# Patient Record
Sex: FEMALE | Race: WHITE | NOT HISPANIC OR LATINO | Employment: OTHER | URBAN - NONMETROPOLITAN AREA
[De-identification: names, ages, dates, MRNs, and addresses within clinical notes are randomized per-mention and may not be internally consistent; named-entity substitution may affect disease eponyms.]

---

## 2017-06-07 ENCOUNTER — TRANSCRIBE ORDERS (OUTPATIENT)
Dept: ADMINISTRATIVE | Facility: HOSPITAL | Age: 67
End: 2017-06-07

## 2017-06-07 DIAGNOSIS — M54.16 LUMBAR RADICULOPATHY: ICD-10-CM

## 2017-06-07 DIAGNOSIS — M54.5 LOW BACK PAIN, UNSPECIFIED BACK PAIN LATERALITY, UNSPECIFIED CHRONICITY, WITH SCIATICA PRESENCE UNSPECIFIED: Primary | ICD-10-CM

## 2017-06-13 ENCOUNTER — APPOINTMENT (OUTPATIENT)
Dept: MRI IMAGING | Facility: HOSPITAL | Age: 67
End: 2017-06-13

## 2017-06-13 ENCOUNTER — APPOINTMENT (OUTPATIENT)
Dept: CT IMAGING | Facility: HOSPITAL | Age: 67
End: 2017-06-13

## 2017-06-19 ENCOUNTER — HOSPITAL ENCOUNTER (OUTPATIENT)
Dept: MRI IMAGING | Facility: HOSPITAL | Age: 67
Discharge: HOME | End: 2017-06-19

## 2017-06-19 ENCOUNTER — HOSPITAL ENCOUNTER (OUTPATIENT)
Dept: CT IMAGING | Facility: HOSPITAL | Age: 67
Discharge: HOME | End: 2017-06-19
Admitting: PHYSICIAN ASSISTANT

## 2017-06-19 DIAGNOSIS — M54.5 LOW BACK PAIN, UNSPECIFIED BACK PAIN LATERALITY, UNSPECIFIED CHRONICITY, WITH SCIATICA PRESENCE UNSPECIFIED: ICD-10-CM

## 2017-06-19 DIAGNOSIS — M54.16 LUMBAR RADICULOPATHY: ICD-10-CM

## 2017-06-19 PROCEDURE — 72148 MRI LUMBAR SPINE W/O DYE: CPT

## 2017-06-19 PROCEDURE — 72131 CT LUMBAR SPINE W/O DYE: CPT

## 2017-07-26 ENCOUNTER — APPOINTMENT (OUTPATIENT)
Dept: PREADMISSION TESTING | Facility: HOSPITAL | Age: 67
End: 2017-07-26

## 2017-07-26 ENCOUNTER — HOSPITAL ENCOUNTER (OUTPATIENT)
Dept: GENERAL RADIOLOGY | Facility: HOSPITAL | Age: 67
Discharge: HOME | End: 2017-07-26
Admitting: ORTHOPAEDIC SURGERY

## 2017-07-26 VITALS
HEIGHT: 63 IN | BODY MASS INDEX: 47.63 KG/M2 | OXYGEN SATURATION: 95 % | DIASTOLIC BLOOD PRESSURE: 82 MMHG | RESPIRATION RATE: 14 BRPM | SYSTOLIC BLOOD PRESSURE: 194 MMHG | WEIGHT: 268.8 LBS | HEART RATE: 53 BPM

## 2017-07-26 LAB
ALBUMIN SERPL-MCNC: 4.6 G/DL (ref 3.5–5)
ALBUMIN/GLOB SERPL: 1.5 G/DL (ref 1.1–2.5)
ALP SERPL-CCNC: 86 U/L (ref 24–120)
ALT SERPL W P-5'-P-CCNC: 43 U/L (ref 0–54)
ANION GAP SERPL CALCULATED.3IONS-SCNC: 14 MMOL/L (ref 4–13)
APTT PPP: 28.3 SECONDS (ref 24.1–34.8)
AST SERPL-CCNC: 34 U/L (ref 7–45)
BASOPHILS # BLD AUTO: 0.02 10*3/MM3 (ref 0–0.2)
BASOPHILS NFR BLD AUTO: 0.4 % (ref 0–2)
BILIRUB SERPL-MCNC: 1 MG/DL (ref 0.1–1)
BILIRUB UR QL STRIP: NEGATIVE
BUN BLD-MCNC: 17 MG/DL (ref 5–21)
BUN/CREAT SERPL: 21.8 (ref 7–25)
CALCIUM SPEC-SCNC: 10.1 MG/DL (ref 8.4–10.4)
CHLORIDE SERPL-SCNC: 102 MMOL/L (ref 98–110)
CLARITY UR: CLEAR
CO2 SERPL-SCNC: 26 MMOL/L (ref 24–31)
COLOR UR: YELLOW
CREAT BLD-MCNC: 0.78 MG/DL (ref 0.5–1.4)
DEPRECATED RDW RBC AUTO: 46.1 FL (ref 40–54)
EOSINOPHIL # BLD AUTO: 0.1 10*3/MM3 (ref 0–0.7)
EOSINOPHIL NFR BLD AUTO: 1.8 % (ref 0–4)
ERYTHROCYTE [DISTWIDTH] IN BLOOD BY AUTOMATED COUNT: 13.6 % (ref 12–15)
GFR SERPL CREATININE-BSD FRML MDRD: 73 ML/MIN/1.73
GLOBULIN UR ELPH-MCNC: 3 GM/DL
GLUCOSE BLD-MCNC: 101 MG/DL (ref 70–100)
GLUCOSE UR STRIP-MCNC: NEGATIVE MG/DL
HCT VFR BLD AUTO: 44.8 % (ref 37–47)
HGB BLD-MCNC: 15.2 G/DL (ref 12–16)
HGB UR QL STRIP.AUTO: NEGATIVE
IMM GRANULOCYTES # BLD: 0.01 10*3/MM3 (ref 0–0.03)
IMM GRANULOCYTES NFR BLD: 0.2 % (ref 0–5)
INR PPP: 0.95 (ref 0.91–1.09)
KETONES UR QL STRIP: NEGATIVE
LEUKOCYTE ESTERASE UR QL STRIP.AUTO: NEGATIVE
LYMPHOCYTES # BLD AUTO: 2.23 10*3/MM3 (ref 0.72–4.86)
LYMPHOCYTES NFR BLD AUTO: 40.3 % (ref 15–45)
MCH RBC QN AUTO: 31.3 PG (ref 28–32)
MCHC RBC AUTO-ENTMCNC: 33.9 G/DL (ref 33–36)
MCV RBC AUTO: 92.2 FL (ref 82–98)
MONOCYTES # BLD AUTO: 0.61 10*3/MM3 (ref 0.19–1.3)
MONOCYTES NFR BLD AUTO: 11 % (ref 4–12)
NEUTROPHILS # BLD AUTO: 2.57 10*3/MM3 (ref 1.87–8.4)
NEUTROPHILS NFR BLD AUTO: 46.3 % (ref 39–78)
NITRITE UR QL STRIP: NEGATIVE
NRBC BLD MANUAL-RTO: 0 /100 WBC (ref 0–0)
PH UR STRIP.AUTO: 5.5 [PH] (ref 5–8)
PLATELET # BLD AUTO: 164 10*3/MM3 (ref 130–400)
PMV BLD AUTO: 11.5 FL (ref 6–12)
POTASSIUM BLD-SCNC: 4.6 MMOL/L (ref 3.5–5.3)
PROT SERPL-MCNC: 7.6 G/DL (ref 6.3–8.7)
PROT UR QL STRIP: NEGATIVE
PROTHROMBIN TIME: 13 SECONDS (ref 11.9–14.6)
RBC # BLD AUTO: 4.86 10*6/MM3 (ref 4.2–5.4)
SODIUM BLD-SCNC: 142 MMOL/L (ref 135–145)
SP GR UR STRIP: 1.02 (ref 1–1.03)
UROBILINOGEN UR QL STRIP: NORMAL
WBC NRBC COR # BLD: 5.54 10*3/MM3 (ref 4.8–10.8)

## 2017-07-26 PROCEDURE — 36415 COLL VENOUS BLD VENIPUNCTURE: CPT

## 2017-07-26 PROCEDURE — 85730 THROMBOPLASTIN TIME PARTIAL: CPT | Performed by: ORTHOPAEDIC SURGERY

## 2017-07-26 PROCEDURE — 71010 HC CHEST PA OR AP: CPT

## 2017-07-26 PROCEDURE — 81003 URINALYSIS AUTO W/O SCOPE: CPT | Performed by: ORTHOPAEDIC SURGERY

## 2017-07-26 PROCEDURE — 80053 COMPREHEN METABOLIC PANEL: CPT | Performed by: ORTHOPAEDIC SURGERY

## 2017-07-26 PROCEDURE — 93005 ELECTROCARDIOGRAM TRACING: CPT

## 2017-07-26 PROCEDURE — 85025 COMPLETE CBC W/AUTO DIFF WBC: CPT | Performed by: ORTHOPAEDIC SURGERY

## 2017-07-26 PROCEDURE — 85610 PROTHROMBIN TIME: CPT | Performed by: ORTHOPAEDIC SURGERY

## 2017-07-26 PROCEDURE — 93010 ELECTROCARDIOGRAM REPORT: CPT | Performed by: INTERNAL MEDICINE

## 2017-07-26 RX ORDER — OXYCODONE AND ACETAMINOPHEN 10; 325 MG/1; MG/1
1 TABLET ORAL 4 TIMES DAILY
COMMUNITY
End: 2017-08-03 | Stop reason: HOSPADM

## 2017-07-26 RX ORDER — IBUPROFEN 200 MG
200 TABLET ORAL EVERY 6 HOURS PRN
COMMUNITY
End: 2017-07-26

## 2017-07-26 RX ORDER — DULOXETIN HYDROCHLORIDE 60 MG/1
60 CAPSULE, DELAYED RELEASE ORAL DAILY
COMMUNITY

## 2017-07-26 RX ORDER — BISOPROLOL FUMARATE AND HYDROCHLOROTHIAZIDE 5; 6.25 MG/1; MG/1
1 TABLET ORAL DAILY
Status: ON HOLD | COMMUNITY
End: 2017-08-02

## 2017-07-27 RX ORDER — IBUPROFEN 200 MG
200 TABLET ORAL AS NEEDED
COMMUNITY
End: 2017-08-03 | Stop reason: HOSPADM

## 2017-08-01 ENCOUNTER — ANESTHESIA EVENT (OUTPATIENT)
Dept: PERIOP | Facility: HOSPITAL | Age: 67
End: 2017-08-01

## 2017-08-02 ENCOUNTER — APPOINTMENT (OUTPATIENT)
Dept: GENERAL RADIOLOGY | Facility: HOSPITAL | Age: 67
End: 2017-08-02

## 2017-08-02 ENCOUNTER — ANESTHESIA (OUTPATIENT)
Dept: PERIOP | Facility: HOSPITAL | Age: 67
End: 2017-08-02

## 2017-08-02 ENCOUNTER — HOSPITAL ENCOUNTER (INPATIENT)
Facility: HOSPITAL | Age: 67
LOS: 1 days | Discharge: HOME | End: 2017-08-03
Attending: ORTHOPAEDIC SURGERY | Admitting: ORTHOPAEDIC SURGERY

## 2017-08-02 DIAGNOSIS — Z74.09 IMPAIRED FUNCTIONAL MOBILITY, BALANCE, GAIT, AND ENDURANCE: ICD-10-CM

## 2017-08-02 DIAGNOSIS — Z78.9 DECREASED ACTIVITIES OF DAILY LIVING (ADL): ICD-10-CM

## 2017-08-02 PROBLEM — M48.061 SPINAL STENOSIS, LUMBAR: Status: ACTIVE | Noted: 2017-08-02

## 2017-08-02 PROCEDURE — 07DR3ZZ EXTRACTION OF ILIAC BONE MARROW, PERCUTANEOUS APPROACH: ICD-10-PCS | Performed by: ORTHOPAEDIC SURGERY

## 2017-08-02 PROCEDURE — 25010000002 HEPARIN (PORCINE) PER 1000 UNITS: Performed by: ORTHOPAEDIC SURGERY

## 2017-08-02 PROCEDURE — G8979 MOBILITY GOAL STATUS: HCPCS

## 2017-08-02 PROCEDURE — 25010000002 NEOSTIGMINE PER 0.5 MG: Performed by: NURSE ANESTHETIST, CERTIFIED REGISTERED

## 2017-08-02 PROCEDURE — 25010000002 HYDROMORPHONE PER 4 MG: Performed by: ORTHOPAEDIC SURGERY

## 2017-08-02 PROCEDURE — 72100 X-RAY EXAM L-S SPINE 2/3 VWS: CPT

## 2017-08-02 PROCEDURE — 25010000002 PROPOFOL 10 MG/ML EMULSION: Performed by: NURSE ANESTHETIST, CERTIFIED REGISTERED

## 2017-08-02 PROCEDURE — 76000 FLUOROSCOPY <1 HR PHYS/QHP: CPT

## 2017-08-02 PROCEDURE — 25010000002 DEXAMETHASONE PER 1 MG: Performed by: NURSE ANESTHETIST, CERTIFIED REGISTERED

## 2017-08-02 PROCEDURE — 25010000003 CEFAZOLIN PER 500 MG: Performed by: ORTHOPAEDIC SURGERY

## 2017-08-02 PROCEDURE — 25010000003 CEFAZOLIN IN D5W 1 GM/50ML SOLUTION: Performed by: ORTHOPAEDIC SURGERY

## 2017-08-02 PROCEDURE — 0SB20ZZ EXCISION OF LUMBAR VERTEBRAL DISC, OPEN APPROACH: ICD-10-PCS | Performed by: ORTHOPAEDIC SURGERY

## 2017-08-02 PROCEDURE — 94760 N-INVAS EAR/PLS OXIMETRY 1: CPT

## 2017-08-02 PROCEDURE — 25010000002 HYDROMORPHONE PER 4 MG: Performed by: ANESTHESIOLOGY

## 2017-08-02 PROCEDURE — G8988 SELF CARE GOAL STATUS: HCPCS | Performed by: OCCUPATIONAL THERAPIST

## 2017-08-02 PROCEDURE — 25010000002 MEPERIDINE PER 100 MG: Performed by: ANESTHESIOLOGY

## 2017-08-02 PROCEDURE — C1713 ANCHOR/SCREW BN/BN,TIS/BN: HCPCS | Performed by: ORTHOPAEDIC SURGERY

## 2017-08-02 PROCEDURE — 94640 AIRWAY INHALATION TREATMENT: CPT

## 2017-08-02 PROCEDURE — 97161 PT EVAL LOW COMPLEX 20 MIN: CPT

## 2017-08-02 PROCEDURE — 94799 UNLISTED PULMONARY SVC/PX: CPT

## 2017-08-02 PROCEDURE — 0SG00A0 FUSION OF LUMBAR VERTEBRAL JOINT WITH INTERBODY FUSION DEVICE, ANTERIOR APPROACH, ANTERIOR COLUMN, OPEN APPROACH: ICD-10-PCS | Performed by: ORTHOPAEDIC SURGERY

## 2017-08-02 PROCEDURE — G8978 MOBILITY CURRENT STATUS: HCPCS

## 2017-08-02 PROCEDURE — 25010000002 MORPHINE SULFATE (PF) 2 MG/ML SOLUTION: Performed by: ANESTHESIOLOGY

## 2017-08-02 PROCEDURE — 97165 OT EVAL LOW COMPLEX 30 MIN: CPT | Performed by: OCCUPATIONAL THERAPIST

## 2017-08-02 PROCEDURE — G8987 SELF CARE CURRENT STATUS: HCPCS | Performed by: OCCUPATIONAL THERAPIST

## 2017-08-02 PROCEDURE — 25010000002 PROPOFOL 1000 MG/ML EMULSION: Performed by: NURSE ANESTHETIST, CERTIFIED REGISTERED

## 2017-08-02 PROCEDURE — 25010000002 ONDANSETRON PER 1 MG: Performed by: NURSE ANESTHETIST, CERTIFIED REGISTERED

## 2017-08-02 DEVICE — SCRW IPF TIMBERLINE VARI S/TAP 5.5X45MM: Type: IMPLANTABLE DEVICE | Status: FUNCTIONAL

## 2017-08-02 DEVICE — PLT IPF TIMBERLINE LP 2H 12MM: Type: IMPLANTABLE DEVICE | Status: FUNCTIONAL

## 2017-08-02 DEVICE — CVR IPF TIMBERLINE 2H: Type: IMPLANTABLE DEVICE | Status: FUNCTIONAL

## 2017-08-02 DEVICE — SPACR IPF TIMBERLINE LRD 12X22X50MM 8D: Type: IMPLANTABLE DEVICE | Status: FUNCTIONAL

## 2017-08-02 DEVICE — BONE FILLER VOID NANOSS ADV 3D 10CC: Type: IMPLANTABLE DEVICE | Status: FUNCTIONAL

## 2017-08-02 RX ORDER — LOSARTAN POTASSIUM AND HYDROCHLOROTHIAZIDE 25; 100 MG/1; MG/1
1 TABLET ORAL
Status: DISCONTINUED | OUTPATIENT
Start: 2017-08-02 | End: 2017-08-02 | Stop reason: CLARIF

## 2017-08-02 RX ORDER — SODIUM CHLORIDE 0.9 % (FLUSH) 0.9 %
1-10 SYRINGE (ML) INJECTION AS NEEDED
Status: DISCONTINUED | OUTPATIENT
Start: 2017-08-02 | End: 2017-08-02 | Stop reason: HOSPADM

## 2017-08-02 RX ORDER — MIDAZOLAM HYDROCHLORIDE 1 MG/ML
1 INJECTION INTRAMUSCULAR; INTRAVENOUS
Status: DISCONTINUED | OUTPATIENT
Start: 2017-08-02 | End: 2017-08-02 | Stop reason: HOSPADM

## 2017-08-02 RX ORDER — FAMOTIDINE 20 MG/1
20 TABLET, FILM COATED ORAL EVERY 12 HOURS SCHEDULED
Status: DISCONTINUED | OUTPATIENT
Start: 2017-08-02 | End: 2017-08-03 | Stop reason: HOSPADM

## 2017-08-02 RX ORDER — VASOPRESSIN 20 U/ML
INJECTION PARENTERAL AS NEEDED
Status: DISCONTINUED | OUTPATIENT
Start: 2017-08-02 | End: 2017-08-02 | Stop reason: SURG

## 2017-08-02 RX ORDER — FUROSEMIDE 20 MG/1
40 TABLET ORAL DAILY PRN
Status: DISCONTINUED | OUTPATIENT
Start: 2017-08-02 | End: 2017-08-03 | Stop reason: HOSPADM

## 2017-08-02 RX ORDER — DULOXETIN HYDROCHLORIDE 30 MG/1
60 CAPSULE, DELAYED RELEASE ORAL DAILY
Status: DISCONTINUED | OUTPATIENT
Start: 2017-08-02 | End: 2017-08-02

## 2017-08-02 RX ORDER — DEXTROSE MONOHYDRATE 25 G/50ML
12.5 INJECTION, SOLUTION INTRAVENOUS AS NEEDED
Status: DISCONTINUED | OUTPATIENT
Start: 2017-08-02 | End: 2017-08-02 | Stop reason: HOSPADM

## 2017-08-02 RX ORDER — ONDANSETRON 2 MG/ML
4 INJECTION INTRAMUSCULAR; INTRAVENOUS ONCE AS NEEDED
Status: DISCONTINUED | OUTPATIENT
Start: 2017-08-02 | End: 2017-08-02 | Stop reason: HOSPADM

## 2017-08-02 RX ORDER — SODIUM CHLORIDE, SODIUM LACTATE, POTASSIUM CHLORIDE, CALCIUM CHLORIDE 600; 310; 30; 20 MG/100ML; MG/100ML; MG/100ML; MG/100ML
9 INJECTION, SOLUTION INTRAVENOUS CONTINUOUS
Status: DISCONTINUED | OUTPATIENT
Start: 2017-08-02 | End: 2017-08-02 | Stop reason: SDUPTHER

## 2017-08-02 RX ORDER — SODIUM CHLORIDE 0.9 % (FLUSH) 0.9 %
3 SYRINGE (ML) INJECTION AS NEEDED
Status: DISCONTINUED | OUTPATIENT
Start: 2017-08-02 | End: 2017-08-02 | Stop reason: HOSPADM

## 2017-08-02 RX ORDER — IPRATROPIUM BROMIDE AND ALBUTEROL SULFATE 2.5; .5 MG/3ML; MG/3ML
3 SOLUTION RESPIRATORY (INHALATION)
Status: DISCONTINUED | OUTPATIENT
Start: 2017-08-02 | End: 2017-08-03 | Stop reason: HOSPADM

## 2017-08-02 RX ORDER — HYDRALAZINE HYDROCHLORIDE 20 MG/ML
5 INJECTION INTRAMUSCULAR; INTRAVENOUS
Status: DISCONTINUED | OUTPATIENT
Start: 2017-08-02 | End: 2017-08-02 | Stop reason: HOSPADM

## 2017-08-02 RX ORDER — PROPOFOL 10 MG/ML
VIAL (ML) INTRAVENOUS AS NEEDED
Status: DISCONTINUED | OUTPATIENT
Start: 2017-08-02 | End: 2017-08-02 | Stop reason: SURG

## 2017-08-02 RX ORDER — HEPARIN SODIUM 10000 [USP'U]/ML
INJECTION, SOLUTION INTRAVENOUS; SUBCUTANEOUS AS NEEDED
Status: DISCONTINUED | OUTPATIENT
Start: 2017-08-02 | End: 2017-08-02 | Stop reason: HOSPADM

## 2017-08-02 RX ORDER — SODIUM CHLORIDE 9 MG/ML
75 INJECTION, SOLUTION INTRAVENOUS CONTINUOUS
Status: DISPENSED | OUTPATIENT
Start: 2017-08-02 | End: 2017-08-03

## 2017-08-02 RX ORDER — DIPHENHYDRAMINE HCL 25 MG
25 CAPSULE ORAL NIGHTLY PRN
Status: DISCONTINUED | OUTPATIENT
Start: 2017-08-02 | End: 2017-08-03 | Stop reason: HOSPADM

## 2017-08-02 RX ORDER — ONDANSETRON 2 MG/ML
4 INJECTION INTRAMUSCULAR; INTRAVENOUS EVERY 6 HOURS PRN
Status: DISCONTINUED | OUTPATIENT
Start: 2017-08-02 | End: 2017-08-02 | Stop reason: SDUPTHER

## 2017-08-02 RX ORDER — SODIUM CHLORIDE 9 MG/ML
75 INJECTION, SOLUTION INTRAVENOUS CONTINUOUS
Status: DISCONTINUED | OUTPATIENT
Start: 2017-08-02 | End: 2017-08-03 | Stop reason: HOSPADM

## 2017-08-02 RX ORDER — MEPERIDINE HYDROCHLORIDE 25 MG/ML
12.5 INJECTION INTRAMUSCULAR; INTRAVENOUS; SUBCUTANEOUS
Status: COMPLETED | OUTPATIENT
Start: 2017-08-02 | End: 2017-08-02

## 2017-08-02 RX ORDER — DIPHENHYDRAMINE HYDROCHLORIDE 50 MG/ML
12.5 INJECTION INTRAMUSCULAR; INTRAVENOUS
Status: DISCONTINUED | OUTPATIENT
Start: 2017-08-02 | End: 2017-08-02 | Stop reason: HOSPADM

## 2017-08-02 RX ORDER — FENTANYL CITRATE 50 UG/ML
25 INJECTION, SOLUTION INTRAMUSCULAR; INTRAVENOUS
Status: DISCONTINUED | OUTPATIENT
Start: 2017-08-02 | End: 2017-08-02 | Stop reason: HOSPADM

## 2017-08-02 RX ORDER — IPRATROPIUM BROMIDE AND ALBUTEROL SULFATE 2.5; .5 MG/3ML; MG/3ML
3 SOLUTION RESPIRATORY (INHALATION) ONCE AS NEEDED
Status: DISCONTINUED | OUTPATIENT
Start: 2017-08-02 | End: 2017-08-02 | Stop reason: HOSPADM

## 2017-08-02 RX ORDER — MORPHINE SULFATE 2 MG/ML
2 INJECTION, SOLUTION INTRAMUSCULAR; INTRAVENOUS
Status: DISCONTINUED | OUTPATIENT
Start: 2017-08-02 | End: 2017-08-02 | Stop reason: HOSPADM

## 2017-08-02 RX ORDER — ONDANSETRON 4 MG/1
4 TABLET, ORALLY DISINTEGRATING ORAL EVERY 6 HOURS PRN
Status: DISCONTINUED | OUTPATIENT
Start: 2017-08-02 | End: 2017-08-03 | Stop reason: HOSPADM

## 2017-08-02 RX ORDER — HYDROCHLOROTHIAZIDE 25 MG/1
12.5 TABLET ORAL NIGHTLY
Status: DISCONTINUED | OUTPATIENT
Start: 2017-08-02 | End: 2017-08-03 | Stop reason: HOSPADM

## 2017-08-02 RX ORDER — SODIUM CHLORIDE 0.9 % (FLUSH) 0.9 %
1-10 SYRINGE (ML) INJECTION AS NEEDED
Status: DISCONTINUED | OUTPATIENT
Start: 2017-08-02 | End: 2017-08-03 | Stop reason: HOSPADM

## 2017-08-02 RX ORDER — SODIUM CHLORIDE, SODIUM LACTATE, POTASSIUM CHLORIDE, CALCIUM CHLORIDE 600; 310; 30; 20 MG/100ML; MG/100ML; MG/100ML; MG/100ML
30 INJECTION, SOLUTION INTRAVENOUS CONTINUOUS
Status: DISCONTINUED | OUTPATIENT
Start: 2017-08-02 | End: 2017-08-02 | Stop reason: SDUPTHER

## 2017-08-02 RX ORDER — ONDANSETRON 2 MG/ML
INJECTION INTRAMUSCULAR; INTRAVENOUS AS NEEDED
Status: DISCONTINUED | OUTPATIENT
Start: 2017-08-02 | End: 2017-08-02 | Stop reason: SURG

## 2017-08-02 RX ORDER — SUFENTANIL CITRATE 50 UG/ML
INJECTION EPIDURAL; INTRAVENOUS AS NEEDED
Status: DISCONTINUED | OUTPATIENT
Start: 2017-08-02 | End: 2017-08-02 | Stop reason: SURG

## 2017-08-02 RX ORDER — FUROSEMIDE 40 MG/1
40 TABLET ORAL DAILY PRN
COMMUNITY

## 2017-08-02 RX ORDER — ONDANSETRON 2 MG/ML
4 INJECTION INTRAMUSCULAR; INTRAVENOUS EVERY 6 HOURS PRN
Status: DISCONTINUED | OUTPATIENT
Start: 2017-08-02 | End: 2017-08-03 | Stop reason: HOSPADM

## 2017-08-02 RX ORDER — FAMOTIDINE 10 MG/ML
20 INJECTION, SOLUTION INTRAVENOUS EVERY 12 HOURS SCHEDULED
Status: DISCONTINUED | OUTPATIENT
Start: 2017-08-02 | End: 2017-08-03 | Stop reason: SDUPTHER

## 2017-08-02 RX ORDER — AMLODIPINE BESYLATE 5 MG/1
5 TABLET ORAL NIGHTLY
Status: DISCONTINUED | OUTPATIENT
Start: 2017-08-02 | End: 2017-08-03 | Stop reason: HOSPADM

## 2017-08-02 RX ORDER — OXYCODONE AND ACETAMINOPHEN 10; 325 MG/1; MG/1
2 TABLET ORAL EVERY 4 HOURS PRN
Status: DISCONTINUED | OUTPATIENT
Start: 2017-08-02 | End: 2017-08-03 | Stop reason: HOSPADM

## 2017-08-02 RX ORDER — ROCURONIUM BROMIDE 10 MG/ML
INJECTION, SOLUTION INTRAVENOUS AS NEEDED
Status: DISCONTINUED | OUTPATIENT
Start: 2017-08-02 | End: 2017-08-02 | Stop reason: SURG

## 2017-08-02 RX ORDER — DULOXETIN HYDROCHLORIDE 30 MG/1
60 CAPSULE, DELAYED RELEASE ORAL NIGHTLY
Status: DISCONTINUED | OUTPATIENT
Start: 2017-08-02 | End: 2017-08-03 | Stop reason: HOSPADM

## 2017-08-02 RX ORDER — NALOXONE HCL 0.4 MG/ML
0.4 VIAL (ML) INJECTION AS NEEDED
Status: DISCONTINUED | OUTPATIENT
Start: 2017-08-02 | End: 2017-08-02 | Stop reason: HOSPADM

## 2017-08-02 RX ORDER — MAGNESIUM HYDROXIDE 1200 MG/15ML
LIQUID ORAL AS NEEDED
Status: DISCONTINUED | OUTPATIENT
Start: 2017-08-02 | End: 2017-08-02 | Stop reason: HOSPADM

## 2017-08-02 RX ORDER — DEXAMETHASONE SODIUM PHOSPHATE 4 MG/ML
INJECTION, SOLUTION INTRA-ARTICULAR; INTRALESIONAL; INTRAMUSCULAR; INTRAVENOUS; SOFT TISSUE AS NEEDED
Status: DISCONTINUED | OUTPATIENT
Start: 2017-08-02 | End: 2017-08-02 | Stop reason: SURG

## 2017-08-02 RX ORDER — SODIUM CHLORIDE, SODIUM LACTATE, POTASSIUM CHLORIDE, CALCIUM CHLORIDE 600; 310; 30; 20 MG/100ML; MG/100ML; MG/100ML; MG/100ML
1000 INJECTION, SOLUTION INTRAVENOUS CONTINUOUS PRN
Status: DISCONTINUED | OUTPATIENT
Start: 2017-08-02 | End: 2017-08-02 | Stop reason: HOSPADM

## 2017-08-02 RX ORDER — GLYCOPYRROLATE 0.2 MG/ML
INJECTION INTRAMUSCULAR; INTRAVENOUS AS NEEDED
Status: DISCONTINUED | OUTPATIENT
Start: 2017-08-02 | End: 2017-08-02 | Stop reason: SURG

## 2017-08-02 RX ORDER — LIDOCAINE HYDROCHLORIDE 10 MG/ML
0.5 INJECTION, SOLUTION INFILTRATION; PERINEURAL ONCE AS NEEDED
Status: DISCONTINUED | OUTPATIENT
Start: 2017-08-02 | End: 2017-08-02 | Stop reason: SDUPTHER

## 2017-08-02 RX ORDER — LABETALOL HYDROCHLORIDE 5 MG/ML
5 INJECTION, SOLUTION INTRAVENOUS
Status: DISCONTINUED | OUTPATIENT
Start: 2017-08-02 | End: 2017-08-02 | Stop reason: HOSPADM

## 2017-08-02 RX ORDER — LOSARTAN POTASSIUM 50 MG/1
50 TABLET ORAL NIGHTLY
Status: DISCONTINUED | OUTPATIENT
Start: 2017-08-02 | End: 2017-08-03 | Stop reason: HOSPADM

## 2017-08-02 RX ORDER — ONDANSETRON 4 MG/1
4 TABLET, FILM COATED ORAL EVERY 6 HOURS PRN
Status: DISCONTINUED | OUTPATIENT
Start: 2017-08-02 | End: 2017-08-03 | Stop reason: HOSPADM

## 2017-08-02 RX ORDER — MIDAZOLAM HYDROCHLORIDE 1 MG/ML
2 INJECTION INTRAMUSCULAR; INTRAVENOUS
Status: DISCONTINUED | OUTPATIENT
Start: 2017-08-02 | End: 2017-08-02 | Stop reason: HOSPADM

## 2017-08-02 RX ORDER — OLMESARTAN MEDOXOMIL, AMLODIPINE AND HYDROCHLOROTHIAZIDE TABLET 20/5/12.5 MG 20; 5; 12.5 MG/1; MG/1; MG/1
1 TABLET ORAL DAILY
COMMUNITY
End: 2020-05-26

## 2017-08-02 RX ADMIN — IPRATROPIUM BROMIDE AND ALBUTEROL SULFATE 3 ML: .5; 3 SOLUTION RESPIRATORY (INHALATION) at 16:08

## 2017-08-02 RX ADMIN — MORPHINE SULFATE 2 MG: 2 INJECTION, SOLUTION INTRAMUSCULAR; INTRAVENOUS at 09:25

## 2017-08-02 RX ADMIN — MEPERIDINE HYDROCHLORIDE 12.5 MG: 25 INJECTION, SOLUTION INTRAMUSCULAR; INTRAVENOUS; SUBCUTANEOUS at 08:59

## 2017-08-02 RX ADMIN — VASOPRESSIN 1 UNITS: 20 INJECTION INTRAVENOUS at 07:42

## 2017-08-02 RX ADMIN — CEFAZOLIN SODIUM 1 G: 1 INJECTION, SOLUTION INTRAVENOUS at 21:04

## 2017-08-02 RX ADMIN — CEFAZOLIN 3 G: 1 INJECTION, POWDER, FOR SOLUTION INTRAVENOUS at 07:31

## 2017-08-02 RX ADMIN — Medication 10 ML: at 10:40

## 2017-08-02 RX ADMIN — HYDROMORPHONE HYDROCHLORIDE 0.5 MG: 1 INJECTION, SOLUTION INTRAMUSCULAR; INTRAVENOUS; SUBCUTANEOUS at 09:17

## 2017-08-02 RX ADMIN — DEXAMETHASONE SODIUM PHOSPHATE 4 MG: 4 INJECTION, SOLUTION INTRAMUSCULAR; INTRAVENOUS at 08:28

## 2017-08-02 RX ADMIN — SUFENTANIL CITRATE 20 MCG: 50 INJECTION, SOLUTION EPIDURAL; INTRAVENOUS at 07:53

## 2017-08-02 RX ADMIN — VASOPRESSIN 0.5 UNITS: 20 INJECTION INTRAVENOUS at 07:26

## 2017-08-02 RX ADMIN — OXYCODONE HYDROCHLORIDE AND ACETAMINOPHEN 2 TABLET: 10; 325 TABLET ORAL at 14:36

## 2017-08-02 RX ADMIN — HYDROMORPHONE HYDROCHLORIDE 1 MG: 1 INJECTION, SOLUTION INTRAMUSCULAR; INTRAVENOUS; SUBCUTANEOUS at 11:50

## 2017-08-02 RX ADMIN — PROPOFOL 50 MCG/KG/MIN: 10 INJECTION, EMULSION INTRAVENOUS at 07:33

## 2017-08-02 RX ADMIN — SODIUM CHLORIDE, POTASSIUM CHLORIDE, SODIUM LACTATE AND CALCIUM CHLORIDE 30 ML/HR: 600; 310; 30; 20 INJECTION, SOLUTION INTRAVENOUS at 05:58

## 2017-08-02 RX ADMIN — SUFENTANIL CITRATE 30 MCG: 50 INJECTION, SOLUTION EPIDURAL; INTRAVENOUS at 07:16

## 2017-08-02 RX ADMIN — CEFAZOLIN SODIUM 1 G: 1 INJECTION, SOLUTION INTRAVENOUS at 14:36

## 2017-08-02 RX ADMIN — HYDROMORPHONE HYDROCHLORIDE 0.5 MG: 1 INJECTION, SOLUTION INTRAMUSCULAR; INTRAVENOUS; SUBCUTANEOUS at 09:12

## 2017-08-02 RX ADMIN — SODIUM CHLORIDE, POTASSIUM CHLORIDE, SODIUM LACTATE AND CALCIUM CHLORIDE 30 ML/HR: 600; 310; 30; 20 INJECTION, SOLUTION INTRAVENOUS at 09:54

## 2017-08-02 RX ADMIN — HYDROMORPHONE HYDROCHLORIDE 0.5 MG: 1 INJECTION, SOLUTION INTRAMUSCULAR; INTRAVENOUS; SUBCUTANEOUS at 08:56

## 2017-08-02 RX ADMIN — HYDROMORPHONE HYDROCHLORIDE 1 MG: 1 INJECTION, SOLUTION INTRAMUSCULAR; INTRAVENOUS; SUBCUTANEOUS at 17:12

## 2017-08-02 RX ADMIN — EPHEDRINE SULFATE 15 MG: 50 INJECTION INTRAMUSCULAR; INTRAVENOUS; SUBCUTANEOUS at 07:25

## 2017-08-02 RX ADMIN — EPHEDRINE SULFATE 15 MG: 50 INJECTION INTRAMUSCULAR; INTRAVENOUS; SUBCUTANEOUS at 07:22

## 2017-08-02 RX ADMIN — MEPERIDINE HYDROCHLORIDE 12.5 MG: 25 INJECTION, SOLUTION INTRAMUSCULAR; INTRAVENOUS; SUBCUTANEOUS at 09:10

## 2017-08-02 RX ADMIN — IPRATROPIUM BROMIDE AND ALBUTEROL SULFATE 3 ML: .5; 3 SOLUTION RESPIRATORY (INHALATION) at 19:59

## 2017-08-02 RX ADMIN — SODIUM CHLORIDE, POTASSIUM CHLORIDE, SODIUM LACTATE AND CALCIUM CHLORIDE 1000 ML: 600; 310; 30; 20 INJECTION, SOLUTION INTRAVENOUS at 05:59

## 2017-08-02 RX ADMIN — OXYCODONE HYDROCHLORIDE AND ACETAMINOPHEN 2 TABLET: 10; 325 TABLET ORAL at 10:29

## 2017-08-02 RX ADMIN — OXYCODONE HYDROCHLORIDE AND ACETAMINOPHEN 2 TABLET: 10; 325 TABLET ORAL at 19:55

## 2017-08-02 RX ADMIN — Medication 2 MG: at 07:40

## 2017-08-02 RX ADMIN — ROCURONIUM BROMIDE 30 MG: 10 INJECTION INTRAVENOUS at 07:16

## 2017-08-02 RX ADMIN — PROPOFOL 150 MG: 10 INJECTION, EMULSION INTRAVENOUS at 07:16

## 2017-08-02 RX ADMIN — AMLODIPINE BESYLATE 5 MG: 5 TABLET ORAL at 21:06

## 2017-08-02 RX ADMIN — MORPHINE SULFATE 2 MG: 2 INJECTION, SOLUTION INTRAMUSCULAR; INTRAVENOUS at 09:32

## 2017-08-02 RX ADMIN — GLYCOPYRROLATE 0.3 MG: 0.2 INJECTION, SOLUTION INTRAMUSCULAR; INTRAVENOUS at 07:40

## 2017-08-02 RX ADMIN — IPRATROPIUM BROMIDE AND ALBUTEROL SULFATE 3 ML: .5; 3 SOLUTION RESPIRATORY (INHALATION) at 12:28

## 2017-08-02 RX ADMIN — FAMOTIDINE 20 MG: 20 TABLET, FILM COATED ORAL at 21:05

## 2017-08-02 RX ADMIN — LIDOCAINE HYDROCHLORIDE 0.5 ML: 10 INJECTION, SOLUTION INFILTRATION; PERINEURAL at 06:00

## 2017-08-02 RX ADMIN — HYDROMORPHONE HYDROCHLORIDE 0.5 MG: 1 INJECTION, SOLUTION INTRAMUSCULAR; INTRAVENOUS; SUBCUTANEOUS at 09:03

## 2017-08-02 RX ADMIN — SODIUM CHLORIDE 75 ML/HR: 9 INJECTION, SOLUTION INTRAVENOUS at 10:39

## 2017-08-02 RX ADMIN — DULOXETINE HYDROCHLORIDE 60 MG: 30 CAPSULE, DELAYED RELEASE ORAL at 21:04

## 2017-08-02 RX ADMIN — ONDANSETRON HYDROCHLORIDE 4 MG: 2 SOLUTION INTRAMUSCULAR; INTRAVENOUS at 08:28

## 2017-08-02 RX ADMIN — HYDROCHLOROTHIAZIDE 12.5 MG: 25 TABLET ORAL at 21:06

## 2017-08-02 RX ADMIN — LOSARTAN POTASSIUM 50 MG: 50 TABLET ORAL at 21:06

## 2017-08-02 NOTE — ANESTHESIA PREPROCEDURE EVALUATION
Anesthesia Evaluation     Patient summary reviewed   no history of anesthetic complications:  NPO Solid Status: > 8 hours       Airway   Mallampati: II  TM distance: >3 FB  Neck ROM: full  Dental      Pulmonary    (-) asthma, sleep apnea, not a smoker  Cardiovascular   Exercise tolerance: poor (<4 METS)    ECG reviewed    (+) hypertension,   (-) pacemaker, past MI, angina, cardiac stents      Neuro/Psych  (-) seizures, CVA  GI/Hepatic/Renal/Endo    (+) morbid obesity,   (-) GERD, liver disease, no renal disease, diabetes    Musculoskeletal     Abdominal    Substance History      OB/GYN          Other                                        Anesthesia Plan    ASA 3     general     intravenous induction   Anesthetic plan and risks discussed with patient.

## 2017-08-02 NOTE — PLAN OF CARE
Problem: Patient Care Overview (Adult)  Goal: Plan of Care Review  Outcome: Ongoing (interventions implemented as appropriate)    08/02/17 0708   Coping/Psychosocial Response Interventions   Plan Of Care Reviewed With patient   Patient Care Overview   Progress no change   Outcome Evaluation   Outcome Summary/Follow up Plan no changes in the preop holding phase

## 2017-08-02 NOTE — PLAN OF CARE
Problem: Patient Care Overview (Adult)  Goal: Plan of Care Review  Outcome: Ongoing (interventions implemented as appropriate)    08/02/17 1506   Coping/Psychosocial Response Interventions   Plan Of Care Reviewed With patient   Patient Care Overview   Progress progress toward functional goals as expected   Outcome Evaluation   Outcome Summary/Follow up Plan OT jhonatan completed. Pt was min x2 for bed mobility. Pt was CGA for t/f and functional mobility. Pt was mod A to joann brace. Pt was supervision for toileting. Skilled OT recommended to address adls, functional mobility, and education. Recommended d/c home with assist and HHOT v. OPOT.         Problem: Inpatient Occupational Therapy  Goal: Patient Education Goal LTG- OT  Outcome: Ongoing (interventions implemented as appropriate)    08/02/17 1506   Patient Education OT LTG   Patient Education OT LTG, Date Established 08/02/17   Patient Education OT LTG, Time to Achieve by discharge   Patient Education OT LTG, Education Type written program;HEP;precautions per surgeon;brace use/care;positioning;posture/body mechanics;home safety;adaptive equipment mgmt;skin care/inspection;energy conservation;work simplification;adaptive breathing;joann/doff brace   Patient Education OT LTG, Education Understanding demonstrates adequately;verbalizes understanding;independent       Goal: Bathing Goal LTG- OT  Outcome: Ongoing (interventions implemented as appropriate)    08/02/17 1506   Bathing OT LTG   Bathing Goal OT LTG, Date Established 08/02/17   Bathing Goal OT LTG, Time to Achieve by discharge   Bathing Goal OT LTG, Activity Type upper body bathing;lower body bathing   Bathing Goal OT LTG, [place] Level conditional independence   Bathing Goal OT LTG, Assist Device grab bars;tub bench with back;shower head, detachable;sponge, long handled       Goal: Toileting Goal LTG- OT  Outcome: Ongoing (interventions implemented as appropriate)    08/02/17 1506   Toileting OT LTG    Toileting Goal OT LTG, Date Established 08/02/17   Toileting Goal OT LTG, Time to Achieve by discharge   Toileting Goal OT LTG, [place] Level conditional independence   Toileting Goal OT LTG, Assist Device toilet seat, raised       Goal: LB Dressing Goal LTG- OT  Outcome: Ongoing (interventions implemented as appropriate)    08/02/17 1506   LB Dressing OT LTG   LB Dressing Goal OT LTG, Date Established 08/02/17   LB Dressing Goal OT LTG, Time to Achieve by discharge   LB Dressing Goal OT LTG, [place] Level conditional independence   LB Dressing Goal OT LTG, Adaptive Equipment laces, elastic;reacher;shoe horn, long handled;sock-aid  (AE PRN)

## 2017-08-02 NOTE — PLAN OF CARE
Problem: Patient Care Overview (Adult)  Goal: Plan of Care Review  Outcome: Ongoing (interventions implemented as appropriate)    08/02/17 1512   Coping/Psychosocial Response Interventions   Plan Of Care Reviewed With patient   Outcome Evaluation   Outcome Summary/Follow up Plan PT eval complete. Pt required min x2 for bed mobility. Pt CGA for sit to stand transfers. Pt ambulated up to 172' with CGA and RW. Pt progressed to supervision with standing balance. Pt stated decrease in pain with ambulation. Pt would benefit from PT to address functional mobility, strength, brace management, balance and education on safety and precautions. Anticipate D/C home with HH/outpatient services.         Problem: Inpatient Physical Therapy  Goal: Bed Mobility Goal LTG- PT  Outcome: Ongoing (interventions implemented as appropriate)    08/02/17 1512   Bed Mobility PT LTG   Bed Mobility PT LTG, Date Established 08/02/17   Bed Mobility PT LTG, Time to Achieve by discharge   Bed Mobility PT LTG, Activity Type all bed mobility   Bed Mobility PT LTG, [place] Level independent       Goal: Transfer Training Goal 1 LTG- PT  Outcome: Ongoing (interventions implemented as appropriate)    08/02/17 1512   Transfer Training PT LTG   Transfer Training PT LTG, Date Established 08/02/17   Transfer Training PT LTG, Time to Achieve by discharge   Transfer Training PT LTG, Activity Type bed to chair /chair to bed;sit to stand/stand to sit   Transfer Training PT LTG, [place] Level independent   Transfer Training PT LTG, Assist Device walker, rolling       Goal: Gait Training Goal LTG- PT  Outcome: Ongoing (interventions implemented as appropriate)    08/02/17 1512   Gait Training PT LTG   Gait Training Goal PT LTG, Date Established 08/02/17   Gait Training Goal PT LTG, Time to Achieve by discharge   Gait Training Goal PT LTG, [place] Level independent   Gait Training Goal PT LTG, Assist Device walker, rolling   Gait Training Goal PT LTG,  Distance to Achieve 250'

## 2017-08-02 NOTE — PLAN OF CARE
Problem: Patient Care Overview (Adult)  Goal: Plan of Care Review  Outcome: Ongoing (interventions implemented as appropriate)    08/02/17 1000   Coping/Psychosocial Response Interventions   Plan Of Care Reviewed With patient   Patient Care Overview   Progress improving   Outcome Evaluation   Outcome Summary/Follow up Plan met dc criteria pacu         Problem: Perioperative Period (Adult)  Goal: Signs and Symptoms of Listed Potential Problems Will be Absent or Manageable (Perioperative Period)  Outcome: Ongoing (interventions implemented as appropriate)

## 2017-08-02 NOTE — ANESTHESIA POSTPROCEDURE EVALUATION
"Patient: Brenda Sneed    Procedure Summary     Date Anesthesia Start Anesthesia Stop Room / Location    08/02/17 0706 0849  [facility] OR 05 / [facility]       Procedure Diagnosis Surgeon Provider    LEFT LUMBAR LATERAL INTERBODY FUSION WITH INSTRUMENTATION  L1-2 (Left Spine Lumbar) (M54.16) MD Terry Shukla, JV          Anesthesia Type: general  Last vitals  BP        Temp        Pulse       Resp        SpO2          Post Anesthesia Care and Evaluation    Patient location during evaluation: PACU  Patient participation: complete - patient participated  Level of consciousness: awake and alert  Pain management: adequate  Airway patency: patent  Anesthetic complications: No anesthetic complications    Cardiovascular status: acceptable and hemodynamically stable  Respiratory status: acceptable  Hydration status: acceptable    Comments: Blood pressure 146/65, pulse 66, temperature 97.7 °F (36.5 °C), temperature source Oral, resp. rate 14, height 63\" (160 cm), weight 268 lb (122 kg), SpO2 96 %.      "

## 2017-08-02 NOTE — THERAPY EVALUATION
Acute Care - Occupational Therapy Initial Evaluation  [facility]     Patient Name: Brenda Sneed  : 1950  MRN: [id number]  Today's Date: 2017  Onset of Illness/Injury or Date of Surgery Date: 17  Date of Referral to OT: 17  Referring Physician: Dr. Wright    Admit Date: 2017       ICD-10-CM ICD-9-CM   1. Decreased activities of daily living (ADL) Z78.9 V49.89   2. Impaired functional mobility, balance, gait, and endurance Z74.09 V49.89     Patient Active Problem List   Diagnosis   • Spinal stenosis, lumbar     Past Medical History:   Diagnosis Date   • Arthritis    • Chronic back pain    • Hypertension    • Kidney stones      Past Surgical History:   Procedure Laterality Date   • BACK SURGERY       ([place]),     • CHOLECYSTECTOMY OPEN     • CORNEAL TRANSPLANT Right    • NECK SURGERY      [place]   • TOTAL HIP ARTHROPLASTY Right     DR. KENDRICK    • TOTAL KNEE ARTHROPLASTY Right    • TOTAL KNEE ARTHROPLASTY Left           OT ASSESSMENT FLOWSHEET (last 72 hours)      OT Evaluation       17 1349 17 1345 17 1000          Rehab Evaluation    Document Type evaluation   see MAR  -MM (P)  evaluation   see MAR  -MS       Subjective Information agree to therapy;complains of;pain  -MM (P)  agree to therapy;complains of;pain  -MS       General Information    Patient Profile Review yes  -MM (P)  yes  -MS       Onset of Illness/Injury or Date of Surgery Date 17  -MM (P)  17  -MS       Referring Physician Dr. Wright  -MM (P)  Dr. Wright  -MS       General Observations Supine, 3L O2/NC, IV, SCDs  -MM (P)  Supine, in distress due to pain, 3 LO2 NC, IV, SCDs  -MS       Pertinent History Of Current Problem Pt admitted with increasing back  and R thigh pain. 8/2 retrolisthesis L1-2 s/p LLIF L1-2.  -MM        Precautions/Limitations fall precautions  -MM (P)  brace on when up;fall precautions;spinal precautions  -MS       Prior Level of  Function independent:;all household mobility;community mobility;transfer;bed mobility;ADL's;feeding;grooming;dressing;bathing   with increased time and attempts  -MM (P)  independent:;all household mobility;ADL's;home management   requires increased time   -MS       Equipment Currently Used at Home rollator;cane, quad;cane, straight;shower chair;commode;grab bar  -MM (P)  rollator;cane, quad;cane, straight;shower chair;commode;grab bar  -MS walker, rolling;other (see comments)  -BS      Plans/Goals Discussed With patient;agreed upon  -MM (P)  patient;agreed upon  -MS       Risks Reviewed patient:;LOB;nausea/vomiting;dizziness;increased discomfort;change in vital signs;increased drainage;lines disloged  -MM (P)  patient:;LOB;nausea/vomiting;dizziness;increased discomfort;change in vital signs;lines disloged  -MS       Benefits Reviewed patient:;improve function;increase independence;increase strength;increase balance;decrease pain;decrease risk of DVT;improve skin integrity;increase knowledge  -MM (P)  patient:;improve function;increase strength;increase balance;decrease pain;increase knowledge  -MS       Barriers to Rehab physical barrier  -MM (P)  none identified  -MS       Living Environment    Lives With alone  -MM (P)  alone   daughter checks in often  -MS alone  -BS      Living Arrangements apartment  -MM (P)  apartment  -MS apartment  -BS      Home Accessibility tub/shower is not walk in;bed and bath on same level  -MM (P)  tub/shower is not walk in  -MS no concerns  -BS      Stair Railings at Home   none  -BS      Type of Financial/Environmental Concern   none  -BS      Transportation Available   car  -BS      Clinical Impression    Date of Referral to OT 08/02/17  -MM        OT Diagnosis decreased adl  -MM        Impairments Found (describe specific impairments) ergonomics and body mechanics;gait, locomotion, and balance  -MM        Patient/Family Goals Statement return home  -MM        Criteria for Skilled  Therapeutic Interventions Met yes;treatment indicated  -MM        Rehab Potential good, to achieve stated therapy goals  -MM        Therapy Frequency 3-5 times/wk  -MM        Predicted Duration of Therapy Intervention (days/wks) until d/c  -MM        Anticipated Equipment Needs At Discharge bathing equipment;dressing equipment  -MM        Anticipated Discharge Disposition home;home with home health;home with outpatient services  -MM        Functional Level Prior    Ambulation   0-->independent  -BS      Transferring   0-->independent  -BS      Toileting   0-->independent  -BS      Bathing   1-->assistive equipment  -BS      Dressing   0-->independent  -BS      Eating   0-->independent  -BS      Communication   0-->understands/communicates without difficulty  -BS      Swallowing   0-->swallows foods/liquids without difficulty  -BS      Vital Signs    Pre SpO2 (%) 99  -MM (P)  99  -MS       O2 Delivery Pre Treatment supplemental O2  -MM (P)  supplemental O2  -MS       Intra SpO2 (%) 94  -MM (P)  94  -MS       O2 Delivery Intra Treatment room air  -MM (P)  room air   reapplied NC before leaving room  -MS       O2 Delivery Post Treatment --   O2 donned after tx  -MM        Pre Patient Position Supine  -MM (P)  Supine  -MS       Intra Patient Position Standing  -MM (P)  Standing  -MS       Post Patient Position Supine  -MM        Pain Assessment    Pain Assessment 0-10  -MM (P)  0-10  -MS       Pain Score 10  -MM (P)  10  -MS       Post Pain Score 7  -MM (P)  7  -MS       Pain Type Acute pain  -MM (P)  Surgical pain;Acute pain;Chronic pain  -MS       Pain Location Back  -MM (P)  Back  -MS       Pain Orientation Left  -MM (P)  Lower  -MS       Pain Radiating Towards  (P)  R mid thigh  -MS       Pain Descriptors Aching  -MM        Pain Frequency Constant/continuous  -MM (P)  Constant/continuous  -MS       Pain Intervention(s) Medication (See MAR);Repositioned  -MM (P)  Repositioned;Ambulation/increased activity  -MS        Response to Interventions tolerated  -MM (P)  Pt reported decreased pain during and after ambulation  -MS       Vision Assessment/Intervention    Visual Impairment WFL  -MM        Cognitive Assessment/Intervention    Current Cognitive/Communication Assessment functional  -MM (P)  functional  -MS       Orientation Status oriented x 4  -MM (P)  oriented x 4  -MS       Follows Commands/Answers Questions 100% of the time;able to follow multi-step instructions  -MM (P)  100% of the time;able to follow multi-step instructions  -MS       Personal Safety WNL/WFL  -MM (P)  decreased awareness, need for assist;decreased awareness, need for safety  -MS       Personal Safety Interventions fall prevention program maintained;gait belt;muscle strengthening facilitated;nonskid shoes/slippers when out of bed;supervised activity  -MM (P)  fall prevention program maintained;gait belt;nonskid shoes/slippers when out of bed;supervised activity  -MS       ROM (Range of Motion)    General ROM Detail BUE AROM WFL, see PT note for BLE  -MM (P)  WFL  -MS       MMT (Manual Muscle Testing)    General MMT Assessment Detail BUE functionally 4/5, grossly deferred d/t spinal precautions. See PT note for BLE  -MM (P)  Functionally 4+/5  -MS       Bed Mobility, Assessment/Treatment    Bed Mobility, Assistive Device bed rails;head of bed elevated  -MM (P)  bed rails  -MS       Bed Mobility, Roll Left, [place] minimum assist (75% patient effort);2 person assist required;verbal cues required  -MM (P)  minimum assist (75% patient effort);2 person assist required  -MS       Bed Mobility, Scoot/Bridge, [place] supervision required  -MM (P)  supervision required   Pt able to bridge to use bed pan  -MS       Bed Mob, Sidelying to Sit, [place] minimum assist (75% patient effort);2 person assist required;verbal cues required  -MM (P)  minimum assist (75% patient effort);2 person assist required;verbal cues required   cues for log roll  -MS        Bed Mob, Sit to Sidelying, [place] minimum assist (75% patient effort);contact guard assist;2 person assist required;verbal cues required  -MM (P)  minimum assist (75% patient effort);2 person assist required;verbal cues required  -MS       Bed Mobility, Safety Issues decreased use of arms for pushing/pulling;decreased use of legs for bridging/pushing  -MM (P)  decreased use of arms for pushing/pulling;decreased use of legs for bridging/pushing  -MS       Bed Mobility, Impairments strength decreased;pain  -MM (P)  strength decreased;pain  -MS       Transfer Assessment/Treatment    Transfers, Sit-Stand [place] contact guard assist  -MM (P)  contact guard assist  -MS       Transfers, Stand-Sit [place] contact guard assist;verbal cues required  -MM (P)  contact guard assist;verbal cues required  -MS       Transfers, Sit-Stand-Sit, Assist Device rolling walker  -MM (P)  rolling walker  -MS       Toilet Transfer, [place] contact guard assist  -MM (P)  contact guard assist  -MS       Toilet Transfer, Assistive Device rolling walker  -MM (P)  rolling walker  -MS       Transfer, Safety Issues step length decreased  -MM (P)  step length decreased  -MS       Transfer, Impairments strength decreased;pain  -MM (P)  strength decreased;pain  -MS       Functional Mobility    Functional Mobility- Ind. Level contact guard assist  -MM        Functional Mobility- Device rolling walker  -MM        Functional Mobility- Comment Pt ambulated to BR, down sparks and back to bed.  -MM        Upper Body Dressing Assessment/Training    UB Dressing Assess/Train, Clothing Type doffing:;donning:   LSO  -MM        UB Dressing Assess/Train, Position sitting;edge of bed  -MM        UB Dressing Assess/Train, [place] moderate assist (50% patient effort);verbal cues required  -MM        UB Dressing Assess/Train, Impairments pain  -MM        Toileting Assessment/Training    Toileting Assess/Train, Assistive Device other (see  comments)   standard commode  -MM        Toileting Assess/Train, Position sitting;standing  -MM        Toileting Assess/Train, Indepen Level supervision required;verbal cues required  -MM        Toileting Assess/Train, Impairments pain  -MM        Motor Skills/Interventions    Additional Documentation  (P)  Balance Skills Training (Group)  -MS       Balance Skills Training    Sitting-Level of Assistance  (P)  Close supervision  -MS       Sitting-Balance Support  (P)  Right upper extremity supported;Left upper extremity supported;Feet supported  -MS       Standing-Level of Assistance  (P)  Contact guard;Close supervision   progressed to supervision   -MS       Static Standing Balance Support  (P)  assistive device  -MS       Gait Balance-Level of Assistance  (P)  Contact guard  -MS       Gait Balance Support  (P)  assistive device  -MS       Orthotics Prosthetics    Additional Documentation  (P)  Orthosis Location (Group);Orthosis Management/Training (Group)  -MS       Orthosis Location    Orthosis Location/Type  (P)  neck/back  -MS       Orthosis, Neck/Back  (P)  LSO (lumbar sacral orthosis)  -MS       Orthosis Management/Training    Orthosis Indications  (P)  immobilize, protect/position healing structures;rest, reduce pain;restrict motion  -MS       Orthosis Skills Training  (P)  activity limitations;clothing management related to orthosis;doffing orthosis;donning orthosis;purpose/goals of orthosis;restrictions/precautions  -MS       Orthosis Wear Schedule  (P)  wear when out of bed only  -MS       Sensory Assessment/Intervention    Sensory Impairment --   WNL per pt  -MM (P)  --   WFL  -MS       General Therapy Interventions    Planned Therapy Interventions ADL retraining;adaptive equipment training;activity intolerance;IADL retraining;balance training;bed mobility training;energy conservation;fine motor coordination training;home exercise program;motor coordination training;orthotic  fitting/training;strengthening;transfer training  -MM        Positioning and Restraints    Pre-Treatment Position in bed  -MM (P)  in bed  -MS       Post Treatment Position bed  -MM (P)  bed  -MS       In Bed fowlers;call light within reach;encouraged to call for assist;with nsg;side rails up x2;SCD pump applied  -MM (P)  supine;call light within reach;encouraged to call for assist;with nsg;SCD pump applied  -MS         User Key  (r) = Recorded By, (t) = Taken By, (c) = Cosigned By    Initials Name Effective Dates    BS Haily Ordonez RN 08/02/16 -     MM Antonino Betancourt OTR/L 10/21/16 -     MS Nina Stanton, PT Student 07/19/17 -            Occupational Therapy Education     Title: PT OT SLP Therapies (Active)     Topic: Occupational Therapy (Active)     Point: ADL training (Done)    Description: Instruct learner(s) on proper safety adaptation and remediation techniques during self care or transfers.   Instruct in proper use of assistive devices.    Learning Progress Summary    Learner Readiness Method Response Comment Documented by Status   Patient Acceptance E VU pt educated on OT role, benefits, POC, brace use/care and spinal precautions.  08/02/17 1503 Done               Point: Precautions (Done)    Description: Instruct learner(s) on prescribed precautions during self-care and functional transfers.    Learning Progress Summary    Learner Readiness Method Response Comment Documented by Status   Patient Acceptance E VU pt educated on OT role, benefits, POC, brace use/care and spinal precautions.  08/02/17 1503 Done                      User Key     Initials Effective Dates Name Provider Type Discipline     10/21/16 -  Antonino Betancourt OTR/L Occupational Therapist OT                  OT Recommendation and Plan  Anticipated Equipment Needs At Discharge: bathing equipment, dressing equipment  Anticipated Discharge Disposition: home, home with home health, home with outpatient services  Planned  Therapy Interventions: ADL retraining, adaptive equipment training, activity intolerance, IADL retraining, balance training, bed mobility training, energy conservation, fine motor coordination training, home exercise program, motor coordination training, orthotic fitting/training, strengthening, transfer training  Therapy Frequency: 3-5 times/wk  Plan of Care Review  Plan Of Care Reviewed With: patient  Progress: progress toward functional goals as expected  Outcome Summary/Follow up Plan: OT eval completed. Pt was min x2 for bed mobility. Pt was CGA for t/f and functional mobility. Pt was mod A  to joann brace. Pt was supervision for toileting. Skilled OT recommended to address adls, functional mobility, and education. Recommended d/c home with assist and HHOT v. OPOT.          OT Goals       08/02/17 1506          Patient Education OT LTG    Patient Education OT LTG, Date Established 08/02/17  -MM      Patient Education OT LTG, Time to Achieve by discharge  -MM      Patient Education OT LTG, Education Type written program;HEP;precautions per surgeon;brace use/care;positioning;posture/body mechanics;home safety;adaptive equipment mgmt;skin care/inspection;energy conservation;work simplification;adaptive breathing;joann/doff brace  -MM      Patient Education OT LTG, Education Understanding demonstrates adequately;verbalizes understanding;independent  -MM      Bathing OT LTG    Bathing Goal OT LTG, Date Established 08/02/17  -MM      Bathing Goal OT LTG, Time to Achieve by discharge  -MM      Bathing Goal OT LTG, Activity Type upper body bathing;lower body bathing  -MM      Bathing Goal OT LTG, [place] Level conditional independence  -MM      Bathing Goal OT LTG, Assist Device grab bars;tub bench with back;shower head, detachable;sponge, long handled  -MM      Toileting OT LTG    Toileting Goal OT LTG, Date Established 08/02/17  -MM      Toileting Goal OT LTG, Time to Achieve by discharge  -MM      Toileting Goal  OT LTG, [place] Level conditional independence  -MM      Toileting Goal OT LTG, Assist Device toilet seat, raised  -MM      LB Dressing OT LTG    LB Dressing Goal OT LTG, Date Established 08/02/17  -MM      LB Dressing Goal OT LTG, Time to Achieve by discharge  -MM      LB Dressing Goal OT LTG, [place] Level conditional independence  -MM      LB Dressing Goal OT LTG, Adaptive Equipment laces, elastic;reacher;shoe horn, long handled;sock-aid   AE PRN  -MM        User Key  (r) = Recorded By, (t) = Taken By, (c) = Cosigned By    Initials Name Provider Type    BENIGNO Betancourt, OTR/L Occupational Therapist                Outcome Measures       08/02/17 1500 08/02/17 1349       How much help from another person do you currently need...    Turning from your back to your side while in flat bed without using bedrails?  (P)  3  -MS     Moving from lying on back to sitting on the side of a flat bed without bedrails?  (P)  3  -MS     Moving to and from a bed to a chair (including a wheelchair)?  (P)  3  -MS     Standing up from a chair using your arms (e.g., wheelchair, bedside chair)?  (P)  3  -MS     Climbing 3-5 steps with a railing?  (P)  2  -MS     To walk in hospital room?  (P)  3  -MS     AM-PAC 6 Clicks Score  (P)  17  -PB (r) MS (t)     How much help from another is currently needed...    Putting on and taking off regular lower body clothing? 2  -MM      Bathing (including washing, rinsing, and drying) 2  -MM      Toileting (which includes using toilet bed pan or urinal) 2  -MM      Putting on and taking off regular upper body clothing 3  -MM      Taking care of personal grooming (such as brushing teeth) 4  -MM      Eating meals 4  -MM      Score 17  -MM      Functional Assessment    Outcome Measure Options AM-PAC 6 Clicks Daily Activity (OT)  -MM (P)  AM-PAC 6 Clicks Basic Mobility (PT)  -MS       User Key  (r) = Recorded By, (t) = Taken By, (c) = Cosigned By    Initials Name Provider Type    KATELYN ESQUIVEL  Rakesh, PT DPT Physical Therapist    MM Antonino Betancourt, OTR/L Occupational Therapist    MS Nina Stanton, PT Student PT Student          Time Calculation:   OT Start Time: 1349  OT Stop Time: 1442  OT Time Calculation (min): 53 min    Therapy Charges for Today     Code Description Service Date Service Provider Modifiers Qty    81539435980 HC OT SELFCARE CURRENT 8/2/2017 Antonino Betancourt OTR/L GO, CK 1    19465004131 HC OT SELFCARE PROJECTED 8/2/2017 Antonino Betancourt OTR/L GO, CI 1    [id number]  OT EVAL LOW COMPLEXITY 4 8/2/2017 Antonino Betancourt OTR/L GO, KX 1          OT G-codes  OT Professional Judgement Used?: Yes  OT Functional Scales Options: AM-PAC 6 Clicks Daily Activity (OT)  Score: 17  Functional Limitation: Self care  Self Care Current Status (): At least 40 percent but less than 60 percent impaired, limited or restricted  Self Care Goal Status (): At least 1 percent but less than 20 percent impaired, limited or restricted    Antonino Betancourt OTR/L  8/2/2017

## 2017-08-02 NOTE — PLAN OF CARE
Problem: Patient Care Overview (Adult)  Goal: Plan of Care Review  Outcome: Ongoing (interventions implemented as appropriate)    08/02/17 1216   Coping/Psychosocial Response Interventions   Plan Of Care Reviewed With patient   Patient Care Overview   Progress improving   Outcome Evaluation   Outcome Summary/Follow up Plan received from PACU...c/o pain at 10...dressing Left flank scant drainage...JOHNSON's...n/v WNL...safety maintained...continue to monitor       Goal: Adult Individualization and Mutuality  Outcome: Ongoing (interventions implemented as appropriate)  Goal: Discharge Needs Assessment  Outcome: Ongoing (interventions implemented as appropriate)    Problem: Perioperative Period (Adult)  Goal: Signs and Symptoms of Listed Potential Problems Will be Absent or Manageable (Perioperative Period)  Outcome: Ongoing (interventions implemented as appropriate)    Problem: Fall Risk (Adult)  Goal: Identify Related Risk Factors and Signs and Symptoms  Outcome: Ongoing (interventions implemented as appropriate)  Goal: Absence of Falls  Outcome: Ongoing (interventions implemented as appropriate)    Problem: Pain, Acute (Adult)  Goal: Identify Related Risk Factors and Signs and Symptoms  Outcome: Ongoing (interventions implemented as appropriate)  Goal: Acceptable Pain Control/Comfort Level  Outcome: Ongoing (interventions implemented as appropriate)       2017

## 2017-08-02 NOTE — PLAN OF CARE
Problem: Perioperative Period (Adult)  Goal: Signs and Symptoms of Listed Potential Problems Will be Absent or Manageable (Perioperative Period)  Outcome: Ongoing (interventions implemented as appropriate)    08/02/17 0666   Perioperative Period   Problems Assessed (Perioperative Period) pain;hypothermia;hypoxia/hypoxemia;perioperative injury;situational response   Problems Present (Perioperative Period) situational response

## 2017-08-02 NOTE — THERAPY EVALUATION
Acute Care - Physical Therapy Initial Evaluation  [facility]     Patient Name: Brenda Sneed  : 1950  MRN: [id number]  Today's Date: 2017   Onset of Illness/Injury or Date of Surgery Date: 17  Date of Referral to PT: 17  Referring Physician: Dr. Wright      Admit Date: 2017     Visit Dx:    ICD-10-CM ICD-9-CM   1. Decreased activities of daily living (ADL) Z78.9 V49.89   2. Impaired functional mobility, balance, gait, and endurance Z74.09 V49.89     Patient Active Problem List   Diagnosis   • Spinal stenosis, lumbar     Past Medical History:   Diagnosis Date   • Arthritis    • Chronic back pain    • Hypertension    • Kidney stones      Past Surgical History:   Procedure Laterality Date   • BACK SURGERY       ([place]),     • CHOLECYSTECTOMY OPEN     • CORNEAL TRANSPLANT Right    • NECK SURGERY      [place]   • TOTAL HIP ARTHROPLASTY Right     DR. KENDRICK    • TOTAL KNEE ARTHROPLASTY Right    • TOTAL KNEE ARTHROPLASTY Left           PT ASSESSMENT (last 72 hours)      PT Evaluation       17 1349 17 1345    Rehab Evaluation    Document Type evaluation   see MAR  -MM evaluation   see MAR  -PB (r) MS (t) PB (c)    Subjective Information agree to therapy;complains of;pain  -MM agree to therapy;complains of;pain  -PB (r) MS (t) PB (c)    General Information    Patient Profile Review yes  -MM yes  -PB (r) MS (t) PB (c)    Onset of Illness/Injury or Date of Surgery Date 17  -MM 17  -PB (r) MS (t) PB (c)    Referring Physician Dr. Wright  -MM Dr. Wright  -PB (r) MS (t) PB (c)    General Observations Supine, 3L O2/NC, IV, SCDs  -MM Supine, in distress due to pain, 3 LO2 NC, IV, SCDs  -PB (r) MS (t) PB (c)    Pertinent History Of Current Problem Pt admitted with increasing back  and R thigh pain. 8/2 retrolisthesis L1-2 s/p LLIF L1-2.  -MM   Pt admitted with increasing back and R thigh pain. 8/2 retrolisthesis L1-2 s/p LLIF L1-2.   -PB (r)  MS (t) PB (c)    Precautions/Limitations fall precautions  -MM brace on when up;fall precautions;spinal precautions  -PB (r) MS (t) PB (c)    Prior Level of Function independent:;all household mobility;community mobility;transfer;bed mobility;ADL's;feeding;grooming;dressing;bathing   with increased time and attempts  -MM independent:;all household mobility;ADL's;home management   requires increased time   -PB (r) MS (t) PB (c)    Equipment Currently Used at Home rollator;cane, quad;cane, straight;shower chair;commode;grab bar  -MM rollator;cane, quad;cane, straight;shower chair;commode;grab bar  -PB (r) MS (t) PB (c)    Plans/Goals Discussed With patient;agreed upon  -MM patient;agreed upon  -PB (r) MS (t) PB (c)    Risks Reviewed patient:;LOB;nausea/vomiting;dizziness;increased discomfort;change in vital signs;increased drainage;lines disloged  -MM patient:;LOB;nausea/vomiting;dizziness;increased discomfort;change in vital signs;lines disloged  -PB (r) MS (t) PB (c)    Benefits Reviewed patient:;improve function;increase independence;increase strength;increase balance;decrease pain;decrease risk of DVT;improve skin integrity;increase knowledge  -MM patient:;improve function;increase strength;increase balance;decrease pain;increase knowledge  -PB (r) MS (t) PB (c)    Barriers to Rehab physical barrier  -MM none identified  -PB (r) MS (t) PB (c)    Living Environment    Lives With alone  -MM alone   daughter checks in often  -PB (r) MS (t) PB (c)    Living Arrangements apartment  -MM apartment  -PB (r) MS (t) PB (c)    Home Accessibility tub/shower is not walk in;bed and bath on same level  -MM tub/shower is not walk in  -PB (r) MS (t) PB (c)    Clinical Impression    Date of Referral to PT  08/02/17  -PB (r) MS (t) PB (c)    Patient/Family Goals Statement  Return home, increase community ambulation  -PB (r) MS (t) PB (c)    Criteria for Skilled Therapeutic Interventions Met  yes;treatment indicated  -PB (r) MS (t)  PB (c)    Rehab Potential  good, to achieve stated therapy goals  -PB (r) MS (t) PB (c)    Predicted Duration of Therapy Intervention (days/wks)  Until D/C  -PB (r) MS (t) PB (c)    Vital Signs    Pre SpO2 (%) 99  -MM 99  -PB (r) MS (t) PB (c)    O2 Delivery Pre Treatment supplemental O2  -MM supplemental O2  -PB (r) MS (t) PB (c)    Intra SpO2 (%) 94  -MM 94  -PB (r) MS (t) PB (c)    O2 Delivery Intra Treatment room air  -MM room air   reapplied NC before leaving room  -PB (r) MS (t) PB (c)    O2 Delivery Post Treatment --   O2 donned after tx  -MM     Pre Patient Position Supine  -MM Supine  -PB (r) MS (t) PB (c)    Intra Patient Position Standing  -MM Standing  -PB (r) MS (t) PB (c)    Post Patient Position Supine  -MM     Pain Assessment    Pain Assessment 0-10  -MM 0-10  -PB (r) MS (t) PB (c)    Pain Score 10  -MM 10  -PB (r) MS (t) PB (c)    Post Pain Score 7  -MM 7  -PB (r) MS (t) PB (c)    Pain Type Acute pain  -MM Surgical pain;Acute pain;Chronic pain  -PB (r) MS (t) PB (c)    Pain Location Back  -MM Back  -PB (r) MS (t) PB (c)    Pain Orientation Left  -MM Lower  -PB (r) MS (t) PB (c)    Pain Radiating Towards  R mid thigh  -PB (r) MS (t) PB (c)    Pain Descriptors Aching  -MM     Pain Frequency Constant/continuous  -MM Constant/continuous  -PB (r) MS (t) PB (c)    Pain Intervention(s) Medication (See MAR);Repositioned  -MM Repositioned;Ambulation/increased activity  -PB (r) MS (t) PB (c)    Response to Interventions tolerated  -MM Pt reported decreased pain during and after ambulation  -PB (r) MS (t) PB (c)    Vision Assessment/Intervention    Visual Impairment WFL  -MM     Cognitive Assessment/Intervention    Current Cognitive/Communication Assessment functional  -MM functional  -PB (r) MS (t) PB (c)    Orientation Status oriented x 4  -MM oriented x 4  -PB (r) MS (t) PB (c)    Follows Commands/Answers Questions 100% of the time;able to follow multi-step instructions  -% of the time;able to follow  multi-step instructions  -PB (r) MS (t) PB (c)    Personal Safety WNL/WFL  -MM decreased awareness, need for assist;decreased awareness, need for safety  -PB (r) MS (t) PB (c)    Personal Safety Interventions fall prevention program maintained;gait belt;muscle strengthening facilitated;nonskid shoes/slippers when out of bed;supervised activity  -MM fall prevention program maintained;gait belt;nonskid shoes/slippers when out of bed;supervised activity  -PB (r) MS (t) PB (c)    ROM (Range of Motion)    General ROM Detail BUE AROM WFL, see PT note for BLE  -MM WFL  -PB (r) MS (t) PB (c)    MMT (Manual Muscle Testing)    General MMT Assessment Detail BUE functionally 4/5, grossly deferred d/t spinal precautions. See PT note for BLE  -MM Functionally 4+/5  -PB (r) MS (t) PB (c)    Bed Mobility, Assessment/Treatment    Bed Mobility, Assistive Device bed rails;head of bed elevated  -MM bed rails  -PB (r) MS (t) PB (c)    Bed Mobility, Roll Left, [place] minimum assist (75% patient effort);2 person assist required;verbal cues required  -MM minimum assist (75% patient effort);2 person assist required  -PB (r) MS (t) PB (c)    Bed Mobility, Scoot/Bridge, [place] supervision required  -MM supervision required   Pt able to bridge to use bed pan  -PB (r) MS (t) PB (c)    Bed Mob, Sidelying to Sit, [place] minimum assist (75% patient effort);2 person assist required;verbal cues required  -MM minimum assist (75% patient effort);2 person assist required;verbal cues required   cues for log roll  -PB (r) MS (t) PB (c)    Bed Mob, Sit to Sidelying, [place] minimum assist (75% patient effort);contact guard assist;2 person assist required;verbal cues required  -MM minimum assist (75% patient effort);2 person assist required;verbal cues required  -PB (r) MS (t) PB (c)    Bed Mobility, Safety Issues decreased use of arms for pushing/pulling;decreased use of legs for bridging/pushing  -MM decreased use of arms for  pushing/pulling;decreased use of legs for bridging/pushing  -PB (r) MS (t) PB (c)    Bed Mobility, Impairments strength decreased;pain  -MM strength decreased;pain  -PB (r) MS (t) PB (c)    Transfer Assessment/Treatment    Transfers, Sit-Stand [place] contact guard assist  -MM contact guard assist  -PB (r) MS (t) PB (c)    Transfers, Stand-Sit [place] contact guard assist;verbal cues required  -MM contact guard assist;verbal cues required  -PB (r) MS (t) PB (c)    Transfers, Sit-Stand-Sit, Assist Device rolling walker  -MM rolling walker  -PB (r) MS (t) PB (c)    Toilet Transfer, [place] contact guard assist  -MM contact guard assist  -PB (r) MS (t) PB (c)    Toilet Transfer, Assistive Device rolling walker  -MM rolling walker  -PB (r) MS (t) PB (c)    Transfer, Safety Issues step length decreased  -MM step length decreased  -PB (r) MS (t) PB (c)    Transfer, Impairments strength decreased;pain  -MM strength decreased;pain  -PB (r) MS (t) PB (c)    Gait Assessment/Treatment    Gait, [place] Level  contact guard assist;1 person + 1 person to manage equipment  -PB (r) MS (t) PB (c)    Gait, Assistive Device  rolling walker  -PB (r) MS (t) PB (c)    Gait, Distance (Feet)  20   172; sitting rest break to use the bathroom  -PB (r) MS (t) PB (c)    Gait, Gait Deviations  antalgic;[place] decreased;narrow base;step length decreased;stride length decreased  -PB (r) MS (t) PB (c)    Gait, Safety Issues  step length decreased  -PB (r) MS (t) PB (c)    Gait, Impairments  strength decreased;pain  -PB (r) MS (t) PB (c)    Motor Skills/Interventions    Additional Documentation  Balance Skills Training (Group)  -PB (r) MS (t) PB (c)    Balance Skills Training    Sitting-Level of Assistance  Close supervision  -PB (r) MS (t) PB (c)    Sitting-Balance Support  Right upper extremity supported;Left upper extremity supported;Feet supported  -PB (r) MS (t) PB (c)    Standing-Level of Assistance  Contact guard;Close  supervision   progressed to supervision   -PB (r) MS (t) PB (c)    Static Standing Balance Support  assistive device  -PB (r) MS (t) PB (c)    Gait Balance-Level of Assistance  Contact guard  -PB (r) MS (t) PB (c)    Gait Balance Support  assistive device  -PB (r) MS (t) PB (c)    Orthotics Prosthetics    Additional Documentation  Orthosis Location (Group);Orthosis Management/Training (Group)  -PB (r) MS (t) PB (c)    Orthosis Location    Orthosis Location/Type  neck/back  -PB (r) MS (t) PB (c)    Orthosis, Neck/Back  LSO (lumbar sacral orthosis)  -PB (r) MS (t) PB (c)    Orthosis Management/Training    Orthosis Indications  immobilize, protect/position healing structures;rest, reduce pain;restrict motion  -PB (r) MS (t) PB (c)    Orthosis Skills Training  activity limitations;clothing management related to orthosis;doffing orthosis;donning orthosis;purpose/goals of orthosis;restrictions/precautions  -PB (r) MS (t) PB (c)    Orthosis Wear Schedule  wear when out of bed only  -PB (r) MS (t) PB (c)    Sensory Assessment/Intervention    Sensory Impairment --   WNL per pt  -MM --   WFL  -PB (r) MS (t) PB (c)    Positioning and Restraints    Pre-Treatment Position in bed  -MM in bed  -PB (r) MS (t) PB (c)    Post Treatment Position bed  -MM bed  -PB (r) MS (t) PB (c)    In Bed fowlers;call light within reach;encouraged to call for assist;with nsg;side rails up x2;SCD pump applied  -MM supine;call light within reach;encouraged to call for assist;with nsg;SCD pump applied  -PB (r) MS (t) PB (c)      08/02/17 1000       General Information    Equipment Currently Used at Home walker, rolling;other (see comments)  -BS     Living Environment    Lives With alone  -BS     Living Arrangements apartment  -BS     Home Accessibility no concerns  -BS     Stair Railings at Home none  -BS     Type of Financial/Environmental Concern none  -BS     Transportation Available car  -BS       User Key  (r) = Recorded By, (t) = Taken By, (c) =  Cosigned By    Initials Name Provider Type    BS Haily Ordonez, RN Registered Nurse    PB Chas Cameron, PT DPT Physical Therapist    MM Antonino Betancourt, OTR/L Occupational Therapist    MS Nina Stanton, PT Student PT Student          Physical Therapy Education     Title: PT OT SLP Therapies (Active)     Topic: Physical Therapy (Active)     Point: Mobility training (Done)    Learning Progress Summary    Learner Readiness Method Response Comment Documented by Status   Patient Acceptance E,D VU,DU,NR spinal precautions, brace management, proper form for bed mobility, use of walker MS 08/02/17 1508 Done               Point: Precautions (Done)    Learning Progress Summary    Learner Readiness Method Response Comment Documented by Status   Patient Acceptance E,D VU,DU,NR spinal precautions, brace management, proper form for bed mobility, use of walker MS 08/02/17 1508 Done                      User Key     Initials Effective Dates Name Provider Type Discipline    MS 07/19/17 -  Nina Stanton, PT Student PT Student PT                PT Recommendation and Plan  Anticipated Discharge Disposition: home with home health, home with outpatient services  Planned Therapy Interventions: balance training, bed mobility training, gait training, home exercise program, orthotic fitting/training, patient/family education, strengthening, transfer training  PT Frequency: 2 times/day, per priority policy  Plan of Care Review  Plan Of Care Reviewed With: patient  Outcome Summary/Follow up Plan: PT eval complete. Pt required min x2 for bed mobility. Pt CGA for sit to stand transfers. Pt ambulated up to 172' with CGA and RW. Pt progressed to supervision with standing balance. Pt stated decrease in pain with ambulation. Pt would benefit from PT to address functional mobility, strength, brace management, balance and education on safety and precautions. Anticipate D/C home with HH/outpatient services.          IP PT Goals        08/02/17 1512          Bed Mobility PT LTG    Bed Mobility PT LTG, Date Established 08/02/17  -PB (r) MS (t) PB (c)      Bed Mobility PT LTG, Time to Achieve by discharge  -PB (r) MS (t) PB (c)      Bed Mobility PT LTG, Activity Type all bed mobility  -PB (r) MS (t) PB (c)      Bed Mobility PT LTG, [place] Level independent  -PB (r) MS (t) PB (c)      Transfer Training PT LTG    Transfer Training PT LTG, Date Established 08/02/17  -PB (r) MS (t) PB (c)      Transfer Training PT LTG, Time to Achieve by discharge  -PB (r) MS (t) PB (c)      Transfer Training PT LTG, Activity Type bed to chair /chair to bed;sit to stand/stand to sit  -PB (r) MS (t) PB (c)      Transfer Training PT LTG, [place] Level independent  -PB (r) MS (t) PB (c)      Transfer Training PT LTG, Assist Device walker, rolling  -PB (r) MS (t) PB (c)      Gait Training PT LTG    Gait Training Goal PT LTG, Date Established 08/02/17  -PB (r) MS (t) PB (c)      Gait Training Goal PT LTG, Time to Achieve by discharge  -PB (r) MS (t) PB (c)      Gait Training Goal PT LTG, [place] Level independent  -PB (r) MS (t) PB (c)      Gait Training Goal PT LTG, Assist Device walker, rolling  -PB (r) MS (t) PB (c)      Gait Training Goal PT LTG, Distance to Achieve 250'  -PB (r) MS (t) PB (c)        User Key  (r) = Recorded By, (t) = Taken By, (c) = Cosigned By    Initials Name Provider Type    PB Chas Cameron, PT DPT Physical Therapist    MS Nina Stanton, PT Student PT Student                Outcome Measures       08/02/17 1500 08/02/17 1349       How much help from another person do you currently need...    Turning from your back to your side while in flat bed without using bedrails?  3  -PB (r) MS (t) PB (c)     Moving from lying on back to sitting on the side of a flat bed without bedrails?  3  -PB (r) MS (t) PB (c)     Moving to and from a bed to a chair (including a wheelchair)?  3  -PB (r) MS (t) PB (c)     Standing up from a chair using  your arms (e.g., wheelchair, bedside chair)?  3  -PB (r) MS (t) PB (c)     Climbing 3-5 steps with a railing?  2  -PB (r) MS (t) PB (c)     To walk in hospital room?  3  -PB (r) MS (t) PB (c)     AM-PAC 6 Clicks Score  17  -PB (r) MS (t)     How much help from another is currently needed...    Putting on and taking off regular lower body clothing? 2  -MM      Bathing (including washing, rinsing, and drying) 2  -MM      Toileting (which includes using toilet bed pan or urinal) 2  -MM      Putting on and taking off regular upper body clothing 3  -MM      Taking care of personal grooming (such as brushing teeth) 4  -MM      Eating meals 4  -MM      Score 17  -MM      Functional Assessment    Outcome Measure Options AM-PAC 6 Clicks Daily Activity (OT)  -MM AM-PAC 6 Clicks Basic Mobility (PT)  -PB (r) MS (t) PB (c)       User Key  (r) = Recorded By, (t) = Taken By, (c) = Cosigned By    Initials Name Provider Type    KATELYN Cameron, PT DPT Physical Therapist    MM Antonino Betancourt, OTR/L Occupational Therapist    MS Nina Stanton, PT Student PT Student           Time Calculation:         PT Charges       08/02/17 1510          Time Calculation    Start Time 1345  -PB (r) MS (t) PB (c)      Stop Time 1440  -PB (r) MS (t) PB (c)      Time Calculation (min) 55 min  -PB (r) MS (t)      PT Received On 08/02/17  -PB (r) MS (t) PB (c)      PT Goal Re-Cert Due Date 08/12/17  -PB (r) MS (t) PB (c)        User Key  (r) = Recorded By, (t) = Taken By, (c) = Cosigned By    Initials Name Provider Type    KATELYN Cameron, PT DPT Physical Therapist    MS Nina Stanton, PT Student PT Student          Therapy Charges for Today     Code Description Service Date Service Provider Modifiers Qty    96719231612 HC PT EVAL LOW COMPLEXITY 4 8/2/2017 Nina Stanton, PT Student GP, KX 1          PT G-Codes  Outcome Measure Options: AM-PAC 6 Clicks Daily Activity (OT)  Score: 17  Functional Limitation: Mobility: Walking and moving  around  Mobility: Walking and Moving Around Current Status (): At least 40 percent but less than 60 percent impaired, limited or restricted  Mobility: Walking and Moving Around Goal Status (): At least 20 percent but less than 40 percent impaired, limited or restricted      Nina Stanton, PT Student  8/2/2017

## 2017-08-02 NOTE — OP NOTE
LUMBAR LATERAL INTERBODY FUSION  Procedure Note    Brenda Sneed  8/2/2017    Pre-op Diagnosis:     1.  Status post previous laminectomy decompression, PSF with instrumentation L3 to S1, Dr. Morales  2.  Status post left LLIF with instrumentation L2-3, 12/22/2013  3.  Severe recurrent back pain  4.  Right buttock, groin, and anterior thigh radiculopathy  5.  Severe degenerative disc disease L1-2  6.  Retrolisthesis L1-2  7.  Focal scoliosis, concave to the right L1-2  8.  Facet arthropathy L1-2  9.  Central and severe foraminal stenosis L1-2    Post-op Diagnosis:    same    Procedure/CPT® Codes:    1.  Left lateral lumbar interbody fusion L1-2  2.  Anterior spinal instrumentation L1-2 (Lanx lateral plate and screws)  3.  Use of PEEK interbody biomechanical device for fusion L1-2 (Lanx PEEK spacer)  4.  Use of bone marrow aspirate obtained through separate incision for fusion  5.  Use of allograft bone chips for fusion  6.  Use of fluoroscopy for confirmation of surgical level, placement of PEEK spacer and instrumentation  7.  Intraoperative neural monitoring    Anesthesia: General    Surgeon: MARTINA Wright MD    Assistant:Travon Snyder PA-C    Estimated Blood Loss: Minimal    Complications: None    Condition: Stable to PACU.    Indications:    The patient is a 66-year-old who sees Dr. Sachin Dia for medical issues.  She had a previous fusion from L3 to S1 done by Dr. Morales several years ago.  She developed adjacent level degeneration at L2-3 for which I performed a left lateral fusion with instrumentation on 12/22/2013.  She did very well with this procedure but unfortunately developed a recurrence of severe back pain as well as symptoms down the right buttock, groin, and into her anterior thigh.  Repeat imaging studies revealed severe degenerative disc disease at the next adjacent level L1-2.  There was retrolisthesis and focal scoliosis along with facet arthropathy causing central and severe  foraminal stenosis at this level.  It was felt to be causing all of her symptomology.    After failing all conservative measures, it was mutually decided that surgery would be the best option. Risks, benefits, and complications of surgery were discussed with the patient. The patient appeared well informed and wished to proceed. We specifically discussed the risk of infection, blood loss, nerve root injury, CSF leak, and the possibility of incomplete resolution of symptoms. We also discussed the possible risk of a nonunion and the potential need for additional surgery in the event of a pseudoarthrosis or hardware failure.     Operative Procedure:    After obtaining informed consent and verifying the correct operative site, the patient was brought to the operating room and placed supine on an operating table.  A general anesthetic was provided by the anesthesia service with the assistance of an endotracheal tube.  Once this was appropriately positioned and secured, the patient was carefully rotated into the lateral decubitus position with the left side up.  All bony prominences were well-padded.  3 inch wide cloth tape was used to maintain the patient's position.  It was also used to tip open the pelvis slightly allowing better access to the lumbar spine through a lateral approach.  Fluoroscopy was then used to identify the L1-2 level, and the skin was marked for our planned incision.  The left flank was then prepped and draped in the usual sterile fashion.  A surgical timeout was taken to confirm this was the correct patient, we were working at the correct level, and that preoperative antibiotics were given in a timely fashion.    A small stab incision was created over the left anterior iliac crest using a 15 blade scalpel.  Through this stab incision, a Jamshidi needle was advanced into the iliac crest.  Through the needle we aspirated approximately 50-60 mL of bone marrow from the iliac crest.  This bone marrow  aspirate was then passed off in a sterile fashion for processing and concentration to be later mixed with allograft bone chips to assist with obtaining a fusion.    An oblique incision was created of the left flank using a 10 blade scalpel directly centered over the L1-2 segment. Dissection was carried bluntly through subcutaneous tissues. Blunt dissection was also carried between the external oblique and internal oblique musculature. The transversalis fascia was pierced allowing access to the retro-peroneal space. At this point a series of neuro monitoring probes were used to safely access the L1-2 level. We used stimulated and free run EMG for this purpose. Once nerve roots were confirmed to be out of harm's way, self retaining retractors were placed for continuous exposure.     An annulotomy was then created at the L1-2 area using a 15 blade scalpel on a long handle. A Podn elevator was used to remove disc material off of the endplates. Disc material was removed using Kerrisons, pituitaries, and forward angled curettes. Once all disc material had been retrieved, I used the Pond elevator to divide the contralateral annulus. This assisted with mobilization of the vertebral body. We then used a series of endplate scrapers to prepare the endplates for interbody fusion.  Due to the severe collapse of the disc space, I used a couple of disc space distractors to gently open the disc space.  Trial spacers were then malleted into position and for the disc space it was felt that a 12 mm x 50 mm implant would be the best fit to restore disc height and correct the focal scoliosis and kyphosis at that level. The disc space was then thoroughly irrigated with saline solution.     A PEEK spacer from the Lanx instrumentation set measuring 12 mm x 50 mm x 22 mm was then packed with allograft bone chips mixed with bone marrow aspirate as tightly as possible. This PEEK spacer was then malleted into the L1-2 disc space under  fluoroscopic guidance. It was placed as a PEEK interbody biomechanical device to assist with interbody fusion. Once this spacer was confirmed to be properly positioned, I chose a 2-hole plate to help augment the fusion of L1-2. This plate was held into position using screws. I placed 45 mm screws into both L1 and L2.  A cover plate was then screwed into position to prevent screw backout once both screws were properly positioned.     The wound was then irrigated thoroughly. A thorough inspection was then undertaken to ensure that we had adequate hemostasis. Bleeding at this point was controlled using FloSeal and bipolar cautery. Closure was then accomplished with a #1 Vicryl reapproximating the internal and external oblique musculature. Immediate subcutaneous tissues were closed with a 3-0 Vicryl and skin closure was accomplished using Mastisol and Steri-Strips. The wound was then sterilely dressed. The patient was then carefully rotated supine onto a hospital gurney, extubated, and sent to the recovery room in good stable condition.     The patient tolerated the procedure well. There were no complications. We estimated blood loss to be minimal. The patient remained hemodynamically stable.     Travon Snyder PA-C provided critical assistance during the procedure. His assistance was medically necessary in order to allow the procedure to occur in the most safe and efficient manner.    MARTINA Wright MD     Date: 8/2/2017  Time: 8:55 AM

## 2017-08-03 VITALS
HEART RATE: 73 BPM | TEMPERATURE: 98.2 F | WEIGHT: 268 LBS | HEIGHT: 63 IN | SYSTOLIC BLOOD PRESSURE: 111 MMHG | DIASTOLIC BLOOD PRESSURE: 45 MMHG | RESPIRATION RATE: 16 BRPM | BODY MASS INDEX: 47.48 KG/M2 | OXYGEN SATURATION: 95 %

## 2017-08-03 LAB
ANION GAP SERPL CALCULATED.3IONS-SCNC: 10 MMOL/L (ref 4–13)
BUN BLD-MCNC: 20 MG/DL (ref 5–21)
BUN/CREAT SERPL: 22.7 (ref 7–25)
CALCIUM SPEC-SCNC: 9.1 MG/DL (ref 8.4–10.4)
CHLORIDE SERPL-SCNC: 101 MMOL/L (ref 98–110)
CO2 SERPL-SCNC: 25 MMOL/L (ref 24–31)
CREAT BLD-MCNC: 0.88 MG/DL (ref 0.5–1.4)
GFR SERPL CREATININE-BSD FRML MDRD: 64 ML/MIN/1.73
GLUCOSE BLD-MCNC: 129 MG/DL (ref 70–100)
POTASSIUM BLD-SCNC: 5.2 MMOL/L (ref 3.5–5.3)
SODIUM BLD-SCNC: 136 MMOL/L (ref 135–145)

## 2017-08-03 PROCEDURE — 94799 UNLISTED PULMONARY SVC/PX: CPT

## 2017-08-03 PROCEDURE — 97535 SELF CARE MNGMENT TRAINING: CPT

## 2017-08-03 PROCEDURE — 97530 THERAPEUTIC ACTIVITIES: CPT

## 2017-08-03 PROCEDURE — 94760 N-INVAS EAR/PLS OXIMETRY 1: CPT

## 2017-08-03 PROCEDURE — 25010000003 CEFAZOLIN IN D5W 1 GM/50ML SOLUTION: Performed by: ORTHOPAEDIC SURGERY

## 2017-08-03 PROCEDURE — 80048 BASIC METABOLIC PNL TOTAL CA: CPT | Performed by: ORTHOPAEDIC SURGERY

## 2017-08-03 PROCEDURE — 97116 GAIT TRAINING THERAPY: CPT

## 2017-08-03 RX ORDER — HYDROMORPHONE HYDROCHLORIDE 4 MG/1
4 TABLET ORAL EVERY 4 HOURS PRN
Refills: 0
Start: 2017-08-03 | End: 2020-05-26

## 2017-08-03 RX ADMIN — IPRATROPIUM BROMIDE AND ALBUTEROL SULFATE 3 ML: .5; 3 SOLUTION RESPIRATORY (INHALATION) at 14:39

## 2017-08-03 RX ADMIN — IPRATROPIUM BROMIDE AND ALBUTEROL SULFATE 3 ML: .5; 3 SOLUTION RESPIRATORY (INHALATION) at 06:51

## 2017-08-03 RX ADMIN — FAMOTIDINE 20 MG: 20 TABLET, FILM COATED ORAL at 08:00

## 2017-08-03 RX ADMIN — SODIUM CHLORIDE 75 ML/HR: 9 INJECTION, SOLUTION INTRAVENOUS at 00:09

## 2017-08-03 RX ADMIN — IPRATROPIUM BROMIDE AND ALBUTEROL SULFATE 3 ML: .5; 3 SOLUTION RESPIRATORY (INHALATION) at 10:37

## 2017-08-03 RX ADMIN — OXYCODONE HYDROCHLORIDE AND ACETAMINOPHEN 2 TABLET: 10; 325 TABLET ORAL at 00:08

## 2017-08-03 RX ADMIN — OXYCODONE HYDROCHLORIDE AND ACETAMINOPHEN 1 TABLET: 10; 325 TABLET ORAL at 12:04

## 2017-08-03 RX ADMIN — OXYCODONE HYDROCHLORIDE AND ACETAMINOPHEN 1 TABLET: 10; 325 TABLET ORAL at 08:00

## 2017-08-03 RX ADMIN — OXYCODONE HYDROCHLORIDE AND ACETAMINOPHEN 1 TABLET: 10; 325 TABLET ORAL at 15:15

## 2017-08-03 RX ADMIN — CEFAZOLIN SODIUM 1 G: 1 INJECTION, SOLUTION INTRAVENOUS at 05:50

## 2017-08-03 NOTE — PLAN OF CARE
Problem: Patient Care Overview (Adult)  Goal: Plan of Care Review  Outcome: Ongoing (interventions implemented as appropriate)    08/03/17 1426   Coping/Psychosocial Response Interventions   Plan Of Care Reviewed With patient   Patient Care Overview   Progress progress toward functional goals as expected   Outcome Evaluation   Outcome Summary/Follow up Plan Pt is discharging home with daughter today. Pt states she is comfortable don/doffing LSO, pt educate don wear schedule of LSO. Pt is able to state 3/3 back precautions independently. Pt and Sotelo discussed need for AE or DME, per pt she has all needed DME and AE at home. Per pt she wears gowns at home and if needed daughter can assist her.

## 2017-08-03 NOTE — PLAN OF CARE
Problem: Patient Care Overview (Adult)  Goal: Plan of Care Review  Outcome: Outcome(s) achieved Date Met:  08/03/17 08/03/17 4202   Coping/Psychosocial Response Interventions   Plan Of Care Reviewed With patient   Patient Care Overview   Progress improving   Outcome Evaluation   Outcome Summary/Follow up Plan pt states pain much better today...up with brace...n/v WNL...VSS...safety maintained...dressing changed to tegaderm per order...no drainage noted...steri-strips in place...incision well approximated...pt d/c'd home       Goal: Adult Individualization and Mutuality  Outcome: Outcome(s) achieved Date Met:  08/03/17  Goal: Discharge Needs Assessment  Outcome: Outcome(s) achieved Date Met:  08/03/17    Problem: Perioperative Period (Adult)  Goal: Signs and Symptoms of Listed Potential Problems Will be Absent or Manageable (Perioperative Period)  Outcome: Outcome(s) achieved Date Met:  08/03/17    Problem: Fall Risk (Adult)  Goal: Identify Related Risk Factors and Signs and Symptoms  Outcome: Outcome(s) achieved Date Met:  08/03/17  Goal: Absence of Falls  Outcome: Outcome(s) achieved Date Met:  08/03/17    Problem: Pain, Acute (Adult)  Goal: Identify Related Risk Factors and Signs and Symptoms  Outcome: Outcome(s) achieved Date Met:  08/03/17  Goal: Acceptable Pain Control/Comfort Level  Outcome: Outcome(s) achieved Date Met:  08/03/17

## 2017-08-03 NOTE — DISCHARGE SUMMARY
Date of Discharge:  8/3/2017    Admission Diagnosis: M54.16    Discharge Diagnosis:   1.  Status post previous laminectomy decompression, PSF with instrumentation L3 to S1, Dr. Morales  2.  Status post left LLIF with instrumentation L2-3, 12/22/2013  3.  Severe recurrent back pain  4.  Right buttock, groin, and anterior thigh radiculopathy  5.  Severe degenerative disc disease L1-2  6.  Retrolisthesis L1-2  7.  Focal scoliosis, concave to the right L1-2  8.  Facet arthropathy L1-2  9.  Central and severe foraminal stenosis L1-2  10. Status post Left lateral lumbar interbody fusion L1-2- 8/2/17  Consults During Admission: none    Hospital Course  Patient is a 66 y.o. female known to our practice with history of prior intervention.  She is admitted for the above elective fusion and has tolerated this well. She will be discharged home in good stable condition with instructions for brace when out of bed.     Condition on Discharge:  STABLE    Vital Signs  Temp:  [97.2 °F (36.2 °C)-98.1 °F (36.7 °C)] 97.8 °F (36.6 °C)  Heart Rate:  [66-88] 76  Resp:  [10-18] 18  BP: (104-167)/(47-71) 149/57    Physical Exam:  Alert and oriented ×3, no acute distress, grossly neurovascularly intact, vital signs stable, dressing clean dry and intact, moving all extremities without focal deficit    Discharge Disposition      Discharge Medications   Brenda Sneed   Home Medication Instructions KIANNA:[id number]    Printed on:08/03/17 0745   Medication Information                      DULoxetine (CYMBALTA) 60 MG capsule  Take 60 mg by mouth Daily.             furosemide (LASIX) 40 MG tablet  Take 40 mg by mouth Daily As Needed (PRN EDEMA).             ibuprofen (ADVIL,MOTRIN) 200 MG tablet  Take 200 mg by mouth As Needed for Mild Pain (1-3).             Olmesartan-Amlodipine-HCTZ (TRIBENZOR) 20-5-12.5 MG tablet  Take 1 tablet by mouth Daily.             oxyCODONE-acetaminophen (PERCOCET)  MG per tablet  Take 1 tablet by mouth 4  (Four) Times a Day.                 Discharge Diet: Resume Home diet, advance as tolerated    Activity at Discharge: Resume home activity advace as tolerated, no lifting, no twisting, no bending, brace as directed, no driving until directed.     Follow-up Appointments  Followup with PCP within one week  Followup [facility] at 2weeks post-op         ALISA Lu  08/03/17  7:45 AM

## 2017-08-03 NOTE — THERAPY TREATMENT NOTE
Acute Care - Physical Therapy Treatment Note  [facility]     Patient Name: Brenda Sneed  : 1950  MRN: [id number]  Today's Date: 8/3/2017  Onset of Illness/Injury or Date of Surgery Date: 17  Date of Referral to PT: 17  Referring Physician: Dr. Wright    Admit Date: 2017    Visit Dx:    ICD-10-CM ICD-9-CM   1. Decreased activities of daily living (ADL) Z78.9 V49.89   2. Impaired functional mobility, balance, gait, and endurance Z74.09 V49.89     Patient Active Problem List   Diagnosis   • Spinal stenosis, lumbar               Adult Rehabilitation Note       17 1055 17 0947 17 0845    Rehab Assessment/Intervention    Discipline physical therapy assistant  -LG physical therapy assistant  -LG physical therapy assistant  -    Document Type therapy note (daily note)  -LG      Subjective Information agree to therapy;no complaints  -LG      Treatment Not Performed  patient unavailable for treatment  -LG patient unavailable for treatment  -LG    Treatment Not Performed, Comment  pt with [facility], asked to check back.   -LG Pt eating breakfast, says she is going home today. Will check back   -LG    Precautions/Limitations fall precautions;brace on when up  -LG      Recorded by [LG] Jb Srinivasan PTA [LG] Jb Srinivasan PTA [LG] Jb Srinivasan PTA    Pain Assessment    Pain Assessment 0-10  -LG      Pain Score 2  -LG      Post Pain Score 2  -LG      Pain Type Acute pain  -LG      Pain Location Back  -LG      Pain Orientation Left  -LG      Pain Descriptors Aching  -LG      Pain Frequency Constant/continuous  -LG      Pain Intervention(s) Medication (See MAR);Repositioned;Ambulation/increased activity  -LG      Response to Interventions tolerated  -LG      Recorded by [LG] Jb Srinivasan PTA      Bed Mobility, Assessment/Treatment    Bed Mobility, Comment up in chair  -LG      Recorded by [LG] Jb Srinivasan PTA      Transfer Assessment/Treatment    Transfers, Sit-Stand  [place] verbal cues required;contact guard assist  -LG      Transfers, Stand-Sit [place] verbal cues required;contact guard assist  -LG      Transfer, Safety Issues step length decreased  -LG      Transfer, Impairments strength decreased  -LG      Recorded by [LG] Jb Srinivasan PTA      Gait Assessment/Treatment    Gait, [place] Level contact guard assist  -LG      Gait, Assistive Device rolling walker  -LG      Gait, Distance (Feet) 125   x 2 reps  -LG      Gait, Gait Deviations bilateral:;antalgic;alexander decreased;narrow base;step length decreased;stride length decreased;toe-to-floor clearance decreased  -LG      Gait, Safety Issues step length decreased  -LG      Gait, Impairments strength decreased  -LG      Recorded by [LG] Jb Srinivasan PTA      Motor Skills/Interventions    Additional Documentation --  -LG      Recorded by [LG] Jb Srinivasan PTA      Therapy Exercises    Bilateral Lower Extremities AROM:;15 reps;ankle pumps/circles;LAQ;hip abduction/adduction  -LG      Recorded by [LG] Jb Srinivasan PTA      Orthotics Prosthetics    Additional Documentation Orthosis Location (Group)  -LG      Recorded by [LG] Jb Srinivasan PTA      Orthosis Location    Orthosis Location/Type neck/back  -LG      Orthosis, Neck/Back LSO (lumbar sacral orthosis)  -LG      Recorded by [LG] Jb Srinivasan PTA      Orthosis Management/Training    Orthosis Indications immobilize, protect/position healing structures;rest, reduce pain;restrict motion  -LG      Orthosis Skills Training doffing orthosis;donning orthosis;purpose/goals of orthosis  -LG      Orthosis Wear Schedule wear when out of bed only  -LG      Recorded by [LG] Jb Srinivasan PTA      Positioning and Restraints    Pre-Treatment Position sitting in chair/recliner  -LG      Post Treatment Position chair  -LG      In Chair reclined;call light within reach;encouraged to call for assist;legs elevated  -LG      Recorded by [LG] Jb Srinivasan PTA        User  Key  (r) = Recorded By, (t) = Taken By, (c) = Cosigned By    Initials Name Effective Dates    REGINA Jb Srinivasan, PTA 08/02/16 -                 IP PT Goals       08/02/17 1512          Bed Mobility PT LTG    Bed Mobility PT LTG, Date Established 08/02/17  -PB (r) MS (t) PB (c)      Bed Mobility PT LTG, Time to Achieve by discharge  -PB (r) MS (t) PB (c)      Bed Mobility PT LTG, Activity Type all bed mobility  -PB (r) MS (t) PB (c)      Bed Mobility PT LTG, [place] Level independent  -PB (r) MS (t) PB (c)      Transfer Training PT LTG    Transfer Training PT LTG, Date Established 08/02/17  -PB (r) MS (t) PB (c)      Transfer Training PT LTG, Time to Achieve by discharge  -PB (r) MS (t) PB (c)      Transfer Training PT LTG, Activity Type bed to chair /chair to bed;sit to stand/stand to sit  -PB (r) MS (t) PB (c)      Transfer Training PT LTG, [place] Level independent  -PB (r) MS (t) PB (c)      Transfer Training PT LTG, Assist Device walker, rolling  -PB (r) MS (t) PB (c)      Gait Training PT LTG    Gait Training Goal PT LTG, Date Established 08/02/17  -PB (r) MS (t) PB (c)      Gait Training Goal PT LTG, Time to Achieve by discharge  -PB (r) MS (t) PB (c)      Gait Training Goal PT LTG, [place] Level independent  -PB (r) MS (t) PB (c)      Gait Training Goal PT LTG, Assist Device walker, rolling  -PB (r) MS (t) PB (c)      Gait Training Goal PT LTG, Distance to Achieve 250'  -PB (r) MS (t) PB (c)        User Key  (r) = Recorded By, (t) = Taken By, (c) = Cosigned By    Initials Name Provider Type    PB Chas Cameron, PT DPT Physical Therapist    MS Nina Stanton, PT Student PT Student          Physical Therapy Education     Title: PT OT SLP Therapies (Active)     Topic: Physical Therapy (Done)     Point: Mobility training (Done)    Learning Progress Summary    Learner Readiness Method Response Comment Documented by Status   Patient Eager SIERRA ANDRES DU Educated on benefits of activity, back  precautions/restrictions, don/dolf LSO, safety with transfers and gait.  08/03/17 1055 Done    Acceptance SIERRA ANDRES DU,NR spinal precautions, brace management, proper form for bed mobility, use of walker MS 08/02/17 1508 Done               Point: Home exercise program (Done)    Learning Progress Summary    Learner Readiness Method Response Comment Documented by Status   Patient Eager SIERRA ANDRES DU Educated on benefits of activity, back precautions/restrictions, don/dolf LSO, safety with transfers and gait.  08/03/17 1055 Done               Point: Body mechanics (Done)    Learning Progress Summary    Learner Readiness Method Response Comment Documented by Status   Patient Eager SIERRA ANDRES DU Educated on benefits of activity, back precautions/restrictions, don/dolf LSO, safety with transfers and gait.  08/03/17 1055 Done               Point: Precautions (Done)    Learning Progress Summary    Learner Readiness Method Response Comment Documented by Status   Patient Eager SIERRA ANDRES DU Educated on benefits of activity, back precautions/restrictions, don/dolf LSO, safety with transfers and gait.  08/03/17 1055 Done    Acceptance SIERRA ANDRES DU,NR spinal precautions, brace management, proper form for bed mobility, use of walker MS 08/02/17 1508 Done                      User Key     Initials Effective Dates Name Provider Type Discipline     08/02/16 -  Jb Srinivasan, PTA Physical Therapy Assistant PT    MS 07/19/17 -  Nina Stanton, PT Student PT Student PT                    PT Recommendation and Plan  Anticipated Discharge Disposition: home with home health, home with outpatient services  Planned Therapy Interventions: balance training, bed mobility training, gait training, home exercise program, orthotic fitting/training, patient/family education, strengthening, transfer training  PT Frequency: 2 times/day, per priority policy  Plan of Care Review  Plan Of Care Reviewed With: patient  Progress: improving  Outcome Summary/Follow  up Plan: Pt sitting up in bedside chair. States she is ready to go home today. Pt Independent dolfing/doning LSO, SBA for sit to/from stand transfers, SBA for gait x 125' x 2 reps. Pt discharging to home today.           Outcome Measures       08/03/17 1118 08/02/17 1500 08/02/17 1349    How much help from another person do you currently need...    Turning from your back to your side while in flat bed without using bedrails? 3  -LG  3  -PB (r) MS (t) PB (c)    Moving from lying on back to sitting on the side of a flat bed without bedrails? 3  -LG  3  -PB (r) MS (t) PB (c)    Moving to and from a bed to a chair (including a wheelchair)? 3  -LG  3  -PB (r) MS (t) PB (c)    Standing up from a chair using your arms (e.g., wheelchair, bedside chair)? 3  -LG  3  -PB (r) MS (t) PB (c)    Climbing 3-5 steps with a railing? 3  -LG  2  -PB (r) MS (t) PB (c)    To walk in hospital room? 3  -LG  3  -PB (r) MS (t) PB (c)    AM-PAC 6 Clicks Score 18  -LG  17  -PB (r) MS (t)    How much help from another is currently needed...    Putting on and taking off regular lower body clothing?  2  -MM     Bathing (including washing, rinsing, and drying)  2  -MM     Toileting (which includes using toilet bed pan or urinal)  2  -MM     Putting on and taking off regular upper body clothing  3  -MM     Taking care of personal grooming (such as brushing teeth)  4  -MM     Eating meals  4  -MM     Score  17  -MM     Functional Assessment    Outcome Measure Options AM-PAC 6 Clicks Basic Mobility (PT)  -LG AM-PAC 6 Clicks Daily Activity (OT)  -MM AM-PAC 6 Clicks Basic Mobility (PT)  -PB (r) MS (t) PB (c)      User Key  (r) = Recorded By, (t) = Taken By, (c) = Cosigned By    Initials Name Provider Type    LG Jb Srinivasan PTA Physical Therapy Assistant    KATELYN Cameron, PT DPT Physical Therapist    MM Antonino Betancourt, OTR/L Occupational Therapist    MS Nina Stanton, PT Student PT Student           Time Calculation:         PT Charges        08/03/17 1055          Time Calculation    Start Time 1055  -LG      Stop Time 1119  -LG      Time Calculation (min) 24 min  -LG      PT Received On 08/03/17  -LG      PT Goal Re-Cert Due Date 08/12/17  -LG      Time Calculation- PT    Total Timed Code Minutes- PT 24 minute(s)  -LG        User Key  (r) = Recorded By, (t) = Taken By, (c) = Cosigned By    Initials Name Provider Type     Jb Srinivasan PTA Physical Therapy Assistant          Therapy Charges for Today     Code Description Service Date Service Provider Modifiers Qty    10657490607 HC GAIT TRAINING EA 15 MIN 8/3/2017 Jb Srinivasan PTA GP, KX 1    13549269468 HC PT THERAPEUTIC ACT EA 15 MIN 8/3/2017 Jb Srinivasan PTA GP, KX 1          PT G-Codes  Outcome Measure Options: AM-PAC 6 Clicks Basic Mobility (PT)  Score: 17  Functional Limitation: Mobility: Walking and moving around  Mobility: Walking and Moving Around Current Status (): At least 40 percent but less than 60 percent impaired, limited or restricted  Mobility: Walking and Moving Around Goal Status (): At least 20 percent but less than 40 percent impaired, limited or restricted    Jb Srinivasan PTA  8/3/2017

## 2017-08-03 NOTE — PLAN OF CARE
Problem: Patient Care Overview (Adult)  Goal: Plan of Care Review  Outcome: Ongoing (interventions implemented as appropriate)    08/03/17 0456   Coping/Psychosocial Response Interventions   Plan Of Care Reviewed With patient   Patient Care Overview   Progress improving   Outcome Evaluation   Outcome Summary/Follow up Plan Medicated with prn pain meds for relief of pain, effective. Ambulated 2-3 times in room to bathroom. with assist x1. Wears LSO brace when OOB. Dressings to back/left flank clean/dry and intact. Neuro wnl. Safety maintained.        Goal: Adult Individualization and Mutuality  Outcome: Ongoing (interventions implemented as appropriate)  Goal: Discharge Needs Assessment  Outcome: Ongoing (interventions implemented as appropriate)    Problem: Perioperative Period (Adult)  Goal: Signs and Symptoms of Listed Potential Problems Will be Absent or Manageable (Perioperative Period)  Outcome: Ongoing (interventions implemented as appropriate)    Problem: Fall Risk (Adult)  Goal: Identify Related Risk Factors and Signs and Symptoms  Outcome: Ongoing (interventions implemented as appropriate)  Goal: Absence of Falls  Outcome: Ongoing (interventions implemented as appropriate)    Problem: Pain, Acute (Adult)  Goal: Identify Related Risk Factors and Signs and Symptoms  Outcome: Ongoing (interventions implemented as appropriate)  Goal: Acceptable Pain Control/Comfort Level  Outcome: Ongoing (interventions implemented as appropriate)

## 2017-08-03 NOTE — PLAN OF CARE
Problem: Patient Care Overview (Adult)  Goal: Plan of Care Review  Outcome: Ongoing (interventions implemented as appropriate)    08/03/17 1055   Coping/Psychosocial Response Interventions   Plan Of Care Reviewed With patient   Patient Care Overview   Progress improving   Outcome Evaluation   Outcome Summary/Follow up Plan Pt sitting up in bedside chair. States she is ready to go home today. Pt Independent dolfing/doning LSO, SBA for sit to/from stand transfers, SBA for gait x 125' x 2 reps. Pt discharging to home today.

## 2017-08-04 NOTE — PLAN OF CARE
Problem: Inpatient Occupational Therapy  Goal: Patient Education Goal LTG- OT  Outcome: Unable to achieve outcome(s) by discharge Date Met:  08/04/17 08/02/17 1506 08/04/17 0718   Patient Education OT LTG   Patient Education OT LTG, Date Established 08/02/17 --    Patient Education OT LTG, Time to Achieve by discharge --    Patient Education OT LTG, Education Type written program;HEP;precautions per surgeon;brace use/care;positioning;posture/body mechanics;home safety;adaptive equipment mgmt;skin care/inspection;energy conservation;work simplification;adaptive breathing;joann/doff brace --    Patient Education OT LTG, Education Understanding demonstrates adequately;verbalizes understanding;independent --    Patient Education OT LTG, Date Goal Reviewed --  08/04/17   Patient Education OT LTG Outcome --  goal not met   Patient Education OT LTG, Reason Goal Not Met --  discharged from facility       Goal: Bathing Goal LTG- OT  Outcome: Unable to achieve outcome(s) by discharge Date Met:  08/04/17 08/02/17 1506 08/04/17 0718   Bathing OT LTG   Bathing Goal OT LTG, Date Established 08/02/17 --    Bathing Goal OT LTG, Time to Achieve by discharge --    Bathing Goal OT LTG, Activity Type upper body bathing;lower body bathing --    Bathing Goal OT LTG, [place] Level conditional independence --    Bathing Goal OT LTG, Assist Device grab bars;tub bench with back;shower head, detachable;sponge, long handled --    Bathing Goal OT LTG, Date Goal Reviewed --  08/04/17   Bathing Goal OT LTG, Outcome --  goal not met   Bathing Goal OT LTG, Reason Goal Not Met --  discharged from facility       Goal: Toileting Goal LTG- OT  Outcome: Unable to achieve outcome(s) by discharge Date Met:  08/04/17 08/02/17 1506 08/04/17 0718   Toileting OT LTG   Toileting Goal OT LTG, Date Established 08/02/17 --    Toileting Goal OT LTG, Time to Achieve by discharge --    Toileting Goal OT LTG, [place] Level conditional independence  --    Toileting Goal OT LTG, Assist Device toilet seat, raised --    Toileting Goal OT LTG, Date Goal Reviewed --  08/04/17   Toileting Goal OT LTG, Outcome --  goal not met   Toileting Goal OT LTG, Reason Goal Not Met --  discharged from facility       Goal: LB Dressing Goal LTG- OT  Outcome: Unable to achieve outcome(s) by discharge Date Met:  08/04/17 08/02/17 1506 08/04/17 0718   LB Dressing OT LTG   LB Dressing Goal OT LTG, Date Established 08/02/17 --    LB Dressing Goal OT LTG, Time to Achieve by discharge --    LB Dressing Goal OT LTG, [place] Level conditional independence --    LB Dressing Goal OT LTG, Adaptive Equipment laces, elastic;reacher;shoe horn, long handled;sock-aid  (AE PRN) --    LB Dressing Goal OT LTG, Date Goal Reviewed --  08/04/17   LB Dressing Goal OT LTG, Outcome --  goal not met   LB Dressing Goal OT LTG, Reason Goal Not Met --  discharged from facility

## 2017-08-04 NOTE — THERAPY DISCHARGE NOTE
Acute Care - Physical Therapy Discharge Summary  [facility]       Patient Name: Brenda Sneed  : 1950  MRN: [id number]    Today's Date: 2017  Onset of Illness/Injury or Date of Surgery Date: 17    Date of Referral to PT: 17  Referring Physician: Dr. Wright      Admit Date: 2017      PT Recommendation and Plan    Visit Dx:    ICD-10-CM ICD-9-CM   1. Decreased activities of daily living (ADL) Z78.9 V49.89   2. Impaired functional mobility, balance, gait, and endurance Z74.09 V49.89             Outcome Measures       17 1400 17 1118 17 1500    How much help from another person do you currently need...    Turning from your back to your side while in flat bed without using bedrails?  3  -LG     Moving from lying on back to sitting on the side of a flat bed without bedrails?  3  -LG     Moving to and from a bed to a chair (including a wheelchair)?  3  -LG     Standing up from a chair using your arms (e.g., wheelchair, bedside chair)?  3  -LG     Climbing 3-5 steps with a railing?  3  -LG     To walk in hospital room?  3  -LG     AM-PAC 6 Clicks Score  18  -LG     How much help from another is currently needed...    Putting on and taking off regular lower body clothing? 2  -TS  2  -MM    Bathing (including washing, rinsing, and drying) 2  -TS  2  -MM    Toileting (which includes using toilet bed pan or urinal) 3  -TS  2  -MM    Putting on and taking off regular upper body clothing 3  -TS  3  -MM    Taking care of personal grooming (such as brushing teeth) 4  -TS  4  -MM    Eating meals 4  -TS  4  -MM    Score 18  -TS  17  -MM    Functional Assessment    Outcome Measure Options AM-PAC 6 Clicks Daily Activity (OT)  -TS AM-PAC 6 Clicks Basic Mobility (PT)  -LG AM-PAC 6 Clicks Daily Activity (OT)  -MM      17 1349          How much help from another person do you currently need...    Turning from your back to your side while in flat bed without using bedrails? 3  -PB (r)  MS (t) PB (c)      Moving from lying on back to sitting on the side of a flat bed without bedrails? 3  -PB (r) MS (t) PB (c)      Moving to and from a bed to a chair (including a wheelchair)? 3  -PB (r) MS (t) PB (c)      Standing up from a chair using your arms (e.g., wheelchair, bedside chair)? 3  -PB (r) MS (t) PB (c)      Climbing 3-5 steps with a railing? 2  -PB (r) MS (t) PB (c)      To walk in hospital room? 3  -PB (r) MS (t) PB (c)      AM-PAC 6 Clicks Score 17  -PB (r) MS (t)      Functional Assessment    Outcome Measure Options AM-PAC 6 Clicks Basic Mobility (PT)  -PB (r) MS (t) PB (c)        User Key  (r) = Recorded By, (t) = Taken By, (c) = Cosigned By    Initials Name Provider Type    REGINA Srinivasan, NE Physical Therapy Assistant    TS Marry Krishnamurthy, FUENTES/L Occupational Therapy Assistant    PB Chas Cameron, PT DPT Physical Therapist    MM Antonino Betancourt, OTR/L Occupational Therapist    MS Nina Stanton, PT Student PT Student                      IP PT Goals       08/04/17 0847 08/02/17 1512       Bed Mobility PT LTG    Bed Mobility PT LTG, Date Established  08/02/17  -PB (r) MS (t) PB (c)     Bed Mobility PT LTG, Time to Achieve  by discharge  -PB (r) MS (t) PB (c)     Bed Mobility PT LTG, Activity Type  all bed mobility  -PB (r) MS (t) PB (c)     Bed Mobility PT LTG, [place] Level  independent  -PB (r) MS (t) PB (c)     Bed Mobility PT LTG, Date Goal Reviewed 08/04/17  -      Bed Mobility PT LTG, Outcome goal not met  -      Bed Mobility PT LTG, Reason Goal Not Met discharged from facility  -      Transfer Training PT LTG    Transfer Training PT LTG, Date Established  08/02/17  -PB (r) MS (t) PB (c)     Transfer Training PT LTG, Time to Achieve  by discharge  -PB (r) MS (t) PB (c)     Transfer Training PT LTG, Activity Type  bed to chair /chair to bed;sit to stand/stand to sit  -PB (r) MS (t) PB (c)     Transfer Training PT LTG, [place] Level  independent  -PB (r) MS  (t) PB (c)     Transfer Training PT LTG, Assist Device  walker, rolling  -PB (r) MS (t) PB (c)     Transfer Training PT  LTG, Date Goal Reviewed 08/04/17  -      Transfer Training PT LTG, Outcome goal not met  -      Transfer Training PT LTG, Reason Goal Not Met discharged from facility  -      Gait Training PT LTG    Gait Training Goal PT LTG, Date Established  08/02/17  -PB (r) MS (t) PB (c)     Gait Training Goal PT LTG, Time to Achieve  by discharge  -PB (r) MS (t) PB (c)     Gait Training Goal PT LTG, [place] Level  independent  -PB (r) MS (t) PB (c)     Gait Training Goal PT LTG, Assist Device  walker, rolling  -PB (r) MS (t) PB (c)     Gait Training Goal PT LTG, Distance to Achieve  250'  -PB (r) MS (t) PB (c)     Gait Training Goal PT LTG, Date Goal Reviewed 08/04/17  -      Gait Training Goal PT LTG, Outcome goal not met  -      Gait Training Goal PT LTG, Reason Goal Not Met discharged from facility  -        User Key  (r) = Recorded By, (t) = Taken By, (c) = Cosigned By    Initials Name Provider Type     Margot Verma, PTA Physical Therapy Assistant    KATELYN Cameron, PT DPT Physical Therapist    MS Nina Stanton, PT Student PT Student              PT Discharge Summary  Reason for Discharge: Discharge from facility  Outcomes Achieved: Refer to plan of care for updates on goals achieved  Discharge Destination: Home      Margot Verma PTA   8/4/2017

## 2017-08-04 NOTE — THERAPY DISCHARGE NOTE
Acute Care - Occupational Therapy Discharge Summary  [facility]     Patient Name: Brenda Sneed  : 1950  MRN: [id number]    Today's Date: 2017  Onset of Illness/Injury or Date of Surgery Date: 17    Date of Referral to OT: 17  Referring Physician: Dr. Wright      Admit Date: 2017        OT Recommendation and Plan    Visit Dx:    ICD-10-CM ICD-9-CM   1. Decreased activities of daily living (ADL) Z78.9 V49.89   2. Impaired functional mobility, balance, gait, and endurance Z74.09 V49.89                     OT Goals       17 0718 17 1506       Patient Education OT LTG    Patient Education OT LTG, Date Established  17  -MM     Patient Education OT LTG, Time to Achieve  by discharge  -MM     Patient Education OT LTG, Education Type  written program;HEP;precautions per surgeon;brace use/care;positioning;posture/body mechanics;home safety;adaptive equipment mgmt;skin care/inspection;energy conservation;work simplification;adaptive breathing;joann/doff brace  -MM     Patient Education OT LTG, Education Understanding  demonstrates adequately;verbalizes understanding;independent  -MM     Patient Education OT LTG, Date Goal Reviewed 17  -      Patient Education OT LTG Outcome goal not met  -      Patient Education OT LTG, Reason Goal Not Met discharged from [facility]  -      Bathing OT LTG    Bathing Goal OT LTG, Date Established  17  -MM     Bathing Goal OT LTG, Time to Achieve  by discharge  -MM     Bathing Goal OT LTG, Activity Type  upper body bathing;lower body bathing  -MM     Bathing Goal OT LTG, [place] Level  conditional independence  -MM     Bathing Goal OT LTG, Assist Device  grab bars;tub bench with back;shower head, detachable;sponge, long handled  -MM     Bathing Goal OT LTG, Date Goal Reviewed 17  -      Bathing Goal OT LTG, Outcome goal not met  -TS      Bathing Goal OT LTG, Reason Goal Not Met discharged from facility  -       Toileting OT LTG    Toileting Goal OT LTG, Date Established  08/02/17  -MM     Toileting Goal OT LTG, Time to Achieve  by discharge  -MM     Toileting Goal OT LTG, [place] Level  conditional independence  -MM     Toileting Goal OT LTG, Assist Device  toilet seat, raised  -MM     Toileting Goal OT LTG, Date Goal Reviewed 08/04/17  -TS      Toileting Goal OT LTG, Outcome goal not met  -TS      Toileting Goal OT LTG, Reason Goal Not Met discharged from facility  -TS      LB Dressing OT LTG    LB Dressing Goal OT LTG, Date Established  08/02/17  -MM     LB Dressing Goal OT LTG, Time to Achieve  by discharge  -MM     LB Dressing Goal OT LTG, [place] Level  conditional independence  -MM     LB Dressing Goal OT LTG, Adaptive Equipment  laces, elastic;reacher;shoe horn, long handled;sock-aid   AE PRN  -MM     LB Dressing Goal OT LTG, Date Goal Reviewed 08/04/17  -TS      LB Dressing Goal OT LTG, Outcome goal not met  -TS      LB Dressing Goal OT LTG, Reason Goal Not Met discharged from facility  -TS        User Key  (r) = Recorded By, (t) = Taken By, (c) = Cosigned By    Initials Name Provider Type    TS ALFREDO Weinstein/L Occupational Therapy Assistant    LINDA Moore/L Occupational Therapist                Outcome Measures       08/03/17 1400 08/03/17 1118 08/02/17 1500    How much help from another person do you currently need...    Turning from your back to your side while in flat bed without using bedrails?  3  -LG     Moving from lying on back to sitting on the side of a flat bed without bedrails?  3  -LG     Moving to and from a bed to a chair (including a wheelchair)?  3  -LG     Standing up from a chair using your arms (e.g., wheelchair, bedside chair)?  3  -LG     Climbing 3-5 steps with a railing?  3  -LG     To walk in hospital room?  3  -LG     AM-PAC 6 Clicks Score  18  -LG     How much help from another is currently needed...    Putting on and taking off regular lower body  clothing? 2  -TS  2  -MM    Bathing (including washing, rinsing, and drying) 2  -TS  2  -MM    Toileting (which includes using toilet bed pan or urinal) 3  -TS  2  -MM    Putting on and taking off regular upper body clothing 3  -TS  3  -MM    Taking care of personal grooming (such as brushing teeth) 4  -TS  4  -MM    Eating meals 4  -TS  4  -MM    Score 18  -TS  17  -MM    Functional Assessment    Outcome Measure Options AM-PAC 6 Clicks Daily Activity (OT)  -TS AM-PAC 6 Clicks Basic Mobility (PT)  -LG AM-PAC 6 Clicks Daily Activity (OT)  -MM      08/02/17 1349          How much help from another person do you currently need...    Turning from your back to your side while in flat bed without using bedrails? 3  -PB (r) MS (t) PB (c)      Moving from lying on back to sitting on the side of a flat bed without bedrails? 3  -PB (r) MS (t) PB (c)      Moving to and from a bed to a chair (including a wheelchair)? 3  -PB (r) MS (t) PB (c)      Standing up from a chair using your arms (e.g., wheelchair, bedside chair)? 3  -PB (r) MS (t) PB (c)      Climbing 3-5 steps with a railing? 2  -PB (r) MS (t) PB (c)      To walk in hospital room? 3  -PB (r) MS (t) PB (c)      AM-PAC 6 Clicks Score 17  -PB (r) MS (t)      Functional Assessment    Outcome Measure Options AM-PAC 6 Clicks Basic Mobility (PT)  -PB (r) MS (t) PB (c)        User Key  (r) = Recorded By, (t) = Taken By, (c) = Cosigned By    Initials Name Provider Type    LG Jb Srinivasan, NE Physical Therapy Assistant    ROGELIO Krishnamurthy FUENTES/L Occupational Therapy Assistant    PB Chas Cameron, PT DPT Physical Therapist    MM Antonino Betancourt, OTR/L Occupational Therapist    MS Nina Stanton, PT Student PT Student          Therapy Charges for Today     Code Description Service Date Service Provider Modifiers Qty    [id number] HC OT SELF CARE/MGMT/TRAIN EA 15 MIN 8/3/2017 ALFREDO Weinstein/L GO, KX 2          OT Discharge Summary  Reason for Discharge:  Discharge from facility  Outcomes Achieved: Refer to plan of care for updates on goals achieved  Discharge Destination: Home with assist      DL Barksdale  8/4/2017

## 2017-08-04 NOTE — PLAN OF CARE
Problem: Inpatient Physical Therapy  Goal: Bed Mobility Goal LTG- PT  Outcome: Unable to achieve outcome(s) by discharge Date Met:  08/04/17 08/02/17 1512 08/04/17 0847   Bed Mobility PT LTG   Bed Mobility PT LTG, Date Established 08/02/17 --    Bed Mobility PT LTG, Time to Achieve by discharge --    Bed Mobility PT LTG, Activity Type all bed mobility --    Bed Mobility PT LTG, [place] Level independent --    Bed Mobility PT LTG, Date Goal Reviewed --  08/04/17   Bed Mobility PT LTG, Outcome --  goal not met   Bed Mobility PT LTG, Reason Goal Not Met --  discharged from facility       Goal: Transfer Training Goal 1 LTG- PT  Outcome: Unable to achieve outcome(s) by discharge Date Met:  08/04/17 08/02/17 1512 08/04/17 0847   Transfer Training PT LTG   Transfer Training PT LTG, Date Established 08/02/17 --    Transfer Training PT LTG, Time to Achieve by discharge --    Transfer Training PT LTG, Activity Type bed to chair /chair to bed;sit to stand/stand to sit --    Transfer Training PT LTG, [place] Level independent --    Transfer Training PT LTG, Assist Device walker, rolling --    Transfer Training PT LTG, Date Goal Reviewed --  08/04/17   Transfer Training PT LTG, Outcome --  goal not met   Transfer Training PT LTG, Reason Goal Not Met --  discharged from facility       Goal: Gait Training Goal LTG- PT  Outcome: Unable to achieve outcome(s) by discharge Date Met:  08/04/17 08/02/17 1512 08/04/17 0847   Gait Training PT LTG   Gait Training Goal PT LTG, Date Established 08/02/17 --    Gait Training Goal PT LTG, Time to Achieve by discharge --    Gait Training Goal PT LTG, [place] Level independent --    Gait Training Goal PT LTG, Assist Device walker, rolling --    Gait Training Goal PT LTG, Distance to Achieve 250' --    Gait Training Goal PT LTG, Date Goal Reviewed --  08/04/17   Gait Training Goal PT LTG, Outcome --  goal not met   Gait Training Goal PT LTG, Reason Goal Not Met --   discharged from facility

## 2017-12-18 ENCOUNTER — TRANSCRIBE ORDERS (OUTPATIENT)
Dept: ADMINISTRATIVE | Facility: HOSPITAL | Age: 67
End: 2017-12-18

## 2017-12-18 DIAGNOSIS — Z12.31 ENCOUNTER FOR SCREENING MAMMOGRAM FOR MALIGNANT NEOPLASM OF BREAST: Primary | ICD-10-CM

## 2017-12-22 ENCOUNTER — HOSPITAL ENCOUNTER (OUTPATIENT)
Dept: MAMMOGRAPHY | Facility: HOSPITAL | Age: 67
Discharge: HOME | End: 2017-12-22
Admitting: PHYSICIAN ASSISTANT

## 2017-12-22 ENCOUNTER — APPOINTMENT (OUTPATIENT)
Dept: MAMMOGRAPHY | Facility: HOSPITAL | Age: 67
End: 2017-12-22

## 2017-12-22 DIAGNOSIS — Z12.31 ENCOUNTER FOR SCREENING MAMMOGRAM FOR MALIGNANT NEOPLASM OF BREAST: ICD-10-CM

## 2017-12-22 PROCEDURE — 77063 BREAST TOMOSYNTHESIS BI: CPT

## 2017-12-22 PROCEDURE — G0202 SCR MAMMO BI INCL CAD: HCPCS

## 2018-01-03 ENCOUNTER — TRANSCRIBE ORDERS (OUTPATIENT)
Dept: ADMINISTRATIVE | Facility: HOSPITAL | Age: 68
End: 2018-01-03

## 2018-01-03 DIAGNOSIS — N64.89 BREAST ASYMMETRY: Primary | ICD-10-CM

## 2018-01-05 ENCOUNTER — HOSPITAL ENCOUNTER (OUTPATIENT)
Dept: GENERAL RADIOLOGY | Facility: HOSPITAL | Age: 68
Discharge: HOME | End: 2018-01-05
Attending: FAMILY MEDICINE

## 2018-01-05 ENCOUNTER — HOSPITAL ENCOUNTER (OUTPATIENT)
Dept: MAMMOGRAPHY | Facility: HOSPITAL | Age: 68
Discharge: HOME | End: 2018-01-05
Admitting: PHYSICIAN ASSISTANT

## 2018-01-05 ENCOUNTER — HOSPITAL ENCOUNTER (OUTPATIENT)
Dept: ULTRASOUND IMAGING | Facility: HOSPITAL | Age: 68
Discharge: HOME | End: 2018-01-05

## 2018-01-05 ENCOUNTER — TRANSCRIBE ORDERS (OUTPATIENT)
Dept: ADMINISTRATIVE | Facility: HOSPITAL | Age: 68
End: 2018-01-05

## 2018-01-05 DIAGNOSIS — M79.672 PAIN IN BOTH FEET: ICD-10-CM

## 2018-01-05 DIAGNOSIS — M79.671 PAIN IN BOTH FEET: ICD-10-CM

## 2018-01-05 DIAGNOSIS — M79.671 PAIN IN BOTH FEET: Primary | ICD-10-CM

## 2018-01-05 DIAGNOSIS — N64.89 BREAST ASYMMETRY: ICD-10-CM

## 2018-01-05 DIAGNOSIS — M79.672 PAIN IN BOTH FEET: Primary | ICD-10-CM

## 2018-01-05 PROCEDURE — G0279 TOMOSYNTHESIS, MAMMO: HCPCS

## 2018-01-05 PROCEDURE — 73630 X-RAY EXAM OF FOOT: CPT

## 2018-01-05 PROCEDURE — 77065 DX MAMMO INCL CAD UNI: CPT

## 2018-01-05 PROCEDURE — 76642 ULTRASOUND BREAST LIMITED: CPT

## 2018-01-18 ENCOUNTER — DOCUMENTATION (OUTPATIENT)
Dept: ULTRASOUND IMAGING | Facility: HOSPITAL | Age: 68
End: 2018-01-18

## 2018-01-18 NOTE — PROGRESS NOTES
Spoke with Geraldo with Dr. Dia regarding patients most recent mammogram with recommendation for left breast stereotactic biopsy for a left breast density.  On the January 5th date the patient was talked to about need for stereotactic biopsy.  She was sick, upset and started crying. She said she needed eye surgery.  She said there was no way she could lay on her stomach for 1-2 hours and was not going to consent to a biopsy at this time.  She was headed to Dr. Dia now.  I did call the office and let them know that she was headed that way and was upset.    Geraldo will call the patient and see how she feels about biopsy process now.

## 2018-02-15 ENCOUNTER — APPOINTMENT (OUTPATIENT)
Dept: GENERAL RADIOLOGY | Facility: HOSPITAL | Age: 68
End: 2018-02-15

## 2018-02-15 ENCOUNTER — HOSPITAL ENCOUNTER (EMERGENCY)
Facility: HOSPITAL | Age: 68
Discharge: HOME | End: 2018-02-15
Attending: EMERGENCY MEDICINE | Admitting: EMERGENCY MEDICINE

## 2018-02-15 VITALS
RESPIRATION RATE: 16 BRPM | DIASTOLIC BLOOD PRESSURE: 78 MMHG | HEIGHT: 63 IN | HEART RATE: 66 BPM | WEIGHT: 260 LBS | TEMPERATURE: 97.2 F | OXYGEN SATURATION: 94 % | BODY MASS INDEX: 46.07 KG/M2 | SYSTOLIC BLOOD PRESSURE: 132 MMHG
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
## 2018-02-15 PROCEDURE — 99284 EMERGENCY DEPT VISIT MOD MDM: CPT

## 2018-02-15 PROCEDURE — 72220 X-RAY EXAM SACRUM TAILBONE: CPT

## 2018-02-15 PROCEDURE — 72170 X-RAY EXAM OF PELVIS: CPT

## 2018-02-15 RX ORDER — ACETAMINOPHEN 500 MG
1000 TABLET ORAL ONCE
Status: COMPLETED | OUTPATIENT
Start: 2018-02-15 | End: 2018-02-15

## 2018-02-15 RX ORDER — MORPHINE SULFATE 30 MG/1
30 TABLET, FILM COATED, EXTENDED RELEASE ORAL 2 TIMES DAILY
COMMUNITY
End: 2020-05-26

## 2018-02-15 RX ADMIN — ACETAMINOPHEN 1000 MG: 500 TABLET ORAL at 11:34

## 2018-02-15 NOTE — ED PROVIDER NOTES
Subjective   History of Present Illness     67-year-old female complaining about a fall which occurred earlier today.    The patient has a history of chronic pain for which she takes by mouth morphine pills.  Patient states that earlier today she attempted to sit in her recliner however she thinks that she did not move far back enough and in the process of trying to sit down she fell forward and struck her face and is also complaining about pain to her lower coccyx region.    The patient denies any LOC and denies being on any blood thinner.    Review of Systems   Constitutional: Negative.    Eyes: Negative for photophobia, pain, discharge, redness and itching.   Respiratory: Negative for apnea, cough, choking, chest tightness, shortness of breath and wheezing.    Cardiovascular: Negative for chest pain, palpitations and leg swelling.   Neurological: Negative for dizziness, tremors, seizures, syncope, facial asymmetry, speech difficulty, weakness, light-headedness, numbness and headaches.       Past Medical History:   Diagnosis Date   • Arthritis    • Chronic back pain    • Hypertension    • Kidney stones        Allergies   Allergen Reactions   • Codeine Itching and Rash     SEVERE RASH/ITCHING (TOLERATES PERCOCET)       Past Surgical History:   Procedure Laterality Date   • BACK SURGERY      2010 ([facility]), 2013    • CHOLECYSTECTOMY OPEN     • CORNEAL TRANSPLANT Right 1999   • LUMBAR FUSION Left 8/2/2017    Procedure: LEFT LUMBAR LATERAL INTERBODY FUSION WITH INSTRUMENTATION  L1-2;  Surgeon: MARCELINO Wright MD;  Location: [facility];  Service:    • NECK SURGERY  2010    [facility]   • TOTAL HIP ARTHROPLASTY Right 2014    DR. KENDRICK    • TOTAL KNEE ARTHROPLASTY Right 2000   • TOTAL KNEE ARTHROPLASTY Left 2002       Family History   Problem Relation Age of Onset   • Breast cancer Neg Hx        Social History     Social History   • Marital status:      Spouse name: N/A   • Number of children: N/A   • Years of  education: N/A     Social History Main Topics   • Smoking status: Never Smoker   • Smokeless tobacco: Never Used   • Alcohol use No   • Drug use: No   • Sexual activity: Not Asked     Other Topics Concern   • None     Social History Narrative           Objective   Physical Exam   Constitutional: She appears well-developed and well-nourished.   HENT:   Abrasion under right eye   Cardiovascular: Normal rate, regular rhythm, normal heart sounds and intact distal pulses.    Pulmonary/Chest: Effort normal. No respiratory distress. She has no wheezes. She has no rales. She exhibits no tenderness.   Abdominal: Soft. There is no tenderness.   Neurological:   CRANIAL NERVES: Pupils equally round and reactive to light, EOMI, no nystagmus; tongue and uvula midline, no facial droop, speech is clear and normal;   MOTOR EXAM: 5/5 upper and lower extremities and is symmetric; no pronator drift  DTR's: +2/4: knees, ankles, elbows and wrists bilaterally;   SENSATION: normal dermatomes and grossly intact to light touch and pain.   GAIT: no ataxia and is normal  ROMBERG SIGN: absent  CEREBELLAR:  normal finger to nose and heel to shin exam       Procedures         ED Course  ED Course      XR Sacrum & Coccyx   Final Result   No acute findings.   This report was finalized on 02/15/2018 11:19 by Dr. Christopher Jennings MD.      XR Pelvis 1 or 2 View   Final Result   1. No acute findings.   2. Mild narrowing of the left hip joint space. Prior right hip   arthroplasty and lumbar fusion.   3. A dedicated sacrum/coccyx series may be needed to further evaluate.   Correlate clinically.           This report was finalized on 02/15/2018 10:25 by Dr. Christopher Jennings MD.          The patient reports her pain is improved.  The patient is negative imaging studies.  The patient was told to follow up with her PCP.        [facility]    Final diagnoses:   Fall, initial encounter            Socrates Carrasquillo, DO  02/15/18 1129

## 2018-02-19 NOTE — ED NOTES
"ED Call Back Questions    1. How are you doing since leaving the Emergency Department?    Doing some better.  Good ER visit.  2. Do you have any questions about your discharge instructions? No     3. Have you filled your new prescriptions yet? N/A  a. Do you have any questions about those medications? N/A    4. Were you able to make a follow-up appointment with the physician? Yes     5. Do you have a primary care physician? Yes   a. If No, would you like for me to set you up with one? N/A  i. If Yes, “I will have our ED  give you a call right back at this number to work with you on the best time for an appointment.”    6. We are always looking to get better at what we do. Do you have any suggestions for what we can do to be even better? No   a. If Yes, \"Thank you for sharing your concerns. I apologize. I will follow up with our manager and patient . Would you like someone to call you back?\" N/A    7. Is there anything else I can do for you? No     "

## 2018-03-17 ENCOUNTER — APPOINTMENT (OUTPATIENT)
Dept: GENERAL RADIOLOGY | Facility: HOSPITAL | Age: 68
End: 2018-03-17

## 2018-03-17 ENCOUNTER — HOSPITAL ENCOUNTER (EMERGENCY)
Facility: HOSPITAL | Age: 68
Discharge: HOME | End: 2018-03-18
Admitting: EMERGENCY MEDICINE

## 2018-03-17 ENCOUNTER — APPOINTMENT (OUTPATIENT)
Dept: CT IMAGING | Facility: HOSPITAL | Age: 68
End: 2018-03-17

## 2018-03-17 DIAGNOSIS — M25.561 CHRONIC PAIN OF BOTH KNEES: ICD-10-CM

## 2018-03-17 DIAGNOSIS — R51.9 NONINTRACTABLE HEADACHE, UNSPECIFIED CHRONICITY PATTERN, UNSPECIFIED HEADACHE TYPE: ICD-10-CM

## 2018-03-17 DIAGNOSIS — M25.562 CHRONIC PAIN OF BOTH KNEES: ICD-10-CM
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
## 2018-03-17 DIAGNOSIS — G89.29 CHRONIC PAIN OF BOTH KNEES: ICD-10-CM

## 2018-03-17 PROCEDURE — 93010 ELECTROCARDIOGRAM REPORT: CPT | Performed by: INTERNAL MEDICINE

## 2018-03-17 PROCEDURE — 73562 X-RAY EXAM OF KNEE 3: CPT

## 2018-03-17 PROCEDURE — 71046 X-RAY EXAM CHEST 2 VIEWS: CPT

## 2018-03-17 PROCEDURE — 73523 X-RAY EXAM HIPS BI 5/> VIEWS: CPT

## 2018-03-17 PROCEDURE — 93005 ELECTROCARDIOGRAM TRACING: CPT | Performed by: NURSE PRACTITIONER

## 2018-03-17 PROCEDURE — 99284 EMERGENCY DEPT VISIT MOD MDM: CPT

## 2018-03-17 PROCEDURE — 70450 CT HEAD/BRAIN W/O DYE: CPT

## 2018-03-17 RX ORDER — BISOPROLOL FUMARATE AND HYDROCHLOROTHIAZIDE 5; 6.25 MG/1; MG/1
1 TABLET ORAL DAILY
COMMUNITY

## 2018-03-17 RX ORDER — HYDROMORPHONE HCL 110MG/55ML
0.5 PATIENT CONTROLLED ANALGESIA SYRINGE INTRAVENOUS ONCE
Status: COMPLETED | OUTPATIENT
Start: 2018-03-17 | End: 2018-03-18

## 2018-03-17 RX ORDER — SODIUM CHLORIDE 0.9 % (FLUSH) 0.9 %
10 SYRINGE (ML) INJECTION AS NEEDED
Status: DISCONTINUED | OUTPATIENT
Start: 2018-03-17 | End: 2018-03-18 | Stop reason: HOSPADM

## 2018-03-18 ENCOUNTER — APPOINTMENT (OUTPATIENT)
Dept: CT IMAGING | Age: 68
DRG: 918 | End: 2018-03-18
Payer: MEDICARE

## 2018-03-18 ENCOUNTER — HOSPITAL ENCOUNTER (OUTPATIENT)
Age: 68
Setting detail: OBSERVATION
Discharge: PSYCHIATRIC OR SUBSTANCE USE FACILITY | DRG: 918 | End: 2018-03-19
Attending: EMERGENCY MEDICINE | Admitting: INTERNAL MEDICINE
Payer: MEDICARE

## 2018-03-18 ENCOUNTER — APPOINTMENT (OUTPATIENT)
Dept: CT IMAGING | Facility: HOSPITAL | Age: 68
End: 2018-03-18

## 2018-03-18 VITALS
TEMPERATURE: 98.2 F | OXYGEN SATURATION: 99 % | SYSTOLIC BLOOD PRESSURE: 139 MMHG | HEIGHT: 63 IN | WEIGHT: 270 LBS | DIASTOLIC BLOOD PRESSURE: 55 MMHG | RESPIRATION RATE: 15 BRPM | HEART RATE: 53 BPM | BODY MASS INDEX: 47.84 KG/M2

## 2018-03-18 DIAGNOSIS — R29.6 MULTIPLE FALLS: Primary | ICD-10-CM

## 2018-03-18 DIAGNOSIS — F32.A DEPRESSION, UNSPECIFIED DEPRESSION TYPE: ICD-10-CM

## 2018-03-18 DIAGNOSIS — R51.9 INTRACTABLE HEADACHE, UNSPECIFIED CHRONICITY PATTERN, UNSPECIFIED HEADACHE TYPE: ICD-10-CM

## 2018-03-18 DIAGNOSIS — T50.904A DRUG OVERDOSE, UNDETERMINED INTENT, INITIAL ENCOUNTER: ICD-10-CM

## 2018-03-18 PROBLEM — F41.9 ANXIETY: Status: ACTIVE | Noted: 2018-03-18

## 2018-03-18 PROBLEM — G47.00 INSOMNIA: Status: ACTIVE | Noted: 2018-03-18

## 2018-03-18 PROBLEM — T50.901A OVERDOSE DRUG, INITIAL ENCOUNTER: Status: ACTIVE | Noted: 2018-03-18

## 2018-03-18 LAB
ACETAMINOPHEN LEVEL: <15 UG/ML
ALBUMIN SERPL-MCNC: 3.9 G/DL (ref 3.5–5)
ALBUMIN SERPL-MCNC: 4.7 G/DL (ref 3.5–5.2)
ALBUMIN/GLOB SERPL: 1.3 G/DL (ref 1.1–2.5)
ALP BLD-CCNC: 101 U/L (ref 35–104)
ALP SERPL-CCNC: 80 U/L (ref 24–120)
ALT SERPL W P-5'-P-CCNC: 28 U/L (ref 0–54)
ALT SERPL-CCNC: 15 U/L (ref 5–33)
AMPHETAMINE SCREEN, URINE: NEGATIVE
ANION GAP SERPL CALCULATED.3IONS-SCNC: 11 MMOL/L (ref 4–13)
ANION GAP SERPL CALCULATED.3IONS-SCNC: 12 MMOL/L (ref 7–19)
APTT PPP: 27.4 SECONDS (ref 24.1–34.8)
AST SERPL-CCNC: 26 U/L (ref 5–32)
AST SERPL-CCNC: 27 U/L (ref 7–45)
BARBITURATE SCREEN URINE: NEGATIVE
BASOPHILS # BLD MANUAL: 0.07 10*3/MM3 (ref 0–0.2)
BASOPHILS NFR BLD AUTO: 1 % (ref 0–2)
BENZODIAZEPINE SCREEN, URINE: NEGATIVE
BILIRUB SERPL-MCNC: 0.4 MG/DL (ref 0.1–1)
BILIRUB SERPL-MCNC: 0.7 MG/DL (ref 0.2–1.2)
BILIRUBIN URINE: NEGATIVE
BLOOD, URINE: NEGATIVE
BUN BLD-MCNC: 21 MG/DL (ref 5–21)
BUN BLDV-MCNC: 18 MG/DL (ref 8–23)
BUN/CREAT SERPL: 26.6 (ref 7–25)
CALCIUM SERPL-MCNC: 9.9 MG/DL (ref 8.8–10.2)
CALCIUM SPEC-SCNC: 9.5 MG/DL (ref 8.4–10.4)
CANNABINOID SCREEN URINE: NEGATIVE
CHLORIDE BLD-SCNC: 96 MMOL/L (ref 98–111)
CHLORIDE SERPL-SCNC: 104 MMOL/L (ref 98–110)
CLARITY: CLEAR
CO2 SERPL-SCNC: 27 MMOL/L (ref 24–31)
CO2: 29 MMOL/L (ref 22–29)
COCAINE METABOLITE SCREEN URINE: NEGATIVE
COLOR: YELLOW
CREAT BLD-MCNC: 0.79 MG/DL (ref 0.5–1.4)
CREAT SERPL-MCNC: 0.8 MG/DL (ref 0.5–0.9)
DEPRECATED RDW RBC AUTO: 42.2 FL (ref 40–54)
EOSINOPHIL # BLD MANUAL: 0.07 10*3/MM3 (ref 0–0.7)
EOSINOPHIL NFR BLD MANUAL: 1 % (ref 0–4)
ERYTHROCYTE [DISTWIDTH] IN BLOOD BY AUTOMATED COUNT: 13.2 % (ref 12–15)
ETHANOL: <10 MG/DL (ref 0–0.08)
FLUAV AG NPH QL: NEGATIVE
FLUBV AG NPH QL IA: NEGATIVE
GFR NON-AFRICAN AMERICAN: >60
GFR SERPL CREATININE-BSD FRML MDRD: 72 ML/MIN/1.73
GLOBULIN UR ELPH-MCNC: 3 GM/DL
GLUCOSE BLD-MCNC: 116 MG/DL (ref 74–109)
GLUCOSE BLD-MCNC: 132 MG/DL (ref 70–100)
GLUCOSE URINE: NEGATIVE MG/DL
HCT VFR BLD AUTO: 41.4 % (ref 37–47)
HCT VFR BLD CALC: 45.2 % (ref 37–47)
HEMOGLOBIN: 15.7 G/DL (ref 12–16)
HGB BLD-MCNC: 14.2 G/DL (ref 12–16)
INR PPP: 0.99 (ref 0.91–1.09)
KETONES, URINE: NEGATIVE MG/DL
LEUKOCYTE ESTERASE, URINE: NEGATIVE
LYMPHOCYTES # BLD MANUAL: 2.3 10*3/MM3 (ref 0.72–4.86)
LYMPHOCYTES NFR BLD MANUAL: 34 % (ref 15–45)
LYMPHOCYTES NFR BLD MANUAL: 9 % (ref 4–12)
Lab: ABNORMAL
MCH RBC QN AUTO: 30.5 PG (ref 28–32)
MCH RBC QN AUTO: 32.2 PG (ref 27–31)
MCHC RBC AUTO-ENTMCNC: 34.3 G/DL (ref 33–36)
MCHC RBC AUTO-ENTMCNC: 34.7 G/DL (ref 33–37)
MCV RBC AUTO: 88.8 FL (ref 82–98)
MCV RBC AUTO: 92.8 FL (ref 81–99)
MONOCYTES # BLD AUTO: 0.61 10*3/MM3 (ref 0.19–1.3)
NEUTROPHILS # BLD AUTO: 3.45 10*3/MM3 (ref 1.87–8.4)
NEUTROPHILS NFR BLD MANUAL: 51 % (ref 39–78)
NITRITE, URINE: NEGATIVE
NRBC BLD MANUAL-RTO: 0 /100 WBC (ref 0–0)
NT-PROBNP SERPL-MCNC: 195 PG/ML (ref 0–900)
OPIATE SCREEN URINE: POSITIVE
PDW BLD-RTO: 14.6 % (ref 11.5–14.5)
PH UA: 6.5
PLATELET # BLD AUTO: 144 10*3/MM3 (ref 130–400)
PLATELET # BLD: 152 K/UL (ref 130–400)
PMV BLD AUTO: 11.4 FL (ref 9.4–12.3)
PMV BLD AUTO: 12.1 FL (ref 6–12)
POTASSIUM BLD-SCNC: 4.3 MMOL/L (ref 3.5–5.3)
POTASSIUM SERPL-SCNC: 4.3 MMOL/L (ref 3.5–5)
PROT SERPL-MCNC: 6.9 G/DL (ref 6.3–8.7)
PROTEIN UA: NEGATIVE MG/DL
PROTHROMBIN TIME: 13.4 SECONDS (ref 11.9–14.6)
RBC # BLD AUTO: 4.66 10*6/MM3 (ref 4.2–5.4)
RBC # BLD: 4.87 M/UL (ref 4.2–5.4)
RBC MORPH BLD: NORMAL
SALICYLATE, SERUM: <3 MG/DL (ref 3–10)
SEDIMENTATION RATE, ERYTHROCYTE: 7 MM/HR (ref 0–25)
SMALL PLATELETS BLD QL SMEAR: ADEQUATE
SODIUM BLD-SCNC: 137 MMOL/L (ref 136–145)
SODIUM BLD-SCNC: 142 MMOL/L (ref 135–145)
SPECIFIC GRAVITY UA: 1.02
TOTAL PROTEIN: 7.6 G/DL (ref 6.6–8.7)
TROPONIN I SERPL-MCNC: <0.012 NG/ML (ref 0–0.03)
TROPONIN: <0.01 NG/ML (ref 0–0.03)
UROBILINOGEN, URINE: 0.2 E.U./DL
VARIANT LYMPHS NFR BLD MANUAL: 4 % (ref 0–5)
WBC # BLD: 6.6 K/UL (ref 4.8–10.8)
WBC MORPH BLD: NORMAL
WBC NRBC COR # BLD: 6.76 10*3/MM3 (ref 4.8–10.8)

## 2018-03-18 PROCEDURE — 85027 COMPLETE CBC AUTOMATED: CPT

## 2018-03-18 PROCEDURE — 96374 THER/PROPH/DIAG INJ IV PUSH: CPT

## 2018-03-18 PROCEDURE — G0378 HOSPITAL OBSERVATION PER HR: HCPCS

## 2018-03-18 PROCEDURE — 87804 INFLUENZA ASSAY W/OPTIC: CPT | Performed by: NURSE PRACTITIONER

## 2018-03-18 PROCEDURE — 2580000003 HC RX 258: Performed by: INTERNAL MEDICINE

## 2018-03-18 PROCEDURE — 72125 CT NECK SPINE W/O DYE: CPT

## 2018-03-18 PROCEDURE — G0480 DRUG TEST DEF 1-7 CLASSES: HCPCS

## 2018-03-18 PROCEDURE — 84484 ASSAY OF TROPONIN QUANT: CPT

## 2018-03-18 PROCEDURE — 6370000000 HC RX 637 (ALT 250 FOR IP): Performed by: INTERNAL MEDICINE

## 2018-03-18 PROCEDURE — 36415 COLL VENOUS BLD VENIPUNCTURE: CPT

## 2018-03-18 PROCEDURE — 80053 COMPREHEN METABOLIC PANEL: CPT

## 2018-03-18 PROCEDURE — 1210000000 HC MED SURG R&B

## 2018-03-18 PROCEDURE — 93005 ELECTROCARDIOGRAM TRACING: CPT

## 2018-03-18 PROCEDURE — 85730 THROMBOPLASTIN TIME PARTIAL: CPT | Performed by: NURSE PRACTITIONER

## 2018-03-18 PROCEDURE — 99285 EMERGENCY DEPT VISIT HI MDM: CPT | Performed by: EMERGENCY MEDICINE

## 2018-03-18 PROCEDURE — 6360000002 HC RX W HCPCS: Performed by: INTERNAL MEDICINE

## 2018-03-18 PROCEDURE — 84484 ASSAY OF TROPONIN QUANT: CPT | Performed by: NURSE PRACTITIONER

## 2018-03-18 PROCEDURE — 96375 TX/PRO/DX INJ NEW DRUG ADDON: CPT

## 2018-03-18 PROCEDURE — 85025 COMPLETE CBC W/AUTO DIFF WBC: CPT | Performed by: NURSE PRACTITIONER

## 2018-03-18 PROCEDURE — 85007 BL SMEAR W/DIFF WBC COUNT: CPT | Performed by: NURSE PRACTITIONER

## 2018-03-18 PROCEDURE — 85652 RBC SED RATE AUTOMATED: CPT

## 2018-03-18 PROCEDURE — 99285 EMERGENCY DEPT VISIT HI MDM: CPT

## 2018-03-18 PROCEDURE — 96372 THER/PROPH/DIAG INJ SC/IM: CPT

## 2018-03-18 PROCEDURE — 6360000002 HC RX W HCPCS

## 2018-03-18 PROCEDURE — 80307 DRUG TEST PRSMV CHEM ANLYZR: CPT

## 2018-03-18 PROCEDURE — 85610 PROTHROMBIN TIME: CPT | Performed by: NURSE PRACTITIONER

## 2018-03-18 PROCEDURE — 25010000002 HYDROMORPHONE PER 4 MG: Performed by: NURSE PRACTITIONER

## 2018-03-18 PROCEDURE — 70450 CT HEAD/BRAIN W/O DYE: CPT

## 2018-03-18 PROCEDURE — 80053 COMPREHEN METABOLIC PANEL: CPT | Performed by: NURSE PRACTITIONER

## 2018-03-18 PROCEDURE — 83880 ASSAY OF NATRIURETIC PEPTIDE: CPT | Performed by: NURSE PRACTITIONER

## 2018-03-18 PROCEDURE — 6360000002 HC RX W HCPCS: Performed by: EMERGENCY MEDICINE

## 2018-03-18 PROCEDURE — 81003 URINALYSIS AUTO W/O SCOPE: CPT

## 2018-03-18 RX ORDER — LORAZEPAM 2 MG/ML
0.5 INJECTION INTRAMUSCULAR ONCE
Status: COMPLETED | OUTPATIENT
Start: 2018-03-18 | End: 2018-03-18

## 2018-03-18 RX ORDER — MORPHINE SULFATE 30 MG/1
30 TABLET, FILM COATED, EXTENDED RELEASE ORAL
Status: ON HOLD | COMMUNITY
End: 2018-03-21 | Stop reason: HOSPADM

## 2018-03-18 RX ORDER — SODIUM CHLORIDE 0.9 % (FLUSH) 0.9 %
10 SYRINGE (ML) INJECTION PRN
Status: DISCONTINUED | OUTPATIENT
Start: 2018-03-18 | End: 2018-03-19 | Stop reason: HOSPADM

## 2018-03-18 RX ORDER — LORAZEPAM 2 MG/ML
0.5 INJECTION INTRAMUSCULAR ONCE
Status: DISCONTINUED | OUTPATIENT
Start: 2018-03-18 | End: 2018-03-18

## 2018-03-18 RX ORDER — SODIUM CHLORIDE 0.9 % (FLUSH) 0.9 %
10 SYRINGE (ML) INJECTION EVERY 12 HOURS SCHEDULED
Status: DISCONTINUED | OUTPATIENT
Start: 2018-03-18 | End: 2018-03-19 | Stop reason: HOSPADM

## 2018-03-18 RX ORDER — ACETAMINOPHEN 325 MG/1
650 TABLET ORAL EVERY 4 HOURS PRN
Status: DISCONTINUED | OUTPATIENT
Start: 2018-03-18 | End: 2018-03-19 | Stop reason: SDUPTHER

## 2018-03-18 RX ORDER — LEVOTHYROXINE SODIUM 0.05 MG/1
50 TABLET ORAL DAILY
COMMUNITY

## 2018-03-18 RX ORDER — LORAZEPAM 2 MG/ML
0.5 INJECTION INTRAMUSCULAR EVERY 8 HOURS PRN
Status: DISCONTINUED | OUTPATIENT
Start: 2018-03-18 | End: 2018-03-19 | Stop reason: HOSPADM

## 2018-03-18 RX ORDER — DULOXETIN HYDROCHLORIDE 60 MG/1
60 CAPSULE, DELAYED RELEASE ORAL DAILY
Status: DISCONTINUED | OUTPATIENT
Start: 2018-03-18 | End: 2018-03-19 | Stop reason: HOSPADM

## 2018-03-18 RX ORDER — SODIUM CHLORIDE 0.9 % (FLUSH) 0.9 %
10 SYRINGE (ML) INJECTION PRN
Status: DISCONTINUED | OUTPATIENT
Start: 2018-03-18 | End: 2018-03-18 | Stop reason: SDUPTHER

## 2018-03-18 RX ORDER — MORPHINE SULFATE 30 MG/1
30 TABLET, FILM COATED, EXTENDED RELEASE ORAL EVERY 4 HOURS PRN
Status: DISCONTINUED | OUTPATIENT
Start: 2018-03-18 | End: 2018-03-18

## 2018-03-18 RX ORDER — DULOXETIN HYDROCHLORIDE 60 MG/1
60 CAPSULE, DELAYED RELEASE ORAL DAILY
Status: ON HOLD | COMMUNITY
End: 2018-03-21 | Stop reason: HOSPADM

## 2018-03-18 RX ORDER — FUROSEMIDE 20 MG/1
20 TABLET ORAL DAILY PRN
COMMUNITY
End: 2021-02-11

## 2018-03-18 RX ORDER — ALPRAZOLAM 1 MG/1
1 TABLET ORAL NIGHTLY PRN
Status: ON HOLD | COMMUNITY
End: 2018-03-18 | Stop reason: CLARIF

## 2018-03-18 RX ORDER — FUROSEMIDE 20 MG/1
20 TABLET ORAL 2 TIMES DAILY
Status: DISCONTINUED | OUTPATIENT
Start: 2018-03-18 | End: 2018-03-19

## 2018-03-18 RX ORDER — BISOPROLOL FUMARATE AND HYDROCHLOROTHIAZIDE 5; 6.25 MG/1; MG/1
1 TABLET ORAL DAILY
COMMUNITY

## 2018-03-18 RX ORDER — LORAZEPAM 2 MG/ML
INJECTION INTRAMUSCULAR
Status: COMPLETED
Start: 2018-03-18 | End: 2018-03-18

## 2018-03-18 RX ORDER — SODIUM CHLORIDE 0.9 % (FLUSH) 0.9 %
10 SYRINGE (ML) INJECTION EVERY 12 HOURS SCHEDULED
Status: DISCONTINUED | OUTPATIENT
Start: 2018-03-18 | End: 2018-03-18 | Stop reason: SDUPTHER

## 2018-03-18 RX ORDER — MORPHINE SULFATE 15 MG/1
15 TABLET, FILM COATED, EXTENDED RELEASE ORAL EVERY 4 HOURS PRN
Status: DISCONTINUED | OUTPATIENT
Start: 2018-03-18 | End: 2018-03-19 | Stop reason: HOSPADM

## 2018-03-18 RX ORDER — METOCLOPRAMIDE HYDROCHLORIDE 5 MG/ML
10 INJECTION INTRAMUSCULAR; INTRAVENOUS ONCE
Status: COMPLETED | OUTPATIENT
Start: 2018-03-18 | End: 2018-03-18

## 2018-03-18 RX ORDER — MORPHINE SULFATE 15 MG/1
15 TABLET ORAL EVERY 4 HOURS PRN
COMMUNITY

## 2018-03-18 RX ORDER — ONDANSETRON 2 MG/ML
4 INJECTION INTRAMUSCULAR; INTRAVENOUS EVERY 6 HOURS PRN
Status: DISCONTINUED | OUTPATIENT
Start: 2018-03-18 | End: 2018-03-19 | Stop reason: HOSPADM

## 2018-03-18 RX ORDER — CYCLOBENZAPRINE HCL 10 MG
10 TABLET ORAL ONCE
Status: DISCONTINUED | OUTPATIENT
Start: 2018-03-18 | End: 2018-03-18

## 2018-03-18 RX ORDER — MORPHINE SULFATE 4 MG/ML
4 INJECTION, SOLUTION INTRAMUSCULAR; INTRAVENOUS ONCE
Status: COMPLETED | OUTPATIENT
Start: 2018-03-18 | End: 2018-03-18

## 2018-03-18 RX ORDER — ZOLPIDEM TARTRATE 10 MG/1
TABLET ORAL NIGHTLY PRN
Status: ON HOLD | COMMUNITY
End: 2018-03-21 | Stop reason: HOSPADM

## 2018-03-18 RX ORDER — DIPHENHYDRAMINE HYDROCHLORIDE 50 MG/ML
25 INJECTION INTRAMUSCULAR; INTRAVENOUS ONCE
Status: COMPLETED | OUTPATIENT
Start: 2018-03-18 | End: 2018-03-18

## 2018-03-18 RX ADMIN — Medication 4 MG: at 14:07

## 2018-03-18 RX ADMIN — METOCLOPRAMIDE 10 MG: 5 INJECTION, SOLUTION INTRAMUSCULAR; INTRAVENOUS at 10:34

## 2018-03-18 RX ADMIN — LORAZEPAM 0.5 MG: 2 INJECTION INTRAMUSCULAR at 13:11

## 2018-03-18 RX ADMIN — LORAZEPAM 0.5 MG: 2 INJECTION INTRAMUSCULAR; INTRAVENOUS at 13:11

## 2018-03-18 RX ADMIN — ENOXAPARIN SODIUM 40 MG: 40 INJECTION SUBCUTANEOUS at 17:24

## 2018-03-18 RX ADMIN — DULOXETINE HYDROCHLORIDE 60 MG: 60 CAPSULE, DELAYED RELEASE ORAL at 17:24

## 2018-03-18 RX ADMIN — Medication 10 ML: at 20:13

## 2018-03-18 RX ADMIN — MORPHINE SULFATE 15 MG: 15 TABLET, FILM COATED, EXTENDED RELEASE ORAL at 20:13

## 2018-03-18 RX ADMIN — HYDROMORPHONE HYDROCHLORIDE 0.5 MG: 2 INJECTION, SOLUTION INTRAMUSCULAR; INTRAVENOUS; SUBCUTANEOUS at 00:19

## 2018-03-18 RX ADMIN — DIPHENHYDRAMINE HYDROCHLORIDE 25 MG: 50 INJECTION, SOLUTION INTRAMUSCULAR; INTRAVENOUS at 10:34

## 2018-03-18 ASSESSMENT — ENCOUNTER SYMPTOMS
ABDOMINAL PAIN: 0
SHORTNESS OF BREATH: 0
VOMITING: 0
EYE PAIN: 0
DIARRHEA: 0

## 2018-03-18 ASSESSMENT — PAIN SCALES - GENERAL
PAINLEVEL_OUTOF10: 8
PAINLEVEL_OUTOF10: 6
PAINLEVEL_OUTOF10: 2
PAINLEVEL_OUTOF10: 4

## 2018-03-18 NOTE — ED PROVIDER NOTES
"Subjective   Patient is a 67-year-old  female that presents to the ER today with complaint of fall, lower extremity pain, and headache.  The patient reports that 2 weeks ago she fell at home and hit her head.  She states that she hit the right side of her face and head.  She states that she was seen at an ER somewhere that \"nothing was done\".  She states that she did not have any CAT scans done or any further studies.  Patient reports that she has had intermittent headaches since that time.  The patient states that she does not normally have headaches.  The patient's granddaughter tells me that the patient has headaches on almost a daily basis.  The patient states that she is not currently on blood thinners.  She states that for the past 3 days she has had headache.  She denies any visual changes with this.  Patient denies any neck pain at this time.  She denies fever.  She denies any thunderclap headache.  The patient reports several days ago she sustained another fall at home.  She states that she landed on both her knees.  Patient reports an abrasion to the left knee.  She states she has not been her tetanus vaccination patient reports that yesterday she fell and landed on her bottom.  She states that she did not hit her head at this time and sustained no other injuries.  Patient states that since that time she has had pain to her bilateral legs.  She states that \"my legs ache\".  The patient states that she is also having some left-sided lateral and anterior rib pain.  She states she is unsure if she injured herself there when she fell.  She states that she has also had some shortness of breath over the past several days. She denies chest pain, denies cough, or fever. States that it is worse upon exertion.  The patient also reports to me that she is on chronic pain management.  She normally takes morphine on a daily basis.  Patient states she has ran out of all of her pain pills and is unable to get " these filled until Monday.  She presents with her granddaughter today for further evaluation.        History provided by:  Patient   used: No    Knee Pain   Location:  Knee and leg  Time since incident:  2 days  Injury: yes    Mechanism of injury: fall    Fall:     Fall occurred:  Standing    Impact surface:  Hard floor    Point of impact:  Knees and buttocks    Entrapped after fall: no    Leg location:  L leg and R leg  Knee location:  L knee and R knee  Pain details:     Quality:  Aching and dull    Radiates to:  Does not radiate    Severity:  Mild    Onset quality:  Sudden    Duration:  1 day    Timing:  Constant    Progression:  Unchanged  Chronicity:  Chronic  Dislocation: no    Tetanus status:  Up to date  Prior injury to area:  Yes  Relieved by:  Nothing  Worsened by:  Nothing  Ineffective treatments:  None tried  Associated symptoms: stiffness    Associated symptoms: no back pain, no decreased ROM, no fatigue, no fever, no itching, no muscle weakness, no neck pain, no numbness, no swelling and no tingling    Risk factors: no concern for non-accidental trauma, no frequent fractures, no known bone disorder, no obesity and no recent illness        Review of Systems   Constitutional: Negative for fatigue and fever.   Musculoskeletal: Positive for stiffness. Negative for back pain and neck pain.   Skin: Negative for itching.   All other systems reviewed and are negative.      Past Medical History:   Diagnosis Date   • Arthritis    • Chronic back pain    • Hypertension    • Kidney stones        Allergies   Allergen Reactions   • Codeine Itching and Rash     SEVERE RASH/ITCHING (TOLERATES PERCOCET)       Past Surgical History:   Procedure Laterality Date   • BACK SURGERY      2010 ([place]), 2013    • CHOLECYSTECTOMY OPEN     • CORNEAL TRANSPLANT Right 1999   • LUMBAR FUSION Left 8/2/2017    Procedure: LEFT LUMBAR LATERAL INTERBODY FUSION WITH INSTRUMENTATION  L1-2;  Surgeon: MARCELINO Chadwick  MD Adriana;  Location:  [facility] OR;  Service:    • NECK SURGERY  2010    [place]   • TOTAL HIP ARTHROPLASTY Right 2014    DR. KENDRICK    • TOTAL KNEE ARTHROPLASTY Right 2000   • TOTAL KNEE ARTHROPLASTY Left 2002       Family History   Problem Relation Age of Onset   • Breast cancer Neg Hx        Social History     Social History   • Marital status:      Social History Main Topics   • Smoking status: Never Smoker   • Smokeless tobacco: Never Used   • Alcohol use No   • Drug use: No     Other Topics Concern   • Not on file           Objective   Physical Exam   Constitutional: She is oriented to person, place, and time. She appears well-developed and well-nourished.   HENT:   Head: Normocephalic and atraumatic.   Eyes: Conjunctivae are normal. Pupils are equal, round, and reactive to light.   Cardiovascular: Normal rate, regular rhythm and normal heart sounds.    Pulmonary/Chest: Effort normal and breath sounds normal.   Musculoskeletal:        Legs:  Neurological: She is alert and oriented to person, place, and time.   Skin: Skin is warm and dry.   Psychiatric: She has a normal mood and affect.   Nursing note and vitals reviewed.      Procedures         ED Course  ED Course   Comment By Time   Pt refused CT scan of the cervical spine and facial bones.  Dalia Choi, APRN 03/18 0018   Patient's labs reviewed.  Her troponin was normal.  Flu swab negative.  CBC and CMP are unremarkable. Xrays reviewed with Dr. Carrasquillo; no acute findings noted.  Dalia Choi, APRN 03/18 0203   CT scan of the head was read by stat read radiology.  This showed no acute infarct hemorrhage mass or edema, chronic small vessel ischemic disease since changes, sinuses are patent, no acute osseous abnormality. Dalia Choi, APRN 03/18 0204   At this time the patient reports she is ready to go home.  She states that if the nursing staff does not remove her IV she will leave AGAINST MEDICAL ADVICE.  We are still pending the  manual differential on her CBC that she is ready to go and wants to leave at this time. Patient be discharged home in stable condition.  She denies any shortness of breath or chest pain or symptoms at this time.  Patient is advised to follow-up with her primary care provider and pain management physician on Monday.  At this time she will be discharged home in stable condition Dalia Choi, APRN 03/18 0204        XR Chest 2 View    (Results Pending)   CT Head Without Contrast    (Results Pending)   XR Knee 3 View Left    (Results Pending)   XR Knee 3 View Right    (Results Pending)   XR Hips Bilateral With or Without Pelvis 5 View    (Results Pending)     Lab Results (last 24 hours)     Procedure Component Value Units Date/Time    CBC & Differential [[id number]] Collected:  03/18/18 0016    Specimen:  Blood Updated:  03/18/18 0206    Narrative:       The following orders were created for panel order CBC & Differential.  Procedure                               Abnormality         Status                     ---------                               -----------         ------                     Manual Differential[[id number]]                              Final result               CBC Auto Differential[[id number]]        Abnormal            Edited Result - FINAL        Please view results for these tests on the individual orders.    Comprehensive Metabolic Panel [[id number]]  (Abnormal) Collected:  03/18/18 0016    Specimen:  Blood Updated:  03/18/18 0036     Glucose 132 (H) mg/dL      BUN 21 mg/dL      Creatinine 0.79 mg/dL      Sodium 142 mmol/L      Potassium 4.3 mmol/L      Chloride 104 mmol/L      CO2 27.0 mmol/L      Calcium 9.5 mg/dL      Total Protein 6.9 g/dL      Albumin 3.90 g/dL      ALT (SGPT) 28 U/L      AST (SGOT) 27 U/L      Alkaline Phosphatase 80 U/L      Total Bilirubin 0.4 mg/dL      eGFR Non African Amer 72 mL/min/1.73      Globulin 3.0 gm/dL      A/G Ratio 1.3 g/dL      BUN/Creatinine Ratio 26.6  (H)     Anion Gap 11.0 mmol/L     Protime-INR [[id number]]  (Normal) Collected:  03/18/18 0016    Specimen:  Blood Updated:  03/18/18 0037     Protime 13.4 Seconds      INR 0.99    aPTT [[id number]]  (Normal) Collected:  03/18/18 0016    Specimen:  Blood Updated:  03/18/18 0037     PTT 27.4 seconds     BNP [[id number]]  (Normal) Collected:  03/18/18 0016    Specimen:  Blood Updated:  03/18/18 0043     proBNP 195.0 pg/mL     Troponin [[id number]]  (Normal) Collected:  03/18/18 0016    Specimen:  Blood Updated:  03/18/18 0046     Troponin I <0.012 ng/mL     Influenza Antigen, Rapid - Swab, Nasopharynx [[id number]]  (Normal) Collected:  03/18/18 0016    Specimen:  Swab from Nasopharynx Updated:  03/18/18 0046     Influenza A Ag, EIA Negative     Influenza B Ag, EIA Negative    Narrative:         Recommend confirmation of negative results by viral culture or molecular assay.    CBC Auto Differential [[id number]]  (Abnormal) Collected:  03/18/18 0016    Specimen:  Blood Updated:  03/18/18 0206     WBC 6.76 10*3/mm3      RBC 4.66 10*6/mm3      Hemoglobin 14.2 g/dL      Hematocrit 41.4 %      MCV 88.8 fL      MCH 30.5 pg      MCHC 34.3 g/dL      RDW 13.2 %      RDW-SD 42.2 fl      MPV 12.1 (H) fL      Platelets 144 10*3/mm3      nRBC 0.0 /100 WBC      Comment: Appended report. These results have been appended to a previously final verified report.       Manual Differential [[id number]] Collected:  03/18/18 0016    Specimen:  Blood Updated:  03/18/18 0206     Neutrophil % 51.0 %      Lymphocyte % 34.0 %      Monocyte % 9.0 %      Eosinophil % 1.0 %      Basophil % 1.0 %      Atypical Lymphocyte % 4.0 %      Neutrophils Absolute 3.45 10*3/mm3      Lymphocytes Absolute 2.30 10*3/mm3      Monocytes Absolute 0.61 10*3/mm3      Eosinophils Absolute 0.07 10*3/mm3      Basophils Absolute 0.07 10*3/mm3      RBC Morphology Normal     WBC Morphology Normal     Platelet Estimate Adequate                  MDM  Number of Diagnoses or  Management Options  Chronic pain of both knees: established and worsening  Fall, initial encounter: new and requires workup  Nonintractable headache, unspecified chronicity pattern, unspecified headache type: new and requires workup     Amount and/or Complexity of Data Reviewed  Clinical lab tests: ordered and reviewed  Tests in the radiology section of CPT®: ordered and reviewed  Tests in the medicine section of CPT®: ordered and reviewed  Discuss the patient with other providers: yes    Patient Progress  Patient progress: stable      Final diagnoses:   Fall, initial encounter   Nonintractable headache, unspecified chronicity pattern, unspecified headache type   Chronic pain of both knees            Dalia Choi, GARETT  03/18/18 0208       Dalia Choi, APRROSALIO  03/18/18 0209

## 2018-03-18 NOTE — DISCHARGE INSTRUCTIONS
Please follow up with your PCP in 1-2 days for a recheck  R/t to the ER as needed    Acute Pain, Adult  Acute pain is a type of pain that may last for just a few days or as long as six months. It is often related to an illness, injury, or medical procedure. Acute pain may be mild, moderate, or severe. It usually goes away once your injury has healed or you are no longer ill.  Pain can make it hard for you to do daily activities. It can cause anxiety and lead to other problems if left untreated. Treatment depends on the cause and severity of your acute pain.  Follow these instructions at home:  · Check your pain level as told by your health care provider.  · Take over-the-counter and prescription medicines only as told by your health care provider.  · If you are taking prescription pain medicine:  ¨ Ask your health care provider about taking a stool softener or laxative to prevent constipation.  ¨ Do not stop taking the medicine suddenly. Talk to your health care provider about how and when to discontinue prescription pain medicine.  ¨ If your pain is severe, do not take more pills than instructed by your health care provider.  ¨ Do not take other over-the-counter pain medicines in addition to this medicine unless told by your health care provider.  ¨ Do not drive or operate heavy machinery while taking prescription pain medicine.  · Apply ice or heat as told by your health care provider. These may reduce swelling and pain.  · Ask your health care provider if other strategies such as distraction, relaxation, or physical therapies can help your pain.  · Keep all follow-up visits as told by your health care provider. This is important.  Contact a health care provider if:  · You have pain that is not controlled by medicine.  · Your pain does not improve or gets worse.  · You have side effects from pain medicines, such as vomiting or confusion.  Get help right away if:  · You have severe pain.  · You have trouble  breathing.  · You lose consciousness.  · You have chest pain or pressure that lasts for more than a few minutes. Along with the chest pain you may:  ¨ Have pain or discomfort in one or both arms, your back, neck, jaw, or stomach.  ¨ Have shortness of breath.  ¨ Break out in a cold sweat.  ¨ Feel nauseous.  ¨ Become light-headed.  These symptoms may represent a serious problem that is an emergency. Do not wait to see if the symptoms will go away. Get medical help right away. Call your local emergency services (911 in the [place] Do not drive yourself to the hospital.  This information is not intended to replace advice given to you by your health care provider. Make sure you discuss any questions you have with your health care provider.  Document Released: 01/01/2017 Document Revised: 05/26/2017 Document Reviewed: 01/01/2017  Elsevier Interactive Patient Education © 2017 [employer]

## 2018-03-18 NOTE — ED NOTES
PT daughter states she is concerned the pt needs a psych eval. States pt made a comment last night about \"just wanting to go to sleep. \" Daughter then states she knows pt is depressed. Daughter also states that pt may have overdosed on Ambien last night, unknown amount. PT is currently lethargic, answers questions appropriately and appears oriented X 4 but speech is delayed and slurred. MD cuellar, MD at bedside.       Segundo Iraheta RN  03/18/18 7451

## 2018-03-18 NOTE — ED PROVIDER NOTES
Chloride 96 (*)     Glucose 116 (*)     All other components within normal limits   URINE DRUG SCREEN - Abnormal; Notable for the following:     Opiate Scrn, Ur Positive (*)     All other components within normal limits   TROPONIN   URINALYSIS   SEDIMENTATION RATE   ACETAMINOPHEN LEVEL   SALICYLATE LEVEL   ETHANOL       All other labs were within normal range or not returned as of this dictation. EMERGENCY DEPARTMENT COURSE and DIFFERENTIAL DIAGNOSIS/MDM:   Vitals:    Vitals:    03/18/18 0902 03/18/18 1005   BP: (!) 153/75 (!) 178/67   Pulse: 61 60   Resp: 20    Temp: 98.9 °F (37.2 °C)    SpO2: 95% 95%   Weight: 230 lb (104.3 kg)    Height: 5' 3\" (1.6 m)        MDM  Patient's headache not described as the worst of her life. No thunderclap onset. No fevers. No neck stiffness or rigidity. I don't see indication for spinal tap at this time. Patient's daughter now telling me that patient took excess amount of Ambien last night. Patient said she only took 1 extra Ambien however she has a bottle that was filled with 30 day supply less than 2 weeks ago and is now empty. She told her daughter she took 5 Ambien last night. Denies homicidal or suicidal ideation. Daughter said that she has talked about being depressed but does not think the patient was trying to harm herself last night. I suspect her falls are likely multifactorial. I think the polypharmacy is likely at least some sort of contributing factor to this. Patient nontoxic on exam. In no distress. Case discussed with Dr. Horacio Zarco is on for Dr. Jignesh Munguia. She is agreeable with plan of care and admission for further monitoring, further workup for her falls and headache and ultimate plan to have patient evaluated by psychiatry once medically clear. I will put in bridging orders at her request.    Patient requesting something for her chronic leg pain. Gave her dose of Ativan. This did not help. Now wanting something else.  She normally takes high doses of

## 2018-03-19 ENCOUNTER — HOSPITAL ENCOUNTER (INPATIENT)
Age: 68
LOS: 2 days | Discharge: HOME | DRG: 918 | End: 2018-03-21
Attending: PSYCHIATRY & NEUROLOGY | Admitting: PSYCHIATRY & NEUROLOGY
Payer: MEDICARE

## 2018-03-19 VITALS
OXYGEN SATURATION: 96 % | DIASTOLIC BLOOD PRESSURE: 71 MMHG | BODY MASS INDEX: 40.75 KG/M2 | HEART RATE: 59 BPM | SYSTOLIC BLOOD PRESSURE: 119 MMHG | HEIGHT: 63 IN | RESPIRATION RATE: 17 BRPM | WEIGHT: 230 LBS | TEMPERATURE: 97.1 F

## 2018-03-19 PROBLEM — R73.9 HYPERGLYCEMIA: Status: ACTIVE | Noted: 2018-03-19

## 2018-03-19 PROBLEM — I95.2 HYPOTENSION DUE TO DRUGS: Status: ACTIVE | Noted: 2018-03-19

## 2018-03-19 PROBLEM — R29.6 MULTIPLE FALLS: Status: ACTIVE | Noted: 2018-03-19

## 2018-03-19 PROBLEM — G93.41 ACUTE METABOLIC ENCEPHALOPATHY: Status: ACTIVE | Noted: 2018-03-19

## 2018-03-19 PROBLEM — E03.9 ACQUIRED HYPOTHYROIDISM: Status: ACTIVE | Noted: 2018-03-19

## 2018-03-19 PROBLEM — I10 ESSENTIAL HYPERTENSION: Status: ACTIVE | Noted: 2018-03-19

## 2018-03-19 LAB
ANION GAP SERPL CALCULATED.3IONS-SCNC: 12 MMOL/L (ref 7–19)
BUN BLDV-MCNC: 13 MG/DL (ref 8–23)
CALCIUM SERPL-MCNC: 8.9 MG/DL (ref 8.8–10.2)
CHLORIDE BLD-SCNC: 101 MMOL/L (ref 98–111)
CO2: 26 MMOL/L (ref 22–29)
CREAT SERPL-MCNC: 0.8 MG/DL (ref 0.5–0.9)
GFR NON-AFRICAN AMERICAN: >60
GLUCOSE BLD-MCNC: 121 MG/DL (ref 74–109)
POTASSIUM REFLEX MAGNESIUM: 4.1 MMOL/L (ref 3.5–5)
SODIUM BLD-SCNC: 139 MMOL/L (ref 136–145)

## 2018-03-19 PROCEDURE — 6370000000 HC RX 637 (ALT 250 FOR IP): Performed by: INTERNAL MEDICINE

## 2018-03-19 PROCEDURE — 6370000000 HC RX 637 (ALT 250 FOR IP): Performed by: PHYSICIAN ASSISTANT

## 2018-03-19 PROCEDURE — 36415 COLL VENOUS BLD VENIPUNCTURE: CPT

## 2018-03-19 PROCEDURE — 80048 BASIC METABOLIC PNL TOTAL CA: CPT

## 2018-03-19 PROCEDURE — 6370000000 HC RX 637 (ALT 250 FOR IP): Performed by: PSYCHIATRY & NEUROLOGY

## 2018-03-19 PROCEDURE — G0378 HOSPITAL OBSERVATION PER HR: HCPCS

## 2018-03-19 PROCEDURE — 1240000000 HC EMOTIONAL WELLNESS R&B

## 2018-03-19 RX ORDER — LEVOTHYROXINE SODIUM 0.05 MG/1
50 TABLET ORAL DAILY
Status: DISCONTINUED | OUTPATIENT
Start: 2018-03-20 | End: 2018-03-21 | Stop reason: HOSPADM

## 2018-03-19 RX ORDER — ACETAMINOPHEN 325 MG/1
650 TABLET ORAL EVERY 4 HOURS PRN
Status: DISCONTINUED | OUTPATIENT
Start: 2018-03-19 | End: 2018-03-19 | Stop reason: HOSPADM

## 2018-03-19 RX ORDER — MORPHINE SULFATE 15 MG/1
15 TABLET, FILM COATED, EXTENDED RELEASE ORAL EVERY 4 HOURS PRN
Status: DISCONTINUED | OUTPATIENT
Start: 2018-03-19 | End: 2018-03-19

## 2018-03-19 RX ORDER — DULOXETIN HYDROCHLORIDE 60 MG/1
60 CAPSULE, DELAYED RELEASE ORAL DAILY
Status: DISCONTINUED | OUTPATIENT
Start: 2018-03-20 | End: 2018-03-21 | Stop reason: HOSPADM

## 2018-03-19 RX ORDER — TRAMADOL HYDROCHLORIDE 50 MG/1
50 TABLET ORAL EVERY 8 HOURS PRN
Status: DISCONTINUED | OUTPATIENT
Start: 2018-03-19 | End: 2018-03-21 | Stop reason: HOSPADM

## 2018-03-19 RX ORDER — ACETAMINOPHEN 325 MG/1
650 TABLET ORAL EVERY 4 HOURS PRN
Status: CANCELLED | OUTPATIENT
Start: 2018-03-19

## 2018-03-19 RX ORDER — ACETAMINOPHEN 325 MG/1
650 TABLET ORAL EVERY 4 HOURS PRN
Status: DISCONTINUED | OUTPATIENT
Start: 2018-03-19 | End: 2018-03-21 | Stop reason: HOSPADM

## 2018-03-19 RX ORDER — LEVOTHYROXINE SODIUM 0.05 MG/1
50 TABLET ORAL DAILY
Status: DISCONTINUED | OUTPATIENT
Start: 2018-03-19 | End: 2018-03-19 | Stop reason: HOSPADM

## 2018-03-19 RX ORDER — DULOXETIN HYDROCHLORIDE 60 MG/1
60 CAPSULE, DELAYED RELEASE ORAL DAILY
Status: CANCELLED | OUTPATIENT
Start: 2018-03-20

## 2018-03-19 RX ORDER — MORPHINE SULFATE 15 MG/1
15 TABLET ORAL EVERY 4 HOURS PRN
Status: DISCONTINUED | OUTPATIENT
Start: 2018-03-19 | End: 2018-03-19

## 2018-03-19 RX ORDER — MORPHINE SULFATE 15 MG/1
15 TABLET ORAL EVERY 4 HOURS PRN
Status: CANCELLED | OUTPATIENT
Start: 2018-03-19

## 2018-03-19 RX ORDER — MORPHINE SULFATE 15 MG/1
15 TABLET ORAL EVERY 4 HOURS PRN
Status: DISCONTINUED | OUTPATIENT
Start: 2018-03-19 | End: 2018-03-19 | Stop reason: HOSPADM

## 2018-03-19 RX ORDER — MORPHINE SULFATE 15 MG/1
15 TABLET, FILM COATED, EXTENDED RELEASE ORAL EVERY 4 HOURS PRN
Status: CANCELLED | OUTPATIENT
Start: 2018-03-19

## 2018-03-19 RX ORDER — LEVOTHYROXINE SODIUM 0.05 MG/1
50 TABLET ORAL DAILY
Status: CANCELLED | OUTPATIENT
Start: 2018-03-20

## 2018-03-19 RX ADMIN — TRAMADOL HYDROCHLORIDE 50 MG: 50 TABLET, FILM COATED ORAL at 20:45

## 2018-03-19 RX ADMIN — MORPHINE SULFATE 15 MG: 15 TABLET, FILM COATED, EXTENDED RELEASE ORAL at 00:13

## 2018-03-19 RX ADMIN — MORPHINE SULFATE 15 MG: 15 TABLET ORAL at 10:47

## 2018-03-19 RX ADMIN — LEVOTHYROXINE SODIUM 50 MCG: 50 TABLET ORAL at 08:38

## 2018-03-19 RX ADMIN — MORPHINE SULFATE 15 MG: 15 TABLET, FILM COATED, EXTENDED RELEASE ORAL at 04:45

## 2018-03-19 RX ADMIN — DULOXETINE HYDROCHLORIDE 60 MG: 60 CAPSULE, DELAYED RELEASE ORAL at 08:29

## 2018-03-19 ASSESSMENT — PAIN SCALES - GENERAL
PAINLEVEL_OUTOF10: 5
PAINLEVEL_OUTOF10: 10
PAINLEVEL_OUTOF10: 8
PAINLEVEL_OUTOF10: 9
PAINLEVEL_OUTOF10: 4

## 2018-03-19 ASSESSMENT — SLEEP AND FATIGUE QUESTIONNAIRES
DIFFICULTY FALLING ASLEEP: YES
DO YOU USE A SLEEP AID: YES
AVERAGE NUMBER OF SLEEP HOURS: 6
RESTFUL SLEEP: NO
DO YOU HAVE DIFFICULTY SLEEPING: YES
DIFFICULTY ARISING: NO
SLEEP PATTERN: DISTURBED/INTERRUPTED SLEEP
DIFFICULTY STAYING ASLEEP: YES

## 2018-03-19 ASSESSMENT — LIFESTYLE VARIABLES: HISTORY_ALCOHOL_USE: NO

## 2018-03-19 ASSESSMENT — PATIENT HEALTH QUESTIONNAIRE - PHQ9: SUM OF ALL RESPONSES TO PHQ QUESTIONS 1-9: 4

## 2018-03-19 NOTE — H&P
speech and lethargy  Psychiatric: Positive for some anxiety and depression. All other systems reviewed and are negative. Past Medical History:      Diagnosis Date    Arthritis     Chronic kidney disease     Hypertension     Leg pain     chronic    Psychiatric problem     Thyroid disease        Past Surgical History:      Procedure Laterality Date    BACK SURGERY      CHOLECYSTECTOMY      EYE SURGERY      HIP SURGERY Right     right hip replacement    KNEE SURGERY      KNEE SURGERY Bilateral     bilateral knee replacement    NECK SURGERY         Medications Prior to Admission:    Prior to Admission medications    Medication Sig Start Date End Date Taking? Authorizing Provider   DULoxetine (CYMBALTA) 60 MG extended release capsule Take 60 mg by mouth daily   Yes Historical Provider, MD   morphine (MS CONTIN) 30 MG extended release tablet Take 30 mg by mouth. Yes Historical Provider, MD   morphine (MSIR) 15 MG tablet Take 15 mg by mouth every 4 hours as needed for Pain. Yes Historical Provider, MD   bisoprolol-hydrochlorothiazide (ZIAC) 5-6.25 MG per tablet Take 1 tablet by mouth daily   Yes Historical Provider, MD   levothyroxine (SYNTHROID) 50 MCG tablet Take 50 mcg by mouth Daily   Yes Historical Provider, MD   zolpidem (AMBIEN) 10 MG tablet Take by mouth nightly as needed for Sleep. Yes Historical Provider, MD   furosemide (LASIX) 20 MG tablet Take 20 mg by mouth daily as needed     Historical Provider, MD       Allergies:  Codeine    Social History:   Social History     Social History    Marital status:      Spouse name: N/A    Number of children: N/A    Years of education: N/A     Occupational History    Not on file.      Social History Main Topics    Smoking status: Never Smoker    Smokeless tobacco: Never Used    Alcohol use No    Drug use: No    Sexual activity: Not on file     Other Topics Concern    Not on file     Social History Narrative    No narrative on file Family History:   History reviewed. No pertinent family history. Physical Exam:    Vitals: BP (!) 96/52   Pulse 60   Temp 97.2 °F (36.2 °C) (Temporal)   Resp 20   Ht 5' 3\" (1.6 m)   Wt 230 lb (104.3 kg)   SpO2 94%   BMI 40.74 kg/m²     GENERAL: WD/WN not in acute distress, resting well in bed  HEENT: NC/AT PERRL, EOMI grossly, TM's clear, OP negative without sig erythema or exudate, neck is supple without masses or carotid bruit noted    CV: normal S1 and S2, no sig murmur, lift or gallop, normal PMI  CHEST: clear to auscultation both anterior and posterior, no rales rhonchi or wheeze appreciated. ABD: soft NT/ND with normoactive BS noted. No guarding or rebounding noted  /rectal: deferred  EXT: no cyanosis or clubbing noted, normal ROM  DERM: skin is warm and dry without sig rashes on exposed areas  PSYCH: appears mentally stable with no obvious abnormalities. She is somewhat agitated this morning and also guarded at times. She is demanding to be discharged home. She states nothing is wrong with her declines any further workup at this time or further evaluation or counseling. NEURO: CN 2-12 apper grossly intact without sig deficits in motor or sensory       CBC:   Recent Labs      03/18/18   1005   WBC  6.6   HGB  15.7   PLT  152     BMP:    Recent Labs      03/18/18   1005  03/19/18   0312   NA  137  139   K  4.3  4.1   CL  96*  101   CO2  29  26   BUN  18  13   CREATININE  0.8  0.8   GLUCOSE  116*  121*     Hepatic:   Recent Labs      03/18/18   1005   AST  26   ALT  15   BILITOT  0.7   ALKPHOS  101     Troponin T:   Recent Labs      03/18/18   1005   TROPONINI  <0.01     Pro-BNP: No results for input(s): BNP in the last 72 hours. Lipids: No results for input(s): CHOL, HDL in the last 72 hours.     Invalid input(s): LDLCALCU  ABGs: No results found for: PHART, PO2ART, ILC4WLI  INR: No results for input(s): INR in the last 72 hours.  -----------------------------------------------------------------  EKG:   Radiology:     Ct Head Wo Contrast    Result Date: 3/18/2018  EXAM: CT OF THE HEAD WITHOUT IV CONTRAST CT CERVICAL SPINE WITHOUT IV CONTRAST 3/18/2018 COMPARISON: Trauma INDICATION: age PROCEDURE: Non contrast enhanced head CT and cervical spine CT were performed. The head images were formatted in the axial plane at 5 mm thick intervals. The cervical spine images were formatted in the axial, coronal and sagittal planes. Radiation dose equals DLP 3301 mGy-cm. Automated exposure control dose reduction technique was implemented. FINDINGS: HEAD: Motion degraded examination. Ventricles and cerebrospinal fluid spaces are normal in size and configuration for the patient's age. There is no evidence of mass-effect or midline shift. The gray-white differentiation is preserved. Chronic bibasilar changes. .  There is no evidence of intracranial contusion, hemorrhage, or skull fracture. The visualized portions of the paranasal sinuses and mastoid air cells are unremarkable. CERVICAL SPINE: The odontoid process is well approximated with the anterior body of C1 and well aligned between the lateral masses of C1. There is no spondylolisthesis. Prior C4-C7 anterior and interbody fusion with no evidence of hardware loosening or failure. Multilevel degenerative changes. No evidence of acute compression deformity. CT head. No acute intracranial abnormalities. CT cervical spine. No acute osseous posttraumatic changes. Signed by Dr Ketan Combs on 3/18/2018 11:30 AM    Ct Cervical Spine Wo Contrast    Result Date: 3/18/2018  EXAM: CT OF THE HEAD WITHOUT IV CONTRAST CT CERVICAL SPINE WITHOUT IV CONTRAST 3/18/2018 COMPARISON: Trauma INDICATION: age PROCEDURE: Non contrast enhanced head CT and cervical spine CT were performed. The head images were formatted in the axial plane at 5 mm thick intervals.  The cervical spine images were formatted in the axial, reviewed in full. Will await pending psychiatric evaluation. Dylan Gonzalez  . 3/19/2018     75-year-old white female with recent presentation to [facility] emergency room and now to [facility] after taking an unknown amount of sleeping medications and having numerous falls. She is been diagnosed with metabolic encephalopathy related to medication. She obviously has an opiate misuse disorder as well. There were multiple phone calls and issues with patient and family demanding pain medication per nursing staff notes. They were demanding pain medication despite the fact that she was still sedated from taking too many sleeping pills? She has been referred to pain management. She is under narcotic contract with the pain management Center. From a medical standpoint she is requesting discharge home. Workup to this point has been unremarkable. I think most of her issues are related to medication error. Offered a more formal evaluation and the patient requests discharge home. She can follow-up with the pain management Center for controlled medications and in our office for routine medical care with the understanding that we would not provide any controlled medications.

## 2018-03-19 NOTE — DISCHARGE SUMMARY
Studies:    See summary of labs/x-rays/path report for further details      Treatments:   obs    Disposition:   home  Follow up with Milvia Mon MD in 2  weeks.     Signed:  Milvia Mon   3/19/2018, 8:41 AM

## 2018-03-20 PROCEDURE — 6370000000 HC RX 637 (ALT 250 FOR IP): Performed by: PSYCHIATRY & NEUROLOGY

## 2018-03-20 PROCEDURE — 1240000000 HC EMOTIONAL WELLNESS R&B

## 2018-03-20 PROCEDURE — 6370000000 HC RX 637 (ALT 250 FOR IP): Performed by: PHYSICIAN ASSISTANT

## 2018-03-20 RX ADMIN — ACETAMINOPHEN 650 MG: 325 TABLET, FILM COATED ORAL at 10:05

## 2018-03-20 RX ADMIN — ACETAMINOPHEN 650 MG: 325 TABLET, FILM COATED ORAL at 18:24

## 2018-03-20 RX ADMIN — TRAMADOL HYDROCHLORIDE 50 MG: 50 TABLET, FILM COATED ORAL at 06:31

## 2018-03-20 RX ADMIN — DULOXETINE HYDROCHLORIDE 60 MG: 60 CAPSULE, DELAYED RELEASE ORAL at 09:12

## 2018-03-20 RX ADMIN — ACETAMINOPHEN 650 MG: 325 TABLET, FILM COATED ORAL at 02:24

## 2018-03-20 RX ADMIN — TRAMADOL HYDROCHLORIDE 50 MG: 50 TABLET, FILM COATED ORAL at 22:09

## 2018-03-20 RX ADMIN — TRAMADOL HYDROCHLORIDE 50 MG: 50 TABLET, FILM COATED ORAL at 14:11

## 2018-03-20 RX ADMIN — LEVOTHYROXINE SODIUM 50 MCG: 50 TABLET ORAL at 06:31

## 2018-03-20 ASSESSMENT — PAIN SCALES - GENERAL
PAINLEVEL_OUTOF10: 10
PAINLEVEL_OUTOF10: 6
PAINLEVEL_OUTOF10: 7
PAINLEVEL_OUTOF10: 6
PAINLEVEL_OUTOF10: 7
PAINLEVEL_OUTOF10: 8
PAINLEVEL_OUTOF10: 8

## 2018-03-20 NOTE — PLAN OF CARE
Problem: Pain:  Goal: Pain level will decrease  Pain level will decrease   Outcome: Ongoing    Goal: Control of acute pain  Control of acute pain   Outcome: Ongoing    Goal: Control of chronic pain  Control of chronic pain   Outcome: Ongoing      Problem: Discharge Planning:  Goal: Discharged to appropriate level of care  Discharged to appropriate level of care   Outcome: Ongoing

## 2018-03-20 NOTE — BH NOTE
BHI Daily Shift Assessment  Nursing Progress Note    Room: [facility]616-01 Name: Viky Cotto Age: 69 y.o.    Ethnicity:  Gender: female   Dx: <principal problem not specified>  Precautions: fall risk  CPAP: No Accu-Chek: No  MSE:  Status and Exam  Normal: No  Facial Expression: Flat, Worried  Affect: Congruent  Level of Consciousness: Alert  Mood:Normal: No  Mood: Sad  Motor Activity:Normal: No  Motor Activity: Decreased  Interview Behavior: Cooperative  Preception: [place] to Person, [place] to Time, [place] to Place, [place] to Situation  Attention:Normal: No  Attention: Distractible  Thought Processes: Blocking  Thought Content:Normal: No  Thought Content: Poverty of Content, Preoccupations  Hallucinations: None  Delusions: No  Memory:Normal: Yes  Insight and Judgment: No  Insight and Judgment: Poor Judgment  Present Suicidal Ideation: No  Present Homicidal Ideation: No  Sleep: No, Good, has difficulty falling asleep Hours Slept: 7 Sched Sleep Meds: Yes PRN Sleep Meds: No Other PRN Meds: Yes Med Compliant: Yes Appetite: no change from normal Percent Meals: 100% Social: Yes ADLs: Yes Speech: normal Depression: 0 Anxiety: 0    Janak Alvarenga RN

## 2018-03-20 NOTE — PLAN OF CARE
Problem: Depressive Behavior With or Without Suicide Precautions:  Goal: STG-Knowledge of positive coping patterns  Outcome: Ongoing                                                                      Group Therapy Note    Date: 3/20/2018  Start Time: 1100  End Time:  1200  Number of Participants: 5    Type of Group: Cognitive Skills    Wellness Binder Information  Module Name:  Emotional Wellness  Session Number:  3    Patient's Goal:  To improve knowledge of strategies to enhance happiness    Notes:  Pt participated in group discussion, identified strategies to enhance happiness.     Status After Intervention:  Unchanged    Participation Level: Interactive    Participation Quality: Appropriate and Attentive      Speech:  normal      Thought Process/Content: Logical      Affective Functioning: Congruent      Mood: anxious and depressed      Level of consciousness:  Alert and Oriented x4      Response to Learning: Able to verbalize current knowledge/experience, Able to verbalize/acknowledge new learning and Progressing to goal      Endings: None Reported    Modes of Intervention: Education      Discipline Responsible: Psychoeducational Specialist      Signature:  Marry Medley

## 2018-03-21 VITALS
RESPIRATION RATE: 18 BRPM | HEIGHT: 63 IN | WEIGHT: 250.01 LBS | OXYGEN SATURATION: 95 % | TEMPERATURE: 96.4 F | HEART RATE: 60 BPM | SYSTOLIC BLOOD PRESSURE: 148 MMHG | BODY MASS INDEX: 44.3 KG/M2 | DIASTOLIC BLOOD PRESSURE: 77 MMHG

## 2018-03-21 PROBLEM — T50.901S OVERDOSE DRUG, SEQUELA: Status: ACTIVE | Noted: 2018-03-21

## 2018-03-21 LAB — HBA1C MFR BLD: 5.2 %

## 2018-03-21 PROCEDURE — 6370000000 HC RX 637 (ALT 250 FOR IP): Performed by: PHYSICIAN ASSISTANT

## 2018-03-21 PROCEDURE — 97165 OT EVAL LOW COMPLEX 30 MIN: CPT

## 2018-03-21 PROCEDURE — 6370000000 HC RX 637 (ALT 250 FOR IP): Performed by: PSYCHIATRY & NEUROLOGY

## 2018-03-21 PROCEDURE — 97161 PT EVAL LOW COMPLEX 20 MIN: CPT

## 2018-03-21 PROCEDURE — 90792 PSYCH DIAG EVAL W/MED SRVCS: CPT | Performed by: PSYCHIATRY & NEUROLOGY

## 2018-03-21 PROCEDURE — 83036 HEMOGLOBIN GLYCOSYLATED A1C: CPT

## 2018-03-21 PROCEDURE — G8989 SELF CARE D/C STATUS: HCPCS

## 2018-03-21 PROCEDURE — G8978 MOBILITY CURRENT STATUS: HCPCS

## 2018-03-21 PROCEDURE — G8979 MOBILITY GOAL STATUS: HCPCS

## 2018-03-21 PROCEDURE — G8987 SELF CARE CURRENT STATUS: HCPCS

## 2018-03-21 PROCEDURE — G8988 SELF CARE GOAL STATUS: HCPCS

## 2018-03-21 PROCEDURE — 5130000000 HC BRIDGE APPOINTMENT

## 2018-03-21 PROCEDURE — G8980 MOBILITY D/C STATUS: HCPCS

## 2018-03-21 PROCEDURE — 6370000000 HC RX 637 (ALT 250 FOR IP): Performed by: FAMILY MEDICINE

## 2018-03-21 PROCEDURE — 36415 COLL VENOUS BLD VENIPUNCTURE: CPT

## 2018-03-21 RX ORDER — DULOXETIN HYDROCHLORIDE 60 MG/1
60 CAPSULE, DELAYED RELEASE ORAL DAILY
Qty: 30 CAPSULE | Refills: 3 | Status: SHIPPED | OUTPATIENT
Start: 2018-03-22

## 2018-03-21 RX ADMIN — TRAMADOL HYDROCHLORIDE 50 MG: 50 TABLET, FILM COATED ORAL at 14:53

## 2018-03-21 RX ADMIN — ACETAMINOPHEN 650 MG: 325 TABLET, FILM COATED ORAL at 00:45

## 2018-03-21 RX ADMIN — DULOXETINE HYDROCHLORIDE 60 MG: 60 CAPSULE, DELAYED RELEASE ORAL at 08:49

## 2018-03-21 RX ADMIN — LEVOTHYROXINE SODIUM 50 MCG: 50 TABLET ORAL at 06:49

## 2018-03-21 RX ADMIN — ACETAMINOPHEN 650 MG: 325 TABLET, FILM COATED ORAL at 11:09

## 2018-03-21 RX ADMIN — METOPROLOL TARTRATE 25 MG: 25 TABLET ORAL at 08:49

## 2018-03-21 RX ADMIN — TRAMADOL HYDROCHLORIDE 50 MG: 50 TABLET, FILM COATED ORAL at 06:49

## 2018-03-21 ASSESSMENT — PAIN SCALES - GENERAL
PAINLEVEL_OUTOF10: 6
PAINLEVEL_OUTOF10: 6
PAINLEVEL_OUTOF10: 5
PAINLEVEL_OUTOF10: 10
PAINLEVEL_OUTOF10: 8

## 2018-03-21 NOTE — H&P
HISTORY and PHYSICAL      CHIEF COMPLAINT:  Depression, SI    Reason for Admission:  Depression, SI    History Obtained From:  patient    HISTORY OF PRESENT ILLNESS:      The patient is a 69 y.o. female who is admitted to the [place] [address] unit with worsening mood issues. She has had no c/o CP or SOA. No abdominal pain or N/V. No dysuria. No recent fevers. No current pain complaints. No HA or dizziness. Past Medical History:        Diagnosis Date    Arthritis     Chronic kidney disease     Hypertension     Leg pain     chronic    Psychiatric problem     Thyroid disease      Past Surgical History:        Procedure Laterality Date    BACK SURGERY      CHOLECYSTECTOMY      EYE SURGERY      HIP SURGERY Right     right hip replacement    KNEE SURGERY      KNEE SURGERY Bilateral     bilateral knee replacement    NECK SURGERY           Medications Prior to Admission:    Prescriptions Prior to Admission: furosemide (LASIX) 20 MG tablet, Take 20 mg by mouth daily as needed   DULoxetine (CYMBALTA) 60 MG extended release capsule, Take 60 mg by mouth daily  morphine (MS CONTIN) 30 MG extended release tablet, Take 30 mg by mouth.  morphine (MSIR) 15 MG tablet, Take 15 mg by mouth every 4 hours as needed for Pain. bisoprolol-hydrochlorothiazide (ZIAC) 5-6.25 MG per tablet, Take 1 tablet by mouth daily  levothyroxine (SYNTHROID) 50 MCG tablet, Take 50 mcg by mouth Daily  zolpidem (AMBIEN) 10 MG tablet, Take by mouth nightly as needed for Sleep. Allergies:  Codeine    Social History:   TOBACCO:   reports that she has never smoked. She has never used smokeless tobacco.  ETOH:   reports that she does not drink alcohol. DRUGS:   reports that she does not use drugs. MARITAL STATUS:    OCCUPATION:  Not working  Patient currently lives alone      Family History:   History reviewed. No pertinent family history.   REVIEW OF SYSTEMS:  Constitutional: neg  CV:
IMPRESSION:  AXIS I:  Probable accidental overdose of Ambien and chronic initial  insomnia. AXIS II:  None. AXIS III:  Please see above. AXIS IV:  Please see above. AXIS V:  65 to 70. TREATMENT PLAN:  She is to continue with the Cymbalta, Synthroid, Ultram  and Tylenol, and engage in group and individual therapy while in the  hospital.  She probably will return to her home, where she lives alone with  her cat, Jasmyn.         Maxwell Jose MD    D: 03/20/2018 12:40:12      T: 03/20/2018 13:32:02     [id number]  Job#: [id number]     Doc#: [id number]    CC:

## 2018-03-22 LAB
EKG P AXIS: 27 DEGREES
EKG P-R INTERVAL: 204 MS
EKG Q-T INTERVAL: 416 MS
EKG QRS DURATION: 82 MS
EKG QTC CALCULATION (BAZETT): 421 MS
EKG T AXIS: 42 DEGREES

## 2018-03-22 NOTE — PROGRESS NOTES
[facility]  Discharge Note    Pt discharged with followings belongings:       Valuables sent home with PATIENT. Valuables retrieved from safe and returned to patient. Patient left department with Departure Mode: Family member via Mobility at Departure: Ambulatory, discharged to Discharged to: Private Residence. Patient education on aftercare instructions: YES  Patient verbalize understanding of AVS:  YES. Status EXAM upon discharge:  Status and Exam  Normal: Yes  Facial Expression: Brightened  Affect: Appropriate  Level of Consciousness: Alert  Mood:Normal: No  Mood: Anxious  Motor Activity:Normal: Yes  Motor Activity: Increased  Interview Behavior: Cooperative  Preception: [place] to Person, [place] to Time, [place] to Place, [place] to Situation  Attention:Normal: Yes  Attention: Distractible  Thought Processes: Blocking  Thought Content:Normal: No  Thought Content: Preoccupations  Hallucinations: None  Delusions: No  Memory:Normal: Yes  Insight and Judgment: Yes  Insight and Judgment: Poor Insight  Present Suicidal Ideation: No  Present Homicidal Ideation: No  PT DISCHARGE UNIT AT 1830, WITH family and nurse, ambulatory with family, pt denies SI,HI and AVH, and looking forward to going home today.    Laura Olivarez RN
Patient has been in bed the majority of this shift. Patient complained early in the shift about not being able to sleep and pain. Patient received Tylenol at that time. No other complaints voiced at that time.
Progress Note  Mack Dobson  3/22/2018 12:00 AM  Subjective:   Admit Date:   3/19/2018      CC/ADMIT DX:       Interval History:   Reviewed overnight events and nursing notes. No new physical complaints. I have reviewed all labs/diagnostics from the last 24hrs. ROS:   I have done a 10 point ROS and all are negative, except what is mentioned in the HPI. Medications:             Objective:   Vitals: BP (!) 148/77 Comment: reported to Dami Silva  @ 1642  Pulse 60   Temp 96.4 °F (35.8 °C) (Temporal)   Resp 18   Ht 5' 3\" (1.6 m)   Wt 250 lb 0.2 oz (113.4 kg)   SpO2 95%   BMI 44.29 kg/m²  No intake or output data in the 24 hours ending 03/22/18 0000  General appearance: alert and cooperative with exam  Lungs: clear to auscultation bilaterally  Heart: RRR  Abdomen: soft, non-tender; bowel sounds normal; no masses,  no organomegaly  Extremities: extremities normal, atraumatic, no cyanosis or edema  Neurologic:  No obvious focal neurologic deficits. Assessment and Plan: Active Problems:    Overdose drug, sequela  Resolved Problems:    Poisoning by sleeping drug      Plan:  1. Continue present medication(s)   2. She is medically stable. I will monitor for any changes or concerns. 3.  Follow with Psych      Discharge planning:   her home     Reviewed treatment plans with the patient and/or family.              Electronically signed by Luz Cosme MD on 3/22/2018 at 12:00 AM
CONFIDENT. ADVISED Pt  TO CONTINUE USE OF WALKER  IF IT IS HER PREFERENCE  Objective          AROM RLE (degrees)  RLE General AROM: MILD LIMITATION AT KNEE DUE TO PAIN BUT ABLE TO FLEX TO 90 DEG SITTING  AROM LLE (degrees)  LLE AROM : WFL  Strength RLE  Comment: HIP  FLEX -3/5, KNEE EXT 3+/5  Strength LLE  Comment: HIP FLEX 3/5, KNEE EXT 4/5  Strength Other  Other: ABLE TO BEAR WEIGHT WELL ON LE'S        Bed mobility  Supine to Sit: Independent  Sit to Supine: Independent  Transfers  Sit to Stand: Independent  Stand to sit: Independent  Ambulation  Ambulation?: Yes  Ambulation 1  Surface: level tile  Device: No Device  Assistance: Independent  Quality of Gait: MILDLY GUARDED. NO LOB OR PATH DEVIATION. NO \"LIMP\" WITH WB ON R LE NOTED FOR AMB SHORT DISTANCE. Distance: IN ROOM. Comments: STATES SHE HAS BEEN AMB IN ROOM WITHOUT RW, BUT LIKES TO USE GOING OUTSIDE OF ROOM. Balance  Sitting - Static: Good  Sitting - Dynamic: Good  Standing - Static: Good  Standing - Dynamic: Good        Assessment   Assessment: Pt APPEARS TO BE AT WHAT SHE DESCRIBES AS BASELINE PRIOR TO TAKING 5 AMBIEN. pt DOES NOT PRESENT WITH NEED FOR PT SERVICES AT THIS TIME.  CAN CONTINUE TO AMB WITH RW AS NEEDED TO PROVIDE HER WITH A SENSE OF SAFEY AND CONFIDENCE  Prognosis: Good  Decision Making: Low Complexity  Patient Education: GENERAL FALL PREVENTION  Barriers to Learning: NONE NOTED  REQUIRES PT FOLLOW UP: No  Activity Tolerance  Activity Tolerance: Patient Tolerated treatment well  PT Equipment Recommendations  Equipment Needed: No     Discharge Recommendations:  Home with assist PRN      Plan   Plan  Times per week: PT FOR EVAL ONLY  Safety Devices  Type of devices:  (Pt IS UP AD ZOEY ON UNIT)    G-Code  PT G-Codes  Functional Assessment Tool Used: IND AMB IN ROOM  Score: CH, IND  Functional Limitation: Mobility: Walking and moving around  Mobility: Walking and Moving Around Current Status (): 0 percent impaired, limited or
Independent  LE Bathing: Modified independent   UE Dressing: Independent  LE Dressing: Independent  Toileting: Independent        Bed mobility  Supine to Sit: Independent  Sit to Supine: Independent  Transfers  Stand Step Transfers: Independent     Cognition  Overall Cognitive Status: WNL  Cognition Comment: Awake, alert, follows directions, appropriate conversation socially                 LUE PROM (degrees)  LUE PROM: WNL  LUE AROM (degrees)  LUE AROM : WNL  RUE PROM (degrees)  RUE PROM: WNL  RUE AROM (degrees)  RUE AROM : WNL  LUE Strength  Gross LUE Strength: WFL  RUE Strength  Gross RUE Strength: WFL                  Assessment   Assessment: No barriers for home discharge noted from a physical standpoint for ADL.   No ongoing OT needs identified  Treatment Diagnosis: Recent Falls  Decision Making: Low Complexity  History: Bilateral TKR, Right THR, back surgery  Activity Tolerance  Activity Tolerance: Patient Tolerated treatment well        Discharge Recommendations:        Plan   Plan  Plan Comment: No further visits needed    G-Code  OT G-codes  Functional Assessment Tool Used: bathe dress toilet  Functional Limitation: Self care  Self Care Current Status (): 0 percent impaired, limited or restricted  Self Care Goal Status (): 0 percent impaired, limited or restricted  Self Care Discharge Status (): 0 percent impaired, limited or restricted  OutComes Score                                           AM-PAC Score             Goals          Therapy Time   Individual Concurrent Group Co-treatment   Time In           Time Out           Minutes                   Tiffany Norris OT Electronically signed by Tiffany Norris OT on 3/21/2018 at 3:48 PM

## 2018-03-22 NOTE — DISCHARGE SUMMARY
[facility] [address] [facility]                              DISCHARGE SUMMARY    PATIENT NAME: Rosita Watters                   :         1950  MED REC NO:   [id number]                              ROOM:       [facility]  ACCOUNT NO:   [de-identified]                           ADMIT DATE:  2018  PROVIDER:     Nereida Corral MD                    [facility] DATE:  2018    ADMITTING PHYSICIAN:  Marian Lloyd MD    ATTENDING PHYSICIAN:  David Rojas MD    DISCHARGE PHYSICIAN:  Dr. Jumana Ruiz. ADMISSION DIAGNOSES:  Probable accidental Ambien overdose and chronic  initial insomnia. DISCHARGE DIAGNOSES: Major Depressive Disorder, Recurrent, Severe, without psychotic features Probable unintentional Ambien overdose and  chronic initial insomnia. ADMISSION CONDITION:  Fair. DISCHARGE CONDITION:  Improved. INDICATIONS FOR ADMISSION:  The patient has a history of chronic  insomnia and was in the habit of taking Ambien. She took about 7 to  10 Ambien over a 2- to 3-night period at her home and fell. She asked  that 911 be called and she was brought to the emergency room. She had  been taking the Ambien for at least a year from Dr. Eusebia Holt. She  states she also had chronic arthritic pain and was taking morphine 15  mg twice a day. She was admitted for the overdose but states that she  did not take the Ambien in an attempt to harm herself, \"I don't want  to die\" and \"I have got too much to live for\" and \"this has been an  eye opener. \"    HOSPITAL COURSE:  The patient was admitted to the behavioral health  unit at [address] in [place]. Medications were continued,  which she was taking prior to admission. She was treated with milieu,  activity, group, and individual therapy. She showed no overt signs of  a thought disorder. At the time of discharge, the prognosis seemed  good.     DIAGNOSTIC STUDIES:  See the routine

## 2018-08-05 PROBLEM — T50.901A OVERDOSE, DRUG: Status: ACTIVE | Noted: 2018-03-21

## 2018-10-27 NOTE — PROGRESS NOTES
[phone] Ama Mitchell (daughter) wants to know what the doctor says. Estefanía Shear any information that is given or talked about. She said we can call her at any time!
Called Dr. Faye Garcia to notify that we need discharge/readmit orders for this pt to go to psych.
Called and spoke with Lul Dunham in psych. She states that Dr. Lolly Coles said that this is appropriate for [facility]. Will call for discharge/readmit orders on this patient.
Report called to Leola Chong in [employer]. Will transfer when pt finishes her lunch.
Alert and oriented, no focal deficits, no motor or sensory deficits.

## 2018-12-18 ENCOUNTER — HOSPITAL ENCOUNTER (OUTPATIENT)
Dept: WOUND CARE | Age: 68
Discharge: HOME | End: 2018-12-18
Payer: MEDICARE

## 2018-12-18 VITALS
TEMPERATURE: 97.5 F | HEART RATE: 78 BPM | RESPIRATION RATE: 18 BRPM | SYSTOLIC BLOOD PRESSURE: 121 MMHG | DIASTOLIC BLOOD PRESSURE: 78 MMHG

## 2018-12-18 DIAGNOSIS — L97.929 VENOUS ULCER OF LEFT LEG (HCC): ICD-10-CM

## 2018-12-18 DIAGNOSIS — L97.222 NON-PRESSURE CHRONIC ULCER OF CALF WITH FAT LAYER EXPOSED, LEFT (HCC): ICD-10-CM

## 2018-12-18 DIAGNOSIS — I83.029 VENOUS ULCER OF LEFT LEG (HCC): ICD-10-CM

## 2018-12-18 PROCEDURE — 99214 OFFICE O/P EST MOD 30 MIN: CPT | Performed by: NURSE PRACTITIONER

## 2018-12-18 PROCEDURE — 99214 OFFICE O/P EST MOD 30 MIN: CPT

## 2018-12-18 RX ORDER — LEVOCETIRIZINE DIHYDROCHLORIDE 5 MG/1
5 TABLET, FILM COATED ORAL NIGHTLY
COMMUNITY
End: 2019-10-02

## 2018-12-18 RX ORDER — TRAMADOL HYDROCHLORIDE 50 MG/1
50 TABLET ORAL EVERY 6 HOURS PRN
COMMUNITY
End: 2019-08-15

## 2018-12-18 RX ORDER — SULFAMETHOXAZOLE AND TRIMETHOPRIM 800; 160 MG/1; MG/1
1 TABLET ORAL 2 TIMES DAILY
COMMUNITY
End: 2019-08-15

## 2018-12-18 RX ORDER — ZOLPIDEM TARTRATE 5 MG/1
5 TABLET ORAL NIGHTLY PRN
COMMUNITY
End: 2019-08-15

## 2018-12-18 RX ORDER — PRAMIPEXOLE DIHYDROCHLORIDE 0.5 MG/1
0.5 TABLET ORAL 3 TIMES DAILY
COMMUNITY

## 2018-12-18 NOTE — PROGRESS NOTES
Patient Care Team:  Vicenta Cheek MD as PCP - General (Family Medicine)    TODAY'S DATE:  12/18/2018     HISTORY of PRESENT ILLNESS HPI   Dorothea Alba is a 69 y.o. female who presents today for wound evaluation. Patient states she was giving her dog a bath and his toenail scraped her leg creating a skin tear to the left lower leg on Dec 5th, 2018. She was seen by her PCP Vivek Martins PA-C on Dec 7, 2018 patient was placed on Bactrim x 10 days and bactroban ointment. Patient returned for PCP follow up Dec 11, 2018 with wound care referral.     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 This started 2 week(s) ago. She believes this is not healing. She has been applying antibiotic ointment. She has not had  fever or chills. She has a history of venous disease.     Dorothea Alba is a 69 y.o. female with the following history reviewed and recorded in [facility]:  Patient Active Problem List    Diagnosis Date Noted    Non-pressure chronic ulcer of calf with fat layer exposed, left ([address] 12/18/2018    Venous ulcer of left leg ([address] 12/18/2018    Overdose, drug 03/21/2018     Replacing Deprecated Diagnoses      Multiple falls 03/19/2018    Essential hypertension 03/19/2018    Acquired hypothyroidism 03/19/2018    Hypotension due to drugs 03/19/2018    Hyperglycemia 03/19/2018    Acute metabolic encephalopathy 10/98/8040    Overdose, undetermined intent, initial encounter 03/18/2018    Repeated falls 03/18/2018    Insomnia 03/18/2018    Anxiety 03/18/2018    Overdose, drug 03/18/2018     Replacing Deprecated Diagnoses       Current Outpatient Prescriptions   Medication Sig Dispense Refill    sulfamethoxazole-trimethoprim (BACTRIM DS;SEPTRA DS) 800-160 MG per tablet Take 1 tablet by mouth 2 times daily      zolpidem (AMBIEN) 5 MG tablet Take 5 mg by mouth nightly as needed for Sleep. Bob Galdamez LIDOCAINE, LIDODERM, 5% PATCH AND REMOVAL       pramipexole (MIRAPEX) 0.5 MG tablet Take 0.5 mg by mouth 3 times daily      traMADol (ULTRAM) 50 MG tablet Take 50 mg by mouth every 6 hours as needed for Pain. Bob Galdamez levocetirizine (XYZAL) 5 MG tablet Take 5 mg by mouth nightly      DULoxetine (CYMBALTA) 60 MG extended release capsule Take 1 capsule by mouth daily 30 capsule 3    furosemide (LASIX) 20 MG tablet Take 20 mg by mouth daily as needed       morphine (MSIR) 15 MG tablet Take 15 mg by mouth every 4 hours as needed for Pain.  bisoprolol-hydrochlorothiazide (ZIAC) 5-6.25 MG per tablet Take 1 tablet by mouth daily      levothyroxine (SYNTHROID) 50 MCG tablet Take 50 mcg by mouth Daily       No current facility-administered medications for this encounter. Allergies: Codeine    Past Medical History:   Diagnosis Date    Arthritis     Chronic kidney disease     Hypertension     Leg pain     chronic    Psychiatric problem     Thyroid disease      Past Surgical History:   Procedure Laterality Date    BACK SURGERY      CHOLECYSTECTOMY      EYE SURGERY      HIP SURGERY Right     right hip replacement    KNEE SURGERY      KNEE SURGERY Bilateral     bilateral knee replacement    NECK SURGERY       History reviewed. No pertinent family history.   Social History   Substance Use Topics    Smoking status: Never Smoker    Smokeless tobacco: Never Used    Alcohol use No       Review of Systems  Constitutional / general:  No fever / chills / sweats  Head:  No headache / neck stiffness  Eyes:  No blurry vision / itching / redness  ENT: No sore throat / ear pain  CV:  No chest pain / palpitations   Respiratory:  No cough / shortness of breath  GI: No nausea / vomiting   Neuro: No seizure / headache  Musculoskeletal:  No muscle pain  Vascular: No thrombosis  Heme / endocrine: No easy bruising / bleeding / heat or cold intolerance  Psychiatric:  No depression / anxiety / insomnia / mood changes  Skin:  No skin hair or nail changes  Wound: left lower leg  All other review of systems are negative. Physical Exam    /78   Pulse 78   Temp 97.5 °F (36.4 °C) (Temporal)   Resp 18     General appearance: Appears stated age, cooperative and no distress  Head: Normocephalic, atraumatic  Eyes: conjunctivae/corneas clear  Ears: normal external ears, nares patent  Neck: no JVD,  trachea midline   Lungs: clear to auscultation bilaterally with symmetric expansion  Heart: regular rate rhythm   Abdomen: soft, non-tender; non-distended   Extremities: no sign of DVT  Lymphatic: No palpable lymph node enlargment  Neurologic: Alert and oriented X 3,  Psychiatric:  Thought content appropriate, normal insight, mood appropriate  Skin: left lower leg     Post Debridement Measurements and Assessment:    Wound 12/18/18 Leg Left; Lower; Anterior Wound 1 - Left anterior lower leg - Venous  (Active)   Wound Image    12/18/2018 10:35 AM   Wound Skin Tear 12/18/2018 10:35 AM   Dressing Status Old drainage 12/18/2018 10:35 AM   Dressing Changed Changed/New 12/18/2018 10:35 AM   Dressing/Treatment Xeroform 12/18/2018 10:35 AM   Wound Cleansed Rinsed/Irrigated with saline 12/18/2018 10:35 AM   Wound Length (cm) 7 cm 12/18/2018 10:35 AM   Wound Width (cm) 4 cm 12/18/2018 10:35 AM   Wound Depth (cm) 0.1 cm 12/18/2018 10:35 AM   Wound Surface Area (cm^2) 28 cm^2 12/18/2018 10:35 AM   Wound business hours of the [address] [place] [address] Road please contact your PCP or go to the nearest emergency room. Venous Insufficiency  Incompetent valves allow backflow when legs relax    Signs and symptoms:   Varicose Veins   Tan or reddish brown skin color changes  Weeping or excoriated skin  Leg swelling known as edema   Edema can lead to ulcerations on the legs    Venous Management     1) Moisturize your legs with a good thick cream like Eucerin Cream, Aquaphor, Cocoa Butter (in a jar), or Vaseline. Apply immediately after bathing, throughout the day and at least twice daily. Dry skin has a greater likelihood of breaking down, if it is stretched by swelling. 2) Elevate legs above the heart when sitting. Elevate legs above the heart 3-4 times daily for 30 minutes each time. Avoid sleeping in a recliner. 3) Avoid standing for long periods of time. 4) Walking is good. Take regular walks. Leg muscle activity promotes better venous return    5)  Once your provider has okayed you to wear compression stockings and you have good arterial flow, wear knee high graduated compression stockings on both legs. (This is what prevents these ulcerations)  Put these on in the morning, no more than 20 minutes after rising and wear stockings throughout the day. Remove your stockings before going to bed. You can hand wash and hang to dry overnight. We recommend having two pairs to rotate washing and wearing. 6) Be very careful with your legs. Often these are caused by a bump or scrape that doesn't heal well. Healing is prevented or slowed by your Venous insufficiency. Compression and keeping the swelling out of your legs is the key to the condition of your skin. 7) You can buy these stockings  at many pharmacies and even online, but you must have a calf and ankle measurement (taken in the morning before your legs swell) to order them. You will also need the strength that the physician has ordered.   Most insurance

## 2019-01-03 ENCOUNTER — HOSPITAL ENCOUNTER (OUTPATIENT)
Dept: WOUND CARE | Age: 69
Discharge: HOME | End: 2019-01-03
Payer: MEDICARE

## 2019-01-03 ENCOUNTER — HOSPITAL ENCOUNTER (OUTPATIENT)
Dept: VASCULAR LAB | Age: 69
Discharge: HOME | End: 2019-01-03
Payer: MEDICARE

## 2019-01-03 VITALS
RESPIRATION RATE: 18 BRPM | BODY MASS INDEX: 44.3 KG/M2 | HEART RATE: 65 BPM | DIASTOLIC BLOOD PRESSURE: 70 MMHG | WEIGHT: 250 LBS | TEMPERATURE: 97.1 F | HEIGHT: 63 IN | SYSTOLIC BLOOD PRESSURE: 125 MMHG

## 2019-01-03 DIAGNOSIS — L97.222 NON-PRESSURE CHRONIC ULCER OF CALF WITH FAT LAYER EXPOSED, LEFT (HCC): ICD-10-CM

## 2019-01-03 DIAGNOSIS — I83.029 VENOUS ULCER OF LEFT LEG (HCC): ICD-10-CM

## 2019-01-03 DIAGNOSIS — L97.929 VENOUS ULCER OF LEFT LEG (HCC): ICD-10-CM

## 2019-01-03 PROCEDURE — 97597 DBRDMT OPN WND 1ST 20 CM/<: CPT | Performed by: SURGERY

## 2019-01-03 PROCEDURE — 97597 DBRDMT OPN WND 1ST 20 CM/<: CPT

## 2019-01-03 PROCEDURE — 93923 UPR/LXTR ART STDY 3+ LVLS: CPT

## 2019-01-10 ENCOUNTER — HOSPITAL ENCOUNTER (OUTPATIENT)
Dept: WOUND CARE | Age: 69
Discharge: HOME | End: 2019-01-10
Payer: MEDICARE

## 2019-01-10 VITALS
DIASTOLIC BLOOD PRESSURE: 72 MMHG | SYSTOLIC BLOOD PRESSURE: 135 MMHG | BODY MASS INDEX: 44.3 KG/M2 | HEIGHT: 63 IN | WEIGHT: 250 LBS | HEART RATE: 68 BPM | TEMPERATURE: 97.5 F | RESPIRATION RATE: 16 BRPM

## 2019-01-10 DIAGNOSIS — L97.929 VENOUS ULCER OF LEFT LEG (HCC): ICD-10-CM

## 2019-01-10 DIAGNOSIS — I83.029 VENOUS ULCER OF LEFT LEG (HCC): ICD-10-CM

## 2019-01-10 DIAGNOSIS — L97.222 NON-PRESSURE CHRONIC ULCER OF CALF WITH FAT LAYER EXPOSED, LEFT (HCC): ICD-10-CM

## 2019-01-10 PROCEDURE — 97597 DBRDMT OPN WND 1ST 20 CM/<: CPT

## 2019-01-10 PROCEDURE — 97597 DBRDMT OPN WND 1ST 20 CM/<: CPT | Performed by: SURGERY

## 2019-01-10 ASSESSMENT — PAIN SCALES - GENERAL: PAINLEVEL_OUTOF10: 0

## 2019-05-15 ENCOUNTER — OFFICE VISIT (OUTPATIENT)
Dept: UROLOGY | Age: 69
End: 2019-05-15
Payer: MEDICARE

## 2019-05-15 VITALS
SYSTOLIC BLOOD PRESSURE: 141 MMHG | BODY MASS INDEX: 40.18 KG/M2 | HEART RATE: 69 BPM | WEIGHT: 250 LBS | HEIGHT: 66 IN | DIASTOLIC BLOOD PRESSURE: 72 MMHG | TEMPERATURE: 97.6 F

## 2019-05-15 DIAGNOSIS — R35.0 URINE FREQUENCY: ICD-10-CM

## 2019-05-15 DIAGNOSIS — R32 URINARY INCONTINENCE, UNSPECIFIED TYPE: Primary | ICD-10-CM

## 2019-05-15 PROBLEM — M48.061 SPINAL STENOSIS, LUMBAR: Status: ACTIVE | Noted: 2017-08-02

## 2019-05-15 LAB
BILIRUBIN, POC: 1
BLOOD URINE, POC: NORMAL
CLARITY, POC: CLEAR
COLOR, POC: YELLOW
GLUCOSE URINE, POC: NORMAL
KETONES, POC: NORMAL
LEUKOCYTE EST, POC: NORMAL
NITRITE, POC: NORMAL
PH, POC: 6
PROTEIN, POC: NORMAL
SPECIFIC GRAVITY, POC: 1.03
UROBILINOGEN, POC: 2

## 2019-05-15 PROCEDURE — 0509F URINE INCON PLAN DOCD: CPT | Performed by: NURSE PRACTITIONER

## 2019-05-15 PROCEDURE — 51798 US URINE CAPACITY MEASURE: CPT | Performed by: NURSE PRACTITIONER

## 2019-05-15 PROCEDURE — 1123F ACP DISCUSS/DSCN MKR DOCD: CPT | Performed by: NURSE PRACTITIONER

## 2019-05-15 PROCEDURE — 1090F PRES/ABSN URINE INCON ASSESS: CPT | Performed by: NURSE PRACTITIONER

## 2019-05-15 PROCEDURE — 99213 OFFICE O/P EST LOW 20 MIN: CPT | Performed by: NURSE PRACTITIONER

## 2019-05-15 PROCEDURE — G8427 DOCREV CUR MEDS BY ELIG CLIN: HCPCS | Performed by: NURSE PRACTITIONER

## 2019-05-15 PROCEDURE — 3017F COLORECTAL CA SCREEN DOC REV: CPT | Performed by: NURSE PRACTITIONER

## 2019-05-15 PROCEDURE — 4040F PNEUMOC VAC/ADMIN/RCVD: CPT | Performed by: NURSE PRACTITIONER

## 2019-05-15 PROCEDURE — G8400 PT W/DXA NO RESULTS DOC: HCPCS | Performed by: NURSE PRACTITIONER

## 2019-05-15 PROCEDURE — G8419 CALC BMI OUT NRM PARAM NOF/U: HCPCS | Performed by: NURSE PRACTITIONER

## 2019-05-15 PROCEDURE — 1036F TOBACCO NON-USER: CPT | Performed by: NURSE PRACTITIONER

## 2019-05-15 PROCEDURE — 81003 URINALYSIS AUTO W/O SCOPE: CPT | Performed by: NURSE PRACTITIONER

## 2019-05-15 NOTE — PROGRESS NOTES
Bijal Lopez is a 77 y.o. female who presents today   Chief Complaint   Patient presents with    New Patient     pt here today for urge incontience           HPI:       Bijal Lopez presents for evaluation of urge incontinence. Overactive Bladder:  Patient is here today for an overactive bladder which started 8 month(s) ago. Recently the OAB symptoms: are worsening  Current medical Rx for OAB: none    Exacerbating factors: during the night  Stress incontinence: Severity = not present. Urge Incontinence:  Severity = mild. Number of pads per day: 0    Irritative Symptoms:  Frequency: 2 hours  Nocturia x 8   Urgency: Always    Obstructive symptoms: Force of stream: normal  Hesitancy: Sometimes  Straining to void: Never    Recurrent urinary tract infections? No  Consumption of bladder irritants? yes - diet Dr. Sharonda Kelly or Diet Coke, all day, occasional water intake. Previous treatments tried for OAB: none    Today's Post-void Residual by bladder scanner: 0 mL  She has tried to decreased night time intake. Past Medical History:   Diagnosis Date    Arthritis     Chronic kidney disease     Hypertension     Leg pain     chronic    Psychiatric problem     Thyroid disease       Past Surgical History:   Procedure Laterality Date    BACK SURGERY      CHOLECYSTECTOMY      EYE SURGERY      HIP SURGERY Right     right hip replacement    KNEE SURGERY      KNEE SURGERY Bilateral     bilateral knee replacement    NECK SURGERY         History reviewed. No pertinent family history. Social History     Tobacco Use    Smoking status: Never Smoker    Smokeless tobacco: Never Used   Substance Use Topics    Alcohol use: No      Current Outpatient Medications   Medication Sig Dispense Refill    Mirabegron ER 25 MG TB24 Take one pill by mouth daily.  30 tablet 3    sulfamethoxazole-trimethoprim (BACTRIM DS;SEPTRA DS) 800-160 MG per tablet Take 1 tablet by mouth 2 times daily      zolpidem (AMBIEN) 5 MG tablet Take 5 mg by mouth nightly as needed for Sleep. Emily Glatter LIDOCAINE, LIDODERM, 5% PATCH AND REMOVAL       pramipexole (MIRAPEX) 0.5 MG tablet Take 0.5 mg by mouth 3 times daily      traMADol (ULTRAM) 50 MG tablet Take 50 mg by mouth every 6 hours as needed for Pain. Emily Glatter levocetirizine (XYZAL) 5 MG tablet Take 5 mg by mouth nightly      DULoxetine (CYMBALTA) 60 MG extended release capsule Take 1 capsule by mouth daily 30 capsule 3    furosemide (LASIX) 20 MG tablet Take 20 mg by mouth daily as needed       morphine (MSIR) 15 MG tablet Take 15 mg by mouth every 4 hours as needed for Pain.  bisoprolol-hydrochlorothiazide (ZIAC) 5-6.25 MG per tablet Take 1 tablet by mouth daily      levothyroxine (SYNTHROID) 50 MCG tablet Take 50 mcg by mouth Daily       No current facility-administered medications for this visit. Allergies   Allergen Reactions    Codeine Rash         Subjective:      Review of Systems   Constitutional: Negative for chills and fever. Genitourinary: Positive for frequency and urgency. Negative for difficulty urinating and dysuria. All other systems reviewed and are negative. Objective:     Physical Exam   Constitutional: She is oriented to person, place, and time. She appears well-developed and well-nourished. No distress. HENT:   Head: Normocephalic and atraumatic. Eyes: Pupils are equal, round, and reactive to light. Conjunctivae are normal.   Neck: Normal range of motion. Neck supple. Cardiovascular: Normal rate. Pulmonary/Chest: Effort normal. No respiratory distress. Abdominal: Soft. Musculoskeletal: Normal range of motion. Neurological: She is alert and oriented to person, place, and time. Skin: Skin is warm and dry. Psychiatric: She has a normal mood and affect. Her behavior is normal. Judgment and thought content normal.   Vitals reviewed.     BP (!) 141/72   Pulse 69   Temp 97.6 °F (36.4 °C) (Temporal)   Ht 5' 6\" (1.676 m)   Wt 250 lb (113.4 kg)   BMI 40.35 kg/m²       DATA:    Results for orders placed or performed in visit on 05/15/19   POCT Urinalysis No Micro (Auto)   Result Value Ref Range    Color, UA yellow     Clarity, UA clear     Glucose, UA POC neg     Bilirubin, UA 1     Ketones, UA neg     Spec Grav, UA 1.030     Blood, UA POC neg     pH, UA 6.0     Protein, UA POC neg     Urobilinogen, UA 2.0     Leukocytes, UA neg     Nitrite, UA neg        Assessment/ Plan       Diagnosis Orders   1. Urinary incontinence, unspecified type  DE MEASUREMENT,POST-VOID RESIDUAL VOLUME BY US,NON-IMAGING    POCT Urinalysis No Micro (Auto)    Mirabegron ER 25 MG TB24   2. Urine frequency  DE MEASUREMENT,POST-VOID RESIDUAL VOLUME BY US,NON-IMAGING    POCT Urinalysis No Micro (Auto)    Mirabegron ER 25 MG TB24   Place patient on Myrbetric, samples have been given. I will fill out a tier form if needed. Patient will follow up in 6 weeks. Discussed use, benefit, and side effects of prescribed medications. All patient questions answered. Pt voiced understanding. Patient agreedwith treatment plan.        Electronically signed by BRUCE Villar on 5/16/2019 at 4:35 PM

## 2019-06-10 ENCOUNTER — TELEPHONE (OUTPATIENT)
Dept: UROLOGY | Age: 69
End: 2019-06-10

## 2019-06-11 RX ORDER — OXYBUTYNIN CHLORIDE 15 MG/1
15 TABLET, EXTENDED RELEASE ORAL DAILY
Qty: 30 TABLET | Refills: 11 | Status: SHIPPED | OUTPATIENT
Start: 2019-06-11 | End: 2019-08-15

## 2019-06-11 NOTE — TELEPHONE ENCOUNTER
Please let patient know that I have called in a different medication. This medication frequently causes dry mouth. We need to move her appointment out to one month from now, thank you.

## 2019-08-15 ENCOUNTER — HOSPITAL ENCOUNTER (EMERGENCY)
Age: 69
Discharge: HOME | End: 2019-08-15
Payer: MEDICARE

## 2019-08-15 VITALS
HEART RATE: 61 BPM | OXYGEN SATURATION: 96 % | RESPIRATION RATE: 18 BRPM | BODY MASS INDEX: 40.35 KG/M2 | SYSTOLIC BLOOD PRESSURE: 175 MMHG | DIASTOLIC BLOOD PRESSURE: 79 MMHG | WEIGHT: 250 LBS | TEMPERATURE: 97.8 F

## 2019-08-15 DIAGNOSIS — M54.31 SCIATICA OF RIGHT SIDE: Primary | ICD-10-CM

## 2019-08-15 PROCEDURE — 6360000002 HC RX W HCPCS: Performed by: NURSE PRACTITIONER

## 2019-08-15 PROCEDURE — 99282 EMERGENCY DEPT VISIT SF MDM: CPT

## 2019-08-15 PROCEDURE — 96372 THER/PROPH/DIAG INJ SC/IM: CPT

## 2019-08-15 RX ORDER — HYDROCODONE BITARTRATE AND ACETAMINOPHEN 7.5; 325 MG/1; MG/1
1 TABLET ORAL EVERY 6 HOURS PRN
COMMUNITY

## 2019-08-15 RX ORDER — DEXAMETHASONE SODIUM PHOSPHATE 4 MG/ML
4 INJECTION, SOLUTION INTRA-ARTICULAR; INTRALESIONAL; INTRAMUSCULAR; INTRAVENOUS; SOFT TISSUE ONCE
Status: COMPLETED | OUTPATIENT
Start: 2019-08-15 | End: 2019-08-15

## 2019-08-15 RX ORDER — ORPHENADRINE CITRATE 30 MG/ML
60 INJECTION INTRAMUSCULAR; INTRAVENOUS ONCE
Status: COMPLETED | OUTPATIENT
Start: 2019-08-15 | End: 2019-08-15

## 2019-08-15 RX ORDER — MORPHINE SULFATE 10 MG/ML
4 INJECTION, SOLUTION INTRAMUSCULAR; INTRAVENOUS ONCE
Status: COMPLETED | OUTPATIENT
Start: 2019-08-15 | End: 2019-08-15

## 2019-08-15 RX ORDER — CYCLOBENZAPRINE HCL 5 MG
5 TABLET ORAL 3 TIMES DAILY PRN
COMMUNITY

## 2019-08-15 RX ADMIN — MORPHINE SULFATE 4 MG: 10 INJECTION INTRAVENOUS at 20:53

## 2019-08-15 RX ADMIN — ORPHENADRINE CITRATE 60 MG: 30 INJECTION INTRAMUSCULAR; INTRAVENOUS at 20:53

## 2019-08-15 RX ADMIN — DEXAMETHASONE SODIUM PHOSPHATE 4 MG: 4 INJECTION, SOLUTION INTRAMUSCULAR; INTRAVENOUS at 20:53

## 2019-08-15 ASSESSMENT — ENCOUNTER SYMPTOMS
VOMITING: 0
BACK PAIN: 1
NAUSEA: 0

## 2019-08-15 ASSESSMENT — PAIN SCALES - GENERAL: PAINLEVEL_OUTOF10: 8

## 2019-08-16 NOTE — ED PROVIDER NOTES
Use    Smoking status: Never Smoker    Smokeless tobacco: Never Used   Substance and Sexual Activity    Alcohol use: No    Drug use: No    Sexual activity: None   Lifestyle    Physical activity:     Days per week: None     Minutes per session: None    Stress: None   Relationships    Social connections:     Talks on phone: None     Gets together: None     Attends Confucianist service: None     Active member of club or organization: None     Attends meetings of clubs or organizations: None     Relationship status: None    Intimate partner violence:     Fear of current or ex partner: None     Emotionally abused: None     Physically abused: None     Forced sexual activity: None   Other Topics Concern    None   Social History Narrative    None       SCREENINGS           PHYSICAL EXAM    (up to 7 forlevel 4, 8 or more for level 5)     ED Triage Vitals [08/15/19 1935]   BP Temp Temp src Pulse Resp SpO2 Height Weight   (!) 175/79 97.8 °F (36.6 °C) -- 61 18 96 % -- 250 lb (113.4 kg)       Physical Exam   Constitutional: She is oriented to person, place, and time. She appears well-developed and well-nourished. No distress. Eyes: Pupils are equal, round, and reactive to light. EOM are normal.   Musculoskeletal: Normal range of motion. No evidence of any spinous process tenderness, step-off or deformity. No lumbar erythema, swelling, ecchymosis, abrasion or rash. + 5 Strength and motor function due to the patient's lower extremities bilaterally. Negative straight leg raise bilaterally. Neurovascularly intact bilaterally. Pedal pulses palpable. Achilles and patella tendon 2+. Neurological: She is alert and oriented to person, place, and time.    Mental Status Exam:   Alert and oriented times three, follows commands, speech intact    Cranial [id number]     Cranial nerve II  Visual acuity: normal  Cranial nerve III  Pupils: equal, round, reactive to light  Cranial nerves III, IV, VI  Extraocular Movements: relaxants. She reports her pain is much better. She will follow-up with her primary care or pain management or return back to the ED as we discussed ominous signs that would necessitate returning back to the ED on an urgent basis. PROCEDURES:    Procedures      FINAL IMPRESSION      1.  Sciatica of right side          DISPOSITION/PLAN   DISPOSITION Decision To Discharge 08/15/2019 08:59:27 PM      PATIENT REFERRED TO:  lEza Black MD  [address]  [phone]      As needed, If symptoms worsen    [facility] EMERGENCY DEPT  [facility]  [phone]    As needed, If symptoms worsen      DISCHARGE MEDICATIONS:  Discharge Medication List as of 8/15/2019  9:12 PM          (Please note that portions of this note were completed with a voice recognition program.  Efforts were made to edit the dictations but occasionallywords are mis-transcribed.)    Nataliya Marcus, BRUCE Marcus, BRUCE  08/15/19 [id number]

## 2019-08-17 ENCOUNTER — HOSPITAL ENCOUNTER (EMERGENCY)
Age: 69
Discharge: HOME | End: 2019-08-17
Payer: MEDICARE

## 2019-08-17 VITALS
HEART RATE: 63 BPM | SYSTOLIC BLOOD PRESSURE: 162 MMHG | OXYGEN SATURATION: 94 % | TEMPERATURE: 97.4 F | RESPIRATION RATE: 18 BRPM | DIASTOLIC BLOOD PRESSURE: 79 MMHG

## 2019-08-17 DIAGNOSIS — M54.50 ACUTE EXACERBATION OF CHRONIC LOW BACK PAIN: Primary | ICD-10-CM

## 2019-08-17 DIAGNOSIS — G89.29 ACUTE EXACERBATION OF CHRONIC LOW BACK PAIN: Primary | ICD-10-CM

## 2019-08-17 PROCEDURE — 6360000002 HC RX W HCPCS: Performed by: PHYSICIAN ASSISTANT

## 2019-08-17 PROCEDURE — 96372 THER/PROPH/DIAG INJ SC/IM: CPT

## 2019-08-17 PROCEDURE — 99282 EMERGENCY DEPT VISIT SF MDM: CPT

## 2019-08-17 RX ORDER — PREDNISONE 10 MG/1
10 TABLET ORAL DAILY
Qty: 3 TABLET | Refills: 0 | Status: SHIPPED | OUTPATIENT
Start: 2019-08-17 | End: 2019-08-20

## 2019-08-17 RX ADMIN — HYDROMORPHONE HYDROCHLORIDE 1 MG: 1 INJECTION, SOLUTION INTRAMUSCULAR; INTRAVENOUS; SUBCUTANEOUS at 16:48

## 2019-08-17 ASSESSMENT — ENCOUNTER SYMPTOMS
ABDOMINAL PAIN: 0
PHOTOPHOBIA: 0
SHORTNESS OF BREATH: 0
COLOR CHANGE: 0
APNEA: 0
EYE PAIN: 0
NAUSEA: 0
RHINORRHEA: 0
BACK PAIN: 1
SORE THROAT: 0
COUGH: 0
ABDOMINAL DISTENTION: 0
EYE DISCHARGE: 0

## 2019-08-17 ASSESSMENT — PAIN SCALES - GENERAL
PAINLEVEL_OUTOF10: 10
PAINLEVEL_OUTOF10: 10

## 2019-08-17 NOTE — ED PROVIDER NOTES
[facility]  eMERGENCYdEPARTMENT eNCOUnter      Pt Name: Huang Martini  MRN: [id number]  [place] 1950  Date of evaluation: 8/17/2019  Provider:CONSTANTIN Lee    CHIEF COMPLAINT       Chief Complaint   Patient presents with    Back Pain     sciatica, right side, seen here friday night         HISTORY OF PRESENT ILLNESS  (Location/Symptom, Timing/Onset, Context/Setting, Quality, Duration, Modifying Factors, Severity.)   Huang Martini is a 77 y.o. female who presents to the emergency department with complaints of chronic low back pain this is a flareup that normally she can control but it is persistent. She is followed by pain management she is due for another joint injection in her back on 9 October she denies any new symptoms or injury today pain is 10 out of 10. HPI    Nursing Notes were reviewed and I agree. REVIEW OF SYSTEMS    (2-9 systems for level 4, 10 or more for level 5)     Review of Systems   Constitutional: Negative for activity change, appetite change, chills and fever. HENT: Negative for congestion, postnasal drip, rhinorrhea and sore throat. Eyes: Negative for photophobia, pain, discharge and visual disturbance. Respiratory: Negative for apnea, cough and shortness of breath. Cardiovascular: Negative for chest pain and leg swelling. Gastrointestinal: Negative for abdominal distention, abdominal pain and nausea. Genitourinary: Negative for vaginal bleeding. Musculoskeletal: Positive for back pain and myalgias. Negative for arthralgias, joint swelling, neck pain and neck stiffness. Skin: Negative for color change and rash. Neurological: Negative for dizziness, syncope, facial asymmetry and headaches. Hematological: Negative for adenopathy. Does not bruise/bleed easily. Psychiatric/Behavioral: Negative for agitation, behavioral problems and confusion. Except as noted above the remainder of the review of systems was reviewed and negative.        PAST MEDICAL HISTORY     Past Medical History:   Diagnosis Date    Arthritis     Chronic kidney disease     Hypertension     Leg pain     chronic    Psychiatric problem     Thyroid disease          SURGICAL HISTORY       Past Surgical History:   Procedure Laterality Date    BACK SURGERY      CHOLECYSTECTOMY      EYE SURGERY      HIP SURGERY Right     right hip replacement    KNEE SURGERY      KNEE SURGERY Bilateral     bilateral knee replacement    NECK SURGERY           CURRENT MEDICATIONS       Previous Medications    BISOPROLOL-HYDROCHLOROTHIAZIDE (ZIAC) 5-6.25 MG PER TABLET    Take 1 tablet by mouth daily    CYCLOBENZAPRINE (FLEXERIL) 5 MG TABLET    Take 5 mg by mouth 3 times daily as needed for Muscle spasms    DULOXETINE (CYMBALTA) 60 MG EXTENDED RELEASE CAPSULE    Take 1 capsule by mouth daily    FUROSEMIDE (LASIX) 20 MG TABLET    Take 20 mg by mouth daily as needed     HYDROCODONE-ACETAMINOPHEN (NORCO) 7.5-325 MG PER TABLET    Take 1 tablet by mouth every 6 hours as needed for Pain. LEVOCETIRIZINE (XYZAL) 5 MG TABLET    Take 5 mg by mouth nightly    LEVOTHYROXINE (SYNTHROID) 50 MCG TABLET    Take 50 mcg by mouth Daily    MORPHINE (MSIR) 15 MG TABLET    Take 15 mg by mouth every 4 hours as needed for Pain. PRAMIPEXOLE (MIRAPEX) 0.5 MG TABLET    Take 0.5 mg by mouth 3 times daily       ALLERGIES     Codeine    FAMILY HISTORY     History reviewed. No pertinent family history.        SOCIAL HISTORY       Social History     Socioeconomic History    Marital status:      Spouse name: None    Number of children: None    Years of education: None    Highest education level: None   Occupational History    None   Social Needs    Financial resource strain: None    Food insecurity:     Worry: None     Inability: None    Transportation needs:     Medical: None     Non-medical: None   Tobacco Use    Smoking status: Never Smoker    Smokeless tobacco: Never Used   Substance and Sexual Activity

## 2019-08-18 ENCOUNTER — HOSPITAL ENCOUNTER (EMERGENCY)
Age: 69
Discharge: HOME | End: 2019-08-18
Payer: MEDICARE

## 2019-08-18 VITALS
HEIGHT: 63 IN | OXYGEN SATURATION: 94 % | SYSTOLIC BLOOD PRESSURE: 186 MMHG | TEMPERATURE: 97.4 F | RESPIRATION RATE: 22 BRPM | BODY MASS INDEX: 44.3 KG/M2 | WEIGHT: 250 LBS | HEART RATE: 52 BPM | DIASTOLIC BLOOD PRESSURE: 91 MMHG

## 2019-08-18 DIAGNOSIS — M54.50 ACUTE EXACERBATION OF CHRONIC LOW BACK PAIN: Primary | ICD-10-CM

## 2019-08-18 DIAGNOSIS — G89.29 ACUTE EXACERBATION OF CHRONIC LOW BACK PAIN: Primary | ICD-10-CM

## 2019-08-18 PROCEDURE — 6360000002 HC RX W HCPCS: Performed by: PHYSICIAN ASSISTANT

## 2019-08-18 PROCEDURE — 99282 EMERGENCY DEPT VISIT SF MDM: CPT

## 2019-08-18 PROCEDURE — 6360000002 HC RX W HCPCS: Performed by: EMERGENCY MEDICINE

## 2019-08-18 PROCEDURE — 96372 THER/PROPH/DIAG INJ SC/IM: CPT

## 2019-08-18 RX ORDER — ORPHENADRINE CITRATE 30 MG/ML
60 INJECTION INTRAMUSCULAR; INTRAVENOUS ONCE
Status: COMPLETED | OUTPATIENT
Start: 2019-08-18 | End: 2019-08-18

## 2019-08-18 RX ADMIN — ORPHENADRINE CITRATE 60 MG: 60 INJECTION INTRAMUSCULAR; INTRAVENOUS at 06:16

## 2019-08-18 RX ADMIN — HYDROMORPHONE HYDROCHLORIDE 1 MG: 1 INJECTION, SOLUTION INTRAMUSCULAR; INTRAVENOUS; SUBCUTANEOUS at 07:04

## 2019-08-18 ASSESSMENT — PAIN DESCRIPTION - LOCATION
LOCATION: BACK
LOCATION: BACK

## 2019-08-18 ASSESSMENT — PAIN SCALES - GENERAL
PAINLEVEL_OUTOF10: 6
PAINLEVEL_OUTOF10: 8
PAINLEVEL_OUTOF10: 10

## 2019-08-18 ASSESSMENT — ENCOUNTER SYMPTOMS
APNEA: 0
EYE PAIN: 0
EYE DISCHARGE: 0
COLOR CHANGE: 0
BACK PAIN: 1
PHOTOPHOBIA: 0
SHORTNESS OF BREATH: 0
NAUSEA: 0
ABDOMINAL DISTENTION: 0
RHINORRHEA: 0
ABDOMINAL PAIN: 0
SORE THROAT: 0
COUGH: 0

## 2019-08-18 ASSESSMENT — PAIN DESCRIPTION - PAIN TYPE: TYPE: CHRONIC PAIN

## 2019-08-18 ASSESSMENT — PAIN DESCRIPTION - ORIENTATION: ORIENTATION: LOWER

## 2019-08-18 NOTE — ED NOTES
Obtained verbal order from Dr. Catalina Guy to order norflex for PT.       Migdalia AnsariPenn Highlands Healthcare  08/18/19 1821

## 2019-10-02 ENCOUNTER — HOSPITAL ENCOUNTER (OUTPATIENT)
Dept: WOUND CARE | Age: 69
Discharge: HOME | End: 2019-10-02
Payer: MEDICARE

## 2019-10-02 VITALS
SYSTOLIC BLOOD PRESSURE: 153 MMHG | HEART RATE: 51 BPM | HEIGHT: 63 IN | WEIGHT: 250 LBS | BODY MASS INDEX: 44.3 KG/M2 | RESPIRATION RATE: 20 BRPM | TEMPERATURE: 96.2 F | DIASTOLIC BLOOD PRESSURE: 71 MMHG

## 2019-10-02 DIAGNOSIS — I83.029 VENOUS ULCER OF LEFT LEG (HCC): ICD-10-CM

## 2019-10-02 DIAGNOSIS — L97.929 VENOUS ULCER OF LEFT LEG (HCC): ICD-10-CM

## 2019-10-02 DIAGNOSIS — L97.222 NON-PRESSURE CHRONIC ULCER OF CALF WITH FAT LAYER EXPOSED, LEFT (HCC): Primary | Chronic | ICD-10-CM

## 2019-10-02 PROCEDURE — 99213 OFFICE O/P EST LOW 20 MIN: CPT

## 2019-10-02 PROCEDURE — 99213 OFFICE O/P EST LOW 20 MIN: CPT | Performed by: NURSE PRACTITIONER

## 2019-10-02 ASSESSMENT — ENCOUNTER SYMPTOMS
RESPIRATORY NEGATIVE: 1
EYES NEGATIVE: 1
GASTROINTESTINAL NEGATIVE: 1

## 2019-10-09 ENCOUNTER — HOSPITAL ENCOUNTER (OUTPATIENT)
Dept: NON INVASIVE DIAGNOSTICS | Age: 69
Discharge: HOME | End: 2019-10-09
Payer: MEDICARE

## 2019-10-09 ENCOUNTER — HOSPITAL ENCOUNTER (OUTPATIENT)
Dept: WOUND CARE | Age: 69
Discharge: HOME | End: 2019-10-09
Payer: MEDICARE

## 2019-10-09 VITALS
BODY MASS INDEX: 44.3 KG/M2 | HEART RATE: 54 BPM | TEMPERATURE: 97 F | SYSTOLIC BLOOD PRESSURE: 156 MMHG | WEIGHT: 250 LBS | DIASTOLIC BLOOD PRESSURE: 71 MMHG | RESPIRATION RATE: 16 BRPM | HEIGHT: 63 IN

## 2019-10-09 DIAGNOSIS — L97.929 VENOUS ULCER OF LEFT LEG (HCC): ICD-10-CM

## 2019-10-09 DIAGNOSIS — I83.029 VENOUS ULCER OF LEFT LEG (HCC): ICD-10-CM

## 2019-10-09 DIAGNOSIS — L97.222 NON-PRESSURE CHRONIC ULCER OF CALF WITH FAT LAYER EXPOSED, LEFT (HCC): Chronic | ICD-10-CM

## 2019-10-09 DIAGNOSIS — L97.222 NON-PRESSURE CHRONIC ULCER OF CALF WITH FAT LAYER EXPOSED, LEFT (HCC): Primary | Chronic | ICD-10-CM

## 2019-10-09 PROCEDURE — 97597 DBRDMT OPN WND 1ST 20 CM/<: CPT | Performed by: SURGERY

## 2019-10-09 PROCEDURE — 93923 UPR/LXTR ART STDY 3+ LVLS: CPT

## 2019-10-09 PROCEDURE — 97597 DBRDMT OPN WND 1ST 20 CM/<: CPT

## 2020-02-12 ENCOUNTER — TRANSCRIBE ORDERS (OUTPATIENT)
Dept: ADMINISTRATIVE | Facility: HOSPITAL | Age: 70
End: 2020-02-12

## 2020-02-12 DIAGNOSIS — M25.512 ACUTE PAIN OF LEFT SHOULDER: Primary | ICD-10-CM

## 2020-02-18 ENCOUNTER — APPOINTMENT (OUTPATIENT)
Dept: MRI IMAGING | Facility: HOSPITAL | Age: 70
End: 2020-02-18

## 2020-02-25 ENCOUNTER — APPOINTMENT (OUTPATIENT)
Dept: MRI IMAGING | Facility: HOSPITAL | Age: 70
End: 2020-02-25

## 2020-03-06 ENCOUNTER — HOSPITAL ENCOUNTER (OUTPATIENT)
Dept: MRI IMAGING | Facility: HOSPITAL | Age: 70
Discharge: HOME | End: 2020-03-06
Admitting: ORTHOPAEDIC SURGERY

## 2020-03-06 PROCEDURE — 73221 MRI JOINT UPR EXTREM W/O DYE: CPT

## 2020-05-13 ENCOUNTER — HOSPITAL ENCOUNTER (EMERGENCY)
Age: 70
Discharge: HOME | End: 2020-05-13
Payer: MEDICARE

## 2020-05-13 ENCOUNTER — APPOINTMENT (OUTPATIENT)
Dept: CT IMAGING | Age: 70
End: 2020-05-13
Payer: MEDICARE

## 2020-05-13 VITALS
BODY MASS INDEX: 42.17 KG/M2 | HEART RATE: 76 BPM | WEIGHT: 238 LBS | TEMPERATURE: 99.2 F | HEIGHT: 63 IN | RESPIRATION RATE: 20 BRPM | SYSTOLIC BLOOD PRESSURE: 149 MMHG | OXYGEN SATURATION: 93 % | DIASTOLIC BLOOD PRESSURE: 94 MMHG

## 2020-05-13 PROCEDURE — 99283 EMERGENCY DEPT VISIT LOW MDM: CPT

## 2020-05-13 PROCEDURE — 96372 THER/PROPH/DIAG INJ SC/IM: CPT

## 2020-05-13 PROCEDURE — 6360000002 HC RX W HCPCS: Performed by: PHYSICIAN ASSISTANT

## 2020-05-13 RX ORDER — FLUTICASONE PROPIONATE 50 MCG
1 SPRAY, SUSPENSION (ML) NASAL DAILY
Qty: 1 BOTTLE | Refills: 0 | Status: SHIPPED | OUTPATIENT
Start: 2020-05-13

## 2020-05-13 RX ORDER — METHYLPREDNISOLONE 4 MG/1
TABLET ORAL
Qty: 1 KIT | Refills: 0 | Status: SHIPPED | OUTPATIENT
Start: 2020-05-13 | End: 2020-05-19

## 2020-05-13 RX ORDER — PROCHLORPERAZINE EDISYLATE 5 MG/ML
10 INJECTION INTRAMUSCULAR; INTRAVENOUS ONCE
Status: COMPLETED | OUTPATIENT
Start: 2020-05-13 | End: 2020-05-13

## 2020-05-13 RX ORDER — DIPHENHYDRAMINE HYDROCHLORIDE 50 MG/ML
25 INJECTION INTRAMUSCULAR; INTRAVENOUS ONCE
Status: DISCONTINUED | OUTPATIENT
Start: 2020-05-13 | End: 2020-05-13 | Stop reason: HOSPADM

## 2020-05-13 RX ORDER — AMOXICILLIN AND CLAVULANATE POTASSIUM 875; 125 MG/1; MG/1
1 TABLET, FILM COATED ORAL 2 TIMES DAILY
COMMUNITY
End: 2021-02-11

## 2020-05-13 RX ADMIN — PROCHLORPERAZINE EDISYLATE 10 MG: 5 INJECTION INTRAMUSCULAR; INTRAVENOUS at 18:17

## 2020-05-13 ASSESSMENT — ENCOUNTER SYMPTOMS
SORE THROAT: 0
NAUSEA: 0
ABDOMINAL DISTENTION: 0
APNEA: 0
EYE DISCHARGE: 0
BACK PAIN: 0
COUGH: 0
PHOTOPHOBIA: 0
RHINORRHEA: 0
EYE PAIN: 0
SHORTNESS OF BREATH: 0
ABDOMINAL PAIN: 0
COLOR CHANGE: 0

## 2020-05-13 ASSESSMENT — PAIN SCALES - GENERAL: PAINLEVEL_OUTOF10: 5

## 2020-05-13 ASSESSMENT — PAIN DESCRIPTION - LOCATION: LOCATION: HEAD

## 2020-05-13 NOTE — ED PROVIDER NOTES
remainder of the review of systems was reviewed and negative. PAST MEDICAL HISTORY     Past Medical History:   Diagnosis Date    Arthritis     Chronic kidney disease     Hypertension     Leg pain     chronic    Psychiatric problem     Thyroid disease          SURGICAL HISTORY       Past Surgical History:   Procedure Laterality Date    BACK SURGERY      CHOLECYSTECTOMY      EYE SURGERY      HIP SURGERY Right     right hip replacement    KNEE SURGERY      KNEE SURGERY Bilateral     bilateral knee replacement    NECK SURGERY           CURRENT MEDICATIONS       Discharge Medication List as of 5/13/2020  6:45 PM      CONTINUE these medications which have NOT CHANGED    Details   amoxicillin-clavulanate (AUGMENTIN) 875-125 MG per tablet Take 1 tablet by mouth 2 times dailyHistorical Med      HYDROcodone-acetaminophen (NORCO) 7.5-325 MG per tablet Take 1 tablet by mouth every 6 hours as needed for Pain. Historical Med      cyclobenzaprine (FLEXERIL) 5 MG tablet Take 5 mg by mouth 3 times daily as needed for Muscle spasmsHistorical Med      pramipexole (MIRAPEX) 0.5 MG tablet Take 0.5 mg by mouth 3 times dailyHistorical Med      DULoxetine (CYMBALTA) 60 MG extended release capsule Take 1 capsule by mouth daily, Disp-30 capsule, R-3Normal      furosemide (LASIX) 20 MG tablet Take 20 mg by mouth daily as needed Historical Med      morphine (MSIR) 15 MG tablet Take 15 mg by mouth every 4 hours as needed for Pain. Historical Med      bisoprolol-hydrochlorothiazide (ZIAC) 5-6.25 MG per tablet Take 1 tablet by mouth dailyHistorical Med      levothyroxine (SYNTHROID) 50 MCG tablet Take 50 mcg by mouth DailyHistorical Med             ALLERGIES     Codeine    FAMILY HISTORY     History reviewed. No pertinent family history.        SOCIAL HISTORY       Social History     Socioeconomic History    Marital status:      Spouse name: None    Number of children: None    Years of education: None    Highest mis-transcribed.)    Frandy Charlton [address], [place]  05/13/20 2442

## 2020-05-14 ENCOUNTER — CARE COORDINATION (OUTPATIENT)
Dept: CARE COORDINATION | Age: 70
End: 2020-05-14

## 2020-05-14 NOTE — CARE COORDINATION
cruise travel and non-essential air travel.  Call your healthcare professional if you have concerns about COVID-19 and your underlying condition or if you are sick. For more information on steps you can take to protect yourself, see CDC's How to [id number] [place] [address] for follow-up call in 7-14 days based on severity of symptoms and risk factors.

## 2020-05-26 ENCOUNTER — TRANSCRIBE ORDERS (OUTPATIENT)
Dept: ADMINISTRATIVE | Facility: HOSPITAL | Age: 70
End: 2020-05-26

## 2020-05-26 ENCOUNTER — HOSPITAL ENCOUNTER (OUTPATIENT)
Dept: GENERAL RADIOLOGY | Facility: HOSPITAL | Age: 70
Discharge: HOME | End: 2020-05-26
Admitting: ORTHOPAEDIC SURGERY

## 2020-05-26 ENCOUNTER — APPOINTMENT (OUTPATIENT)
Dept: PREADMISSION TESTING | Facility: HOSPITAL | Age: 70
End: 2020-05-26

## 2020-05-26 ENCOUNTER — CARE COORDINATION (OUTPATIENT)
Dept: CARE COORDINATION | Age: 70
End: 2020-05-26

## 2020-05-26 VITALS
HEART RATE: 55 BPM | SYSTOLIC BLOOD PRESSURE: 141 MMHG | DIASTOLIC BLOOD PRESSURE: 62 MMHG | HEIGHT: 63 IN | WEIGHT: 244.27 LBS | OXYGEN SATURATION: 95 % | BODY MASS INDEX: 43.28 KG/M2 | RESPIRATION RATE: 20 BRPM

## 2020-05-26 DIAGNOSIS — Z01.818 PRE-OP TESTING: ICD-10-CM

## 2020-05-26 DIAGNOSIS — Z01.810 PRE-OPERATIVE CARDIOVASCULAR EXAMINATION: Primary | ICD-10-CM

## 2020-05-26 LAB
ALBUMIN SERPL-MCNC: 4 G/DL (ref 3.5–5.2)
ALBUMIN/GLOB SERPL: 1.3 G/DL
ALP SERPL-CCNC: 125 U/L (ref 39–117)
ALT SERPL W P-5'-P-CCNC: 17 U/L (ref 1–33)
ANION GAP SERPL CALCULATED.3IONS-SCNC: 12 MMOL/L (ref 5–15)
APTT PPP: 27.8 SECONDS (ref 24.1–35)
AST SERPL-CCNC: 35 U/L (ref 1–32)
BILIRUB SERPL-MCNC: 1 MG/DL (ref 0.2–1.2)
BILIRUB UR QL STRIP: NEGATIVE
BUN BLD-MCNC: 9 MG/DL (ref 8–23)
BUN/CREAT SERPL: 13.6 (ref 7–25)
CALCIUM SPEC-SCNC: 9.5 MG/DL (ref 8.6–10.5)
CHLORIDE SERPL-SCNC: 99 MMOL/L (ref 98–107)
CLARITY UR: CLEAR
CLUMPED PLATELETS: PRESENT
CO2 SERPL-SCNC: 30 MMOL/L (ref 22–29)
COLOR UR: ABNORMAL
CREAT BLD-MCNC: 0.66 MG/DL (ref 0.57–1)
DEPRECATED RDW RBC AUTO: 45.6 FL (ref 37–54)
EOSINOPHIL # BLD MANUAL: 0.26 10*3/MM3 (ref 0–0.4)
EOSINOPHIL NFR BLD MANUAL: 5.1 % (ref 0.3–6.2)
ERYTHROCYTE [DISTWIDTH] IN BLOOD BY AUTOMATED COUNT: 13.5 % (ref 12.3–15.4)
GFR SERPL CREATININE-BSD FRML MDRD: 88 ML/MIN/1.73
GLOBULIN UR ELPH-MCNC: 3.2 GM/DL
GLUCOSE BLD-MCNC: 142 MG/DL (ref 65–99)
GLUCOSE UR STRIP-MCNC: NEGATIVE MG/DL
HCT VFR BLD AUTO: 40.5 % (ref 34–46.6)
HGB BLD-MCNC: 13.6 G/DL (ref 12–15.9)
HGB UR QL STRIP.AUTO: NEGATIVE
INR PPP: 1.11 (ref 0.91–1.09)
KETONES UR QL STRIP: NEGATIVE
LEUKOCYTE ESTERASE UR QL STRIP.AUTO: NEGATIVE
LYMPHOCYTES # BLD MANUAL: 1.62 10*3/MM3 (ref 0.7–3.1)
LYMPHOCYTES NFR BLD MANUAL: 12.2 % (ref 5–12)
LYMPHOCYTES NFR BLD MANUAL: 31.6 % (ref 19.6–45.3)
MCH RBC QN AUTO: 30.8 PG (ref 26.6–33)
MCHC RBC AUTO-ENTMCNC: 33.6 G/DL (ref 31.5–35.7)
MCV RBC AUTO: 91.8 FL (ref 79–97)
MONOCYTES # BLD AUTO: 0.63 10*3/MM3 (ref 0.1–0.9)
NEUTROPHILS # BLD AUTO: 2.31 10*3/MM3 (ref 1.7–7)
NEUTROPHILS NFR BLD MANUAL: 43.9 % (ref 42.7–76)
NEUTS BAND NFR BLD MANUAL: 1 % (ref 0–5)
NITRITE UR QL STRIP: NEGATIVE
PH UR STRIP.AUTO: 5.5 [PH] (ref 5–8)
PLATELET # BLD AUTO: 111 10*3/MM3 (ref 140–450)
PMV BLD AUTO: 10.9 FL (ref 6–12)
POTASSIUM BLD-SCNC: 3.3 MMOL/L (ref 3.5–5.2)
PROT SERPL-MCNC: 7.2 G/DL (ref 6–8.5)
PROT UR QL STRIP: NEGATIVE
PROTHROMBIN TIME: 13.9 SECONDS (ref 11.9–14.6)
RBC # BLD AUTO: 4.41 10*6/MM3 (ref 3.77–5.28)
ROULEAUX BLD QL SMEAR: ABNORMAL
SMALL PLATELETS BLD QL SMEAR: ABNORMAL
SODIUM BLD-SCNC: 141 MMOL/L (ref 136–145)
SP GR UR STRIP: 1.01 (ref 1–1.03)
UROBILINOGEN UR QL STRIP: ABNORMAL
VARIANT LYMPHS NFR BLD MANUAL: 6.1 % (ref 0–5)
WBC MORPH BLD: NORMAL
WBC NRBC COR # BLD: 5.14 10*3/MM3 (ref 3.4–10.8)

## 2020-05-26 PROCEDURE — 81003 URINALYSIS AUTO W/O SCOPE: CPT | Performed by: ORTHOPAEDIC SURGERY

## 2020-05-26 PROCEDURE — 36415 COLL VENOUS BLD VENIPUNCTURE: CPT

## 2020-05-26 PROCEDURE — 85730 THROMBOPLASTIN TIME PARTIAL: CPT | Performed by: ORTHOPAEDIC SURGERY

## 2020-05-26 PROCEDURE — 85007 BL SMEAR W/DIFF WBC COUNT: CPT | Performed by: ORTHOPAEDIC SURGERY

## 2020-05-26 PROCEDURE — 71046 X-RAY EXAM CHEST 2 VIEWS: CPT

## 2020-05-26 PROCEDURE — 85025 COMPLETE CBC W/AUTO DIFF WBC: CPT | Performed by: ORTHOPAEDIC SURGERY

## 2020-05-26 PROCEDURE — 85610 PROTHROMBIN TIME: CPT | Performed by: ORTHOPAEDIC SURGERY

## 2020-05-26 PROCEDURE — 80053 COMPREHEN METABOLIC PANEL: CPT | Performed by: ORTHOPAEDIC SURGERY

## 2020-05-26 PROCEDURE — 93010 ELECTROCARDIOGRAM REPORT: CPT | Performed by: INTERNAL MEDICINE

## 2020-05-26 PROCEDURE — 93005 ELECTROCARDIOGRAM TRACING: CPT

## 2020-05-26 RX ORDER — LEVOTHYROXINE SODIUM 0.1 MG/1
100 TABLET ORAL DAILY
COMMUNITY

## 2020-05-26 RX ORDER — HYDROCODONE BITARTRATE AND ACETAMINOPHEN 7.5; 325 MG/1; MG/1
1 TABLET ORAL EVERY 6 HOURS PRN
COMMUNITY
End: 2020-06-02 | Stop reason: HOSPADM

## 2020-05-26 RX ORDER — CYCLOBENZAPRINE HCL 10 MG
10 TABLET ORAL 3 TIMES DAILY PRN
Status: ON HOLD | COMMUNITY
End: 2020-06-01 | Stop reason: SDUPTHER

## 2020-05-26 RX ORDER — MORPHINE SULFATE 15 MG/1
15 TABLET ORAL 3 TIMES DAILY
COMMUNITY

## 2020-05-26 NOTE — DISCHARGE INSTRUCTIONS
DAY OF SURGERY INSTRUCTIONS        YOUR SURGEON: ***LILIANA REZA    PROCEDURE: ***TOTAL SHOULDER REPLACEMENT LEFT    DATE OF SURGERY: ***June 1,2020    ARRIVAL TIME: AS DIRECTED BY OFFICE    YOU MAY TAKE THE FOLLOWING MEDICATION(S) THE MORNING OF SURGERY WITH A SIP OF WATER: ***BISOPROLOL (ZIAC)      ALL OTHER HOME MEDICATION CHECK WITH YOUR PHYSICIAN      DO NOT TAKE ANY ERECTILE DYSFUNCTION MEDICATIONS (EX: CIALIS, VIAGRA) 24 HOURS PRIOR TO SURGERY                      MANAGING PAIN AFTER SURGERY    We know you are probably wondering what your pain will be like after surgery.  Following surgery it is unrealistic to expect you will not have pain.   Pain is how our bodies let us know that something is wrong or cautions us to be careful.  That said, our goal is to make your pain tolerable.    Methods we may use to treat your pain include (oral or IV medications, PCAs, epidurals, nerve blocks, etc.)   While some procedures require IV pain medications for a short time after surgery, transitioning to pain medications by mouth allows for better management of pain.   Your nurse will encourage you to take oral pain medications whenever possible.  IV medications work almost immediately, but only last a short while.  Taking medications by mouth allows for a more constant level of medication in your blood stream for a longer period of time.      Once your pain is out of control it is harder to get back under control.  It is important you are aware when your next dose of pain medication is due.  If you are admitted, your nurse may write the time of your next dose on the white board in your room to help you remember.      We are interested in your pain and encourage you to inform us about aggravating factors during your visit.   Many times a simple repositioning every few hours can make a big difference.    If your physician says it is okay, do not let your pain prevent you from getting out of bed. Be sure to call your  nurse for assistance prior to getting up so you do not fall.      Before surgery, please decide your tolerable pain goal.  These faces help describe the pain ratings we use on a 0-10 scale.   Be prepared to tell us your goal and whether or not you take pain or anxiety medications at home.          BEFORE YOU COME TO THE HOSPITAL  (Pre-op instructions)  • Do not eat, drink, smoke or chew gum after midnight the night before surgery.  This also includes no mints.  • Morning of surgery take only the medicines you have been instructed with a sip of water unless otherwise instructed  by your physician.  • Do not shave, wear makeup or dark nail polish.  • Remove all jewelry including rings.  • Leave anything you consider valuable at home.  • Leave your suitcase in the car until after your surgery.  • Bring the following with you if applicable:  o Picture ID and insurance, Medicare or Medicaid cards  o Co-pay/deductible required by insurance (cash, check, credit card)  o Copy of advance directive, living will or power-of- documents if not brought to PAT  o CPAP or BIPAP mask and tubing  o Relaxation aids ( book, magazine), etc.  o Hearing aids                        ON THE DAY OF SURGERY  · On the day of surgery check in at registration located at the main entrance of the hospital.   ? You will be registered and given a beeper with instructions where to wait in the main lobby.  ? When your beeper lights up and vibrates a member of the Outpatient Surgery staff will meet you at the double doors under the stair steps and escort you to your preoperative room.   · You may have cloth compression devices placed on your legs. These help to prevent blood clots and reduce swelling in your legs.  · An IV may be inserted into one of your veins.  · In the operating room, you may be given one or more of the following:  ? A medicine to help you relax (sedative).  ? A medicine to numb the area (local anesthetic).  ? A medicine to  "make you fall asleep (general anesthetic).  ? A medicine that is injected into an area of your body to numb everything below the injection site (regional anesthetic).  · Your surgical site will be marked or identified.  · You may be given an antibiotic through your IV to help prevent infection.  Contact a health care provider if you:  · Develop a fever of more than 100.4°F (38°C) or other feelings of illness during the 48 hours before your surgery.  · Have symptoms that get worse.  Have questions or concerns about your surgery    General Anesthesia/Surgery, Adult  General anesthesia is the use of medicines to make a person \"go to sleep\" (unconscious) for a medical procedure. General anesthesia must be used for certain procedures, and is often recommended for procedures that:  · Last a long time.  · Require you to be still or in an unusual position.  · Are major and can cause blood loss.  The medicines used for general anesthesia are called general anesthetics. As well as making you unconscious for a certain amount of time, these medicines:  · Prevent pain.  · Control your blood pressure.  · Relax your muscles.  Tell a health care provider about:  · Any allergies you have.  · All medicines you are taking, including vitamins, herbs, eye drops, creams, and over-the-counter medicines.  · Any problems you or family members have had with anesthetic medicines.  · Types of anesthetics you have had in the past.  · Any blood disorders you have.  · Any surgeries you have had.  · Any medical conditions you have.  · Any recent upper respiratory, chest, or ear infections.  · Any history of:  ? Heart or lung conditions, such as heart failure, sleep apnea, asthma, or chronic obstructive pulmonary disease (COPD).  ?  service.  ? Depression or anxiety.  · Any tobacco or drug use, including marijuana or alcohol use.  · Whether you are pregnant or may be pregnant.  What are the risks?  Generally, this is a safe procedure. " However, problems may occur, including:  · Allergic reaction.  · Lung and heart problems.  · Inhaling food or liquid from the stomach into the lungs (aspiration).  · Nerve injury.  · Air in the bloodstream, which can lead to stroke.  · Extreme agitation or confusion (delirium) when you wake up from the anesthetic.  · Waking up during your procedure and being unable to move. This is rare.  These problems are more likely to develop if you are having a major surgery or if you have an advanced or serious medical condition. You can prevent some of these complications by answering all of your health care provider's questions thoroughly and by following all instructions before your procedure.  General anesthesia can cause side effects, including:  · Nausea or vomiting.  · A sore throat from the breathing tube.  · Hoarseness.  · Wheezing or coughing.  · Shaking chills.  · Tiredness.  · Body aches.  · Anxiety.  · Sleepiness or drowsiness.  · Confusion or agitation.  RISKS AND COMPLICATIONS OF SURGERY  Your health care provider will discuss possible risks and complications with you before surgery. Common risks and complications include:    · Problems due to the use of anesthetics.  · Blood loss and replacement (does not apply to minor surgical procedures).  · Temporary increase in pain due to surgery.  · Uncorrected pain or problems that the surgery was meant to correct.  · Infection.  · New damage.    What happens before the procedure?    Medicines  Ask your health care provider about:  · Changing or stopping your regular medicines. This is especially important if you are taking diabetes medicines or blood thinners.  · Taking medicines such as aspirin and ibuprofen. These medicines can thin your blood. Do not take these medicines unless your health care provider tells you to take them.  · Taking over-the-counter medicines, vitamins, herbs, and supplements. Do not take these during the week before your procedure unless your  health care provider approves them.  General instructions  · Starting 3-6 weeks before the procedure, do not use any products that contain nicotine or tobacco, such as cigarettes and e-cigarettes. If you need help quitting, ask your health care provider.  · If you brush your teeth on the morning of the procedure, make sure to spit out all of the toothpaste.  · Tell your health care provider if you become ill or develop a cold, cough, or fever.  · If instructed by your health care provider, bring your sleep apnea device with you on the day of your surgery (if applicable).  · Ask your health care provider if you will be going home the same day, the following day, or after a longer hospital stay.  ? Plan to have someone take you home from the hospital or clinic.  ? Plan to have a responsible adult care for you for at least 24 hours after you leave the hospital or clinic. This is important.  What happens during the procedure?  · You will be given anesthetics through both of the following:  ? A mask placed over your nose and mouth.  ? An IV in one of your veins.  · You may receive a medicine to help you relax (sedative).  · After you are unconscious, a breathing tube may be inserted down your throat to help you breathe. This will be removed before you wake up.  · An anesthesia specialist will stay with you throughout your procedure. He or she will:  ? Keep you comfortable and safe by continuing to give you medicines and adjusting the amount of medicine that you get.  ? Monitor your blood pressure, pulse, and oxygen levels to make sure that the anesthetics do not cause any problems.  The procedure may vary among health care providers and hospitals.  What happens after the procedure?  · Your blood pressure, temperature, heart rate, breathing rate, and blood oxygen level will be monitored until the medicines you were given have worn off.  · You will wake up in a recovery area. You may wake up slowly.  · If you feel anxious  or agitated, you may be given medicine to help you calm down.  · If you will be going home the same day, your health care provider may check to make sure you can walk, drink, and urinate.  · Your health care provider will treat any pain or side effects you have before you go home.  · Do not drive for 24 hours if you were given a sedative.  Summary  · General anesthesia is used to keep you still and prevent pain during a procedure.  · It is important to tell your healthcare provider about your medical history and any surgeries you have had, and previous experience with anesthesia.  · Follow your healthcare provider’s instructions about when to stop eating, drinking, or taking certain medicines before your procedure.  · Plan to have someone take you home from the hospital or clinic.  This information is not intended to replace advice given to you by your health care provider. Make sure you discuss any questions you have with your health care provider.  Document Released: 03/26/2009 Document Revised: 08/03/2018 Document Reviewed: 08/03/2018  [employer] Interactive Patient Education © 2019 [employer]       Fall Prevention in Hospitals, Adult  As a hospital patient, your condition and the treatments you receive can increase your risk for falls. Some additional risk factors for falls in a hospital include:  · Being in an unfamiliar environment.  · Being on bed rest.  · Your surgery.  · Taking certain medicines.  · Your tubing requirements, such as intravenous (IV) therapy or catheters.  It is important that you learn how to decrease fall risks while at the hospital. Below are important tips that can help prevent falls.  SAFETY TIPS FOR PREVENTING FALLS  Talk about your risk of falling.  · Ask your health care provider why you are at risk for falling. Is it your medicine, illness, tubing placement, or something else?  · Make a plan with your health care provider to keep you safe from falls.  · Ask your health care  provider or pharmacist about side effects of your medicines. Some medicines can make you dizzy or affect your coordination.  Ask for help.  · Ask for help before getting out of bed. You may need to press your call button.  · Ask for assistance in getting safely to the toilet.  · Ask for a walker or cane to be put at your bedside. Ask that most of the side rails on your bed be placed up before your health care provider leaves the room.  · Ask family or friends to sit with you.  · Ask for things that are out of your reach, such as your glasses, hearing aids, telephone, bedside table, or call button.  Follow these tips to avoid falling:  · Stay lying or seated, rather than standing, while waiting for help.  · Wear rubber-soled slippers or shoes whenever you walk in the hospital.  · Avoid quick, sudden movements.  ¨ Change positions slowly.  ¨ Sit on the side of your bed before standing.  ¨ Stand up slowly and wait before you start to walk.  · Let your health care provider know if there is a spill on the floor.  · Pay careful attention to the medical equipment, electrical cords, and tubes around you.  · When you need help, use your call button by your bed or in the bathroom. Wait for one of your health care providers to help you.  · If you feel dizzy or unsure of your footing, return to bed and wait for assistance.  · Avoid being distracted by the TV, telephone, or another person in your room.  · Do not lean or support yourself on rolling objects, such as IV poles or bedside tables.     This information is not intended to replace advice given to you by your health care provider. Make sure you discuss any questions you have with your health care provider.     Document Released: 12/15/2001 Document Revised: 01/08/2016 Document Reviewed: 08/25/2013  [employer] Interactive Patient Education ©2016 [employer]       [facility] [employer]% Patient Instruction Sheet    Chlorhexidine Before Surgery  Chlorhexidine  gluconate (CHG) is a germ-killing (antiseptic) solution that is used to clean the skin. It gets rid of the bacteria that normally live on the skin. Cleaning your skin with CHG before surgery helps lower the risk for infection after surgery.    How to use CHG solution  · You will take 2 showers, one shower the night before surgery, the second shower the morning of surgery before coming to the hospital.  · Use CHG only as told by your health care provider, and follow the instructions on the label.  · Use CHG solution while taking a shower. Follow these steps when using CHG solution (unless your health care provider gives you different instructions):  1. Start the shower.  2. Use your normal soap and shampoo to wash your face and hair.  3. Turn off the shower or move out of the shower stream.  4. Pour the CHG onto a clean washcloth. Do not use any type of brush or rough-edged sponge.  5. Starting at your neck, lather your body down to your toes. Make sure you:  6. Pay special attention to the part of your body where you will be having surgery. Scrub this area for at least 1 minute.  7. Use the full amount of CHG as directed. Usually, this is one half bottle for each shower.  8. Do not use CHG on your head or face. If the solution gets into your ears or eyes, rinse them well with water.  9. Avoid your genital area.  10. Avoid any areas of skin that have broken skin, cuts, or scrapes.  11. Scrub your back and under your arms. Make sure to wash skin folds.  12. Let the lather sit on your skin for 1-2 minutes or as long as told by your health care  provider.  13. Thoroughly rinse your entire body in the shower. Make sure that all body creases and crevices are rinsed well.  14. Dry off with a clean towel. Do not put any substances on your body afterward, such as powder, lotion, or perfume.  15. Put on clean clothes or pajamas.  16. If it is the night before your surgery, sleep in clean sheets.    What are the risks?  Risks  of using CHG include:  · A skin reaction.  · Hearing loss, if CHG gets in your ears.  · Eye injury, if CHG gets in your eyes and is not rinsed out.  · The CHG product catching fire.  Make sure that you avoid smoking and flames after applying CHG to your skin.  Do not use CHG:  · If you have a chlorhexidine allergy or have previously reacted to chlorhexidine.  · On babies younger than 2 months of age.      On the day of surgery, when you are taken to your room in Outpatient Surgery you will be given a CHG prepackaged cloth to wipe the site for your surgery.  How to use CHG prepackaged cloths  · Follow the instructions on the label.  · Use the CHG cloth on clean, dry skin. Follow these steps when using a CHG cloth (unless your health care provider gives you different instructions):  1. Using the CHG cloth, vigorously scrub the part of your body where you will be having surgery. Scrub using a back-and-forth motion for 3 minutes. The area on your body should be completely wet with CHG when you are finished scrubbing.  2. Do not rinse. Discard the cloth and let the area air-dry for 1 minute. Do not put any substances on your body afterward, such as powder, lotion, or perfume.  Contact a health care provider if:  · Your skin gets irritated after scrubbing.  · You have questions about using your solution or cloth.  Get help right away if:  · Your eyes become very red or swollen.  · Your eyes itch badly.  · Your skin itches badly and is red or swollen.  · Your hearing changes.  · You have trouble seeing.  · You have swelling or tingling in your mouth or throat.  · You have trouble breathing.  · You swallow any chlorhexidine.  Summary  · Chlorhexidine gluconate (CHG) is a germ-killing (antiseptic) solution that is used to clean the skin. Cleaning your skin with CHG before surgery helps lower the risk for infection after surgery.  · You may be given CHG to use at home. It may be in a bottle or in a prepackaged cloth to use on  your skin. Carefully follow your health care provider's instructions and the instructions on the product label.  · Do not use CHG if you have a chlorhexidine allergy.  · Contact your health care provider if your skin gets irritated after scrubbing.  This information is not intended to replace advice given to you by your health care provider. Make sure you discuss any questions you have with your health care provider.  Document Released: 09/11/2013 Document Revised: 11/15/2018 Document Reviewed: 11/15/2018  [employer] Interactive Patient Education © 2019 [employer]

## 2020-05-29 ENCOUNTER — APPOINTMENT (OUTPATIENT)
Dept: LAB | Facility: HOSPITAL | Age: 70
End: 2020-05-29

## 2020-05-29 PROCEDURE — U0003 INFECTIOUS AGENT DETECTION BY NUCLEIC ACID (DNA OR RNA); SEVERE ACUTE RESPIRATORY SYNDROME CORONAVIRUS 2 (SARS-COV-2) (CORONAVIRUS DISEASE [COVID-19]), AMPLIFIED PROBE TECHNIQUE, MAKING USE OF HIGH THROUGHPUT TECHNOLOGIES AS DESCRIBED BY CMS-2020-01-R: HCPCS | Performed by: ORTHOPAEDIC SURGERY

## 2020-05-30 LAB
COVID LABCORP PRIORITY: NORMAL
SARS-COV-2 RNA RESP QL NAA+PROBE: NOT DETECTED

## 2020-06-01 ENCOUNTER — HOSPITAL ENCOUNTER (INPATIENT)
Facility: HOSPITAL | Age: 70
LOS: 1 days | Discharge: HOME | End: 2020-06-02
Attending: ORTHOPAEDIC SURGERY | Admitting: ORTHOPAEDIC SURGERY

## 2020-06-01 ENCOUNTER — ANESTHESIA (OUTPATIENT)
Dept: PERIOP | Facility: HOSPITAL | Age: 70
End: 2020-06-01

## 2020-06-01 ENCOUNTER — APPOINTMENT (OUTPATIENT)
Dept: GENERAL RADIOLOGY | Facility: HOSPITAL | Age: 70
End: 2020-06-01

## 2020-06-01 ENCOUNTER — ANESTHESIA EVENT (OUTPATIENT)
Dept: PERIOP | Facility: HOSPITAL | Age: 70
End: 2020-06-01

## 2020-06-01 DIAGNOSIS — M19.212 SECONDARY OSTEOARTHRITIS OF LEFT SHOULDER DUE TO ROTATOR CUFF ARTHROPATHY: Primary | ICD-10-CM

## 2020-06-01 DIAGNOSIS — Z78.9 DECREASED ACTIVITIES OF DAILY LIVING (ADL): ICD-10-CM

## 2020-06-01 LAB
ABO GROUP BLD: NORMAL
BLD GP AB SCN SERPL QL: NEGATIVE
RH BLD: POSITIVE
T&S EXPIRATION DATE: NORMAL

## 2020-06-01 PROCEDURE — 25010000003 BUPIVACAINE LIPOSOME 1.3 % SUSPENSION: Performed by: ANESTHESIOLOGY

## 2020-06-01 PROCEDURE — 73020 X-RAY EXAM OF SHOULDER: CPT

## 2020-06-01 PROCEDURE — 25010000002 MIDAZOLAM PER 1 MG: Performed by: ANESTHESIOLOGY

## 2020-06-01 PROCEDURE — 86850 RBC ANTIBODY SCREEN: CPT | Performed by: ORTHOPAEDIC SURGERY

## 2020-06-01 PROCEDURE — 94799 UNLISTED PULMONARY SVC/PX: CPT

## 2020-06-01 PROCEDURE — 25010000002 VANCOMYCIN 1 G RECONSTITUTED SOLUTION: Performed by: ORTHOPAEDIC SURGERY

## 2020-06-01 PROCEDURE — C9290 INJ, BUPIVACAINE LIPOSOME: HCPCS | Performed by: ANESTHESIOLOGY

## 2020-06-01 PROCEDURE — 25010000002 VANCOMYCIN 1 G RECONSTITUTED SOLUTION 1 EACH VIAL: Performed by: ORTHOPAEDIC SURGERY

## 2020-06-01 PROCEDURE — 86900 BLOOD TYPING SEROLOGIC ABO: CPT | Performed by: ORTHOPAEDIC SURGERY

## 2020-06-01 PROCEDURE — 25010000002 ONDANSETRON PER 1 MG: Performed by: NURSE ANESTHETIST, CERTIFIED REGISTERED

## 2020-06-01 PROCEDURE — 0RRK00Z REPLACEMENT OF LEFT SHOULDER JOINT WITH REVERSE BALL AND SOCKET SYNTHETIC SUBSTITUTE, OPEN APPROACH: ICD-10-PCS | Performed by: ORTHOPAEDIC SURGERY

## 2020-06-01 PROCEDURE — 25010000002 DEXAMETHASONE PER 1 MG: Performed by: ANESTHESIOLOGY

## 2020-06-01 PROCEDURE — C1776 JOINT DEVICE (IMPLANTABLE): HCPCS | Performed by: ORTHOPAEDIC SURGERY

## 2020-06-01 PROCEDURE — 86901 BLOOD TYPING SEROLOGIC RH(D): CPT | Performed by: ORTHOPAEDIC SURGERY

## 2020-06-01 PROCEDURE — 25010000002 FENTANYL CITRATE (PF) 100 MCG/2ML SOLUTION: Performed by: NURSE ANESTHETIST, CERTIFIED REGISTERED

## 2020-06-01 PROCEDURE — 25010000002 PROPOFOL 10 MG/ML EMULSION: Performed by: NURSE ANESTHETIST, CERTIFIED REGISTERED

## 2020-06-01 PROCEDURE — 25010000002 FENTANYL CITRATE (PF) 100 MCG/2ML SOLUTION: Performed by: ANESTHESIOLOGY

## 2020-06-01 PROCEDURE — 25010000002 NEOSTIGMINE 10 MG/10ML SOLUTION: Performed by: NURSE ANESTHETIST, CERTIFIED REGISTERED

## 2020-06-01 PROCEDURE — 25010000002 CEFAZOLIN PER 500 MG: Performed by: ORTHOPAEDIC SURGERY

## 2020-06-01 DEVICE — SPACR HUM/SHLDR UNIVERS REVERS TI 36 PLS6: Type: IMPLANTABLE DEVICE | Status: FUNCTIONAL

## 2020-06-01 DEVICE — IMPLANTABLE DEVICE: Type: IMPLANTABLE DEVICE | Status: FUNCTIONAL

## 2020-06-01 DEVICE — GLENOSPHERE UNIVERS REVERS S/36 PLS4 LAT: Type: IMPLANTABLE DEVICE | Status: FUNCTIONAL

## 2020-06-01 DEVICE — STEM HUM/SHLDR UNIVERS REVERS SZ9: Type: IMPLANTABLE DEVICE | Status: FUNCTIONAL

## 2020-06-01 DEVICE — SEAL HEMO SURG ARISTA/AH ABS/PWDR 3GM: Type: IMPLANTABLE DEVICE | Status: FUNCTIONAL

## 2020-06-01 DEVICE — IMPLANTABLE DEVICE: Type: IMPLANTABLE DEVICE | Site: SHOULDER | Status: FUNCTIONAL

## 2020-06-01 DEVICE — CUP SUT UNIVERS REVERS OFFST PLS2 36 LT: Type: IMPLANTABLE DEVICE | Status: FUNCTIONAL

## 2020-06-01 RX ORDER — CYCLOBENZAPRINE HCL 10 MG
10 TABLET ORAL 3 TIMES DAILY PRN
Status: DISCONTINUED | OUTPATIENT
Start: 2020-06-01 | End: 2020-06-02 | Stop reason: HOSPADM

## 2020-06-01 RX ORDER — DULOXETIN HYDROCHLORIDE 30 MG/1
60 CAPSULE, DELAYED RELEASE ORAL DAILY
Status: DISCONTINUED | OUTPATIENT
Start: 2020-06-02 | End: 2020-06-02 | Stop reason: HOSPADM

## 2020-06-01 RX ORDER — SODIUM CHLORIDE 0.9 % (FLUSH) 0.9 %
3 SYRINGE (ML) INJECTION EVERY 12 HOURS SCHEDULED
Status: DISCONTINUED | OUTPATIENT
Start: 2020-06-01 | End: 2020-06-02 | Stop reason: HOSPADM

## 2020-06-01 RX ORDER — BUPIVACAINE HCL/0.9 % NACL/PF 0.1 %
2 PLASTIC BAG, INJECTION (ML) EPIDURAL EVERY 8 HOURS
Status: COMPLETED | OUTPATIENT
Start: 2020-06-01 | End: 2020-06-02

## 2020-06-01 RX ORDER — NALOXONE HCL 0.4 MG/ML
0.4 VIAL (ML) INJECTION AS NEEDED
Status: DISCONTINUED | OUTPATIENT
Start: 2020-06-01 | End: 2020-06-01 | Stop reason: HOSPADM

## 2020-06-01 RX ORDER — HYDROCODONE BITARTRATE AND ACETAMINOPHEN 10; 325 MG/1; MG/1
1 TABLET ORAL EVERY 4 HOURS PRN
Qty: 70 TABLET | Refills: 0 | Status: SHIPPED | OUTPATIENT
Start: 2020-06-01

## 2020-06-01 RX ORDER — ACETAMINOPHEN 650 MG/1
650 SUPPOSITORY RECTAL EVERY 4 HOURS PRN
Status: DISCONTINUED | OUTPATIENT
Start: 2020-06-01 | End: 2020-06-02 | Stop reason: HOSPADM

## 2020-06-01 RX ORDER — SODIUM CHLORIDE, SODIUM LACTATE, POTASSIUM CHLORIDE, CALCIUM CHLORIDE 600; 310; 30; 20 MG/100ML; MG/100ML; MG/100ML; MG/100ML
100 INJECTION, SOLUTION INTRAVENOUS CONTINUOUS
Status: DISCONTINUED | OUTPATIENT
Start: 2020-06-01 | End: 2020-06-01 | Stop reason: HOSPADM

## 2020-06-01 RX ORDER — ACETAMINOPHEN 500 MG
1000 TABLET ORAL ONCE
Status: COMPLETED | OUTPATIENT
Start: 2020-06-01 | End: 2020-06-01

## 2020-06-01 RX ORDER — LEVOTHYROXINE SODIUM 0.1 MG/1
100 TABLET ORAL
Status: DISCONTINUED | OUTPATIENT
Start: 2020-06-02 | End: 2020-06-02 | Stop reason: HOSPADM

## 2020-06-01 RX ORDER — HYDROCODONE BITARTRATE AND ACETAMINOPHEN 7.5; 325 MG/1; MG/1
1 TABLET ORAL EVERY 6 HOURS PRN
Status: DISCONTINUED | OUTPATIENT
Start: 2020-06-01 | End: 2020-06-01 | Stop reason: SDUPTHER

## 2020-06-01 RX ORDER — ASPIRIN 325 MG
325 TABLET, DELAYED RELEASE (ENTERIC COATED) ORAL 2 TIMES DAILY
Qty: 84 TABLET | Refills: 0 | Status: SHIPPED | OUTPATIENT
Start: 2020-06-01 | End: 2020-07-13

## 2020-06-01 RX ORDER — SODIUM CHLORIDE 0.9 % (FLUSH) 0.9 %
3 SYRINGE (ML) INJECTION AS NEEDED
Status: DISCONTINUED | OUTPATIENT
Start: 2020-06-01 | End: 2020-06-01 | Stop reason: HOSPADM

## 2020-06-01 RX ORDER — ONDANSETRON 2 MG/ML
INJECTION INTRAMUSCULAR; INTRAVENOUS AS NEEDED
Status: DISCONTINUED | OUTPATIENT
Start: 2020-06-01 | End: 2020-06-01 | Stop reason: SURG

## 2020-06-01 RX ORDER — ACETAMINOPHEN 325 MG/1
650 TABLET ORAL EVERY 4 HOURS PRN
Status: DISCONTINUED | OUTPATIENT
Start: 2020-06-01 | End: 2020-06-02 | Stop reason: HOSPADM

## 2020-06-01 RX ORDER — ONDANSETRON 4 MG/1
4 TABLET, FILM COATED ORAL EVERY 6 HOURS PRN
Status: DISCONTINUED | OUTPATIENT
Start: 2020-06-01 | End: 2020-06-02 | Stop reason: HOSPADM

## 2020-06-01 RX ORDER — LIDOCAINE HYDROCHLORIDE 10 MG/ML
0.5 INJECTION, SOLUTION EPIDURAL; INFILTRATION; INTRACAUDAL; PERINEURAL ONCE AS NEEDED
Status: DISCONTINUED | OUTPATIENT
Start: 2020-06-01 | End: 2020-06-01 | Stop reason: HOSPADM

## 2020-06-01 RX ORDER — FUROSEMIDE 40 MG/1
40 TABLET ORAL DAILY PRN
Status: DISCONTINUED | OUTPATIENT
Start: 2020-06-01 | End: 2020-06-02 | Stop reason: HOSPADM

## 2020-06-01 RX ORDER — FENTANYL CITRATE 50 UG/ML
25 INJECTION, SOLUTION INTRAMUSCULAR; INTRAVENOUS
Status: DISCONTINUED | OUTPATIENT
Start: 2020-06-01 | End: 2020-06-01 | Stop reason: HOSPADM

## 2020-06-01 RX ORDER — BISACODYL 5 MG/1
10 TABLET, DELAYED RELEASE ORAL DAILY PRN
Status: DISCONTINUED | OUTPATIENT
Start: 2020-06-01 | End: 2020-06-02 | Stop reason: HOSPADM

## 2020-06-01 RX ORDER — BUPIVACAINE HCL/0.9 % NACL/PF 0.1 %
2 PLASTIC BAG, INJECTION (ML) EPIDURAL ONCE
Status: COMPLETED | OUTPATIENT
Start: 2020-06-01 | End: 2020-06-01

## 2020-06-01 RX ORDER — CYCLOBENZAPRINE HCL 10 MG
10 TABLET ORAL 3 TIMES DAILY PRN
Qty: 30 TABLET | Refills: 0 | Status: SHIPPED | OUTPATIENT
Start: 2020-06-01

## 2020-06-01 RX ORDER — FLUMAZENIL 0.1 MG/ML
0.2 INJECTION INTRAVENOUS AS NEEDED
Status: DISCONTINUED | OUTPATIENT
Start: 2020-06-01 | End: 2020-06-01 | Stop reason: HOSPADM

## 2020-06-01 RX ORDER — NEOSTIGMINE METHYLSULFATE 1 MG/ML
INJECTION, SOLUTION INTRAVENOUS AS NEEDED
Status: DISCONTINUED | OUTPATIENT
Start: 2020-06-01 | End: 2020-06-01 | Stop reason: SURG

## 2020-06-01 RX ORDER — SODIUM CHLORIDE 0.9 % (FLUSH) 0.9 %
3 SYRINGE (ML) INJECTION EVERY 12 HOURS SCHEDULED
Status: DISCONTINUED | OUTPATIENT
Start: 2020-06-01 | End: 2020-06-01 | Stop reason: HOSPADM

## 2020-06-01 RX ORDER — DEXAMETHASONE SODIUM PHOSPHATE 4 MG/ML
4 INJECTION, SOLUTION INTRA-ARTICULAR; INTRALESIONAL; INTRAMUSCULAR; INTRAVENOUS; SOFT TISSUE ONCE AS NEEDED
Status: COMPLETED | OUTPATIENT
Start: 2020-06-01 | End: 2020-06-01

## 2020-06-01 RX ORDER — DOCUSATE SODIUM 100 MG/1
100 CAPSULE, LIQUID FILLED ORAL 2 TIMES DAILY
Qty: 60 CAPSULE | Refills: 1 | Status: SHIPPED | OUTPATIENT
Start: 2020-06-01

## 2020-06-01 RX ORDER — TRANEXAMIC ACID 100 MG/ML
INJECTION, SOLUTION INTRAVENOUS AS NEEDED
Status: DISCONTINUED | OUTPATIENT
Start: 2020-06-01 | End: 2020-06-01 | Stop reason: SURG

## 2020-06-01 RX ORDER — DOCUSATE SODIUM 100 MG/1
100 CAPSULE, LIQUID FILLED ORAL 2 TIMES DAILY
Status: DISCONTINUED | OUTPATIENT
Start: 2020-06-01 | End: 2020-06-02 | Stop reason: HOSPADM

## 2020-06-01 RX ORDER — MIDAZOLAM HYDROCHLORIDE 1 MG/ML
1 INJECTION INTRAMUSCULAR; INTRAVENOUS
Status: DISCONTINUED | OUTPATIENT
Start: 2020-06-01 | End: 2020-06-01 | Stop reason: HOSPADM

## 2020-06-01 RX ORDER — PROPOFOL 10 MG/ML
VIAL (ML) INTRAVENOUS AS NEEDED
Status: DISCONTINUED | OUTPATIENT
Start: 2020-06-01 | End: 2020-06-01 | Stop reason: SURG

## 2020-06-01 RX ORDER — ASPIRIN 325 MG
325 TABLET, DELAYED RELEASE (ENTERIC COATED) ORAL EVERY 12 HOURS SCHEDULED
Status: DISCONTINUED | OUTPATIENT
Start: 2020-06-02 | End: 2020-06-02 | Stop reason: HOSPADM

## 2020-06-01 RX ORDER — ONDANSETRON 4 MG/1
4 TABLET, FILM COATED ORAL EVERY 8 HOURS PRN
Qty: 20 TABLET | Refills: 1 | Status: SHIPPED | OUTPATIENT
Start: 2020-06-01

## 2020-06-01 RX ORDER — BISACODYL 10 MG
10 SUPPOSITORY, RECTAL RECTAL DAILY PRN
Status: DISCONTINUED | OUTPATIENT
Start: 2020-06-01 | End: 2020-06-02 | Stop reason: HOSPADM

## 2020-06-01 RX ORDER — SODIUM CHLORIDE 0.9 % (FLUSH) 0.9 %
10 SYRINGE (ML) INJECTION AS NEEDED
Status: DISCONTINUED | OUTPATIENT
Start: 2020-06-01 | End: 2020-06-02 | Stop reason: HOSPADM

## 2020-06-01 RX ORDER — SODIUM CHLORIDE 0.9 % (FLUSH) 0.9 %
3-10 SYRINGE (ML) INJECTION AS NEEDED
Status: DISCONTINUED | OUTPATIENT
Start: 2020-06-01 | End: 2020-06-01 | Stop reason: HOSPADM

## 2020-06-01 RX ORDER — NALOXONE HCL 0.4 MG/ML
0.4 VIAL (ML) INJECTION
Status: DISCONTINUED | OUTPATIENT
Start: 2020-06-01 | End: 2020-06-02 | Stop reason: HOSPADM

## 2020-06-01 RX ORDER — ONDANSETRON 2 MG/ML
4 INJECTION INTRAMUSCULAR; INTRAVENOUS EVERY 6 HOURS PRN
Status: DISCONTINUED | OUTPATIENT
Start: 2020-06-01 | End: 2020-06-02 | Stop reason: HOSPADM

## 2020-06-01 RX ORDER — LABETALOL HYDROCHLORIDE 5 MG/ML
5 INJECTION, SOLUTION INTRAVENOUS
Status: DISCONTINUED | OUTPATIENT
Start: 2020-06-01 | End: 2020-06-01 | Stop reason: HOSPADM

## 2020-06-01 RX ORDER — MAGNESIUM HYDROXIDE 1200 MG/15ML
LIQUID ORAL AS NEEDED
Status: DISCONTINUED | OUTPATIENT
Start: 2020-06-01 | End: 2020-06-01 | Stop reason: HOSPADM

## 2020-06-01 RX ORDER — ROCURONIUM BROMIDE 10 MG/ML
INJECTION, SOLUTION INTRAVENOUS AS NEEDED
Status: DISCONTINUED | OUTPATIENT
Start: 2020-06-01 | End: 2020-06-01 | Stop reason: SURG

## 2020-06-01 RX ORDER — MORPHINE SULFATE 15 MG/1
15 TABLET ORAL 3 TIMES DAILY
Status: DISCONTINUED | OUTPATIENT
Start: 2020-06-01 | End: 2020-06-02 | Stop reason: HOSPADM

## 2020-06-01 RX ORDER — OXYCODONE AND ACETAMINOPHEN 10; 325 MG/1; MG/1
1 TABLET ORAL EVERY 4 HOURS PRN
Status: DISCONTINUED | OUTPATIENT
Start: 2020-06-01 | End: 2020-06-02 | Stop reason: HOSPADM

## 2020-06-01 RX ORDER — BUPIVACAINE HYDROCHLORIDE 5 MG/ML
INJECTION, SOLUTION EPIDURAL; INTRACAUDAL
Status: COMPLETED | OUTPATIENT
Start: 2020-06-01 | End: 2020-06-01

## 2020-06-01 RX ORDER — SODIUM CHLORIDE, SODIUM LACTATE, POTASSIUM CHLORIDE, CALCIUM CHLORIDE 600; 310; 30; 20 MG/100ML; MG/100ML; MG/100ML; MG/100ML
1000 INJECTION, SOLUTION INTRAVENOUS CONTINUOUS
Status: DISCONTINUED | OUTPATIENT
Start: 2020-06-01 | End: 2020-06-01 | Stop reason: HOSPADM

## 2020-06-01 RX ORDER — EPHEDRINE SULFATE 50 MG/ML
INJECTION INTRAVENOUS AS NEEDED
Status: DISCONTINUED | OUTPATIENT
Start: 2020-06-01 | End: 2020-06-01 | Stop reason: SURG

## 2020-06-01 RX ORDER — OXYCODONE HYDROCHLORIDE 5 MG/1
10 TABLET ORAL EVERY 4 HOURS PRN
Status: DISCONTINUED | OUTPATIENT
Start: 2020-06-01 | End: 2020-06-02 | Stop reason: HOSPADM

## 2020-06-01 RX ORDER — OXYCODONE AND ACETAMINOPHEN 10; 325 MG/1; MG/1
1 TABLET ORAL ONCE AS NEEDED
Status: DISCONTINUED | OUTPATIENT
Start: 2020-06-01 | End: 2020-06-01 | Stop reason: HOSPADM

## 2020-06-01 RX ORDER — OXYCODONE AND ACETAMINOPHEN 7.5; 325 MG/1; MG/1
2 TABLET ORAL EVERY 4 HOURS PRN
Status: DISCONTINUED | OUTPATIENT
Start: 2020-06-01 | End: 2020-06-01 | Stop reason: HOSPADM

## 2020-06-01 RX ORDER — NALOXONE HCL 0.4 MG/ML
0.1 VIAL (ML) INJECTION
Status: DISCONTINUED | OUTPATIENT
Start: 2020-06-01 | End: 2020-06-02 | Stop reason: HOSPADM

## 2020-06-01 RX ORDER — HYDROMORPHONE HYDROCHLORIDE 1 MG/ML
0.5 INJECTION, SOLUTION INTRAMUSCULAR; INTRAVENOUS; SUBCUTANEOUS
Status: DISCONTINUED | OUTPATIENT
Start: 2020-06-01 | End: 2020-06-02 | Stop reason: HOSPADM

## 2020-06-01 RX ORDER — ONDANSETRON 2 MG/ML
4 INJECTION INTRAMUSCULAR; INTRAVENOUS ONCE AS NEEDED
Status: DISCONTINUED | OUTPATIENT
Start: 2020-06-01 | End: 2020-06-01 | Stop reason: HOSPADM

## 2020-06-01 RX ORDER — FENTANYL CITRATE 50 UG/ML
INJECTION, SOLUTION INTRAMUSCULAR; INTRAVENOUS AS NEEDED
Status: DISCONTINUED | OUTPATIENT
Start: 2020-06-01 | End: 2020-06-01 | Stop reason: SURG

## 2020-06-01 RX ORDER — VANCOMYCIN HYDROCHLORIDE 1 G/20ML
INJECTION, POWDER, LYOPHILIZED, FOR SOLUTION INTRAVENOUS AS NEEDED
Status: DISCONTINUED | OUTPATIENT
Start: 2020-06-01 | End: 2020-06-01 | Stop reason: HOSPADM

## 2020-06-01 RX ORDER — GLYCOPYRROLATE 0.2 MG/ML
INJECTION INTRAMUSCULAR; INTRAVENOUS AS NEEDED
Status: DISCONTINUED | OUTPATIENT
Start: 2020-06-01 | End: 2020-06-01 | Stop reason: SURG

## 2020-06-01 RX ORDER — MIDAZOLAM HYDROCHLORIDE 1 MG/ML
2 INJECTION INTRAMUSCULAR; INTRAVENOUS
Status: DISCONTINUED | OUTPATIENT
Start: 2020-06-01 | End: 2020-06-01 | Stop reason: HOSPADM

## 2020-06-01 RX ADMIN — ONDANSETRON HYDROCHLORIDE 4 MG: 2 SOLUTION INTRAMUSCULAR; INTRAVENOUS at 15:57

## 2020-06-01 RX ADMIN — ROCURONIUM BROMIDE 40 MG: 10 INJECTION INTRAVENOUS at 13:15

## 2020-06-01 RX ADMIN — EPHEDRINE SULFATE 25 MG: 50 INJECTION INTRAVENOUS at 13:53

## 2020-06-01 RX ADMIN — BUPIVACAINE HYDROCHLORIDE 10 ML: 5 INJECTION, SOLUTION EPIDURAL; INTRACAUDAL; PERINEURAL at 11:39

## 2020-06-01 RX ADMIN — ACETAMINOPHEN 1000 MG: 500 TABLET, FILM COATED ORAL at 11:31

## 2020-06-01 RX ADMIN — SODIUM CHLORIDE, POTASSIUM CHLORIDE, SODIUM LACTATE AND CALCIUM CHLORIDE 1000 ML: 600; 310; 30; 20 INJECTION, SOLUTION INTRAVENOUS at 11:01

## 2020-06-01 RX ADMIN — OXYCODONE HYDROCHLORIDE AND ACETAMINOPHEN 1 TABLET: 10; 325 TABLET ORAL at 17:54

## 2020-06-01 RX ADMIN — BUPIVACAINE 10 ML: 13.3 INJECTION, SUSPENSION, LIPOSOMAL INFILTRATION at 11:39

## 2020-06-01 RX ADMIN — PROPOFOL 150 MG: 10 INJECTION, EMULSION INTRAVENOUS at 13:15

## 2020-06-01 RX ADMIN — DEXAMETHASONE SODIUM PHOSPHATE 4 MG: 4 INJECTION, SOLUTION INTRAMUSCULAR; INTRAVENOUS at 11:31

## 2020-06-01 RX ADMIN — MIDAZOLAM 1 MG: 1 INJECTION INTRAMUSCULAR; INTRAVENOUS at 11:34

## 2020-06-01 RX ADMIN — FENTANYL CITRATE 50 MCG: 50 INJECTION, SOLUTION INTRAMUSCULAR; INTRAVENOUS at 11:34

## 2020-06-01 RX ADMIN — TRANEXAMIC ACID 1000 MG: 100 INJECTION, SOLUTION INTRAVENOUS at 13:21

## 2020-06-01 RX ADMIN — MORPHINE SULFATE 15 MG: 15 TABLET ORAL at 21:01

## 2020-06-01 RX ADMIN — GLYCOPYRROLATE 0.6 MG: 0.2 INJECTION, SOLUTION INTRAMUSCULAR; INTRAVENOUS at 15:49

## 2020-06-01 RX ADMIN — PROPOFOL 50 MG: 10 INJECTION, EMULSION INTRAVENOUS at 14:36

## 2020-06-01 RX ADMIN — Medication 2 G: at 13:15

## 2020-06-01 RX ADMIN — TRANEXAMIC ACID 1000 MG: 100 INJECTION, SOLUTION INTRAVENOUS at 15:49

## 2020-06-01 RX ADMIN — PROPOFOL 100 MG: 10 INJECTION, EMULSION INTRAVENOUS at 15:59

## 2020-06-01 RX ADMIN — SODIUM CHLORIDE 2 G: 9 INJECTION, SOLUTION INTRAVENOUS at 21:01

## 2020-06-01 RX ADMIN — NEOSTIGMINE METHYLSULFATE 4 MG: 1 INJECTION INTRAVENOUS at 15:49

## 2020-06-01 RX ADMIN — DOCUSATE SODIUM 100 MG: 100 CAPSULE ORAL at 21:01

## 2020-06-01 RX ADMIN — FENTANYL CITRATE 100 MCG: 50 INJECTION, SOLUTION INTRAMUSCULAR; INTRAVENOUS at 14:36

## 2020-06-01 RX ADMIN — SODIUM CHLORIDE, PRESERVATIVE FREE 3 ML: 5 INJECTION INTRAVENOUS at 21:01

## 2020-06-01 NOTE — ANESTHESIA PREPROCEDURE EVALUATION
Anesthesia Evaluation     Patient summary reviewed   no history of anesthetic complications:  NPO Solid Status: > 8 hours  NPO Liquid Status: > 8 hours           Airway   Mallampati: II  TM distance: >3 FB  No difficulty expected  Dental    (+) edentulous    Pulmonary - negative pulmonary ROS   Cardiovascular   Exercise tolerance: (Denies chest pain with exertion)    ECG reviewed    (+) hypertension,   (-) past MI, CAD, dysrhythmias, cardiac stents      Neuro/Psych  (+) psychiatric history Depression,     GI/Hepatic/Renal/Endo    (+) morbid obesity,  thyroid problem hypothyroidism  (-) liver disease, no renal disease, diabetes    Musculoskeletal     (+) back pain, chronic pain,   Abdominal    Substance History      OB/GYN          Other                        Anesthesia Plan    ASA 3     general with block     intravenous induction     Anesthetic plan, all risks, benefits, and alternatives have been provided, discussed and informed consent has been obtained with: patient.

## 2020-06-01 NOTE — ANESTHESIA POSTPROCEDURE EVALUATION
Patient: Brenda Sneed    Procedure Summary     Date:  06/01/20 Room / Location:  [facility] OR [address] [place] [address] PAD OR    Anesthesia Start:  1312 Anesthesia Stop:  1615    Procedure:  LEFT REVERSE TOTAL SHOULDER REPLACEMENT (Left Shoulder) Diagnosis:  (LEFT SHOULDER ARTHRITIS)    Surgeon:  Saravanan Calvin MD Provider:  DA Mckinney CRNA    Anesthesia Type:  general with block ASA Status:  3          Anesthesia Type: general with block    Vitals  Vitals Value Taken Time   /64 6/1/2020  4:40 PM   Temp 98.2 °F (36.8 °C) 6/1/2020  4:35 PM   Pulse 72 6/1/2020  4:45 PM   Resp 14 6/1/2020  4:45 PM   SpO2 96 % 6/1/2020  4:45 PM           Post Anesthesia Care and Evaluation    Patient location during evaluation: PACU  Patient participation: complete - patient participated  Level of consciousness: awake and alert  Pain management: adequate  Airway patency: patent  Anesthetic complications: No anesthetic complications  PONV Status: none  Cardiovascular status: acceptable and hemodynamically stable  Respiratory status: acceptable  Hydration status: acceptable    Comments: Blood pressure 118/55, pulse 76, temperature 98.3 °F (36.8 °C), temperature source Oral, resp. rate 16, SpO2 97 %, not currently breastfeeding.    Patient discharged from PACU based upon Hanh score. Please see RN notes for further details

## 2020-06-01 NOTE — ANESTHESIA PROCEDURE NOTES
Peripheral Block    Pre-sedation assessment completed: 6/1/2020 11:34 AM    Patient reassessed immediately prior to procedure    Patient location during procedure: holding area  Start time: 6/1/2020 11:37 AM  Stop time: 6/1/2020 11:39 AM  Reason for block: procedure for pain, at surgeon's request, post-op pain management and Request by Dr. Calvin  Performed by  Anesthesiologist: Mellissa Jon MD  Preanesthetic Checklist  Completed: patient identified, site marked, surgical consent, pre-op evaluation, timeout performed, IV checked, risks and benefits discussed and monitors and equipment checked  Prep:  Pt Position: supine  Sterile barriers:gloves  Prep: ChloraPrep  Patient monitoring: blood pressure monitoring, continuous pulse oximetry and EKG  Procedure  Sedation:yes    Guidance:ultrasound guided and Brachial plexus identified and local anesthetic seen surrounding nerves  ULTRASOUND INTERPRETATION. Using ultrasound guidance a 20 G gauge needle was placed in close proximity to the nerve, at which point, under ultrasound guidance anesthetic was injected in the area of the nerve and spread of the anesthesia was seen on ultrasound in close proximity thereto.  There were no abnormalities seen on ultrasound; a digital image was taken; and the patient tolerated the procedure with no complications. Images:still images obtained (picture printed and placed in patients chart)    Laterality:left  Block Type:interscalene  Injection Technique:single-shot  Needle Type:echogenic  Needle Gauge:20 G  Resistance on Injection: none    Medications Used: bupivacaine liposome (EXPAREL) 1.3 % injection, 10 mL  bupivacaine PF (MARCAINE) injection 0.5%, 10 mL  Med admintered at 6/1/2020 11:39 AM      Post Assessment  Injection Assessment: negative aspiration for heme, no paresthesia on injection and incremental injection  Patient Tolerance:comfortable throughout block  Complications:no

## 2020-06-01 NOTE — ANESTHESIA PROCEDURE NOTES
Airway  Urgency: elective    Date/Time: 6/1/2020 1:16 PM  Airway not difficult    General Information and Staff    Patient location during procedure: OR  CRNA: DA Mckinney CRNA    Indications and Patient Condition  Indications for airway management: airway protection    Preoxygenated: yes  MILS maintained throughout  Mask difficulty assessment: 0 - not attempted    Final Airway Details  Final airway type: endotracheal airway      Successful airway: ETT  Cuffed: yes   Successful intubation technique: direct laryngoscopy  Facilitating devices/methods: intubating stylet  Endotracheal tube insertion site: oral  Blade: Cobos  Blade size: 2  ETT size (mm): 7.5  Cormack-Lehane Classification: grade I - full view of glottis  Placement verified by: chest auscultation and capnometry   Cuff volume (mL): 6  Measured from: lips  ETT/EBT  to lips (cm): 21  Number of attempts at approach: 1  Assessment: lips, teeth, and gum same as pre-op and atraumatic intubation

## 2020-06-02 VITALS
OXYGEN SATURATION: 92 % | SYSTOLIC BLOOD PRESSURE: 129 MMHG | RESPIRATION RATE: 18 BRPM | HEART RATE: 96 BPM | TEMPERATURE: 97.5 F | DIASTOLIC BLOOD PRESSURE: 87 MMHG

## 2020-06-02 LAB
ANION GAP SERPL CALCULATED.3IONS-SCNC: 11 MMOL/L (ref 5–15)
BUN BLD-MCNC: 13 MG/DL (ref 8–23)
BUN/CREAT SERPL: 17.6 (ref 7–25)
CALCIUM SPEC-SCNC: 8.9 MG/DL (ref 8.6–10.5)
CHLORIDE SERPL-SCNC: 101 MMOL/L (ref 98–107)
CO2 SERPL-SCNC: 27 MMOL/L (ref 22–29)
CREAT BLD-MCNC: 0.74 MG/DL (ref 0.57–1)
GFR SERPL CREATININE-BSD FRML MDRD: 77 ML/MIN/1.73
GLUCOSE BLD-MCNC: 142 MG/DL (ref 65–99)
HCT VFR BLD AUTO: 24.6 % (ref 34–46.6)
HGB BLD-MCNC: 10.7 G/DL (ref 12–15.9)
POTASSIUM BLD-SCNC: 4.4 MMOL/L (ref 3.5–5.2)
SODIUM BLD-SCNC: 139 MMOL/L (ref 136–145)

## 2020-06-02 PROCEDURE — 85014 HEMATOCRIT: CPT | Performed by: ORTHOPAEDIC SURGERY

## 2020-06-02 PROCEDURE — 25010000002 CEFAZOLIN PER 500 MG: Performed by: ORTHOPAEDIC SURGERY

## 2020-06-02 PROCEDURE — 97166 OT EVAL MOD COMPLEX 45 MIN: CPT

## 2020-06-02 PROCEDURE — 80048 BASIC METABOLIC PNL TOTAL CA: CPT | Performed by: ORTHOPAEDIC SURGERY

## 2020-06-02 PROCEDURE — 85018 HEMOGLOBIN: CPT | Performed by: ORTHOPAEDIC SURGERY

## 2020-06-02 RX ADMIN — POLYETHYLENE GLYCOL (3350) 17 G: 17 POWDER, FOR SOLUTION ORAL at 09:00

## 2020-06-02 RX ADMIN — SODIUM CHLORIDE, PRESERVATIVE FREE 3 ML: 5 INJECTION INTRAVENOUS at 09:03

## 2020-06-02 RX ADMIN — CYCLOBENZAPRINE 10 MG: 10 TABLET, FILM COATED ORAL at 07:48

## 2020-06-02 RX ADMIN — LEVOTHYROXINE SODIUM 100 MCG: 100 TABLET ORAL at 05:55

## 2020-06-02 RX ADMIN — ASPIRIN 325 MG: 325 TABLET, COATED ORAL at 09:02

## 2020-06-02 RX ADMIN — OXYCODONE HYDROCHLORIDE AND ACETAMINOPHEN 1 TABLET: 10; 325 TABLET ORAL at 07:48

## 2020-06-02 RX ADMIN — DOCUSATE SODIUM 100 MG: 100 CAPSULE ORAL at 09:00

## 2020-06-02 RX ADMIN — MORPHINE SULFATE 15 MG: 15 TABLET ORAL at 09:00

## 2020-06-02 RX ADMIN — SODIUM CHLORIDE 2 G: 9 INJECTION, SOLUTION INTRAVENOUS at 05:55

## 2020-06-02 RX ADMIN — DULOXETINE HYDROCHLORIDE 60 MG: 30 CAPSULE, DELAYED RELEASE ORAL at 09:01

## 2020-06-02 RX ADMIN — OXYCODONE HYDROCHLORIDE AND ACETAMINOPHEN 1 TABLET: 10; 325 TABLET ORAL at 02:16

## 2020-07-23 NOTE — THERAPY TREATMENT NOTE
Acute Care - Occupational Therapy Treatment Note  [facility]     Patient Name: Brenda Sneed  : 1950  MRN: [id number]  Today's Date: 8/3/2017  Onset of Illness/Injury or Date of Surgery Date: 17  Date of Referral to OT: 17  Referring Physician: Dr. Wright      Admit Date: 2017    Visit Dx:     ICD-10-CM ICD-9-CM   1. Decreased activities of daily living (ADL) Z78.9 V49.89   2. Impaired functional mobility, balance, gait, and endurance Z74.09 V49.89     Patient Active Problem List   Diagnosis   • Spinal stenosis, lumbar             Adult Rehabilitation Note       17 1358 17 1055 17 0947    Rehab Assessment/Intervention    Discipline occupational therapy assistant  -TS physical therapy assistant  -LG physical therapy assistant  -LG    Document Type therapy note (daily note)  -TS therapy note (daily note)  -LG     Subjective Information agree to therapy;no complaints  -TS agree to therapy;no complaints  -LG     Treatment Not Performed   patient unavailable for treatment  -LG    Treatment Not Performed, Comment   pt with [employer], asked to check back.   -LG    Precautions/Limitations fall precautions;brace on when up;spinal precautions  -TS fall precautions;brace on when up  -LG     Recorded by [TS] ALFREDO Weinstein/MAYLIN [LG] Jb Srinivasan PTA [LG] Jb Srinivasan PTA    Pain Assessment    Pain Assessment No/denies pain  -TS 0-10  -LG     Pain Score  2  -LG     Post Pain Score  2  -LG     Pain Type  Acute pain  -LG     Pain Location  Back  -LG     Pain Orientation  Left  -LG     Pain Descriptors  Aching  -LG     Pain Frequency  Constant/continuous  -LG     Pain Intervention(s)  Medication (See MAR);Repositioned;Ambulation/increased activity  -LG     Response to Interventions  tolerated  -LG     Recorded by [TS] ALFREDO Weinstein/L [LG] Jb Srinivasan PTA     Cognitive Assessment/Intervention    Personal Safety Interventions fall prevention program  Past Medical History:   Diagnosis Date   • IBS (irritable bowel syndrome)    • Migraine    .  Past Surgical History:   Procedure Laterality Date   • No past surgeries     .  Current Facility-Administered Medications   Medication Dose Route Frequency Provider Last Rate Last Dose   • oxytocin (PITOCIN) 30 Units in sodium chloride 0.9% 500 mL  0-334 mL/hr Intravenous Continuous Pan Presley MD       • acetaminophen (TYLENOL) tablet 650 mg  650 mg Oral Q6H Pan Presley MD   650 mg at 20 0242   • ibuprofen (MOTRIN) tablet 600 mg  600 mg Oral Q6H Pan Presley MD   600 mg at 20 0242   • benzocaine/menthol (DERMOPLAST) 20-0.5 % topical spray 1 spray  1 spray Topical Q1H PRN Pan Presley MD       • hydroCORTisone (ANUSOL-HC) suppository 25 mg  25 mg Rectal Q8H PRN Pan Presley MD       • hydroCORTisone-pramoxine (ANALPRAM-HC) 2.5-1 % rectal cream 1 application  1 application Rectal TID PRN Pan Presley MD       • simethicone (MYLICON) tablet 125 mg  125 mg Oral Q4H PRN Pan Presley MD       • calcium carbonate (TUMS) chewable tablet 500 mg  500 mg Oral Q4H PRN Pan Presley MD       • polyethylene glycol (MIRALAX) packet 17 g  17 g Oral Daily PRN Pan Presley MD       • docusate sodium-sennosides (SENOKOT S) 50-8.6 MG 2 tablet  2 tablet Oral BID PRN Pan Presley MD       • bisacodyl (DULCOLAX) suppository 10 mg  10 mg Rectal Daily PRN Pan Presley MD       • magnesium hydroxide (MILK OF MAGNESIA) 400 MG/5ML suspension 30 mL  30 mL Oral Daily PRN Pan Presley MD       .  OB History    Para Term  AB Living   1 1 1 0 0 1   SAB TAB Ectopic Molar Multiple Live Births   0 0 0 0 0 1   .    P: Knowledge deficit r/t breastfeeding     I: Met with dyad and family on follow up rounds and assisted with breastfeeding session. Maternal assessment reveals  jelena but short nipples that retract with areolar  compression and areolar edema.  Cisco biting and tongue thrusting but able to organize sporadic suck on gloved finger. Demonstrated gentle waking and stimulation techniques, breast massage, manual expression of colostrum and breast compression. Skin to skin with mother initiated. Assisted with positioning in cross cradle and foootball holds and attempted deep, asymmetric latch but  unable to achieve s/c nipple shield. Discussed plan to continue attempting  at breast, pumping for stimulation and complement feeding until  consistently latching at breast. Assisted with pumping session. ~ 1ml of colostrum collected and dropper fed to . Discussed dropper vs paced bottle feeding and assisted with paced bottle feeding. Cisco gaggy, tongue thrusting and unable to organize suck on the nipple. Took ~ 8 ml with difficulties via bottle and was dropper fed  2ml. Discussed nipple shield use and care, deep, asymmetric latch, nutritive vs non nutritive suck, typical  behaviors, colostrum, skin to skin, 8-12 feeding/ pumping sessions in 24 hours with no time limit at the breast, breast milk production, supply/demand, frequency/duration, signs of effective breastfeeding, gentle waking and stimulation techniques to keep  active while breastfeeding and  output. Encouraged rooming in, and to call for assistance as needed. Lactation education provided. Questions answered. Department Help line reinforced and mother encouraged to call  with any questions/concerns/need for assistance. Offered outpatient lactation consult after discharge. CHELSEY Rendon updated     E: Mother verbalized an understanding of provided information.     P: LC to continue to follow. Instructed Mother to call for assistance and as questions arise.   maintained  -TS      Recorded by [TS] JENNY WeinsteinA/L      Bed Mobility, Assessment/Treatment    Bed Mobility, Comment  up in chair  -LG     Recorded by  [LG] Jb Srinivasan PTA     Transfer Assessment/Treatment    Transfers, Sit-Stand [place]  verbal cues required;contact guard assist  -LG     Transfers, Stand-Sit [place]  verbal cues required;contact guard assist  -LG     Transfer, Safety Issues  step length decreased  -LG     Transfer, Impairments  strength decreased  -LG     Recorded by  [LG] Jb Srinivasan PTA     Gait Assessment/Treatment    Gait, [place] Level  contact guard assist  -LG     Gait, Assistive Device  rolling walker  -LG     Gait, Distance (Feet)  125   x 2 reps  -LG     Gait, Gait Deviations  bilateral:;antalgic;alexander decreased;narrow base;step length decreased;stride length decreased;toe-to-floor clearance decreased  -LG     Gait, Safety Issues  step length decreased  -LG     Gait, Impairments  strength decreased  -LG     Recorded by  [LG] Jb Srinivasan PTA     Motor Skills/Interventions    Additional Documentation  --  -LG     Recorded by  [LG] Jb Srinivasan PTA     Therapy Exercises    Bilateral Lower Extremities  AROM:;15 reps;ankle pumps/circles;LAQ;hip abduction/adduction  -LG     Recorded by  [LG] Jb Srinivasan PTA     Orthotics Prosthetics    Additional Documentation Orthosis Management/Training (Group)  -TS Orthosis Location (Group)  -LG     Recorded by [TS] ALFREDO Weinstein/MAYLIN [LG] Jb Srinivasan PTA     Orthosis Location    Orthosis Location/Type  neck/back  -LG     Orthosis, Neck/Back  LSO (lumbar sacral orthosis)  -LG     Recorded by  [LG] Jb Srinivasan PTA     Orthosis Management/Training    Orthosis Indications  immobilize, protect/position healing structures;rest, reduce pain;restrict motion  -LG     Orthosis Skills Training doffing orthosis;donning orthosis;purpose/goals of orthosis;clothing management related to orthosis  -TS doffing  orthosis;donning orthosis;purpose/goals of orthosis  -LG     Orthosis Wear Schedule  wear when out of bed only  -LG     Recorded by [TS] DL Weinstein [LG] Jb Srinivasan PTA     Positioning and Restraints    Pre-Treatment Position in bed  -TS sitting in chair/recliner  -LG     Post Treatment Position bed  -TS chair  -LG     In Bed fowlers;call light within reach;encouraged to call for assist;side rails up x2  -TS      In Chair  reclined;call light within reach;encouraged to call for assist;legs elevated  -LG     Recorded by [TS] ALFREDO Weinstein/MAYLIN [LG] Jb Srinivasan PTA       08/03/17 0845          Rehab Assessment/Intervention    Discipline physical therapy assistant  -LG      Treatment Not Performed patient unavailable for treatment  -LG      Treatment Not Performed, Comment Pt eating breakfast, says she is going home today. Will check back   -LG      Recorded by [LG] Jb Srinivasan PTA        User Key  (r) = Recorded By, (t) = Taken By, (c) = Cosigned By    Initials Name Effective Dates    LG Jb Srinivasan PTA 08/02/16 -     TS DL Weinstein 08/02/16 -                 OT Goals       08/02/17 1506          Patient Education OT LTG    Patient Education OT LTG, Date Established 08/02/17  -MM      Patient Education OT LTG, Time to Achieve by discharge  -MM      Patient Education OT LTG, Education Type written program;HEP;precautions per surgeon;brace use/care;positioning;posture/body mechanics;home safety;adaptive equipment mgmt;skin care/inspection;energy conservation;work simplification;adaptive breathing;joann/doff brace  -MM      Patient Education OT LTG, Education Understanding demonstrates adequately;verbalizes understanding;independent  -MM      Bathing OT LTG    Bathing Goal OT LTG, Date Established 08/02/17  -MM      Bathing Goal OT LTG, Time to Achieve by discharge  -MM      Bathing Goal OT LTG, Activity Type upper body bathing;lower body bathing  -MM      Bathing Goal OT  LTG, [place] Level conditional independence  -MM      Bathing Goal OT LTG, Assist Device grab bars;tub bench with back;shower head, detachable;sponge, long handled  -MM      Toileting OT LTG    Toileting Goal OT LTG, Date Established 08/02/17  -MM      Toileting Goal OT LTG, Time to Achieve by discharge  -MM      Toileting Goal OT LTG, [place] Level conditional independence  -MM      Toileting Goal OT LTG, Assist Device toilet seat, raised  -MM      LB Dressing OT LTG    LB Dressing Goal OT LTG, Date Established 08/02/17  -MM      LB Dressing Goal OT LTG, Time to Achieve by discharge  -MM      LB Dressing Goal OT LTG, [place] Level conditional independence  -MM      LB Dressing Goal OT LTG, Adaptive Equipment laces, elastic;reacher;shoe horn, long handled;sock-aid   AE PRN  -MM        User Key  (r) = Recorded By, (t) = Taken By, (c) = Cosigned By    Initials Name Provider Type    BENIGNO Betancourt, OTR/L Occupational Therapist          Occupational Therapy Education     Title: PT OT SLP Therapies (Active)     Topic: Occupational Therapy (Active)     Point: ADL training (Done)    Description: Instruct learner(s) on proper safety adaptation and remediation techniques during self care or transfers.   Instruct in proper use of assistive devices.    Learning Progress Summary    Learner Readiness Method Response Comment Documented by Status   Patient Acceptance MCKENNA MAJOR home safety, AE, stated 3/3 back precautions, verbalizes she is comfortable with LSO, educated on work simplification techniques for ADLs. TS 08/03/17 1425 Done    Acceptance E MORIAH pt educated on OT role, benefits, POC, brace use/care and spinal precautions. MM 08/02/17 1503 Done               Point: Precautions (Done)    Description: Instruct learner(s) on prescribed precautions during self-care and functional transfers.    Learning Progress Summary    Learner Readiness Method Response Comment Documented by Status   Patient Acceptance MCKENNA MAJOR  home safety, AE, stated 3/3 back precautions, verbalizes she is comfortable with LSO, educated on work simplification techniques for ADLs. TS 08/03/17 1425 Done    Acceptance E VU pt educated on OT role, benefits, POC, brace use/care and spinal precautions.  08/02/17 1503 Done                      User Key     Initials Effective Dates Name Provider Type Discipline     08/02/16 -  ALFREDO Weinstein/L Occupational Therapy Assistant OT     10/21/16 -  LINDA You/L Occupational Therapist OT                  OT Recommendation and Plan  Anticipated Equipment Needs At Discharge: bathing equipment, dressing equipment  Anticipated Discharge Disposition: home, home with home health, home with outpatient services  Planned Therapy Interventions: ADL retraining, adaptive equipment training, activity intolerance, IADL retraining, balance training, bed mobility training, energy conservation, fine motor coordination training, home exercise program, motor coordination training, orthotic fitting/training, strengthening, transfer training  Therapy Frequency: 3-5 times/wk  Plan of Care Review  Plan Of Care Reviewed With: patient  Progress: progress toward functional goals as expected  Outcome Summary/Follow up Plan: Pt is discharging home with daughter today. Pt states she is comfortable don/doffing LSO, pt educate don wear schedule of LSO. Pt is able to state 3/3 back precautions independently. Pt and Sotelo discussed need for AE or DME, per pt she has all needed DME and AE at home. Per pt she wears gowns at home and if needed daughter can assist her.         Outcome Measures       08/03/17 1400 08/03/17 1118 08/02/17 1500    How much help from another person do you currently need...    Turning from your back to your side while in flat bed without using bedrails?  3  -LG     Moving from lying on back to sitting on the side of a flat bed without bedrails?  3  -LG     Moving to and from a bed to a chair (including  a wheelchair)?  3  -LG     Standing up from a chair using your arms (e.g., wheelchair, bedside chair)?  3  -LG     Climbing 3-5 steps with a railing?  3  -LG     To walk in hospital room?  3  -LG     AM-PAC 6 Clicks Score  18  -LG     How much help from another is currently needed...    Putting on and taking off regular lower body clothing? 2  -TS  2  -MM    Bathing (including washing, rinsing, and drying) 2  -TS  2  -MM    Toileting (which includes using toilet bed pan or urinal) 3  -TS  2  -MM    Putting on and taking off regular upper body clothing 3  -TS  3  -MM    Taking care of personal grooming (such as brushing teeth) 4  -TS  4  -MM    Eating meals 4  -TS  4  -MM    Score 18  -TS  17  -MM    Functional Assessment    Outcome Measure Options AM-PAC 6 Clicks Daily Activity (OT)  -TS AM-PAC 6 Clicks Basic Mobility (PT)  -LG AM-PAC 6 Clicks Daily Activity (OT)  -MM      08/02/17 6669          How much help from another person do you currently need...    Turning from your back to your side while in flat bed without using bedrails? 3  -PB (r) MS (t) PB (c)      Moving from lying on back to sitting on the side of a flat bed without bedrails? 3  -PB (r) MS (t) PB (c)      Moving to and from a bed to a chair (including a wheelchair)? 3  -PB (r) MS (t) PB (c)      Standing up from a chair using your arms (e.g., wheelchair, bedside chair)? 3  -PB (r) MS (t) PB (c)      Climbing 3-5 steps with a railing? 2  -PB (r) MS (t) PB (c)      To walk in hospital room? 3  -PB (r) MS (t) PB (c)      AM-PAC 6 Clicks Score 17  -PB (r) MS (t)      Functional Assessment    Outcome Measure Options AM-PAC 6 Clicks Basic Mobility (PT)  -PB (r) MS (t) PB (c)        User Key  (r) = Recorded By, (t) = Taken By, (c) = Cosigned By    Initials Name Provider Type    REGINA Srinivasan PTA Physical Therapy Assistant    ALFREDO Mcduffie/L Occupational Therapy Assistant    PB Chas Cameron, PT DPT Physical Therapist    BENIGNO ANDRES  GAGE BetancourtR/L Occupational Therapist    MS Nina MERCADO Romy, PT Student PT Student           Time Calculation:         Time Calculation- OT       08/03/17 1429          Time Calculation- OT    OT Start Time 1358  -TS      OT Stop Time 1422  -TS      OT Time Calculation (min) 24 min  -TS      Total Timed Code Minutes- OT 24 minute(s)  -TS      OT Received On 08/03/17  -TS        User Key  (r) = Recorded By, (t) = Taken By, (c) = Cosigned By    Initials Name Provider Type     ALFREDO Weinstein/L Occupational Therapy Assistant           Therapy Charges for Today     Code Description Service Date Service Provider Modifiers Qty    [id number] HC OT SELF CARE/MGMT/TRAIN EA 15 MIN 8/3/2017 DL Weinstein KX 2          OT G-codes  OT Professional Judgement Used?: Yes  OT Functional Scales Options: AM-PAC 6 Clicks Daily Activity (OT)  Score: 17  Functional Limitation: Self care  Self Care Current Status (): At least 40 percent but less than 60 percent impaired, limited or restricted  Self Care Goal Status (): At least 1 percent but less than 20 percent impaired, limited or restricted    DL Barksdale  8/3/2017

## 2020-07-25 ENCOUNTER — HOSPITAL ENCOUNTER (EMERGENCY)
Age: 70
Discharge: LEFT BEFORE DISCHARGE | End: 2020-07-25
Payer: MEDICARE

## 2020-07-25 ENCOUNTER — NURSE TRIAGE (OUTPATIENT)
Dept: CALL CENTER | Facility: HOSPITAL | Age: 70
End: 2020-07-25

## 2020-07-25 VITALS
DIASTOLIC BLOOD PRESSURE: 119 MMHG | RESPIRATION RATE: 18 BRPM | TEMPERATURE: 99.5 F | WEIGHT: 240 LBS | SYSTOLIC BLOOD PRESSURE: 156 MMHG | OXYGEN SATURATION: 95 % | HEART RATE: 73 BPM | HEIGHT: 63 IN | BODY MASS INDEX: 42.52 KG/M2

## 2020-07-26 NOTE — TELEPHONE ENCOUNTER
"Reviewed guideline with caller, advises she be seen within 2-3 days. Caller agrees to follow care advice per protocol.     Reason for Disposition  • [1] SEVERE local itching (i.e., interferes with work, school, sleep) AND [2] not improved after 24 hours of hydrocortisone cream    Additional Information  • Negative: Anaphylactic reaction suspected (e.g., sudden onset of difficulty breathing, difficulty swallowing, wheezing following bite)  • Negative: Sounds like a life-threatening emergency to the triager  • Negative: Widespread hives  • Negative: Impetigo suspected  (i.e., painless infected superficial small sores, < 1 inch or 2.5 cm, often covered by a soft, yellow-brown scab or crust; sometimes occurring near nasal openings)  • Negative: Other insect bite suspected  • Negative: Doesn't match the SYMPTOMS of bed bug bites  • Negative: Patient sounds very sick or weak to the triager  • Negative: [1] Fever AND [2] red area  • Negative: [1] Fever AND [2] area is very tender to touch  • Negative: [1] Red streak or red line AND [2] length > 2 inches (5 cm)  • Negative: [1] Red or very tender (to touch) area AND [2] started over 24 hours after the bite  • Negative: [1] Red or very tender (to touch) area AND [2] getting larger over 48 hours after the bite    Answer Assessment - Initial Assessment Questions  1. LOCATION: \"Where are the bites located?\"       Back, buttock, along bra line   2. ONSET: \"When did you get bitten?\"       unknown  3. CAUSE: Why do you suspect bed bugs?\" (e.g., recent travel, recent purchase of used clothing)      Bed bugs seen in her apartment   4. REDNESS: \"Is the area red or pink?\" If so, ask \"What size is area of redness?\" (inches or cm). \"When did the redness start?\"      Red and pink   5. ITCHING: \"Does it itch?\" If so, ask: \"How bad is the itch?\"     - MILD: doesn't interfere with normal activities    - MODERATE-SEVERE: interferes with work, school, sleep, or other activities       Itching " "under her arm, moderate  6. SWELLING: \"How big is the swelling?\" (inches, cm, or compare to coins)      Not very swollen  7. OTHER SYMPTOMS: \"Do you have any other symptoms?\"  (e.g., difficulty breathing, hives)      no    Protocols used: BED BUG BITE-ADULT-AH      "

## 2020-07-26 NOTE — ED NOTES
Bed: 09  Expected date:   Expected time:   Means of arrival:   Comments:  Brook Callejas RN  07/25/20 2024

## 2021-01-14 NOTE — ED PROVIDER NOTES
None    Highest education level: None   Occupational History    None   Social Needs    Financial resource strain: None    Food insecurity:     Worry: None     Inability: None    Transportation needs:     Medical: None     Non-medical: None   Tobacco Use    Smoking status: Never Smoker    Smokeless tobacco: Never Used   Substance and Sexual Activity    Alcohol use: No    Drug use: No    Sexual activity: None   Lifestyle    Physical activity:     Days per week: None     Minutes per session: None    Stress: None   Relationships    Social connections:     Talks on phone: None     Gets together: None     Attends [employer] service: None     Active member of club or organization: None     Attends meetings of clubs or organizations: None     Relationship status: None    Intimate partner violence:     Fear of current or ex partner: None     Emotionally abused: None     Physically abused: None     Forced sexual activity: None   Other Topics Concern    None   Social History Narrative    None       SCREENINGS    Jude Coma Scale  Eye Opening: Spontaneous  Best Verbal Response: Oriented  Best Motor Response: Obeys commands  Darden Coma Scale Score: 15      PHYSICAL EXAM    (up to 7 forlevel 4, 8 or more for level 5)     ED Triage Vitals [08/18/19 0500]   BP Temp Temp Source Pulse Resp SpO2 Height Weight   (!) 186/91 97.4 °F (36.3 °C) Temporal 52 22 94 % 5' 3\" (1.6 m) 250 lb (113.4 kg)       Physical Exam   Constitutional: She is oriented to person, place, and time. She appears well-developed and well-nourished. No distress. HENT:   Head: Normocephalic and atraumatic. Right Ear: External ear normal.   Left Ear: External ear normal.   Nose: Nose normal.   Mouth/Throat: Oropharynx is clear and moist. No oropharyngeal exudate. Eyes: Pupils are equal, round, and reactive to light. Conjunctivae and EOM are normal. Right eye exhibits no discharge. Left eye exhibits no discharge. Neck: Normal range of motion. kristin all pertinent systems normal

## 2021-02-11 ENCOUNTER — HOSPITAL ENCOUNTER (INPATIENT)
Age: 71
LOS: 2 days | Discharge: HOME | DRG: 603 | End: 2021-02-13
Attending: EMERGENCY MEDICINE | Admitting: FAMILY MEDICINE
Payer: MEDICARE

## 2021-02-11 DIAGNOSIS — L03.115 CELLULITIS OF RIGHT LOWER EXTREMITY: Primary | ICD-10-CM

## 2021-02-11 PROBLEM — L03.90 CELLULITIS: Status: ACTIVE | Noted: 2021-02-11

## 2021-02-11 LAB
ANION GAP SERPL CALCULATED.3IONS-SCNC: 9 MMOL/L (ref 7–19)
BUN BLDV-MCNC: 18 MG/DL (ref 8–23)
CALCIUM SERPL-MCNC: 9 MG/DL (ref 8.8–10.2)
CHLORIDE BLD-SCNC: 95 MMOL/L (ref 98–111)
CO2: 29 MMOL/L (ref 22–29)
CREAT SERPL-MCNC: 0.9 MG/DL (ref 0.5–0.9)
D DIMER: 1.58 UG/ML FEU (ref 0–0.48)
GFR AFRICAN AMERICAN: >59
GFR NON-AFRICAN AMERICAN: >60
GLUCOSE BLD-MCNC: 131 MG/DL (ref 74–109)
HCT VFR BLD CALC: 40 % (ref 37–47)
HEMOGLOBIN: 13.1 G/DL (ref 12–16)
INR BLD: 1.28 (ref 0.88–1.18)
MCH RBC QN AUTO: 30.5 PG (ref 27–31)
MCHC RBC AUTO-ENTMCNC: 32.8 G/DL (ref 33–37)
MCV RBC AUTO: 93 FL (ref 81–99)
PDW BLD-RTO: 14.6 % (ref 11.5–14.5)
PLATELET # BLD: 104 K/UL (ref 130–400)
PMV BLD AUTO: 11.6 FL (ref 9.4–12.3)
POTASSIUM REFLEX MAGNESIUM: 3.9 MMOL/L (ref 3.5–5)
PROTHROMBIN TIME: 16 SEC (ref 12–14.6)
RBC # BLD: 4.3 M/UL (ref 4.2–5.4)
SARS-COV-2, NAAT: NOT DETECTED
SODIUM BLD-SCNC: 133 MMOL/L (ref 136–145)
WBC # BLD: 12 K/UL (ref 4.8–10.8)

## 2021-02-11 PROCEDURE — 85379 FIBRIN DEGRADATION QUANT: CPT

## 2021-02-11 PROCEDURE — 85027 COMPLETE CBC AUTOMATED: CPT

## 2021-02-11 PROCEDURE — 80048 BASIC METABOLIC PNL TOTAL CA: CPT

## 2021-02-11 PROCEDURE — 93971 EXTREMITY STUDY: CPT

## 2021-02-11 PROCEDURE — 6370000000 HC RX 637 (ALT 250 FOR IP): Performed by: FAMILY MEDICINE

## 2021-02-11 PROCEDURE — 2580000003 HC RX 258: Performed by: EMERGENCY MEDICINE

## 2021-02-11 PROCEDURE — 99284 EMERGENCY DEPT VISIT MOD MDM: CPT

## 2021-02-11 PROCEDURE — 96365 THER/PROPH/DIAG IV INF INIT: CPT

## 2021-02-11 PROCEDURE — 85610 PROTHROMBIN TIME: CPT

## 2021-02-11 PROCEDURE — 36415 COLL VENOUS BLD VENIPUNCTURE: CPT

## 2021-02-11 PROCEDURE — 1210000000 HC MED SURG R&B

## 2021-02-11 PROCEDURE — 96375 TX/PRO/DX INJ NEW DRUG ADDON: CPT

## 2021-02-11 PROCEDURE — 6360000002 HC RX W HCPCS: Performed by: EMERGENCY MEDICINE

## 2021-02-11 PROCEDURE — U0002 COVID-19 LAB TEST NON-CDC: HCPCS

## 2021-02-11 PROCEDURE — 2580000003 HC RX 258: Performed by: FAMILY MEDICINE

## 2021-02-11 PROCEDURE — 6360000002 HC RX W HCPCS: Performed by: FAMILY MEDICINE

## 2021-02-11 RX ORDER — MORPHINE SULFATE 15 MG/1
15 TABLET ORAL EVERY 4 HOURS PRN
Status: DISCONTINUED | OUTPATIENT
Start: 2021-02-11 | End: 2021-02-13 | Stop reason: HOSPADM

## 2021-02-11 RX ORDER — SODIUM CHLORIDE 0.9 % (FLUSH) 0.9 %
10 SYRINGE (ML) INJECTION EVERY 12 HOURS SCHEDULED
Status: DISCONTINUED | OUTPATIENT
Start: 2021-02-11 | End: 2021-02-13 | Stop reason: HOSPADM

## 2021-02-11 RX ORDER — ONDANSETRON 2 MG/ML
4 INJECTION INTRAMUSCULAR; INTRAVENOUS EVERY 6 HOURS PRN
Status: DISCONTINUED | OUTPATIENT
Start: 2021-02-11 | End: 2021-02-13 | Stop reason: HOSPADM

## 2021-02-11 RX ORDER — POLYETHYLENE GLYCOL 3350 17 G/17G
17 POWDER, FOR SOLUTION ORAL DAILY PRN
Status: DISCONTINUED | OUTPATIENT
Start: 2021-02-11 | End: 2021-02-13 | Stop reason: HOSPADM

## 2021-02-11 RX ORDER — BISOPROLOL FUMARATE AND HYDROCHLOROTHIAZIDE 5; 6.25 MG/1; MG/1
1 TABLET ORAL DAILY
Status: DISCONTINUED | OUTPATIENT
Start: 2021-02-11 | End: 2021-02-11

## 2021-02-11 RX ORDER — CYCLOBENZAPRINE HCL 5 MG
5 TABLET ORAL 3 TIMES DAILY PRN
Status: DISCONTINUED | OUTPATIENT
Start: 2021-02-11 | End: 2021-02-13 | Stop reason: HOSPADM

## 2021-02-11 RX ORDER — GABAPENTIN 100 MG/1
100 CAPSULE ORAL 3 TIMES DAILY
COMMUNITY

## 2021-02-11 RX ORDER — SODIUM CHLORIDE 0.9 % (FLUSH) 0.9 %
10 SYRINGE (ML) INJECTION PRN
Status: DISCONTINUED | OUTPATIENT
Start: 2021-02-11 | End: 2021-02-13 | Stop reason: HOSPADM

## 2021-02-11 RX ORDER — DULOXETIN HYDROCHLORIDE 30 MG/1
60 CAPSULE, DELAYED RELEASE ORAL DAILY
Status: DISCONTINUED | OUTPATIENT
Start: 2021-02-11 | End: 2021-02-13 | Stop reason: HOSPADM

## 2021-02-11 RX ORDER — MORPHINE SULFATE 4 MG/ML
2 INJECTION, SOLUTION INTRAMUSCULAR; INTRAVENOUS ONCE
Status: COMPLETED | OUTPATIENT
Start: 2021-02-11 | End: 2021-02-11

## 2021-02-11 RX ORDER — HYDROCODONE BITARTRATE AND ACETAMINOPHEN 7.5; 325 MG/1; MG/1
1 TABLET ORAL EVERY 6 HOURS PRN
Status: DISCONTINUED | OUTPATIENT
Start: 2021-02-11 | End: 2021-02-13 | Stop reason: HOSPADM

## 2021-02-11 RX ORDER — ACETAMINOPHEN 325 MG/1
650 TABLET ORAL EVERY 6 HOURS PRN
Status: DISCONTINUED | OUTPATIENT
Start: 2021-02-11 | End: 2021-02-13 | Stop reason: HOSPADM

## 2021-02-11 RX ORDER — PROMETHAZINE HYDROCHLORIDE 25 MG/1
12.5 TABLET ORAL EVERY 6 HOURS PRN
Status: DISCONTINUED | OUTPATIENT
Start: 2021-02-11 | End: 2021-02-13 | Stop reason: HOSPADM

## 2021-02-11 RX ORDER — HYDROCHLOROTHIAZIDE 25 MG/1
6.25 TABLET ORAL DAILY
Status: DISCONTINUED | OUTPATIENT
Start: 2021-02-11 | End: 2021-02-13 | Stop reason: HOSPADM

## 2021-02-11 RX ORDER — METOPROLOL SUCCINATE 50 MG/1
50 TABLET, EXTENDED RELEASE ORAL DAILY
Status: DISCONTINUED | OUTPATIENT
Start: 2021-02-11 | End: 2021-02-13 | Stop reason: HOSPADM

## 2021-02-11 RX ORDER — SODIUM CHLORIDE 9 MG/ML
INJECTION, SOLUTION INTRAVENOUS CONTINUOUS
Status: DISCONTINUED | OUTPATIENT
Start: 2021-02-11 | End: 2021-02-13 | Stop reason: HOSPADM

## 2021-02-11 RX ORDER — LEVOTHYROXINE SODIUM 0.05 MG/1
50 TABLET ORAL DAILY
Status: DISCONTINUED | OUTPATIENT
Start: 2021-02-11 | End: 2021-02-13 | Stop reason: HOSPADM

## 2021-02-11 RX ORDER — PRAMIPEXOLE DIHYDROCHLORIDE 0.25 MG/1
0.5 TABLET ORAL 3 TIMES DAILY
Status: DISCONTINUED | OUTPATIENT
Start: 2021-02-11 | End: 2021-02-13 | Stop reason: HOSPADM

## 2021-02-11 RX ORDER — ACETAMINOPHEN 650 MG/1
650 SUPPOSITORY RECTAL EVERY 6 HOURS PRN
Status: DISCONTINUED | OUTPATIENT
Start: 2021-02-11 | End: 2021-02-13 | Stop reason: HOSPADM

## 2021-02-11 RX ADMIN — MORPHINE SULFATE 15 MG: 15 TABLET ORAL at 17:56

## 2021-02-11 RX ADMIN — VANCOMYCIN HYDROCHLORIDE 1500 MG: 10 INJECTION, POWDER, LYOPHILIZED, FOR SOLUTION INTRAVENOUS at 11:56

## 2021-02-11 RX ADMIN — SODIUM CHLORIDE: 9 INJECTION, SOLUTION INTRAVENOUS at 17:58

## 2021-02-11 RX ADMIN — SODIUM CHLORIDE, PRESERVATIVE FREE 10 ML: 5 INJECTION INTRAVENOUS at 20:57

## 2021-02-11 RX ADMIN — HYDROCODONE BITARTRATE AND ACETAMINOPHEN 1 TABLET: 7.5; 325 TABLET ORAL at 20:57

## 2021-02-11 RX ADMIN — HYDROCHLOROTHIAZIDE 6.25 MG: 25 TABLET ORAL at 17:56

## 2021-02-11 RX ADMIN — ENOXAPARIN SODIUM 40 MG: 40 INJECTION SUBCUTANEOUS at 15:55

## 2021-02-11 RX ADMIN — METOPROLOL SUCCINATE 50 MG: 50 TABLET, EXTENDED RELEASE ORAL at 17:56

## 2021-02-11 RX ADMIN — BENZOCAINE 6 MG-MENTHOL 10 MG LOZENGES 1 LOZENGE: at 20:57

## 2021-02-11 RX ADMIN — MORPHINE SULFATE 2 MG: 4 INJECTION, SOLUTION INTRAMUSCULAR; INTRAVENOUS at 13:17

## 2021-02-11 RX ADMIN — LEVOTHYROXINE SODIUM 50 MCG: 0.05 TABLET ORAL at 22:03

## 2021-02-11 RX ADMIN — PRAMIPEXOLE DIHYDROCHLORIDE 0.5 MG: 0.25 TABLET ORAL at 20:57

## 2021-02-11 RX ADMIN — DULOXETINE HYDROCHLORIDE 60 MG: 30 CAPSULE, DELAYED RELEASE ORAL at 17:56

## 2021-02-11 ASSESSMENT — ENCOUNTER SYMPTOMS
EYE PAIN: 0
DIARRHEA: 0
ABDOMINAL PAIN: 0
SHORTNESS OF BREATH: 0
VOMITING: 0

## 2021-02-11 ASSESSMENT — PAIN SCALES - GENERAL
PAINLEVEL_OUTOF10: 7
PAINLEVEL_OUTOF10: 6

## 2021-02-11 NOTE — ED NOTES
Bed: 17  Expected date:   Expected time:   Means of arrival:   Comments:  surendra Blanco, ZION  02/11/21 1115

## 2021-02-11 NOTE — ED PROVIDER NOTES
[facility]  eMERGENCY dEPARTMENT eNCOUnter      Pt Name: Scot Parikh  MRN: [id number]  [place] 1950  Date of evaluation: 2/11/2021  Provider: Ced Joseph MD    [address] [place] [address]       Chief Complaint   Patient presents with    Leg Swelling     right leg since yesteday         HISTORY OF PRESENT ILLNESS   (Location/Symptom, Timing/Onset,Context/Setting, Quality, Duration, Modifying Factors, Severity)  Note limiting factors. Scot Parikh is a 65 y.o. female who presents to the emergency department due to redness warmth and swelling of right leg. Symptoms started yesterday. Denies any injury. Said she really does not have any significant pain in the leg. Has some mild swelling in both legs which is chronic and unchanged from baseline. No history of DVT or PE. Does have history of cellulitis before. On exam she has significant erythema from the ankle extending up to the knee. No swelling of the joints. Area is very red and warm to the touch. Looks consistent with cellulitis. No other complaints. No fever chills or other constitutional symptoms. No chest pain or dyspnea. No Covid exposure she is aware of. HPI    NursingNotes were reviewed. REVIEW OF SYSTEMS    (2-9 systems for level 4, 10 or more for level 5)     Review of Systems   Constitutional: Negative for fever. Eyes: Negative for pain. Respiratory: Negative for shortness of breath. Cardiovascular: Positive for leg swelling (bilateral/chronic). Negative for chest pain and palpitations. Gastrointestinal: Negative for abdominal pain, diarrhea and vomiting. Genitourinary: Negative for dysuria. Skin: Negative for rash. Neurological: Negative for weakness and headaches. All other systems reviewed and are negative. A complete review of systems was performed and is negative except as noted above in the HPI.        PAST MEDICAL HISTORY     Past Medical History:   Diagnosis Date    Arthritis     Chronic kidney disease     Hypertension     Leg pain     chronic    Psychiatric problem     Thyroid disease          SURGICAL HISTORY       Past Surgical History:   Procedure Laterality Date    BACK SURGERY      CHOLECYSTECTOMY      EYE SURGERY      HIP SURGERY Right     right hip replacement    KNEE SURGERY      KNEE SURGERY Bilateral     bilateral knee replacement    NECK SURGERY           CURRENT MEDICATIONS       Previous Medications    BISOPROLOL-HYDROCHLOROTHIAZIDE (ZIAC) 5-6.25 MG PER TABLET    Take 1 tablet by mouth daily    CYCLOBENZAPRINE (FLEXERIL) 5 MG TABLET    Take 5 mg by mouth 3 times daily as needed for Muscle spasms    DULOXETINE (CYMBALTA) 60 MG EXTENDED RELEASE CAPSULE    Take 1 capsule by mouth daily    FLUTICASONE (FLONASE) 50 MCG/ACT NASAL SPRAY    1 spray by Each Nostril route daily    HYDROCODONE-ACETAMINOPHEN (NORCO) 7.5-325 MG PER TABLET    Take 1 tablet by mouth every 6 hours as needed for Pain. LEVOTHYROXINE (SYNTHROID) 50 MCG TABLET    Take 50 mcg by mouth Daily    MORPHINE (MSIR) 15 MG TABLET    Take 15 mg by mouth every 4 hours as needed for Pain. PRAMIPEXOLE (MIRAPEX) 0.5 MG TABLET    Take 0.5 mg by mouth 3 times daily       ALLERGIES     Benadryl [diphenhydramine] and Codeine    FAMILY HISTORY     History reviewed. No pertinent family history.        SOCIAL HISTORY       Social History     Socioeconomic History    Marital status:      Spouse name: None    Number of children: None    Years of education: None    Highest education level: None   Occupational History    None   Social Needs    Financial resource strain: None    Food insecurity     Worry: None     Inability: None    Transportation needs     Medical: None     Non-medical: None   Tobacco Use    Smoking status: Never Smoker    Smokeless tobacco: Never Used   Substance and Sexual Activity    Alcohol use: No    Drug use: No    Sexual activity: None   Lifestyle    Physical activity     Days per week: None     Minutes per session: None    Stress: None   Relationships    Social connections     Talks on phone: None     Gets together: None     Attends Evangelical service: None     Active member of club or organization: None     Attends meetings of clubs or organizations: None     Relationship status: None    Intimate partner violence     Fear of current or ex partner: None     Emotionally abused: None     Physically abused: None     Forced sexual activity: None   Other Topics Concern    None   Social History Narrative    None       SCREENINGS    Jude Coma Scale  Eye Opening: Spontaneous  Best Verbal Response: Oriented  Best Motor Response: Obeys commands  Jude Coma Scale Score: 15        PHYSICAL EXAM    (up to 7 for level 4, 8 or more for level 5)     ED Triage Vitals [02/11/21 1115]   BP Temp Temp Source Pulse Resp SpO2 Height Weight   (!) 154/121 99.7 °F (37.6 °C) Oral 71 17 96 % 5' 3\" (1.6 m) 250 lb (113.4 kg)       Physical Exam  Vitals signs reviewed. Constitutional:       General: She is not in acute distress. Appearance: She is well-developed. HENT:      Head: Normocephalic and atraumatic. Eyes:      General: No scleral icterus. Pupils: Pupils are equal, round, and reactive to light. Neck:      Musculoskeletal: Normal range of motion and neck supple. Vascular: No JVD. Cardiovascular:      Rate and Rhythm: Normal rate and regular rhythm. Pulses: Normal pulses. Heart sounds: Normal heart sounds. No murmur. Pulmonary:      Effort: Pulmonary effort is normal. No respiratory distress. Breath sounds: Normal breath sounds. Abdominal:      General: There is no distension. Palpations: Abdomen is soft. Tenderness: There is no abdominal tenderness. There is no guarding or rebound. Musculoskeletal: Normal range of motion. General: No tenderness. Right knee: She exhibits normal range of motion and no effusion. No tenderness found. Right ankle: She exhibits normal range of motion and normal pulse. Right lower leg: She exhibits swelling (with erythema and warmth). Edema (mild) present. Left lower leg: Edema (mild) present. Legs:    Skin:     General: Skin is warm and dry. Capillary Refill: Capillary refill takes less than 2 seconds. Neurological:      Mental Status: She is alert and oriented to person, place, and time. Psychiatric:         Mood and Affect: Mood normal.         Behavior: Behavior normal.         DIAGNOSTIC RESULTS       RADIOLOGY:   Non-plain film images such as CT, Ultrasound and MRI are read by the radiologist. Ignacio Trevor images are visualized and preliminarily interpreted by the emergency physician with the below findings:    Interpretation per the Radiologist below, if available at the time of this note:    VL Extremity Venous Right    (Results Pending)         LABS:  Labs Reviewed   CBC - Abnormal; Notable for the following components:       Result Value    WBC 12.0 (*)     MCHC 32.8 (*)     RDW 14.6 (*)     Platelets 617 (*)     All other components within normal limits   BASIC METABOLIC PANEL W/ REFLEX TO MG FOR LOW K - Abnormal; Notable for the following components:    Sodium 133 (*)     Chloride 95 (*)     Glucose 131 (*)     All other components within normal limits   D-DIMER, QUANTITATIVE - Abnormal; Notable for the following components:    D-Dimer, Quant 1.58 (*)     All other components within normal limits   PROTIME-INR - Abnormal; Notable for the following components:    Protime 16.0 (*)     INR 1.28 (*)     All other components within normal limits   COVID-19       All other labs were within normal range or not returned as of this dictation.     EMERGENCY DEPARTMENT COURSE and DIFFERENTIALDIAGNOSIS/MDM:   Vitals:    Vitals:    02/11/21 1115 02/11/21 1130 02/11/21 1200   BP: (!) 154/121 (!) 152/77 (!) 160/132   Pulse: 71     Resp: 17     Temp: 99.7 °F (37.6 °C)     TempSrc: Oral

## 2021-02-11 NOTE — PROGRESS NOTES
Pharmacy Note  Vancomycin Consult    Yelena Garcia is a 65 y.o. female started on Vancomycin for skin infection; consult received from Dr. Azam Nichols to manage therapy. Active Problems:    * No active hospital problems. *  Resolved Problems:    * No resolved hospital problems. *      Allergies:  Benadryl [diphenhydramine] and Codeine     Temp max: 99.7    No results for input(s): BUN in the last 72 hours. No results for input(s): CREATININE in the last 72 hours. No results for input(s): WBC in the last 72 hours. No intake or output data in the 24 hours ending 02/11/21 1137    Culture Date Source Results   none                   Ht Readings from Last 1 Encounters:   02/11/21 5' 3\" (1.6 m)        Wt Readings from Last 1 Encounters:   02/11/21 250 lb (113.4 kg)         Body mass index is 44.29 kg/m². CrCl cannot be calculated (Patient's most recent lab result is older than the maximum 10 days allowed. ). Assessment/Plan:  Will initiate vancomycin 1500 mg IV every 18 hours. Timing of trough level will be determined based on culture results, renal function, and clinical response. Thank you for the consult. Will continue to follow.     Electronically signed by Jimena Valera, [facility] on 2/11/2021 at 11:37 AM

## 2021-02-11 NOTE — PROGRESS NOTES
Vascular Preliminary Report      Right Lower Extremity Venous Duplex completed. No evidence of DVT, SVT, or Reflux noted at this time. Enlarged lymph nodes noted within the right groin. Final Report Pending.

## 2021-02-11 NOTE — PROGRESS NOTES
Received patient vi stretcher from ER. Walked to bathroom voided than back to bed. Settled into room. Vancomycin IVPB infusing. denies pain or SOA.  C/O pain from right lower leg cellulites elevated on pillow

## 2021-02-12 PROBLEM — E66.01 MORBID OBESITY DUE TO EXCESS CALORIES (HCC): Status: ACTIVE | Noted: 2021-02-12

## 2021-02-12 PROBLEM — E87.6 HYPOKALEMIA: Status: ACTIVE | Noted: 2021-02-12

## 2021-02-12 LAB
ANION GAP SERPL CALCULATED.3IONS-SCNC: 10 MMOL/L (ref 7–19)
BUN BLDV-MCNC: 16 MG/DL (ref 8–23)
CALCIUM SERPL-MCNC: 8.5 MG/DL (ref 8.8–10.2)
CHLORIDE BLD-SCNC: 99 MMOL/L (ref 98–111)
CO2: 28 MMOL/L (ref 22–29)
CREAT SERPL-MCNC: 0.7 MG/DL (ref 0.5–0.9)
GFR AFRICAN AMERICAN: >59
GFR NON-AFRICAN AMERICAN: >60
GLUCOSE BLD-MCNC: 117 MG/DL (ref 74–109)
HCT VFR BLD CALC: 36.3 % (ref 37–47)
HEMOGLOBIN: 12.2 G/DL (ref 12–16)
MAGNESIUM: 1.7 MG/DL (ref 1.6–2.4)
MCH RBC QN AUTO: 30.7 PG (ref 27–31)
MCHC RBC AUTO-ENTMCNC: 33.6 G/DL (ref 33–37)
MCV RBC AUTO: 91.2 FL (ref 81–99)
PDW BLD-RTO: 14.5 % (ref 11.5–14.5)
PLATELET # BLD: 91 K/UL (ref 130–400)
PMV BLD AUTO: 11.9 FL (ref 9.4–12.3)
POTASSIUM REFLEX MAGNESIUM: 3 MMOL/L (ref 3.5–5)
RBC # BLD: 3.98 M/UL (ref 4.2–5.4)
SODIUM BLD-SCNC: 137 MMOL/L (ref 136–145)
WBC # BLD: 8.4 K/UL (ref 4.8–10.8)

## 2021-02-12 PROCEDURE — 80048 BASIC METABOLIC PNL TOTAL CA: CPT

## 2021-02-12 PROCEDURE — 6370000000 HC RX 637 (ALT 250 FOR IP): Performed by: FAMILY MEDICINE

## 2021-02-12 PROCEDURE — 6360000002 HC RX W HCPCS: Performed by: FAMILY MEDICINE

## 2021-02-12 PROCEDURE — 83735 ASSAY OF MAGNESIUM: CPT

## 2021-02-12 PROCEDURE — 36415 COLL VENOUS BLD VENIPUNCTURE: CPT

## 2021-02-12 PROCEDURE — 2580000003 HC RX 258: Performed by: FAMILY MEDICINE

## 2021-02-12 PROCEDURE — 6370000000 HC RX 637 (ALT 250 FOR IP): Performed by: PHYSICIAN ASSISTANT

## 2021-02-12 PROCEDURE — 1210000000 HC MED SURG R&B

## 2021-02-12 PROCEDURE — 85027 COMPLETE CBC AUTOMATED: CPT

## 2021-02-12 RX ORDER — POTASSIUM CHLORIDE 7.45 MG/ML
10 INJECTION INTRAVENOUS PRN
Status: DISCONTINUED | OUTPATIENT
Start: 2021-02-12 | End: 2021-02-13 | Stop reason: HOSPADM

## 2021-02-12 RX ORDER — POTASSIUM CHLORIDE 20 MEQ/1
40 TABLET, EXTENDED RELEASE ORAL PRN
Status: DISCONTINUED | OUTPATIENT
Start: 2021-02-12 | End: 2021-02-13 | Stop reason: HOSPADM

## 2021-02-12 RX ORDER — POTASSIUM CHLORIDE 20 MEQ/1
20 TABLET, EXTENDED RELEASE ORAL 2 TIMES DAILY WITH MEALS
Status: DISCONTINUED | OUTPATIENT
Start: 2021-02-12 | End: 2021-02-13 | Stop reason: HOSPADM

## 2021-02-12 RX ADMIN — BENZOCAINE 6 MG-MENTHOL 10 MG LOZENGES 1 LOZENGE: at 20:07

## 2021-02-12 RX ADMIN — MUPIROCIN: 20 OINTMENT TOPICAL at 15:09

## 2021-02-12 RX ADMIN — MUPIROCIN: 20 OINTMENT TOPICAL at 20:07

## 2021-02-12 RX ADMIN — ACETAMINOPHEN 650 MG: 325 TABLET ORAL at 17:12

## 2021-02-12 RX ADMIN — MORPHINE SULFATE 15 MG: 15 TABLET ORAL at 05:46

## 2021-02-12 RX ADMIN — METOPROLOL SUCCINATE 50 MG: 50 TABLET, EXTENDED RELEASE ORAL at 08:22

## 2021-02-12 RX ADMIN — PRAMIPEXOLE DIHYDROCHLORIDE 0.5 MG: 0.25 TABLET ORAL at 08:22

## 2021-02-12 RX ADMIN — HYDROCODONE BITARTRATE AND ACETAMINOPHEN 1 TABLET: 7.5; 325 TABLET ORAL at 20:07

## 2021-02-12 RX ADMIN — POTASSIUM CHLORIDE 20 MEQ: 1500 TABLET, EXTENDED RELEASE ORAL at 17:12

## 2021-02-12 RX ADMIN — MUPIROCIN: 20 OINTMENT TOPICAL at 11:14

## 2021-02-12 RX ADMIN — POTASSIUM CHLORIDE 20 MEQ: 1500 TABLET, EXTENDED RELEASE ORAL at 08:28

## 2021-02-12 RX ADMIN — DULOXETINE HYDROCHLORIDE 60 MG: 30 CAPSULE, DELAYED RELEASE ORAL at 08:22

## 2021-02-12 RX ADMIN — LEVOTHYROXINE SODIUM 50 MCG: 0.05 TABLET ORAL at 05:41

## 2021-02-12 RX ADMIN — SODIUM CHLORIDE: 9 INJECTION, SOLUTION INTRAVENOUS at 08:22

## 2021-02-12 RX ADMIN — SODIUM CHLORIDE, PRESERVATIVE FREE 10 ML: 5 INJECTION INTRAVENOUS at 08:22

## 2021-02-12 RX ADMIN — BENZOCAINE 6 MG-MENTHOL 10 MG LOZENGES 1 LOZENGE: at 01:42

## 2021-02-12 RX ADMIN — VANCOMYCIN HYDROCHLORIDE 1500 MG: 10 INJECTION, POWDER, LYOPHILIZED, FOR SOLUTION INTRAVENOUS at 05:46

## 2021-02-12 RX ADMIN — PRAMIPEXOLE DIHYDROCHLORIDE 0.5 MG: 0.25 TABLET ORAL at 20:03

## 2021-02-12 RX ADMIN — ENOXAPARIN SODIUM 40 MG: 40 INJECTION SUBCUTANEOUS at 15:14

## 2021-02-12 RX ADMIN — BENZOCAINE 6 MG-MENTHOL 10 MG LOZENGES 1 LOZENGE: at 08:22

## 2021-02-12 RX ADMIN — HYDROCHLOROTHIAZIDE 6.25 MG: 25 TABLET ORAL at 08:22

## 2021-02-12 ASSESSMENT — PAIN SCALES - GENERAL
PAINLEVEL_OUTOF10: 5
PAINLEVEL_OUTOF10: 10

## 2021-02-12 ASSESSMENT — PAIN DESCRIPTION - DESCRIPTORS: DESCRIPTORS: HEADACHE

## 2021-02-12 ASSESSMENT — PAIN - FUNCTIONAL ASSESSMENT: PAIN_FUNCTIONAL_ASSESSMENT: PREVENTS OR INTERFERES SOME ACTIVE ACTIVITIES AND ADLS

## 2021-02-12 ASSESSMENT — PAIN DESCRIPTION - LOCATION: LOCATION: KNEE;LEG

## 2021-02-12 ASSESSMENT — PAIN DESCRIPTION - ONSET
ONSET: ON-GOING
ONSET: ON-GOING

## 2021-02-12 ASSESSMENT — PAIN DESCRIPTION - PROGRESSION: CLINICAL_PROGRESSION: GRADUALLY IMPROVING

## 2021-02-12 ASSESSMENT — PAIN DESCRIPTION - FREQUENCY: FREQUENCY: CONTINUOUS

## 2021-02-12 NOTE — H&P
[facility]  History and Physical    Patient:  Eriberto Moody  MRN: [id number]    CHIEF COMPLAINT: Pain and swelling right lower leg      PCP: Chasity Hsieh MD    HISTORY OF PRESENT ILLNESS:   The patient is a 65 y.o. female who presented to the emergency room with complaints of pain and swelling in the right lower leg. She is noted significant redness and warmth to the area. Symptoms started Wednesday. She denies any injury. Said she really does not have any significant pain in the leg. Has some mild swelling in both legs which is chronic and unchanged from baseline. No history of DVT or PE. She does have history of cellulitis before. On exam she has significant erythema from the ankle extending up to the knee. No swelling of the joints. The Area is very erythematous and warm to the touch consistent with cellulitis. She has no other complaints. No fever chills or other constitutional symptoms. No chest pain or dyspnea. No Covid exposure that she is aware of. She has been admitted for further evaluation and stabilization. 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 Authorizing Provider   gabapentin (NEURONTIN) 100 MG capsule Take 100 mg by mouth 3 times daily.    Yes Historical Provider, MD   fluticasone (FLONASE) 50 MCG/ACT nasal spray 1 spray by Each Nostril route daily 5/13/20  Yes CONSTANTIN Salomon   HYDROcodone-acetaminophen (NORCO) 7.5-325 MG per tablet Take 1 tablet by mouth every 6 hours as needed for Pain. Yes Historical Provider, MD   cyclobenzaprine (FLEXERIL) 5 MG tablet Take 5 mg by mouth 3 times daily as needed for Muscle spasms   Yes Historical Provider, MD   DULoxetine (CYMBALTA) 60 MG extended release capsule Take 1 capsule by mouth daily 3/22/18  Yes Odean Sandifer, MD   morphine (MSIR) 15 MG tablet Take 15 mg by mouth every 4 hours as needed for Pain.    Yes Historical Provider, MD   bisoprolol-hydrochlorothiazide (ZIAC) 5-6.25 MG per tablet Take 1 tablet by mouth daily   Yes Historical Provider, MD   levothyroxine (SYNTHROID) 50 MCG tablet Take 50 mcg by mouth Daily   Yes Historical Provider, MD   pramipexole (MIRAPEX) 0.5 MG tablet Take 0.5 mg by mouth 3 times daily    Historical Provider, MD       Allergies:  Benadryl [diphenhydramine] and Codeine    Social History:   Social History     Socioeconomic History    Marital status:      Spouse name: Not on file    Number of children: Not on file    Years of education: Not on file    Highest education level: Not on file   Occupational History    Not on file   Social Needs    Financial resource strain: Not on file    Food insecurity     Worry: Not on file     Inability: Not on file    Transportation needs     Medical: Not on file     Non-medical: Not on file   Tobacco Use    Smoking status: Never Smoker    Smokeless tobacco: Never Used   Substance and Sexual Activity    Alcohol use: No    Drug use: No    Sexual activity: Not on file   Lifestyle    Physical activity     Days per week: Not on file     Minutes per session: Not on file    Stress: Not on file   Relationships    Social connections     Talks on phone: Not on file     Gets together: Not on file     Attends Christianity service: Not on file     Active member of club or organization: Not on file     Attends meetings of clubs or organizations: Not on file     Relationship status: Not on file    Intimate partner violence     Fear of current or ex partner: Not on file     Emotionally abused: Not on file     Physically abused: Not on file     Forced sexual activity: Not on file   Other Topics Concern    Not on file   Social History Narrative    Not on file       Family History:   History reviewed. No pertinent family history. Review of systems:    Con: no fever/chills or significant weight changes   Heent: denies HA, change in vision or hearing. No significant sinus drainage. No             difficulties swallowing. No recent trauma noted. CV: denies CP, dyspnea on exertion, Palpitations. No change in exercise tolerance  Pulm: No cough, sputum production, denies hemoptysis   GI: denies changes in bowel habits, no diarrhea or significant constipation. No BRBPR       or melena. : no dysuria, hesitancy or dysuria. Denies hematuria. Derm: no rashes or new lesions of concern. Psych: no signs of depression, anxiety or significant mood changes. Neuro: denies focal weakness or change in mentation  A full 14 point review of systems is otherwise negative outside listed above and HPI      Physical Exam:    Vitals: /64   Pulse 57   Temp 96 °F (35.6 °C) (Temporal)   Resp 18   Ht 5' 3\" (1.6 m)   Wt 250 lb (113.4 kg)   SpO2 93%   BMI 44.29 kg/m²     GENERAL: WD/WN not in acute distress, resting well in bed  HEENT: NC/AT PERRL, EOMI grossly, TM's clear, OP negative without sig erythema or exudate, neck is supple without masses or carotid bruit noted    CV: normal S1 and S2, no sig murmur, lift or gallop, normal PMI. Positive for bilateral lower extremity swelling which is chronic in nature. CHEST: clear to auscultation both anterior and posterior, no rales rhonchi or wheeze appreciated. ABD: soft NT/ND with normoactive BS noted.  No guarding or rebounding noted  /rectal: deferred  EXT: Right lower extremity with erythema and warmth, moderate edema 2+, small area of excoriation right lateral mid fibula region. Approximately 1 to 2 cm  DERM: skin is otherwise warm and dry without sig rashes on exposed areas  PSYCH: appears mentally stable with no obvious abnormalities  NEURO: CN 2-12 apper grossly intact without sig deficits in motor or sensory       CBC:   Recent Labs     02/11/21  1154 02/12/21  0317   WBC 12.0* 8.4   HGB 13.1 12.2   * 91*     BMP:    Recent Labs     02/11/21  1154 02/12/21  0317   * 137   K 3.9 3.0*   CL 95* 99   CO2 29 28   BUN 18 16   CREATININE 0.9 0.7   GLUCOSE 131* 117*     Hepatic: No results for input(s): AST, ALT, ALB, BILITOT, ALKPHOS in the last 72 hours. Troponin T: No results for input(s): TROPONINI in the last 72 hours. Pro-BNP: No results for input(s): BNP in the last 72 hours. Lipids: No results for input(s): CHOL, HDL in the last 72 hours. Invalid input(s): LDLCALCU  ABGs: No results found for: PHART, PO2ART, DEQ7UVC  INR:   Recent Labs     02/11/21  1154   INR 1.28*     -----------------------------------------------------------------  EKG:   Radiology:     No results found. Assessment and Plan     Active Problems:    Cellulitis  Essential hypertension--continue with Ziac  Hypothyroidism--Synthroid 50 mcg  Chronic low back pain-- continue with morphine and hydrocodone as needed  RLS--Mirapex  Morbid obesity BMI 44.3  Hypokalemia  VTE prophylaxis--Lovenox    Continue with IV vancomycin, topical skin care  Elevation  Gentle diuresis  Follow along with daily labs  Venous Doppler negative for SVT/DVT  Follow clinical course  Will need close follow-up as an outpatient with wound care. Current patient. Meredith Schirmer  .  2/12/2021     Added topical Bactroban and wound care evaluation  Place sliding scale potassium orders and added magnesium to a.m. labs. Medically stable. Continue with IV diuresis and IV antibiotics with local skin care. Negative for DVT.     I have discussed the care of Roney Stockton, including pertinent history and exam findings with the ARNP/PA. I have seen and examined the patient and the key elements of all parts of the encounter have been performed by me. I agree with the assessment and plan as outlined by the ARNP/PA. Please refer to my separate note for complete documentation.      Electronically signed by Edwin Mendoza MD on 2/12/2021 at 9:46 AM

## 2021-02-13 LAB
ANION GAP SERPL CALCULATED.3IONS-SCNC: 8 MMOL/L (ref 7–19)
BUN BLDV-MCNC: 12 MG/DL (ref 8–23)
CALCIUM SERPL-MCNC: 8.4 MG/DL (ref 8.8–10.2)
CHLORIDE BLD-SCNC: 101 MMOL/L (ref 98–111)
CO2: 29 MMOL/L (ref 22–29)
CREAT SERPL-MCNC: 0.6 MG/DL (ref 0.5–0.9)
GFR AFRICAN AMERICAN: >59
GFR NON-AFRICAN AMERICAN: >60
GLUCOSE BLD-MCNC: 100 MG/DL (ref 70–99)
GLUCOSE BLD-MCNC: 88 MG/DL (ref 74–109)
MAGNESIUM: 1.7 MG/DL (ref 1.6–2.4)
PERFORMED ON: ABNORMAL
POTASSIUM SERPL-SCNC: 3.4 MMOL/L (ref 3.5–5)
SODIUM BLD-SCNC: 138 MMOL/L (ref 136–145)

## 2021-02-13 PROCEDURE — 6370000000 HC RX 637 (ALT 250 FOR IP): Performed by: PHYSICIAN ASSISTANT

## 2021-02-13 PROCEDURE — 80048 BASIC METABOLIC PNL TOTAL CA: CPT

## 2021-02-13 PROCEDURE — 6370000000 HC RX 637 (ALT 250 FOR IP): Performed by: FAMILY MEDICINE

## 2021-02-13 PROCEDURE — 82947 ASSAY GLUCOSE BLOOD QUANT: CPT

## 2021-02-13 PROCEDURE — 36415 COLL VENOUS BLD VENIPUNCTURE: CPT

## 2021-02-13 PROCEDURE — 6360000002 HC RX W HCPCS: Performed by: FAMILY MEDICINE

## 2021-02-13 PROCEDURE — 2580000003 HC RX 258: Performed by: FAMILY MEDICINE

## 2021-02-13 PROCEDURE — 83735 ASSAY OF MAGNESIUM: CPT

## 2021-02-13 RX ORDER — CEPHALEXIN 500 MG/1
500 CAPSULE ORAL 3 TIMES DAILY
Qty: 21 CAPSULE | Refills: 0 | Status: SHIPPED | OUTPATIENT
Start: 2021-02-13 | End: 2021-02-20

## 2021-02-13 RX ADMIN — PRAMIPEXOLE DIHYDROCHLORIDE 0.5 MG: 0.25 TABLET ORAL at 08:22

## 2021-02-13 RX ADMIN — VANCOMYCIN HYDROCHLORIDE 1500 MG: 10 INJECTION, POWDER, LYOPHILIZED, FOR SOLUTION INTRAVENOUS at 01:34

## 2021-02-13 RX ADMIN — MUPIROCIN: 20 OINTMENT TOPICAL at 08:34

## 2021-02-13 RX ADMIN — POTASSIUM CHLORIDE 40 MEQ: 1500 TABLET, EXTENDED RELEASE ORAL at 08:33

## 2021-02-13 RX ADMIN — POTASSIUM CHLORIDE 20 MEQ: 1500 TABLET, EXTENDED RELEASE ORAL at 08:22

## 2021-02-13 RX ADMIN — LEVOTHYROXINE SODIUM 50 MCG: 0.05 TABLET ORAL at 06:01

## 2021-02-13 RX ADMIN — MORPHINE SULFATE 15 MG: 15 TABLET ORAL at 02:38

## 2021-02-13 RX ADMIN — METOPROLOL SUCCINATE 50 MG: 50 TABLET, EXTENDED RELEASE ORAL at 08:22

## 2021-02-13 RX ADMIN — DULOXETINE HYDROCHLORIDE 60 MG: 30 CAPSULE, DELAYED RELEASE ORAL at 08:22

## 2021-02-13 RX ADMIN — HYDROCODONE BITARTRATE AND ACETAMINOPHEN 1 TABLET: 7.5; 325 TABLET ORAL at 07:20

## 2021-02-13 RX ADMIN — HYDROCHLOROTHIAZIDE 6.25 MG: 25 TABLET ORAL at 08:23

## 2021-02-13 ASSESSMENT — PAIN SCALES - GENERAL: PAINLEVEL_OUTOF10: 7

## 2021-02-13 NOTE — DISCHARGE SUMMARY
DISCHARGE SUMMARY       Date of Admission: 2/11/2021  Date of Discharge:  2/13/2021  Primary Care Physician: Rea Harris MD    Discharge Diagnoses:  Principal Problem:    Cellulitis  Active Problems:    Essential hypertension    Acquired hypothyroidism    Hypokalemia    Morbid obesity due to excess calories [facility])  Resolved Problems:    * No resolved hospital problems. *        Presenting Problem/History of Present Illness  Cellulitis [L03.90]       Hospital Course  Patient admitted with right leg swelling and redness. She elevated D-dimer and underwent venous Doppler study which showed no DVT. She was treated with IV vancomycin for cellulitis. She has had improvement in the erythema which she states was above her knee initially and is now at the mid shin. She has mild erythema at this time and some changes consistent with a resolving streptococcal cellulitis. She is afebrile. Her white blood count returned to normal.  Her cultures are negative at this point. She is anxious to go home today.   I think it is reasonable to discharge on oral antibiotics and monitor closely as an outpatient    Procedures Performed    Venous Doppler study which was negative for DVT    Consults:   None        Condition on Discharge: Stable and improved    Discharge Disposition: Home      Discharge Medications   Gregg Nicole   Home Medication Instructions [id number]    Printed on:02/13/21 1323   Medication Information                      bisoprolol-hydrochlorothiazide (ZIAC) 5-6.25 MG per tablet  Take 1 tablet by mouth daily             cephALEXin (KEFLEX) 500 MG capsule  Take 1 capsule by mouth 3 times daily for 7 days             cyclobenzaprine (FLEXERIL) 5 MG tablet  Take 5 mg by mouth 3 times daily as needed for Muscle spasms             DULoxetine (CYMBALTA) 60 MG extended release capsule  Take 1 capsule by mouth daily             fluticasone (FLONASE) 50 MCG/ACT nasal spray  1 spray by Each Nostril route daily             gabapentin (NEURONTIN) 100 MG capsule  Take 100 mg by mouth 3 times daily. HYDROcodone-acetaminophen (NORCO) 7.5-325 MG per tablet  Take 1 tablet by mouth every 6 hours as needed for Pain.             levothyroxine (SYNTHROID) 50 MCG tablet  Take 50 mcg by mouth Daily             morphine (MSIR) 15 MG tablet  Take 15 mg by mouth every 4 hours as needed for Pain. pramipexole (MIRAPEX) 0.5 MG tablet  Take 0.5 mg by mouth 3 times daily                 Discharge Diet: Regular    Discharge Care Plan / Instructions:     Activity at Discharge: As tolerated    Follow-up Appointments  Follow-up with Dr. Jaison Bach next week    Test Results Pending at Discharge       Nhi Don  02/13/21  1:29 PM

## 2021-02-16 VITALS
HEART RATE: 55 BPM | BODY MASS INDEX: 44.19 KG/M2 | WEIGHT: 249.4 LBS | SYSTOLIC BLOOD PRESSURE: 135 MMHG | HEIGHT: 63 IN | DIASTOLIC BLOOD PRESSURE: 63 MMHG | TEMPERATURE: 97.7 F | OXYGEN SATURATION: 94 % | RESPIRATION RATE: 18 BRPM

## 2021-04-30 ENCOUNTER — HOSPITAL ENCOUNTER (INPATIENT)
Age: 71
LOS: 3 days | Discharge: HOME | DRG: 603 | End: 2021-05-03
Attending: EMERGENCY MEDICINE | Admitting: FAMILY MEDICINE
Payer: MEDICARE

## 2021-04-30 DIAGNOSIS — L03.116 CELLULITIS OF LEFT LOWER EXTREMITY: Primary | ICD-10-CM

## 2021-04-30 PROBLEM — L03.119 CELLULITIS OF LOWER LEG: Status: ACTIVE | Noted: 2021-04-30

## 2021-04-30 LAB
ALBUMIN SERPL-MCNC: 3.8 G/DL (ref 3.5–5.2)
ALP BLD-CCNC: 195 U/L (ref 35–104)
ALT SERPL-CCNC: 26 U/L (ref 5–33)
ANION GAP SERPL CALCULATED.3IONS-SCNC: 10 MMOL/L (ref 7–19)
AST SERPL-CCNC: 44 U/L (ref 5–32)
BASOPHILS ABSOLUTE: 0 K/UL (ref 0–0.2)
BASOPHILS RELATIVE PERCENT: 0.2 % (ref 0–1)
BILIRUB SERPL-MCNC: 2 MG/DL (ref 0.2–1.2)
BUN BLDV-MCNC: 12 MG/DL (ref 8–23)
C-REACTIVE PROTEIN: 2.99 MG/DL (ref 0–0.5)
CALCIUM SERPL-MCNC: 9 MG/DL (ref 8.8–10.2)
CHLORIDE BLD-SCNC: 98 MMOL/L (ref 98–111)
CO2: 28 MMOL/L (ref 22–29)
CREAT SERPL-MCNC: 0.9 MG/DL (ref 0.5–0.9)
EOSINOPHILS ABSOLUTE: 0 K/UL (ref 0–0.6)
EOSINOPHILS RELATIVE PERCENT: 0.1 % (ref 0–5)
GFR AFRICAN AMERICAN: >59
GFR NON-AFRICAN AMERICAN: >60
GLUCOSE BLD-MCNC: 129 MG/DL (ref 74–109)
HCT VFR BLD CALC: 43.1 % (ref 37–47)
HEMOGLOBIN: 14.3 G/DL (ref 12–16)
IMMATURE GRANULOCYTES #: 0.2 K/UL
LACTIC ACID, SEPSIS: 2.6 MG/DL (ref 0.5–1.9)
LYMPHOCYTES ABSOLUTE: 1.1 K/UL (ref 1.1–4.5)
LYMPHOCYTES RELATIVE PERCENT: 6.5 % (ref 20–40)
MCH RBC QN AUTO: 31.2 PG (ref 27–31)
MCHC RBC AUTO-ENTMCNC: 33.2 G/DL (ref 33–37)
MCV RBC AUTO: 93.9 FL (ref 81–99)
MONOCYTES ABSOLUTE: 1.5 K/UL (ref 0–0.9)
MONOCYTES RELATIVE PERCENT: 9.3 % (ref 0–10)
NEUTROPHILS ABSOLUTE: 13.7 K/UL (ref 1.5–7.5)
NEUTROPHILS RELATIVE PERCENT: 82.9 % (ref 50–65)
PDW BLD-RTO: 14.2 % (ref 11.5–14.5)
PLATELET # BLD: 96 K/UL (ref 130–400)
PMV BLD AUTO: 10.9 FL (ref 9.4–12.3)
POTASSIUM REFLEX MAGNESIUM: 4 MMOL/L (ref 3.5–5)
PRO-BNP: 418 PG/ML (ref 0–900)
RBC # BLD: 4.59 M/UL (ref 4.2–5.4)
SARS-COV-2, NAAT: NOT DETECTED
SEDIMENTATION RATE, ERYTHROCYTE: 4 MM/HR (ref 0–25)
SODIUM BLD-SCNC: 136 MMOL/L (ref 136–145)
TOTAL PROTEIN: 6.7 G/DL (ref 6.6–8.7)
WBC # BLD: 16.6 K/UL (ref 4.8–10.8)

## 2021-04-30 PROCEDURE — 96374 THER/PROPH/DIAG INJ IV PUSH: CPT

## 2021-04-30 PROCEDURE — 6370000000 HC RX 637 (ALT 250 FOR IP): Performed by: FAMILY MEDICINE

## 2021-04-30 PROCEDURE — 1210000000 HC MED SURG R&B

## 2021-04-30 PROCEDURE — 83605 ASSAY OF LACTIC ACID: CPT

## 2021-04-30 PROCEDURE — 2500000003 HC RX 250 WO HCPCS: Performed by: PHYSICIAN ASSISTANT

## 2021-04-30 PROCEDURE — 6360000002 HC RX W HCPCS: Performed by: FAMILY MEDICINE

## 2021-04-30 PROCEDURE — 83880 ASSAY OF NATRIURETIC PEPTIDE: CPT

## 2021-04-30 PROCEDURE — 80053 COMPREHEN METABOLIC PANEL: CPT

## 2021-04-30 PROCEDURE — 85025 COMPLETE CBC W/AUTO DIFF WBC: CPT

## 2021-04-30 PROCEDURE — 2580000003 HC RX 258: Performed by: PHYSICIAN ASSISTANT

## 2021-04-30 PROCEDURE — 36415 COLL VENOUS BLD VENIPUNCTURE: CPT

## 2021-04-30 PROCEDURE — 86140 C-REACTIVE PROTEIN: CPT

## 2021-04-30 PROCEDURE — 87635 SARS-COV-2 COVID-19 AMP PRB: CPT

## 2021-04-30 PROCEDURE — 6360000002 HC RX W HCPCS: Performed by: PHYSICIAN ASSISTANT

## 2021-04-30 PROCEDURE — 99283 EMERGENCY DEPT VISIT LOW MDM: CPT

## 2021-04-30 PROCEDURE — 85652 RBC SED RATE AUTOMATED: CPT

## 2021-04-30 PROCEDURE — 2580000003 HC RX 258: Performed by: FAMILY MEDICINE

## 2021-04-30 RX ORDER — HYDROCODONE BITARTRATE AND ACETAMINOPHEN 7.5; 325 MG/1; MG/1
1 TABLET ORAL EVERY 6 HOURS PRN
Status: DISCONTINUED | OUTPATIENT
Start: 2021-04-30 | End: 2021-05-03 | Stop reason: HOSPADM

## 2021-04-30 RX ORDER — DULOXETIN HYDROCHLORIDE 30 MG/1
60 CAPSULE, DELAYED RELEASE ORAL DAILY
Status: DISCONTINUED | OUTPATIENT
Start: 2021-04-30 | End: 2021-05-03 | Stop reason: HOSPADM

## 2021-04-30 RX ORDER — METOPROLOL SUCCINATE 50 MG/1
50 TABLET, EXTENDED RELEASE ORAL DAILY
Status: DISCONTINUED | OUTPATIENT
Start: 2021-04-30 | End: 2021-05-03 | Stop reason: HOSPADM

## 2021-04-30 RX ORDER — ACETAMINOPHEN 325 MG/1
650 TABLET ORAL EVERY 4 HOURS PRN
Status: DISCONTINUED | OUTPATIENT
Start: 2021-04-30 | End: 2021-05-03 | Stop reason: HOSPADM

## 2021-04-30 RX ORDER — SODIUM CHLORIDE 0.9 % (FLUSH) 0.9 %
5-40 SYRINGE (ML) INJECTION EVERY 12 HOURS SCHEDULED
Status: DISCONTINUED | OUTPATIENT
Start: 2021-04-30 | End: 2021-05-03 | Stop reason: HOSPADM

## 2021-04-30 RX ORDER — SODIUM CHLORIDE 0.9 % (FLUSH) 0.9 %
5-40 SYRINGE (ML) INJECTION PRN
Status: DISCONTINUED | OUTPATIENT
Start: 2021-04-30 | End: 2021-05-03 | Stop reason: HOSPADM

## 2021-04-30 RX ORDER — CYCLOBENZAPRINE HCL 5 MG
5 TABLET ORAL 3 TIMES DAILY PRN
Status: DISCONTINUED | OUTPATIENT
Start: 2021-04-30 | End: 2021-05-03 | Stop reason: HOSPADM

## 2021-04-30 RX ORDER — PRAMIPEXOLE DIHYDROCHLORIDE 0.25 MG/1
0.5 TABLET ORAL 3 TIMES DAILY
Status: DISCONTINUED | OUTPATIENT
Start: 2021-04-30 | End: 2021-05-03 | Stop reason: HOSPADM

## 2021-04-30 RX ORDER — FLUTICASONE PROPIONATE 50 MCG
1 SPRAY, SUSPENSION (ML) NASAL DAILY
Status: DISCONTINUED | OUTPATIENT
Start: 2021-04-30 | End: 2021-05-03 | Stop reason: HOSPADM

## 2021-04-30 RX ORDER — GABAPENTIN 100 MG/1
100 CAPSULE ORAL 3 TIMES DAILY
Status: DISCONTINUED | OUTPATIENT
Start: 2021-04-30 | End: 2021-05-03 | Stop reason: HOSPADM

## 2021-04-30 RX ORDER — LEVOTHYROXINE SODIUM 0.05 MG/1
50 TABLET ORAL DAILY
Status: DISCONTINUED | OUTPATIENT
Start: 2021-05-01 | End: 2021-05-03 | Stop reason: HOSPADM

## 2021-04-30 RX ORDER — BISOPROLOL FUMARATE AND HYDROCHLOROTHIAZIDE 5; 6.25 MG/1; MG/1
1 TABLET ORAL DAILY
Status: DISCONTINUED | OUTPATIENT
Start: 2021-04-30 | End: 2021-04-30 | Stop reason: CLARIF

## 2021-04-30 RX ORDER — SODIUM CHLORIDE 9 MG/ML
25 INJECTION, SOLUTION INTRAVENOUS PRN
Status: DISCONTINUED | OUTPATIENT
Start: 2021-04-30 | End: 2021-05-03 | Stop reason: HOSPADM

## 2021-04-30 RX ORDER — MORPHINE SULFATE 15 MG/1
15 TABLET ORAL EVERY 4 HOURS PRN
Status: DISCONTINUED | OUTPATIENT
Start: 2021-04-30 | End: 2021-05-03 | Stop reason: HOSPADM

## 2021-04-30 RX ORDER — 0.9 % SODIUM CHLORIDE 0.9 %
500 INTRAVENOUS SOLUTION INTRAVENOUS ONCE
Status: COMPLETED | OUTPATIENT
Start: 2021-04-30 | End: 2021-04-30

## 2021-04-30 RX ADMIN — GABAPENTIN 100 MG: 100 CAPSULE ORAL at 20:55

## 2021-04-30 RX ADMIN — METOPROLOL SUCCINATE 50 MG: 50 TABLET, EXTENDED RELEASE ORAL at 20:59

## 2021-04-30 RX ADMIN — SODIUM CHLORIDE 500 ML: 9 INJECTION, SOLUTION INTRAVENOUS at 17:46

## 2021-04-30 RX ADMIN — SODIUM CHLORIDE, PRESERVATIVE FREE 10 ML: 5 INJECTION INTRAVENOUS at 20:59

## 2021-04-30 RX ADMIN — DULOXETINE HYDROCHLORIDE 60 MG: 30 CAPSULE, DELAYED RELEASE ORAL at 20:55

## 2021-04-30 RX ADMIN — PIPERACILLIN AND TAZOBACTAM 3375 MG: 3; .375 INJECTION, POWDER, LYOPHILIZED, FOR SOLUTION INTRAVENOUS at 20:55

## 2021-04-30 RX ADMIN — CEFTRIAXONE 2000 MG: 1 INJECTION, POWDER, FOR SOLUTION INTRAMUSCULAR; INTRAVENOUS at 16:10

## 2021-04-30 RX ADMIN — HYDROCODONE BITARTRATE AND ACETAMINOPHEN 1 TABLET: 7.5; 325 TABLET ORAL at 20:55

## 2021-04-30 RX ADMIN — METRONIDAZOLE 500 MG: 500 INJECTION, SOLUTION INTRAVENOUS at 18:11

## 2021-04-30 RX ADMIN — ENOXAPARIN SODIUM 40 MG: 40 INJECTION SUBCUTANEOUS at 20:55

## 2021-04-30 RX ADMIN — PRAMIPEXOLE DIHYDROCHLORIDE 0.5 MG: 0.25 TABLET ORAL at 20:55

## 2021-04-30 RX ADMIN — FLUTICASONE PROPIONATE 1 SPRAY: 50 SPRAY, METERED NASAL at 22:53

## 2021-04-30 ASSESSMENT — ENCOUNTER SYMPTOMS
ABDOMINAL DISTENTION: 0
BACK PAIN: 0
RHINORRHEA: 0
NAUSEA: 0
COLOR CHANGE: 1
APNEA: 0
ABDOMINAL PAIN: 0
EYE PAIN: 0
COUGH: 0
SHORTNESS OF BREATH: 0
EYE DISCHARGE: 0
SORE THROAT: 0
PHOTOPHOBIA: 0

## 2021-04-30 ASSESSMENT — PAIN SCALES - GENERAL: PAINLEVEL_OUTOF10: 6

## 2021-04-30 NOTE — PROGRESS NOTES
Pharmacy Note  Vancomycin Consult    Madhavi Zuniga is a 65 y.o. female started on Vancomycin for skin/soft tissue infection; consult received from Dr. Kelsey Reyes to manage therapy. Also receiving the following antibiotics: Ceftriaxone. Active Problems:    Cellulitis of lower leg  Resolved Problems:    * No resolved hospital problems. *      Allergies:  Benadryl [diphenhydramine] and Codeine     Temp max: 97    Recent Labs     04/30/21  1604   BUN 12       Recent Labs     04/30/21  1604   CREATININE 0.9       Recent Labs     04/30/21  1604   WBC 16.6*       No intake or output data in the 24 hours ending 04/30/21 1818    Culture Date Source Results   none                   Ht Readings from Last 1 Encounters:   04/30/21 5' 3\" (1.6 m)        Wt Readings from Last 1 Encounters:   04/30/21 250 lb (113.4 kg)         Body mass index is 44.29 kg/m². Estimated Creatinine Clearance: 69 mL/min (based on SCr of 0.9 mg/dL). Assessment/Plan:  Will initiate vancomycin 1250 mg IV every 24 hours. Timing of trough level will be determined based on culture results, renal function, and clinical response. Thank you for the consult. Will continue to follow.     Electronically signed by Lisseth Murcia, [address] [place] [address] on 4/30/2021 at 6:18 PM

## 2021-04-30 NOTE — ED PROVIDER NOTES
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 fluticasone (FLONASE) 50 MCG/ACT nasal spray 1 spray by Each Nostril route daily  Qty: 1 Bottle, Refills: 0      HYDROcodone-acetaminophen (NORCO) 7.5-325 MG per tablet Take 1 tablet by mouth every 6 hours as needed for Pain. cyclobenzaprine (FLEXERIL) 5 MG tablet Take 5 mg by mouth 3 times daily as needed for Muscle spasms      pramipexole (MIRAPEX) 0.5 MG tablet Take 0.5 mg by mouth 3 times daily      DULoxetine (CYMBALTA) 60 MG extended release capsule Take 1 capsule by mouth daily  Qty: 30 capsule, Refills: 3      morphine (MSIR) 15 MG tablet Take 15 mg by mouth every 4 hours as needed for Pain. bisoprolol-hydrochlorothiazide (ZIAC) 5-6.25 MG per tablet Take 1 tablet by mouth daily      levothyroxine (SYNTHROID) 50 MCG tablet Take 50 mcg by mouth Daily             ALLERGIES     Benadryl [diphenhydramine] and Codeine    FAMILY HISTORY     History reviewed. No pertinent family history.        SOCIAL HISTORY       Social History     Socioeconomic History    Marital status:      Spouse name: None    Number of children: None    Years of education: None    Highest education level: None   Occupational History    None   Social Needs    Financial murmur. No friction rub. Pulmonary:      Effort: Pulmonary effort is normal. No respiratory distress. Breath sounds: Normal breath sounds. No stridor. No wheezing. Abdominal:      General: Bowel sounds are normal. There is no distension. Palpations: Abdomen is soft. Tenderness: There is no abdominal tenderness. Musculoskeletal: Normal range of motion. General: Tenderness and signs of injury present. Legs:    Skin:     General: Skin is warm and dry. Capillary Refill: Capillary refill takes less than 2 seconds. Findings: Erythema and lesion present. No rash. Neurological:      General: No focal deficit present. Mental Status: She is alert and oriented to person, place, and time. Mental status is at baseline. Cranial Nerves: No cranial nerve deficit. Sensory: No sensory deficit. Coordination: Coordination normal.   Psychiatric:         Mood and Affect: Mood normal.         Behavior: Behavior normal.         Thought Content:  Thought content normal.         Judgment: Judgment normal.           DIAGNOSTIC RESULTS     RADIOLOGY:   Non-plain film images such as CT, Ultrasound and MRI are read by the radiologist. Plain radiographic images are visualized and preliminarilyinterpreted by Hannah Alaniz MD with the below findings:      Interpretation per the Radiologist below, if available at the time of this note:    No orders to display       LABS:  Labs Reviewed   CBC WITH AUTO DIFFERENTIAL - Abnormal; Notable for the following components:       Result Value    WBC 16.6 (*)     MCH 31.2 (*)     Platelets 96 (*)     Neutrophils % 82.9 (*)     Lymphocytes % 6.5 (*)     Neutrophils Absolute 13.7 (*)     Monocytes Absolute 1.50 (*)     All other components within normal limits   COMPREHENSIVE METABOLIC PANEL W/ REFLEX TO MG FOR LOW K - Abnormal; Notable for the following components:    Glucose 129 (*)     Total Bilirubin 2.0 (*)     Alkaline Phosphatase 195 (*)     AST 44 (*)     All other components within normal limits   LACTATE, SEPSIS - Abnormal; Notable for the following components:    Lactic Acid, Sepsis 2.6 (*)     All other components within normal limits    Narrative:     Julissa Mcneill tel. ,  Chemistry results called to and read back by Rosalba/RN/ER, 04/30/2021 16:30,  by Amparo Machado - Abnormal; Notable for the following components:    CRP 2.99 (*)     All other components within normal limits   COVID-19, RAPID   SEDIMENTATION RATE   BRAIN NATRIURETIC PEPTIDE   LACTATE, SEPSIS   CBC WITH AUTO DIFFERENTIAL   COMPREHENSIVE METABOLIC PANEL W/ REFLEX TO MG FOR LOW K       All other labs were within normal range or notreturned as of this dictation. RE-ASSESSMENT        EMERGENCY DEPARTMENT COURSE and DIFFERENTIAL DIAGNOSIS/MDM:   Vitals:    Vitals:    04/30/21 1436 04/30/21 1815 04/30/21 1912   BP: 137/89 137/78 125/66   Pulse: 100 74 76   Resp: 20 18 17   Temp: 97 °F (36.1 °C) 97.5 °F (36.4 °C) 97.6 °F (36.4 °C)   TempSrc:   Temporal   SpO2: 93% 97% 96%   Weight: 250 lb (113.4 kg)     Height: 5' 3\" (1.6 m)         MDM  My attending is seen the patient separate from myself and is agreeable for plan for admission I have spoken with Dr. Lexi Rao who is on-call for Dr. Azul Beach the patient's PCP agreeable to admit we will give her Rocephin and Flagyl down here to cover for cat scratch and we will start Pipracil and tazobactam per Dr. Angela Dumont recommendation when she goes to the floor. We felt this was appropriate based on patient's history of recent cellulitis comorbidities and acute onset of the erythema within 12 hours and inflammatory markers. PROCEDURES:    Procedures      FINAL IMPRESSION      1. Cellulitis of left lower extremity          DISPOSITION/PLAN   DISPOSITION Admitted 04/30/2021 06:10:11 PM      PATIENT REFERRED TO:  No follow-up provider specified.     DISCHARGE MEDICATIONS:  Current Discharge Medication List          (Please

## 2021-05-01 PROBLEM — G89.4 CHRONIC PAIN SYNDROME: Chronic | Status: ACTIVE | Noted: 2021-05-01

## 2021-05-01 LAB
ALBUMIN SERPL-MCNC: 3.3 G/DL (ref 3.5–5.2)
ALP BLD-CCNC: 150 U/L (ref 35–104)
ALT SERPL-CCNC: 24 U/L (ref 5–33)
ANION GAP SERPL CALCULATED.3IONS-SCNC: 10 MMOL/L (ref 7–19)
AST SERPL-CCNC: 39 U/L (ref 5–32)
BILIRUB SERPL-MCNC: 1.5 MG/DL (ref 0.2–1.2)
BUN BLDV-MCNC: 14 MG/DL (ref 8–23)
CALCIUM SERPL-MCNC: 8.3 MG/DL (ref 8.8–10.2)
CHLORIDE BLD-SCNC: 100 MMOL/L (ref 98–111)
CO2: 26 MMOL/L (ref 22–29)
CREAT SERPL-MCNC: 0.8 MG/DL (ref 0.5–0.9)
GFR AFRICAN AMERICAN: >59
GFR NON-AFRICAN AMERICAN: >60
GLUCOSE BLD-MCNC: 107 MG/DL (ref 74–109)
LACTIC ACID, SEPSIS: 1.5 MG/DL (ref 0.5–1.9)
POTASSIUM REFLEX MAGNESIUM: 4.1 MMOL/L (ref 3.5–5)
REASON FOR REJECTION: NORMAL
REJECTED TEST: NORMAL
SODIUM BLD-SCNC: 136 MMOL/L (ref 136–145)
TOTAL PROTEIN: 5.4 G/DL (ref 6.6–8.7)

## 2021-05-01 PROCEDURE — 36415 COLL VENOUS BLD VENIPUNCTURE: CPT

## 2021-05-01 PROCEDURE — 83605 ASSAY OF LACTIC ACID: CPT

## 2021-05-01 PROCEDURE — 1210000000 HC MED SURG R&B

## 2021-05-01 PROCEDURE — 80053 COMPREHEN METABOLIC PANEL: CPT

## 2021-05-01 PROCEDURE — 6370000000 HC RX 637 (ALT 250 FOR IP): Performed by: FAMILY MEDICINE

## 2021-05-01 PROCEDURE — 6360000002 HC RX W HCPCS: Performed by: FAMILY MEDICINE

## 2021-05-01 PROCEDURE — 2580000003 HC RX 258: Performed by: FAMILY MEDICINE

## 2021-05-01 RX ADMIN — PIPERACILLIN AND TAZOBACTAM 3375 MG: 3; .375 INJECTION, POWDER, LYOPHILIZED, FOR SOLUTION INTRAVENOUS at 20:36

## 2021-05-01 RX ADMIN — DULOXETINE HYDROCHLORIDE 60 MG: 30 CAPSULE, DELAYED RELEASE ORAL at 08:36

## 2021-05-01 RX ADMIN — SODIUM CHLORIDE, PRESERVATIVE FREE 10 ML: 5 INJECTION INTRAVENOUS at 08:37

## 2021-05-01 RX ADMIN — PRAMIPEXOLE DIHYDROCHLORIDE 0.5 MG: 0.25 TABLET ORAL at 14:22

## 2021-05-01 RX ADMIN — MORPHINE SULFATE 15 MG: 15 TABLET ORAL at 08:37

## 2021-05-01 RX ADMIN — LEVOTHYROXINE SODIUM 50 MCG: 50 TABLET ORAL at 05:13

## 2021-05-01 RX ADMIN — FLUTICASONE PROPIONATE 1 SPRAY: 50 SPRAY, METERED NASAL at 08:37

## 2021-05-01 RX ADMIN — SODIUM CHLORIDE, PRESERVATIVE FREE 10 ML: 5 INJECTION INTRAVENOUS at 20:36

## 2021-05-01 RX ADMIN — PIPERACILLIN AND TAZOBACTAM 3375 MG: 3; .375 INJECTION, POWDER, LYOPHILIZED, FOR SOLUTION INTRAVENOUS at 08:37

## 2021-05-01 RX ADMIN — HYDROCODONE BITARTRATE AND ACETAMINOPHEN 1 TABLET: 7.5; 325 TABLET ORAL at 14:22

## 2021-05-01 RX ADMIN — GABAPENTIN 100 MG: 100 CAPSULE ORAL at 20:35

## 2021-05-01 RX ADMIN — MORPHINE SULFATE 15 MG: 15 TABLET ORAL at 22:40

## 2021-05-01 RX ADMIN — PRAMIPEXOLE DIHYDROCHLORIDE 0.5 MG: 0.25 TABLET ORAL at 08:36

## 2021-05-01 RX ADMIN — PIPERACILLIN AND TAZOBACTAM 3375 MG: 3; .375 INJECTION, POWDER, LYOPHILIZED, FOR SOLUTION INTRAVENOUS at 01:38

## 2021-05-01 RX ADMIN — HYDROCODONE BITARTRATE AND ACETAMINOPHEN 1 TABLET: 7.5; 325 TABLET ORAL at 20:35

## 2021-05-01 RX ADMIN — METOPROLOL SUCCINATE 50 MG: 50 TABLET, EXTENDED RELEASE ORAL at 08:37

## 2021-05-01 RX ADMIN — PRAMIPEXOLE DIHYDROCHLORIDE 0.5 MG: 0.25 TABLET ORAL at 20:35

## 2021-05-01 RX ADMIN — PIPERACILLIN AND TAZOBACTAM 3375 MG: 3; .375 INJECTION, POWDER, LYOPHILIZED, FOR SOLUTION INTRAVENOUS at 14:22

## 2021-05-01 ASSESSMENT — PAIN SCALES - GENERAL
PAINLEVEL_OUTOF10: 9
PAINLEVEL_OUTOF10: 9
PAINLEVEL_OUTOF10: 8

## 2021-05-01 NOTE — H&P
CHIEF COMPLAINT: Left leg swelling and redness    History Obtained From:  patient     HISTORY OF PRESENT ILLNESS:      The patient is a 65 y.o. female with significant past medical history of hypertension, CKD, thyroid disease, and obesity who presents with a cat bite or scratch to the left leg the day prior to admission. She states she accidentally stepped on her cat's tail and cat either bit or scratched her. Within about 24 hours her leg became red and swollen. Complaint more of itching then of specific pain in the leg. Denies any fever but does report chills. Also reports nausea recently with an episode of emesis. She has now been admitted for IV antibiotics. 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 fluticasone (FLONASE) 50 MCG/ACT nasal spray, 1 spray by Each Nostril route daily  HYDROcodone-acetaminophen (NORCO) 7.5-325 MG per tablet, Take 1 tablet by mouth every 6 hours as needed for Pain. cyclobenzaprine (FLEXERIL) 5 MG tablet, Take 5 mg by mouth 3 times daily as needed for Muscle spasms  pramipexole (MIRAPEX) 0.5 MG tablet, Take 0.5 mg by mouth 3 times daily  DULoxetine (CYMBALTA) 60 MG extended release capsule, Take 1 capsule by mouth daily  morphine (MSIR) 15 MG tablet, Take 15 mg by mouth every 4 hours as needed for Pain.   bisoprolol-hydrochlorothiazide (ZIAC) 5-6.25 MG per tablet, Take 1 tablet by mouth daily  levothyroxine (SYNTHROID) 50 MCG tablet, Take 50 mcg by mouth Daily    Allergies:    Benadryl [diphenhydramine] and Codeine    Social History:    reports that she has never smoked. She has never used smokeless tobacco. She reports that she does not drink alcohol or use drugs. Family History:   family history is not on file. REVIEW OF SYSTEMS:  As above in the HPI, otherwise negative    PHYSICAL EXAM:    Vitals:  /63   Pulse 57   Temp 97.7 °F (36.5 °C)   Resp 16   Ht 5' 3\" (1.6 m)   Wt 250 lb (113.4 kg)   SpO2 98%   BMI 44.29 kg/m²     General Appearance:  awake, alert, oriented, in no acute distress and obese  Skin: Erythema and warmth almost circumferentially of the left leg. Primarily on the lateral side. Difficult to specifically note site of injury however appears to have a wound posterior. Satellite vesicle. All below the level of the knee. Head/face:  NCAT  Eyes:  No gross abnormalities. Neck:  neck- supple, no mass, non-tender  Lungs:  Normal expansion. Clear to auscultation. No rales, rhonchi, or wheezing. Heart:  Heart regular rate and rhythm  Auscultation: Normal S1 and S2.  Regular rhythm. No murmurs, gallops, or rubs. Abdomen: Auscultation: Normal bowel sounds. No bruits. Palpation: No masses, tenderness or organomegally. Extremities: pulses present in all extremities and trace pedal edema  Musculoskeletal:  negative  Peripheral Pulses:  Normal  Neurologic: No gross focal deficits apparent.     DATA:  CBC with Differential:    Lab Results   Component Value Date    WBC 16.6 04/30/2021    RBC 4.59 04/30/2021    HGB 14.3 04/30/2021    HCT 43.1 04/30/2021    HCT 41.8 01/24/2011    PLT 96 04/30/2021     01/24/2011    MCV 93.9 04/30/2021    MCH 31.2 04/30/2021    MCHC 33.2 04/30/2021    RDW 14.2 04/30/2021    LYMPHOPCT 6.5 04/30/2021    MONOPCT 9.3 04/30/2021    EOSPCT 0 01/24/2011    BASOPCT 0.2 04/30/2021 MONOSABS 1.50 04/30/2021    LYMPHSABS 1.1 04/30/2021    EOSABS 0.00 04/30/2021    BASOSABS 0.00 04/30/2021     CMP:    Lab Results   Component Value Date     05/01/2021     01/24/2011    K 4.1 05/01/2021    K 4.0 01/24/2011     05/01/2021    CL 99 01/24/2011    CO2 26 05/01/2021    BUN 14 05/01/2021    CREATININE 0.8 05/01/2021    CREATININE 0.8 01/24/2011    GFRAA >59 05/01/2021    LABGLOM >60 05/01/2021    GLUCOSE 107 05/01/2021    PROT 5.4 05/01/2021    LABALBU 3.3 05/01/2021    CALCIUM 8.3 05/01/2021    BILITOT 1.5 05/01/2021    ALKPHOS 150 05/01/2021    AST 39 05/01/2021    ALT 24 05/01/2021     Blood Culture:  No components found for: CBLOOD, CFUNGUSBL    ASSESSMENT:      Patient Active Problem List   Diagnosis    Overdose, undetermined intent, initial encounter    Repeated falls    Insomnia    Anxiety    Overdose, drug    Multiple falls    Essential hypertension    Acquired hypothyroidism    Hypotension due to drugs    Hyperglycemia    Acute metabolic encephalopathy    Overdose, drug    Non-pressure chronic ulcer of calf with fat layer exposed, left ([address]    Venous ulcer of left leg ([address]    Spinal stenosis, lumbar    Cellulitis    Hypokalemia    Morbid obesity due to excess calories (HCC)    Cellulitis of lower leg    Chronic pain syndrome       PLAN:    Empiric antibiotic started. Coverage primarily for bite given that potentially more dangerous to scratch. Review of history does show prior cellulitis in the right leg that resolved. No signs of vascular compromise in the extremity so anticipate a good response to treatment. I will keep her on her current regimen of pain medications she gets for her back, hopefully to maintain good pain control of the leg as well. Skip Moscoso MD  5/1/2021  0800

## 2021-05-01 NOTE — PROGRESS NOTES
4 Eyes Skin Assessment    Doug Duncan is being assessed upon: Admission    I agree that I, Chris Anderson, along with Aaron Walsh RN (either 2 RN's or 1 LPN and 1 RN) have performed a thorough Head to Toe Skin Assessment on the patient. ALL assessment sites listed below have been assessed. Areas assessed by both nurses:     [x]   Head, Face, and Ears   [x]   Shoulders, Back, and Chest  [x]   Arms, Elbows, and Hands   [x]   Coccyx, Sacrum, and Ischium  [x]   Legs, Feet, and Heels    L lower leg redness, swelling and cellulitis     Does the Patient have Skin Breakdown?  No    Cesar Prevention initiated: No  Wound Care Orders initiated: No    WOC nurse consulted for Pressure Injury (Stage 3,4, Unstageable, DTI, NWPT, and Complex wounds) and New or Established Ostomies: No        Primary Nurse eSignature: Chris Anderson RN on 4/30/2021 at 11:18 PM      Co-Signer eSignature: Electronically signed by Aaron Walsh RN on 5/1/21 at 4:40 AM CDT

## 2021-05-01 NOTE — PROGRESS NOTES
Small area to left posterior leg noted to have opened and drained small amount of yellow and red drainage this evening. Cleansed with normal saline. Mepilex applied. Patient denies any increased pain at site comparatively to overall LLE pain.   Electronically signed by Geovany Kent RN on 5/1/2021 at 6:43 PM

## 2021-05-02 LAB
ANION GAP SERPL CALCULATED.3IONS-SCNC: 12 MMOL/L (ref 7–19)
BASOPHILS ABSOLUTE: 0 K/UL (ref 0–0.2)
BASOPHILS RELATIVE PERCENT: 0.3 % (ref 0–1)
BUN BLDV-MCNC: 15 MG/DL (ref 8–23)
CALCIUM SERPL-MCNC: 8.3 MG/DL (ref 8.8–10.2)
CHLORIDE BLD-SCNC: 100 MMOL/L (ref 98–111)
CO2: 27 MMOL/L (ref 22–29)
CREAT SERPL-MCNC: 0.7 MG/DL (ref 0.5–0.9)
EOSINOPHILS ABSOLUTE: 0.1 K/UL (ref 0–0.6)
EOSINOPHILS RELATIVE PERCENT: 2 % (ref 0–5)
GFR AFRICAN AMERICAN: >59
GFR NON-AFRICAN AMERICAN: >60
GLUCOSE BLD-MCNC: 91 MG/DL (ref 74–109)
HCT VFR BLD CALC: 36.5 % (ref 37–47)
HEMOGLOBIN: 12.3 G/DL (ref 12–16)
IMMATURE GRANULOCYTES #: 0 K/UL
LYMPHOCYTES ABSOLUTE: 1.8 K/UL (ref 1.1–4.5)
LYMPHOCYTES RELATIVE PERCENT: 27.1 % (ref 20–40)
MCH RBC QN AUTO: 32.7 PG (ref 27–31)
MCHC RBC AUTO-ENTMCNC: 33.7 G/DL (ref 33–37)
MCV RBC AUTO: 97.1 FL (ref 81–99)
MONOCYTES ABSOLUTE: 0.6 K/UL (ref 0–0.9)
MONOCYTES RELATIVE PERCENT: 9.4 % (ref 0–10)
NEUTROPHILS ABSOLUTE: 4 K/UL (ref 1.5–7.5)
NEUTROPHILS RELATIVE PERCENT: 60.9 % (ref 50–65)
PDW BLD-RTO: 14.9 % (ref 11.5–14.5)
PLATELET # BLD: 86 K/UL (ref 130–400)
PMV BLD AUTO: 12.3 FL (ref 9.4–12.3)
POTASSIUM SERPL-SCNC: 3.9 MMOL/L (ref 3.5–5)
RBC # BLD: 3.76 M/UL (ref 4.2–5.4)
SODIUM BLD-SCNC: 139 MMOL/L (ref 136–145)
WBC # BLD: 6.6 K/UL (ref 4.8–10.8)

## 2021-05-02 PROCEDURE — 1210000000 HC MED SURG R&B

## 2021-05-02 PROCEDURE — 2580000003 HC RX 258: Performed by: FAMILY MEDICINE

## 2021-05-02 PROCEDURE — 36415 COLL VENOUS BLD VENIPUNCTURE: CPT

## 2021-05-02 PROCEDURE — 85025 COMPLETE CBC W/AUTO DIFF WBC: CPT

## 2021-05-02 PROCEDURE — 80048 BASIC METABOLIC PNL TOTAL CA: CPT

## 2021-05-02 PROCEDURE — 6360000002 HC RX W HCPCS: Performed by: FAMILY MEDICINE

## 2021-05-02 PROCEDURE — 6370000000 HC RX 637 (ALT 250 FOR IP): Performed by: FAMILY MEDICINE

## 2021-05-02 RX ADMIN — MORPHINE SULFATE 15 MG: 15 TABLET ORAL at 21:02

## 2021-05-02 RX ADMIN — PIPERACILLIN AND TAZOBACTAM 3375 MG: 3; .375 INJECTION, POWDER, LYOPHILIZED, FOR SOLUTION INTRAVENOUS at 02:41

## 2021-05-02 RX ADMIN — PRAMIPEXOLE DIHYDROCHLORIDE 0.5 MG: 0.25 TABLET ORAL at 13:09

## 2021-05-02 RX ADMIN — LEVOTHYROXINE SODIUM 50 MCG: 50 TABLET ORAL at 06:16

## 2021-05-02 RX ADMIN — PIPERACILLIN AND TAZOBACTAM 3375 MG: 3; .375 INJECTION, POWDER, LYOPHILIZED, FOR SOLUTION INTRAVENOUS at 07:32

## 2021-05-02 RX ADMIN — PIPERACILLIN AND TAZOBACTAM 3375 MG: 3; .375 INJECTION, POWDER, LYOPHILIZED, FOR SOLUTION INTRAVENOUS at 21:02

## 2021-05-02 RX ADMIN — SODIUM CHLORIDE, PRESERVATIVE FREE 10 ML: 5 INJECTION INTRAVENOUS at 07:32

## 2021-05-02 RX ADMIN — METOPROLOL SUCCINATE 50 MG: 50 TABLET, EXTENDED RELEASE ORAL at 07:35

## 2021-05-02 RX ADMIN — MORPHINE SULFATE 15 MG: 15 TABLET ORAL at 07:31

## 2021-05-02 RX ADMIN — FLUTICASONE PROPIONATE 1 SPRAY: 50 SPRAY, METERED NASAL at 07:36

## 2021-05-02 RX ADMIN — HYDROCODONE BITARTRATE AND ACETAMINOPHEN 1 TABLET: 7.5; 325 TABLET ORAL at 13:17

## 2021-05-02 RX ADMIN — GABAPENTIN 100 MG: 100 CAPSULE ORAL at 21:02

## 2021-05-02 RX ADMIN — PRAMIPEXOLE DIHYDROCHLORIDE 0.5 MG: 0.25 TABLET ORAL at 07:32

## 2021-05-02 RX ADMIN — DULOXETINE HYDROCHLORIDE 60 MG: 30 CAPSULE, DELAYED RELEASE ORAL at 07:32

## 2021-05-02 RX ADMIN — PRAMIPEXOLE DIHYDROCHLORIDE 0.5 MG: 0.25 TABLET ORAL at 21:02

## 2021-05-02 RX ADMIN — PIPERACILLIN AND TAZOBACTAM 3375 MG: 3; .375 INJECTION, POWDER, LYOPHILIZED, FOR SOLUTION INTRAVENOUS at 13:18

## 2021-05-02 RX ADMIN — CYCLOBENZAPRINE HYDROCHLORIDE 5 MG: 5 TABLET, FILM COATED ORAL at 21:02

## 2021-05-02 ASSESSMENT — PAIN SCALES - GENERAL: PAINLEVEL_OUTOF10: 7

## 2021-05-02 NOTE — PROGRESS NOTES
Family Medicine Progress Note    Patient:  Luanna Duverney  YOB: 1950    MRN: Ambrocio Dumont: [de-identified]     Admit date: 4/30/2021    Patient Seen, Chart, Consults notes, Labs, Radiology studies reviewed. Subjective: Day 2 of stay with cellulitis of the left lower extremity related to a cat scratch or bite and most recent (in last 24 hours) has had improvement in the leg as far as redness and tenderness. Still sore when touched but otherwise not having pain. Had a spot on the back leg open up drain yesterday. No fever. Past, Family, Social History unchanged from admission. Diet:  DIET GENERAL; Carb Control: 5 carb choices (75 gms)/meal    Medications:  Scheduled Meds:   sodium chloride flush  5-40 mL Intravenous 2 times per day    enoxaparin  40 mg Subcutaneous Daily    piperacillin-tazobactam  3,375 mg Intravenous Q6H    levothyroxine  50 mcg Oral Daily    DULoxetine  60 mg Oral Daily    pramipexole  0.5 mg Oral TID    fluticasone  1 spray Each Nostril Daily    gabapentin  100 mg Oral TID    metoprolol succinate  50 mg Oral Daily     Continuous Infusions:   sodium chloride       PRN Meds:sodium chloride flush, sodium chloride, acetaminophen, morphine, HYDROcodone-acetaminophen, cyclobenzaprine    Objective:    Vitals: /61   Pulse 63   Temp 98.1 °F (36.7 °C)   Resp 18   Ht 5' 3\" (1.6 m)   Wt 250 lb (113.4 kg)   SpO2 96%   BMI 44.29 kg/m²   24 hour intake/output:    Intake/Output Summary (Last 24 hours) at 5/2/2021 1122  Last data filed at 5/2/2021 1014  Gross per 24 hour   Intake 480 ml   Output 300 ml   Net 180 ml     Last 3 weights: Wt Readings from Last 3 Encounters:   05/02/21 250 lb (113.4 kg)   02/12/21 249 lb 6.4 oz (113.1 kg)   07/25/20 240 lb (108.9 kg)       Physical Exam:    General Appearance:  awake, alert, oriented, in no acute distress and obese  Skin: Less erythema in the left leg. Area of previous vesicle now be removed.   Minimal serosanguineous drainage. No evidence of purulence. Less induration. Eyes:  No gross abnormalities. Neck:  neck- supple, no mass, non-tender  Lungs:  Normal expansion. Clear to auscultation. No rales, rhonchi, or wheezing. Heart:  Heart sounds are normal.  Regular rate and rhythm without murmur, gallop or rub. Abdomen: Auscultation: Normal bowel sounds. No bruits. Palpation: No masses, tenderness or organomegally. Extremities: trace pedal edema  Musculoskeletal:  negative  Neurologic: No gross focal deficits.     CBC with Differential:    Lab Results   Component Value Date    WBC 6.6 05/02/2021    RBC 3.76 05/02/2021    HGB 12.3 05/02/2021    HCT 36.5 05/02/2021    HCT 41.8 01/24/2011    PLT 86 05/02/2021     01/24/2011    MCV 97.1 05/02/2021    MCH 32.7 05/02/2021    MCHC 33.7 05/02/2021    RDW 14.9 05/02/2021    LYMPHOPCT 27.1 05/02/2021    MONOPCT 9.4 05/02/2021    EOSPCT 0 01/24/2011    BASOPCT 0.3 05/02/2021    MONOSABS 0.60 05/02/2021    LYMPHSABS 1.8 05/02/2021    EOSABS 0.10 05/02/2021    BASOSABS 0.00 05/02/2021     CMP:    Lab Results   Component Value Date     05/02/2021     01/24/2011    K 3.9 05/02/2021    K 4.1 05/01/2021    K 4.0 01/24/2011     05/02/2021    CL 99 01/24/2011    CO2 27 05/02/2021    BUN 15 05/02/2021    CREATININE 0.7 05/02/2021    CREATININE 0.8 01/24/2011    GFRAA >59 05/02/2021    LABGLOM >60 05/02/2021    GLUCOSE 91 05/02/2021    PROT 5.4 05/01/2021    LABALBU 3.3 05/01/2021    CALCIUM 8.3 05/02/2021    BILITOT 1.5 05/01/2021    ALKPHOS 150 05/01/2021    AST 39 05/01/2021    ALT 24 05/01/2021     Last 3 Troponin:    Lab Results   Component Value Date    TROPONINI <0.01 03/18/2018     Urine Culture:  No components found for: CURINE  Blood Culture:  No components found for: CBLOOD, CFUNGUSBL  Stool Culture:  No components found for: CSTOOL    Assessment:    Principal Problem:    Cellulitis of lower leg  Active Problems:    Insomnia    Anxiety

## 2021-05-03 VITALS
OXYGEN SATURATION: 96 % | HEIGHT: 63 IN | WEIGHT: 248.3 LBS | RESPIRATION RATE: 16 BRPM | HEART RATE: 60 BPM | DIASTOLIC BLOOD PRESSURE: 61 MMHG | SYSTOLIC BLOOD PRESSURE: 128 MMHG | BODY MASS INDEX: 44 KG/M2 | TEMPERATURE: 97.3 F

## 2021-05-03 LAB
ANION GAP SERPL CALCULATED.3IONS-SCNC: 9 MMOL/L (ref 7–19)
BASOPHILS ABSOLUTE: 0 K/UL (ref 0–0.2)
BASOPHILS RELATIVE PERCENT: 0.6 % (ref 0–1)
BUN BLDV-MCNC: 10 MG/DL (ref 8–23)
CALCIUM SERPL-MCNC: 8.6 MG/DL (ref 8.8–10.2)
CHLORIDE BLD-SCNC: 101 MMOL/L (ref 98–111)
CO2: 30 MMOL/L (ref 22–29)
CREAT SERPL-MCNC: 0.8 MG/DL (ref 0.5–0.9)
EOSINOPHILS ABSOLUTE: 0.2 K/UL (ref 0–0.6)
EOSINOPHILS RELATIVE PERCENT: 3.2 % (ref 0–5)
GFR AFRICAN AMERICAN: >59
GFR NON-AFRICAN AMERICAN: >60
GLUCOSE BLD-MCNC: 104 MG/DL (ref 74–109)
HCT VFR BLD CALC: 39 % (ref 37–47)
HEMOGLOBIN: 12.9 G/DL (ref 12–16)
IMMATURE GRANULOCYTES #: 0 K/UL
LYMPHOCYTES ABSOLUTE: 1.8 K/UL (ref 1.1–4.5)
LYMPHOCYTES RELATIVE PERCENT: 36.2 % (ref 20–40)
MCH RBC QN AUTO: 31.2 PG (ref 27–31)
MCHC RBC AUTO-ENTMCNC: 33.1 G/DL (ref 33–37)
MCV RBC AUTO: 94.2 FL (ref 81–99)
MONOCYTES ABSOLUTE: 0.5 K/UL (ref 0–0.9)
MONOCYTES RELATIVE PERCENT: 10.5 % (ref 0–10)
NEUTROPHILS ABSOLUTE: 2.4 K/UL (ref 1.5–7.5)
NEUTROPHILS RELATIVE PERCENT: 49.3 % (ref 50–65)
PDW BLD-RTO: 14.3 % (ref 11.5–14.5)
PLATELET # BLD: 100 K/UL (ref 130–400)
PMV BLD AUTO: 11.3 FL (ref 9.4–12.3)
POTASSIUM SERPL-SCNC: 3.8 MMOL/L (ref 3.5–5)
RBC # BLD: 4.14 M/UL (ref 4.2–5.4)
SODIUM BLD-SCNC: 140 MMOL/L (ref 136–145)
WBC # BLD: 4.9 K/UL (ref 4.8–10.8)

## 2021-05-03 PROCEDURE — 80048 BASIC METABOLIC PNL TOTAL CA: CPT

## 2021-05-03 PROCEDURE — 6370000000 HC RX 637 (ALT 250 FOR IP): Performed by: FAMILY MEDICINE

## 2021-05-03 PROCEDURE — 6360000002 HC RX W HCPCS: Performed by: FAMILY MEDICINE

## 2021-05-03 PROCEDURE — 36415 COLL VENOUS BLD VENIPUNCTURE: CPT

## 2021-05-03 PROCEDURE — 85025 COMPLETE CBC W/AUTO DIFF WBC: CPT

## 2021-05-03 PROCEDURE — 2580000003 HC RX 258: Performed by: FAMILY MEDICINE

## 2021-05-03 RX ORDER — AMOXICILLIN AND CLAVULANATE POTASSIUM 875; 125 MG/1; MG/1
1 TABLET, FILM COATED ORAL 2 TIMES DAILY
Qty: 20 TABLET | Refills: 0 | Status: SHIPPED | OUTPATIENT
Start: 2021-05-03 | End: 2021-05-13

## 2021-05-03 RX ADMIN — FLUTICASONE PROPIONATE 1 SPRAY: 50 SPRAY, METERED NASAL at 10:10

## 2021-05-03 RX ADMIN — SODIUM CHLORIDE, PRESERVATIVE FREE 10 ML: 5 INJECTION INTRAVENOUS at 08:51

## 2021-05-03 RX ADMIN — MORPHINE SULFATE 15 MG: 15 TABLET ORAL at 08:48

## 2021-05-03 RX ADMIN — METOPROLOL SUCCINATE 50 MG: 50 TABLET, EXTENDED RELEASE ORAL at 08:48

## 2021-05-03 RX ADMIN — DULOXETINE HYDROCHLORIDE 60 MG: 30 CAPSULE, DELAYED RELEASE ORAL at 08:48

## 2021-05-03 RX ADMIN — PIPERACILLIN AND TAZOBACTAM 3375 MG: 3; .375 INJECTION, POWDER, LYOPHILIZED, FOR SOLUTION INTRAVENOUS at 03:35

## 2021-05-03 RX ADMIN — PIPERACILLIN AND TAZOBACTAM 3375 MG: 3; .375 INJECTION, POWDER, LYOPHILIZED, FOR SOLUTION INTRAVENOUS at 08:48

## 2021-05-03 RX ADMIN — LEVOTHYROXINE SODIUM 50 MCG: 50 TABLET ORAL at 05:35

## 2021-05-03 RX ADMIN — PRAMIPEXOLE DIHYDROCHLORIDE 0.5 MG: 0.25 TABLET ORAL at 08:48

## 2021-05-03 ASSESSMENT — PAIN SCALES - GENERAL: PAINLEVEL_OUTOF10: 7

## 2021-05-03 NOTE — DISCHARGE SUMMARY
Hospital Discharge Summary    Alivia Fonseca  :  1950  MRN:  [id number]    Admit date:  2021  Discharge date:  May 3, 2021    Admitting Physician:    Gisselle Grant MD    Discharge Diagnoses:    Principal Problem:    Cellulitis of lower leg  Active Problems:    Insomnia    Generalized anxiety disorder    Essential hypertension    Acquired hypothyroidism    Hyperglycemia    Spinal stenosis, lumbar    Morbid obesity due to excess calories     Chronic pain syndrome      Hospital Course: The patient was admitted for the above noted medical/surgical issues. Magan Beebe presented to the emergency room early afternoon on  after she accidentally stepped on her cats tell and it bit her left lower leg. She was recently admitted in February for cellulitis of her right lower extremity. She was concern with the potential for infection thus she sought emergency medical care. The incident occurred the day before on Thursday. She denies any fevers or chills. She did have an episode of nausea and vomiting apparently. Past medical history includes chronic kidney disease, hypothyroidism, morbid obesity, essential hypertension and chronic pain syndrome. She was admitted and initiated on IV antibiotics. She reached maximum improvement and was cleared for discharge Monday, 3 May. Please see daily progress note for further details concerning their stay. The patient improved throughout their stay and reached maximum medical improvement on the day of discharge. The patient/family agree with the treatment plan as outlined above. All questions concerning their stay were answered prior to discharge. They understand the importance of follow up concerning any abnormal test results.      Discharge Medications:       Jose M Cruz   [facility] Medication Instructions KETAN:[id number]    Printed on:21 0719   Medication Information                      amoxicillin-clavulanate (AUGMENTIN) 875-125 MG per tablet  Take 1 tablet by mouth 2 times daily for 10 days             bisoprolol-hydrochlorothiazide (ZIAC) 5-6.25 MG per tablet  Take 1 tablet by mouth daily             cyclobenzaprine (FLEXERIL) 5 MG tablet  Take 5 mg by mouth 3 times daily as needed for Muscle spasms             DULoxetine (CYMBALTA) 60 MG extended release capsule  Take 1 capsule by mouth daily             fluticasone (FLONASE) 50 MCG/ACT nasal spray  1 spray by Each Nostril route daily             gabapentin (NEURONTIN) 100 MG capsule  Take 100 mg by mouth 3 times daily. HYDROcodone-acetaminophen (NORCO) 7.5-325 MG per tablet  Take 1 tablet by mouth every 6 hours as needed for Pain.             levothyroxine (SYNTHROID) 50 MCG tablet  Take 50 mcg by mouth Daily             morphine (MSIR) 15 MG tablet  Take 15 mg by mouth every 4 hours as needed for Pain. pramipexole (MIRAPEX) 0.5 MG tablet  Take 0.5 mg by mouth 3 times daily                 Consults: None    Significant Diagnostic Studies:    See summary of labs/x-rays/path report for further details    Treatments:   IV antibiotics    Disposition:   Stable  Follow up with Jaci Mcneil physician assistant Friday of this week    Signed:  Garfield Meraz   5/3/2021, 7:19 AM     The patient was seen on rounds today. Reviewed the last 24 hours of labs and x-rays that are available. Updated overnight status with nursing staff. Reviewed the case with Jaci Mcneil PA-C. All questions were answered to the patient's/family's understanding. Patient is medically stable, please see orders for further details. I have discussed the care of Doug Duncan, including pertinent history and exam findings with the ARNP/PA. I have seen and examined the patient and the key elements of all parts of the encounter have been performed by me. I agree with the assessment and plan as outlined by the ARNP/PA. Please refer to my separate note for complete documentation.      Electronically signed by

## 2021-05-12 NOTE — PROGRESS NOTES
Physician Progress Note      Dima Ibrahim  CSN #:                  [id number]  :                       1950  ADMIT DATE:       2021 2:37 PM  [address] DATE:        5/3/2021 11:06 AM  RESPONDING  PROVIDER #:        Monty Middleton MD          QUERY TEXT:    Pt admitted with cellulitis LLE after cat scratch. Pt noted to have sepsis   indicators at the eric of admission. If possible, please document in the   progress notes and discharge summary if you are evaluating and /or treating   any of the following: The medical record reflects the following:  Risk Factors: Cellulitis LLE  Clinical Indicators:  WBC 16.6 Lactic acid 2.6 CRP 2.99  Treatment: Zosyn 3,375 mg IV q 6 hr., Flagyl 500 mg IV x 1. Discharged on   Augmentin 875-125 BID x 10 days. Options provided:  -- Sepsis, present on admission  -- No Sepsis, cellulitis of LLE only  -- Other - I will add my own diagnosis  -- Disagree - Not applicable / Not valid  -- Disagree - Clinically unable to determine / Unknown  -- Refer to Clinical Documentation Reviewer    PROVIDER RESPONSE TEXT:    This patient has cellulitis of LLE only, patient is not septic.     Query created by: Pastor Ng on 5/10/2021 11:31 AM      Electronically signed by:  Monty Middleton MD 2021 9:05 AM

## 2021-06-14 ENCOUNTER — TRANSCRIBE ORDERS (OUTPATIENT)
Dept: ADMINISTRATIVE | Facility: HOSPITAL | Age: 71
End: 2021-06-14

## 2021-06-14 DIAGNOSIS — M54.50 LOW BACK PAIN, UNSPECIFIED BACK PAIN LATERALITY, UNSPECIFIED CHRONICITY, UNSPECIFIED WHETHER SCIATICA PRESENT: Primary | ICD-10-CM

## 2021-07-02 ENCOUNTER — APPOINTMENT (OUTPATIENT)
Dept: MRI IMAGING | Facility: HOSPITAL | Age: 71
End: 2021-07-02

## 2021-07-02 ENCOUNTER — APPOINTMENT (OUTPATIENT)
Dept: CT IMAGING | Facility: HOSPITAL | Age: 71
End: 2021-07-02

## 2021-07-23 ENCOUNTER — APPOINTMENT (OUTPATIENT)
Dept: CT IMAGING | Facility: HOSPITAL | Age: 71
End: 2021-07-23

## 2021-07-23 ENCOUNTER — APPOINTMENT (OUTPATIENT)
Dept: MRI IMAGING | Facility: HOSPITAL | Age: 71
End: 2021-07-23

## 2021-08-25 ENCOUNTER — HOSPITAL ENCOUNTER (OUTPATIENT)
Dept: CT IMAGING | Facility: HOSPITAL | Age: 71
Discharge: HOME | End: 2021-08-25

## 2021-08-25 ENCOUNTER — HOSPITAL ENCOUNTER (OUTPATIENT)
Dept: MRI IMAGING | Facility: HOSPITAL | Age: 71
Discharge: HOME | End: 2021-08-25

## 2021-08-25 DIAGNOSIS — M54.50 LOW BACK PAIN, UNSPECIFIED BACK PAIN LATERALITY, UNSPECIFIED CHRONICITY, UNSPECIFIED WHETHER SCIATICA PRESENT: ICD-10-CM

## 2021-08-25 PROCEDURE — 72148 MRI LUMBAR SPINE W/O DYE: CPT

## 2021-08-25 PROCEDURE — 72131 CT LUMBAR SPINE W/O DYE: CPT

## 2021-09-14 ENCOUNTER — HOSPITAL ENCOUNTER (EMERGENCY)
Age: 71
Discharge: HOME | End: 2021-09-14
Payer: MEDICARE

## 2021-09-14 VITALS
RESPIRATION RATE: 18 BRPM | HEART RATE: 65 BPM | TEMPERATURE: 98.7 F | HEIGHT: 63 IN | OXYGEN SATURATION: 91 % | SYSTOLIC BLOOD PRESSURE: 161 MMHG | WEIGHT: 250 LBS | BODY MASS INDEX: 44.3 KG/M2 | DIASTOLIC BLOOD PRESSURE: 94 MMHG

## 2021-09-14 DIAGNOSIS — M54.40 BACK PAIN OF LUMBOSACRAL REGION WITH SCIATICA: Primary | ICD-10-CM

## 2021-09-14 PROCEDURE — 96372 THER/PROPH/DIAG INJ SC/IM: CPT

## 2021-09-14 PROCEDURE — 6360000002 HC RX W HCPCS: Performed by: NURSE PRACTITIONER

## 2021-09-14 PROCEDURE — 99282 EMERGENCY DEPT VISIT SF MDM: CPT

## 2021-09-14 RX ORDER — MORPHINE SULFATE 4 MG/ML
4 INJECTION, SOLUTION INTRAMUSCULAR; INTRAVENOUS ONCE
Status: COMPLETED | OUTPATIENT
Start: 2021-09-14 | End: 2021-09-14

## 2021-09-14 RX ADMIN — Medication 4 MG: at 16:51

## 2021-09-14 ASSESSMENT — PAIN SCALES - GENERAL
PAINLEVEL_OUTOF10: 9
PAINLEVEL_OUTOF10: 9

## 2021-09-14 ASSESSMENT — ENCOUNTER SYMPTOMS
BACK PAIN: 1
VOMITING: 0
NAUSEA: 0
DIARRHEA: 0
COUGH: 0
SHORTNESS OF BREATH: 0
ABDOMINAL PAIN: 0

## 2021-09-14 ASSESSMENT — PAIN DESCRIPTION - LOCATION: LOCATION: BACK

## 2021-09-14 ASSESSMENT — PAIN DESCRIPTION - ORIENTATION: ORIENTATION: LOWER

## 2021-09-14 ASSESSMENT — PAIN DESCRIPTION - PAIN TYPE: TYPE: ACUTE PAIN

## 2021-09-14 NOTE — ED PROVIDER NOTES
[facility] EMERGENCY DEPT  EMERGENCY DEPARTMENT ENCOUNTER      Pt Name: Juliane Lara  MRN: [id number]  [place] 1950  Date of evaluation: 9/14/2021  Provider: BRUCE Serrano [id number]       Chief Complaint   Patient presents with    Back Pain     sciatica         HISTORY OF PRESENT ILLNESS   (Location/Symptom, Timing/Onset, Context/Setting, Quality, Duration, Modifying Factors, Severity)  Note limiting factors. Juliane Lara is a 65 y.o. female who presents to the emergency department with concern for right sided low back pain radiating to buttock and down leg toward knee. States she has a longstanding hx with sciatica and is actually going to pain management tomorrow for a nerve stimulator trial and if it works will have one implanted. She is requesting temporary relief of pain until she can follow up tomorrow. No numbness, tingling or weakness. No saddle anesthesia. No bowel or bladder dysfunction. Feels similar to previous. No new trauma or injury. HPI    Nursing Notes were reviewed. REVIEW OF SYSTEMS    (2-9 systems for level 4, 10 or more for level 5)     Review of Systems   Constitutional: Negative for chills and fever. HENT: Negative for congestion. Respiratory: Negative for cough and shortness of breath. Cardiovascular: Negative for chest pain and palpitations. Gastrointestinal: Negative for abdominal pain, diarrhea, nausea and vomiting. Genitourinary: Negative for difficulty urinating, dysuria, flank pain, frequency and urgency. Musculoskeletal: Positive for back pain. Negative for myalgias and neck pain. Neurological: Negative for dizziness, syncope, weakness, light-headedness and headaches. Except as noted above the remainder of the review of systems was reviewed and negative.        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    HIP SURGERY Right     right hip replacement    JOINT REPLACEMENT      both knees right side revision    KNEE SURGERY      KNEE SURGERY Bilateral     bilateral knee replacement    NECK SURGERY      TONSILLECTOMY           CURRENT MEDICATIONS       Previous Medications    BISOPROLOL-HYDROCHLOROTHIAZIDE (ZIAC) 5-6.25 MG PER TABLET    Take 1 tablet by mouth daily    CYCLOBENZAPRINE (FLEXERIL) 5 MG TABLET    Take 5 mg by mouth 3 times daily as needed for Muscle spasms    DULOXETINE (CYMBALTA) 60 MG EXTENDED RELEASE CAPSULE    Take 1 capsule by mouth daily    FLUTICASONE (FLONASE) 50 MCG/ACT NASAL SPRAY    1 spray by Each Nostril route daily    GABAPENTIN (NEURONTIN) 100 MG CAPSULE    Take 100 mg by mouth 3 times daily. HYDROCODONE-ACETAMINOPHEN (NORCO) 7.5-325 MG PER TABLET    Take 1 tablet by mouth every 6 hours as needed for Pain. LEVOTHYROXINE (SYNTHROID) 50 MCG TABLET    Take 50 mcg by mouth Daily    MORPHINE (MSIR) 15 MG TABLET    Take 15 mg by mouth every 4 hours as needed for Pain. PRAMIPEXOLE (MIRAPEX) 0.5 MG TABLET    Take 0.5 mg by mouth 3 times daily       ALLERGIES     Benadryl [diphenhydramine] and Codeine    FAMILY HISTORY     History reviewed. No pertinent family history.        SOCIAL HISTORY       Social History     Socioeconomic History    Marital status:      Spouse name: None    Number of children: None    Years of education: None    Highest education level: None   Occupational History    None   Tobacco Use    Smoking status: Never Smoker    Smokeless tobacco: Never Used   Vaping Use    Vaping Use: Never used   Substance and Sexual Activity    Alcohol use: No    Drug use: No    Sexual activity: None   Other Topics Concern    None   Social History Narrative    None Social Determinants of Health     Financial Resource Strain:     Difficulty of Paying Living Expenses:    Food Insecurity:     Worried About Running Out of Food in the Last Year:     [address] in the Last Year:    Transportation Needs:     Lack of Transportation (Medical):  Lack of Transportation (Non-Medical):    Physical Activity:     Days of Exercise per Week:     Minutes of Exercise per Session:    Stress:     Feeling of Stress :    Social Connections:     Frequency of Communication with Friends and Family:     Frequency of Social Gatherings with Friends and Family:     Attends Taoist Services:     Active Member of Clubs or Organizations:     Attends Club or Organization Meetings:     Marital Status:    Intimate Partner Violence:     Fear of Current or Ex-Partner:     Emotionally Abused:     Physically Abused:     Sexually Abused:        SCREENINGS                        PHYSICAL EXAM    (up to 7 for level 4, 8 or more for level 5)     ED Triage Vitals   BP Temp Temp Source Pulse Resp SpO2 Height Weight   09/14/21 1625 09/14/21 1625 09/14/21 1625 09/14/21 1625 09/14/21 1625 09/14/21 1625 09/14/21 1629 09/14/21 1629   (!) 161/94 98.7 °F (37.1 °C) Oral 65 18 91 % 5' 3\" (1.6 m) 250 lb (113.4 kg)       Physical Exam  Vitals reviewed. Constitutional:       General: She is not in acute distress. Appearance: Normal appearance. She is obese. She is not ill-appearing, toxic-appearing or diaphoretic. Comments: Using a cane to ambulate   HENT:      Head: Normocephalic and atraumatic. Mouth/Throat:      Mouth: Mucous membranes are moist.   Cardiovascular:      Rate and Rhythm: Normal rate and regular rhythm. Pulses: Normal pulses. Heart sounds: Normal heart sounds. Pulmonary:      Effort: Pulmonary effort is normal.      Breath sounds: Normal breath sounds. Abdominal:      Palpations: Abdomen is soft. Tenderness: There is no abdominal tenderness. Back pain of lumbosacral region with sciatica          DISPOSITION/PLAN   DISPOSITION Decision To Discharge 09/14/2021 04:57:28 PM      PATIENT REFERRED TO:  Adrian Eaton MD  [address]  [phone]            DISCHARGE MEDICATIONS:  New Prescriptions    No medications on file     Controlled Substances Monitoring:     No flowsheet data found.     (Please note that portions of this note were completed with a voice recognition program.  Efforts were made to edit the dictations but occasionally words are mis-transcribed.)    BRUCE Serrano CNP (electronically signed)  Attending Emergency Physician           BRUCE Serrano CNP  09/14/21 2020

## 2022-04-07 ENCOUNTER — APPOINTMENT (OUTPATIENT)
Dept: GENERAL RADIOLOGY | Facility: HOSPITAL | Age: 72
End: 2022-04-07

## 2022-04-07 ENCOUNTER — APPOINTMENT (OUTPATIENT)
Dept: ULTRASOUND IMAGING | Facility: HOSPITAL | Age: 72
End: 2022-04-07

## 2022-04-07 ENCOUNTER — HOSPITAL ENCOUNTER (EMERGENCY)
Facility: HOSPITAL | Age: 72
Discharge: HOME | End: 2022-04-07
Attending: EMERGENCY MEDICINE | Admitting: EMERGENCY MEDICINE

## 2022-04-07 VITALS
OXYGEN SATURATION: 98 % | HEART RATE: 72 BPM | DIASTOLIC BLOOD PRESSURE: 70 MMHG | SYSTOLIC BLOOD PRESSURE: 165 MMHG | RESPIRATION RATE: 18 BRPM | HEIGHT: 63 IN | TEMPERATURE: 98.4 F | BODY MASS INDEX: 45.36 KG/M2 | WEIGHT: 256 LBS

## 2022-04-07 DIAGNOSIS — L03.119 CELLULITIS OF LOWER EXTREMITY, UNSPECIFIED LATERALITY: Primary | ICD-10-CM

## 2022-04-07 LAB
ALBUMIN SERPL-MCNC: 3.5 G/DL (ref 3.5–5.2)
ALBUMIN/GLOB SERPL: 1.3 G/DL
ALP SERPL-CCNC: 137 U/L (ref 39–117)
ALT SERPL W P-5'-P-CCNC: 11 U/L (ref 1–33)
ANION GAP SERPL CALCULATED.3IONS-SCNC: 11 MMOL/L (ref 5–15)
AST SERPL-CCNC: 29 U/L (ref 1–32)
BASOPHILS # BLD AUTO: 0.03 10*3/MM3 (ref 0–0.2)
BASOPHILS NFR BLD AUTO: 0.9 % (ref 0–1.5)
BILIRUB SERPL-MCNC: 0.8 MG/DL (ref 0–1.2)
BILIRUB UR QL STRIP: NEGATIVE
BUN SERPL-MCNC: 12 MG/DL (ref 8–23)
BUN/CREAT SERPL: 22.2 (ref 7–25)
CALCIUM SPEC-SCNC: 9.3 MG/DL (ref 8.6–10.5)
CHLORIDE SERPL-SCNC: 101 MMOL/L (ref 98–107)
CLARITY UR: CLEAR
CO2 SERPL-SCNC: 26 MMOL/L (ref 22–29)
COLOR UR: ABNORMAL
CREAT SERPL-MCNC: 0.54 MG/DL (ref 0.57–1)
DEPRECATED RDW RBC AUTO: 48 FL (ref 37–54)
EGFRCR SERPLBLD CKD-EPI 2021: 97.4 ML/MIN/1.73
EOSINOPHIL # BLD AUTO: 0.07 10*3/MM3 (ref 0–0.4)
EOSINOPHIL NFR BLD AUTO: 2.1 % (ref 0.3–6.2)
ERYTHROCYTE [DISTWIDTH] IN BLOOD BY AUTOMATED COUNT: 15.1 % (ref 12.3–15.4)
GLOBULIN UR ELPH-MCNC: 2.7 GM/DL
GLUCOSE SERPL-MCNC: 124 MG/DL (ref 65–99)
GLUCOSE UR STRIP-MCNC: NEGATIVE MG/DL
HCT VFR BLD AUTO: 34.3 % (ref 34–46.6)
HGB BLD-MCNC: 12.5 G/DL (ref 12–15.9)
HGB UR QL STRIP.AUTO: NEGATIVE
INR PPP: 1.07 (ref 0.91–1.09)
KETONES UR QL STRIP: NEGATIVE
LEUKOCYTE ESTERASE UR QL STRIP.AUTO: NEGATIVE
LYMPHOCYTES # BLD AUTO: 1.25 10*3/MM3 (ref 0.7–3.1)
LYMPHOCYTES NFR BLD AUTO: 36.9 % (ref 19.6–45.3)
MCH RBC QN AUTO: 34.9 PG (ref 26.6–33)
MCHC RBC AUTO-ENTMCNC: 36.4 G/DL (ref 31.5–35.7)
MCV RBC AUTO: 95.8 FL (ref 79–97)
MONOCYTES # BLD AUTO: 0.37 10*3/MM3 (ref 0.1–0.9)
MONOCYTES NFR BLD AUTO: 10.9 % (ref 5–12)
NEUTROPHILS NFR BLD AUTO: 1.66 10*3/MM3 (ref 1.7–7)
NEUTROPHILS NFR BLD AUTO: 48.9 % (ref 42.7–76)
NITRITE UR QL STRIP: NEGATIVE
NT-PROBNP SERPL-MCNC: 250.4 PG/ML (ref 0–900)
PH UR STRIP.AUTO: 6.5 [PH] (ref 5–8)
PLATELET # BLD AUTO: 89 10*3/MM3 (ref 140–450)
PMV BLD AUTO: 11 FL (ref 6–12)
POTASSIUM SERPL-SCNC: 4.8 MMOL/L (ref 3.5–5.2)
PROT SERPL-MCNC: 6.2 G/DL (ref 6–8.5)
PROT UR QL STRIP: NEGATIVE
PROTHROMBIN TIME: 13.5 SECONDS (ref 11.9–14.6)
RBC # BLD AUTO: 3.58 10*6/MM3 (ref 3.77–5.28)
SODIUM SERPL-SCNC: 138 MMOL/L (ref 136–145)
SP GR UR STRIP: 1.02 (ref 1–1.03)
TROPONIN T SERPL-MCNC: <0.01 NG/ML (ref 0–0.03)
UROBILINOGEN UR QL STRIP: ABNORMAL
WBC NRBC COR # BLD: 3.39 10*3/MM3 (ref 3.4–10.8)

## 2022-04-07 PROCEDURE — 84484 ASSAY OF TROPONIN QUANT: CPT | Performed by: EMERGENCY MEDICINE

## 2022-04-07 PROCEDURE — 93970 EXTREMITY STUDY: CPT

## 2022-04-07 PROCEDURE — 99283 EMERGENCY DEPT VISIT LOW MDM: CPT

## 2022-04-07 PROCEDURE — 85025 COMPLETE CBC W/AUTO DIFF WBC: CPT | Performed by: EMERGENCY MEDICINE

## 2022-04-07 PROCEDURE — 85610 PROTHROMBIN TIME: CPT | Performed by: EMERGENCY MEDICINE

## 2022-04-07 PROCEDURE — 81003 URINALYSIS AUTO W/O SCOPE: CPT | Performed by: EMERGENCY MEDICINE

## 2022-04-07 PROCEDURE — 71045 X-RAY EXAM CHEST 1 VIEW: CPT

## 2022-04-07 PROCEDURE — 83880 ASSAY OF NATRIURETIC PEPTIDE: CPT | Performed by: EMERGENCY MEDICINE

## 2022-04-07 PROCEDURE — 25010000002 MORPHINE PER 10 MG: Performed by: EMERGENCY MEDICINE

## 2022-04-07 PROCEDURE — 80053 COMPREHEN METABOLIC PANEL: CPT | Performed by: EMERGENCY MEDICINE

## 2022-04-07 PROCEDURE — 96374 THER/PROPH/DIAG INJ IV PUSH: CPT

## 2022-04-07 PROCEDURE — 93970 EXTREMITY STUDY: CPT | Performed by: SURGERY

## 2022-04-07 RX ORDER — ONDANSETRON 2 MG/ML
4 INJECTION INTRAMUSCULAR; INTRAVENOUS ONCE
Status: DISCONTINUED | OUTPATIENT
Start: 2022-04-07 | End: 2022-04-07 | Stop reason: HOSPADM

## 2022-04-07 RX ORDER — SODIUM CHLORIDE 0.9 % (FLUSH) 0.9 %
10 SYRINGE (ML) INJECTION AS NEEDED
Status: DISCONTINUED | OUTPATIENT
Start: 2022-04-07 | End: 2022-04-07 | Stop reason: HOSPADM

## 2022-04-07 RX ORDER — CLINDAMYCIN HYDROCHLORIDE 300 MG/1
300 CAPSULE ORAL 4 TIMES DAILY
Qty: 28 CAPSULE | Refills: 0 | Status: ON HOLD | OUTPATIENT
Start: 2022-04-07 | End: 2022-05-02

## 2022-04-07 RX ADMIN — MORPHINE SULFATE 4 MG: 4 INJECTION, SOLUTION INTRAMUSCULAR; INTRAVENOUS at 15:28

## 2022-04-07 NOTE — ED PROVIDER NOTES
"Subjective   71-year-old female presents to the emergency department complaint of bilateral lower extremity swelling and pain.  She has a history of lower extremity edema, hypertension, chronic back pain, and has had cellulitis in the past.  Swelling and pain have progressed over the past week.  She states the pain is moderate severity with no aggravating or limiting factors.  Today she had \"chills\", but no fever, no malaise.  Otherwise, no cough, congestion, sore throat or rhinorrhea.  No aggravating or alleviating factors.      History provided by:  Patient      Review of Systems   All other systems reviewed and are negative.      Past Medical History:   Diagnosis Date   • Arthritis    • Bronchitis    • Cataract     right eye , cornea implant   • Chronic back pain    • Depression    • Disease of thyroid gland    • Edema     lower extremities   • Hypertension    • Kidney stones        Allergies   Allergen Reactions   • Codeine Itching and Rash     SEVERE RASH/ITCHING (TOLERATES PERCOCET)   • Benadryl [Diphenhydramine] Other (See Comments)     KEEPS PATIENT AWAKE        Past Surgical History:   Procedure Laterality Date   • BACK SURGERY      2010 ([facility]), 2013    • CHOLECYSTECTOMY OPEN     • CORNEAL TRANSPLANT Right 1999   • LUMBAR FUSION Left 8/2/2017    Procedure: LEFT LUMBAR LATERAL INTERBODY FUSION WITH INSTRUMENTATION  L1-2;  Surgeon: MARCELINO Wright MD;  Location:  [facility] OR;  Service:    • NECK SURGERY  2010    [facility]   • TOTAL HIP ARTHROPLASTY Right 2014    DR. KENDRICK    • TOTAL KNEE ARTHROPLASTY Right 2000   • TOTAL KNEE ARTHROPLASTY Left 2002   • TOTAL SHOULDER ARTHROPLASTY W/ DISTAL CLAVICLE EXCISION Left 6/1/2020    Procedure: LEFT REVERSE TOTAL SHOULDER REPLACEMENT;  Surgeon: Saravanan Calvin MD;  Location:  [facility] OR;  Service: Orthopedics;  Laterality: Left;       Family History   Problem Relation Age of Onset   • Breast cancer Neg Hx        Social History     Socioeconomic History   • Marital " status:    Tobacco Use   • Smoking status: Never Smoker   • Smokeless tobacco: Never Used   Substance and Sexual Activity   • Alcohol use: No   • Drug use: No   • Sexual activity: Defer           Objective   Physical Exam  Vitals and nursing note reviewed.   Constitutional:       General: She is not in acute distress.     Appearance: Normal appearance. She is normal weight.   HENT:      Head: Normocephalic and atraumatic.      Nose: Nose normal.      Mouth/Throat:      Mouth: Mucous membranes are moist.      Pharynx: Oropharynx is clear. No oropharyngeal exudate or posterior oropharyngeal erythema.   Eyes:      Extraocular Movements: Extraocular movements intact.      Conjunctiva/sclera: Conjunctivae normal.      Pupils: Pupils are equal, round, and reactive to light.   Cardiovascular:      Rate and Rhythm: Normal rate and regular rhythm.      Pulses: Normal pulses.      Heart sounds: Normal heart sounds.   Pulmonary:      Effort: Pulmonary effort is normal.      Breath sounds: Normal breath sounds. No wheezing, rhonchi or rales.   Abdominal:      General: Abdomen is flat. Bowel sounds are normal. There is no distension.      Palpations: Abdomen is soft.      Tenderness: There is no abdominal tenderness.   Musculoskeletal:         General: No tenderness. Normal range of motion.      Cervical back: Normal range of motion and neck supple. No rigidity. No muscular tenderness.      Right lower leg: Edema present.      Left lower leg: Edema present.   Skin:     General: Skin is warm and dry.      Capillary Refill: Capillary refill takes less than 2 seconds.      Findings: No rash.   Neurological:      General: No focal deficit present.      Mental Status: She is alert and oriented to person, place, and time. Mental status is at baseline.      Cranial Nerves: No cranial nerve deficit.      Sensory: No sensory deficit.      Motor: No weakness.   Psychiatric:         Mood and Affect: Mood normal.         Behavior:  Behavior normal.         Thought Content: Thought content normal.         Procedures         Lab Results (last 24 hours)     Procedure Component Value Units Date/Time    Urinalysis With Microscopic If Indicated (No Culture) - Urine, Clean Catch [[id number]]  (Abnormal) Collected: 04/07/22 1415    Specimen: Urine, Clean Catch Updated: 04/07/22 1432     Color, UA Dark Yellow     Appearance, UA Clear     pH, UA 6.5     Specific Gravity, UA 1.022     Glucose, UA Negative     Ketones, UA Negative     Bilirubin, UA Negative     Blood, UA Negative     Protein, UA Negative     Leuk Esterase, UA Negative     Nitrite, UA Negative     Urobilinogen, UA 1.0 E.U./dL    Narrative:      Urine microscopic not indicated.    CBC & Differential [[id number]]  (Abnormal) Collected: 04/07/22 1445    Specimen: Blood Updated: 04/07/22 1521    Narrative:      The following orders were created for panel order CBC & Differential.  Procedure                               Abnormality         Status                     ---------                               -----------         ------                     CBC Auto Differential[[id number]]        Abnormal            Final result                 Please view results for these tests on the individual orders.    Comprehensive Metabolic Panel [[id number]]  (Abnormal) Collected: 04/07/22 1445    Specimen: Blood Updated: 04/07/22 1521     Glucose 124 mg/dL      BUN 12 mg/dL      Creatinine 0.54 mg/dL      Sodium 138 mmol/L      Potassium 4.8 mmol/L      Comment: Slight hemolysis detected by analyzer. Results may be affected.        Chloride 101 mmol/L      CO2 26.0 mmol/L      Calcium 9.3 mg/dL      Total Protein 6.2 g/dL      Albumin 3.50 g/dL      ALT (SGPT) 11 U/L      AST (SGOT) 29 U/L      Comment: Slight hemolysis detected by analyzer. Results may be affected.        Alkaline Phosphatase 137 U/L      Total Bilirubin 0.8 mg/dL      Globulin 2.7 gm/dL      A/G Ratio 1.3 g/dL      BUN/Creatinine Ratio 22.2      Anion Gap 11.0 mmol/L      eGFR 97.4 mL/min/1.73      Comment: National Kidney Foundation and American Society of Nephrology (ASN) Task Force recommended calculation based on the Chronic Kidney Disease Epidemiology Collaboration (CKD-EPI) equation refit without adjustment for race.       Narrative:      GFR Normal >60  Chronic Kidney Disease <60  Kidney Failure <15      Protime-INR [[id number]]  (Normal) Collected: 04/07/22 1445    Specimen: Blood Updated: 04/07/22 1535     Protime 13.5 Seconds      INR 1.07    BNP [[id number]]  (Normal) Collected: 04/07/22 1445    Specimen: Blood Updated: 04/07/22 1513     proBNP 250.4 pg/mL     Narrative:      Among patients with dyspnea, NT-proBNP is highly sensitive for the detection of acute congestive heart failure. In addition NT-proBNP of <300 pg/ml effectively rules out acute congestive heart failure with 99% negative predictive value.    Results may be falsely decreased if patient taking Biotin.      Troponin [[id number]]  (Normal) Collected: 04/07/22 1445    Specimen: Blood Updated: 04/07/22 1513     Troponin T <0.010 ng/mL     Narrative:      Troponin T Reference Range:  <= 0.03 ng/mL-   Negative for AMI  >0.03 ng/mL-     Abnormal for myocardial necrosis.  Clinicians would have to utilize clinical acumen, EKG, Troponin and serial changes to determine if it is an Acute Myocardial Infarction or myocardial injury due to an underlying chronic condition.       Results may be falsely decreased if patient taking Biotin.      CBC Auto Differential [[id number]]  (Abnormal) Collected: 04/07/22 1445    Specimen: Blood Updated: 04/07/22 1521     WBC 3.39 10*3/mm3      RBC 3.58 10*6/mm3      Hemoglobin 12.5 g/dL      Hematocrit 34.3 %      MCV 95.8 fL      MCH 34.9 pg      MCHC 36.4 g/dL      RDW 15.1 %      RDW-SD 48.0 fl      MPV 11.0 fL      Platelets 89 10*3/mm3      Neutrophil % 48.9 %      Lymphocyte % 36.9 %      Monocyte % 10.9 %      Eosinophil % 2.1 %      Basophil % 0.9 %       Neutrophils, Absolute 1.66 10*3/mm3      Lymphocytes, Absolute 1.25 10*3/mm3      Monocytes, Absolute 0.37 10*3/mm3      Eosinophils, Absolute 0.07 10*3/mm3      Basophils, Absolute 0.03 10*3/mm3     Narrative:      Sample heated 15 min at 37 degrees for accurate HCT.      XR Chest 1 View    Result Date: 4/7/2022  EXAMINATION: XR CHEST 1 VW- 4/7/2022 2:31 PM CDT  HISTORY: Lower extremity edema.  REPORT: A frontal view of the chest was obtained.  COMPARISON: Chest x-ray 5/26/2020.   The lungs are hypoaerated, no focal infiltrate or pulmonary consolidation is identified. No pneumothorax or effusion is identified. Heart size is normal. The osseous structures show no acute findings. There is previous left total reverse shoulder arthroplasty and ACDF. Cholecystectomy clips are present.      Shallow inspiration, no acute cardiopulmonary abnormality.  This report was finalized on 04/07/2022 14:32 by Dr. Sachin Nuno MD.    ED Course  ED Course as of 04/07/22 1550   Thu Apr 07, 2022   1546 71-year-old female who has a history of chronic bilateral lower extremity swelling presents to the ED with complaint of worsening swelling and tenderness to both legs over the past week.  Lungs are clear.  Troponin normal.  BNP normal.  Do not suspect congestive heart failure as etiology of her swelling.  Ultrasound bilateral lower extremity negative for DVT at this time.  Likely early cellulitis.  Will treat with antibiotics and have the patient return for worsening symptoms. [AW]      ED Course User Index  [AW] Lalit Carter MD                                                 [facility]    Final diagnoses:   Cellulitis of lower extremity, unspecified laterality       ED Disposition  ED Disposition     ED Disposition   Discharge    Condition   Stable    Comment   --             Sachin Dia MD  [address] [place] [id number]  [phone]    Schedule an appointment as soon as possible for a visit in 5 days            Medication List      New Prescriptions    clindamycin 300 MG capsule  Commonly known as: CLEOCIN  Take 1 capsule by mouth 4 (Four) Times a Day.           Where to Get Your Medications      [id number] - [place] [place] - [address] AT [facility] [address] - [phone]  - [phone] FX  [address], [facility] [address]    Phone: [phone]   · clindamycin 300 MG capsule          Lalit Carter MD  04/07/22 9825

## 2022-05-01 ENCOUNTER — HOSPITAL ENCOUNTER (INPATIENT)
Facility: HOSPITAL | Age: 72
LOS: 3 days | Discharge: HOME | End: 2022-05-04
Attending: FAMILY MEDICINE | Admitting: FAMILY MEDICINE

## 2022-05-01 ENCOUNTER — APPOINTMENT (OUTPATIENT)
Dept: GENERAL RADIOLOGY | Facility: HOSPITAL | Age: 72
End: 2022-05-01

## 2022-05-01 DIAGNOSIS — Z74.09 IMPAIRED FUNCTIONAL MOBILITY AND ACTIVITY TOLERANCE: ICD-10-CM

## 2022-05-01 DIAGNOSIS — L03.115 CELLULITIS OF RIGHT LOWER EXTREMITY: Primary | ICD-10-CM

## 2022-05-01 LAB
ALBUMIN SERPL-MCNC: 3.4 G/DL (ref 3.5–5.2)
ALBUMIN/GLOB SERPL: 1.1 G/DL
ALP SERPL-CCNC: 139 U/L (ref 39–117)
ALT SERPL W P-5'-P-CCNC: 15 U/L (ref 1–33)
ANION GAP SERPL CALCULATED.3IONS-SCNC: 13 MMOL/L (ref 5–15)
AST SERPL-CCNC: 40 U/L (ref 1–32)
BILIRUB SERPL-MCNC: 2.9 MG/DL (ref 0–1.2)
BUN SERPL-MCNC: 15 MG/DL (ref 8–23)
BUN/CREAT SERPL: 22.4 (ref 7–25)
CALCIUM SPEC-SCNC: 8.6 MG/DL (ref 8.6–10.5)
CHLORIDE SERPL-SCNC: 97 MMOL/L (ref 98–107)
CO2 SERPL-SCNC: 24 MMOL/L (ref 22–29)
CREAT SERPL-MCNC: 0.67 MG/DL (ref 0.57–1)
CRP SERPL-MCNC: 8.59 MG/DL (ref 0–0.5)
D-LACTATE SERPL-SCNC: 2.2 MMOL/L (ref 0.5–2)
D-LACTATE SERPL-SCNC: 2.7 MMOL/L (ref 0.5–2)
DACRYOCYTES BLD QL SMEAR: ABNORMAL
DEPRECATED RDW RBC AUTO: 50.1 FL (ref 37–54)
EGFRCR SERPLBLD CKD-EPI 2021: 92.4 ML/MIN/1.73
EOSINOPHIL # BLD MANUAL: 0.08 10*3/MM3 (ref 0–0.4)
EOSINOPHIL NFR BLD MANUAL: 1 % (ref 0.3–6.2)
ERYTHROCYTE [DISTWIDTH] IN BLOOD BY AUTOMATED COUNT: 18.8 % (ref 12.3–15.4)
ERYTHROCYTE [SEDIMENTATION RATE] IN BLOOD: 3 MM/HR (ref 0–30)
GLOBULIN UR ELPH-MCNC: 3 GM/DL
GLUCOSE SERPL-MCNC: 109 MG/DL (ref 65–99)
HCT VFR BLD AUTO: 35 % (ref 34–46.6)
HGB BLD-MCNC: 13.3 G/DL (ref 12–15.9)
LYMPHOCYTES # BLD MANUAL: 2.24 10*3/MM3 (ref 0.7–3.1)
LYMPHOCYTES NFR BLD MANUAL: 9.1 % (ref 5–12)
MCH RBC QN AUTO: 37.4 PG (ref 26.6–33)
MCHC RBC AUTO-ENTMCNC: 38 G/DL (ref 31.5–35.7)
MCV RBC AUTO: 98.3 FL (ref 79–97)
MONOCYTES # BLD: 0.72 10*3/MM3 (ref 0.1–0.9)
NEUTROPHILS # BLD AUTO: 4.87 10*3/MM3 (ref 1.7–7)
NEUTROPHILS NFR BLD MANUAL: 58.6 % (ref 42.7–76)
NEUTS BAND NFR BLD MANUAL: 3 % (ref 0–5)
PLATELET # BLD AUTO: 110 10*3/MM3 (ref 140–450)
PMV BLD AUTO: 11.6 FL (ref 6–12)
POIKILOCYTOSIS BLD QL SMEAR: ABNORMAL
POLYCHROMASIA BLD QL SMEAR: ABNORMAL
POTASSIUM SERPL-SCNC: 4.5 MMOL/L (ref 3.5–5.2)
PROT SERPL-MCNC: 6.4 G/DL (ref 6–8.5)
RBC # BLD AUTO: 3.56 10*6/MM3 (ref 3.77–5.28)
ROULEAUX BLD QL SMEAR: ABNORMAL
SARS-COV-2 RNA PNL SPEC NAA+PROBE: NOT DETECTED
SMALL PLATELETS BLD QL SMEAR: ABNORMAL
SODIUM SERPL-SCNC: 134 MMOL/L (ref 136–145)
VARIANT LYMPHS NFR BLD MANUAL: 23.2 % (ref 19.6–45.3)
VARIANT LYMPHS NFR BLD MANUAL: 5.1 % (ref 0–5)
WBC MORPH BLD: NORMAL
WBC NRBC COR # BLD: 7.9 10*3/MM3 (ref 3.4–10.8)

## 2022-05-01 PROCEDURE — 87150 DNA/RNA AMPLIFIED PROBE: CPT | Performed by: NURSE PRACTITIONER

## 2022-05-01 PROCEDURE — 85025 COMPLETE CBC W/AUTO DIFF WBC: CPT | Performed by: NURSE PRACTITIONER

## 2022-05-01 PROCEDURE — 87635 SARS-COV-2 COVID-19 AMP PRB: CPT | Performed by: NURSE PRACTITIONER

## 2022-05-01 PROCEDURE — 87186 SC STD MICRODIL/AGAR DIL: CPT | Performed by: NURSE PRACTITIONER

## 2022-05-01 PROCEDURE — 87076 CULTURE ANAEROBE IDENT EACH: CPT | Performed by: NURSE PRACTITIONER

## 2022-05-01 PROCEDURE — 99284 EMERGENCY DEPT VISIT MOD MDM: CPT

## 2022-05-01 PROCEDURE — 86140 C-REACTIVE PROTEIN: CPT | Performed by: NURSE PRACTITIONER

## 2022-05-01 PROCEDURE — 87077 CULTURE AEROBIC IDENTIFY: CPT | Performed by: NURSE PRACTITIONER

## 2022-05-01 PROCEDURE — 80053 COMPREHEN METABOLIC PANEL: CPT | Performed by: NURSE PRACTITIONER

## 2022-05-01 PROCEDURE — 85652 RBC SED RATE AUTOMATED: CPT | Performed by: NURSE PRACTITIONER

## 2022-05-01 PROCEDURE — 87040 BLOOD CULTURE FOR BACTERIA: CPT | Performed by: NURSE PRACTITIONER

## 2022-05-01 PROCEDURE — 87070 CULTURE OTHR SPECIMN AEROBIC: CPT | Performed by: NURSE PRACTITIONER

## 2022-05-01 PROCEDURE — 83605 ASSAY OF LACTIC ACID: CPT | Performed by: NURSE PRACTITIONER

## 2022-05-01 PROCEDURE — 87205 SMEAR GRAM STAIN: CPT | Performed by: NURSE PRACTITIONER

## 2022-05-01 PROCEDURE — 85007 BL SMEAR W/DIFF WBC COUNT: CPT | Performed by: NURSE PRACTITIONER

## 2022-05-01 PROCEDURE — 25010000002 VANCOMYCIN 10 G RECONSTITUTED SOLUTION: Performed by: NURSE PRACTITIONER

## 2022-05-01 PROCEDURE — 73590 X-RAY EXAM OF LOWER LEG: CPT

## 2022-05-01 PROCEDURE — 87147 CULTURE TYPE IMMUNOLOGIC: CPT | Performed by: NURSE PRACTITIONER

## 2022-05-01 RX ORDER — HYDROCODONE BITARTRATE AND ACETAMINOPHEN 10; 325 MG/1; MG/1
1 TABLET ORAL ONCE
Status: COMPLETED | OUTPATIENT
Start: 2022-05-01 | End: 2022-05-01

## 2022-05-01 RX ORDER — GABAPENTIN 100 MG/1
100 CAPSULE ORAL 2 TIMES DAILY
COMMUNITY

## 2022-05-01 RX ADMIN — Medication 2000 MG: at 20:30

## 2022-05-01 RX ADMIN — HYDROCODONE BITARTRATE AND ACETAMINOPHEN 1 TABLET: 10; 325 TABLET ORAL at 18:41

## 2022-05-01 NOTE — ED PROVIDER NOTES
Subjective   This is a 71 year old female who presents with redness, swelling, pain of the right lower extremity.  The patient was seen here on 4/7/22 and had a workup done for swelling and cellulitis of bilateral lower extremities.  She was discharged home with clindamycin, the patient states the cellulitis resolved.  Last night she started to have chills and states when she woke up this morning she noticed pain, swelling, redness of the right lower extremity.  There is a small abrasion to the right anterior lower extremity with purulent drainage noted.  No fever, chills noted today. Pt is alert, oriented, no acute distress, she is able to bear weight and ambulate without significant difficulty.  The pt has a history of chronic recurring lower extremity cellulitis, she has been seen in the ER and has been treated outpatient and inpatient in the past.        History provided by:  Patient and relative   used: No        Review of Systems   Constitutional: Negative.  Negative for activity change, appetite change, chills, diaphoresis, fatigue and fever.   HENT: Negative.  Negative for congestion, ear pain, facial swelling, sore throat and trouble swallowing.    Eyes: Negative.  Negative for photophobia, pain, discharge, redness and itching.   Respiratory: Negative.  Negative for cough, chest tightness, shortness of breath, wheezing and stridor.    Cardiovascular: Positive for leg swelling. Negative for chest pain and palpitations.   Gastrointestinal: Negative.  Negative for abdominal distention, abdominal pain, blood in stool, constipation, diarrhea, nausea and vomiting.   Endocrine: Negative.  Negative for cold intolerance and heat intolerance.   Genitourinary: Negative.  Negative for difficulty urinating, dysuria, flank pain, frequency, urgency and vaginal bleeding.   Musculoskeletal: Negative for arthralgias, back pain, gait problem, joint swelling and myalgias.   Skin: Positive for color change,  rash and wound. Negative for pallor.   Allergic/Immunologic: Negative.    Neurological: Negative.  Negative for dizziness, tremors, seizures, syncope, weakness, light-headedness, numbness and headaches.   Hematological: Negative.  Negative for adenopathy. Does not bruise/bleed easily.   Psychiatric/Behavioral: Negative.  Negative for agitation, confusion and suicidal ideas. The patient is not nervous/anxious.    All other systems reviewed and are negative.      Past Medical History:   Diagnosis Date   • Arthritis    • Bronchitis    • Cataract     right eye , cornea implant   • Chronic back pain    • Depression    • Disease of thyroid gland    • Edema     lower extremities   • Hypertension    • Kidney stones        Allergies   Allergen Reactions   • Codeine Itching and Rash     SEVERE RASH/ITCHING (TOLERATES PERCOCET)   • Benadryl [Diphenhydramine] Other (See Comments)     KEEPS PATIENT AWAKE        Past Surgical History:   Procedure Laterality Date   • BACK SURGERY      2010 ([facility]), 2013    • CHOLECYSTECTOMY OPEN     • CORNEAL TRANSPLANT Right 1999   • LUMBAR FUSION Left 8/2/2017    Procedure: LEFT LUMBAR LATERAL INTERBODY FUSION WITH INSTRUMENTATION  L1-2;  Surgeon: MARCELINO Wright MD;  Location: [facility] OR;  Service:    • NECK SURGERY  2010    [facility]   • TOTAL HIP ARTHROPLASTY Right 2014    DR. KENDRICK    • TOTAL KNEE ARTHROPLASTY Right 2000   • TOTAL KNEE ARTHROPLASTY Left 2002   • TOTAL SHOULDER ARTHROPLASTY W/ DISTAL CLAVICLE EXCISION Left 6/1/2020    Procedure: LEFT REVERSE TOTAL SHOULDER REPLACEMENT;  Surgeon: Saravanan Calvin MD;  Location: [facility] OR;  Service: Orthopedics;  Laterality: Left;       Family History   Problem Relation Age of Onset   • Breast cancer Neg Hx        Social History     Socioeconomic History   • Marital status:    Tobacco Use   • Smoking status: Never Smoker   • Smokeless tobacco: Never Used   Substance and Sexual Activity   • Alcohol use: No   • Drug use: No   •  Sexual activity: Defer           Objective   Physical Exam  Vitals and nursing note reviewed.   Constitutional:       General: She is not in acute distress.     Appearance: Normal appearance. She is normal weight. She is not ill-appearing, toxic-appearing or diaphoretic.   HENT:      Head: Normocephalic and atraumatic.      Right Ear: Tympanic membrane, ear canal and external ear normal.      Left Ear: Tympanic membrane, ear canal and external ear normal.      Nose: Nose normal. No congestion or rhinorrhea.      Mouth/Throat:      Mouth: Mucous membranes are moist.      Pharynx: Oropharynx is clear.   Eyes:      Extraocular Movements: Extraocular movements intact.      Conjunctiva/sclera: Conjunctivae normal.      Pupils: Pupils are equal, round, and reactive to light.   Cardiovascular:      Rate and Rhythm: Normal rate and regular rhythm.      Pulses: Normal pulses.      Heart sounds: Normal heart sounds. No murmur heard.    No friction rub. No gallop.   Pulmonary:      Effort: Pulmonary effort is normal. No respiratory distress.      Breath sounds: Normal breath sounds. No stridor. No wheezing, rhonchi or rales.   Chest:      Chest wall: No tenderness.   Abdominal:      General: Bowel sounds are normal. There is no distension.      Palpations: Abdomen is soft. There is no mass.      Tenderness: There is no abdominal tenderness.      Hernia: No hernia is present.   Musculoskeletal:         General: Swelling present. No tenderness, deformity or signs of injury. Normal range of motion.      Cervical back: Normal range of motion and neck supple. No rigidity or tenderness.      Right lower leg: Edema present.   Skin:     General: Skin is warm and dry.      Capillary Refill: Capillary refill takes less than 2 seconds.      Coloration: Skin is not pale.      Findings: Erythema and lesion present. No rash.      Comments: Erythema noted to right lower extremity from the knee down to his foot.  There is a small superficial  abrasion noted to anterior lower extremity and has purulent drainage noted from the site.     Neurological:      General: No focal deficit present.      Mental Status: She is alert and oriented to person, place, and time. Mental status is at baseline.      Cranial Nerves: No cranial nerve deficit.      Sensory: No sensory deficit.      Motor: No weakness.      Coordination: Coordination normal.      Gait: Gait normal.      Deep Tendon Reflexes: Reflexes normal.   Psychiatric:         Mood and Affect: Mood normal.         Behavior: Behavior normal.         Thought Content: Thought content normal.         Judgment: Judgment normal.         Procedures           ED Course  ED Course as of 05/01/22 2026   Sun May 01, 2022   1800 Did recommend venous doppler ultrasound of the right lower extremity for the patient to rule out DVT.  The patient had a negative ultrasound 4/7/22.  Pulses are palpable to bilateral lower extremities.  No decrease in pulses, there is warmth to touch, redness, unilateral swelling.  The patient refuses to have ultrasound done today because she states she cannot take the pain of palpating the lower leg.   [WS]   1937 C-Reactive Protein(!): 8.59 [WS]   1937 Lactate(!!): 2.7  Pt has been treated recently for bilateral lower extremity cellulitis with clindamycin.  The patient completed therapy and continued to have slight erythema despite antibiotic therapy.  The patient states her symptoms drastically worsened last night prompting her to come to the Er for evaluation.  Workup does show elevated crp, lactic acid elevation.  WBC normal but patient has failed outpatient therapy.  Bilirubin elevated 2.9 and AST 40 (both elevated since previous ER visit 4/7/22) patient denies abdominal pain, nausea, vomiting, diarrhea, constipation.  She does not appear jaundice or with an elevated temperature.  PCP Dr. Dia.   [WS]   2025 Dr. Ely, on call for Dr. Dia,discussed patient evaluation and  recommend inpatient hospital admission for treatment of right lower extremity cellulitis with IV vancomycin.   [WS]      ED Course User Index  [WS] Isha Villalobos, GARETT                                                 MDM  Number of Diagnoses or Management Options  Cellulitis of right lower extremity  Diagnosis management comments: Medical records reviewed from previous ER visit 4/7/22 treated with clindamycin outpatient for bilateral lower extremity cellulitis.  Reviewed 2021 medical records, patient admitted to Dr. Dia for intravenous antibiotic administration for treatment of cellulitis.     Differential diagnosis includes but are not limited to cellulitis, lymphangitis, follilculitis, abscess, osteomyelitis.     Plan to obtain a broad workup including labwork, xr imaging, blood cultures, wound culture.      Imaging and other workup revealed right lower extremity cellulitis.      Patient was then reassessed, appears well and non-toxic.  Explained to the patient that she since she failed outpatient treatment with clindamycin and continues to have right lower extremity redness, swelling, purulent drainage that she would benefit for IV antibiotic administration.  Called to speak with Dr. Chao who is on call for Dr. Dia, she agrees with evaluation and recommendation for inpatient admission to hospital.  Telephone orders received, will admit patient.           Amount and/or Complexity of Data Reviewed  Clinical lab tests: reviewed and ordered  Tests in the medicine section of CPT®: ordered and reviewed  Decide to obtain previous medical records or to obtain history from someone other than the patient: yes  Obtain history from someone other than the patient: yes  Review and summarize past medical records: yes  Discuss the patient with other providers: yes        Final diagnoses:   Cellulitis of right lower extremity       ED Disposition  ED Disposition     ED Disposition   Decision to Admit    Condition    --    Comment   Level of Care: Med/Surg [1]   Diagnosis: Cellulitis of right lower extremity [245982]   Admitting Physician: RADHA BLACKMAN [[id number]]   Attending Physician: RADHA BLACKMAN [[id number]]   Certification: I Certify That Inpatient Hospital Services Are Medically Necessary For Greater Than 2 Midnights               No follow-up provider specified.       Medication List      No changes were made to your prescriptions during this visit.          Isha Villalobos, GARETT  05/01/22 2026

## 2022-05-02 LAB
ANION GAP SERPL CALCULATED.3IONS-SCNC: 11 MMOL/L (ref 5–15)
BACTERIA BLD CULT: ABNORMAL
BUN SERPL-MCNC: 13 MG/DL (ref 8–23)
BUN/CREAT SERPL: 21.7 (ref 7–25)
CALCIUM SPEC-SCNC: 8.6 MG/DL (ref 8.6–10.5)
CHLORIDE SERPL-SCNC: 102 MMOL/L (ref 98–107)
CO2 SERPL-SCNC: 24 MMOL/L (ref 22–29)
CREAT SERPL-MCNC: 0.6 MG/DL (ref 0.57–1)
DACRYOCYTES BLD QL SMEAR: NORMAL
DEPRECATED RDW RBC AUTO: 49.3 FL (ref 37–54)
EGFRCR SERPLBLD CKD-EPI 2021: 94.9 ML/MIN/1.73
EOSINOPHIL # BLD MANUAL: 0.24 10*3/MM3 (ref 0–0.4)
EOSINOPHIL NFR BLD MANUAL: 4 % (ref 0.3–6.2)
ERYTHROCYTE [DISTWIDTH] IN BLOOD BY AUTOMATED COUNT: 17.7 % (ref 12.3–15.4)
GLUCOSE SERPL-MCNC: 117 MG/DL (ref 65–99)
HCT VFR BLD AUTO: 33.8 % (ref 34–46.6)
HGB BLD-MCNC: 11.5 G/DL (ref 12–15.9)
LYMPHOCYTES # BLD MANUAL: 2.16 10*3/MM3 (ref 0.7–3.1)
LYMPHOCYTES NFR BLD MANUAL: 6.1 % (ref 5–12)
MCH RBC QN AUTO: 33 PG (ref 26.6–33)
MCHC RBC AUTO-ENTMCNC: 34 G/DL (ref 31.5–35.7)
MCV RBC AUTO: 97.1 FL (ref 79–97)
MONOCYTES # BLD: 0.37 10*3/MM3 (ref 0.1–0.9)
NEUTROPHILS # BLD AUTO: 3.32 10*3/MM3 (ref 1.7–7)
NEUTROPHILS NFR BLD MANUAL: 54.5 % (ref 42.7–76)
PLATELET # BLD AUTO: 117 10*3/MM3 (ref 140–450)
PMV BLD AUTO: 12.8 FL (ref 6–12)
POIKILOCYTOSIS BLD QL SMEAR: NORMAL
POTASSIUM SERPL-SCNC: 3.3 MMOL/L (ref 3.5–5.2)
RBC # BLD AUTO: 3.48 10*6/MM3 (ref 3.77–5.28)
SMALL PLATELETS BLD QL SMEAR: NORMAL
SODIUM SERPL-SCNC: 137 MMOL/L (ref 136–145)
VARIANT LYMPHS NFR BLD MANUAL: 35.4 % (ref 19.6–45.3)
WBC MORPH BLD: NORMAL
WBC NRBC COR # BLD: 6.1 10*3/MM3 (ref 3.4–10.8)

## 2022-05-02 PROCEDURE — 80048 BASIC METABOLIC PNL TOTAL CA: CPT | Performed by: STUDENT IN AN ORGANIZED HEALTH CARE EDUCATION/TRAINING PROGRAM

## 2022-05-02 PROCEDURE — 25010000002 VANCOMYCIN 10 G RECONSTITUTED SOLUTION: Performed by: FAMILY MEDICINE

## 2022-05-02 PROCEDURE — 36415 COLL VENOUS BLD VENIPUNCTURE: CPT | Performed by: STUDENT IN AN ORGANIZED HEALTH CARE EDUCATION/TRAINING PROGRAM

## 2022-05-02 PROCEDURE — 99221 1ST HOSP IP/OBS SF/LOW 40: CPT | Performed by: NURSE PRACTITIONER

## 2022-05-02 PROCEDURE — 85007 BL SMEAR W/DIFF WBC COUNT: CPT | Performed by: STUDENT IN AN ORGANIZED HEALTH CARE EDUCATION/TRAINING PROGRAM

## 2022-05-02 PROCEDURE — 85025 COMPLETE CBC W/AUTO DIFF WBC: CPT | Performed by: STUDENT IN AN ORGANIZED HEALTH CARE EDUCATION/TRAINING PROGRAM

## 2022-05-02 PROCEDURE — 25010000002 ENOXAPARIN PER 10 MG: Performed by: STUDENT IN AN ORGANIZED HEALTH CARE EDUCATION/TRAINING PROGRAM

## 2022-05-02 RX ORDER — CYCLOBENZAPRINE HCL 10 MG
10 TABLET ORAL 3 TIMES DAILY PRN
Status: DISCONTINUED | OUTPATIENT
Start: 2022-05-02 | End: 2022-05-04 | Stop reason: HOSPADM

## 2022-05-02 RX ORDER — HYDROCODONE BITARTRATE AND ACETAMINOPHEN 10; 325 MG/1; MG/1
1 TABLET ORAL EVERY 4 HOURS PRN
Status: DISCONTINUED | OUTPATIENT
Start: 2022-05-02 | End: 2022-05-04 | Stop reason: HOSPADM

## 2022-05-02 RX ORDER — POTASSIUM CHLORIDE 750 MG/1
40 CAPSULE, EXTENDED RELEASE ORAL EVERY 4 HOURS
Status: COMPLETED | OUTPATIENT
Start: 2022-05-02 | End: 2022-05-02

## 2022-05-02 RX ORDER — LANOLIN ALCOHOL/MO/W.PET/CERES
6 CREAM (GRAM) TOPICAL NIGHTLY PRN
Status: DISCONTINUED | OUTPATIENT
Start: 2022-05-02 | End: 2022-05-04 | Stop reason: HOSPADM

## 2022-05-02 RX ORDER — FAMOTIDINE 20 MG/1
20 TABLET, FILM COATED ORAL 2 TIMES DAILY PRN
Status: DISCONTINUED | OUTPATIENT
Start: 2022-05-02 | End: 2022-05-04 | Stop reason: HOSPADM

## 2022-05-02 RX ORDER — FUROSEMIDE 20 MG/1
40 TABLET ORAL DAILY PRN
Status: DISCONTINUED | OUTPATIENT
Start: 2022-05-02 | End: 2022-05-02

## 2022-05-02 RX ORDER — ENOXAPARIN SODIUM 100 MG/ML
40 INJECTION SUBCUTANEOUS NIGHTLY
Status: DISCONTINUED | OUTPATIENT
Start: 2022-05-02 | End: 2022-05-04 | Stop reason: HOSPADM

## 2022-05-02 RX ORDER — BISOPROLOL FUMARATE AND HYDROCHLOROTHIAZIDE 5; 6.25 MG/1; MG/1
1 TABLET ORAL DAILY
Status: DISCONTINUED | OUTPATIENT
Start: 2022-05-02 | End: 2022-05-04 | Stop reason: HOSPADM

## 2022-05-02 RX ORDER — MORPHINE SULFATE 15 MG/1
15 TABLET, FILM COATED, EXTENDED RELEASE ORAL 3 TIMES DAILY PRN
Status: DISCONTINUED | OUTPATIENT
Start: 2022-05-02 | End: 2022-05-04 | Stop reason: HOSPADM

## 2022-05-02 RX ORDER — LEVOTHYROXINE SODIUM 0.1 MG/1
100 TABLET ORAL
Status: DISCONTINUED | OUTPATIENT
Start: 2022-05-02 | End: 2022-05-04 | Stop reason: HOSPADM

## 2022-05-02 RX ORDER — ACETAMINOPHEN 500 MG
500 TABLET ORAL EVERY 6 HOURS PRN
Status: DISCONTINUED | OUTPATIENT
Start: 2022-05-02 | End: 2022-05-04 | Stop reason: HOSPADM

## 2022-05-02 RX ORDER — GABAPENTIN 100 MG/1
100 CAPSULE ORAL 2 TIMES DAILY
Status: DISCONTINUED | OUTPATIENT
Start: 2022-05-02 | End: 2022-05-04 | Stop reason: HOSPADM

## 2022-05-02 RX ORDER — ONDANSETRON 4 MG/1
4 TABLET, FILM COATED ORAL EVERY 8 HOURS PRN
Status: DISCONTINUED | OUTPATIENT
Start: 2022-05-02 | End: 2022-05-04 | Stop reason: HOSPADM

## 2022-05-02 RX ORDER — DULOXETIN HYDROCHLORIDE 30 MG/1
60 CAPSULE, DELAYED RELEASE ORAL DAILY
Status: DISCONTINUED | OUTPATIENT
Start: 2022-05-02 | End: 2022-05-04 | Stop reason: HOSPADM

## 2022-05-02 RX ADMIN — DULOXETINE HYDROCHLORIDE 60 MG: 30 CAPSULE, DELAYED RELEASE ORAL at 12:34

## 2022-05-02 RX ADMIN — VANCOMYCIN HYDROCHLORIDE 750 MG: 10 INJECTION, POWDER, LYOPHILIZED, FOR SOLUTION INTRAVENOUS at 07:40

## 2022-05-02 RX ADMIN — LEVOTHYROXINE SODIUM 100 MCG: 100 TABLET ORAL at 12:34

## 2022-05-02 RX ADMIN — BISOPROLOL FUMARATE AND HYDROCHLOROTHIAZIDE 1 TABLET: 6.25; 5 TABLET ORAL at 13:08

## 2022-05-02 RX ADMIN — ENOXAPARIN SODIUM 40 MG: 100 INJECTION SUBCUTANEOUS at 20:05

## 2022-05-02 RX ADMIN — POTASSIUM CHLORIDE 40 MEQ: 10 CAPSULE, COATED, EXTENDED RELEASE ORAL at 15:52

## 2022-05-02 RX ADMIN — HYDROCODONE BITARTRATE AND ACETAMINOPHEN 1 TABLET: 10; 325 TABLET ORAL at 22:56

## 2022-05-02 RX ADMIN — GABAPENTIN 100 MG: 100 CAPSULE ORAL at 12:28

## 2022-05-02 RX ADMIN — MORPHINE SULFATE 15 MG: 15 TABLET, FILM COATED, EXTENDED RELEASE ORAL at 01:46

## 2022-05-02 RX ADMIN — VANCOMYCIN HYDROCHLORIDE 750 MG: 10 INJECTION, POWDER, LYOPHILIZED, FOR SOLUTION INTRAVENOUS at 20:04

## 2022-05-02 RX ADMIN — HYDROCODONE BITARTRATE AND ACETAMINOPHEN 1 TABLET: 10; 325 TABLET ORAL at 15:12

## 2022-05-02 RX ADMIN — POTASSIUM CHLORIDE 40 MEQ: 10 CAPSULE, COATED, EXTENDED RELEASE ORAL at 20:05

## 2022-05-02 RX ADMIN — GABAPENTIN 100 MG: 100 CAPSULE ORAL at 20:05

## 2022-05-02 RX ADMIN — MORPHINE SULFATE 15 MG: 15 TABLET, FILM COATED, EXTENDED RELEASE ORAL at 12:34

## 2022-05-02 NOTE — PAYOR COMM NOTE
"Eyad Gurrola (71 y.o. Female) [id number]   Admit 5/1   [facility]   [place] phone     Fax                Date of Birth   1950    Social Security Number       Address   [address] [facility] [address]    Home Phone   [phone]    MRN   [id number]       Yazidi   Rastafarian    Marital Status                               Admission Date   5/1/22    Admission Type   Emergency    Admitting Provider   Sachin Dia MD    Attending Provider   Sanjiv Block MD    Department, Room/Bed   [facility] 3A, 332/1       Discharge Date       Discharge Disposition       Discharge Destination                               Attending Provider: Sanjiv Block MD    Allergies: Codeine, Benadryl [Diphenhydramine]    Isolation: None   Infection: None   Code Status: No CPR   Advance Care Planning Activity    Ht: 160 cm (63\")   Wt: 111 kg (245 lb)    Admission Cmt: None   Principal Problem: None                Active Insurance as of 5/1/2022     Primary Coverage     Payor Plan Insurance Group Employer/Plan Group    HUMANA MEDICARE REPLACEMENT HUMANA MEDICARE REPLACEMENT [id number]     Payor Plan Address Payor Plan Phone Number Payor Plan Fax Number Effective Dates    [address] [address] [phone]  1/1/2022 - None Entered    [facility] [address]       Subscriber Name Subscriber Birth Date Member ID       EYAD GURROLA 1950 [id number]                 Emergency Contacts      (Rel.) Home Phone Work Phone Mobile Phone    eNlla (Daughter) [phone] -- --    ODELL NELSON (Relative) -- -- [phone]               History & Physical      Sanjiv Block MD at 05/02/22 0997                  History and Physical      CHIEF COMPLAINT: Right lower extremity pain    Reason for Admission: Cellulitis    History Obtained From: Patient, chart review    HISTORY OF PRESENT ILLNESS:      The patient is a 71 y.o. female who " presents with subacute onset right lower extremity swelling and pain.  She was seen in the ER on 4/7 where she was diagnosed with cellulitis of right lower extremity discharged home on clindamycin.  She states that her symptoms initially improved but then began to worsen when she was off medication.  She started to feel more systemic symptoms and represented to the emergency department where she was found to again have right lower extremity cellulitis.  Work-up was generally negative in the ER and she is admitted for IV antibiotics.    Past Medical History:    Past Medical History:   Diagnosis Date   • Arthritis    • Bronchitis    • Cataract     right eye , cornea implant   • Chronic back pain    • Depression    • Disease of thyroid gland    • Edema     lower extremities   • Hypertension    • Kidney stones        Past Surgical History:    Past Surgical History:   Procedure Laterality Date   • BACK SURGERY      2010 ([facility]), 2013    • CHOLECYSTECTOMY OPEN     • CORNEAL TRANSPLANT Right 1999   • LUMBAR FUSION Left 8/2/2017    Procedure: LEFT LUMBAR LATERAL INTERBODY FUSION WITH INSTRUMENTATION  L1-2;  Surgeon: MARCELINO Wright MD;  Location: [facility] OR;  Service:    • NECK SURGERY  2010    [facility]   • TOTAL HIP ARTHROPLASTY Right 2014    DR. KENDRICK    • TOTAL KNEE ARTHROPLASTY Right 2000   • TOTAL KNEE ARTHROPLASTY Left 2002   • TOTAL SHOULDER ARTHROPLASTY W/ DISTAL CLAVICLE EXCISION Left 6/1/2020    Procedure: LEFT REVERSE TOTAL SHOULDER REPLACEMENT;  Surgeon: Saravanan Calvin MD;  Location: [facility] OR;  Service: Orthopedics;  Laterality: Left;       Medications Prior to Admission:    Medications Prior to Admission   Medication Sig Dispense Refill Last Dose   • Bisoprolol-Hydrochlorothiazide (ZIAC PO) Take  by mouth Daily.   Past Week at Unknown time   • cyclobenzaprine (FLEXERIL) 10 MG tablet Take 1 tablet by mouth 3 (Three) Times a Day As Needed for Muscle Spasms. 30 tablet 0 Past Week at Unknown time   •  docusate sodium (Colace) 100 MG capsule Take 1 capsule by mouth 2 (Two) Times a Day. 60 capsule 1 Past Month at Unknown time   • DULoxetine (CYMBALTA) 60 MG capsule Take 60 mg by mouth Daily.   5/1/2022 at Unknown time   • furosemide (LASIX) 40 MG tablet Take 40 mg by mouth Daily As Needed (PRN EDEMA).   Past Month at Unknown time   • gabapentin (NEURONTIN) 100 MG capsule Take 100 mg by mouth 2 (Two) Times a Day.   5/1/2022 at Unknown time   • levothyroxine (SYNTHROID, LEVOTHROID) 100 MCG tablet Take 100 mcg by mouth Daily.   5/1/2022 at Unknown time   • Morphine (MSIR) 15 MG tablet Take 15 mg by mouth 3 (Three) Times a Day.   5/1/2022 at Unknown time   • ondansetron (Zofran) 4 MG tablet Take 1 tablet by mouth Every 8 (Eight) Hours As Needed for Nausea or Vomiting. 20 tablet 1 Past Week at Unknown time   • clindamycin (CLEOCIN) 300 MG capsule Take 1 capsule by mouth 4 (Four) Times a Day. (Patient not taking: Reported on 5/1/2022) 28 capsule 0 Not Taking at Unknown time   • HYDROcodone-acetaminophen (Norco)  MG per tablet Take 1 tablet by mouth Every 4 (Four) Hours As Needed for Moderate Pain . (Patient not taking: Reported on 5/1/2022) 70 tablet 0 Not Taking at Unknown time       Allergies:  Codeine and Benadryl [diphenhydramine]    Social History:   Social History     Socioeconomic History   • Marital status:    Tobacco Use   • Smoking status: Never Smoker   • Smokeless tobacco: Never Used   Substance and Sexual Activity   • Alcohol use: No   • Drug use: No   • Sexual activity: Defer       Family History:   Family History   Problem Relation Age of Onset   • Breast cancer Neg Hx        REVIEW OF SYSTEMS:    CONSTITUTIONAL:  Negative for anorexia, chills, fevers, night sweats and weight loss, patient has been relatively medically stable until illness outlined in HPI  EYES:  negative for eye dryness, icterus and redness, no significant changes in vision  HEENT:   negative for dental problems, epistaxis  or sinus congestion , no history of facial trauma or difficulty swallowing  RESPIRATORY:  negative for chest tightness, cough, dyspnea on exertion, pneumonia or worse sputum production  CARDIOVASCULAR: No history of chest pain, dyspnea, exertional chest pressure/discomfort, irregular heart beat, or PND  GASTROINTESTINAL:  negative for abdominal pain, nausea or vomiting, no history of hematemesis, jaundice, melena or rectal bleeding  MUSCULOSKELETAL:  negative for muscle weakness, myalgias and neck pain, no significant arthralgias  NEUROLOGICAL:   negative for dizziness, headaches, seizures, speech problems, tremors and vertigo, mentation has been at baseline  INTEGUMENT: negative for pruritus, rash, skin color change and skin lesion(s)       Vital Signs   Temp:  [98.2 °F (36.8 °C)-99.1 °F (37.3 °C)] 98.4 °F (36.9 °C)  Heart Rate:  [67-85] 71  Resp:  [16-20] 16  BP: (124-155)/(42-81) 124/42          Physical Exam:  Constitutional: The patient is oriented to person, place, and time. They appear well-developed.  Able to answer questions appropriately  HEENT: Grossly normal sitting up in bed  Head: Normocephalic and atraumatic.   Eyes: Pupils are equal, round, and reactive to light.  Extraocular muscles appear to be intact  Neck: Neck supple without masses or carotid bruit.  Cardiovascular: Regular rhythm and normal heart sounds.  No extra or lift rub or murmur appreciated  Pulmonary/Chest: Effort normal and breath sounds normal. CTAB, no appreciable rales or wheezes  Abdominal: Soft. Bowel sounds are normal. There is  no distension or tenderness appreciated. There is no rebound and no guarding.   Musculoskeletal: Swelling and redness involving the right lower extremity up to the upper third of the leg.  Pulses and sensation intact.  Tenderness to palpation present but not severe.  Neurological: Pt is alert and oriented to person, place, and time. They have normal reflexes. CN 2-12 appear grossly intact  Skin: Skin is  warm and dry without significant rashes     Results Review:   I reviewed the patient's new imaging results and agree with the interpretation.    DATA:  Lab Results (last 24 hours)     Procedure Component Value Units Date/Time    Manual Differential [[id number]] Collected: 05/02/22 0629    Specimen: Blood Updated: 05/02/22 0741    CBC & Differential [[id number]] Collected: 05/02/22 0629    Specimen: Blood Updated: 05/02/22 0736    Narrative:      The following orders were created for panel order CBC & Differential.  Procedure                               Abnormality         Status                     ---------                               -----------         ------                     CBC Auto Differential[[id number]]                            In process                   Please view results for these tests on the individual orders.    CBC Auto Differential [[id number]] Collected: 05/02/22 0629    Specimen: Blood Updated: 05/02/22 0736    Basic Metabolic Panel [[id number]]  (Abnormal) Collected: 05/02/22 0541    Specimen: Blood Updated: 05/02/22 0610     Glucose 117 mg/dL      BUN 13 mg/dL      Creatinine 0.60 mg/dL      Sodium 137 mmol/L      Potassium 3.3 mmol/L      Comment: Slight hemolysis detected by analyzer. Results may be affected.        Chloride 102 mmol/L      CO2 24.0 mmol/L      Calcium 8.6 mg/dL      BUN/Creatinine Ratio 21.7     Anion Gap 11.0 mmol/L      eGFR 94.9 mL/min/1.73      Comment: National Kidney Foundation and American Society of Nephrology (ASN) Task Force recommended calculation based on the Chronic Kidney Disease Epidemiology Collaboration (CKD-EPI) equation refit without adjustment for race.       Narrative:      GFR Normal >60  Chronic Kidney Disease <60  Kidney Failure <15      Wound Culture - Wound, Leg, Right [[id number]] Collected: 05/01/22 1803    Specimen: Wound from Leg, Right Updated: 05/02/22 0538     Gram Stain Rare (1+) WBCs per low power field      Rare (1+) Gram negative  bacilli      Rare (1+) Gram positive cocci    COVID-19,[facility] IN-HOUSE,Nasal Swab No Transport Media 3-4 HR TAT - Swab, Nasal Cavity [[id number]]  (Normal) Collected: 05/01/22 2102    Specimen: Swab from Nasal Cavity Updated: 05/01/22 2147     COVID19 Not Detected    Narrative:      Fact sheet for providers: https://www.fda.gov/media/265911/download     Fact sheet for patients: https://www.fda.gov/media/050235/download    Test performed by PCR.    Consider negative results in combination with clinical observations, patient history, and epidemiological information.    STAT Lactic Acid, Reflex [[id number]]  (Abnormal) Collected: 05/01/22 2100    Specimen: Blood Updated: 05/01/22 2125     Lactate 2.2 mmol/L     Blood Culture - Blood, Blood, Central Line [[id number]] Collected: 05/01/22 2030    Specimen: Blood, Central Line Updated: 05/01/22 2110    Blood Culture - Blood, Arm, Left [[id number]] Collected: 05/01/22 2045    Specimen: Blood from Arm, Left Updated: 05/01/22 2109    Sedimentation Rate [[id number]]  (Normal) Collected: 05/01/22 1802    Specimen: Blood Updated: 05/01/22 1938     Sed Rate 3 mm/hr     Manual Differential [[id number]]  (Abnormal) Collected: 05/01/22 1802    Specimen: Blood Updated: 05/01/22 1927     Neutrophil % 58.6 %      Lymphocyte % 23.2 %      Monocyte % 9.1 %      Eosinophil % 1.0 %      Bands %  3.0 %      Atypical Lymphocyte % 5.1 %      Neutrophils Absolute 4.87 10*3/mm3      Lymphocytes Absolute 2.24 10*3/mm3      Monocytes Absolute 0.72 10*3/mm3      Eosinophils Absolute 0.08 10*3/mm3      Dacrocytes Slight/1+     Poikilocytes Slight/1+     Polychromasia Slight/1+     Rouleaux Large/3+     WBC Morphology Normal     Platelet Estimate Decreased    CBC & Differential [[id number]]  (Abnormal) Collected: 05/01/22 1802    Specimen: Blood Updated: 05/01/22 1904    Narrative:      The following orders were created for panel order CBC & Differential.  Procedure                                Abnormality         Status                     ---------                               -----------         ------                     CBC Auto Differential[[id number]]        Abnormal            Final result                 Please view results for these tests on the individual orders.    CBC Auto Differential [[id number]]  (Abnormal) Collected: 05/01/22 1802    Specimen: Blood Updated: 05/01/22 1904     WBC 7.90 10*3/mm3      RBC 3.56 10*6/mm3      Hemoglobin 13.3 g/dL      Hematocrit 35.0 %      MCV 98.3 fL      MCH 37.4 pg      MCHC 38.0 g/dL      RDW 18.8 %      RDW-SD 50.1 fl      MPV 11.6 fL      Platelets 110 10*3/mm3     C-reactive Protein [[id number]]  (Abnormal) Collected: 05/01/22 1802    Specimen: Blood Updated: 05/01/22 1901     C-Reactive Protein 8.59 mg/dL     Lactic Acid, Plasma [[id number]]  (Abnormal) Collected: 05/01/22 1802    Specimen: Blood Updated: 05/01/22 1900     Lactate 2.7 mmol/L     Comprehensive Metabolic Panel [[id number]]  (Abnormal) Collected: 05/01/22 1802    Specimen: Blood Updated: 05/01/22 1859     Glucose 109 mg/dL      BUN 15 mg/dL      Creatinine 0.67 mg/dL      Sodium 134 mmol/L      Potassium 4.5 mmol/L      Comment: Slight hemolysis detected by analyzer. Results may be affected.        Chloride 97 mmol/L      CO2 24.0 mmol/L      Calcium 8.6 mg/dL      Total Protein 6.4 g/dL      Albumin 3.40 g/dL      ALT (SGPT) 15 U/L      AST (SGOT) 40 U/L      Comment: Slight hemolysis detected by analyzer. Results may be affected.        Alkaline Phosphatase 139 U/L      Total Bilirubin 2.9 mg/dL      Globulin 3.0 gm/dL      A/G Ratio 1.1 g/dL      BUN/Creatinine Ratio 22.4     Anion Gap 13.0 mmol/L      eGFR 92.4 mL/min/1.73      Comment: National Kidney Foundation and American Society of Nephrology (ASN) Task Force recommended calculation based on the Chronic Kidney Disease Epidemiology Collaboration (CKD-EPI) equation refit without adjustment for race.       Narrative:      GFR Normal  >60  Chronic Kidney Disease <60  Kidney Failure <15          Imaging Results (Last 24 Hours)     Procedure Component Value Units Date/Time    XR Tibia Fibula 2 View Right [[id number]] Collected: 05/01/22 1956     Updated: 05/01/22 2000    Narrative:      EXAMINATION:  XR TIBIA FIBULA 2 VW RIGHT-  5/1/2022 7:40 PM CDT     HISTORY: Right lower extremity swelling.     COMPARISON: No comparison study.     TECHNIQUE: 2 views were obtained.     FINDINGS:  There has been prior right knee arthroplasty. The appliance  appears to be intact. There is a fairly large spacer between the  components. There is no evidence of acute fracture of the right tibia or  fibula. There is diffuse soft tissue edema involving the subcutaneous  fat from the knee down to the lower leg.       Impression:      1. Prior right knee arthroplasty.  2. No evidence of acute fracture.  3. Diffuse soft tissue edema.     This report was finalized on 05/01/2022 19:57 by Dr. Christopher Jennings MD.            ASSESSMENT AND PLAN:      1.     Cellulitis of right lower extremity    Plan:    Will continue antibiotics and await cultures.  Anticipate several days of IV antibiotics. No further work-up at this time, will see how she responds to IV antibiotics.      Sanjiv Block MD  07:58 CDT 5/2/2022        Electronically signed by Sanjiv Block MD at 05/02/22 0801          Emergency Department Notes      Isha Villalobos APRN at 05/01/22 1745     Attestation signed by Kalyani Warren MD at 05/01/22 2204        SUPERVISE: For this patient encounter, I reviewed the APC's documentation, treatment plan, and medical decision making.  Kalyani Warren MD 5/1/2022 22:04 CDT                         Subjective   This is a 71 year old female who presents with redness, swelling, pain of the right lower extremity.  The patient was seen here on 4/7/22 and had a workup done for swelling and cellulitis of bilateral lower extremities.  She was discharged home with clindamycin,  the patient states the cellulitis resolved.  Last night she started to have chills and states when she woke up this morning she noticed pain, swelling, redness of the right lower extremity.  There is a small abrasion to the right anterior lower extremity with purulent drainage noted.  No fever, chills noted today. Pt is alert, oriented, no acute distress, she is able to bear weight and ambulate without significant difficulty.  The pt has a history of chronic recurring lower extremity cellulitis, she has been seen in the ER and has been treated outpatient and inpatient in the past.        History provided by:  Patient and relative   used: No        Review of Systems   Constitutional: Negative.  Negative for activity change, appetite change, chills, diaphoresis, fatigue and fever.   HENT: Negative.  Negative for congestion, ear pain, facial swelling, sore throat and trouble swallowing.    Eyes: Negative.  Negative for photophobia, pain, discharge, redness and itching.   Respiratory: Negative.  Negative for cough, chest tightness, shortness of breath, wheezing and stridor.    Cardiovascular: Positive for leg swelling. Negative for chest pain and palpitations.   Gastrointestinal: Negative.  Negative for abdominal distention, abdominal pain, blood in stool, constipation, diarrhea, nausea and vomiting.   Endocrine: Negative.  Negative for cold intolerance and heat intolerance.   Genitourinary: Negative.  Negative for difficulty urinating, dysuria, flank pain, frequency, urgency and vaginal bleeding.   Musculoskeletal: Negative for arthralgias, back pain, gait problem, joint swelling and myalgias.   Skin: Positive for color change, rash and wound. Negative for pallor.   Allergic/Immunologic: Negative.    Neurological: Negative.  Negative for dizziness, tremors, seizures, syncope, weakness, light-headedness, numbness and headaches.   Hematological: Negative.  Negative for adenopathy. Does not  bruise/bleed easily.   Psychiatric/Behavioral: Negative.  Negative for agitation, confusion and suicidal ideas. The patient is not nervous/anxious.    All other systems reviewed and are negative.      Past Medical History:   Diagnosis Date   • Arthritis    • Bronchitis    • Cataract     right eye , cornea implant   • Chronic back pain    • Depression    • Disease of thyroid gland    • Edema     lower extremities   • Hypertension    • Kidney stones        Allergies   Allergen Reactions   • Codeine Itching and Rash     SEVERE RASH/ITCHING (TOLERATES PERCOCET)   • Benadryl [Diphenhydramine] Other (See Comments)     KEEPS PATIENT AWAKE        Past Surgical History:   Procedure Laterality Date   • BACK SURGERY      2010 ([facility]), 2013    • CHOLECYSTECTOMY OPEN     • CORNEAL TRANSPLANT Right 1999   • LUMBAR FUSION Left 8/2/2017    Procedure: LEFT LUMBAR LATERAL INTERBODY FUSION WITH INSTRUMENTATION  L1-2;  Surgeon: MARCELINO Wright MD;  Location: [facility] OR;  Service:    • NECK SURGERY  2010    [facility]   • TOTAL HIP ARTHROPLASTY Right 2014    DR. KENDRICK    • TOTAL KNEE ARTHROPLASTY Right 2000   • TOTAL KNEE ARTHROPLASTY Left 2002   • TOTAL SHOULDER ARTHROPLASTY W/ DISTAL CLAVICLE EXCISION Left 6/1/2020    Procedure: LEFT REVERSE TOTAL SHOULDER REPLACEMENT;  Surgeon: Saravanan Calvin MD;  Location: [facility] OR;  Service: Orthopedics;  Laterality: Left;       Family History   Problem Relation Age of Onset   • Breast cancer Neg Hx        Social History     Socioeconomic History   • Marital status:    Tobacco Use   • Smoking status: Never Smoker   • Smokeless tobacco: Never Used   Substance and Sexual Activity   • Alcohol use: No   • Drug use: No   • Sexual activity: Defer           Objective   Physical Exam  Vitals and nursing note reviewed.   Constitutional:       General: She is not in acute distress.     Appearance: Normal appearance. She is normal weight. She is not ill-appearing, toxic-appearing or  diaphoretic.   HENT:      Head: Normocephalic and atraumatic.      Right Ear: Tympanic membrane, ear canal and external ear normal.      Left Ear: Tympanic membrane, ear canal and external ear normal.      Nose: Nose normal. No congestion or rhinorrhea.      Mouth/Throat:      Mouth: Mucous membranes are moist.      Pharynx: Oropharynx is clear.   Eyes:      Extraocular Movements: Extraocular movements intact.      Conjunctiva/sclera: Conjunctivae normal.      Pupils: Pupils are equal, round, and reactive to light.   Cardiovascular:      Rate and Rhythm: Normal rate and regular rhythm.      Pulses: Normal pulses.      Heart sounds: Normal heart sounds. No murmur heard.    No friction rub. No gallop.   Pulmonary:      Effort: Pulmonary effort is normal. No respiratory distress.      Breath sounds: Normal breath sounds. No stridor. No wheezing, rhonchi or rales.   Chest:      Chest wall: No tenderness.   Abdominal:      General: Bowel sounds are normal. There is no distension.      Palpations: Abdomen is soft. There is no mass.      Tenderness: There is no abdominal tenderness.      Hernia: No hernia is present.   Musculoskeletal:         General: Swelling present. No tenderness, deformity or signs of injury. Normal range of motion.      Cervical back: Normal range of motion and neck supple. No rigidity or tenderness.      Right lower leg: Edema present.   Skin:     General: Skin is warm and dry.      Capillary Refill: Capillary refill takes less than 2 seconds.      Coloration: Skin is not pale.      Findings: Erythema and lesion present. No rash.      Comments: Erythema noted to right lower extremity from the knee down to his foot.  There is a small superficial abrasion noted to anterior lower extremity and has purulent drainage noted from the site.     Neurological:      General: No focal deficit present.      Mental Status: She is alert and oriented to person, place, and time. Mental status is at baseline.       Cranial Nerves: No cranial nerve deficit.      Sensory: No sensory deficit.      Motor: No weakness.      Coordination: Coordination normal.      Gait: Gait normal.      Deep Tendon Reflexes: Reflexes normal.   Psychiatric:         Mood and Affect: Mood normal.         Behavior: Behavior normal.         Thought Content: Thought content normal.         Judgment: Judgment normal.         Procedures          ED Course  ED Course as of 05/01/22 2026   Sun May 01, 2022   1800 Did recommend venous doppler ultrasound of the right lower extremity for the patient to rule out DVT.  The patient had a negative ultrasound 4/7/22.  Pulses are palpable to bilateral lower extremities.  No decrease in pulses, there is warmth to touch, redness, unilateral swelling.  The patient refuses to have ultrasound done today because she states she cannot take the pain of palpating the lower leg.   [WS]   1937 C-Reactive Protein(!): 8.59 [WS]   1937 Lactate(!!): 2.7  Pt has been treated recently for bilateral lower extremity cellulitis with clindamycin.  The patient completed therapy and continued to have slight erythema despite antibiotic therapy.  The patient states her symptoms drastically worsened last night prompting her to come to the Er for evaluation.  Workup does show elevated crp, lactic acid elevation.  WBC normal but patient has failed outpatient therapy.  Bilirubin elevated 2.9 and AST 40 (both elevated since previous ER visit 4/7/22) patient denies abdominal pain, nausea, vomiting, diarrhea, constipation.  She does not appear jaundice or with an elevated temperature.  PCP Dr. Dia.   [WS]   2025 Dr. Ely, on call for Dr. Dia,discussed patient evaluation and recommend inpatient hospital admission for treatment of right lower extremity cellulitis with IV vancomycin.   [WS]      ED Course User Index  [WS] Isha Villalobos, GARETT                                                 MDM  Number of Diagnoses or Management  Options  Cellulitis of right lower extremity  Diagnosis management comments: Medical records reviewed from previous ER visit 4/7/22 treated with clindamycin outpatient for bilateral lower extremity cellulitis.  Reviewed 2021 medical records, patient admitted to Dr. Dia for intravenous antibiotic administration for treatment of cellulitis.     Differential diagnosis includes but are not limited to cellulitis, lymphangitis, follilculitis, abscess, osteomyelitis.     Plan to obtain a broad workup including labwork, xr imaging, blood cultures, wound culture.      Imaging and other workup revealed right lower extremity cellulitis.      Patient was then reassessed, appears well and non-toxic.  Explained to the patient that she since she failed outpatient treatment with clindamycin and continues to have right lower extremity redness, swelling, purulent drainage that she would benefit for IV antibiotic administration.  Called to speak with Dr. Chao who is on call for Dr. Dia, she agrees with evaluation and recommendation for inpatient admission to hospital.  Telephone orders received, will admit patient.           Amount and/or Complexity of Data Reviewed  Clinical lab tests: reviewed and ordered  Tests in the medicine section of CPT®: ordered and reviewed  Decide to obtain previous medical records or to obtain history from someone other than the patient: yes  Obtain history from someone other than the patient: yes  Review and summarize past medical records: yes  Discuss the patient with other providers: yes        Final diagnoses:   Cellulitis of right lower extremity       ED Disposition  ED Disposition     ED Disposition   Decision to Admit    Condition   --    Comment   Level of Care: Med/Surg [1]   Diagnosis: Cellulitis of right lower extremity [279927]   Admitting Physician: RADHA BLACKMAN [[id number]]   Attending Physician: RADHA BLACKMAN [[id number]]   Certification: I Certify That Inpatient Hospital  Services Are Medically Necessary For Greater Than 2 Midnights               No follow-up provider specified.       Medication List      No changes were made to your prescriptions during this visit.          Isha Villalobos APRN  05/01/22 2026      Electronically signed by Kalyani Warren MD at 05/01/22 2204     Jean Marie Unger, RN at 05/01/22 2157        Report called to Marilee Varma RN    Electronically signed by Jean Marie Unger, RN at 05/01/22 2158         Current Facility-Administered Medications   Medication Dose Route Frequency Provider Last Rate Last Admin   • Morphine (MS CONTIN) 12 hr tablet 15 mg  15 mg Oral TID PRN Manan Pond MD   15 mg at 05/02/22 0146   • vancomycin 750 mg/250 mL 0.9% NS IVPB ([facility])  750 mg Intravenous Q12H Sachin Todd MD   750 mg at 05/02/22 5275

## 2022-05-02 NOTE — PLAN OF CARE
Goal Outcome Evaluation:  Plan of Care Reviewed With: patient        Progress: improving  Outcome Evaluation: Pt a/o x4, vss, room air. Pt SBA w/ cane. PRN pain meds given. Pain in right leg and generalized. Leg marked for spreading. Bed locked, lowest position, side rails x3, call light in reach, pt calls appropriately.

## 2022-05-02 NOTE — PLAN OF CARE
Goal Outcome Evaluation:  Plan of Care Reviewed With: patient        Progress: improving    New admission. A&Ox4. Pt up ab nery. Cellulitis to RLE. Spoke with Dr. Pond and someone from that team will be in today to place more orders.

## 2022-05-02 NOTE — CASE MANAGEMENT/SOCIAL WORK
Discharge Planning Assessment  [facility]     Patient Name: Brenda Sneed  MRN: [id number]  Today's Date: 5/2/2022    Admit Date: 5/1/2022     Discharge Needs Assessment     Row Name 05/02/22 1542       Living Environment    People in Home alone    Current Living Arrangements home    Primary Care Provided by self    Provides Primary Care For no one    Family Caregiver if Needed child(shasha), adult    Family Caregiver Names Dtr (Dominga)    Quality of Family Relationships helpful;involved    Able to Return to Prior Arrangements yes       Resource/Environmental Concerns    Resource/Environmental Concerns none    Transportation Concerns none       Transition Planning    Patient/Family Anticipates Transition to home with help/services;home    Patient/Family Anticipated Services at Transition home health care    Transportation Anticipated family or friend will provide       Discharge Needs Assessment    Readmission Within the Last 30 Days no previous admission in last 30 days    Equipment Currently Used at Home cane, straight    Concerns to be Addressed adjustment to diagnosis/illness    Anticipated Changes Related to Illness none    Equipment Needed After Discharge none    Outpatient/Agency/Support Group Needs homecare agency    Discharge Facility/Level of Care Needs home with home health    Discharge Coordination/Progress Noted pt admitted for IV abx. Cultures currently pending. Plan is for pt to return home at [place]. Will continue to follow for possible home IV abx. Pt has RX coverage and PCP.               Discharge Plan    No documentation.               Continued Care and Services - Admitted Since 5/1/2022    Coordination has not been started for this encounter.       Expected Discharge Date and Time     Expected Discharge Date Expected Discharge Time    May 5, 2022          Demographic Summary    No documentation.                Functional Status    No documentation.                Psychosocial    No  documentation.                Abuse/Neglect    No documentation.                Legal    No documentation.                Substance Abuse    No documentation.                Patient Forms    No documentation.                   Wilda Flores MSW

## 2022-05-02 NOTE — CONSULTS
"Pharmacy Dosing Service  Pharmacokinetics  Vancomycin Initial Evaluation  Assessment/Action/Plan:  Loading dose?: 2000mg  Current Order: Vancomycin 750 mg IVPB every 12 hours  Current end date:5/6/22  Levels: trough ordered prior to fourth dose on 5/3 at 0700  Additional antimicrobial agent(s): none    Vancomycin dosage initiated based on population pharmacokinetic parameters. Pharmacy will continue to follow daily and adjust dose accordingly.     Subjective:  Brenda Sneed is a 71 y.o. female initiated on Vancomycin 2000 mg IV once for the treatment of SSTI by prescriber with corresponding \"Pharmacy to Dose\" consult, day 1 of 5 of treatment.    AUC Model Data:  Loading dose: 2000mg  Regimen: 750 mg IV every 12 hours.  Start time: 01:08 on 05/02/2022  Exposure target: AUC24 (range)400-600 mg/L.hr   AUC24,ss: 446 mg/L.hr  PAUC*: 58 %  Ctrough,ss: 13.6 mg/L  Pconc*: 29 %  Tox.: 9 %      Objective:  Ht: 160 cm (63\"); Wt: 111 kg (245 lb)  Estimated Creatinine Clearance: 89.5 mL/min (by C-G formula based on SCr of 0.67 mg/dL).   Creatinine   Date Value Ref Range Status   05/01/2022 0.67 0.57 - 1.00 mg/dL Final   04/07/2022 0.54 (L) 0.57 - 1.00 mg/dL Final   05/03/2021 0.8 0.5 - 0.9 mg/dL Final   05/02/2021 0.7 0.5 - 0.9 mg/dL Final   02/13/2021 0.6 0.5 - 0.9 mg/dL Final      Lab Results   Component Value Date    WBC 7.90 05/01/2022    WBC 3.39 (L) 04/07/2022    WBC 4.9 05/03/2021      Baseline culture results:  Microbiology Results (last 10 days)       Procedure Component Value - Date/Time    COVID-19,[facility] IN-HOUSE,Nasal Swab No Transport Media 3-4 HR TAT - Swab, Nasal Cavity [[id number]]  (Normal) Collected: 05/01/22 2102    Lab Status: Final result Specimen: Swab from Nasal Cavity Updated: 05/01/22 2147     COVID19 Not Detected    Narrative:      Fact sheet for providers: https://www.fda.gov/media/451278/download     Fact sheet for patients: https://www.fda.gov/media/185600/download    Test performed by " PCR.    Consider negative results in combination with clinical observations, patient history, and epidemiological information.            Monique Lange, [facility]  05/02/22 00:10 CDT

## 2022-05-02 NOTE — CONSULTS
[facility]  INPATIENT WOUND CONSULTATION    Today's Date: 05/02/22    Patient Name: Brenda Sneed  MRN: [id number]  CSN: [id number]  PCP: Sachin Dia MD  Referring Provider:   Consulting Provider (From admission, onward)    Start Ordered     Status Ordering Provider    05/01/22 2022 05/01/22 2023  Wound / Ostomy  Care Provider Consult  Once        Specialty:  Wound Care  Provider:  Julieta Murray APRN Acknowledged PETA, NIHANTH         Attending Provider: Sanjiv Block MD  Length of Stay: 1    SUBJECTIVE   Chief Complaint: Cellulitis of right lower extremity    HPI: Brenda Sneed, a 71 y.o.female, presents with a past medical history of hypertension, arthritis, chronic back pain.  A full past medical history as listed below.  Patient recently went to the emergency department on 4/.  She was diagnosed with cellulitis of right lower extremity and discharged home on clindamycin.  Patient reports that symptoms improved initially.  She she states she noticed the day before yesterday swelling on her right lower leg and then yesterday she noticed systemic symptoms including chills and fatigue.    Inpatient wound care consulted due to cellulitis of right lower extremity.  Patient has a small open area on the right anterior lower leg that is weeping.  Right lower leg is more edematous than left with erythema from the knee to midfoot.  Patient is resting in bed on her back.      Visit Dx:    ICD-10-CM ICD-9-CM   1. Cellulitis of right lower extremity  L03.115 682.6     Patient Active Problem List   Diagnosis   • Spinal stenosis, lumbar   • Secondary osteoarthritis of left shoulder due to rotator cuff arthropathy   • Cellulitis of right lower extremity       History:   Past Medical History:   Diagnosis Date   • Arthritis    • Bronchitis    • Cataract     right eye , cornea implant   • Chronic back pain    • Depression    • Disease of thyroid gland    • Edema     lower extremities    • Hypertension    • Kidney stones      Past Surgical History:   Procedure Laterality Date   • BACK SURGERY      2010 ([place]), 2013    • CHOLECYSTECTOMY OPEN     • CORNEAL TRANSPLANT Right 1999   • LUMBAR FUSION Left 8/2/2017    Procedure: LEFT LUMBAR LATERAL INTERBODY FUSION WITH INSTRUMENTATION  L1-2;  Surgeon: MARCELINO Wright MD;  Location:  [facility] OR;  Service:    • NECK SURGERY  2010    [facility]   • TOTAL HIP ARTHROPLASTY Right 2014    DR. KENDRICK    • TOTAL KNEE ARTHROPLASTY Right 2000   • TOTAL KNEE ARTHROPLASTY Left 2002   • TOTAL SHOULDER ARTHROPLASTY W/ DISTAL CLAVICLE EXCISION Left 6/1/2020    Procedure: LEFT REVERSE TOTAL SHOULDER REPLACEMENT;  Surgeon: Saravanan Calvin MD;  Location:  [facility] OR;  Service: Orthopedics;  Laterality: Left;     Social History     Socioeconomic History   • Marital status:    Tobacco Use   • Smoking status: Never Smoker   • Smokeless tobacco: Never Used   Substance and Sexual Activity   • Alcohol use: No   • Drug use: No   • Sexual activity: Defer     Family History   Problem Relation Age of Onset   • Breast cancer Neg Hx        Allergies:  Allergies   Allergen Reactions   • Codeine Itching and Rash     SEVERE RASH/ITCHING (TOLERATES PERCOCET)   • Benadryl [Diphenhydramine] Other (See Comments)     KEEPS PATIENT AWAKE        Medications:    Current Facility-Administered Medications:   •  acetaminophen (TYLENOL) tablet 500 mg, 500 mg, Oral, Q6H PRN, Sanjiv Block MD  •  bisoprolol-hydrochlorothiazide (ZIAC) 5-6.25 MG per tablet 1 tablet, 1 tablet, Oral, Daily, Sanjiv Block MD, 1 tablet at 05/02/22 1308  •  cyclobenzaprine (FLEXERIL) tablet 10 mg, 10 mg, Oral, TID PRN, Sanjiv Block MD  •  DULoxetine (CYMBALTA) DR capsule 60 mg, 60 mg, Oral, Daily, Sanjiv Block MD, 60 mg at 05/02/22 1234  •  Enoxaparin Sodium (LOVENOX) syringe 40 mg, 40 mg, Subcutaneous, Nightly, Sanjiv Block MD  •  famotidine (PEPCID) tablet 20 mg, 20 mg, Oral, BID  PRN, Sanjiv Block MD  •  gabapentin (NEURONTIN) capsule 100 mg, 100 mg, Oral, BID, Sanjiv Block MD, 100 mg at 05/02/22 1228  •  HYDROcodone-acetaminophen (NORCO)  MG per tablet 1 tablet, 1 tablet, Oral, Q4H PRN, Sanjvi Block MD  •  levothyroxine (SYNTHROID, LEVOTHROID) tablet 100 mcg, 100 mcg, Oral, Q AM, Sanjiv Block MD, 100 mcg at 05/02/22 1234  •  melatonin tablet 6 mg, 6 mg, Oral, Nightly PRN, Sanjiv Block MD  •  Morphine (MS CONTIN) 12 hr tablet 15 mg, 15 mg, Oral, TID PRN, Manan Pond MD, 15 mg at 05/02/22 1234  •  ondansetron (ZOFRAN) tablet 4 mg, 4 mg, Oral, Q8H PRN, Sanjiv Block MD  •  vancomycin 750 mg/250 mL 0.9% NS IVPB ([facility]), 750 mg, Intravenous, Q12H, Sachin Todd MD, 750 mg at 05/02/22 0740    Review of Systems:   Review of Systems   Constitutional: Positive for chills and fatigue.   HENT: Negative for rhinorrhea and sore throat.    Respiratory: Negative for cough and shortness of breath.    Cardiovascular: Positive for leg swelling. Negative for chest pain and palpitations.   Gastrointestinal: Negative for diarrhea, nausea and vomiting.   Genitourinary: Negative for frequency and urgency.   Musculoskeletal: Positive for back pain, gait problem and myalgias. Negative for arthralgias.   Skin: Positive for color change and wound.   Neurological: Positive for weakness. Negative for dizziness.   Psychiatric/Behavioral: Negative for agitation and behavioral problems.         OBJECTIVE     Vitals:    05/02/22 1308   BP: 145/60   Pulse: 84   Resp:    Temp:    SpO2:        PHYSICAL EXAM  Physical Exam  Vitals and nursing note reviewed.   Constitutional:       Appearance: She is morbidly obese.      Comments: Body mass index is 43.4 kg/m².    HENT:      Head: Normocephalic and atraumatic.   Eyes:      General: Lids are normal. Gaze aligned appropriately.   Cardiovascular:      Rate and Rhythm: Normal rate and regular rhythm.      Pulses:            Dorsalis pedis pulses are 2+ on the right side and 2+ on the left side.   Pulmonary:      Effort: Pulmonary effort is normal. No respiratory distress.   Abdominal:      General: Abdomen is protuberant.      Palpations: Abdomen is soft.   Musculoskeletal:      Cervical back: Normal range of motion and neck supple.   Feet:      Right foot:      Skin integrity: Erythema present. No ulcer.      Left foot:      Skin integrity: No ulcer.      Comments: Pitting edema of bilateral feet with right greater than left.  Erythema present of the right foot related to cellulitis of right lower extremity that extends approximately midfoot.  Skin:     General: Skin is warm and dry.      Findings: Erythema and wound present.      Comments: Right lower extremity is edematous with erythema from knee to midfoot.  Small open wound of the right anterior lower leg is present with weeping and serous drainage.  This is not a deep wound but more like a skin tear.  Edges are irregular and attached to the wound bed.  Edema present bilateral lower extremities   Neurological:      Mental Status: She is alert and oriented to person, place, and time.   Psychiatric:         Attention and Perception: Attention normal.         Mood and Affect: Mood normal.         Speech: Speech normal.         Behavior: Behavior is cooperative.              Results Review:  Lab Results (last 48 hours)     Procedure Component Value Units Date/Time    Manual Differential [[id number]] Collected: 05/02/22 0629    Specimen: Blood Updated: 05/02/22 0828     Neutrophil % 54.5 %      Lymphocyte % 35.4 %      Monocyte % 6.1 %      Eosinophil % 4.0 %      Neutrophils Absolute 3.32 10*3/mm3      Lymphocytes Absolute 2.16 10*3/mm3      Monocytes Absolute 0.37 10*3/mm3      Eosinophils Absolute 0.24 10*3/mm3      Dacrocytes Slight/1+     Poikilocytes Slight/1+     WBC Morphology Normal     Platelet Estimate Decreased    CBC & Differential [[id number]]  (Abnormal) Collected:  05/02/22 0629    Specimen: Blood Updated: 05/02/22 0828    Narrative:      The following orders were created for panel order CBC & Differential.  Procedure                               Abnormality         Status                     ---------                               -----------         ------                     CBC Auto Differential[[id number]]        Abnormal            Final result                 Please view results for these tests on the individual orders.    CBC Auto Differential [[id number]]  (Abnormal) Collected: 05/02/22 0629    Specimen: Blood Updated: 05/02/22 0828     WBC 6.10 10*3/mm3      RBC 3.48 10*6/mm3      Hemoglobin 11.5 g/dL      Hematocrit 33.8 %      MCV 97.1 fL      MCH 33.0 pg      MCHC 34.0 g/dL      RDW 17.7 %      RDW-SD 49.3 fl      MPV 12.8 fL      Platelets 117 10*3/mm3     Narrative:      Repeated to confirm results.      Basic Metabolic Panel [[id number]]  (Abnormal) Collected: 05/02/22 0541    Specimen: Blood Updated: 05/02/22 0610     Glucose 117 mg/dL      BUN 13 mg/dL      Creatinine 0.60 mg/dL      Sodium 137 mmol/L      Potassium 3.3 mmol/L      Comment: Slight hemolysis detected by analyzer. Results may be affected.        Chloride 102 mmol/L      CO2 24.0 mmol/L      Calcium 8.6 mg/dL      BUN/Creatinine Ratio 21.7     Anion Gap 11.0 mmol/L      eGFR 94.9 mL/min/1.73      Comment: National Kidney Foundation and American Society of Nephrology (ASN) Task Force recommended calculation based on the Chronic Kidney Disease Epidemiology Collaboration (CKD-EPI) equation refit without adjustment for race.       Narrative:      GFR Normal >60  Chronic Kidney Disease <60  Kidney Failure <15      Wound Culture - Wound, Leg, Right [[id number]] Collected: 05/01/22 1803    Specimen: Wound from Leg, Right Updated: 05/02/22 0538     Gram Stain Rare (1+) WBCs per low power field      Rare (1+) Gram negative bacilli      Rare (1+) Gram positive cocci    COVID-19,[facility] IN-HOUSE,Nasal Swab  No Transport Media 3-4 HR TAT - Swab, Nasal Cavity [[id number]]  (Normal) Collected: 05/01/22 2102    Specimen: Swab from Nasal Cavity Updated: 05/01/22 2147     COVID19 Not Detected    Narrative:      Fact sheet for providers: https://www.fda.gov/media/992015/download     Fact sheet for patients: https://www.fda.gov/media/615133/download    Test performed by PCR.    Consider negative results in combination with clinical observations, patient history, and epidemiological information.    STAT Lactic Acid, Reflex [[id number]]  (Abnormal) Collected: 05/01/22 2100    Specimen: Blood Updated: 05/01/22 2125     Lactate 2.2 mmol/L     Blood Culture - Blood, Blood, Central Line [[id number]] Collected: 05/01/22 2030    Specimen: Blood, Central Line Updated: 05/01/22 2110    Blood Culture - Blood, Arm, Left [[id number]] Collected: 05/01/22 2045    Specimen: Blood from Arm, Left Updated: 05/01/22 2109    Sedimentation Rate [[id number]]  (Normal) Collected: 05/01/22 1802    Specimen: Blood Updated: 05/01/22 1938     Sed Rate 3 mm/hr     Manual Differential [[id number]]  (Abnormal) Collected: 05/01/22 1802    Specimen: Blood Updated: 05/01/22 1927     Neutrophil % 58.6 %      Lymphocyte % 23.2 %      Monocyte % 9.1 %      Eosinophil % 1.0 %      Bands %  3.0 %      Atypical Lymphocyte % 5.1 %      Neutrophils Absolute 4.87 10*3/mm3      Lymphocytes Absolute 2.24 10*3/mm3      Monocytes Absolute 0.72 10*3/mm3      Eosinophils Absolute 0.08 10*3/mm3      Dacrocytes Slight/1+     Poikilocytes Slight/1+     Polychromasia Slight/1+     Rouleaux Large/3+     WBC Morphology Normal     Platelet Estimate Decreased    CBC & Differential [[id number]]  (Abnormal) Collected: 05/01/22 1802    Specimen: Blood Updated: 05/01/22 1904    Narrative:      The following orders were created for panel order CBC & Differential.  Procedure                               Abnormality         Status                     ---------                                -----------         ------                     CBC Auto Differential[[id number]]        Abnormal            Final result                 Please view results for these tests on the individual orders.    CBC Auto Differential [[id number]]  (Abnormal) Collected: 05/01/22 1802    Specimen: Blood Updated: 05/01/22 1904     WBC 7.90 10*3/mm3      RBC 3.56 10*6/mm3      Hemoglobin 13.3 g/dL      Hematocrit 35.0 %      MCV 98.3 fL      MCH 37.4 pg      MCHC 38.0 g/dL      RDW 18.8 %      RDW-SD 50.1 fl      MPV 11.6 fL      Platelets 110 10*3/mm3     C-reactive Protein [[id number]]  (Abnormal) Collected: 05/01/22 1802    Specimen: Blood Updated: 05/01/22 1901     C-Reactive Protein 8.59 mg/dL     Lactic Acid, Plasma [[id number]]  (Abnormal) Collected: 05/01/22 1802    Specimen: Blood Updated: 05/01/22 1900     Lactate 2.7 mmol/L     Comprehensive Metabolic Panel [[id number]]  (Abnormal) Collected: 05/01/22 1802    Specimen: Blood Updated: 05/01/22 1859     Glucose 109 mg/dL      BUN 15 mg/dL      Creatinine 0.67 mg/dL      Sodium 134 mmol/L      Potassium 4.5 mmol/L      Comment: Slight hemolysis detected by analyzer. Results may be affected.        Chloride 97 mmol/L      CO2 24.0 mmol/L      Calcium 8.6 mg/dL      Total Protein 6.4 g/dL      Albumin 3.40 g/dL      ALT (SGPT) 15 U/L      AST (SGOT) 40 U/L      Comment: Slight hemolysis detected by analyzer. Results may be affected.        Alkaline Phosphatase 139 U/L      Total Bilirubin 2.9 mg/dL      Globulin 3.0 gm/dL      A/G Ratio 1.1 g/dL      BUN/Creatinine Ratio 22.4     Anion Gap 13.0 mmol/L      eGFR 92.4 mL/min/1.73      Comment: National Kidney Foundation and American Society of Nephrology (ASN) Task Force recommended calculation based on the Chronic Kidney Disease Epidemiology Collaboration (CKD-EPI) equation refit without adjustment for race.       Narrative:      GFR Normal >60  Chronic Kidney Disease <60  Kidney Failure <15          Imaging Results (Last 72  Hours)     Procedure Component Value Units Date/Time    XR Tibia Fibula 2 View Right [[id number]] Collected: 05/01/22 1956     Updated: 05/01/22 2000    Narrative:      EXAMINATION:  XR TIBIA FIBULA 2 VW RIGHT-  5/1/2022 7:40 PM CDT     HISTORY: Right lower extremity swelling.     COMPARISON: No comparison study.     TECHNIQUE: 2 views were obtained.     FINDINGS:  There has been prior right knee arthroplasty. The appliance  appears to be intact. There is a fairly large spacer between the  components. There is no evidence of acute fracture of the right tibia or  fibula. There is diffuse soft tissue edema involving the subcutaneous  fat from the knee down to the lower leg.       Impression:      1. Prior right knee arthroplasty.  2. No evidence of acute fracture.  3. Diffuse soft tissue edema.     This report was finalized on 05/01/2022 19:57 by Dr. Christopher Jennings MD.             ASSESSMENT/PLAN       Examination and evaluation of wound(s) was performed.    DIAGNOSIS:   Cellulitis of right lower extremity  Open wound of right anterior lower leg, limited to skin breakdown  Edema of bilateral lower extremities  Morbid obesity - Body mass index is 43.4 kg/m².     PLAN:   Cellulitis is being treated with vancomycin.      Start     Ordered    05/02/22 1513  Elevate Extremity  Continuous        Comments: Elevate bilateral lower extremities above heart level when possible   Question:  Elevate Extremity  Answer:  BLE    05/02/22 1513    Unscheduled  Wound Care Routine (Specify)  As Needed      Comments: Place of service under right leg due to drainage.  May cover wound with Opticell AG and wrap with Kerlix if drainage becomes increases.    05/02/22 1514                  Discussed findings and treatment plan including risks, benefits, and treatment options with patient in detail. Patient agreed with treatment plan.      This document has been electronically signed by GARETT Garcia on 5/2/2022 15:04 CDT

## 2022-05-02 NOTE — H&P
History and Physical      CHIEF COMPLAINT: Right lower extremity pain    Reason for Admission: Cellulitis    History Obtained From: Patient, chart review    HISTORY OF PRESENT ILLNESS:      The patient is a 71 y.o. female who presents with subacute onset right lower extremity swelling and pain.  She was seen in the ER on 4/7 where she was diagnosed with cellulitis of right lower extremity discharged home on clindamycin.  She states that her symptoms initially improved but then began to worsen when she was off medication.  She started to feel more systemic symptoms and represented to the emergency department where she was found to again have right lower extremity cellulitis.  Work-up was generally negative in the ER and she is admitted for IV antibiotics.    Past Medical History:    Past Medical History:   Diagnosis Date   • Arthritis    • Bronchitis    • Cataract     right eye , cornea implant   • Chronic back pain    • Depression    • Disease of thyroid gland    • Edema     lower extremities   • Hypertension    • Kidney stones        Past Surgical History:    Past Surgical History:   Procedure Laterality Date   • BACK SURGERY      2010 ([place]), 2013    • CHOLECYSTECTOMY OPEN     • CORNEAL TRANSPLANT Right 1999   • LUMBAR FUSION Left 8/2/2017    Procedure: LEFT LUMBAR LATERAL INTERBODY FUSION WITH INSTRUMENTATION  L1-2;  Surgeon: MARCELINO Wright MD;  Location: [facility] OR;  Service:    • NECK SURGERY  2010    [place]   • TOTAL HIP ARTHROPLASTY Right 2014    DR. KENDRICK    • TOTAL KNEE ARTHROPLASTY Right 2000   • TOTAL KNEE ARTHROPLASTY Left 2002   • TOTAL SHOULDER ARTHROPLASTY W/ DISTAL CLAVICLE EXCISION Left 6/1/2020    Procedure: LEFT REVERSE TOTAL SHOULDER REPLACEMENT;  Surgeon: Saravanan Calvin MD;  Location: [facility] OR;  Service: Orthopedics;  Laterality: Left;       Medications Prior to Admission:    Medications Prior to Admission   Medication Sig Dispense Refill Last Dose   •  Bisoprolol-Hydrochlorothiazide (ZIAC PO) Take  by mouth Daily.   Past Week at Unknown time   • cyclobenzaprine (FLEXERIL) 10 MG tablet Take 1 tablet by mouth 3 (Three) Times a Day As Needed for Muscle Spasms. 30 tablet 0 Past Week at Unknown time   • docusate sodium (Colace) 100 MG capsule Take 1 capsule by mouth 2 (Two) Times a Day. 60 capsule 1 Past Month at Unknown time   • DULoxetine (CYMBALTA) 60 MG capsule Take 60 mg by mouth Daily.   5/1/2022 at Unknown time   • furosemide (LASIX) 40 MG tablet Take 40 mg by mouth Daily As Needed (PRN EDEMA).   Past Month at Unknown time   • gabapentin (NEURONTIN) 100 MG capsule Take 100 mg by mouth 2 (Two) Times a Day.   5/1/2022 at Unknown time   • levothyroxine (SYNTHROID, LEVOTHROID) 100 MCG tablet Take 100 mcg by mouth Daily.   5/1/2022 at Unknown time   • Morphine (MSIR) 15 MG tablet Take 15 mg by mouth 3 (Three) Times a Day.   5/1/2022 at Unknown time   • ondansetron (Zofran) 4 MG tablet Take 1 tablet by mouth Every 8 (Eight) Hours As Needed for Nausea or Vomiting. 20 tablet 1 Past Week at Unknown time   • clindamycin (CLEOCIN) 300 MG capsule Take 1 capsule by mouth 4 (Four) Times a Day. (Patient not taking: Reported on 5/1/2022) 28 capsule 0 Not Taking at Unknown time   • HYDROcodone-acetaminophen (Norco)  MG per tablet Take 1 tablet by mouth Every 4 (Four) Hours As Needed for Moderate Pain . (Patient not taking: Reported on 5/1/2022) 70 tablet 0 Not Taking at Unknown time       Allergies:  Codeine and Benadryl [diphenhydramine]    Social History:   Social History     Socioeconomic History   • Marital status:    Tobacco Use   • Smoking status: Never Smoker   • Smokeless tobacco: Never Used   Substance and Sexual Activity   • Alcohol use: No   • Drug use: No   • Sexual activity: Defer       Family History:   Family History   Problem Relation Age of Onset   • Breast cancer Neg Hx        REVIEW OF SYSTEMS:    CONSTITUTIONAL:  Negative for anorexia, chills,  fevers, night sweats and weight loss, patient has been relatively medically stable until illness outlined in HPI  EYES:  negative for eye dryness, icterus and redness, no significant changes in vision  HEENT:   negative for dental problems, epistaxis or sinus congestion , no history of facial trauma or difficulty swallowing  RESPIRATORY:  negative for chest tightness, cough, dyspnea on exertion, pneumonia or worse sputum production  CARDIOVASCULAR: No history of chest pain, dyspnea, exertional chest pressure/discomfort, irregular heart beat, or PND  GASTROINTESTINAL:  negative for abdominal pain, nausea or vomiting, no history of hematemesis, jaundice, melena or rectal bleeding  MUSCULOSKELETAL:  negative for muscle weakness, myalgias and neck pain, no significant arthralgias  NEUROLOGICAL:   negative for dizziness, headaches, seizures, speech problems, tremors and vertigo, mentation has been at baseline  INTEGUMENT: negative for pruritus, rash, skin color change and skin lesion(s)       Vital Signs   Temp:  [98.2 °F (36.8 °C)-99.1 °F (37.3 °C)] 98.4 °F (36.9 °C)  Heart Rate:  [67-85] 71  Resp:  [16-20] 16  BP: (124-155)/(42-81) 124/42          Physical Exam:  Constitutional: The patient is oriented to person, place, and time. They appear well-developed.  Able to answer questions appropriately  HEENT: Grossly normal sitting up in bed  Head: Normocephalic and atraumatic.   Eyes: Pupils are equal, round, and reactive to light.  Extraocular muscles appear to be intact  Neck: Neck supple without masses or carotid bruit.  Cardiovascular: Regular rhythm and normal heart sounds.  No extra or lift rub or murmur appreciated  Pulmonary/Chest: Effort normal and breath sounds normal. CTAB, no appreciable rales or wheezes  Abdominal: Soft. Bowel sounds are normal. There is  no distension or tenderness appreciated. There is no rebound and no guarding.   Musculoskeletal: Swelling and redness involving the right lower extremity up  to the upper third of the leg.  Pulses and sensation intact.  Tenderness to palpation present but not severe.  Neurological: Pt is alert and oriented to person, place, and time. They have normal reflexes. CN 2-12 appear grossly intact  Skin: Skin is warm and dry without significant rashes     Results Review:   I reviewed the patient's new imaging results and agree with the interpretation.    DATA:  Lab Results (last 24 hours)     Procedure Component Value Units Date/Time    Manual Differential [[id number]] Collected: 05/02/22 0629    Specimen: Blood Updated: 05/02/22 0741    CBC & Differential [[id number]] Collected: 05/02/22 0629    Specimen: Blood Updated: 05/02/22 0736    Narrative:      The following orders were created for panel order CBC & Differential.  Procedure                               Abnormality         Status                     ---------                               -----------         ------                     CBC Auto Differential[[id number]]                            In process                   Please view results for these tests on the individual orders.    CBC Auto Differential [[id number]] Collected: 05/02/22 0629    Specimen: Blood Updated: 05/02/22 0736    Basic Metabolic Panel [[id number]]  (Abnormal) Collected: 05/02/22 0541    Specimen: Blood Updated: 05/02/22 0610     Glucose 117 mg/dL      BUN 13 mg/dL      Creatinine 0.60 mg/dL      Sodium 137 mmol/L      Potassium 3.3 mmol/L      Comment: Slight hemolysis detected by analyzer. Results may be affected.        Chloride 102 mmol/L      CO2 24.0 mmol/L      Calcium 8.6 mg/dL      BUN/Creatinine Ratio 21.7     Anion Gap 11.0 mmol/L      eGFR 94.9 mL/min/1.73      Comment: National Kidney Foundation and American Society of Nephrology (ASN) Task Force recommended calculation based on the Chronic Kidney Disease Epidemiology Collaboration (CKD-EPI) equation refit without adjustment for race.       Narrative:      GFR Normal >60  Chronic Kidney  Disease <60  Kidney Failure <15      Wound Culture - Wound, Leg, Right [[id number]] Collected: 05/01/22 1803    Specimen: Wound from Leg, Right Updated: 05/02/22 0538     Gram Stain Rare (1+) WBCs per low power field      Rare (1+) Gram negative bacilli      Rare (1+) Gram positive cocci    COVID-19,[facility] IN-HOUSE,Nasal Swab No Transport Media 3-4 HR TAT - Swab, Nasal Cavity [[id number]]  (Normal) Collected: 05/01/22 2102    Specimen: Swab from Nasal Cavity Updated: 05/01/22 2147     COVID19 Not Detected    Narrative:      Fact sheet for providers: https://www.fda.gov/media/932671/download     Fact sheet for patients: https://www.fda.gov/media/121571/download    Test performed by PCR.    Consider negative results in combination with clinical observations, patient history, and epidemiological information.    STAT Lactic Acid, Reflex [[id number]]  (Abnormal) Collected: 05/01/22 2100    Specimen: Blood Updated: 05/01/22 2125     Lactate 2.2 mmol/L     Blood Culture - Blood, Blood, Central Line [[id number]] Collected: 05/01/22 2030    Specimen: Blood, Central Line Updated: 05/01/22 2110    Blood Culture - Blood, Arm, Left [[id number]] Collected: 05/01/22 2045    Specimen: Blood from Arm, Left Updated: 05/01/22 2109    Sedimentation Rate [[id number]]  (Normal) Collected: 05/01/22 1802    Specimen: Blood Updated: 05/01/22 1938     Sed Rate 3 mm/hr     Manual Differential [[id number]]  (Abnormal) Collected: 05/01/22 1802    Specimen: Blood Updated: 05/01/22 1927     Neutrophil % 58.6 %      Lymphocyte % 23.2 %      Monocyte % 9.1 %      Eosinophil % 1.0 %      Bands %  3.0 %      Atypical Lymphocyte % 5.1 %      Neutrophils Absolute 4.87 10*3/mm3      Lymphocytes Absolute 2.24 10*3/mm3      Monocytes Absolute 0.72 10*3/mm3      Eosinophils Absolute 0.08 10*3/mm3      Dacrocytes Slight/1+     Poikilocytes Slight/1+     Polychromasia Slight/1+     Rouleaux Large/3+     WBC Morphology Normal     Platelet Estimate Decreased    CBC  & Differential [[id number]]  (Abnormal) Collected: 05/01/22 1802    Specimen: Blood Updated: 05/01/22 1904    Narrative:      The following orders were created for panel order CBC & Differential.  Procedure                               Abnormality         Status                     ---------                               -----------         ------                     CBC Auto Differential[[id number]]        Abnormal            Final result                 Please view results for these tests on the individual orders.    CBC Auto Differential [[id number]]  (Abnormal) Collected: 05/01/22 1802    Specimen: Blood Updated: 05/01/22 1904     WBC 7.90 10*3/mm3      RBC 3.56 10*6/mm3      Hemoglobin 13.3 g/dL      Hematocrit 35.0 %      MCV 98.3 fL      MCH 37.4 pg      MCHC 38.0 g/dL      RDW 18.8 %      RDW-SD 50.1 fl      MPV 11.6 fL      Platelets 110 10*3/mm3     C-reactive Protein [[id number]]  (Abnormal) Collected: 05/01/22 1802    Specimen: Blood Updated: 05/01/22 1901     C-Reactive Protein 8.59 mg/dL     Lactic Acid, Plasma [[id number]]  (Abnormal) Collected: 05/01/22 1802    Specimen: Blood Updated: 05/01/22 1900     Lactate 2.7 mmol/L     Comprehensive Metabolic Panel [[id number]]  (Abnormal) Collected: 05/01/22 1802    Specimen: Blood Updated: 05/01/22 1859     Glucose 109 mg/dL      BUN 15 mg/dL      Creatinine 0.67 mg/dL      Sodium 134 mmol/L      Potassium 4.5 mmol/L      Comment: Slight hemolysis detected by analyzer. Results may be affected.        Chloride 97 mmol/L      CO2 24.0 mmol/L      Calcium 8.6 mg/dL      Total Protein 6.4 g/dL      Albumin 3.40 g/dL      ALT (SGPT) 15 U/L      AST (SGOT) 40 U/L      Comment: Slight hemolysis detected by analyzer. Results may be affected.        Alkaline Phosphatase 139 U/L      Total Bilirubin 2.9 mg/dL      Globulin 3.0 gm/dL      A/G Ratio 1.1 g/dL      BUN/Creatinine Ratio 22.4     Anion Gap 13.0 mmol/L      eGFR 92.4 mL/min/1.73      Comment: National Kidney  Foundation and American Society of Nephrology (ASN) Task Force recommended calculation based on the Chronic Kidney Disease Epidemiology Collaboration (CKD-EPI) equation refit without adjustment for race.       Narrative:      GFR Normal >60  Chronic Kidney Disease <60  Kidney Failure <15          Imaging Results (Last 24 Hours)     Procedure Component Value Units Date/Time    XR Tibia Fibula 2 View Right [[id number]] Collected: 05/01/22 1956     Updated: 05/01/22 2000    Narrative:      EXAMINATION:  XR TIBIA FIBULA 2 VW RIGHT-  5/1/2022 7:40 PM CDT     HISTORY: Right lower extremity swelling.     COMPARISON: No comparison study.     TECHNIQUE: 2 views were obtained.     FINDINGS:  There has been prior right knee arthroplasty. The appliance  appears to be intact. There is a fairly large spacer between the  components. There is no evidence of acute fracture of the right tibia or  fibula. There is diffuse soft tissue edema involving the subcutaneous  fat from the knee down to the lower leg.       Impression:      1. Prior right knee arthroplasty.  2. No evidence of acute fracture.  3. Diffuse soft tissue edema.     This report was finalized on 05/01/2022 19:57 by Dr. Christopher Jennings MD.            ASSESSMENT AND PLAN:      1.     Cellulitis of right lower extremity    Plan:    Will continue antibiotics and await cultures.  Anticipate several days of IV antibiotics. No further work-up at this time, will see how she responds to IV antibiotics.      Sanjiv Block MD  07:58 CDT 5/2/2022

## 2022-05-03 PROBLEM — F41.8 ANXIETY ASSOCIATED WITH DEPRESSION: Chronic | Status: ACTIVE | Noted: 2022-05-03

## 2022-05-03 PROBLEM — I10 HTN (HYPERTENSION): Chronic | Status: ACTIVE | Noted: 2022-05-03

## 2022-05-03 PROBLEM — E66.01 OBESITY, CLASS III, BMI 40-49.9 (MORBID OBESITY) (HCC): Chronic | Status: ACTIVE | Noted: 2022-05-03

## 2022-05-03 LAB
ANION GAP SERPL CALCULATED.3IONS-SCNC: 8 MMOL/L (ref 5–15)
BUN SERPL-MCNC: 11 MG/DL (ref 8–23)
BUN/CREAT SERPL: 18 (ref 7–25)
CALCIUM SPEC-SCNC: 9.1 MG/DL (ref 8.6–10.5)
CHLORIDE SERPL-SCNC: 104 MMOL/L (ref 98–107)
CO2 SERPL-SCNC: 27 MMOL/L (ref 22–29)
CREAT SERPL-MCNC: 0.61 MG/DL (ref 0.57–1)
DEPRECATED RDW RBC AUTO: 48.7 FL (ref 37–54)
EGFRCR SERPLBLD CKD-EPI 2021: 94.5 ML/MIN/1.73
ERYTHROCYTE [DISTWIDTH] IN BLOOD BY AUTOMATED COUNT: 14.4 % (ref 12.3–15.4)
GLUCOSE SERPL-MCNC: 109 MG/DL (ref 65–99)
HCT VFR BLD AUTO: 36.2 % (ref 34–46.6)
HGB BLD-MCNC: 12.2 G/DL (ref 12–15.9)
MCH RBC QN AUTO: 31.9 PG (ref 26.6–33)
MCHC RBC AUTO-ENTMCNC: 33.7 G/DL (ref 31.5–35.7)
MCV RBC AUTO: 94.5 FL (ref 79–97)
PLATELET # BLD AUTO: 72 10*3/MM3 (ref 140–450)
PMV BLD AUTO: 11.6 FL (ref 6–12)
POTASSIUM SERPL-SCNC: 4.1 MMOL/L (ref 3.5–5.2)
RBC # BLD AUTO: 3.83 10*6/MM3 (ref 3.77–5.28)
SODIUM SERPL-SCNC: 139 MMOL/L (ref 136–145)
VANCOMYCIN TROUGH SERPL-MCNC: 9.9 MCG/ML (ref 5–20)
WBC NRBC COR # BLD: 3.97 10*3/MM3 (ref 3.4–10.8)

## 2022-05-03 PROCEDURE — 97161 PT EVAL LOW COMPLEX 20 MIN: CPT

## 2022-05-03 PROCEDURE — 25010000002 ENOXAPARIN PER 10 MG: Performed by: STUDENT IN AN ORGANIZED HEALTH CARE EDUCATION/TRAINING PROGRAM

## 2022-05-03 PROCEDURE — 25010000002 VANCOMYCIN 10 G RECONSTITUTED SOLUTION: Performed by: FAMILY MEDICINE

## 2022-05-03 PROCEDURE — 85027 COMPLETE CBC AUTOMATED: CPT | Performed by: STUDENT IN AN ORGANIZED HEALTH CARE EDUCATION/TRAINING PROGRAM

## 2022-05-03 PROCEDURE — 80048 BASIC METABOLIC PNL TOTAL CA: CPT | Performed by: STUDENT IN AN ORGANIZED HEALTH CARE EDUCATION/TRAINING PROGRAM

## 2022-05-03 PROCEDURE — 97165 OT EVAL LOW COMPLEX 30 MIN: CPT

## 2022-05-03 PROCEDURE — 80202 ASSAY OF VANCOMYCIN: CPT | Performed by: FAMILY MEDICINE

## 2022-05-03 PROCEDURE — 25010000002 VANCOMYCIN 1 G RECONSTITUTED SOLUTION 1 EACH VIAL: Performed by: STUDENT IN AN ORGANIZED HEALTH CARE EDUCATION/TRAINING PROGRAM

## 2022-05-03 RX ADMIN — BISOPROLOL FUMARATE AND HYDROCHLOROTHIAZIDE 1 TABLET: 6.25; 5 TABLET ORAL at 08:10

## 2022-05-03 RX ADMIN — DULOXETINE HYDROCHLORIDE 60 MG: 30 CAPSULE, DELAYED RELEASE ORAL at 08:10

## 2022-05-03 RX ADMIN — MORPHINE SULFATE 15 MG: 15 TABLET, FILM COATED, EXTENDED RELEASE ORAL at 15:20

## 2022-05-03 RX ADMIN — MORPHINE SULFATE 15 MG: 15 TABLET, FILM COATED, EXTENDED RELEASE ORAL at 22:45

## 2022-05-03 RX ADMIN — LEVOTHYROXINE SODIUM 100 MCG: 100 TABLET ORAL at 05:10

## 2022-05-03 RX ADMIN — ENOXAPARIN SODIUM 40 MG: 100 INJECTION SUBCUTANEOUS at 20:40

## 2022-05-03 RX ADMIN — GABAPENTIN 100 MG: 100 CAPSULE ORAL at 08:10

## 2022-05-03 RX ADMIN — GABAPENTIN 100 MG: 100 CAPSULE ORAL at 20:40

## 2022-05-03 RX ADMIN — HYDROCODONE BITARTRATE AND ACETAMINOPHEN 1 TABLET: 10; 325 TABLET ORAL at 09:52

## 2022-05-03 RX ADMIN — VANCOMYCIN HYDROCHLORIDE 1000 MG: 1 INJECTION, POWDER, LYOPHILIZED, FOR SOLUTION INTRAVENOUS at 17:18

## 2022-05-03 RX ADMIN — VANCOMYCIN HYDROCHLORIDE 750 MG: 10 INJECTION, POWDER, LYOPHILIZED, FOR SOLUTION INTRAVENOUS at 08:11

## 2022-05-03 NOTE — THERAPY DISCHARGE NOTE
Acute Care - Occupational Therapy Discharge  [facility]    Patient Name: Brenda Sneed  : 1950    MRN: [id number]                              Today's Date: 5/3/2022       Admit Date: 2022    Visit Dx:     ICD-10-CM ICD-9-CM   1. Cellulitis of right lower extremity  L03.115 682.6   2. Impaired functional mobility and activity tolerance  Z74.09 V49.89     Patient Active Problem List   Diagnosis   • Spinal stenosis, lumbar   • Secondary osteoarthritis of left shoulder due to rotator cuff arthropathy   • Cellulitis of right lower extremity   • HTN (hypertension)   • Obesity, Class III, BMI 40-49.9 (morbid obesity) (HCC)   • Anxiety associated with depression     Past Medical History:   Diagnosis Date   • Arthritis    • Bronchitis    • Cataract     right eye , cornea implant   • Chronic back pain    • Depression    • Disease of thyroid gland    • Edema     lower extremities   • Hypertension    • Kidney stones      Past Surgical History:   Procedure Laterality Date   • BACK SURGERY       ([place]),     • CHOLECYSTECTOMY OPEN     • CORNEAL TRANSPLANT Right    • LUMBAR FUSION Left 2017    Procedure: LEFT LUMBAR LATERAL INTERBODY FUSION WITH INSTRUMENTATION  L1-2;  Surgeon: MARCELINO Wright MD;  Location: [facility] OR;  Service:    • NECK SURGERY      [place]   • TOTAL HIP ARTHROPLASTY Right     DR. KENDRICK    • TOTAL KNEE ARTHROPLASTY Right    • TOTAL KNEE ARTHROPLASTY Left    • TOTAL SHOULDER ARTHROPLASTY W/ DISTAL CLAVICLE EXCISION Left 2020    Procedure: LEFT REVERSE TOTAL SHOULDER REPLACEMENT;  Surgeon: Saravanan Calvin MD;  Location: [facility];  Service: Orthopedics;  Laterality: Left;      General Information     Row Name 22 1440          OT Time and Intention    Document Type evaluation  -MW     Mode of Treatment occupational therapy  -MW     Row Name 22 1440          General Information    Patient Profile Reviewed yes  -MW     Prior Level of Function  independent:;all household mobility;ADL's;cooking;cleaning;dependent:;driving  difficulty with LB dressing at baseline  -     Existing Precautions/Restrictions fall  -     Barriers to Rehab visual deficit;physical barrier  -     Row Name 05/03/22 1440          Occupational Profile    Reason for Services/Referral (Occupational Profile) presented with RLE edema and redness, dx: cellultis  -     Environmental Supports and Barriers (Occupational Profile) straight cane when outside; tub shower, has shower chair but was not using  -     Row Name 05/03/22 1440          Living Environment    People in Home alone  -     Row Name 05/03/22 1440          Home Main Entrance    Number of Stairs, Main Entrance one  -     Row Name 05/03/22 1440          Stairs Within Home, Primary    Number of Stairs, Within Home, Primary none  -[facility] Name 05/03/22 1440          Cognition    Orientation Status (Cognition) oriented x 4  -           User Key  (r) = Recorded By, (t) = Taken By, (c) = Cosigned By    Initials Name Provider Type     Marlin Waldron, OTR/L Occupational Therapist               Mobility/ADL's     Row Name 05/03/22 1440          Bed Mobility    Bed Mobility supine-sit;sit-supine  -     Supine-Sit [place] (Bed Mobility) independent  -     Sit-Supine [place] (Bed Mobility) independent  -     Row Name 05/03/22 1440          Transfers    Transfers sit-stand transfer  -     Sit-Stand [place] (Transfers) modified independence  -[facility] Name 05/03/22 1440          Sit-Stand Transfer    Assistive Device (Sit-Stand Transfers) cane, straight  -[facility] Name 05/03/22 1440          Functional Mobility    Functional Mobility- Ind. Level independent  -     Functional Mobility- Device cane, straight  -     Functional Mobility- Comment down sparks and back to room with no LOB or SOA  -     Row Name 05/03/22 1440          Activities of Daily Living    BADL Assessment/Intervention lower  body dressing  -MW     Row Name 05/03/22 1440          Lower Body Dressing Assessment/Training    [place] Level (Lower Body Dressing) don;doff;socks;maximum assist (25% patient effort)  -MW     Position (Lower Body Dressing) edge of bed sitting  -MW     Comment, (Lower Body Dressing) reports she does not wear socks or shoes at home, has a sock aid if needs to don for MD appts  -           User Key  (r) = Recorded By, (t) = Taken By, (c) = Cosigned By    Initials Name Provider Type    Marlin Parra, OTR/L Occupational Therapist               Obj/Interventions     Row Name 05/03/22 1440          Range of Motion Comprehensive    General Range of Motion bilateral upper extremity ROM WFL  -MW     [facility] Name 05/03/22 1440          Strength Comprehensive (MMT)    Comment, General Manual Muscle Testing (MMT) Assessment BUE 5/5  -MW     [facility] Name 05/03/22 1440          Balance    Balance Assessment sitting static balance;sitting dynamic balance;standing static balance;standing dynamic balance  -MW     Static Sitting Balance independent  -MW     Dynamic Sitting Balance independent  -MW     Position, Sitting Balance unsupported  -MW     Static Standing Balance independent  -MW     Dynamic Standing Balance independent  -MW     Position/Device Used, Standing Balance supported;unsupported  -MW           User Key  (r) = Recorded By, (t) = Taken By, (c) = Cosigned By    Initials Name Provider Type    Marlin Parra, OTR/L Occupational Therapist               Goals/Plan    No documentation.                Clinical Impression     [facility] Name 05/03/22 1440          Pain Assessment    Pretreatment Pain Rating 0/10 - no pain  -MW     Posttreatment Pain Rating 0/10 - no pain  -MW     Pain Intervention(s) Medication (See MAR);Repositioned;Ambulation/increased activity  -     Row Name 05/03/22 1440          Plan of Care Review    Plan of Care Reviewed With patient  -MW     Progress no change  -     Outcome Evaluation OT  jhonatan completed. Reports she has been up in room to BR without difficulty. Pt performs bed mobility independently. Transfers and amb in sparks with straight cane mod I. Pt is maxA for LB dressing, however reports this is her baseline, she has sock aid or assist from family if need be upon return home. Pt demos no exacerbation of SOA, pain, etc with activity and no LOB. She is at baseline function, no skilled therapy warranted at this time. Recommend d/c home w assist.  -     Row Name 05/03/22 1440          Therapy Assessment/Plan (OT)    Criteria for Skilled Therapeutic Interventions Met (OT) no;no problems identified which require skilled intervention  -MW     Therapy Frequency (OT) evaluation only  -     Row Name 05/03/22 1440          Therapy Plan Review/Discharge Plan (OT)    Anticipated Discharge Disposition (OT) home with assist  -MW     Row Name 05/03/22 1440          Positioning and Restraints    Pre-Treatment Position in bed  -MW     Post Treatment Position bed  -MW     In Bed fowlers;call light within reach;encouraged to call for assist  -MW           User Key  (r) = Recorded By, (t) = Taken By, (c) = Cosigned By    Initials Name Provider Type    MW Marlin Waldron, OTR/L Occupational Therapist               Outcome Measures     Row Name 05/03/22 1440          How much help from another is currently needed...    Putting on and taking off regular lower body clothing? 2  -MW     Bathing (including washing, rinsing, and drying) 3  -MW     Toileting (which includes using toilet bed pan or urinal) 4  -MW     Putting on and taking off regular upper body clothing 4  -MW     Taking care of personal grooming (such as brushing teeth) 4  -MW     Eating meals 4  -MW     AM-PAC 6 Clicks Score (OT) 21  -MW     Row Name 05/03/22 1441          How much help from another person do you currently need...    Turning from your back to your side while in flat bed without using bedrails? 4 (P)   -JJ     Moving from lying on  back to sitting on the side of a flat bed without bedrails? 4 (P)   -JJ     Moving to and from a bed to a chair (including a wheelchair)? 4 (P)   -JJ     Standing up from a chair using your arms (e.g., wheelchair, bedside chair)? 4 (P)   -JJ     Climbing 3-5 steps with a railing? 3 (P)   -JJ     To walk in hospital room? 4 (P)   -JJ     AM-PAC 6 Clicks Score (PT) 23 (P)   -JJ     Highest level of mobility 7 --> Walked 25 feet or more (P)   -JJ     Row Name 05/03/22 1441 05/03/22 1440       Functional Assessment    Outcome Measure Options AM-PAC 6 Clicks Basic Mobility (PT) (P)   - AM-PAC 6 Clicks Daily Activity (OT)  -          User Key  (r) = Recorded By, (t) = Taken By, (c) = Cosigned By    Initials Name Provider Type     Marlin Waldron, OTR/L Occupational Therapist    Alvin Cano, PT Student PT Student              Occupational Therapy Education                 Title: PT OT SLP Therapies (Done)     Topic: Occupational Therapy (Done)     Point: ADL training (Done)     Description:   Instruct learner(s) on proper safety adaptation and remediation techniques during self care or transfers.   Instruct in proper use of assistive devices.              Learning Progress Summary           Patient Acceptance, E,D, VU,DU by  at 5/3/2022 1611                   Point: Home exercise program (Done)     Description:   Instruct learner(s) on appropriate technique for monitoring, assisting and/or progressing therapeutic exercises/activities.              Learning Progress Summary           Patient Acceptance, E,D, VU,DU by  at 5/3/2022 1611                               User Key     Initials Effective Dates Name Provider Type Discipline     08/28/18 -  Marlin Waldrno, OTR/L Occupational Therapist OT              OT Recommendation and Plan  Therapy Frequency (OT): evaluation only  Plan of Care Review  Plan of Care Reviewed With: patient  Progress: no change  Outcome Evaluation: OT jhonatan completed. Reports she  has been up in room to BR without difficulty. Pt performs bed mobility independently. Transfers and amb in sparks with straight cane mod I. Pt is maxA for LB dressing, however reports this is her baseline, she has sock aid or assist from family if need be upon return home. Pt demos no exacerbation of SOA, pain, etc with activity and no LOB. She is at baseline function, no skilled therapy warranted at this time. Recommend d/c home w assist.  Plan of Care Reviewed With: patient  Outcome Evaluation: OT eval completed. Reports she has been up in room to BR without difficulty. Pt performs bed mobility independently. Transfers and amb in sparks with straight cane mod I. Pt is maxA for LB dressing, however reports this is her baseline, she has sock aid or assist from family if need be upon return home. Pt demos no exacerbation of SOA, pain, etc with activity and no LOB. She is at baseline function, no skilled therapy warranted at this time. Recommend d/c home w assist.     Time Calculation:    Time Calculation- OT     Row Name 05/03/22 1610             Time Calculation- OT    OT Start Time 1440  +10 min chart review  -MW      OT Stop Time 1455  -MW      OT Time Calculation (min) 15 min  -MW      OT Received On 05/03/22  -            User Key  (r) = Recorded By, (t) = Taken By, (c) = Cosigned By    Initials Name Provider Type    Marlin Parra OTR/L Occupational Therapist              Therapy Charges for Today     Code Description Service Date Service Provider Modifiers Qty    [id number]  OT EVAL LOW COMPLEXITY 2 5/3/2022 Marlin Waldron OTR/L GO 1             OT Discharge Summary  Anticipated Discharge Disposition (OT): home with assist  Reason for Discharge: At baseline function  Outcomes Achieved: Other (eval only)  Discharge Destination: Home with assist    LINDA Montilla/MAYLIN  5/3/2022

## 2022-05-03 NOTE — PLAN OF CARE
Goal Outcome Evaluation:            A&Ox4.  Bilat edema.  RLE warmth,redness, and leaking that has decreased throughout shift.   Abx given as ordered.  IV restarted.  Prn pain meds given as ordered with C/O pain with good results.  Up with cane and standby assist to RR.  Pt reports improvement on ability to move legs.  Resting between care.

## 2022-05-03 NOTE — PLAN OF CARE
Goal Outcome Evaluation:  Plan of Care Reviewed With: patient        Progress: no change  Outcome Evaluation: OT jhonatan completed. Reports she has been up in room to BR without difficulty. Pt performs bed mobility independently. Transfers and amb in sparks with straight cane mod I. Pt is maxA for LB dressing, however reports this is her baseline, she has sock aid or assist from family if need be upon return home. Pt demos no exacerbation of SOA, pain, etc with activity and no LOB. She is at baseline function, no skilled therapy warranted at this time. Recommend d/c home w assist.   3

## 2022-05-03 NOTE — PROGRESS NOTES
"Pharmacy Dosing Service  Pharmacokinetics  Vancomycin Follow-up Evaluation    Assessment/Action/Plan:      Calculated AUC based on trough level drawn this morning subtherapeutic at 321. Renal function stable. Increase Vancomycin dose to 1g IV q12. Predicted steady state values on new regimen below. Pharmacy will continue to follow daily and adjust dose accordingly.    AUC Model Data:  Regimen: 1000 mg IV every 12 hours.  Exposure target: AUC24 (range)400-600 mg/L.hr   AUC24,ss: 427 mg/L.hr  PAUC*: 63 %  Ctrough,ss: 10.4 mg/L  Pconc*: 0 %  Tox.: 6 %     Subjective:  Brenda Sneed is a 71 y.o. female currently on Vancomycin 750 mg IV every 12 hours for the treatment of SSTI, day 3 of therapy. Current end date: 5/6/22    Objective:  Ht: 160 cm (63\"); Wt: 111 kg (245 lb)  Estimated Creatinine Clearance: 99.9 mL/min (by C-G formula based on SCr of 0.6 mg/dL).   Creatinine   Date Value Ref Range Status   05/02/2022 0.60 0.57 - 1.00 mg/dL Final   05/01/2022 0.67 0.57 - 1.00 mg/dL Final   Final   Final   Final   Final      Lab Results   Component Value Date    WBC 6.10 05/02/2022    WBC 7.90 05/01/2022      Lab Results   Component Value Date    VANCOTROUGH 9.90 05/03/2022         Culture Results:  Microbiology Results (last 10 days)       Procedure Component Value - Date/Time    COVID-19,[facility] IN-HOUSE,Nasal Swab No Transport Media 3-4 HR TAT - Swab, Nasal Cavity [[id number]]  (Normal) Collected: 05/01/22 2102    Lab Status: Final result Specimen: Swab from Nasal Cavity Updated: 05/01/22 2147     COVID19 Not Detected    Narrative:      Fact sheet for providers: https://www.fda.gov/media/493543/download     Fact sheet for patients: https://www.fda.gov/media/566609/download    Test performed by PCR.    Consider negative results in combination with clinical observations, patient history, and epidemiological information.    Blood Culture ID, PCR - Blood, Arm, Left [[id number]]  (Abnormal) Collected: 05/01/22 2045    Lab " Status: Final result Specimen: Blood from Arm, Left Updated: 05/02/22 2025     BCID, PCR Staph spp, not aureus or lugdunesis. Identification by BCID2 PCR.    Blood Culture - Blood, Blood, Central Line [[id number]]  (Normal) Collected: 05/01/22 2030    Lab Status: Preliminary result Specimen: Blood, Central Line Updated: 05/02/22 2117     Blood Culture No growth at 24 hours    Wound Culture - Wound, Leg, Right [[id number]] Collected: 05/01/22 1803    Lab Status: Preliminary result Specimen: Wound from Leg, Right Updated: 05/02/22 0538     Gram Stain Rare (1+) WBCs per low power field      Rare (1+) Gram negative bacilli      Rare (1+) Gram positive cocci            JESSA Cuellar, PharmD   05/03/22 08:28 CDT

## 2022-05-03 NOTE — THERAPY DISCHARGE NOTE
Patient Name: Brenda Sneed  : 1950    MRN: [id number]                              Today's Date: 5/3/2022       Admit Date: 2022    Visit Dx:     ICD-10-CM ICD-9-CM   1. Cellulitis of right lower extremity  L03.115 682.6   2. Impaired functional mobility and activity tolerance  Z74.09 V49.89     Patient Active Problem List   Diagnosis   • Spinal stenosis, lumbar   • Secondary osteoarthritis of left shoulder due to rotator cuff arthropathy   • Cellulitis of right lower extremity   • HTN (hypertension)   • Obesity, Class III, BMI 40-49.9 (morbid obesity) ([facility])   • Anxiety associated with depression     Past Medical History:   Diagnosis Date   • Arthritis    • Bronchitis    • Cataract     right eye , cornea implant   • Chronic back pain    • Depression    • Disease of thyroid gland    • Edema     lower extremities   • Hypertension    • Kidney stones      Past Surgical History:   Procedure Laterality Date   • BACK SURGERY       ([facility])2013    • CHOLECYSTECTOMY OPEN     • CORNEAL TRANSPLANT Right    • LUMBAR FUSION Left 2017    Procedure: LEFT LUMBAR LATERAL INTERBODY FUSION WITH INSTRUMENTATION  L1-2;  Surgeon: MARCELINO Wright MD;  Location:  [facility] OR;  Service:    • NECK SURGERY      [facility]   • TOTAL HIP ARTHROPLASTY Right     DR. KENDRICK    • TOTAL KNEE ARTHROPLASTY Right    • TOTAL KNEE ARTHROPLASTY Left    • TOTAL SHOULDER ARTHROPLASTY W/ DISTAL CLAVICLE EXCISION Left 2020    Procedure: LEFT REVERSE TOTAL SHOULDER REPLACEMENT;  Surgeon: Saravanan Calvin MD;  Location:  [facility] OR;  Service: Orthopedics;  Laterality: Left;      General Information     Row Name 22 1441          Physical Therapy Time and Intention    Document Type evaluation (P)   Cellulitis of right lower extremity  -JJ     Mode of Treatment physical therapy (P)   -JJ     Row Name 22 1441          General Information    Patient Profile Reviewed yes (P)   -JJ     Prior Level of  Function independent:;all household mobility;gait;transfer;bed mobility;cooking;cleaning;dependent:;driving (P)   -JJ     Existing Precautions/Restrictions fall (P)   -JJ     Barriers to Rehab visual deficit (P)   -JJ     Row Name 05/03/22 1441          Living Environment    People in Home alone (P)   -JJ     Row Name 05/03/22 1441          Home Main Entrance    Number of Stairs, Main Entrance one (P)   -JJ     Stair Railings, Main Entrance none (P)   -JJ     Row Name 05/03/22 1441          Stairs Within Home, Primary    Number of Stairs, Within Home, Primary none (P)   -JJ     Stair Railings, Within Home, Primary none (P)   -JJ     Row Name 05/03/22 1441          Cognition    Orientation Status (Cognition) oriented x 4 (P)   -JJ     Row Name 05/03/22 1441          Safety Issues, Functional Mobility    Impairments Affecting Function (Mobility) endurance/activity tolerance;balance (P)   -JJ           User Key  (r) = Recorded By, (t) = Taken By, (c) = Cosigned By    Initials Name Provider Type    Alvin Warner, PT Student PT Student               Mobility     Row Name 05/03/22 1441          Bed Mobility    Bed Mobility supine-sit;sit-supine (P)   -JJ     Supine-Sit [place] (Bed Mobility) independent (P)   -JJ     Sit-Supine [place] (Bed Mobility) independent (P)   -JJ     Row Name 05/03/22 1441          Sit-Stand Transfer    Sit-Stand [place] (Transfers) modified independence (P)   -J     Assistive Device (Sit-Stand Transfers) cane, straight (P)   -JJ     Comment, (Sit-Stand Transfer) Cane on the R (P)   -JJ     Row Name 05/03/22 1441          Gait/Stairs (Locomotion)    [place] Level (Gait) modified independence (P)   -JJ     Assistive Device (Gait) cane, straight (P)   -JJ     Distance in Feet (Gait) 75' (P)   -JJ     Bilateral Gait Deviations heel strike decreased (P)   -JJ           User Key  (r) = Recorded By, (t) = Taken By, (c) = Cosigned By    Initials Name Provider Type    ROSIE  Alvin Huerta, PT Student PT Student               Obj/Interventions     Row Name 05/03/22 1441          Range of Motion Comprehensive    General Range of Motion bilateral lower extremity ROM WFL (P)   -JJ     Row Name 05/03/22 1441          Strength Comprehensive (MMT)    Comment, General Manual Muscle Testing (MMT) Assessment Not formally assessed due to BLE pain, functionally 3+/5 due to ability to amb (P)   -JJ     Row Name 05/03/22 1441          Balance    Balance Assessment sitting static balance;sitting dynamic balance;standing static balance;standing dynamic balance (P)   -JJ     Static Sitting Balance independent (P)   -JJ     Dynamic Sitting Balance independent (P)   -JJ     Position, Sitting Balance sitting edge of bed (P)   -JJ     Static Standing Balance modified independence (P)   -JJ     Dynamic Standing Balance modified independence (P)   -JJ     Position/Device Used, Standing Balance cane, straight (P)   -JJ     Row Name 05/03/22 1441          Sensory Assessment (Somatosensory)    Sensory Assessment (Somatosensory) other (see comments);unable/difficult to assess (P)   Increased sensitivity to touch, increased RLE erythema  -JJ           User Key  (r) = Recorded By, (t) = Taken By, (c) = Cosigned By    Initials Name Provider Type    JJ Alvin Huerta, PT Student PT Student               Goals/Plan    No documentation.                Clinical Impression     Row Name 05/03/22 1441          Pain    Pretreatment Pain Rating 0/10 - no pain (P)   -JJ     Posttreatment Pain Rating 0/10 - no pain (P)   -JJ     Pre/Posttreatment Pain Comment Pain in LE with palpation, pt denies pain at rest (P)   -JJ     Pain Intervention(s) Medication (See MAR);Repositioned;Ambulation/increased activity (P)   -JJ     Row Name 05/03/22 1441          Plan of Care Review    Plan of Care Reviewed With patient (P)   -JJ     Progress no change (P)   -JJ     Outcome Evaluation PT eval completed. Pt A&Ox4, on RA, in fowlers. Pt  denies any pain and reports that the swelling in her leg seems to be decreasing slightly. With bed mobility pt independent with supine to sit requiring extra time. Once EOB pt performed STS Mod I with straight cane. Pt then amb 75' in sparks Mod I with straight cane demonstrating decreased heel strike. No major gait deviations or LOB noted. At this time pt does not meet criteria for skilled PT care. PT to sign off. DC recommend home. (P)   -JJ     Row Name 05/03/22 1441          Therapy Assessment/Plan (PT)    Criteria for Skilled Interventions Met (PT) no;does not meet criteria for skilled intervention (P)   -JJ     Therapy Frequency (PT) evaluation only (P)   -JJ     Predicted Duration of Therapy Intervention (PT) Until DC or goals met (P)   -JJ     Row Name 05/03/22 1441          Vital Signs    O2 Delivery Pre Treatment room air (P)   -JJ     O2 Delivery Intra Treatment room air (P)   -JJ     O2 Delivery Post Treatment room air (P)   -JJ     Pre Patient Position Supine (P)   -JJ     Intra Patient Position Standing (P)   -JJ     Post Patient Position Supine (P)   -JJ     Row Name 05/03/22 1441          Positioning and Restraints    Pre-Treatment Position in bed (P)   -JJ     Post Treatment Position bed (P)   -JJ     In Bed fowlers;call light within reach;encouraged to call for assist;side rails up x2 (P)   -JJ           User Key  (r) = Recorded By, (t) = Taken By, (c) = Cosigned By    Initials Name Provider Type    Alvin Cano, PT Student PT Student               Outcome Measures     Row Name 05/03/22 1441          How much help from another person do you currently need...    Turning from your back to your side while in flat bed without using bedrails? 4 (P)   -JJ     Moving from lying on back to sitting on the side of a flat bed without bedrails? 4 (P)   -JJ     Moving to and from a bed to a chair (including a wheelchair)? 4 (P)   -JJ     Standing up from a chair using your arms (e.g., wheelchair, bedside  chair)? 4 (P)   -     Climbing 3-5 steps with a railing? 3 (P)   -     To walk in hospital room? 4 (P)   -     AM-PAC 6 Clicks Score (PT) 23 (P)   -     Highest level of mobility 7 --> Walked 25 feet or more (P)   -     Row Name 05/03/22 1441 05/03/22 1440       Functional Assessment    Outcome Measure Options AM-PAC 6 Clicks Basic Mobility (PT) (P)   - AM-PAC 6 Clicks Daily Activity (OT)  -          User Key  (r) = Recorded By, (t) = Taken By, (c) = Cosigned By    Initials Name Provider Type    MW Marlin Waldron, OTR/L Occupational Therapist    Alvin Cano, PT Student PT Student              Physical Therapy Education                 Title: PT OT SLP Therapies (Done)     Topic: Physical Therapy (Done)     Point: Mobility training (Done)     Learning Progress Summary           Patient Acceptance, E, VU by  at 5/3/2022 1523    Comment: Pt educated on POC                   Point: Body mechanics (Done)     Learning Progress Summary           Patient Acceptance, E, VU by  at 5/3/2022 1523    Comment: Pt educated on POC                   Point: Precautions (Done)     Learning Progress Summary           Patient Acceptance, E, VU by  at 5/3/2022 1523    Comment: Pt educated on POC                               User Key     Initials Effective Dates Name Provider Type Discipline     03/07/22 -  Alvin Huerta, PT Student PT Student PT              PT Recommendation and Plan     Plan of Care Reviewed With: (P) patient  Progress: (P) no change  Outcome Evaluation: (P) PT eval completed. Pt A&Ox4, on RA, in fowlers. Pt denies any pain and reports that the swelling in her leg seems to be decreasing slightly. With bed mobility pt independent with supine to sit requiring extra time. Once EOB pt performed STS Mod I with straight cane. Pt then amb 75' in sparks Mod I with straight cane demonstrating decreased heel strike. No major gait deviations or LOB noted. At this time pt does not meet criteria for  skilled PT care. PT to sign off. DC recommend home.     Time Calculation:    PT Charges     Row Name 05/03/22 1441             Time Calculation    Start Time 1441 (P)   -JJ      Stop Time 1500 (P)   -JJ      Time Calculation (min) 19 min (P)   -ROSIE      PT Received On 05/03/22 (P)   -JJESSA      PT Goal Re-Cert Due Date -- (P)   -JJESSA            User Key  (r) = Recorded By, (t) = Taken By, (c) = Cosigned By    Initials Name Provider Type    Alvin Cano, PT Student PT Student                  PT G-Codes  Outcome Measure Options: (P) AM-PAC 6 Clicks Basic Mobility (PT)  AM-PAC 6 Clicks Score (PT): (P) 23  AM-PAC 6 Clicks Score (OT): 21    PT Discharge Summary  Anticipated Discharge Disposition (PT): (P) home with assist, home    Alvin Huerta, PT Student  5/3/2022

## 2022-05-03 NOTE — PROGRESS NOTES
"    Daily Progress Note  Brenda Sneed  MRN: [id number] LOS: 2    Admit Date: 2022   5/3/2022 07:35 CDT    Subjective:         Interval History:    Reviewed overnight events and nursing notes.     Patient states mild interval improvement from yesterday.  Feels like her pain is improved, feels like her mobility is improved, feels like her swelling is improved, feels like she is able to move around much better.  Blood cultures positive for staph species in 1 of 4 bottles.    ROS:  Review of Systems   Constitutional: Negative for chills and fever.   Respiratory: Negative for cough, chest tightness, shortness of breath and wheezing.    Cardiovascular: Negative for chest pain, palpitations and leg swelling.   Gastrointestinal: Negative for abdominal pain, diarrhea, nausea and vomiting.       DIET:  Diet Regular; Low Sodium, Cardiac    Medications:      bisoprolol-hydrochlorothiazide, 1 tablet, Oral, Daily  DULoxetine, 60 mg, Oral, Daily  enoxaparin, 40 mg, Subcutaneous, Nightly  gabapentin, 100 mg, Oral, BID  levothyroxine, 100 mcg, Oral, Q AM  vancomycin, 750 mg, Intravenous, Q12H          Objective:     Vitals: /75 (BP Location: Right arm, Patient Position: Lying)   Pulse 73   Temp 98.1 °F (36.7 °C) (Oral)   Resp 18   Ht 160 cm (63\")   Wt 111 kg (245 lb)   SpO2 97%   BMI 43.40 kg/m²      Intake/Output Summary (Last 24 hours) at 5/3/2022 0735  Last data filed at 2022  Gross per 24 hour   Intake 1180 ml   Output --   Net 1180 ml    Temp (24hrs), Av.1 °F (36.7 °C), Min:97.9 °F (36.6 °C), Max:98.3 °F (36.8 °C)    Glucose:  Lab Results   Component Value Date    POCGLU 100 (H) 2021     Physical Examination:   Physical Exam  Constitutional:       General: She is not in acute distress.     Appearance: Normal appearance. She is not ill-appearing or toxic-appearing.   Cardiovascular:      Rate and Rhythm: Normal rate and regular rhythm.      Pulses: Normal pulses.      Heart sounds: Normal " heart sounds. No murmur heard.  Pulmonary:      Effort: Pulmonary effort is normal. No respiratory distress.      Breath sounds: Normal breath sounds. No stridor. No wheezing or rales.   Abdominal:      General: Abdomen is flat. Bowel sounds are normal. There is no distension.      Palpations: Abdomen is soft.      Tenderness: There is no abdominal tenderness.   Musculoskeletal:      Right lower leg: Edema present.      Comments: Erythema and swelling maybe mildly improved.  It is fairly stable when compared with previous erythema markings.   Neurological:      Mental Status: She is alert.         Labs:  Lab Results (last 24 hours)     Procedure Component Value Units Date/Time    Basic Metabolic Panel [[id number]] Collected: 05/03/22 0730    Specimen: Blood Updated: 05/03/22 0730    CBC (No Diff) [[id number]] Updated: 05/03/22 0729    Specimen: Blood     Vancomycin, Trough [[id number]]  (Normal) Collected: 05/03/22 0622    Specimen: Blood Updated: 05/03/22 0653     Vancomycin Trough 9.90 mcg/mL     Blood Culture - Blood, Blood, Central Line [[id number]]  (Normal) Collected: 05/01/22 2030    Specimen: Blood, Central Line Updated: 05/02/22 2117     Blood Culture No growth at 24 hours    Blood Culture ID, PCR - Blood, Arm, Left [[id number]]  (Abnormal) Collected: 05/01/22 2045    Specimen: Blood from Arm, Left Updated: 05/02/22 2025     BCID, PCR Staph spp, not aureus or lugdunesis. Identification by BCID2 PCR.    Manual Differential [[id number]] Collected: 05/02/22 0629    Specimen: Blood Updated: 05/02/22 0828     Neutrophil % 54.5 %      Lymphocyte % 35.4 %      Monocyte % 6.1 %      Eosinophil % 4.0 %      Neutrophils Absolute 3.32 10*3/mm3      Lymphocytes Absolute 2.16 10*3/mm3      Monocytes Absolute 0.37 10*3/mm3      Eosinophils Absolute 0.24 10*3/mm3      Dacrocytes Slight/1+     Poikilocytes Slight/1+     WBC Morphology Normal     Platelet Estimate Decreased    CBC & Differential [[id number]]  (Abnormal) Collected:  05/02/22 0629    Specimen: Blood Updated: 05/02/22 0828    Narrative:      The following orders were created for panel order CBC & Differential.  Procedure                               Abnormality         Status                     ---------                               -----------         ------                     CBC Auto Differential[[id number]]        Abnormal            Final result                 Please view results for these tests on the individual orders.    CBC Auto Differential [[id number]]  (Abnormal) Collected: 05/02/22 0629    Specimen: Blood Updated: 05/02/22 0828     WBC 6.10 10*3/mm3      RBC 3.48 10*6/mm3      Hemoglobin 11.5 g/dL      Hematocrit 33.8 %      MCV 97.1 fL      MCH 33.0 pg      MCHC 34.0 g/dL      RDW 17.7 %      RDW-SD 49.3 fl      MPV 12.8 fL      Platelets 117 10*3/mm3     Narrative:      Repeated to confirm results.             Imaging:  XR Tibia Fibula 2 View Right    Result Date: 5/1/2022  EXAMINATION:  XR TIBIA FIBULA 2 VW RIGHT-  5/1/2022 7:40 PM CDT  HISTORY: Right lower extremity swelling.  COMPARISON: No comparison study.  TECHNIQUE: 2 views were obtained.  FINDINGS:  There has been prior right knee arthroplasty. The appliance appears to be intact. There is a fairly large spacer between the components. There is no evidence of acute fracture of the right tibia or fibula. There is diffuse soft tissue edema involving the subcutaneous fat from the knee down to the lower leg.      Impression: 1. Prior right knee arthroplasty. 2. No evidence of acute fracture. 3. Diffuse soft tissue edema.  This report was finalized on 05/01/2022 19:57 by Dr. Christopher Jennings MD.         Assessment and Plan:     Primary Problem:  Cellulitis of right lower extremity    Hospital Problem list:    Cellulitis of right lower extremity    HTN (hypertension)    Obesity, Class III, BMI 40-49.9 (morbid obesity) ([facility])    Anxiety associated with depression      Assessment: 71-year-old female who presents with  right lower extremity cellulitis resistant to oral medications.    Plan:    Right lower extremity cellulitis  Blood cultures positive in 1 out of 4 bottles.  We will continue to monitor speciation and if further bottles grow, this may be contaminant.  Continue vancomycin.  Wound care consulted and following.  We will continue to monitor for clinical improvement.    Morbid obesity with functional impairment  PT/OT consulted.  I spoke with patient this morning that she may benefit from rehabilitation upon discharge.  She was resistant to placement but we will see how she does with therapy.    Reviewed treatment plans with the patient and/or family.     Code Status:   Code Status and Medical Interventions:   Ordered at: 05/01/22 2023     Level Of Support Discussed With:    Patient     Code Status (Patient has no pulse and is not breathing):    No CPR (Do Not Attempt to Resuscitate)     Medical Interventions (Patient has pulse or is breathing):    Comfort Measures       Electronically signed by Sanjiv Block MD on 5/3/2022 at 07:35 CDT

## 2022-05-03 NOTE — PLAN OF CARE
Goal Outcome Evaluation:  Plan of Care Reviewed With: patient        Progress: no change  Outcome Evaluation: Pt a&ox4. C/o pain- tx with prn meds with good relief noted. IV abx given as ordered. Voiding. BM today. RLE cellulitis continues, no leaking noted so far. Resting well between care. Call light within reach.

## 2022-05-03 NOTE — PLAN OF CARE
Goal Outcome Evaluation:  Plan of Care Reviewed With: (P) patient        Progress: (P) no change  Outcome Evaluation: (P) PT eval completed. Pt A&Ox4, on RA, in fowlers. Pt denies any pain and reports that the swelling in her leg seems to be decreasing slightly. With bed mobility pt independent with supine to sit requiring extra time. Once EOB pt performed STS Mod I with straight cane. Pt then amb 75' in sparks Mod I with straight cane demonstrating decreased heel strike. No major gait deviations or LOB noted. At this time pt does not meet criteria for skilled PT care. PT to sign off. DC recommend home.

## 2022-05-04 VITALS
SYSTOLIC BLOOD PRESSURE: 127 MMHG | HEART RATE: 54 BPM | OXYGEN SATURATION: 95 % | HEIGHT: 63 IN | WEIGHT: 245 LBS | BODY MASS INDEX: 43.41 KG/M2 | TEMPERATURE: 98.1 F | RESPIRATION RATE: 16 BRPM | DIASTOLIC BLOOD PRESSURE: 53 MMHG

## 2022-05-04 LAB
ANION GAP SERPL CALCULATED.3IONS-SCNC: 7 MMOL/L (ref 5–15)
BACTERIA SPEC AEROBE CULT: ABNORMAL
BUN SERPL-MCNC: 9 MG/DL (ref 8–23)
BUN/CREAT SERPL: 14.1 (ref 7–25)
CALCIUM SPEC-SCNC: 8.4 MG/DL (ref 8.6–10.5)
CHLORIDE SERPL-SCNC: 105 MMOL/L (ref 98–107)
CO2 SERPL-SCNC: 28 MMOL/L (ref 22–29)
CREAT SERPL-MCNC: 0.64 MG/DL (ref 0.57–1)
DEPRECATED RDW RBC AUTO: 49.1 FL (ref 37–54)
EGFRCR SERPLBLD CKD-EPI 2021: 93.4 ML/MIN/1.73
ERYTHROCYTE [DISTWIDTH] IN BLOOD BY AUTOMATED COUNT: 14.6 % (ref 12.3–15.4)
GLUCOSE SERPL-MCNC: 107 MG/DL (ref 65–99)
GRAM STN SPEC: ABNORMAL
HCT VFR BLD AUTO: 31.8 % (ref 34–46.6)
HGB BLD-MCNC: 11.2 G/DL (ref 12–15.9)
ISOLATED FROM: ABNORMAL
MCH RBC QN AUTO: 34.6 PG (ref 26.6–33)
MCHC RBC AUTO-ENTMCNC: 35.2 G/DL (ref 31.5–35.7)
MCV RBC AUTO: 98.1 FL (ref 79–97)
PLATELET # BLD AUTO: 81 10*3/MM3 (ref 140–450)
PMV BLD AUTO: 10.6 FL (ref 6–12)
POTASSIUM SERPL-SCNC: 3.8 MMOL/L (ref 3.5–5.2)
RBC # BLD AUTO: 3.24 10*6/MM3 (ref 3.77–5.28)
SODIUM SERPL-SCNC: 140 MMOL/L (ref 136–145)
WBC NRBC COR # BLD: 2.78 10*3/MM3 (ref 3.4–10.8)

## 2022-05-04 PROCEDURE — 85027 COMPLETE CBC AUTOMATED: CPT | Performed by: STUDENT IN AN ORGANIZED HEALTH CARE EDUCATION/TRAINING PROGRAM

## 2022-05-04 PROCEDURE — 25010000002 VANCOMYCIN 1 G RECONSTITUTED SOLUTION 1 EACH VIAL: Performed by: STUDENT IN AN ORGANIZED HEALTH CARE EDUCATION/TRAINING PROGRAM

## 2022-05-04 PROCEDURE — 87040 BLOOD CULTURE FOR BACTERIA: CPT | Performed by: STUDENT IN AN ORGANIZED HEALTH CARE EDUCATION/TRAINING PROGRAM

## 2022-05-04 PROCEDURE — 80048 BASIC METABOLIC PNL TOTAL CA: CPT | Performed by: STUDENT IN AN ORGANIZED HEALTH CARE EDUCATION/TRAINING PROGRAM

## 2022-05-04 RX ORDER — DOXYCYCLINE HYCLATE 100 MG/1
100 CAPSULE ORAL 2 TIMES DAILY
Qty: 20 CAPSULE | Refills: 0 | Status: SHIPPED | OUTPATIENT
Start: 2022-05-04 | End: 2022-05-14

## 2022-05-04 RX ADMIN — BISOPROLOL FUMARATE AND HYDROCHLOROTHIAZIDE 1 TABLET: 6.25; 5 TABLET ORAL at 09:13

## 2022-05-04 RX ADMIN — GABAPENTIN 100 MG: 100 CAPSULE ORAL at 09:13

## 2022-05-04 RX ADMIN — LEVOTHYROXINE SODIUM 100 MCG: 100 TABLET ORAL at 05:08

## 2022-05-04 RX ADMIN — DULOXETINE HYDROCHLORIDE 60 MG: 30 CAPSULE, DELAYED RELEASE ORAL at 09:13

## 2022-05-04 RX ADMIN — HYDROCODONE BITARTRATE AND ACETAMINOPHEN 1 TABLET: 10; 325 TABLET ORAL at 14:58

## 2022-05-04 RX ADMIN — HYDROCODONE BITARTRATE AND ACETAMINOPHEN 1 TABLET: 10; 325 TABLET ORAL at 05:09

## 2022-05-04 RX ADMIN — VANCOMYCIN HYDROCHLORIDE 1000 MG: 1 INJECTION, POWDER, LYOPHILIZED, FOR SOLUTION INTRAVENOUS at 05:08

## 2022-05-04 NOTE — CASE MANAGEMENT/SOCIAL WORK
Continued Stay Note  [facility]     Patient Name: Brenda Sneed  MRN: [id number]  Today's Date: 5/4/2022    Admit Date: 5/1/2022     Discharge Plan     Row Name 05/04/22 1129       Plan    Final Discharge Disposition Code 01 - home or self-care    Final Note Pt is being dcd home today on oral ABX. Pt is ambulating independently with therapy and does not need HH at dc. No dc needs identified.               Discharge Codes    No documentation.               Expected Discharge Date and Time     Expected Discharge Date Expected Discharge Time    May 4, 2022             TAMICA Dodge

## 2022-05-04 NOTE — PLAN OF CARE
Goal Outcome Evaluation:  Plan of Care Reviewed With: patient     Pt alert and oriented x4. VSS. No c/o pain. JOHNSON. PPP. Ankle pumps for VTE. Room air. Tolerating prescribed diet. RLE cellulitis and BLE edema. Up ad nery. Last BM 4/4/22. Plans to d/c home today. Call light within reach. Safety maintained.

## 2022-05-04 NOTE — DISCHARGE SUMMARY
Hospital Discharge Summary    Brenda Sneed  :  1950  MRN:  [id number]    Admit date:  2022  Discharge date:  2022    Admitting Physician:    Sachin Dia MD    Discharge Diagnoses:      Cellulitis of right lower extremity    HTN (hypertension)    Obesity, Class III, BMI 40-49.9 (morbid obesity) ([facility])    Anxiety associated with depression      Hospital Course:       Ms. Sneed is a 71-year-old female with past medical history of morbid obesity and hypotension who presents with cellulitis involving her right lower extremity.  She had previously been on outpatient clindamycin and Keflex with some resolution of her symptoms but it did not completely heal and her symptoms worsened with swelling, pain, erythema of right lower extremity.  She is found to have cellulitis with negative x-ray and DVT ultrasound.  She was started on vancomycin with significant provement in symptoms and decreasing erythema, swelling, and pain.  Of note she did have a positive blood culture in 1 of 4 bottles which was staph species (not aureus) which is likely contamination.  She will complete 14 days of antibiotic therapy and will be discharged on doxycycline with close clinical follow-up.    Discharge Medications:         Discharge Medications      New Medications      Instructions Start Date   doxycycline 100 MG capsule  Commonly known as: VIBRAMYCIN   100 mg, Oral, 2 Times Daily         Continue These Medications      Instructions Start Date   cyclobenzaprine 10 MG tablet  Commonly known as: FLEXERIL   10 mg, Oral, 3 Times Daily PRN      docusate sodium 100 MG capsule  Commonly known as: Colace   100 mg, Oral, 2 Times Daily      DULoxetine 60 MG capsule  Commonly known as: CYMBALTA   60 mg, Oral, Daily      furosemide 40 MG tablet  Commonly known as: LASIX   40 mg, Oral, Daily PRN      gabapentin 100 MG capsule  Commonly known as: NEURONTIN   100 mg, Oral, 2 Times Daily      HYDROcodone-acetaminophen   MG per tablet  Commonly known as: Norco   1 tablet, Oral, Every 4 Hours PRN      levothyroxine 100 MCG tablet  Commonly known as: SYNTHROID, LEVOTHROID   100 mcg, Oral, Daily      Morphine 15 MG tablet  Commonly known as: MSIR   15 mg, Oral, 3 Times Daily      ondansetron 4 MG tablet  Commonly known as: Zofran   4 mg, Oral, Every 8 Hours PRN      ZIAC PO   Oral, Daily             Consults: None    Significant Diagnostic Studies:      XR Tibia Fibula 2 View Right    Result Date: 5/1/2022  1. Prior right knee arthroplasty. 2. No evidence of acute fracture. 3. Diffuse soft tissue edema.  This report was finalized on 05/01/2022 19:57 by Dr. Christopher Jennings MD.         Treatments:   Vancomycin    Disposition:   Home  Follow up with Sachin Dia MD in 1 weeks.    Signed:  Sanjiv Block MD   5/4/2022, 07:52 CDT

## 2022-05-04 NOTE — PLAN OF CARE
Goal Outcome Evaluation:         A&Ox4.  RLE red, warm, swollen, leaking at times and elevated.  Abx given as ordered.   Prn pain meds given for C/O pain with good results.  Up to RR with cane.   Pt says she is feeling better and says swelling in bilat legs has improved.  Resting well.  Will continue to monitor.

## 2022-05-05 ENCOUNTER — READMISSION MANAGEMENT (OUTPATIENT)
Dept: CALL CENTER | Facility: HOSPITAL | Age: 72
End: 2022-05-05

## 2022-05-05 LAB
BACTERIA SPEC AEROBE CULT: ABNORMAL
GRAM STN SPEC: ABNORMAL

## 2022-05-05 NOTE — PAYOR COMM NOTE
"REF:  [id number]    [facility]  [place],   [phone]  OR  FAX   [phone]      Eyad Gurrola (71 y.o. Female)             Date of Birth   1950    Social Security Number       Address   [address] [address]    Home Phone   [phone]    MRN   [id number]       Gnosticist   [facility]    Marital Status                               Admission Date   22    Admission Type   Emergency    Admitting Provider   Sachin Dia MD    Attending Provider       Department, Room/Bed   [facility] 3A, 332/1       Discharge Date   2022    Discharge Disposition   Home or Self Care    Discharge Destination                               Attending Provider: (none)   Allergies: Codeine, Benadryl [Diphenhydramine]    Isolation: None   Infection: None   Code Status: Prior   Advance Care Planning Activity    Ht: 160 cm (63\")   Wt: 111 kg (245 lb)    Admission Cmt: None   Principal Problem: Cellulitis of right lower extremity [L03.115]                 Active Insurance as of 2022     Primary Coverage     Payor Plan Insurance Group Employer/Plan Group    HUMANA MEDICARE REPLACEMENT HUMANA MEDICARE REPLACEMENT [id number]     Payor Plan Address Payor Plan Phone Number Payor Plan Fax Number Effective Dates    [address] [address] [phone]  2022 - None Entered    [facility] [id number]       Subscriber Name Subscriber Birth Date Member ID       EYAD GURROLA 1950 [id number]                 Emergency Contacts      (Rel.) Home Phone Work Phone Mobile Phone    Nella (Daughter) [phone] -- --    ODELL NELSON (Relative) -- -- [phone]               Discharge Summary      Sanjiv Block MD at 22 0752                 Hospital Discharge Summary    Eyad Gurrola  :  1950  MRN:  [id number]    Admit date:  2022  Discharge date:  2022    Admitting Physician:    Sachin Dia MD    Discharge Diagnoses: "      Cellulitis of right lower extremity    HTN (hypertension)    Obesity, Class III, BMI 40-49.9 (morbid obesity) (HCC)    Anxiety associated with depression      Hospital Course:       Ms. Sneed is a 71-year-old female with past medical history of morbid obesity and hypotension who presents with cellulitis involving her right lower extremity.  She had previously been on outpatient clindamycin and Keflex with some resolution of her symptoms but it did not completely heal and her symptoms worsened with swelling, pain, erythema of right lower extremity.  She is found to have cellulitis with negative x-ray and DVT ultrasound.  She was started on vancomycin with significant provement in symptoms and decreasing erythema, swelling, and pain.  Of note she did have a positive blood culture in 1 of 4 bottles which was staph species (not aureus) which is likely contamination.  She will complete 14 days of antibiotic therapy and will be discharged on doxycycline with close clinical follow-up.    Discharge Medications:         Discharge Medications      New Medications      Instructions Start Date   doxycycline 100 MG capsule  Commonly known as: VIBRAMYCIN   100 mg, Oral, 2 Times Daily         Continue These Medications      Instructions Start Date   cyclobenzaprine 10 MG tablet  Commonly known as: FLEXERIL   10 mg, Oral, 3 Times Daily PRN      docusate sodium 100 MG capsule  Commonly known as: Colace   100 mg, Oral, 2 Times Daily      DULoxetine 60 MG capsule  Commonly known as: CYMBALTA   60 mg, Oral, Daily      furosemide 40 MG tablet  Commonly known as: LASIX   40 mg, Oral, Daily PRN      gabapentin 100 MG capsule  Commonly known as: NEURONTIN   100 mg, Oral, 2 Times Daily      HYDROcodone-acetaminophen  MG per tablet  Commonly known as: Norco   1 tablet, Oral, Every 4 Hours PRN      levothyroxine 100 MCG tablet  Commonly known as: SYNTHROID, LEVOTHROID   100 mcg, Oral, Daily      Morphine 15 MG tablet  Commonly  known as: MSIR   15 mg, Oral, 3 Times Daily      ondansetron 4 MG tablet  Commonly known as: Zofran   4 mg, Oral, Every 8 Hours PRN      ZIAC PO   Oral, Daily             Consults: None    Significant Diagnostic Studies:      XR Tibia Fibula 2 View Right    Result Date: 5/1/2022  1. Prior right knee arthroplasty. 2. No evidence of acute fracture. 3. Diffuse soft tissue edema.  This report was finalized on 05/01/2022 19:57 by Dr. Christopher Jennings MD.         Treatments:   Vancomycin    Disposition:   Home  Follow up with Sachin Dia MD in 1 weeks.    Signed:  Eva Ann MD   5/4/2022, 07:52 CDT      Electronically signed by Eva Ann MD at 05/04/22 0754       Discharge Order (From admission, onward)     Start     Ordered    05/04/22 0751  Discharge patient  Once        Expected Discharge Date: 05/04/22    Expected Discharge Time: Midday    Discharge Disposition: Home or Self Care    Physician of Record for Attribution - Please select from Treatment Team: EVA ANN [[id number]]    Review needed by CMO to determine Physician of Record: No       Question Answer Comment   Physician of Record for Attribution - Please select from Treatment Team EVA ANN    Review needed by CMO to determine Physician of Record No        05/04/22 0754

## 2022-05-05 NOTE — OUTREACH NOTE
Prep Survey    Flowsheet Row Responses   [facility] facility patient discharged from? [facility]   Is LACE score < 7 ? No   Emergency Room discharge w/ pulse ox? No   Eligibility Readm Mgmt   Discharge diagnosis Cellulitis of right lower extremity   Does the patient have one of the following disease processes/diagnoses(primary or secondary)? Other   Does the patient have Home health ordered? No   Is there a DME ordered? No   Comments regarding appointments Schedule an appointment with Sachin Dia MD   Prep survey completed? Yes          ANGELINA BOWIE - Registered Nurse

## 2022-05-06 LAB
BACTERIA SPEC AEROBE CULT: ABNORMAL
GRAM STN SPEC: ABNORMAL
ISOLATED FROM: ABNORMAL

## 2022-05-09 ENCOUNTER — READMISSION MANAGEMENT (OUTPATIENT)
Dept: CALL CENTER | Facility: HOSPITAL | Age: 72
End: 2022-05-09

## 2022-05-09 LAB
BACTERIA SPEC AEROBE CULT: NORMAL
BACTERIA SPEC AEROBE CULT: NORMAL

## 2022-05-09 NOTE — OUTREACH NOTE
Medical Week 1 Survey    Flowsheet Row Responses   [facility] facility patient discharged from? [facility]   Does the patient have one of the following disease processes/diagnoses(primary or secondary)? Other   Week 1 attempt successful? No   Unsuccessful attempts Attempt 1          MIGUE AMIN - Registered Nurse

## 2022-05-11 ENCOUNTER — READMISSION MANAGEMENT (OUTPATIENT)
Dept: CALL CENTER | Facility: HOSPITAL | Age: 72
End: 2022-05-11

## 2022-05-11 NOTE — OUTREACH NOTE
Medical Week 1 Survey    Flowsheet Row Responses   [facility] patient discharged from? [place]   Does the patient have one of the following disease processes/diagnoses(primary or secondary)? Other   Week 1 attempt successful? Yes   Call start time [phone]   Revoke Decline to participate   Call end time 1055          FADUMO ESQUIVEL - Registered Nurse

## 2022-07-05 ENCOUNTER — APPOINTMENT (OUTPATIENT)
Dept: CT IMAGING | Facility: HOSPITAL | Age: 72
End: 2022-07-05

## 2022-07-05 ENCOUNTER — APPOINTMENT (OUTPATIENT)
Dept: GENERAL RADIOLOGY | Facility: HOSPITAL | Age: 72
End: 2022-07-05

## 2022-07-05 ENCOUNTER — HOSPITAL ENCOUNTER (OUTPATIENT)
Facility: HOSPITAL | Age: 72
Setting detail: OBSERVATION
LOS: 2 days | Discharge: HOME | End: 2022-07-08
Attending: FAMILY MEDICINE | Admitting: FAMILY MEDICINE

## 2022-07-05 DIAGNOSIS — T07.XXXA MULTIPLE CONTUSIONS: ICD-10-CM

## 2022-07-05 DIAGNOSIS — M54.50 ACUTE LOW BACK PAIN WITHOUT SCIATICA, UNSPECIFIED BACK PAIN LATERALITY: ICD-10-CM

## 2022-07-05 DIAGNOSIS — R55 NEAR SYNCOPE: ICD-10-CM

## 2022-07-05 DIAGNOSIS — T79.6XXA TRAUMATIC RHABDOMYOLYSIS, INITIAL ENCOUNTER: Primary | ICD-10-CM

## 2022-07-05 DIAGNOSIS — Z74.09 IMPAIRED MOBILITY: ICD-10-CM

## 2022-07-05 DIAGNOSIS — E87.6 HYPOKALEMIA: ICD-10-CM

## 2022-07-05 DIAGNOSIS — R77.8 ELEVATED TROPONIN: ICD-10-CM

## 2022-07-05 DIAGNOSIS — Z78.9 DECREASED ACTIVITIES OF DAILY LIVING (ADL): ICD-10-CM

## 2022-07-05 LAB — D DIMER PPP FEU-MCNC: 2.52 MCGFEU/ML (ref 0–0.5)

## 2022-07-05 PROCEDURE — 71045 X-RAY EXAM CHEST 1 VIEW: CPT

## 2022-07-05 PROCEDURE — 70450 CT HEAD/BRAIN W/O DYE: CPT

## 2022-07-05 PROCEDURE — 72125 CT NECK SPINE W/O DYE: CPT

## 2022-07-05 PROCEDURE — 85025 COMPLETE CBC W/AUTO DIFF WBC: CPT | Performed by: NURSE PRACTITIONER

## 2022-07-05 PROCEDURE — 87635 SARS-COV-2 COVID-19 AMP PRB: CPT | Performed by: NURSE PRACTITIONER

## 2022-07-05 PROCEDURE — 72128 CT CHEST SPINE W/O DYE: CPT

## 2022-07-05 PROCEDURE — 73070 X-RAY EXAM OF ELBOW: CPT

## 2022-07-05 PROCEDURE — 72170 X-RAY EXAM OF PELVIS: CPT

## 2022-07-05 PROCEDURE — 93010 ELECTROCARDIOGRAM REPORT: CPT | Performed by: INTERNAL MEDICINE

## 2022-07-05 PROCEDURE — 85379 FIBRIN DEGRADATION QUANT: CPT | Performed by: NURSE PRACTITIONER

## 2022-07-05 PROCEDURE — 99284 EMERGENCY DEPT VISIT MOD MDM: CPT

## 2022-07-05 PROCEDURE — 93005 ELECTROCARDIOGRAM TRACING: CPT | Performed by: NURSE PRACTITIONER

## 2022-07-05 PROCEDURE — 73562 X-RAY EXAM OF KNEE 3: CPT

## 2022-07-05 PROCEDURE — 72131 CT LUMBAR SPINE W/O DYE: CPT

## 2022-07-05 RX ORDER — SODIUM CHLORIDE 0.9 % (FLUSH) 0.9 %
10 SYRINGE (ML) INJECTION AS NEEDED
Status: DISCONTINUED | OUTPATIENT
Start: 2022-07-05 | End: 2022-07-08 | Stop reason: HOSPADM

## 2022-07-05 RX ORDER — ONDANSETRON 2 MG/ML
4 INJECTION INTRAMUSCULAR; INTRAVENOUS ONCE
Status: COMPLETED | OUTPATIENT
Start: 2022-07-05 | End: 2022-07-06

## 2022-07-06 PROBLEM — T79.6XXA TRAUMATIC RHABDOMYOLYSIS (HCC): Status: ACTIVE | Noted: 2022-07-06

## 2022-07-06 LAB
ALBUMIN SERPL-MCNC: 3 G/DL (ref 3.5–5.2)
ALBUMIN/GLOB SERPL: 1.3 G/DL
ALP SERPL-CCNC: 136 U/L (ref 39–117)
ALT SERPL W P-5'-P-CCNC: 20 U/L (ref 1–33)
ANION GAP SERPL CALCULATED.3IONS-SCNC: 15 MMOL/L (ref 5–15)
ANION GAP SERPL CALCULATED.3IONS-SCNC: 7 MMOL/L (ref 5–15)
AST SERPL-CCNC: 64 U/L (ref 1–32)
BACTERIA UR QL AUTO: ABNORMAL /HPF
BASOPHILS # BLD AUTO: 0.01 10*3/MM3 (ref 0–0.2)
BASOPHILS NFR BLD AUTO: 0.1 % (ref 0–1.5)
BILIRUB SERPL-MCNC: 1.9 MG/DL (ref 0–1.2)
BILIRUB UR QL STRIP: ABNORMAL
BUN SERPL-MCNC: 12 MG/DL (ref 8–23)
BUN SERPL-MCNC: 19 MG/DL (ref 8–23)
BUN/CREAT SERPL: 16.4 (ref 7–25)
BUN/CREAT SERPL: 21.3 (ref 7–25)
CALCIUM SPEC-SCNC: 8.4 MG/DL (ref 8.6–10.5)
CALCIUM SPEC-SCNC: 8.6 MG/DL (ref 8.6–10.5)
CHLORIDE SERPL-SCNC: 100 MMOL/L (ref 98–107)
CHLORIDE SERPL-SCNC: 101 MMOL/L (ref 98–107)
CK SERPL-CCNC: 2339 U/L (ref 20–180)
CLARITY UR: ABNORMAL
CO2 SERPL-SCNC: 22 MMOL/L (ref 22–29)
CO2 SERPL-SCNC: 28 MMOL/L (ref 22–29)
COLOR UR: ABNORMAL
CREAT SERPL-MCNC: 0.73 MG/DL (ref 0.57–1)
CREAT SERPL-MCNC: 0.89 MG/DL (ref 0.57–1)
DEPRECATED RDW RBC AUTO: 49.4 FL (ref 37–54)
EGFRCR SERPLBLD CKD-EPI 2021: 68.6 ML/MIN/1.73
EGFRCR SERPLBLD CKD-EPI 2021: 87 ML/MIN/1.73
EOSINOPHIL # BLD AUTO: 0.01 10*3/MM3 (ref 0–0.4)
EOSINOPHIL NFR BLD AUTO: 0.1 % (ref 0.3–6.2)
ERYTHROCYTE [DISTWIDTH] IN BLOOD BY AUTOMATED COUNT: 15.2 % (ref 12.3–15.4)
GLOBULIN UR ELPH-MCNC: 2.4 GM/DL
GLUCOSE SERPL-MCNC: 110 MG/DL (ref 65–99)
GLUCOSE SERPL-MCNC: 117 MG/DL (ref 65–99)
GLUCOSE UR STRIP-MCNC: NEGATIVE MG/DL
GRAN CASTS URNS QL MICRO: ABNORMAL /LPF
HCT VFR BLD AUTO: 36.3 % (ref 34–46.6)
HGB BLD-MCNC: 12.5 G/DL (ref 12–15.9)
HGB UR QL STRIP.AUTO: NEGATIVE
HYALINE CASTS UR QL AUTO: ABNORMAL /LPF
KETONES UR QL STRIP: ABNORMAL
LEUKOCYTE ESTERASE UR QL STRIP.AUTO: ABNORMAL
LYMPHOCYTES # BLD AUTO: 0.43 10*3/MM3 (ref 0.7–3.1)
LYMPHOCYTES NFR BLD AUTO: 6 % (ref 19.6–45.3)
MCH RBC QN AUTO: 32.8 PG (ref 26.6–33)
MCHC RBC AUTO-ENTMCNC: 34.4 G/DL (ref 31.5–35.7)
MCV RBC AUTO: 95.3 FL (ref 79–97)
MONOCYTES # BLD AUTO: 0.72 10*3/MM3 (ref 0.1–0.9)
MONOCYTES NFR BLD AUTO: 10 % (ref 5–12)
NEUTROPHILS NFR BLD AUTO: 6.03 10*3/MM3 (ref 1.7–7)
NEUTROPHILS NFR BLD AUTO: 83.5 % (ref 42.7–76)
NITRITE UR QL STRIP: POSITIVE
PH UR STRIP.AUTO: 5.5 [PH] (ref 5–8)
PLATELET # BLD AUTO: 80 10*3/MM3 (ref 140–450)
PMV BLD AUTO: 11.4 FL (ref 6–12)
POTASSIUM SERPL-SCNC: 3.2 MMOL/L (ref 3.5–5.2)
POTASSIUM SERPL-SCNC: 3.8 MMOL/L (ref 3.5–5.2)
POTASSIUM SERPL-SCNC: 7 MMOL/L (ref 3.5–5.2)
PROT SERPL-MCNC: 5.4 G/DL (ref 6–8.5)
PROT UR QL STRIP: ABNORMAL
QT INTERVAL: 404 MS
QTC INTERVAL: 451 MS
RBC # BLD AUTO: 3.81 10*6/MM3 (ref 3.77–5.28)
RBC # UR STRIP: ABNORMAL /HPF
REF LAB TEST METHOD: ABNORMAL
SARS-COV-2 RNA PNL SPEC NAA+PROBE: NOT DETECTED
SODIUM SERPL-SCNC: 136 MMOL/L (ref 136–145)
SODIUM SERPL-SCNC: 137 MMOL/L (ref 136–145)
SP GR UR STRIP: 1.02 (ref 1–1.03)
SQUAMOUS #/AREA URNS HPF: ABNORMAL /HPF
TROPONIN T SERPL-MCNC: 0.1 NG/ML (ref 0–0.03)
UROBILINOGEN UR QL STRIP: ABNORMAL
WBC # UR STRIP: ABNORMAL /HPF
WBC NRBC COR # BLD: 7.22 10*3/MM3 (ref 3.4–10.8)

## 2022-07-06 PROCEDURE — 80053 COMPREHEN METABOLIC PANEL: CPT | Performed by: NURSE PRACTITIONER

## 2022-07-06 PROCEDURE — 96375 TX/PRO/DX INJ NEW DRUG ADDON: CPT

## 2022-07-06 PROCEDURE — 81001 URINALYSIS AUTO W/SCOPE: CPT | Performed by: NURSE PRACTITIONER

## 2022-07-06 PROCEDURE — 97162 PT EVAL MOD COMPLEX 30 MIN: CPT

## 2022-07-06 PROCEDURE — 97165 OT EVAL LOW COMPLEX 30 MIN: CPT

## 2022-07-06 PROCEDURE — P9612 CATHETERIZE FOR URINE SPEC: HCPCS

## 2022-07-06 PROCEDURE — G0378 HOSPITAL OBSERVATION PER HR: HCPCS

## 2022-07-06 PROCEDURE — 84484 ASSAY OF TROPONIN QUANT: CPT | Performed by: NURSE PRACTITIONER

## 2022-07-06 PROCEDURE — 25010000002 ONDANSETRON PER 1 MG: Performed by: NURSE PRACTITIONER

## 2022-07-06 PROCEDURE — 25010000002 MORPHINE PER 10 MG: Performed by: NURSE PRACTITIONER

## 2022-07-06 PROCEDURE — 82550 ASSAY OF CK (CPK): CPT | Performed by: NURSE PRACTITIONER

## 2022-07-06 PROCEDURE — 84132 ASSAY OF SERUM POTASSIUM: CPT | Performed by: FAMILY MEDICINE

## 2022-07-06 RX ORDER — POTASSIUM CHLORIDE 750 MG/1
20 CAPSULE, EXTENDED RELEASE ORAL DAILY
Status: DISCONTINUED | OUTPATIENT
Start: 2022-07-06 | End: 2022-07-08 | Stop reason: HOSPADM

## 2022-07-06 RX ORDER — ASCORBIC ACID 500 MG
500 TABLET ORAL DAILY
COMMUNITY

## 2022-07-06 RX ORDER — NYSTATIN 100000 [USP'U]/G
POWDER TOPICAL 3 TIMES DAILY PRN
COMMUNITY
End: 2022-07-08 | Stop reason: HOSPADM

## 2022-07-06 RX ORDER — SODIUM CHLORIDE, SODIUM LACTATE, POTASSIUM CHLORIDE, CALCIUM CHLORIDE 600; 310; 30; 20 MG/100ML; MG/100ML; MG/100ML; MG/100ML
75 INJECTION, SOLUTION INTRAVENOUS CONTINUOUS
Status: DISCONTINUED | OUTPATIENT
Start: 2022-07-06 | End: 2022-07-07

## 2022-07-06 RX ORDER — SODIUM CHLORIDE 0.9 % (FLUSH) 0.9 %
10 SYRINGE (ML) INJECTION EVERY 12 HOURS SCHEDULED
Status: DISCONTINUED | OUTPATIENT
Start: 2022-07-06 | End: 2022-07-08 | Stop reason: HOSPADM

## 2022-07-06 RX ORDER — DULOXETIN HYDROCHLORIDE 30 MG/1
60 CAPSULE, DELAYED RELEASE ORAL DAILY
Status: DISCONTINUED | OUTPATIENT
Start: 2022-07-06 | End: 2022-07-08 | Stop reason: HOSPADM

## 2022-07-06 RX ORDER — HYDROCODONE BITARTRATE AND ACETAMINOPHEN 7.5; 325 MG/1; MG/1
1 TABLET ORAL EVERY 6 HOURS PRN
Status: DISCONTINUED | OUTPATIENT
Start: 2022-07-06 | End: 2022-07-08 | Stop reason: HOSPADM

## 2022-07-06 RX ORDER — LEVOTHYROXINE SODIUM 0.1 MG/1
100 TABLET ORAL DAILY
Status: DISCONTINUED | OUTPATIENT
Start: 2022-07-06 | End: 2022-07-08 | Stop reason: HOSPADM

## 2022-07-06 RX ORDER — NYSTATIN 100000 U/G
1 CREAM TOPICAL 3 TIMES DAILY PRN
COMMUNITY

## 2022-07-06 RX ORDER — POTASSIUM CHLORIDE 750 MG/1
20 CAPSULE, EXTENDED RELEASE ORAL DAILY
Status: DISCONTINUED | OUTPATIENT
Start: 2022-07-06 | End: 2022-07-06

## 2022-07-06 RX ORDER — CYCLOBENZAPRINE HCL 10 MG
10 TABLET ORAL 3 TIMES DAILY PRN
Status: DISCONTINUED | OUTPATIENT
Start: 2022-07-06 | End: 2022-07-08 | Stop reason: HOSPADM

## 2022-07-06 RX ORDER — PRAMIPEXOLE DIHYDROCHLORIDE 0.75 MG/1
0.75 TABLET ORAL
COMMUNITY

## 2022-07-06 RX ORDER — FAMOTIDINE 20 MG/1
20 TABLET, FILM COATED ORAL 2 TIMES DAILY
COMMUNITY

## 2022-07-06 RX ORDER — MORPHINE SULFATE 15 MG/1
15 TABLET ORAL 3 TIMES DAILY
Status: DISCONTINUED | OUTPATIENT
Start: 2022-07-06 | End: 2022-07-08 | Stop reason: HOSPADM

## 2022-07-06 RX ORDER — GABAPENTIN 100 MG/1
100 CAPSULE ORAL 2 TIMES DAILY
Status: DISCONTINUED | OUTPATIENT
Start: 2022-07-06 | End: 2022-07-08 | Stop reason: HOSPADM

## 2022-07-06 RX ORDER — SODIUM CHLORIDE 0.9 % (FLUSH) 0.9 %
10 SYRINGE (ML) INJECTION AS NEEDED
Status: DISCONTINUED | OUTPATIENT
Start: 2022-07-06 | End: 2022-07-08 | Stop reason: HOSPADM

## 2022-07-06 RX ORDER — ONDANSETRON 4 MG/1
4 TABLET, FILM COATED ORAL EVERY 8 HOURS PRN
Status: DISCONTINUED | OUTPATIENT
Start: 2022-07-06 | End: 2022-07-08 | Stop reason: HOSPADM

## 2022-07-06 RX ADMIN — POTASSIUM CHLORIDE 20 MEQ: 10 CAPSULE, COATED, EXTENDED RELEASE ORAL at 02:03

## 2022-07-06 RX ADMIN — SODIUM CHLORIDE, POTASSIUM CHLORIDE, SODIUM LACTATE AND CALCIUM CHLORIDE 75 ML/HR: 600; 310; 30; 20 INJECTION, SOLUTION INTRAVENOUS at 18:47

## 2022-07-06 RX ADMIN — HYDROCODONE BITARTRATE AND ACETAMINOPHEN 1 TABLET: 7.5; 325 TABLET ORAL at 20:12

## 2022-07-06 RX ADMIN — DULOXETINE HYDROCHLORIDE 60 MG: 30 CAPSULE, DELAYED RELEASE ORAL at 09:23

## 2022-07-06 RX ADMIN — ONDANSETRON 4 MG: 2 INJECTION INTRAMUSCULAR; INTRAVENOUS at 00:59

## 2022-07-06 RX ADMIN — SODIUM CHLORIDE, POTASSIUM CHLORIDE, SODIUM LACTATE AND CALCIUM CHLORIDE 75 ML/HR: 600; 310; 30; 20 INJECTION, SOLUTION INTRAVENOUS at 02:44

## 2022-07-06 RX ADMIN — GABAPENTIN 100 MG: 100 CAPSULE ORAL at 21:51

## 2022-07-06 RX ADMIN — LEVOTHYROXINE SODIUM 100 MCG: 100 TABLET ORAL at 09:27

## 2022-07-06 RX ADMIN — MORPHINE SULFATE 15 MG: 15 TABLET ORAL at 13:20

## 2022-07-06 RX ADMIN — HYDROCODONE BITARTRATE AND ACETAMINOPHEN 1 TABLET: 7.5; 325 TABLET ORAL at 06:14

## 2022-07-06 RX ADMIN — GABAPENTIN 100 MG: 100 CAPSULE ORAL at 09:23

## 2022-07-06 RX ADMIN — MORPHINE SULFATE 15 MG: 15 TABLET ORAL at 21:51

## 2022-07-06 RX ADMIN — MORPHINE SULFATE 4 MG: 4 INJECTION, SOLUTION INTRAMUSCULAR; INTRAVENOUS at 00:59

## 2022-07-06 RX ADMIN — MORPHINE SULFATE 15 MG: 15 TABLET ORAL at 09:23

## 2022-07-06 RX ADMIN — Medication 10 ML: at 09:25

## 2022-07-06 NOTE — THERAPY EVALUATION
Patient Name: Brenda Sneed  : 1950    MRN: [id number]                              Today's Date: 2022       Admit Date: 2022    Visit Dx:     ICD-10-CM ICD-9-CM   1. Traumatic rhabdomyolysis, initial encounter ([facility])  T79.6XXA 958.6   2. Near syncope  R55 780.2   3. Acute low back pain without sciatica, unspecified back pain laterality  M54.50 724.2   4. Multiple contusions  T07.XXXA 924.8   5. Elevated troponin  R77.8 790.6   6. Hypokalemia  E87.6 276.8   7. Decreased activities of daily living (ADL)  Z78.9 V49.89   8. Impaired mobility  Z74.09 799.89     Patient Active Problem List   Diagnosis   • Spinal stenosis, lumbar   • Secondary osteoarthritis of left shoulder due to rotator cuff arthropathy   • Cellulitis of right lower extremity   • HTN (hypertension)   • Obesity, Class III, BMI 40-49.9 (morbid obesity) ([facility])   • Anxiety associated with depression   • Traumatic rhabdomyolysis ([facility])     Past Medical History:   Diagnosis Date   • Arthritis    • Bronchitis    • Cataract     right eye , cornea implant   • Chronic back pain    • Depression    • Disease of thyroid gland    • Edema     lower extremities   • Hypertension    • Kidney stones      Past Surgical History:   Procedure Laterality Date   • BACK SURGERY       ([place])2013    • CHOLECYSTECTOMY OPEN     • CORNEAL TRANSPLANT Right    • LUMBAR FUSION Left 2017    Procedure: LEFT LUMBAR LATERAL INTERBODY FUSION WITH INSTRUMENTATION  L1-2;  Surgeon: MARCELINO Wright MD;  Location:  [facility] OR;  Service:    • NECK SURGERY      [place]   • TOTAL HIP ARTHROPLASTY Right     DR. KENDRICK    • TOTAL KNEE ARTHROPLASTY Right    • TOTAL KNEE ARTHROPLASTY Left    • TOTAL SHOULDER ARTHROPLASTY W/ DISTAL CLAVICLE EXCISION Left 2020    Procedure: LEFT REVERSE TOTAL SHOULDER REPLACEMENT;  Surgeon: Saravanan Calvin MD;  Location:  [facility] OR;  Service: Orthopedics;  Laterality: Left;      General Information     Row Name  07/06/22 [facility]          Physical Therapy Time and Intention    Document Type evaluation  Pt presents to PT with rhabdomyolysis. PMH includes significant back disease followed by [facility] on 15 mg of morphine 3 times daily. Pt had a fall at her house where she laid on the ground for upwards of 6 to 7 hours.  -DESTINEY (r) WOJCIECH (t) DESTINEY (c)     Mode of Treatment physical therapy  -DESTINEY (r) WOJCIECH (t) DESTINEY (c)     Row Name 07/06/22 1412          General Information    Patient Profile Reviewed yes  -DESTINEY (r) WOJCIECH (t) DESTINEY (c)     Prior Level of Function independent:;all household mobility;ADL's;home management;dependent:;driving  -DESTINEY (r) WOJCIECH (t) DESTINEY (c)     Existing Precautions/Restrictions fall  -DESTINEY (r) WOJCIECH (t) DESTINEY (c)     Barriers to Rehab none identified  -DESTINEY (r) WOJCIECH (t) DESTINEY (c)     Row Name 07/06/22 1412          Living Environment    People in Home alone  -DESTINEY (r) WOJCIECH (t) DESTINEY (c)     Row Name 07/06/22 1412          Home Main Entrance    Number of Stairs, Main Entrance one  -DESTINEY (r) WOJCIECH (t) DESTINEY (c)     Stair Railings, Main Entrance none  -DESTINEY (r) WOJCIECH (t) DESTINEY (c)     Row Name 07/06/22 1412          Stairs Within Home, Primary    Number of Stairs, Within Home, Primary none  -DESTINEY (r) WOJCIECH (t) DESTINEY (c)     Row Name 07/06/22 1412          Cognition    Orientation Status (Cognition) oriented x 4;person;place;situation;time  -DESTINEY (r) WOJCIECH (t) DESTINEY (c)     Row Name 07/06/22 1412          Safety Issues, Functional Mobility    Impairments Affecting Function (Mobility) endurance/activity tolerance;pain;strength;balance  -DESTINEY (r) WOJCIECH (t) DESTINEY (c)           User Key  (r) = Recorded By, (t) = Taken By, (c) = Cosigned By    Initials Name Provider Type    Samir Keita, PT DPT Physical Therapist    Akash Urias, PT Student PT Student               Mobility     Row Name 07/06/22 1412          Bed Mobility    Bed Mobility supine-sit;sit-supine  -DESTINEY (r) WOJCIECH (t) DESTINEY (c)     Scooting/Bridging [place] (Bed Mobility) standby assist;1 person assist  -DESTINEY (r) WOJCIECH (t)  DESTINEY (c)     Supine-Sit [place] (Bed Mobility) minimum assist (75% patient effort);1 person assist  -DESTINEY (r) WOJCIECH (t) DESTINEY (c)     Sit-Supine [place] (Bed Mobility) minimum assist (75% patient effort);1 person assist  -DESTINEY (r) WOJCIECH (t) DESTINEY (c)     Row Name 07/06/22 1412          Transfers    Comment, (Transfers) Pt starting position was supine  -DESTINEY (r) WOJCIECH (t) DESTINEY (c)     Row Name 07/06/22 1412          Bed-Chair Transfer    Bed-Chair [place] (Transfers) not tested  -DESTINEY (r) WOJCIECH (t) DESTINEY (c)     Row Name 07/06/22 1412          Sit-Stand Transfer    Sit-Stand [place] (Transfers) contact guard;1 person assist  -DESTINEY (r) WOJCIECH (t) DESTINEY (c)     Assistive Device (Sit-Stand Transfers) walker, front-wheeled  -DESTINEY (r) WOJCIECH (t) DESTINEY (c)     Row Name 07/06/22 1412          Gait/Stairs (Locomotion)    [place] Level (Gait) not tested  -DESTINEY (r) WOJCIECH (t) DESTINEY (c)     [place] Level (Stairs) not tested  -DESTINEY (r) WOJCIECH (t) DESTINEY (c)           User Key  (r) = Recorded By, (t) = Taken By, (c) = Cosigned By    Initials Name Provider Type    Samir Keita, PT DPT Physical Therapist    Akash Urias, PT Student PT Student               Obj/Interventions     Row Name 07/06/22 1412          Range of Motion Comprehensive    General Range of Motion bilateral lower extremity ROM WFL  -DESTINEY (r) WOJCIECH (t) DESTINEY (c)     Row Name 07/06/22 1412          Strength Comprehensive (MMT)    General Manual Muscle Testing (MMT) Assessment lower extremity strength deficits identified  -DESTINEY (r) WOJCIECH (t) DESTINEY (c)     Comment, General Manual Muscle Testing (MMT) Assessment BLE strength grossly 4/5 noting a little more weakness on R than L  -DESTINEY (r) WOJCIECH (t) DESTINEY (c)     Row Name 07/06/22 1412          Balance    Balance Assessment sitting static balance;sitting dynamic balance;sit to stand dynamic balance;standing static balance  -DESTINEY (r) WOJCIECH (t) DESTINEY (c)     Static Sitting Balance modified independence  -DESTINEY (r) WOJCIECH (t) DESTINEY (c)     Dynamic Sitting Balance modified independence  -DESTINEY  (r) WOJCIECH (t) DESTINEY (c)     Position, Sitting Balance sitting edge of bed  -DESTINEY (r) WOJCIECH (t) DESTINEY (c)     Sit to Stand Dynamic Balance contact guard;1-person assist  -DESTINEY (r) WOJCIECH (t) DESTINEY (c)     Static Standing Balance standby assist  -DESTINEY (r) WOJCIECH (t) DESTINEY (c)     Dynamic Standing Balance not tested  -DESTINEY (r) WOJCIECH (t) DESTINEY (c)     Position/Device Used, Standing Balance walker, front-wheeled  -DESTINEY (r) WOJCIECH (t) DESTINEY (c)     Balance Interventions sitting;standing;sit to stand;static;dynamic  -DESTINEY (r) WOJCIECH (t) DESTINEY (c)     Row Name 07/06/22 1412          Sensory Assessment (Somatosensory)    Sensory Assessment (Somatosensory) sensation intact  -DESTINEY (r) WOJCIECH (t) DESTINEY (c)           User Key  (r) = Recorded By, (t) = Taken By, (c) = Cosigned By    Initials Name Provider Type    Samir Keita, PT DPT Physical Therapist    Akash Urias, PT Student PT Student               Goals/Plan     Row Name 07/06/22 1412          Bed Mobility Goal 1 (PT)    Activity/Assistive Device (Bed Mobility Goal 1, PT) sit to supine;supine to sit  -DESTINEY (r) WOJCIECH (t) DESTINEY (c)     [place] Level/Cues Needed (Bed Mobility Goal 1, PT) supervision required  -DESTINEY (r) WOJCIECH (t) DESTINEY (c)     Time Frame (Bed Mobility Goal 1, PT) long term goal (LTG);10 days  -DESTINEY (r) WOJCIECH (t) DESTINEY (c)     Progress/Outcomes (Bed Mobility Goal 1, PT) goal ongoing  -DESTINEY (r) WOJCIECH (t) DESTINEY (c)     Row Name 07/06/22 1412          Transfer Goal 1 (PT)    Activity/Assistive Device (Transfer Goal 1, PT) sit-to-stand/stand-to-sit;bed-to-chair/chair-to-bed  -DESTINEY (r) WOJCIECH (t) DESTINEY (c)     [place] Level/Cues Needed (Transfer Goal 1, PT) standby assist  -DESTINEY (r) WOJCIECH (t) DESTINEY (c)     Time Frame (Transfer Goal 1, PT) long term goal (LTG);10 days  -DESTINEY (r) WOJCIECH (t) DESTINEY (c)     Progress/Outcome (Transfer Goal 1, PT) goal ongoing  -DESTINEY (r) WOJCIECH (t) DESTINEY (c)     Row Name 07/06/22 1412          Gait Training Goal 1 (PT)    Activity/Assistive Device (Gait Training Goal 1, PT) gait (walking locomotion);assistive device use;decrease fall risk;improve  balance and speed;increase endurance/gait distance;increase energy conservation  -DESTINEY (r) WOJCIECH (t) DESTINEY (c)     [place] Level (Gait Training Goal 1, PT) standby assist  -DESTINEY (r) WOJCIECH (t) DESTINEY (c)     Distance (Gait Training Goal 1, PT) 100 ft  -DESTINEY (r) WOJCIECH (t) DESTINEY (c)     Time Frame (Gait Training Goal 1, PT) long term goal (LTG);10 days  -DESTINEY (r) WOJCIECH (t) DESTINEY (c)     Progress/Outcome (Gait Training Goal 1, PT) goal ongoing  -DESTINEY (r) WOJCIECH (t) DESTINEY (c)     Row Name 07/06/22 1412          Therapy Assessment/Plan (PT)    Planned Therapy Interventions (PT) balance training;bed mobility training;gait training;home exercise program;patient/family education;strengthening;transfer training;postural re-education;stair training  -DESTINEY           User Key  (r) = Recorded By, (t) = Taken By, (c) = Cosigned By    Initials Name Provider Type    Samir Keita, PT DPT Physical Therapist    Akash Urias, PT Student PT Student               Clinical Impression     Row Name 07/06/22 1412          Pain    Pretreatment Pain Rating 7/10  -DESTINEY (r) WOJCIECH (t) DESTINEY (c)     Pain Location - Side/Orientation Bilateral  -DESTINEY (r) WOJCIECH (t) DESTINEY (c)     Pain Location - shoulder  -DESTINEY (r) WOJCIECH (t) DESTINEY (c)     Pain Intervention(s) Medication (See MAR);Repositioned;Ambulation/increased activity  -DESTINEY (r) WOJCIECH (t) DESTINEY (c)     Row Name 07/06/22 1412          Plan of Care Review    Plan of Care Reviewed With patient  -DESTINEY (r) WOJCIECH (t) DESTINEY (c)     Progress no change  -DESTINEY (r) WOJCIECH (t) DESTINEY (c)     Outcome Evaluation PT Eval Complete. Pt A&Ox4. Pt was in supine upon arrival and c/o shoulder pain in B shoulders. Stated it was a 7/10. Pt was willing to participate in PT but stated she would like some rest soon after to help ease pain. Pt was min assist performing supine-sit transfer to EOB. Sensation intact. BLE MMT strength grossly 4/5 with R side slightly weaker than L. Pt performed STS transfer with CGAx1. Pt was compliant with standing for 1 minute but said she would ambulate another time  when pain had eased and when she needed to use the restroom. Pt was min assist to performe sit-supine transfer back into supine position. Pt will benefit from skilled PT intervention to promote increased activity tolerance and strength as well as decreased falls risk when ambulating. Anticipate d.c to home with home health pending progress.  -DESTINEY (r) WOJCIECH (t) DESTINEY (c)     Row Name 07/06/22 1412          Therapy Assessment/Plan (PT)    Patient/Family Therapy Goals Statement (PT) increase ability to ambulate/decrease fall risk  -DESTINEY (r) WOJCIECH (t) DESTINEY (c)     Rehab Potential (PT) good, to achieve stated therapy goals  -DESTINEY (r) WOJCIECH (t) DESTINEY (c)     Criteria for Skilled Interventions Met (PT) yes;skilled treatment is necessary  -DESTINEY (r) WOJCIECH (t) DESTINEY (c)     Therapy Frequency (PT) 2 times/day  -DESTINEY (r) WOJCIECH (t) DESTINEY (c)     Predicted Duration of Therapy Intervention (PT) until d.c  -DESTINEY (r) WOJCIECH (t) DESTINEY (c)     Row Name 07/06/22 1412          Vital Signs    O2 Delivery Pre Treatment room air  -DESTINEY (r) WOJCIECH (t) DESTINEY (c)     O2 Delivery Intra Treatment room air  -DESTINEY (r) WOJCIECH (t) DESTINEY (c)     O2 Delivery Post Treatment room air  -DESTINEY (r) WOJCIECH (t) DESTINEY (c)     Pre Patient Position Supine  -DESTINEY (r) WOJCIECH (t) DESTINEY (c)     Intra Patient Position Standing  -DESTINEY (r) WOJCIECH (t) DESTINEY (c)     Post Patient Position Supine  -DESTINEY (r) WOJCIECH (t) DESTINEY (c)     Row Name 07/06/22 1412          Positioning and Restraints    Pre-Treatment Position in bed  -DESTINEY (r) WOJCIECH (t) DESTINEY (c)     Post Treatment Position bed  -DESTINEY (r) WOJCIECH (t) DESTINEY (c)     In Bed supine;call light within reach;encouraged to call for assist;side rails up x2  -DESTINEY (r) WOJCIECH (t) DESTINEY (c)           User Key  (r) = Recorded By, (t) = Taken By, (c) = Cosigned By    Initials Name Provider Type    Samir Keita, PT DPT Physical Therapist    Akash Urias, PT Student PT Student               Outcome Measures     Row Name 07/06/22 1412          How much help from another person do you currently need...    Turning from your back to your side while  in flat bed without using bedrails? 3  -DESTINEY (r) WOJCIECH (t) DESTINEY (c)     Moving from lying on back to sitting on the side of a flat bed without bedrails? 3  -DESTINEY (r) WOJCIECH (t) DESTINEY (c)     Moving to and from a bed to a chair (including a wheelchair)? 2  -DESTINEY (r) WOJCIECH (t) DESTINEY (c)     Standing up from a chair using your arms (e.g., wheelchair, bedside chair)? 3  -DESTINEY (r) WOJCIECH (t) DESTINEY (c)     Climbing 3-5 steps with a railing? 2  -DESTINEY (r) WOJCIECH (t) DESTINEY (c)     To walk in hospital room? 3  -DESTINEY (r) WOJCIECH (t) DESTINEY (c)     AM-PAC 6 Clicks Score (PT) 16  -DESTINEY (r) WOJCIECH (t)     Highest level of mobility 5 --> Static standing  -DESTINEY (r) WOJCIECH (t)     Row Name 07/06/22 1412 07/06/22 0815       Functional Assessment    Outcome Measure Options AM-PAC 6 Clicks Basic Mobility (PT)  -DESTINEY (r) WOJCIECH (t) DESTINEY (c) AM-PAC 6 Clicks Daily Activity (OT)  -AC          User Key  (r) = Recorded By, (t) = Taken By, (c) = Cosigned By    Initials Name Provider Type    Leonardo Iglesias, OTR/L, CNT Occupational Therapist    Samir Keita, PT DPT Physical Therapist    Akash Urias, PT Student PT Student                             Physical Therapy Education                 Title: PT OT SLP Therapies (In Progress)     Topic: Physical Therapy (In Progress)     Point: Mobility training (Done)     Learning Progress Summary           Patient Acceptance, E, MORIAH,DU by WOJCIECH at 7/6/2022 1412    Comment: Pt presents to Pt with a traumatic rhabdomyolysis. Pt was educated on benefits of ambulating to increase strength/decrease falls risk as well as increasing activity tolerance. Anticipate d.c to home with HH pending progress.                   Point: Home exercise program (Not Started)     Learner Progress:  Not documented in this visit.          Point: Body mechanics (Not Started)     Learner Progress:  Not documented in this visit.          Point: Precautions (Not Started)     Learner Progress:  Not documented in this visit.                      User Key     Initials Effective Dates Name  Provider Type Discipline     05/04/22 -  Akash Benson, PT Student PT Student PT              PT Recommendation and Plan     Plan of Care Reviewed With: patient  Progress: no change  Outcome Evaluation: PT Eval Complete. Pt A&Ox4. Pt was in supine upon arrival and c/o shoulder pain in B shoulders. Stated it was a 7/10. Pt was willing to participate in PT but stated she would like some rest soon after to help ease pain. Pt was min assist performing supine-sit transfer to EOB. Sensation intact. BLE MMT strength grossly 4/5 with R side slightly weaker than L. Pt performed STS transfer with CGAx1. Pt was compliant with standing for 1 minute but said she would ambulate another time when pain had eased and when she needed to use the restroom. Pt was min assist to performe sit-supine transfer back into supine position. Pt will benefit from skilled PT intervention to promote increased activity tolerance and strength as well as decreased falls risk when ambulating. Anticipate d.c to home with home health pending progress.     Time Calculation:    PT Charges     Row Name 07/06/22 1412             Time Calculation    Start Time 1412  -DESTINEY (r) WOJCIECH (t) DESTINEY (c)      Stop Time 1453  -DESTINEY (r) WOJCIECH (t) DESTINEY (c)      Time Calculation (min) 41 min  -DESTINEY (r) WOJCIECH (t)      PT Received On 07/06/22  -DESTINEY (r) WOJCIECH (t) DESTINEY (c)      PT Goal Re-Cert Due Date 07/16/22  -DESTINEY (r) WOJCIECH (t) DESTINEY (c)            User Key  (r) = Recorded By, (t) = Taken By, (c) = Cosigned By    Initials Name Provider Type    Samir Keita, PT DPT Physical Therapist    Akash Urias, PT Student PT Student                  PT G-Codes  Outcome Measure Options: AM-PAC 6 Clicks Basic Mobility (PT)  AM-PAC 6 Clicks Score (PT): 16  AM-PAC 6 Clicks Score (OT): 22    Akash Benson PT Student  7/6/2022

## 2022-07-06 NOTE — THERAPY EVALUATION
Acute Care - Occupational Therapy Initial Evaluation  [facility]     Patient Name: Brenda Sneed  : 1950  MRN: [id number]  Today's Date: 2022  Onset of Illness/Injury or Date of Surgery: 22  Date of Referral to OT: 22  Referring Physician: Dr. Dia    Admit Date: 2022       ICD-10-CM ICD-9-CM   1. Traumatic rhabdomyolysis, initial encounter ([facility])  T79.6XXA 958.6   2. Near syncope  R55 780.2   3. Acute low back pain without sciatica, unspecified back pain laterality  M54.50 724.2   4. Multiple contusions  T07.XXXA 924.8   5. Elevated troponin  R77.8 790.6   6. Hypokalemia  E87.6 276.8   7. Decreased activities of daily living (ADL)  Z78.9 V49.89     Patient Active Problem List   Diagnosis   • Spinal stenosis, lumbar   • Secondary osteoarthritis of left shoulder due to rotator cuff arthropathy   • Cellulitis of right lower extremity   • HTN (hypertension)   • Obesity, Class III, BMI 40-49.9 (morbid obesity) ([facility])   • Anxiety associated with depression   • Traumatic rhabdomyolysis ([facility])     Past Medical History:   Diagnosis Date   • Arthritis    • Bronchitis    • Cataract     right eye , cornea implant   • Chronic back pain    • Depression    • Disease of thyroid gland    • Edema     lower extremities   • Hypertension    • Kidney stones      Past Surgical History:   Procedure Laterality Date   • BACK SURGERY       ([place]),     • CHOLECYSTECTOMY OPEN     • CORNEAL TRANSPLANT Right    • LUMBAR FUSION Left 2017    Procedure: LEFT LUMBAR LATERAL INTERBODY FUSION WITH INSTRUMENTATION  L1-2;  Surgeon: MARCELINO Wright MD;  Location: [facility];  Service:    • NECK SURGERY      [place]   • TOTAL HIP ARTHROPLASTY Right     DR. KENDRICK    • TOTAL KNEE ARTHROPLASTY Right    • TOTAL KNEE ARTHROPLASTY Left    • TOTAL SHOULDER ARTHROPLASTY W/ DISTAL CLAVICLE EXCISION Left 2020    Procedure: LEFT REVERSE TOTAL SHOULDER REPLACEMENT;  Surgeon: Saravanan Calvin  MD Har[place];  Location:  [facility] OR;  Service: Orthopedics;  Laterality: Left;         OT ASSESSMENT FLOWSHEET (last 12 hours)     OT Evaluation and Treatment     Row Name 07/06/22 0815                   OT Time and Intention    Subjective Information complains of;pain  -AC        Document Type evaluation  -AC        Mode of Treatment occupational therapy  -                  General Information    Patient Profile Reviewed yes  -AC        Onset of Illness/Injury or Date of Surgery 07/05/22  -        Referring Physician Dr. Dia  -        General Observations of Patient bruising to knees and elbows  -AC        Prior Level of Function independent:;all household mobility;community mobility;gait;transfer;bed mobility;ADL's;home management;cooking;cleaning;shopping;dependent:;driving  uses rollator most of the time  -        Equipment Currently Used at Home grab bar;rollator  -        Pertinent History of Current Functional Problem fall at home, down on floor for 6-7 hours; Dx: traumatic rhabdomyolysis, CHRISTINE, near syncope; PMH: lumbar spinal stenosis, chronic pain  -AC        Existing Precautions/Restrictions fall  -AC        Barriers to Rehab none identified  -AC                  Living Environment    Current Living Arrangements home  tub shower  -AC        Home Accessibility stairs to enter home  -AC        People in Home alone  daughter and daughter's boyfriend check on pt  -AC                  Home Main Entrance    Number of Stairs, Main Entrance one  -AC        Stair Railings, Main Entrance none  -AC                  Pain Assessment    Pretreatment Pain Rating 8/10  -AC        Pain Location - Side/Orientation Bilateral  -AC        Pain Location - shoulder  -AC        Pain Intervention(s) Ambulation/increased activity;Rest  5/10 in back  -AC                  Cognition    Orientation Status (Cognition) oriented x 4  -AC        Follows Commands (Cognition) WNL  -AC        Personal Safety Interventions fall  prevention program maintained;gait belt;muscle strengthening facilitated;nonskid shoes/slippers when out of bed;supervised activity  -                  Range of Motion Comprehensive    Comment, General Range of Motion WFL AROM BUE, L shoulder decreased by approx 25% due to previous total shoulder replacement  -                  Strength Comprehensive (MMT)    Comment, General Manual Muscle Testing (MMT) Assessment RUE 4-/5 grossly, LUE 4/5 grossly  -AC                  Activities of Daily Living    BADL Assessment/Intervention toileting;lower body dressing  -AC                  Lower Body Dressing Assessment/Training    [place] Level (Lower Body Dressing) don;shoes/slippers;set up  house slippers  -        Position (Lower Body Dressing) edge of bed sitting  -AC                  Toileting Assessment/Training    [place] Level (Toileting) toileting skills;perform perineal hygiene;independent  -AC        Assistive Devices (Toileting) commode  -        Position (Toileting) unsupported sitting  -AC                  BADL Safety/Performance    Impairments, BADL Safety/Performance balance;endurance/activity tolerance;pain;range of motion;strength  -AC                  Bed Mobility    Bed Mobility scooting/bridging;supine-sit;sit-supine  -AC        Scooting/Bridging Mower (Bed Mobility) standby assist  -        Supine-Sit Mower (Bed Mobility) minimum assist (75% patient effort)  -        Sit-Supine Mower (Bed Mobility) minimum assist (75% patient effort)  -AC        Comment, (Bed Mobility) pt reports she can get in and out of her bed at home because it's lower to the ground  -                  Functional Mobility    Functional Mobility- Ind. Level contact guard assist  -        Functional Mobility- Device walker, front-wheeled  -        Functional Mobility- Comment to BR, to bed  -                  Transfer Assessment/Treatment    Transfers sit-stand transfer;stand-sit  "transfer;toilet transfer  -                  Transfers    Sit-Stand [place] (Transfers) contact guard  -        Stand-Sit [place] (Transfers) standby assist  -AC        [place] Level (Toilet Transfer) standby assist  -AC        Assistive Device (Toilet Transfer) commode;grab bars/safety frame  -                  Sit-Stand Transfer    Assistive Device (Sit-Stand Transfers) walker, front-wheeled  -AC                  Stand-Sit Transfer    Assistive Device (Stand-Sit Transfers) walker, front-wheeled  -AC                  Toilet Transfer    Type (Toilet Transfer) sit-stand;stand-sit  -AC                  Safety Issues, Functional Mobility    Impairments Affecting Function (Mobility) endurance/activity tolerance;pain;range of motion (ROM);strength  -                  Motor Skills    Motor Skills neuro-muscular function  -        Neuromuscular Function left;upper extremity;tremor, intention;tremor, resting  LLE also while on commode  -                  Balance    Balance Assessment sitting static balance;sitting dynamic balance;standing static balance;standing dynamic balance  -        Static Sitting Balance independent  -AC        Dynamic Sitting Balance independent  -AC        Position, Sitting Balance sitting edge of bed  -        Static Standing Balance standby assist  -AC        Dynamic Standing Balance standby assist  -AC        Position/Device Used, Standing Balance walker, front-wheeled  -AC        Comment, Balance slight L lean post-activity while sitting EOB, pt unaware but corrects with verbal cues  -                  Plan of Care Review    Plan of Care Reviewed With patient  -AC        Progress no change  -        Outcome Evaluation OT eval completed.  Pt alert and oriented x4.  Very pleasant.  Reports baseline of living alone, independent with mobility and BADL.  Uses rollator \"most of the time\" but admits to using furniture and walls as support during functional mobilty.  " Pt reports 4 falls this year.  She needed Lenka for bed mobility.  Transferred and ambulated to  with CGA and rw.  Toileting and hygiene completed independently.  Pt donned/doffed house slippers with set up.  L shoulder AROM limited from previous shoulder surgery and pt demo generalized weakness.  Intention/resting tremor noted in LUE/LLE, pt reports she has had for years.  OT will continue to work with pt to increase her safety, strength and endurance for ADL and provide education on home safety and fall prevention.  OT recommends discharge home with  PT/OT.  -AC                  Positioning and Restraints    Pre-Treatment Position in bed  -AC        Post Treatment Position bed  -AC        In Bed supine;call light within reach;encouraged to call for assist;side rails up x2  -AC                  Therapy Assessment/Plan (OT)    Date of Referral to OT 07/06/22  -AC        OT Diagnosis decreased adl  -AC        Rehab Potential (OT) good, to achieve stated therapy goals  -AC        Criteria for Skilled Therapeutic Interventions Met (OT) yes;meets criteria;skilled treatment is necessary  -AC        Therapy Frequency (OT) 5 times/wk  -AC        Predicted Duration of Therapy Intervention (OT) 10 days  -AC        Planned Therapy Interventions (OT) activity tolerance training;BADL retraining;occupation/activity based interventions;patient/caregiver education/training;strengthening exercise;transfer/mobility retraining  -AC                  OT Goals    Transfer Goal Selection (OT) transfer, OT goal 1  -AC        Bathing Goal Selection (OT) bathing, OT goal 1  -AC        Strength Goal Selection (OT) strength, OT goal 1  -AC                  Transfer Goal 1 (OT)    Activity/Assistive Device (Transfer Goal 1, OT) tub  -AC        [place] Level/Cues Needed (Transfer Goal 1, OT) modified independence  -AC        Time Frame (Transfer Goal 1, OT) long term goal (LTG);10 days  -AC        Progress/Outcome (Transfer Goal 1, OT)  goal ongoing  -AC                  Bathing Goal 1 (OT)    Activity/Device (Bathing Goal 1, OT) bathing skills, all  -AC        [place] Level/Cues Needed (Bathing Goal 1, OT) modified independence  -AC        Time Frame (Bathing Goal 1, OT) long term goal (LTG);10 days  -AC        Progress/Outcomes (Bathing Goal 1, OT) goal ongoing  -AC                  Strength Goal 1 (OT)    Strength Goal 1 (OT) Increase BUE strength to 4+/5 to increase independence with ADL  -AC        Time Frame (Strength Goal 1, OT) long term goal (LTG);10 days  -AC        Progress/Outcome (Strength Goal 1, OT) goal ongoing  -AC              User Key  (r) = Recorded By, (t) = Taken By, (c) = Cosigned By    Initials Name Effective Dates     Leonardo Duncan OTR/L, CNT 04/09/19 -                  Occupational Therapy Education                 Title: PT OT SLP Therapies (In Progress)     Topic: Occupational Therapy (In Progress)     Point: ADL training (Done)     Description:   Instruct learner(s) on proper safety adaptation and remediation techniques during self care or transfers.   Instruct in proper use of assistive devices.              Learning Progress Summary           Patient Acceptance, E, VU by  at 7/6/2022 0942                   Point: Home exercise program (Not Started)     Description:   Instruct learner(s) on appropriate technique for monitoring, assisting and/or progressing therapeutic exercises/activities.              Learner Progress:  Not documented in this visit.          Point: Body mechanics (Not Started)     Description:   Instruct learner(s) on proper positioning and spine alignment during self-care, functional mobility activities and/or exercises.              Learner Progress:  Not documented in this visit.                      User Key     Initials Effective Dates Name Provider Type Discipline     04/09/19 -  Leonardo Duncan OTR/L, KIRSTIN Occupational Therapist OT                  OT Recommendation and  "Plan  Planned Therapy Interventions (OT): activity tolerance training, BADL retraining, occupation/activity based interventions, patient/caregiver education/training, strengthening exercise, transfer/mobility retraining  Therapy Frequency (OT): 5 times/wk  Plan of Care Review  Plan of Care Reviewed With: patient  Progress: no change  Outcome Evaluation: OT eval completed.  Pt alert and oriented x4.  Very pleasant.  Reports baseline of living alone, independent with mobility and BADL.  Uses rollator \"most of the time\" but admits to using furniture and walls as support during functional mobilty.  Pt reports 4 falls this year.  She needed Lenka for bed mobility.  Transferred and ambulated to BR with CGA and rw.  Toileting and hygiene completed independently.  Pt donned/doffed house slippers with set up.  L shoulder AROM limited from previous shoulder surgery and pt demo generalized weakness.  Intention/resting tremor noted in LUE/LLE, pt reports she has had for years.  OT will continue to work with pt to increase her safety, strength and endurance for ADL and provide education on home safety and fall prevention.  OT recommends discharge home with HH PT/OT.  Plan of Care Reviewed With: patient  Outcome Evaluation: OT eval completed.  Pt alert and oriented x4.  Very pleasant.  Reports baseline of living alone, independent with mobility and BADL.  Uses rollator \"most of the time\" but admits to using furniture and walls as support during functional mobilty.  Pt reports 4 falls this year.  She needed Lenka for bed mobility.  Transferred and ambulated to BR with CGA and rw.  Toileting and hygiene completed independently.  Pt donned/doffed house slippers with set up.  L shoulder AROM limited from previous shoulder surgery and pt demo generalized weakness.  Intention/resting tremor noted in LUE/LLE, pt reports she has had for years.  OT will continue to work with pt to increase her safety, strength and endurance for ADL and " provide education on home safety and fall prevention.  OT recommends discharge home with HH PT/OT.     Outcome Measures     Row Name 07/06/22 0815             How much help from another is currently needed...    Putting on and taking off regular lower body clothing? 3  -AC      Bathing (including washing, rinsing, and drying) 3  -AC      Toileting (which includes using toilet bed pan or urinal) 4  -AC      Putting on and taking off regular upper body clothing 4  -AC      Taking care of personal grooming (such as brushing teeth) 4  -AC      Eating meals 4  -AC      AM-PAC 6 Clicks Score (OT) 22  -AC              Functional Assessment    Outcome Measure Options AM-PAC 6 Clicks Daily Activity (OT)  -AC            User Key  (r) = Recorded By, (t) = Taken By, (c) = Cosigned By    Initials Name Provider Type    Leonardo Iglesias OTR/L, CNT Occupational Therapist                Time Calculation:    Time Calculation- OT     Row Name 07/06/22 0942             Time Calculation- OT    OT Start Time 0815  -      OT Stop Time 0915  -      OT Time Calculation (min) 60 min  -      OT Received On 07/06/22  -      OT Goal Re-Cert Due Date 07/16/22  -            User Key  (r) = Recorded By, (t) = Taken By, (c) = Cosigned By    Initials Name Provider Type    Leonardo Iglesias OTR/L, CNT Occupational Therapist              Therapy Charges for Today     Code Description Service Date Service Provider Modifiers Qty    [id number]  OT EVAL LOW COMPLEXITY 4 7/6/2022 Leonardo Duncan OTR/L, CNT GO 1               LINDA Herring/L, CNT  7/6/2022

## 2022-07-06 NOTE — ED NOTES
"Pt took c collar off, iinstructed to wait for ct results pt states \"I can't wear this anymore\" and removed collar  "

## 2022-07-06 NOTE — ED PROVIDER NOTES
Subjective   Patient is a very pleasant 71-year-old female who presents the ER today per EMS with complaint of fall.  Patient reports that she has been having problems with dizziness for the past several months.  She states that when this occurs she has fallen.  She states that today she felt dizzy and like she did not have good balance.  She states that she fell to the ground and landed on her buttocks.  The patient states she does not believe that she hit her head.  She states that she laid on the floor for several hours before she was able to yell loud enough for a neighbor to hear and come over and help her.  The patient does live alone.  She is not on anticoagulants.  The patient reports that she is having neck pain, mid and low back pain, bilateral elbow pain, bilateral knee pain, and bilateral hip pain.  Due to this she was brought to the ER today for further evaluation.  She denies any chest pain, shortness of breath, abdominal pain.  She has had no loss of bowel or bladder control, she denies any saddle anesthesias.  Patient denies any numbness or tingling to her extremities.  She denies any difficulty with motor function.      History provided by:  Patient   used: No        Review of Systems   Musculoskeletal: Positive for back pain, myalgias and neck pain.   Neurological: Positive for dizziness.   All other systems reviewed and are negative.      Past Medical History:   Diagnosis Date   • Arthritis    • Bronchitis    • Cataract     right eye , cornea implant   • Chronic back pain    • Depression    • Disease of thyroid gland    • Edema     lower extremities   • Hypertension    • Kidney stones        Allergies   Allergen Reactions   • Codeine Itching and Rash     SEVERE RASH/ITCHING (TOLERATES PERCOCET)   • Benadryl [Diphenhydramine] Other (See Comments)     KEEPS PATIENT AWAKE        Past Surgical History:   Procedure Laterality Date   • BACK SURGERY      2010 ([place]), 2013    •  CHOLECYSTECTOMY OPEN     • CORNEAL TRANSPLANT Right 1999   • LUMBAR FUSION Left 8/2/2017    Procedure: LEFT LUMBAR LATERAL INTERBODY FUSION WITH INSTRUMENTATION  L1-2;  Surgeon: MARCELINO Wright MD;  Location:  [facility] OR;  Service:    • NECK SURGERY  2010    [place]   • TOTAL HIP ARTHROPLASTY Right 2014    DR. KENDRICK    • TOTAL KNEE ARTHROPLASTY Right 2000   • TOTAL KNEE ARTHROPLASTY Left 2002   • TOTAL SHOULDER ARTHROPLASTY W/ DISTAL CLAVICLE EXCISION Left 6/1/2020    Procedure: LEFT REVERSE TOTAL SHOULDER REPLACEMENT;  Surgeon: Saravanan Calvin MD;  Location:  [facility] OR;  Service: Orthopedics;  Laterality: Left;       Family History   Problem Relation Age of Onset   • Breast cancer Neg Hx        Social History     Socioeconomic History   • Marital status:    Tobacco Use   • Smoking status: Never Smoker   • Smokeless tobacco: Never Used   Substance and Sexual Activity   • Alcohol use: No   • Drug use: No   • Sexual activity: Defer           Objective   Physical Exam  Vitals and nursing note reviewed.   Constitutional:       Appearance: Normal appearance.   HENT:      Head: Normocephalic and atraumatic.      Right Ear: External ear normal.      Left Ear: External ear normal.      Mouth/Throat:      Mouth: Mucous membranes are moist.      Pharynx: Oropharynx is clear.   Eyes:      Extraocular Movements: Extraocular movements intact.      Conjunctiva/sclera: Conjunctivae normal.      Pupils: Pupils are equal, round, and reactive to light.   Neck:      Comments: C-collar in place.  Patient able to move all extremities, neurologically intact.  Cardiovascular:      Rate and Rhythm: Normal rate and regular rhythm.   Pulmonary:      Effort: Pulmonary effort is normal.      Breath sounds: Normal breath sounds.   Abdominal:      General: Bowel sounds are normal.      Palpations: Abdomen is soft.   Musculoskeletal:      Comments: Ecchymosis noted to bilateral elbows.  Right greater than left.  There is a skin tear  noted to the left elbow.  Patient has full range of motion to her upper extremities.  Radial pulses 2+ bilaterally and intact, positive CMS, neurovascularly intact bilaterally.    Pain to bilateral anterior and lateral hips.  No ecchymosis, erythema, or swelling noted.  Pedal pulses are 2+, positive CMS, neurovascularly intact bilaterally.    Ecchymosis noted to bilateral knees.  Slight swelling noted to bilateral knees.  Pedal pulses 2+ bilaterally, positive CMS, neurovascularly intact bilaterally.    Specific pain noted to paraspinous muscles in thoracic and lumbar spine, no swelling, erythema, or ecchymosis noted.  Patient neurologically intact.   Skin:     General: Skin is warm and dry.      Capillary Refill: Capillary refill takes less than 2 seconds.   Neurological:      General: No focal deficit present.      Mental Status: She is alert and oriented to person, place, and time.   Psychiatric:         Mood and Affect: Mood normal.         Behavior: Behavior normal.         Procedures           ED Course  ED Course as of 07/06/22 0127   Wed Jul 06, 2022 0043 CT scan of the head cervical spine, thoracic and lumbar spine showed no acute findings. [LF]   0049 Pt had previously elevated dimer in the past. [LF]   0124 Discussed pt case with Dr. Wray; call placed to pt PCP.  [LF]   0124 Pt labs showed a CK of 2339.  Her troponin is elevated as well.  She has no chest pain or shortness of breath.  Potassium was 3.2 so given some oral potassium.  The dimer was elevated but I do have discussed this with ALISA Israel and he will follow this. [LF]   0125 At this time the patient will be admitted in stable condition. [LF]      ED Course User Index  [LF] Dalia Choi, APRN                                 XR Elbow 2 View Bilateral   ED Interpretation   NAD      XR Chest 1 View   ED Interpretation   NAD      XR Knee 3 View Bilateral   ED Interpretation   NAD      XR Pelvis 1 or 2 View   ED Interpretation   NAD       CT Head Without Contrast    (Results Pending)   CT Cervical Spine Without Contrast    (Results Pending)   CT Thoracic Spine Without Contrast    (Results Pending)   CT Lumbar Spine Without Contrast    (Results Pending)     Labs Reviewed   COMPREHENSIVE METABOLIC PANEL - Abnormal; Notable for the following components:       Result Value    Glucose 110 (*)     Potassium 3.2 (*)     Total Protein 5.4 (*)     Albumin 3.00 (*)     AST (SGOT) 64 (*)     Alkaline Phosphatase 136 (*)     Total Bilirubin 1.9 (*)     All other components within normal limits    Narrative:     GFR Normal >60  Chronic Kidney Disease <60  Kidney Failure <15     TROPONIN (IN-HOUSE) - Abnormal; Notable for the following components:    Troponin T 0.100 (*)     All other components within normal limits    Narrative:     Troponin T Reference Range:  <= 0.03 ng/mL-   Negative for AMI  >0.03 ng/mL-     Abnormal for myocardial necrosis.  Clinicians would have to utilize clinical acumen, EKG, Troponin and serial changes to determine if it is an Acute Myocardial Infarction or myocardial injury due to an underlying chronic condition.       Results may be falsely decreased if patient taking Biotin.     D-DIMER, QUANTITATIVE - Abnormal; Notable for the following components:    D-Dimer, Quantitative 2.52 (*)     All other components within normal limits    Narrative:     Reference Range is 0-0.50 MCGFEU/mL. However, results <0.50 MCGFEU/mL tends to rule out DVT or PE. Results >0.50 MCGFEU/mL are not useful in predicting absence or presence of DVT or PE.     CK - Abnormal; Notable for the following components:    Creatine Kinase 2,339 (*)     All other components within normal limits   CBC WITH AUTO DIFFERENTIAL - Abnormal; Notable for the following components:    Platelets 80 (*)     Neutrophil % 83.5 (*)     Lymphocyte % 6.0 (*)     Eosinophil % 0.1 (*)     Lymphocytes, Absolute 0.43 (*)     All other components within normal limits   COVID-19,[facility]  IN-HOUSE,[facility] SW[facility] NO TRANSPORT MEDIA 2 HR TAT - Normal    Narrative:     Fact sheet for providers: https://www.fda.gov/media/141444/download     Fact sheet for patients: https://www.fda.gov/media/409072/download    Test performed by PCR.    Consider negative results in combination with clinical observations, patient history, and epidemiological information.   URINALYSIS W/ CULTURE IF INDICATED   CBC AND DIFFERENTIAL    Narrative:     The following orders were created for panel order CBC & Differential.  Procedure                               Abnormality         Status                     ---------                               -----------         ------                     CBC Auto Differential[[id number]]        Abnormal            Final result                 Please view results for these tests on the individual orders.               MDM  Number of Diagnoses or Management Options  Acute low back pain without sciatica, unspecified back pain laterality: new and requires workup  Elevated troponin: new and requires workup  Hypokalemia: new and requires workup  Multiple contusions: new and requires workup  Near syncope: new and requires workup  Traumatic rhabdomyolysis, initial encounter (HCC): new and requires workup     Amount and/or Complexity of Data Reviewed  Clinical lab tests: reviewed and ordered  Tests in the radiology section of CPT®: ordered and reviewed  Tests in the medicine section of CPT®: ordered and reviewed  Decide to obtain previous medical records or to obtain history from someone other than the patient: yes  Discuss the patient with other providers: yes  Independent visualization of images, tracings, or specimens: yes    Patient Progress  Patient progress: stable      Final diagnoses:   Traumatic rhabdomyolysis, initial encounter (HCC)   Near syncope   Acute low back pain without sciatica, unspecified back pain laterality   Multiple contusions   Elevated troponin   Hypokalemia       ED  Disposition  ED Disposition     ED Disposition   Decision to Admit    Condition   --    Comment   Level of Care: Telemetry [5]   Diagnosis: Traumatic rhabdomyolysis (HCC) [[id number]]   Admitting Physician: RADHA RYAN [[id number]]   Attending Physician: RADHA RYAN [[id number]]   Isolate for COVID?: No [0]   Certification: I Certify That Inpatient Hospital Services Are Medically Necessary For Greater Than 2 Midnights               No follow-up provider specified.       Medication List      No changes were made to your prescriptions during this visit.          Dalia Choi, APRN  07/06/22 0127

## 2022-07-06 NOTE — CASE MANAGEMENT/SOCIAL WORK
Discharge Planning Assessment  [facility]     Patient Name: Brenda Sneed  MRN: [id number]  Today's Date: 7/6/2022    Admit Date: 7/5/2022     Discharge Needs Assessment     Row Name 07/06/22 1007       Living Environment    People in Home alone    Current Living Arrangements home    Primary Care Provided by self    Provides Primary Care For no one    Family Caregiver if Needed child(shasha), adult    Family Caregiver Names Daughter- Dominga    Quality of Family Relationships helpful;involved;supportive    Able to Return to Prior Arrangements yes       Resource/Environmental Concerns    Resource/Environmental Concerns none       Transition Planning    Patient/Family Anticipates Transition to home with family    Patient/Family Anticipated Services at Transition none    Transportation Anticipated family or friend will provide       Discharge Needs Assessment    Readmission Within the Last 30 Days no previous admission in last 30 days    Equipment Currently Used at Home cane, quad    Concerns to be Addressed no discharge needs identified    Anticipated Changes Related to Illness none    Equipment Needed After Discharge none    Current Discharge Risk lives alone    Discharge Coordination/Progress Pt resides home alone.  She has a daughter and son in law that help her as needed.  Pt has PCP, RX coverage and can afford medications.  OT is recommending home with  services.  SW will follow.               Discharge Plan    No documentation.               Continued Care and Services - Admitted Since 7/5/2022    Coordination has not been started for this encounter.       Expected Discharge Date and Time     Expected Discharge Date Expected Discharge Time    Jul 7, 2022          Demographic Summary    No documentation.                Functional Status    No documentation.                Psychosocial    No documentation.                Abuse/Neglect    No documentation.                Legal    No documentation.                 Substance Abuse    No documentation.                Patient Forms    No documentation.                   PEDRO PackerW

## 2022-07-06 NOTE — H&P
History and Physical      CHIEF COMPLAINT: I fell on the ground    Reason for Admission: Rhabdomyolysis    History Obtained From: Patient    HISTORY OF PRESENT ILLNESS:      The patient is a 71 y.o. female with significant past medical history of significant back disease and followed by [facility] on 15 mg of morphine 3 times daily who presents with fall at her house where she laid on the ground for upwards of 6 to 7 hours.  Apparently the patient was in her kitchen and she fell to the ground.  Due to body habitus she could not get off the ground.  Apparently she laid there over 6 to 7 hours.  Known extensive lumbar disc disease followed by [facility] on copious amounts of opioid analgesics.  With all the above presented to the emergency room with the help of EMS x-rays were unremarkable but her creatinine kinase was remarkably elevated.  She was diagnosed with rhabdomyolysis and was admitted for IV fluid rehydration and further delineation of care.  Apparently patient has had multiple falls in the past.  She lives by herself.  There has been no head trauma loss of consciousness just simply her legs give out.  She has applied for a medical alert bracelet.    Past Medical History:    Past Medical History:   Diagnosis Date   • Arthritis    • Bronchitis    • Cataract     right eye , cornea implant   • Chronic back pain    • Depression    • Disease of thyroid gland    • Edema     lower extremities   • Hypertension    • Kidney stones        Past Surgical History:    Past Surgical History:   Procedure Laterality Date   • BACK SURGERY      2010 ([place]), 2013    • CHOLECYSTECTOMY OPEN     • CORNEAL TRANSPLANT Right 1999   • LUMBAR FUSION Left 8/2/2017    Procedure: LEFT LUMBAR LATERAL INTERBODY FUSION WITH INSTRUMENTATION  L1-2;  Surgeon: MARCELINO Wright MD;  Location: [facility];  Service:    • NECK SURGERY  2010    [place]   • TOTAL HIP ARTHROPLASTY Right 2014    DR. KENDRICK    • TOTAL  KNEE ARTHROPLASTY Right 2000   • TOTAL KNEE ARTHROPLASTY Left 2002   • TOTAL SHOULDER ARTHROPLASTY W/ DISTAL CLAVICLE EXCISION Left 6/1/2020    Procedure: LEFT REVERSE TOTAL SHOULDER REPLACEMENT;  Surgeon: Saravanan Calvin MD;  Location: [facility];  Service: Orthopedics;  Laterality: Left;       Medications Prior to Admission:    Medications Prior to Admission   Medication Sig Dispense Refill Last Dose   • Bisoprolol-Hydrochlorothiazide (ZIAC PO) Take  by mouth Daily.      • cyclobenzaprine (FLEXERIL) 10 MG tablet Take 1 tablet by mouth 3 (Three) Times a Day As Needed for Muscle Spasms. 30 tablet 0    • docusate sodium (Colace) 100 MG capsule Take 1 capsule by mouth 2 (Two) Times a Day. (Patient not taking: Reported on 7/6/2022) 60 capsule 1 Not Taking at Unknown time   • DULoxetine (CYMBALTA) 60 MG capsule Take 60 mg by mouth Daily.      • furosemide (LASIX) 40 MG tablet Take 40 mg by mouth Daily As Needed (PRN EDEMA).      • gabapentin (NEURONTIN) 100 MG capsule Take 100 mg by mouth 2 (Two) Times a Day.      • HYDROcodone-acetaminophen (Norco)  MG per tablet Take 1 tablet by mouth Every 4 (Four) Hours As Needed for Moderate Pain . (Patient not taking: Reported on 5/1/2022) 70 tablet 0    • levothyroxine (SYNTHROID, LEVOTHROID) 100 MCG tablet Take 100 mcg by mouth Daily.      • Morphine (MSIR) 15 MG tablet Take 15 mg by mouth 3 (Three) Times a Day.      • ondansetron (Zofran) 4 MG tablet Take 1 tablet by mouth Every 8 (Eight) Hours As Needed for Nausea or Vomiting. 20 tablet 1        Allergies:  Codeine and Benadryl [diphenhydramine]    Social History:   Social History     Socioeconomic History   • Marital status:    Tobacco Use   • Smoking status: Never Smoker   • Smokeless tobacco: Never Used   Substance and Sexual Activity   • Alcohol use: No   • Drug use: No   • Sexual activity: Defer       Family History:   Family History   Problem Relation Age of Onset   • Breast cancer Neg Hx        REVIEW OF  SYSTEMS:    CONSTITUTIONAL: She states her back pain is progressively worse and is followed by [facility]  EYES:  negative for eye dryness, icterus and redness, no significant changes in vision  HEENT:   negative for dental problems, epistaxis or sinus congestion , no history of facial trauma or difficulty swallowing  RESPIRATORY:  negative for chest tightness, cough, dyspnea on exertion, pneumonia or worse sputum production  CARDIOVASCULAR: No history of chest pain, dyspnea, exertional chest pressure/discomfort, irregular heart beat, or PND  GASTROINTESTINAL:  negative for abdominal pain, nausea or vomiting, no history of hematemesis, jaundice, melena or rectal bleeding  MUSCULOSKELETAL: Significant arthritic changes in all weightbearing joints  NEUROLOGICAL:   negative for dizziness, headaches, seizures, speech problems, tremors and vertigo, mentation has been at baseline  INTEGUMENT: negative for pruritus, rash, skin color change and skin lesion(s)       Vital Signs   Temp:  [98.2 °F (36.8 °C)-99.3 °F (37.4 °C)] 98.2 °F (36.8 °C)  Heart Rate:  [70-81] 73  Resp:  [20] 20  BP: (107-135)/() 116/43          Physical Exam:  Constitutional: The patient is oriented to person, place, and time. They appear well-developed.  Able to answer questions appropriately  HEENT: Grossly normal sitting up in bed  Head: Normocephalic and atraumatic.   Eyes: Pupils are equal, round, and reactive to light.  Extraocular muscles appear to be intact  Neck: Neck supple without masses or carotid bruit.  Cardiovascular: Regular rhythm and normal heart sounds.  No extra or lift rub or murmur appreciated  Pulmonary/Chest: Effort normal and breath sounds normal. CTAB, no appreciable rales or wheezes  Abdominal: Soft. Bowel sounds are normal. There is  no distension or tenderness appreciated. There is no rebound and no guarding.   Musculoskeletal: Normal range of motion. There is  no edema or tenderness.  No significant joint  swelling, and abrasion of the lower extremity and multiple ecchymoses from bruises  Neurological: Pt is alert and oriented to person, place, and time. They have normal reflexes. CN 2-12 appear grossly intact  Skin: Skin is warm and dry without significant rashes     Results Review:   I reviewed the patient's new imaging results and agree with the interpretation.    DATA:  Lab Results (last 24 hours)     Procedure Component Value Units Date/Time    Urinalysis, Microscopic Only - Urine, Catheter [[id number]]  (Abnormal) Collected: 07/06/22 0137    Specimen: Urine, Catheter Updated: 07/06/22 0213     RBC, UA 0-2 /HPF      WBC, UA 3-5 /HPF      Comment: Urine culture not indicated.        Bacteria, UA 1+ /HPF      Squamous Epithelial Cells, UA 7-12 /HPF      Hyaline Casts, UA None Seen /LPF      Granular Casts, UA 0-2 /LPF      Methodology Manual Light Microscopy    Urinalysis With Culture If Indicated - Urine, Catheter [[id number]]  (Abnormal) Collected: 07/06/22 0137    Specimen: Urine, Catheter Updated: 07/06/22 0213     Color, UA [place]     Appearance, UA Cloudy     pH, UA 5.5     Specific Gravity, UA 1.021     Glucose, UA Negative     Ketones, UA Trace     Bilirubin, UA Small (1+)     Blood, UA Negative     Protein, UA 30 mg/dL (1+)     Leuk Esterase, UA Small (1+)     Nitrite, UA Positive     Urobilinogen, UA 1.0 E.U./dL    Narrative:      Dipstick results may be inaccurate due to color interference.    CK [[id number]]  (Abnormal) Collected: 07/06/22 0036    Specimen: Blood Updated: 07/06/22 0113     Creatine Kinase 2,339 U/L     Troponin [[id number]]  (Abnormal) Collected: 07/06/22 0036    Specimen: Blood Updated: 07/06/22 0105     Troponin T 0.100 ng/mL     Narrative:      Troponin T Reference Range:  <= 0.03 ng/mL-   Negative for AMI  >0.03 ng/mL-     Abnormal for myocardial necrosis.  Clinicians would have to utilize clinical acumen, EKG, Troponin and serial changes to determine if it is an Acute Myocardial  Infarction or myocardial injury due to an underlying chronic condition.       Results may be falsely decreased if patient taking Biotin.      Comprehensive Metabolic Panel [[id number]]  (Abnormal) Collected: 07/06/22 0036    Specimen: Blood Updated: 07/06/22 0103     Glucose 110 mg/dL      BUN 12 mg/dL      Creatinine 0.73 mg/dL      Sodium 137 mmol/L      Potassium 3.2 mmol/L      Comment: Slight hemolysis detected by analyzer. Results may be affected.        Chloride 100 mmol/L      CO2 22.0 mmol/L      Calcium 8.6 mg/dL      Total Protein 5.4 g/dL      Albumin 3.00 g/dL      ALT (SGPT) 20 U/L      AST (SGOT) 64 U/L      Comment: Slight hemolysis detected by analyzer. Results may be affected.        Alkaline Phosphatase 136 U/L      Total Bilirubin 1.9 mg/dL      Globulin 2.4 gm/dL      A/G Ratio 1.3 g/dL      BUN/Creatinine Ratio 16.4     Anion Gap 15.0 mmol/L      eGFR 87.0 mL/min/1.73      Comment: National Kidney Foundation and American Society of Nephrology (ASN) Task Force recommended calculation based on the Chronic Kidney Disease Epidemiology Collaboration (CKD-EPI) equation refit without adjustment for race.       Narrative:      GFR Normal >60  Chronic Kidney Disease <60  Kidney Failure <15      COVID-19,[facility] IN-HOUSE,Nasal Swab No Transport Media 3-4 HR TAT - Swab, Nasal Cavity [[id number]]  (Normal) Collected: 07/05/22 2339    Specimen: Swab from Nasal Cavity Updated: 07/06/22 0032     COVID19 Not Detected    Narrative:      Fact sheet for providers: https://www.fda.gov/media/421048/download     Fact sheet for patients: https://www.fda.gov/media/933588/download    Test performed by PCR.    Consider negative results in combination with clinical observations, patient history, and epidemiological information.    CBC & Differential [[id number]]  (Abnormal) Collected: 07/05/22 2339    Specimen: Blood Updated: 07/06/22 0028    Narrative:      The following orders were created for panel order CBC &  Differential.  Procedure                               Abnormality         Status                     ---------                               -----------         ------                     CBC Auto Differential[[id number]]        Abnormal            Final result                 Please view results for these tests on the individual orders.    CBC Auto Differential [[id number]]  (Abnormal) Collected: 07/05/22 2339    Specimen: Blood Updated: 07/06/22 0028     WBC 7.22 10*3/mm3      RBC 3.81 10*6/mm3      Hemoglobin 12.5 g/dL      Hematocrit 36.3 %      MCV 95.3 fL      MCH 32.8 pg      MCHC 34.4 g/dL      RDW 15.2 %      RDW-SD 49.4 fl      MPV 11.4 fL      Platelets 80 10*3/mm3      Neutrophil % 83.5 %      Lymphocyte % 6.0 %      Monocyte % 10.0 %      Eosinophil % 0.1 %      Basophil % 0.1 %      Neutrophils, Absolute 6.03 10*3/mm3      Lymphocytes, Absolute 0.43 10*3/mm3      Monocytes, Absolute 0.72 10*3/mm3      Eosinophils, Absolute 0.01 10*3/mm3      Basophils, Absolute 0.01 10*3/mm3     D-dimer, Quantitative [[id number]]  (Abnormal) Collected: 07/05/22 2339    Specimen: Blood Updated: 07/05/22 2358     D-Dimer, Quantitative 2.52 MCGFEU/mL     Narrative:      Reference Range is 0-0.50 MCGFEU/mL. However, results <0.50 MCGFEU/mL tends to rule out DVT or PE. Results >0.50 MCGFEU/mL are not useful in predicting absence or presence of DVT or PE.          Imaging Results (Last 24 Hours)     Procedure Component Value Units Date/Time    XR Chest 1 View [[id number]] Collected: 07/06/22 0744     Updated: 07/06/22 0749    Narrative:      EXAMINATION: XR CHEST 1 VW-     7/5/2022 11:16 PM CDT     HISTORY: dizziness, near syncope     A frontal projection of the chest is compared with the previous study  dated 4/7/2022.     The lungs are poorly expanded, worse on the right side.     There are atelectatic changes in the lungs bilaterally, right more than  the left.     There is moderate elevation right diaphragm which is more  progressive  since the previous study.     There is no pleural effusion, pulmonary congestion or pneumothorax.     There is moderate cardiomegaly. Atheromatous changes thoracic aorta  noted.     There is no acute bony abnormality. Left shoulder arthroplasty similar  to the previous study. The hardware fusion of the lower cervical spine  is partially visualized.       Impression:      1. The poor lung expansion. No active cardiopulmonary disease.  2. A moderate cardiomegaly.  This report was finalized on 07/06/2022 07:46 by Dr. Glen Heaton MD.    XR Elbow 2 View Bilateral [[id number]] Collected: 07/06/22 0710     Updated: 07/06/22 0715    Narrative:      EXAMINATION: XR ELBOW 2 VW BILATERAL-     7/5/2022 11:16 PM CDT     HISTORY: arm pain, fall     The frontal oblique and lateral views of the right and left elbow are  obtained. There is no previous study for comparison.     There is no evidence of a recent fracture or dislocation.     There is marked diffuse osteopenia. No focal bony erosion. No soft  tissue swelling or a mass.     The anterior fat pad bilaterally is intact.       Impression:      1. No acute fracture or dislocation.  2. A marked diffuse osteopenia.  This report was finalized on 07/06/2022 07:12 by Dr. Glen Heaton MD.    XR Knee 3 View Bilateral [[id number]] Collected: 07/06/22 0656     Updated: 07/06/22 0705    Narrative:      EXAMINATION: XR KNEE 3 VW BILATERAL-     7/5/2022 11:17 PM CDT     HISTORY: knee pain, fall     The frontal and lateral views of the left knee are obtained. Comparison  is made with the previous study dated 3/17/2018.     There is evidence of any arthroplasty. There is normal alignment of the  hardware similar to the previous study.     There is moderate diffuse osteopenia. No focal bony erosion.     The frontal and lateral views of the right again show no acute fracture  or dislocation.     The knee prostheses in place. No hardware complication.     There is moderate  diffuse osteopenia. No focal bony erosion. No soft  tissue swelling. No joint effusion.       Impression:      1. No acute fracture or dislocation.  2. Bilateral knee prosthesis in place similar to the previous study in  2018. No hardware complication.  This report was finalized on 07/06/2022 07:02 by Dr. Glen Heaton MD.    XR Pelvis 1 or 2 View [[id number]] Collected: 07/06/22 0653     Updated: 07/06/22 0659    Narrative:      EXAMINATION: XR PELVIS 1 OR 2 VW-     7/5/2022 11:17 PM CDT     HISTORY: amisha hip pain, fall     A frontal projection of the pelvis is obtained. Comparison is made with  the previous study dated 2/15/2018.     There is no evidence of an acute fracture or dislocation.     There is evidence of a right hip arthroplasty. No complication. No  change since the previous study.     Hardware fusion of the lower lumbar spine is partially visualized. No  hardware complication.     Sacroiliac joints and symphysis pubis are normal.       Impression:      1. No acute fracture or dislocation.  This report was finalized on 07/06/2022 06:56 by Dr. Glen Heaton MD.    CT Head Without Contrast [[id number]] Collected: 07/06/22 0630     Updated: 07/06/22 0637    Narrative:      EXAMINATION: CT HEAD WO CONTRAST-      7/5/2022 11:33 PM CDT     HISTORY: head injury, fall     In order to have a CT radiation dose as low as reasonably achievable  Automated Exposure Control was utilized for adjustment of the mA and/or  KV according to patient size.     DLP in mGycm= 777     The CT scan of the head is performed without intravenous contrast  enhancement.     Images are acquired in axial plane and subsequent reconstruction in  coronal and sagittal planes.     Comparison is made with the previous study dated 3/17/2018.     There is no evidence of an intracranial hemorrhage or hematoma.     There is no evidence of a mass. There is no midline shift.     There are prominent frontal cortical sulci representing chronic  volume  loss similar to the previous study. The ventricles and basal cisterns  are age appropriate.     There are areas of chronic white matter ischemia in the centrum  semiovale bilaterally. The gray-white matter differentiation is  maintained.     The images are reviewed in bone window show no evidence of a skull  fracture. Limited visualized paranasal sinuses and mastoid air cells are  clear.       Impression:      1. No acute intracranial abnormality.  2. No skull fracture.     The above study was initially reviewed and reported by [facility]. I do not  find any discrepancies.                             This report was finalized on 07/06/2022 06:34 by Dr. Glen Heaton MD.    CT Cervical Spine Without Contrast [[id number]] Resulted: 07/05/22 2333     Updated: 07/05/22 2356    CT Thoracic Spine Without Contrast [[id number]] Resulted: 07/05/22 2333     Updated: 07/05/22 2356    CT Lumbar Spine Without Contrast [[id number]] Resulted: 07/05/22 2333     Updated: 07/05/22 2356            ASSESSMENT AND PLAN:      1.     Spinal stenosis, lumbar    Secondary osteoarthritis of left shoulder due to rotator cuff arthropathy    HTN (hypertension)    Anxiety associated with depression    Traumatic rhabdomyolysis (HCC)    Rhabdomyolysis-gently rehydrate and recheck creatinine kinase in a.m.  X-rays are reassuring, will get physical therapy and Occupational Therapy to evaluate for discharge planning.  Patient does live alone but has family close by and as agreed to a medical alert bracelet.    Hypertension-restart home medications follow blood pressures.    Acute kidney injury related to rhabdomyolysis.  Recheck creatinine along with CPK in a.m.    Chronic pain syndrome-we will restart morphine 15 mg 3 times daily which is her home dose from [facility] and use as needed Norco for breakthrough pain.    Hopefully will be medically stable for discharge 1-2 days but may need rehabilitative services for short-term to get  her back on her feet and prevent falling.    All questions were answered to the best of my ability and left a note on the board for family concerning our plan forward.      Sachin Dia MD  08:02 CDT 7/6/2022

## 2022-07-06 NOTE — PLAN OF CARE
"Goal Outcome Evaluation:  Plan of Care Reviewed With: patient        Progress: no change  Outcome Evaluation: OT eval completed.  Pt alert and oriented x4.  Very pleasant.  Reports baseline of living alone, independent with mobility and BADL.  Uses rollator \"most of the time\" but admits to using furniture and walls as support during functional mobilty.  Pt reports 4 falls this year.  She needed Lenka for bed mobility.  Transferred and ambulated to  with CGA and rw.  Toileting and hygiene completed independently.  Pt donned/doffed house slippers with set up.  L shoulder AROM limited from previous shoulder surgery and pt demo generalized weakness.  Intention/resting tremor noted in LUE/LLE, pt reports she has had for years.  OT will continue to work with pt to increase her safety, strength and endurance for ADL and provide education on home safety and fall prevention.  OT recommends discharge home with HH PT/OT.  "

## 2022-07-06 NOTE — PLAN OF CARE
Goal Outcome Evaluation:  Plan of Care Reviewed With: patient           Outcome Evaluation: Patient fell at home around 6PM yesterday evening. Transferred from ER with traumatic rhabdomyolysis. VSS. S 69-71 on tele. Remains on RA with no c/o SOA. High fall risk, bed alarm set. Up assist x2. Patient has large bruises on both knees and elbows. There is a small scrape on the left calf below the knee. Contacted Dr. Ion Bowles, on call for Dr. Dia, for orders. No new orders other than to continue the LR infusion at 75 ml/hr. NPO. Safety maintained.

## 2022-07-06 NOTE — PLAN OF CARE
Goal Outcome Evaluation:  Plan of Care Reviewed With: patient        Progress: no change  Outcome Evaluation: PT Eval Complete. Pt A&Ox4. Pt was in supine upon arrival and c/o shoulder pain in B shoulders. Stated it was a 7/10. Pt was willing to participate in PT but stated she would like some rest soon after to help ease pain. Pt was min assist performing supine-sit transfer to EOB. Sensation intact. BLE MMT strength grossly 4/5 with R side slightly weaker than L. Pt performed STS transfer with CGAx1. Pt was compliant with standing for 1 minute but said she would ambulate another time when pain had eased and when she needed to use the restroom. Pt was min assist to performe sit-supine transfer back into supine position. Pt will benefit from skilled PT intervention to promote increased activity tolerance and strength as well as decreased falls risk when ambulating. Anticipate d.c to home with home health pending progress.

## 2022-07-06 NOTE — PAYOR COMM NOTE
"7/6/22 [facility] [phone]  [phone]      ER ADMIT TO INPATIENT ON 7/5/22.        Eyad Gurrola (71 y.o. Female)             Date of Birth   1950    Social Security Number       Address   [address] [address]    Home Phone   [phone]    MRN   [id number]       [employer]ism   [facility]    Marital Status                               Admission Date   7/5/22    Admission Type   Emergency    Admitting Provider   Sachin Dia MD    Attending Provider   Sachin Dia MD    Department, Room/Bed   [facility] 4B, 431/1       Discharge Date       Discharge Disposition       Discharge Destination                               Attending Provider: Sachin Dia MD    Allergies: Codeine, Benadryl [Diphenhydramine]    Isolation: None   Infection: None   Code Status: CPR   Advance Care Planning Activity    Ht: 160 cm (63\")   Wt: 112 kg (247 lb 6.4 oz)    Admission Cmt: None   Principal Problem: None                Active Insurance as of 7/5/2022     Primary Coverage     Payor Plan Insurance Group Employer/Plan Group    HUMANA MEDICARE REPLACEMENT HUMANA MEDICARE REPLACEMENT [id number]     Payor Plan Address Payor Plan Phone Number Payor Plan Fax Number Effective Dates    [address] [address] [phone]  1/1/2022 - None Entered    [facility] [id number]       Subscriber Name Subscriber Birth Date Member ID       EYAD GURROLA 1950 [id number]           Secondary Coverage     Payor Plan Insurance Group Employer/Plan Group    [id number]      Payor Plan Address Payor Plan Phone Number Payor Plan Fax Number Effective Dates    [address]   6/1/2021 - None Entered    [facility] [id number]       Subscriber Name Subscriber Birth Date Member ID       EYAD GURROLA 1950 [id number]                 Emergency Contacts      (Rel.) Home Phone Work Phone Mobile Phone    ,Nella (Daughter) " [phone] -- --    HARMEET NELSONMY (Relative) -- -- [phone]                                  [facility] Encounter Date/Time: 2022   Hospital Account: [id number]    MRN: [id number]   Patient:  Eyad Gurrola   Contact Serial #: [id number]   SSN:          ENCOUNTER             Patient Class: Inpatient   Unit: [address] Service: Medicine     Bed: 431/1   Admitting Provider: Sachin Dia MD   Referring Physician: Sachin Dia   Attending Provider: Sachin Dia MD   Adm Diagnosis: Traumatic rhabdomyolysis*               PATIENT          Name: Eyad Gurrola : 1950 (71 yrs)   Address: [address] [place] [address] Sex: Female   City: [place] [address]   County: [facility]   Marital Status:  Ethnicity: NOT                                                                              Race: WHITE   Primary Care Provider: Sachin Dia MD Patients Phone: Home Phone: [phone]         EMERGENCY CONTACT   Contact Name Legal Guardian? Relationship to Patient Home Phone Work Phone   1. Nella Law  2. ODELL NELSON No  No Daughter  Relative [phone]              GUARANTOR            Guarantor: Eyad Gurrola     : 1950   Address: [address] [place] [address] Sex: [place]     [place] [address]     Relation to Patient: Self       Home Phone: [phone]   Guarantor ID: [id number]       Work Phone:     GUARANTOR EMPLOYER   Employer:           Status: RETIRED   COVERAGE          PRIMARY INSURANCE   Payor: HUMANA MEDICARE REPLACEMENT Plan: HUMANA MEDICARE REPLACEMENT   Group Number: [id number] Insurance Type: INDEMNITY   Subscriber Name: EYAD GURROLA Subscriber : 1950   Subscriber ID: [id number] Coverage Address: [address] [address]   Pat. Rel. to Subscriber: Self Coverage Phone: [phone]   SECONDARY INSURANCE   [place]   Group Number:   Insurance Type:  INDEMNITY   Subscriber Name: EYAD GURROLA Subscriber : 1950   Subscriber ID: [id number] Coverage Address: [address]. Rel. to Subscriber: SELF Coverage Phone: [phone]      Contact Serial # ([id number])         2022    Chart ID ([id number])                  Dalia Choi APRN    Nurse Practitioner   Emergency Medicine   ED Provider Notes       Attested   Date of Service:  22   Creation Time:  22              Attested            Attestation signed by Barrett Wray Jr., MD at 22 0133             SUPERVISE: For this patient encounter, I reviewed the APC's documentation, treatment plan, and medical decision making.     Barrett Wray Jr, MD       2022       01:33 CDT                                              Expand AllCollapse All          Show:Clear all  [x]Manual[x]Template[]Copied    Added by:  [x]Dalia Choi APRN      []Dionna for details         Subjective      Patient is a very pleasant 71-year-old female who presents the ER today per EMS with complaint of fall.  Patient reports that she has been having problems with dizziness for the past several months.  She states that when this occurs she has fallen.  She states that today she felt dizzy and like she did not have good balance.  She states that she fell to the ground and landed on her buttocks.  The patient states she does not believe that she hit her head.  She states that she laid on the floor for several hours before she was able to yell loud enough for a neighbor to hear and come over and help her.  The patient does live alone.  She is not on anticoagulants.  The patient reports that she is having neck pain, mid and low back pain, bilateral elbow pain, bilateral knee pain, and bilateral hip pain.  Due to this she was brought to the ER today for further evaluation.  She denies any chest pain, shortness of breath, abdominal pain.  She  has had no loss of bowel or bladder control, she denies any saddle anesthesias.  Patient denies any numbness or tingling to her extremities.  She denies any difficulty with motor function.        History provided by:  Patient   used: No          Review of Systems   Musculoskeletal: Positive for back pain, myalgias and neck pain.   Neurological: Positive for dizziness.   All other systems reviewed and are negative.         History   Past Medical History:   Diagnosis Date   • Arthritis     • Bronchitis     • Cataract       right eye , cornea implant   • Chronic back pain     • Depression     • Disease of thyroid gland     • Edema       lower extremities   • Hypertension     • Kidney stones                    Allergies   Allergen Reactions   • Codeine Itching and Rash       SEVERE RASH/ITCHING (TOLERATES PERCOCET)   • Benadryl [Diphenhydramine] Other (See Comments)       KEEPS PATIENT AWAKE         Physical Exam  Vitals and nursing note reviewed.   Constitutional:       Appearance: Normal appearance.   HENT:      Head: Normocephalic and atraumatic.      Right Ear: External ear normal.      Left Ear: External ear normal.      Mouth/Throat:      Mouth: Mucous membranes are moist.      Pharynx: Oropharynx is clear.   Eyes:      Extraocular Movements: Extraocular movements intact.      Conjunctiva/sclera: Conjunctivae normal.      Pupils: Pupils are equal, round, and reactive to light.   Neck:      Comments: C-collar in place.  Patient able to move all extremities, neurologically intact.  Cardiovascular:      Rate and Rhythm: Normal rate and regular rhythm.   Pulmonary:      Effort: Pulmonary effort is normal.      Breath sounds: Normal breath sounds.   Abdominal:      General: Bowel sounds are normal.      Palpations: Abdomen is soft.   Musculoskeletal:      Comments: Ecchymosis noted to bilateral elbows.  Right greater than left.  There is a skin tear noted to the left elbow.  Patient has full range  of motion to her upper extremities.  Radial pulses 2+ bilaterally and intact, positive CMS, neurovascularly intact bilaterally.    Pain to bilateral anterior and lateral hips.  No ecchymosis, erythema, or swelling noted.  Pedal pulses are 2+, positive CMS, neurovascularly intact bilaterally.    Ecchymosis noted to bilateral knees.  Slight swelling noted to bilateral knees.  Pedal pulses 2+ bilaterally, positive CMS, neurovascularly intact bilaterally.    Specific pain noted to paraspinous muscles in thoracic and lumbar spine, no swelling, erythema, or ecchymosis noted.  Patient neurologically intact.   Skin:     General: Skin is warm and dry.      Capillary Refill: Capillary refill takes less than 2 seconds.   Neurological:      General: No focal deficit present.      Mental Status: She is alert and oriented to person, place, and time.   Psychiatric:         Mood and Affect: Mood normal.         Behavior: Behavior normal. TR     ED Course      ED Course as of 07/06/22 0127   Wed Jul 06, 2022   0043 CT scan of the head cervical spine, thoracic and lumbar spine showed no acute findings. [LF]   0049 Pt had previously elevated dimer in the past. [LF]   0124 Discussed pt case with Dr. Wray; call placed to pt PCP.  [LF]   0124 Pt labs showed a CK of 2339.  Her troponin is elevated as well.  She has no chest pain or shortness of breath.  Potassium was 3.2 so given some oral potassium.  The dimer was elevated but I do have discussed this with ALISA Israel and he will follow this. [LF]   0125 At this time the patient will be admitted in stable condition. [LF]       ED Course User Index  [LF] Dalia Choi,        Final diagnoses:   Traumatic rhabdomyolysis, initial encounter (HCC)   Near syncope   Acute low back pain without sciatica, unspecified back pain laterality   Multiple contusions   Elevated troponin   Hypokalemia         ED Disposition         ED Disposition             ED Disposition   Decision to Admit     Condition   --    Comment   Level of Care: Telemetry [5]   Diagnosis: Traumatic rhabdomyolysis (HCC) [6691172]   Admitting Physician: RADHA DIA [[id number]]   Attending Physician: RADHA DIA [[id number]]   Isolate for COVID?: No [0]   Certification: I Certify That Inpatient Hospital Services Are Medically Necessary For Greater Than 2 Midnights                  Radha Dia MD    Physician   Medicine   H&P       Signed   Date of Service:  07/06/22 0802   Creation Time:  07/06/22 0802              Signed        Expand AllCollapse All          Show:Clear all  [x]Manual[x]Template[]Copied    Added by:  [x]Radha Dia MD      []ver for details                History and Physical        CHIEF COMPLAINT: I fell on the ground     Reason for Admission: Rhabdomyolysis     History Obtained From: Patient     HISTORY OF PRESENT ILLNESS:       The patient is a 71 y.o. female with significant past medical history of significant back disease and followed by [facility] on 15 mg of morphine 3 times daily who presents with fall at her house where she laid on the ground for upwards of 6 to 7 hours.  Apparently the patient was in her kitchen and she fell to the ground.  Due to body habitus she could not get off the ground.  Apparently she laid there over 6 to 7 hours.  Known extensive lumbar disc disease followed by [facility] on copious amounts of opioid analgesics.  With all the above presented to the emergency room with the help of EMS x-rays were unremarkable but her creatinine kinase was remarkably elevated.  She was diagnosed with rhabdomyolysis and was admitted for IV fluid rehydration and further delineation of care.  Apparently patient has had multiple falls in the past.  She lives by herself.  There has been no head trauma loss of consciousness just simply her legs give out.  She has applied for a medical alert bracelet.     Past Medical History:    Medical History      Diagnosis Date   • Arthritis     • Bronchitis     • Cataract       right eye , cornea implant   • Chronic back pain     • Depression     • Disease of thyroid gland     • Edema       lower extremities   • Hypertension     • Kidney stones             REVIEW OF SYSTEMS:     CONSTITUTIONAL: She states her back pain is progressively worse and is followed by [facility]  EYES:  negative for eye dryness, icterus and redness, no significant changes in vision  HEENT:   negative for dental problems, epistaxis or sinus congestion , no history of facial trauma or difficulty swallowing  RESPIRATORY:  negative for chest tightness, cough, dyspnea on exertion, pneumonia or worse sputum production  CARDIOVASCULAR: No history of chest pain, dyspnea, exertional chest pressure/discomfort, irregular heart beat, or PND  GASTROINTESTINAL:  negative for abdominal pain, nausea or vomiting, no history of hematemesis, jaundice, melena or rectal bleeding  MUSCULOSKELETAL: Significant arthritic changes in all weightbearing joints  NEUROLOGICAL:   negative for dizziness, headaches, seizures, speech problems, tremors and vertigo, mentation has been at baseline  INTEGUMENT: negative for pruritus, rash, skin color change and skin lesion(s)            Vital Signs   Temp:  [98.2 °F (36.8 °C)-99.3 °F (37.4 °C)] 98.2 °F (36.8 °C)  Heart Rate:  [70-81] 73  Resp:  [20] 20  BP: (107-135)/() 116/43            Physical Exam:  Constitutional: The patient is oriented to person, place, and time. They appear well-developed.  Able to answer questions appropriately  HEENT: Grossly normal sitting up in bed  Head: Normocephalic and atraumatic.   Eyes: Pupils are equal, round, and reactive to light.  Extraocular muscles appear to be intact  Neck: Neck supple without masses or carotid bruit.  Cardiovascular: Regular rhythm and normal heart sounds.  No extra or lift rub or murmur appreciated  Pulmonary/Chest: Effort normal and breath sounds normal.  CTAB, no appreciable rales or wheezes  Abdominal: Soft. Bowel sounds are normal. There is  no distension or tenderness appreciated. There is no rebound and no guarding.   Musculoskeletal: Normal range of motion. There is  no edema or tenderness.  No significant joint swelling, and abrasion of the lower extremity and multiple ecchymoses from bruises  Neurological: Pt is alert and oriented to person, place, and time. They have normal reflexes. CN 2-12 appear grossly intact  Skin: Skin is warm and dry without significant rashes      Results Review:              I reviewed the patient's new imaging results and agree with the interpretation.     DATA:       Procedure Component Value Units Date/Time     Urinalysis, Microscopic Only - Urine, Catheter [[id number]]  (Abnormal) Collected: 07/06/22 0137     Specimen: Urine, Catheter Updated: 07/06/22 0213       RBC, UA 0-2 /HPF         WBC, UA 3-5 /HPF         Comment: Urine culture not indicated.          Bacteria, UA 1+ /HPF         Squamous Epithelial Cells, UA 7-12 /HPF         Hyaline Casts, UA None Seen /LPF         Granular Casts, UA 0-2 /LPF         Methodology Manual Light Microscopy     Urinalysis With Culture If Indicated - Urine, Catheter [[id number]]  (Abnormal) Collected: 07/06/22 0137     Specimen: Urine, Catheter Updated: 07/06/22 0213       Color, UA [place]       Appearance, UA Cloudy       pH, UA 5.5       Specific Gravity, UA 1.021       Glucose, UA Negative       Ketones, UA Trace       Bilirubin, UA Small (1+)       Blood, UA Negative       Protein, UA 30 mg/dL (1+)       Leuk Esterase, UA Small (1+)       Nitrite, UA Positive       Urobilinogen, UA 1.0 E.U./dL     Narrative:       Dipstick results may be inaccurate due to color interference.     CK [[id number]]  (Abnormal) Collected: 07/06/22 0036     Specimen: Blood Updated: 07/06/22 0113       Creatine Kinase 2,339 U/L       Troponin [[id number]]  (Abnormal) Collected: 07/06/22 0036     Specimen: Blood  Updated: 07/06/22 0105       Troponin T 0.100 ng/mL       Narrative:       Troponin T Reference Range:  <= 0.03 ng/mL-   Negative for AMI  >0.03 ng/mL-     Abnormal for myocardial necrosis.  Clinicians would have to utilize clinical acumen, EKG, Troponin and serial changes to determine if it is an Acute Myocardial Infarction or myocardial injury due to an underlying chronic condition.         Results may be falsely decreased if patient taking Biotin.        Comprehensive Metabolic Panel [[id number]]  (Abnormal) Collected: 07/06/22 0036     Specimen: Blood Updated: 07/06/22 0103       Glucose 110 mg/dL         BUN 12 mg/dL         Creatinine 0.73 mg/dL         Sodium 137 mmol/L         Potassium 3.2 mmol/L         Comment: Slight hemolysis detected by analyzer. Results may be affected.          Chloride 100 mmol/L         CO2 22.0 mmol/L         Calcium 8.6 mg/dL         Total Protein 5.4 g/dL         Albumin 3.00 g/dL         ALT (SGPT) 20 U/L         AST (SGOT) 64 U/L         Comment: Slight hemolysis detected by analyzer. Results may be affected.          Alkaline Phosphatase 136 U/L         Total Bilirubin 1.9 mg/dL         Globulin 2.4 gm/dL         A/G Ratio 1.3 g/dL         BUN/Creatinine Ratio 16.4       Anion Gap 15.0 mmol/L         eGFR 87.0 mL/min/1.73         Comment: National Kidney Foundation and American Society of Nephrology (ASN) Task Force recommended calculation based on the Chronic Kidney Disease Epidemiology Collaboration (CKD-EPI) equation refit without adjustment for race.        Narrative:       GFR Normal >60  Chronic Kidney Disease <60  Kidney Failure <15        COVID-19,[facility] IN-HOUSE,Nasal Swab No Transport Media 3-4 HR TAT - Swab, Nasal Cavity [[id number]]  (Normal) Collected: 07/05/22 2339     Specimen: Swab from Nasal Cavity Updated: 07/06/22 0032       COVID19 Not Detected     Narrative:       Fact sheet for providers: https://www.fda.gov/media/230642/download      Fact sheet for  patients: https://www.Ready [employer] Travel.gov/media/109346/download     Test performed by PCR.     Consider negative results in combination with clinical observations, patient history, and epidemiological information.     CBC & Differential [[id number]]  (Abnormal) Collected: 07/05/22 2339     Specimen: Blood Updated: 07/06/22 0028     Narrative:       The following orders were created for panel order CBC & Differential.  Procedure                               Abnormality         Status                     ---------                               -----------         ------                     CBC Auto Differential[[id number]]        Abnormal            Final result                  Please view results for these tests on the individual orders.     CBC Auto Differential [[id number]]  (Abnormal) Collected: 07/05/22 2339     Specimen: Blood Updated: 07/06/22 0028       WBC 7.22 10*3/mm3         RBC 3.81 10*6/mm3         Hemoglobin 12.5 g/dL         Hematocrit 36.3 %         MCV 95.3 fL         MCH 32.8 pg         MCHC 34.4 g/dL         RDW 15.2 %         RDW-SD 49.4 fl         MPV 11.4 fL         Platelets 80 10*3/mm3         Neutrophil % 83.5 %         Lymphocyte % 6.0 %         Monocyte % 10.0 %         Eosinophil % 0.1 %         Basophil % 0.1 %         Neutrophils, Absolute 6.03 10*3/mm3         Lymphocytes, Absolute 0.43 10*3/mm3         Monocytes, Absolute 0.72 10*3/mm3         Eosinophils, Absolute 0.01 10*3/mm3         Basophils, Absolute 0.01 10*3/mm3       D-dimer, Quantitative [[id number]]  (Abnormal) Collected: 07/05/22 2339     Specimen: Blood Updated: 07/05/22 2358       D-Dimer, Quantitative 2.52 MCGFEU/mL       Narrative:       Reference Range is 0-0.50 MCGFEU/mL. However, results <0.50 MCGFEU/mL tends to rule out DVT or PE. Results >0.50 MCGFEU/mL are not useful in predicting absence or presence of DVT or PE.                      Imaging Results (Last 24 Hours)      Procedure Component Value Units Date/Time     XR Chest 1  View [[id number]] Collected: 07/06/22 0744       Updated: 07/06/22 0749     Narrative:       EXAMINATION: XR CHEST 1 VW-     7/5/2022 11:16 PM CDT     HISTORY: dizziness, near syncope     A frontal projection of the chest is compared with the previous study  dated 4/7/2022.     The lungs are poorly expanded, worse on the right side.     There are atelectatic changes in the lungs bilaterally, right more than  the left.     There is moderate elevation right diaphragm which is more progressive  since the previous study.     There is no pleural effusion, pulmonary congestion or pneumothorax.     There is moderate cardiomegaly. Atheromatous changes thoracic aorta  noted.     There is no acute bony abnormality. Left shoulder arthroplasty similar  to the previous study. The hardware fusion of the lower cervical spine  is partially visualized.        Impression:       1. The poor lung expansion. No active cardiopulmonary disease.  2. A moderate cardiomegaly.  This report was finalized on 07/06/2022 07:46 by Dr. Glen Heaton MD.     XR Elbow 2 View Bilateral [[id number]] Collected: 07/06/22 0710       Updated: 07/06/22 0715     Narrative:       EXAMINATION: XR ELBOW 2 VW BILATERAL-     7/5/2022 11:16 PM CDT     HISTORY: arm pain, fall     The frontal oblique and lateral views of the right and left elbow are  obtained. There is no previous study for comparison.     There is no evidence of a recent fracture or dislocation.     There is marked diffuse osteopenia. No focal bony erosion. No soft  tissue swelling or a mass.     The anterior fat pad bilaterally is intact.        Impression:       1. No acute fracture or dislocation.  2. A marked diffuse osteopenia.  This report was finalized on 07/06/2022 07:12 by Dr. Glen Heaton MD.     XR Knee 3 View Bilateral [[id number]] Collected: 07/06/22 0656       Updated: 07/06/22 0705     Narrative:       EXAMINATION: XR KNEE 3 VW BILATERAL-     7/5/2022 11:17 PM CDT     HISTORY: knee  pain, fall     The frontal and lateral views of the left knee are obtained. Comparison  is made with the previous study dated 3/17/2018.     There is evidence of any arthroplasty. There is normal alignment of the  hardware similar to the previous study.     There is moderate diffuse osteopenia. No focal bony erosion.     The frontal and lateral views of the right again show no acute fracture  or dislocation.     The knee prostheses in place. No hardware complication.          is moderate diffuse osteopenia. No focal bony erosion. No soft  tissue swelling. No joint effusion.         Impression:       1. No acute fracture or dislocation.  2. Bilateral knee prosthesis in place similar to the previous study in  2018. No hardware complication.  This report was finalized on 07/06/2022 07:02 by Dr. Glen Heaton MD.     XR Pelvis 1 or 2 View [[id number]] Collected: 07/06/22 0653       Updated: 07/06/22 0659     Narrative:       EXAMINATION: XR PELVIS 1 OR 2 VW-     7/5/2022 11:17 PM CDT     HISTORY: amisha hip pain, fall     A frontal projection of the pelvis is obtained. Comparison is made with  the previous study dated 2/15/2018.     There is no evidence of an acute fracture or dislocation.     There is evidence of a right hip arthroplasty. No complication. No  change since the previous study.     Hardware fusion of the lower lumbar spine is partially visualized. No  hardware complication.     Sacroiliac joints and symphysis pubis are normal.        Impression:       1. No acute fracture or dislocation.  This report was finalized on 07/06/2022 06:56 by Dr. Glen Heaton MD.     CT Head Without Contrast [[id number]] Collected: 07/06/22 0630       Updated: 07/06/22 0637     Narrative:       EXAMINATION: CT HEAD WO CONTRAST-      7/5/2022 11:33 PM CDT     HISTORY: head injury, fall     In order to have a CT radiation dose as low as reasonably achievable  Automated Exposure Control was utilized for adjustment of the mA  and/or  KV according to patient size.     DLP in mGycm= 777     The CT scan of the head is performed without intravenous contrast  enhancement.     Images are acquired in axial plane and subsequent reconstruction in  coronal and sagittal planes.     Comparison is made with the previous study dated 3/17/2018.     There is no evidence of an intracranial hemorrhage or hematoma.     There is no evidence of a mass. There is no midline shift.     There are prominent frontal cortical sulci representing chronic volume  loss similar to the previous study. The ventricles and basal cisterns  are age appropriate.     There are areas of chronic white matter ischemia in the centrum  semiovale bilaterally. The gray-white matter differentiation is  maintained.     The images are reviewed in bone window show no evidence of a skull  fracture. Limited visualized paranasal sinuses and mastoid air cells are  clear.        Impression:       1. No acute intracranial abnormality.  2. No skull fracture.     The above study was initially reviewed and reported by [facility]. I do not  find any discrepancies.                             This report was finalized on 07/06/2022 06:34 by Dr. Glen Heaton MD.     CT Cervical Spine Without Contrast [[id number]                        1.     Spinal stenosis, lumbar    Secondary osteoarthritis of left shoulder due to rotator cuff arthropathy    HTN (hypertension)    Anxiety associated with depression    Traumatic rhabdomyolysis (HCC)     Rhabdomyolysis-gently rehydrate and recheck creatinine kinase in a.m.  X-rays are reassuring, will get physical therapy and Occupational Therapy to evaluate for discharge planning.  Patient does live alone but has family close by and as agreed to a medical alert bracelet.     Hypertension-restart home medications follow blood pressures.     Acute kidney injury related to rhabdomyolysis.  Recheck creatinine along with CPK in a.m.     Chronic pain syndrome-we will  restart morphine 15 mg 3 times daily which is her home dose from [facility] pain management and use as needed Norco for breakthrough pain.     Hopefully will be medically stable for discharge 1-2 days but may need rehabilitative services for short-term to get her back on her feet and prevent falling.     All questions were answered to the best of my ability and left a note on the board for family concerning our plan forward.        Sachin Dia MD  08:02 CDT 7/6/2022                            Current Facility-Administered Medications   Medication Dose Route Frequency Provider Last Rate Last Admin   • cyclobenzaprine (FLEXERIL) tablet 10 mg  10 mg Oral TID PRN Sachin Dia MD       • DULoxetine (CYMBALTA) DR capsule 60 mg  60 mg Oral Daily Sachin Dia MD   60 mg at 07/06/22 0923   • gabapentin (NEURONTIN) capsule 100 mg  100 mg Oral BID Sachin Dia MD   100 mg at 07/06/22 0923   • HYDROcodone-acetaminophen (NORCO) 7.5-325 MG per tablet 1 tablet  1 tablet Oral Q6H PRN Sumeet Bowles PA   1 tablet at 07/06/22 0614   • lactated ringers infusion  75 mL/hr Intravenous Continuous Dalia Choi APRN 75 mL/hr at 07/06/22 0244 75 mL/hr at 07/06/22 0244   • levothyroxine (SYNTHROID, LEVOTHROID) tablet 100 mcg  100 mcg Oral Daily Sachin Dia MD   100 mcg at 07/06/22 0927   • Morphine (MSIR) tablet 15 mg  15 mg Oral TID Sachin Dia MD   15 mg at 07/06/22 0923   • ondansetron (ZOFRAN) tablet 4 mg  4 mg Oral Q8H PRN Sachin Dia MD       • potassium chloride (MICRO-K) CR capsule 20 mEq  20 mEq Oral Daily Dalia Choi APRN   20 mEq at 07/06/22 0203   • sodium chloride 0.9 % flush 10 mL  10 mL Intravenous PRN Dalia Choi APRN       • sodium chloride 0.9 % flush 10 mL  10 mL Intravenous Q12H Sachin Dia MD   10 mL at 07/06/22 0925   • sodium chloride 0.9 % flush 10 mL  10 mL Intravenous PRN Sachin Dia MD

## 2022-07-07 LAB
ALBUMIN SERPL-MCNC: 2.9 G/DL (ref 3.5–5.2)
ALBUMIN/GLOB SERPL: 1.1 G/DL
ALP SERPL-CCNC: 110 U/L (ref 39–117)
ALT SERPL W P-5'-P-CCNC: 29 U/L (ref 1–33)
ANION GAP SERPL CALCULATED.3IONS-SCNC: 7 MMOL/L (ref 5–15)
ANISOCYTOSIS BLD QL: ABNORMAL
AST SERPL-CCNC: 109 U/L (ref 1–32)
BILIRUB SERPL-MCNC: 1.8 MG/DL (ref 0–1.2)
BUN SERPL-MCNC: 20 MG/DL (ref 8–23)
BUN/CREAT SERPL: 22.5 (ref 7–25)
CALCIUM SPEC-SCNC: 8.5 MG/DL (ref 8.6–10.5)
CHLORIDE SERPL-SCNC: 99 MMOL/L (ref 98–107)
CK SERPL-CCNC: 3617 U/L (ref 20–180)
CO2 SERPL-SCNC: 29 MMOL/L (ref 22–29)
CREAT SERPL-MCNC: 0.89 MG/DL (ref 0.57–1)
DEPRECATED RDW RBC AUTO: 51.9 FL (ref 37–54)
EGFRCR SERPLBLD CKD-EPI 2021: 68.6 ML/MIN/1.73
ERYTHROCYTE [DISTWIDTH] IN BLOOD BY AUTOMATED COUNT: 15.8 % (ref 12.3–15.4)
GLOBULIN UR ELPH-MCNC: 2.6 GM/DL
GLUCOSE SERPL-MCNC: 104 MG/DL (ref 65–99)
HCT VFR BLD AUTO: 32.1 % (ref 34–46.6)
HGB BLD-MCNC: 11.1 G/DL (ref 12–15.9)
LYMPHOCYTES # BLD MANUAL: 1.31 10*3/MM3 (ref 0.7–3.1)
LYMPHOCYTES NFR BLD MANUAL: 10 % (ref 5–12)
MACROCYTES BLD QL SMEAR: ABNORMAL
MCH RBC QN AUTO: 33.7 PG (ref 26.6–33)
MCHC RBC AUTO-ENTMCNC: 34.6 G/DL (ref 31.5–35.7)
MCV RBC AUTO: 97.6 FL (ref 79–97)
MONOCYTES # BLD: 0.82 10*3/MM3 (ref 0.1–0.9)
MYELOCYTES NFR BLD MANUAL: 1 % (ref 0–0)
NEUTROPHILS # BLD AUTO: 5.99 10*3/MM3 (ref 1.7–7)
NEUTROPHILS NFR BLD MANUAL: 58 % (ref 42.7–76)
NEUTS BAND NFR BLD MANUAL: 15 % (ref 0–5)
PLATELET # BLD AUTO: 69 10*3/MM3 (ref 140–450)
PMV BLD AUTO: 12 FL (ref 6–12)
POIKILOCYTOSIS BLD QL SMEAR: ABNORMAL
POTASSIUM SERPL-SCNC: 3.9 MMOL/L (ref 3.5–5.2)
PROT SERPL-MCNC: 5.5 G/DL (ref 6–8.5)
RBC # BLD AUTO: 3.29 10*6/MM3 (ref 3.77–5.28)
SMALL PLATELETS BLD QL SMEAR: ABNORMAL
SODIUM SERPL-SCNC: 135 MMOL/L (ref 136–145)
T4 FREE SERPL-MCNC: 1.2 NG/DL (ref 0.93–1.7)
TSH SERPL DL<=0.05 MIU/L-ACNC: 2.14 UIU/ML (ref 0.27–4.2)
VARIANT LYMPHS NFR BLD MANUAL: 14 % (ref 19.6–45.3)
VARIANT LYMPHS NFR BLD MANUAL: 2 % (ref 0–5)
WBC MORPH BLD: NORMAL
WBC NRBC COR # BLD: 8.2 10*3/MM3 (ref 3.4–10.8)

## 2022-07-07 PROCEDURE — G0378 HOSPITAL OBSERVATION PER HR: HCPCS

## 2022-07-07 PROCEDURE — 97535 SELF CARE MNGMENT TRAINING: CPT | Performed by: OCCUPATIONAL THERAPIST

## 2022-07-07 PROCEDURE — 84439 ASSAY OF FREE THYROXINE: CPT | Performed by: FAMILY MEDICINE

## 2022-07-07 PROCEDURE — 97110 THERAPEUTIC EXERCISES: CPT

## 2022-07-07 PROCEDURE — 25010000002 CEFTRIAXONE PER 250 MG: Performed by: FAMILY MEDICINE

## 2022-07-07 PROCEDURE — 84443 ASSAY THYROID STIM HORMONE: CPT | Performed by: FAMILY MEDICINE

## 2022-07-07 PROCEDURE — 97110 THERAPEUTIC EXERCISES: CPT | Performed by: OCCUPATIONAL THERAPIST

## 2022-07-07 PROCEDURE — 97116 GAIT TRAINING THERAPY: CPT

## 2022-07-07 PROCEDURE — 80053 COMPREHEN METABOLIC PANEL: CPT | Performed by: FAMILY MEDICINE

## 2022-07-07 PROCEDURE — 85025 COMPLETE CBC W/AUTO DIFF WBC: CPT | Performed by: FAMILY MEDICINE

## 2022-07-07 PROCEDURE — 82550 ASSAY OF CK (CPK): CPT | Performed by: FAMILY MEDICINE

## 2022-07-07 PROCEDURE — 85007 BL SMEAR W/DIFF WBC COUNT: CPT | Performed by: FAMILY MEDICINE

## 2022-07-07 RX ADMIN — LEVOTHYROXINE SODIUM 100 MCG: 100 TABLET ORAL at 06:00

## 2022-07-07 RX ADMIN — HYDROCODONE BITARTRATE AND ACETAMINOPHEN 1 TABLET: 7.5; 325 TABLET ORAL at 09:18

## 2022-07-07 RX ADMIN — MORPHINE SULFATE 15 MG: 15 TABLET ORAL at 21:51

## 2022-07-07 RX ADMIN — Medication 10 ML: at 21:51

## 2022-07-07 RX ADMIN — GABAPENTIN 100 MG: 100 CAPSULE ORAL at 21:51

## 2022-07-07 RX ADMIN — GABAPENTIN 100 MG: 100 CAPSULE ORAL at 09:18

## 2022-07-07 RX ADMIN — SODIUM CHLORIDE 1 G: 9 INJECTION, SOLUTION INTRAVENOUS at 09:13

## 2022-07-07 RX ADMIN — DULOXETINE HYDROCHLORIDE 60 MG: 30 CAPSULE, DELAYED RELEASE ORAL at 09:17

## 2022-07-07 RX ADMIN — SILVER SULFADIAZINE 1 APPLICATION: 10 CREAM TOPICAL at 21:52

## 2022-07-07 RX ADMIN — SILVER SULFADIAZINE 1 APPLICATION: 10 CREAM TOPICAL at 09:57

## 2022-07-07 RX ADMIN — POTASSIUM CHLORIDE 20 MEQ: 10 CAPSULE, COATED, EXTENDED RELEASE ORAL at 09:18

## 2022-07-07 RX ADMIN — MORPHINE SULFATE 15 MG: 15 TABLET ORAL at 13:51

## 2022-07-07 RX ADMIN — MORPHINE SULFATE 15 MG: 15 TABLET ORAL at 06:00

## 2022-07-07 RX ADMIN — Medication 10 ML: at 09:19

## 2022-07-07 NOTE — PROGRESS NOTES
Brenda Sneed is a 71 y.o. female patient.      Increased erythema of bilateral lower extremities with weeping open lesions on the left.  Patient did well with physical therapy yesterday.  Pain controlled.      Current Facility-Administered Medications   Medication Dose Route Frequency Provider Last Rate Last Admin   • cefTRIAXone (ROCEPHIN) 1 g in sodium chloride 0.9 % 100 mL IVPB-VTB  1 g Intravenous Q24H Sachin Dia MD       • cyclobenzaprine (FLEXERIL) tablet 10 mg  10 mg Oral TID PRN Sachin Dia MD       • DULoxetine (CYMBALTA) DR capsule 60 mg  60 mg Oral Daily Sachin Dia MD   60 mg at 07/06/22 0923   • gabapentin (NEURONTIN) capsule 100 mg  100 mg Oral BID Sachin Dia MD   100 mg at 07/06/22 2151   • HYDROcodone-acetaminophen (NORCO) 7.5-325 MG per tablet 1 tablet  1 tablet Oral Q6H PRN Sumeet Bowles PA   1 tablet at 07/06/22 2012   • levothyroxine (SYNTHROID, LEVOTHROID) tablet 100 mcg  100 mcg Oral Daily Sachin Dia MD   100 mcg at 07/07/22 0600   • Morphine (MSIR) tablet 15 mg  15 mg Oral TID Sachin Dia MD   15 mg at 07/07/22 0600   • ondansetron (ZOFRAN) tablet 4 mg  4 mg Oral Q8H PRN Sachin Dia MD       • potassium chloride (MICRO-K) CR capsule 20 mEq  20 mEq Oral Daily Dalia Choi APRN   20 mEq at 07/06/22 0203   • silver sulfadiazine (SILVADENE, SSD) 1 % cream 1 application  1 application Topical Q12H Sachin Dia MD       • sodium chloride 0.9 % flush 10 mL  10 mL Intravenous PRN Dalia Choi APRN       • sodium chloride 0.9 % flush 10 mL  10 mL Intravenous Q12H Sachin Dia MD   10 mL at 07/06/22 0925   • sodium chloride 0.9 % flush 10 mL  10 mL Intravenous PRN Sachin Dia MD         ALLERGIES:    Allergies   Allergen Reactions   • Codeine Itching and Rash     SEVERE RASH/ITCHING (TOLERATES PERCOCET)   • Benadryl [Diphenhydramine] Other (See Comments)     KEEPS PATIENT AWAKE        " Spinal stenosis, lumbar    Secondary osteoarthritis of left shoulder due to rotator cuff arthropathy    HTN (hypertension)    Anxiety associated with depression    Traumatic rhabdomyolysis (HCC)    Blood pressure 115/40, pulse 67, temperature 97.9 °F (36.6 °C), temperature source Oral, resp. rate 16, height 160 cm (63\"), weight 111 kg (245 lb 12.8 oz), SpO2 100 %, not currently breastfeeding.      Subjective:  Symptoms:  Stable.    Diet:  Adequate intake.    Activity level: Returning to normal.    Pain:  She complains of pain that is mild.  She reports pain is improving.  Pain is well controlled.      Review of Systems  Objective:  General Appearance:  Comfortable and well-appearing.    Vital signs: (most recent): Blood pressure 115/40, pulse 67, temperature 97.9 °F (36.6 °C), temperature source Oral, resp. rate 16, height 160 cm (63\"), weight 111 kg (245 lb 12.8 oz), SpO2 100 %, not currently breastfeeding.  Vital signs are normal.    Output: Producing urine and minimal stool output.    HEENT: Normal HEENT exam.    Lungs:  Normal effort and normal respiratory rate.    Heart: Normal rate.  Regular rhythm.    Abdomen: Abdomen is soft.  There is no abdominal tenderness.     Extremities: (Erythema bilaterally from about the knee down with open areas on the left noted.)            Labs:  Lab Results (last 72 hours)     Procedure Component Value Units Date/Time    Manual Differential [[id number]]  (Abnormal) Collected: 07/07/22 0436    Specimen: Blood Updated: 07/07/22 0549     Neutrophil % 58.0 %      Lymphocyte % 14.0 %      Monocyte % 10.0 %      Bands %  15.0 %      Myelocyte % 1.0 %      Atypical Lymphocyte % 2.0 %      Neutrophils Absolute 5.99 10*3/mm3      Lymphocytes Absolute 1.31 10*3/mm3      Monocytes Absolute 0.82 10*3/mm3      Anisocytosis Slight/1+     Macrocytes Slight/1+     Poikilocytes Slight/1+     WBC Morphology Normal     Platelet Estimate Decreased    CBC Auto Differential [[id number]]  (Abnormal) " Collected: 07/07/22 0436    Specimen: Blood Updated: 07/07/22 0539     WBC 8.20 10*3/mm3      RBC 3.29 10*6/mm3      Hemoglobin 11.1 g/dL      Hematocrit 32.1 %      MCV 97.6 fL      MCH 33.7 pg      MCHC 34.6 g/dL      RDW 15.8 %      RDW-SD 51.9 fl      MPV 12.0 fL      Platelets 69 10*3/mm3     CK [[id number]]  (Abnormal) Collected: 07/07/22 0436    Specimen: Blood Updated: 07/07/22 0537     Creatine Kinase 3,617 U/L     Comprehensive Metabolic Panel [[id number]]  (Abnormal) Collected: 07/07/22 0436    Specimen: Blood Updated: 07/07/22 0526     Glucose 104 mg/dL      BUN 20 mg/dL      Creatinine 0.89 mg/dL      Sodium 135 mmol/L      Potassium 3.9 mmol/L      Comment: Slight hemolysis detected by analyzer. Results may be affected.        Chloride 99 mmol/L      CO2 29.0 mmol/L      Calcium 8.5 mg/dL      Total Protein 5.5 g/dL      Albumin 2.90 g/dL      ALT (SGPT) 29 U/L      AST (SGOT) 109 U/L      Comment: Slight hemolysis detected by analyzer. Results may be affected.        Alkaline Phosphatase 110 U/L      Total Bilirubin 1.8 mg/dL      Globulin 2.6 gm/dL      A/G Ratio 1.1 g/dL      BUN/Creatinine Ratio 22.5     Anion Gap 7.0 mmol/L      eGFR 68.6 mL/min/1.73      Comment: National Kidney Foundation and American Society of Nephrology (ASN) Task Force recommended calculation based on the Chronic Kidney Disease Epidemiology Collaboration (CKD-EPI) equation refit without adjustment for race.       Narrative:      GFR Normal >60  Chronic Kidney Disease <60  Kidney Failure <15      TSH [[id number]]  (Normal) Collected: 07/07/22 0436    Specimen: Blood Updated: 07/07/22 0525     TSH 2.140 uIU/mL     T4, Free [[id number]]  (Normal) Collected: 07/07/22 0436    Specimen: Blood Updated: 07/07/22 0525     Free T4 1.20 ng/dL     Narrative:      Results may be falsely increased if patient taking Biotin.      Potassium [[id number]]  (Normal) Collected: 07/06/22 1500    Specimen: Blood Updated: 07/06/22 1542     Potassium 3.8  mmol/L      Comment: Slight hemolysis detected by analyzer. Results may be affected.       Basic Metabolic Panel [[id number]]  (Abnormal) Collected: 07/06/22 1345    Specimen: Blood Updated: 07/06/22 1443     Glucose 117 mg/dL      BUN 19 mg/dL      Creatinine 0.89 mg/dL      Sodium 136 mmol/L      Potassium 7.0 mmol/L      Comment: Slight hemolysis detected by analyzer. Results may be affected.        Chloride 101 mmol/L      CO2 28.0 mmol/L      Calcium 8.4 mg/dL      BUN/Creatinine Ratio 21.3     Anion Gap 7.0 mmol/L      eGFR 68.6 mL/min/1.73      Comment: National Kidney Foundation and American Society of Nephrology (ASN) Task Force recommended calculation based on the Chronic Kidney Disease Epidemiology Collaboration (CKD-EPI) equation refit without adjustment for race.       Narrative:      GFR Normal >60  Chronic Kidney Disease <60  Kidney Failure <15      Urinalysis, Microscopic Only - Urine, Catheter [[id number]]  (Abnormal) Collected: 07/06/22 0137    Specimen: Urine, Catheter Updated: 07/06/22 0213     RBC, UA 0-2 /HPF      WBC, UA 3-5 /HPF      Comment: Urine culture not indicated.        Bacteria, UA 1+ /HPF      Squamous Epithelial Cells, UA 7-12 /HPF      Hyaline Casts, UA None Seen /LPF      Granular Casts, UA 0-2 /LPF      Methodology Manual Light Microscopy    Urinalysis With Culture If Indicated - Urine, Catheter [[id number]]  (Abnormal) Collected: 07/06/22 0137    Specimen: Urine, Catheter Updated: 07/06/22 0213     Color, UA [place]     Appearance, UA Cloudy     pH, UA 5.5     Specific Gravity, UA 1.021     Glucose, UA Negative     Ketones, UA Trace     Bilirubin, UA Small (1+)     Blood, UA Negative     Protein, UA 30 mg/dL (1+)     Leuk Esterase, UA Small (1+)     Nitrite, UA Positive     Urobilinogen, UA 1.0 E.U./dL    Narrative:      Dipstick results may be inaccurate due to color interference.    CK [[id number]]  (Abnormal) Collected: 07/06/22 0036    Specimen: Blood Updated: 07/06/22 0113      Creatine Kinase 2,339 U/L     Troponin [[id number]]  (Abnormal) Collected: 07/06/22 0036    Specimen: Blood Updated: 07/06/22 0105     Troponin T 0.100 ng/mL     Narrative:      Troponin T Reference Range:  <= 0.03 ng/mL-   Negative for AMI  >0.03 ng/mL-     Abnormal for myocardial necrosis.  Clinicians would have to utilize clinical acumen, EKG, Troponin and serial changes to determine if it is an Acute Myocardial Infarction or myocardial injury due to an underlying chronic condition.       Results may be falsely decreased if patient taking Biotin.      Comprehensive Metabolic Panel [[id number]]  (Abnormal) Collected: 07/06/22 0036    Specimen: Blood Updated: 07/06/22 0103     Glucose 110 mg/dL      BUN 12 mg/dL      Creatinine 0.73 mg/dL      Sodium 137 mmol/L      Potassium 3.2 mmol/L      Comment: Slight hemolysis detected by analyzer. Results may be affected.        Chloride 100 mmol/L      CO2 22.0 mmol/L      Calcium 8.6 mg/dL      Total Protein 5.4 g/dL      Albumin 3.00 g/dL      ALT (SGPT) 20 U/L      AST (SGOT) 64 U/L      Comment: Slight hemolysis detected by analyzer. Results may be affected.        Alkaline Phosphatase 136 U/L      Total Bilirubin 1.9 mg/dL      Globulin 2.4 gm/dL      A/G Ratio 1.3 g/dL      BUN/Creatinine Ratio 16.4     Anion Gap 15.0 mmol/L      eGFR 87.0 mL/min/1.73      Comment: National Kidney Foundation and American Society of Nephrology (ASN) Task Force recommended calculation based on the Chronic Kidney Disease Epidemiology Collaboration (CKD-EPI) equation refit without adjustment for race.       Narrative:      GFR Normal >60  Chronic Kidney Disease <60  Kidney Failure <15      COVID-19,[facility]-HOU[facility],Nasal Swab No Transport Media 3-4 HR TAT - Swab, Nasal Cavity [[id number]]  (Normal) Collected: 07/05/22 2339    Specimen: Swab from Nasal Cavity Updated: 07/06/22 0032     COVID19 Not Detected    Narrative:      Fact sheet for providers:  https://www.fda.gov/media/937377/download     Fact sheet for patients: https://www.fda.gov/media/938255/download    Test performed by PCR.    Consider negative results in combination with clinical observations, patient history, and epidemiological information.    CBC & Differential [[id number]]  (Abnormal) Collected: 07/05/22 2339    Specimen: Blood Updated: 07/06/22 0028    Narrative:      The following orders were created for panel order CBC & Differential.  Procedure                               Abnormality         Status                     ---------                               -----------         ------                     CBC Auto Differential[[id number]]        Abnormal            Final result                 Please view results for these tests on the individual orders.    CBC Auto Differential [[id number]]  (Abnormal) Collected: 07/05/22 2339    Specimen: Blood Updated: 07/06/22 0028     WBC 7.22 10*3/mm3      RBC 3.81 10*6/mm3      Hemoglobin 12.5 g/dL      Hematocrit 36.3 %      MCV 95.3 fL      MCH 32.8 pg      MCHC 34.4 g/dL      RDW 15.2 %      RDW-SD 49.4 fl      MPV 11.4 fL      Platelets 80 10*3/mm3      Neutrophil % 83.5 %      Lymphocyte % 6.0 %      Monocyte % 10.0 %      Eosinophil % 0.1 %      Basophil % 0.1 %      Neutrophils, Absolute 6.03 10*3/mm3      Lymphocytes, Absolute 0.43 10*3/mm3      Monocytes, Absolute 0.72 10*3/mm3      Eosinophils, Absolute 0.01 10*3/mm3      Basophils, Absolute 0.01 10*3/mm3     D-dimer, Quantitative [[id number]]  (Abnormal) Collected: 07/05/22 2339    Specimen: Blood Updated: 07/05/22 2358     D-Dimer, Quantitative 2.52 MCGFEU/mL     Narrative:      Reference Range is 0-0.50 MCGFEU/mL. However, results <0.50 MCGFEU/mL tends to rule out DVT or PE. Results >0.50 MCGFEU/mL are not useful in predicting absence or presence of DVT or PE.            Imaging Results (Last 72 Hours)     Procedure Component Value Units Date/Time    CT Thoracic Spine Without Contrast  [[id number]] Collected: 07/06/22 0901     Updated: 07/06/22 0907    Narrative:      CT THORACIC SPINE WO CONTRAST- 7/5/2022 11:33 PM CDT     HISTORY: mid back pain, fall     COMPARISON: None      DLP: 203 mGy cm     TECHNIQUE: Serial helical tomographic images of the thoracic spine were  obtained without the use of intravenous contrast. Multiplanar  reformatted images were provided for review.     Automated exposure control was utilized to reduce patient radiation  dose.     FINDINGS:      There is no evidence of fracture or subluxation. Vertebral body heights  and alignment are well maintained. Multilevel loss of intervertebral  disc height. Multilevel endplate Schmorl's nodes. There is no  significant bony narrowing of the spinal canal or neural foramina. There  is no evidence of facet lock or perch. The posterior elements are  intact. Partially imaged anterior cervical fusion construct.     The paraspinal soft tissues are unremarkable.       Impression:      1 .No acute osseous injury or malalignment in the thoracic spine.  2. These findings are in agreement with the critical and emergent  findings from the [facility] preliminary report.        This report was finalized on 07/06/2022 09:04 by Dr Macho Brown, .    CT Lumbar Spine Without Contrast [[id number]] Collected: 07/06/22 0811     Updated: 07/06/22 0817    Narrative:      EXAMINATION: CT LUMBAR SPINE WO CONTRAST-      7/5/2022 11:33 PM CDT     HISTORY: Fall injury. Back pain.     In order to have a CT radiation dose as low as reasonably achievable  Automated Exposure Control was utilized for adjustment of the mA and/or  KV according to patient size.     DLP in mGycm= 1026.     Noncontrast lumbar spine CT.  Axial, sagittal, and coronal sequences.     Comparison is made with August 25, 2021.     A preliminary [facility] report was faxed to the Emergency Department  immediately after this study was interpreted at 12:08 AM.     18 degrees left convex  scoliosis.  Exaggerated lordosis.     Postsurgical change from L1 through S1.  L1-2 and L2-3 discectomy.  Bilateral pedicle screw hardware fusion from L3 through S1.     No evidence of hardware disruption or displacement.     No high-grade bony stenosis of the spinal canal or neural foramina is  seen. No acute fracture.     Summary:  1. Extensive postsurgical and degenerative change.  2. Stable as compared with 11 months ago.  3. No acute fracture is seen.                                         This report was finalized on 07/06/2022 08:13 by Dr. Ben Crockett MD.    CT Cervical Spine Without Contrast [[id number]] Collected: 07/06/22 0750     Updated: 07/06/22 0805    Narrative:      EXAMINATION: CT CERVICAL SPINE WO CONTRAST-      7/5/2022 11:33 PM CDT     HISTORY: neck pain, fall     In order to have a CT radiation dose as low as reasonably achievable  Automated Exposure Control was utilized for adjustment of the mA and/or  KV according to patient size.     DLP in mGycm= 499     The CT scan of the cervical spine is performed without intravenous or  intrathecal contrast enhancement.     The images are acquired in axial plane and subsequent reconstruction in  coronal and sagittal planes.     There is no previous study for comparison.     There is no evidence of an acute fracture or compression. There is no  acute displacement or malalignment.     There is evidence of hardware fusion of cervical spine at levels C4-C7.  Due to artifacts from the hardware these vertebrae is suboptimally  visualized and evaluated. There appears to be complete healing and bony  union C4-C7.     There is moderate anterolisthesis of C3 over C4 and C7 over T1.     There are loss of disc heights at C2-3 C3-4 and C7-T1 representing  degenerative disc disease. There are small posterior osteophytes and  uncinate spurs most pronounced at C3-4. There is no significant spinal  stenosis or neural foraminal stenosis at any level.     The  atlantoaxial articulation normal and intact.     The limited visualized prevertebral soft tissues are unremarkable.     The limited included lung apices are unremarkable.       Impression:      1. No acute fracture or malalignment.  2. Hardware fusion C4-C7 with complete bony union. No  obvious/significant hardware complication.  3. The neural foramina or spinal canal are patent.     The above study was initially reviewed and reported by [facility]. I do not  find any discrepancies.                             This report was finalized on 07/06/2022 08:02 by Dr. Glen Heaton MD.    XR Chest 1 View [[id number]] Collected: 07/06/22 0744     Updated: 07/06/22 0749    Narrative:      EXAMINATION: XR CHEST 1 VW-     7/5/2022 11:16 PM CDT     HISTORY: dizziness, near syncope     A frontal projection of the chest is compared with the previous study  dated 4/7/2022.     The lungs are poorly expanded, worse on the right side.     There are atelectatic changes in the lungs bilaterally, right more than  the left.     There is moderate elevation right diaphragm which is more progressive  since the previous study.     There is no pleural effusion, pulmonary congestion or pneumothorax.     There is moderate cardiomegaly. Atheromatous changes thoracic aorta  noted.     There is no acute bony abnormality. Left shoulder arthroplasty similar  to the previous study. The hardware fusion of the lower cervical spine  is partially visualized.       Impression:      1. The poor lung expansion. No active cardiopulmonary disease.  2. A moderate cardiomegaly.  This report was finalized on 07/06/2022 07:46 by Dr. Glen Heaton MD.    XR Elbow 2 View Bilateral [[id number]] Collected: 07/06/22 0710     Updated: 07/06/22 0715    Narrative:      EXAMINATION: XR ELBOW 2 VW BILATERAL-     7/5/2022 11:16 PM CDT     HISTORY: arm pain, fall     The frontal oblique and lateral views of the right and left elbow are  obtained. There is no previous study  for comparison.     There is no evidence of a recent fracture or dislocation.     There is marked diffuse osteopenia. No focal bony erosion. No soft  tissue swelling or a mass.     The anterior fat pad bilaterally is intact.       Impression:      1. No acute fracture or dislocation.  2. A marked diffuse osteopenia.  This report was finalized on 07/06/2022 07:12 by Dr. Glen Heaton MD.    XR Knee 3 View Bilateral [[id number]] Collected: 07/06/22 0656     Updated: 07/06/22 0705    Narrative:      EXAMINATION: XR KNEE 3 VW BILATERAL-     7/5/2022 11:17 PM CDT     HISTORY: knee pain, fall     The frontal and lateral views of the left knee are obtained. Comparison  is made with the previous study dated 3/17/2018.     There is evidence of any arthroplasty. There is normal alignment of the  hardware similar to the previous study.     There is moderate diffuse osteopenia. No focal bony erosion.     The frontal and lateral views of the right again show no acute fracture  or dislocation.     The knee prostheses in place. No hardware complication.     There is moderate diffuse osteopenia. No focal bony erosion. No soft  tissue swelling. No joint effusion.       Impression:      1. No acute fracture or dislocation.  2. Bilateral knee prosthesis in place similar to the previous study in  2018. No hardware complication.  This report was finalized on 07/06/2022 07:02 by Dr. Glen Heaton MD.    XR Pelvis 1 or 2 View [[id number]] Collected: 07/06/22 0653     Updated: 07/06/22 0659    Narrative:      EXAMINATION: XR PELVIS 1 OR 2 VW-     7/5/2022 11:17 PM CDT     HISTORY: amisha hip pain, fall     A frontal projection of the pelvis is obtained. Comparison is made with  the previous study dated 2/15/2018.     There is no evidence of an acute fracture or dislocation.     There is evidence of a right hip arthroplasty. No complication. No  change since the previous study.     Hardware fusion of the lower lumbar spine is partially  visualized. No  hardware complication.     Sacroiliac joints and symphysis pubis are normal.       Impression:      1. No acute fracture or dislocation.  This report was finalized on 07/06/2022 06:56 by Dr. Glen Heaton MD.    CT Head Without Contrast [[id number]] Collected: 07/06/22 0630     Updated: 07/06/22 0637    Narrative:      EXAMINATION: CT HEAD WO CONTRAST-      7/5/2022 11:33 PM CDT     HISTORY: head injury, fall     In order to have a CT radiation dose as low as reasonably achievable  Automated Exposure Control was utilized for adjustment of the mA and/or  KV according to patient size.     DLP in mGycm= 777     The CT scan of the head is performed without intravenous contrast  enhancement.     Images are acquired in axial plane and subsequent reconstruction in  coronal and sagittal planes.     Comparison is made with the previous study dated 3/17/2018.     There is no evidence of an intracranial hemorrhage or hematoma.     There is no evidence of a mass. There is no midline shift.     There are prominent frontal cortical sulci representing chronic volume  loss similar to the previous study. The ventricles and basal cisterns  are age appropriate.     There are areas of chronic white matter ischemia in the centrum  semiovale bilaterally. The gray-white matter differentiation is  maintained.     The images are reviewed in bone window show no evidence of a skull  fracture. Limited visualized paranasal sinuses and mastoid air cells are  clear.       Impression:      1. No acute intracranial abnormality.  2. No skull fracture.     The above study was initially reviewed and reported by [facility]. I do not  find any discrepancies.                             This report was finalized on 07/06/2022 06:34 by Dr. Glen Heaton MD.                Assessment:    Condition: In stable condition.  Improving.       Plan:   Discharge home (Plan discharge home with home health on Friday).  (    Bilateral lower extremity  edema-start Rocephin 1 g IV every 24, add Silvadene cream apply twice daily to lower extremities especially open areas.  Patient will go home tomorrow and will have home health check on these lower extremities and home physical therapy.    Rhabdomyolysis- kidney function CK returned to normal.  Discontinue IV fluids increase activity.    Hypertension blood pressure stable.    Plan for patient to be discharged home Friday and will need home health care for physical therapy and wound checks.  We will follow-up in the office early next week.).     Problem List:     Spinal stenosis, lumbar    Secondary osteoarthritis of left shoulder due to rotator cuff arthropathy    HTN (hypertension)    Anxiety associated with depression    Traumatic rhabdomyolysis (HCC)    Sachin Dia MD  7/7/2022

## 2022-07-07 NOTE — PLAN OF CARE
Goal Outcome Evaluation:  Plan of Care Reviewed With: patient           Outcome Evaluation: OT tx complete. Pt alert and agreeable to therapy session. Pt completed sit<> stand with SBA, and ambulated to BR with CGA and FWW. Pt with BLE edema, with weeping noted at R anterior shin. Pt reports this is not unusual. Pt completed toileting I, and stood sink side to complete hand washing, face washing, and hair combing I, with SBA for balance. Pt with no unsteadiness or fatigue with standing tolerane for these tasks. Pt complete continued fxl mobility in hallway with CGA and FWW to improve endurance for safety and I with home management and mobility. Pt returned to room with minimal fatigue, and completed UE exercises to address strength to maintain and improve independence in ADLs. Continue OT POC.

## 2022-07-07 NOTE — PLAN OF CARE
Goal Outcome Evaluation:           Progress: no change  VSS. S 63-71 on tele. Complained of some pain this shift treated per MAR with some relief. Pt has been up in the chair today and worked with PT. Safety maintained this shift. Will continue to monitor and notify MD of any changes.

## 2022-07-07 NOTE — PLAN OF CARE
Goal Outcome Evaluation:      Patient can roll to left and bring self to sit EOB with CGA. Stands and ambulated 160' with RWx and CGA. Toileted independently. Worked thru multiple LE exercises. Discussed safety at home, keeping feet apart during gait. Patient stated this last fall was due to dizziness and she wasn't using walker. Discussed importance of using Rwx at all times. Will continue to benefit from strengthening activities.

## 2022-07-07 NOTE — PLAN OF CARE
Goal Outcome Evaluation:              Outcome Evaluation: Pt up with assist one newton well , up with walker and assist of one , pain meds given as needed , iv fluids infusing as ordered .

## 2022-07-07 NOTE — THERAPY TREATMENT NOTE
Patient Name: Brenda Sneed  : 1950    MRN: [id number]                              Today's Date: 2022       Admit Date: 2022    Visit Dx:     ICD-10-CM ICD-9-CM   1. Traumatic rhabdomyolysis, initial encounter ([facility])  T79.6XXA 958.6   2. Near syncope  R55 780.2   3. Acute low back pain without sciatica, unspecified back pain laterality  M54.50 724.2   4. Multiple contusions  T07.XXXA 924.8   5. Elevated troponin  R77.8 790.6   6. Hypokalemia  E87.6 276.8   7. Decreased activities of daily living (ADL)  Z78.9 V49.89   8. Impaired mobility  Z74.09 799.89     Patient Active Problem List   Diagnosis   • Spinal stenosis, lumbar   • Secondary osteoarthritis of left shoulder due to rotator cuff arthropathy   • Cellulitis of right lower extremity   • HTN (hypertension)   • Obesity, Class III, BMI 40-49.9 (morbid obesity) ([facility])   • Anxiety associated with depression   • Traumatic rhabdomyolysis ([facility])     Past Medical History:   Diagnosis Date   • Arthritis    • Bronchitis    • Cataract     right eye , cornea implant   • Chronic back pain    • Depression    • Disease of thyroid gland    • Edema     lower extremities   • Hypertension    • Kidney stones      Past Surgical History:   Procedure Laterality Date   • BACK SURGERY       ([place])2013    • CHOLECYSTECTOMY OPEN     • CORNEAL TRANSPLANT Right    • LUMBAR FUSION Left 2017    Procedure: LEFT LUMBAR LATERAL INTERBODY FUSION WITH INSTRUMENTATION  L1-2;  Surgeon: MARCELINO Wright MD;  Location:  [facility] OR;  Service:    • NECK SURGERY      [place]   • TOTAL HIP ARTHROPLASTY Right     DR. KENDRICK    • TOTAL KNEE ARTHROPLASTY Right    • TOTAL KNEE ARTHROPLASTY Left    • TOTAL SHOULDER ARTHROPLASTY W/ DISTAL CLAVICLE EXCISION Left 2020    Procedure: LEFT REVERSE TOTAL SHOULDER REPLACEMENT;  Surgeon: Saravanan Calvin MD;  Location:  [facility] OR;  Service: Orthopedics;  Laterality: Left;      General Information     Row Name  07/07/22 1157          OT Time and Intention    Document Type therapy note (daily note)  -JJ (r) AD (t) JJ (c)     Mode of Treatment occupational therapy  -JJ (r) AD (t) JJESSA (c)     Row Name 07/07/22 1157          General Information    Patient Profile Reviewed yes  -JJ (r) AD (t) JJ (c)     Existing Precautions/Restrictions fall  -JJ (r) AD (t) JJ (c)           User Key  (r) = Recorded By, (t) = Taken By, (c) = Cosigned By    Initials Name Provider Type    JKiana Camejo, OTR/L, CSRS Occupational Therapist    AD Peggy Umanzor, OT Student OT Student                 Mobility/ADL's     Row Name 07/07/22 1157          Bed Mobility    Comment, (Bed Mobility) In chair at arrival  -JJ (r) AD (t) JJ (c)     Row Name 07/07/22 1157          Transfers    Transfers sit-stand transfer;stand-sit transfer;toilet transfer  -JJ (r) AD (t) JJ (c)     Sit-Stand [place] (Transfers) standby assist  -JJ (r) AD (t) JJ (c)     Stand-Sit [place] (Transfers) standby assist  -JJ (r) AD (t) JJ (c)     [place] Level (Toilet Transfer) standby assist  -JJ (r) AD (t) JJ (c)     Row Name 07/07/22 1157          Sit-Stand Transfer    Assistive Device (Sit-Stand Transfers) walker, front-wheeled  -JJ (r) AD (t) JJ (c)     Row Name 07/07/22 1157          Stand-Sit Transfer    Assistive Device (Stand-Sit Transfers) walker, front-wheeled  -JJ (r) AD (t) JJ (c)     Row Name 07/07/22 1157          Toilet Transfer    Type (Toilet Transfer) sit-stand;stand-sit  -JJ (r) AD (t) JJ (c)     Row Name 07/07/22 1157          Functional Mobility    Functional Mobility- Ind. Level contact guard assist  -JJ (r) AD (t) JJ (c)     Functional Mobility- Device walker, front-wheeled  -JJ (r) AD (t) JJ (c)     Functional Mobility- Comment Fxl mobility completed in room to BR and in hallway to improve independence and safety with home mobility. Pt completed with CGA and FWW with no unsteadiness or fatigue, utilizing FWW properly  -ROSIE (r) CURTIS (t) ROSIE (c)      Row Name 07/07/22 1157          Activities of Daily Living    BADL Assessment/Intervention lower body dressing;grooming;toileting  -JJ (r) AD (t) JJ (c)     Row Name 07/07/22 1157          Lower Body Dressing Assessment/Training    [place] Level (Lower Body Dressing) don;shoes/slippers;set up  -JJ (r) AD (t) JJ (c)     Position (Lower Body Dressing) supported sitting  -JJ (r) AD (t) JJ (c)     Row Name 07/07/22 1157          Grooming Assessment/Training    [place] Level (Grooming) hair care, combing/brushing;wash face, hands;independent  -JJ (r) AD (t) JJ (c)     Row Name 07/07/22 1157          Toileting Assessment/Training    [place] Level (Toileting) toileting skills;independent  -JJ (r) AD (t) JJ (c)     Assistive Devices (Toileting) commode  -JJ (r) AD (t) JJ (c)     Position (Toileting) unsupported sitting  -JJ (r) AD (t) JJ (c)           User Key  (r) = Recorded By, (t) = Taken By, (c) = Cosigned By    Initials Name Provider Type    Kiana Kirk, OTR/L, CSRS Occupational Therapist    Peggy Perez, OT Student OT Student               Obj/Interventions     Row Name 07/07/22 1157          Shoulder (Therapeutic Exercise)    Shoulder (Therapeutic Exercise) AROM (active range of motion)  -JJ (r) AD (t) JJ (c)     Shoulder AROM (Therapeutic Exercise) bilateral;flexion;aBduction;2 sets;10 repetitions;5 repetitions;5 second hold  -JJ (r) AD (t) JJ (c)     Row Name 07/07/22 1157          Elbow/Forearm (Therapeutic Exercise)    Elbow/Forearm (Therapeutic Exercise) AROM (active range of motion)  -JJ (r) AD (t) JJ (c)     Elbow/Forearm AROM (Therapeutic Exercise) bilateral;flexion;extension;2 sets;15 repititions  -JJ (r) AD (t) JJ (c)     Row Name 07/07/22 1157          Motor Skills    Therapeutic Exercise shoulder;elbow/forearm  -JJ (r) AD (t) JJ (c)           User Key  (r) = Recorded By, (t) = Taken By, (c) = Cosigned By    Initials Name Provider Type    Kiana Kirk, GAGER/MAYLIN, CSRS  Occupational Therapist    Peggy Perez, OT Student OT Student               Goals/Plan    No documentation.                Clinical Impression     Row Name 07/07/22 1157          Pain Assessment    Pretreatment Pain Rating 6/10  -JJ (r) AD (t) JJ (c)     Posttreatment Pain Rating 6/10  -JJ (r) AD (t) JJ (c)     Pain Location - Side/Orientation Bilateral  -JJ (r) AD (t) JJ (c)     Pain Location - shoulder  -JJ (r) AD (t) JJ (c)     Pain Intervention(s) Repositioned;Ambulation/increased activity;Rest  -JJ (r) AD (t) JJ (c)     Row Name 07/07/22 1157          Plan of Care Review    Plan of Care Reviewed With patient  -JJ (r) AD (t) JJ (c)     Outcome Evaluation OT tx complete. Pt alert and agreeable to therapy session. Pt completed sit<> stand with SBA, and ambulated to BR with CGA and FWW. Pt with BLE edema, with weeping noted at R anterior shin. Pt reports this is not unusual. Pt completed toileting I, and stood sink side to complete hand washing, face washing, and hair combing I, with SBA for balance. Pt with no unsteadiness or fatigue with standing tolerane for these tasks. Pt complete continued fxl mobility in hallway with CGA and FWW to improve endurance for safety and I with home management and mobility. Pt returned to room with minimal fatigue, and completed UE exercises to address strength to maintain and improve independence in ADLs. Continue OT POC.  -JJ (r) AD (t) JJ (c)     Row Name 07/07/22 1157          Positioning and Restraints    Pre-Treatment Position sitting in chair/recliner  -JJ (r) AD (t) JJ (c)     Post Treatment Position chair  -JJ (r) AD (t) JJ (c)     In Chair reclined;call light within reach;encouraged to call for assist;legs elevated  -JJ (r) AD (t) JJ (c)           User Key  (r) = Recorded By, (t) = Taken By, (c) = Cosigned By    Initials Name Provider Type    Kiana Kirk, OTR/L, CSRS Occupational Therapist    Peggy Perez, OT Student OT Student               Outcome  Measures     Row Name 07/07/22 1157          How much help from another is currently needed...    Putting on and taking off regular lower body clothing? 3  -JJ (r) AD (t) JJ (c)     Bathing (including washing, rinsing, and drying) 3  -JJ (r) AD (t) JJ (c)     Toileting (which includes using toilet bed pan or urinal) 4  -JJ (r) AD (t) JJ (c)     Putting on and taking off regular upper body clothing 4  -JJ (r) AD (t) JJ (c)     Taking care of personal grooming (such as brushing teeth) 4  -JJ (r) AD (t) JJ (c)     Eating meals 4  -JJ (r) AD (t) JJ (c)     AM-PAC 6 Clicks Score (OT) 22  -JJ (r) AD (t)     Row Name 07/07/22 1000          How much help from another person do you currently need...    Turning from your back to your side while in flat bed without using bedrails? 4  -WOJCIECH     Moving from lying on back to sitting on the side of a flat bed without bedrails? 3  -WOJCIECH     Moving to and from a bed to a chair (including a wheelchair)? 3  -WOJCIECH     Standing up from a chair using your arms (e.g., wheelchair, bedside chair)? 3  -WOJCIECH     Climbing 3-5 steps with a railing? 2  -WOJCIECH     To walk in hospital room? 3  -WOJCIECH     AM-PAC 6 Clicks Score (PT) 18  -WOJCIECH     Highest level of mobility 6 --> Walked 10 steps or more  -WOJCIECH     Row Name 07/07/22 1157          Functional Assessment    Outcome Measure Options AM-PAC 6 Clicks Daily Activity (OT)  -JJ (r) AD (t) JJ (c)           User Key  (r) = Recorded By, (t) = Taken By, (c) = Cosigned By    Initials Name Provider Type    Allison Jasso PTA Physical Therapist Assistant    Kiana Kirk, OTR/L, CSRS Occupational Therapist    Peggy Perez, OT Student OT Student                Occupational Therapy Education                 Title: PT OT SLP Therapies (In Progress)     Topic: Occupational Therapy (In Progress)     Point: ADL training (Done)     Description:   Instruct learner(s) on proper safety adaptation and remediation techniques during self care or transfers.    Instruct in proper use of assistive devices.              Learning Progress Summary           Patient Acceptance, E, VU by AD at 7/7/2022 1318    Acceptance, E, VU by AC at 7/6/2022 0942                   Point: Home exercise program (Done)     Description:   Instruct learner(s) on appropriate technique for monitoring, assisting and/or progressing therapeutic exercises/activities.              Learning Progress Summary           Patient Acceptance, E, VU by AD at 7/7/2022 1318                   Point: Body mechanics (Not Started)     Description:   Instruct learner(s) on proper positioning and spine alignment during self-care, functional mobility activities and/or exercises.              Learner Progress:  Not documented in this visit.                      User Key     Initials Effective Dates Name Provider Type Discipline     04/09/19 -  Leonardo Duncan, OTR/L, CNT Occupational Therapist OT    AD 05/04/22 -  Peggy Umanzor, GAGE Student OT Student OT              OT Recommendation and Plan     Plan of Care Review  Plan of Care Reviewed With: patient  Outcome Evaluation: OT tx complete. Pt alert and agreeable to therapy session. Pt completed sit<> stand with SBA, and ambulated to BR with CGA and FWW. Pt with BLE edema, with weeping noted at R anterior shin. Pt reports this is not unusual. Pt completed toileting I, and stood sink side to complete hand washing, face washing, and hair combing I, with SBA for balance. Pt with no unsteadiness or fatigue with standing tolerane for these tasks. Pt complete continued fxl mobility in hallway with CGA and FWW to improve endurance for safety and I with home management and mobility. Pt returned to room with minimal fatigue, and completed UE exercises to address strength to maintain and improve independence in ADLs. Continue OT POC.     Time Calculation:    Time Calculation- OT     Row Name 07/07/22 6476             Time Calculation- OT    OT Start Time 1120  +3 min for  previous attempt  -JJ (r) AD (t) JJ (c)      OT Stop Time 1157  -JJ (r) AD (t) JJ (c)      OT Time Calculation (min) 37 min  -JJ (r) AD (t)      Total Timed Code Minutes- OT 40 minute(s)  -JJ (r) AD (t) JJ (c)      OT Received On 07/07/22  -JJ (r) AD (t) JJ (c)              Timed Charges    28288 - OT Therapeutic Exercise Minutes 15  -JJ (r) AD (t) JJ (c)      98821 - OT Self Care/Mgmt Minutes 25  -JJ (r) AD (t) JJ (c)              Total Minutes    Timed Charges Total Minutes 40  -JJ (r) AD (t)       Total Minutes 40  -JJ (r) AD (t)            User Key  (r) = Recorded By, (t) = Taken By, (c) = Cosigned By    Initials Name Provider Type    Kiana Kirk, GAGER/L, CSRS Occupational Therapist    Peggy Perez OT Student OT Student                       Peggy Umanzor OT Student  7/7/2022

## 2022-07-08 ENCOUNTER — HOME HEALTH ADMISSION (OUTPATIENT)
Dept: HOME HEALTH SERVICES | Facility: HOME HEALTHCARE | Age: 72
End: 2022-07-08

## 2022-07-08 ENCOUNTER — READMISSION MANAGEMENT (OUTPATIENT)
Dept: CALL CENTER | Facility: HOSPITAL | Age: 72
End: 2022-07-08

## 2022-07-08 VITALS
WEIGHT: 250.6 LBS | HEIGHT: 63 IN | SYSTOLIC BLOOD PRESSURE: 139 MMHG | BODY MASS INDEX: 44.4 KG/M2 | TEMPERATURE: 97.8 F | DIASTOLIC BLOOD PRESSURE: 62 MMHG | RESPIRATION RATE: 16 BRPM | OXYGEN SATURATION: 95 % | HEART RATE: 72 BPM

## 2022-07-08 PROBLEM — T79.6XXA TRAUMATIC RHABDOMYOLYSIS, INITIAL ENCOUNTER: Status: ACTIVE | Noted: 2022-07-08

## 2022-07-08 PROCEDURE — G0378 HOSPITAL OBSERVATION PER HR: HCPCS

## 2022-07-08 PROCEDURE — 96365 THER/PROPH/DIAG IV INF INIT: CPT

## 2022-07-08 PROCEDURE — 25010000002 CEFTRIAXONE PER 250 MG: Performed by: FAMILY MEDICINE

## 2022-07-08 RX ORDER — CEFDINIR 300 MG/1
300 CAPSULE ORAL 2 TIMES DAILY
Qty: 20 CAPSULE | Refills: 0 | Status: SHIPPED | OUTPATIENT
Start: 2022-07-08 | End: 2022-07-18

## 2022-07-08 RX ADMIN — SILVER SULFADIAZINE 1 APPLICATION: 10 CREAM TOPICAL at 08:20

## 2022-07-08 RX ADMIN — DULOXETINE HYDROCHLORIDE 60 MG: 30 CAPSULE, DELAYED RELEASE ORAL at 08:19

## 2022-07-08 RX ADMIN — LEVOTHYROXINE SODIUM 100 MCG: 100 TABLET ORAL at 06:10

## 2022-07-08 RX ADMIN — MORPHINE SULFATE 15 MG: 15 TABLET ORAL at 05:01

## 2022-07-08 RX ADMIN — Medication 10 ML: at 08:20

## 2022-07-08 RX ADMIN — SODIUM CHLORIDE 1 G: 9 INJECTION, SOLUTION INTRAVENOUS at 08:19

## 2022-07-08 RX ADMIN — POTASSIUM CHLORIDE 20 MEQ: 10 CAPSULE, COATED, EXTENDED RELEASE ORAL at 08:19

## 2022-07-08 RX ADMIN — GABAPENTIN 100 MG: 100 CAPSULE ORAL at 08:19

## 2022-07-08 NOTE — THERAPY DISCHARGE NOTE
Acute Care - Occupational Therapy Discharge Summary  [facility]     Patient Name: Brenda Sneed  : 1950  MRN: [id number]    Today's Date: 2022  Onset of Illness/Injury or Date of Surgery: 22    Date of Referral to OT: 22  Referring Physician: Dr. Dia      Admit Date: 2022        OT Recommendation and Plan    Visit Dx:    ICD-10-CM ICD-9-CM   1. Traumatic rhabdomyolysis, initial encounter ([facility])  T79.6XXA 958.6   2. Near syncope  R55 780.2   3. Acute low back pain without sciatica, unspecified back pain laterality  M54.50 724.2   4. Multiple contusions  T07.XXXA 924.8   5. Elevated troponin  R77.8 790.6   6. Hypokalemia  E87.6 276.8   7. Decreased activities of daily living (ADL)  Z78.9 V49.89   8. Impaired mobility  Z74.09 799.89                OT Rehab Goals     Row Name 22 1300             Transfer Goal 1 (OT)    Activity/Assistive Device (Transfer Goal 1, OT) tub  -AC      [place] Level/Cues Needed (Transfer Goal 1, OT) modified independence  -AC      Time Frame (Transfer Goal 1, OT) long term goal (LTG);10 days  -AC      Progress/Outcome (Transfer Goal 1, OT) goal not met  -AC              Bathing Goal 1 (OT)    Activity/Device (Bathing Goal 1, OT) bathing skills, all  -AC      [place] Level/Cues Needed (Bathing Goal 1, OT) modified independence  -AC      Time Frame (Bathing Goal 1, OT) long term goal (LTG);10 days  -AC      Progress/Outcomes (Bathing Goal 1, OT) goal not met  -AC              Strength Goal 1 (OT)    Strength Goal 1 (OT) Increase BUE strength to 4+/5 to increase independence with ADL  -AC      Time Frame (Strength Goal 1, OT) long term goal (LTG);10 days  -AC      Progress/Outcome (Strength Goal 1, OT) goal not met  -AC            User Key  (r) = Recorded By, (t) = Taken By, (c) = Cosigned By    Initials Name Provider Type Discipline    Leonardo Iglesias, OTR/L, CNT Occupational Therapist OT                 Outcome Measures     Row Name  07/07/22 1000 07/06/22 0815          How much help from another person do you currently need...    Turning from your back to your side while in flat bed without using bedrails? 4  -WOJCIECH --     Moving from lying on back to sitting on the side of a flat bed without bedrails? 3  -WOJCIECH --     Moving to and from a bed to a chair (including a wheelchair)? 3  -WOJCIECH --     Standing up from a chair using your arms (e.g., wheelchair, bedside chair)? 3  -WOJCIECH --     Climbing 3-5 steps with a railing? 2  -WOJCIECH --     To walk in hospital room? 3  -WOJCIECH --     AM-PAC 6 Clicks Score (PT) 18  -WOJCIECH --            How much help from another is currently needed...    Putting on and taking off regular lower body clothing? -- 3  -AC     Bathing (including washing, rinsing, and drying) -- 3  -AC     Toileting (which includes using toilet bed pan or urinal) -- 4  -AC     Putting on and taking off regular upper body clothing -- 4  -AC     Taking care of personal grooming (such as brushing teeth) -- 4  -AC     Eating meals -- 4  -AC     AM-PAC 6 Clicks Score (OT) -- 22  -AC            Functional Assessment    Outcome Measure Options -- AM-PAC 6 Clicks Daily Activity (OT)  -AC           User Key  (r) = Recorded By, (t) = Taken By, (c) = Cosigned By    Initials Name Provider Type    AC Leonardo Duncan, OTR/L, CNT Occupational Therapist    Allison Jasso, PTA Physical Therapist Assistant                Timed Therapy Charges  Total Units: 3    Charges  Total Units: 3    Procedure Name Documented Minutes Units Code    HC OT SELF CARE/MGMT/TRAIN EA 15 MIN 25  2    98396 (CPT®)      HC OT THER PROC EA 15 MIN 15  1    07525 (CPT®)               Documented Minutes  Total Minutes: 40    Therapy Provided Minutes    80620 - OT Self Care/Mgmt Minutes 25    35095 - OT Therapeutic Exercise Minutes 15                    OT Discharge Summary  Anticipated Discharge Disposition (OT): home with home health  Reason for Discharge: Discharge from facility  Outcomes  Achieved: Refer to plan of care for updates on goals achieved  Discharge Destination: Home with home health      Leonardo Duncan, OTR/L, CNT  7/8/2022

## 2022-07-08 NOTE — THERAPY DISCHARGE NOTE
Acute Care - Physical Therapy Discharge Summary  [facility]       Patient Name: Brenda Sneed  : 1950  MRN: [id number]    Today's Date: 2022  Onset of Illness/Injury or Date of Surgery: 22       Referring Physician: Dr. Dia      Admit Date: 2022      PT Recommendation and Plan    Visit Dx:    ICD-10-CM ICD-9-CM   1. Traumatic rhabdomyolysis, initial encounter ([facility])  T79.6XXA 958.6   2. Near syncope  R55 780.2   3. Acute low back pain without sciatica, unspecified back pain laterality  M54.50 724.2   4. Multiple contusions  T07.XXXA 924.8   5. Elevated troponin  R77.8 790.6   6. Hypokalemia  E87.6 276.8   7. Decreased activities of daily living (ADL)  Z78.9 V49.89   8. Impaired mobility  Z74.09 799.89        Outcome Measures     Row Name 22 1000 22 0815          How much help from another person do you currently need...    Turning from your back to your side while in flat bed without using bedrails? 4  -WOJCIECH --     Moving from lying on back to sitting on the side of a flat bed without bedrails? 3  -WOJCIECH --     Moving to and from a bed to a chair (including a wheelchair)? 3  -WOJCIECH --     Standing up from a chair using your arms (e.g., wheelchair, bedside chair)? 3  -WOJCIECH --     Climbing 3-5 steps with a railing? 2  -WOJCIECH --     To walk in hospital room? 3  -WOJCIECH --     AM-PAC 6 Clicks Score (PT) 18  -WOJCIECH --            How much help from another is currently needed...    Putting on and taking off regular lower body clothing? -- 3  -AC     Bathing (including washing, rinsing, and drying) -- 3  -AC     Toileting (which includes using toilet bed pan or urinal) -- 4  -AC     Putting on and taking off regular upper body clothing -- 4  -AC     Taking care of personal grooming (such as brushing teeth) -- 4  -AC     Eating meals -- 4  -AC     AM-PAC 6 Clicks Score (OT) -- 22  -AC            Functional Assessment    Outcome Measure Options -- AM-PAC 6 Clicks Daily Activity (OT)  -AC           User  Key  (r) = Recorded By, (t) = Taken By, (c) = Cosigned By    Initials Name Provider Type    AC Leonardo Duncan, OTR/L, CNT Occupational Therapist    Allison Jasso PTA Physical Therapist Assistant                     PT Rehab Goals     Row Name 07/08/22 1300             Bed Mobility Goal 1 (PT)    [place] Level/Cues Needed (Bed Mobility Goal 1, PT) contact guard required  Goal: SBA  -WOJCIECH      Progress/Outcomes (Bed Mobility Goal 1, PT) goal not met  -WOJCIECH              Transfer Goal 1 (PT)    [place] Level/Cues Needed (Transfer Goal 1, PT) standby assist  Goal:SBA  -WOJCIECH      Progress/Outcome (Transfer Goal 1, PT) goal met  -WOJCIECH              Gait Training Goal 1 (PT)    [place] Level (Gait Training Goal 1, PT) contact guard required  Goal:SBA  -WOJCIECH      Distance (Gait Training Goal 1, PT) 160  Goal:100  -WOJCIECH      Progress/Outcome (Gait Training Goal 1, PT) goal partially met  -WOJCIECH            User Key  (r) = Recorded By, (t) = Taken By, (c) = Cosigned By    Initials Name Provider Type Discipline    Allison Jasso PTA Physical Therapist Assistant PT                Therapy Charges for Today     Code Description Service Date Service Provider Modifiers Qty    48455652816 HC GAIT TRAINING EA 15 MIN 7/7/2022 Allison Arevalo PTA GP 1    07827176085 HC PT THER PROC EA 15 MIN 7/7/2022 Allison Arevalo PTA GP 1          PT Discharge Summary  Anticipated Discharge Disposition (PT): home with home health  Reason for Discharge: Discharge from facility  Outcomes Achieved: Patient able to partially acheive established goals  Discharge Destination: Home with home health      Allison Arevalo PTA   7/8/2022

## 2022-07-08 NOTE — PLAN OF CARE
Goal Outcome Evaluation:  Plan of Care Reviewed With: patient        Progress: improving  Outcome Evaluation: Patient received alert and orientated with no complaints.  Patient up with 1 assist with walker to the bathroom; patient had an incontinent episode prior to nurse arrival.  Patient assisted to bathroom and patient was able to complete independent perineal care.  Patient  states she still feels dizzy when first sitting up on the edge of the bed; nurse had patient take frequent brakes to complete safe ambulation with the patient.  Patient pain is well controlled with ordered meds at this time.  Patient remains on continuous cardiac monitoring; will continue to assess and treat patient per plan of care.

## 2022-07-08 NOTE — DISCHARGE PLACEMENT REQUEST
"From: Silvia Gore, BSW  [phone]    Please deliver to pt's room today.  TY    Eyad Gurrola (71 y.o. Female)             Date of Birth   1950    Social Security Number       Address   [address] [address]    Home Phone   [phone]    MRN   [id number]       Confucianism   Scientologist    Marital Status                               Admission Date   7/5/22    Admission Type   Emergency    Admitting Provider   Sachin Dia MD    Attending Provider   Sachin Dia MD    Department, Room/Bed   [facility] 4B, 431/1       Discharge Date       Discharge Disposition   Home or Self Care    Discharge Destination                               Attending Provider: Sachin Dia MD    Allergies: Codeine, Benadryl [Diphenhydramine]    Isolation: None   Infection: None   Code Status: CPR   Advance Care Planning Activity    Ht: 160 cm (63\")   Wt: 114 kg (250 lb 9.6 oz)    Admission Cmt: None   Principal Problem: None                Active Insurance as of 7/5/2022     Primary Coverage     Payor Plan Insurance Group Employer/Plan Group    HUMANA MEDICARE REPLACEMENT HUMANA MEDICARE REPLACEMENT [id number]     Payor Plan Address Payor Plan Phone Number Payor Plan Fax Number Effective Dates    [address] [address] [phone]  1/1/2022 - None Entered    [facility] [address]       Subscriber Name Subscriber Birth Date Member ID       EYAD GURROLA 1950 [id number]           Secondary Coverage     Payor Plan Insurance Group Employer/Plan Group    [id number]      Payor Plan Address Payor Plan Phone Number Payor Plan Fax Number Effective Dates    [address]   6/1/2021 - None Entered    [facility] [id number]       Subscriber Name Subscriber Birth Date Member ID       EYAD GURROLA 1950 [id number]                 Emergency Contacts      (Rel.) Home Phone Work Phone Mobile Phone    Nella (Daughter) [phone] " -- --    ODELL NELSON (Relative) -- -- [phone]        Miscellaneous DME [[id number]] (Order [id number])  Order  Date: 7/8/2022 Department: [address] Ordering/Authorizing: Sachin Dia MD                             Order History  Outpatient  Date/Time Action Taken User Additional Information   07/08/22 1029 Sign Alecia Hurd RN Ordering Mode: Verbal with readback       Order Details    Frequency Duration Priority Order Class   None None Routine External       Start Date/Time    Start Date   07/08/22       Order Information    Order Date Service Start Date Start Time   07/08/22 Medicine 07/08/22            Reference Links    Associated Reports   View Encounter       Order Questions    Question Answer   Type of DME Bariatric bedside commode   Length of Need (99 Months = Lifetime) 99 Months = Lifetime   Note: Custom values to enter length of need for DME                      Verbal Order Info    Action Created on Order Mode Entered by Responsible Provider Signed by Signed on   Ordering 07/08/22 1029 Verbal with readback Alecia Hurd RN Eickholz, James Noah, MD       Source Order Set / Preference List    Preference List   AMB SUPPLIES [[id number]]             Clinical Indications     ICD-10-CM ICD-9-CM   Traumatic rhabdomyolysis, initial encounter (Formerly [facility])  - Primary     T79.6XXA 958.6   Decreased activities of daily living (ADL)     Z78.9 V49.89   Impaired mobility     Z74.09 799.89                             Reprint Order Requisition    Miscellaneous DME (Order #[id number]) on 7/8/22         Encounter    View Encounter                Order Provider Info        Office phone Pager E-mail   Ordering User Alecia Hurd RN -- -- --   Authorizing Provider Sachin Dia [phone] -- --   Attending Provider Sachin Dia [phone] -- --   Entered By Alecia Hurd RN -- -- --   Ordering Provider Sachin Dia [phone] -- --       Tracking  Reports    Cosign Tracking Order Transmittal Tracking     Authorized by:  Sachin Dia MD  (NPI: [id number])                Lab Component SmartPhrase Guide    Miscellaneous DME (Order #[id number]) on 7/8/22              History & Physical      Sachin Dia MD at 07/06/22 0802                  History and Physical      CHIEF COMPLAINT: I fell on the ground    Reason for Admission: Rhabdomyolysis    History Obtained From: Patient    HISTORY OF PRESENT ILLNESS:      The patient is a 71 y.o. female with significant past medical history of significant back disease and followed by [facility] on 15 mg of morphine 3 times daily who presents with fall at her house where she laid on the ground for upwards of 6 to 7 hours.  Apparently the patient was in her kitchen and she fell to the ground.  Due to body habitus she could not get off the ground.  Apparently she laid there over 6 to 7 hours.  Known extensive lumbar disc disease followed by [facility] on copious amounts of opioid analgesics.  With all the above presented to the emergency room with the help of EMS x-rays were unremarkable but her creatinine kinase was remarkably elevated.  She was diagnosed with rhabdomyolysis and was admitted for IV fluid rehydration and further delineation of care.  Apparently patient has had multiple falls in the past.  She lives by herself.  There has been no head trauma loss of consciousness just simply her legs give out.  She has applied for a medical alert bracelet.    Past Medical History:    Past Medical History:   Diagnosis Date   • Arthritis    • Bronchitis    • Cataract     right eye , cornea implant   • Chronic back pain    • Depression    • Disease of thyroid gland    • Edema     lower extremities   • Hypertension    • Kidney stones        Past Surgical History:    Past Surgical History:   Procedure Laterality Date   • BACK SURGERY      2010 ([place]), 2013    • CHOLECYSTECTOMY OPEN     •  CORNEAL TRANSPLANT Right 1999   • LUMBAR FUSION Left 8/2/2017    Procedure: LEFT LUMBAR LATERAL INTERBODY FUSION WITH INSTRUMENTATION  L1-2;  Surgeon: MARCELINO Wright MD;  Location:  [facility] OR;  Service:    • NECK SURGERY  2010    [place]   • TOTAL HIP ARTHROPLASTY Right 2014    DR. KENDRICK    • TOTAL KNEE ARTHROPLASTY Right 2000   • TOTAL KNEE ARTHROPLASTY Left 2002   • TOTAL SHOULDER ARTHROPLASTY W/ DISTAL CLAVICLE EXCISION Left 6/1/2020    Procedure: LEFT REVERSE TOTAL SHOULDER REPLACEMENT;  Surgeon: Saravanan Calvin MD;  Location:  [facility] OR;  Service: Orthopedics;  Laterality: Left;       Medications Prior to Admission:    Medications Prior to Admission   Medication Sig Dispense Refill Last Dose   • Bisoprolol-Hydrochlorothiazide (ZIAC PO) Take  by mouth Daily.      • cyclobenzaprine (FLEXERIL) 10 MG tablet Take 1 tablet by mouth 3 (Three) Times a Day As Needed for Muscle Spasms. 30 tablet 0    • docusate sodium (Colace) 100 MG capsule Take 1 capsule by mouth 2 (Two) Times a Day. (Patient not taking: Reported on 7/6/2022) 60 capsule 1 Not Taking at Unknown time   • DULoxetine (CYMBALTA) 60 MG capsule Take 60 mg by mouth Daily.      • furosemide (LASIX) 40 MG tablet Take 40 mg by mouth Daily As Needed (PRN EDEMA).      • gabapentin (NEURONTIN) 100 MG capsule Take 100 mg by mouth 2 (Two) Times a Day.      • HYDROcodone-acetaminophen (Norco)  MG per tablet Take 1 tablet by mouth Every 4 (Four) Hours As Needed for Moderate Pain . (Patient not taking: Reported on 5/1/2022) 70 tablet 0    • levothyroxine (SYNTHROID, LEVOTHROID) 100 MCG tablet Take 100 mcg by mouth Daily.      • Morphine (MSIR) 15 MG tablet Take 15 mg by mouth 3 (Three) Times a Day.      • ondansetron (Zofran) 4 MG tablet Take 1 tablet by mouth Every 8 (Eight) Hours As Needed for Nausea or Vomiting. 20 tablet 1        Allergies:  Codeine and Benadryl [diphenhydramine]    Social History:   Social History     Socioeconomic History   • Marital  status:    Tobacco Use   • Smoking status: Never Smoker   • Smokeless tobacco: Never Used   Substance and Sexual Activity   • Alcohol use: No   • Drug use: No   • Sexual activity: Defer       Family History:   Family History   Problem Relation Age of Onset   • Breast cancer Neg Hx        REVIEW OF SYSTEMS:    CONSTITUTIONAL: She states her back pain is progressively worse and is followed by [facility]  EYES:  negative for eye dryness, icterus and redness, no significant changes in vision  HEENT:   negative for dental problems, epistaxis or sinus congestion , no history of facial trauma or difficulty swallowing  RESPIRATORY:  negative for chest tightness, cough, dyspnea on exertion, pneumonia or worse sputum production  CARDIOVASCULAR: No history of chest pain, dyspnea, exertional chest pressure/discomfort, irregular heart beat, or PND  GASTROINTESTINAL:  negative for abdominal pain, nausea or vomiting, no history of hematemesis, jaundice, melena or rectal bleeding  MUSCULOSKELETAL: Significant arthritic changes in all weightbearing joints  NEUROLOGICAL:   negative for dizziness, headaches, seizures, speech problems, tremors and vertigo, mentation has been at baseline  INTEGUMENT: negative for pruritus, rash, skin color change and skin lesion(s)       Vital Signs   Temp:  [98.2 °F (36.8 °C)-99.3 °F (37.4 °C)] 98.2 °F (36.8 °C)  Heart Rate:  [70-81] 73  Resp:  [20] 20  BP: (107-135)/() 116/43          Physical Exam:  Constitutional: The patient is oriented to person, place, and time. They appear well-developed.  Able to answer questions appropriately  HEENT: Grossly normal sitting up in bed  Head: Normocephalic and atraumatic.   Eyes: Pupils are equal, round, and reactive to light.  Extraocular muscles appear to be intact  Neck: Neck supple without masses or carotid bruit.  Cardiovascular: Regular rhythm and normal heart sounds.  No extra or lift rub or murmur appreciated  Pulmonary/Chest:  Effort normal and breath sounds normal. CTAB, no appreciable rales or wheezes  Abdominal: Soft. Bowel sounds are normal. There is  no distension or tenderness appreciated. There is no rebound and no guarding.   Musculoskeletal: Normal range of motion. There is  no edema or tenderness.  No significant joint swelling, and abrasion of the lower extremity and multiple ecchymoses from bruises  Neurological: Pt is alert and oriented to person, place, and time. They have normal reflexes. CN 2-12 appear grossly intact  Skin: Skin is warm and dry without significant rashes     Results Review:   I reviewed the patient's new imaging results and agree with the interpretation.    DATA:  Lab Results (last 24 hours)     Procedure Component Value Units Date/Time    Urinalysis, Microscopic Only - Urine, Catheter [[id number]]  (Abnormal) Collected: 07/06/22 0137    Specimen: Urine, Catheter Updated: 07/06/22 0213     RBC, UA 0-2 /HPF      WBC, UA 3-5 /HPF      Comment: Urine culture not indicated.        Bacteria, UA 1+ /HPF      Squamous Epithelial Cells, UA 7-12 /HPF      Hyaline Casts, UA None Seen /LPF      Granular Casts, UA 0-2 /LPF      Methodology Manual Light Microscopy    Urinalysis With Culture If Indicated - Urine, Catheter [[id number]]  (Abnormal) Collected: 07/06/22 0137    Specimen: Urine, Catheter Updated: 07/06/22 0213     Color, UA [place]     Appearance, UA Cloudy     pH, UA 5.5     Specific Gravity, UA 1.021     Glucose, UA Negative     Ketones, UA Trace     Bilirubin, UA Small (1+)     Blood, UA Negative     Protein, UA 30 mg/dL (1+)     Leuk Esterase, UA Small (1+)     Nitrite, UA Positive     Urobilinogen, UA 1.0 E.U./dL    Narrative:      Dipstick results may be inaccurate due to color interference.    CK [[id number]]  (Abnormal) Collected: 07/06/22 0036    Specimen: Blood Updated: 07/06/22 0113     Creatine Kinase 2,339 U/L     Troponin [[id number]]  (Abnormal) Collected: 07/06/22 0036    Specimen: Blood Updated:  07/06/22 0105     Troponin T 0.100 ng/mL     Narrative:      Troponin T Reference Range:  <= 0.03 ng/mL-   Negative for AMI  >0.03 ng/mL-     Abnormal for myocardial necrosis.  Clinicians would have to utilize clinical acumen, EKG, Troponin and serial changes to determine if it is an Acute Myocardial Infarction or myocardial injury due to an underlying chronic condition.       Results may be falsely decreased if patient taking Biotin.      Comprehensive Metabolic Panel [[id number]]  (Abnormal) Collected: 07/06/22 0036    Specimen: Blood Updated: 07/06/22 0103     Glucose 110 mg/dL      BUN 12 mg/dL      Creatinine 0.73 mg/dL      Sodium 137 mmol/L      Potassium 3.2 mmol/L      Comment: Slight hemolysis detected by analyzer. Results may be affected.        Chloride 100 mmol/L      CO2 22.0 mmol/L      Calcium 8.6 mg/dL      Total Protein 5.4 g/dL      Albumin 3.00 g/dL      ALT (SGPT) 20 U/L      AST (SGOT) 64 U/L      Comment: Slight hemolysis detected by analyzer. Results may be affected.        Alkaline Phosphatase 136 U/L      Total Bilirubin 1.9 mg/dL      Globulin 2.4 gm/dL      A/G Ratio 1.3 g/dL      BUN/Creatinine Ratio 16.4     Anion Gap 15.0 mmol/L      eGFR 87.0 mL/min/1.73      Comment: National Kidney Foundation and American Society of Nephrology (ASN) Task Force recommended calculation based on the Chronic Kidney Disease Epidemiology Collaboration (CKD-EPI) equation refit without adjustment for race.       Narrative:      GFR Normal >60  Chronic Kidney Disease <60  Kidney Failure <15      COVID-19,[facility] IN-HOUSE,Nasal Swab No Transport Media 3-4 HR TAT - Swab, Nasal Cavity [[id number]]  (Normal) Collected: 07/05/22 2339    Specimen: Swab from Nasal Cavity Updated: 07/06/22 0032     COVID19 Not Detected    Narrative:      Fact sheet for providers: https://www.fda.gov/media/317030/download     Fact sheet for patients: https://www.fda.gov/media/422604/download    Test performed by PCR.    Consider  negative results in combination with clinical observations, patient history, and epidemiological information.    CBC & Differential [[id number]]  (Abnormal) Collected: 07/05/22 2339    Specimen: Blood Updated: 07/06/22 0028    Narrative:      The following orders were created for panel order CBC & Differential.  Procedure                               Abnormality         Status                     ---------                               -----------         ------                     CBC Auto Differential[[id number]]        Abnormal            Final result                 Please view results for these tests on the individual orders.    CBC Auto Differential [[id number]]  (Abnormal) Collected: 07/05/22 2339    Specimen: Blood Updated: 07/06/22 0028     WBC 7.22 10*3/mm3      RBC 3.81 10*6/mm3      Hemoglobin 12.5 g/dL      Hematocrit 36.3 %      MCV 95.3 fL      MCH 32.8 pg      MCHC 34.4 g/dL      RDW 15.2 %      RDW-SD 49.4 fl      MPV 11.4 fL      Platelets 80 10*3/mm3      Neutrophil % 83.5 %      Lymphocyte % 6.0 %      Monocyte % 10.0 %      Eosinophil % 0.1 %      Basophil % 0.1 %      Neutrophils, Absolute 6.03 10*3/mm3      Lymphocytes, Absolute 0.43 10*3/mm3      Monocytes, Absolute 0.72 10*3/mm3      Eosinophils, Absolute 0.01 10*3/mm3      Basophils, Absolute 0.01 10*3/mm3     D-dimer, Quantitative [[id number]]  (Abnormal) Collected: 07/05/22 2339    Specimen: Blood Updated: 07/05/22 2358     D-Dimer, Quantitative 2.52 MCGFEU/mL     Narrative:      Reference Range is 0-0.50 MCGFEU/mL. However, results <0.50 MCGFEU/mL tends to rule out DVT or PE. Results >0.50 MCGFEU/mL are not useful in predicting absence or presence of DVT or PE.          Imaging Results (Last 24 Hours)     Procedure Component Value Units Date/Time    XR Chest 1 View [[id number]] Collected: 07/06/22 0744     Updated: 07/06/22 0749    Narrative:      EXAMINATION: XR CHEST 1 VW-     7/5/2022 11:16 PM CDT     HISTORY: dizziness, near syncope      A frontal projection of the chest is compared with the previous study  dated 4/7/2022.     The lungs are poorly expanded, worse on the right side.     There are atelectatic changes in the lungs bilaterally, right more than  the left.     There is moderate elevation right diaphragm which is more progressive  since the previous study.     There is no pleural effusion, pulmonary congestion or pneumothorax.     There is moderate cardiomegaly. Atheromatous changes thoracic aorta  noted.     There is no acute bony abnormality. Left shoulder arthroplasty similar  to the previous study. The hardware fusion of the lower cervical spine  is partially visualized.       Impression:      1. The poor lung expansion. No active cardiopulmonary disease.  2. A moderate cardiomegaly.  This report was finalized on 07/06/2022 07:46 by Dr. Glen Heaton MD.    XR Elbow 2 View Bilateral [[id number]] Collected: 07/06/22 0710     Updated: 07/06/22 0715    Narrative:      EXAMINATION: XR ELBOW 2 VW BILATERAL-     7/5/2022 11:16 PM CDT     HISTORY: arm pain, fall     The frontal oblique and lateral views of the right and left elbow are  obtained. There is no previous study for comparison.     There is no evidence of a recent fracture or dislocation.     There is marked diffuse osteopenia. No focal bony erosion. No soft  tissue swelling or a mass.     The anterior fat pad bilaterally is intact.       Impression:      1. No acute fracture or dislocation.  2. A marked diffuse osteopenia.  This report was finalized on 07/06/2022 07:12 by Dr. Glen Heaton MD.    XR Knee 3 View Bilateral [[id number]] Collected: 07/06/22 0656     Updated: 07/06/22 0705    Narrative:      EXAMINATION: XR KNEE 3 VW BILATERAL-     7/5/2022 11:17 PM CDT     HISTORY: knee pain, fall     The frontal and lateral views of the left knee are obtained. Comparison  is made with the previous study dated 3/17/2018.     There is evidence of any arthroplasty. There is normal  alignment of the  hardware similar to the previous study.     There is moderate diffuse osteopenia. No focal bony erosion.     The frontal and lateral views of the right again show no acute fracture  or dislocation.     The knee prostheses in place. No hardware complication.     There is moderate diffuse osteopenia. No focal bony erosion. No soft  tissue swelling. No joint effusion.       Impression:      1. No acute fracture or dislocation.  2. Bilateral knee prosthesis in place similar to the previous study in  2018. No hardware complication.  This report was finalized on 07/06/2022 07:02 by Dr. Glen Heaton MD.    XR Pelvis 1 or 2 View [[id number]] Collected: 07/06/22 0653     Updated: 07/06/22 0659    Narrative:      EXAMINATION: XR PELVIS 1 OR 2 VW-     7/5/2022 11:17 PM CDT     HISTORY: amisha hip pain, fall     A frontal projection of the pelvis is obtained. Comparison is made with  the previous study dated 2/15/2018.     There is no evidence of an acute fracture or dislocation.     There is evidence of a right hip arthroplasty. No complication. No  change since the previous study.     Hardware fusion of the lower lumbar spine is partially visualized. No  hardware complication.     Sacroiliac joints and symphysis pubis are normal.       Impression:      1. No acute fracture or dislocation.  This report was finalized on 07/06/2022 06:56 by Dr. Glen Heaton MD.    CT Head Without Contrast [[id number]] Collected: 07/06/22 0630     Updated: 07/06/22 0637    Narrative:      EXAMINATION: CT HEAD WO CONTRAST-      7/5/2022 11:33 PM CDT     HISTORY: head injury, fall     In order to have a CT radiation dose as low as reasonably achievable  Automated Exposure Control was utilized for adjustment of the mA and/or  KV according to patient size.     DLP in mGycm= 777     The CT scan of the head is performed without intravenous contrast  enhancement.     Images are acquired in axial plane and subsequent reconstruction  in  coronal and sagittal planes.     Comparison is made with the previous study dated 3/17/2018.     There is no evidence of an intracranial hemorrhage or hematoma.     There is no evidence of a mass. There is no midline shift.     There are prominent frontal cortical sulci representing chronic volume  loss similar to the previous study. The ventricles and basal cisterns  are age appropriate.     There are areas of chronic white matter ischemia in the centrum  semiovale bilaterally. The gray-white matter differentiation is  maintained.     The images are reviewed in bone window show no evidence of a skull  fracture. Limited visualized paranasal sinuses and mastoid air cells are  clear.       Impression:      1. No acute intracranial abnormality.  2. No skull fracture.     The above study was initially reviewed and reported by [facility]. I do not  find any discrepancies.                             This report was finalized on 07/06/2022 06:34 by Dr. Glen Heaton MD.    CT Cervical Spine Without Contrast [[id number]] Resulted: 07/05/22 2333     Updated: 07/05/22 2356    CT Thoracic Spine Without Contrast [[id number]] Resulted: 07/05/22 2333     Updated: 07/05/22 2356    CT Lumbar Spine Without Contrast [[id number]] Resulted: 07/05/22 2333     Updated: 07/05/22 2356            ASSESSMENT AND PLAN:      1.     Spinal stenosis, lumbar    Secondary osteoarthritis of left shoulder due to rotator cuff arthropathy    HTN (hypertension)    Anxiety associated with depression    Traumatic rhabdomyolysis (HCC)    Rhabdomyolysis-gently rehydrate and recheck creatinine kinase in a.m.  X-rays are reassuring, will get physical therapy and Occupational Therapy to evaluate for discharge planning.  Patient does live alone but has family close by and as agreed to a medical alert bracelet.    Hypertension-restart home medications follow blood pressures.    Acute kidney injury related to rhabdomyolysis.  Recheck creatinine along with CPK  in a.m.    Chronic pain syndrome-we will restart morphine 15 mg 3 times daily which is her home dose from [facility] and use as needed Norco for breakthrough pain.    Hopefully will be medically stable for discharge 1-2 days but may need rehabilitative services for short-term to get her back on her feet and prevent falling.    All questions were answered to the best of my ability and left a note on the board for family concerning our plan forward.      Sachin Dia MD  08:02 CDT 7/6/2022      Electronically signed by Sachin Dia MD at 07/06/22 0807

## 2022-07-08 NOTE — DISCHARGE SUMMARY
Hospital Discharge Summary    Brenda Sneed  :  1950  MRN:  [id number]    Admit date:  2022  Discharge date:  2022    Admitting Physician:    Sachin Dia MD    Discharge Diagnoses:      Spinal stenosis, lumbar    Secondary osteoarthritis of left shoulder due to rotator cuff arthropathy    HTN (hypertension)    Anxiety associated with depression    Traumatic rhabdomyolysis (HCC)      Hospital Course:   The patient was admitted for the above surgical/medical indication.  Please see admission H&P for further details concerning the admission.  The patient was seen daily and progress noted via daily updates in the progress notes.  The patient improved throughout her stay.  They reached maximum medical improvement and were considered stable for discharge home.  They understand the importance of follow-up concerning any abnormal lab values/x-rays.  All questions were answered to the best of my ability prior to their discharge home.    Pleasant 71-year-old white female who fell at home and was unable to get up or get a hold of family.  She was down for several hours.  She presented to the emergency room was diagnosed with traumatic rhabdomyolysis.  She was admitted for IV fluid rehydration and further delineation of care.  Patient did well with physical therapy was getting to and from the restroom.  Did recommend using her rolling walker.  She developed some cellulitis in bilateral lower extremities was started on Rocephin and ultimately switched over to Omnicef.  On the morning of  she reached maximum inpatient benefit and will be discharged home on oral antibiotics, resume her pain medication from pain management and have home health evaluate her home for safety and physical therapy at home.  Discharge Medications:         Discharge Medications      New Medications      Instructions Start Date   cefdinir 300 MG capsule  Commonly known as: OMNICEF   300 mg, Oral, 2 Times Daily       silver sulfadiazine 1 % cream  Commonly known as: SILVADENE, SSD   1 application, Topical, Every 12 Hours Scheduled         Changes to Medications      Instructions Start Date   nystatin 265824 UNIT/GM cream  Commonly known as: MYCOSTATIN  What changed: Another medication with the same name was removed. Continue taking this medication, and follow the directions you see here.   1 application, Topical, 3 Times Daily PRN         Continue These Medications      Instructions Start Date   bisoprolol-hydrochlorothiazide 5-6.25 MG per tablet  Commonly known as: ZIAC   1 tablet, Oral, Daily      cyclobenzaprine 10 MG tablet  Commonly known as: FLEXERIL   10 mg, Oral, 3 Times Daily PRN      docusate sodium 100 MG capsule  Commonly known as: Colace   100 mg, Oral, 2 Times Daily      DULoxetine 60 MG capsule  Commonly known as: CYMBALTA   60 mg, Oral, Daily      famotidine 20 MG tablet  Commonly known as: PEPCID   20 mg, Oral, 2 Times Daily      furosemide 40 MG tablet  Commonly known as: LASIX   40 mg, Oral, Daily PRN      gabapentin 100 MG capsule  Commonly known as: NEURONTIN   100 mg, Oral, 2 Times Daily      HYDROcodone-acetaminophen  MG per tablet  Commonly known as: Norco   1 tablet, Oral, Every 4 Hours PRN      levothyroxine 100 MCG tablet  Commonly known as: SYNTHROID, LEVOTHROID   100 mcg, Oral, Daily      Morphine 15 MG tablet  Commonly known as: MSIR   15 mg, Oral, 3 Times Daily      ondansetron 4 MG tablet  Commonly known as: Zofran   4 mg, Oral, Every 8 Hours PRN      pramipexole 0.75 MG tablet  Commonly known as: MIRAPEX   0.75 mg, Oral, Every Night at Bedtime      vitamin C 500 MG tablet  Commonly known as: ASCORBIC ACID   500 mg, Oral, Daily      vitamin D3 125 MCG (5000 UT) capsule capsule   5,000 Units, Oral, Daily             Consults: None    Significant Diagnostic Studies:      EKG: normal EKG, normal sinus rhythm, unchanged from previous tracings.      Treatments:   IV fluid  rehydration    Disposition:   Home with home health  Follow up with Sachin Dia MD first of next week with Ion knowles PA-C for hospital follow-up/TCM visit which can be done via TeleMed.    Signed:  Sachin Dia MD   7/8/2022, 07:38 CDT

## 2022-07-08 NOTE — CASE MANAGEMENT/SOCIAL WORK
Continued Stay Note  [facility]     Patient Name: Brenda Sneed  MRN: [id number]  Today's Date: 7/8/2022    Admit Date: 7/5/2022     Discharge Plan     Row Name 07/08/22 0756       Plan    Plan Home with [facility]    Patient/Family in Agreement with Plan yes    Provided Post Acute Provider List? Yes    Post Acute Provider List Home Health    Delivered To Patient    Method of Delivery Telephone    Final Discharge Disposition Code 06 - home with home health care    Final Note Referral for hh.  Pt was provided a list of hh companies and chose [facility].  DILCIA has informed intake at [facility] and moved referal to their inbox.  Pt to [facility] today.    10:31-  Referral for [facility].  Pt was provided DME companies and chose [facility].  DILCIA spoke to Erlinda at [facility] and faxed referral.  They will bring to pt's room shortly.                Discharge Codes    No documentation.               Expected Discharge Date and Time     Expected Discharge Date Expected Discharge Time    Jul 8, 2022             RUBIN Packer

## 2022-07-09 NOTE — OUTREACH NOTE
Prep Survey    Flowsheet Row Responses   [facility] facility patient discharged from? [place]   Is LACE score < 7 ? No   Emergency Room discharge w/ pulse ox? No   Eligibility Readm Mgmt   Discharge diagnosis Traumatic rhabdomyolysis, cellulitis in bilateral lower extremities    Does the patient have one of the following disease processes/diagnoses(primary or secondary)? Other   Does the patient have Home health ordered? Yes   What is the Home health agency?  [facility]   Is there a DME ordered? Yes   What DME was ordered? [facility]   Prep survey completed? Yes          ALYSA GIBSON - Registered Nurse

## 2022-07-11 ENCOUNTER — HOME CARE VISIT (OUTPATIENT)
Dept: HOME HEALTH SERVICES | Facility: CLINIC | Age: 72
End: 2022-07-11

## 2022-07-11 NOTE — CASE COMMUNICATION
Patient refused home health admission.  Patient reports she is doing much better and she doesn't need any home health services at this time.

## 2022-07-12 ENCOUNTER — READMISSION MANAGEMENT (OUTPATIENT)
Dept: CALL CENTER | Facility: HOSPITAL | Age: 72
End: 2022-07-12

## 2022-07-12 NOTE — OUTREACH NOTE
Medical Week 1 Survey    Flowsheet Row Responses   [facility] patient discharged from? [place]   Does the patient have one of the following disease processes/diagnoses(primary or secondary)? Other   Week 1 attempt successful? Yes   Call start time 1242   Call end time 1249   Discharge diagnosis Traumatic rhabdomyolysis, cellulitis in bilateral lower extremities    Person spoke with today (if not patient) and relationship Patient   Meds reviewed with patient/caregiver? Yes   Is the patient having any side effects they believe may be caused by any medication additions or changes? No   Does the patient have all medications ordered at discharge? Yes   Is the patient taking all medications as directed (includes completed medication regime)? Yes   Does the patient have a primary care provider?  Yes   Does the patient have an appointment with their PCP within 7 days of discharge? No   Nursing Interventions Educated patient on importance of making appointment, Advised patient to make appointment   Has the patient kept scheduled appointments due by today? N/A   What is the Home health agency?  [facility]   Has home health visited the patient within 72 hours of discharge? Call prior to 72 hours   Psychosocial issues? No   Did the patient receive a copy of their discharge instructions? Yes   Nursing interventions Reviewed instructions with patient   What is the patient's perception of their health status since discharge? Improving   Is the patient/caregiver able to teach back signs and symptoms related to disease process for when to call PCP? Yes   Is the patient/caregiver able to teach back signs and symptoms related to disease process for when to call 911? Yes   Is the patient/caregiver able to teach back the hierarchy of who to call/visit for symptoms/problems? PCP, Specialist, Home health nurse, Urgent Care, ED, 911 Yes   If the patient is a current smoker, are they able to teach back resources for cessation? Not a smoker    Week 1 call completed? Yes   Wrap up additional comments Pt states she is doing better,  reports cellulitis in legs do not look red anymore, but a pink color. Pt reports there are two areas on left leg that weep fluid, and one place of the right leg. Pt educated on the importance of HH. Pt advised to have HH come to home to help assess/advise with legs for healthy healing. Pt was given phone number for HH, and will call HH back as she previously refused. Pt advised to make a PCP fu appt. Questions/concerns addressed.          LORI PEACOCK - Registered Nurse

## 2022-07-20 ENCOUNTER — READMISSION MANAGEMENT (OUTPATIENT)
Dept: CALL CENTER | Facility: HOSPITAL | Age: 72
End: 2022-07-20

## 2022-07-20 NOTE — OUTREACH NOTE
Medical Week 2 Survey    Flowsheet Row Responses   [facility] facility patient discharged from? [place]   Does the patient have one of the following disease processes/diagnoses(primary or secondary)? Other   Week 2 attempt successful? No   Unsuccessful attempts Attempt 1          MELY PEACOCK - Registered Nurse

## 2023-04-07 ENCOUNTER — ANESTHESIA EVENT (OUTPATIENT)
Dept: PERIOP | Facility: HOSPITAL | Age: 73
End: 2023-04-07
Payer: MEDICARE

## 2023-04-07 ENCOUNTER — APPOINTMENT (OUTPATIENT)
Dept: GENERAL RADIOLOGY | Facility: HOSPITAL | Age: 73
End: 2023-04-07
Payer: MEDICARE

## 2023-04-07 ENCOUNTER — ANESTHESIA (OUTPATIENT)
Dept: PERIOP | Facility: HOSPITAL | Age: 73
End: 2023-04-07
Payer: MEDICARE

## 2023-04-07 ENCOUNTER — PREP FOR SURGERY (OUTPATIENT)
Dept: OTHER | Facility: HOSPITAL | Age: 73
End: 2023-04-07
Payer: MEDICARE

## 2023-04-07 ENCOUNTER — HOSPITAL ENCOUNTER (EMERGENCY)
Facility: HOSPITAL | Age: 73
Discharge: HOME | End: 2023-04-07
Admitting: SPECIALIST
Payer: MEDICARE

## 2023-04-07 VITALS
SYSTOLIC BLOOD PRESSURE: 125 MMHG | WEIGHT: 250 LBS | BODY MASS INDEX: 44.3 KG/M2 | HEIGHT: 63 IN | RESPIRATION RATE: 18 BRPM | DIASTOLIC BLOOD PRESSURE: 60 MMHG | HEART RATE: 65 BPM | OXYGEN SATURATION: 96 % | TEMPERATURE: 98.5 F

## 2023-04-07 DIAGNOSIS — S81.812A LACERATION OF LEFT LOWER EXTREMITY, INITIAL ENCOUNTER: Primary | ICD-10-CM

## 2023-04-07 LAB
ALBUMIN SERPL-MCNC: 3.5 G/DL (ref 3.5–5.2)
ALBUMIN/GLOB SERPL: 1.3 G/DL
ALP SERPL-CCNC: 133 U/L (ref 39–117)
ALT SERPL W P-5'-P-CCNC: 15 U/L (ref 1–33)
ANION GAP SERPL CALCULATED.3IONS-SCNC: 10 MMOL/L (ref 5–15)
AST SERPL-CCNC: 34 U/L (ref 1–32)
BASOPHILS # BLD AUTO: 0.02 10*3/MM3 (ref 0–0.2)
BASOPHILS NFR BLD AUTO: 0.5 % (ref 0–1.5)
BILIRUB SERPL-MCNC: 0.9 MG/DL (ref 0–1.2)
BUN SERPL-MCNC: 13 MG/DL (ref 8–23)
BUN/CREAT SERPL: 21.7 (ref 7–25)
CALCIUM SPEC-SCNC: 8.5 MG/DL (ref 8.6–10.5)
CHLORIDE SERPL-SCNC: 102 MMOL/L (ref 98–107)
CO2 SERPL-SCNC: 27 MMOL/L (ref 22–29)
CREAT SERPL-MCNC: 0.6 MG/DL (ref 0.57–1)
DEPRECATED RDW RBC AUTO: 53.8 FL (ref 37–54)
EGFRCR SERPLBLD CKD-EPI 2021: 94.3 ML/MIN/1.73
EOSINOPHIL # BLD AUTO: 0.18 10*3/MM3 (ref 0–0.4)
EOSINOPHIL NFR BLD AUTO: 4.9 % (ref 0.3–6.2)
ERYTHROCYTE [DISTWIDTH] IN BLOOD BY AUTOMATED COUNT: 16.4 % (ref 12.3–15.4)
GLOBULIN UR ELPH-MCNC: 2.6 GM/DL
GLUCOSE SERPL-MCNC: 105 MG/DL (ref 65–99)
HCT VFR BLD AUTO: 30.9 % (ref 34–46.6)
HGB BLD-MCNC: 11.1 G/DL (ref 12–15.9)
LYMPHOCYTES # BLD AUTO: 1.94 10*3/MM3 (ref 0.7–3.1)
LYMPHOCYTES NFR BLD AUTO: 52.4 % (ref 19.6–45.3)
MCH RBC QN AUTO: 37.2 PG (ref 26.6–33)
MCHC RBC AUTO-ENTMCNC: 35.9 G/DL (ref 31.5–35.7)
MCV RBC AUTO: 103.7 FL (ref 79–97)
MONOCYTES # BLD AUTO: 0.41 10*3/MM3 (ref 0.1–0.9)
MONOCYTES NFR BLD AUTO: 11.1 % (ref 5–12)
NEUTROPHILS NFR BLD AUTO: 1.14 10*3/MM3 (ref 1.7–7)
NEUTROPHILS NFR BLD AUTO: 30.8 % (ref 42.7–76)
PLATELET # BLD AUTO: 76 10*3/MM3 (ref 140–450)
PMV BLD AUTO: 10.7 FL (ref 6–12)
POTASSIUM SERPL-SCNC: 3.6 MMOL/L (ref 3.5–5.2)
PROT SERPL-MCNC: 6.1 G/DL (ref 6–8.5)
RBC # BLD AUTO: 2.98 10*6/MM3 (ref 3.77–5.28)
SODIUM SERPL-SCNC: 139 MMOL/L (ref 136–145)
WBC NRBC COR # BLD: 3.7 10*3/MM3 (ref 3.4–10.8)

## 2023-04-07 PROCEDURE — 25010000002 TETANUS-DIPHTH-ACELL PERTUSSIS 5-2.5-18.5 LF-MCG/0.5 SUSPENSION PREFILLED SYRINGE: Performed by: NURSE PRACTITIONER

## 2023-04-07 PROCEDURE — 25010000002 FENTANYL CITRATE (PF) 100 MCG/2ML SOLUTION

## 2023-04-07 PROCEDURE — 12005 RPR S/N/A/GEN/TRK12.6-20.0CM: CPT | Performed by: SPECIALIST

## 2023-04-07 PROCEDURE — 90715 TDAP VACCINE 7 YRS/> IM: CPT | Performed by: NURSE PRACTITIONER

## 2023-04-07 PROCEDURE — 25010000002 PROPOFOL 10 MG/ML EMULSION

## 2023-04-07 PROCEDURE — 25010000002 CEFAZOLIN PER 500 MG: Performed by: STUDENT IN AN ORGANIZED HEALTH CARE EDUCATION/TRAINING PROGRAM

## 2023-04-07 PROCEDURE — 25010000002 CEFAZOLIN PER 500 MG: Performed by: SPECIALIST

## 2023-04-07 PROCEDURE — 99284 EMERGENCY DEPT VISIT MOD MDM: CPT | Performed by: SPECIALIST

## 2023-04-07 PROCEDURE — 85025 COMPLETE CBC W/AUTO DIFF WBC: CPT | Performed by: NURSE PRACTITIONER

## 2023-04-07 PROCEDURE — 99284 EMERGENCY DEPT VISIT MOD MDM: CPT

## 2023-04-07 PROCEDURE — 90471 IMMUNIZATION ADMIN: CPT | Performed by: NURSE PRACTITIONER

## 2023-04-07 PROCEDURE — 80053 COMPREHEN METABOLIC PANEL: CPT | Performed by: NURSE PRACTITIONER

## 2023-04-07 PROCEDURE — 73590 X-RAY EXAM OF LOWER LEG: CPT

## 2023-04-07 RX ORDER — HYDROCODONE BITARTRATE AND ACETAMINOPHEN 7.5; 325 MG/1; MG/1
1 TABLET ORAL ONCE AS NEEDED
Status: DISCONTINUED | OUTPATIENT
Start: 2023-04-07 | End: 2023-04-07 | Stop reason: HOSPADM

## 2023-04-07 RX ORDER — FENTANYL CITRATE 50 UG/ML
25 INJECTION, SOLUTION INTRAMUSCULAR; INTRAVENOUS
Status: DISCONTINUED | OUTPATIENT
Start: 2023-04-07 | End: 2023-04-07 | Stop reason: HOSPADM

## 2023-04-07 RX ORDER — OXYCODONE AND ACETAMINOPHEN 10; 325 MG/1; MG/1
1 TABLET ORAL ONCE AS NEEDED
Status: COMPLETED | OUTPATIENT
Start: 2023-04-07 | End: 2023-04-07

## 2023-04-07 RX ORDER — LABETALOL HYDROCHLORIDE 5 MG/ML
5 INJECTION, SOLUTION INTRAVENOUS
Status: DISCONTINUED | OUTPATIENT
Start: 2023-04-07 | End: 2023-04-07 | Stop reason: HOSPADM

## 2023-04-07 RX ORDER — LIDOCAINE HYDROCHLORIDE 10 MG/ML
0.5 INJECTION, SOLUTION EPIDURAL; INFILTRATION; INTRACAUDAL; PERINEURAL ONCE AS NEEDED
Status: DISCONTINUED | OUTPATIENT
Start: 2023-04-07 | End: 2023-04-07 | Stop reason: HOSPADM

## 2023-04-07 RX ORDER — FLUMAZENIL 0.1 MG/ML
0.2 INJECTION INTRAVENOUS AS NEEDED
Status: DISCONTINUED | OUTPATIENT
Start: 2023-04-07 | End: 2023-04-07 | Stop reason: HOSPADM

## 2023-04-07 RX ORDER — SODIUM CHLORIDE 0.9 % (FLUSH) 0.9 %
10 SYRINGE (ML) INJECTION AS NEEDED
Status: DISCONTINUED | OUTPATIENT
Start: 2023-04-07 | End: 2023-04-07 | Stop reason: HOSPADM

## 2023-04-07 RX ORDER — DROPERIDOL 2.5 MG/ML
0.62 INJECTION, SOLUTION INTRAMUSCULAR; INTRAVENOUS ONCE AS NEEDED
Status: DISCONTINUED | OUTPATIENT
Start: 2023-04-07 | End: 2023-04-07 | Stop reason: HOSPADM

## 2023-04-07 RX ORDER — ROCURONIUM BROMIDE 10 MG/ML
INJECTION, SOLUTION INTRAVENOUS AS NEEDED
Status: DISCONTINUED | OUTPATIENT
Start: 2023-04-07 | End: 2023-04-07 | Stop reason: SURG

## 2023-04-07 RX ORDER — BUPIVACAINE HCL/0.9 % NACL/PF 0.1 %
2 PLASTIC BAG, INJECTION (ML) EPIDURAL ONCE
Status: CANCELLED | OUTPATIENT
Start: 2023-04-07 | End: 2023-04-07

## 2023-04-07 RX ORDER — SODIUM CHLORIDE 0.9 % (FLUSH) 0.9 %
3-10 SYRINGE (ML) INJECTION AS NEEDED
Status: DISCONTINUED | OUTPATIENT
Start: 2023-04-07 | End: 2023-04-07 | Stop reason: HOSPADM

## 2023-04-07 RX ORDER — ONDANSETRON 2 MG/ML
4 INJECTION INTRAMUSCULAR; INTRAVENOUS ONCE AS NEEDED
Status: DISCONTINUED | OUTPATIENT
Start: 2023-04-07 | End: 2023-04-07 | Stop reason: HOSPADM

## 2023-04-07 RX ORDER — BUPRENORPHINE 15 UG/H
PATCH TRANSDERMAL
Status: ON HOLD | COMMUNITY
Start: 2023-04-05 | End: 2023-04-19

## 2023-04-07 RX ORDER — SODIUM CHLORIDE 0.9 % (FLUSH) 0.9 %
3 SYRINGE (ML) INJECTION EVERY 12 HOURS SCHEDULED
Status: DISCONTINUED | OUTPATIENT
Start: 2023-04-07 | End: 2023-04-07 | Stop reason: HOSPADM

## 2023-04-07 RX ORDER — SODIUM CHLORIDE, SODIUM LACTATE, POTASSIUM CHLORIDE, CALCIUM CHLORIDE 600; 310; 30; 20 MG/100ML; MG/100ML; MG/100ML; MG/100ML
100 INJECTION, SOLUTION INTRAVENOUS CONTINUOUS
Status: DISCONTINUED | OUTPATIENT
Start: 2023-04-07 | End: 2023-04-07 | Stop reason: HOSPADM

## 2023-04-07 RX ORDER — NEOSTIGMINE METHYLSULFATE 5 MG/5 ML
SYRINGE (ML) INTRAVENOUS AS NEEDED
Status: DISCONTINUED | OUTPATIENT
Start: 2023-04-07 | End: 2023-04-07 | Stop reason: SURG

## 2023-04-07 RX ORDER — MAGNESIUM HYDROXIDE 1200 MG/15ML
LIQUID ORAL AS NEEDED
Status: DISCONTINUED | OUTPATIENT
Start: 2023-04-07 | End: 2023-04-07 | Stop reason: HOSPADM

## 2023-04-07 RX ORDER — LIDOCAINE HYDROCHLORIDE 20 MG/ML
INJECTION, SOLUTION EPIDURAL; INFILTRATION; INTRACAUDAL; PERINEURAL AS NEEDED
Status: DISCONTINUED | OUTPATIENT
Start: 2023-04-07 | End: 2023-04-07 | Stop reason: SURG

## 2023-04-07 RX ORDER — SODIUM CHLORIDE 9 MG/ML
40 INJECTION, SOLUTION INTRAVENOUS AS NEEDED
Status: DISCONTINUED | OUTPATIENT
Start: 2023-04-07 | End: 2023-04-07 | Stop reason: HOSPADM

## 2023-04-07 RX ORDER — LIDOCAINE HYDROCHLORIDE AND EPINEPHRINE BITARTRATE 20; .01 MG/ML; MG/ML
10 INJECTION, SOLUTION SUBCUTANEOUS ONCE
Status: COMPLETED | OUTPATIENT
Start: 2023-04-07 | End: 2023-04-07

## 2023-04-07 RX ORDER — HYDROCODONE BITARTRATE AND ACETAMINOPHEN 10; 325 MG/1; MG/1
1 TABLET ORAL EVERY 4 HOURS PRN
Qty: 20 TABLET | Refills: 0 | Status: SHIPPED | OUTPATIENT
Start: 2023-04-07

## 2023-04-07 RX ORDER — FENTANYL CITRATE 50 UG/ML
INJECTION, SOLUTION INTRAMUSCULAR; INTRAVENOUS AS NEEDED
Status: DISCONTINUED | OUTPATIENT
Start: 2023-04-07 | End: 2023-04-07 | Stop reason: SURG

## 2023-04-07 RX ORDER — NALOXONE HCL 0.4 MG/ML
0.4 VIAL (ML) INJECTION AS NEEDED
Status: DISCONTINUED | OUTPATIENT
Start: 2023-04-07 | End: 2023-04-07 | Stop reason: HOSPADM

## 2023-04-07 RX ORDER — PROPOFOL 10 MG/ML
VIAL (ML) INTRAVENOUS AS NEEDED
Status: DISCONTINUED | OUTPATIENT
Start: 2023-04-07 | End: 2023-04-07 | Stop reason: SURG

## 2023-04-07 RX ADMIN — LIDOCAINE HYDROCHLORIDE,EPINEPHRINE BITARTRATE 10 ML: 20; .01 INJECTION, SOLUTION INFILTRATION; PERINEURAL at 11:18

## 2023-04-07 RX ADMIN — SODIUM CHLORIDE, POTASSIUM CHLORIDE, SODIUM LACTATE AND CALCIUM CHLORIDE 100 ML/HR: 600; 310; 30; 20 INJECTION, SOLUTION INTRAVENOUS at 12:56

## 2023-04-07 RX ADMIN — LIDOCAINE HYDROCHLORIDE 50 MG: 20 INJECTION, SOLUTION EPIDURAL; INFILTRATION; INTRACAUDAL; PERINEURAL at 14:45

## 2023-04-07 RX ADMIN — ROCURONIUM BROMIDE 30 MG: 10 INJECTION, SOLUTION INTRAVENOUS at 14:45

## 2023-04-07 RX ADMIN — TETANUS TOXOID, REDUCED DIPHTHERIA TOXOID AND ACELLULAR PERTUSSIS VACCINE, ADSORBED 0.5 ML: 5; 2.5; 8; 8; 2.5 SUSPENSION INTRAMUSCULAR at 09:13

## 2023-04-07 RX ADMIN — Medication 3.5 MG: at 15:08

## 2023-04-07 RX ADMIN — FENTANYL CITRATE 50 MCG: 50 INJECTION INTRAMUSCULAR; INTRAVENOUS at 14:45

## 2023-04-07 RX ADMIN — PROPOFOL INJECTABLE EMULSION 100 MG: 10 INJECTION, EMULSION INTRAVENOUS at 14:45

## 2023-04-07 RX ADMIN — GLYCOPYRROLATE 0.4 MG: 0.2 INJECTION INTRAMUSCULAR; INTRAVENOUS at 15:08

## 2023-04-07 RX ADMIN — CEFAZOLIN 2 G: 2 INJECTION, POWDER, FOR SOLUTION INTRAMUSCULAR; INTRAVENOUS at 14:49

## 2023-04-07 RX ADMIN — SODIUM CHLORIDE 1 G: 900 INJECTION INTRAVENOUS at 11:18

## 2023-04-07 RX ADMIN — OXYCODONE HYDROCHLORIDE AND ACETAMINOPHEN 1 TABLET: 10; 325 TABLET ORAL at 15:35

## 2023-04-07 RX ADMIN — FENTANYL CITRATE 50 MCG: 50 INJECTION INTRAMUSCULAR; INTRAVENOUS at 14:41

## 2023-04-07 NOTE — ANESTHESIA PROCEDURE NOTES
Airway  Urgency: elective    Date/Time: 4/7/2023 2:46 PM  Airway not difficult    General Information and Staff    Patient location during procedure: OR  CRNA/CAA: Piero Burt CRNA    Indications and Patient Condition  Indications for airway management: airway protection    Preoxygenated: yes  Mask difficulty assessment: 1 - vent by mask    Final Airway Details  Final airway type: endotracheal airway      Successful airway: ETT  Cuffed: yes   Successful intubation technique: direct laryngoscopy  Facilitating devices/methods: intubating stylet  Blade: Obrien  Blade size: 2  ETT size (mm): 7.0  Cormack-Lehane Classification: grade I - full view of glottis  Placement verified by: chest auscultation and capnometry   Measured from: lips  ETT/EBT  to lips (cm): 18  Number of attempts at approach: 1  Assessment: lips, teeth, and gum same as pre-op and atraumatic intubation

## 2023-04-07 NOTE — ANESTHESIA PREPROCEDURE EVALUATION
Anesthesia Evaluation     Patient summary reviewed   no history of anesthetic complications:  NPO Solid Status: > 8 hours  NPO Liquid Status: > 8 hours           Airway   Mallampati: I  TM distance: >3 FB  Neck ROM: full  No difficulty expected  Dental - normal exam     Pulmonary - negative pulmonary ROS   Cardiovascular   Exercise tolerance: poor (<4 METS)    (+) hypertension,       Neuro/Psych- negative ROS  GI/Hepatic/Renal/Endo    (+) morbid obesity,  renal disease stones, thyroid problem     Musculoskeletal     (+) back pain, chronic pain,   Abdominal    Substance History      OB/GYN          Other   arthritis, blood dyscrasia anemia thrombocytopenia,                     Anesthesia Plan    ASA 3 - emergent     general     (Discussed new finding of thrombocytopenia and anemia with patient. Will discuss with Dr. Vazquez and ensure plan for close followup. Will need for plts to surgeon and primary team)  intravenous induction     Anesthetic plan, risks, benefits, and alternatives have been provided, discussed and informed consent has been obtained with: patient.        CODE STATUS:

## 2023-04-07 NOTE — ED NOTES
Upon undressing and assessing wound, small area of steady bleeding noted on lateral aspect of laceration; pressure dressing applied; NP notified.

## 2023-04-07 NOTE — OP NOTE
Preoperative diagnosis:     Laceration left lower leg  Postoperative diagnosis:    same  Surgeon:     Sarita Vazquez MD FACS  Procedure:    Washout and primary repair laceration left lower leg, 15cm  Anesthesia:    Get    EBL:     minimal  IVF:     See anesthesia notes  Indications:     The patient is a 72 y.o. -year-old female who presents after laceration of the anterior left shin.  She was seen in the ED and now presents for washout and closure.  The risks, benefits, complications, and possible alternatives of the procedure were discussed with the patient who agreed to proceed.  Description of procedure:  The patient was laid supine.  The left leg was prepped and draped in the usual sterile fashion.  The skin was very thin and bruised.  There was an oblique laceration of the anterior mid shin.  The wound appeared clean.  Three liters of sterile saline were used to irrigate with the pulse .  The skin edges were reapproximated with staples and 3-0 nylon in simple interrupted fashion.  Neosporin, 4 x 4's, ABD's, and Kerlix rolls were applied.Mastisol, steri-strips, and dry dressings were applied.  The sponge, needle, and instrument counts were correct at the end of the case.  Findings:    Very thin skin with ecchymoses, clean wound ;    Complications:   None  Disposition:    Good to PACU

## 2023-04-07 NOTE — H&P
Sarita Vazquez MD FACS History and Physical     Referring Provider: Provider, No Known    Patient Care Team:  Sachin Dia MD as PCP - General  Sachin Dia MD as PCP - Family Medicine  Sumeet Bowles PA as Physician Assistant (Family Medicine)    Chief complaint:  Laceration left shin    Subjective .   History of present illness:  The patient is a 72 y.o. female who presents to the ED complaining of lacerating her anterior mid shin on her bed.  Repair was attempted by the ED staff, but they called for surgical consultation for assistance.    History:    Past Medical History:   Diagnosis Date   • Arthritis    • Bronchitis    • Cataract     right eye , cornea implant   • Chronic back pain    • Depression    • Disease of thyroid gland    • Edema     lower extremities   • Hypertension    • Kidney stones    ,   Past Surgical History:   Procedure Laterality Date   • BACK SURGERY      2010 ([place]), 2013    • CHOLECYSTECTOMY OPEN     • CORNEAL TRANSPLANT Right 1999   • LUMBAR FUSION Left 8/2/2017    Procedure: LEFT LUMBAR LATERAL INTERBODY FUSION WITH INSTRUMENTATION  L1-2;  Surgeon: MARCELINO Wright MD;  Location: [facility] OR;  Service:    • NECK SURGERY  2010    [place]   • TOTAL HIP ARTHROPLASTY Right 2014    DR. KENDRICK    • TOTAL KNEE ARTHROPLASTY Right 2000   • TOTAL KNEE ARTHROPLASTY Left 2002   • TOTAL SHOULDER ARTHROPLASTY W/ DISTAL CLAVICLE EXCISION Left 6/1/2020    Procedure: LEFT REVERSE TOTAL SHOULDER REPLACEMENT;  Surgeon: Saravanan Calvin MD;  Location: [facility] OR;  Service: Orthopedics;  Laterality: Left;   ,   Family History   Problem Relation Age of Onset   • Breast cancer Neg Hx    ,   Social History     Tobacco Use   • Smoking status: Never   • Smokeless tobacco: Never   Substance Use Topics   • Alcohol use: No   • Drug use: No   ,   Medications Prior to Admission   Medication Sig Dispense Refill Last Dose   • bisoprolol-hydrochlorothiazide (ZIAC) 5-6.25 MG per tablet Take 1  tablet by mouth Daily.   4/6/2023 at 1000   • Buprenorphine 15 MCG/HR patch weekly APPLY 1 PATCH TOPICALLY TO THE SKIN EVERY 7 DAYS   4/5/2023   • cyclobenzaprine (FLEXERIL) 10 MG tablet Take 1 tablet by mouth 3 (Three) Times a Day As Needed for Muscle Spasms. 30 tablet 0 Past Week   • docusate sodium (Colace) 100 MG capsule Take 1 capsule by mouth 2 (Two) Times a Day. 60 capsule 1 Past Week   • DULoxetine (CYMBALTA) 60 MG capsule Take 1 capsule by mouth Daily.   Past Week   • famotidine (PEPCID) 20 MG tablet Take 1 tablet by mouth 2 (Two) Times a Day.   Past Month   • furosemide (LASIX) 40 MG tablet Take 1 tablet by mouth Daily As Needed (PRN EDEMA).   Past Week   • levothyroxine (SYNTHROID, LEVOTHROID) 100 MCG tablet Take 1 tablet by mouth Daily.   Past Week   • nystatin (MYCOSTATIN) 954214 UNIT/GM cream Apply 1 application topically to the appropriate area as directed 3 (Three) Times a Day As Needed.   Past Month   • ondansetron (Zofran) 4 MG tablet Take 1 tablet by mouth Every 8 (Eight) Hours As Needed for Nausea or Vomiting. 20 tablet 1 Past Month   • pramipexole (MIRAPEX) 0.75 MG tablet Take 1 tablet by mouth every night at bedtime.   Past Week   • vitamin C (ASCORBIC ACID) 500 MG tablet Take 1 tablet by mouth Daily.   Past Week   • vitamin D3 125 MCG (5000 UT) capsule capsule Take 1 capsule by mouth Daily.   Past Week   • gabapentin (NEURONTIN) 100 MG capsule Take 1 capsule by mouth 2 (Two) Times a Day.      • HYDROcodone-acetaminophen (Norco)  MG per tablet Take 1 tablet by mouth Every 4 (Four) Hours As Needed for Moderate Pain . 70 tablet 0    • Morphine (MSIR) 15 MG tablet Take 1 tablet by mouth 3 (Three) Times a Day.      • silver sulfadiazine (SILVADENE, SSD) 1 % cream Apply 1 application topically to the appropriate area as directed Every 12 (Twelve) Hours. 25 g 1     and Allergies:  Codeine and Benadryl [diphenhydramine]    Current Facility-Administered Medications:   •  atropine sulfate  injection 0.5 mg, 0.5 mg, Intravenous, Once PRN, Mellissa Jon MD  •  droperidol (INAPSINE) injection 0.625 mg, 0.625 mg, Intravenous, Once PRN **OR** droperidol (INAPSINE) injection 0.625 mg, 0.625 mg, Intramuscular, Once PRN, Mellissa Jon MD  •  fentaNYL citrate (PF) (SUBLIMAZE) injection 25 mcg, 25 mcg, Intravenous, Q5 Min PRN, Mellissa Jon MD  •  flumazenil (ROMAZICON) injection 0.2 mg, 0.2 mg, Intravenous, PRN, Mellissa Jon MD  •  HYDROcodone-acetaminophen (NORCO) 7.5-325 MG per tablet 1 tablet, 1 tablet, Oral, Once PRN, Sarita Vazquez MD  •  labetalol (NORMODYNE,TRANDATE) injection 5 mg, 5 mg, Intravenous, Q5 Min PRN, Mellissa Jon MD  •  lactated ringers infusion, 100 mL/hr, Intravenous, Continuous, Mellissa Jon MD, Last Rate: 100 mL/hr at 04/07/23 1439, Currently Infusing at 04/07/23 1439  •  naloxone (NARCAN) injection 0.4 mg, 0.4 mg, Intravenous, PRN, Mellissa Jon MD  •  ondansetron (ZOFRAN) injection 4 mg, 4 mg, Intravenous, Once PRN, Mellissa Jon MD  •  oxyCODONE-acetaminophen (PERCOCET)  MG per tablet 1 tablet, 1 tablet, Oral, Once PRN, Mellissa Jon MD  •  [COMPLETED] Insert Peripheral IV, , , Once **AND** [MAR Hold] sodium chloride 0.9 % flush 10 mL, 10 mL, Intravenous, PRN, Marry Marsh, APRN    Review of Systems:    All organ systems were evaluated and found negative except those which are mentioned in the History of Present Illness.      Objective     Physical Exam:    Vital Signs:  Temp:  [98 °F (36.7 °C)-98.5 °F (36.9 °C)] 98 °F (36.7 °C)  Heart Rate:  [56-70] 63  Resp:  [11-18] 11  BP: (132-159)/(53-92) 132/57    Constitutional:    Well-developed, well-nourished in no acute distress  Eyes:     Extraocular movements intact; pupils equal, round, and reactive  Ears, Nose, Mouth, Throat:  Hearing intact, nose midline, no mucosal lesions  Cardiovascular:   Regular rate and rhythm    Respiratory:    Clear to auscultation bilaterally  Gastrointestinal:   Soft, nontender, nondistended  Genitourinary:    Deferred  Musculoskeletal:   Full range of motion, no muscle wasting, no weakness  Skin:     Thin skin, oblique laceration left mid shin, clean, jagged  Neurological:    Moves all extremities, sensation intact  Psychiatric:    Alert and oriented to person, place, and time  Hematologic/Lymphatic/Immune: No lymphadenopathy      Results Review:     Lab Results (last 24 hours)     Procedure Component Value Units Date/Time    CBC & Differential [[id number]]  (Abnormal) Collected: 04/07/23 0921    Specimen: Blood Updated: 04/07/23 1003    Narrative:      The following orders were created for panel order CBC & Differential.  Procedure                               Abnormality         Status                     ---------                               -----------         ------                     CBC Auto Differential[[id number]]        Abnormal            Final result                 Please view results for these tests on the individual orders.    CBC Auto Differential [[id number]]  (Abnormal) Collected: 04/07/23 0921    Specimen: Blood Updated: 04/07/23 1003     WBC 3.70 10*3/mm3      RBC 2.98 10*6/mm3      Hemoglobin 11.1 g/dL      Hematocrit 30.9 %      .7 fL      MCH 37.2 pg      MCHC 35.9 g/dL      RDW 16.4 %      RDW-SD 53.8 fl      MPV 10.7 fL      Platelets 76 10*3/mm3      Neutrophil % 30.8 %      Lymphocyte % 52.4 %      Monocyte % 11.1 %      Eosinophil % 4.9 %      Basophil % 0.5 %      Neutrophils, Absolute 1.14 10*3/mm3      Lymphocytes, Absolute 1.94 10*3/mm3      Monocytes, Absolute 0.41 10*3/mm3      Eosinophils, Absolute 0.18 10*3/mm3      Basophils, Absolute 0.02 10*3/mm3     Narrative:      Heated 15 min at 37 degrees    Comprehensive Metabolic Panel [[id number]]  (Abnormal) Collected: 04/07/23 0921    Specimen: Blood Updated: 04/07/23 0949     Glucose 105 mg/dL      BUN 13 mg/dL       Creatinine 0.60 mg/dL      Sodium 139 mmol/L      Potassium 3.6 mmol/L      Comment: Slight hemolysis detected by analyzer. Results may be affected.        Chloride 102 mmol/L      CO2 27.0 mmol/L      Calcium 8.5 mg/dL      Total Protein 6.1 g/dL      Albumin 3.5 g/dL      ALT (SGPT) 15 U/L      AST (SGOT) 34 U/L      Alkaline Phosphatase 133 U/L      Total Bilirubin 0.9 mg/dL      Globulin 2.6 gm/dL      A/G Ratio 1.3 g/dL      BUN/Creatinine Ratio 21.7     Anion Gap 10.0 mmol/L      eGFR 94.3 mL/min/1.73     Narrative:      GFR Normal >60  Chronic Kidney Disease <60  Kidney Failure <15    The GFR formula is only valid for adults with stable renal function between ages 18 and 70.        Imaging Results (Last 24 Hours)     Procedure Component Value Units Date/Time    XR Tibia Fibula 2 View Left [[id number]] Collected: 04/07/23 0936     Updated: 04/07/23 0940    Narrative:      EXAMINATION: XR TIBIA FIBULA 2 VW LEFT-     4/7/2023 9:25 AM CDT     HISTORY: large laceration; trauma; fall     Left tibia/fibula, 2 views.     Mid lower leg soft tissue injury with bandage.  No soft tissue foreign body.     Intact tibia and fibula.     Normal appearance of the left knee prosthesis.     Normal ankle joint.     Summary:  1. Soft tissue injury with no foreign body or fracture.  This report was finalized on 04/07/2023 09:37 by Dr. Ben Crockett MD.            Assessment & Plan     Laceration left shin    Will be undergo washout and closure.  Her integrity of her skin is poor.  She may not heal well and require debridement and vac placement.    The patient will be scheduled for prework testing including:  COVID; cbc, cmp, EKG, and CXR will be performed dependent upon anesthesia requirements.    Sarita Vazquez MD  04/07/23  15:31 CDT

## 2023-04-07 NOTE — ANESTHESIA POSTPROCEDURE EVALUATION
"Patient: Brenda Sneed    Procedure Summary     Date: 04/07/23 Room / Location:  [facility] OR  /  [facility]    Anesthesia Start: 1439 Anesthesia Stop: 1518    Procedure: washout and closure left leg wound  (Left) Diagnosis:       Laceration of left lower extremity, initial encounter      (Laceration of left lower extremity, initial encounter [S81.812A])    Surgeons: Sarita Vazquez MD Provider: Piero Burt CRNA    Anesthesia Type: general ASA Status: 3 - Emergent          Anesthesia Type: general    Vitals  Vitals Value Taken Time   /53 04/07/23 1542   Temp 98.5 °F (36.9 °C) 04/07/23 1530   Pulse 59 04/07/23 1543   Resp 11 04/07/23 1530   SpO2 100 % 04/07/23 1543   Vitals shown include unvalidated device data.        Post Anesthesia Care and Evaluation    Patient location during evaluation: PACU  Patient participation: complete - patient participated  Level of consciousness: awake and alert  Pain management: adequate    Airway patency: patent  Anesthetic complications: No anesthetic complications  PONV Status: none  Cardiovascular status: acceptable and hemodynamically stable  Respiratory status: acceptable  Hydration status: acceptable    Comments: Blood pressure 116/60, pulse 59, temperature 98.5 °F (36.9 °C), resp. rate 16, height 160 cm (63\"), weight 113 kg (250 lb), SpO2 96 %, not currently breastfeeding.    Patient discharged from PACU based upon Hanh score. Please see RN notes for further details      "

## 2023-04-08 ENCOUNTER — NURSE TRIAGE (OUTPATIENT)
Dept: CALL CENTER | Facility: HOSPITAL | Age: 73
End: 2023-04-08
Payer: MEDICARE

## 2023-04-08 ENCOUNTER — DOCUMENTATION (OUTPATIENT)
Dept: SOCIAL WORK | Facility: HOSPITAL | Age: 73
End: 2023-04-08
Payer: MEDICARE

## 2023-04-08 NOTE — TELEPHONE ENCOUNTER
"Spoke with case management regarding dressing supplies. She will investigate and call the daughter back at   [phone]    Reason for Disposition  • [1] Caller requesting NON-URGENT health information AND [2] PCP's office is the best resource    Additional Information  • Negative: [1] Caller is not with the adult (patient) AND [2] reporting urgent symptoms  • Negative: Lab result questions  • Negative: Medication questions  • Negative: Caller can't be reached by phone  • Negative: Caller has already spoken to PCP or another triager  • Negative: RN needs further essential information from caller in order to complete triage  • Negative: Requesting regular office appointment    Answer Assessment - Initial Assessment Questions  1. REASON FOR CALL or QUESTION: \"What is your reason for calling today?\" or \"How can I best help you?\" or \"What question do you have that I can help answer?\"    Was evaluated at the ED yesterday for a skin tear on her leg. She was taken to OR for the procedure.  At discharge they gave her one kerlex and 1 4x4 They are needing supplies. She is suppose to change the bandage 3 times a day.  Advised supplies can be purchased at the drug store. Caller says 1 kerlix is $3 and the dressing is changed 3 times a day.    Protocols used: INFORMATION ONLY CALL - NO TRIAGE-ADULT-      "

## 2023-04-08 NOTE — ED PROVIDER NOTES
Subjective   History of Present Illness  Patient is a 72-year-old female who presents to the ER via EMS due to a fall and laceration.  Patient states her cat knocked off her telephone from her bed.  She states she had gotten up and walked around her bed and was attempting to retrieve the cell phone from behind her bed.  She states that she bent over her legs gave out and she fell awkwardly.  She did not hit her head and had no loss of consciousness.  She sustained a large laceration to her left lower leg on the bed frame of her bed.  She is uncertain if she is up-to-date on her Tdap.  She presents for laceration repair.  She denies any back pain, no loss of bowel or bladder control, no saddle anesthesia. Past medical history significant for arthritis, chronic back pain, depression, thyroid disease, hypertension, obesity.        Review of Systems   Constitutional: Negative.  Negative for fever.   HENT: Negative.  Negative for congestion.    Respiratory: Negative.  Negative for cough and shortness of breath.    Cardiovascular: Negative.  Negative for chest pain.   Gastrointestinal: Negative.  Negative for abdominal pain, diarrhea, nausea and vomiting.   Genitourinary: Negative.    Musculoskeletal: Negative for back pain and neck pain.        Positive for left lower leg pain   Skin: Positive for wound.        Positive for laceration left lower leg   Neurological: Negative.  Negative for numbness.   All other systems reviewed and are negative.      Past Medical History:   Diagnosis Date   • Arthritis    • Bronchitis    • Cataract     right eye , cornea implant   • Chronic back pain    • Depression    • Disease of thyroid gland    • Edema     lower extremities   • Hypertension    • Kidney stones        Allergies   Allergen Reactions   • Codeine Itching and Rash     SEVERE RASH/ITCHING (TOLERATES PERCOCET)   • Benadryl [Diphenhydramine] Other (See Comments)     KEEPS PATIENT AWAKE        Past Surgical History:   Procedure  Laterality Date   • BACK SURGERY      2010 ([place]), 2013    • CHOLECYSTECTOMY OPEN     • CORNEAL TRANSPLANT Right 1999   • LUMBAR FUSION Left 8/2/2017    Procedure: LEFT LUMBAR LATERAL INTERBODY FUSION WITH INSTRUMENTATION  L1-2;  Surgeon: MARCELINO Wright MD;  Location:  [facility] OR;  Service:    • NECK SURGERY  2010    [place]   • TOTAL HIP ARTHROPLASTY Right 2014    DR. KENDRICK    • TOTAL KNEE ARTHROPLASTY Right 2000   • TOTAL KNEE ARTHROPLASTY Left 2002   • TOTAL SHOULDER ARTHROPLASTY W/ DISTAL CLAVICLE EXCISION Left 6/1/2020    Procedure: LEFT REVERSE TOTAL SHOULDER REPLACEMENT;  Surgeon: Saravanan Calvin MD;  Location:  [facility] OR;  Service: Orthopedics;  Laterality: Left;       Family History   Problem Relation Age of Onset   • Breast cancer Neg Hx        Social History     Socioeconomic History   • Marital status:    Tobacco Use   • Smoking status: Never   • Smokeless tobacco: Never   Substance and Sexual Activity   • Alcohol use: No   • Drug use: No   • Sexual activity: Defer           Objective   Physical Exam  Vitals and nursing note reviewed.   Constitutional:       Appearance: Normal appearance. She is well-developed. She is obese.   HENT:      Head: Normocephalic and atraumatic.      Right Ear: External ear normal.      Left Ear: External ear normal.      Nose: Nose normal.      Mouth/Throat:      Pharynx: Oropharynx is clear.   Eyes:      Extraocular Movements: Extraocular movements intact.      Conjunctiva/sclera: Conjunctivae normal.      Pupils: Pupils are equal, round, and reactive to light.   Cardiovascular:      Rate and Rhythm: Normal rate and regular rhythm.      Pulses: Normal pulses.      Heart sounds: Normal heart sounds.   Pulmonary:      Effort: Pulmonary effort is normal.      Breath sounds: Normal breath sounds.   Abdominal:      General: Bowel sounds are normal.      Palpations: Abdomen is soft.   Musculoskeletal:         General: Tenderness present. Normal range of motion.       Cervical back: Normal range of motion and neck supple.      Right lower leg: Edema present.      Left lower leg: Edema present.      Comments: approx 19 cm in length laceration to the anterior aspect of the left lower leg, gaping laceration with very thin skin. skin tear noted to the left thigh, bleeding currently controlled to both wounds; pulses palpable bilaterally; bruising noted around the laceration   Skin:     General: Skin is warm and dry.      Capillary Refill: Capillary refill takes less than 2 seconds.   Neurological:      Mental Status: She is alert and oriented to person, place, and time.   Psychiatric:         Mood and Affect: Mood normal.         Behavior: Behavior normal.         Thought Content: Thought content normal.         Judgment: Judgment normal.         Procedures           ED Course   ED Course as of 04/07/23 2006 Fri Apr 07, 2023   1159 Attempted closure with minimal success. Spoke with Dr. Vazquez who evaluated and will be taking patient to the OR to close the wound. [TW]      ED Course User Index  [TW] Marry Marsh APRN                                           Medical Decision Making  Patient is a 72-year-old female who presents to the ER via EMS due to a fall and laceration.  Patient states her cat knocked off her telephone from her bed.  She states she had gotten up and walked around her bed and was attempting to retrieve the cell phone from behind her bed.  She states that she bent over her legs gave out and she fell awkwardly.  She did not hit her head and had no loss of consciousness.  She sustained a large laceration to her left lower leg on the bed frame of her bed.  She is uncertain if she is up-to-date on her Tdap.  She presents for laceration repair.  Past medical history significant for arthritis, chronic back pain, depression, thyroid disease, hypertension, obesity.  Differential diagnosis: bony fracture; laceration     Labs Reviewed  COMPREHENSIVE METABOLIC  PANEL - Abnormal; Notable for the following components:     Glucose                       105 (*)                Calcium                       8.5 (*)                AST (SGOT)                    34 (*)                 Alkaline Phosphatase          133 (*)             All other components within normal limits         Narrative: GFR Normal >60                  Chronic Kidney Disease <60                  Kidney Failure <15                                    The GFR formula is only valid for adults with stable renal function between ages 18 and 70.  CBC WITH AUTO DIFFERENTIAL - Abnormal; Notable for the following components:     RBC                           2.98 (*)               Hemoglobin                    11.1 (*)               Hematocrit                    30.9 (*)               MCV                           103.7 (*)               MCH                           37.2 (*)               MCHC                          35.9 (*)               RDW                           16.4 (*)               Platelets                     76 (*)                 Neutrophil %                  30.8 (*)               Lymphocyte %                  52.4 (*)               Neutrophils, Absolute         1.14 (*)            All other components within normal limits         Narrative: Heated 15 min at 37 degrees  CBC AND DIFFERENTIAL  XR Tibia Fibula 2 View Left   Final Result     We attempted to close with deep sutures, staples, and external sutures. Patient has very thin skin and the wound is gaping. She has a lot of adipose tissue making it difficult to do any deep sutures. Spoke with Dr. Sarita Vazquez who came to evaluate and feels she will need to take patient to the OR for closure and wash out given clinical exam.      Laceration of left lower extremity, initial encounter: complicated acute illness or injury  Amount and/or Complexity of Data Reviewed  Labs: ordered. Decision-making details documented in ED Course.  Radiology: ordered.  Decision-making details documented in ED Course.  Discussion of management or test interpretation with external provider(s): Dr. Vazquez took patient to the OR    Risk  Prescription drug management.          Final diagnoses:   Laceration of left lower extremity, initial encounter       ED Disposition  ED Disposition     ED Disposition   Send to OR    Condition   --    Comment   --             Sarita Vazquez MD  [address]  [place] [id number]  [phone]    Go on 4/17/2023  at 10:15AM    Sachin Dia MD  [address] [id number]  [phone]               Medication List      Changed    * HYDROcodone-acetaminophen  MG per tablet  Commonly known as: Norco  Take 1 tablet by mouth Every 4 (Four) Hours As Needed for Moderate Pain .  What changed: Another medication with the same name was added. Make sure you understand how and when to take each.     * HYDROcodone-acetaminophen  MG per tablet  Commonly known as: NORCO  Take 1 tablet by mouth Every 4 (Four) Hours As Needed for Moderate Pain (Pain).  What changed: You were already taking a medication with the same name, and this prescription was added. Make sure you understand how and when to take each.         * This list has 2 medication(s) that are the same as other medications prescribed for you. Read the directions carefully, and ask your doctor or other care provider to review them with you.               Where to Get Your Medications      These medications were sent to [id number] - [place], [place] - [address] AT [facility] [address] - [phone]  - [phone] FX  [address], [facility] [address]    Phone: [phone]   · HYDROcodone-acetaminophen  MG per tablet          Marry Marsh, APRN  04/07/23 2006

## 2023-04-08 NOTE — PROGRESS NOTES
Received call from [facility].  Patient's daughter calling because patient was discharged home yesterday after surgery with supplies for only one dressing change.  [facility] that [facility] and local drugstores sold supplies but she stated they would have difficulty paying for supplies.  I called and spoke with daughter Dominga Law.  I assembled a bag of kerlix rolls, non adherant pads and silk tape.  I placed it at the ER Triage desk with the nurse and Ms. Law stated that her boyfriend would come pick it up.    
30-Mar-2021

## 2023-04-11 ENCOUNTER — TRANSCRIBE ORDERS (OUTPATIENT)
Dept: HOME HEALTH SERVICES | Facility: HOME HEALTHCARE | Age: 73
End: 2023-04-11
Payer: MEDICARE

## 2023-04-11 ENCOUNTER — HOME HEALTH ADMISSION (OUTPATIENT)
Dept: HOME HEALTH SERVICES | Facility: HOME HEALTHCARE | Age: 73
End: 2023-04-11
Payer: MEDICARE

## 2023-04-11 DIAGNOSIS — Z48.817 AFTERCARE FOLLOWING SURGERY OF THE SKIN OR SUBCUTANEOUS TISSUE: Primary | ICD-10-CM

## 2023-04-12 ENCOUNTER — HOME CARE VISIT (OUTPATIENT)
Dept: HOME HEALTH SERVICES | Facility: CLINIC | Age: 73
End: 2023-04-12
Payer: MEDICARE

## 2023-04-12 VITALS
SYSTOLIC BLOOD PRESSURE: 122 MMHG | DIASTOLIC BLOOD PRESSURE: 70 MMHG | OXYGEN SATURATION: 98 % | TEMPERATURE: 98.4 F | WEIGHT: 240 LBS | RESPIRATION RATE: 16 BRPM | HEART RATE: 63 BPM | BODY MASS INDEX: 42.52 KG/M2 | HEIGHT: 63 IN

## 2023-04-12 PROCEDURE — G0299 HHS/HOSPICE OF RN EA 15 MIN: HCPCS

## 2023-04-14 ENCOUNTER — HOME CARE VISIT (OUTPATIENT)
Dept: HOME HEALTH SERVICES | Facility: CLINIC | Age: 73
End: 2023-04-14
Payer: MEDICARE

## 2023-04-14 VITALS
SYSTOLIC BLOOD PRESSURE: 132 MMHG | OXYGEN SATURATION: 97 % | DIASTOLIC BLOOD PRESSURE: 76 MMHG | RESPIRATION RATE: 16 BRPM | TEMPERATURE: 98.1 F | HEART RATE: 69 BPM

## 2023-04-14 PROCEDURE — G0299 HHS/HOSPICE OF RN EA 15 MIN: HCPCS

## 2023-04-15 NOTE — HOME HEALTH
SOC Note: Pt admitted for homecare services after sustaining a laceration to her left lower leg. Pt had dropped her phone under her bed and in trying to retrieve it her knees gave out and she fell to the floor. Her left leg became lodged under her bed and she had to call the fire department to come help her up. It was at this time they pointed out to her that she had a wound and needed medical assistance. Pt was taken to [facility] ER where they attempted to sew up the wound, however due to the fragility of the pt's skin and size of the wound it was decided that Dr. Sarita Vazquez would take her to the OR for staple placement. Pt has assistance with dressings from her daughter's boyfriend. Pt uses a rollator. Pt has poor eyesight.   Home Health ordered for: SN  Reason for Hosp/Primary Dx/Co-morbidities: Laceration to left lower leg  Focus of Care: Laceration to left lower leg wound care  Current Functional status/mobility/DME: Pt ambulates with a rollator  HB status/Living Arrangements: Pt lives alone, but has assistance from her daughter's boyfriend and her niece  Skin Integrity/wound status: Wound to left lower leg  Code Status: CPR  Fall Risk: 9

## 2023-04-17 ENCOUNTER — HOME CARE VISIT (OUTPATIENT)
Dept: HOME HEALTH SERVICES | Facility: CLINIC | Age: 73
End: 2023-04-17
Payer: MEDICARE

## 2023-04-17 ENCOUNTER — HOME CARE VISIT (OUTPATIENT)
Dept: HOME HEALTH SERVICES | Facility: HOME HEALTHCARE | Age: 73
End: 2023-04-17
Payer: MEDICARE

## 2023-04-17 VITALS
SYSTOLIC BLOOD PRESSURE: 118 MMHG | OXYGEN SATURATION: 98 % | TEMPERATURE: 98.3 F | RESPIRATION RATE: 18 BRPM | DIASTOLIC BLOOD PRESSURE: 56 MMHG | HEART RATE: 58 BPM

## 2023-04-17 PROCEDURE — G0299 HHS/HOSPICE OF RN EA 15 MIN: HCPCS

## 2023-04-18 ENCOUNTER — HOSPITAL ENCOUNTER (OUTPATIENT)
Facility: HOSPITAL | Age: 73
Setting detail: OBSERVATION
Discharge: HOME | End: 2023-04-21
Attending: SPECIALIST | Admitting: SPECIALIST
Payer: MEDICARE

## 2023-04-18 ENCOUNTER — PREP FOR SURGERY (OUTPATIENT)
Dept: OTHER | Facility: HOSPITAL | Age: 73
End: 2023-04-18
Payer: MEDICARE

## 2023-04-18 ENCOUNTER — TELEPHONE (OUTPATIENT)
Dept: SURGERY | Facility: CLINIC | Age: 73
End: 2023-04-18
Payer: MEDICARE

## 2023-04-18 DIAGNOSIS — L03.116 CELLULITIS OF LEFT LOWER EXTREMITY: Primary | ICD-10-CM

## 2023-04-18 PROBLEM — L03.90 CELLULITIS: Status: ACTIVE | Noted: 2023-04-18

## 2023-04-18 LAB
ABO GROUP BLD: NORMAL
ALBUMIN SERPL-MCNC: 3.3 G/DL (ref 3.5–5.2)
ALBUMIN/GLOB SERPL: 1.4 G/DL
ALP SERPL-CCNC: 132 U/L (ref 39–117)
ALT SERPL W P-5'-P-CCNC: 11 U/L (ref 1–33)
ANION GAP SERPL CALCULATED.3IONS-SCNC: 9 MMOL/L (ref 5–15)
AST SERPL-CCNC: 25 U/L (ref 1–32)
BASOPHILS # BLD AUTO: 0.01 10*3/MM3 (ref 0–0.2)
BASOPHILS NFR BLD AUTO: 0.2 % (ref 0–1.5)
BILIRUB SERPL-MCNC: 1.7 MG/DL (ref 0–1.2)
BLD GP AB SCN SERPL QL: NEGATIVE
BUN SERPL-MCNC: 11 MG/DL (ref 8–23)
BUN/CREAT SERPL: 12.5 (ref 7–25)
CALCIUM SPEC-SCNC: 8.2 MG/DL (ref 8.6–10.5)
CHLORIDE SERPL-SCNC: 98 MMOL/L (ref 98–107)
CO2 SERPL-SCNC: 27 MMOL/L (ref 22–29)
CREAT SERPL-MCNC: 0.88 MG/DL (ref 0.57–1)
DEPRECATED RDW RBC AUTO: 51.2 FL (ref 37–54)
EGFRCR SERPLBLD CKD-EPI 2021: 69.1 ML/MIN/1.73
EOSINOPHIL # BLD AUTO: 0.04 10*3/MM3 (ref 0–0.4)
EOSINOPHIL NFR BLD AUTO: 0.7 % (ref 0.3–6.2)
ERYTHROCYTE [DISTWIDTH] IN BLOOD BY AUTOMATED COUNT: 14.9 % (ref 12.3–15.4)
GLOBULIN UR ELPH-MCNC: 2.3 GM/DL
GLUCOSE SERPL-MCNC: 116 MG/DL (ref 65–99)
HCT VFR BLD AUTO: 25.3 % (ref 34–46.6)
HGB BLD-MCNC: 9 G/DL (ref 12–15.9)
IMM GRANULOCYTES # BLD AUTO: 0.02 10*3/MM3 (ref 0–0.05)
IMM GRANULOCYTES NFR BLD AUTO: 0.4 % (ref 0–0.5)
INR PPP: 1.14 (ref 0.91–1.09)
LYMPHOCYTES # BLD AUTO: 1.12 10*3/MM3 (ref 0.7–3.1)
LYMPHOCYTES NFR BLD AUTO: 20.1 % (ref 19.6–45.3)
MCH RBC QN AUTO: 35.9 PG (ref 26.6–33)
MCHC RBC AUTO-ENTMCNC: 35.6 G/DL (ref 31.5–35.7)
MCV RBC AUTO: 100.8 FL (ref 79–97)
MONOCYTES # BLD AUTO: 0.6 10*3/MM3 (ref 0.1–0.9)
MONOCYTES NFR BLD AUTO: 10.8 % (ref 5–12)
NEUTROPHILS NFR BLD AUTO: 3.78 10*3/MM3 (ref 1.7–7)
NEUTROPHILS NFR BLD AUTO: 67.8 % (ref 42.7–76)
NRBC BLD AUTO-RTO: 0 /100 WBC (ref 0–0.2)
PLATELET # BLD AUTO: 104 10*3/MM3 (ref 140–450)
PMV BLD AUTO: 9.6 FL (ref 6–12)
POTASSIUM SERPL-SCNC: 3.5 MMOL/L (ref 3.5–5.2)
PROT SERPL-MCNC: 5.6 G/DL (ref 6–8.5)
PROTHROMBIN TIME: 14.7 SECONDS (ref 11.8–14.8)
RBC # BLD AUTO: 2.51 10*6/MM3 (ref 3.77–5.28)
RH BLD: POSITIVE
SODIUM SERPL-SCNC: 134 MMOL/L (ref 136–145)
T&S EXPIRATION DATE: NORMAL
WBC NRBC COR # BLD: 5.57 10*3/MM3 (ref 3.4–10.8)

## 2023-04-18 PROCEDURE — 25010000002 VANCOMYCIN 1 G RECONSTITUTED SOLUTION 1 EACH VIAL: Performed by: SPECIALIST

## 2023-04-18 PROCEDURE — G0378 HOSPITAL OBSERVATION PER HR: HCPCS

## 2023-04-18 PROCEDURE — 63710000001 DOCUSATE SODIUM 100 MG CAPSULE: Performed by: SPECIALIST

## 2023-04-18 PROCEDURE — 86901 BLOOD TYPING SEROLOGIC RH(D): CPT | Performed by: SPECIALIST

## 2023-04-18 PROCEDURE — A9270 NON-COVERED ITEM OR SERVICE: HCPCS | Performed by: SPECIALIST

## 2023-04-18 PROCEDURE — 25010000002 PIPERACILLIN SOD-TAZOBACTAM PER 1 G: Performed by: SPECIALIST

## 2023-04-18 PROCEDURE — 86850 RBC ANTIBODY SCREEN: CPT | Performed by: SPECIALIST

## 2023-04-18 PROCEDURE — 96365 THER/PROPH/DIAG IV INF INIT: CPT

## 2023-04-18 PROCEDURE — 86900 BLOOD TYPING SEROLOGIC ABO: CPT | Performed by: SPECIALIST

## 2023-04-18 PROCEDURE — 80053 COMPREHEN METABOLIC PANEL: CPT | Performed by: SPECIALIST

## 2023-04-18 PROCEDURE — 94664 DEMO&/EVAL PT USE INHALER: CPT

## 2023-04-18 PROCEDURE — 63710000001 PRAMIPEXOLE 0.25 MG TABLET: Performed by: SPECIALIST

## 2023-04-18 PROCEDURE — 85610 PROTHROMBIN TIME: CPT | Performed by: SPECIALIST

## 2023-04-18 PROCEDURE — 85025 COMPLETE CBC W/AUTO DIFF WBC: CPT | Performed by: SPECIALIST

## 2023-04-18 PROCEDURE — 63710000001 FAMOTIDINE 20 MG TABLET: Performed by: SPECIALIST

## 2023-04-18 PROCEDURE — 63710000001 DULOXETINE 30 MG CAPSULE DELAYED-RELEASE PARTICLES: Performed by: SPECIALIST

## 2023-04-18 PROCEDURE — 63710000001 HYDROCODONE-ACETAMINOPHEN 10-325 MG TABLET: Performed by: SPECIALIST

## 2023-04-18 PROCEDURE — 94799 UNLISTED PULMONARY SVC/PX: CPT

## 2023-04-18 PROCEDURE — 63710000001 VITAMIN C 500 MG TABLET: Performed by: SPECIALIST

## 2023-04-18 PROCEDURE — 63710000001 VITAMIN D3 125 MCG (5000 UT) CAPSULE: Performed by: SPECIALIST

## 2023-04-18 RX ORDER — PRAMIPEXOLE DIHYDROCHLORIDE 0.25 MG/1
0.75 TABLET ORAL EVERY 8 HOURS SCHEDULED
Status: DISCONTINUED | OUTPATIENT
Start: 2023-04-18 | End: 2023-04-21 | Stop reason: HOSPADM

## 2023-04-18 RX ORDER — GABAPENTIN 100 MG/1
100 CAPSULE ORAL 2 TIMES DAILY
Status: DISCONTINUED | OUTPATIENT
Start: 2023-04-18 | End: 2023-04-18

## 2023-04-18 RX ORDER — DULOXETIN HYDROCHLORIDE 30 MG/1
60 CAPSULE, DELAYED RELEASE ORAL DAILY
Status: DISCONTINUED | OUTPATIENT
Start: 2023-04-18 | End: 2023-04-21 | Stop reason: HOSPADM

## 2023-04-18 RX ORDER — ONDANSETRON 4 MG/1
4 TABLET, FILM COATED ORAL EVERY 8 HOURS PRN
Status: DISCONTINUED | OUTPATIENT
Start: 2023-04-18 | End: 2023-04-18

## 2023-04-18 RX ORDER — FAMOTIDINE 20 MG/1
20 TABLET, FILM COATED ORAL 2 TIMES DAILY
Status: DISCONTINUED | OUTPATIENT
Start: 2023-04-18 | End: 2023-04-21 | Stop reason: HOSPADM

## 2023-04-18 RX ORDER — DOCUSATE SODIUM 100 MG/1
100 CAPSULE, LIQUID FILLED ORAL 2 TIMES DAILY
Status: DISCONTINUED | OUTPATIENT
Start: 2023-04-18 | End: 2023-04-21 | Stop reason: HOSPADM

## 2023-04-18 RX ORDER — HYDROCODONE BITARTRATE AND ACETAMINOPHEN 10; 325 MG/1; MG/1
1 TABLET ORAL EVERY 4 HOURS PRN
Status: DISCONTINUED | OUTPATIENT
Start: 2023-04-18 | End: 2023-04-21 | Stop reason: HOSPADM

## 2023-04-18 RX ORDER — LEVOTHYROXINE SODIUM 0.1 MG/1
100 TABLET ORAL
Status: DISCONTINUED | OUTPATIENT
Start: 2023-04-19 | End: 2023-04-21 | Stop reason: HOSPADM

## 2023-04-18 RX ORDER — FUROSEMIDE 40 MG/1
40 TABLET ORAL DAILY PRN
Status: DISCONTINUED | OUTPATIENT
Start: 2023-04-18 | End: 2023-04-18

## 2023-04-18 RX ORDER — HYDROMORPHONE HYDROCHLORIDE 1 MG/ML
0.25 INJECTION, SOLUTION INTRAMUSCULAR; INTRAVENOUS; SUBCUTANEOUS EVERY 4 HOURS PRN
Status: DISCONTINUED | OUTPATIENT
Start: 2023-04-18 | End: 2023-04-21 | Stop reason: HOSPADM

## 2023-04-18 RX ORDER — ONDANSETRON 4 MG/1
8 TABLET, FILM COATED ORAL EVERY 6 HOURS PRN
Status: DISCONTINUED | OUTPATIENT
Start: 2023-04-18 | End: 2023-04-21 | Stop reason: HOSPADM

## 2023-04-18 RX ORDER — ASCORBIC ACID 500 MG
500 TABLET ORAL DAILY
Status: DISCONTINUED | OUTPATIENT
Start: 2023-04-18 | End: 2023-04-21 | Stop reason: HOSPADM

## 2023-04-18 RX ORDER — CYCLOBENZAPRINE HCL 10 MG
10 TABLET ORAL 3 TIMES DAILY PRN
Status: DISCONTINUED | OUTPATIENT
Start: 2023-04-18 | End: 2023-04-18

## 2023-04-18 RX ORDER — DEXTROSE, SODIUM CHLORIDE, SODIUM LACTATE, POTASSIUM CHLORIDE, AND CALCIUM CHLORIDE 5; .6; .31; .03; .02 G/100ML; G/100ML; G/100ML; G/100ML; G/100ML
75 INJECTION, SOLUTION INTRAVENOUS CONTINUOUS
Status: DISCONTINUED | OUTPATIENT
Start: 2023-04-18 | End: 2023-04-19

## 2023-04-18 RX ADMIN — TAZOBACTAM SODIUM AND PIPERACILLIN SODIUM 3.38 G: 375; 3 INJECTION, SOLUTION INTRAVENOUS at 23:15

## 2023-04-18 RX ADMIN — Medication 5000 UNITS: at 18:56

## 2023-04-18 RX ADMIN — FAMOTIDINE 20 MG: 20 TABLET, FILM COATED ORAL at 20:53

## 2023-04-18 RX ADMIN — DOCUSATE SODIUM 100 MG: 100 CAPSULE ORAL at 20:53

## 2023-04-18 RX ADMIN — VANCOMYCIN HYDROCHLORIDE 1000 MG: 1 INJECTION, POWDER, LYOPHILIZED, FOR SOLUTION INTRAVENOUS at 18:54

## 2023-04-18 RX ADMIN — SODIUM CHLORIDE, SODIUM LACTATE, POTASSIUM CHLORIDE, CALCIUM CHLORIDE AND DEXTROSE MONOHYDRATE 75 ML/HR: 5; 600; 310; 30; 20 INJECTION, SOLUTION INTRAVENOUS at 18:34

## 2023-04-18 RX ADMIN — PRAMIPEXOLE DIHYDROCHLORIDE 0.75 MG: 0.25 TABLET ORAL at 20:53

## 2023-04-18 RX ADMIN — HYDROCODONE BITARTRATE AND ACETAMINOPHEN 1 TABLET: 10; 325 TABLET ORAL at 20:52

## 2023-04-18 RX ADMIN — OXYCODONE HYDROCHLORIDE AND ACETAMINOPHEN 500 MG: 500 TABLET ORAL at 18:55

## 2023-04-18 RX ADMIN — DULOXETINE HYDROCHLORIDE 60 MG: 30 CAPSULE, DELAYED RELEASE ORAL at 18:56

## 2023-04-18 NOTE — CASE COMMUNICATION
At approximately 1835 this evening, April 17, 2023 Brenda Sneed called the after hours [place] [facility] RN via [facility] Switchboard - Per patient had a SNV today with Tram, who called Dr. Sarita Vazquez, surgeon office -  approximately 1-2 pm today and left a  message regarding leg incision red and hot, new change since surgery last week..  Patient denies fever, however experienced chills in the last hour.  Patient states that sh e had surgery last week, and was not discharged home with antibiotics.  Patient states prior to calling oncall nurse at [facility], called [facility] [address] at [phone] and a prescription had not been called in yet.    At 1901 returned call to the patient and informed to either go to ER or Urgent care since can not get a hold of the surgeon's office tonight, several attempts and when prompted waited for the after kwasi rs and no response.  .  Patient states she will see if she can get a ride to urgent care/ER room.     Michael Ugalde, can you follow up with Ms. Sneed in the morning? Thank you.

## 2023-04-18 NOTE — NURSING NOTE
Buprenorphine patch on right side of chest. Patient changes the patch every 7 days. Last placement was on 4/17/2023.

## 2023-04-18 NOTE — TELEPHONE ENCOUNTER
Tram from [facility] called yesterday on ms packer left a vm, I called ms baer back and got no answer, Today brenda gale [facility] called wanted to know has anbx been called out to her pharmacy. A anbx has not been called out to her pharmacy. I will follow up with this with dr rodriguez today.

## 2023-04-19 ENCOUNTER — ANESTHESIA (OUTPATIENT)
Dept: PERIOP | Facility: HOSPITAL | Age: 73
End: 2023-04-19
Payer: MEDICARE

## 2023-04-19 ENCOUNTER — HOME CARE VISIT (OUTPATIENT)
Dept: HOME HEALTH SERVICES | Facility: HOME HEALTHCARE | Age: 73
End: 2023-04-19
Payer: MEDICARE

## 2023-04-19 ENCOUNTER — ANESTHESIA EVENT (OUTPATIENT)
Dept: PERIOP | Facility: HOSPITAL | Age: 73
End: 2023-04-19
Payer: MEDICARE

## 2023-04-19 PROCEDURE — 63710000001 BISOPROLOL 5 MG TABLET 100 EACH BOTTLE: Performed by: SPECIALIST

## 2023-04-19 PROCEDURE — A9270 NON-COVERED ITEM OR SERVICE: HCPCS | Performed by: SPECIALIST

## 2023-04-19 PROCEDURE — A9270 NON-COVERED ITEM OR SERVICE: HCPCS | Performed by: ANESTHESIOLOGY

## 2023-04-19 PROCEDURE — G0378 HOSPITAL OBSERVATION PER HR: HCPCS

## 2023-04-19 PROCEDURE — 25010000002 PIPERACILLIN SOD-TAZOBACTAM PER 1 G: Performed by: SPECIALIST

## 2023-04-19 PROCEDURE — 63710000001 PRAMIPEXOLE 0.25 MG TABLET: Performed by: SPECIALIST

## 2023-04-19 PROCEDURE — 63710000001 HYDROCHLOROTHIAZIDE 25 MG TABLET 1,000 EACH BOTTLE: Performed by: SPECIALIST

## 2023-04-19 PROCEDURE — 25010000002 PROPOFOL 10 MG/ML EMULSION: Performed by: NURSE ANESTHETIST, CERTIFIED REGISTERED

## 2023-04-19 PROCEDURE — 25010000002 SUGAMMADEX 200 MG/2ML SOLUTION: Performed by: NURSE ANESTHETIST, CERTIFIED REGISTERED

## 2023-04-19 PROCEDURE — 63710000001 DOCUSATE SODIUM 100 MG CAPSULE: Performed by: SPECIALIST

## 2023-04-19 PROCEDURE — 25010000002 FENTANYL CITRATE (PF) 50 MCG/ML SOLUTION: Performed by: ANESTHESIOLOGY

## 2023-04-19 PROCEDURE — 25010000002 FENTANYL CITRATE (PF) 100 MCG/2ML SOLUTION: Performed by: NURSE ANESTHETIST, CERTIFIED REGISTERED

## 2023-04-19 PROCEDURE — 25010000002 HYDROMORPHONE PER 4 MG: Performed by: SPECIALIST

## 2023-04-19 PROCEDURE — 63710000001 OXYCODONE-ACETAMINOPHEN 5-325 MG TABLET: Performed by: ANESTHESIOLOGY

## 2023-04-19 PROCEDURE — 25010000002 VANCOMYCIN 1 G RECONSTITUTED SOLUTION 1 EACH VIAL: Performed by: SPECIALIST

## 2023-04-19 PROCEDURE — 63710000001 LEVOTHYROXINE 100 MCG TABLET: Performed by: SPECIALIST

## 2023-04-19 PROCEDURE — 63710000001 FAMOTIDINE 20 MG TABLET: Performed by: SPECIALIST

## 2023-04-19 PROCEDURE — 25010000002 VANCOMYCIN 10 G RECONSTITUTED SOLUTION: Performed by: SPECIALIST

## 2023-04-19 PROCEDURE — 63710000001 ACETAMINOPHEN 500 MG TABLET: Performed by: ANESTHESIOLOGY

## 2023-04-19 RX ORDER — SODIUM CHLORIDE 9 MG/ML
40 INJECTION, SOLUTION INTRAVENOUS AS NEEDED
Status: DISCONTINUED | OUTPATIENT
Start: 2023-04-19 | End: 2023-04-19 | Stop reason: HOSPADM

## 2023-04-19 RX ORDER — FLUMAZENIL 0.1 MG/ML
0.2 INJECTION INTRAVENOUS AS NEEDED
Status: DISCONTINUED | OUTPATIENT
Start: 2023-04-19 | End: 2023-04-19 | Stop reason: HOSPADM

## 2023-04-19 RX ORDER — LIDOCAINE HYDROCHLORIDE 10 MG/ML
0.5 INJECTION, SOLUTION EPIDURAL; INFILTRATION; INTRACAUDAL; PERINEURAL ONCE AS NEEDED
Status: DISCONTINUED | OUTPATIENT
Start: 2023-04-19 | End: 2023-04-19 | Stop reason: HOSPADM

## 2023-04-19 RX ORDER — ONDANSETRON 2 MG/ML
4 INJECTION INTRAMUSCULAR; INTRAVENOUS
Status: DISCONTINUED | OUTPATIENT
Start: 2023-04-19 | End: 2023-04-19 | Stop reason: HOSPADM

## 2023-04-19 RX ORDER — ROCURONIUM BROMIDE 10 MG/ML
INJECTION, SOLUTION INTRAVENOUS AS NEEDED
Status: DISCONTINUED | OUTPATIENT
Start: 2023-04-19 | End: 2023-04-19 | Stop reason: SURG

## 2023-04-19 RX ORDER — OXYCODONE HYDROCHLORIDE AND ACETAMINOPHEN 5; 325 MG/1; MG/1
1 TABLET ORAL ONCE AS NEEDED
Status: COMPLETED | OUTPATIENT
Start: 2023-04-19 | End: 2023-04-19

## 2023-04-19 RX ORDER — SODIUM CHLORIDE 0.9 % (FLUSH) 0.9 %
3 SYRINGE (ML) INJECTION EVERY 12 HOURS SCHEDULED
Status: DISCONTINUED | OUTPATIENT
Start: 2023-04-19 | End: 2023-04-19 | Stop reason: HOSPADM

## 2023-04-19 RX ORDER — SODIUM CHLORIDE, SODIUM LACTATE, POTASSIUM CHLORIDE, CALCIUM CHLORIDE 600; 310; 30; 20 MG/100ML; MG/100ML; MG/100ML; MG/100ML
100 INJECTION, SOLUTION INTRAVENOUS CONTINUOUS
Status: DISCONTINUED | OUTPATIENT
Start: 2023-04-19 | End: 2023-04-19

## 2023-04-19 RX ORDER — NYSTATIN 100000 [USP'U]/G
1 POWDER TOPICAL 2 TIMES DAILY
COMMUNITY

## 2023-04-19 RX ORDER — DROPERIDOL 2.5 MG/ML
0.62 INJECTION, SOLUTION INTRAMUSCULAR; INTRAVENOUS ONCE AS NEEDED
Status: DISCONTINUED | OUTPATIENT
Start: 2023-04-19 | End: 2023-04-19 | Stop reason: HOSPADM

## 2023-04-19 RX ORDER — SODIUM CHLORIDE 0.9 % (FLUSH) 0.9 %
3-10 SYRINGE (ML) INJECTION AS NEEDED
Status: DISCONTINUED | OUTPATIENT
Start: 2023-04-19 | End: 2023-04-19 | Stop reason: HOSPADM

## 2023-04-19 RX ORDER — EPHEDRINE SULFATE 50 MG/ML
INJECTION INTRAVENOUS AS NEEDED
Status: DISCONTINUED | OUTPATIENT
Start: 2023-04-19 | End: 2023-04-19 | Stop reason: SURG

## 2023-04-19 RX ORDER — LABETALOL HYDROCHLORIDE 5 MG/ML
5 INJECTION, SOLUTION INTRAVENOUS
Status: DISCONTINUED | OUTPATIENT
Start: 2023-04-19 | End: 2023-04-19 | Stop reason: HOSPADM

## 2023-04-19 RX ORDER — FENTANYL CITRATE 50 UG/ML
25 INJECTION, SOLUTION INTRAMUSCULAR; INTRAVENOUS
Status: DISCONTINUED | OUTPATIENT
Start: 2023-04-19 | End: 2023-04-19 | Stop reason: HOSPADM

## 2023-04-19 RX ORDER — HYDROMORPHONE HYDROCHLORIDE 1 MG/ML
0.25 INJECTION, SOLUTION INTRAMUSCULAR; INTRAVENOUS; SUBCUTANEOUS
Status: DISCONTINUED | OUTPATIENT
Start: 2023-04-19 | End: 2023-04-19 | Stop reason: HOSPADM

## 2023-04-19 RX ORDER — PROPOFOL 10 MG/ML
VIAL (ML) INTRAVENOUS AS NEEDED
Status: DISCONTINUED | OUTPATIENT
Start: 2023-04-19 | End: 2023-04-19 | Stop reason: SURG

## 2023-04-19 RX ORDER — LIDOCAINE HYDROCHLORIDE 20 MG/ML
INJECTION, SOLUTION EPIDURAL; INFILTRATION; INTRACAUDAL; PERINEURAL AS NEEDED
Status: DISCONTINUED | OUTPATIENT
Start: 2023-04-19 | End: 2023-04-19 | Stop reason: SURG

## 2023-04-19 RX ORDER — ACETAMINOPHEN 500 MG
1000 TABLET ORAL ONCE
Status: COMPLETED | OUTPATIENT
Start: 2023-04-19 | End: 2023-04-19

## 2023-04-19 RX ORDER — MAGNESIUM HYDROXIDE 1200 MG/15ML
LIQUID ORAL AS NEEDED
Status: DISCONTINUED | OUTPATIENT
Start: 2023-04-19 | End: 2023-04-19 | Stop reason: HOSPADM

## 2023-04-19 RX ORDER — DULOXETIN HYDROCHLORIDE 60 MG/1
1 CAPSULE, DELAYED RELEASE ORAL 2 TIMES DAILY
COMMUNITY

## 2023-04-19 RX ORDER — NALOXONE HCL 0.4 MG/ML
0.04 VIAL (ML) INJECTION AS NEEDED
Status: DISCONTINUED | OUTPATIENT
Start: 2023-04-19 | End: 2023-04-19 | Stop reason: HOSPADM

## 2023-04-19 RX ORDER — FENTANYL CITRATE 50 UG/ML
INJECTION, SOLUTION INTRAMUSCULAR; INTRAVENOUS AS NEEDED
Status: DISCONTINUED | OUTPATIENT
Start: 2023-04-19 | End: 2023-04-19 | Stop reason: SURG

## 2023-04-19 RX ORDER — BUPRENORPHINE 15 UG/H
1 PATCH TRANSDERMAL
COMMUNITY

## 2023-04-19 RX ADMIN — OXYCODONE HYDROCHLORIDE AND ACETAMINOPHEN 1 TABLET: 5; 325 TABLET ORAL at 12:15

## 2023-04-19 RX ADMIN — BISOPROLOL FUMARATE: 5 TABLET, FILM COATED ORAL at 09:12

## 2023-04-19 RX ADMIN — FAMOTIDINE 20 MG: 20 TABLET, FILM COATED ORAL at 20:42

## 2023-04-19 RX ADMIN — HYDROMORPHONE HYDROCHLORIDE 0.25 MG: 1 INJECTION, SOLUTION INTRAMUSCULAR; INTRAVENOUS; SUBCUTANEOUS at 15:36

## 2023-04-19 RX ADMIN — LEVOTHYROXINE SODIUM 100 MCG: 100 TABLET ORAL at 06:15

## 2023-04-19 RX ADMIN — TAZOBACTAM SODIUM AND PIPERACILLIN SODIUM 3.38 G: 375; 3 INJECTION, SOLUTION INTRAVENOUS at 09:16

## 2023-04-19 RX ADMIN — SUGAMMADEX 200 MG: 100 INJECTION, SOLUTION INTRAVENOUS at 11:50

## 2023-04-19 RX ADMIN — FENTANYL CITRATE 100 MCG: 50 INJECTION INTRAMUSCULAR; INTRAVENOUS at 11:29

## 2023-04-19 RX ADMIN — LIDOCAINE HYDROCHLORIDE 80 MG: 20 INJECTION, SOLUTION EPIDURAL; INFILTRATION; INTRACAUDAL; PERINEURAL at 11:29

## 2023-04-19 RX ADMIN — PRAMIPEXOLE DIHYDROCHLORIDE 0.75 MG: 0.25 TABLET ORAL at 15:39

## 2023-04-19 RX ADMIN — EPHEDRINE SULFATE 10 MG: 50 INJECTION INTRAVENOUS at 11:37

## 2023-04-19 RX ADMIN — TAZOBACTAM SODIUM AND PIPERACILLIN SODIUM 3.38 G: 375; 3 INJECTION, SOLUTION INTRAVENOUS at 03:47

## 2023-04-19 RX ADMIN — PRAMIPEXOLE DIHYDROCHLORIDE 0.75 MG: 0.25 TABLET ORAL at 20:42

## 2023-04-19 RX ADMIN — ACETAMINOPHEN 1000 MG: 500 TABLET, FILM COATED ORAL at 11:16

## 2023-04-19 RX ADMIN — TAZOBACTAM SODIUM AND PIPERACILLIN SODIUM 3.38 G: 375; 3 INJECTION, SOLUTION INTRAVENOUS at 22:28

## 2023-04-19 RX ADMIN — SODIUM CHLORIDE, POTASSIUM CHLORIDE, SODIUM LACTATE AND CALCIUM CHLORIDE 100 ML/HR: 600; 310; 30; 20 INJECTION, SOLUTION INTRAVENOUS at 11:19

## 2023-04-19 RX ADMIN — VANCOMYCIN HYDROCHLORIDE 750 MG: 10 INJECTION, POWDER, LYOPHILIZED, FOR SOLUTION INTRAVENOUS at 18:19

## 2023-04-19 RX ADMIN — VANCOMYCIN HYDROCHLORIDE 1000 MG: 1 INJECTION, POWDER, LYOPHILIZED, FOR SOLUTION INTRAVENOUS at 06:15

## 2023-04-19 RX ADMIN — DOCUSATE SODIUM 100 MG: 100 CAPSULE ORAL at 20:42

## 2023-04-19 RX ADMIN — ROCURONIUM BROMIDE 25 MG: 10 INJECTION, SOLUTION INTRAVENOUS at 11:29

## 2023-04-19 RX ADMIN — TAZOBACTAM SODIUM AND PIPERACILLIN SODIUM 3.38 G: 375; 3 INJECTION, SOLUTION INTRAVENOUS at 16:13

## 2023-04-19 RX ADMIN — PRAMIPEXOLE DIHYDROCHLORIDE 0.75 MG: 0.25 TABLET ORAL at 06:15

## 2023-04-19 RX ADMIN — EPHEDRINE SULFATE 10 MG: 50 INJECTION INTRAVENOUS at 11:42

## 2023-04-19 RX ADMIN — FENTANYL CITRATE 25 MCG: 50 INJECTION, SOLUTION INTRAMUSCULAR; INTRAVENOUS at 12:16

## 2023-04-19 RX ADMIN — PROPOFOL 150 MG: 10 INJECTION, EMULSION INTRAVENOUS at 11:29

## 2023-04-19 RX ADMIN — FENTANYL CITRATE 25 MCG: 50 INJECTION, SOLUTION INTRAMUSCULAR; INTRAVENOUS at 12:21

## 2023-04-19 NOTE — H&P
Sarita Vazquez MD [facility] History and Physical     Referring Provider: Sarita Vazquez MD    Patient Care Team:  Sachin Dia MD as PCP - General  Sachin Dia MD as PCP - Family Medicine  Sumeet Bowles PA as Physician Assistant (Family Medicine)    Chief complaint:  Redness and pain    Subjective .   History of present illness:  The patient is a 72 y.o. female who is status post closure of a laceration to the left shin on April 7, 2023.  She called the office yesterday and stated that the incision was red, swollen, and tender.  She also states that she has had some discharge.  She denies any fevers or chills..    History:    Past Medical History:   Diagnosis Date   • Arthritis    • Bronchitis    • Cataract     right eye , cornea implant   • Chronic back pain    • Depression    • Disease of thyroid gland    • Edema     lower extremities   • Hypertension    • Kidney stones    ,   Past Surgical History:   Procedure Laterality Date   • BACK SURGERY      2010 ([place]), 2013    • CHOLECYSTECTOMY OPEN     • CORNEAL TRANSPLANT Right 1999   • LACERATION REPAIR Left 4/7/2023    Procedure: washout and closure left leg wound ;  Surgeon: Sarita Vazquez MD;  Location: [facility] OR;  Service: General;  Laterality: Left;   • LUMBAR FUSION Left 8/2/2017    Procedure: LEFT LUMBAR LATERAL INTERBODY FUSION WITH INSTRUMENTATION  L1-2;  Surgeon: MARCELINO Wright MD;  Location:  [facility] OR;  Service:    • NECK SURGERY  2010    [place]   • TOTAL HIP ARTHROPLASTY Right 2014    DR. KENDRICK    • TOTAL KNEE ARTHROPLASTY Right 2000   • TOTAL KNEE ARTHROPLASTY Left 2002   • TOTAL SHOULDER ARTHROPLASTY W/ DISTAL CLAVICLE EXCISION Left 6/1/2020    Procedure: LEFT REVERSE TOTAL SHOULDER REPLACEMENT;  Surgeon: Saravanan Calvin MD;  Location:  [facility] OR;  Service: Orthopedics;  Laterality: Left;   ,   Family History   Problem Relation Age of Onset   • Breast cancer Neg Hx    ,   Social History     Tobacco Use   • Smoking  status: Never   • Smokeless tobacco: Never   Vaping Use   • Vaping Use: Never used   Substance Use Topics   • Alcohol use: No   • Drug use: No   ,   Medications Prior to Admission   Medication Sig Dispense Refill Last Dose   • bisoprolol-hydrochlorothiazide (ZIAC) 5-6.25 MG per tablet Take 1 tablet by mouth Daily.      • Buprenorphine 15 MCG/HR patch weekly Place 1 patch on the skin as directed by provider Every 7 (Seven) Days. Monday      • cyclobenzaprine (FLEXERIL) 10 MG tablet Take 1 tablet by mouth 3 (Three) Times a Day As Needed for Muscle Spasms. 30 tablet 0    • docusate sodium (Colace) 100 MG capsule Take 1 capsule by mouth 2 (Two) Times a Day. 60 capsule 1    • DULoxetine (CYMBALTA) 60 MG capsule Take 1 capsule by mouth 2 (Two) Times a Day.      • furosemide (LASIX) 40 MG tablet Take 1 tablet by mouth Daily As Needed (PRN EDEMA).      • HYDROcodone-acetaminophen (NORCO)  MG per tablet Take 1 tablet by mouth Every 4 (Four) Hours As Needed for Moderate Pain (Pain). 20 tablet 0    • levothyroxine (SYNTHROID, LEVOTHROID) 100 MCG tablet Take 1 tablet by mouth Daily.      • nystatin (MYCOSTATIN) 726117 UNIT/GM powder Apply 1 application topically to the appropriate area as directed 2 (Two) Times a Day.      • pramipexole (MIRAPEX) 0.75 MG tablet Take 1 tablet by mouth every night at bedtime.      • vitamin C (ASCORBIC ACID) 500 MG tablet Take 1 tablet by mouth Daily.      • vitamin D3 125 MCG (5000 UT) capsule capsule Take 1 capsule by mouth Daily.       and Allergies:  Codeine and Benadryl [diphenhydramine]    Current Facility-Administered Medications:   •  ascorbic acid (VITAMIN C) tablet 500 mg, 500 mg, Oral, Daily, Richelle Mcclure MD, 500 mg at 04/18/23 8925  •  bisoprolol (ZEBeta) 5 mg, hydroCHLOROthiazide (HYDRODIURIL) 6.25 mg for Ziac 5-6.25, , Oral, Q24H, Richelle Mcclure MD, Given at 04/19/23 0912  •  dextrose 5 % and lactated Ringer's infusion, 75 mL/hr, Intravenous, Continuous, Richelle Mcclure,  MD, Last Rate: 75 mL/hr at 04/18/23 2109, 75 mL/hr at 04/18/23 2109  •  docusate sodium (COLACE) capsule 100 mg, 100 mg, Oral, BID, Richelle Mcclure MD, 100 mg at 04/18/23 2053  •  DULoxetine (CYMBALTA) DR capsule 60 mg, 60 mg, Oral, Daily, Richelle Mcclure MD, 60 mg at 04/18/23 1856  •  famotidine (PEPCID) tablet 20 mg, 20 mg, Oral, BID, Richelle Mcclure MD, 20 mg at 04/18/23 2053  •  HYDROcodone-acetaminophen (NORCO)  MG per tablet 1 tablet, 1 tablet, Oral, Q4H PRN, Richelle Mcclure MD, 1 tablet at 04/18/23 2052  •  HYDROmorphone (DILAUDID) injection 0.25 mg, 0.25 mg, Intravenous, Q4H PRN, Sarita Vazquez MD  •  levothyroxine (SYNTHROID, LEVOTHROID) tablet 100 mcg, 100 mcg, Oral, Q AM, Richelle Mcclure MD, 100 mcg at 04/19/23 0615  •  ondansetron (ZOFRAN) tablet 8 mg, 8 mg, Oral, Q6H PRN, Sarita Vazquez MD  •  piperacillin-tazobactam (ZOSYN) 3.375 g in iso-osmotic dextrose 50 ml (premix), 3.375 g, Intravenous, Q6H, Sarita Vazquez MD, 3.375 g at 04/19/23 0916  •  pramipexole (MIRAPEX) tablet 0.75 mg, 0.75 mg, Oral, Q8H, Richelle Mcclure MD, 0.75 mg at 04/19/23 0615  •  vancomycin (VANCOCIN) 1,000 mg in sodium chloride 0.9 % 250 mL IVPB-VTB, 1,000 mg, Intravenous, Q12H, Sarita Vazquez MD, 1,000 mg at 04/19/23 0615  •  vitamin D3 capsule 5,000 Units, 5,000 Units, Oral, Daily, Richelle Mcclure MD, 5,000 Units at 04/18/23 1856    Review of Systems:    All organ systems were evaluated and found negative except those which are mentioned in the History of Present Illness.      Objective     Physical Exam:    Vital Signs:  Temp:  [98.2 °F (36.8 °C)-99 °F (37.2 °C)] 98.9 °F (37.2 °C)  Heart Rate:  [59-70] 70  Resp:  [16-18] 18  BP: ()/(45-55) 143/55    Constitutional:    Well-developed, well-nourished in no acute distress  Eyes:     Extraocular movements intact; pupils equal, round, and reactive  Ears, Nose, Mouth, Throat:  Hearing intact, nose midline, no mucosal lesions  Cardiovascular:   Regular  rate and rhythm   Respiratory:    Clear to auscultation bilaterally  Gastrointestinal:   Soft, nontender, nondistended  Genitourinary:    Deferred  Musculoskeletal:   Full range of motion, no muscle wasting, no weakness, left leg erythema to just superior to posterior knee, pulling of staple line with dried blood on incisions  Skin:     No rashes or excoriations  Neurological:    Moves all extremities, sensation intact  Psychiatric:    Alert and oriented to person, place, and time  Hematologic/Lymphatic/Immune: No lymphadenopathy      Results Review:     Lab Results (last 24 hours)     Procedure Component Value Units Date/Time    Comprehensive Metabolic Panel [[id number]]  (Abnormal) Collected: 04/18/23 1800    Specimen: Blood Updated: 04/18/23 1826     Glucose 116 mg/dL      BUN 11 mg/dL      Creatinine 0.88 mg/dL      Sodium 134 mmol/L      Potassium 3.5 mmol/L      Chloride 98 mmol/L      CO2 27.0 mmol/L      Calcium 8.2 mg/dL      Total Protein 5.6 g/dL      Albumin 3.3 g/dL      ALT (SGPT) 11 U/L      AST (SGOT) 25 U/L      Alkaline Phosphatase 132 U/L      Total Bilirubin 1.7 mg/dL      Globulin 2.3 gm/dL      A/G Ratio 1.4 g/dL      BUN/Creatinine Ratio 12.5     Anion Gap 9.0 mmol/L      eGFR 69.1 mL/min/1.73     Narrative:      GFR Normal >60  Chronic Kidney Disease <60  Kidney Failure <15    The GFR formula is only valid for adults with stable renal function between ages 18 and 70.    Protime-INR [[id number]]  (Abnormal) Collected: 04/18/23 1800    Specimen: Blood Updated: 04/18/23 1820     Protime 14.7 Seconds      INR 1.14    CBC & Differential [[id number]]  (Abnormal) Collected: 04/18/23 1800    Specimen: Blood Updated: 04/18/23 1809    Narrative:      The following orders were created for panel order CBC & Differential.  Procedure                               Abnormality         Status                     ---------                               -----------         ------                     CBC Auto  Differential[[id number]]        Abnormal            Final result                 Please view results for these tests on the individual orders.    CBC Auto Differential [[id number]]  (Abnormal) Collected: 04/18/23 1800    Specimen: Blood Updated: 04/18/23 1809     WBC 5.57 10*3/mm3      RBC 2.51 10*6/mm3      Hemoglobin 9.0 g/dL      Hematocrit 25.3 %      .8 fL      MCH 35.9 pg      MCHC 35.6 g/dL      RDW 14.9 %      RDW-SD 51.2 fl      MPV 9.6 fL      Platelets 104 10*3/mm3      Neutrophil % 67.8 %      Lymphocyte % 20.1 %      Monocyte % 10.8 %      Eosinophil % 0.7 %      Basophil % 0.2 %      Immature Grans % 0.4 %      Neutrophils, Absolute 3.78 10*3/mm3      Lymphocytes, Absolute 1.12 10*3/mm3      Monocytes, Absolute 0.60 10*3/mm3      Eosinophils, Absolute 0.04 10*3/mm3      Basophils, Absolute 0.01 10*3/mm3      Immature Grans, Absolute 0.02 10*3/mm3      nRBC 0.0 /100 WBC         Imaging Results (Last 24 Hours)     ** No results found for the last 24 hours. **            Assessment & Plan     Cellulitis of left shin   S/p laceration repair    She will be admitted and iv vancomycin and zosyn will be started.  She will require removal of the staples and washout of the incisions.        Sarita Vazquez MD  04/19/23  10:31 CDT

## 2023-04-19 NOTE — NURSING NOTE
Patient is current with [facility] at this time. Pt currently receiving SN services. Services will continue upon hospital discharge if appropriate.

## 2023-04-19 NOTE — ANESTHESIA PROCEDURE NOTES
Airway  Urgency: elective    Date/Time: 4/19/2023 11:30 AM  Airway not difficult    General Information and Staff    Patient location during procedure: OR  CRNA/CAA: Terry Almodovar CRNA    Indications and Patient Condition  Indications for airway management: airway protection    Preoxygenated: yes  MILS maintained throughout  Mask difficulty assessment: 1 - vent by mask    Final Airway Details  Final airway type: endotracheal airway      Successful airway: ETT  Cuffed: yes   Successful intubation technique: direct laryngoscopy  Endotracheal tube insertion site: oral  Blade: Obrien  Blade size: 2  ETT size (mm): 7.5  Cormack-Lehane Classification: grade I - full view of glottis  Placement verified by: chest auscultation and capnometry   Cuff volume (mL): 5  Measured from: lips  ETT/EBT  to lips (cm): 20  Number of attempts at approach: 1  Assessment: lips, teeth, and gum same as pre-op and atraumatic intubation

## 2023-04-19 NOTE — PAYOR COMM NOTE
"Eyad Gurrola (72 y.o. Female) [id number] admit 4/18 [facility]   ysabel phone    Fax     Direct admit       Date of Birth   1950    Social Security Number       Address   [address] [facility] [address]    Home Phone   [phone]    MRN   [id number]       Sabianist   Islam    Marital Status                               Admission Date   4/18/23    Admission Type   Urgent    Admitting Provider   Sarita Vazquez MD    Attending Provider   Sarita Vazquez MD    Department, Room/Bed   [facility], [facility] OR/MAIN OR       Discharge Date       Discharge Disposition       Discharge Destination                               Attending Provider: Sarita Vazquez MD    Allergies: Codeine, Benadryl [Diphenhydramine]    Isolation: None   Infection: None   Code Status: CPR    Ht: 160 cm (63\")   Wt: 113 kg (250 lb)    Admission Cmt: None   Principal Problem: Cellulitis [L03.90]                 Active Insurance as of 4/18/2023     Primary Coverage     Payor Plan Insurance Group Employer/Plan Group    HUMANA MEDICARE REPLACEMENT HUMANA MEDICARE REPLACEMENT [id number]     Payor Plan Address Payor Plan Phone Number Payor Plan Fax Number Effective Dates    [address] [address] [phone]  1/1/2023 - None Entered    [facility] [id number]       Subscriber Name Subscriber Birth Date Member ID       EYAD GURROLA 1950 [id number]           Secondary Coverage     Payor Plan Insurance Group Employer/Plan Group    [id number]      Payor Plan Address Payor Plan Phone Number Payor Plan Fax Number Effective Dates    [address]   6/1/2021 - None Entered    [facility] [id number]       Subscriber Name Subscriber Birth Date Member ID       EYAD GURROLA 1950 [id number]                 Emergency Contacts      (Rel.) Home Phone Work Phone Mobile Phone    Nella (Daughter) [phone] -- [phone]    " ODELL NELSON (Relative) -- -- [phone]               History & Physical      Sarita Vazquez MD at 04/19/23 1030          Sarita Vazquez MD [facility] History and Physical     Referring Provider: Sarita Vazquez MD    Patient Care Team:  Sachin Dia MD as PCP - General  Sachin Dia MD as PCP - Family Medicine  Sumeet Bowles PA as Physician Assistant (Family Medicine)    Chief complaint:  Redness and pain    Subjective  .   History of present illness:  The patient is a 72 y.o. female who is status post closure of a laceration to the left shin on April 7, 2023.  She called the office yesterday and stated that the incision was red, swollen, and tender.  She also states that she has had some discharge.  She denies any fevers or chills..    History:    Past Medical History:   Diagnosis Date   • Arthritis    • Bronchitis    • Cataract     right eye , cornea implant   • Chronic back pain    • Depression    • Disease of thyroid gland    • Edema     lower extremities   • Hypertension    • Kidney stones    ,   Past Surgical History:   Procedure Laterality Date   • BACK SURGERY      2010 ([facility]), 2013    • CHOLECYSTECTOMY OPEN     • CORNEAL TRANSPLANT Right 1999   • LACERATION REPAIR Left 4/7/2023    Procedure: washout and closure left leg wound ;  Surgeon: Sarita Vazquez MD;  Location:  [facility] OR;  Service: General;  Laterality: Left;   • LUMBAR FUSION Left 8/2/2017    Procedure: LEFT LUMBAR LATERAL INTERBODY FUSION WITH INSTRUMENTATION  L1-2;  Surgeon: MARCELINO Wright MD;  Location:  [facility] OR;  Service:    • NECK SURGERY  2010    [facility]   • TOTAL HIP ARTHROPLASTY Right 2014    DR. KENDRICK    • TOTAL KNEE ARTHROPLASTY Right 2000   • TOTAL KNEE ARTHROPLASTY Left 2002   • TOTAL SHOULDER ARTHROPLASTY W/ DISTAL CLAVICLE EXCISION Left 6/1/2020    Procedure: LEFT REVERSE TOTAL SHOULDER REPLACEMENT;  Surgeon: Saravanan Calvin MD;  Location:  [facility] OR;  Service: Orthopedics;  Laterality: Left;    ,   Family History   Problem Relation Age of Onset   • Breast cancer Neg Hx    ,   Social History     Tobacco Use   • Smoking status: Never   • Smokeless tobacco: Never   Vaping Use   • Vaping Use: Never used   Substance Use Topics   • Alcohol use: No   • Drug use: No   ,   Medications Prior to Admission   Medication Sig Dispense Refill Last Dose   • bisoprolol-hydrochlorothiazide (ZIAC) 5-6.25 MG per tablet Take 1 tablet by mouth Daily.      • Buprenorphine 15 MCG/HR patch weekly Place 1 patch on the skin as directed by provider Every 7 (Seven) Days. Monday      • cyclobenzaprine (FLEXERIL) 10 MG tablet Take 1 tablet by mouth 3 (Three) Times a Day As Needed for Muscle Spasms. 30 tablet 0    • docusate sodium (Colace) 100 MG capsule Take 1 capsule by mouth 2 (Two) Times a Day. 60 capsule 1    • DULoxetine (CYMBALTA) 60 MG capsule Take 1 capsule by mouth 2 (Two) Times a Day.      • furosemide (LASIX) 40 MG tablet Take 1 tablet by mouth Daily As Needed (PRN EDEMA).      • HYDROcodone-acetaminophen (NORCO)  MG per tablet Take 1 tablet by mouth Every 4 (Four) Hours As Needed for Moderate Pain (Pain). 20 tablet 0    • levothyroxine (SYNTHROID, LEVOTHROID) 100 MCG tablet Take 1 tablet by mouth Daily.      • nystatin (MYCOSTATIN) 111802 UNIT/GM powder Apply 1 application topically to the appropriate area as directed 2 (Two) Times a Day.      • pramipexole (MIRAPEX) 0.75 MG tablet Take 1 tablet by mouth every night at bedtime.      • vitamin C (ASCORBIC ACID) 500 MG tablet Take 1 tablet by mouth Daily.      • vitamin D3 125 MCG (5000 UT) capsule capsule Take 1 capsule by mouth Daily.       and Allergies:  Codeine and Benadryl [diphenhydramine]    Current Facility-Administered Medications:   •  ascorbic acid (VITAMIN C) tablet 500 mg, 500 mg, Oral, Daily, Richelle Mcclure MD, 500 mg at 04/18/23 8879  •  bisoprolol (ZEBeta) 5 mg, hydroCHLOROthiazide (HYDRODIURIL) 6.25 mg for Ziac 5-6.25, , Oral, Q24H, Richelle Mcclure  MD, Given at 04/19/23 0912  •  dextrose 5 % and lactated Ringer's infusion, 75 mL/hr, Intravenous, Continuous, Richelle Mcclure MD, Last Rate: 75 mL/hr at 04/18/23 2109, 75 mL/hr at 04/18/23 2109  •  docusate sodium (COLACE) capsule 100 mg, 100 mg, Oral, BID, Richelle Mcclure MD, 100 mg at 04/18/23 2053  •  DULoxetine (CYMBALTA) DR capsule 60 mg, 60 mg, Oral, Daily, Richelle Mcclure MD, 60 mg at 04/18/23 1856  •  famotidine (PEPCID) tablet 20 mg, 20 mg, Oral, BID, Richelle Mcclure MD, 20 mg at 04/18/23 2053  •  HYDROcodone-acetaminophen (NORCO)  MG per tablet 1 tablet, 1 tablet, Oral, Q4H PRN, Richelle Mcclure MD, 1 tablet at 04/18/23 2052  •  HYDROmorphone (DILAUDID) injection 0.25 mg, 0.25 mg, Intravenous, Q4H PRN, Sarita Vazquez MD  •  levothyroxine (SYNTHROID, LEVOTHROID) tablet 100 mcg, 100 mcg, Oral, Q AM, Richelle Mcclure MD, 100 mcg at 04/19/23 0615  •  ondansetron (ZOFRAN) tablet 8 mg, 8 mg, Oral, Q6H PRN, Sarita Vazquez MD  •  piperacillin-tazobactam (ZOSYN) 3.375 g in iso-osmotic dextrose 50 ml (premix), 3.375 g, Intravenous, Q6H, Sarita Vazquez MD, 3.375 g at 04/19/23 0916  •  pramipexole (MIRAPEX) tablet 0.75 mg, 0.75 mg, Oral, Q8H, Richelle Mcclure MD, 0.75 mg at 04/19/23 0615  •  vancomycin (VANCOCIN) 1,000 mg in sodium chloride 0.9 % 250 mL IVPB-VTB, 1,000 mg, Intravenous, Q12H, Sarita Vazquez MD, 1,000 mg at 04/19/23 0615  •  vitamin D3 capsule 5,000 Units, 5,000 Units, Oral, Daily, Richelle Mcclure MD, 5,000 Units at 04/18/23 8722    Review of Systems:    All organ systems were evaluated and found negative except those which are mentioned in the History of Present Illness.      Objective      Physical Exam:    Vital Signs:  Temp:  [98.2 °F (36.8 °C)-99 °F (37.2 °C)] 98.9 °F (37.2 °C)  Heart Rate:  [59-70] 70  Resp:  [16-18] 18  BP: ()/(45-55) 143/55    Constitutional:    Well-developed, well-nourished in no acute distress  Eyes:     Extraocular movements intact; pupils  equal, round, and reactive  Ears, Nose, Mouth, Throat:  Hearing intact, nose midline, no mucosal lesions  Cardiovascular:   Regular rate and rhythm   Respiratory:    Clear to auscultation bilaterally  Gastrointestinal:   Soft, nontender, nondistended  Genitourinary:    Deferred  Musculoskeletal:   Full range of motion, no muscle wasting, no weakness, left leg erythema to just superior to posterior knee, pulling of staple line with dried blood on incisions  Skin:     No rashes or excoriations  Neurological:    Moves all extremities, sensation intact  Psychiatric:    Alert and oriented to person, place, and time  Hematologic/Lymphatic/Immune: No lymphadenopathy      Results Review:     Lab Results (last 24 hours)     Procedure Component Value Units Date/Time    Comprehensive Metabolic Panel [[id number]]  (Abnormal) Collected: 04/18/23 1800    Specimen: Blood Updated: 04/18/23 1826     Glucose 116 mg/dL      BUN 11 mg/dL      Creatinine 0.88 mg/dL      Sodium 134 mmol/L      Potassium 3.5 mmol/L      Chloride 98 mmol/L      CO2 27.0 mmol/L      Calcium 8.2 mg/dL      Total Protein 5.6 g/dL      Albumin 3.3 g/dL      ALT (SGPT) 11 U/L      AST (SGOT) 25 U/L      Alkaline Phosphatase 132 U/L      Total Bilirubin 1.7 mg/dL      Globulin 2.3 gm/dL      A/G Ratio 1.4 g/dL      BUN/Creatinine Ratio 12.5     Anion Gap 9.0 mmol/L      eGFR 69.1 mL/min/1.73     Narrative:      GFR Normal >60  Chronic Kidney Disease <60  Kidney Failure <15    The GFR formula is only valid for adults with stable renal function between ages 18 and 70.    Protime-INR [[id number]]  (Abnormal) Collected: 04/18/23 1800    Specimen: Blood Updated: 04/18/23 1820     Protime 14.7 Seconds      INR 1.14    CBC & Differential [[id number]]  (Abnormal) Collected: 04/18/23 1800    Specimen: Blood Updated: 04/18/23 1809    Narrative:      The following orders were created for panel order CBC & Differential.  Procedure                               Abnormality          Status                     ---------                               -----------         ------                     CBC Auto Differential[[id number]]        Abnormal            Final result                 Please view results for these tests on the individual orders.    CBC Auto Differential [[id number]]  (Abnormal) Collected: 04/18/23 1800    Specimen: Blood Updated: 04/18/23 1809     WBC 5.57 10*3/mm3      RBC 2.51 10*6/mm3      Hemoglobin 9.0 g/dL      Hematocrit 25.3 %      .8 fL      MCH 35.9 pg      MCHC 35.6 g/dL      RDW 14.9 %      RDW-SD 51.2 fl      MPV 9.6 fL      Platelets 104 10*3/mm3      Neutrophil % 67.8 %      Lymphocyte % 20.1 %      Monocyte % 10.8 %      Eosinophil % 0.7 %      Basophil % 0.2 %      Immature Grans % 0.4 %      Neutrophils, Absolute 3.78 10*3/mm3      Lymphocytes, Absolute 1.12 10*3/mm3      Monocytes, Absolute 0.60 10*3/mm3      Eosinophils, Absolute 0.04 10*3/mm3      Basophils, Absolute 0.01 10*3/mm3      Immature Grans, Absolute 0.02 10*3/mm3      nRBC 0.0 /100 WBC         Imaging Results (Last 24 Hours)     ** No results found for the last 24 hours. **            Assessment & Plan     Cellulitis of left shin   S/p laceration repair    She will be admitted and iv vancomycin and zosyn will be started.  She will require removal of the staples and washout of the incisions.        Sarita Vazquez MD  04/19/23  10:31 CDT              Electronically signed by Sarita Vazquez MD at 04/19/23 1033       Vital Signs (last day)     Date/Time Temp Temp src Pulse Resp BP Patient Position SpO2    04/19/23 1230 -- -- 61 22 107/70 -- 98    04/19/23 1220 -- -- 66 19 131/61 -- 92    04/19/23 1215 -- -- 65 19 146/50 -- 97    04/19/23 1210 -- -- 62 17 139/47 -- 100    04/19/23 1205 -- -- 65 18 125/61 -- 99    04/19/23 1200 -- -- 62 21 139/46 -- 99    04/19/23 1157 97.5 (36.4) -- 63 21 139/46 Lying 100    04/19/23 1117 -- -- 58 18 -- -- 95    04/19/23 0746 98.9 (37.2) Oral 70 18 143/55  Lying 98    04/19/23 0423 99 (37.2) Oral 67 16 125/50 Lying 95    04/19/23 0012 98.6 (37) Oral 65 16 120/50 Lying --    04/18/23 2307 -- -- 62 16 -- -- 98    04/18/23 1954 98.2 (36.8) Oral 59 16 131/51 Lying 99    04/18/23 1656 98.8 (37.1) Oral 61 18 97/45 Lying 99            Current Facility-Administered Medications   Medication Dose Route Frequency Provider Last Rate Last Admin   • ascorbic acid (VITAMIN C) tablet 500 mg  500 mg Oral Daily Richelle Mcclure MD   500 mg at 04/18/23 1855   • atropine sulfate injection 0.5 mg  0.5 mg Intravenous Once PRN Luma Vazquez MD       • bisoprolol (ZEBeta) 5 mg, hydroCHLOROthiazide (HYDRODIURIL) 6.25 mg for Ziac 5-6.25   Oral Q24H Richelle Mcclure MD   Given at 04/19/23 0912   • dextrose 5 % and lactated Ringer's infusion  75 mL/hr Intravenous Continuous Richelle Mcclure MD 75 mL/hr at 04/18/23 2109 75 mL/hr at 04/18/23 2109   • docusate sodium (COLACE) capsule 100 mg  100 mg Oral BID Richelle Mcclure MD   100 mg at 04/18/23 2053   • droperidol (INAPSINE) injection 0.625 mg  0.625 mg Intravenous Once PRN Luma Vazquez MD        Or   • droperidol (INAPSINE) injection 0.625 mg  0.625 mg Intramuscular Once PRN Luma Vazquez MD       • DULoxetine (CYMBALTA) DR capsule 60 mg  60 mg Oral Daily Richelle Mcclure MD   60 mg at 04/18/23 1856   • famotidine (PEPCID) tablet 20 mg  20 mg Oral BID Richelle Mcclure MD   20 mg at 04/18/23 2053   • fentaNYL citrate (PF) (SUBLIMAZE) injection 25 mcg  25 mcg Intravenous Q5 Min PRN Luma Vazquez MD   25 mcg at 04/19/23 1221   • flumazenil (ROMAZICON) injection 0.2 mg  0.2 mg Intravenous PRN Luma Vazquez MD       • HYDROcodone-acetaminophen (NORCO)  MG per tablet 1 tablet  1 tablet Oral Q4H PRN Richelle Mcclure MD   1 tablet at 04/18/23 2052   • [MAR Hold] HYDROmorphone (DILAUDID) injection 0.25 mg  0.25 mg Intravenous Q4H PRN Sarita Vazquez MD       • HYDROmorphone (DILAUDID) injection 0.25 mg   0.25 mg Intravenous Q10 Min PRN Luma Vazquez MD       • labetalol (NORMODYNE,TRANDATE) injection 5 mg  5 mg Intravenous Q5 Min PRN Luma Vazquez MD       • lactated ringers infusion  100 mL/hr Intravenous Continuous Luma Vazquez  mL/hr at 04/19/23 1119 100 mL/hr at 04/19/23 1119   • levothyroxine (SYNTHROID, LEVOTHROID) tablet 100 mcg  100 mcg Oral Q AM Richelle Mcclure MD   100 mcg at 04/19/23 0615   • naloxone (NARCAN) injection 0.04 mg  0.04 mg Intravenous PRN Luma Vazquez MD       • ondansetron (ZOFRAN) injection 4 mg  4 mg Intravenous Q15 Min PRN Luma Vazquez MD       • [MAR Hold] ondansetron (ZOFRAN) tablet 8 mg  8 mg Oral Q6H PRN Sarita Vazquez MD       • piperacillin-tazobactam (ZOSYN) 3.375 g in iso-osmotic dextrose 50 ml (premix)  3.375 g Intravenous Q6H Sarita Vazquez MD   3.375 g at 04/19/23 0916   • pramipexole (MIRAPEX) tablet 0.75 mg  0.75 mg Oral Q8H Richelle Mcclure MD   0.75 mg at 04/19/23 0615   • vancomycin (VANCOCIN) 1,000 mg in sodium chloride 0.9 % 250 mL IVPB-VTB  1,000 mg Intravenous Q12H Sarita Vazquez MD   1,000 mg at 04/19/23 0615   • vitamin D3 capsule 5,000 Units  5,000 Units Oral Daily Richelle Mcclure MD   5,000 Units at 04/18/23 1856        Operative/Procedure Notes (last 24 hours)      Sarita Vazquez MD at 04/19/23 1140          Preoperative diagnosis:     Left shin cellulitis after laceration repair  Postoperative diagnosis:    same  Surgeon:     Sarita Vazquez MD Located within [facility]  Procedure:    Pulse irrigation washout left shin incision  Anesthesia:    get   EBL:     minimal  IVF:     See anesthesia notes  Indications:     The patient is a 72 y.o. -year-old female who presents status post primary closure of a left shin lateration.  She returns with erythema for washout  The risks, benefits, complications, and possible alternatives of the procedure were discussed with the patient who agreed to proceed.  Description of  procedure:  The patient was laid supine.  The left leg was prepped and draped in the usual sterile fashion.  The staples and nylon sutures were removed.  The leg demonstrated erythema.  The leg was obese with very thin skin.  3liters of normal saline were used with the pulse  for washout.  No purulent discharge was seen.  Kerlix fluffs, ABD, and Kerlix roll were used to dress the wound.  Mastisol, steri-strips, and dry dressings were applied.  The sponge, needle, and instrument counts were correct at the end of the case.  Findings:    Obese, thin-skinned, erythema.  No purulent discharge  Complications:   None  Disposition:    Good to PACU                              Electronically signed by Sarita Vazquez MD at 04/19/23 1156       4/18  : Pt. direct admit to 3C with redness and swelling to left leg. Tender to touch. [place] present on left wound. Picture placed in chart. IVF infusing. Abx given per order. Voiding. Up with 1 assist and cane. Alert x4. Regular diet. No complaints of pain. Safety maintained.    4/19   Outcome Evaluation: IVF; IV ABX; staples to left leg red, warm, swollen; ext. cath; up with assist and cane; medicated for pain; NPO since midnight for surgery today; resting between care; safety

## 2023-04-19 NOTE — OP NOTE
Preoperative diagnosis:     Left shin cellulitis after laceration repair  Postoperative diagnosis:    same  Surgeon:     Sarita Vazquez MD FACS  Procedure:    Pulse irrigation washout left shin incision  Anesthesia:    get   EBL:     minimal  IVF:     See anesthesia notes  Indications:     The patient is a 72 y.o. -year-old female who presents status post primary closure of a left shin lateration.  She returns with erythema for washout  The risks, benefits, complications, and possible alternatives of the procedure were discussed with the patient who agreed to proceed.  Description of procedure:  The patient was laid supine.  The left leg was prepped and draped in the usual sterile fashion.  The staples and nylon sutures were removed.  The leg demonstrated erythema.  The leg was obese with very thin skin.  3liters of normal saline were used with the pulse  for washout.  No purulent discharge was seen.  Kerlix fluffs, ABD, and Kerlix roll were used to dress the wound.  Mastisol, steri-strips, and dry dressings were applied.  The sponge, needle, and instrument counts were correct at the end of the case.  Findings:    Obese, thin-skinned, erythema.  No purulent discharge  Complications:   None  Disposition:    Good to PACU

## 2023-04-19 NOTE — ANESTHESIA POSTPROCEDURE EVALUATION
"Patient: Brenda Sneed    Procedure Summary     Date: 04/19/23 Room / Location: [facility] OR  /  [facility] OR    Anesthesia Start: 1120 Anesthesia Stop: 1200    Procedure: INCISION AND DRAINAGE ABSCESS left leg (Left) Diagnosis:       Cellulitis of left lower extremity      (Cellulitis of left lower extremity [L03.116])    Surgeons: Sarita Vazquez MD Provider: Terry Almodovar CRNA    Anesthesia Type: general ASA Status: 3          Anesthesia Type: general    Vitals  Vitals Value Taken Time   /40 04/19/23 1251   Temp 97.6 °F (36.4 °C) 04/19/23 1255   Pulse 60 04/19/23 1259   Resp 16 04/19/23 1240   SpO2 95 % 04/19/23 1259   Vitals shown include unvalidated device data.        Post Anesthesia Care and Evaluation    Patient location during evaluation: PACU  Patient participation: complete - patient participated  Level of consciousness: awake and alert  Pain management: adequate    Airway patency: patent  Anesthetic complications: No anesthetic complications    Cardiovascular status: acceptable  Respiratory status: acceptable  Hydration status: acceptable    Comments: Blood pressure 124/50, pulse 59, temperature 98.6 °F (37 °C), temperature source Oral, resp. rate 18, height 160 cm (63\"), weight 113 kg (250 lb), SpO2 95 %, not currently breastfeeding.    Pt discharged from PACU based on dinah score >8      "

## 2023-04-19 NOTE — HOME HEALTH
FOCUS OF CARE/SKILLED NEED: direct skill  TEACHING/INTERVENTIONS: wound assessment and dressing change  PROGRESS TOWARD GOALS: ongoing  PHYSICIAN CONTACT:  Dr. Sarita Vazquez's office contacted regarding increase pain, redness, and swelling to left lower leg.   INSURANCE CHANGES?  no  FALLS SINCE LAST VISIT?  no  MEDICATION CHANGES? no  PLAN FOR NEXT VISIT: wound assessment and dressing change  PRE-SCREENED FOR COVID? no s/s

## 2023-04-19 NOTE — PLAN OF CARE
Goal Outcome Evaluation:  Plan of Care Reviewed With: patient           Outcome Evaluation: SURGERY TODAY. BULKY DRESSING TO LLE. LEFT HEEL ELEVATED OFF BED. IV FLUIDS PER ORDER. IV ANTIBIOTICS PER ORDER. PURE WIK INTACT. ROOM AIR. NO DISTRESS NOTED.

## 2023-04-19 NOTE — PLAN OF CARE
Goal Outcome Evaluation:  Plan of Care Reviewed With: patient        Progress: no change  Outcome Evaluation: Pt. direct admit to 3C with redness and swelling to left leg. Tender to touch. Rosangela present on left wound. Picture placed in chart. IVF infusing. Abx given per order. Voiding. Up with 1 assist and cane. Alert x4. Regular diet. No complaints of pain. Safety maintained.

## 2023-04-19 NOTE — PROGRESS NOTES
"Pharmacy Dosing Service  Pharmacokinetics  Vancomycin Follow-up Evaluation    Assessment/Action/Plan:  Current Order: Vancomycin 1000 mg IVPB every 12 hours  Current end date:5/19/2023  Levels: Vancomycin trough ordered before dose due on 4/20/2023 at 0600  Additional antimicrobial agent(s): Zosyn  Previous regimen [id number] = 759  Vancomycin dose adjusted to 750 mg iv q12h. Pharmacy will continue to follow daily and adjust dose accordingly.     Subjective:  Brenda Sneed is a 72 y.o. female currently on Vancomycin for the treatment of SSTI, day 2 of treatment.    AUC Model Data:  Regimen: 750 mg IV every 12 hours.  Exposure target: AUC24 (range)400-600 mg/L.hr   AUC24,ss: 572 mg/L.hr  PAUC*: 76 %  Ctrough,ss: 18.8 mg/L  Pconc*: 46 %  Tox.: 15 %      Objective:  Ht: 160 cm (63\"); Wt: 113 kg (250 lb)  Estimated Creatinine Clearance: 67.8 mL/min (by C-G formula based on SCr of 0.88 mg/dL).   Creatinine   Date Value Ref Range Status   04/18/2023 0.88 0.57 - 1.00 mg/dL Final   04/07/2023 0.60 0.57 - 1.00 mg/dL Final   07/07/2022 0.89 0.57 - 1.00 mg/dL Final   05/03/2021 0.8 0.5 - 0.9 mg/dL Final   05/02/2021 0.7 0.5 - 0.9 mg/dL Final   02/13/2021 0.6 0.5 - 0.9 mg/dL Final      Lab Results   Component Value Date    WBC 5.57 04/18/2023    WBC 3.70 04/07/2023    WBC 8.20 07/07/2022         Culture Results:  Microbiology Results (last 10 days)       ** No results found for the last 240 hours. **            Bryce Gibson, PharmD   04/19/23 16:17 CDT    "

## 2023-04-19 NOTE — CASE MANAGEMENT/SOCIAL WORK
Discharge Planning Assessment  [facility]     Patient Name: Brenda Sneed  MRN: [id number]  Today's Date: 4/19/2023    Admit Date: 4/18/2023        Discharge Needs Assessment     Row Name 04/19/23 1423       Living Environment    People in Home alone    Current Living Arrangements home    Potentially Unsafe Housing Conditions none    Primary Care Provided by self    Provides Primary Care For no one    Family Caregiver if Needed child(shasha), adult    Family Caregiver Names Daughter Dominga     Quality of Family Relationships helpful    Able to Return to Prior Arrangements yes       Resource/Environmental Concerns    Resource/Environmental Concerns none    Transportation Concerns none       Food Insecurity    Within the past 12 months, you worried that your food would run out before you got the money to buy more. Never true    Within the past 12 months, the food you bought just didn't last and you didn't have money to get more. Never true       Transition Planning    Patient/Family Anticipates Transition to home    Patient/Family Anticipated Services at Transition None/HH    Transportation Anticipated family or friend will provide       Discharge Needs Assessment    Readmission Within the Last 30 Days current reason for admission unrelated to previous admission    Current Outpatient/Agency/Support Group homecare agency    Equipment Currently Used at Home cane, straight;walker, standard;other (see comments)  medical alert    Concerns to be Addressed denies needs/concerns at this time    Anticipated Changes Related to Illness none    Equipment Needed After Discharge none    Discharge Facility/Level of Care Needs home with home health    Discharge Coordination/Progress Spoke beryl pt and king at bedside.  Pt has medical alert, [facility] from previous admission, daughter lives close to help pt at home if needed.  Plan to go to OR today for left leg.  Will continue to follow for any dc needs post  surgery.               Discharge Plan    No documentation.               Continued Care and Services - Admitted Since 4/18/2023    Coordination has not been started for this encounter.          Demographic Summary    No documentation.                Functional Status    No documentation.                Psychosocial    No documentation.                Abuse/Neglect    No documentation.                Legal    No documentation.                Substance Abuse    No documentation.                Patient Forms    No documentation.                   Romana Browne RN

## 2023-04-19 NOTE — PLAN OF CARE
Goal Outcome Evaluation:  Plan of Care Reviewed With: patient        Progress: no change  Outcome Evaluation: IVF; IV ABX; staples to left leg red, warm, swollen; ext. cath; up with assist and cane; medicated for pain; NPO since midnight for surgery today; resting between care; safety maintained

## 2023-04-19 NOTE — ANESTHESIA PREPROCEDURE EVALUATION
Anesthesia Evaluation     Patient summary reviewed and Nursing notes reviewed   no history of anesthetic complications:  NPO Solid Status: > 8 hours  NPO Liquid Status: > 8 hours           Airway   Mallampati: I  TM distance: >3 FB  Neck ROM: full  No difficulty expected  Dental      Pulmonary    (-) sleep apnea, not a smoker  Cardiovascular   Exercise tolerance: poor (<4 METS)    (+) hypertension,   (-) CAD      Neuro/Psych  (+) psychiatric history Anxiety and Depression,    (-) seizures, TIA, CVA  GI/Hepatic/Renal/Endo    (+) morbid obesity,  renal disease stones, thyroid problem hypothyroidism  (-) liver disease, diabetes    Musculoskeletal     (+) chronic pain,       ROS comment: buprenorphen patch, currently on  Abdominal    Substance History      OB/GYN          Other   arthritis,                      Anesthesia Plan    ASA 3     general     intravenous induction     Anesthetic plan, risks, benefits, and alternatives have been provided, discussed and informed consent has been obtained with: patient.        CODE STATUS:    Code Status (Patient has no pulse and is not breathing): CPR (Attempt to Resuscitate)  Medical Interventions (Patient has pulse or is breathing): Full Support

## 2023-04-20 LAB — VANCOMYCIN TROUGH SERPL-MCNC: 11.7 MCG/ML (ref 5–20)

## 2023-04-20 PROCEDURE — 63710000001 HYDROCHLOROTHIAZIDE 25 MG TABLET 1,000 EACH BOTTLE: Performed by: SPECIALIST

## 2023-04-20 PROCEDURE — 63710000001 FAMOTIDINE 20 MG TABLET: Performed by: SPECIALIST

## 2023-04-20 PROCEDURE — A9270 NON-COVERED ITEM OR SERVICE: HCPCS | Performed by: SPECIALIST

## 2023-04-20 PROCEDURE — G0378 HOSPITAL OBSERVATION PER HR: HCPCS

## 2023-04-20 PROCEDURE — 25010000002 VANCOMYCIN 1 G RECONSTITUTED SOLUTION 1 EACH VIAL: Performed by: SPECIALIST

## 2023-04-20 PROCEDURE — 63710000001 LEVOTHYROXINE 100 MCG TABLET: Performed by: SPECIALIST

## 2023-04-20 PROCEDURE — 63710000001 HYDROCODONE-ACETAMINOPHEN 10-325 MG TABLET: Performed by: SPECIALIST

## 2023-04-20 PROCEDURE — 63710000001 VITAMIN D3 125 MCG (5000 UT) CAPSULE: Performed by: SPECIALIST

## 2023-04-20 PROCEDURE — 63710000001 DULOXETINE 30 MG CAPSULE DELAYED-RELEASE PARTICLES: Performed by: SPECIALIST

## 2023-04-20 PROCEDURE — 63710000001 DOCUSATE SODIUM 100 MG CAPSULE: Performed by: SPECIALIST

## 2023-04-20 PROCEDURE — 63710000001 PRAMIPEXOLE 0.25 MG TABLET: Performed by: SPECIALIST

## 2023-04-20 PROCEDURE — 25010000002 PIPERACILLIN SOD-TAZOBACTAM PER 1 G: Performed by: SPECIALIST

## 2023-04-20 PROCEDURE — 80202 ASSAY OF VANCOMYCIN: CPT | Performed by: SPECIALIST

## 2023-04-20 PROCEDURE — 63710000001 BISOPROLOL 5 MG TABLET 100 EACH BOTTLE: Performed by: SPECIALIST

## 2023-04-20 PROCEDURE — 63710000001 VITAMIN C 500 MG TABLET: Performed by: SPECIALIST

## 2023-04-20 RX ADMIN — TAZOBACTAM SODIUM AND PIPERACILLIN SODIUM 3.38 G: 375; 3 INJECTION, SOLUTION INTRAVENOUS at 05:07

## 2023-04-20 RX ADMIN — FAMOTIDINE 20 MG: 20 TABLET, FILM COATED ORAL at 21:24

## 2023-04-20 RX ADMIN — HYDROCODONE BITARTRATE AND ACETAMINOPHEN 1 TABLET: 10; 325 TABLET ORAL at 03:16

## 2023-04-20 RX ADMIN — FAMOTIDINE 20 MG: 20 TABLET, FILM COATED ORAL at 09:36

## 2023-04-20 RX ADMIN — VANCOMYCIN HYDROCHLORIDE 1000 MG: 1 INJECTION, POWDER, LYOPHILIZED, FOR SOLUTION INTRAVENOUS at 17:25

## 2023-04-20 RX ADMIN — PRAMIPEXOLE DIHYDROCHLORIDE 0.75 MG: 0.25 TABLET ORAL at 21:24

## 2023-04-20 RX ADMIN — HYDROCODONE BITARTRATE AND ACETAMINOPHEN 1 TABLET: 10; 325 TABLET ORAL at 21:38

## 2023-04-20 RX ADMIN — DOCUSATE SODIUM 100 MG: 100 CAPSULE ORAL at 09:36

## 2023-04-20 RX ADMIN — DOCUSATE SODIUM 100 MG: 100 CAPSULE ORAL at 21:24

## 2023-04-20 RX ADMIN — VANCOMYCIN HYDROCHLORIDE 1000 MG: 1 INJECTION, POWDER, LYOPHILIZED, FOR SOLUTION INTRAVENOUS at 06:18

## 2023-04-20 RX ADMIN — TAZOBACTAM SODIUM AND PIPERACILLIN SODIUM 3.38 G: 375; 3 INJECTION, SOLUTION INTRAVENOUS at 09:35

## 2023-04-20 RX ADMIN — Medication 5000 UNITS: at 09:36

## 2023-04-20 RX ADMIN — OXYCODONE HYDROCHLORIDE AND ACETAMINOPHEN 500 MG: 500 TABLET ORAL at 09:36

## 2023-04-20 RX ADMIN — BISOPROLOL FUMARATE: 5 TABLET, FILM COATED ORAL at 09:44

## 2023-04-20 RX ADMIN — HYDROCODONE BITARTRATE AND ACETAMINOPHEN 1 TABLET: 10; 325 TABLET ORAL at 16:25

## 2023-04-20 RX ADMIN — TAZOBACTAM SODIUM AND PIPERACILLIN SODIUM 3.38 G: 375; 3 INJECTION, SOLUTION INTRAVENOUS at 21:38

## 2023-04-20 RX ADMIN — PRAMIPEXOLE DIHYDROCHLORIDE 0.75 MG: 0.25 TABLET ORAL at 05:08

## 2023-04-20 RX ADMIN — PRAMIPEXOLE DIHYDROCHLORIDE 0.75 MG: 0.25 TABLET ORAL at 14:23

## 2023-04-20 RX ADMIN — TAZOBACTAM SODIUM AND PIPERACILLIN SODIUM 3.38 G: 375; 3 INJECTION, SOLUTION INTRAVENOUS at 16:27

## 2023-04-20 RX ADMIN — LEVOTHYROXINE SODIUM 100 MCG: 100 TABLET ORAL at 05:08

## 2023-04-20 RX ADMIN — DULOXETINE HYDROCHLORIDE 60 MG: 30 CAPSULE, DELAYED RELEASE ORAL at 09:35

## 2023-04-20 NOTE — PLAN OF CARE
Goal Outcome Evaluation:  Plan of Care Reviewed With: (P) patient        Progress: (P) no change    Medicated for pain, see MAR. IID between abx. Voiding per external urinary catheter. Dressing change to LLE with Kerlix and abd pad. Pt on RA throughout shift. VSS, safety maintained.

## 2023-04-20 NOTE — PLAN OF CARE
Goal Outcome Evaluation:           Progress: improving    VSS.  Dsg changed to LLE as ordered - currently CDI.  Medicated for pain X1 with good results.  Denies nausea - tolerating regular diet.  Up ambulating in hallway with assist.  IV abx as ordered.  Good UO with external catheter.  Edema to BLE.  Safety maintained.

## 2023-04-20 NOTE — PROGRESS NOTES
"Pharmacy Dosing Service  Pharmacokinetics  Vancomycin Follow-up Evaluation    Assessment/Action/Plan:  Current Order: Vancomycin 1,000 mg IVPB every 12 hours (adjusted from 750 mg IV every 12 hours)  Current end date/final dose: 5/18/23 at 1800  Additional antimicrobial agent(s): Zosyn    Vancomycin trough = 11.7 (~10.5 hours post dose). Increased dose from 750 mg IV every 12 hours to 1,000 mg IV every 12 hours. Pharmacy will continue to follow daily and adjust dose accordingly.     Subjective:  Brenda Sneed is a 72 y.o. female currently on Vancomycin 1,000 mg IV every 12 hours (adjusted from 750mg q12h) for the treatment of SSTI (LLE cellulitis w/ abscess)(I&D done 4/19/23), day 3 of treatment.    AUC Model Data:  Regimen: 1000 mg IV every 12 hours.  Start time: 06:00 on 04/20/2023  Exposure target: AUC24 (range)400-600 mg/L.hr   AUC24,ss: 510 mg/L.hr  PAUC*: 93 %  Ctrough,ss: 13.2 mg/L  Pconc*: 1 %  Tox.: 8 %    Objective:  Ht: 160 cm (63\"); Wt: 113 kg (250 lb)  Estimated Creatinine Clearance: 67.8 mL/min (by C-G formula based on SCr of 0.88 mg/dL).   Creatinine   Date Value Ref Range Status   04/18/2023 0.88 0.57 - 1.00 mg/dL Final      Lab Results   Component Value Date    WBC 5.57 04/18/2023         Lab Results   Component Value Date    VANCOTROUGH 11.70 04/20/2023       Culture Results:  Microbiology Results (last 10 days)       ** No results found for the last 240 hours. **            Linwood Merrill, PharmD   04/20/23 05:29 CDT    "

## 2023-04-21 VITALS
HEART RATE: 59 BPM | SYSTOLIC BLOOD PRESSURE: 143 MMHG | OXYGEN SATURATION: 97 % | DIASTOLIC BLOOD PRESSURE: 59 MMHG | TEMPERATURE: 98.3 F | BODY MASS INDEX: 44.3 KG/M2 | WEIGHT: 250 LBS | RESPIRATION RATE: 16 BRPM | HEIGHT: 63 IN

## 2023-04-21 PROBLEM — L03.116 CELLULITIS OF LEFT LOWER EXTREMITY: Status: ACTIVE | Noted: 2023-04-21

## 2023-04-21 LAB
ANION GAP SERPL CALCULATED.3IONS-SCNC: 9 MMOL/L (ref 5–15)
BUN SERPL-MCNC: 9 MG/DL (ref 8–23)
BUN/CREAT SERPL: 13.4 (ref 7–25)
CALCIUM SPEC-SCNC: 8.4 MG/DL (ref 8.6–10.5)
CHLORIDE SERPL-SCNC: 104 MMOL/L (ref 98–107)
CO2 SERPL-SCNC: 27 MMOL/L (ref 22–29)
CREAT SERPL-MCNC: 0.67 MG/DL (ref 0.57–1)
DEPRECATED RDW RBC AUTO: 55 FL (ref 37–54)
EGFRCR SERPLBLD CKD-EPI 2021: 91.8 ML/MIN/1.73
ERYTHROCYTE [DISTWIDTH] IN BLOOD BY AUTOMATED COUNT: 15.1 % (ref 12.3–15.4)
GLUCOSE SERPL-MCNC: 98 MG/DL (ref 65–99)
HCT VFR BLD AUTO: 27.5 % (ref 34–46.6)
HGB BLD-MCNC: 8.6 G/DL (ref 12–15.9)
MCH RBC QN AUTO: 30.9 PG (ref 26.6–33)
MCHC RBC AUTO-ENTMCNC: 31.3 G/DL (ref 31.5–35.7)
MCV RBC AUTO: 98.9 FL (ref 79–97)
PLATELET # BLD AUTO: 112 10*3/MM3 (ref 140–450)
PMV BLD AUTO: 9.5 FL (ref 6–12)
POTASSIUM SERPL-SCNC: 3.2 MMOL/L (ref 3.5–5.2)
RBC # BLD AUTO: 2.78 10*6/MM3 (ref 3.77–5.28)
SODIUM SERPL-SCNC: 140 MMOL/L (ref 136–145)
WBC NRBC COR # BLD: 4.59 10*3/MM3 (ref 3.4–10.8)

## 2023-04-21 PROCEDURE — 80048 BASIC METABOLIC PNL TOTAL CA: CPT | Performed by: SPECIALIST

## 2023-04-21 PROCEDURE — 63710000001 LEVOTHYROXINE 100 MCG TABLET: Performed by: SPECIALIST

## 2023-04-21 PROCEDURE — 25010000002 PIPERACILLIN SOD-TAZOBACTAM PER 1 G: Performed by: SPECIALIST

## 2023-04-21 PROCEDURE — A9270 NON-COVERED ITEM OR SERVICE: HCPCS | Performed by: SPECIALIST

## 2023-04-21 PROCEDURE — 85027 COMPLETE CBC AUTOMATED: CPT | Performed by: SPECIALIST

## 2023-04-21 PROCEDURE — 63710000001 BISOPROLOL 5 MG TABLET 100 EACH BOTTLE: Performed by: SPECIALIST

## 2023-04-21 PROCEDURE — 63710000001 FAMOTIDINE 20 MG TABLET: Performed by: SPECIALIST

## 2023-04-21 PROCEDURE — 63710000001 CEPHALEXIN 500 MG CAPSULE: Performed by: SPECIALIST

## 2023-04-21 PROCEDURE — 63710000001 HYDROCHLOROTHIAZIDE 25 MG TABLET 1,000 EACH BOTTLE: Performed by: SPECIALIST

## 2023-04-21 PROCEDURE — 63710000001 DULOXETINE 30 MG CAPSULE DELAYED-RELEASE PARTICLES: Performed by: SPECIALIST

## 2023-04-21 PROCEDURE — 63710000001 VITAMIN C 500 MG TABLET: Performed by: SPECIALIST

## 2023-04-21 PROCEDURE — 25010000002 VANCOMYCIN 1 G RECONSTITUTED SOLUTION 1 EACH VIAL: Performed by: SPECIALIST

## 2023-04-21 PROCEDURE — 63710000001 DOCUSATE SODIUM 100 MG CAPSULE: Performed by: SPECIALIST

## 2023-04-21 PROCEDURE — G0378 HOSPITAL OBSERVATION PER HR: HCPCS

## 2023-04-21 PROCEDURE — 63710000001 VITAMIN D3 125 MCG (5000 UT) CAPSULE: Performed by: SPECIALIST

## 2023-04-21 PROCEDURE — 63710000001 PRAMIPEXOLE 0.25 MG TABLET: Performed by: SPECIALIST

## 2023-04-21 RX ORDER — OXYCODONE HYDROCHLORIDE AND ACETAMINOPHEN 5; 325 MG/1; MG/1
1 TABLET ORAL EVERY 4 HOURS PRN
Status: DISCONTINUED | OUTPATIENT
Start: 2023-04-21 | End: 2023-04-21 | Stop reason: HOSPADM

## 2023-04-21 RX ORDER — CEPHALEXIN 500 MG/1
500 CAPSULE ORAL EVERY 12 HOURS SCHEDULED
Qty: 14 CAPSULE | Refills: 0 | Status: SHIPPED | OUTPATIENT
Start: 2023-04-21 | End: 2023-04-28

## 2023-04-21 RX ORDER — CEPHALEXIN 500 MG/1
500 CAPSULE ORAL EVERY 12 HOURS SCHEDULED
Status: DISCONTINUED | OUTPATIENT
Start: 2023-04-21 | End: 2023-04-21 | Stop reason: HOSPADM

## 2023-04-21 RX ADMIN — PRAMIPEXOLE DIHYDROCHLORIDE 0.75 MG: 0.25 TABLET ORAL at 06:23

## 2023-04-21 RX ADMIN — CEPHALEXIN 500 MG: 500 CAPSULE ORAL at 09:12

## 2023-04-21 RX ADMIN — Medication 5000 UNITS: at 09:05

## 2023-04-21 RX ADMIN — DOCUSATE SODIUM 100 MG: 100 CAPSULE ORAL at 09:05

## 2023-04-21 RX ADMIN — FAMOTIDINE 20 MG: 20 TABLET, FILM COATED ORAL at 09:05

## 2023-04-21 RX ADMIN — TAZOBACTAM SODIUM AND PIPERACILLIN SODIUM 3.38 G: 375; 3 INJECTION, SOLUTION INTRAVENOUS at 10:29

## 2023-04-21 RX ADMIN — BISOPROLOL FUMARATE: 5 TABLET, FILM COATED ORAL at 09:05

## 2023-04-21 RX ADMIN — OXYCODONE HYDROCHLORIDE AND ACETAMINOPHEN 500 MG: 500 TABLET ORAL at 09:05

## 2023-04-21 RX ADMIN — DULOXETINE HYDROCHLORIDE 60 MG: 30 CAPSULE, DELAYED RELEASE ORAL at 09:05

## 2023-04-21 RX ADMIN — VANCOMYCIN HYDROCHLORIDE 1000 MG: 1 INJECTION, POWDER, LYOPHILIZED, FOR SOLUTION INTRAVENOUS at 06:29

## 2023-04-21 RX ADMIN — LEVOTHYROXINE SODIUM 100 MCG: 100 TABLET ORAL at 06:23

## 2023-04-21 RX ADMIN — TAZOBACTAM SODIUM AND PIPERACILLIN SODIUM 3.38 G: 375; 3 INJECTION, SOLUTION INTRAVENOUS at 04:44

## 2023-04-21 NOTE — PROGRESS NOTES
Sarita Vazquez MD FACS  Progress Note     LOS: 3 days   Patient Care Team:  Sachin Dia MD as PCP - General  Sachin Dia MD as PCP - Family Medicine  Sumeet Bowles PA as Physician Assistant (Family Medicine)      Subjective     Chief complaint: cellulitis    History of Present Illness:     No events over pm.  Pain controlled      The following portions of the patient's history were reviewed and updated as appropriate: allergies, current medications, past family history, past medical history, past social history, past surgical history, and problem list.    Objective     Physical Exam:    Vital Signs:  Temp:  [97.5 °F (36.4 °C)-98.9 °F (37.2 °C)] 98 °F (36.7 °C)  Heart Rate:  [58-78] 61  Resp:  [16-18] 16  BP: (113-153)/(43-56) 114/48      Constitutional:    Well-developed, well-nourished in no acute distress  Eyes:     Extraocular movements intact; pupils equal, round, and reactive  Ears, Nose, Mouth, Throat:  Hearing intact, nose midline, no mucosal lesions  Cardiovascular:   Regular rate and rhythm   Respiratory:    Clear to auscultation bilaterally  Gastrointestinal:   Soft, nontender, nondistended  Genitourinary:    Deferred  Musculoskeletal:   Full range of motion, no muscle wasting, no weakness, decreased erythema, clean wounds, no purulent discharge  Skin:     No rashes or excoriations  Neurological:    Moves all extremities, sensation intact  Psychiatric:    Alert and oriented to person, place, and time  Hematologic/Lymphatic/Immune: No lymphadenopathy      Results Review:    Lab Results (last 24 hours)     Procedure Component Value Units Date/Time    Basic Metabolic Panel [[id number]]  (Abnormal) Collected: 04/21/23 0554    Specimen: Blood Updated: 04/21/23 0640     Glucose 98 mg/dL      BUN 9 mg/dL      Creatinine 0.67 mg/dL      Sodium 140 mmol/L      Potassium 3.2 mmol/L      Chloride 104 mmol/L      CO2 27.0 mmol/L      Calcium 8.4 mg/dL      BUN/Creatinine Ratio 13.4     Anion  Gap 9.0 mmol/L      eGFR 91.8 mL/min/1.73     Narrative:      GFR Normal >60  Chronic Kidney Disease <60  Kidney Failure <15    The GFR formula is only valid for adults with stable renal function between ages 18 and 70.    CBC (No Diff) [[id number]]  (Abnormal) Collected: 04/21/23 0554    Specimen: Blood Updated: 04/21/23 0634     WBC 4.59 10*3/mm3      RBC 2.78 10*6/mm3      Hemoglobin 8.6 g/dL      Hematocrit 27.5 %      MCV 98.9 fL      MCH 30.9 pg      MCHC 31.3 g/dL      RDW 15.1 %      RDW-SD 55.0 fl      MPV 9.5 fL      Platelets 112 10*3/mm3         Imaging Results (Last 24 Hours)     ** No results found for the last 24 hours. **            Assessment & Plan     Cellulitis s/p laceration repair; washout    Home on oral abx  Bathe daily  Dry dressing changes bid      April MAYLIN Vazquez MD  04/21/23  08:27 CDT

## 2023-04-21 NOTE — CASE MANAGEMENT/SOCIAL WORK
Continued Stay Note   [place]     Patient Name: Brenda Sneed  MRN: [id number]  Today's Date: 4/21/2023    Admit Date: 4/18/2023    Plan: Poly    Discharge Plan     Row Name 04/21/23 1030       Plan    Plan Poly     Patient/Family in Agreement with Plan yes    Final Discharge Disposition Code 06 - home with home health care    Final Note Pt is going home today. Notified Glory with [facility] [phone] and inboxed resumption orders.               Discharge Codes    No documentation.               Expected Discharge Date and Time     Expected Discharge Date Expected Discharge Time    Apr 21, 2023             KAITLIN Dillard

## 2023-04-21 NOTE — PLAN OF CARE
Goal Outcome Evaluation:  VSS.  Dsg changed to LLE by MD at start of shift, Drainage noted during shift.  Medicated for pain X1 with good results.  Denies nausea - tolerating regular diet.  Up ambulating in hallway with standby assist and use of cane.  IV abx as ordered.  Good UO with external catheter.  Edema to BLE.  Safety maintained.      Problem: Adult Inpatient Plan of Care  Goal: Plan of Care Review  Outcome: Ongoing, Progressing  Goal: Patient-Specific Goal (Individualized)  Outcome: Ongoing, Progressing  Goal: Absence of Hospital-Acquired Illness or Injury  Outcome: Ongoing, Progressing  Intervention: Identify and Manage Fall Risk  Recent Flowsheet Documentation  Taken 4/21/2023 0400 by Daja Lantigua RN  Safety Promotion/Fall Prevention: safety round/check completed  Taken 4/21/2023 0200 by Daja Lantigua RN  Safety Promotion/Fall Prevention: safety round/check completed  Taken 4/21/2023 0000 by Daja Lantigua RN  Safety Promotion/Fall Prevention: safety round/check completed  Taken 4/20/2023 2200 by Daja Lantigua RN  Safety Promotion/Fall Prevention: safety round/check completed  Taken 4/20/2023 2030 by Daja Lantigua RN  Safety Promotion/Fall Prevention:   safety round/check completed   nonskid shoes/slippers when out of bed   lighting adjusted   fall prevention program maintained   assistive device/personal items within reach   clutter free environment maintained  Intervention: Prevent and Manage VTE (Venous Thromboembolism) Risk  Recent Flowsheet Documentation  Taken 4/21/2023 0200 by Daja Lantigua RN  Activity Management: ambulated outside room  Taken 4/20/2023 2030 by Daja Lantigua RN  VTE Prevention/Management:   sequential compression devices on   right  Goal: Optimal Comfort and Wellbeing  Outcome: Ongoing, Progressing  Goal: Readiness for Transition of Care  Outcome: Ongoing, Progressing     Problem: Pain Acute  Goal: Acceptable Pain Control and Functional Ability  Outcome: Ongoing, Progressing      Problem: Skin or Soft Tissue Infection  Goal: Absence of Infection Signs and Symptoms  Outcome: Ongoing, Progressing     Problem: Fall Injury Risk  Goal: Absence of Fall and Fall-Related Injury  Outcome: Ongoing, Progressing  Intervention: Promote Injury-Free Environment  Recent Flowsheet Documentation  Taken 4/21/2023 0400 by Daja Lantigua RN  Safety Promotion/Fall Prevention: safety round/check completed  Taken 4/21/2023 0200 by Daja Lantigua RN  Safety Promotion/Fall Prevention: safety round/check completed  Taken 4/21/2023 0000 by Daja Lantigua RN  Safety Promotion/Fall Prevention: safety round/check completed  Taken 4/20/2023 2200 by Daja Lantigua RN  Safety Promotion/Fall Prevention: safety round/check completed  Taken 4/20/2023 2030 by Daja Lantigua RN  Safety Promotion/Fall Prevention:   safety round/check completed   nonskid shoes/slippers when out of bed   lighting adjusted   fall prevention program maintained   assistive device/personal items within reach   clutter free environment maintained     Problem: Bleeding (Surgery Nonspecified)  Goal: Absence of Bleeding  Outcome: Ongoing, Progressing     Problem: Bowel Motility Impaired (Surgery Nonspecified)  Goal: Effective Bowel Elimination  Outcome: Ongoing, Progressing     Problem: Fluid and Electrolyte Imbalance (Surgery Nonspecified)  Goal: Fluid and Electrolyte Balance  Outcome: Ongoing, Progressing     Problem: Glycemic Control Impaired (Surgery Nonspecified)  Goal: Blood Glucose Level Within Targeted Range  Outcome: Ongoing, Progressing     Problem: Infection (Surgery Nonspecified)  Goal: Absence of Infection Signs and Symptoms  Outcome: Ongoing, Progressing     Problem: Ongoing Anesthesia Effects (Surgery Nonspecified)  Goal: Anesthesia/Sedation Recovery  Outcome: Ongoing, Progressing  Intervention: Optimize Anesthesia Recovery  Recent Flowsheet Documentation  Taken 4/21/2023 0400 by Daja Lantigua RN  Safety Promotion/Fall Prevention: safety  round/check completed  Taken 4/21/2023 0200 by Daja Lantigua RN  Safety Promotion/Fall Prevention: safety round/check completed  Taken 4/21/2023 0000 by Daja Lantigua RN  Safety Promotion/Fall Prevention: safety round/check completed  Taken 4/20/2023 2200 by Daja Lantigua RN  Safety Promotion/Fall Prevention: safety round/check completed  Taken 4/20/2023 2030 by Daja Lantigua RN  Safety Promotion/Fall Prevention:   safety round/check completed   nonskid shoes/slippers when out of bed   lighting adjusted   fall prevention program maintained   assistive device/personal items within reach   clutter free environment maintained  Administration (IS):   instruction provided, follow-up   proper technique demonstrated  Level Incentive Spirometer (mL): 2000  Incentive Spirometer Predicted Level (mL): 2500  Number of Repetitions (IS): 10     Problem: Pain (Surgery Nonspecified)  Goal: Acceptable Pain Control  Outcome: Ongoing, Progressing     Problem: Postoperative Nausea and Vomiting (Surgery Nonspecified)  Goal: Nausea and Vomiting Relief  Outcome: Ongoing, Progressing     Problem: Postoperative Urinary Retention (Surgery Nonspecified)  Goal: Effective Urinary Elimination  Outcome: Ongoing, Progressing     Problem: Respiratory Compromise (Surgery Nonspecified)  Goal: Effective Oxygenation and Ventilation  Outcome: Ongoing, Progressing

## 2023-04-21 NOTE — DISCHARGE SUMMARY
Consults     No orders found from 3/20/2023 to 4/19/2023.      Sarita Vazquez MD [facility] Discharge Summary    Date of Discharge:  4/21/2023    Discharge Diagnosis: cellulitis left leg    Presenting Problem/History of Present Illness  Cellulitis of leg [L03.119]  Cellulitis [L03.90]     Hospital Course  Patient is a 72 y.o. female presented with cellulitis at left leg.  She is status post laceration repair.  She was admitted and placed on iv vanco and zosyn.  She underwent washout.  The cellulitis resolved.  She was discharged with instructions to bathe daily and place dry dressings bid.  She was given a script for Keflex 500mg bid x 7 days.  All home meds were resumed..      Procedures Performed  Procedure(s):  INCISION AND DRAINAGE ABSCESS left leg       Consults:   Consults     No orders found from 3/20/2023 to 4/19/2023.          Condition on Discharge:  good    Vital Signs  Temp:  [97.5 °F (36.4 °C)-98.9 °F (37.2 °C)] 98 °F (36.7 °C)  Heart Rate:  [58-78] 61  Resp:  [16-18] 16  BP: (113-153)/(43-56) 114/48    Physical Exam:   See History and Physical found in chart.    Discharge Disposition  Home or Self Care    Discharge Medications     Discharge Medications      New Medications      Instructions Start Date   cephalexin 500 MG capsule  Commonly known as: KEFLEX   500 mg, Oral, Every 12 Hours Scheduled         Continue These Medications      Instructions Start Date   bisoprolol-hydrochlorothiazide 5-6.25 MG per tablet  Commonly known as: ZIAC   1 tablet, Oral, Daily      Buprenorphine 15 MCG/HR patch weekly   1 patch, Transdermal, Every 7 Days, Monday      cyclobenzaprine 10 MG tablet  Commonly known as: FLEXERIL   10 mg, Oral, 3 Times Daily PRN      docusate sodium 100 MG capsule  Commonly known as: Colace   100 mg, Oral, 2 Times Daily      DULoxetine 60 MG capsule  Commonly known as: CYMBALTA   60 mg, Oral, 2 Times Daily      furosemide 40 MG tablet  Commonly known as: LASIX   40 mg, Oral, Daily PRN       HYDROcodone-acetaminophen  MG per tablet  Commonly known as: NORCO   1 tablet, Oral, Every 4 Hours PRN      levothyroxine 100 MCG tablet  Commonly known as: SYNTHROID, LEVOTHROID   100 mcg, Oral, Daily      nystatin [id number] UNIT/GM powder  Commonly known as: MYCOSTATIN   1 application, Topical, 2 Times Daily      pramipexole 0.75 MG tablet  Commonly known as: MIRAPEX   0.75 mg, Oral, Every Night at Bedtime      vitamin C 500 MG tablet  Commonly known as: ASCORBIC ACID   500 mg, Oral, Daily      vitamin D3 125 MCG (5000 UT) capsule capsule   5,000 Units, Oral, Daily             Discharge Diet:     Activity at Discharge:     Follow-up Appointments  Future Appointments   Date Time Provider Department Center   4/26/2023  3:30 PM Sarita Vazquez MD [facility]         Test Results Pending at Discharge       Sarita Vazquez MD  04/21/23  08:31 CDT

## 2023-04-22 ENCOUNTER — READMISSION MANAGEMENT (OUTPATIENT)
Dept: CALL CENTER | Facility: HOSPITAL | Age: 73
End: 2023-04-22
Payer: MEDICARE

## 2023-04-22 ENCOUNTER — HOME CARE VISIT (OUTPATIENT)
Dept: HOME HEALTH SERVICES | Facility: CLINIC | Age: 73
End: 2023-04-22
Payer: MEDICARE

## 2023-04-22 VITALS
HEART RATE: 63 BPM | TEMPERATURE: 97.8 F | OXYGEN SATURATION: 97 % | SYSTOLIC BLOOD PRESSURE: 130 MMHG | RESPIRATION RATE: 20 BRPM | DIASTOLIC BLOOD PRESSURE: 64 MMHG

## 2023-04-22 PROCEDURE — G0299 HHS/HOSPICE OF RN EA 15 MIN: HCPCS

## 2023-04-22 NOTE — HOME HEALTH
Recorded as Task   Date: 09/26/2017 07:05 AM, Created By: PORTAL,PATIENT   Task Name: ePat Unread Msg   Assigned To: Lily Aj   Regarding Patient: PRAKASH JOHNSON, Status: Active   Comment:    PORTAL,PATIENT - 26 Sep 2017 7:05 AM     ****Auto-Generated [facility] Patient Portal Delivery Status Notification** The current [employer] message delivered/sent to the following recipient: PRAKASH JOHNSON has exceeded 72 hours and remains unopened by the patient. At this time please call patient and notify he or she of message/details and document actions taken to deliver message before completing this task. DO NOT RE-SEND PORTAL MESSAGE. Sep 22 2017 9:47AM Good morning! I have been looking for the Hepatitis lab work for Prakash but have not been able to locate the results. Can you tell me where he had it drawn? Thank! Lily Flower, RN   Lily Aj - 26 Sep 2017 8:46 AM     TASK EDITED  Hepatitis results received.        Signatures   Electronically signed by : Lily Aj, ; Sep 26 2017  8:47AM CST     COVID SCREENIG :patient denies any s/s of covid     FOCUS OF CARE/SKILLED NEED : woundcare . Patient had an incision and drainage of wound abscess to LLE on 4/19/23 at   . Patient was placed in OBS and given IV antibiotics for LLE cellulitis and returned home 4/21/23 .    TEACHING/INTERVENTIONS:Reviewed with patient any changes in medications and changed dressing as ordered , Dressing is ordered to be changed BID . Patient states she has a friend that comes to help change dressing when homecare not around . Dressing was very loose and saturated on visit . Patient educated on fall and inection precautions     PATIENT/CAREGIVER RESPONSE :patient verbalizes understanding     PROGRESS TOWARD GOALS :ongoing  progressing     PHYSICAN CONTACT:no    FALLS SINCE LAST VISIT :no    MEDICATION CHANGE SINCE LAST VISIT: Keflex PO for 7 days     PLAN FOR NEXT VISIT: continue woundcare

## 2023-04-22 NOTE — OUTREACH NOTE
Prep Survey    Flowsheet Row Responses   Worship facility patient discharged from? [place]   Is LACE score < 7 ? No   Eligibility Readm Mgmt   Discharge diagnosis cellulitis left leg s/p I&D of left leg abscess   Does the patient have one of the following disease processes/diagnoses(primary or secondary)? General Surgery   Does the patient have Home health ordered? Yes   What is the Home health agency?  [facility]   Is there a DME ordered? No   Prep survey completed? Yes          Myrna GIBSON - Registered Nurse

## 2023-04-24 ENCOUNTER — HOME CARE VISIT (OUTPATIENT)
Dept: HOME HEALTH SERVICES | Facility: CLINIC | Age: 73
End: 2023-04-24
Payer: MEDICARE

## 2023-04-24 DIAGNOSIS — S81.812A LACERATION OF LEFT LOWER EXTREMITY, INITIAL ENCOUNTER: Primary | ICD-10-CM

## 2023-04-24 PROCEDURE — G0299 HHS/HOSPICE OF RN EA 15 MIN: HCPCS

## 2023-04-24 RX ORDER — HYDROCODONE BITARTRATE AND ACETAMINOPHEN 5; 325 MG/1; MG/1
1 TABLET ORAL EVERY 4 HOURS PRN
Qty: 30 TABLET | Refills: 0 | Status: SHIPPED | OUTPATIENT
Start: 2023-04-24

## 2023-04-24 NOTE — PAYOR COMM NOTE
"REF   [id number]    [facility]  [place]    [phone]  OR  FAX   [phone]     Eyad Gurrola (72 y.o. Female)     Date of Birth   1950    Social Security Number       Address   [address] [address]    Home Phone   [phone]    MRN   [id number]       Evangelical   Christian    Marital Status                               Admission Date   4/18/23    Admission Type   Urgent    Admitting Provider   Sarita Vazquez MD    Attending Provider       Department, Room/Bed   [facility] 3C, 380/1       Discharge Date   4/21/2023    Discharge Disposition   Home or Self Care    Discharge Destination                               Attending Provider: (none)   Allergies: Codeine, Benadryl [Diphenhydramine]    Isolation: None   Infection: None   Code Status: Prior    Ht: 160 cm (63\")   Wt: 113 kg (250 lb)    Admission Cmt: None   Principal Problem: Cellulitis [L03.90]                 Active Insurance as of 4/18/2023     Primary Coverage     Payor Plan Insurance Group Employer/Plan Group    HUMANA MEDICARE REPLACEMENT HUMANA MEDICARE REPLACEMENT [id number]     Payor Plan Address Payor Plan Phone Number Payor Plan Fax Number Effective Dates    [address] [address] [phone]  1/1/2023 - None Entered    [facility] [id number]       Subscriber Name Subscriber Birth Date Member ID       EYAD GURROLA 1950 [id number]           Secondary Coverage     Payor Plan Insurance Group Employer/Plan Group    [id number]      Payor Plan Address Payor Plan Phone Number Payor Plan Fax Number Effective Dates    [address]   6/1/2021 - None Entered    [facility] [address]       Subscriber Name Subscriber Birth Date Member ID       EYAD GURROLA 1950 [id number]                 Emergency Contacts      (Rel.) Home Phone Work Phone Mobile Phone    Nella (Daughter) [phone] -- [phone]    ODELL NELSON (Relative) -- -- " [phone]               Discharge Summary      Sarita Vazquez MD at 04/21/23 0831          Consults     No orders found from 3/20/2023 to 4/19/2023.      Sarita Vazquez MD [facility] Discharge Summary    Date of Discharge:  4/21/2023    Discharge Diagnosis: cellulitis left leg    Presenting Problem/History of Present Illness  Cellulitis of leg [L03.119]  Cellulitis [L03.90]     Hospital Course  Patient is a 72 y.o. female presented with cellulitis at left leg.  She is status post laceration repair.  She was admitted and placed on iv vanco and zosyn.  She underwent washout.  The cellulitis resolved.  She was discharged with instructions to bathe daily and place dry dressings bid.  She was given a script for Keflex 500mg bid x 7 days.  All home meds were resumed..      Procedures Performed  Procedure(s):  INCISION AND DRAINAGE ABSCESS left leg       Consults:   Consults     No orders found from 3/20/2023 to 4/19/2023.          Condition on Discharge:  good    Vital Signs  Temp:  [97.5 °F (36.4 °C)-98.9 °F (37.2 °C)] 98 °F (36.7 °C)  Heart Rate:  [58-78] 61  Resp:  [16-18] 16  BP: (113-153)/(43-56) 114/48    Physical Exam:   See History and Physical found in chart.    Discharge Disposition  Home or Self Care    Discharge Medications     Discharge Medications      New Medications      Instructions Start Date   cephalexin 500 MG capsule  Commonly known as: KEFLEX   500 mg, Oral, Every 12 Hours Scheduled         Continue These Medications      Instructions Start Date   bisoprolol-hydrochlorothiazide 5-6.25 MG per tablet  Commonly known as: ZIAC   1 tablet, Oral, Daily      Buprenorphine 15 MCG/HR patch weekly   1 patch, Transdermal, Every 7 Days, Monday      cyclobenzaprine 10 MG tablet  Commonly known as: FLEXERIL   10 mg, Oral, 3 Times Daily PRN      docusate sodium 100 MG capsule  Commonly known as: Colace   100 mg, Oral, 2 Times Daily      DULoxetine 60 MG capsule  Commonly known as: CYMBALTA   60 mg, Oral, 2 Times  Daily      furosemide 40 MG tablet  Commonly known as: LASIX   40 mg, Oral, Daily PRN      HYDROcodone-acetaminophen  MG per tablet  Commonly known as: NORCO   1 tablet, Oral, Every 4 Hours PRN      levothyroxine 100 MCG tablet  Commonly known as: SYNTHROID, LEVOTHROID   100 mcg, Oral, Daily      nystatin 045560 UNIT/GM powder  Commonly known as: MYCOSTATIN   1 application, Topical, 2 Times Daily      pramipexole 0.75 MG tablet  Commonly known as: MIRAPEX   0.75 mg, Oral, Every Night at Bedtime      vitamin C 500 MG tablet  Commonly known as: ASCORBIC ACID   500 mg, Oral, Daily      vitamin D3 125 MCG (5000 UT) capsule capsule   5,000 Units, Oral, Daily             Discharge Diet:     Activity at Discharge:     Follow-up Appointments  Future Appointments   Date Time Provider Department Center   4/26/2023  3:30 PM Janel Vazquez MD [facility]         Test Results Pending at Discharge       Janel Vazquez MD  04/21/23  08:31 CDT              Electronically signed by Janel Vazquez MD at 04/21/23 0832       Discharge Order (From admission, onward)     Start     Ordered    04/21/23 0831  Discharge patient  Once        Expected Discharge Date: 04/21/23    Discharge Disposition: Home or Self Care    Physician of Record for Attribution - Please select from Treatment Team: JANEL VAZQUEZ [[id number]]    Review needed by CMO to determine Physician of Record: No       Question Answer Comment   Physician of Record for Attribution - Please select from Treatment Team JANEL VAZQUEZ    Review needed by CMO to determine Physician of Record No        04/21/23 0831

## 2023-04-26 ENCOUNTER — READMISSION MANAGEMENT (OUTPATIENT)
Dept: CALL CENTER | Facility: HOSPITAL | Age: 73
End: 2023-04-26
Payer: MEDICARE

## 2023-04-26 VITALS
HEART RATE: 60 BPM | SYSTOLIC BLOOD PRESSURE: 120 MMHG | RESPIRATION RATE: 18 BRPM | DIASTOLIC BLOOD PRESSURE: 60 MMHG | OXYGEN SATURATION: 98 % | TEMPERATURE: 97.6 F

## 2023-04-26 NOTE — OUTREACH NOTE
General Surgery Week 1 Survey    Flowsheet Row Responses   [facility] patient discharged from? [place]   Does the patient have one of the following disease processes/diagnoses(primary or secondary)? General Surgery   Week 1 attempt successful? Yes   Call start time 0926   Call end time 0930   Discharge diagnosis cellulitis left leg s/p I&D of left leg abscess   Meds reviewed with patient/caregiver? Yes   Is the patient having any side effects they believe may be caused by any medication additions or changes? No   Does the patient have all medications related to this admission filled (includes all antibiotics, pain medications, etc.) Yes   Is the patient taking all medications as directed (includes completed medication regime)? Yes   Does the patient have a follow up appointment scheduled with their surgeon? Yes   Has the patient kept scheduled appointments due by today? N/A   Comments 4/29/23   What is the Home health agency?  [facility]   Has home health visited the patient within 72 hours of discharge? Yes   Psychosocial issues? No   Did the patient receive a copy of their discharge instructions? Yes   Nursing interventions Reviewed instructions with patient   What is the patient's perception of their health status since discharge? Improving   Nursing interventions Nurse provided patient education   Is the patient /caregiver able to teach back basic post-op care? Lifting as instructed by MD in discharge instructions, No tub bath, swimming, or hot tub until instructed by MD, Take showers only when approved by MD-sponge bathe until then, Drive as instructed by MD in discharge instructions   Is the patient/caregiver able to teach back signs and symptoms of incisional infection? Increased drainage or bleeding, Increased redness, swelling or pain at the incisonal site, Incisional warmth, Pus or odor from incision, Fever   Is the patient/caregiver able to teach back steps to recovery at home? Eat a well-balance diet, Set  small, achievable goals for return to baseline health, Rest and rebuild strength, gradually increase activity   Is the patient/caregiver able to teach back the hierarchy of who to call/visit for symptoms/problems? PCP, Specialist, Home health nurse, Urgent Care, ED, 911 Yes   Week 1 call completed? Yes   Wrap up additional comments Pt reports HH is coming in. Pt states that her leg is still red but surgical area is looking better. Reviewed s/s with Pt and she is aware to call surgeon office with any issues.           IGGY AMIN - Registered Nurse

## 2023-04-27 ENCOUNTER — HOME CARE VISIT (OUTPATIENT)
Dept: HOME HEALTH SERVICES | Facility: CLINIC | Age: 73
End: 2023-04-27
Payer: MEDICARE

## 2023-04-27 PROCEDURE — G0299 HHS/HOSPICE OF RN EA 15 MIN: HCPCS

## 2023-04-27 NOTE — HOME HEALTH
FOCUS OF CARE/SKILLED NEED: direct skill  TEACHING/INTERVENTIONS: wound assessment and dressing change to lower left leg wound  PROGRESS TOWARD GOALS: ongoing  PHYSICIAN CONTACT:  no  INSURANCE CHANGES?  no  FALLS SINCE LAST VISIT?  no  MEDICATION CHANGES? no  PLAN FOR NEXT VISIT: wound assessment and dressing change to lower left leg wound  PRE-SCREENED FOR COVID? no s/s

## 2023-04-28 ENCOUNTER — OFFICE VISIT (OUTPATIENT)
Dept: SURGERY | Facility: CLINIC | Age: 73
End: 2023-04-28
Payer: MEDICARE

## 2023-04-28 VITALS
DIASTOLIC BLOOD PRESSURE: 60 MMHG | TEMPERATURE: 97.8 F | SYSTOLIC BLOOD PRESSURE: 120 MMHG | OXYGEN SATURATION: 97 % | RESPIRATION RATE: 18 BRPM | HEART RATE: 59 BPM

## 2023-04-28 VITALS — WEIGHT: 250 LBS | BODY MASS INDEX: 44.3 KG/M2 | HEIGHT: 63 IN

## 2023-04-28 DIAGNOSIS — S81.812D LACERATION OF LEFT LOWER EXTREMITY, SUBSEQUENT ENCOUNTER: ICD-10-CM

## 2023-04-28 DIAGNOSIS — Z48.89 POSTOPERATIVE VISIT: Primary | ICD-10-CM

## 2023-04-28 DIAGNOSIS — Z48.89 AFTERCARE FOLLOWING SURGERY: ICD-10-CM

## 2023-04-28 DIAGNOSIS — I89.0 LYMPHEDEMA OF BOTH LOWER EXTREMITIES: ICD-10-CM

## 2023-04-28 NOTE — PROGRESS NOTES
"Sarita Vazquez MD FACS Progress Note    Referring Provider: No ref. provider found      Chief complaint   Chief Complaint   Patient presents with   • Follow-up        Subjective .     History of present illness:  Brenda Sneed  is a 72 y.o. female who presents status post washout laceration left shin.  She denies any fevers or chills.  She states that she continues to have clear discharge..    History    The following portions of the patient's history were reviewed and updated as appropriate: allergies, current medications, past family history, past medical history, past social history, past surgical history, and problem list.    Objective     Vital Signs   Ht 160 cm (63\")   Wt 113 kg (250 lb)   BMI 44.29 kg/m²      Physical Exam:    General The patient is well-developed, well-nourished, and in no acute distress.    Left leg  Edema, clean wounds with granulation tissue, dependent edema, serous discharge      Class 3 Severe Obesity (BMI >=40). Obesity-related health conditions include the following: none. Obesity is newly identified. BMI is is above average; BMI management plan is completed. We discussed portion control and increasing exercise.    Assessment & Plan       Diagnoses and all orders for this visit:    1. Postoperative visit (Primary)    2. Aftercare following surgery    3. Laceration of left lower extremity, subsequent encounter  Overview:  Added automatically from request for surgery [id number]      4. Lymphedema of both lower extremities         The patient was instructed to keep her leg elevated and wrapped with ace wrap.  She was instructed to continue dry dressings bid and prn.  She was also instructed to go have her legs measured and purchased appropriately-sized compression stockings.  She will return in 1-2 weeks.  She was given a refill of Keflex 250mg bid x 1 week.      Sarita Vazquez MD              "

## 2023-04-30 NOTE — HOME HEALTH
FOCUS OF CARE/SKILLED NEED: direct skill  TEACHING/INTERVENTIONS: wound assessment and dressing change  PROGRESS TOWARD GOALS: ongoing  PHYSICIAN CONTACT:  Dr. Sarita Vazquez's office contacted regarding s/s of cellulitis on pt's lower left leg  INSURANCE CHANGES?  no  FALLS SINCE LAST VISIT?  no  MEDICATION CHANGES? no  PLAN FOR NEXT VISIT:  wound assessment and dressing change  PRE-SCREENED FOR COVID? no s/s

## 2023-05-01 ENCOUNTER — HOME CARE VISIT (OUTPATIENT)
Dept: HOME HEALTH SERVICES | Facility: CLINIC | Age: 73
End: 2023-05-01
Payer: MEDICARE

## 2023-05-01 PROCEDURE — G0299 HHS/HOSPICE OF RN EA 15 MIN: HCPCS

## 2023-05-03 VITALS
DIASTOLIC BLOOD PRESSURE: 70 MMHG | SYSTOLIC BLOOD PRESSURE: 122 MMHG | TEMPERATURE: 97.7 F | OXYGEN SATURATION: 98 % | RESPIRATION RATE: 18 BRPM | HEART RATE: 77 BPM

## 2023-05-04 ENCOUNTER — READMISSION MANAGEMENT (OUTPATIENT)
Dept: CALL CENTER | Facility: HOSPITAL | Age: 73
End: 2023-05-04
Payer: MEDICARE

## 2023-05-04 ENCOUNTER — HOME CARE VISIT (OUTPATIENT)
Dept: HOME HEALTH SERVICES | Facility: CLINIC | Age: 73
End: 2023-05-04
Payer: MEDICARE

## 2023-05-04 PROCEDURE — G0299 HHS/HOSPICE OF RN EA 15 MIN: HCPCS

## 2023-05-04 NOTE — OUTREACH NOTE
General Surgery Week 2 Survey    Flowsheet Row Responses   [facility] patient discharged from? [place]   Does the patient have one of the following disease processes/diagnoses(primary or secondary)? General Surgery   Week 2 attempt successful? No   Unsuccessful attempts Attempt 1          Ldia Carnes Registered Nurse

## 2023-05-07 VITALS
SYSTOLIC BLOOD PRESSURE: 110 MMHG | DIASTOLIC BLOOD PRESSURE: 70 MMHG | OXYGEN SATURATION: 93 % | TEMPERATURE: 97.9 F | RESPIRATION RATE: 18 BRPM | HEART RATE: 102 BPM

## 2023-05-08 ENCOUNTER — HOME CARE VISIT (OUTPATIENT)
Dept: HOME HEALTH SERVICES | Facility: CLINIC | Age: 73
End: 2023-05-08
Payer: MEDICARE

## 2023-05-08 PROCEDURE — G0299 HHS/HOSPICE OF RN EA 15 MIN: HCPCS

## 2023-05-08 NOTE — HOME HEALTH
FOCUS OF CARE/SKILLED NEED: direct skill  TEACHING/INTERVENTIONS: wound assessment, dressing change  PROGRESS TOWARD GOALS: ongoing  PHYSICIAN CONTACT:  no  INSURANCE CHANGES?  no  FALLS SINCE LAST VISIT?  no  MEDICATION CHANGES? no  PLAN FOR NEXT VISIT: wound assessment, dressing change  PRE-SCREENED FOR COVID? no s/s

## 2023-05-09 ENCOUNTER — TELEPHONE (OUTPATIENT)
Dept: SURGERY | Facility: CLINIC | Age: 73
End: 2023-05-09
Payer: MEDICARE

## 2023-05-09 ENCOUNTER — OFFICE VISIT (OUTPATIENT)
Dept: SURGERY | Facility: CLINIC | Age: 73
End: 2023-05-09
Payer: MEDICARE

## 2023-05-09 DIAGNOSIS — Z48.89 POSTOPERATIVE VISIT: Primary | ICD-10-CM

## 2023-05-09 DIAGNOSIS — I89.0 LYMPHEDEMA OF BOTH LOWER EXTREMITIES: ICD-10-CM

## 2023-05-09 DIAGNOSIS — Z48.89 AFTERCARE FOLLOWING SURGERY: ICD-10-CM

## 2023-05-09 DIAGNOSIS — S81.812D LACERATION OF LEFT LOWER EXTREMITY, SUBSEQUENT ENCOUNTER: ICD-10-CM

## 2023-05-09 NOTE — TELEPHONE ENCOUNTER
Saw Dr. Dr. Vazquez today, Dr. Vazquez will see in 2 weeks.  Patient will call for next appointment, needs to see family doctor.

## 2023-05-09 NOTE — TELEPHONE ENCOUNTER
Tram called on 05/08/2023 inquiring about Mrs. Sneed  Cellulitis on her left lower extremity. Tram states leg is purple and mrs chew is concerned about this. I gave this message to Dr. Vazquez and phone number to reach Tram at [facility].

## 2023-05-10 VITALS
DIASTOLIC BLOOD PRESSURE: 68 MMHG | SYSTOLIC BLOOD PRESSURE: 120 MMHG | OXYGEN SATURATION: 94 % | RESPIRATION RATE: 18 BRPM | HEART RATE: 60 BPM | TEMPERATURE: 97.8 F

## 2023-05-11 ENCOUNTER — HOME CARE VISIT (OUTPATIENT)
Dept: HOME HEALTH SERVICES | Facility: CLINIC | Age: 73
End: 2023-05-11
Payer: MEDICARE

## 2023-05-11 PROCEDURE — G0299 HHS/HOSPICE OF RN EA 15 MIN: HCPCS

## 2023-05-11 NOTE — HOME HEALTH
FOCUS OF CARE/SKILLED NEED: direct skill  TEACHING/INTERVENTIONS: wound assessment and dressing change  PROGRESS TOWARD GOALS: ongoing  PHYSICIAN CONTACT:  Dr. Vazquez's office contacted regarding dusky color of pt's left lower leg and petechiae and bruising noted on pt's arms and legs. SN spoke with ELDER Joy.   INSURANCE CHANGES?  no  FALLS SINCE LAST VISIT?  no  MEDICATION CHANGES? no  PLAN FOR NEXT VISIT: wound assessment and dressing change  PRE-SCREENED FOR COVID? no s/s

## 2023-05-12 VITALS
OXYGEN SATURATION: 98 % | SYSTOLIC BLOOD PRESSURE: 112 MMHG | HEART RATE: 62 BPM | RESPIRATION RATE: 18 BRPM | TEMPERATURE: 97.8 F | DIASTOLIC BLOOD PRESSURE: 70 MMHG

## 2023-05-12 NOTE — HOME HEALTH
COVID SCREENING:Pt is neg for s/s of covid19    FOCUS OF CARE/SKILLED NEED: Wd assess and care    TEACHING/INTERVENTIONS: Instructed to report increased redness, pain, or drainage    PATIENT/CG RESPONSE:cooperative    PROGRESS TOWARD GOALS:No s/s of wd infection    PHYSICIAN CONTACT: no    FALLS SINCE LAST VISIT?none    MEDICATION CHANGES SINCE LAST VISIT?none    PLAN FOR NEXT VISIT:wd assessment and care    To see Dr Cantu next Friday due to low platlets

## 2023-05-15 ENCOUNTER — HOME CARE VISIT (OUTPATIENT)
Dept: HOME HEALTH SERVICES | Facility: CLINIC | Age: 73
End: 2023-05-15
Payer: MEDICARE

## 2023-05-15 NOTE — CASE COMMUNICATION
Patient missed a skilled nursing visit from [facility] on 05-15-23.     Reason: Pt declined visit today.        For your records only.   As per home health protocol, MD must be notified of missed/cancelled visits; therefore the prescribed frequency was not met.

## 2023-05-17 ENCOUNTER — HOME CARE VISIT (OUTPATIENT)
Dept: HOME HEALTH SERVICES | Facility: CLINIC | Age: 73
End: 2023-05-17
Payer: MEDICARE

## 2023-05-17 PROCEDURE — G0299 HHS/HOSPICE OF RN EA 15 MIN: HCPCS

## 2023-05-18 VITALS
SYSTOLIC BLOOD PRESSURE: 110 MMHG | DIASTOLIC BLOOD PRESSURE: 50 MMHG | HEART RATE: 56 BPM | TEMPERATURE: 97.7 F | OXYGEN SATURATION: 97 %

## 2023-05-18 NOTE — PROGRESS NOTES
OP HEMATOLOGY/ONCOLOGY CONSULTATION      Pt Name: Ai Quinonez  YOB: 1950  MRN: [id number]  Referring provider: BRUCE Gomez  Requesting provider: Dr. Tiburcio Aly   Reason for consultation: Anemia and Low Platelets  Date of evaluation: 5/19/2023    History Obtained From:  patient, niece - Levi Meadows, electronic medical record    CHIEF COMPLAINT:    Chief Complaint   Patient presents with    New Patient     Anemia, thrombocytopenia     HISTORY OF PRESENT ILLNESS:    Ai Quinonez is a 74 y.o.  female referred to the clinic by Dr. Tiburcio Aly  for evaluation of Anemia and thrombocytopenia. Hematology/Oncology consultation is performed 5/19/2023. PMH significant for hypertension, CKD, thyroid disease, arthritis, obesity, anxiety/depression, cellulitis LLE. Review of prior CBCs at [facility] reveals chronic, intermittent anemia dating to 4/2015. Chronic thrombocytopenia has been present dating to at least 7/2016 with platelet count ranging from 69,000 -136,000. Az Taylor underwent laceration repair of her left leg by Dr. Lynn Flores, status post fall with leg injury. She was also treated for cellulitis with IV Vancomycin and Zosyn. She underwent washout. Cellulitis subsequently resolved. She was discharged with dry dressing changes and oral Keflex x7 days    CBC 4/28/2023: WBC: 5.6, Hgb: 10.6, MCV: 96, PLT: 65,000     Labs 5/12/2023  CBC: WBC 6.0, Hgb 9.4/MCV 98, platelets 363,194  CMP: Creatinine 0.81, GFR 76, calcium 8.8, total protein 5.7, alkaline phosphatase 138 ()  BNP: 72.2 (0-100) CRP: 21 (0-10)    Nkechi denies melena or hematochezia. Recent renal function was normal.  She denies overt bleeding, though has significant, scattered bruising throughout her body which is clearly visible on exposed areas during exam today. She denies alcohol use and has no known history of liver disease.   She does take ibuprofen 2-3 times per day, in addition to Cymbalta daily which

## 2023-05-19 ENCOUNTER — HOME CARE VISIT (OUTPATIENT)
Dept: HOME HEALTH SERVICES | Facility: CLINIC | Age: 73
End: 2023-05-19
Payer: MEDICARE

## 2023-05-19 ENCOUNTER — OFFICE VISIT (OUTPATIENT)
Dept: HEMATOLOGY | Age: 73
End: 2023-05-19
Payer: COMMERCIAL

## 2023-05-19 ENCOUNTER — HOSPITAL ENCOUNTER (OUTPATIENT)
Dept: INFUSION THERAPY | Age: 73
Discharge: HOME | End: 2023-05-19
Payer: MEDICARE

## 2023-05-19 VITALS
OXYGEN SATURATION: 90 % | DIASTOLIC BLOOD PRESSURE: 78 MMHG | BODY MASS INDEX: 44.3 KG/M2 | HEART RATE: 57 BPM | HEIGHT: 63 IN | SYSTOLIC BLOOD PRESSURE: 116 MMHG | WEIGHT: 250 LBS

## 2023-05-19 DIAGNOSIS — R53.83 FATIGUE, UNSPECIFIED TYPE: ICD-10-CM

## 2023-05-19 DIAGNOSIS — D69.6 THROMBOCYTOPENIA (HCC): ICD-10-CM

## 2023-05-19 DIAGNOSIS — D53.9 MACROCYTIC ANEMIA: ICD-10-CM

## 2023-05-19 DIAGNOSIS — Z71.89 CARE PLAN DISCUSSED WITH PATIENT: ICD-10-CM

## 2023-05-19 DIAGNOSIS — R17 ELEVATED BILIRUBIN: ICD-10-CM

## 2023-05-19 DIAGNOSIS — D53.9 MACROCYTIC ANEMIA: Primary | ICD-10-CM

## 2023-05-19 DIAGNOSIS — D64.9 NORMOCYTIC ANEMIA: ICD-10-CM

## 2023-05-19 DIAGNOSIS — D64.9 NORMOCYTIC ANEMIA: Primary | ICD-10-CM

## 2023-05-19 LAB
FERRITIN SERPL-MCNC: 83.1 NG/ML (ref 13–150)
FOLATE SERPL-MCNC: 6.5 NG/ML (ref 4.8–37.3)
HAPTOGLOB SERPL-MCNC: 70 MG/DL (ref 30–200)
HAV IGM SERPL QL IA: NORMAL
HBV CORE IGM SERPL QL IA: NORMAL
HBV SURFACE AG SERPL QL IA: NORMAL
HCV AB SERPL QL IA: NORMAL
HIV-1 P24 AG: NORMAL
HIV1+2 AB SERPLBLD QL IA.RAPID: NORMAL
IRON SATN MFR SERPL: 11 % (ref 14–50)
IRON SERPL-MCNC: 32 UG/DL (ref 37–145)
LDH SERPL-CCNC: 312 U/L (ref 91–215)
TIBC SERPL-MCNC: 288 UG/DL (ref 250–400)
VIT B12 SERPL-MCNC: 1168 PG/ML (ref 211–946)

## 2023-05-19 PROCEDURE — 3078F DIAST BP <80 MM HG: CPT | Performed by: NURSE PRACTITIONER

## 2023-05-19 PROCEDURE — 36415 COLL VENOUS BLD VENIPUNCTURE: CPT

## 2023-05-19 PROCEDURE — 36415 COLL VENOUS BLD VENIPUNCTURE: CPT | Performed by: NURSE PRACTITIONER

## 2023-05-19 PROCEDURE — 99212 OFFICE O/P EST SF 10 MIN: CPT

## 2023-05-19 PROCEDURE — 99204 OFFICE O/P NEW MOD 45 MIN: CPT | Performed by: NURSE PRACTITIONER

## 2023-05-19 PROCEDURE — G0299 HHS/HOSPICE OF RN EA 15 MIN: HCPCS

## 2023-05-19 PROCEDURE — 1123F ACP DISCUSS/DSCN MKR DOCD: CPT | Performed by: NURSE PRACTITIONER

## 2023-05-19 PROCEDURE — 3074F SYST BP LT 130 MM HG: CPT | Performed by: NURSE PRACTITIONER

## 2023-05-19 PROCEDURE — 85025 COMPLETE CBC W/AUTO DIFF WBC: CPT

## 2023-05-19 RX ORDER — IBUPROFEN 200 MG
200 TABLET ORAL EVERY 6 HOURS PRN
COMMUNITY

## 2023-05-19 RX ORDER — BUMETANIDE 1 MG/1
1 TABLET ORAL DAILY PRN
COMMUNITY

## 2023-05-19 ASSESSMENT — ENCOUNTER SYMPTOMS
EYE DISCHARGE: 0
SORE THROAT: 1
CONSTIPATION: 1
COUGH: 0
BACK PAIN: 1
NAUSEA: 0
EYE ITCHING: 0
SHORTNESS OF BREATH: 1
WHEEZING: 0
DIARRHEA: 0
VOMITING: 0
ABDOMINAL PAIN: 0

## 2023-05-21 VITALS
SYSTOLIC BLOOD PRESSURE: 120 MMHG | TEMPERATURE: 98.5 F | DIASTOLIC BLOOD PRESSURE: 58 MMHG | RESPIRATION RATE: 18 BRPM | OXYGEN SATURATION: 99 % | HEART RATE: 62 BPM

## 2023-05-22 ENCOUNTER — HOME CARE VISIT (OUTPATIENT)
Dept: HOME HEALTH SERVICES | Facility: CLINIC | Age: 73
End: 2023-05-22
Payer: MEDICARE

## 2023-05-22 ENCOUNTER — TELEPHONE (OUTPATIENT)
Dept: SURGERY | Facility: CLINIC | Age: 73
End: 2023-05-22
Payer: MEDICARE

## 2023-05-22 LAB — BLOOD SMEAR REVIEW: NORMAL

## 2023-05-22 PROCEDURE — G0299 HHS/HOSPICE OF RN EA 15 MIN: HCPCS

## 2023-05-22 ASSESSMENT — PROMIS GLOBAL HEALTH SCALE
TO WHAT EXTENT ARE YOU ABLE TO CARRY OUT YOUR EVERYDAY PHYSICAL ACTIVITIES SUCH AS WALKING, CLIMBING STAIRS, CARRYING GROCERIES, OR MOVING A CHAIR [ON A SCALE OF 1 (NOT AT ALL) TO 5 (COMPLETELY)]?: 2
SUM OF RESPONSES TO QUESTIONS 2, 4, 5, & 10: 15
IN THE PAST 7 DAYS, HOW WOULD YOU RATE YOUR FATIGUE ON AVERAGE [ON A SCALE FROM 1 (NONE) TO 5 (VERY SEVERE)]?: 2
IN GENERAL, WOULD YOU SAY YOUR HEALTH IS...[ON A SCALE OF 1 (POOR) TO 5 (EXCELLENT)]: 3
IN GENERAL, PLEASE RATE HOW WELL YOU CARRY OUT YOUR USUAL SOCIAL ACTIVITIES (INCLUDES ACTIVITIES AT HOME, AT WORK, AND IN YOUR COMMUNITY, AND RESPONSIBILITIES AS A PARENT, CHILD, SPOUSE, EMPLOYEE, FRIEND, ETC) [ON A SCALE OF 1 (POOR) TO 5 (EXCELLENT)]?: 4
IN THE PAST 7 DAYS, HOW WOULD YOU RATE YOUR PAIN ON AVERAGE [ON A SCALE FROM 0 (NO PAIN) TO 10 (WORST IMAGINABLE PAIN)]?: 4
SUM OF RESPONSES TO QUESTIONS 3, 6, 7, & 8: 11
IN GENERAL, WOULD YOU SAY YOUR QUALITY OF LIFE IS...[ON A SCALE OF 1 (POOR) TO 5 (EXCELLENT)]: 3
IN THE PAST 7 DAYS, HOW OFTEN HAVE YOU BEEN BOTHERED BY EMOTIONAL PROBLEMS, SUCH AS FEELING ANXIOUS, DEPRESSED, OR IRRITABLE [ON A SCALE FROM 1 (NEVER) TO 5 (ALWAYS)]?: 5
IN GENERAL, HOW WOULD YOU RATE YOUR PHYSICAL HEALTH [ON A SCALE OF 1 (POOR) TO 5 (EXCELLENT)]?: 3
IN GENERAL, HOW WOULD YOU RATE YOUR SATISFACTION WITH YOUR SOCIAL ACTIVITIES AND RELATIONSHIPS [ON A SCALE OF 1 (POOR) TO 5 (EXCELLENT)]?: 4
IN GENERAL, HOW WOULD YOU RATE YOUR MENTAL HEALTH, INCLUDING YOUR MOOD AND YOUR ABILITY TO THINK [ON A SCALE OF 1 (POOR) TO 5 (EXCELLENT)]?: 3

## 2023-05-23 ENCOUNTER — HOSPITAL ENCOUNTER (INPATIENT)
Facility: HOSPITAL | Age: 73
LOS: 7 days | Discharge: HOME HEALTH | DRG: 803 | End: 2023-05-30
Attending: STUDENT IN AN ORGANIZED HEALTH CARE EDUCATION/TRAINING PROGRAM | Admitting: FAMILY MEDICINE
Payer: MEDICARE

## 2023-05-23 ENCOUNTER — APPOINTMENT (OUTPATIENT)
Dept: GENERAL RADIOLOGY | Facility: HOSPITAL | Age: 73
DRG: 803 | End: 2023-05-23
Payer: MEDICARE

## 2023-05-23 ENCOUNTER — TELEPHONE (OUTPATIENT)
Dept: HEMATOLOGY | Age: 73
End: 2023-05-23

## 2023-05-23 ENCOUNTER — HOME CARE VISIT (OUTPATIENT)
Dept: HOME HEALTH SERVICES | Facility: HOME HEALTHCARE | Age: 73
End: 2023-05-23
Payer: MEDICARE

## 2023-05-23 DIAGNOSIS — I89.0 LYMPHEDEMA OF BOTH LOWER EXTREMITIES: Primary | ICD-10-CM

## 2023-05-23 DIAGNOSIS — L03.116 CELLULITIS OF LEFT LOWER EXTREMITY: ICD-10-CM

## 2023-05-23 DIAGNOSIS — Z74.09 IMPAIRED MOBILITY: ICD-10-CM

## 2023-05-23 DIAGNOSIS — D69.6 THROMBOCYTOPENIA: ICD-10-CM

## 2023-05-23 DIAGNOSIS — Z78.9 DECREASED ACTIVITIES OF DAILY LIVING (ADL): ICD-10-CM

## 2023-05-23 DIAGNOSIS — S81.812D LACERATION OF LEFT LOWER EXTREMITY, SUBSEQUENT ENCOUNTER: ICD-10-CM

## 2023-05-23 LAB
ABO GROUP BLD: NORMAL
ALBUMIN SERPL-MCNC: 3.3 G/DL (ref 3.5–5.2)
ALBUMIN/GLOB SERPL: 1.3 G/DL
ALP SERPL-CCNC: 147 U/L (ref 39–117)
ALT SERPL W P-5'-P-CCNC: 10 U/L (ref 1–33)
ANION GAP SERPL CALCULATED.3IONS-SCNC: 11 MMOL/L (ref 5–15)
ANISOCYTOSIS BLD QL: ABNORMAL
APTT PPP: 32.6 SECONDS (ref 24.1–35)
AST SERPL-CCNC: 28 U/L (ref 1–32)
BACTERIA UR QL AUTO: ABNORMAL /HPF
BASOPHILS # BLD AUTO: 0.05 10*3/MM3 (ref 0–0.2)
BASOPHILS # BLD MANUAL: 0.03 10*3/MM3 (ref 0–0.2)
BASOPHILS NFR BLD AUTO: 0.7 % (ref 0–1.5)
BASOPHILS NFR BLD MANUAL: 1.1 % (ref 0–1.5)
BILIRUB CONJ SERPL-MCNC: 0.7 MG/DL (ref 0–0.3)
BILIRUB INDIRECT SERPL-MCNC: 1 MG/DL
BILIRUB SERPL-MCNC: 1.5 MG/DL (ref 0–1.2)
BILIRUB SERPL-MCNC: 1.7 MG/DL (ref 0–1.2)
BILIRUB UR QL STRIP: NEGATIVE
BLD GP AB SCN SERPL QL: NEGATIVE
BUN SERPL-MCNC: 14 MG/DL (ref 8–23)
BUN/CREAT SERPL: 16.7 (ref 7–25)
CALCIUM SPEC-SCNC: 8.7 MG/DL (ref 8.6–10.5)
CHLORIDE SERPL-SCNC: 100 MMOL/L (ref 98–107)
CK SERPL-CCNC: 65 U/L (ref 20–180)
CLARITY UR: CLEAR
CO2 SERPL-SCNC: 25 MMOL/L (ref 22–29)
COLOR UR: ABNORMAL
CREAT SERPL-MCNC: 0.84 MG/DL (ref 0.57–1)
D-LACTATE SERPL-SCNC: 1.8 MMOL/L (ref 0.5–2)
DAT POLY-SP REAG RBC QL: NEGATIVE
DEPRECATED RDW RBC AUTO: 50.4 FL (ref 37–54)
DEPRECATED RDW RBC AUTO: 52.4 FL (ref 37–54)
EGFRCR SERPLBLD CKD-EPI 2021: 73 ML/MIN/1.73
EOSINOPHIL # BLD AUTO: 0.21 10*3/MM3 (ref 0–0.4)
EOSINOPHIL NFR BLD AUTO: 2.9 % (ref 0.3–6.2)
ERYTHROCYTE [DISTWIDTH] IN BLOOD BY AUTOMATED COUNT: 15.3 % (ref 12.3–15.4)
ERYTHROCYTE [DISTWIDTH] IN BLOOD BY AUTOMATED COUNT: 15.4 % (ref 12.3–15.4)
FIBRINOGEN PPP-MCNC: 258 MG/DL (ref 240–460)
FSP PPP LA-ACNC: ABNORMAL
GLOBULIN UR ELPH-MCNC: 2.5 GM/DL
GLUCOSE SERPL-MCNC: 126 MG/DL (ref 65–99)
GLUCOSE UR STRIP-MCNC: NEGATIVE MG/DL
HCT VFR BLD AUTO: 23.3 % (ref 34–46.6)
HCT VFR BLD AUTO: 23.4 % (ref 34–46.6)
HGB BLD-MCNC: 7.1 G/DL (ref 12–15.9)
HGB BLD-MCNC: 7.4 G/DL (ref 12–15.9)
HGB UR QL STRIP.AUTO: ABNORMAL
HOLD SPECIMEN: NORMAL
HOLD SPECIMEN: NORMAL
HYALINE CASTS UR QL AUTO: ABNORMAL /LPF
INR PPP: 1.17 (ref 0.91–1.09)
KETONES UR QL STRIP: NEGATIVE
LDH SERPL-CCNC: 283 U/L (ref 135–214)
LEUKOCYTE ESTERASE UR QL STRIP.AUTO: ABNORMAL
LYMPHOCYTES # BLD AUTO: 2.73 10*3/MM3 (ref 0.7–3.1)
LYMPHOCYTES # BLD MANUAL: 0.91 10*3/MM3 (ref 0.7–3.1)
LYMPHOCYTES NFR BLD AUTO: 38.1 % (ref 19.6–45.3)
LYMPHOCYTES NFR BLD MANUAL: 8 % (ref 5–12)
MAGNESIUM SERPL-MCNC: 1.7 MG/DL (ref 1.6–2.4)
MCH RBC QN AUTO: 28.8 PG (ref 26.6–33)
MCH RBC QN AUTO: 29.6 PG (ref 26.6–33)
MCHC RBC AUTO-ENTMCNC: 30.5 G/DL (ref 31.5–35.7)
MCHC RBC AUTO-ENTMCNC: 31.6 G/DL (ref 31.5–35.7)
MCV RBC AUTO: 91.1 FL (ref 79–97)
MCV RBC AUTO: 97.1 FL (ref 79–97)
MONOCYTES # BLD AUTO: 0.93 10*3/MM3 (ref 0.1–0.9)
MONOCYTES # BLD: 0.25 10*3/MM3 (ref 0.1–0.9)
MONOCYTES NFR BLD AUTO: 13 % (ref 5–12)
NEUTROPHILS # BLD AUTO: 1.89 10*3/MM3 (ref 1.7–7)
NEUTROPHILS NFR BLD AUTO: 3.18 10*3/MM3 (ref 1.7–7)
NEUTROPHILS NFR BLD AUTO: 44.5 % (ref 42.7–76)
NEUTROPHILS NFR BLD MANUAL: 61.4 % (ref 42.7–76)
NITRITE UR QL STRIP: NEGATIVE
NRBC SPEC MANUAL: 3.4 /100 WBC (ref 0–0.2)
NT-PROBNP SERPL-MCNC: 300.2 PG/ML (ref 0–900)
PH UR STRIP.AUTO: 6 [PH] (ref 5–8)
PLATELET # BLD AUTO: 2 10*3/MM3 (ref 140–450)
PLATELET # BLD AUTO: 5 10*3/MM3 (ref 140–450)
PMV BLD AUTO: 10.1 FL (ref 6–12)
PMV BLD AUTO: 10.7 FL (ref 6–12)
POLYCHROMASIA BLD QL SMEAR: ABNORMAL
POLYCHROMASIA BLD QL SMEAR: NORMAL
POTASSIUM SERPL-SCNC: 3 MMOL/L (ref 3.5–5.2)
PROT SERPL-MCNC: 5.8 G/DL (ref 6–8.5)
PROT UR QL STRIP: NEGATIVE
PROTHROMBIN TIME: 15.1 SECONDS (ref 11.8–14.8)
RBC # BLD AUTO: 2.4 10*6/MM3 (ref 3.77–5.28)
RBC # BLD AUTO: 2.57 10*6/MM3 (ref 3.77–5.28)
RBC # UR STRIP: ABNORMAL /HPF
REF LAB TEST METHOD: ABNORMAL
RETICS # AUTO: 0.15 10*6/MM3 (ref 0.02–0.13)
RETICS/RBC NFR AUTO: 6.16 % (ref 0.7–1.9)
RH BLD: POSITIVE
SMALL PLATELETS BLD QL SMEAR: ABNORMAL
SMALL PLATELETS BLD QL SMEAR: NORMAL
SODIUM SERPL-SCNC: 136 MMOL/L (ref 136–145)
SP GR UR STRIP: 1.01 (ref 1–1.03)
SQUAMOUS #/AREA URNS HPF: ABNORMAL /HPF
T&S EXPIRATION DATE: NORMAL
TARGETS BLD QL SMEAR: ABNORMAL
TROPONIN T SERPL HS-MCNC: 39 NG/L
TSH SERPL DL<=0.05 MIU/L-ACNC: 6.19 UIU/ML (ref 0.27–4.2)
UROBILINOGEN UR QL STRIP: ABNORMAL
VARIANT LYMPHS NFR BLD MANUAL: 29.5 % (ref 19.6–45.3)
WBC # UR STRIP: ABNORMAL /HPF
WBC MORPH BLD: NORMAL
WBC MORPH BLD: NORMAL
WBC NRBC COR # BLD: 3.08 10*3/MM3 (ref 3.4–10.8)
WBC NRBC COR # BLD: 7.16 10*3/MM3 (ref 3.4–10.8)
WHOLE BLOOD HOLD COAG: NORMAL
WHOLE BLOOD HOLD SPECIMEN: NORMAL

## 2023-05-23 PROCEDURE — 86880 COOMBS TEST DIRECT: CPT | Performed by: INTERNAL MEDICINE

## 2023-05-23 PROCEDURE — 85007 BL SMEAR W/DIFF WBC COUNT: CPT | Performed by: STUDENT IN AN ORGANIZED HEALTH CARE EDUCATION/TRAINING PROGRAM

## 2023-05-23 PROCEDURE — 93005 ELECTROCARDIOGRAM TRACING: CPT

## 2023-05-23 PROCEDURE — 80053 COMPREHEN METABOLIC PANEL: CPT

## 2023-05-23 PROCEDURE — P9100 PATHOGEN TEST FOR PLATELETS: HCPCS

## 2023-05-23 PROCEDURE — 85025 COMPLETE CBC W/AUTO DIFF WBC: CPT | Performed by: PHYSICIAN ASSISTANT

## 2023-05-23 PROCEDURE — 83880 ASSAY OF NATRIURETIC PEPTIDE: CPT

## 2023-05-23 PROCEDURE — 85384 FIBRINOGEN ACTIVITY: CPT | Performed by: PHYSICIAN ASSISTANT

## 2023-05-23 PROCEDURE — 86922 COMPATIBILITY TEST ANTIGLOB: CPT

## 2023-05-23 PROCEDURE — 86901 BLOOD TYPING SEROLOGIC RH(D): CPT | Performed by: STUDENT IN AN ORGANIZED HEALTH CARE EDUCATION/TRAINING PROGRAM

## 2023-05-23 PROCEDURE — 84443 ASSAY THYROID STIM HORMONE: CPT | Performed by: INTERNAL MEDICINE

## 2023-05-23 PROCEDURE — 85045 AUTOMATED RETICULOCYTE COUNT: CPT | Performed by: INTERNAL MEDICINE

## 2023-05-23 PROCEDURE — 82550 ASSAY OF CK (CPK): CPT | Performed by: INTERNAL MEDICINE

## 2023-05-23 PROCEDURE — 81001 URINALYSIS AUTO W/SCOPE: CPT | Performed by: STUDENT IN AN ORGANIZED HEALTH CARE EDUCATION/TRAINING PROGRAM

## 2023-05-23 PROCEDURE — 86900 BLOOD TYPING SEROLOGIC ABO: CPT | Performed by: STUDENT IN AN ORGANIZED HEALTH CARE EDUCATION/TRAINING PROGRAM

## 2023-05-23 PROCEDURE — 83605 ASSAY OF LACTIC ACID: CPT | Performed by: STUDENT IN AN ORGANIZED HEALTH CARE EDUCATION/TRAINING PROGRAM

## 2023-05-23 PROCEDURE — 83010 ASSAY OF HAPTOGLOBIN QUANT: CPT | Performed by: STUDENT IN AN ORGANIZED HEALTH CARE EDUCATION/TRAINING PROGRAM

## 2023-05-23 PROCEDURE — 85610 PROTHROMBIN TIME: CPT | Performed by: PHYSICIAN ASSISTANT

## 2023-05-23 PROCEDURE — 36430 TRANSFUSION BLD/BLD COMPNT: CPT

## 2023-05-23 PROCEDURE — 85060 BLOOD SMEAR INTERPRETATION: CPT | Performed by: STUDENT IN AN ORGANIZED HEALTH CARE EDUCATION/TRAINING PROGRAM

## 2023-05-23 PROCEDURE — 85730 THROMBOPLASTIN TIME PARTIAL: CPT | Performed by: PHYSICIAN ASSISTANT

## 2023-05-23 PROCEDURE — 85007 BL SMEAR W/DIFF WBC COUNT: CPT | Performed by: PHYSICIAN ASSISTANT

## 2023-05-23 PROCEDURE — 82248 BILIRUBIN DIRECT: CPT | Performed by: INTERNAL MEDICINE

## 2023-05-23 PROCEDURE — 85397 CLOTTING FUNCT ACTIVITY: CPT | Performed by: PHYSICIAN ASSISTANT

## 2023-05-23 PROCEDURE — 85362 FIBRIN DEGRADATION PRODUCTS: CPT | Performed by: PHYSICIAN ASSISTANT

## 2023-05-23 PROCEDURE — 86900 BLOOD TYPING SEROLOGIC ABO: CPT

## 2023-05-23 PROCEDURE — 36415 COLL VENOUS BLD VENIPUNCTURE: CPT

## 2023-05-23 PROCEDURE — 86850 RBC ANTIBODY SCREEN: CPT | Performed by: STUDENT IN AN ORGANIZED HEALTH CARE EDUCATION/TRAINING PROGRAM

## 2023-05-23 PROCEDURE — P9035 PLATELET PHERES LEUKOREDUCED: HCPCS

## 2023-05-23 PROCEDURE — P9016 RBC LEUKOCYTES REDUCED: HCPCS

## 2023-05-23 PROCEDURE — 0 HYDROMORPHONE 1 MG/ML SOLUTION: Performed by: STUDENT IN AN ORGANIZED HEALTH CARE EDUCATION/TRAINING PROGRAM

## 2023-05-23 PROCEDURE — 87040 BLOOD CULTURE FOR BACTERIA: CPT | Performed by: STUDENT IN AN ORGANIZED HEALTH CARE EDUCATION/TRAINING PROGRAM

## 2023-05-23 PROCEDURE — 86920 COMPATIBILITY TEST SPIN: CPT

## 2023-05-23 PROCEDURE — 83615 LACTATE (LD) (LDH) ENZYME: CPT | Performed by: STUDENT IN AN ORGANIZED HEALTH CARE EDUCATION/TRAINING PROGRAM

## 2023-05-23 PROCEDURE — 83735 ASSAY OF MAGNESIUM: CPT | Performed by: STUDENT IN AN ORGANIZED HEALTH CARE EDUCATION/TRAINING PROGRAM

## 2023-05-23 PROCEDURE — 85025 COMPLETE CBC W/AUTO DIFF WBC: CPT

## 2023-05-23 PROCEDURE — 25010000002 CEFTRIAXONE PER 250 MG: Performed by: STUDENT IN AN ORGANIZED HEALTH CARE EDUCATION/TRAINING PROGRAM

## 2023-05-23 PROCEDURE — 93005 ELECTROCARDIOGRAM TRACING: CPT | Performed by: STUDENT IN AN ORGANIZED HEALTH CARE EDUCATION/TRAINING PROGRAM

## 2023-05-23 PROCEDURE — 82247 BILIRUBIN TOTAL: CPT | Performed by: INTERNAL MEDICINE

## 2023-05-23 PROCEDURE — 84484 ASSAY OF TROPONIN QUANT: CPT

## 2023-05-23 PROCEDURE — 93010 ELECTROCARDIOGRAM REPORT: CPT | Performed by: INTERNAL MEDICINE

## 2023-05-23 PROCEDURE — 99285 EMERGENCY DEPT VISIT HI MDM: CPT

## 2023-05-23 PROCEDURE — 71046 X-RAY EXAM CHEST 2 VIEWS: CPT

## 2023-05-23 PROCEDURE — 86022 PLATELET ANTIBODIES: CPT | Performed by: PHYSICIAN ASSISTANT

## 2023-05-23 RX ORDER — ONDANSETRON 2 MG/ML
4 INJECTION INTRAMUSCULAR; INTRAVENOUS EVERY 6 HOURS PRN
Status: DISCONTINUED | OUTPATIENT
Start: 2023-05-23 | End: 2023-05-30 | Stop reason: HOSPADM

## 2023-05-23 RX ORDER — POLYETHYLENE GLYCOL 3350 17 G/17G
17 POWDER, FOR SOLUTION ORAL DAILY PRN
Status: DISCONTINUED | OUTPATIENT
Start: 2023-05-23 | End: 2023-05-30 | Stop reason: HOSPADM

## 2023-05-23 RX ORDER — CHOLECALCIFEROL (VITAMIN D3) 125 MCG
5 CAPSULE ORAL NIGHTLY PRN
Status: DISCONTINUED | OUTPATIENT
Start: 2023-05-23 | End: 2023-05-30 | Stop reason: HOSPADM

## 2023-05-23 RX ORDER — ACETAMINOPHEN 325 MG/1
650 TABLET ORAL EVERY 4 HOURS PRN
Status: DISCONTINUED | OUTPATIENT
Start: 2023-05-23 | End: 2023-05-30 | Stop reason: HOSPADM

## 2023-05-23 RX ORDER — SODIUM CHLORIDE 9 MG/ML
40 INJECTION, SOLUTION INTRAVENOUS AS NEEDED
Status: DISCONTINUED | OUTPATIENT
Start: 2023-05-23 | End: 2023-05-26

## 2023-05-23 RX ORDER — CALCIUM CARBONATE 500 MG/1
2 TABLET, CHEWABLE ORAL 3 TIMES DAILY PRN
Status: DISCONTINUED | OUTPATIENT
Start: 2023-05-23 | End: 2023-05-30 | Stop reason: HOSPADM

## 2023-05-23 RX ORDER — OXYCODONE HYDROCHLORIDE AND ACETAMINOPHEN 5; 325 MG/1; MG/1
2 TABLET ORAL EVERY 4 HOURS PRN
Status: DISPENSED | OUTPATIENT
Start: 2023-05-23 | End: 2023-05-30

## 2023-05-23 RX ORDER — SODIUM CHLORIDE 0.9 % (FLUSH) 0.9 %
10 SYRINGE (ML) INJECTION EVERY 12 HOURS SCHEDULED
Status: DISCONTINUED | OUTPATIENT
Start: 2023-05-23 | End: 2023-05-26

## 2023-05-23 RX ORDER — SODIUM CHLORIDE 0.9 % (FLUSH) 0.9 %
10 SYRINGE (ML) INJECTION AS NEEDED
Status: DISCONTINUED | OUTPATIENT
Start: 2023-05-23 | End: 2023-05-26

## 2023-05-23 RX ORDER — OXYCODONE HYDROCHLORIDE AND ACETAMINOPHEN 5; 325 MG/1; MG/1
1 TABLET ORAL EVERY 4 HOURS PRN
Status: DISCONTINUED | OUTPATIENT
Start: 2023-05-23 | End: 2023-05-23

## 2023-05-23 RX ORDER — AMOXICILLIN 250 MG
2 CAPSULE ORAL 2 TIMES DAILY
Status: DISCONTINUED | OUTPATIENT
Start: 2023-05-23 | End: 2023-05-30 | Stop reason: HOSPADM

## 2023-05-23 RX ORDER — SODIUM CHLORIDE 0.9 % (FLUSH) 0.9 %
10 SYRINGE (ML) INJECTION AS NEEDED
Status: DISCONTINUED | OUTPATIENT
Start: 2023-05-23 | End: 2023-05-23 | Stop reason: SDUPTHER

## 2023-05-23 RX ORDER — BISACODYL 5 MG/1
5 TABLET, DELAYED RELEASE ORAL DAILY PRN
Status: DISCONTINUED | OUTPATIENT
Start: 2023-05-23 | End: 2023-05-30 | Stop reason: HOSPADM

## 2023-05-23 RX ORDER — BISACODYL 10 MG
10 SUPPOSITORY, RECTAL RECTAL DAILY PRN
Status: DISCONTINUED | OUTPATIENT
Start: 2023-05-23 | End: 2023-05-30 | Stop reason: HOSPADM

## 2023-05-23 RX ADMIN — OXYCODONE HYDROCHLORIDE AND ACETAMINOPHEN 2 TABLET: 5; 325 TABLET ORAL at 22:17

## 2023-05-23 RX ADMIN — DOCUSATE SODIUM 50 MG AND SENNOSIDES 8.6 MG 2 TABLET: 8.6; 5 TABLET, FILM COATED ORAL at 18:19

## 2023-05-23 RX ADMIN — DOCUSATE SODIUM 50 MG AND SENNOSIDES 8.6 MG 2 TABLET: 8.6; 5 TABLET, FILM COATED ORAL at 21:42

## 2023-05-23 RX ADMIN — Medication 10 ML: at 21:42

## 2023-05-23 RX ADMIN — HYDROMORPHONE HYDROCHLORIDE 1 MG: 1 INJECTION, SOLUTION INTRAMUSCULAR; INTRAVENOUS; SUBCUTANEOUS at 04:59

## 2023-05-23 RX ADMIN — OXYCODONE HYDROCHLORIDE AND ACETAMINOPHEN 1 TABLET: 5; 325 TABLET ORAL at 13:50

## 2023-05-23 RX ADMIN — Medication 10 ML: at 21:43

## 2023-05-23 RX ADMIN — CEFTRIAXONE 2 G: 2 INJECTION, POWDER, FOR SOLUTION INTRAMUSCULAR; INTRAVENOUS at 06:15

## 2023-05-23 RX ADMIN — OXYCODONE HYDROCHLORIDE AND ACETAMINOPHEN 1 TABLET: 5; 325 TABLET ORAL at 18:19

## 2023-05-23 NOTE — NURSING NOTE
Patient is current with [facility] at this time. Pt currently receiving SN services. Services will continue upon hospital discharge if appropriate.

## 2023-05-23 NOTE — ED PROVIDER NOTES
Subjective   History of Present Illness  Patient states that she is having shortness of breath that has gotten worse over the past week. States that she recently was discharged after an admission for an infected leg. States that she has been having low platelets that they are monitoring at home. She has been bruising very easily. Has a history of high blood pressure and thyroid disease. States that she has back pain that she takes medication for. Unclear of what is causing her platelet count to be low.    Review of Systems   All other systems reviewed and are negative.    Past Medical History:   Diagnosis Date    Arthritis     Bronchitis     Cataract     right eye , cornea implant    Chronic back pain     Depression     Disease of thyroid gland     Edema     lower extremities    Hypertension     Kidney stones        Allergies   Allergen Reactions    Codeine Itching and Rash     SEVERE RASH/ITCHING (TOLERATES PERCOCET)    Benadryl [Diphenhydramine] Other (See Comments)     KEEPS PATIENT AWAKE        Past Surgical History:   Procedure Laterality Date    BACK SURGERY      2010 ([place]), 2013     CHOLECYSTECTOMY OPEN      CORNEAL TRANSPLANT Right 1999    INCISION AND DRAINAGE ABSCESS Left 4/19/2023    Procedure: INCISION AND DRAINAGE ABSCESS left leg;  Surgeon: Sarita Vazquez MD;  Location:  [facility] OR;  Service: General;  Laterality: Left;    LACERATION REPAIR Left 4/7/2023    Procedure: washout and closure left leg wound ;  Surgeon: Sarita Vazquez MD;  Location:  [facility] OR;  Service: General;  Laterality: Left;    LUMBAR FUSION Left 8/2/2017    Procedure: LEFT LUMBAR LATERAL INTERBODY FUSION WITH INSTRUMENTATION  L1-2;  Surgeon: MARCELINO Wright MD;  Location:  [facility] OR;  Service:     NECK SURGERY  2010    [place]    TOTAL HIP ARTHROPLASTY Right 2014    DR. KENDRICK     TOTAL KNEE ARTHROPLASTY Right 2000    TOTAL KNEE ARTHROPLASTY Left 2002    TOTAL SHOULDER ARTHROPLASTY W/ DISTAL CLAVICLE EXCISION Left 6/1/2020     Procedure: LEFT REVERSE TOTAL SHOULDER REPLACEMENT;  Surgeon: Saravanan Calvin MD;  Location[facility];  Service: Orthopedics;  Laterality: Left;       Family History   Problem Relation Age of Onset    Breast cancer Neg Hx        Social History     Socioeconomic History    Marital status:    Tobacco Use    Smoking status: Never    Smokeless tobacco: Never   Vaping Use    Vaping Use: Never used   Substance and Sexual Activity    Alcohol use: No    Drug use: No    Sexual activity: Defer           Objective   Physical Exam  Vitals and nursing note reviewed.   Constitutional:       General: She is not in acute distress.     Appearance: Normal appearance. She is obese. She is not toxic-appearing or diaphoretic.   HENT:      Head: Normocephalic and atraumatic.      Right Ear: External ear normal.      Left Ear: External ear normal.      Nose: Nose normal.      Mouth/Throat:      Mouth: Mucous membranes are moist.   Eyes:      General:         Right eye: No discharge.         Left eye: No discharge.      Extraocular Movements: Extraocular movements intact.      Conjunctiva/sclera: Conjunctivae normal.   Cardiovascular:      Rate and Rhythm: Normal rate.      Pulses: Normal pulses.   Pulmonary:      Effort: No respiratory distress.      Breath sounds: Rhonchi present.   Abdominal:      General: Abdomen is flat.      Tenderness: There is no abdominal tenderness. There is no guarding or rebound.   Musculoskeletal:         General: No deformity or signs of injury.      Right lower leg: Edema present.      Left lower leg: Edema present.   Skin:     General: Skin is warm.      Coloration: Skin is not jaundiced.      Findings: Rash (to LLE with wounds that are partially healed) present.   Neurological:      Mental Status: She is alert and oriented to person, place, and time. Mental status is at baseline.   Psychiatric:         Mood and Affect: Mood normal.         Behavior: Behavior normal.         Thought Content:  Thought content normal.         Judgment: Judgment normal.       Procedures           ED Course                                           Medical Decision Making  Brenda Sneed is a very pleasant 72 y.o. female who presents to the ED for shortness of breath and leg pain.     Patient was non-toxic and not-ill appearing on arrival. Vital signs table on arrival.     Patient's presentation raises suspicion for differentials including, but not limited to, infection, ITP, anemia.     Labs were reviewed and pertinent for severe thrombocytopenia. On re-evaluation, patient remained hemodynamically stable and appeared to be comfortable and in no acute distress. Transfusion was started with PRBCs and platelets in the ER.  Given the need for further treatment and workup, will discuss with hospitalist for admission. Discussed with Dr. Block who recommended admission to the ICU.      Signed by:   Kalyani Warren MD 5/27/2023 19:15 CDT   Emergency Medicine Physician    Dragon disclaimer:  Part of this note may be an electronic transcription/translation of spoken language to printed text using the Dragon Dictation System.         Problems Addressed:  Thrombocytopenia: complicated acute illness or injury    Amount and/or Complexity of Data Reviewed  Labs: ordered.  Radiology: ordered.  ECG/medicine tests: ordered.    Risk  Prescription drug management.  Decision regarding hospitalization.        Final diagnoses:   Thrombocytopenia       ED Disposition  ED Disposition       ED Disposition   Decision to Admit    Condition   --    Comment   Level of Care: Critical Care [6]   Diagnosis: Thrombocytopenia [244379]   Admitting Physician: RADHA RYAN [[id number]]   Attending Physician: RADHA RYAN [[id number]]   Certification: I Certify That Inpatient Hospital Services Are Medically Necessary For Greater Than 2 Midnights                 [facility]  [address]  [facility]  [id number] [phone]        [facility] PHYSICAL THERAPY  [address]  [facility] [address] [phone]        [facility] HOME CARE  [address] [facility] [address] [phone]             Medication List      No changes were made to your prescriptions during this visit.            Kalyani Warren MD  05/27/23 2048

## 2023-05-23 NOTE — H&P
History and Physical      CHIEF COMPLAINT:  Dyspnea on exertion    Reason for Admission:  Thrombocytopenia    History Obtained From: Patient    HISTORY OF PRESENT ILLNESS:      The patient is a 72 y.o. female who presents with progressively worsening dyspnea on exertion over the past several weeks. She noticed that she was unable to ambulate as effectively and particularly noticed issues with ADLs such as going to the bathroom.  She presented to the ED where she was found to have significant anemia and thrombocytopenia and was admitted for further workup.    Of note, she does have stable/chronic lymphedema and lower extremity wounds.    Past Medical History:    Past Medical History:   Diagnosis Date    Arthritis     Bronchitis     Cataract     right eye , cornea implant    Chronic back pain     Depression     Disease of thyroid gland     Edema     lower extremities    Hypertension     Kidney stones        Past Surgical History:    Past Surgical History:   Procedure Laterality Date    BACK SURGERY      2010 ([facility]), 2013     CHOLECYSTECTOMY OPEN      CORNEAL TRANSPLANT Right 1999    INCISION AND DRAINAGE ABSCESS Left 4/19/2023    Procedure: INCISION AND DRAINAGE ABSCESS left leg;  Surgeon: Sarita Vazquez MD;  Location:  [facility];  Service: General;  Laterality: Left;    LACERATION REPAIR Left 4/7/2023    Procedure: washout and closure left leg wound ;  Surgeon: Sarita Vazquez MD;  Location:  [facility];  Service: General;  Laterality: Left;    LUMBAR FUSION Left 8/2/2017    Procedure: LEFT LUMBAR LATERAL INTERBODY FUSION WITH INSTRUMENTATION  L1-2;  Surgeon: MARCELINO Wright MD;  Location:  [facility];  Service:     NECK SURGERY  2010    [facility]    TOTAL HIP ARTHROPLASTY Right 2014    DR. KENDRICK     TOTAL KNEE ARTHROPLASTY Right 2000    TOTAL KNEE ARTHROPLASTY Left 2002    TOTAL SHOULDER ARTHROPLASTY W/ DISTAL CLAVICLE EXCISION Left 6/1/2020    Procedure: LEFT REVERSE TOTAL SHOULDER REPLACEMENT;   Surgeon: Saravanan Calvin MD;  Location: [facility];  Service: Orthopedics;  Laterality: Left;       Medications Prior to Admission:    (Not in a hospital admission)      Allergies:  Codeine and Benadryl [diphenhydramine]    Social History:   Social History     Socioeconomic History    Marital status:    Tobacco Use    Smoking status: Never    Smokeless tobacco: Never   Vaping Use    Vaping Use: Never used   Substance and Sexual Activity    Alcohol use: No    Drug use: No    Sexual activity: Defer       Family History:   Family History   Problem Relation Age of Onset    Breast cancer Neg Hx        REVIEW OF SYSTEMS:    CONSTITUTIONAL:  Negative for anorexia, chills, fevers, night sweats and weight loss, patient has been relatively medically stable until illness outlined in HPI  EYES:  negative for eye dryness, icterus and redness, no significant changes in vision  HEENT:   negative for dental problems, epistaxis or sinus congestion , no history of facial trauma or difficulty swallowing  RESPIRATORY:  negative for chest tightness, cough, + dyspnea on exertion, pneumonia or worse sputum production  CARDIOVASCULAR: No history of chest pain, exertional chest pressure/discomfort, irregular heart beat, or PND  GASTROINTESTINAL:  negative for abdominal pain, nausea or vomiting, no history of hematemesis, jaundice, melena or rectal bleeding  MUSCULOSKELETAL:  as stated in HPI  NEUROLOGICAL:   negative for dizziness, headaches, seizures, speech problems, tremors and vertigo, mentation has been at baseline  INTEGUMENT: wound present as stated in HPI      Vital Signs   Temp:  [97.7 °F (36.5 °C)-98.3 °F (36.8 °C)] 98 °F (36.7 °C)  Heart Rate:  [56-72] 56  Resp:  [12-18] 12  BP: (120-158)/() 128/60          Physical Exam:  Constitutional: Oriented, appears chronically ill, obese  HEENT: Grossly normal lying in bed  Head: Normocephalic and atraumatic.   Eyes: Pupils are equal, round, and reactive to light.   Extraocular muscles appear to be intact  Neck: Neck supple without masses or carotid bruit.  Cardiovascular: Regular rhythm and normal heart sounds.  No extra or lift rub or murmur appreciated  Pulmonary/Chest: Effort normal and breath sounds normal. CTAB, no appreciable rales or wheezes  Abdominal: Soft. Bowel sounds are normal. There is  no distension or tenderness appreciated. There is no rebound and no guarding.   Musculoskeletal: Normal range of motion. There is  no edema or tenderness.  No significant joint swelling  Neurological: Pt is alert and oriented to person, place, and time. They have normal reflexes. CN 2-12 appear grossly intact  Skin: multiple chronic wounds on left lower extremity    Results Review:   I reviewed the patient's new imaging results and agree with the interpretation.    DATA:  Lab Results (last 24 hours)       Procedure Component Value Units Date/Time    Blood Culture - Blood, Arm, Left [[id number]] Collected: 05/23/23 0548    Specimen: Blood from Arm, Left Updated: 05/23/23 0728    Blood Culture - Blood, Arm, Left [[id number]] Collected: 05/23/23 0548    Specimen: Blood from Arm, Left Updated: 05/23/23 0728    Lactic Acid, Plasma [[id number]]  (Normal) Collected: 05/23/23 0615    Specimen: Blood Updated: 05/23/23 0643     Lactate 1.8 mmol/L     Magnesium [[id number]]  (Normal) Collected: 05/23/23 0109    Specimen: Blood Updated: 05/23/23 0543     Magnesium 1.7 mg/dL     Urinalysis, Microscopic Only - Urine, Clean Catch [[id number]]  (Abnormal) Collected: 05/23/23 0404    Specimen: Urine, Clean Catch Updated: 05/23/23 0438     RBC, UA 21-30 /HPF      WBC, UA 31-50 /HPF      Bacteria, UA 2+ /HPF      Squamous Epithelial Cells, UA None Seen /HPF      Hyaline Casts, UA 3-6 /LPF      Methodology Automated Microscopy    Urinalysis With Culture If Indicated - Urine, Clean Catch [[id number]]  (Abnormal) Collected: 05/23/23 0404    Specimen: Urine, Clean Catch Updated: 05/23/23 0438     Color, UA  Dark Yellow     Appearance, UA Clear     pH, UA 6.0     Specific Gravity, UA 1.014     Glucose, UA Negative     Ketones, UA Negative     Bilirubin, UA Negative     Blood, UA Moderate (2+)     Protein, UA Negative     Leuk Esterase, UA Large (3+)     Nitrite, UA Negative     Urobilinogen, UA 1.0 E.U./dL    Narrative:      In absence of clinical symptoms, the presence of pyuria, bacteria, and/or nitrites on the urinalysis result does not correlate with infection.    CBC & Differential [[id number]]  (Abnormal) Collected: 05/23/23 0230    Specimen: Blood Updated: 05/23/23 0352    Narrative:      The following orders were created for panel order CBC & Differential.  Procedure                               Abnormality         Status                     ---------                               -----------         ------                     CBC Auto Differential[[id number]]        Abnormal            Final result               Scan Slide[[id number]]                                       Final result                 Please view results for these tests on the individual orders.    Scan Slide [[id number]] Collected: 05/23/23 0230    Specimen: Blood Updated: 05/23/23 0352     Polychromasia Mod/2+     WBC Morphology Normal     Platelet Estimate Decreased    CBC Auto Differential [[id number]]  (Abnormal) Collected: 05/23/23 0230    Specimen: Blood Updated: 05/23/23 0313     WBC 7.16 10*3/mm3      RBC 2.40 10*6/mm3      Hemoglobin 7.1 g/dL      Hematocrit 23.3 %      MCV 97.1 fL      MCH 29.6 pg      MCHC 30.5 g/dL      RDW 15.3 %      RDW-SD 52.4 fl      MPV 10.7 fL      Platelets 5 10*3/mm3      Neutrophil % 44.5 %      Lymphocyte % 38.1 %      Monocyte % 13.0 %      Eosinophil % 2.9 %      Basophil % 0.7 %      Neutrophils, Absolute 3.18 10*3/mm3      Lymphocytes, Absolute 2.73 10*3/mm3      Monocytes, Absolute 0.93 10*3/mm3      Eosinophils, Absolute 0.21 10*3/mm3      Basophils, Absolute 0.05 10*3/mm3     [place] Draw [[id number]]  Collected: 05/23/23 0109    Specimen: Blood Updated: 05/23/23 0215    Narrative:      The following orders were created for panel order [place] Draw.  Procedure                               Abnormality         Status                     ---------                               -----------         ------                     Green Top (Gel)[[id number]]                                  Final result               Lavender Top[[id number]]                                     Final result               Red Top[[id number]]                                          Final result               Light Blue Top[[id number]]                                   Final result                 Please view results for these tests on the individual orders.    Green Top (Gel) [[id number]] Collected: 05/23/23 0109    Specimen: Blood Updated: 05/23/23 0215     Extra Tube Hold for add-ons.     Comment: Auto resulted.       Red Top [[id number]] Collected: 05/23/23 0109    Specimen: Blood Updated: 05/23/23 0215     Extra Tube Hold for add-ons.     Comment: Auto resulted.       Light Blue Top [[id number]] Collected: 05/23/23 0109    Specimen: Blood Updated: 05/23/23 0215     Extra Tube Hold for add-ons.     Comment: Auto resulted       Lavender Top [[id number]] Collected: 05/23/23 0109    Specimen: Blood Updated: 05/23/23 0215     Extra Tube hold for add-on     Comment: Auto resulted       Comprehensive Metabolic Panel [[id number]]  (Abnormal) Collected: 05/23/23 0109    Specimen: Blood Updated: 05/23/23 0145     Glucose 126 mg/dL      BUN 14 mg/dL      Creatinine 0.84 mg/dL      Sodium 136 mmol/L      Potassium 3.0 mmol/L      Chloride 100 mmol/L      CO2 25.0 mmol/L      Calcium 8.7 mg/dL      Total Protein 5.8 g/dL      Albumin 3.3 g/dL      ALT (SGPT) 10 U/L      AST (SGOT) 28 U/L      Alkaline Phosphatase 147 U/L      Total Bilirubin 1.5 mg/dL      Globulin 2.5 gm/dL      A/G Ratio 1.3 g/dL      BUN/Creatinine Ratio 16.7     Anion Gap 11.0 mmol/L       eGFR 73.0 mL/min/1.73     Narrative:      GFR Normal >60  Chronic Kidney Disease <60  Kidney Failure <15    The GFR formula is only valid for adults with stable renal function between ages 18 and 70.    BNP [[id number]]  (Normal) Collected: 05/23/23 0109    Specimen: Blood Updated: 05/23/23 0142     proBNP 300.2 pg/mL     Narrative:      Among patients with dyspnea, NT-proBNP is highly sensitive for the detection of acute congestive heart failure. In addition NT-proBNP of <300 pg/ml effectively rules out acute congestive heart failure with 99% negative predictive value.    Results may be falsely decreased if patient taking Biotin.      Single High Sensitivity Troponin T [[id number]]  (Abnormal) Collected: 05/23/23 0109    Specimen: Blood Updated: 05/23/23 0141     HS Troponin T 39 ng/L     Narrative:      High Sensitive Troponin T Reference Range:  <10.0 ng/L- Negative Female for AMI  <15.0 ng/L- Negative Male for AMI  >=10 - Abnormal Female indicating possible myocardial injury.  >=15 - Abnormal Male indicating possible myocardial injury.   Clinicians would have to utilize clinical acumen, EKG, Troponin, and serial changes to determine if it is an Acute Myocardial Infarction or myocardial injury due to an underlying chronic condition.               Imaging Results (Last 24 Hours)       Procedure Component Value Units Date/Time    XR Chest 2 View [[id number]] Collected: 05/23/23 0654     Updated: 05/23/23 0658    Narrative:      EXAM/TECHNIQUE: XR CHEST 2 VW-     INDICATION: Shortness of air     COMPARISON: 7/5/2022     FINDINGS:     Enlarged but stable cardiac silhouette. No pleural effusion,  pneumothorax, or focal consolidation. No acute osseous finding. LEFT  shoulder arthroplasty without evidence of complication. Partially imaged  postoperative change in the lower cervical spine.       Impression:         No acute findings.  This report was finalized on 05/23/2023 06:55 by Dr. Bryce Mancera MD.               ASSESSMENT AND PLAN:      1.     Thrombocytopenia    Plan:    Dyspnea on exertion  Related to anemia, admit and transfuse, heme/onc evaluation as well    Bicytopenia  Heme/onc consulted, transfusing today    Lower extremity wound  Wound care consulted    UTI  Ceftriaxone, culture pending      Sanjiv Block MD  13:08 CDT 5/23/2023

## 2023-05-23 NOTE — PROGRESS NOTES
"Pharmacy Dosing Service  Antimicrobial Dosing  Rocephin    Assessment/Action/Plan:  Rocephin 1 gm IV once has been adjusted to 2 gm IV once for patient with BMI ? 30 kg/m2.      Subjective:  Brenda Sneed is a 72 y.o. female     Objective:  Ht: 160 cm (63\"); Wt: 113 kg (250 lb); BMI: Body mass index is 44.29 kg/m².]  Estimated Creatinine Clearance: 71.1 mL/min (by C-G formula based on SCr of 0.84 mg/dL).   Creatinine   Date Value Ref Range Status   05/23/2023 0.84 0.57 - 1.00 mg/dL Final      Lab Results   Component Value Date    WBC 7.16 05/23/2023      Baseline culture results:  Microbiology Results (last 10 days)       ** No results found for the last 240 hours. **            Linwood Merrill, PharmD  05/23/23 05:04 CDT    "

## 2023-05-23 NOTE — TELEPHONE ENCOUNTER
Ayesha   :  1950  Dx:   Bicytopenia  Provider:  Dr. Jasmin Donato  Room:  CCU #9  Nurse on Call:  Nancy Lion [phone]

## 2023-05-24 ENCOUNTER — APPOINTMENT (OUTPATIENT)
Dept: ULTRASOUND IMAGING | Facility: HOSPITAL | Age: 73
DRG: 803 | End: 2023-05-24
Payer: MEDICARE

## 2023-05-24 ENCOUNTER — APPOINTMENT (OUTPATIENT)
Dept: CARDIOLOGY | Facility: HOSPITAL | Age: 73
DRG: 803 | End: 2023-05-24
Payer: MEDICARE

## 2023-05-24 VITALS
OXYGEN SATURATION: 98 % | HEART RATE: 60 BPM | SYSTOLIC BLOOD PRESSURE: 112 MMHG | DIASTOLIC BLOOD PRESSURE: 70 MMHG | RESPIRATION RATE: 20 BRPM | TEMPERATURE: 97 F

## 2023-05-24 LAB
ALBUMIN SERPL-MCNC: 3.2 G/DL (ref 3.5–5.2)
ALBUMIN/GLOB SERPL: 1.3 G/DL
ALP SERPL-CCNC: 130 U/L (ref 39–117)
ALT SERPL W P-5'-P-CCNC: 8 U/L (ref 1–33)
ANION GAP SERPL CALCULATED.3IONS-SCNC: 10 MMOL/L (ref 5–15)
ANISOCYTOSIS BLD QL: ABNORMAL
APTT PPP: 30.1 SECONDS (ref 24.1–35)
AST SERPL-CCNC: 28 U/L (ref 1–32)
BH BB BLOOD EXPIRATION DATE: NORMAL
BH BB BLOOD EXPIRATION DATE: NORMAL
BH BB BLOOD TYPE BARCODE: 6200
BH BB BLOOD TYPE BARCODE: 6200
BH BB DISPENSE STATUS: NORMAL
BH BB DISPENSE STATUS: NORMAL
BH BB PRODUCT CODE: NORMAL
BH BB PRODUCT CODE: NORMAL
BH BB UNIT NUMBER: NORMAL
BH BB UNIT NUMBER: NORMAL
BH CV ECHO MEAS - AO MAX PG: 10.2 MMHG
BH CV ECHO MEAS - AO MEAN PG: 5 MMHG
BH CV ECHO MEAS - AO ROOT DIAM: 3.7 CM
BH CV ECHO MEAS - AO V2 MAX: 160 CM/SEC
BH CV ECHO MEAS - AO V2 VTI: 39.6 CM
BH CV ECHO MEAS - AVA(I,D): 2.22 CM2
BH CV ECHO MEAS - EDV(CUBED): 169.1 ML
BH CV ECHO MEAS - EDV(MOD-SP2): 137 ML
BH CV ECHO MEAS - EDV(MOD-SP4): 121 ML
BH CV ECHO MEAS - EF(MOD-BP): 77 %
BH CV ECHO MEAS - EF(MOD-SP2): 77 %
BH CV ECHO MEAS - EF(MOD-SP4): 76 %
BH CV ECHO MEAS - ESV(CUBED): 53.6 ML
BH CV ECHO MEAS - ESV(MOD-SP2): 31.5 ML
BH CV ECHO MEAS - ESV(MOD-SP4): 29.1 ML
BH CV ECHO MEAS - FS: 31.8 %
BH CV ECHO MEAS - IVS/LVPW: 1.02 CM
BH CV ECHO MEAS - IVSD: 1.09 CM
BH CV ECHO MEAS - LA DIMENSION: 4.6 CM
BH CV ECHO MEAS - LAT PEAK E' VEL: 10.7 CM/SEC
BH CV ECHO MEAS - LV DIASTOLIC VOL/BSA (35-75): 56.5 CM2
BH CV ECHO MEAS - LV MASS(C)D: 238.3 GRAMS
BH CV ECHO MEAS - LV MAX PG: 4.8 MMHG
BH CV ECHO MEAS - LV MEAN PG: 3 MMHG
BH CV ECHO MEAS - LV SYSTOLIC VOL/BSA (12-30): 13.6 CM2
BH CV ECHO MEAS - LV V1 MAX: 110 CM/SEC
BH CV ECHO MEAS - LV V1 VTI: 28 CM
BH CV ECHO MEAS - LVIDD: 5.5 CM
BH CV ECHO MEAS - LVIDS: 3.8 CM
BH CV ECHO MEAS - LVOT AREA: 3.1 CM2
BH CV ECHO MEAS - LVOT DIAM: 2 CM
BH CV ECHO MEAS - LVPWD: 1.07 CM
BH CV ECHO MEAS - MED PEAK E' VEL: 6.2 CM/SEC
BH CV ECHO MEAS - MV A MAX VEL: 130 CM/SEC
BH CV ECHO MEAS - MV DEC TIME: 0.26 MSEC
BH CV ECHO MEAS - MV E MAX VEL: 119 CM/SEC
BH CV ECHO MEAS - MV E/A: 0.92
BH CV ECHO MEAS - MV MAX PG: 7 MMHG
BH CV ECHO MEAS - MV MEAN PG: 3 MMHG
BH CV ECHO MEAS - MV V2 VTI: 48.5 CM
BH CV ECHO MEAS - MVA(VTI): 1.81 CM2
BH CV ECHO MEAS - PA V2 MAX: 130 CM/SEC
BH CV ECHO MEAS - RAP SYSTOLE: 5 MMHG
BH CV ECHO MEAS - RVDD: 3.6 CM
BH CV ECHO MEAS - RVSP: 34.4 MMHG
BH CV ECHO MEAS - SI(MOD-SP2): 49.3 ML/M2
BH CV ECHO MEAS - SI(MOD-SP4): 42.9 ML/M2
BH CV ECHO MEAS - SV(LVOT): 88 ML
BH CV ECHO MEAS - SV(MOD-SP2): 105.5 ML
BH CV ECHO MEAS - SV(MOD-SP4): 91.9 ML
BH CV ECHO MEAS - TAPSE (>1.6): 1.75 CM
BH CV ECHO MEAS - TR MAX PG: 29.4 MMHG
BH CV ECHO MEAS - TR MAX VEL: 271 CM/SEC
BH CV ECHO MEASUREMENTS AVERAGE E/E' RATIO: 14.08
BH CV XLRA - TDI S': 16.1 CM/SEC
BILIRUB SERPL-MCNC: 1.4 MG/DL (ref 0–1.2)
BUN SERPL-MCNC: 11 MG/DL (ref 8–23)
BUN/CREAT SERPL: 17.5 (ref 7–25)
CALCIUM SPEC-SCNC: 8.4 MG/DL (ref 8.6–10.5)
CHLORIDE SERPL-SCNC: 104 MMOL/L (ref 98–107)
CO2 SERPL-SCNC: 26 MMOL/L (ref 22–29)
CREAT SERPL-MCNC: 0.63 MG/DL (ref 0.57–1)
CROSSMATCH INTERPRETATION: NORMAL
CYTOLOGIST CVX/VAG CYTO: NORMAL
DEPRECATED RDW RBC AUTO: 53.3 FL (ref 37–54)
EGFRCR SERPLBLD CKD-EPI 2021: 93.2 ML/MIN/1.73
EOSINOPHIL # BLD MANUAL: 0.09 10*3/MM3 (ref 0–0.4)
EOSINOPHIL NFR BLD MANUAL: 3 % (ref 0.3–6.2)
ERYTHROCYTE [DISTWIDTH] IN BLOOD BY AUTOMATED COUNT: 19.3 % (ref 12.3–15.4)
FIBRINOGEN PPP-MCNC: 294 MG/DL (ref 240–460)
GLOBULIN UR ELPH-MCNC: 2.4 GM/DL
GLUCOSE SERPL-MCNC: 98 MG/DL (ref 65–99)
HAPTOGLOB SERPL-MCNC: 71 MG/DL (ref 30–200)
HCT VFR BLD AUTO: 23.3 % (ref 34–46.6)
HGB BLD-MCNC: 7.7 G/DL (ref 12–15.9)
HYPOCHROMIA BLD QL: ABNORMAL
INR PPP: 1.04 (ref 0.91–1.09)
LEFT ATRIUM VOLUME INDEX: 25.7 ML/M2
LEFT ATRIUM VOLUME: 55 ML
LYMPHOCYTES # BLD MANUAL: 1.08 10*3/MM3 (ref 0.7–3.1)
LYMPHOCYTES NFR BLD MANUAL: 9 % (ref 5–12)
MCH RBC QN AUTO: 32.6 PG (ref 26.6–33)
MCHC RBC AUTO-ENTMCNC: 33 G/DL (ref 31.5–35.7)
MCV RBC AUTO: 98.7 FL (ref 79–97)
MONOCYTES # BLD: 0.26 10*3/MM3 (ref 0.1–0.9)
NEUTROPHILS # BLD AUTO: 1.48 10*3/MM3 (ref 1.7–7)
NEUTROPHILS NFR BLD MANUAL: 51 % (ref 42.7–76)
PATH INTERP BLD-IMP: NORMAL
PLATELET # BLD AUTO: 13 10*3/MM3 (ref 140–450)
PMV BLD AUTO: 12.7 FL (ref 6–12)
POIKILOCYTOSIS BLD QL SMEAR: ABNORMAL
POLYCHROMASIA BLD QL SMEAR: ABNORMAL
POTASSIUM SERPL-SCNC: 3.8 MMOL/L (ref 3.5–5.2)
PROT SERPL-MCNC: 5.6 G/DL (ref 6–8.5)
PROTHROMBIN TIME: 13.8 SECONDS (ref 11.8–14.8)
QT INTERVAL: 458 MS
QTC INTERVAL: 458 MS
RBC # BLD AUTO: 2.36 10*6/MM3 (ref 3.77–5.28)
SMALL PLATELETS BLD QL SMEAR: ABNORMAL
SODIUM SERPL-SCNC: 140 MMOL/L (ref 136–145)
TARGETS BLD QL SMEAR: ABNORMAL
UNIT  ABO: NORMAL
UNIT  ABO: NORMAL
UNIT  RH: NORMAL
UNIT  RH: NORMAL
VARIANT LYMPHS NFR BLD MANUAL: 37 % (ref 19.6–45.3)
WBC MORPH BLD: NORMAL
WBC NRBC COR # BLD: 2.91 10*3/MM3 (ref 3.4–10.8)

## 2023-05-24 PROCEDURE — 87070 CULTURE OTHR SPECIMN AEROBIC: CPT | Performed by: NURSE PRACTITIONER

## 2023-05-24 PROCEDURE — 85730 THROMBOPLASTIN TIME PARTIAL: CPT | Performed by: INTERNAL MEDICINE

## 2023-05-24 PROCEDURE — 76700 US EXAM ABDOM COMPLETE: CPT

## 2023-05-24 PROCEDURE — 87181 SC STD AGAR DILUTION PER AGT: CPT | Performed by: NURSE PRACTITIONER

## 2023-05-24 PROCEDURE — 25510000001 PERFLUTREN 6.52 MG/ML SUSPENSION: Performed by: INTERNAL MEDICINE

## 2023-05-24 PROCEDURE — 25010000002 HYDROMORPHONE PER 4 MG: Performed by: FAMILY MEDICINE

## 2023-05-24 PROCEDURE — 0JBP0ZZ EXCISION OF LEFT LOWER LEG SUBCUTANEOUS TISSUE AND FASCIA, OPEN APPROACH: ICD-10-PCS | Performed by: STUDENT IN AN ORGANIZED HEALTH CARE EDUCATION/TRAINING PROGRAM

## 2023-05-24 PROCEDURE — 87077 CULTURE AEROBIC IDENTIFY: CPT | Performed by: NURSE PRACTITIONER

## 2023-05-24 PROCEDURE — 93306 TTE W/DOPPLER COMPLETE: CPT | Performed by: INTERNAL MEDICINE

## 2023-05-24 PROCEDURE — 85025 COMPLETE CBC W/AUTO DIFF WBC: CPT | Performed by: STUDENT IN AN ORGANIZED HEALTH CARE EDUCATION/TRAINING PROGRAM

## 2023-05-24 PROCEDURE — 87186 SC STD MICRODIL/AGAR DIL: CPT | Performed by: NURSE PRACTITIONER

## 2023-05-24 PROCEDURE — 80053 COMPREHEN METABOLIC PANEL: CPT | Performed by: STUDENT IN AN ORGANIZED HEALTH CARE EDUCATION/TRAINING PROGRAM

## 2023-05-24 PROCEDURE — 25010000002 CEFTRIAXONE PER 250 MG: Performed by: STUDENT IN AN ORGANIZED HEALTH CARE EDUCATION/TRAINING PROGRAM

## 2023-05-24 PROCEDURE — 87176 TISSUE HOMOGENIZATION CULTR: CPT | Performed by: NURSE PRACTITIONER

## 2023-05-24 PROCEDURE — 87147 CULTURE TYPE IMMUNOLOGIC: CPT | Performed by: NURSE PRACTITIONER

## 2023-05-24 PROCEDURE — 85610 PROTHROMBIN TIME: CPT | Performed by: INTERNAL MEDICINE

## 2023-05-24 PROCEDURE — 87205 SMEAR GRAM STAIN: CPT | Performed by: NURSE PRACTITIONER

## 2023-05-24 PROCEDURE — 85384 FIBRINOGEN ACTIVITY: CPT | Performed by: INTERNAL MEDICINE

## 2023-05-24 PROCEDURE — 85007 BL SMEAR W/DIFF WBC COUNT: CPT | Performed by: STUDENT IN AN ORGANIZED HEALTH CARE EDUCATION/TRAINING PROGRAM

## 2023-05-24 PROCEDURE — 93306 TTE W/DOPPLER COMPLETE: CPT

## 2023-05-24 RX ORDER — CHLORHEXIDINE GLUCONATE 500 MG/1
1 CLOTH TOPICAL ONCE
Status: COMPLETED | OUTPATIENT
Start: 2023-05-24 | End: 2023-05-24

## 2023-05-24 RX ORDER — LEVOTHYROXINE SODIUM 0.1 MG/1
100 TABLET ORAL
Status: DISCONTINUED | OUTPATIENT
Start: 2023-05-24 | End: 2023-05-30 | Stop reason: HOSPADM

## 2023-05-24 RX ORDER — CYCLOBENZAPRINE HCL 10 MG
10 TABLET ORAL 3 TIMES DAILY PRN
Status: DISCONTINUED | OUTPATIENT
Start: 2023-05-24 | End: 2023-05-30 | Stop reason: HOSPADM

## 2023-05-24 RX ORDER — CHLORHEXIDINE GLUCONATE 500 MG/1
1 CLOTH TOPICAL EVERY 24 HOURS
Status: DISCONTINUED | OUTPATIENT
Start: 2023-05-25 | End: 2023-05-24

## 2023-05-24 RX ORDER — ASCORBIC ACID 500 MG
500 TABLET ORAL DAILY
Status: DISCONTINUED | OUTPATIENT
Start: 2023-05-24 | End: 2023-05-30 | Stop reason: HOSPADM

## 2023-05-24 RX ORDER — HYDROMORPHONE HYDROCHLORIDE 1 MG/ML
0.5 INJECTION, SOLUTION INTRAMUSCULAR; INTRAVENOUS; SUBCUTANEOUS
Status: DISCONTINUED | OUTPATIENT
Start: 2023-05-24 | End: 2023-05-27 | Stop reason: ALTCHOICE

## 2023-05-24 RX ORDER — NYSTATIN 100000 [USP'U]/G
1 POWDER TOPICAL 2 TIMES DAILY
Status: DISCONTINUED | OUTPATIENT
Start: 2023-05-24 | End: 2023-05-30 | Stop reason: HOSPADM

## 2023-05-24 RX ORDER — DOCUSATE SODIUM 100 MG/1
100 CAPSULE, LIQUID FILLED ORAL 2 TIMES DAILY
Status: DISCONTINUED | OUTPATIENT
Start: 2023-05-24 | End: 2023-05-30 | Stop reason: HOSPADM

## 2023-05-24 RX ORDER — DULOXETIN HYDROCHLORIDE 30 MG/1
60 CAPSULE, DELAYED RELEASE ORAL 2 TIMES DAILY
Status: DISCONTINUED | OUTPATIENT
Start: 2023-05-24 | End: 2023-05-30 | Stop reason: HOSPADM

## 2023-05-24 RX ORDER — PRAMIPEXOLE DIHYDROCHLORIDE 0.25 MG/1
0.75 TABLET ORAL EVERY 8 HOURS SCHEDULED
Status: DISCONTINUED | OUTPATIENT
Start: 2023-05-24 | End: 2023-05-30 | Stop reason: HOSPADM

## 2023-05-24 RX ORDER — LIDOCAINE 40 MG/G
1 CREAM TOPICAL ONCE
Status: COMPLETED | OUTPATIENT
Start: 2023-05-24 | End: 2023-05-24

## 2023-05-24 RX ADMIN — PERFLUTREN 13.04 MG: 6.52 INJECTION, SUSPENSION INTRAVENOUS at 08:32

## 2023-05-24 RX ADMIN — NYSTATIN 1 APPLICATION: 100000 POWDER TOPICAL at 20:08

## 2023-05-24 RX ADMIN — OXYCODONE HYDROCHLORIDE AND ACETAMINOPHEN 2 TABLET: 5; 325 TABLET ORAL at 02:18

## 2023-05-24 RX ADMIN — PRAMIPEXOLE DIHYDROCHLORIDE 0.75 MG: 0.25 TABLET ORAL at 21:23

## 2023-05-24 RX ADMIN — DOCUSATE SODIUM 100 MG: 100 CAPSULE ORAL at 08:18

## 2023-05-24 RX ADMIN — DULOXETINE HYDROCHLORIDE 60 MG: 30 CAPSULE, DELAYED RELEASE ORAL at 20:07

## 2023-05-24 RX ADMIN — DOCUSATE SODIUM 50 MG AND SENNOSIDES 8.6 MG 2 TABLET: 8.6; 5 TABLET, FILM COATED ORAL at 20:07

## 2023-05-24 RX ADMIN — OXYCODONE HYDROCHLORIDE AND ACETAMINOPHEN 2 TABLET: 5; 325 TABLET ORAL at 15:29

## 2023-05-24 RX ADMIN — HYDROMORPHONE HYDROCHLORIDE 0.5 MG: 1 INJECTION, SOLUTION INTRAMUSCULAR; INTRAVENOUS; SUBCUTANEOUS at 08:17

## 2023-05-24 RX ADMIN — CHLORHEXIDINE GLUCONATE 1 APPLICATION: 500 CLOTH TOPICAL at 02:05

## 2023-05-24 RX ADMIN — LEVOTHYROXINE SODIUM 100 MCG: 100 TABLET ORAL at 06:18

## 2023-05-24 RX ADMIN — PRAMIPEXOLE DIHYDROCHLORIDE 0.75 MG: 0.25 TABLET ORAL at 14:04

## 2023-05-24 RX ADMIN — PRAMIPEXOLE DIHYDROCHLORIDE 0.75 MG: 0.25 TABLET ORAL at 06:18

## 2023-05-24 RX ADMIN — OXYCODONE HYDROCHLORIDE AND ACETAMINOPHEN 2 TABLET: 5; 325 TABLET ORAL at 21:23

## 2023-05-24 RX ADMIN — Medication 10 ML: at 08:18

## 2023-05-24 RX ADMIN — Medication 1 APPLICATION: at 08:18

## 2023-05-24 RX ADMIN — OXYCODONE HYDROCHLORIDE AND ACETAMINOPHEN 2 TABLET: 5; 325 TABLET ORAL at 06:18

## 2023-05-24 RX ADMIN — DULOXETINE HYDROCHLORIDE 60 MG: 30 CAPSULE, DELAYED RELEASE ORAL at 08:18

## 2023-05-24 RX ADMIN — BISOPROLOL FUMARATE: 5 TABLET ORAL at 08:17

## 2023-05-24 RX ADMIN — OXYCODONE HYDROCHLORIDE AND ACETAMINOPHEN 500 MG: 500 TABLET ORAL at 08:18

## 2023-05-24 RX ADMIN — Medication 1 APPLICATION: at 02:05

## 2023-05-24 RX ADMIN — DOCUSATE SODIUM 50 MG AND SENNOSIDES 8.6 MG 2 TABLET: 8.6; 5 TABLET, FILM COATED ORAL at 08:18

## 2023-05-24 RX ADMIN — LIDOCAINE 4% 1 APPLICATION: 4 CREAM TOPICAL at 10:53

## 2023-05-24 RX ADMIN — Medication 10 ML: at 20:08

## 2023-05-24 RX ADMIN — CEFTRIAXONE 2 G: 2 INJECTION, POWDER, FOR SOLUTION INTRAMUSCULAR; INTRAVENOUS at 00:05

## 2023-05-24 RX ADMIN — NYSTATIN 1 APPLICATION: 100000 POWDER TOPICAL at 08:17

## 2023-05-24 RX ADMIN — HYDROMORPHONE HYDROCHLORIDE 0.5 MG: 1 INJECTION, SOLUTION INTRAMUSCULAR; INTRAVENOUS; SUBCUTANEOUS at 18:09

## 2023-05-24 RX ADMIN — DOCUSATE SODIUM 100 MG: 100 CAPSULE ORAL at 20:07

## 2023-05-24 RX ADMIN — Medication 5000 UNITS: at 08:18

## 2023-05-24 RX ADMIN — Medication 1 APPLICATION: at 20:08

## 2023-05-24 NOTE — CASE MANAGEMENT/SOCIAL WORK
Discharge Planning Assessment   [facility]     Patient Name: Brenda Sneed  MRN: [id number]  Today's Date: 5/24/2023    Admit Date: 5/23/2023        Discharge Needs Assessment       Row Name 05/24/23 1008       Living Environment    People in Home alone    Current Living Arrangements apartment    Primary Care Provided by self    Provides Primary Care For no one    Quality of Family Relationships supportive    Able to Return to Prior Arrangements yes       Resource/Environmental Concerns    Resource/Environmental Concerns none    Transportation Concerns none       Food Insecurity    Within the past 12 months, you worried that your food would run out before you got the money to buy more. Never true    Within the past 12 months, the food you bought just didn't last and you didn't have money to get more. Never true       Transition Planning    Patient/Family Anticipates Transition to home with help/services    Patient/Family Anticipated Services at Transition home health care    Transportation Anticipated family or friend will provide       Discharge Needs Assessment    Readmission Within the Last 30 Days no previous admission in last 30 days    Current Outpatient/Agency/Support Group homecare agency    Equipment Currently Used at Home walker, standard;walker, rolling;cane, straight;other (see comments)  Medical alert button    Concerns to be Addressed discharge planning    Anticipated Changes Related to Illness none    Equipment Needed After Discharge none    Outpatient/Agency/Support Group Needs homecare agency    Discharge Facility/Level of Care Needs home with home health    Current Discharge Risk chronically ill;lives alone    Discharge Coordination/Progress SW spoke with patient to assess for discharge needs.  Patient resides alone and is currently receiving  services through [facility].  Patient has a PCP and RX coverage.  Patient states she plans to return home upon discharge with continued services  through [facility].                   Discharge Plan    No documentation.                 Continued Care and Services - Admitted Since 5/23/2023    Coordination has not been started for this encounter.       Selected Continued Care - Prior Encounters Includes continued care and service providers with selected services from prior encounters from 2/22/2023 to 5/24/2023      Discharged on 4/21/2023 Admission date: 4/18/2023 - Discharge disposition: Home or Self Care      Home Medical Care       Service Provider Selected Services Address Phone Fax Patient Preferred    [facility] [address], [facility] [address] [phone] --                             Demographic Summary    No documentation.                  Functional Status    No documentation.                  Psychosocial    No documentation.                  Abuse/Neglect    No documentation.                  Legal    No documentation.                  Substance Abuse    No documentation.                  Patient Forms    No documentation.                     RUBIN Nayak

## 2023-05-24 NOTE — NURSING NOTE
"Patient has been offered to be turn and release pressure off her bottom to decrease and prevent skin breakdown. Patient has refused turns and said she \"can not tolerate the pain while turned\". Patient was educated on the risk of skin break down and weight shifting has been provided.   "

## 2023-05-24 NOTE — CONSULTS
[facility]  INPATIENT WOUND & OSTOMY CONSULTATION    Today's Date: 05/24/23    Patient Name: Brenda Sneed  MRN: [id number]  CSN: [id number]  PCP: Radha Dia MD  Referring Provider:   Consulting Provider (From admission, onward)      Start Ordered     Status Ordering Provider    05/23/23 2147 05/23/23 2147    Once        Specialty:  Wound Care  Provider:  Julieta Murray APRN    Canceled RADHA DIA    05/23/23 0853 05/23/23 0852  Inpatient Wound Care Provider Consult  Once        Specialty:  Wound Care  Provider:  (Not yet assigned)    Acknowledged EVA ANN           Attending Provider: Radha Dia MD  Length of Stay: 1    SUBJECTIVE   Chief Complaint: Skin breakdown of left leg    HPI: Brenda Sneed, a 72 y.o.female, presents with a past medical history of hypertension, thyroid disease, arthritis.  A full past medical history as listed below.  Patient presented to the hospital on 5/23 with progressively worsening dyspnea on exertion over the past several weeks. Patient was found to have significant anemia and thrombocytopenia and was admitted.     Inpatient wound care was consulted due to wounds of left lower leg.  Patient fell causing laceration of left leg on 04/07.  She fell after her legs gave out and her leg went under the bed frame causing large laceration.  Dr. Vazquez surgically repaired.  Patient was admitted on 4/21 by Dr. Vazquez due to cellulitis of left leg.  She was placed on IV vanco and zosyn.  She underwent washout and prescribed Keflex 500mg bid x 7 days.  She states she has received care from home health nurse by dressing with Kerlix and ACE wrap.  She is currently in CCU room 9.        Visit Dx:    ICD-10-CM ICD-9-CM   1. Thrombocytopenia  D69.6 287.5       Hospital Problem List:     Thrombocytopenia      History:   Past Medical History:   Diagnosis Date    Arthritis     Bronchitis     Cataract     right eye , cornea implant     Chronic back pain     Depression     Disease of thyroid gland     Edema     lower extremities    Hypertension     Kidney stones      Past Surgical History:   Procedure Laterality Date    BACK SURGERY      2010 ([facility]), 2013     CHOLECYSTECTOMY OPEN      CORNEAL TRANSPLANT Right 1999    INCISION AND DRAINAGE ABSCESS Left 4/19/2023    Procedure: INCISION AND DRAINAGE ABSCESS left leg;  Surgeon: Sarita Vazquez MD;  Location:  [facility] OR;  Service: General;  Laterality: Left;    LACERATION REPAIR Left 4/7/2023    Procedure: washout and closure left leg wound ;  Surgeon: Sarita Vazquez MD;  Location:  [facility] OR;  Service: General;  Laterality: Left;    LUMBAR FUSION Left 8/2/2017    Procedure: LEFT LUMBAR LATERAL INTERBODY FUSION WITH INSTRUMENTATION  L1-2;  Surgeon: MARCELINO Wright MD;  Location:  [facility] OR;  Service:     NECK SURGERY  2010    [facility]    TOTAL HIP ARTHROPLASTY Right 2014    DR. KENDRICK     TOTAL KNEE ARTHROPLASTY Right 2000    TOTAL KNEE ARTHROPLASTY Left 2002    TOTAL SHOULDER ARTHROPLASTY W/ DISTAL CLAVICLE EXCISION Left 6/1/2020    Procedure: LEFT REVERSE TOTAL SHOULDER REPLACEMENT;  Surgeon: Saravanan Calvin MD;  Location:  [facility] OR;  Service: Orthopedics;  Laterality: Left;     Social History     Socioeconomic History    Marital status:    Tobacco Use    Smoking status: Never    Smokeless tobacco: Never   Vaping Use    Vaping Use: Never used   Substance and Sexual Activity    Alcohol use: No    Drug use: No    Sexual activity: Defer     Family History   Problem Relation Age of Onset    Breast cancer Neg Hx        Allergies:  Allergies   Allergen Reactions    Codeine Itching and Rash     SEVERE RASH/ITCHING (TOLERATES PERCOCET)    Benadryl [Diphenhydramine] Other (See Comments)     KEEPS PATIENT AWAKE        Medications:    Current Facility-Administered Medications:     acetaminophen (TYLENOL) tablet 650 mg, 650 mg, Oral, Q4H PRN, Sanjiv lBock MD    ascorbic acid (VITAMIN C)  tablet 500 mg, 500 mg, Oral, Daily, Sanjiv Block MD, 500 mg at 05/24/23 0818    sennosides-docusate (PERICOLACE) 8.6-50 MG per tablet 2 tablet, 2 tablet, Oral, BID, 2 tablet at 05/24/23 0818 **AND** polyethylene glycol (MIRALAX) packet 17 g, 17 g, Oral, Daily PRN **AND** bisacodyl (DULCOLAX) EC tablet 5 mg, 5 mg, Oral, Daily PRN **AND** bisacodyl (DULCOLAX) suppository 10 mg, 10 mg, Rectal, Daily PRN, Sanjiv Block MD    bisoprolol (ZEBeta) 5 mg, hydroCHLOROthiazide (HYDRODIURIL) 6.25 mg for Ziac 5-6.25, , Oral, Daily, Sanjiv Block MD, Given at 05/24/23 0817    calcium carbonate (TUMS) chewable tablet 500 mg (200 mg elemental), 2 tablet, Oral, TID PRN, Sanjiv Block MD    [START ON 5/25/2023] Chlorhexidine Gluconate Cloth 2 % pads 1 application, 1 application, Topical, Q24H, Sachin Dia MD    cyclobenzaprine (FLEXERIL) tablet 10 mg, 10 mg, Oral, TID PRN, Sanjiv Block MD    docusate sodium (COLACE) capsule 100 mg, 100 mg, Oral, BID, Sanjiv Block MD, 100 mg at 05/24/23 0818    DULoxetine (CYMBALTA) DR capsule 60 mg, 60 mg, Oral, BID, Sanjiv Block MD, 60 mg at 05/24/23 0818    HYDROmorphone (DILAUDID) injection 0.5 mg, 0.5 mg, Intravenous, Q2H PRN, Sachin Dia MD, 0.5 mg at 05/24/23 0817    levothyroxine (SYNTHROID, LEVOTHROID) tablet 100 mcg, 100 mcg, Oral, Q AM, Sanjiv Block MD, 100 mcg at 05/24/23 0618    melatonin tablet 5 mg, 5 mg, Oral, Nightly PRN, Sanjiv Block MD    mupirocin (BACTROBAN) 2 % nasal ointment 1 application, 1 application, Each Nare, BID, Sachin Dia MD, 1 application at 05/24/23 0818    nystatin (MYCOSTATIN) powder 1 application, 1 application, Topical, BID, Sanjiv Block MD, 1 application at 05/24/23 0817    ondansetron (ZOFRAN) injection 4 mg, 4 mg, Intravenous, Q6H PRN, Sanjiv Block MD    oxyCODONE-acetaminophen (PERCOCET) 5-325 MG per tablet 2 tablet, 2 tablet, Oral, Q4H PRN, Sumeet Bowles  ALISA WORKMAN, 2 tablet at 05/24/23 0618    pramipexole (MIRAPEX) tablet 0.75 mg, 0.75 mg, Oral, Q8H, Sanjiv Block MD, 0.75 mg at 05/24/23 0618    sodium chloride 0.9 % flush 10 mL, 10 mL, Intravenous, Q12H, Sanjiv Block MD, 10 mL at 05/24/23 0818    sodium chloride 0.9 % flush 10 mL, 10 mL, Intravenous, PRN, Sanjiv Block MD    sodium chloride 0.9 % infusion 40 mL, 40 mL, Intravenous, PRN, Sanjiv Block MD    vitamin D3 capsule 5,000 Units, 5,000 Units, Oral, Daily, Sanjiv Block MD, 5,000 Units at 05/24/23 0818    Review of Systems:   Review of Systems   Constitutional:  Positive for activity change and fatigue. Negative for chills and fever.   HENT:  Negative for rhinorrhea and sore throat.    Respiratory:  Negative for shortness of breath.    Cardiovascular:  Positive for leg swelling. Negative for chest pain and palpitations.   Gastrointestinal:  Negative for diarrhea, nausea and vomiting.   Genitourinary:  Negative for flank pain and hematuria.   Musculoskeletal:  Positive for gait problem and myalgias. Negative for arthralgias.   Skin:  Positive for color change and wound.   Neurological:  Positive for weakness. Negative for dizziness and headaches.   Psychiatric/Behavioral:  Negative for agitation and behavioral problems.        OBJECTIVE     Vitals:    05/24/23 0900   BP: 117/53   Pulse: 66   Resp:    Temp:    SpO2: 96%       PHYSICAL EXAM:   Physical Exam  Vitals and nursing note reviewed.   Constitutional:       General: She is sleeping.      Appearance: She is morbidly obese.      Comments: Body mass index is 44.99 kg/m².    HENT:      Head: Normocephalic and atraumatic.   Eyes:      General: Lids are normal. Gaze aligned appropriately.   Cardiovascular:      Rate and Rhythm: Normal rate and regular rhythm.   Pulmonary:      Effort: Pulmonary effort is normal. No respiratory distress.   Abdominal:      General: Abdomen is protuberant.      Palpations: Abdomen is soft.    Musculoskeletal:      Cervical back: Normal range of motion and neck supple.      Right lower leg: Edema present.      Left lower leg: Edema present.   Skin:     General: Skin is warm and dry.      Findings: Erythema and wound present.      Comments: Proximal medial wound 1cm x 1.9cm covered with slough and dried bloody drainage. Unable to see wound bed clearly.   Periwound area is erythemic.    Proximal lateral wound 1.9cm x 0.5cm x 0.1cm. Red moist wound bed with serosanguinous drainage present.  Dried drainage on edges of wound.  Periwound area is erythemic.   Distal medial wound 1.6cm x 7.5cm is laceration.  Red moist wound bed with dried bloody drainage over edges. Wound bed is not clearly visible.  Slough present with purulent drainage expressed with palpation of periwound area.    Distal lateral wound 1.2cm x 1cm. Wound bed covered with bloody drainage.  Periwound area is red and blanchable.  No purulent drainage present.     Neurological:      Mental Status: She is alert, oriented to person, place, and time and easily aroused.      Motor: Weakness present.   Psychiatric:         Attention and Perception: She is inattentive.         Speech: Speech normal.         Behavior: Behavior is cooperative.            Results Review:  Lab Results (last 48 hours)       Procedure Component Value Units Date/Time    Haptoglobin [[id number]] Collected: 05/23/23 0109    Specimen: Blood Updated: 05/24/23 0947    Protime-INR [[id number]]  (Normal) Collected: 05/24/23 0844    Specimen: Blood Updated: 05/24/23 0902     Protime 13.8 Seconds      INR 1.04    aPTT [[id number]]  (Normal) Collected: 05/24/23 0844    Specimen: Blood Updated: 05/24/23 0902     PTT 30.1 seconds     Fibrinogen [[id number]]  (Normal) Collected: 05/24/23 0844    Specimen: Blood Updated: 05/24/23 0902     Fibrinogen 294 mg/dL     Peripheral Blood Smear [[id number]] Collected: 05/23/23 0230    Specimen: Blood Updated: 05/24/23 0837     Performed by: Kamilah HERNANDEZ  MD Jero     Pathologist Interpretation --     Moderate macrocytic anemia  Red blood cell agglutination    Normal total white blood cell count with the following manual differential:  Neutrophils 48%  Bands 1%  Lymphocytes 38%  Monocytes 8%  Eosinophils 3%  Basophils 1%  Myelocytes 1%  Severe peripheral thrombocytopenia    No schistocytes identified  No platelet clumps identified  No morulae identified in granulocytes or monocytes    Blood Culture - Blood, Arm, Left [[id number]]  (Normal) Collected: 05/23/23 0548    Specimen: Blood from Arm, Left Updated: 05/24/23 0731     Blood Culture No growth at 24 hours    Blood Culture - Blood, Arm, Left [[id number]]  (Normal) Collected: 05/23/23 0548    Specimen: Blood from Arm, Left Updated: 05/24/23 0731     Blood Culture No growth at 24 hours    Manual Differential [[id number]]  (Abnormal) Collected: 05/24/23 0254    Specimen: Blood Updated: 05/24/23 0423     Neutrophil % 51.0 %      Lymphocyte % 37.0 %      Monocyte % 9.0 %      Eosinophil % 3.0 %      Neutrophils Absolute 1.48 10*3/mm3      Lymphocytes Absolute 1.08 10*3/mm3      Monocytes Absolute 0.26 10*3/mm3      Eosinophils Absolute 0.09 10*3/mm3      Anisocytosis Slight/1+     Hypochromia Slight/1+     Poikilocytes Slight/1+     Polychromasia Slight/1+     Target Cells Slight/1+     WBC Morphology Normal     Platelet Estimate Decreased    CBC & Differential [[id number]]  (Abnormal) Collected: 05/24/23 0254    Specimen: Blood Updated: 05/24/23 0423    Narrative:      The following orders were created for panel order CBC & Differential.  Procedure                               Abnormality         Status                     ---------                               -----------         ------                     CBC Auto Differential[[id number]]        Abnormal            Final result                 Please view results for these tests on the individual orders.    CBC Auto Differential [[id number]]  (Abnormal) Collected:  05/24/23 0254    Specimen: Blood Updated: 05/24/23 0423     WBC 2.91 10*3/mm3      RBC 2.36 10*6/mm3      Hemoglobin 7.7 g/dL      Hematocrit 23.3 %      MCV 98.7 fL      MCH 32.6 pg      MCHC 33.0 g/dL      RDW 19.3 %      RDW-SD 53.3 fl      MPV 12.7 fL      Platelets 13 10*3/mm3     Narrative:      Rec platelets    Comprehensive Metabolic Panel [[id number]]  (Abnormal) Collected: 05/24/23 0254    Specimen: Blood Updated: 05/24/23 0327     Glucose 98 mg/dL      BUN 11 mg/dL      Creatinine 0.63 mg/dL      Sodium 140 mmol/L      Potassium 3.8 mmol/L      Chloride 104 mmol/L      CO2 26.0 mmol/L      Calcium 8.4 mg/dL      Total Protein 5.6 g/dL      Albumin 3.2 g/dL      ALT (SGPT) 8 U/L      AST (SGOT) 28 U/L      Alkaline Phosphatase 130 U/L      Total Bilirubin 1.4 mg/dL      Globulin 2.4 gm/dL      A/G Ratio 1.3 g/dL      BUN/Creatinine Ratio 17.5     Anion Gap 10.0 mmol/L      eGFR 93.2 mL/min/1.73     Narrative:      GFR Normal >60  Chronic Kidney Disease <60  Kidney Failure <15    The GFR formula is only valid for adults with stable renal function between ages 18 and 70.    TSH [[id number]]  (Abnormal) Collected: 05/23/23 1910    Specimen: Blood Updated: 05/23/23 2024     TSH 6.190 uIU/mL     Bilirubin, Total & Direct [[id number]]  (Abnormal) Collected: 05/23/23 1910    Specimen: Blood Updated: 05/23/23 2017     Total Bilirubin 1.7 mg/dL      Bilirubin, Direct 0.7 mg/dL      Bilirubin, Indirect 1.0 mg/dL     Reticulocytes [[id number]]  (Abnormal) Collected: 05/23/23 1710    Specimen: Blood Updated: 05/23/23 2006     Reticulocyte % 6.16 %      Reticulocyte Absolute 0.1546 10*6/mm3     CK [[id number]]  (Normal) Collected: 05/23/23 1910    Specimen: Blood Updated: 05/1948     Creatine Kinase 65 U/L     Fibrin Split Products [[id number]]  (Abnormal) Collected: 05/23/23 1710    Specimen: Blood Updated: 05/23/23 182     Fibrin Split Products Greater than or equal to 5, but less than 20 mcg/mL    Manual  Differential [[id number]]  (Abnormal) Collected: 05/23/23 1710    Specimen: Blood Updated: 05/23/23 1815     Neutrophil % 61.4 %      Lymphocyte % 29.5 %      Monocyte % 8.0 %      Basophil % 1.1 %      Neutrophils Absolute 1.89 10*3/mm3      Lymphocytes Absolute 0.91 10*3/mm3      Monocytes Absolute 0.25 10*3/mm3      Basophils Absolute 0.03 10*3/mm3      nRBC 3.4 /100 WBC      Anisocytosis Slight/1+     Polychromasia Slight/1+     Target Cells Slight/1+     WBC Morphology Normal     Platelet Estimate Decreased    CBC & Differential [[id number]]  (Abnormal) Collected: 05/23/23 1710    Specimen: Blood Updated: 05/23/23 1815    Narrative:      The following orders were created for panel order CBC & Differential.  Procedure                               Abnormality         Status                     ---------                               -----------         ------                     CBC Auto Differential[[id number]]        Abnormal            Final result                 Please view results for these tests on the individual orders.    CBC Auto Differential [[id number]]  (Abnormal) Collected: 05/23/23 1710    Specimen: Blood Updated: 05/23/23 1815     WBC 3.08 10*3/mm3      RBC 2.57 10*6/mm3      Hemoglobin 7.4 g/dL      Hematocrit 23.4 %      MCV 91.1 fL      MCH 28.8 pg      MCHC 31.6 g/dL      RDW 15.4 %      RDW-SD 50.4 fl      MPV 10.1 fL      Platelets 2 10*3/mm3     aPTT [[id number]]  (Normal) Collected: 05/23/23 1710    Specimen: Blood Updated: 05/23/23 1741     PTT 32.6 seconds     Protime-INR [[id number]]  (Abnormal) Collected: 05/23/23 1710    Specimen: Blood Updated: 05/23/23 1741     Protime 15.1 Seconds      INR 1.17    Fibrinogen [[id number]]  (Normal) Collected: 05/23/23 1710    Specimen: Blood Updated: 05/23/23 1741     Fibrinogen 258 mg/dL     Platelet Antibody Profile [[id number]] Collected: 05/23/23 1710    Specimen: Blood Updated: 05/23/23 1717    [id number] Activity [[id number]] Collected: 05/23/23 1710     Specimen: Blood Updated: 05/23/23 1717    Lactate Dehydrogenase [[id number]]  (Abnormal) Collected: 05/23/23 0109    Specimen: Blood Updated: 05/23/23 1649      U/L     Lactic Acid, Plasma [[id number]]  (Normal) Collected: 05/23/23 0615    Specimen: Blood Updated: 05/23/23 0643     Lactate 1.8 mmol/L     Magnesium [[id number]]  (Normal) Collected: 05/23/23 0109    Specimen: Blood Updated: 05/23/23 0543     Magnesium 1.7 mg/dL     Urinalysis, Microscopic Only - Urine, Clean Catch [[id number]]  (Abnormal) Collected: 05/23/23 0404    Specimen: Urine, Clean Catch Updated: 05/23/23 0438     RBC, UA 21-30 /HPF      WBC, UA 31-50 /HPF      Bacteria, UA 2+ /HPF      Squamous Epithelial Cells, UA None Seen /HPF      Hyaline Casts, UA 3-6 /LPF      Methodology Automated Microscopy    Urinalysis With Culture If Indicated - Urine, Clean Catch [[id number]]  (Abnormal) Collected: 05/23/23 0404    Specimen: Urine, Clean Catch Updated: 05/23/23 0438     Color, UA Dark Yellow     Appearance, UA Clear     pH, UA 6.0     Specific Gravity, UA 1.014     Glucose, UA Negative     Ketones, UA Negative     Bilirubin, UA Negative     Blood, UA Moderate (2+)     Protein, UA Negative     Leuk Esterase, UA Large (3+)     Nitrite, UA Negative     Urobilinogen, UA 1.0 E.U./dL    Narrative:      In absence of clinical symptoms, the presence of pyuria, bacteria, and/or nitrites on the urinalysis result does not correlate with infection.    CBC & Differential [[id number]]  (Abnormal) Collected: 05/23/23 0230    Specimen: Blood Updated: 05/23/23 0352    Narrative:      The following orders were created for panel order CBC & Differential.  Procedure                               Abnormality         Status                     ---------                               -----------         ------                     CBC Auto Differential[[id number]]        Abnormal            Final result               Scan Slide[[id number]]                                        Final result                 Please view results for these tests on the individual orders.    Scan Slide [[id number]] Collected: 05/23/23 0230    Specimen: Blood Updated: 05/23/23 0352     Polychromasia Mod/2+     WBC Morphology Normal     Platelet Estimate Decreased    CBC Auto Differential [[id number]]  (Abnormal) Collected: 05/23/23 0230    Specimen: Blood Updated: 05/23/23 0313     WBC 7.16 10*3/mm3      RBC 2.40 10*6/mm3      Hemoglobin 7.1 g/dL      Hematocrit 23.3 %      MCV 97.1 fL      MCH 29.6 pg      MCHC 30.5 g/dL      RDW 15.3 %      RDW-SD 52.4 fl      MPV 10.7 fL      Platelets 5 10*3/mm3      Neutrophil % 44.5 %      Lymphocyte % 38.1 %      Monocyte % 13.0 %      Eosinophil % 2.9 %      Basophil % 0.7 %      Neutrophils, Absolute 3.18 10*3/mm3      Lymphocytes, Absolute 2.73 10*3/mm3      Monocytes, Absolute 0.93 10*3/mm3      Eosinophils, Absolute 0.21 10*3/mm3      Basophils, Absolute 0.05 10*3/mm3     [place] Draw [[id number]] Collected: 05/23/23 0109    Specimen: Blood Updated: 05/23/23 0215    Narrative:      The following orders were created for panel order [place] Draw.  Procedure                               Abnormality         Status                     ---------                               -----------         ------                     Green Top (Gel)[[id number]]                                  Final result               Lavender Top[[id number]]                                     Final result               Red Top[[id number]]                                          Final result               Light Blue Top[[id number]]                                   Final result                 Please view results for these tests on the individual orders.    Green Top (Gel) [[id number]] Collected: 05/23/23 0109    Specimen: Blood Updated: 05/23/23 0215     Extra Tube Hold for add-ons.     Comment: Auto resulted.       Red Top [[id number]] Collected: 05/23/23 0109    Specimen: Blood Updated: 05/23/23  0215     Extra Tube Hold for add-ons.     Comment: Auto resulted.       Light Blue Top [[id number]] Collected: 05/23/23 0109    Specimen: Blood Updated: 05/23/23 0215     Extra Tube Hold for add-ons.     Comment: Auto resulted       Lavender Top [[id number]] Collected: 05/23/23 0109    Specimen: Blood Updated: 05/23/23 0215     Extra Tube hold for add-on     Comment: Auto resulted       Comprehensive Metabolic Panel [[id number]]  (Abnormal) Collected: 05/23/23 0109    Specimen: Blood Updated: 05/23/23 0145     Glucose 126 mg/dL      BUN 14 mg/dL      Creatinine 0.84 mg/dL      Sodium 136 mmol/L      Potassium 3.0 mmol/L      Chloride 100 mmol/L      CO2 25.0 mmol/L      Calcium 8.7 mg/dL      Total Protein 5.8 g/dL      Albumin 3.3 g/dL      ALT (SGPT) 10 U/L      AST (SGOT) 28 U/L      Alkaline Phosphatase 147 U/L      Total Bilirubin 1.5 mg/dL      Globulin 2.5 gm/dL      A/G Ratio 1.3 g/dL      BUN/Creatinine Ratio 16.7     Anion Gap 11.0 mmol/L      eGFR 73.0 mL/min/1.73     Narrative:      GFR Normal >60  Chronic Kidney Disease <60  Kidney Failure <15    The GFR formula is only valid for adults with stable renal function between ages 18 and 70.    BNP [[id number]]  (Normal) Collected: 05/23/23 0109    Specimen: Blood Updated: 05/23/23 0142     proBNP 300.2 pg/mL     Narrative:      Among patients with dyspnea, NT-proBNP is highly sensitive for the detection of acute congestive heart failure. In addition NT-proBNP of <300 pg/ml effectively rules out acute congestive heart failure with 99% negative predictive value.    Results may be falsely decreased if patient taking Biotin.      Single High Sensitivity Troponin T [[id number]]  (Abnormal) Collected: 05/23/23 0109    Specimen: Blood Updated: 05/23/23 0141     HS Troponin T 39 ng/L     Narrative:      High Sensitive Troponin T Reference Range:  <10.0 ng/L- Negative Female for AMI  <15.0 ng/L- Negative Male for AMI  >=10 - Abnormal Female indicating possible myocardial  injury.  >=15 - Abnormal Male indicating possible myocardial injury.   Clinicians would have to utilize clinical acumen, EKG, Troponin, and serial changes to determine if it is an Acute Myocardial Infarction or myocardial injury due to an underlying chronic condition.               Imaging Results (Last 72 Hours)       Procedure Component Value Units Date/Time    XR Chest 2 View [[id number]] Collected: 05/23/23 0654     Updated: 05/23/23 0658    Narrative:      EXAM/TECHNIQUE: XR CHEST 2 VW-     INDICATION: Shortness of air     COMPARISON: 7/5/2022     FINDINGS:     Enlarged but stable cardiac silhouette. No pleural effusion,  pneumothorax, or focal consolidation. No acute osseous finding. LEFT  shoulder arthroplasty without evidence of complication. Partially imaged  postoperative change in the lower cervical spine.       Impression:         No acute findings.  This report was finalized on 05/23/2023 06:55 by Dr. Bryce Mancera MD.               ASSESSMENT/PLAN       Examination and evaluation of wound(s) was performed.    An excisional tissue debridement was completed of the 4 wounds located on the left anterior lower leg with a total area of 16.05cm². The following instruments were used: curette. Pain control was achieved using lidocaine 4% topical solution. Material removed includes slough, subcutaneous tissue and scab.  Tissue was collected for tissue culture. A minimal amount of bleeding was controlled with pressure. The procedure was tolerated well with a pain level of 0 prior to procedure and a pain level of 0 following the procedure. Post debridement measurements: Proximal medial wound 1cm x 1.9cm x 1.1 cm with undermining from 8 to 11 reaching 6.6cm at 9 o'clock; Proximal lateral wound 1.9cm x 0.5cm x 0.1cm; distal medial wound 1.6cm x 7.5cm x 0.1cm; distal lateral wound 1.2cm x 1cm x 0.1cm. Tissue exposed post debridement is subcutaneous tissue.     DIAGNOSIS:   Laceration of left lower extremity  Open  "wound of left lower leg with fat layer exposed  Lymphedema of both lower extremities  Obesity - Body mass index is 44.99 kg/m².      PLAN:   Orders placed for wound care.      Suggest patient to follow up at outpatient wound care after discharge.     Suggest lymphedema therapy after discharge.  Referrals placed.         Start     Ordered    05/24/23 1116  Wound Care  Every 12 Hours        Question Answer Comment   Wound Locations Left medial lower leg    Wound Care Instructions Clean with NS.  Apply 1/4\" iodoform to wound loosely filling undermining area without packing tightly.  Wrap with Kerlix.    Cleanse Normal Saline    Intervention Iodoform Packing Strips    Dressing: Gauze Roll        05/24/23 1115    05/24/23 1116  Compression - Not to be Used for VTE Prophylaxis  Every 12 Hours        Question Answer Comment   Wound Locations LLE    Wound Care Instructions Wrap LLE from toes to knee with ACE wrap for light compression    Compression Device ACE Wrap        05/24/23 1115    05/24/23 1113  Wound Care  Every 12 Hours        Question Answer Comment   Wound Locations left proximal lateral lower leg; left distal medial lower leg; left distal lateral lower leg    Wound Care Instructions Clean with NS.  Apply Opticell Ag to wound bed.  Wrap with Kerlix.  Change every 12 hours.    Cleanse Normal Saline    Intervention Other    Other Opticell Ag    Moistened? No    Dressing: Gauze Roll        05/24/23 1112    05/24/23 1113  Elevate Extremity  Continuous        Comments: Elevate BLE above heart level when possible.   Question:  Elevate Extremity  Answer:  BLE    05/24/23 1112    05/24/23 0000  Ambulatory Referral to Wound Clinic         05/24/23 1122    05/24/23 0000  Ambulatory Referral to Lymphedema Clinic        Question:  Service Requested:  Answer:  Either PT or OT    05/24/23 1122                     Discussed findings and treatment plan including risks, benefits, and treatment options with patient in detail. " Patient agreed with treatment plan.      This document has been electronically signed by GARETT Garcia on 5/24/2023 10:07 CDT

## 2023-05-24 NOTE — CONSULTS
MEDICAL ONCOLOGY CONSULTATION    Pt Name: Brenda Sneed  MRN: [id number]  [id number]  YOB: 1950  Date of evaluation: 5/23/2023    Reason for Consultation: Anemia and thrombocytopenia/pancytopenia    Requesting Physician: Dr. Tristen Block    History Obtained From: The patient and EMR as well as office records     HISTORY OF PRESENT ILLNESS:    Brenda Sneed is a 72-year-old  female admitted to [facility] through the ED on 5/23/2023 by Dr. Tristen Block with a several week history of increasing dyspnea on exertion, difficulty ambulating across the room in her house even accomplishing activities of daily living such as going to the bathroom etc.  When evaluated in the ED she is found to have anemia and thrombocytopenia.    CBC on 5/23/2023 had a WBC of 7.16 with 44.5% segmented neutrophils, 38.1% lymphocytes, 13% monocytes, 2.9% eosinophils and 0.7% basophils.  Hemoglobin and hematocrit were 7.1 and 23.3 respectively with a platelet count of 5000.      Significant comorbid associated medical problems include:  Morbid obesity  Significant bilateral lower extremity chronic venous stasis, massive edema and bilateral lower extremity cellulitis L>R, hypertension, arthritis, thyroid disease, anxiety/depression    Brenda was initially evaluated by Iram BAJWA on 5/19/2023 regarding thrombocytopenia.    HEMATOLOGICAL HISTORY FROM OFFICE RECORDS  Brenda was initially evaluated by Iram BAJWA on 5/19/2023 referred to the clinic by Dr. Dia for evaluation of Anemia and thrombocytopenia.     Review of prior CBCs at [facility] reveals chronic, intermittent anemia dating to 4/2015.  Chronic thrombocytopenia has been present dating to at least 7/2016 with platelet count ranging from 69,000 -136,000.     Brenda underwent laceration repair of her left leg by Dr. Sarita Vazquez 4/7/2023, status post fall with leg injury.  She was also treated for cellulitis with IV Vancomycin and Zosyn.  She underwent  washout.  Cellulitis subsequently resolved.  She was discharged with dry dressing changes and oral Keflex x7 days     CBC 4/28/2023: WBC: 5.6, Hgb: 10.6, MCV: 96, PLT: 65,000      Labs 5/12/2023  CBC: WBC 6.0, Hgb 9.4/MCV 98, platelets 102,000  CMP: Creatinine 0.81, GFR 76, calcium 8.8, total protein 5.7, alkaline phosphatase 138 ()  BNP: 72.2 (0-100) CRP: 21 (0-10)     Brenda denied melena or hematochezia.  Recent renal function was normal.  She denies overt bleeding, though has significant, scattered bruising throughout her body which is clearly visible on exposed areas during exam.    She denies regular alcohol use.  She drank a few margaritas for couple years in her early 40s but none since.  She has no known history of liver disease.  She is a never smoker     Serology 5/19/2023   Serum Fe 32  TIBC - 280  Fe sat - 11%  Ferritin - 83.1     B12 = 1,168  Folate - 6.5  Copper in process  Zinc in process     Hapto - 70  LDH - 312     Hep A, B, C - nonreactive  HIV - Nonreactive     SPEP in process  QI in process  Free serum light chain in process    ASSESSMENT and PLAN developed on 5/19/2023 hematology consultation visit:  She take ibuprofen 2-3 times per day, in addition to Cymbalta daily which can increase bleeding and can be exacerbated in the setting of thrombocytopenia.     Possible causes of chronic anemia and thrombocytopenia discussed.   Baseline serology requested. Obtain occult blood stools.  Can not rule out underlying bone marrow disorder.    Chronic anemia. Check anemia labs  Check stool for OB  Chronic thrombocytopenia  Check labs as noted below, include PBS  Obtain US liver/spleen re: elevated bilirubin (1.7 on 4/18/2023) and spleen size re: thrombocytopenia  If unrevealing, will need to consider BMAB, which was discussed   Suspect bruising exacerbated by Ibuprofen/Cymbalta in a setting of thrombocytopenia  Suspect platelets recently worsened r/t infection/medication (vancomycin);       Past  Medical History:    Past Medical History:   Diagnosis Date    Arthritis     Bronchitis     Cataract     right eye , cornea implant    Chronic back pain     Depression     Disease of thyroid gland     Edema     lower extremities    Hypertension     Kidney stones        Past Surgical History:    Past Surgical History:   Procedure Laterality Date    BACK SURGERY      2010 ([facility]), 2013     CHOLECYSTECTOMY OPEN      CORNEAL TRANSPLANT Right 1999    INCISION AND DRAINAGE ABSCESS Left 4/19/2023    Procedure: INCISION AND DRAINAGE ABSCESS left leg;  Surgeon: Sarita Vazquez MD;  Location:  [facility] OR;  Service: General;  Laterality: Left;    LACERATION REPAIR Left 4/7/2023    Procedure: washout and closure left leg wound ;  Surgeon: Sarita Vazquez MD;  Location:  [facility] OR;  Service: General;  Laterality: Left;    LUMBAR FUSION Left 8/2/2017    Procedure: LEFT LUMBAR LATERAL INTERBODY FUSION WITH INSTRUMENTATION  L1-2;  Surgeon: MARCELINO Wright MD;  Location:  [facility] OR;  Service:     NECK SURGERY  2010    [facility]    TOTAL HIP ARTHROPLASTY Right 2014    DR. KENDRICK     TOTAL KNEE ARTHROPLASTY Right 2000    TOTAL KNEE ARTHROPLASTY Left 2002    TOTAL SHOULDER ARTHROPLASTY W/ DISTAL CLAVICLE EXCISION Left 6/1/2020    Procedure: LEFT REVERSE TOTAL SHOULDER REPLACEMENT;  Surgeon: Saravanan Calvin MD;  Location:  [facility] OR;  Service: Orthopedics;  Laterality: Left;       Current Hospital Medications:      Current Facility-Administered Medications:     acetaminophen (TYLENOL) tablet 650 mg, 650 mg, Oral, Q4H PRN, Sanjiv Block MD    sennosides-docusate (PERICOLACE) 8.6-50 MG per tablet 2 tablet, 2 tablet, Oral, BID, 2 tablet at 05/23/23 1819 **AND** polyethylene glycol (MIRALAX) packet 17 g, 17 g, Oral, Daily PRN **AND** bisacodyl (DULCOLAX) EC tablet 5 mg, 5 mg, Oral, Daily PRN **AND** bisacodyl (DULCOLAX) suppository 10 mg, 10 mg, Rectal, Daily PRN, Sanjiv Block MD    calcium carbonate (TUMS) chewable tablet  "500 mg (200 mg elemental), 2 tablet, Oral, TID PRN, Sanjiv Block MD    [START ON 5/24/2023] cefTRIAXone (ROCEPHIN) 2 g in sodium chloride 0.9 % 100 mL IVPB, 2 g, Intravenous, Once, Sanjiv Block MD    melatonin tablet 5 mg, 5 mg, Oral, Nightly PRN, Sanjiv Block MD    ondansetron (ZOFRAN) injection 4 mg, 4 mg, Intravenous, Q6H PRN, Sanjiv Block MD    oxyCODONE-acetaminophen (PERCOCET) 5-325 MG per tablet 1 tablet, 1 tablet, Oral, Q4H PRN, Sanjiv Block MD, 1 tablet at 05/23/23 1819    sodium chloride 0.9 % flush 10 mL, 10 mL, Intravenous, Q12H, Sanjiv Block MD    sodium chloride 0.9 % flush 10 mL, 10 mL, Intravenous, PRN, Sanjiv Block MD    sodium chloride 0.9 % infusion 40 mL, 40 mL, Intravenous, PRN, Sanjiv Block MD    Allergies:   Allergies   Allergen Reactions    Codeine Itching and Rash     SEVERE RASH/ITCHING (TOLERATES PERCOCET)    Benadryl [Diphenhydramine] Other (See Comments)     KEEPS PATIENT AWAKE        Social History:    Social History     Socioeconomic History    Marital status:    Tobacco Use    Smoking status: Never    Smokeless tobacco: Never   Vaping Use    Vaping Use: Never used   Substance and Sexual Activity    Alcohol use: No    Drug use: No    Sexual activity: Defer       Family History:   Family History   Problem Relation Age of Onset    Breast cancer Neg Hx        REVIEW OF SYSTEMS:    As outlined in the HPI and otherwise unremarkable in not pertinent to the hematological issues.    Vitals:    /72   Pulse 60   Temp 97.9 °F (36.6 °C)   Resp 12   Ht 160 cm (63\")   Wt 113 kg (250 lb)   SpO2 100%   BMI 44.29 kg/m²     PHYSICAL EXAM:    CONSTITUTIONAL: Alert, appropriate, weak and tired, morbid obesity  EYES: Nonicteric, EOM intact, pupils equal round   ENT: Mucous membranes moist, no oropharyngeal lesions   NECK: Supple, no masses   CHEST/LUNGS: CTA bilaterally, normal respiratory effort, without dyspnea at " rest  CARDIOVASCULAR: RRR, no murmurs  ABDOMEN: soft non-tender, active bowel sounds, no HSM  EXTREMITIES: Morbid obesity with chronic venous stasis changes of the lower extremities with massive edema and cellulitis, scrapes on the left leg with scabs and erythema consistent with cellulitis  SKIN: Morbid obesity with chronic venous stasis changes of the lower extremities with massive edema and cellulitis, scrapes on the left leg with scabs and erythema consistent with cellulitis  LYMPH: No obvious cervical, clavicular, axillary, or inguinal lymphadenopathy, difficult to palpate due to morbid obesity  NEUROLOGIC: follows commands, mental status appears to be intact, she does not appear to have focal findings but neurological exam not performed in detail  PSYCH: mood and affect appropriate    CBC  Results from last 7 days   Lab Units 05/23/23  1710 05/23/23  0230   WBC 10*3/mm3 3.08* 7.16   HEMOGLOBIN g/dL 7.4* 7.1*   HEMATOCRIT % 23.4* 23.3*   PLATELETS 10*3/mm3 2* 5*         Lab Results   Component Value Date     05/23/2023    K 3.0 (L) 05/23/2023     05/23/2023    CO2 25.0 05/23/2023    BUN 14 05/23/2023    CREATININE 0.84 05/23/2023    GLUCOSE 126 (H) 05/23/2023    CALCIUM 8.7 05/23/2023    BILITOT 1.5 (H) 05/23/2023    ALKPHOS 147 (H) 05/23/2023    AST 28 05/23/2023    ALT 10 05/23/2023    AGRATIO 1.3 05/23/2023    GLOB 2.5 05/23/2023       Lab Results   Component Value Date    INR 1.17 (H) 05/23/2023    INR 1.14 (H) 04/18/2023    INR 1.07 04/07/2022    PROTIME 15.1 (H) 05/23/2023    PROTIME 14.7 04/18/2023    PROTIME 13.5 04/07/2022       Cultures:    Lab Results   Component Value Date    BLOODCX No growth at 5 days 05/04/2022    BLOODCX No growth at 5 days 05/04/2022     No components found for: URINCX    ASSESSMENT/PLAN:    Brenda Sneed is a 72-year-old  female admitted to [facility] through the ED on 5/23/2023 by Dr. Tristen Block with a several week history of increasing dyspnea on exertion,  difficulty ambulating across the room in her house even accomplishing activities of daily living such as going to the bathroom etc.  When evaluated in the ED she is found to have anemia and thrombocytopenia.    CBC on 5/23/2023 had a WBC of 7.16 with 44.5% segmented neutrophils, 38.1% lymphocytes, 13% monocytes, 2.9% eosinophils and 0.7% basophils.  Hemoglobin and hematocrit were 7.1 and 23.3 respectively with a platelet count of 5000.      Significant comorbid associated medical problems include:  Morbid obesity  Significant bilateral lower extremity chronic venous stasis, massive edema and bilateral lower extremity cellulitis L>R, hypertension, arthritis, thyroid disease, anxiety/depression    Brenda was initially evaluated by Iram BAJWA on 5/19/2023 regarding thrombocytopenia.    HEMATOLOGICAL HISTORY FROM OFFICE RECORDS  Brenda was initially evaluated by Iram BAJWA on 5/19/2023 referred to the clinic by Dr. Dia for evaluation of Anemia and thrombocytopenia.      Brenda denied melena or hematochezia.  Recent renal function was normal.  She denies overt bleeding, though has significant, scattered bruising throughout her body which is clearly visible on exposed areas during exam.    She denies regular alcohol use.  She drank a few margaritas for couple years in her early 40s but none since.  She has no known history of liver disease.  She is a never smoker     Serology 5/19/2023   Serum Fe 32  TIBC - 280  Fe sat - 11%  Ferritin - 83.1     B12 = 1,168  Folate - 6.5  Copper in process  Zinc in process     Hapto - 70  LDH - 312     Hep A, B, C - nonreactive  HIV - Nonreactive     SPEP in process  QI in process  Free serum light chain in process    ASSESSMENT and PLAN developed on 5/19/2023 hematology consultation visit:  She take ibuprofen 2-3 times per day, in addition to Cymbalta daily which can increase bleeding and can be exacerbated in the setting of thrombocytopenia.     Possible causes of  chronic anemia and thrombocytopenia discussed.   Baseline serology requested. Obtain occult blood stools.  Can not rule out underlying bone marrow disorder.    Chronic anemia. Check anemia labs  Check stool for OB  Chronic thrombocytopenia  Check labs as noted below, include PBS  Obtain US liver/spleen re: elevated bilirubin (1.7 on 4/18/2023) and spleen size re: thrombocytopenia  If unrevealing, will need to consider BMAB, which was discussed   Suspect bruising exacerbated by Ibuprofen/Cymbalta in a setting of thrombocytopenia  Suspect platelets recently worsened r/t infection/medication (vancomycin);       Review of prior CBCs at [facility] reveals chronic, intermittent anemia dating to 4/2015.  Chronic thrombocytopenia has been present dating to at least 7/2016 with platelet count ranging from 69,000 -136,000.     Brenda underwent laceration repair of her left leg by Dr. Sarita Vazquez 4/7/2023, status post fall with leg injury.  She was also treated for cellulitis with IV Vancomycin and Zosyn.  She underwent washout.  Cellulitis subsequently resolved.  She was discharged with dry dressing changes and oral Keflex x7 days     CBC 4/28/2023: WBC: 5.6, Hgb: 10.6, MCV: 96, PLT: 65,000      Labs 5/12/2023  CBC: WBC 6.0, Hgb 9.4/MCV 98, platelets 102,000  CMP: Creatinine 0.81, GFR 76, calcium 8.8, total protein 5.7, alkaline phosphatase 138 ()  BNP: 72.2 (0-100) CRP: 21 (0-10)    CBC w/diff          4/18/2023    18:00 4/21/2023    05:54 5/23/2023    02:30 5/23/2023    17:10   CBC w/Diff   WBC 5.57   4.59   7.16   3.08     RBC 2.51   2.78   2.40   2.57     Hemoglobin 9.0   8.6   7.1   7.4     Hematocrit 25.3   27.5   23.3   23.4     .8   98.9   97.1   91.1     MCH 35.9   30.9   29.6   28.8     MCHC 35.6   31.3   30.5   31.6     RDW 14.9   15.1   15.3   15.4     Platelets 104   112   5   2     Neutrophil Rel % 67.8    44.5      Immature Granulocyte Rel % 0.4        Lymphocyte Rel % 20.1    38.1      Monocyte Rel %  10.8    13.0      Eosinophil Rel % 0.7    2.9      Basophil Rel % 0.2    0.7        CMP reveals a normal creatinine of 0.84  Lactate - 1.8 WNL  BNP - 300.2 - WNL     Her admitting CBC is significantly different than it was on 5/12/2023.  Hgb has dropped to 7.1, platelet is down to 5000.  She is currently in the coronary care unit-room 9.  She is status post 1 unit packed red blood cell and 1 pheresis of platelets.  No active bleeding at present per nursing.    Brenda has a chronic pancytopenia dating back many years.  This could be due to hypersplenism secondary to fatty liver disease or a primary bone marrow disorder.  I believe that she has had an exacerbation of the mild pancytopenia resulting in the current hospitalization with worsening anemia and profound thrombocytopenia.    Further serology has been requested as outlined below:  Antiplatelet antibody   DIC screen  CPK LDH, reticulocyte count and haptoglobin, Bry direct and indirect, direct and indirect bili, TSH  Peripheral blood smear to evaluate for schistocytes  AdamTS 13 to [place]  Monitor for bleeding  FOBT  2D Echo to evaluate cardiac status and rule out vegetations    Differential diagnosis of the current situation could include:   DIC, hemolytic anemia, ITP, TTP, infection (cellulitis, UTI) with bone marrow myelosuppression     REC:  Transfuse 1 unit of PRBCs for hemoglobin <7.0  Transfuse 1 unit of platelets for platelet count , 10,000  Further recommendations to follow awaiting results of above      Morales Cantu MD    05/23/23  19:00 CDT

## 2023-05-24 NOTE — PLAN OF CARE
Patient is A&O X4  and follows commands. Patient received one unit of platelets this shift. Patient has remained NS and BP has been stable. Complaints of pain this shift Percocet X3 was administered. Patient denied scheduled turns please see other note. Afebrile and patient safety maintained.       Problem: Anemia  Goal: Anemia Symptom Improvement  5/24/2023 0626 by Alejandro Dunne RN  Outcome: Ongoing, Progressing  5/24/2023 0609 by Alejandro Dunne RN  Outcome: Ongoing, Progressing  Intervention: Monitor and Manage Anemia  Recent Flowsheet Documentation  Taken 5/24/2023 0502 by Alejandro Dunne RN  Safety Promotion/Fall Prevention: safety round/check completed  Taken 5/24/2023 0405 by Alejandro Dunne RN  Safety Promotion/Fall Prevention: safety round/check completed  Taken 5/24/2023 0302 by Alejandro Dunne RN  Safety Promotion/Fall Prevention: safety round/check completed  Taken 5/24/2023 0205 by Alejandro Dunne RN  Safety Promotion/Fall Prevention: safety round/check completed  Taken 5/24/2023 0105 by Alejandro Dunne RN  Safety Promotion/Fall Prevention: safety round/check completed  Taken 5/24/2023 0005 by Alejandro Dunne RN  Safety Promotion/Fall Prevention: safety round/check completed  Taken 5/23/2023 2302 by Alejandro Dunne RN  Safety Promotion/Fall Prevention: safety round/check completed  Taken 5/23/2023 2202 by Alejandro Dunne RN  Safety Promotion/Fall Prevention: safety round/check completed  Taken 5/23/2023 2102 by Alejandro Dunne RN  Safety Promotion/Fall Prevention: safety round/check completed  Taken 5/23/2023 2005 by Alejandro Dunne RN  Safety Promotion/Fall Prevention: safety round/check completed  Taken 5/23/2023 1901 by Alejandro Dunne RN  Safety Promotion/Fall Prevention: safety round/check completed     Problem: Skin Injury Risk Increased  Goal: Skin Health and Integrity  5/24/2023 0626 by Alejandro Dunne RN  Outcome: Ongoing, Progressing  5/24/2023 0609  by Alejandro Dunne RN  Outcome: Ongoing, Progressing  Intervention: Optimize Skin Protection  Recent Flowsheet Documentation  Taken 5/24/2023 0405 by Alejandro Dunne RN  Pressure Reduction Techniques:   weight shift assistance provided   heels elevated off bed  Head of Bed (HOB) Positioning: HOB at 30-45 degrees  Pressure Reduction Devices: pressure-redistributing mattress utilized  Skin Protection:   adhesive use limited   incontinence pads utilized   tubing/devices free from skin contact   skin-to-skin areas padded   skin-to-device areas padded  Taken 5/24/2023 0005 by Alejandro Dunne RN  Pressure Reduction Techniques:   weight shift assistance provided   heels elevated off bed  Head of Bed (HOB) Positioning: HOB at 20-30 degrees  Pressure Reduction Devices: pressure-redistributing mattress utilized  Skin Protection:   adhesive use limited   incontinence pads utilized   tubing/devices free from skin contact   skin-to-skin areas padded   skin-to-device areas padded  Taken 5/23/2023 2202 by Alejandro Dunne RN  Head of Bed (HOB) Positioning: HOB at 30-45 degrees  Taken 5/23/2023 2005 by Alejandro Dunne RN  Pressure Reduction Techniques:   weight shift assistance provided   heels elevated off bed  Head of Bed (HOB) Positioning: HOB at 30-45 degrees  Pressure Reduction Devices: pressure-redistributing mattress utilized  Skin Protection:   adhesive use limited   incontinence pads utilized   tubing/devices free from skin contact   skin-to-skin areas padded   skin-to-device areas padded     Problem: Fall Injury Risk  Goal: Absence of Fall and Fall-Related Injury  5/24/2023 0626 by Alejandro Dunne RN  Outcome: Ongoing, Progressing  5/24/2023 0609 by Alejandro Dunne RN  Outcome: Ongoing, Progressing  Intervention: Promote Injury-Free Environment  Recent Flowsheet Documentation  Taken 5/24/2023 0502 by Alejandro Dunne RN  Safety Promotion/Fall Prevention: safety round/check completed  Taken 5/24/2023  0405 by Alejandro Dunne RN  Safety Promotion/Fall Prevention: safety round/check completed  Taken 5/24/2023 0302 by Alejandro Dunne RN  Safety Promotion/Fall Prevention: safety round/check completed  Taken 5/24/2023 0205 by Alejandro Dunne RN  Safety Promotion/Fall Prevention: safety round/check completed  Taken 5/24/2023 0105 by Alejandro Dunne RN  Safety Promotion/Fall Prevention: safety round/check completed  Taken 5/24/2023 0005 by Alejandro Dunne RN  Safety Promotion/Fall Prevention: safety round/check completed  Taken 5/23/2023 2302 by Alejandro Dunne RN  Safety Promotion/Fall Prevention: safety round/check completed  Taken 5/23/2023 2202 by Alejandro Dunne RN  Safety Promotion/Fall Prevention: safety round/check completed  Taken 5/23/2023 2102 by Alejandro Dunne RN  Safety Promotion/Fall Prevention: safety round/check completed  Taken 5/23/2023 2005 by Alejandro Dunne RN  Safety Promotion/Fall Prevention: safety round/check completed  Taken 5/23/2023 1901 by Alejandro Dunne RN  Safety Promotion/Fall Prevention: safety round/check completed     Problem: Adjustment to Illness (Sepsis/Septic Shock)  Goal: Optimal Coping  5/24/2023 0626 by Alejandro Dunne RN  Outcome: Ongoing, Progressing  5/24/2023 0609 by Alejandro Dunne RN  Outcome: Ongoing, Progressing  Intervention: Optimize Psychosocial Adjustment to Illness  Recent Flowsheet Documentation  Taken 5/24/2023 0405 by Alejandro Dunne RN  Family/Support System Care: support provided  Taken 5/24/2023 0005 by Alejandro Dunne RN  Family/Support System Care: support provided  Taken 5/23/2023 2005 by Alejandro Dunne RN  Family/Support System Care: support provided     Problem: Bleeding (Sepsis/Septic Shock)  Goal: Absence of Bleeding  5/24/2023 0626 by Alejandro Dunne RN  Outcome: Ongoing, Progressing  5/24/2023 0609 by Alejandro Dunne RN  Outcome: Ongoing, Progressing     Problem: Glycemic Control Impaired  (Sepsis/Septic Shock)  Goal: Blood Glucose Level Within Desired Range  5/24/2023 0626 by Alejandro Dunne RN  Outcome: Ongoing, Progressing  5/24/2023 0609 by Alejandro Dunne RN  Outcome: Ongoing, Progressing     Problem: Infection Progression (Sepsis/Septic Shock)  Goal: Absence of Infection Signs and Symptoms  5/24/2023 0626 by Alejandro Dunne RN  Outcome: Ongoing, Progressing  5/24/2023 0609 by Alejandro Dunne RN  Outcome: Ongoing, Progressing  Intervention: Promote Recovery  Recent Flowsheet Documentation  Taken 5/24/2023 0405 by Alejandro Dunne RN  Activity Management: bedrest  Taken 5/24/2023 0205 by Alejandro Dunne RN  Activity Management: bedrest  Taken 5/24/2023 0005 by Alejandro Dunne RN  Activity Management: bedrest  Taken 5/23/2023 2202 by Alejandro Dunne RN  Activity Management: bedrest  Taken 5/23/2023 2005 by Alejandro Dunne RN  Activity Management: bedrest     Problem: Nutrition Impaired (Sepsis/Septic Shock)  Goal: Optimal Nutrition Intake  5/24/2023 0626 by Alejandro Dunne RN  Outcome: Ongoing, Progressing  5/24/2023 0609 by Alejandro Dunne RN  Outcome: Ongoing, Progressing   Goal Outcome Evaluation:

## 2023-05-24 NOTE — PROGRESS NOTES
HEMATOLOGY AND MEDICAL ONCOLOGY PROGRESS NOTE    Patient name: Brenda Sneed  Patient : 1950  VISIT # [id number]  MR #[id number]  Room: CCU 9    SUBJECTIVE:  Resting OK - No bleeding.  Breathing OK on RA    INTERVAL HISTORY:  Brenda Sneed is a 72-year-old  female admitted to [facility] through the ED on 2023 by Dr. Tristen Block with a several week history of increasing dyspnea on exertion, difficulty ambulating across the room in her house even accomplishing activities of daily living such as going to the bathroom etc.  When evaluated in the ED she is found to have anemia and thrombocytopenia.     CBC on 2023 had a WBC of 7.16 with 44.5% segmented neutrophils, 38.1% lymphocytes, 13% monocytes, 2.9% eosinophils and 0.7% basophils.  Hemoglobin and hematocrit were 7.1 and 23.3 respectively with a platelet count of 5000.      Significant comorbid associated medical problems include:  Morbid obesity  Significant bilateral lower extremity chronic venous stasis, massive edema and bilateral lower extremity cellulitis L>R, hypertension, arthritis, thyroid disease, anxiety/depression     Brenda was initially evaluated by Iram BAJWA on 2023 regarding thrombocytopenia.     HEMATOLOGICAL HISTORY FROM OFFICE RECORDS  Brenda was initially evaluated by Iram BAJWA on 2023 referred to the clinic by Dr. Dia for evaluation of Anemia and thrombocytopenia.      Review of prior CBCs at [facility] reveals chronic, intermittent anemia dating to 2015.  Chronic thrombocytopenia has been present dating to at least 2016 with platelet count ranging from 69,000 -136,000.     Brenda underwent laceration repair of her left leg by Dr. Sarita Vazquez 2023, status post fall with leg injury.  She was also treated for cellulitis with IV Vancomycin and Zosyn.  She underwent washout.  Cellulitis subsequently resolved.  She was discharged with dry dressing changes and oral Keflex x7 days      CBC 4/28/2023: WBC: 5.6, Hgb: 10.6, MCV: 96, PLT: 65,000      Labs 5/12/2023  CBC: WBC 6.0, Hgb 9.4/MCV 98, platelets 102,000  CMP: Creatinine 0.81, GFR 76, calcium 8.8, total protein 5.7, alkaline phosphatase 138 ()  BNP: 72.2 (0-100) CRP: 21 (0-10)     Brenda denied melena or hematochezia.  Recent renal function was normal.  She denies overt bleeding, though has significant, scattered bruising throughout her body which is clearly visible on exposed areas during exam.    She denies regular alcohol use.  She drank a few margaritas for couple years in her early 40s but none since.  She has no known history of liver disease.  She is a never smoker     Serology 5/19/2023   Serum Fe 32  TIBC - 280  Fe sat - 11%  Ferritin - 83.1     B12 = 1,168  Folate - 6.5  Copper in process  Zinc in process     Hapto - 70  LDH - 312     Hep A, B, C - nonreactive  HIV - Nonreactive     SPEP in process  QI in process  Free serum light chain in process     ASSESSMENT and PLAN developed on 5/19/2023 hematology consultation visit:  She take ibuprofen 2-3 times per day, in addition to Cymbalta daily which can increase bleeding and can be exacerbated in the setting of thrombocytopenia.     Possible causes of chronic anemia and thrombocytopenia discussed.   Baseline serology requested. Obtain occult blood stools.  Can not rule out underlying bone marrow disorder.     Chronic anemia. Check anemia labs  Check stool for OB  Chronic thrombocytopenia  Check labs as noted below, include PBS  Obtain US liver/spleen re: elevated bilirubin (1.7 on 4/18/2023) and spleen size re: thrombocytopenia  If unrevealing, will need to consider BMAB, which was discussed   Suspect bruising exacerbated by Ibuprofen/Cymbalta in a setting of thrombocytopenia  Suspect platelets recently worsened r/t infection/medication (vancomycin);    OBJECTIVE:      Vitals:    05/24/23 0500   BP: 145/53   Pulse: 64   Resp:    Temp:    SpO2: 97%       Intake/Output Summary  (Last 24 hours) at 5/24/2023 0633  Last data filed at 5/24/2023 0405  Gross per 24 hour   Intake 817 ml   Output 1700 ml   Net -883 ml       PHYSICAL EXAM:  CONSTITUTIONAL: Alert, appropriate, weak and tired, morbid obesity  EYES: Minimally icteric  NECK: Supple, no masses   CHEST/LUNGS: CTA bilaterally, normal respiratory effort  CARDIOVASCULAR: RRR, no murmurs  ABDOMEN: soft non-tender, active bowel sounds, no HSM  EXTREMITIES: Morbid obesity with chronic venous stasis changes of the lower extremities with massive edema and cellulitis, scrapes on the left leg with scabs and erythema consistent with cellulitis  SKIN: venous stasis changes of the lower extremities with massive edema and erythema, scrapes on the left leg with scabs, ecchymosis on arms  LYMPH: No obvious cervical, clavicular, axillary, or inguinal lymphadenopathy, difficult to palpate due to morbid obesity  NEUROLOGIC: follows commands, mental status appears to be intact, she does not appear to have focal findings but neurological exam not performed in detail  PSYCH: mood and affect appropriate      CBC  Results from last 7 days   Lab Units 05/24/23  0254 05/23/23  1710 05/23/23  0230   WBC 10*3/mm3 2.91* 3.08* 7.16   HEMOGLOBIN g/dL 7.7* 7.4* 7.1*   HEMATOCRIT % 23.3* 23.4* 23.3*   PLATELETS 10*3/mm3 13* 2* 5*         Lab Results   Component Value Date     05/24/2023    K 3.8 05/24/2023     05/24/2023    CO2 26.0 05/24/2023    BUN 11 05/24/2023    CREATININE 0.63 05/24/2023    GLUCOSE 98 05/24/2023    CALCIUM 8.4 (L) 05/24/2023    BILITOT 1.4 (H) 05/24/2023    ALKPHOS 130 (H) 05/24/2023    AST 28 05/24/2023    ALT 8 05/24/2023    AGRATIO 1.3 05/24/2023    GLOB 2.4 05/24/2023       Lab Results   Component Value Date    INR 1.17 (H) 05/23/2023    INR 1.14 (H) 04/18/2023    INR 1.07 04/07/2022    PROTIME 15.1 (H) 05/23/2023    PROTIME 14.7 04/18/2023    PROTIME 13.5 04/07/2022       Cultures:    Lab Results   Component Value Date    BLOODCX  No growth at 5 days 05/04/2022    BLOODCX No growth at 5 days 05/04/2022     No components found for: URINCX    ASSESSMENT/PLAN:  #1  Pancytopenia - Severe anemia and thrombocytopenia    Review of prior CBCs at [facility] reveals chronic, intermittent anemia dating to 4/2015.  Chronic thrombocytopenia has been present dating to at least 7/2016 with platelet count ranging from 69,000 -136,000.     Brenda underwent laceration repair of her left leg by Dr. Sarita Vazquez 4/7/2023, status post fall with leg injury.  She was also treated for cellulitis with IV Vancomycin and Zosyn.  She underwent washout.  Cellulitis subsequently resolved.  She was discharged with dry dressing changes and oral Keflex x7 days     CBC 4/28/2023: WBC: 5.6, Hgb: 10.6, MCV: 96, PLT: 65,000      Labs 5/12/2023  CBC: WBC 6.0, Hgb 9.4/MCV 98, platelets 102,000  CMP: Creatinine 0.81, GFR 76, calcium 8.8, total protein 5.7, alkaline phosphatase 138 ()  BNP: 72.2 (0-100) CRP: 21 (0-10)     CBC w/diff            4/18/2023    18:00 4/21/2023    05:54 5/23/2023    02:30 5/23/2023    17:10   CBC w/Diff   WBC 5.57   4.59   7.16   3.08     RBC 2.51   2.78   2.40   2.57     Hemoglobin 9.0   8.6   7.1   7.4     Hematocrit 25.3   27.5   23.3   23.4     .8   98.9   97.1   91.1     MCH 35.9   30.9   29.6   28.8     MCHC 35.6   31.3   30.5   31.6     RDW 14.9   15.1   15.3   15.4     Platelets 104   112   5   2     Neutrophil Rel % 67.8     44.5       Immature Granulocyte Rel % 0.4           Lymphocyte Rel % 20.1     38.1       Monocyte Rel % 10.8     13.0       Eosinophil Rel % 0.7     2.9       Basophil Rel % 0.2     0.7          CMP reveals a normal creatinine of 0.84  Lactate - 1.8 WNL  BNP - 300.2 - WNL     Her admitting CBC is significantly different than it was on 5/12/2023.  Hgb has dropped to 7.1, platelet is down to 5000.  She is currently in the coronary care unit-room 9.  She is status post 1 unit packed red blood cell and 1 pheresis of  platelets.  No active bleeding at present per nursing.     Brenda has a chronic pancytopenia dating back many years.  This could be due to hypersplenism secondary to fatty liver disease or a primary bone marrow disorder.  I believe that she has had an exacerbation of the mild pancytopenia resulting in the current hospitalization with worsening anemia and profound thrombocytopenia.       Serology 5/19/2023   Serum Fe 32  TIBC - 280  Fe sat - 11%  Ferritin - 83.1     B12 = 1,168  Folate - 6.5  Copper in process  Zinc in process     Hapto - 70  LDH - 312     Hep A, B, C - nonreactive  HIV - Nonreactive     SPEP in process  QI in process  Free serum light chain in process      Further serology has been requested as outlined below:  Antiplatelet antibody   DIC screen  CPK LDH, reticulocyte count and haptoglobin, Bry direct and indirect, direct and indirect bili, TSH  Peripheral blood smear to evaluate for schistocytes  AdamTS 13 to [place]  Monitor for bleeding  FOBT  2D Echo to evaluate cardiac status and rule out vegetations     Differential diagnosis of the current situation could include:   DIC, hemolytic anemia, ITP, TTP, infection (cellulitis, UTI) with bone marrow myelosuppression      Serology 5/23/2023 [facility]  PT/INR - 15.1/1.17  PTT - 32.6  Fibrinogen - 258  FSP >5 </= 20    Tbili 1.7 with 1.0 indirect  Hapto - in process  Retic - 6.16% with absolute 0.1546  LDH - 283 (135-214)  LALY (Bry) - Negative    TSH - 6.190    Antplatelet ab - in process    ADAMTS 13 - to be sent off    Peripheral blood smear - in process    FOBT to be collected    S/p 1 unit pRBC on 5/23/2023  S/p 2 PLT on 5/23/2023      Lab Results   Component Value Date    WBC 2.91 (L) 05/24/2023    HGB 7.7 (L) 05/24/2023    HCT 23.3 (L) 05/24/2023    MCV 98.7 (H) 05/24/2023    PLT 13 (C) 05/24/2023     Lab Results   Component Value Date    NEUTROABS 1.48 (L) 05/24/2023           REC:  Monitor CBC  Transfuse 1 unit of PRBCs for  hemoglobin <7.0  Transfuse 1 unit of platelets for platelet count < 10,000  Add IPF      #2  UTI/ cellulitis    UA POSITIVE on 5/23/2023    Blood cx X 2 on 5.23.2023 - in process    Ceftriaxone 2 g IV x 1      Discussed with bedside nurse    ALISA Fang    05/24/23  06:33 CDT      Physicians attestation and contribution:    I, Morales Cantu, personally and independently performed an evaluation on Brenda [facility]  I have reviewed relevant medical information/data to include but not limited to the medication list, relevant appropriate lab work and imaging when applicable.  I reviewed other physician's notes, ancillary services and nurses assessments.  I have reviewed the above documentation completed by Warren Marie PA-C  Please see my additional addended and/or modified contributions to the history of present illness, physical examination and assessment/medical decision-making and plan that reflects my findings and impressions.  I discussed the essential elements of the care plan with Warren Marie PA-C and the patient.  I have encouraged and answered all the questions raised to the patient's understanding and satisfaction.  I concur with the above stated.    Subjective: Awake, more alert and interactive than last night.  Slept well after she got her pain medication.    Objective: Lower extremity cellulitis and edema decreased significantly just in the last 12 hours.  Otherwise no new objective findings.    Assessment/plan:  Chronic pancytopenia with acute insult with further profound cytopenias.  Work-up in progress.    #1  Thrombocytopenia    Differential includes DIC, ITP, TTP, infection (cellulitis, UTI) with bone marrow myelosuppression   IV antibiotics ongoing with improvement in cellulitis  Serology requested yesterday pending, will also monitor daily PT PTT and fibrinogen to assess for potential early or compensated DIC  Transfuse 1 unit of platelets for platelet count < 10,000    #2  Anemia  Iron  studies, B12 folic acid levels etc. are all normal.  Awaiting pathology review of peripheral smear and other serology ordered    Transfuse 1 unit of PRBCs for hemoglobin <7.0    Morales Cantu MD  5/24/2023 08:02 CDT

## 2023-05-24 NOTE — PLAN OF CARE
Goal Outcome Evaluation:              Outcome Evaluation: RD nutrition assessment completed.  Pt's po intake is unknown at this time, and pt was asleep when RD visited.  RN not available to ask about pt's po intake.  Received consult per wound report.  Will offer [employer] with Breakfast and Supper for pt to try and monitor acceptance.  Will continue to monitor po intake and follow for further d/c needs

## 2023-05-24 NOTE — PROGRESS NOTES
Brenda Sneed is a 72 y.o. female patient.    Complains of left foot pain  Status post transfusion.  Appreciate hematology oncology help.  Medically stable for transfer to floor.        Current Facility-Administered Medications   Medication Dose Route Frequency Provider Last Rate Last Admin    acetaminophen (TYLENOL) tablet 650 mg  650 mg Oral Q4H PRN Sanjiv Block MD        ascorbic acid (VITAMIN C) tablet 500 mg  500 mg Oral Daily Sanjiv Block MD        sennosides-docusate (PERICOLACE) 8.6-50 MG per tablet 2 tablet  2 tablet Oral BID Sanjiv Block MD   2 tablet at 05/23/23 2142    And    polyethylene glycol (MIRALAX) packet 17 g  17 g Oral Daily PRN Sanjiv Block MD        And    bisacodyl (DULCOLAX) EC tablet 5 mg  5 mg Oral Daily PRN Sanjiv Block MD        And    bisacodyl (DULCOLAX) suppository 10 mg  10 mg Rectal Daily PRN Sanjiv Block MD        bisoprolol (ZEBeta) 5 mg, hydroCHLOROthiazide (HYDRODIURIL) 6.25 mg for Ziac 5-6.25   Oral Daily Sanjiv Block MD        calcium carbonate (TUMS) chewable tablet 500 mg (200 mg elemental)  2 tablet Oral TID PRN Sanjiv Block MD        [START ON 5/25/2023] Chlorhexidine Gluconate Cloth 2 % pads 1 application  1 application Topical Q24H Sachin Dia MD        cyclobenzaprine (FLEXERIL) tablet 10 mg  10 mg Oral TID PRN Sanjiv Block MD        docusate sodium (COLACE) capsule 100 mg  100 mg Oral BID Sanjiv Block MD        DULoxetine (CYMBALTA) DR capsule 60 mg  60 mg Oral BID Sanjiv Block MD        HYDROmorphone (DILAUDID) injection 0.5 mg  0.5 mg Intravenous Q2H PRN Sachin Dia MD        levothyroxine (SYNTHROID, LEVOTHROID) tablet 100 mcg  100 mcg Oral Q AM Sanjiv Block MD   100 mcg at 05/24/23 0618    melatonin tablet 5 mg  5 mg Oral Nightly PRN Sanjiv Block MD        mupirocin (BACTROBAN) 2 % nasal ointment 1 application  1 application Each Nare BID Israelkholz,  "Sachin Rojas MD   1 application at 05/24/23 0205    nystatin (MYCOSTATIN) powder 1 application  1 application Topical BID Sanjiv Block MD        ondansetron (ZOFRAN) injection 4 mg  4 mg Intravenous Q6H PRN Sanjiv Block MD        oxyCODONE-acetaminophen (PERCOCET) 5-325 MG per tablet 2 tablet  2 tablet Oral Q4H PRN Sumeet Bowles PA   2 tablet at 05/24/23 0618    pramipexole (MIRAPEX) tablet 0.75 mg  0.75 mg Oral Q8H Sanjiv Block MD   0.75 mg at 05/24/23 0618    sodium chloride 0.9 % flush 10 mL  10 mL Intravenous Q12H Sanjiv Block MD   10 mL at 05/23/23 2143    sodium chloride 0.9 % flush 10 mL  10 mL Intravenous PRN Sanjiv Block MD        sodium chloride 0.9 % infusion 40 mL  40 mL Intravenous PRN Sanjiv Block MD        vitamin D3 capsule 5,000 Units  5,000 Units Oral Daily Sanjiv Block MD         ALLERGIES:    Allergies   Allergen Reactions    Codeine Itching and Rash     SEVERE RASH/ITCHING (TOLERATES PERCOCET)    Benadryl [Diphenhydramine] Other (See Comments)     KEEPS PATIENT AWAKE        Thrombocytopenia    Blood pressure 151/53, pulse 64, temperature 97.8 °F (36.6 °C), temperature source Oral, resp. rate 17, height 160 cm (63\"), weight 115 kg (254 lb), SpO2 99 %, not currently breastfeeding.      Subjective:  Symptoms:  Stable.    Diet:  Poor intake.    Activity level: Impaired due to weakness.    Pain:  She complains of pain that is moderate.  She reports pain is unchanged.  Pain is partially controlled.    Review of Systems  Objective:  General Appearance:  Comfortable and well-appearing.    Vital signs: (most recent): Blood pressure 151/53, pulse 64, temperature 97.8 °F (36.6 °C), temperature source Oral, resp. rate 17, height 160 cm (63\"), weight 115 kg (254 lb), SpO2 99 %, not currently breastfeeding.  Vital signs are normal.    Output: Producing urine and minimal stool output.    HEENT: Normal HEENT exam.    Lungs:  Normal effort and normal respiratory " rate.    Heart: Normal rate.  Regular rhythm.    Abdomen: Abdomen is soft.  Bowel sounds are normal.   There is no abdominal tenderness.     Extremities: Decreased range of motion.  (3 open wounds on left shin, decent pulses posterior tib/dorsal )  Skin:  Warm and dry.              Labs:  Lab Results (last 72 hours)       Procedure Component Value Units Date/Time    Blood Culture - Blood, Arm, Left [[id number]]  (Normal) Collected: 05/23/23 0548    Specimen: Blood from Arm, Left Updated: 05/24/23 0731     Blood Culture No growth at 24 hours    Blood Culture - Blood, Arm, Left [[id number]]  (Normal) Collected: 05/23/23 0548    Specimen: Blood from Arm, Left Updated: 05/24/23 0731     Blood Culture No growth at 24 hours    Manual Differential [[id number]]  (Abnormal) Collected: 05/24/23 0254    Specimen: Blood Updated: 05/24/23 0423     Neutrophil % 51.0 %      Lymphocyte % 37.0 %      Monocyte % 9.0 %      Eosinophil % 3.0 %      Neutrophils Absolute 1.48 10*3/mm3      Lymphocytes Absolute 1.08 10*3/mm3      Monocytes Absolute 0.26 10*3/mm3      Eosinophils Absolute 0.09 10*3/mm3      Anisocytosis Slight/1+     Hypochromia Slight/1+     Poikilocytes Slight/1+     Polychromasia Slight/1+     Target Cells Slight/1+     WBC Morphology Normal     Platelet Estimate Decreased    CBC & Differential [[id number]]  (Abnormal) Collected: 05/24/23 0254    Specimen: Blood Updated: 05/24/23 0423    Narrative:      The following orders were created for panel order CBC & Differential.  Procedure                               Abnormality         Status                     ---------                               -----------         ------                     CBC Auto Differential[[id number]]        Abnormal            Final result                 Please view results for these tests on the individual orders.    CBC Auto Differential [[id number]]  (Abnormal) Collected: 05/24/23 0254    Specimen: Blood Updated: 05/24/23 0423     WBC 2.91  10*3/mm3      RBC 2.36 10*6/mm3      Hemoglobin 7.7 g/dL      Hematocrit 23.3 %      MCV 98.7 fL      MCH 32.6 pg      MCHC 33.0 g/dL      RDW 19.3 %      RDW-SD 53.3 fl      MPV 12.7 fL      Platelets 13 10*3/mm3     Narrative:      Rec platelets    Comprehensive Metabolic Panel [[id number]]  (Abnormal) Collected: 05/24/23 0254    Specimen: Blood Updated: 05/24/23 0327     Glucose 98 mg/dL      BUN 11 mg/dL      Creatinine 0.63 mg/dL      Sodium 140 mmol/L      Potassium 3.8 mmol/L      Chloride 104 mmol/L      CO2 26.0 mmol/L      Calcium 8.4 mg/dL      Total Protein 5.6 g/dL      Albumin 3.2 g/dL      ALT (SGPT) 8 U/L      AST (SGOT) 28 U/L      Alkaline Phosphatase 130 U/L      Total Bilirubin 1.4 mg/dL      Globulin 2.4 gm/dL      A/G Ratio 1.3 g/dL      BUN/Creatinine Ratio 17.5     Anion Gap 10.0 mmol/L      eGFR 93.2 mL/min/1.73     Narrative:      GFR Normal >60  Chronic Kidney Disease <60  Kidney Failure <15    The GFR formula is only valid for adults with stable renal function between ages 18 and 70.    TSH [[id number]]  (Abnormal) Collected: 05/23/23 1910    Specimen: Blood Updated: 05/23/23 2024     TSH 6.190 uIU/mL     Bilirubin, Total & Direct [[id number]]  (Abnormal) Collected: 05/23/23 1910    Specimen: Blood Updated: 05/23/23 2017     Total Bilirubin 1.7 mg/dL      Bilirubin, Direct 0.7 mg/dL      Bilirubin, Indirect 1.0 mg/dL     Reticulocytes [[id number]]  (Abnormal) Collected: 05/23/23 1710    Specimen: Blood Updated: 05/23/23 2006     Reticulocyte % 6.16 %      Reticulocyte Absolute 0.1546 10*6/mm3     CK [[id number]]  (Normal) Collected: 05/23/23 1910    Specimen: Blood Updated: 05/1948     Creatine Kinase 65 U/L     Fibrin Split Products [[id number]]  (Abnormal) Collected: 05/23/23 1710    Specimen: Blood Updated: 05/23/23 1824     Fibrin Split Products Greater than or equal to 5, but less than 20 mcg/mL    Manual Differential [[id number]]  (Abnormal) Collected: 05/23/23 3049    Specimen:  Blood Updated: 05/23/23 1815     Neutrophil % 61.4 %      Lymphocyte % 29.5 %      Monocyte % 8.0 %      Basophil % 1.1 %      Neutrophils Absolute 1.89 10*3/mm3      Lymphocytes Absolute 0.91 10*3/mm3      Monocytes Absolute 0.25 10*3/mm3      Basophils Absolute 0.03 10*3/mm3      nRBC 3.4 /100 WBC      Anisocytosis Slight/1+     Polychromasia Slight/1+     Target Cells Slight/1+     WBC Morphology Normal     Platelet Estimate Decreased    CBC & Differential [[id number]]  (Abnormal) Collected: 05/23/23 1710    Specimen: Blood Updated: 05/23/23 1815    Narrative:      The following orders were created for panel order CBC & Differential.  Procedure                               Abnormality         Status                     ---------                               -----------         ------                     CBC Auto Differential[[id number]]        Abnormal            Final result                 Please view results for these tests on the individual orders.    CBC Auto Differential [[id number]]  (Abnormal) Collected: 05/23/23 1710    Specimen: Blood Updated: 05/23/23 1815     WBC 3.08 10*3/mm3      RBC 2.57 10*6/mm3      Hemoglobin 7.4 g/dL      Hematocrit 23.4 %      MCV 91.1 fL      MCH 28.8 pg      MCHC 31.6 g/dL      RDW 15.4 %      RDW-SD 50.4 fl      MPV 10.1 fL      Platelets 2 10*3/mm3     aPTT [[id number]]  (Normal) Collected: 05/23/23 1710    Specimen: Blood Updated: 05/23/23 1741     PTT 32.6 seconds     Protime-INR [[id number]]  (Abnormal) Collected: 05/23/23 1710    Specimen: Blood Updated: 05/23/23 1741     Protime 15.1 Seconds      INR 1.17    Fibrinogen [[id number]]  (Normal) Collected: 05/23/23 1710    Specimen: Blood Updated: 05/23/23 1741     Fibrinogen 258 mg/dL     Platelet Antibody Profile [[id number]] Collected: 05/23/23 1710    Specimen: Blood Updated: 05/23/23 1717    NTONTS01 Activity [[id number]] Collected: 05/23/23 1710    Specimen: Blood Updated: 05/23/23 1717    Lactate Dehydrogenase  [[id number]]  (Abnormal) Collected: 05/23/23 0109    Specimen: Blood Updated: 05/23/23 1649      U/L     Peripheral Blood Smear [[id number]] Collected: 05/23/23 0230    Specimen: Blood Updated: 05/23/23 1635    Lactic Acid, Plasma [[id number]]  (Normal) Collected: 05/23/23 0615    Specimen: Blood Updated: 05/23/23 0643     Lactate 1.8 mmol/L     Magnesium [[id number]]  (Normal) Collected: 05/23/23 0109    Specimen: Blood Updated: 05/23/23 0543     Magnesium 1.7 mg/dL     Urinalysis, Microscopic Only - Urine, Clean Catch [[id number]]  (Abnormal) Collected: 05/23/23 0404    Specimen: Urine, Clean Catch Updated: 05/23/23 0438     RBC, UA 21-30 /HPF      WBC, UA 31-50 /HPF      Bacteria, UA 2+ /HPF      Squamous Epithelial Cells, UA None Seen /HPF      Hyaline Casts, UA 3-6 /LPF      Methodology Automated Microscopy    Urinalysis With Culture If Indicated - Urine, Clean Catch [[id number]]  (Abnormal) Collected: 05/23/23 0404    Specimen: Urine, Clean Catch Updated: 05/23/23 0438     Color, UA Dark Yellow     Appearance, UA Clear     pH, UA 6.0     Specific Gravity, UA 1.014     Glucose, UA Negative     Ketones, UA Negative     Bilirubin, UA Negative     Blood, UA Moderate (2+)     Protein, UA Negative     Leuk Esterase, UA Large (3+)     Nitrite, UA Negative     Urobilinogen, UA 1.0 E.U./dL    Narrative:      In absence of clinical symptoms, the presence of pyuria, bacteria, and/or nitrites on the urinalysis result does not correlate with infection.    CBC & Differential [[id number]]  (Abnormal) Collected: 05/23/23 0230    Specimen: Blood Updated: 05/23/23 0352    Narrative:      The following orders were created for panel order CBC & Differential.  Procedure                               Abnormality         Status                     ---------                               -----------         ------                     CBC Auto Differential[[id number]]        Abnormal            Final result               Scan  Slide[[id number]]                                       Final result                 Please view results for these tests on the individual orders.    Scan Slide [[id number]] Collected: 05/23/23 0230    Specimen: Blood Updated: 05/23/23 0352     Polychromasia Mod/2+     WBC Morphology Normal     Platelet Estimate Decreased    CBC Auto Differential [[id number]]  (Abnormal) Collected: 05/23/23 0230    Specimen: Blood Updated: 05/23/23 0313     WBC 7.16 10*3/mm3      RBC 2.40 10*6/mm3      Hemoglobin 7.1 g/dL      Hematocrit 23.3 %      MCV 97.1 fL      MCH 29.6 pg      MCHC 30.5 g/dL      RDW 15.3 %      RDW-SD 52.4 fl      MPV 10.7 fL      Platelets 5 10*3/mm3      Neutrophil % 44.5 %      Lymphocyte % 38.1 %      Monocyte % 13.0 %      Eosinophil % 2.9 %      Basophil % 0.7 %      Neutrophils, Absolute 3.18 10*3/mm3      Lymphocytes, Absolute 2.73 10*3/mm3      Monocytes, Absolute 0.93 10*3/mm3      Eosinophils, Absolute 0.21 10*3/mm3      Basophils, Absolute 0.05 10*3/mm3     [facility] Draw [[id number]] Collected: 05/23/23 0109    Specimen: Blood Updated: 05/23/23 0215    Narrative:      The following orders were created for panel order [facility] Draw.  Procedure                               Abnormality         Status                     ---------                               -----------         ------                     Green Top (Gel)[[id number]]                                  Final result               Lavender Top[[id number]]                                     Final result               Red Top[[id number]]                                          Final result               Light Blue Top[[id number]]                                   Final result                 Please view results for these tests on the individual orders.    Green Top (Gel) [[id number]] Collected: 05/23/23 0109    Specimen: Blood Updated: 05/23/23 0215     Extra Tube Hold for add-ons.     Comment: Auto resulted.       Red Top [[id number]] Collected:  05/23/23 0109    Specimen: Blood Updated: 05/23/23 0215     Extra Tube Hold for add-ons.     Comment: Auto resulted.       Light Blue Top [[id number]] Collected: 05/23/23 0109    Specimen: Blood Updated: 05/23/23 0215     Extra Tube Hold for add-ons.     Comment: Auto resulted       Lavender Top [[id number]] Collected: 05/23/23 0109    Specimen: Blood Updated: 05/23/23 0215     Extra Tube hold for add-on     Comment: Auto resulted       Comprehensive Metabolic Panel [[id number]]  (Abnormal) Collected: 05/23/23 0109    Specimen: Blood Updated: 05/23/23 0145     Glucose 126 mg/dL      BUN 14 mg/dL      Creatinine 0.84 mg/dL      Sodium 136 mmol/L      Potassium 3.0 mmol/L      Chloride 100 mmol/L      CO2 25.0 mmol/L      Calcium 8.7 mg/dL      Total Protein 5.8 g/dL      Albumin 3.3 g/dL      ALT (SGPT) 10 U/L      AST (SGOT) 28 U/L      Alkaline Phosphatase 147 U/L      Total Bilirubin 1.5 mg/dL      Globulin 2.5 gm/dL      A/G Ratio 1.3 g/dL      BUN/Creatinine Ratio 16.7     Anion Gap 11.0 mmol/L      eGFR 73.0 mL/min/1.73     Narrative:      GFR Normal >60  Chronic Kidney Disease <60  Kidney Failure <15    The GFR formula is only valid for adults with stable renal function between ages 18 and 70.    BNP [[id number]]  (Normal) Collected: 05/23/23 0109    Specimen: Blood Updated: 05/23/23 0142     proBNP 300.2 pg/mL     Narrative:      Among patients with dyspnea, NT-proBNP is highly sensitive for the detection of acute congestive heart failure. In addition NT-proBNP of <300 pg/ml effectively rules out acute congestive heart failure with 99% negative predictive value.    Results may be falsely decreased if patient taking Biotin.      Single High Sensitivity Troponin T [[id number]]  (Abnormal) Collected: 05/23/23 0109    Specimen: Blood Updated: 05/23/23 0141     HS Troponin T 39 ng/L     Narrative:      High Sensitive Troponin T Reference Range:  <10.0 ng/L- Negative Female for AMI  <15.0 ng/L- Negative Male for  AMI  >=10 - Abnormal Female indicating possible myocardial injury.  >=15 - Abnormal Male indicating possible myocardial injury.   Clinicians would have to utilize clinical acumen, EKG, Troponin, and serial changes to determine if it is an Acute Myocardial Infarction or myocardial injury due to an underlying chronic condition.                 Imaging Results (Last 72 Hours)       Procedure Component Value Units Date/Time    XR Chest 2 View [[id number]] Collected: 05/23/23 0654     Updated: 05/23/23 0658    Narrative:      EXAM/TECHNIQUE: XR CHEST 2 VW-     INDICATION: Shortness of air     COMPARISON: 7/5/2022     FINDINGS:     Enlarged but stable cardiac silhouette. No pleural effusion,  pneumothorax, or focal consolidation. No acute osseous finding. LEFT  shoulder arthroplasty without evidence of complication. Partially imaged  postoperative change in the lower cervical spine.       Impression:         No acute findings.  This report was finalized on 05/23/2023 06:55 by Dr. Bryce Mancera MD.                  Assessment:    Condition: In stable condition.  Improving.       Plan:   Transfer to floor.  Out of bed and up to chair.  Regular diet.   (    Thrombocytopenia-appreciate hematology/oncology consultation note from last evening.  Continue with transfusion.  Work-up in progress.    Venous stasis ulcer-wound care consult pending.  Continue with elevation and local wound care.    Hypertension-blood pressure elevated related to poor pain control overnight.  Increased pain medication.  Restart home blood pressure medication.    Unstable gait-PT OT consult once on the floor to help get out of bed into chair.    Overall improved still working up pancytopenia and appreciate hematology/oncology help.    ).   Problem List:     Thrombocytopenia    Sachin Dia MD  5/24/2023

## 2023-05-24 NOTE — HOME HEALTH
COVID SCREENING:Pt is negative for s/s of covid19    FOCUS OF CARE/SKILLED NEED: wd care and assessment    TEACHING/INTERVENTIONS: wd care and assessment    PATIENT/CG RESPONSE:cooperative    PROGRESS TOWARD GOALS:no s/s of wd infection    PHYSICIAN CONTACT: followup for hospital bed    FALLS SINCE LAST VISIT?none    MEDICATION CHANGES SINCE LAST VISIT?none    PLAN FOR NEXT VISIT:wd care and assessment

## 2023-05-24 NOTE — PLAN OF CARE
Goal Outcome Evaluation:              Outcome Evaluation: C.o pain medicated with prn meds, wounds on legs dressed per orders, US abdomen, alert and oriented.

## 2023-05-25 PROBLEM — N30.01 ACUTE CYSTITIS WITH HEMATURIA: Status: ACTIVE | Noted: 2023-05-25

## 2023-05-25 LAB
ANION GAP SERPL CALCULATED.3IONS-SCNC: 9 MMOL/L (ref 5–15)
APTT PPP: 32.9 SECONDS (ref 24.1–35)
BASOPHILS # BLD AUTO: 0.06 10*3/MM3 (ref 0–0.2)
BASOPHILS NFR BLD AUTO: 1.2 % (ref 0–1.5)
BH BB BLOOD EXPIRATION DATE: NORMAL
BH BB BLOOD TYPE BARCODE: 6200
BH BB DISPENSE STATUS: NORMAL
BH BB PRODUCT CODE: NORMAL
BH BB UNIT NUMBER: NORMAL
BUN SERPL-MCNC: 11 MG/DL (ref 8–23)
BUN/CREAT SERPL: 20.4 (ref 7–25)
CALCIUM SPEC-SCNC: 8.7 MG/DL (ref 8.6–10.5)
CHLORIDE SERPL-SCNC: 102 MMOL/L (ref 98–107)
CO2 SERPL-SCNC: 28 MMOL/L (ref 22–29)
COPPER SERPL-MCNC: 177.9 UG/DL (ref 80–155)
CREAT SERPL-MCNC: 0.54 MG/DL (ref 0.57–1)
DEPRECATED RDW RBC AUTO: 53.1 FL (ref 37–54)
EGFRCR SERPLBLD CKD-EPI 2021: 96.7 ML/MIN/1.73
EOSINOPHIL # BLD AUTO: 0.2 10*3/MM3 (ref 0–0.4)
EOSINOPHIL NFR BLD AUTO: 4 % (ref 0.3–6.2)
ERYTHROCYTE [DISTWIDTH] IN BLOOD BY AUTOMATED COUNT: 15.6 % (ref 12.3–15.4)
GLUCOSE SERPL-MCNC: 102 MG/DL (ref 65–99)
HCT VFR BLD AUTO: 26.6 % (ref 34–46.6)
HEMOCCULT STL QL: NEGATIVE
HGB BLD-MCNC: 8.3 G/DL (ref 12–15.9)
INR PPP: 1.14 (ref 0.91–1.09)
LYMPHOCYTES # BLD AUTO: 1.33 10*3/MM3 (ref 0.7–3.1)
LYMPHOCYTES NFR BLD AUTO: 26.5 % (ref 19.6–45.3)
MCH RBC QN AUTO: 30.2 PG (ref 26.6–33)
MCHC RBC AUTO-ENTMCNC: 31.2 G/DL (ref 31.5–35.7)
MCV RBC AUTO: 96.7 FL (ref 79–97)
MONOCYTES # BLD AUTO: 0.48 10*3/MM3 (ref 0.1–0.9)
MONOCYTES NFR BLD AUTO: 9.6 % (ref 5–12)
NEUTROPHILS NFR BLD AUTO: 2.92 10*3/MM3 (ref 1.7–7)
NEUTROPHILS NFR BLD AUTO: 58.1 % (ref 42.7–76)
PLATELET # BLD AUTO: 7 10*3/MM3 (ref 140–450)
PLATELETS.RETICULATED NFR BLD AUTO: 5.4 % (ref 0.9–6.5)
PMV BLD AUTO: 12.4 FL (ref 6–12)
POTASSIUM SERPL-SCNC: 3.3 MMOL/L (ref 3.5–5.2)
PROTHROMBIN TIME: 14.8 SECONDS (ref 11.8–14.8)
RBC # BLD AUTO: 2.75 10*6/MM3 (ref 3.77–5.28)
SODIUM SERPL-SCNC: 139 MMOL/L (ref 136–145)
UNIT  ABO: NORMAL
UNIT  RH: NORMAL
WBC NRBC COR # BLD: 5.02 10*3/MM3 (ref 3.4–10.8)
ZINC SERPL-MCNC: 58 UG/DL (ref 60–120)

## 2023-05-25 PROCEDURE — P9035 PLATELET PHERES LEUKOREDUCED: HCPCS

## 2023-05-25 PROCEDURE — 85025 COMPLETE CBC W/AUTO DIFF WBC: CPT | Performed by: FAMILY MEDICINE

## 2023-05-25 PROCEDURE — 80048 BASIC METABOLIC PNL TOTAL CA: CPT | Performed by: FAMILY MEDICINE

## 2023-05-25 PROCEDURE — C1751 CATH, INF, PER/CENT/MIDLINE: HCPCS

## 2023-05-25 PROCEDURE — 85610 PROTHROMBIN TIME: CPT | Performed by: INTERNAL MEDICINE

## 2023-05-25 PROCEDURE — 36430 TRANSFUSION BLD/BLD COMPNT: CPT

## 2023-05-25 PROCEDURE — 25010000002 HYDROMORPHONE PER 4 MG: Performed by: FAMILY MEDICINE

## 2023-05-25 PROCEDURE — P9037 PLATE PHERES LEUKOREDU IRRAD: HCPCS

## 2023-05-25 PROCEDURE — 97165 OT EVAL LOW COMPLEX 30 MIN: CPT

## 2023-05-25 PROCEDURE — 85730 THROMBOPLASTIN TIME PARTIAL: CPT | Performed by: INTERNAL MEDICINE

## 2023-05-25 PROCEDURE — 85055 RETICULATED PLATELET ASSAY: CPT | Performed by: PHYSICIAN ASSISTANT

## 2023-05-25 PROCEDURE — P9100 PATHOGEN TEST FOR PLATELETS: HCPCS

## 2023-05-25 PROCEDURE — 02HV33Z INSERTION OF INFUSION DEVICE INTO SUPERIOR VENA CAVA, PERCUTANEOUS APPROACH: ICD-10-PCS | Performed by: FAMILY MEDICINE

## 2023-05-25 PROCEDURE — 82272 OCCULT BLD FECES 1-3 TESTS: CPT | Performed by: FAMILY MEDICINE

## 2023-05-25 RX ORDER — SODIUM CHLORIDE 0.9 % (FLUSH) 0.9 %
10 SYRINGE (ML) INJECTION AS NEEDED
Status: DISCONTINUED | OUTPATIENT
Start: 2023-05-25 | End: 2023-05-30 | Stop reason: HOSPADM

## 2023-05-25 RX ORDER — SODIUM CHLORIDE 0.9 % (FLUSH) 0.9 %
10 SYRINGE (ML) INJECTION EVERY 12 HOURS SCHEDULED
Status: DISCONTINUED | OUTPATIENT
Start: 2023-05-25 | End: 2023-05-30 | Stop reason: HOSPADM

## 2023-05-25 RX ORDER — LIDOCAINE HYDROCHLORIDE 10 MG/ML
1 INJECTION, SOLUTION EPIDURAL; INFILTRATION; INTRACAUDAL; PERINEURAL ONCE
Status: COMPLETED | OUTPATIENT
Start: 2023-05-25 | End: 2023-05-25

## 2023-05-25 RX ORDER — SODIUM CHLORIDE 0.9 % (FLUSH) 0.9 %
20 SYRINGE (ML) INJECTION AS NEEDED
Status: DISCONTINUED | OUTPATIENT
Start: 2023-05-25 | End: 2023-05-30 | Stop reason: HOSPADM

## 2023-05-25 RX ORDER — CEFDINIR 300 MG/1
300 CAPSULE ORAL EVERY 12 HOURS SCHEDULED
Status: DISCONTINUED | OUTPATIENT
Start: 2023-05-25 | End: 2023-05-28 | Stop reason: ALTCHOICE

## 2023-05-25 RX ORDER — SODIUM CHLORIDE 9 MG/ML
40 INJECTION, SOLUTION INTRAVENOUS AS NEEDED
Status: DISCONTINUED | OUTPATIENT
Start: 2023-05-25 | End: 2023-05-30 | Stop reason: HOSPADM

## 2023-05-25 RX ADMIN — Medication 10 ML: at 22:12

## 2023-05-25 RX ADMIN — OXYCODONE HYDROCHLORIDE AND ACETAMINOPHEN 500 MG: 500 TABLET ORAL at 09:00

## 2023-05-25 RX ADMIN — OXYCODONE HYDROCHLORIDE AND ACETAMINOPHEN 2 TABLET: 5; 325 TABLET ORAL at 04:29

## 2023-05-25 RX ADMIN — Medication 10 ML: at 09:02

## 2023-05-25 RX ADMIN — NYSTATIN 1 APPLICATION: 100000 POWDER TOPICAL at 22:12

## 2023-05-25 RX ADMIN — DULOXETINE HYDROCHLORIDE 60 MG: 30 CAPSULE, DELAYED RELEASE ORAL at 09:00

## 2023-05-25 RX ADMIN — Medication 5000 UNITS: at 09:00

## 2023-05-25 RX ADMIN — DULOXETINE HYDROCHLORIDE 60 MG: 30 CAPSULE, DELAYED RELEASE ORAL at 22:12

## 2023-05-25 RX ADMIN — ACETAMINOPHEN 650 MG: 325 TABLET ORAL at 22:18

## 2023-05-25 RX ADMIN — PRAMIPEXOLE DIHYDROCHLORIDE 0.75 MG: 0.25 TABLET ORAL at 13:56

## 2023-05-25 RX ADMIN — LEVOTHYROXINE SODIUM 100 MCG: 100 TABLET ORAL at 05:01

## 2023-05-25 RX ADMIN — DOCUSATE SODIUM 100 MG: 100 CAPSULE ORAL at 22:10

## 2023-05-25 RX ADMIN — BISOPROLOL FUMARATE: 5 TABLET ORAL at 09:00

## 2023-05-25 RX ADMIN — DOCUSATE SODIUM 100 MG: 100 CAPSULE ORAL at 09:00

## 2023-05-25 RX ADMIN — PRAMIPEXOLE DIHYDROCHLORIDE 0.75 MG: 0.25 TABLET ORAL at 05:01

## 2023-05-25 RX ADMIN — PRAMIPEXOLE DIHYDROCHLORIDE 0.75 MG: 0.25 TABLET ORAL at 22:12

## 2023-05-25 RX ADMIN — CEFDINIR 300 MG: 300 CAPSULE ORAL at 22:12

## 2023-05-25 RX ADMIN — HYDROMORPHONE HYDROCHLORIDE 0.5 MG: 1 INJECTION, SOLUTION INTRAMUSCULAR; INTRAVENOUS; SUBCUTANEOUS at 16:57

## 2023-05-25 RX ADMIN — Medication 10 ML: at 22:13

## 2023-05-25 RX ADMIN — HYDROMORPHONE HYDROCHLORIDE 0.5 MG: 1 INJECTION, SOLUTION INTRAMUSCULAR; INTRAVENOUS; SUBCUTANEOUS at 00:33

## 2023-05-25 RX ADMIN — DOCUSATE SODIUM 50 MG AND SENNOSIDES 8.6 MG 2 TABLET: 8.6; 5 TABLET, FILM COATED ORAL at 09:00

## 2023-05-25 RX ADMIN — CEFDINIR 300 MG: 300 CAPSULE ORAL at 09:00

## 2023-05-25 RX ADMIN — CALCIUM CARBONATE 2 TABLET: 500 TABLET, CHEWABLE ORAL at 14:41

## 2023-05-25 RX ADMIN — LIDOCAINE HYDROCHLORIDE ANHYDROUS 1 ML: 10 INJECTION, SOLUTION INFILTRATION at 10:43

## 2023-05-25 NOTE — THERAPY EVALUATION
Acute Care - Occupational Therapy Initial Evaluation  [facility]     Patient Name: Brenda Sneed  : 1950  MRN: [id number]  Today's Date: 2023  Onset of Illness/Injury or Date of Surgery: 23  Date of Referral to OT: 23  Referring Physician: Dr. Dia    Admit Date: 2023       ICD-10-CM ICD-9-CM   1. Lymphedema of both lower extremities  I89.0 457.1   2. Thrombocytopenia  D69.6 287.5   3. Cellulitis of left lower extremity  L03.116 682.6   4. Laceration of left lower extremity, subsequent encounter  S81.812D V58.89     894.0     Patient Active Problem List   Diagnosis   • Spinal stenosis, lumbar   • Secondary osteoarthritis of left shoulder due to rotator cuff arthropathy   • Cellulitis of right lower extremity   • HTN (hypertension)   • Obesity, Class III, BMI 40-49.9 (morbid obesity)   • Anxiety associated with depression   • Traumatic rhabdomyolysis   • Traumatic rhabdomyolysis, initial encounter   • Laceration of left lower extremity   • Cellulitis   • Cellulitis of left lower extremity   • Thrombocytopenia   • Acute cystitis with hematuria     Past Medical History:   Diagnosis Date   • Arthritis    • Bronchitis    • Cataract     right eye , cornea implant   • Chronic back pain    • Depression    • Disease of thyroid gland    • Edema     lower extremities   • Hypertension    • Kidney stones      Past Surgical History:   Procedure Laterality Date   • BACK SURGERY       ([place]),     • CHOLECYSTECTOMY OPEN     • CORNEAL TRANSPLANT Right    • INCISION AND DRAINAGE ABSCESS Left 2023    Procedure: INCISION AND DRAINAGE ABSCESS left leg;  Surgeon: Sarita Vazquez MD;  Location: [facility] OR;  Service: General;  Laterality: Left;   • LACERATION REPAIR Left 2023    Procedure: washout and closure left leg wound ;  Surgeon: Sarita Vazquez MD;  Location: [facility] OR;  Service: General;  Laterality: Left;   • LUMBAR FUSION Left 2017    Procedure: LEFT LUMBAR  LATERAL INTERBODY FUSION WITH INSTRUMENTATION  L1-2;  Surgeon: MARCELINO Wright MD;  Location:  [facility] OR;  Service:    • NECK SURGERY  2010    [facility]   • TOTAL HIP ARTHROPLASTY Right 2014    DR. KENDRICK    • TOTAL KNEE ARTHROPLASTY Right 2000   • TOTAL KNEE ARTHROPLASTY Left 2002   • TOTAL SHOULDER ARTHROPLASTY W/ DISTAL CLAVICLE EXCISION Left 6/1/2020    Procedure: LEFT REVERSE TOTAL SHOULDER REPLACEMENT;  Surgeon: Saravanan Calvin MD;  Location:  [facility] OR;  Service: Orthopedics;  Laterality: Left;         OT ASSESSMENT FLOWSHEET (last 12 hours)     OT Evaluation and Treatment     Row Name 05/25/23 1315 05/25/23 1115                OT Time and Intention    Subjective Information no complaints  -AC (r) WB (t) AC (c) --       Document Type evaluation  -AC (r) WB (t) AC (c) --       Mode of Treatment occupational therapy  -AC (r) WB (t) AC (c) --       Comment, Session Not Performed -- just had IV line placed, not supposed to move L UE for at least an hour  -AC (r) WB (t) AC (c)       Comment Reports she would like to d/c to home with assistance  -AC (r) WB (t) AC (c) --          General Information    Patient Profile Reviewed yes  -AC (r) WB (t) AC (c) --       Onset of Illness/Injury or Date of Surgery 05/25/23  -AC (r) WB (t) AC (c) --       Referring Physician Dr. Dia  -AC (r) WB (t) AC (c) --       Prior Level of Function independent:;all household mobility;ADL's;home management  -AC (r) WB (t) AC (c) --       Equipment Currently Used at Home walker, rolling;commode, bedside;cane, straight  -AC (r) WB (t) AC (c) --       Pertinent History of Current Functional Problem Reported to ED with dyspnea upon exertion; CBC found pt had anemia and thrombocytopenia; PMH: chronic lymphedema, fall on 4/7/23 resulting in LE wounds, chronic HTN, L lateral lumbar interbody fusion of L1-L2, L total shoulder arthroplasty with distal clavicle excision, R total hip arthroplasty, and B total knee arthroplasty  -AC (r) WB (t)  AC (c) --          Living Environment    Current Living Arrangements home  -AC (r) WB (t) AC (c) --       People in Home alone  Daughter and daughter's boyfriend lives 2 miles from home; multiple sources of support nearby; medical alert system in place  -AC (r) WB (t) AC (c) --          Pain Assessment    Pretreatment Pain Rating 0/10 - no pain  -AC (r) WB (t) AC (c) --       Posttreatment Pain Rating 0/10 - no pain  -AC (r) WB (t) AC (c) --          Cognition    Orientation Status (Cognition) oriented x 4  -AC (r) WB (t) AC (c) --          Range of Motion Comprehensive    General Range of Motion bilateral upper extremity ROM WFL  -AC (r) WB (t) AC (c) --       Comment, General Range of Motion AROM  -AC (r) WB (t) AC (c) --          Strength Comprehensive (MMT)    Comment, General Manual Muscle Testing (MMT) Assessment B shoulders 4/5; B distal UE 4+/5  -AC (r) WB (t) AC (c) --          Activities of Daily Living    BADL Assessment/Intervention toileting;lower body dressing  -AC (r) WB (t) AC (c) --          Lower Body Dressing Assessment/Training    [place] Level (Lower Body Dressing) doff;don;socks;dependent (less than 25% patient effort)  -AC (r) WB (t) AC (c) --       Position (Lower Body Dressing) supported sitting  -AC (r) WB (t) AC (c) --       Comment, (Lower Body Dressing) unable to reach feet d/t body habitus  -AC (r) WB (t) AC (c) --          Toileting Assessment/Training    [place] Level (Toileting) toileting skills;supervision;perform perineal hygiene;maximum assist (25% patient effort)  -AC (r) WB (t) AC (c) --       Assistive Devices (Toileting) grab bar/safety frame  -AC (r) WB (t) AC (c) --       Position (Toileting) supported sitting  -AC (r) WB (t) AC (c) --       Comment, (Toileting) needed asistance with completing hygiene  -AC (r) WB (t) AC (c) --          BADL Safety/Performance    Impairments, BADL Safety/Performance endurance/activity tolerance;trunk/postural control;range of  motion  -AC (r) WB (t) AC (c) --          Functional Mobility    Functional Mobility- Ind. Level contact guard assist  -AC (r) WB (t) AC (c) --       Functional Mobility- Device walker, front-wheeled  -AC (r) WB (t) AC (c) --       Functional Mobility- Comment bed to bathroom to chair, back to bathroom then to chair  -AC (r) WB (t) AC (c) --          Transfer Assessment/Treatment    Transfers sit-stand transfer;stand-sit transfer;toilet transfer  -AC (r) WB (t) AC (c) --          Sit-Stand Transfer    Sit-Stand [place] (Transfers) standby assist  -AC (r) WB (t) AC (c) --       Assistive Device (Sit-Stand Transfers) walker, front-wheeled  -AC (r) WB (t) AC (c) --          Stand-Sit Transfer    Stand-Sit [place] (Transfers) standby assist;verbal cues  -AC (r) WB (t) AC (c) --       Assistive Device (Stand-Sit Transfers) walker, front-wheeled  -AC (r) WB (t) AC (c) --       Comment, (Stand-Sit Transfer) cues for safety  -AC (r) WB (t) AC (c) --          Toilet Transfer    Type (Toilet Transfer) sit-stand;stand-sit  -AC (r) WB (t) AC (c) --       [place] Level (Toilet Transfer) standby assist  -AC (r) WB (t) AC (c) --       Assistive Device (Toilet Transfer) grab bars/safety frame;walker, front-wheeled;commode  -AC (r) WB (t) AC (c) --          Safety Issues, Functional Mobility    Impairments Affecting Function (Mobility) endurance/activity tolerance;shortness of breath  -AC (r) WB (t) AC (c) --          Balance    Balance Assessment sitting static balance;sitting dynamic balance;standing static balance;standing dynamic balance  -AC (r) WB (t) AC (c) --       Static Sitting Balance independent  -AC (r) WB (t) AC (c) --       Dynamic Sitting Balance standby assist  -AC (r) WB (t) AC (c) --       Position, Sitting Balance sitting in chair  -AC (r) WB (t) AC (c) --       Static Standing Balance supervision  -AC (r) WB (t) AC (c) --       Dynamic Standing Balance contact guard  -AC (r) WB (t) AC (c) --        Position/Device Used, Standing Balance walker, front-wheeled  -AC (r) WB (t) AC (c) --          Wound 05/23/23 1900 Left proximal leg    Wound - Properties Group Placement Date: 05/23/23  -CR Placement Time: 1900  -CR Present on Hospital Admission: Y  -CR Side: Left  -CR Orientation: proximal  -CR Location: leg  -CR    Retired Wound - Properties Group Placement Date: 05/23/23  -CR Placement Time: 1900  -CR Present on Hospital Admission: Y  -CR Side: Left  -CR Orientation: proximal  -CR Location: leg  -CR    Retired Wound - Properties Group Date first assessed: 05/23/23  -CR Time first assessed: 1900  -CR Present on Hospital Admission: Y  -CR Side: Left  -CR Location: leg  -CR       Wound 05/23/23 1900 Right proximal leg    Wound - Properties Group Placement Date: 05/23/23  -CR Placement Time: 1900  -CR Present on Hospital Admission: Y  -CR Side: Right  -CR Orientation: proximal  -CR Location: leg  -CR    Retired Wound - Properties Group Placement Date: 05/23/23  -CR Placement Time: 1900  -CR Present on Hospital Admission: Y  -CR Side: Right  -CR Orientation: proximal  -CR Location: leg  -CR    Retired Wound - Properties Group Date first assessed: 05/23/23  -CR Time first assessed: 1900  -CR Present on Hospital Admission: Y  -CR Side: Right  -CR Location: leg  -CR       Wound 04/19/23 1140 Left lower leg Incision    Wound - Properties Group Placement Date: 04/19/23  -DT Placement Time: 1140  -DT Present on Hospital Admission: N  -DT Side: Left  -DT Orientation: lower  -DT Location: leg  -DT Primary Wound Type: Incision  -DT    Retired Wound - Properties Group Placement Date: 04/19/23  -DT Placement Time: 1140  -DT Present on Hospital Admission: N  -DT Side: Left  -DT Orientation: lower  -DT Location: leg  -DT Primary Wound Type: Incision  -DT    Retired Wound - Properties Group Date first assessed: 04/19/23  -DT Time first assessed: 1140  -DT Present on Hospital Admission: N  -DT Side: Left  -DT Location: leg  -DT  Primary Wound Type: Incision  -DT       Wound 05/25/23 1341 Left posterior hand Skin Tear    Wound - Properties Group Placement Date: 05/25/23  -DF Placement Time: 1341  -DF Side: Left  -DF Orientation: posterior  -DF Location: hand  -DF Primary Wound Type: Skin tear  -DF    Retired Wound - Properties Group Placement Date: 05/25/23  -DF Placement Time: 1341  -DF Side: Left  -DF Orientation: posterior  -DF Location: hand  -DF Primary Wound Type: Skin tear  -DF    Retired Wound - Properties Group Date first assessed: 05/25/23  -DF Time first assessed: 1341  -DF Side: Left  -DF Location: hand  -DF Primary Wound Type: Skin tear  -DF       Plan of Care Review    Plan of Care Reviewed With patient  -AC (r) WB (t) AC (c) --       Progress no change  -AC (r) WB (t) AC (c) --       Outcome Evaluation OT eval completed. Pt reported to ED d/t dypnea upon exertion and was admitted upon CBC indicating anemia and thrombocytopenia. Current CBC remains abnormal. Pt was previously independent for all ADL and home management tasks using various AE. AXOx4. Pt was up with aide walking to bathroom when OT arrived. Required maxA for hygiene. Required SBA for functional transfers using AE. Pt completed functional mobility <> bathroom to chair with CGA. Unable to don/doff socks d/t inability to reach feet. B UE AROM WFL. B shoulder strength 4/5. B distal UE strength 4+/5. Education provided on AE pt could obtain to improve independence with ADL tasks. Pt's weakness, fatigue, endurance, limited range of motion, and body habitus impact her independence with ADL completion. Pt would benefit from skilled OT services. OT recommends d/c to SNF to continue working on ADL independence and endurance during occupations.  -AC (r) WB (t) AC (c) --          Positioning and Restraints    Pre-Treatment Position standing in room  -AC (r) WB (t) AC (c) --       Post Treatment Position chair  -AC (r) WB (t) AC (c) --       In Chair call light within  reach;encouraged to call for assist;legs elevated  -AC (r) WB (t) AC (c) --          Therapy Assessment/Plan (OT)    Date of Referral to OT 05/25/23  -AC (r) WB (t) AC (c) --       OT Diagnosis decreased ADLs  -AC (r) WB (t) AC (c) --       Rehab Potential (OT) good, to achieve stated therapy goals  -AC (r) WB (t) AC (c) --       Criteria for Skilled Therapeutic Interventions Met (OT) yes;skilled treatment is necessary  -AC (r) WB (t) AC (c) --       Therapy Frequency (OT) 3 times/wk  -AC (r) WB (t) AC (c) --       Predicted Duration of Therapy Intervention (OT) 10 days  -AC (r) WB (t) AC (c) --       Planned Therapy Interventions (OT) activity tolerance training;adaptive equipment training;BADL retraining;functional balance retraining;occupation/activity based interventions;patient/caregiver education/training;transfer/mobility retraining;strengthening exercise  -AC (r) WB (t) AC (c) --          OT Goals    Bathing Goal Selection (OT) bathing, OT goal 1  -AC (r) WB (t) AC (c) --       Dressing Goal Selection (OT) dressing, OT goal 1  -AC (r) WB (t) AC (c) --       Toileting Goal Selection (OT) toileting, OT goal 1  -AC (r) WB (t) AC (c) --          Bathing Goal 1 (OT)    Activity/Device (Bathing Goal 1, OT) bathing skills, all;grab bar, tub/shower;tub bench  -AC (r) WB (t) AC (c) --       [place] Level/Cues Needed (Bathing Goal 1, OT) standby assist  -AC (r) WB (t) AC (c) --       Time Frame (Bathing Goal 1, OT) long term goal (LTG)  -AC (r) WB (t) AC (c) --       Progress/Outcomes (Bathing Goal 1, OT) new goal  -AC (r) WB (t) AC (c) --          Dressing Goal 1 (OT)    Activity/Device (Dressing Goal 1, OT) dressing skills, all;sock-aid  -AC (r) WB (t) AC (c) --       [place]/Cues Needed (Dressing Goal 1, OT) standby assist  -AC (r) WB (t) AC (c) --       Time Frame (Dressing Goal 1, OT) long term goal (LTG)  -AC (r) WB (t) AC (c) --       Progress/Outcome (Dressing Goal 1, OT) new goal  -AC (r) WB (t) AC  (c) --          Toileting Goal 1 (OT)    Activity/Device (Toileting Goal 1, OT) toileting skills, all  -AC (r) WB (t) AC (c) --       [place] Level/Cues Needed (Toileting Goal 1, OT) standby assist  -AC (r) WB (t) AC (c) --       Time Frame (Toileting Goal 1, OT) long term goal (LTG)  -AC (r) WB (t) AC (c) --       Progress/Outcome (Toileting Goal 1, OT) new goal  -AC (r) WB (t) AC (c) --             User Key  (r) = Recorded By, (t) = Taken By, (c) = Cosigned By    Initials Name Effective Dates    AC Leonardo Duncan, OTR/L, CNT 02/03/23 -     Earnestine Davila RN 06/16/21 -     Ludwin Sam RN 02/17/22 -     Alejandro Carr RN 03/03/23 -     Arsalan Gao OT Student 12/05/22 -                  Occupational Therapy Education     Title: PT OT SLP Therapies (In Progress)     Topic: Occupational Therapy (In Progress)     Point: ADL training (Done)     Description:   Instruct learner(s) on proper safety adaptation and remediation techniques during self care or transfers.   Instruct in proper use of assistive devices.              Learning Progress Summary           Patient Acceptance, E, VU by  at 5/25/2023 4835    Comment: AE options and safety techniques during ADL completion.                   Point: Home exercise program (Not Started)     Description:   Instruct learner(s) on appropriate technique for monitoring, assisting and/or progressing therapeutic exercises/activities.              Learner Progress:  Not documented in this visit.          Point: Precautions (Not Started)     Description:   Instruct learner(s) on prescribed precautions during self-care and functional transfers.              Learner Progress:  Not documented in this visit.          Point: Body mechanics (Not Started)     Description:   Instruct learner(s) on proper positioning and spine alignment during self-care, functional mobility activities and/or exercises.              Learner Progress:  Not documented in this  visit.                      User Key     Initials Effective Dates Name Provider Type Discipline     12/05/22 -  Arsalan Elizalde OT Student OT Student OT                  OT Recommendation and Plan  Planned Therapy Interventions (OT): activity tolerance training, adaptive equipment training, BADL retraining, functional balance retraining, occupation/activity based interventions, patient/caregiver education/training, transfer/mobility retraining, strengthening exercise  Therapy Frequency (OT): 3 times/wk  Plan of Care Review  Plan of Care Reviewed With: patient  Progress: no change  Outcome Evaluation: OT eval completed. Pt reported to ED d/t dypnea upon exertion and was admitted upon CBC indicating anemia and thrombocytopenia. Current CBC remains abnormal. Pt was previously independent for all ADL and home management tasks using various AE. AXOx4. Pt was up with aide walking to bathroom when OT arrived. Required maxA for hygiene. Required SBA for functional transfers using AE. Pt completed functional mobility <> bathroom to chair with CGA. Unable to don/doff socks d/t inability to reach feet. B UE AROM WFL. B shoulder strength 4/5. B distal UE strength 4+/5. Education provided on AE pt could obtain to improve independence with ADL tasks. Pt's weakness, fatigue, endurance, limited range of motion, and body habitus impact her independence with ADL completion. Pt would benefit from skilled OT services. OT recommends d/c to SNF to continue working on ADL independence and endurance during occupations.  Plan of Care Reviewed With: patient  Outcome Evaluation: OT eval completed. Pt reported to ED d/t dypnea upon exertion and was admitted upon CBC indicating anemia and thrombocytopenia. Current CBC remains abnormal. Pt was previously independent for all ADL and home management tasks using various AE. AXOx4. Pt was up with aide walking to bathroom when OT arrived. Required maxA for hygiene. Required SBA for functional  transfers using AE. Pt completed functional mobility <> bathroom to chair with CGA. Unable to don/doff socks d/t inability to reach feet. B UE AROM WFL. B shoulder strength 4/5. B distal UE strength 4+/5. Education provided on AE pt could obtain to improve independence with ADL tasks. Pt's weakness, fatigue, endurance, limited range of motion, and body habitus impact her independence with ADL completion. Pt would benefit from skilled OT services. OT recommends d/c to SNF to continue working on ADL independence and endurance during occupations.     Outcome Measures     Row Name 05/25/23 1400             How much help from another is currently needed...    Putting on and taking off regular lower body clothing? 2  -AC (r) WB (t) AC (c)      Bathing (including washing, rinsing, and drying) 2  -AC (r) WB (t) AC (c)      Toileting (which includes using toilet bed pan or urinal) 2  -AC (r) WB (t) AC (c)      Putting on and taking off regular upper body clothing 2  -AC (r) WB (t) AC (c)      Taking care of personal grooming (such as brushing teeth) 3  -AC (r) WB (t) AC (c)      Eating meals 4  -AC (r) WB (t) AC (c)      AM-PAC 6 Clicks Score (OT) 15  -AC (r) WB (t)         Functional Assessment    Outcome Measure Options AM-PAC 6 Clicks Daily Activity (OT)  -AC (r) WB (t) AC (c)            User Key  (r) = Recorded By, (t) = Taken By, (c) = Cosigned By    Initials Name Provider Type    Leonardo Iglesias, OTR/L, CNT Occupational Therapist    Arsalan Gao OT Student OT Student                Time Calculation:    Time Calculation- OT     Row Name 05/25/23 1426             Time Calculation- OT    OT Start Time 1310  +15 min chart review  -AC (r) WB (t) AC (c)      OT Stop Time 1354  -AC (r) WB (t) AC (c)      OT Time Calculation (min) 44 min  -AC (r) WB (t)      OT Received On 05/25/23  -AC (r) WB (t) AC (c)      OT Goal Re-Cert Due Date 06/04/23  -AC (r) WB (t) AC (c)            User Key  (r) = Recorded By, (t) = Taken  By, (c) = Cosigned By    Initials Name Provider Type    AC Leonardo Duncan, OTR/L, CNT Occupational Therapist    Arsalan Gao OT Student OT Student                       Arsalan Elizalde OT Student  5/25/2023

## 2023-05-25 NOTE — PROGRESS NOTES
HEMATOLOGY AND MEDICAL ONCOLOGY PROGRESS NOTE    Patient name: Brenda Sneed  Patient : 1950  VISIT # [id number]  MR #[id number]  Room: 376    SUBJECTIVE:  Out of CCU.  Lab unable to get any blood this am    INTERVAL HISTORY:  Brenda Sneed is a 72-year-old  female admitted to [facility] through the ED on 2023 by Dr. Tristen Block with a several week history of increasing dyspnea on exertion, difficulty ambulating across the room in her house even accomplishing activities of daily living such as going to the bathroom etc.  When evaluated in the ED she is found to have anemia and thrombocytopenia.     CBC on 2023 had a WBC of 7.16 with 44.5% segmented neutrophils, 38.1% lymphocytes, 13% monocytes, 2.9% eosinophils and 0.7% basophils.  Hemoglobin and hematocrit were 7.1 and 23.3 respectively with a platelet count of 5000.      Significant comorbid associated medical problems include:  Morbid obesity  Significant bilateral lower extremity chronic venous stasis, massive edema and bilateral lower extremity cellulitis L>R, hypertension, arthritis, thyroid disease, anxiety/depression     Brenda was initially evaluated by Iram BAJWA on 2023 regarding thrombocytopenia.     HEMATOLOGICAL HISTORY FROM OFFICE RECORDS  Brenda was initially evaluated by Iram BAJWA on 2023 referred to the clinic by Dr. Dia for evaluation of Anemia and thrombocytopenia.      Review of prior CBCs at [facility] reveals chronic, intermittent anemia dating to 2015.  Chronic thrombocytopenia has been present dating to at least 2016 with platelet count ranging from 69,000 -136,000.     Brenda underwent laceration repair of her left leg by Dr. Sarita Vazquez 2023, status post fall with leg injury.  She was also treated for cellulitis with IV Vancomycin and Zosyn.  She underwent washout.  Cellulitis subsequently resolved.  She was discharged with dry dressing changes and oral Keflex x7 days      CBC 4/28/2023: WBC: 5.6, Hgb: 10.6, MCV: 96, PLT: 65,000      Labs 5/12/2023  CBC: WBC 6.0, Hgb 9.4/MCV 98, platelets 102,000  CMP: Creatinine 0.81, GFR 76, calcium 8.8, total protein 5.7, alkaline phosphatase 138 ()  BNP: 72.2 (0-100) CRP: 21 (0-10)     Brenda denied melena or hematochezia.  Recent renal function was normal.  She denies overt bleeding, though has significant, scattered bruising throughout her body which is clearly visible on exposed areas during exam.    She denies regular alcohol use.  She drank a few margaritas for couple years in her early 40s but none since.  She has no known history of liver disease.  She is a never smoker     Serology 5/19/2023   Serum Fe 32  TIBC - 280  Fe sat - 11%  Ferritin - 83.1     B12 = 1,168  Folate - 6.5  Copper in process  Zinc in process     Hapto - 70  LDH - 312     Hep A, B, C - nonreactive  HIV - Nonreactive     SPEP in process  QI in process  Free serum light chain in process     ASSESSMENT and PLAN developed on 5/19/2023 hematology consultation visit:  She take ibuprofen 2-3 times per day, in addition to Cymbalta daily which can increase bleeding and can be exacerbated in the setting of thrombocytopenia.     Possible causes of chronic anemia and thrombocytopenia discussed.   Baseline serology requested. Obtain occult blood stools.  Can not rule out underlying bone marrow disorder.     Chronic anemia. Check anemia labs  Check stool for OB  Chronic thrombocytopenia  Check labs as noted below, include PBS  Obtain US liver/spleen re: elevated bilirubin (1.7 on 4/18/2023) and spleen size re: thrombocytopenia  If unrevealing, will need to consider BMAB, which was discussed   Suspect bruising exacerbated by Ibuprofen/Cymbalta in a setting of thrombocytopenia  Suspect platelets recently worsened r/t infection/medication (vancomycin);    OBJECTIVE:      Vitals:    05/25/23 0422   BP: 145/61   Pulse: 66   Resp: 16   Temp: 98.2 °F (36.8 °C)   SpO2: 94%        Intake/Output Summary (Last 24 hours) at 5/25/2023 0639  Last data filed at 5/24/2023 2014  Gross per 24 hour   Intake --   Output 450 ml   Net -450 ml       PHYSICAL EXAM:  CONSTITUTIONAL: Alert, appropriate, weak and tired, morbid obesity  EYES: Minimally icteric  NECK: Supple, no masses   CHEST/LUNGS: CTA bilaterally, normal respiratory effort  CARDIOVASCULAR: RRR, no murmurs  ABDOMEN: soft non-tender, active bowel sounds, no HSM  EXTREMITIES: Morbid obesity with chronic venous stasis changes of the lower extremities with massive edema and cellulitis, scrapes on the left leg with scabs and erythema consistent with cellulitis  SKIN: venous stasis changes of the lower extremities with massive edema and erythema, Left leg wrapped, ecchymosis on arms  LYMPH: No obvious cervical, clavicular, axillary, or inguinal lymphadenopathy, difficult to palpate due to morbid obesity  NEUROLOGIC: follows commands, mental status appears to be intact, she does not appear to have focal findings but neurological exam not performed in detail  PSYCH: mood and affect appropriate      CBC  Results from last 7 days   Lab Units 05/24/23  0254 05/23/23  1710 05/23/23  0230   WBC 10*3/mm3 2.91* 3.08* 7.16   HEMOGLOBIN g/dL 7.7* 7.4* 7.1*   HEMATOCRIT % 23.3* 23.4* 23.3*   PLATELETS 10*3/mm3 13* 2* 5*         Lab Results   Component Value Date     05/24/2023    K 3.8 05/24/2023     05/24/2023    CO2 26.0 05/24/2023    BUN 11 05/24/2023    CREATININE 0.63 05/24/2023    GLUCOSE 98 05/24/2023    CALCIUM 8.4 (L) 05/24/2023    BILITOT 1.4 (H) 05/24/2023    ALKPHOS 130 (H) 05/24/2023    AST 28 05/24/2023    ALT 8 05/24/2023    AGRATIO 1.3 05/24/2023    GLOB 2.4 05/24/2023       Lab Results   Component Value Date    INR 1.04 05/24/2023    INR 1.17 (H) 05/23/2023    INR 1.14 (H) 04/18/2023    PROTIME 13.8 05/24/2023    PROTIME 15.1 (H) 05/23/2023    PROTIME 14.7 04/18/2023       Cultures:    Lab Results   Component Value Date     BLOODCX No growth at 24 hours 05/23/2023    BLOODCX No growth at 24 hours 05/23/2023     No components found for: URINCX    ASSESSMENT/PLAN:  #1  Pancytopenia - Severe anemia and thrombocytopenia    Review of prior CBCs at [facility] reveals chronic, intermittent anemia dating to 4/2015.  Chronic thrombocytopenia has been present dating to at least 7/2016 with platelet count ranging from 69,000 -136,000.     Brenda underwent laceration repair of her left leg by Dr. Sarita Vazquez 4/7/2023, status post fall with leg injury.  She was also treated for cellulitis with IV Vancomycin and Zosyn.  She underwent washout.  Cellulitis subsequently resolved.  She was discharged with dry dressing changes and oral Keflex x7 days     CBC 4/28/2023: WBC: 5.6, Hgb: 10.6, MCV: 96, PLT: 65,000      Labs 5/12/2023  CBC: WBC 6.0, Hgb 9.4/MCV 98, platelets 102,000  CMP: Creatinine 0.81, GFR 76, calcium 8.8, total protein 5.7, alkaline phosphatase 138 ()  BNP: 72.2 (0-100) CRP: 21 (0-10)     CBC w/diff            4/18/2023    18:00 4/21/2023    05:54 5/23/2023    02:30 5/23/2023    17:10   CBC w/Diff   WBC 5.57   4.59   7.16   3.08     RBC 2.51   2.78   2.40   2.57     Hemoglobin 9.0   8.6   7.1   7.4     Hematocrit 25.3   27.5   23.3   23.4     .8   98.9   97.1   91.1     MCH 35.9   30.9   29.6   28.8     MCHC 35.6   31.3   30.5   31.6     RDW 14.9   15.1   15.3   15.4     Platelets 104   112   5   2     Neutrophil Rel % 67.8     44.5       Immature Granulocyte Rel % 0.4           Lymphocyte Rel % 20.1     38.1       Monocyte Rel % 10.8     13.0       Eosinophil Rel % 0.7     2.9       Basophil Rel % 0.2     0.7          CMP reveals a normal creatinine of 0.84  Lactate - 1.8 WNL  BNP - 300.2 - WNL     Her admitting CBC is significantly different than it was on 5/12/2023.  Hgb has dropped to 7.1, platelet is down to 5000.  She is currently in the coronary care unit-room 9.  She is status post 1 unit packed red blood cell and 1  pheresis of platelets.  No active bleeding at present per nursing.     Brenda has a chronic pancytopenia dating back many years.  This could be due to hypersplenism secondary to fatty liver disease or a primary bone marrow disorder.  I believe that she has had an exacerbation of the mild pancytopenia resulting in the current hospitalization with worsening anemia and profound thrombocytopenia.       Serology 5/19/2023   Serum Fe 32  TIBC - 280  Fe sat - 11%  Ferritin - 83.1     B12 = 1,168  Folate - 6.5  Copper in process  Zinc in process     Hapto - 70  LDH - 312     Hep A, B, C - nonreactive  HIV - Nonreactive     SPEP in process  QI in process  Free serum light chain in process      Further serology has been requested as outlined below:  Antiplatelet antibody   DIC screen  CPK LDH, reticulocyte count and haptoglobin, Bry direct and indirect, direct and indirect bili, TSH  Peripheral blood smear to evaluate for schistocytes  AdamTS 13 to [place]  Monitor for bleeding  FOBT  2D Echo to evaluate cardiac status and rule out vegetations     Differential diagnosis of the current situation could include:   DIC, hemolytic anemia, ITP, TTP, infection (cellulitis, UTI) with bone marrow myelosuppression      Serology 5/23/2023 [facility]  PT/INR - 15.1/1.17  PTT - 32.6  Fibrinogen - 258  FSP >5 </= 20    Tbili 1.7 with 1.0 indirect  Hapto - in process  Retic - 6.16% with absolute 0.1546  LDH - 283 (135-214)  LALY (Bry) - Negative    TSH - 6.190    Antplatelet ab - in process    ADAMTS 13 - to be sent off    Peripheral blood smear   Moderate macrocytic anemia   Red blood cell agglutination   Normal total white blood cell count with the following manual differential:   Neutrophils 48%   Bands 1%   Lymphocytes 38%   Monocytes 8%   Eosinophils 3%   Basophils 1%   Myelocytes 1%   Severe peripheral thrombocytopenia   No schistocytes identified   No platelet clumps identified   No morulae identified in granulocytes or  monocytes     FOBT to be collected    S/p 1 unit pRBC on 5/23/2023  S/p 2 PLT on 5/23/2023      Lab Results   Component Value Date    WBC 2.91 (L) 05/24/2023    HGB 7.7 (L) 05/24/2023    HCT 23.3 (L) 05/24/2023    MCV 98.7 (H) 05/24/2023    PLT 13 (C) 05/24/2023     Lab Results   Component Value Date    NEUTROABS 1.48 (L) 05/24/2023     #1  Thrombocytopenia    Differential includes DIC, ITP, infection (cellulitis, UTI) with bone marrow myelosuppression   IV antibiotics ongoing with improvement in cellulitis    No schistocyts - No TTP    PT/INR  PTT  Fibrinogen    Transfuse 1 unit of platelets for platelet count < 10,000    #2  Anemia  Iron studies, B12 folic acid levels etc. are all normal.  Awaiting serology ordered    RBC agglutination on peripheral smear  No schistoctes    Transfuse 1 unit of PRBCs for hemoglobin <7.0          #3  UTI/ cellulitis    UA POSITIVE on 5/23/2023    Blood cx X 2 on 5/23/2023 - No growth at 24 hrs    Ceftriaxone 2 g IV x 1 given on 5/23/2023 - will order daily      Discussed with nurse - no venous access - Will get PICC placed - vascular access team consulted    ALISA Fang    05/25/23  06:39 CDT      Physicians attestation and contribution:    I, Morales Cantu, personally and independently performed an evaluation on [facility]  I have reviewed relevant medical information/data to include but not limited to the medication list, relevant appropriate lab work and imaging when applicable.  I reviewed other physician's notes, ancillary services and nurses assessments.  I have reviewed the above documentation completed by Warren Marie PA-C  Please see my additional addended and/or modified contributions to the history of present illness, physical examination and assessment/medical decision-making and plan that reflects my findings and impressions.  I discussed the essential elements of the care plan with Warren Marie PA-C and the patient.  I have encouraged and answered all  the questions raised to the patient's understanding and satisfaction.  I concur with the above stated.    Subjective: Symptomatically stable without significant improvement    Objective: No new objective findings, continue diuresis    Assessment/plan:  Unable to draw full complement lab work.  We will discuss with nursing to ask lab to get a fingerstick CBC to be able to at least see how things are going today from that regard.  Other lab work pending    Morales Cantu MD  5/25/2023 07:32 CDT

## 2023-05-25 NOTE — PLAN OF CARE
Goal Outcome Evaluation:  Plan of Care Reviewed With: patient        Progress: no change  Outcome Evaluation: Pt c/o pain x1 since I took over her care; sat up for lunch for several hours; 1 unit of platelets given per order; voiding/purewick in place; up with assist; safety maintained.

## 2023-05-25 NOTE — PROGRESS NOTES
Brenda Sneed is a 72 y.o. female patient.    Looks and feels better.  Needs out of bed.  No venous access/unable to draw blood this morning.      Current Facility-Administered Medications   Medication Dose Route Frequency Provider Last Rate Last Admin    acetaminophen (TYLENOL) tablet 650 mg  650 mg Oral Q4H PRN Sachin Dia MD        ascorbic acid (VITAMIN C) tablet 500 mg  500 mg Oral Daily Sachin Dia MD   500 mg at 05/24/23 0818    sennosides-docusate (PERICOLACE) 8.6-50 MG per tablet 2 tablet  2 tablet Oral BID Sachin Dia MD   2 tablet at 05/24/23 2007    And    polyethylene glycol (MIRALAX) packet 17 g  17 g Oral Daily PRN Sachin Dia MD        And    bisacodyl (DULCOLAX) EC tablet 5 mg  5 mg Oral Daily PRN Sachin Dia MD        And    bisacodyl (DULCOLAX) suppository 10 mg  10 mg Rectal Daily PRN Sachin Dia MD        bisoprolol (ZEBeta) 5 mg, hydroCHLOROthiazide (HYDRODIURIL) 6.25 mg for [place] 5-6.25   Oral Daily Sachin Dia MD   Given at 05/24/23 0817    calcium carbonate (TUMS) chewable tablet 500 mg (200 mg elemental)  2 tablet Oral TID PRN Sachin Dia MD        cefdinir (OMNICEF) capsule 300 mg  300 mg Oral Q12H Sachin Dia MD        cyclobenzaprine (FLEXERIL) tablet 10 mg  10 mg Oral TID PRN Sachin Dia MD        docusate sodium (COLACE) capsule 100 mg  100 mg Oral BID Sachin Dia MD   100 mg at 05/24/23 2007    DULoxetine (CYMBALTA) DR capsule 60 mg  60 mg Oral BID Sachin Dia MD   60 mg at 05/24/23 2007    HYDROmorphone (DILAUDID) injection 0.5 mg  0.5 mg Intravenous Q2H PRN Sachin Dia MD   0.5 mg at 05/25/23 0033    levothyroxine (SYNTHROID, LEVOTHROID) tablet 100 mcg  100 mcg Oral Q AM Sachin Dia MD   100 mcg at 05/25/23 0501    melatonin tablet 5 mg  5 mg Oral Nightly PRN Sachin Dia MD        nystatin (MYCOSTATIN) powder 1 application  1  "application Topical BID Sachin Dia MD   1 application at 05/24/23 2008    ondansetron (ZOFRAN) injection 4 mg  4 mg Intravenous Q6H PRN Sacihn Dia MD        oxyCODONE-acetaminophen (PERCOCET) 5-325 MG per tablet 2 tablet  2 tablet Oral Q4H PRN Sachin Dia MD   2 tablet at 05/25/23 0429    pramipexole (MIRAPEX) tablet 0.75 mg  0.75 mg Oral Q8H Sachin Dia MD   0.75 mg at 05/25/23 0501    sodium chloride 0.9 % flush 10 mL  10 mL Intravenous Q12H Sachin Dia MD   10 mL at 05/24/23 2008    sodium chloride 0.9 % flush 10 mL  10 mL Intravenous PRN Sachin Dia MD        sodium chloride 0.9 % infusion 40 mL  40 mL Intravenous PRN Sachin Dia MD        vitamin D3 capsule 5,000 Units  5,000 Units Oral Daily Sachin Dia MD   5,000 Units at 05/24/23 0818     ALLERGIES:    Allergies   Allergen Reactions    Codeine Itching and Rash     SEVERE RASH/ITCHING (TOLERATES PERCOCET)    Benadryl [Diphenhydramine] Other (See Comments)     KEEPS PATIENT AWAKE        Thrombocytopenia    HTN (hypertension)    Obesity, Class III, BMI 40-49.9 (morbid obesity)    Anxiety associated with depression    Acute cystitis with hematuria    Blood pressure 155/70, pulse 66, temperature 98.2 °F (36.8 °C), temperature source Oral, resp. rate 16, height 160 cm (63\"), weight 115 kg (254 lb), SpO2 96 %, not currently breastfeeding.      Subjective:  Symptoms:  Stable.    Diet:  Adequate intake.    Activity level: Impaired due to weakness.    Pain:  She complains of pain that is moderate.  She reports pain is unchanged.  Pain is partially controlled.    Review of Systems  Objective:  General Appearance:  Comfortable and well-appearing.    Vital signs: (most recent): Blood pressure 155/70, pulse 66, temperature 98.2 °F (36.8 °C), temperature source Oral, resp. rate 16, height 160 cm (63\"), weight 115 kg (254 lb), SpO2 96 %, not currently breastfeeding.  Vital signs are normal.  "   Output: Producing urine and minimal stool output.    HEENT: Normal HEENT exam.    Lungs:  Normal effort and normal respiratory rate.    Heart: Normal rate.  Regular rhythm.    Abdomen: Abdomen is soft.  There is generalized tenderness.     Pulses: Distal pulses are intact.  (Wounds on leg and dressing today)    Skin:  Warm and dry.              Labs:  Lab Results (last 72 hours)       Procedure Component Value Units Date/Time    Blood Culture - Blood, Arm, Left [[id number]]  (Normal) Collected: 05/23/23 0548    Specimen: Blood from Arm, Left Updated: 05/25/23 0730     Blood Culture No growth at 2 days    Blood Culture - Blood, Arm, Left [[id number]]  (Normal) Collected: 05/23/23 0548    Specimen: Blood from Arm, Left Updated: 05/25/23 0730     Blood Culture No growth at 2 days    Haptoglobin [[id number]]  (Normal) Collected: 05/23/23 0109    Specimen: Blood Updated: 05/24/23 2013     Haptoglobin 71 mg/dL     Tissue / Bone Culture - Tissue, Leg, Left [[id number]] Collected: 05/24/23 1054    Specimen: Tissue from Leg, Left Updated: 05/24/23 1321     Gram Stain Moderate (3+) Gram negative bacilli      No WBCs seen    Protime-INR [[id number]]  (Normal) Collected: 05/24/23 0844    Specimen: Blood Updated: 05/24/23 0902     Protime 13.8 Seconds      INR 1.04    aPTT [[id number]]  (Normal) Collected: 05/24/23 0844    Specimen: Blood Updated: 05/24/23 0902     PTT 30.1 seconds     Fibrinogen [[id number]]  (Normal) Collected: 05/24/23 0844    Specimen: Blood Updated: 05/24/23 0902     Fibrinogen 294 mg/dL     Peripheral Blood Smear [[id number]] Collected: 05/23/23 0230    Specimen: Blood Updated: 05/24/23 0837     Performed by: Kamilah Nogueira MD     Pathologist Interpretation --     Moderate macrocytic anemia  Red blood cell agglutination    Normal total white blood cell count with the following manual differential:  Neutrophils 48%  Bands 1%  Lymphocytes 38%  Monocytes 8%  Eosinophils 3%  Basophils 1%  Myelocytes 1%  Severe  peripheral thrombocytopenia    No schistocytes identified  No platelet clumps identified  No morulae identified in granulocytes or monocytes    Manual Differential [[id number]]  (Abnormal) Collected: 05/24/23 0254    Specimen: Blood Updated: 05/24/23 0423     Neutrophil % 51.0 %      Lymphocyte % 37.0 %      Monocyte % 9.0 %      Eosinophil % 3.0 %      Neutrophils Absolute 1.48 10*3/mm3      Lymphocytes Absolute 1.08 10*3/mm3      Monocytes Absolute 0.26 10*3/mm3      Eosinophils Absolute 0.09 10*3/mm3      Anisocytosis Slight/1+     Hypochromia Slight/1+     Poikilocytes Slight/1+     Polychromasia Slight/1+     Target Cells Slight/1+     WBC Morphology Normal     Platelet Estimate Decreased    CBC & Differential [[id number]]  (Abnormal) Collected: 05/24/23 0254    Specimen: Blood Updated: 05/24/23 0423    Narrative:      The following orders were created for panel order CBC & Differential.  Procedure                               Abnormality         Status                     ---------                               -----------         ------                     CBC Auto Differential[[id number]]        Abnormal            Final result                 Please view results for these tests on the individual orders.    CBC Auto Differential [[id number]]  (Abnormal) Collected: 05/24/23 0254    Specimen: Blood Updated: 05/24/23 0423     WBC 2.91 10*3/mm3      RBC 2.36 10*6/mm3      Hemoglobin 7.7 g/dL      Hematocrit 23.3 %      MCV 98.7 fL      MCH 32.6 pg      MCHC 33.0 g/dL      RDW 19.3 %      RDW-SD 53.3 fl      MPV 12.7 fL      Platelets 13 10*3/mm3     Narrative:      Rec platelets    Comprehensive Metabolic Panel [[id number]]  (Abnormal) Collected: 05/24/23 0254    Specimen: Blood Updated: 05/24/23 0327     Glucose 98 mg/dL      BUN 11 mg/dL      Creatinine 0.63 mg/dL      Sodium 140 mmol/L      Potassium 3.8 mmol/L      Chloride 104 mmol/L      CO2 26.0 mmol/L      Calcium 8.4 mg/dL      Total Protein 5.6 g/dL       Albumin 3.2 g/dL      ALT (SGPT) 8 U/L      AST (SGOT) 28 U/L      Alkaline Phosphatase 130 U/L      Total Bilirubin 1.4 mg/dL      Globulin 2.4 gm/dL      A/G Ratio 1.3 g/dL      BUN/Creatinine Ratio 17.5     Anion Gap 10.0 mmol/L      eGFR 93.2 mL/min/1.73     Narrative:      GFR Normal >60  Chronic Kidney Disease <60  Kidney Failure <15    The GFR formula is only valid for adults with stable renal function between ages 18 and 70.    TSH [[id number]]  (Abnormal) Collected: 05/23/23 1910    Specimen: Blood Updated: 05/23/23 2024     TSH 6.190 uIU/mL     Bilirubin, Total & Direct [[id number]]  (Abnormal) Collected: 05/23/23 1910    Specimen: Blood Updated: 05/23/23 2017     Total Bilirubin 1.7 mg/dL      Bilirubin, Direct 0.7 mg/dL      Bilirubin, Indirect 1.0 mg/dL     Reticulocytes [[id number]]  (Abnormal) Collected: 05/23/23 1710    Specimen: Blood Updated: 05/23/23 2006     Reticulocyte % 6.16 %      Reticulocyte Absolute 0.1546 10*6/mm3     CK [[id number]]  (Normal) Collected: 05/23/23 1910    Specimen: Blood Updated: 05/1948     Creatine Kinase 65 U/L     Fibrin Split Products [[id number]]  (Abnormal) Collected: 05/23/23 1710    Specimen: Blood Updated: 05/23/23 1824     Fibrin Split Products Greater than or equal to 5, but less than 20 mcg/mL    Manual Differential [[id number]]  (Abnormal) Collected: 05/23/23 1710    Specimen: Blood Updated: 05/23/23 1815     Neutrophil % 61.4 %      Lymphocyte % 29.5 %      Monocyte % 8.0 %      Basophil % 1.1 %      Neutrophils Absolute 1.89 10*3/mm3      Lymphocytes Absolute 0.91 10*3/mm3      Monocytes Absolute 0.25 10*3/mm3      Basophils Absolute 0.03 10*3/mm3      nRBC 3.4 /100 WBC      Anisocytosis Slight/1+     Polychromasia Slight/1+     Target Cells Slight/1+     WBC Morphology Normal     Platelet Estimate Decreased    CBC & Differential [[id number]]  (Abnormal) Collected: 05/23/23 1710    Specimen: Blood Updated: 05/23/23 1815    Narrative:      The following  orders were created for panel order CBC & Differential.  Procedure                               Abnormality         Status                     ---------                               -----------         ------                     CBC Auto Differential[[id number]]        Abnormal            Final result                 Please view results for these tests on the individual orders.    CBC Auto Differential [[id number]]  (Abnormal) Collected: 05/23/23 1710    Specimen: Blood Updated: 05/23/23 1815     WBC 3.08 10*3/mm3      RBC 2.57 10*6/mm3      Hemoglobin 7.4 g/dL      Hematocrit 23.4 %      MCV 91.1 fL      MCH 28.8 pg      MCHC 31.6 g/dL      RDW 15.4 %      RDW-SD 50.4 fl      MPV 10.1 fL      Platelets 2 10*3/mm3     aPTT [[id number]]  (Normal) Collected: 05/23/23 1710    Specimen: Blood Updated: 05/23/23 1741     PTT 32.6 seconds     Protime-INR [[id number]]  (Abnormal) Collected: 05/23/23 1710    Specimen: Blood Updated: 05/23/23 1741     Protime 15.1 Seconds      INR 1.17    Fibrinogen [[id number]]  (Normal) Collected: 05/23/23 1710    Specimen: Blood Updated: 05/23/23 1741     Fibrinogen 258 mg/dL     Platelet Antibody Profile [[id number]] Collected: 05/23/23 1710    Specimen: Blood Updated: 05/23/23 1717    [id number] Activity [[id number]] Collected: 05/23/23 1710    Specimen: Blood Updated: 05/23/23 1717    Lactate Dehydrogenase [[id number]]  (Abnormal) Collected: 05/23/23 0109    Specimen: Blood Updated: 05/23/23 1649      U/L     Lactic Acid, Plasma [[id number]]  (Normal) Collected: 05/23/23 0615    Specimen: Blood Updated: 05/23/23 0643     Lactate 1.8 mmol/L     Magnesium [[id number]]  (Normal) Collected: 05/23/23 0109    Specimen: Blood Updated: 05/23/23 0543     Magnesium 1.7 mg/dL     Urinalysis, Microscopic Only - Urine, Clean Catch [[id number]]  (Abnormal) Collected: 05/23/23 0404    Specimen: Urine, Clean Catch Updated: 05/23/23 0438     RBC, UA 21-30 /HPF      WBC, UA 31-50 /HPF      Bacteria, UA 2+  /HPF      Squamous Epithelial Cells, UA None Seen /HPF      Hyaline Casts, UA 3-6 /LPF      Methodology Automated Microscopy    Urinalysis With Culture If Indicated - Urine, Clean Catch [[id number]]  (Abnormal) Collected: 05/23/23 0404    Specimen: Urine, Clean Catch Updated: 05/23/23 0438     Color, UA Dark Yellow     Appearance, UA Clear     pH, UA 6.0     Specific Gravity, UA 1.014     Glucose, UA Negative     Ketones, UA Negative     Bilirubin, UA Negative     Blood, UA Moderate (2+)     Protein, UA Negative     Leuk Esterase, UA Large (3+)     Nitrite, UA Negative     Urobilinogen, UA 1.0 E.U./dL    Narrative:      In absence of clinical symptoms, the presence of pyuria, bacteria, and/or nitrites on the urinalysis result does not correlate with infection.    CBC & Differential [[id number]]  (Abnormal) Collected: 05/23/23 0230    Specimen: Blood Updated: 05/23/23 0352    Narrative:      The following orders were created for panel order CBC & Differential.  Procedure                               Abnormality         Status                     ---------                               -----------         ------                     CBC Auto Differential[[id number]]        Abnormal            Final result               Scan Slide[[id number]]                                       Final result                 Please view results for these tests on the individual orders.    Scan Slide [[id number]] Collected: 05/23/23 0230    Specimen: Blood Updated: 05/23/23 0352     Polychromasia Mod/2+     WBC Morphology Normal     Platelet Estimate Decreased    CBC Auto Differential [[id number]]  (Abnormal) Collected: 05/23/23 0230    Specimen: Blood Updated: 05/23/23 0313     WBC 7.16 10*3/mm3      RBC 2.40 10*6/mm3      Hemoglobin 7.1 g/dL      Hematocrit 23.3 %      MCV 97.1 fL      MCH 29.6 pg      MCHC 30.5 g/dL      RDW 15.3 %      RDW-SD 52.4 fl      MPV 10.7 fL      Platelets 5 10*3/mm3      Neutrophil % 44.5 %      Lymphocyte %  38.1 %      Monocyte % 13.0 %      Eosinophil % 2.9 %      Basophil % 0.7 %      Neutrophils, Absolute 3.18 10*3/mm3      Lymphocytes, Absolute 2.73 10*3/mm3      Monocytes, Absolute 0.93 10*3/mm3      Eosinophils, Absolute 0.21 10*3/mm3      Basophils, Absolute 0.05 10*3/mm3     [place] Draw [[id number]] Collected: 05/23/23 0109    Specimen: Blood Updated: 05/23/23 0215    Narrative:      The following orders were created for panel order [place] Draw.  Procedure                               Abnormality         Status                     ---------                               -----------         ------                     Green Top (Gel)[[id number]]                                  Final result               Lavender Top[[id number]]                                     Final result               Red Top[[id number]]                                          Final result               Light Blue Top[[id number]]                                   Final result                 Please view results for these tests on the individual orders.    Green Top (Gel) [[id number]] Collected: 05/23/23 0109    Specimen: Blood Updated: 05/23/23 0215     Extra Tube Hold for add-ons.     Comment: Auto resulted.       Red Top [[id number]] Collected: 05/23/23 0109    Specimen: Blood Updated: 05/23/23 0215     Extra Tube Hold for add-ons.     Comment: Auto resulted.       Light Blue Top [[id number]] Collected: 05/23/23 0109    Specimen: Blood Updated: 05/23/23 0215     Extra Tube Hold for add-ons.     Comment: Auto resulted       Lavender Top [[id number]] Collected: 05/23/23 0109    Specimen: Blood Updated: 05/23/23 0215     Extra Tube hold for add-on     Comment: Auto resulted       Comprehensive Metabolic Panel [[id number]]  (Abnormal) Collected: 05/23/23 0109    Specimen: Blood Updated: 05/23/23 0145     Glucose 126 mg/dL      BUN 14 mg/dL      Creatinine 0.84 mg/dL      Sodium 136 mmol/L      Potassium 3.0 mmol/L      Chloride 100 mmol/L      CO2  25.0 mmol/L      Calcium 8.7 mg/dL      Total Protein 5.8 g/dL      Albumin 3.3 g/dL      ALT (SGPT) 10 U/L      AST (SGOT) 28 U/L      Alkaline Phosphatase 147 U/L      Total Bilirubin 1.5 mg/dL      Globulin 2.5 gm/dL      A/G Ratio 1.3 g/dL      BUN/Creatinine Ratio 16.7     Anion Gap 11.0 mmol/L      eGFR 73.0 mL/min/1.73     Narrative:      GFR Normal >60  Chronic Kidney Disease <60  Kidney Failure <15    The GFR formula is only valid for adults with stable renal function between ages 18 and 70.    BNP [[id number]]  (Normal) Collected: 05/23/23 0109    Specimen: Blood Updated: 05/23/23 0142     proBNP 300.2 pg/mL     Narrative:      Among patients with dyspnea, NT-proBNP is highly sensitive for the detection of acute congestive heart failure. In addition NT-proBNP of <300 pg/ml effectively rules out acute congestive heart failure with 99% negative predictive value.    Results may be falsely decreased if patient taking Biotin.      Single High Sensitivity Troponin T [[id number]]  (Abnormal) Collected: 05/23/23 0109    Specimen: Blood Updated: 05/23/23 0141     HS Troponin T 39 ng/L     Narrative:      High Sensitive Troponin T Reference Range:  <10.0 ng/L- Negative Female for AMI  <15.0 ng/L- Negative Male for AMI  >=10 - Abnormal Female indicating possible myocardial injury.  >=15 - Abnormal Male indicating possible myocardial injury.   Clinicians would have to utilize clinical acumen, EKG, Troponin, and serial changes to determine if it is an Acute Myocardial Infarction or myocardial injury due to an underlying chronic condition.                 Imaging Results (Last 72 Hours)       Procedure Component Value Units Date/Time    US Abdomen Complete [[id number]] Collected: 05/24/23 1645     Updated: 05/24/23 1651    Narrative:      EXAM/TECHNIQUE: US ABDOMEN COMPLETE-     INDICATION: thrombocytopenia; I89.0-Lymphedema, not elsewhere  classified; D69.6-Thrombocytopenia, unspecified; L03.116-Cellulitis of  left lower  limb; S81.812D-Laceration without foreign body, left lower  leg, subsequent encounter     COMPARISON: None     FINDINGS:     Visualized portion of the pancreas is unremarkable. Upper abdominal  aorta and IVC are unremarkable.     Liver echotexture is coarsened and the liver contour appears slightly  nodular. Patent portal vein with appropriate directional flow. No solid  liver lesion identified. Perihepatic ascites is present.     Gallbladder is reported surgically absent. The common bile duct is 7 mm  diameter, upper limits of normal, with prominence likely related to  reservoir phenomenon.     RIGHT kidney is 9.1 cm in length and demonstrates normal cortical  thickness and echogenicity. LEFT kidney is 9.1 cm in length and  demonstrates normal cortical thickness and echogenicity. No shadowing  calculi or hydronephrosis. No focal urinary bladder abnormality.     Spleen is enlarged measuring up to 13.4 cm. No focal splenic lesion.       Impression:         1.  Coarsened liver with mild surface contour nodularity, concerning for  chronic liver disease and cirrhosis.  2.  Perihepatic ascites.  3.  Mild splenomegaly.  This report was finalized on 05/24/2023 16:48 by Dr. Bryce Mancera MD.    XR Chest 2 View [[id number]] Collected: 05/23/23 0654     Updated: 05/23/23 0658    Narrative:      EXAM/TECHNIQUE: XR CHEST 2 VW-     INDICATION: Shortness of air     COMPARISON: 7/5/2022     FINDINGS:     Enlarged but stable cardiac silhouette. No pleural effusion,  pneumothorax, or focal consolidation. No acute osseous finding. LEFT  shoulder arthroplasty without evidence of complication. Partially imaged  postoperative change in the lower cervical spine.       Impression:         No acute findings.  This report was finalized on 05/23/2023 06:55 by Dr. Bryce Mancera MD.                  Assessment:    Condition: In stable condition.  Improving.       Plan:   Encourage ambulation and per physical therapy.    (    Pancytopenia-unable to draw blood this morning, will try fingerstick CBC today.  Hematology/oncology following and appreciate input.    Acute cystitis with hematuria present on admission-?  Urine culture done.  Unable to obtain venous access will try Omnicef 300 mg p.o. twice daily in place of her Rocephin.    Venous stasis ulcers left leg-continue with local wound care and oral antibiotics.    Patient lives in a 1 bedroom apartment alone, will get PT OT consults today to work on discharge planning.    ).   Problem List:     Thrombocytopenia    HTN (hypertension)    Obesity, Class III, BMI 40-49.9 (morbid obesity)    Anxiety associated with depression    Acute cystitis with hematuria    Sachin Dia MD  5/25/2023

## 2023-05-25 NOTE — PLAN OF CARE
Goal Outcome Evaluation:  Plan of Care Reviewed With: patient        Progress: no change  Outcome Evaluation: OT eval completed. Pt reported to ED d/t dypnea upon exertion and was admitted upon CBC indicating anemia and thrombocytopenia. Current CBC remains abnormal. Pt was previously independent for all ADL and home management tasks using various AE. AXOx4. Pt was up with aide walking to bathroom when OT arrived. Required maxA for hygiene. Required SBA for functional transfers using AE. Pt completed functional mobility <> bathroom to chair with CGA. Unable to don/doff socks d/t inability to reach feet. B UE AROM WFL. B shoulder strength 4/5. B distal UE strength 4+/5. Education provided on AE pt could obtain to improve independence with ADL tasks. Pt's weakness, fatigue, endurance, limited range of motion, and body habitus impact her independence with ADL completion. Pt would benefit from skilled OT services. OT recommends d/c to SNF to continue working on ADL independence and endurance during occupations.

## 2023-05-25 NOTE — CONSULTS
Patient or patient's representative gives informed consent after an explanation of the risks (air embolism; catheter occlusion; phlebitis; catheter infection; bloodstream infection; vein thrombosis; hematoma/bleeding at the insertion site; slight discomfort; accidental puncture of an artery, nerve, or tendon; dislodging of device), benefits (longer dwell time, outpatient treatment, medications that cannot run peripherally), and alternatives (short peripheral catheter, centrally inserted central line, or permanent catheter) of PICC placement. Time provided to ask and answer questions.    Pt had 4 [place] single lumen PICC placed in left basilic vein. Pt tolerated procedure well. 42 cm of catheter internal and 0 cm external. Tip confirmation by 3cg. All lumens flush and draw well. Sterile dressing applied.

## 2023-05-25 NOTE — NURSING NOTE
Patient was transferred to  and Nurse Sabrina was given report. Daughter was notified of the move.

## 2023-05-25 NOTE — PLAN OF CARE
Problem: Adult Inpatient Plan of Care  Goal: Plan of Care Review  Outcome: Ongoing, Progressing  Flowsheets (Taken 5/25/2023 0316)  Plan of Care Reviewed With: patient  Outcome Evaluation: Pt complains of pain in lower extremities, see MAR. A&O x4. Drsg changed as ordered. Cont pulse ox, room air. Tele. Safety maintained.

## 2023-05-26 LAB
ALBUMIN SERPL-MCNC: 3.12 G/DL (ref 3.75–5.01)
ALPHA1 GLOB SERPL ELPH-MCNC: 0.39 G/DL (ref 0.19–0.46)
ALPHA2 GLOB SERPL ELPH-MCNC: 0.54 G/DL (ref 0.48–1.05)
APTT PPP: 35.3 SECONDS (ref 24.1–35)
B-GLOBULIN SERPL ELPH-MCNC: 0.67 G/DL (ref 0.48–1.1)
BACTERIA UR QL AUTO: ABNORMAL /HPF
BASOPHILS # BLD AUTO: 0.03 10*3/MM3 (ref 0–0.2)
BASOPHILS NFR BLD AUTO: 0.6 % (ref 0–1.5)
BH BB BLOOD EXPIRATION DATE: NORMAL
BH BB BLOOD TYPE BARCODE: 6200
BH BB DISPENSE STATUS: NORMAL
BH BB PRODUCT CODE: NORMAL
BH BB UNIT NUMBER: NORMAL
BILIRUB UR QL STRIP: ABNORMAL
CLARITY UR: CLEAR
COLOR UR: ABNORMAL
DEPRECATED KAPPA LC FREE/LAMBDA SER: 1.84 {RATIO} (ref 0.26–1.65)
DEPRECATED RDW RBC AUTO: 50.5 FL (ref 37–54)
EOSINOPHIL # BLD AUTO: 0.13 10*3/MM3 (ref 0–0.4)
EOSINOPHIL NFR BLD AUTO: 2.8 % (ref 0.3–6.2)
ERYTHROCYTE [DISTWIDTH] IN BLOOD BY AUTOMATED COUNT: 18.5 % (ref 12.3–15.4)
FIBRINOGEN PPP-MCNC: 227 MG/DL (ref 240–460)
GAMMA GLOB SERPL ELPH-MCNC: 0.97 G/DL (ref 0.62–1.51)
GLUCOSE UR STRIP-MCNC: NEGATIVE MG/DL
HCT VFR BLD AUTO: 19.2 % (ref 34–46.6)
HGB BLD-MCNC: 7.1 G/DL (ref 12–15.9)
HGB UR QL STRIP.AUTO: ABNORMAL
HYALINE CASTS UR QL AUTO: ABNORMAL /LPF
IGA SERPL-MCNC: 263 MG/DL (ref 68–408)
IGG SERPL-MCNC: 817 MG/DL (ref 768–1632)
IGM SERPL-MCNC: 209 MG/DL (ref 35–263)
IMM GRANULOCYTES # BLD AUTO: 0.02 10*3/MM3 (ref 0–0.05)
IMM GRANULOCYTES NFR BLD AUTO: 0.4 % (ref 0–0.5)
INR PPP: 1.23 (ref 0.91–1.09)
INTERPRETATION SERPL IFE-IMP: ABNORMAL
INTERPRETATION SERPL IFE-IMP: ABNORMAL
KAPPA LC FREE SER-MCNC: 41.8 MG/L (ref 3.3–19.4)
KETONES UR QL STRIP: ABNORMAL
LABORATORY COMMENT REPORT: NORMAL
LAMBDA LC FREE SERPL-MCNC: 22.69 MG/L (ref 5.71–26.3)
LEUKOCYTE ESTERASE UR QL STRIP.AUTO: ABNORMAL
LYMPHOCYTES # BLD AUTO: 1.73 10*3/MM3 (ref 0.7–3.1)
LYMPHOCYTES NFR BLD AUTO: 37 % (ref 19.6–45.3)
MCH RBC QN AUTO: 37.4 PG (ref 26.6–33)
MCHC RBC AUTO-ENTMCNC: 37 G/DL (ref 31.5–35.7)
MCV RBC AUTO: 101.1 FL (ref 79–97)
MONOCYTES # BLD AUTO: 0.53 10*3/MM3 (ref 0.1–0.9)
MONOCYTES NFR BLD AUTO: 11.3 % (ref 5–12)
NEUTROPHILS NFR BLD AUTO: 2.23 10*3/MM3 (ref 1.7–7)
NEUTROPHILS NFR BLD AUTO: 47.9 % (ref 42.7–76)
NITRITE UR QL STRIP: NEGATIVE
NRBC BLD AUTO-RTO: 0.4 /100 WBC (ref 0–0.2)
PH UR STRIP.AUTO: 6.5 [PH] (ref 5–8)
PLATELET # BLD AUTO: 5 10*3/MM3 (ref 140–450)
PROT SERPL-MCNC: 5.7 G/DL (ref 6.3–8.2)
PROT UR QL STRIP: NEGATIVE
PROTHROMBIN TIME: 15.7 SECONDS (ref 11.8–14.8)
RBC # BLD AUTO: 1.9 10*6/MM3 (ref 3.77–5.28)
RBC # UR STRIP: ABNORMAL /HPF
REF LAB TEST METHOD: ABNORMAL
SP GR UR STRIP: 1.02 (ref 1–1.03)
SQUAMOUS #/AREA URNS HPF: ABNORMAL /HPF
UNIT  ABO: NORMAL
UNIT  RH: NORMAL
UROBILINOGEN UR QL STRIP: ABNORMAL
VWF CP ACT/NOR PPP CHRO: 89.5 %
WBC # UR STRIP: ABNORMAL /HPF
WBC NRBC COR # BLD: 4.67 10*3/MM3 (ref 3.4–10.8)

## 2023-05-26 PROCEDURE — 25010000002 DEXAMETHASONE PER 1 MG: Performed by: PHYSICIAN ASSISTANT

## 2023-05-26 PROCEDURE — 85610 PROTHROMBIN TIME: CPT | Performed by: INTERNAL MEDICINE

## 2023-05-26 PROCEDURE — 85730 THROMBOPLASTIN TIME PARTIAL: CPT | Performed by: INTERNAL MEDICINE

## 2023-05-26 PROCEDURE — 87186 SC STD MICRODIL/AGAR DIL: CPT | Performed by: FAMILY MEDICINE

## 2023-05-26 PROCEDURE — 87086 URINE CULTURE/COLONY COUNT: CPT | Performed by: FAMILY MEDICINE

## 2023-05-26 PROCEDURE — 97161 PT EVAL LOW COMPLEX 20 MIN: CPT | Performed by: PHYSICAL THERAPIST

## 2023-05-26 PROCEDURE — 81001 URINALYSIS AUTO W/SCOPE: CPT | Performed by: FAMILY MEDICINE

## 2023-05-26 PROCEDURE — 87088 URINE BACTERIA CULTURE: CPT | Performed by: FAMILY MEDICINE

## 2023-05-26 PROCEDURE — 97535 SELF CARE MNGMENT TRAINING: CPT | Performed by: OCCUPATIONAL THERAPIST

## 2023-05-26 PROCEDURE — 86157 COLD AGGLUTININ TITER: CPT | Performed by: PHYSICIAN ASSISTANT

## 2023-05-26 PROCEDURE — 36430 TRANSFUSION BLD/BLD COMPNT: CPT

## 2023-05-26 PROCEDURE — P9100 PATHOGEN TEST FOR PLATELETS: HCPCS

## 2023-05-26 PROCEDURE — 85384 FIBRINOGEN ACTIVITY: CPT | Performed by: INTERNAL MEDICINE

## 2023-05-26 PROCEDURE — 25010000002 IMMUNE GLOBULIN (HUMAN) 5 GM/50ML SOLUTION 50 ML VIAL: Performed by: PHYSICIAN ASSISTANT

## 2023-05-26 PROCEDURE — 85025 COMPLETE CBC W/AUTO DIFF WBC: CPT | Performed by: PHYSICIAN ASSISTANT

## 2023-05-26 PROCEDURE — P9035 PLATELET PHERES LEUKOREDUCED: HCPCS

## 2023-05-26 PROCEDURE — 25010000002: Performed by: PHYSICIAN ASSISTANT

## 2023-05-26 RX ORDER — PANTOPRAZOLE SODIUM 40 MG/1
40 TABLET, DELAYED RELEASE ORAL
Status: DISCONTINUED | OUTPATIENT
Start: 2023-05-26 | End: 2023-05-30 | Stop reason: HOSPADM

## 2023-05-26 RX ADMIN — OXYCODONE HYDROCHLORIDE AND ACETAMINOPHEN 2 TABLET: 5; 325 TABLET ORAL at 17:05

## 2023-05-26 RX ADMIN — DULOXETINE HYDROCHLORIDE 60 MG: 30 CAPSULE, DELAYED RELEASE ORAL at 20:03

## 2023-05-26 RX ADMIN — OXYCODONE HYDROCHLORIDE AND ACETAMINOPHEN 500 MG: 500 TABLET ORAL at 08:12

## 2023-05-26 RX ADMIN — NYSTATIN 1 APPLICATION: 100000 POWDER TOPICAL at 08:15

## 2023-05-26 RX ADMIN — OXYCODONE HYDROCHLORIDE AND ACETAMINOPHEN 2 TABLET: 5; 325 TABLET ORAL at 07:52

## 2023-05-26 RX ADMIN — DOCUSATE SODIUM 50 MG AND SENNOSIDES 8.6 MG 2 TABLET: 8.6; 5 TABLET, FILM COATED ORAL at 08:13

## 2023-05-26 RX ADMIN — DOCUSATE SODIUM 100 MG: 100 CAPSULE ORAL at 08:13

## 2023-05-26 RX ADMIN — PANTOPRAZOLE SODIUM 40 MG: 40 TABLET, DELAYED RELEASE ORAL at 12:14

## 2023-05-26 RX ADMIN — OXYCODONE HYDROCHLORIDE AND ACETAMINOPHEN 2 TABLET: 5; 325 TABLET ORAL at 00:25

## 2023-05-26 RX ADMIN — Medication 20 ML: at 08:14

## 2023-05-26 RX ADMIN — DEXAMETHASONE SODIUM PHOSPHATE 40 MG: 10 INJECTION INTRAMUSCULAR; INTRAVENOUS at 19:29

## 2023-05-26 RX ADMIN — Medication 10 ML: at 08:15

## 2023-05-26 RX ADMIN — CEFDINIR 300 MG: 300 CAPSULE ORAL at 20:03

## 2023-05-26 RX ADMIN — DULOXETINE HYDROCHLORIDE 60 MG: 30 CAPSULE, DELAYED RELEASE ORAL at 08:13

## 2023-05-26 RX ADMIN — Medication 5000 UNITS: at 08:14

## 2023-05-26 RX ADMIN — Medication 10 ML: at 08:13

## 2023-05-26 RX ADMIN — PRAMIPEXOLE DIHYDROCHLORIDE 0.75 MG: 0.25 TABLET ORAL at 14:38

## 2023-05-26 RX ADMIN — Medication 10 ML: at 20:06

## 2023-05-26 RX ADMIN — LEVOTHYROXINE SODIUM 100 MCG: 100 TABLET ORAL at 05:13

## 2023-05-26 RX ADMIN — PRAMIPEXOLE DIHYDROCHLORIDE 0.75 MG: 0.25 TABLET ORAL at 05:13

## 2023-05-26 RX ADMIN — IMMUNE GLOBULIN INFUSION (HUMAN) 45 G: 100 INJECTION, SOLUTION INTRAVENOUS; SUBCUTANEOUS at 13:38

## 2023-05-26 RX ADMIN — BISOPROLOL FUMARATE: 5 TABLET ORAL at 08:12

## 2023-05-26 RX ADMIN — CALCIUM CARBONATE 2 TABLET: 500 TABLET, CHEWABLE ORAL at 00:35

## 2023-05-26 RX ADMIN — CEFDINIR 300 MG: 300 CAPSULE ORAL at 08:13

## 2023-05-26 RX ADMIN — NYSTATIN 1 APPLICATION: 100000 POWDER TOPICAL at 20:05

## 2023-05-26 NOTE — PROGRESS NOTES
Brenda Sneed is a 72 y.o. female patient.    Patient happy about her midline IV access.  Platelets still low.  Cultures reviewed on wounds on left lower extremity sensitive to cephalosporins, urine culture pending?  Never ordered?      Current Facility-Administered Medications   Medication Dose Route Frequency Provider Last Rate Last Admin    acetaminophen (TYLENOL) tablet 650 mg  650 mg Oral Q4H PRN Sachin Dia MD   650 mg at 05/25/23 2218    ascorbic acid (VITAMIN C) tablet 500 mg  500 mg Oral Daily Sachin Dia MD   500 mg at 05/25/23 0900    sennosides-docusate (PERICOLACE) 8.6-50 MG per tablet 2 tablet  2 tablet Oral BID Sachin Dia MD   2 tablet at 05/25/23 0900    And    polyethylene glycol (MIRALAX) packet 17 g  17 g Oral Daily PRN Sachin Dia MD        And    bisacodyl (DULCOLAX) EC tablet 5 mg  5 mg Oral Daily PRN Sachin Dia MD        And    bisacodyl (DULCOLAX) suppository 10 mg  10 mg Rectal Daily PRN Sachin Dia MD        bisoprolol (ZEBeta) 5 mg, hydroCHLOROthiazide (HYDRODIURIL) 6.25 mg for Ziac 5-6.25   Oral Daily Sachin Dia MD   Given at 05/25/23 0900    calcium carbonate (TUMS) chewable tablet 500 mg (200 mg elemental)  2 tablet Oral TID PRN Sachin Dia MD   2 tablet at 05/26/23 0035    cefdinir (OMNICEF) capsule 300 mg  300 mg Oral Q12H Sachin Dia MD   300 mg at 05/25/23 2212    cyclobenzaprine (FLEXERIL) tablet 10 mg  10 mg Oral TID PRN Sachin Dia MD        docusate sodium (COLACE) capsule 100 mg  100 mg Oral BID Sachin Dia MD   100 mg at 05/25/23 2210    DULoxetine (CYMBALTA) DR capsule 60 mg  60 mg Oral BID Sachin Dia MD   60 mg at 05/25/23 2212    HYDROmorphone (DILAUDID) injection 0.5 mg  0.5 mg Intravenous Q2H PRN Sachin Dia MD   0.5 mg at 05/25/23 1657    levothyroxine (SYNTHROID, LEVOTHROID) tablet 100 mcg  100 mcg Oral Q AM Sachin Dia MD    "100 mcg at 05/26/23 0513    melatonin tablet 5 mg  5 mg Oral Nightly PRN Sachin Dia MD        nystatin (MYCOSTATIN) powder 1 application  1 application Topical BID Sachin Dia MD   1 application at 05/25/23 2212    ondansetron (ZOFRAN) injection 4 mg  4 mg Intravenous Q6H PRN Sachin Dia MD        oxyCODONE-acetaminophen (PERCOCET) 5-325 MG per tablet 2 tablet  2 tablet Oral Q4H PRN Sachin Dia MD   2 tablet at 05/26/23 0025    pramipexole (MIRAPEX) tablet 0.75 mg  0.75 mg Oral Q8H Sachin Dia MD   0.75 mg at 05/26/23 0513    sodium chloride 0.9 % flush 10 mL  10 mL Intravenous Q12H Sachin Dia MD   10 mL at 05/25/23 2213    sodium chloride 0.9 % flush 10 mL  10 mL Intravenous PRN Sachin Dia MD        sodium chloride 0.9 % flush 10 mL  10 mL Intravenous Q12H Sachin Dia MD   10 mL at 05/25/23 2212    sodium chloride 0.9 % flush 10 mL  10 mL Intravenous PRN Sachin Dia MD        sodium chloride 0.9 % flush 20 mL  20 mL Intravenous MALIKN Sachin Dia MD        sodium chloride 0.9 % infusion 40 mL  40 mL Intravenous PRN Sachin Dia MD        sodium chloride 0.9 % infusion 40 mL  40 mL Intravenous PRN Sachin Dia MD        vitamin D3 capsule 5,000 Units  5,000 Units Oral Daily Sachin Dia MD   5,000 Units at 05/25/23 0900     ALLERGIES:    Allergies   Allergen Reactions    Codeine Itching and Rash     SEVERE RASH/ITCHING (TOLERATES PERCOCET)    Benadryl [Diphenhydramine] Other (See Comments)     KEEPS PATIENT AWAKE        Thrombocytopenia    HTN (hypertension)    Obesity, Class III, BMI 40-49.9 (morbid obesity)    Anxiety associated with depression    Acute cystitis with hematuria    Blood pressure 117/82, pulse 68, temperature 98.7 °F (37.1 °C), temperature source Oral, resp. rate 16, height 160 cm (63\"), weight 115 kg (254 lb), SpO2 96 %, not currently " "breastfeeding.      Subjective:  Symptoms:  Stable.    Diet:  Adequate intake.    Activity level: Impaired due to weakness.    Pain:  She complains of pain that is mild.  She reports pain is improving.  Pain is well controlled.    Review of Systems  Objective:  General Appearance:  Comfortable and well-appearing.    Vital signs: (most recent): Blood pressure 117/82, pulse 68, temperature 98.7 °F (37.1 °C), temperature source Oral, resp. rate 16, height 160 cm (63\"), weight 115 kg (254 lb), SpO2 96 %, not currently breastfeeding.  Vital signs are normal.    Output: Producing urine and minimal stool output.    HEENT: Normal HEENT exam.    Lungs:  Normal effort and normal respiratory rate.    Heart: Normal rate.  Regular rhythm.    Abdomen: Abdomen is soft.  Bowel sounds are normal.     Extremities: Decreased range of motion.    Neurological: Patient is alert.    Skin:  Warm and dry.              Labs:  Lab Results (last 72 hours)       Procedure Component Value Units Date/Time    Blood Culture - Blood, Arm, Left [[id number]]  (Normal) Collected: 05/23/23 0548    Specimen: Blood from Arm, Left Updated: 05/26/23 0730     Blood Culture No growth at 3 days    Blood Culture - Blood, Arm, Left [[id number]]  (Normal) Collected: 05/23/23 0548    Specimen: Blood from Arm, Left Updated: 05/26/23 0730     Blood Culture No growth at 3 days    Tissue / Bone Culture - Tissue, Leg, Left [[id number]]  (Abnormal)  (Susceptibility) Collected: 05/24/23 1054    Specimen: Tissue from Leg, Left Updated: 05/26/23 0707     Tissue Culture Moderate growth (3+) Staphylococcus aureus      Moderate growth (3+) Klebsiella pneumoniae ssp pneumoniae      Moderate growth (3+) Escherichia coli     Gram Stain Moderate (3+) Gram negative bacilli      No WBCs seen    Susceptibility        Staphylococcus aureus      CHRISTIANO      Clindamycin Susceptible      Erythromycin Resistant      Inducible Clindamycin Resistance Negative      Oxacillin Susceptible      " Rifampin Susceptible      Tetracycline Resistant      Trimethoprim + Sulfamethoxazole Susceptible      Vancomycin Susceptible                       Susceptibility        Klebsiella pneumoniae ssp pneumoniae      CHRISTIANO      Ampicillin Resistant      Ampicillin + Sulbactam Susceptible      Cefepime Susceptible      Ceftazidime Susceptible      Ceftriaxone Susceptible      Gentamicin Susceptible      Levofloxacin Susceptible      Piperacillin + Tazobactam Susceptible      Tetracycline Resistant      Trimethoprim + Sulfamethoxazole Susceptible                       Susceptibility Comments       Staphylococcus aureus    This isolate does not demonstrate inducible clindamycin resistance in vitro.        Klebsiella pneumoniae ssp pneumoniae    Cefazolin sensitivity will not be reported for Enterobacteriaceae in non-urine isolates. If cefazolin is preferred, please call the microbiology lab to request an E-test.  With the exception of urinary-sourced infections, aminoglycosides should not be used as monotherapy.               aPTT [[id number]]  (Abnormal) Collected: 05/26/23 0543    Specimen: Blood Updated: 05/26/23 0613     PTT 35.3 seconds     Protime-INR [[id number]]  (Abnormal) Collected: 05/26/23 0543    Specimen: Blood Updated: 05/26/23 0613     Protime 15.7 Seconds      INR 1.23    Occult Blood X 1, Stool - Stool, Per Rectum [[id number]]  (Normal) Collected: 05/25/23 2311    Specimen: Stool from Per Rectum Updated: 05/25/23 2321     Fecal Occult Blood Negative    Immature Platelet Fraction [[id number]]  (Normal) Collected: 05/25/23 0947    Specimen: Blood Updated: 05/25/23 1024     IPF 5.40 %     CBC & Differential [[id number]]  (Abnormal) Collected: 05/25/23 0947    Specimen: Blood Updated: 05/25/23 1024    Narrative:      The following orders were created for panel order CBC & Differential.  Procedure                               Abnormality         Status                     ---------                                -----------         ------                     CBC Auto Differential[[id number]]        Abnormal            Final result                 Please view results for these tests on the individual orders.    CBC Auto Differential [[id number]]  (Abnormal) Collected: 05/25/23 0947    Specimen: Blood Updated: 05/25/23 1024     WBC 5.02 10*3/mm3      RBC 2.75 10*6/mm3      Hemoglobin 8.3 g/dL      Hematocrit 26.6 %      MCV 96.7 fL      MCH 30.2 pg      MCHC 31.2 g/dL      RDW 15.6 %      RDW-SD 53.1 fl      MPV 12.4 fL      Platelets 7 10*3/mm3      Neutrophil % 58.1 %      Lymphocyte % 26.5 %      Monocyte % 9.6 %      Eosinophil % 4.0 %      Basophil % 1.2 %      Neutrophils, Absolute 2.92 10*3/mm3      Lymphocytes, Absolute 1.33 10*3/mm3      Monocytes, Absolute 0.48 10*3/mm3      Eosinophils, Absolute 0.20 10*3/mm3      Basophils, Absolute 0.06 10*3/mm3     Basic Metabolic Panel [[id number]]  (Abnormal) Collected: 05/25/23 0947    Specimen: Blood Updated: 05/25/23 1018     Glucose 102 mg/dL      BUN 11 mg/dL      Creatinine 0.54 mg/dL      Sodium 139 mmol/L      Potassium 3.3 mmol/L      Comment: Slight hemolysis detected by analyzer. Results may be affected.        Chloride 102 mmol/L      CO2 28.0 mmol/L      Calcium 8.7 mg/dL      BUN/Creatinine Ratio 20.4     Anion Gap 9.0 mmol/L      eGFR 96.7 mL/min/1.73     Narrative:      GFR Normal >60  Chronic Kidney Disease <60  Kidney Failure <15    The GFR formula is only valid for adults with stable renal function between ages 18 and 70.    aPTT [[id number]]  (Normal) Collected: 05/25/23 0947    Specimen: Blood Updated: 05/25/23 1007     PTT 32.9 seconds     Protime-INR [[id number]]  (Abnormal) Collected: 05/25/23 0947    Specimen: Blood Updated: 05/25/23 1007     Protime 14.8 Seconds      INR 1.14    Haptoglobin [[id number]]  (Normal) Collected: 05/23/23 0109    Specimen: Blood Updated: 05/24/23 2013     Haptoglobin 71 mg/dL     Protime-INR [[id number]]  (Normal) Collected:  05/24/23 0844    Specimen: Blood Updated: 05/24/23 0902     Protime 13.8 Seconds      INR 1.04    aPTT [[id number]]  (Normal) Collected: 05/24/23 0844    Specimen: Blood Updated: 05/24/23 0902     PTT 30.1 seconds     Fibrinogen [[id number]]  (Normal) Collected: 05/24/23 0844    Specimen: Blood Updated: 05/24/23 0902     Fibrinogen 294 mg/dL     Peripheral Blood Smear [[id number]] Collected: 05/23/23 0230    Specimen: Blood Updated: 05/24/23 0837     Performed by: Kamilah Nogueira MD     Pathologist Interpretation --     Moderate macrocytic anemia  Red blood cell agglutination    Normal total white blood cell count with the following manual differential:  Neutrophils 48%  Bands 1%  Lymphocytes 38%  Monocytes 8%  Eosinophils 3%  Basophils 1%  Myelocytes 1%  Severe peripheral thrombocytopenia    No schistocytes identified  No platelet clumps identified  No morulae identified in granulocytes or monocytes    Manual Differential [[id number]]  (Abnormal) Collected: 05/24/23 0254    Specimen: Blood Updated: 05/24/23 0423     Neutrophil % 51.0 %      Lymphocyte % 37.0 %      Monocyte % 9.0 %      Eosinophil % 3.0 %      Neutrophils Absolute 1.48 10*3/mm3      Lymphocytes Absolute 1.08 10*3/mm3      Monocytes Absolute 0.26 10*3/mm3      Eosinophils Absolute 0.09 10*3/mm3      Anisocytosis Slight/1+     Hypochromia Slight/1+     Poikilocytes Slight/1+     Polychromasia Slight/1+     Target Cells Slight/1+     WBC Morphology Normal     Platelet Estimate Decreased    CBC & Differential [[id number]]  (Abnormal) Collected: 05/24/23 0254    Specimen: Blood Updated: 05/24/23 0423    Narrative:      The following orders were created for panel order CBC & Differential.  Procedure                               Abnormality         Status                     ---------                               -----------         ------                     CBC Auto Differential[[id number]]        Abnormal            Final result                 Please  view results for these tests on the individual orders.    CBC Auto Differential [[id number]]  (Abnormal) Collected: 05/24/23 0254    Specimen: Blood Updated: 05/24/23 0423     WBC 2.91 10*3/mm3      RBC 2.36 10*6/mm3      Hemoglobin 7.7 g/dL      Hematocrit 23.3 %      MCV 98.7 fL      MCH 32.6 pg      MCHC 33.0 g/dL      RDW 19.3 %      RDW-SD 53.3 fl      MPV 12.7 fL      Platelets 13 10*3/mm3     Narrative:      Rec platelets    Comprehensive Metabolic Panel [[id number]]  (Abnormal) Collected: 05/24/23 0254    Specimen: Blood Updated: 05/24/23 0327     Glucose 98 mg/dL      BUN 11 mg/dL      Creatinine 0.63 mg/dL      Sodium 140 mmol/L      Potassium 3.8 mmol/L      Chloride 104 mmol/L      CO2 26.0 mmol/L      Calcium 8.4 mg/dL      Total Protein 5.6 g/dL      Albumin 3.2 g/dL      ALT (SGPT) 8 U/L      AST (SGOT) 28 U/L      Alkaline Phosphatase 130 U/L      Total Bilirubin 1.4 mg/dL      Globulin 2.4 gm/dL      A/G Ratio 1.3 g/dL      BUN/Creatinine Ratio 17.5     Anion Gap 10.0 mmol/L      eGFR 93.2 mL/min/1.73     Narrative:      GFR Normal >60  Chronic Kidney Disease <60  Kidney Failure <15    The GFR formula is only valid for adults with stable renal function between ages 18 and 70.    TSH [[id number]]  (Abnormal) Collected: 05/23/23 1910    Specimen: Blood Updated: 05/23/23 2024     TSH 6.190 uIU/mL     Bilirubin, Total & Direct [[id number]]  (Abnormal) Collected: 05/23/23 1910    Specimen: Blood Updated: 05/23/23 2017     Total Bilirubin 1.7 mg/dL      Bilirubin, Direct 0.7 mg/dL      Bilirubin, Indirect 1.0 mg/dL     Reticulocytes [[id number]]  (Abnormal) Collected: 05/23/23 1710    Specimen: Blood Updated: 05/23/23 2006     Reticulocyte % 6.16 %      Reticulocyte Absolute 0.1546 10*6/mm3     CK [[id number]]  (Normal) Collected: 05/23/23 1910    Specimen: Blood Updated: 05/1948     Creatine Kinase 65 U/L     Fibrin Split Products [[id number]]  (Abnormal) Collected: 05/23/23 1710    Specimen: Blood  Updated: 05/23/23 1824     Fibrin Split Products Greater than or equal to 5, but less than 20 mcg/mL    Manual Differential [[id number]]  (Abnormal) Collected: 05/23/23 1710    Specimen: Blood Updated: 05/23/23 1815     Neutrophil % 61.4 %      Lymphocyte % 29.5 %      Monocyte % 8.0 %      Basophil % 1.1 %      Neutrophils Absolute 1.89 10*3/mm3      Lymphocytes Absolute 0.91 10*3/mm3      Monocytes Absolute 0.25 10*3/mm3      Basophils Absolute 0.03 10*3/mm3      nRBC 3.4 /100 WBC      Anisocytosis Slight/1+     Polychromasia Slight/1+     Target Cells Slight/1+     WBC Morphology Normal     Platelet Estimate Decreased    CBC & Differential [[id number]]  (Abnormal) Collected: 05/23/23 1710    Specimen: Blood Updated: 05/23/23 1815    Narrative:      The following orders were created for panel order CBC & Differential.  Procedure                               Abnormality         Status                     ---------                               -----------         ------                     CBC Auto Differential[[id number]]        Abnormal            Final result                 Please view results for these tests on the individual orders.    CBC Auto Differential [[id number]]  (Abnormal) Collected: 05/23/23 1710    Specimen: Blood Updated: 05/23/23 1815     WBC 3.08 10*3/mm3      RBC 2.57 10*6/mm3      Hemoglobin 7.4 g/dL      Hematocrit 23.4 %      MCV 91.1 fL      MCH 28.8 pg      MCHC 31.6 g/dL      RDW 15.4 %      RDW-SD 50.4 fl      MPV 10.1 fL      Platelets 2 10*3/mm3     aPTT [[id number]]  (Normal) Collected: 05/23/23 1710    Specimen: Blood Updated: 05/23/23 1741     PTT 32.6 seconds     Protime-INR [[id number]]  (Abnormal) Collected: 05/23/23 1710    Specimen: Blood Updated: 05/23/23 1741     Protime 15.1 Seconds      INR 1.17    Fibrinogen [[id number]]  (Normal) Collected: 05/23/23 1710    Specimen: Blood Updated: 05/23/23 1741     Fibrinogen 258 mg/dL     Platelet Antibody Profile [[id number]] Collected:  05/23/23 1710    Specimen: Blood Updated: 05/23/23 1717    [id number] Activity [[id number]] Collected: 05/23/23 1710    Specimen: Blood Updated: 05/23/23 1717    Lactate Dehydrogenase [[id number]]  (Abnormal) Collected: 05/23/23 0109    Specimen: Blood Updated: 05/23/23 1649      U/L             Imaging Results (Last 72 Hours)       Procedure Component Value Units Date/Time    US Abdomen Complete [[id number]] Collected: 05/24/23 1645     Updated: 05/24/23 1651    Narrative:      EXAM/TECHNIQUE: US ABDOMEN COMPLETE-     INDICATION: thrombocytopenia; I89.0-Lymphedema, not elsewhere  classified; D69.6-Thrombocytopenia, unspecified; L03.116-Cellulitis of  left lower limb; S81.812D-Laceration without foreign body, left lower  leg, subsequent encounter     COMPARISON: None     FINDINGS:     Visualized portion of the pancreas is unremarkable. Upper abdominal  aorta and IVC are unremarkable.     Liver echotexture is coarsened and the liver contour appears slightly  nodular. Patent portal vein with appropriate directional flow. No solid  liver lesion identified. Perihepatic ascites is present.     Gallbladder is reported surgically absent. The common bile duct is 7 mm  diameter, upper limits of normal, with prominence likely related to  reservoir phenomenon.     RIGHT kidney is 9.1 cm in length and demonstrates normal cortical  thickness and echogenicity. LEFT kidney is 9.1 cm in length and  demonstrates normal cortical thickness and echogenicity. No shadowing  calculi or hydronephrosis. No focal urinary bladder abnormality.     Spleen is enlarged measuring up to 13.4 cm. No focal splenic lesion.       Impression:         1.  Coarsened liver with mild surface contour nodularity, concerning for  chronic liver disease and cirrhosis.  2.  Perihepatic ascites.  3.  Mild splenomegaly.  This report was finalized on 05/24/2023 16:48 by Dr. Bryce Mancera MD.                  Assessment:    Condition: In stable condition.   Improving.       Plan:   Encourage ambulation and per physical therapy.   (      Cellulitis left lower extremity-currently in bandage.  Reviewed culture of gram-positive and gram-negative bacteria both sensitive to cephalosporins we will continue with the Omnicef for now.    Urinary tract infection/acute cystitis/hematuria present on admission.  Urinalysis reviewed?  Culture ordered?  Will order culture today and continue with Omnicef.    [id number] in progress with hematology oncology.    Overall patient medically stable encourage participation in PT and OT up in chair for all meals and oral intake.    Hopefully discharge home 1-2 days.).   Problem List:     Thrombocytopenia    HTN (hypertension)    Obesity, Class III, BMI 40-49.9 (morbid obesity)    Anxiety associated with depression    Acute cystitis with hematuria    Sachin Dia MD  5/26/2023

## 2023-05-26 NOTE — THERAPY TREATMENT NOTE
Patient Name: Brenda Sneed  : 1950    MRN: [id number]                              Today's Date: 2023       Admit Date: 2023    Visit Dx:     ICD-10-CM ICD-9-CM   1. Lymphedema of both lower extremities  I89.0 457.1   2. Thrombocytopenia  D69.6 287.5   3. Cellulitis of left lower extremity  L03.116 682.6   4. Laceration of left lower extremity, subsequent encounter  S81.812D V58.89     894.0     Patient Active Problem List   Diagnosis    Spinal stenosis, lumbar    Secondary osteoarthritis of left shoulder due to rotator cuff arthropathy    Cellulitis of right lower extremity    HTN (hypertension)    Obesity, Class III, BMI 40-49.9 (morbid obesity)    Anxiety associated with depression    Traumatic rhabdomyolysis    Traumatic rhabdomyolysis, initial encounter    Laceration of left lower extremity    Cellulitis    Cellulitis of left lower extremity    Thrombocytopenia    Acute cystitis with hematuria     Past Medical History:   Diagnosis Date    Arthritis     Bronchitis     Cataract     right eye , cornea implant    Chronic back pain     Depression     Disease of thyroid gland     Edema     lower extremities    Hypertension     Kidney stones      Past Surgical History:   Procedure Laterality Date    BACK SURGERY       ([place])2013     CHOLECYSTECTOMY OPEN      CORNEAL TRANSPLANT Right     INCISION AND DRAINAGE ABSCESS Left 2023    Procedure: INCISION AND DRAINAGE ABSCESS left leg;  Surgeon: Sarita Vazquez MD;  Location:  [facility];  Service: General;  Laterality: Left;    LACERATION REPAIR Left 2023    Procedure: washout and closure left leg wound ;  Surgeon: Sarita Vazquez MD;  Location:  [facility];  Service: General;  Laterality: Left;    LUMBAR FUSION Left 2017    Procedure: LEFT LUMBAR LATERAL INTERBODY FUSION WITH INSTRUMENTATION  L1-2;  Surgeon: MARCELINO Wright MD;  Location:  [facility];  Service:     NECK SURGERY      [place]    TOTAL HIP ARTHROPLASTY  Right 2014    DR. KENDRICK     TOTAL KNEE ARTHROPLASTY Right 2000    TOTAL KNEE ARTHROPLASTY Left 2002    TOTAL SHOULDER ARTHROPLASTY W/ DISTAL CLAVICLE EXCISION Left 6/1/2020    Procedure: LEFT REVERSE TOTAL SHOULDER REPLACEMENT;  Surgeon: Saravanan Calvin MD;  Location: [facility];  Service: Orthopedics;  Laterality: Left;      General Information       Row Name 05/26/23 1415          OT Time and Intention    Document Type therapy note (daily note)  -     Mode of Treatment occupational therapy  -       Row Name 05/26/23 1415          General Information    Patient Profile Reviewed yes  -     Existing Precautions/Restrictions fall  -       Row Name 05/26/23 1415          Living Environment    People in Home alone  -       Row Name 05/26/23 1415          Cognition    Orientation Status (Cognition) oriented x 4  -[facility] Name 05/26/23 1415          Safety Issues, Functional Mobility    Impairments Affecting Function (Mobility) endurance/activity tolerance;shortness of breath  -               User Key  (r) = Recorded By, (t) = Taken By, (c) = Cosigned By      Initials Name Provider Type     Dorita Kendrick, OTR/L Occupational Therapist                     Mobility/ADL's       Row Name 05/26/23 1415          Bed Mobility    Bed Mobility sit-supine  -     Sit-Supine Kinsey (Bed Mobility) contact guard;verbal cues  -[facility] Name 05/26/23 1415          Transfers    Transfers sit-stand transfer;stand-sit transfer  -[facility] Name 05/26/23 1415          Sit-Stand Transfer    Sit-Stand Kinsey (Transfers) standby assist  -     Comment, (Sit-Stand Transfer) pt stood from toilet without rwx, HHA  -[facility] Name 05/26/23 1415          Stand-Sit Transfer    Stand-Sit Kinsey (Transfers) standby assist;verbal cues  -     Comment, (Stand-Sit Transfer) HHA to bed  -[facility] Name 05/26/23 1415          Functional Mobility    Functional Mobility- Ind. Level contact guard assist  -  "    Functional Mobility- Comment HHA  -       Row Name 05/26/23 1415          Activities of Daily Living    BADL Assessment/Intervention toileting  -               User Key  (r) = Recorded By, (t) = Taken By, (c) = Cosigned By      Initials Name Provider Type    Dorita Brown, OTR/L Occupational Therapist                   Obj/Interventions       Row Name 05/26/23 1415          Balance    Balance Interventions sitting;standing;sit to stand;supported;static;dynamic;occupation based/functional task  -               User Key  (r) = Recorded By, (t) = Taken By, (c) = Cosigned By      Initials Name Provider Type    Dorita Brown, OTR/L Occupational Therapist                   Goals/Plan    No documentation.                  Clinical Impression       Row Name 05/26/23 1415          Plan of Care Review    Plan of Care Reviewed With patient  -     Progress no change  -     Outcome Evaluation Found pt in bathroom after having gotten up with rwx.  Pt. reports \"I rang my call button, no one came because they are busy.  I made a mess of the floor and so here I am.\"  OTR assisted pt back to bed & placed BSC at bedside to reduce fall risk & reduce dist of how far pt required to go with staff assist.  Much time spent on safety edu.  Pt. visibly upset up verbalized undrestanding.  Cont OT tx  -       Row Name 05/26/23 1415          Therapy Assessment/Plan (OT)    Rehab Potential (OT) good, to achieve stated therapy goals  -     Criteria for Skilled Therapeutic Interventions Met (OT) yes;skilled treatment is necessary  -     Therapy Frequency (OT) 3 times/wk  -       Row Name 05/26/23 1415          Therapy Plan Review/Discharge Plan (OT)    Anticipated Discharge Disposition (OT) [facility] nursing facility  -       Row Name 05/26/23 1415          Positioning and Restraints    Pre-Treatment Position standing in room  -     Post Treatment Position bed  -     In Bed fowlers;call light within " reach;encouraged to call for assist;side rails up x2;LLE elevated  -CH               User Key  (r) = Recorded By, (t) = Taken By, (c) = Cosigned By      Initials Name Provider Type    Dorita Brown OTR/L Occupational Therapist                   Outcome Measures       Row Name 05/26/23 1458          How much help from another is currently needed...    Putting on and taking off regular lower body clothing? 2  -CH     Bathing (including washing, rinsing, and drying) 3  -CH     Toileting (which includes using toilet bed pan or urinal) 3  -CH     Putting on and taking off regular upper body clothing 3  -CH     Taking care of personal grooming (such as brushing teeth) 3  -CH     Eating meals 4  -CH     AM-PAC 6 Clicks Score (OT) 18  -       Row Name 05/26/23 1116          How much help from another person do you currently need...    Turning from your back to your side while in flat bed without using bedrails? 3 (P)   -CG     Moving from lying on back to sitting on the side of a flat bed without bedrails? 2 (P)   -CG     Moving to and from a bed to a chair (including a wheelchair)? 2 (P)   -CG     Standing up from a chair using your arms (e.g., wheelchair, bedside chair)? 3 (P)   -CG     Climbing 3-5 steps with a railing? 2 (P)   -CG     To walk in hospital room? 3 (P)   -CG     AM-PAC 6 Clicks Score (PT) 15 (P)   -CG     Highest level of mobility 4 --> Transferred to chair/commode (P)   -CG       Row Name 05/26/23 1458 05/26/23 1116       Functional Assessment    Outcome Measure Options AM-PAC 6 Clicks Daily Activity (OT)  - AM-PAC 6 Clicks Basic Mobility (PT) (P)   -CG              User Key  (r) = Recorded By, (t) = Taken By, (c) = Cosigned By      Initials Name Provider Type    Dorita Brown, GAGER/L Occupational Therapist    Josafat Osei, PT Student PT Student                    Occupational Therapy Education       Title: PT OT SLP Therapies (In Progress)       Topic: Occupational Therapy (In Progress)   "     Point: ADL training (Done)       Description:   Instruct learner(s) on proper safety adaptation and remediation techniques during self care or transfers.   Instruct in proper use of assistive devices.                  Learning Progress Summary             Patient MCKENNA Anderson VU by  at 5/25/2023 [id number]    Comment: AE options and safety techniques during ADL completion.                         Point: Home exercise program (Not Started)       Description:   Instruct learner(s) on appropriate technique for monitoring, assisting and/or progressing therapeutic exercises/activities.                  Learner Progress:  Not documented in this visit.              Point: Precautions (Not Started)       Description:   Instruct learner(s) on prescribed precautions during self-care and functional transfers.                  Learner Progress:  Not documented in this visit.              Point: Body mechanics (Not Started)       Description:   Instruct learner(s) on proper positioning and spine alignment during self-care, functional mobility activities and/or exercises.                  Learner Progress:  Not documented in this visit.                              User Key       Initials Effective Dates Name Provider Type Discipline     12/05/22 -  Arsalan Elizalde OT Student OT Student OT                  OT Recommendation and Plan  Therapy Frequency (OT): 3 times/wk  Plan of Care Review  Plan of Care Reviewed With: patient  Progress: no change  Outcome Evaluation: Found pt in bathroom after having gotten up with rwx.  Pt. reports \"I rang my call button, no one came because they are busy.  I made a mess of the floor and so here I am.\"  OTR assisted pt back to bed & placed BSC at bedside to reduce fall risk & reduce dist of how far pt required to go with staff assist.  Much time spent on safety edu.  Pt. visibly upset up verbalized undrestanding.  Cont OT tx     Time Calculation:    Time Calculation- OT       Row Name 05/26/23 " 1415             Time Calculation- OT    OT Start Time 1415  -CH      OT Stop Time 1448  -CH      OT Time Calculation (min) 33 min  -CH      Total Timed Code Minutes- OT 33 minute(s)  -CH      OT Received On 05/26/23  -         Timed Charges    90342 - OT Self Care/Mgmt Minutes 33  -CH         Total Minutes    Timed Charges Total Minutes 33  -CH       Total Minutes 33  -CH                User Key  (r) = Recorded By, (t) = Taken By, (c) = Cosigned By      Initials Name Provider Type     Dorita Oropeza OTR/L Occupational Therapist                  Therapy Charges for Today       Code Description Service Date Service Provider Modifiers Qty    [id number] HC OT SELF CARE/MGMT/TRAIN EA 15 MIN 5/26/2023 Dorita Oropeza OTR/MAYLIN GO 2                 LINDA Quinn/MAYLIN  5/26/2023

## 2023-05-26 NOTE — PROGRESS NOTES
HEMATOLOGY AND MEDICAL ONCOLOGY PROGRESS NOTE    Patient name: Brenda Sneed  Patient : 1950  VISIT # [id number]  MR #[id number]  Room: [facility]    SUBJECTIVE:  PICC in place    INTERVAL HISTORY:  Brenda Sneed is a 72-year-old  female admitted to [facility] through the ED on 2023 by Dr. Tristen Block with a several week history of increasing dyspnea on exertion, difficulty ambulating across the room in her house even accomplishing activities of daily living such as going to the bathroom etc.  When evaluated in the ED she is found to have anemia and thrombocytopenia.     CBC on 2023 had a WBC of 7.16 with 44.5% segmented neutrophils, 38.1% lymphocytes, 13% monocytes, 2.9% eosinophils and 0.7% basophils.  Hemoglobin and hematocrit were 7.1 and 23.3 respectively with a platelet count of 5000.      Significant comorbid associated medical problems include:  Morbid obesity  Significant bilateral lower extremity chronic venous stasis, massive edema and bilateral lower extremity cellulitis L>R, hypertension, arthritis, thyroid disease, anxiety/depression     Brenda was initially evaluated by Iram BAJWA on 2023 regarding thrombocytopenia.     HEMATOLOGICAL HISTORY FROM OFFICE RECORDS  Brenda was initially evaluated by Iram BAJWA on 2023 referred to the clinic by Dr. Dia for evaluation of Anemia and thrombocytopenia.      Review of prior CBCs at [facility] reveals chronic, intermittent anemia dating to 2015.  Chronic thrombocytopenia has been present dating to at least 2016 with platelet count ranging from 69,000 -136,000.     Brenda underwent laceration repair of her left leg by Dr. Sarita Vazquez 2023, status post fall with leg injury.  She was also treated for cellulitis with IV Vancomycin and Zosyn.  She underwent washout.  Cellulitis subsequently resolved.  She was discharged with dry dressing changes and oral Keflex x7 days     CBC 2023: WBC: 5.6, Hgb: 10.6,  MCV: 96, PLT: 65,000      Labs 5/12/2023  CBC: WBC 6.0, Hgb 9.4/MCV 98, platelets 102,000  CMP: Creatinine 0.81, GFR 76, calcium 8.8, total protein 5.7, alkaline phosphatase 138 ()  BNP: 72.2 (0-100) CRP: 21 (0-10)     Brenda denied melena or hematochezia.  Recent renal function was normal.  She denies overt bleeding, though has significant, scattered bruising throughout her body which is clearly visible on exposed areas during exam.    She denies regular alcohol use.  She drank a few margaritas for couple years in her early 40s but none since.  She has no known history of liver disease.  She is a never smoker     Serology 5/19/2023   Serum Fe 32  TIBC - 280  Fe sat - 11%  Ferritin - 83.1     B12 = 1,168  Folate - 6.5  Copper in process  Zinc in process     Hapto - 70  LDH - 312     Hep A, B, C - nonreactive  HIV - Nonreactive     SPEP in process  QI in process  Free serum light chain in process     ASSESSMENT and PLAN developed on 5/19/2023 hematology consultation visit:  She take ibuprofen 2-3 times per day, in addition to Cymbalta daily which can increase bleeding and can be exacerbated in the setting of thrombocytopenia.     Possible causes of chronic anemia and thrombocytopenia discussed.   Baseline serology requested. Obtain occult blood stools.  Can not rule out underlying bone marrow disorder.     Chronic anemia. Check anemia labs  Check stool for OB  Chronic thrombocytopenia  Check labs as noted below, include PBS  Obtain US liver/spleen re: elevated bilirubin (1.7 on 4/18/2023) and spleen size re: thrombocytopenia  If unrevealing, will need to consider BMAB, which was discussed   Suspect bruising exacerbated by Ibuprofen/Cymbalta in a setting of thrombocytopenia  Suspect platelets recently worsened r/t infection/medication (vancomycin);    OBJECTIVE:      Vitals:    05/26/23 0300   BP: 145/53   Pulse: 65   Resp: 16   Temp: 98.4 °F (36.9 °C)   SpO2: 97%       Intake/Output Summary (Last 24 hours) at  5/26/2023 0634  Last data filed at 5/26/2023 0300  Gross per 24 hour   Intake 718 ml   Output 500 ml   Net 218 ml       PHYSICAL EXAM:  CONSTITUTIONAL: Alert, appropriate, weak and tired, morbid obesity  EYES: Minimally icteric  NECK: Supple, no masses   CHEST/LUNGS: CTA bilaterally, normal respiratory effort  CARDIOVASCULAR: RRR, no murmurs  ABDOMEN: soft non-tender, active bowel sounds, no HSM  EXTREMITIES: Morbid obesity with chronic venous stasis changes of the lower extremities with massive edema and cellulitis,  SKIN: venous stasis changes of the lower extremities with massive edema and erythema, Left leg wrapped, ecchymosis on arms, PICC left arm  LYMPH: No obvious cervical, clavicular, axillary, or inguinal lymphadenopathy, difficult to palpate due to morbid obesity  NEUROLOGIC: follows commands, mental status appears to be intact, she does not appear to have focal findings but neurological exam not performed in detail  PSYCH: mood and affect appropriate      CBC  Results from last 7 days   Lab Units 05/25/23  0947 05/24/23  0254 05/23/23  1710   WBC 10*3/mm3 5.02 2.91* 3.08*   HEMOGLOBIN g/dL 8.3* 7.7* 7.4*   HEMATOCRIT % 26.6* 23.3* 23.4*   PLATELETS 10*3/mm3 7* 13* 2*         Lab Results   Component Value Date     05/25/2023    K 3.3 (L) 05/25/2023     05/25/2023    CO2 28.0 05/25/2023    BUN 11 05/25/2023    CREATININE 0.54 (L) 05/25/2023    GLUCOSE 102 (H) 05/25/2023    CALCIUM 8.7 05/25/2023    BILITOT 1.4 (H) 05/24/2023    ALKPHOS 130 (H) 05/24/2023    AST 28 05/24/2023    ALT 8 05/24/2023    AGRATIO 1.3 05/24/2023    GLOB 2.4 05/24/2023       Lab Results   Component Value Date    INR 1.23 (H) 05/26/2023    INR 1.14 (H) 05/25/2023    INR 1.04 05/24/2023    PROTIME 15.7 (H) 05/26/2023    PROTIME 14.8 05/25/2023    PROTIME 13.8 05/24/2023       Cultures:    Lab Results   Component Value Date    BLOODCX No growth at 2 days 05/23/2023    BLOODCX No growth at 2 days 05/23/2023     No components  found for: URINCX    ASSESSMENT/PLAN:  #1  Pancytopenia - Severe anemia and thrombocytopenia    Review of prior CBCs at [facility] reveals chronic, intermittent anemia dating to 4/2015.  Chronic thrombocytopenia has been present dating to at least 7/2016 with platelet count ranging from 69,000 -136,000.     Brenda underwent laceration repair of her left leg by Dr. Sarita Vazquez 4/7/2023, status post fall with leg injury.  She was also treated for cellulitis with IV Vancomycin and Zosyn.  She underwent washout.  Cellulitis subsequently resolved.  She was discharged with dry dressing changes and oral Keflex x7 days     CBC 4/28/2023: WBC: 5.6, Hgb: 10.6, MCV: 96, PLT: 65,000      Labs 5/12/2023  CBC: WBC 6.0, Hgb 9.4/MCV 98, platelets 102,000  CMP: Creatinine 0.81, GFR 76, calcium 8.8, total protein 5.7, alkaline phosphatase 138 ()  BNP: 72.2 (0-100) CRP: 21 (0-10)     CBC w/diff            4/18/2023    18:00 4/21/2023    05:54 5/23/2023    02:30 5/23/2023    17:10   CBC w/Diff   WBC 5.57   4.59   7.16   3.08     RBC 2.51   2.78   2.40   2.57     Hemoglobin 9.0   8.6   7.1   7.4     Hematocrit 25.3   27.5   23.3   23.4     .8   98.9   97.1   91.1     MCH 35.9   30.9   29.6   28.8     MCHC 35.6   31.3   30.5   31.6     RDW 14.9   15.1   15.3   15.4     Platelets 104   112   5   2     Neutrophil Rel % 67.8     44.5       Immature Granulocyte Rel % 0.4           Lymphocyte Rel % 20.1     38.1       Monocyte Rel % 10.8     13.0       Eosinophil Rel % 0.7     2.9       Basophil Rel % 0.2     0.7          CMP reveals a normal creatinine of 0.84  Lactate - 1.8 WNL  BNP - 300.2 - WNL     Her admitting CBC is significantly different than it was on 5/12/2023.  Hgb has dropped to 7.1, platelet is down to 5000.  She is currently in the coronary care unit-room 9.  She is status post 1 unit packed red blood cell and 1 pheresis of platelets.  No active bleeding at present per nursing.     Brenda has a chronic pancytopenia dating  back many years.  This could be due to hypersplenism secondary to fatty liver disease or a primary bone marrow disorder.  I believe that she has had an exacerbation of the mild pancytopenia resulting in the current hospitalization with worsening anemia and profound thrombocytopenia.       Serology 5/19/2023   Serum Fe 32  TIBC - 280  Fe sat - 11%  Ferritin - 83.1     B12 = 1,168  Folate - 6.5  Copper - 177.9  Zinc - 58     Hapto - 70  LDH - 312     Hep A, B, C - nonreactive  HIV - Nonreactive     SPEP in process  QI in process  Free serum light chain in process       Differential diagnosis of the current situation could include:   DIC, hemolytic anemia, ITP, TTP, infection (cellulitis, UTI) with bone marrow myelosuppression      Serology 5/23/2023 [facility] In[facility]  PT/INR - 15.1/1.17  PTT - 32.6  Fibrinogen - 258  FSP >5 </= 20    Tbili 1.7 with 1.0 indirect  Hapto - in process  Retic - 6.16% with absolute 0.1546  LDH - 283 (135-214)  LALY (Bry) - Negative    TSH - 6.190    Antplatelet ab - in process    ADAMTS 13 - to be sent off    Peripheral blood smear 5/24/2023   Moderate macrocytic anemia   Red blood cell agglutination   Normal total white blood cell count with the following manual differential:   Neutrophils 48%   Bands 1%   Lymphocytes 38%   Monocytes 8%   Eosinophils 3%   Basophils 1%   Myelocytes 1%   Severe peripheral thrombocytopenia   No schistocytes identified   No platelet clumps identified   No morulae identified in granulocytes or monocytes     FOBT on 5/25/2023 - NEGATIVE    Echo 5/24/2023  Left ventricular systolic function is normal. Left ventricular ejection fraction appears to be 66 - 70%.  Left ventricular diastolic dysfunction is noted.  The left atrial cavity is mildly dilated.  Normal right ventricular cavity size and systolic function noted.  There is no significant (greater than mild) valvular dysfunction.  Estimated right ventricular systolic pressure from tricuspid regurgitation is  normal (<35 mmHg).      #1  Thrombocytopenia    Differential includes DIC, ITP, infection (cellulitis, UTI) with bone marrow myelosuppression, hepatic dysfunction with splenomegaly     IV antibiotics ongoing with improvement in cellulitis    No schistocyts - No TTP  Immature platelet fraction (IPF) 5.4-normal on 5/25/2023 (interpretation of this with profound thrombocytopenia receiving platelet transfusions-indeterminate)      U/S abdomen 5/24/2023  1.  Coarsened liver with mild surface contour nodularity, concerning for chronic liver disease and cirrhosis.  2.  Perihepatic ascites.  3.  Mild splenomegaly 13.4 cm.       S/p 2 PLT on 5/23/2023  S/p 1 PLT on 5/24/2023  S/p 1 PLT on 5/25/2023        Transfuse 1 unit of platelets for platelet count < 10,000  Plan hepatic elastography  Cold agglutuinins -pending  PNH panel -pending  Antiplatelet antibodies are still pending-in process      #2  Anemia  Iron studies, B12 folic acid levels etc. are all normal.  Awaiting serology ordered    RBC agglutination on peripheral smear  No schistocytes    FOBT on 5/25/2023 - NEGATIVE    S/p 1 unit pRBC on 5/23/2023        Transfuse 1 unit of PRBCs for hemoglobin <7.0  Awaiting CBC        #3  UTI/ cellulitis    UA POSITIVE on 5/23/2023    Blood cx X 2 on 5/23/2023 - No growth at 2 days          Ceftriaxone 2 g IV x 1 given on 5/23/2023  Currently getting cefdinir 300 mg PO every 12 hrs        ALISA Fang    05/26/23  06:34 CDT      Physicians attestation and contribution:    I, Morales Cantu, personally and independently performed an evaluation on Brenda Sneed  I have reviewed relevant medical information/data to include but not limited to the medication list, relevant appropriate lab work and imaging when applicable.  I reviewed other physician's notes, ancillary services and nurses assessments.  I have reviewed the above documentation completed by Warren Marie PA-C  Please see my additional addended and/or modified  contributions to the history of present illness, physical examination and assessment/medical decision-making and plan that reflects my findings and impressions.  I discussed the essential elements of the care plan with Warren Marie PA-C and the patient.  I have encouraged and answered all the questions raised to the patient's understanding and satisfaction.  I concur with the above stated.    Subjective: Day #4 of hospitalization, symptomatically improved but still not back to baseline.    Objective: Left lower extremity wrapped but with decreasing edema    Assessment/plan:  Left leg culture with staph aureus and GNRs, on Omnicef for cellulitis and UTI.  Urine culture pending.   CBC today, 5/26/2023 with a WBC of 4.67, hemoglobin 7.1 and a platelet count of 5,000.   Work-up of the profound thrombocytopenia so far without a clear-cut etiology  Peripheral smear without schistocytes, unlikely this is TTP with a normal creatinine  Immature platelet fraction 5.4 on 5/25/2023-indeterminate given the profound thrombocytopenia and receiving platelet transfusions daily.      She continues with daily abnormal coagulation parameters.  This could be associated with her liver disease or low-grade DIC, however I would not expect a low-grade DIC to produce such a profound thrombocytopenia.  Myelosuppression from infection should have shown some improvement after 4 days of IV antibiotics.    Diagnosis profound acute thrombocytopenia with associated anemia superimposed on a chronic mild pancytopenia with documented liver disease and splenomegaly.  Suspect she either has a superimposed ITP or worsening bone marrow disorder.  Antiplatelet antibodies are still pending-in process    PLAN:   Trial of high-dose Decadron x 4 days and IVIG to see if we can change the course of the thrombocytopenia.   If her hematological picture has not improved with Decadron and IVIG with ongoing antibiotics, plan on doing a bone marrow Tuesday, 5/30/2023.          Morales Cantu MD  5/26/2023 09:12 CDT

## 2023-05-26 NOTE — PLAN OF CARE
"Goal Outcome Evaluation:  Plan of Care Reviewed With: patient        Progress: no change  Outcome Evaluation: Found pt in bathroom after having gotten up with rwx.  Pt. reports \"I rang my call button, no one came because they are busy.  I made a mess of the floor and so here I am.\"  OTR assisted pt back to bed & placed BSC at bedside to reduce fall risk & reduce dist of how far pt required to go with staff assist.  Much time spent on safety edu.  Pt. visibly upset up verbalized undrestanding.  Cont OT tx         "

## 2023-05-26 NOTE — CASE MANAGEMENT/SOCIAL WORK
Continued Stay Note  FRANCISCA Howard     Patient Name: Brenda Sneed  MRN: [id number]  Today's Date: 5/26/2023    Admit Date: 5/23/2023    Plan: Home Health   Discharge Plan       Row Name 05/26/23 1523       Plan    Plan Home Health    Patient/Family in Agreement with Plan yes    Plan Comments Spoke with pt about possible snf. She says that she will go home at d/c. She has a neighbor, daughter, and daughter's boyfriend who will be able to help her if needed. Pt is current with [facility] and will resumption of care orders at d/c.    Final Discharge Disposition Code 06 - home with home health care                   Discharge Codes    No documentation.                       KAITLIN Dillard

## 2023-05-26 NOTE — PLAN OF CARE
Goal Outcome Evaluation:  Plan of Care Reviewed With: patient        Progress: improving  Outcome Evaluation: Ntn follow up. Pt reports her appetite has been reduced/poor for the last 2 days. She says her appetite was pretty normal prior to this. She is ordered a healthy heart diet and has consumed 42% of the last 3 meals. Weight stable for the last year according to weight records. Pt is endentulous but reports not chewing or swallowing difficulty. She is receiving Tayo BID for skin health. Cont to follow per protocol.

## 2023-05-26 NOTE — PLAN OF CARE
"Goal Outcome Evaluation:  Plan of Care Reviewed With: patient        Progress: no change  Outcome Evaluation: (P) PT eval complete. Pt was AO x4. Bed mobility was mod A. Pt required grabbing SPT arms and helped pull herself up sitting. Sit to stand was mod A w/ pt putting one hand on walker. Pt ambulated 40 ft w/ front wheeled walker w/ CGA. Pt reports \"feeling better and more like myself\". Pt said she sat up for 2 hours during breakfast and was planning on sitting up for remaining meals. Pt demonstrated decrease in LE strength as well as decrease in cardiovascular endurance. Skilled therapy would be benficial to improve strength, gait speed, and activity endurance. D/c recommendation to SNF.         "

## 2023-05-26 NOTE — THERAPY EVALUATION
Patient Name: Brenda Sneed  : 1950    MRN: [id number]                              Today's Date: 2023       Admit Date: 2023    Visit Dx:     ICD-10-CM ICD-9-CM   1. Lymphedema of both lower extremities  I89.0 457.1   2. Thrombocytopenia  D69.6 287.5   3. Cellulitis of left lower extremity  L03.116 682.6   4. Laceration of left lower extremity, subsequent encounter  S81.812D V58.89     894.0   5. Impaired mobility [Z74.09 (ICD-10-CM)]  Z74.09 799.89     Patient Active Problem List   Diagnosis   • Spinal stenosis, lumbar   • Secondary osteoarthritis of left shoulder due to rotator cuff arthropathy   • Cellulitis of right lower extremity   • HTN (hypertension)   • Obesity, Class III, BMI 40-49.9 (morbid obesity)   • Anxiety associated with depression   • Traumatic rhabdomyolysis   • Traumatic rhabdomyolysis, initial encounter   • Laceration of left lower extremity   • Cellulitis   • Cellulitis of left lower extremity   • Thrombocytopenia   • Acute cystitis with hematuria     Past Medical History:   Diagnosis Date   • Arthritis    • Bronchitis    • Cataract     right eye , cornea implant   • Chronic back pain    • Depression    • Disease of thyroid gland    • Edema     lower extremities   • Hypertension    • Kidney stones      Past Surgical History:   Procedure Laterality Date   • BACK SURGERY       ([place]),     • CHOLECYSTECTOMY OPEN     • CORNEAL TRANSPLANT Right    • INCISION AND DRAINAGE ABSCESS Left 2023    Procedure: INCISION AND DRAINAGE ABSCESS left leg;  Surgeon: Sarita Vazquez MD;  Location: [facility] OR;  Service: General;  Laterality: Left;   • LACERATION REPAIR Left 2023    Procedure: washout and closure left leg wound ;  Surgeon: Sarita Vazquez MD;  Location: [facility] OR;  Service: General;  Laterality: Left;   • LUMBAR FUSION Left 2017    Procedure: LEFT LUMBAR LATERAL INTERBODY FUSION WITH INSTRUMENTATION  L1-2;  Surgeon: MARCELINO Wright MD;   Location:  [facility] OR;  Service:    • NECK SURGERY  2010    [place]   • TOTAL HIP ARTHROPLASTY Right 2014    DR. KENDRICK    • TOTAL KNEE ARTHROPLASTY Right 2000   • TOTAL KNEE ARTHROPLASTY Left 2002   • TOTAL SHOULDER ARTHROPLASTY W/ DISTAL CLAVICLE EXCISION Left 6/1/2020    Procedure: LEFT REVERSE TOTAL SHOULDER REPLACEMENT;  Surgeon: Saravanan Calvin MD;  Location:  [facility] OR;  Service: Orthopedics;  Laterality: Left;      General Information    No documentation.                Mobility     Row Name 05/26/23 1116          Bed Mobility    Bed Mobility supine-sit  -MS (r) CG (t) MS (c)     Supine-Sit [place] (Bed Mobility) moderate assist (50% patient effort);1 person assist  -MS (r) CG (t) MS (c)     Assistive Device (Bed Mobility) other (see comments)  grab onto SPT arms  -MS (r) CG (t) MS (c)     Row Name 05/26/23 1116          Sit-Stand Transfer    Sit-Stand [place] (Transfers) moderate assist (50% patient effort)  -MS (r) CG (t) MS (c)     Assistive Device (Sit-Stand Transfers) walker, front-wheeled  -MS (r) CG (t) MS (c)     Row Name 05/26/23 1116          Gait/Stairs (Locomotion)    [place] Level (Gait) contact guard;1 person assist  -MS (r) CG (t) MS (c)     Assistive Device (Gait) walker, front-wheeled  -MS (r) CG (t) MS (c)     Distance in Feet (Gait) 40  -MS (r) CG (t) MS (c)     Deviations/Abnormal Patterns (Gait) gait speed decreased;stride length decreased;base of support, narrow  -MS (r) CG (t) MS (c)           User Key  (r) = Recorded By, (t) = Taken By, (c) = Cosigned By    Initials Name Provider Type    Lois Garrido R, PT, DPT, NCS Physical Therapist    CG Josafat Nieto, PT Student PT Student               Obj/Interventions     Row Name 05/26/23 1116          Range of Motion Comprehensive    General Range of Motion upper extremity range of motion deficits identified  -MS (r) CG (t) MS (c)     Comment, General Range of Motion L UE IR limited  -MS (r) CG (t) MS (c)     Row Name  05/26/23 1116          Strength Comprehensive (MMT)    Comment, General Manual Muscle Testing (MMT) Assessment B shoulder flexion 4-/5; B elbow flexion 4/5; B elbow extension 3+/5; BI LE Hip flexion, knee extension and knee flexion 3/5 w/ LBP; BI DF and PF 4/5  -MS (r) CG (t) MS (c)     Row Name 05/26/23 1116          Balance    Balance Assessment sitting static balance;sitting dynamic balance;sit to stand dynamic balance;standing static balance;standing dynamic balance  -MS (r) CG (t) MS (c)     Static Sitting Balance independent  -MS (r) CG (t) MS (c)     Dynamic Sitting Balance 1-person assist;standby assist  -MS (r) CG (t) MS (c)     Position, Sitting Balance unsupported  -MS (r) CG (t) MS (c)     Sit to Stand Dynamic Balance moderate assist;1-person assist  -MS (r) CG (t) MS (c)     Static Standing Balance standby assist;1-person assist  -MS (r) CG (t) MS (c)     Dynamic Standing Balance contact guard;1-person assist  -MS (r) CG (t) MS (c)     Position/Device Used, Standing Balance walker, front-wheeled  -MS (r) CG (t) MS (c)     Row Name 05/26/23 1116          Sensory Assessment (Somatosensory)    Sensory Assessment (Somatosensory) UE sensation intact;LE sensation intact  -MS (r) CG (t) MS (c)           User Key  (r) = Recorded By, (t) = Taken By, (c) = Cosigned By    Initials Name Provider Type    Lois Garrido, PT, DPT, NCS Physical Therapist    Josafat Osei, PT Student PT Student               Goals/Plan     Row Name 05/26/23 1116          Bed Mobility Goal 1 (PT)    Activity/Assistive Device (Bed Mobility Goal 1, PT) bed mobility activities, all  -MS (r) CG (t) MS (c)     [place] Level/Cues Needed (Bed Mobility Goal 1, PT) independent  -MS (r) CG (t) MS (c)     Time Frame (Bed Mobility Goal 1, PT) long term goal (LTG)  -MS (r) CG (t) MS (c)     Progress/Outcomes (Bed Mobility Goal 1, PT) new goal  -MS (r) CG (t) MS (c)     Row Name 05/26/23 1116          Transfer Goal 1 (PT)     Activity/Assistive Device (Transfer Goal 1, PT) sit-to-stand/stand-to-sit  -MS (r) CG (t) MS (c)     [place] Level/Cues Needed (Transfer Goal 1, PT) modified independence  -MS (r) CG (t) MS (c)     Time Frame (Transfer Goal 1, PT) long term goal (LTG)  -MS (r) CG (t) MS (c)     Progress/Outcome (Transfer Goal 1, PT) new goal  -MS (r) CG (t) MS (c)     Row Name 05/26/23 1116          Gait Training Goal 1 (PT)    Activity/Assistive Device (Gait Training Goal 1, PT) gait (walking locomotion);improve balance and speed;increase endurance/gait distance;cane, straight  -MS (r) CG (t) MS (c)     [place] Level (Gait Training Goal 1, PT) independent  -MS (r) CG (t) MS (c)     Distance (Gait Training Goal 1, PT) 40 feet to be able to walk outside  -MS (r) CG (t) MS (c)     Row Name 05/26/23 1116          Stairs Goal 1 (PT)    Activity/Assistive Device (Stairs Goal 1, PT) stairs, all skills  -MS (r) CG (t) MS (c)     [place] Level/Cues Needed (Stairs Goal 1, PT) modified independence  -MS (r) CG (t) MS (c)     Number of Stairs (Stairs Goal 1, PT) 1 to be able to get in and out of her house  -MS (r) CG (t) MS (c)     Row Name 05/26/23 1116          Therapy Assessment/Plan (PT)    Planned Therapy Interventions (PT) balance training;bed mobility training;gait training;patient/family education;stair training;strengthening;transfer training  -MS (r) CG (t) MS (c)           User Key  (r) = Recorded By, (t) = Taken By, (c) = Cosigned By    Initials Name Provider Type    Lois Garrido R, PT, DPT, NCS Physical Therapist    Josafat Osei, PT Student PT Student               Clinical Impression     Row Name 05/26/23 1116          Pain    Pretreatment Pain Rating 0/10 - no pain  -MS (r) CG (t) MS (c)     Posttreatment Pain Rating 0/10 - no pain  -MS (r) CG (t) MS (c)     Pain Intervention(s) Medication (See MAR);Repositioned;Ambulation/increased activity  -MS (r) CG (t) MS (c)     Additional Documentation Pain Scale:  "Numbers Pre/Post-Treatment (Group)  -MS (r) CG (t) MS (c)     Row Name 05/26/23 1116          Plan of Care Review    Plan of Care Reviewed With patient  -MS (r) CG (t) MS (c)     Progress no change  -MS (r) CG (t) MS (c)     Outcome Evaluation PT eval complete. Pt was AO x4. Bed mobility was mod A. Pt required grabbing SPT arms and helped pull herself up sitting. Sit to stand was mod A w/ pt putting one hand on walker. Pt ambulated 40 ft w/ front wheeled walker w/ CGA. Pt reports \"feeling better and more like myself\". Pt said she sat up for 2 hours during breakfast and was planning on sitting up for remaining meals. Pt demonstrated decrease in LE strength as well as decrease in cardiovascular endurance. Skilled therapy would be benficial to improve strength, gait speed, and activity endurance. D/c recommendation to SNF. (P)   -CG     Row Name 05/26/23 1115          Therapy Assessment/Plan (PT)    Patient/Family Therapy Goals Statement (PT) Return home  -MS (r) CG (t) MS (c)     Rehab Potential (PT) good, to achieve stated therapy goals  -MS (r) CG (t) MS (c)     Criteria for Skilled Interventions Met (PT) yes  -MS (r) CG (t) MS (c)     Therapy Frequency (PT) 2 times/day  -MS (r) CG (t) MS (c)     Row Name 05/26/23 1116          Vital Signs    O2 Delivery Pre Treatment room air  -MS (r) CG (t) MS (c)     O2 Delivery Intra Treatment room air  -MS (r) CG (t) MS (c)     O2 Delivery Post Treatment room air  -MS (r) CG (t) MS (c)     Pre Patient Position Supine  -MS (r) CG (t) MS (c)     Post Patient Position Sitting  -MS (r) CG (t) MS (c)     Row Name 05/26/23 1116          Positioning and Restraints    Pre-Treatment Position in bed  -MS (r) CG (t) MS (c)     Post Treatment Position chair  -MS (r) CG (t) MS (c)     In Chair sitting;reclined;legs elevated;call light within reach;encouraged to call for assist  -MS (r) CG (t) MS (c)           User Key  (r) = Recorded By, (t) = Taken By, (c) = Cosigned By    Initials Name " Provider Type    Lois Garrido BENEDICT, PT, DPT, NCS Physical Therapist    Josafat Osei, PT Student PT Student               Outcome Measures     Row Name 05/26/23 1116          How much help from another person do you currently need...    Turning from your back to your side while in flat bed without using bedrails? 3  -MS (r) CG (t) MS (c)     Moving from lying on back to sitting on the side of a flat bed without bedrails? 2  -MS (r) CG (t) MS (c)     Moving to and from a bed to a chair (including a wheelchair)? 2  -MS (r) CG (t) MS (c)     Standing up from a chair using your arms (e.g., wheelchair, bedside chair)? 3  -MS (r) CG (t) MS (c)     Climbing 3-5 steps with a railing? 2  -MS (r) CG (t) MS (c)     To walk in hospital room? 3  -MS (r) CG (t) MS (c)     AM-PAC 6 Clicks Score (PT) 15  -MS (r) CG (t)     Highest level of mobility 4 --> Transferred to chair/commode  -MS (r) CG (t)     Row Name 05/26/23 1458 05/26/23 1116       Functional Assessment    Outcome Measure Options AM-PAC 6 Clicks Daily Activity (OT)  - AM-PAC 6 Clicks Basic Mobility (PT)  -MS (r) CG (t) MS (c)          User Key  (r) = Recorded By, (t) = Taken By, (c) = Cosigned By    Initials Name Provider Type     Dorita Oropeza, OTR/L Occupational Therapist    MS Wu Lois BENEDICT, PT, DPT, NCS Physical Therapist    Josafat Osei, PT Student PT Student                             Physical Therapy Education     Title: PT OT SLP Therapies (In Progress)     Topic: Physical Therapy (Done)     Point: Mobility training (Done)     Learning Progress Summary           Patient Acceptance, E, VU by CG at 5/26/2023 1325    Comment: Pt educated on POC, d/c recommendations, safety w/ transfers, safety ambulating                   Point: Body mechanics (Done)     Learning Progress Summary           Patient Acceptance, E, VU by CG at 5/26/2023 1325    Comment: Pt educated on POC, d/c recommendations, safety w/ transfers, safety ambulating                 "   Point: Precautions (Done)     Learning Progress Summary           Patient Acceptance, E, VU by CG at 5/26/2023 7975    Comment: Pt educated on POC, d/c recommendations, safety w/ transfers, safety ambulating                               User Key     Initials Effective Dates Name Provider Type Discipline    CG 04/27/23 -  Josafat Nieto, PT Student PT Student PT              PT Recommendation and Plan  Planned Therapy Interventions (PT): balance training, bed mobility training, gait training, patient/family education, stair training, strengthening, transfer training  Plan of Care Reviewed With: patient  Progress: no change  Outcome Evaluation: (P) PT eval complete. Pt was AO x4. Bed mobility was mod A. Pt required grabbing SPT arms and helped pull herself up sitting. Sit to stand was mod A w/ pt putting one hand on walker. Pt ambulated 40 ft w/ front wheeled walker w/ CGA. Pt reports \"feeling better and more like myself\". Pt said she sat up for 2 hours during breakfast and was planning on sitting up for remaining meals. Pt demonstrated decrease in LE strength as well as decrease in cardiovascular endurance. Skilled therapy would be benficial to improve strength, gait speed, and activity endurance. D/c recommendation to SNF.     Time Calculation:    PT Charges     Row Name 05/26/23 1335             Time Calculation    Start Time 1137  -MS (r) CG (t) MS (c)      Stop Time 1208  -MS (r) CG (t) MS (c)      Time Calculation (min) 31 min  -MS (r) CG (t)      PT Received On 05/26/23  -MS (r) CG (t) MS (c)      PT Goal Re-Cert Due Date 06/05/23  -MS (r) CG (t) MS (c)         Untimed Charges    PT Eval/Re-eval Minutes 42  -MS (r) CG (t) MS (c)         Total Minutes    Untimed Charges Total Minutes 42  -MS (r) CG (t)       Total Minutes 42  -MS (r) CG (t)            User Key  (r) = Recorded By, (t) = Taken By, (c) = Cosigned By    Initials Name Provider Type    MS Lois Wu R, PT, DPT, NCS Physical Therapist    CG " Josafat Nieto, PT Student PT Student                  PT G-Codes  Outcome Measure Options: AM-PAC 6 Clicks Daily Activity (OT)  AM-PAC 6 Clicks Score (PT): 15  AM-PAC 6 Clicks Score (OT): 18  PT Discharge Summary  Anticipated Discharge Disposition (PT): (P) skilled nursing facility    Josafat Nieto, PT Student  5/26/2023

## 2023-05-26 NOTE — PLAN OF CARE
Goal Outcome Evaluation:  Plan of Care Reviewed With: patient        Progress: no change  Outcome Evaluation: Medicated prn for c/o pain, purewick in place, up x1 and walker to BR and BSC, 2 sm loose BM this shift, dressing to LLE changed per order dressing changed to L PICC.

## 2023-05-27 ENCOUNTER — APPOINTMENT (OUTPATIENT)
Dept: ULTRASOUND IMAGING | Facility: HOSPITAL | Age: 73
DRG: 803 | End: 2023-05-27
Payer: MEDICARE

## 2023-05-27 LAB
ABO GROUP BLD: NORMAL
APTT PPP: 34.7 SECONDS (ref 24.1–35)
BASOPHILS # BLD AUTO: 0.02 10*3/MM3 (ref 0–0.2)
BASOPHILS NFR BLD AUTO: 0.8 % (ref 0–1.5)
BH BB BLOOD EXPIRATION DATE: NORMAL
BH BB BLOOD TYPE BARCODE: 7300
BH BB DISPENSE STATUS: NORMAL
BH BB PRODUCT CODE: NORMAL
BH BB UNIT NUMBER: NORMAL
BLD GP AB SCN SERPL QL: NEGATIVE
DEPRECATED RDW RBC AUTO: 51 FL (ref 37–54)
EOSINOPHIL # BLD AUTO: 0.01 10*3/MM3 (ref 0–0.4)
EOSINOPHIL NFR BLD AUTO: 0.4 % (ref 0.3–6.2)
ERYTHROCYTE [DISTWIDTH] IN BLOOD BY AUTOMATED COUNT: 15.3 % (ref 12.3–15.4)
FIBRINOGEN PPP-MCNC: 205 MG/DL (ref 240–460)
HCT VFR BLD AUTO: 20.8 % (ref 34–46.6)
HCT VFR BLD AUTO: 22.9 % (ref 34–46.6)
HGB BLD-MCNC: 6.3 G/DL (ref 12–15.9)
HGB BLD-MCNC: 7.9 G/DL (ref 12–15.9)
HLA AB SER QL IA: NEGATIVE
INR PPP: 1.33 (ref 0.91–1.09)
LYMPHOCYTES # BLD AUTO: 0.62 10*3/MM3 (ref 0.7–3.1)
LYMPHOCYTES NFR BLD AUTO: 24.9 % (ref 19.6–45.3)
MCH RBC QN AUTO: 28.3 PG (ref 26.6–33)
MCHC RBC AUTO-ENTMCNC: 30.3 G/DL (ref 31.5–35.7)
MCV RBC AUTO: 93.3 FL (ref 79–97)
MONOCYTES # BLD AUTO: 0.07 10*3/MM3 (ref 0.1–0.9)
MONOCYTES NFR BLD AUTO: 2.8 % (ref 5–12)
NEUTROPHILS NFR BLD AUTO: 1.73 10*3/MM3 (ref 1.7–7)
NEUTROPHILS NFR BLD AUTO: 69.5 % (ref 42.7–76)
PLAT GP AB SER QL IA: NEGATIVE
PLAT GP IA/IIA AB SER QL IA: NEGATIVE
PLAT GP IB/IX AB SER QL IA: NEGATIVE
PLAT GP IIB/IIIA AB SER QL IA: NEGATIVE
PLATELET # BLD AUTO: 20 10*3/MM3 (ref 140–450)
PMV BLD AUTO: 12.3 FL (ref 6–12)
PROTHROMBIN TIME: 16.6 SECONDS (ref 11.8–14.8)
RBC # BLD AUTO: 2.23 10*6/MM3 (ref 3.77–5.28)
RH BLD: POSITIVE
T&S EXPIRATION DATE: NORMAL
UNIT  ABO: NORMAL
UNIT  RH: NORMAL
WBC NRBC COR # BLD: 2.49 10*3/MM3 (ref 3.4–10.8)

## 2023-05-27 PROCEDURE — 85610 PROTHROMBIN TIME: CPT | Performed by: INTERNAL MEDICINE

## 2023-05-27 PROCEDURE — 25010000002 DEXAMETHASONE PER 1 MG: Performed by: PHYSICIAN ASSISTANT

## 2023-05-27 PROCEDURE — 86923 COMPATIBILITY TEST ELECTRIC: CPT

## 2023-05-27 PROCEDURE — 86900 BLOOD TYPING SEROLOGIC ABO: CPT | Performed by: FAMILY MEDICINE

## 2023-05-27 PROCEDURE — 36430 TRANSFUSION BLD/BLD COMPNT: CPT

## 2023-05-27 PROCEDURE — 85730 THROMBOPLASTIN TIME PARTIAL: CPT | Performed by: INTERNAL MEDICINE

## 2023-05-27 PROCEDURE — 86900 BLOOD TYPING SEROLOGIC ABO: CPT

## 2023-05-27 PROCEDURE — 97116 GAIT TRAINING THERAPY: CPT

## 2023-05-27 PROCEDURE — 86850 RBC ANTIBODY SCREEN: CPT | Performed by: FAMILY MEDICINE

## 2023-05-27 PROCEDURE — 76981 USE PARENCHYMA: CPT

## 2023-05-27 PROCEDURE — P9016 RBC LEUKOCYTES REDUCED: HCPCS

## 2023-05-27 PROCEDURE — 85384 FIBRINOGEN ACTIVITY: CPT | Performed by: INTERNAL MEDICINE

## 2023-05-27 PROCEDURE — 85018 HEMOGLOBIN: CPT | Performed by: FAMILY MEDICINE

## 2023-05-27 PROCEDURE — 85014 HEMATOCRIT: CPT | Performed by: FAMILY MEDICINE

## 2023-05-27 PROCEDURE — 85025 COMPLETE CBC W/AUTO DIFF WBC: CPT | Performed by: PHYSICIAN ASSISTANT

## 2023-05-27 PROCEDURE — 86901 BLOOD TYPING SEROLOGIC RH(D): CPT | Performed by: FAMILY MEDICINE

## 2023-05-27 RX ADMIN — Medication 10 ML: at 09:20

## 2023-05-27 RX ADMIN — DEXAMETHASONE SODIUM PHOSPHATE 40 MG: 10 INJECTION INTRAMUSCULAR; INTRAVENOUS at 09:19

## 2023-05-27 RX ADMIN — LEVOTHYROXINE SODIUM 100 MCG: 100 TABLET ORAL at 06:38

## 2023-05-27 RX ADMIN — PANTOPRAZOLE SODIUM 40 MG: 40 TABLET, DELAYED RELEASE ORAL at 06:38

## 2023-05-27 RX ADMIN — OXYCODONE HYDROCHLORIDE AND ACETAMINOPHEN 2 TABLET: 5; 325 TABLET ORAL at 22:09

## 2023-05-27 RX ADMIN — OXYCODONE HYDROCHLORIDE AND ACETAMINOPHEN 2 TABLET: 5; 325 TABLET ORAL at 00:37

## 2023-05-27 RX ADMIN — PRAMIPEXOLE DIHYDROCHLORIDE 0.75 MG: 0.25 TABLET ORAL at 22:11

## 2023-05-27 RX ADMIN — PRAMIPEXOLE DIHYDROCHLORIDE 0.75 MG: 0.25 TABLET ORAL at 06:38

## 2023-05-27 RX ADMIN — OXYCODONE HYDROCHLORIDE AND ACETAMINOPHEN 2 TABLET: 5; 325 TABLET ORAL at 07:03

## 2023-05-27 RX ADMIN — DOCUSATE SODIUM 100 MG: 100 CAPSULE ORAL at 09:19

## 2023-05-27 RX ADMIN — CEFDINIR 300 MG: 300 CAPSULE ORAL at 09:20

## 2023-05-27 RX ADMIN — ACETAMINOPHEN 650 MG: 325 TABLET ORAL at 16:02

## 2023-05-27 RX ADMIN — DOCUSATE SODIUM 50 MG AND SENNOSIDES 8.6 MG 2 TABLET: 8.6; 5 TABLET, FILM COATED ORAL at 09:20

## 2023-05-27 RX ADMIN — Medication 10 ML: at 22:12

## 2023-05-27 RX ADMIN — CEFDINIR 300 MG: 300 CAPSULE ORAL at 22:11

## 2023-05-27 RX ADMIN — NYSTATIN 1 APPLICATION: 100000 POWDER TOPICAL at 22:12

## 2023-05-27 RX ADMIN — DULOXETINE HYDROCHLORIDE 60 MG: 30 CAPSULE, DELAYED RELEASE ORAL at 22:11

## 2023-05-27 RX ADMIN — NYSTATIN 1 APPLICATION: 100000 POWDER TOPICAL at 09:20

## 2023-05-27 RX ADMIN — Medication 5000 UNITS: at 09:19

## 2023-05-27 RX ADMIN — DULOXETINE HYDROCHLORIDE 60 MG: 30 CAPSULE, DELAYED RELEASE ORAL at 09:19

## 2023-05-27 RX ADMIN — BISOPROLOL FUMARATE: 5 TABLET ORAL at 09:19

## 2023-05-27 RX ADMIN — PRAMIPEXOLE DIHYDROCHLORIDE 0.75 MG: 0.25 TABLET ORAL at 13:57

## 2023-05-27 RX ADMIN — OXYCODONE HYDROCHLORIDE AND ACETAMINOPHEN 2 TABLET: 5; 325 TABLET ORAL at 17:30

## 2023-05-27 RX ADMIN — OXYCODONE HYDROCHLORIDE AND ACETAMINOPHEN 500 MG: 500 TABLET ORAL at 09:19

## 2023-05-27 NOTE — THERAPY TREATMENT NOTE
Acute Care - Physical Therapy Treatment Note  [facility]     Patient Name: Brenda Sneed  : 1950  MRN: [id number]  Today's Date: 2023   Onset of Illness/Injury or Date of Surgery: 23  Visit Dx:     ICD-10-CM ICD-9-CM   1. Lymphedema of both lower extremities  I89.0 457.1   2. Thrombocytopenia  D69.6 287.5   3. Cellulitis of left lower extremity  L03.116 682.6   4. Laceration of left lower extremity, subsequent encounter  S81.812D V58.89     894.0   5. Impaired mobility [Z74.09 (ICD-10-CM)]  Z74.09 799.89     Patient Active Problem List   Diagnosis    Spinal stenosis, lumbar    Secondary osteoarthritis of left shoulder due to rotator cuff arthropathy    Cellulitis of right lower extremity    HTN (hypertension)    Obesity, Class III, BMI 40-49.9 (morbid obesity)    Anxiety associated with depression    Traumatic rhabdomyolysis    Traumatic rhabdomyolysis, initial encounter    Laceration of left lower extremity    Cellulitis    Cellulitis of left lower extremity    Thrombocytopenia    Acute cystitis with hematuria     Past Medical History:   Diagnosis Date    Arthritis     Bronchitis     Cataract     right eye , cornea implant    Chronic back pain     Depression     Disease of thyroid gland     Edema     lower extremities    Hypertension     Kidney stones      Past Surgical History:   Procedure Laterality Date    BACK SURGERY       ([place]),      CHOLECYSTECTOMY OPEN      CORNEAL TRANSPLANT Right     INCISION AND DRAINAGE ABSCESS Left 2023    Procedure: INCISION AND DRAINAGE ABSCESS left leg;  Surgeon: Sarita Vazquez MD;  Location: [facility] OR;  Service: General;  Laterality: Left;    LACERATION REPAIR Left 2023    Procedure: washout and closure left leg wound ;  Surgeon: Sarita Vazquez MD;  Location: [facility] OR;  Service: General;  Laterality: Left;    LUMBAR FUSION Left 2017    Procedure: LEFT LUMBAR LATERAL INTERBODY FUSION WITH INSTRUMENTATION  L1-2;  Surgeon: MARCELINO  Farrukh Wright MD;  Location:  [facility] OR;  Service:     NECK SURGERY  2010    [place]    TOTAL HIP ARTHROPLASTY Right 2014    DR. KENDRICK     TOTAL KNEE ARTHROPLASTY Right 2000    TOTAL KNEE ARTHROPLASTY Left 2002    TOTAL SHOULDER ARTHROPLASTY W/ DISTAL CLAVICLE EXCISION Left 6/1/2020    Procedure: LEFT REVERSE TOTAL SHOULDER REPLACEMENT;  Surgeon: Saravanan Calvin MD;  Location:  [facility] OR;  Service: Orthopedics;  Laterality: Left;     PT Assessment (last 12 hours)       PT Evaluation and Treatment       Row Name 05/27/23 1410          Physical Therapy Time and Intention    Subjective Information no complaints  -WK     Document Type therapy note (daily note)  -WK     Mode of Treatment physical therapy  -WK       Row Name 05/27/23 1410          General Information    Existing Precautions/Restrictions fall  -WK       Row Name 05/27/23 1410          Bed Mobility    Supine-Sit [place] (Bed Mobility) verbal cues;minimum assist (75% patient effort)  assited nsg with clean up  -WK     Sit-Supine [place] (Bed Mobility) verbal cues;minimum assist (75% patient effort)  -WK       Row Name 05/27/23 1410          Sit-Stand Transfer    Sit-Stand [place] (Transfers) standby assist  sit to stand x 3  -WK     Assistive Device (Sit-Stand Transfers) walker, front-wheeled  -WK       Row Name 05/27/23 1410          Stand-Sit Transfer    Stand-Sit [place] (Transfers) standby assist  -WK     Assistive Device (Stand-Sit Transfers) walker, front-wheeled  -WK       Row Name 05/27/23 1410          Gait/Stairs (Locomotion)    [place] Level (Gait) verbal cues;contact guard  -WK     Assistive Device (Gait) walker, front-wheeled  -WK     Distance in Feet (Gait) 40' x2  -WK     Deviations/Abnormal Patterns (Gait) gait speed decreased  -WK       Row Name             Wound 05/23/23 1900 Left proximal leg    Wound - Properties Group Placement Date: 05/23/23  -CR Placement Time: 1900  -CR Present on Hospital Admission: Y   -CR Side: Left  -CR Orientation: proximal  -CR Location: leg  -CR    Retired Wound - Properties Group Placement Date: 05/23/23  -CR Placement Time: 1900  -CR Present on Hospital Admission: Y  -CR Side: Left  -CR Orientation: proximal  -CR Location: leg  -CR    Retired Wound - Properties Group Date first assessed: 05/23/23  -CR Time first assessed: 1900  -CR Present on Hospital Admission: Y  -CR Side: Left  -CR Location: leg  -CR      Row Name             Wound 05/23/23 1900 Right proximal leg    Wound - Properties Group Placement Date: 05/23/23  -CR Placement Time: 1900  -CR Present on Hospital Admission: Y  -CR Side: Right  -CR Orientation: proximal  -CR Location: leg  -CR    Retired Wound - Properties Group Placement Date: 05/23/23  -CR Placement Time: 1900  -CR Present on Hospital Admission: Y  -CR Side: Right  -CR Orientation: proximal  -CR Location: leg  -CR    Retired Wound - Properties Group Date first assessed: 05/23/23  -CR Time first assessed: 1900  -CR Present on Hospital Admission: Y  -CR Side: Right  -CR Location: leg  -CR      Row Name             Wound 04/19/23 1140 Left lower leg Incision    Wound - Properties Group Placement Date: 04/19/23  -DT Placement Time: 1140  -DT Present on Hospital Admission: N  -DT Side: Left  -DT Orientation: lower  -DT Location: leg  -DT Primary Wound Type: Incision  -DT    Retired Wound - Properties Group Placement Date: 04/19/23  -DT Placement Time: 1140  -DT Present on Hospital Admission: N  -DT Side: Left  -DT Orientation: lower  -DT Location: leg  -DT Primary Wound Type: Incision  -DT    Retired Wound - Properties Group Date first assessed: 04/19/23  -DT Time first assessed: 1140  -DT Present on Hospital Admission: N  -DT Side: Left  -DT Location: leg  -DT Primary Wound Type: Incision  -DT      Row Name             Wound 05/25/23 1341 Left posterior hand Skin Tear    Wound - Properties Group Placement Date: 05/25/23  -DF Placement Time: 1341  -DF Side: Left  -DF  Orientation: posterior  -DF Location: hand  -DF Primary Wound Type: Skin tear  -DF    Retired Wound - Properties Group Placement Date: 05/25/23  -DF Placement Time: 1341  -DF Side: Left  -DF Orientation: posterior  -DF Location: hand  -DF Primary Wound Type: Skin tear  -DF    Retired Wound - Properties Group Date first assessed: 05/25/23  -DF Time first assessed: 1341  -DF Side: Left  -DF Location: hand  -DF Primary Wound Type: Skin tear  -DF      Row Name 05/27/23 1410          Plan of Care Review    Plan of Care Reviewed With patient  -WK     Progress improving  -WK     Outcome Evaluation Pt performed bed mobility min A and amb 40'x2 CGA with RW. Pt educated on POC.  -WK       Row Name 05/27/23 1410          Positioning and Restraints    Pre-Treatment Position in bed  -WK     Post Treatment Position bed  -WK     In Bed fowlers;call light within reach;encouraged to call for assist;with family/caregiver  -WK               User Key  (r) = Recorded By, (t) = Taken By, (c) = Cosigned By      Initials Name Provider Type    DF Earnestine Woods, RN Registered Nurse    Rika Diallo PTA Physical Therapist Assistant    Ludwin Sam RN Registered Nurse    Alejandro Carr, RN Registered Nurse                    Physical Therapy Education       Title: PT OT SLP Therapies (In Progress)       Topic: Physical Therapy (Done)       Point: Mobility training (Done)       Learning Progress Summary             Patient Acceptance, E, VU by CG at 5/26/2023 1325    Comment: Pt educated on POC, d/c recommendations, safety w/ transfers, safety ambulating                         Point: Body mechanics (Done)       Learning Progress Summary             Patient Acceptance, E, VU by CG at 5/26/2023 1325    Comment: Pt educated on POC, d/c recommendations, safety w/ transfers, safety ambulating                         Point: Precautions (Done)       Learning Progress Summary             Patient Acceptance, E, VU by CG at  5/26/2023 1325    Comment: Pt educated on POC, d/c recommendations, safety w/ transfers, safety ambulating                                         User Key       Initials Effective Dates Name Provider Type Discipline     04/27/23 -  Josafat Nieto, PT Student PT Student PT                  PT Recommendation and Plan     Plan of Care Reviewed With: patient  Progress: improving  Outcome Evaluation: Pt performed bed mobility min A and amb 40'x2 CGA with RW. Pt educated on POC.   Outcome Measures       Row Name 05/27/23 1410 05/25/23 1400          How much help from another person do you currently need...    Turning from your back to your side while in flat bed without using bedrails? 3  -WK --     Moving from lying on back to sitting on the side of a flat bed without bedrails? 3  -WK --     Moving to and from a bed to a chair (including a wheelchair)? 4  -WK --     Standing up from a chair using your arms (e.g., wheelchair, bedside chair)? 4  -WK --     Climbing 3-5 steps with a railing? 2  -WK --     To walk in hospital room? 3  -WK --     AM-PAC 6 Clicks Score (PT) 19  -WK --        How much help from another is currently needed...    Putting on and taking off regular lower body clothing? -- 2  -AC (r) WB (t) AC (c)     Bathing (including washing, rinsing, and drying) -- 2  -AC (r) WB (t) AC (c)     Toileting (which includes using toilet bed pan or urinal) -- 2  -AC (r) WB (t) AC (c)     Putting on and taking off regular upper body clothing -- 2  -AC (r) WB (t) AC (c)     Taking care of personal grooming (such as brushing teeth) -- 3  -AC (r) WB (t) AC (c)     Eating meals -- 4  -AC (r) WB (t) AC (c)     AM-PAC 6 Clicks Score (OT) -- 15  -AC (r) WB (t)        Functional Assessment    Outcome Measure Options AM-PAC 6 Clicks Basic Mobility (PT)  -WK AM-PAC 6 Clicks Daily Activity (OT)  -AC (r) WB (t) AC (c)               User Key  (r) = Recorded By, (t) = Taken By, (c) = Cosigned By      Initials Name Provider Type     Leonardo Iglesias, OTR/L, CNT Occupational Therapist    WK Rika Walden PTA Physical Therapist Assistant    WB Arsalan Elizalde, OT Student OT Student                     Time Calculation:    PT Charges       Row Name 05/27/23 1434             Time Calculation    Start Time 1410  -WK      Stop Time 1433  -WK      Time Calculation (min) 23 min  -WK      PT Received On 05/27/23  -WK         Time Calculation- PT    Total Timed Code Minutes- PT 23 minute(s)  -WK                User Key  (r) = Recorded By, (t) = Taken By, (c) = Cosigned By      Initials Name Provider Type    WK Rika Walden PTA Physical Therapist Assistant                  Therapy Charges for Today       Code Description Service Date Service Provider Modifiers Qty    [id number] HC GAIT TRAINING EA 15 MIN 5/27/2023 Rika Walden PTA GP 2            PT G-Codes  Outcome Measure Options: AM-PAC 6 Clicks Basic Mobility (PT)  AM-PAC 6 Clicks Score (PT): 19  AM-PAC 6 Clicks Score (OT): 18    Rika Walden PTA  5/27/2023

## 2023-05-27 NOTE — PROGRESS NOTES
Brenda Sneed is a 72 y.o. female patient.    Continues to make slow steady progress.  Appreciate wound care help.  Repeat urinalysis pending.        Current Facility-Administered Medications   Medication Dose Route Frequency Provider Last Rate Last Admin    acetaminophen (TYLENOL) tablet 650 mg  650 mg Oral Q4H PRN Sachin Dia MD   650 mg at 05/25/23 2218    ascorbic acid (VITAMIN C) tablet 500 mg  500 mg Oral Daily Sachin Dia MD   500 mg at 05/26/23 0812    sennosides-docusate (PERICOLACE) 8.6-50 MG per tablet 2 tablet  2 tablet Oral BID Sachin Dia MD   2 tablet at 05/26/23 0813    And    polyethylene glycol (MIRALAX) packet 17 g  17 g Oral Daily PRN Sachin Dia MD        And    bisacodyl (DULCOLAX) EC tablet 5 mg  5 mg Oral Daily PRN Sachin Dia MD        And    bisacodyl (DULCOLAX) suppository 10 mg  10 mg Rectal Daily PRN Sachin Dia MD        bisoprolol (ZEBeta) 5 mg, hydroCHLOROthiazide (HYDRODIURIL) 6.25 mg for Ziac 5-6.25   Oral Daily Sachin Dia MD   Given at 05/26/23 0812    calcium carbonate (TUMS) chewable tablet 500 mg (200 mg elemental)  2 tablet Oral TID PRN Sachin Dia MD   2 tablet at 05/26/23 0035    cefdinir (OMNICEF) capsule 300 mg  300 mg Oral Q12H Sachin Dia MD   300 mg at 05/26/23 2003    cyclobenzaprine (FLEXERIL) tablet 10 mg  10 mg Oral TID PRN Sachin Dia MD        dexamethasone sodium phosphate 40 mg in sodium chloride 0.9 % IVPB  40 mg Intravenous Daily Warren Marie PA   40 mg at 05/26/23 1929    docusate sodium (COLACE) capsule 100 mg  100 mg Oral BID Sachin Dia MD   100 mg at 05/26/23 0813    DULoxetine (CYMBALTA) DR capsule 60 mg  60 mg Oral BID Sachin Dia MD   60 mg at 05/26/23 2003    HYDROmorphone (DILAUDID) injection 0.5 mg  0.5 mg Intravenous Q2H PRN Sachin Dia MD   0.5 mg at 05/25/23 1657    levothyroxine (SYNTHROID, LEVOTHROID) tablet 100  "mcg  100 mcg Oral Q AM Sachin Dia MD   100 mcg at 05/26/23 0513    melatonin tablet 5 mg  5 mg Oral Nightly PRN Sachin Dia MD        nystatin (MYCOSTATIN) powder 1 application  1 application Topical BID Sachin Dia MD   1 application at 05/26/23 2005    ondansetron (ZOFRAN) injection 4 mg  4 mg Intravenous Q6H PRN Sachin Dia MD        oxyCODONE-acetaminophen (PERCOCET) 5-325 MG per tablet 2 tablet  2 tablet Oral Q4H PRN Sachin Dia MD   2 tablet at 05/27/23 0037    pantoprazole (PROTONIX) EC tablet 40 mg  40 mg Oral Q AM Warren Marie PA   40 mg at 05/26/23 1214    pramipexole (MIRAPEX) tablet 0.75 mg  0.75 mg Oral Q8H Sachin Dia MD   0.75 mg at 05/26/23 1438    sodium chloride 0.9 % flush 10 mL  10 mL Intravenous Q12H Sachin Dia MD   10 mL at 05/26/23 2006    sodium chloride 0.9 % flush 10 mL  10 mL Intravenous PRN Sachin Dia MD        sodium chloride 0.9 % flush 20 mL  20 mL Intravenous PRN Sachin Dia MD   20 mL at 05/26/23 0814    sodium chloride 0.9 % infusion 40 mL  40 mL Intravenous PRN Sachin Dia MD        vitamin D3 capsule 5,000 Units  5,000 Units Oral Daily Sachin Dia MD   5,000 Units at 05/26/23 0814     ALLERGIES:    Allergies   Allergen Reactions    Codeine Itching and Rash     SEVERE RASH/ITCHING (TOLERATES PERCOCET)    Benadryl [Diphenhydramine] Other (See Comments)     KEEPS PATIENT AWAKE        Thrombocytopenia    HTN (hypertension)    Obesity, Class III, BMI 40-49.9 (morbid obesity)    Anxiety associated with depression    Acute cystitis with hematuria    Blood pressure 117/51, pulse 69, temperature 98 °F (36.7 °C), temperature source Oral, resp. rate 16, height 160 cm (63\"), weight 115 kg (254 lb), SpO2 94 %, not currently breastfeeding.      Subjective:  Symptoms:  Stable.    Diet:  Adequate intake.    Activity level: Impaired due to weakness.    Pain:  She complains of pain " "that is mild.  She reports pain is improving.  Pain is well controlled.    Review of Systems  Objective:  General Appearance:  Comfortable and well-appearing.    Vital signs: (most recent): Blood pressure 117/51, pulse 69, temperature 98 °F (36.7 °C), temperature source Oral, resp. rate 16, height 160 cm (63\"), weight 115 kg (254 lb), SpO2 94 %, not currently breastfeeding.  Vital signs are normal.    Output: Producing urine and minimal stool output.    HEENT: Normal HEENT exam.    Lungs:  Normal effort and normal respiratory rate.    Heart: Normal rate.  Regular rhythm.    Abdomen: Abdomen is soft.  There is generalized tenderness.     Extremities: Decreased range of motion.    Pulses: Distal pulses are intact.    Neurological: Patient is alert.              Labs:  Lab Results (last 72 hours)       Procedure Component Value Units Date/Time    Manual Differential [[id number]] Collected: 05/27/23 0549    Specimen: Blood Updated: 05/27/23 0600    CBC & Differential [[id number]] Collected: 05/27/23 0549    Specimen: Blood Updated: 05/27/23 0555    Narrative:      The following orders were created for panel order CBC & Differential.  Procedure                               Abnormality         Status                     ---------                               -----------         ------                     CBC Auto Differential[[id number]]                            In process                   Please view results for these tests on the individual orders.    CBC Auto Differential [[id number]] Collected: 05/27/23 0549    Specimen: Blood Updated: 05/27/23 0555    Protime-INR [[id number]] Collected: 05/27/23 0549    Specimen: Blood Updated: 05/27/23 0555    aPTT [[id number]] Collected: 05/27/23 0549    Specimen: Blood Updated: 05/27/23 0555    Fibrinogen [[id number]] Collected: 05/27/23 0549    Specimen: Blood Updated: 05/27/23 0555    SAVRBS58 Activity [[id number]] Collected: 05/23/23 1710    Specimen: Blood Updated: 05/26/23 2207 "     JEFEZZ13 Activity 89.5 %     Narrative:      Test(s) 486414-GLVSAZ96 Activity  was developed and its performance characteristics determined  by [facility]. It has not been cleared or approved by the Food  and Drug Administration.  Performed at:  [address] [place] [address]  [id number]  : Selwyn Cedeno MD, Phone:  [phone]    Comment [[id number]] Collected: 05/23/23 1710    Specimen: Blood Updated: 05/26/23 2207     Comment Comment     Comment: Severe deficiency of IEGGUS30 (less than 10% activity) is a  relatively specific finding in patients with a clinical diagnosis of  either hereditary or acquired thrombotic thrombocytopenic purpura  (TTP). Normal to moderately reduced NDLHSS48 activity results do not  exclude a diagnosis of TTP. Conditions that could have GZFJAM52  activity greater than 10% include hemolytic uremic syndrome (HUS),  atypical hemolytic uremic syndrome (aHUS), and other thrombotic  microangiopathies associated with hematopoietic stem cell and solid  organ transplantation, liver disease, DIC, sepsis, pregnancy, or  effects of certain medications (eg, clopidogrel, cyclosporine,  mitomycin C, quinine).       Narrative:      Performed at:   - [address]  [id number]  : Selwyn Cedeno MD, Phone:  [phone]    Fibrinogen [[id number]]  (Abnormal) Collected: 05/26/23 1056    Specimen: Blood Updated: 05/26/23 1126     Fibrinogen 227 mg/dL     Urinalysis, Microscopic Only - Urine, Clean Catch [[id number]]  (Abnormal) Collected: 05/26/23 1006    Specimen: Urine, Clean Catch Updated: 05/26/23 1037     RBC, UA 13-20 /HPF      WBC, UA 13-20 /HPF      Bacteria, UA Trace /HPF      Squamous Epithelial Cells, UA 3-6 /HPF      Hyaline Casts, UA 7-12 /LPF      Methodology Automated Microscopy    Urinalysis With Culture If Indicated - Urine, Clean Catch [[id number]]  (Abnormal) Collected: 05/26/23 1006    Specimen: Urine,  Clean Catch Updated: 05/26/23 1037     Color, UA Dark Yellow     Appearance, UA Clear     pH, UA 6.5     Specific Gravity, UA 1.022     Glucose, UA Negative     Ketones, UA Trace     Bilirubin, UA Small (1+)     Blood, UA Moderate (2+)     Protein, UA Negative     Leuk Esterase, UA Moderate (2+)     Nitrite, UA Negative     Urobilinogen, UA 1.0 E.U./dL    Narrative:      In absence of clinical symptoms, the presence of pyuria, bacteria, and/or nitrites on the urinalysis result does not correlate with infection.    Urine Culture - Urine, Urine, Clean Catch [[id number]] Collected: 05/26/23 1006    Specimen: Urine, Clean Catch Updated: 05/26/23 1037    CBC & Differential [[id number]]  (Abnormal) Collected: 05/26/23 0810    Specimen: Blood Updated: 05/26/23 0918    Narrative:      The following orders were created for panel order CBC & Differential.  Procedure                               Abnormality         Status                     ---------                               -----------         ------                     CBC Auto Differential[[id number]]        Abnormal            Final result                 Please view results for these tests on the individual orders.    CBC Auto Differential [[id number]]  (Abnormal) Collected: 05/26/23 0810    Specimen: Blood Updated: 05/26/23 0918     WBC 4.67 10*3/mm3      RBC 1.90 10*6/mm3      Hemoglobin 7.1 g/dL      Hematocrit 19.2 %      .1 fL      MCH 37.4 pg      MCHC 37.0 g/dL      RDW 18.5 %      RDW-SD 50.5 fl      Platelets 5 10*3/mm3      Neutrophil % 47.9 %      Lymphocyte % 37.0 %      Monocyte % 11.3 %      Eosinophil % 2.8 %      Basophil % 0.6 %      Immature Grans % 0.4 %      Neutrophils, Absolute 2.23 10*3/mm3      Lymphocytes, Absolute 1.73 10*3/mm3      Monocytes, Absolute 0.53 10*3/mm3      Eosinophils, Absolute 0.13 10*3/mm3      Basophils, Absolute 0.03 10*3/mm3      Immature Grans, Absolute 0.02 10*3/mm3      nRBC 0.4 /100 WBC     Cold Agglutinin Titer  [[id number]] Collected: 05/26/23 0810    Specimen: Blood Updated: 05/26/23 0818    Blood Culture - Blood, Arm, Left [[id number]]  (Normal) Collected: 05/23/23 0548    Specimen: Blood from Arm, Left Updated: 05/26/23 0730     Blood Culture No growth at 3 days    Blood Culture - Blood, Arm, Left [[id number]]  (Normal) Collected: 05/23/23 0548    Specimen: Blood from Arm, Left Updated: 05/26/23 0730     Blood Culture No growth at 3 days    Tissue / Bone Culture - Tissue, Leg, Left [[id number]]  (Abnormal)  (Susceptibility) Collected: 05/24/23 1054    Specimen: Tissue from Leg, Left Updated: 05/26/23 0707     Tissue Culture Moderate growth (3+) Staphylococcus aureus      Moderate growth (3+) Klebsiella pneumoniae ssp pneumoniae      Moderate growth (3+) Escherichia coli     Gram Stain Moderate (3+) Gram negative bacilli      No WBCs seen    Susceptibility        Staphylococcus aureus      CHRISTIANO      Clindamycin Susceptible      Erythromycin Resistant      Inducible Clindamycin Resistance Negative      Oxacillin Susceptible      Rifampin Susceptible      Tetracycline Resistant      Trimethoprim + Sulfamethoxazole Susceptible      Vancomycin Susceptible                       Susceptibility        Klebsiella pneumoniae ssp pneumoniae      CHRISTIANO      Ampicillin Resistant      Ampicillin + Sulbactam Susceptible      Cefepime Susceptible      Ceftazidime Susceptible      Ceftriaxone Susceptible      Gentamicin Susceptible      Levofloxacin Susceptible      Piperacillin + Tazobactam Susceptible      Tetracycline Resistant      Trimethoprim + Sulfamethoxazole Susceptible                       Susceptibility Comments       Staphylococcus aureus    This isolate does not demonstrate inducible clindamycin resistance in vitro.        Klebsiella pneumoniae ssp pneumoniae    Cefazolin sensitivity will not be reported for Enterobacteriaceae in non-urine isolates. If cefazolin is preferred, please call the microbiology lab to request an  E-test.  With the exception of urinary-sourced infections, aminoglycosides should not be used as monotherapy.               aPTT [[id number]]  (Abnormal) Collected: 05/26/23 0543    Specimen: Blood Updated: 05/26/23 0613     PTT 35.3 seconds     Protime-INR [[id number]]  (Abnormal) Collected: 05/26/23 0543    Specimen: Blood Updated: 05/26/23 0613     Protime 15.7 Seconds      INR 1.23    Occult Blood X 1, Stool - Stool, Per Rectum [[id number]]  (Normal) Collected: 05/25/23 2311    Specimen: Stool from Per Rectum Updated: 05/25/23 2321     Fecal Occult Blood Negative    Immature Platelet Fraction [[id number]]  (Normal) Collected: 05/25/23 0947    Specimen: Blood Updated: 05/25/23 1024     IPF 5.40 %     CBC & Differential [[id number]]  (Abnormal) Collected: 05/25/23 0947    Specimen: Blood Updated: 05/25/23 1024    Narrative:      The following orders were created for panel order CBC & Differential.  Procedure                               Abnormality         Status                     ---------                               -----------         ------                     CBC Auto Differential[[id number]]        Abnormal            Final result                 Please view results for these tests on the individual orders.    CBC Auto Differential [[id number]]  (Abnormal) Collected: 05/25/23 0947    Specimen: Blood Updated: 05/25/23 1024     WBC 5.02 10*3/mm3      RBC 2.75 10*6/mm3      Hemoglobin 8.3 g/dL      Hematocrit 26.6 %      MCV 96.7 fL      MCH 30.2 pg      MCHC 31.2 g/dL      RDW 15.6 %      RDW-SD 53.1 fl      MPV 12.4 fL      Platelets 7 10*3/mm3      Neutrophil % 58.1 %      Lymphocyte % 26.5 %      Monocyte % 9.6 %      Eosinophil % 4.0 %      Basophil % 1.2 %      Neutrophils, Absolute 2.92 10*3/mm3      Lymphocytes, Absolute 1.33 10*3/mm3      Monocytes, Absolute 0.48 10*3/mm3      Eosinophils, Absolute 0.20 10*3/mm3      Basophils, Absolute 0.06 10*3/mm3     Basic Metabolic Panel [[id number]]  (Abnormal)  Collected: 05/25/23 0947    Specimen: Blood Updated: 05/25/23 1018     Glucose 102 mg/dL      BUN 11 mg/dL      Creatinine 0.54 mg/dL      Sodium 139 mmol/L      Potassium 3.3 mmol/L      Comment: Slight hemolysis detected by analyzer. Results may be affected.        Chloride 102 mmol/L      CO2 28.0 mmol/L      Calcium 8.7 mg/dL      BUN/Creatinine Ratio 20.4     Anion Gap 9.0 mmol/L      eGFR 96.7 mL/min/1.73     Narrative:      GFR Normal >60  Chronic Kidney Disease <60  Kidney Failure <15    The GFR formula is only valid for adults with stable renal function between ages 18 and 70.    aPTT [[id number]]  (Normal) Collected: 05/25/23 0947    Specimen: Blood Updated: 05/25/23 1007     PTT 32.9 seconds     Protime-INR [[id number]]  (Abnormal) Collected: 05/25/23 0947    Specimen: Blood Updated: 05/25/23 1007     Protime 14.8 Seconds      INR 1.14    Haptoglobin [[id number]]  (Normal) Collected: 05/23/23 0109    Specimen: Blood Updated: 05/24/23 2013     Haptoglobin 71 mg/dL     Protime-INR [[id number]]  (Normal) Collected: 05/24/23 0844    Specimen: Blood Updated: 05/24/23 0902     Protime 13.8 Seconds      INR 1.04    aPTT [[id number]]  (Normal) Collected: 05/24/23 0844    Specimen: Blood Updated: 05/24/23 0902     PTT 30.1 seconds     Fibrinogen [[id number]]  (Normal) Collected: 05/24/23 0844    Specimen: Blood Updated: 05/24/23 0902     Fibrinogen 294 mg/dL     Peripheral Blood Smear [[id number]] Collected: 05/23/23 0230    Specimen: Blood Updated: 05/24/23 0837     Performed by: Kamilah Nogueira MD     Pathologist Interpretation --     Moderate macrocytic anemia  Red blood cell agglutination    Normal total white blood cell count with the following manual differential:  Neutrophils 48%  Bands 1%  Lymphocytes 38%  Monocytes 8%  Eosinophils 3%  Basophils 1%  Myelocytes 1%  Severe peripheral thrombocytopenia    No schistocytes identified  No platelet clumps identified  No morulae identified in granulocytes or monocytes             Imaging Results (Last 72 Hours)       Procedure Component Value Units Date/Time    US Abdomen Complete [[id number]] Collected: 05/24/23 1645     Updated: 05/24/23 1651    Narrative:      EXAM/TECHNIQUE: US ABDOMEN COMPLETE-     INDICATION: thrombocytopenia; I89.0-Lymphedema, not elsewhere  classified; D69.6-Thrombocytopenia, unspecified; L03.116-Cellulitis of  left lower limb; S81.812D-Laceration without foreign body, left lower  leg, subsequent encounter     COMPARISON: None     FINDINGS:     Visualized portion of the pancreas is unremarkable. Upper abdominal  aorta and IVC are unremarkable.     Liver echotexture is coarsened and the liver contour appears slightly  nodular. Patent portal vein with appropriate directional flow. No solid  liver lesion identified. Perihepatic ascites is present.     Gallbladder is reported surgically absent. The common bile duct is 7 mm  diameter, upper limits of normal, with prominence likely related to  reservoir phenomenon.     RIGHT kidney is 9.1 cm in length and demonstrates normal cortical  thickness and echogenicity. LEFT kidney is 9.1 cm in length and  demonstrates normal cortical thickness and echogenicity. No shadowing  calculi or hydronephrosis. No focal urinary bladder abnormality.     Spleen is enlarged measuring up to 13.4 cm. No focal splenic lesion.       Impression:         1.  Coarsened liver with mild surface contour nodularity, concerning for  chronic liver disease and cirrhosis.  2.  Perihepatic ascites.  3.  Mild splenomegaly.  This report was finalized on 05/24/2023 16:48 by Dr. Bryce Mancera MD.                  Assessment:    Condition: In stable condition.  Improving.       Plan:   Encourage ambulation and per physical therapy.   (    Pancytopenia-hemoglobin 7.1 yesterday platelets are slowly improving.  Work-up in progress with Dr. Cantu.  Repeat pending this morning.    Venous stasis ulcer left lower extremity-appreciate wound care help.   Continue with oral antibiotics and local wound care.    Urinary tract infection present on admission-apparently no culture was ordered on admission urinalysis repeated yesterday culture and sensitivity still pending.  Continue with Omnicef for now.    Fatty liver disease-ultrasound liver shows coarsened liver with mild surface nodularity consistent with chronic liver disease/cirrhosis.  Mild splenomegaly.    Overall making slow steady progress with PT OT.  Patient has much family support at home.  Once cleared by hematology oncology will be able to be discharged home with outpatient follow-up.  Patient is seeing Iram in clinic.    ).   Problem List:     Thrombocytopenia    HTN (hypertension)    Obesity, Class III, BMI 40-49.9 (morbid obesity)    Anxiety associated with depression    Acute cystitis with hematuria    Sachin Dia MD  5/27/2023

## 2023-05-27 NOTE — PROGRESS NOTES
HEMATOLOGY AND MEDICAL ONCOLOGY PROGRESS NOTE    Patient name: Brenda Sneed  Patient : 1950  VISIT # [id number]  MR #[id number]  Room: [facility]    SUBJECTIVE:  PICC in place    INTERVAL HISTORY:  Brenda Sneed is a 72-year-old  female admitted to [facility] through the ED on 2023 by Dr. Tristen Block with a several week history of increasing dyspnea on exertion, difficulty ambulating across the room in her house even accomplishing activities of daily living such as going to the bathroom etc.  When evaluated in the ED she is found to have anemia and thrombocytopenia.     CBC on 2023 had a WBC of 7.16 with 44.5% segmented neutrophils, 38.1% lymphocytes, 13% monocytes, 2.9% eosinophils and 0.7% basophils.  Hemoglobin and hematocrit were 7.1 and 23.3 respectively with a platelet count of 5000.      Significant comorbid associated medical problems include:  Morbid obesity  Significant bilateral lower extremity chronic venous stasis, massive edema and bilateral lower extremity cellulitis L>R, hypertension, arthritis, thyroid disease, anxiety/depression     Brenda was initially evaluated by Iram BAJWA on 2023 regarding thrombocytopenia.     HEMATOLOGICAL HISTORY FROM OFFICE RECORDS  Brenda was initially evaluated by Iram BAJWA on 2023 referred to the clinic by Dr. Dia for evaluation of Anemia and thrombocytopenia.      Review of prior CBCs at [facility] reveals chronic, intermittent anemia dating to 2015.  Chronic thrombocytopenia has been present dating to at least 2016 with platelet count ranging from 69,000 -136,000.     Brenda underwent laceration repair of her left leg by Dr. Sarita Vazquez 2023, status post fall with leg injury.  She was also treated for cellulitis with IV Vancomycin and Zosyn.  She underwent washout.  Cellulitis subsequently resolved.  She was discharged with dry dressing changes and oral Keflex x7 days     CBC 2023: WBC: 5.6, Hgb: 10.6,  MCV: 96, PLT: 65,000      Labs 5/12/2023  CBC: WBC 6.0, Hgb 9.4/MCV 98, platelets 102,000  CMP: Creatinine 0.81, GFR 76, calcium 8.8, total protein 5.7, alkaline phosphatase 138 ()  BNP: 72.2 (0-100) CRP: 21 (0-10)     Brenda denied melena or hematochezia.  Recent renal function was normal.  She denies overt bleeding, though has significant, scattered bruising throughout her body which is clearly visible on exposed areas during exam.    She denies regular alcohol use.  She drank a few margaritas for couple years in her early 40s but none since.  She has no known history of liver disease.  She is a never smoker     Serology 5/19/2023   Serum Fe 32  TIBC - 280  Fe sat - 11%  Ferritin - 83.1     B12 = 1,168  Folate - 6.5  Copper in process  Zinc in process     Hapto - 70  LDH - 312     Hep A, B, C - nonreactive  HIV - Nonreactive     SPEP in process  QI in process  Free serum light chain in process     ASSESSMENT and PLAN developed on 5/19/2023 hematology consultation visit:  She take ibuprofen 2-3 times per day, in addition to Cymbalta daily which can increase bleeding and can be exacerbated in the setting of thrombocytopenia.     Possible causes of chronic anemia and thrombocytopenia discussed.   Baseline serology requested. Obtain occult blood stools.  Can not rule out underlying bone marrow disorder.     Chronic anemia. Check anemia labs  Check stool for OB  Chronic thrombocytopenia  Check labs as noted below, include PBS  Obtain US liver/spleen re: elevated bilirubin (1.7 on 4/18/2023) and spleen size re: thrombocytopenia  If unrevealing, will need to consider BMAB, which was discussed   Suspect bruising exacerbated by Ibuprofen/Cymbalta in a setting of thrombocytopenia  Suspect platelets recently worsened r/t infection/medication (vancomycin);    OBJECTIVE:      Vitals:    05/27/23 0800   BP: 128/56   Pulse: 71   Resp: 16   Temp: 98.3 °F (36.8 °C)   SpO2: 98%       Intake/Output Summary (Last 24 hours) at  5/27/2023 0821  Last data filed at 5/26/2023 1700  Gross per 24 hour   Intake 945 ml   Output 400 ml   Net 545 ml         PHYSICAL EXAM:  CONSTITUTIONAL: Alert, appropriate, weak and tired, morbid obesity  EYES: Minimally icteric  NECK: Supple, no masses   CHEST/LUNGS: CTA bilaterally, normal respiratory effort  CARDIOVASCULAR: RRR, no murmurs  ABDOMEN: soft non-tender, active bowel sounds, no HSM  EXTREMITIES: Morbid obesity with chronic venous stasis changes of the lower extremities with massive edema and cellulitis,  SKIN: venous stasis changes of the lower extremities with massive edema and erythema, Left leg wrapped, ecchymosis on arms, PICC left arm  LYMPH: No obvious cervical, clavicular, axillary, or inguinal lymphadenopathy, difficult to palpate due to morbid obesity  NEUROLOGIC: follows commands, mental status appears to be intact, she does not appear to have focal findings but neurological exam not performed in detail  PSYCH: mood and affect appropriate      CBC  Results from last 7 days   Lab Units 05/27/23  0549 05/26/23  0810 05/25/23  0947   WBC 10*3/mm3 2.49* 4.67 5.02   HEMOGLOBIN g/dL 6.3* 7.1* 8.3*   HEMATOCRIT % 20.8* 19.2* 26.6*   PLATELETS 10*3/mm3 20* 5* 7*           Lab Results   Component Value Date     05/25/2023    K 3.3 (L) 05/25/2023     05/25/2023    CO2 28.0 05/25/2023    BUN 11 05/25/2023    CREATININE 0.54 (L) 05/25/2023    GLUCOSE 102 (H) 05/25/2023    CALCIUM 8.7 05/25/2023    BILITOT 1.4 (H) 05/24/2023    ALKPHOS 130 (H) 05/24/2023    AST 28 05/24/2023    ALT 8 05/24/2023    AGRATIO 1.3 05/24/2023    GLOB 2.4 05/24/2023       Lab Results   Component Value Date    INR 1.33 (H) 05/27/2023    INR 1.23 (H) 05/26/2023    INR 1.14 (H) 05/25/2023    PROTIME 16.6 (H) 05/27/2023    PROTIME 15.7 (H) 05/26/2023    PROTIME 14.8 05/25/2023       Cultures:    Lab Results   Component Value Date    BLOODCX No growth at 4 days 05/23/2023    BLOODCX No growth at 4 days 05/23/2023     No  components found for: URINCX    ASSESSMENT/PLAN:  #1  Pancytopenia - Severe anemia and thrombocytopenia    Review of prior CBCs at [facility] reveals chronic, intermittent anemia dating to 4/2015.  Chronic thrombocytopenia has been present dating to at least 7/2016 with platelet count ranging from 69,000 -136,000.     Brenda underwent laceration repair of her left leg by Dr. Sraita Vazquez 4/7/2023, status post fall with leg injury.  She was also treated for cellulitis with IV Vancomycin and Zosyn.  She underwent washout.  Cellulitis subsequently resolved.  She was discharged with dry dressing changes and oral Keflex x7 days     CBC 4/28/2023: WBC: 5.6, Hgb: 10.6, MCV: 96, PLT: 65,000      Labs 5/12/2023  CBC: WBC 6.0, Hgb 9.4/MCV 98, platelets 102,000  CMP: Creatinine 0.81, GFR 76, calcium 8.8, total protein 5.7, alkaline phosphatase 138 ()  BNP: 72.2 (0-100) CRP: 21 (0-10)     CBC w/diff            4/18/2023    18:00 4/21/2023    05:54 5/23/2023    02:30 5/23/2023    17:10   CBC w/Diff   WBC 5.57   4.59   7.16   3.08     RBC 2.51   2.78   2.40   2.57     Hemoglobin 9.0   8.6   7.1   7.4     Hematocrit 25.3   27.5   23.3   23.4     .8   98.9   97.1   91.1     MCH 35.9   30.9   29.6   28.8     MCHC 35.6   31.3   30.5   31.6     RDW 14.9   15.1   15.3   15.4     Platelets 104   112   5   2     Neutrophil Rel % 67.8     44.5       Immature Granulocyte Rel % 0.4           Lymphocyte Rel % 20.1     38.1       Monocyte Rel % 10.8     13.0       Eosinophil Rel % 0.7     2.9       Basophil Rel % 0.2     0.7          CMP reveals a normal creatinine of 0.84  Lactate - 1.8 WNL  BNP - 300.2 - WNL     Her admitting CBC is significantly different than it was on 5/12/2023.  Hgb has dropped to 7.1, platelet is down to 5000.  She is currently in the coronary care unit-room 9.  She is status post 1 unit packed red blood cell and 1 pheresis of platelets.  No active bleeding at present per nursing.     Brenda has a chronic  pancytopenia dating back many years.  This could be due to hypersplenism secondary to fatty liver disease or a primary bone marrow disorder.  I believe that she has had an exacerbation of the mild pancytopenia resulting in the current hospitalization with worsening anemia and profound thrombocytopenia.       Serology 5/19/2023   Serum Fe 32  TIBC - 280  Fe sat - 11%  Ferritin - 83.1     B12 = 1,168  Folate - 6.5  Copper - 177.9  Zinc - 58     Hapto - 70  LDH - 312     Hep A, B, C - nonreactive  HIV - Nonreactive     SPEP in process  QI in process  Free serum light chain in process       Differential diagnosis of the current situation could include:   DIC, hemolytic anemia, ITP, TTP, infection (cellulitis, UTI) with bone marrow myelosuppression      Serology 5/23/2023 [facility]  PT/INR - 15.1/1.17  PTT - 32.6  Fibrinogen - 258  FSP >5 </= 20    Tbili 1.7 with 1.0 indirect  Hapto - in process  Retic - 6.16% with absolute 0.1546  LDH - 283 (135-214)  LALY (Bry) - Negative    TSH - 6.190    Antplatelet ab - in process    ADAMTS 13 -89.5% (>66.8%)    Peripheral blood smear 5/24/2023   Moderate macrocytic anemia   Red blood cell agglutination   Normal total white blood cell count with the following manual differential:   Neutrophils 48%   Bands 1%   Lymphocytes 38%   Monocytes 8%   Eosinophils 3%   Basophils 1%   Myelocytes 1%   Severe peripheral thrombocytopenia   No schistocytes identified   No platelet clumps identified   No morulae identified in granulocytes or monocytes     FOBT on 5/25/2023 - NEGATIVE    Echo 5/24/2023  Left ventricular systolic function is normal. Left ventricular ejection fraction appears to be 66 - 70%.  Left ventricular diastolic dysfunction is noted.  The left atrial cavity is mildly dilated.  Normal right ventricular cavity size and systolic function noted.  There is no significant (greater than mild) valvular dysfunction.  Estimated right ventricular systolic pressure from tricuspid  regurgitation is normal (<35 mmHg).      #1  Thrombocytopenia    Differential includes DIC, ITP, infection (cellulitis, UTI) with bone marrow myelosuppression, hepatic dysfunction with splenomegaly     IV antibiotics ongoing with improvement in cellulitis    No schistocyts - No TTP  Immature platelet fraction (IPF) 5.4-normal on 5/25/2023 (interpretation of this with profound thrombocytopenia receiving platelet transfusions-indeterminate)      U/S abdomen 5/24/2023  1.  Coarsened liver with mild surface contour nodularity, concerning for chronic liver disease and cirrhosis.  2.  Perihepatic ascites.  3.  Mild splenomegaly 13.4 cm.       S/p 2 PLT on 5/23/2023  S/p 1 PLT on 5/24/2023  S/p 1 PLT on 5/25/2023  S/p 1 PLT on 5/26/2023          Transfuse 1 unit of platelets for platelet count < 10,000  Plan hepatic elastography-not yet performed  Cold agglutuinins -pending  PNH panel -pending  Antiplatelet antibodies are still pending-in process    Trial of high-dose Decadron x 4 days initiated on 5/26/2023, day 2 of 4 today 5/27/2023  Received IVIG 45 g x1 on 5/26/2023    If her hematological picture has not improved with Decadron and IVIG with ongoing antibiotics, plan on doing a bone marrow Tuesday, 5/30/2023.       #2  Anemia  Iron studies, B12 folic acid levels etc. are all normal.  Awaiting serology ordered    RBC agglutination on peripheral smear  No schistocytes    FOBT on 5/25/2023 - NEGATIVE    S/p 1 unit pRBC on 5/23/2023  S/p 1 unit pRBC on 5/27/2023      Transfuse 1 unit of PRBCs for hemoglobin <7.0    Anticipating transfusion of 1 unit of PRBCs today 5/27/2023      #3  UTI/ cellulitis    UA POSITIVE on 5/23/2023    Blood cx X 2 on 5/23/2023 - No growth at 4 days          Ceftriaxone 2 g IV x 1 given on 5/23/2023  Currently getting cefdinir 300 mg PO every 12 hrs        GARETT Morales    05/27/23  08:21 CDT      Morales Cantu MD  5/27/2023 08:43 CDT    Physicians attestation and  contribution:    I, Morales Cantu, personally and independently performed an evaluation on Brenda Sneed  I have reviewed relevant medical information/data to include but not limited to the medication list, relevant appropriate lab work and imaging when applicable.  I reviewed other physician's notes, ancillary services and nurses assessments.  I have reviewed the above documentation completed by Ursula BAJWA  Please see my additional addended and/or modified contributions to the history of present illness, physical examination and assessment/medical decision-making and plan that reflects my findings and impressions.  I discussed the essential elements of the care plan with Ursula BAJWA and the patient.  I have encouraged and answered all the questions raised to the patient's understanding and satisfaction.  I concur with the above stated.    Subjective: Awake, alert and talkative.  No specific complaints today.    Objective: Left lower extremity wrapped.  Swelling appears to continue to decrease in both lower extremities.  She is not toxic and without new objective findings.    Assessment/plan:  CBC today, 5/27/2023 with a WBC of 2.49, hemoglobin 6.3 and a platelet count of 20,000.  1 unit of PRBCs will be transfused.  Platelet count improved with transfusion, IVIG and Decadron.     Diagnosis profound acute thrombocytopenia with associated anemia superimposed on a chronic mild pancytopenia with documented liver disease and splenomegaly.  Suspect she either has a superimposed ITP or worsening bone marrow disorder.  Antiplatelet antibodies are still pending-in process     PLAN:   Trial of high-dose Decadron x 4 days and IVIG to see if we can change the course of the thrombocytopenia.   If her hematological picture has not improved with Decadron and IVIG with ongoing antibiotics, plan on doing a bone marrow Tuesday, 5/30/2023.       Morales Cantu MD  5/27/2023 08:43 CDT

## 2023-05-27 NOTE — PLAN OF CARE
Goal Outcome Evaluation:  Plan of Care Reviewed With: patient        Progress: no change  Outcome Evaluation: Pt c/o pain x2, medicated with PRN meds; up with standby and walker; drsg change to left leg per orders; Platelets x1 given; IVIG given; voiding; BMs this shift; safety maintained.

## 2023-05-27 NOTE — PLAN OF CARE
Problem: Adult Inpatient Plan of Care  Goal: Plan of Care Review    Progress: improving  Plan of Care Reviewed With: patient  Outcome Evaluation: Pt performed bed mobility min A and amb 40'x2 CGA with RW. Pt educated on POC.

## 2023-05-28 LAB
APTT PPP: 33.7 SECONDS (ref 24.1–35)
BACTERIA SPEC AEROBE CULT: ABNORMAL
BACTERIA SPEC AEROBE CULT: NORMAL
BACTERIA SPEC AEROBE CULT: NORMAL
BASOPHILS # BLD AUTO: 0.02 10*3/MM3 (ref 0–0.2)
BASOPHILS NFR BLD AUTO: 0.2 % (ref 0–1.5)
BH BB BLOOD EXPIRATION DATE: NORMAL
BH BB BLOOD TYPE BARCODE: 6200
BH BB DISPENSE STATUS: NORMAL
BH BB PRODUCT CODE: NORMAL
BH BB UNIT NUMBER: NORMAL
CROSSMATCH INTERPRETATION: NORMAL
DEPRECATED RDW RBC AUTO: 49.8 FL (ref 37–54)
EOSINOPHIL # BLD AUTO: 0.01 10*3/MM3 (ref 0–0.4)
EOSINOPHIL NFR BLD AUTO: 0.1 % (ref 0.3–6.2)
ERYTHROCYTE [DISTWIDTH] IN BLOOD BY AUTOMATED COUNT: 15.2 % (ref 12.3–15.4)
FIBRINOGEN PPP-MCNC: 185 MG/DL (ref 240–460)
HCT VFR BLD AUTO: 26 % (ref 34–46.6)
HGB BLD-MCNC: 8.1 G/DL (ref 12–15.9)
IMM GRANULOCYTES # BLD AUTO: 0.12 10*3/MM3 (ref 0–0.05)
IMM GRANULOCYTES NFR BLD AUTO: 1.4 % (ref 0–0.5)
INR PPP: 1.27 (ref 0.91–1.09)
LYMPHOCYTES # BLD AUTO: 1.43 10*3/MM3 (ref 0.7–3.1)
LYMPHOCYTES NFR BLD AUTO: 16.7 % (ref 19.6–45.3)
MCH RBC QN AUTO: 28.9 PG (ref 26.6–33)
MCHC RBC AUTO-ENTMCNC: 31.2 G/DL (ref 31.5–35.7)
MCV RBC AUTO: 92.9 FL (ref 79–97)
MONOCYTES # BLD AUTO: 0.41 10*3/MM3 (ref 0.1–0.9)
MONOCYTES NFR BLD AUTO: 4.8 % (ref 5–12)
NEUTROPHILS NFR BLD AUTO: 6.58 10*3/MM3 (ref 1.7–7)
NEUTROPHILS NFR BLD AUTO: 76.8 % (ref 42.7–76)
NRBC BLD AUTO-RTO: 0.2 /100 WBC (ref 0–0.2)
PLATELET # BLD AUTO: 94 10*3/MM3 (ref 140–450)
PMV BLD AUTO: 11.5 FL (ref 6–12)
PROTHROMBIN TIME: 16 SECONDS (ref 11.8–14.8)
RBC # BLD AUTO: 2.8 10*6/MM3 (ref 3.77–5.28)
UNIT  ABO: NORMAL
UNIT  RH: NORMAL
WBC NRBC COR # BLD: 8.57 10*3/MM3 (ref 3.4–10.8)

## 2023-05-28 PROCEDURE — 85025 COMPLETE CBC W/AUTO DIFF WBC: CPT | Performed by: PHYSICIAN ASSISTANT

## 2023-05-28 PROCEDURE — 85384 FIBRINOGEN ACTIVITY: CPT | Performed by: INTERNAL MEDICINE

## 2023-05-28 PROCEDURE — 25010000002 DEXAMETHASONE PER 1 MG: Performed by: PHYSICIAN ASSISTANT

## 2023-05-28 PROCEDURE — 85610 PROTHROMBIN TIME: CPT | Performed by: INTERNAL MEDICINE

## 2023-05-28 PROCEDURE — 97110 THERAPEUTIC EXERCISES: CPT

## 2023-05-28 PROCEDURE — 25010000002 CEFAZOLIN PER 500 MG: Performed by: PHYSICIAN ASSISTANT

## 2023-05-28 PROCEDURE — 85730 THROMBOPLASTIN TIME PARTIAL: CPT | Performed by: INTERNAL MEDICINE

## 2023-05-28 PROCEDURE — 97116 GAIT TRAINING THERAPY: CPT

## 2023-05-28 RX ORDER — LEVOFLOXACIN 750 MG/1
750 TABLET ORAL EVERY 24 HOURS
Status: DISCONTINUED | OUTPATIENT
Start: 2023-05-28 | End: 2023-05-30 | Stop reason: HOSPADM

## 2023-05-28 RX ADMIN — CEFAZOLIN 2 G: 2 INJECTION, POWDER, FOR SOLUTION INTRAMUSCULAR; INTRAVENOUS at 23:50

## 2023-05-28 RX ADMIN — PANTOPRAZOLE SODIUM 40 MG: 40 TABLET, DELAYED RELEASE ORAL at 05:45

## 2023-05-28 RX ADMIN — PRAMIPEXOLE DIHYDROCHLORIDE 0.75 MG: 0.25 TABLET ORAL at 15:06

## 2023-05-28 RX ADMIN — DULOXETINE HYDROCHLORIDE 60 MG: 30 CAPSULE, DELAYED RELEASE ORAL at 21:59

## 2023-05-28 RX ADMIN — Medication 5000 UNITS: at 08:45

## 2023-05-28 RX ADMIN — OXYCODONE HYDROCHLORIDE AND ACETAMINOPHEN 2 TABLET: 5; 325 TABLET ORAL at 21:59

## 2023-05-28 RX ADMIN — BISOPROLOL FUMARATE: 5 TABLET ORAL at 08:44

## 2023-05-28 RX ADMIN — OXYCODONE HYDROCHLORIDE AND ACETAMINOPHEN 2 TABLET: 5; 325 TABLET ORAL at 08:51

## 2023-05-28 RX ADMIN — NYSTATIN 1 APPLICATION: 100000 POWDER TOPICAL at 21:58

## 2023-05-28 RX ADMIN — Medication 10 ML: at 21:59

## 2023-05-28 RX ADMIN — OXYCODONE HYDROCHLORIDE AND ACETAMINOPHEN 2 TABLET: 5; 325 TABLET ORAL at 17:52

## 2023-05-28 RX ADMIN — DEXAMETHASONE SODIUM PHOSPHATE 40 MG: 10 INJECTION INTRAMUSCULAR; INTRAVENOUS at 08:44

## 2023-05-28 RX ADMIN — PRAMIPEXOLE DIHYDROCHLORIDE 0.75 MG: 0.25 TABLET ORAL at 21:58

## 2023-05-28 RX ADMIN — Medication 10 ML: at 08:46

## 2023-05-28 RX ADMIN — DULOXETINE HYDROCHLORIDE 60 MG: 30 CAPSULE, DELAYED RELEASE ORAL at 08:45

## 2023-05-28 RX ADMIN — NYSTATIN 1 APPLICATION: 100000 POWDER TOPICAL at 08:46

## 2023-05-28 RX ADMIN — LEVOTHYROXINE SODIUM 100 MCG: 100 TABLET ORAL at 05:45

## 2023-05-28 RX ADMIN — OXYCODONE HYDROCHLORIDE AND ACETAMINOPHEN 500 MG: 500 TABLET ORAL at 08:45

## 2023-05-28 RX ADMIN — OXYCODONE HYDROCHLORIDE AND ACETAMINOPHEN 2 TABLET: 5; 325 TABLET ORAL at 03:10

## 2023-05-28 RX ADMIN — CEFDINIR 300 MG: 300 CAPSULE ORAL at 08:44

## 2023-05-28 RX ADMIN — CEFAZOLIN 2 G: 2 INJECTION, POWDER, FOR SOLUTION INTRAMUSCULAR; INTRAVENOUS at 15:06

## 2023-05-28 RX ADMIN — PRAMIPEXOLE DIHYDROCHLORIDE 0.75 MG: 0.25 TABLET ORAL at 05:45

## 2023-05-28 RX ADMIN — LEVOFLOXACIN 750 MG: 750 TABLET, FILM COATED ORAL at 15:06

## 2023-05-28 NOTE — PROGRESS NOTES
HEMATOLOGY AND MEDICAL ONCOLOGY PROGRESS NOTE    Patient name: Brenda Sneed  Patient : 1950  VISIT # [id number]  MR #[id number]  Room: [facility]    SUBJECTIVE: Sleeping and arouses easily.  Excessive bruising although no noted bleeding.  No new complaints this morning.    INTERVAL HISTORY:  Brenda Sneed is a 72-year-old  female admitted to [facility] through the ED on 2023 by Dr. Tristen Block with a several week history of increasing dyspnea on exertion, difficulty ambulating across the room in her house even accomplishing activities of daily living such as going to the bathroom etc.  When evaluated in the ED she is found to have anemia and thrombocytopenia.     CBC on 2023 had a WBC of 7.16 with 44.5% segmented neutrophils, 38.1% lymphocytes, 13% monocytes, 2.9% eosinophils and 0.7% basophils.  Hemoglobin and hematocrit were 7.1 and 23.3 respectively with a platelet count of 5000.      Significant comorbid associated medical problems include:  Morbid obesity  Significant bilateral lower extremity chronic venous stasis, massive edema and bilateral lower extremity cellulitis L>R, hypertension, arthritis, thyroid disease, anxiety/depression     Brenda was initially evaluated by Iram BAJWA on 2023 regarding thrombocytopenia.     HEMATOLOGICAL HISTORY FROM OFFICE RECORDS  Brenda was initially evaluated by Iram BAJWA on 2023 referred to the clinic by Dr. Dia for evaluation of Anemia and thrombocytopenia.      Review of prior CBCs at [facility] reveals chronic, intermittent anemia dating to 2015.  Chronic thrombocytopenia has been present dating to at least 2016 with platelet count ranging from 69,000 -136,000.     Brenda underwent laceration repair of her left leg by Dr. Sarita Vazquez 2023, status post fall with leg injury.  She was also treated for cellulitis with IV Vancomycin and Zosyn.  She underwent washout.  Cellulitis subsequently resolved.  She was  discharged with dry dressing changes and oral Keflex x7 days     CBC 4/28/2023: WBC: 5.6, Hgb: 10.6, MCV: 96, PLT: 65,000      Labs 5/12/2023  CBC: WBC 6.0, Hgb 9.4/MCV 98, platelets 102,000  CMP: Creatinine 0.81, GFR 76, calcium 8.8, total protein 5.7, alkaline phosphatase 138 ()  BNP: 72.2 (0-100) CRP: 21 (0-10)     Brenda denied melena or hematochezia.  Recent renal function was normal.  She denies overt bleeding, though has significant, scattered bruising throughout her body which is clearly visible on exposed areas during exam.    She denies regular alcohol use.  She drank a few margaritas for couple years in her early 40s but none since.  She has no known history of liver disease.  She is a never smoker     Serology 5/19/2023   Serum Fe 32  TIBC - 280  Fe sat - 11%  Ferritin - 83.1     B12 = 1,168  Folate - 6.5  Copper in process  Zinc in process     Hapto - 70  LDH - 312     Hep A, B, C - nonreactive  HIV - Nonreactive     SPEP in process  QI in process  Free serum light chain in process     ASSESSMENT and PLAN developed on 5/19/2023 hematology consultation visit:  She take ibuprofen 2-3 times per day, in addition to Cymbalta daily which can increase bleeding and can be exacerbated in the setting of thrombocytopenia.     Possible causes of chronic anemia and thrombocytopenia discussed.   Baseline serology requested. Obtain occult blood stools.  Can not rule out underlying bone marrow disorder.     Chronic anemia. Check anemia labs  Check stool for OB  Chronic thrombocytopenia  Check labs as noted below, include PBS  Obtain US liver/spleen re: elevated bilirubin (1.7 on 4/18/2023) and spleen size re: thrombocytopenia  If unrevealing, will need to consider BMAB, which was discussed   Suspect bruising exacerbated by Ibuprofen/Cymbalta in a setting of thrombocytopenia  Suspect platelets recently worsened r/t infection/medication (vancomycin);    OBJECTIVE:      Vitals:    05/28/23 0803   BP: 150/58   Pulse:  65   Resp: 16   Temp: 98 °F (36.7 °C)   SpO2: 97%       Intake/Output Summary (Last 24 hours) at 5/28/2023 0805  Last data filed at 5/27/2023 1411  Gross per 24 hour   Intake 416.25 ml   Output 300 ml   Net 116.25 ml         PHYSICAL EXAM:  CONSTITUTIONAL: Alert, appropriate, weak and tired, morbid obesity  EYES: Minimally icteric  NECK: Supple, no masses   CHEST/LUNGS: CTA bilaterally, normal respiratory effort  CARDIOVASCULAR: RRR, no murmurs  ABDOMEN: soft non-tender, active bowel sounds, no HSM  EXTREMITIES: Morbid obesity with chronic venous stasis changes of the lower extremities with massive edema and cellulitis,  SKIN: venous stasis changes of the lower extremities with massive edema and erythema, Left leg wrapped, ecchymosis on arms, PICC left arm  LYMPH: No obvious cervical, clavicular, axillary, or inguinal lymphadenopathy, difficult to palpate due to morbid obesity  NEUROLOGIC: follows commands, mental status appears to be intact, she does not appear to have focal findings but neurological exam not performed in detail  PSYCH: mood and affect appropriate      CBC  Results from last 7 days   Lab Units 05/28/23  0436 05/27/23  1456 05/27/23  0549 05/26/23  0810   WBC 10*3/mm3 8.57  --  2.49* 4.67   HEMOGLOBIN g/dL 8.1* 7.9* 6.3* 7.1*   HEMATOCRIT % 26.0* 22.9* 20.8* 19.2*   PLATELETS 10*3/mm3 94*  --  20* 5*           Lab Results   Component Value Date     05/25/2023    K 3.3 (L) 05/25/2023     05/25/2023    CO2 28.0 05/25/2023    BUN 11 05/25/2023    CREATININE 0.54 (L) 05/25/2023    GLUCOSE 102 (H) 05/25/2023    CALCIUM 8.7 05/25/2023    BILITOT 1.4 (H) 05/24/2023    ALKPHOS 130 (H) 05/24/2023    AST 28 05/24/2023    ALT 8 05/24/2023    AGRATIO 1.3 05/24/2023    GLOB 2.4 05/24/2023       Lab Results   Component Value Date    INR 1.27 (H) 05/28/2023    INR 1.33 (H) 05/27/2023    INR 1.23 (H) 05/26/2023    PROTIME 16.0 (H) 05/28/2023    PROTIME 16.6 (H) 05/27/2023    PROTIME 15.7 (H) 05/26/2023        Cultures:    Lab Results   Component Value Date    BLOODCX No growth at 5 days 05/23/2023    BLOODCX No growth at 5 days 05/23/2023     No components found for: URINCX    ASSESSMENT/PLAN:  #1  Pancytopenia - Severe anemia and thrombocytopenia    Review of prior CBCs at [facility] reveals chronic, intermittent anemia dating to 4/2015.  Chronic thrombocytopenia has been present dating to at least 7/2016 with platelet count ranging from 69,000 -136,000.     Brenda underwent laceration repair of her left leg by Dr. Sarita Vazquez 4/7/2023, status post fall with leg injury.  She was also treated for cellulitis with IV Vancomycin and Zosyn.  She underwent washout.  Cellulitis subsequently resolved.  She was discharged with dry dressing changes and oral Keflex x7 days     CBC 4/28/2023: WBC: 5.6, Hgb: 10.6, MCV: 96, PLT: 65,000      Labs 5/12/2023  CBC: WBC 6.0, Hgb 9.4/MCV 98, platelets 102,000  CMP: Creatinine 0.81, GFR 76, calcium 8.8, total protein 5.7, alkaline phosphatase 138 ()  BNP: 72.2 (0-100) CRP: 21 (0-10)     CBC w/diff            4/18/2023    18:00 4/21/2023    05:54 5/23/2023    02:30 5/23/2023    17:10   CBC w/Diff   WBC 5.57   4.59   7.16   3.08     RBC 2.51   2.78   2.40   2.57     Hemoglobin 9.0   8.6   7.1   7.4     Hematocrit 25.3   27.5   23.3   23.4     .8   98.9   97.1   91.1     MCH 35.9   30.9   29.6   28.8     MCHC 35.6   31.3   30.5   31.6     RDW 14.9   15.1   15.3   15.4     Platelets 104   112   5   2     Neutrophil Rel % 67.8     44.5       Immature Granulocyte Rel % 0.4           Lymphocyte Rel % 20.1     38.1       Monocyte Rel % 10.8     13.0       Eosinophil Rel % 0.7     2.9       Basophil Rel % 0.2     0.7          CMP reveals a normal creatinine of 0.84  Lactate - 1.8 WNL  BNP - 300.2 - WNL     Her admitting CBC is significantly different than it was on 5/12/2023.  Hgb has dropped to 7.1, platelet is down to 5000.  She is currently in the coronary care unit-room 9.  She is  status post 1 unit packed red blood cell and 1 pheresis of platelets.  No active bleeding at present per nursing.     Brenda has a chronic pancytopenia dating back many years.  This could be due to hypersplenism secondary to fatty liver disease or a primary bone marrow disorder.  I believe that she has had an exacerbation of the mild pancytopenia resulting in the current hospitalization with worsening anemia and profound thrombocytopenia.       Serology 5/19/2023   Serum Fe 32  TIBC - 280  Fe sat - 11%  Ferritin - 83.1     B12 = 1,168  Folate - 6.5  Copper - 177.9  Zinc - 58     Hapto - 70  LDH - 312     Hep A, B, C - nonreactive  HIV - Nonreactive     SPEP in process  QI in process  Free serum light chain in process       Differential diagnosis of the current situation could include:   DIC, hemolytic anemia, ITP, TTP, infection (cellulitis, UTI) with bone marrow myelosuppression      Serology 5/23/2023 [facility]  PT/INR - 15.1/1.17  PTT - 32.6  Fibrinogen - 258  FSP >5 </= 20    Tbili 1.7 with 1.0 indirect  Hapto - in process  Retic - 6.16% with absolute 0.1546  LDH - 283 (135-214)  LALY (Bry) - Negative    TSH - 6.190    Antplatelet ab -negative on 5/23/2023  ADAMTS 13 -89.5% (>66.8%) on 5/23/2023    Peripheral blood smear 5/24/2023   Moderate macrocytic anemia   Red blood cell agglutination   Normal total white blood cell count with the following manual differential:   Neutrophils 48%   Bands 1%   Lymphocytes 38%   Monocytes 8%   Eosinophils 3%   Basophils 1%   Myelocytes 1%   Severe peripheral thrombocytopenia   No schistocytes identified   No platelet clumps identified   No morulae identified in granulocytes or monocytes     FOBT on 5/25/2023 - NEGATIVE    Echo 5/24/2023  Left ventricular systolic function is normal. Left ventricular ejection fraction appears to be 66 - 70%.  Left ventricular diastolic dysfunction is noted.  The left atrial cavity is mildly dilated.  Normal right ventricular cavity size  and systolic function noted.  There is no significant (greater than mild) valvular dysfunction.  Estimated right ventricular systolic pressure from tricuspid regurgitation is normal (<35 mmHg).      #1  Thrombocytopenia    Differential includes DIC, ITP, infection (cellulitis, UTI) with bone marrow myelosuppression, hepatic dysfunction with splenomegaly     IV antibiotics ongoing with improvement in cellulitis    No schistocyts - No TTP  Immature platelet fraction (IPF) 5.4-normal on 5/25/2023 (interpretation of this with profound thrombocytopenia receiving platelet transfusions-indeterminate)      U/S abdomen 5/24/2023  1.  Coarsened liver with mild surface contour nodularity, concerning for chronic liver disease and cirrhosis.  2.  Perihepatic ascites.  3.  Mild splenomegaly 13.4 cm.       S/p 2 PLT on 5/23/2023  S/p 1 PLT on 5/24/2023  S/p 1 PLT on 5/25/2023  S/p 1 PLT on 5/26/2023          Transfuse 1 unit of platelets for platelet count < 10,000  Plan hepatic elastography-not yet performed  Cold agglutuinins -pending  PNH panel -pending  Antiplatelet antibodies negative on 5/23/2023    Trial of high-dose Decadron x 4 days initiated on 5/26/2023, day 3 of 4 today 5/28/2023  Received IVIG 45 g x1 on 5/26/2023      #2  Anemia  Iron studies, B12 folic acid levels etc. are all normal.  Awaiting serology ordered    RBC agglutination on peripheral smear  No schistocytes    FOBT on 5/25/2023 - NEGATIVE    S/p 1 unit pRBC on 5/23/2023  S/p 1 unit pRBC on 5/27/2023      Transfuse 1 unit of PRBCs for hemoglobin <7.0      #3  UTI/ cellulitis    UA POSITIVE on 5/23/2023    Blood cx X 2 on 5/23/2023 - No growth at 4 days          Ceftriaxone 2 g IV x 1 given on 5/23/2023  Currently getting cefdinir 300 mg PO every 12 hrs        Ursula Lake, GARETT    05/28/23  08:05 CDT       Physicians attestation and contribution:    IMorales, personally and independently performed an evaluation on [facility]  I have  reviewed relevant medical information/data to include but not limited to the medication list, relevant appropriate lab work and imaging when applicable.  I reviewed other physician's notes, ancillary services and nurses assessments.  I have reviewed the above documentation completed by Ursula BAJWA  Please see my additional addended and/or modified contributions to the history of present illness, physical examination and assessment/medical decision-making and plan that reflects my findings and impressions.  I discussed the essential elements of the care plan with Ursula BAJWA and the patient.  I have encouraged and answered all the questions raised to the patient's understanding and satisfaction.  I concur with the above stated.    Subjective: Awake, alert and talkative, feeling better.    Objective: Legs are wrapped but can be seen as with decreasing edema    Assessment/plan:  CBC today, 5/28/2023, has a WBC of 8.57 with a hemoglobin of 8.1 and a platelet count of 94,000    Diagnosis profound acute thrombocytopenia with associated anemia superimposed on a chronic mild pancytopenia with documented liver disease and splenomegaly.  Given the dramatic response to empiric steroids and IVIG, a possible diagnosis is ITP despite the negative results on antiplatelet antibodies 5/23/2023    PLAN:   Continue trial of high-dose Decadron x 4 days   Due to the improvement in the hematological picture with Decadron and IVIG with ongoing antibiotics, the bone marrow biopsy and aspirate tentatively planned for Tuesday will be put on hold.     Morales Cantu MD  5/28/2023 08:14 CDT

## 2023-05-28 NOTE — PLAN OF CARE
Goal Outcome Evaluation:  Plan of Care Reviewed With: patient, daughter, son        Progress: improving  Outcome Evaluation: A&OX4, intermiottent forgetul episodes. VSS. C/o BLE pain, PRN pain med given with relief. Assist x1 and walker to ambulate, ambulated in hallway and in room. BLE drsg changed as ordered. Voiding w/o difficulty, on room air. LUE PICC X1 lumen CDI. IV and PO ABX given. H&H stable on am labs. Alarms set, call light in reach. Safety maintained and continue to monitor.

## 2023-05-28 NOTE — PLAN OF CARE
Problem: Adult Inpatient Plan of Care  Goal: Plan of Care Review    Progress: improving  Plan of Care Reviewed With: patient  Outcome Evaluation: Bed mobility and sit to stand SBA. Pt amb 120' SBA with RW. Educated pt on safety at home.

## 2023-05-28 NOTE — THERAPY TREATMENT NOTE
Acute Care - Physical Therapy Treatment Note  [facility]     Patient Name: Brenda Sneed  : 1950  MRN: [id number]  Today's Date: 2023   Onset of Illness/Injury or Date of Surgery: 23  Visit Dx:     ICD-10-CM ICD-9-CM   1. Lymphedema of both lower extremities  I89.0 457.1   2. Thrombocytopenia  D69.6 287.5   3. Cellulitis of left lower extremity  L03.116 682.6   4. Laceration of left lower extremity, subsequent encounter  S81.812D V58.89     894.0   5. Impaired mobility [Z74.09 (ICD-10-CM)]  Z74.09 799.89     Patient Active Problem List   Diagnosis    Spinal stenosis, lumbar    Secondary osteoarthritis of left shoulder due to rotator cuff arthropathy    Cellulitis of right lower extremity    HTN (hypertension)    Obesity, Class III, BMI 40-49.9 (morbid obesity)    Anxiety associated with depression    Traumatic rhabdomyolysis    Traumatic rhabdomyolysis, initial encounter    Laceration of left lower extremity    Cellulitis    Cellulitis of left lower extremity    Thrombocytopenia    Acute cystitis with hematuria     Past Medical History:   Diagnosis Date    Arthritis     Bronchitis     Cataract     right eye , cornea implant    Chronic back pain     Depression     Disease of thyroid gland     Edema     lower extremities    Hypertension     Kidney stones      Past Surgical History:   Procedure Laterality Date    BACK SURGERY       ([place]),      CHOLECYSTECTOMY OPEN      CORNEAL TRANSPLANT Right     INCISION AND DRAINAGE ABSCESS Left 2023    Procedure: INCISION AND DRAINAGE ABSCESS left leg;  Surgeon: Sarita Vazquez MD;  Location: [facility] OR;  Service: General;  Laterality: Left;    LACERATION REPAIR Left 2023    Procedure: washout and closure left leg wound ;  Surgeon: Sarita Vazquez MD;  Location: [facility] OR;  Service: General;  Laterality: Left;    LUMBAR FUSION Left 2017    Procedure: LEFT LUMBAR LATERAL INTERBODY FUSION WITH INSTRUMENTATION  L1-2;  Surgeon: MARCELINO  Farrukh Wright MD;  Location:  [facility] OR;  Service:     NECK SURGERY  2010    [place]    TOTAL HIP ARTHROPLASTY Right 2014    DR. KENDRICK     TOTAL KNEE ARTHROPLASTY Right 2000    TOTAL KNEE ARTHROPLASTY Left 2002    TOTAL SHOULDER ARTHROPLASTY W/ DISTAL CLAVICLE EXCISION Left 6/1/2020    Procedure: LEFT REVERSE TOTAL SHOULDER REPLACEMENT;  Surgeon: Saravanan Calvin MD;  Location:  [facility] OR;  Service: Orthopedics;  Laterality: Left;     PT Assessment (last 12 hours)       PT Evaluation and Treatment       Row Name 05/28/23 1536 05/28/23 1004       Physical Therapy Time and Intention    Subjective Information no complaints  -WK no complaints  -WK    Document Type therapy note (daily note)  -WK therapy note (daily note)  -WK    Mode of Treatment physical therapy  -WK physical therapy  -WK      Row Name 05/28/23 1536 05/28/23 1004       General Information    Existing Precautions/Restrictions fall  -WK fall  -WK      Row Name 05/28/23 1536 05/28/23 1004       Bed Mobility    Supine-Sit [place] (Bed Mobility) standby assist  -WK standby assist  -WK    Sit-Supine [place] (Bed Mobility) minimum assist (75% patient effort)  -WK standby assist  -WK    Assistive Device (Bed Mobility) -- bed rails;head of bed elevated  -WK      Row Name 05/28/23 1536 05/28/23 1004       Sit-Stand Transfer    Sit-Stand [place] (Transfers) standby assist  -WK standby assist  -WK      Row Name 05/28/23 1536 05/28/23 1004       Stand-Sit Transfer    Stand-Sit [place] (Transfers) standby assist  -WK standby assist  -WK      Row Name 05/28/23 1536 05/28/23 1004       Gait/Stairs (Locomotion)    [place] Level (Gait) standby assist  -WK standby assist  -WK    Assistive Device (Gait) walker, front-wheeled  -WK walker, front-wheeled  -WK    Distance in Feet (Gait) 150  -  -WK    Deviations/Abnormal Patterns (Gait) gait speed decreased  -WK gait speed decreased  -WK    Comment, (Gait/Stairs) 5' without RW SBA  -WK  edcuated POC and safety at home  -WK      Row Name 05/28/23 1004          Motor Skills    Therapeutic Exercise aerobic  -WK       Row Name 05/28/23 1004          Aerobic Exercise    Comment, Aerobic Exercise (Therapeutic Exercise) AROM BLE 15 reps sitting.  -WK       Row Name             Wound 05/23/23 1900 Left proximal leg    Wound - Properties Group Placement Date: 05/23/23  -CR Placement Time: 1900  -CR Present on Hospital Admission: Y  -CR Side: Left  -CR Orientation: proximal  -CR Location: leg  -CR    Retired Wound - Properties Group Placement Date: 05/23/23  -CR Placement Time: 1900  -CR Present on Hospital Admission: Y  -CR Side: Left  -CR Orientation: proximal  -CR Location: leg  -CR    Retired Wound - Properties Group Date first assessed: 05/23/23  -CR Time first assessed: 1900  -CR Present on Hospital Admission: Y  -CR Side: Left  -CR Location: leg  -CR      Row Name             Wound 05/23/23 1900 Right proximal leg    Wound - Properties Group Placement Date: 05/23/23  -CR Placement Time: 1900  -CR Present on Hospital Admission: Y  -CR Side: Right  -CR Orientation: proximal  -CR Location: leg  -CR    Retired Wound - Properties Group Placement Date: 05/23/23  -CR Placement Time: 1900  -CR Present on Hospital Admission: Y  -CR Side: Right  -CR Orientation: proximal  -CR Location: leg  -CR    Retired Wound - Properties Group Date first assessed: 05/23/23  -CR Time first assessed: 1900  -CR Present on Hospital Admission: Y  -CR Side: Right  -CR Location: leg  -CR      Row Name             Wound 04/19/23 1140 Left lower leg Incision    Wound - Properties Group Placement Date: 04/19/23  -DT Placement Time: 1140  -DT Present on Hospital Admission: N  -DT Side: Left  -DT Orientation: lower  -DT Location: leg  -DT Primary Wound Type: Incision  -DT    Retired Wound - Properties Group Placement Date: 04/19/23  -DT Placement Time: 1140  -DT Present on Hospital Admission: N  -DT Side: Left  -DT Orientation: lower   -DT Location: leg  -DT Primary Wound Type: Incision  -DT    Retired Wound - Properties Group Date first assessed: 04/19/23  -DT Time first assessed: 1140  -DT Present on Hospital Admission: N  -DT Side: Left  -DT Location: leg  -DT Primary Wound Type: Incision  -DT      Row Name             Wound 05/25/23 1341 Left posterior hand Skin Tear    Wound - Properties Group Placement Date: 05/25/23  -DF Placement Time: 1341  -DF Side: Left  -DF Orientation: posterior  -DF Location: hand  -DF Primary Wound Type: Skin tear  -DF    Retired Wound - Properties Group Placement Date: 05/25/23  -DF Placement Time: 1341  -DF Side: Left  -DF Orientation: posterior  -DF Location: hand  -DF Primary Wound Type: Skin tear  -DF    Retired Wound - Properties Group Date first assessed: 05/25/23  -DF Time first assessed: 1341  -DF Side: Left  -DF Location: hand  -DF Primary Wound Type: Skin tear  -DF      Row Name 05/28/23 1004          Plan of Care Review    Plan of Care Reviewed With patient  -WK     Progress improving  -WK     Outcome Evaluation Bed mobility and sit to stand SBA. Pt amb 120' SBA with RW. Educated pt on safety at home.  -WK       Row Name 05/28/23 1536 05/28/23 1004       Positioning and Restraints    Pre-Treatment Position in bed  -WK in bed  -WK    Post Treatment Position bed  -WK chair  -WK    In Bed fowlers;call light within reach;encouraged to call for assist;with family/caregiver  -WK --    In Chair -- reclined;call light within reach;encouraged to call for assist  -WK              User Key  (r) = Recorded By, (t) = Taken By, (c) = Cosigned By      Initials Name Provider Type    DF Earnestine Woods RN Registered Nurse    Rika Diallo PTA Physical Therapist Assistant    Ludwin Sam RN Registered Nurse    Alejandro Carr, RN Registered Nurse                    Physical Therapy Education       Title: PT OT SLP Therapies (In Progress)       Topic: Physical Therapy (Done)       Point: Mobility  training (Done)       Learning Progress Summary             Patient Acceptance, E, VU by CG at 5/26/2023 1325    Comment: Pt educated on POC, d/c recommendations, safety w/ transfers, safety ambulating                         Point: Body mechanics (Done)       Learning Progress Summary             Patient Acceptance, E, VU by CG at 5/26/2023 1325    Comment: Pt educated on POC, d/c recommendations, safety w/ transfers, safety ambulating                         Point: Precautions (Done)       Learning Progress Summary             Patient Acceptance, E, VU by CG at 5/26/2023 1325    Comment: Pt educated on POC, d/c recommendations, safety w/ transfers, safety ambulating                                         User Key       Initials Effective Dates Name Provider Type Discipline     04/27/23 -  Josafat Nieto, PT Student PT Student PT                  PT Recommendation and Plan     Plan of Care Reviewed With: patient  Progress: improving  Outcome Evaluation: Bed mobility and sit to stand SBA. Pt amb 120' SBA with RW. Educated pt on safety at home.   Outcome Measures       Row Name 05/28/23 1004 05/27/23 1410          How much help from another person do you currently need...    Turning from your back to your side while in flat bed without using bedrails? 4  -WK 3  -WK     Moving from lying on back to sitting on the side of a flat bed without bedrails? 4  -WK 3  -WK     Moving to and from a bed to a chair (including a wheelchair)? 4  -WK 4  -WK     Standing up from a chair using your arms (e.g., wheelchair, bedside chair)? 4  -WK 4  -WK     Climbing 3-5 steps with a railing? 2  -WK 2  -WK     To walk in hospital room? 3  -WK 3  -WK     AM-PAC 6 Clicks Score (PT) 21  -WK 19  -WK        Functional Assessment    Outcome Measure Options AM-PAC 6 Clicks Basic Mobility (PT)  -WK AM-PAC 6 Clicks Basic Mobility (PT)  -WK               User Key  (r) = Recorded By, (t) = Taken By, (c) = Cosigned By      Initials Name Provider  Type    WK Rika Walden PTA Physical Therapist Assistant                     Time Calculation:    PT Charges       Row Name 05/28/23 1555 05/28/23 1035          Time Calculation    Start Time 1536  -WK 1004  -WK     Stop Time 1554  -WK 1032  -WK     Time Calculation (min) 18 min  -WK 28 min  -WK     PT Received On 05/28/23  -WK 05/28/23  -WK        Time Calculation- PT    Total Timed Code Minutes- PT 18 minute(s)  -WK 28 minute(s)  -WK               User Key  (r) = Recorded By, (t) = Taken By, (c) = Cosigned By      Initials Name Provider Type    WK Rika Walden PTA Physical Therapist Assistant                  Therapy Charges for Today       Code Description Service Date Service Provider Modifiers Qty    11344821138 HC GAIT TRAINING EA 15 MIN 5/27/2023 Rika Walden, NE GP 2    11115101133 HC GAIT TRAINING EA 15 MIN 5/28/2023 Rika Walden, NE GP 1    28028641133 HC PT THER PROC EA 15 MIN 5/28/2023 Rika Walden, PTA GP 1    10708827294 HC GAIT TRAINING EA 15 MIN 5/28/2023 Rika Walden, PTA GP 1            PT G-Codes  Outcome Measure Options: AM-PAC 6 Clicks Basic Mobility (PT)  AM-PAC 6 Clicks Score (PT): 21  AM-PAC 6 Clicks Score (OT): 18    Rika Walden PTA  5/28/2023

## 2023-05-28 NOTE — PROGRESS NOTES
Brenda Sneed is a 72 y.o. female patient.    Continues to make slow steady progress.  Appreciate wound care help.  Ambulated in halls with physical therapy yesterday after transfusion.  Reviewed hematology oncology note.        Current Facility-Administered Medications   Medication Dose Route Frequency Provider Last Rate Last Admin    acetaminophen (TYLENOL) tablet 650 mg  650 mg Oral Q4H PRN Sachin Dia MD   650 mg at 05/27/23 1602    ascorbic acid (VITAMIN C) tablet 500 mg  500 mg Oral Daily Sachin Dia MD   500 mg at 05/27/23 0919    sennosides-docusate (PERICOLACE) 8.6-50 MG per tablet 2 tablet  2 tablet Oral BID Sachin Dia MD   2 tablet at 05/27/23 0920    And    polyethylene glycol (MIRALAX) packet 17 g  17 g Oral Daily PRN Sachin Dia MD        And    bisacodyl (DULCOLAX) EC tablet 5 mg  5 mg Oral Daily PRN Sachin Dia MD        And    bisacodyl (DULCOLAX) suppository 10 mg  10 mg Rectal Daily PRN Sachin Dia MD        bisoprolol (ZEBeta) 5 mg, hydroCHLOROthiazide (HYDRODIURIL) 6.25 mg for Ziac 5-6.25   Oral Daily Sachin Dia MD   Given at 05/27/23 0919    calcium carbonate (TUMS) chewable tablet 500 mg (200 mg elemental)  2 tablet Oral TID PRN Sachin Dia MD   2 tablet at 05/26/23 0035    cefdinir (OMNICEF) capsule 300 mg  300 mg Oral Q12H Sachin Dia MD   300 mg at 05/27/23 2211    cyclobenzaprine (FLEXERIL) tablet 10 mg  10 mg Oral TID PRN Sachin Dia MD        dexamethasone sodium phosphate 40 mg in sodium chloride 0.9 % IVPB  40 mg Intravenous Daily Warren Marie PA   40 mg at 05/27/23 0919    docusate sodium (COLACE) capsule 100 mg  100 mg Oral BID Sachin Dia MD   100 mg at 05/27/23 0919    DULoxetine (CYMBALTA) DR capsule 60 mg  60 mg Oral BID Sachin Dia MD   60 mg at 05/27/23 2211    levothyroxine (SYNTHROID, LEVOTHROID) tablet 100 mcg  100 mcg Oral Q AM Sachin Dia,  "MD   100 mcg at 05/28/23 0545    melatonin tablet 5 mg  5 mg Oral Nightly PRN Sachin Dia MD        nystatin (MYCOSTATIN) powder 1 application  1 application Topical BID Sachin Dia MD   1 application at 05/27/23 2212    ondansetron (ZOFRAN) injection 4 mg  4 mg Intravenous Q6H PRN Sachin Dia MD        oxyCODONE-acetaminophen (PERCOCET) 5-325 MG per tablet 2 tablet  2 tablet Oral Q4H PRN Sachin Dia MD   2 tablet at 05/28/23 0310    pantoprazole (PROTONIX) EC tablet 40 mg  40 mg Oral Q AM Warren Marie PA   40 mg at 05/28/23 0545    pramipexole (MIRAPEX) tablet 0.75 mg  0.75 mg Oral Q8H Sachin Dia MD   0.75 mg at 05/28/23 0545    sodium chloride 0.9 % flush 10 mL  10 mL Intravenous Q12H Sachin Dia MD   10 mL at 05/27/23 2212    sodium chloride 0.9 % flush 10 mL  10 mL Intravenous PRN Sachin Dia MD        sodium chloride 0.9 % flush 20 mL  20 mL Intravenous PRN Sachin Dia MD   20 mL at 05/26/23 0814    sodium chloride 0.9 % infusion 40 mL  40 mL Intravenous PRN Sachin Dia MD        vitamin D3 capsule 5,000 Units  5,000 Units Oral Daily Sachin Dia MD   5,000 Units at 05/27/23 0919     ALLERGIES:    Allergies   Allergen Reactions    Codeine Itching and Rash     SEVERE RASH/ITCHING (TOLERATES PERCOCET)    Benadryl [Diphenhydramine] Other (See Comments)     KEEPS PATIENT AWAKE        Thrombocytopenia    HTN (hypertension)    Obesity, Class III, BMI 40-49.9 (morbid obesity)    Anxiety associated with depression    Acute cystitis with hematuria    Blood pressure 129/58, pulse 53, temperature 98.1 °F (36.7 °C), temperature source Oral, resp. rate 16, height 160 cm (63\"), weight 115 kg (254 lb), SpO2 95 %, not currently breastfeeding.      Subjective:  Symptoms:  Stable.    Diet:  Adequate intake.    Activity level: Impaired due to weakness.    Pain:  She complains of pain that is mild.  She reports pain is improving.  " "Pain is well controlled.    Review of Systems  Objective:  General Appearance:  Comfortable and well-appearing.    Vital signs: (most recent): Blood pressure 129/58, pulse 53, temperature 98.1 °F (36.7 °C), temperature source Oral, resp. rate 16, height 160 cm (63\"), weight 115 kg (254 lb), SpO2 95 %, not currently breastfeeding.  Vital signs are normal.    Output: Producing urine and minimal stool output.    HEENT: Normal HEENT exam.    Lungs:  Normal effort and normal respiratory rate.    Heart: Normal rate.  Regular rhythm.    Abdomen: Abdomen is soft.  There is generalized tenderness.     Extremities: Decreased range of motion.    Pulses: Distal pulses are intact.    Neurological: Patient is alert.              Labs:  Lab Results (last 72 hours)       Procedure Component Value Units Date/Time    Manual Differential [[id number]] Collected: 05/27/23 0549    Specimen: Blood Updated: 05/27/23 0600    CBC & Differential [[id number]] Collected: 05/27/23 0549    Specimen: Blood Updated: 05/27/23 0555    Narrative:      The following orders were created for panel order CBC & Differential.  Procedure                               Abnormality         Status                     ---------                               -----------         ------                     CBC Auto Differential[[id number]]                            In process                   Please view results for these tests on the individual orders.    CBC Auto Differential [[id number]] Collected: 05/27/23 0549    Specimen: Blood Updated: 05/27/23 0555    Protime-INR [[id number]] Collected: 05/27/23 0549    Specimen: Blood Updated: 05/27/23 0555    aPTT [[id number]] Collected: 05/27/23 0549    Specimen: Blood Updated: 05/27/23 0555    Fibrinogen [[id number]] Collected: 05/27/23 0549    Specimen: Blood Updated: 05/27/23 0555    LTPGDD39 Activity [[id number]] Collected: 05/23/23 1710    Specimen: Blood Updated: 05/26/23 2207     JUXLAD71 Activity 89.5 %     Narrative:   "    Test(s) 854978-OMBADT73 Activity  was developed and its performance characteristics determined  by [employer]. It has not been cleared or approved by the Food  and Drug Administration.  Performed at:  [address] [place] [address]  [id number]  : Selwyn Cedeno MD, Phone:  [phone]    Comment [[id number]] Collected: 05/23/23 1710    Specimen: Blood Updated: 05/26/23 2207     Comment Comment     Comment: Severe deficiency of JKXASA72 (less than 10% activity) is a  relatively specific finding in patients with a clinical diagnosis of  either hereditary or acquired thrombotic thrombocytopenic purpura  (TTP). Normal to moderately reduced RCQUSO50 activity results do not  exclude a diagnosis of TTP. Conditions that could have KLXKFL38  activity greater than 10% include hemolytic uremic syndrome (HUS),  atypical hemolytic uremic syndrome (aHUS), and other thrombotic  microangiopathies associated with hematopoietic stem cell and solid  organ transplantation, liver disease, DIC, sepsis, pregnancy, or  effects of certain medications (eg, clopidogrel, cyclosporine,  mitomycin C, quinine).       Narrative:      Performed at:  [address] [place] [address]  [id number]  : Selwyn Cedeno MD, Phone:  [phone]    Fibrinogen [[id number]]  (Abnormal) Collected: 05/26/23 1056    Specimen: Blood Updated: 05/26/23 1126     Fibrinogen 227 mg/dL     Urinalysis, Microscopic Only - Urine, Clean Catch [[id number]]  (Abnormal) Collected: 05/26/23 1006    Specimen: Urine, Clean Catch Updated: 05/26/23 1037     RBC, UA 13-20 /HPF      WBC, UA 13-20 /HPF      Bacteria, UA Trace /HPF      Squamous Epithelial Cells, UA 3-6 /HPF      Hyaline Casts, UA 7-12 /LPF      Methodology Automated Microscopy    Urinalysis With Culture If Indicated - Urine, Clean Catch [[id number]]  (Abnormal) Collected: 05/26/23 1006    Specimen: Urine, Clean Catch Updated: 05/26/23 1037     Color,  UA Dark Yellow     Appearance, UA Clear     pH, UA 6.5     Specific Gravity, UA 1.022     Glucose, UA Negative     Ketones, UA Trace     Bilirubin, UA Small (1+)     Blood, UA Moderate (2+)     Protein, UA Negative     Leuk Esterase, UA Moderate (2+)     Nitrite, UA Negative     Urobilinogen, UA 1.0 E.U./dL    Narrative:      In absence of clinical symptoms, the presence of pyuria, bacteria, and/or nitrites on the urinalysis result does not correlate with infection.    Urine Culture - Urine, Urine, Clean Catch [[id number]] Collected: 05/26/23 1006    Specimen: Urine, Clean Catch Updated: 05/26/23 1037    CBC & Differential [[id number]]  (Abnormal) Collected: 05/26/23 0810    Specimen: Blood Updated: 05/26/23 0918    Narrative:      The following orders were created for panel order CBC & Differential.  Procedure                               Abnormality         Status                     ---------                               -----------         ------                     CBC Auto Differential[[id number]]        Abnormal            Final result                 Please view results for these tests on the individual orders.    CBC Auto Differential [[id number]]  (Abnormal) Collected: 05/26/23 0810    Specimen: Blood Updated: 05/26/23 0918     WBC 4.67 10*3/mm3      RBC 1.90 10*6/mm3      Hemoglobin 7.1 g/dL      Hematocrit 19.2 %      .1 fL      MCH 37.4 pg      MCHC 37.0 g/dL      RDW 18.5 %      RDW-SD 50.5 fl      Platelets 5 10*3/mm3      Neutrophil % 47.9 %      Lymphocyte % 37.0 %      Monocyte % 11.3 %      Eosinophil % 2.8 %      Basophil % 0.6 %      Immature Grans % 0.4 %      Neutrophils, Absolute 2.23 10*3/mm3      Lymphocytes, Absolute 1.73 10*3/mm3      Monocytes, Absolute 0.53 10*3/mm3      Eosinophils, Absolute 0.13 10*3/mm3      Basophils, Absolute 0.03 10*3/mm3      Immature Grans, Absolute 0.02 10*3/mm3      nRBC 0.4 /100 WBC     Cold Agglutinin Titer [[id number]] Collected: 05/26/23 0810     Specimen: Blood Updated: 05/26/23 0818    Blood Culture - Blood, Arm, Left [[id number]]  (Normal) Collected: 05/23/23 0548    Specimen: Blood from Arm, Left Updated: 05/26/23 0730     Blood Culture No growth at 3 days    Blood Culture - Blood, Arm, Left [[id number]]  (Normal) Collected: 05/23/23 0548    Specimen: Blood from Arm, Left Updated: 05/26/23 0730     Blood Culture No growth at 3 days    Tissue / Bone Culture - Tissue, Leg, Left [[id number]]  (Abnormal)  (Susceptibility) Collected: 05/24/23 1054    Specimen: Tissue from Leg, Left Updated: 05/26/23 0707     Tissue Culture Moderate growth (3+) Staphylococcus aureus      Moderate growth (3+) Klebsiella pneumoniae ssp pneumoniae      Moderate growth (3+) Escherichia coli     Gram Stain Moderate (3+) Gram negative bacilli      No WBCs seen    Susceptibility        Staphylococcus aureus      CHRISTIANO      Clindamycin Susceptible      Erythromycin Resistant      Inducible Clindamycin Resistance Negative      Oxacillin Susceptible      Rifampin Susceptible      Tetracycline Resistant      Trimethoprim + Sulfamethoxazole Susceptible      Vancomycin Susceptible                       Susceptibility        Klebsiella pneumoniae ssp pneumoniae      CHRISTIANO      Ampicillin Resistant      Ampicillin + Sulbactam Susceptible      Cefepime Susceptible      Ceftazidime Susceptible      Ceftriaxone Susceptible      Gentamicin Susceptible      Levofloxacin Susceptible      Piperacillin + Tazobactam Susceptible      Tetracycline Resistant      Trimethoprim + Sulfamethoxazole Susceptible                       Susceptibility Comments       Staphylococcus aureus    This isolate does not demonstrate inducible clindamycin resistance in vitro.        Klebsiella pneumoniae ssp pneumoniae    Cefazolin sensitivity will not be reported for Enterobacteriaceae in non-urine isolates. If cefazolin is preferred, please call the microbiology lab to request an E-test.  With the exception of urinary-sourced  infections, aminoglycosides should not be used as monotherapy.               aPTT [[id number]]  (Abnormal) Collected: 05/26/23 0543    Specimen: Blood Updated: 05/26/23 0613     PTT 35.3 seconds     Protime-INR [[id number]]  (Abnormal) Collected: 05/26/23 0543    Specimen: Blood Updated: 05/26/23 0613     Protime 15.7 Seconds      INR 1.23    Occult Blood X 1, Stool - Stool, Per Rectum [[id number]]  (Normal) Collected: 05/25/23 2311    Specimen: Stool from Per Rectum Updated: 05/25/23 2321     Fecal Occult Blood Negative    Immature Platelet Fraction [[id number]]  (Normal) Collected: 05/25/23 0947    Specimen: Blood Updated: 05/25/23 1024     IPF 5.40 %     CBC & Differential [[id number]]  (Abnormal) Collected: 05/25/23 0947    Specimen: Blood Updated: 05/25/23 1024    Narrative:      The following orders were created for panel order CBC & Differential.  Procedure                               Abnormality         Status                     ---------                               -----------         ------                     CBC Auto Differential[[id number]]        Abnormal            Final result                 Please view results for these tests on the individual orders.    CBC Auto Differential [[id number]]  (Abnormal) Collected: 05/25/23 0947    Specimen: Blood Updated: 05/25/23 1024     WBC 5.02 10*3/mm3      RBC 2.75 10*6/mm3      Hemoglobin 8.3 g/dL      Hematocrit 26.6 %      MCV 96.7 fL      MCH 30.2 pg      MCHC 31.2 g/dL      RDW 15.6 %      RDW-SD 53.1 fl      MPV 12.4 fL      Platelets 7 10*3/mm3      Neutrophil % 58.1 %      Lymphocyte % 26.5 %      Monocyte % 9.6 %      Eosinophil % 4.0 %      Basophil % 1.2 %      Neutrophils, Absolute 2.92 10*3/mm3      Lymphocytes, Absolute 1.33 10*3/mm3      Monocytes, Absolute 0.48 10*3/mm3      Eosinophils, Absolute 0.20 10*3/mm3      Basophils, Absolute 0.06 10*3/mm3     Basic Metabolic Panel [[id number]]  (Abnormal) Collected: 05/25/23 0947    Specimen: Blood  Updated: 05/25/23 1018     Glucose 102 mg/dL      BUN 11 mg/dL      Creatinine 0.54 mg/dL      Sodium 139 mmol/L      Potassium 3.3 mmol/L      Comment: Slight hemolysis detected by analyzer. Results may be affected.        Chloride 102 mmol/L      CO2 28.0 mmol/L      Calcium 8.7 mg/dL      BUN/Creatinine Ratio 20.4     Anion Gap 9.0 mmol/L      eGFR 96.7 mL/min/1.73     Narrative:      GFR Normal >60  Chronic Kidney Disease <60  Kidney Failure <15    The GFR formula is only valid for adults with stable renal function between ages 18 and 70.    aPTT [[id number]]  (Normal) Collected: 05/25/23 0947    Specimen: Blood Updated: 05/25/23 1007     PTT 32.9 seconds     Protime-INR [[id number]]  (Abnormal) Collected: 05/25/23 0947    Specimen: Blood Updated: 05/25/23 1007     Protime 14.8 Seconds      INR 1.14    Haptoglobin [[id number]]  (Normal) Collected: 05/23/23 0109    Specimen: Blood Updated: 05/24/23 2013     Haptoglobin 71 mg/dL     Protime-INR [[id number]]  (Normal) Collected: 05/24/23 0844    Specimen: Blood Updated: 05/24/23 0902     Protime 13.8 Seconds      INR 1.04    aPTT [[id number]]  (Normal) Collected: 05/24/23 0844    Specimen: Blood Updated: 05/24/23 0902     PTT 30.1 seconds     Fibrinogen [[id number]]  (Normal) Collected: 05/24/23 0844    Specimen: Blood Updated: 05/24/23 0902     Fibrinogen 294 mg/dL     Peripheral Blood Smear [[id number]] Collected: 05/23/23 0230    Specimen: Blood Updated: 05/24/23 0837     Performed by: Kamilah Nogueira MD     Pathologist Interpretation --     Moderate macrocytic anemia  Red blood cell agglutination    Normal total white blood cell count with the following manual differential:  Neutrophils 48%  Bands 1%  Lymphocytes 38%  Monocytes 8%  Eosinophils 3%  Basophils 1%  Myelocytes 1%  Severe peripheral thrombocytopenia    No schistocytes identified  No platelet clumps identified  No morulae identified in granulocytes or monocytes            Imaging Results (Last 72 Hours)        Procedure Component Value Units Date/Time    US Elastography Parenchyma [[id number]] Collected: 05/27/23 1343     Updated: 05/27/23 1351    Narrative:      EXAMINATION:  US ELASTOGRAPHY PARENCHYMA-  5/27/2023 12:42 PM CDT     HISTORY: Liver - cirrhosis. I89.0-Lymphedema, not elsewhere classified;  D69.6-Thrombocytopenia, unspecified; L03.116-Cellulitis of left lower  limb; S81.812D-Laceration without foreign body, left lower leg,  subsequent encounter; Z74.09-Other reduced mobility.      COMPARISON: 05/24/2023.     TECHNIQUE: Transverse and longitudinal sonographic images of the liver  and right upper quadrant were obtained using a Siemens shear wave  system. Elastography was also performed. Images are obtained in the  usual manner with right arm above shoulder rolled 30 degrees. A median  of 10 measurements was calculated. An interquartile range IQR/median  <0.3 considered reliable data.     FINDINGS: Please see the report on 05/24/2023 for evaluation of right  quadrant findings.     Additional images are obtained  in the usual manner with right arm above  shoulder rolled 30 degrees.     A median of 10 measurements was calculated. The median velocity measures  1.92 m/s.  The IQR/median value measures 0.14.     An interquartile range IQR/median <0.3 considered reliable data.          Impression:         1. Medial and velocity 1.92 m/s.  2. Metavir score F3.        Shear wave measurements may be affected by hepatic congestion,  steatosis, and inflammation. Shear wave speed measurements should be  interpreted in conjunction with other available clinical data, and they  should not be considered interchangeable with MRI-derived stiffness  measurements at this time.        Liver Fibrosis Staging      Metavir                    m/s     Mild-Moderate                    F2                     1.66-1.77 m/s     Moderate-Severe               F3                     1.77-1.99 m/s     Cirrhosis                               F4                     >1.99 m/s   This report was finalized on 05/27/2023 13:48 by Dr. Christopher Jennings MD.                  Assessment:    Condition: In stable condition.  Improving.       Plan:   Encourage ambulation and per physical therapy.   (    Pancytopenia-hemoglobin 7.1 yesterday platelets are slowly improving.  Work-up in progress with Dr. Cantu.  Repeat pending this morning.  Trial of high-dose Decadron and IVIG per hematology oncology.  Will add Accu-Cheks and sliding scale insulin.    Venous stasis ulcer left lower extremity-appreciate wound care help.  Continue with oral antibiotics and local wound care.    Urinary tract infection present on admission-apparently no culture was ordered on admission urinalysis repeated yesterday culture and sensitivity still pending.  Continue with Omnicef for now.    Fatty liver disease-ultrasound liver shows coarsened liver with mild surface nodularity consistent with chronic liver disease/cirrhosis.  Mild splenomegaly.  Reviewed elastogram ultrasound of liver and F3 which is mild/moderate cirrhosis    Overall making slow steady progress with PT OT.  Patient has much family support at home.  Plan for high-dose Decadron and IVIG with bone marrow on Tuesday.  Follow how counts respond to treatment plan.    ).   Problem List:     Thrombocytopenia    HTN (hypertension)    Obesity, Class III, BMI 40-49.9 (morbid obesity)    Anxiety associated with depression    Acute cystitis with hematuria    Sachin Dia MD  5/28/2023

## 2023-05-28 NOTE — PLAN OF CARE
Goal Outcome Evaluation:  Plan of Care Reviewed With: patient        Progress: no change  Outcome Evaluation: Patient recieved alert and orientated with no new complaints.  Patient has Left upper arm PICC that remain clean dry and intact.  Patient OOB to bedside commode with one assist.  Patient pain well controlled with ordered pain medication at this time.  Patient tolerated wound care as ordered; patient tolerated all meds with no complaints of side effects.  Patient safety maintained throughout the night with call bell in reach.  Will continue to assess and treat patient per plan of care.

## 2023-05-28 NOTE — THERAPY TREATMENT NOTE
Acute Care - Physical Therapy Treatment Note  [facility]     Patient Name: Brenda Sneed  : 1950  MRN: [id number]  Today's Date: 2023   Onset of Illness/Injury or Date of Surgery: 23  Visit Dx:     ICD-10-CM ICD-9-CM   1. Lymphedema of both lower extremities  I89.0 457.1   2. Thrombocytopenia  D69.6 287.5   3. Cellulitis of left lower extremity  L03.116 682.6   4. Laceration of left lower extremity, subsequent encounter  S81.812D V58.89     894.0   5. Impaired mobility [Z74.09 (ICD-10-CM)]  Z74.09 799.89     Patient Active Problem List   Diagnosis    Spinal stenosis, lumbar    Secondary osteoarthritis of left shoulder due to rotator cuff arthropathy    Cellulitis of right lower extremity    HTN (hypertension)    Obesity, Class III, BMI 40-49.9 (morbid obesity)    Anxiety associated with depression    Traumatic rhabdomyolysis    Traumatic rhabdomyolysis, initial encounter    Laceration of left lower extremity    Cellulitis    Cellulitis of left lower extremity    Thrombocytopenia    Acute cystitis with hematuria     Past Medical History:   Diagnosis Date    Arthritis     Bronchitis     Cataract     right eye , cornea implant    Chronic back pain     Depression     Disease of thyroid gland     Edema     lower extremities    Hypertension     Kidney stones      Past Surgical History:   Procedure Laterality Date    BACK SURGERY       ([place]),      CHOLECYSTECTOMY OPEN      CORNEAL TRANSPLANT Right     INCISION AND DRAINAGE ABSCESS Left 2023    Procedure: INCISION AND DRAINAGE ABSCESS left leg;  Surgeon: Sarita Vazquez MD;  Location: [facility] OR;  Service: General;  Laterality: Left;    LACERATION REPAIR Left 2023    Procedure: washout and closure left leg wound ;  Surgeon: Sarita Vazquez MD;  Location: [facility] OR;  Service: General;  Laterality: Left;    LUMBAR FUSION Left 2017    Procedure: LEFT LUMBAR LATERAL INTERBODY FUSION WITH INSTRUMENTATION  L1-2;  Surgeon: MARCELINO  Farrukh Wright MD;  Location:  [facility] OR;  Service:     NECK SURGERY  2010    [place]    TOTAL HIP ARTHROPLASTY Right 2014    DR. KENDRICK     TOTAL KNEE ARTHROPLASTY Right 2000    TOTAL KNEE ARTHROPLASTY Left 2002    TOTAL SHOULDER ARTHROPLASTY W/ DISTAL CLAVICLE EXCISION Left 6/1/2020    Procedure: LEFT REVERSE TOTAL SHOULDER REPLACEMENT;  Surgeon: Saravanan Calvin MD;  Location:  [facility] OR;  Service: Orthopedics;  Laterality: Left;     PT Assessment (last 12 hours)       PT Evaluation and Treatment       Row Name 05/28/23 1004          Physical Therapy Time and Intention    Subjective Information no complaints  -WK     Document Type therapy note (daily note)  -WK     Mode of Treatment physical therapy  -WK       Row Name 05/28/23 1004          General Information    Existing Precautions/Restrictions fall  -WK       Row Name 05/28/23 1004          Bed Mobility    Supine-Sit [place] (Bed Mobility) standby assist  -WK     Sit-Supine [place] (Bed Mobility) standby assist  -WK     Assistive Device (Bed Mobility) bed rails;head of bed elevated  -WK       Row Name 05/28/23 1004          Sit-Stand Transfer    Sit-Stand [place] (Transfers) standby assist  -WK       Row Name 05/28/23 1004          Stand-Sit Transfer    Stand-Sit [place] (Transfers) standby assist  -WK       Row Name 05/28/23 1004          Gait/Stairs (Locomotion)    [place] Level (Gait) standby assist  -WK     Assistive Device (Gait) walker, front-wheeled  -WK     Distance in Feet (Gait) 120  -WK     Deviations/Abnormal Patterns (Gait) gait speed decreased  -WK     Comment, (Gait/Stairs) edcuated POC and safety at home  -WK       Row Name 05/28/23 1004          Motor Skills    Therapeutic Exercise aerobic  -WK       Row Name 05/28/23 1004          Aerobic Exercise    Comment, Aerobic Exercise (Therapeutic Exercise) AROM BLE 15 reps sitting.  -WK       Row Name             Wound 05/23/23 1900 Left proximal leg    Wound - Properties  Group Placement Date: 05/23/23  -CR Placement Time: 1900  -CR Present on Hospital Admission: Y  -CR Side: Left  -CR Orientation: proximal  -CR Location: leg  -CR    Retired Wound - Properties Group Placement Date: 05/23/23  -CR Placement Time: 1900  -CR Present on Hospital Admission: Y  -CR Side: Left  -CR Orientation: proximal  -CR Location: leg  -CR    Retired Wound - Properties Group Date first assessed: 05/23/23  -CR Time first assessed: 1900  -CR Present on Hospital Admission: Y  -CR Side: Left  -CR Location: leg  -CR      Row Name             Wound 05/23/23 1900 Right proximal leg    Wound - Properties Group Placement Date: 05/23/23  -CR Placement Time: 1900  -CR Present on Hospital Admission: Y  -CR Side: Right  -CR Orientation: proximal  -CR Location: leg  -CR    Retired Wound - Properties Group Placement Date: 05/23/23  -CR Placement Time: 1900  -CR Present on Hospital Admission: Y  -CR Side: Right  -CR Orientation: proximal  -CR Location: leg  -CR    Retired Wound - Properties Group Date first assessed: 05/23/23  -CR Time first assessed: 1900  -CR Present on Hospital Admission: Y  -CR Side: Right  -CR Location: leg  -CR      Row Name             Wound 04/19/23 1140 Left lower leg Incision    Wound - Properties Group Placement Date: 04/19/23  -DT Placement Time: 1140  -DT Present on Hospital Admission: N  -DT Side: Left  -DT Orientation: lower  -DT Location: leg  -DT Primary Wound Type: Incision  -DT    Retired Wound - Properties Group Placement Date: 04/19/23  -DT Placement Time: 1140  -DT Present on Hospital Admission: N  -DT Side: Left  -DT Orientation: lower  -DT Location: leg  -DT Primary Wound Type: Incision  -DT    Retired Wound - Properties Group Date first assessed: 04/19/23  -DT Time first assessed: 1140  -DT Present on Hospital Admission: N  -DT Side: Left  -DT Location: leg  -DT Primary Wound Type: Incision  -DT      Row Name             Wound 05/25/23 1341 Left posterior hand Skin Tear    Wound  - Properties Group Placement Date: 05/25/23  -DF Placement Time: 1341  -DF Side: Left  -DF Orientation: posterior  -DF Location: hand  -DF Primary Wound Type: Skin tear  -DF    Retired Wound - Properties Group Placement Date: 05/25/23  -DF Placement Time: 1341  -DF Side: Left  -DF Orientation: posterior  -DF Location: hand  -DF Primary Wound Type: Skin tear  -DF    Retired Wound - Properties Group Date first assessed: 05/25/23  -DF Time first assessed: 1341  -DF Side: Left  -DF Location: hand  -DF Primary Wound Type: Skin tear  -DF      Row Name 05/28/23 1004          Plan of Care Review    Plan of Care Reviewed With patient  -WK     Progress improving  -WK     Outcome Evaluation Bed mobility and sit to stand SBA. Pt amb 120' SBA with RW. Educated pt on safety at home.  -WK       Row Name 05/28/23 1004          Positioning and Restraints    Pre-Treatment Position in bed  -WK     Post Treatment Position chair  -WK     In Chair reclined;call light within reach;encouraged to call for assist  -WK               User Key  (r) = Recorded By, (t) = Taken By, (c) = Cosigned By      Initials Name Provider Type    DF Earnestine Woods RN Registered Nurse    Rika Diallo PTA Physical Therapist Assistant    Ludwin Sam RN Registered Nurse    Alejandro Carr RN Registered Nurse                    Physical Therapy Education       Title: PT OT SLP Therapies (In Progress)       Topic: Physical Therapy (Done)       Point: Mobility training (Done)       Learning Progress Summary             Patient Acceptance, E, VU by CG at 5/26/2023 1325    Comment: Pt educated on POC, d/c recommendations, safety w/ transfers, safety ambulating                         Point: Body mechanics (Done)       Learning Progress Summary             Patient Acceptance, E, VU by CG at 5/26/2023 1325    Comment: Pt educated on POC, d/c recommendations, safety w/ transfers, safety ambulating                         Point: Precautions  (Done)       Learning Progress Summary             Patient Acceptance, E, VU by CG at 5/26/2023 3639    Comment: Pt educated on POC, d/c recommendations, safety w/ transfers, safety ambulating                                         User Key       Initials Effective Dates Name Provider Type Discipline    CG 04/27/23 -  Josafat Nieto, PT Student PT Student PT                  PT Recommendation and Plan     Plan of Care Reviewed With: patient  Progress: improving  Outcome Evaluation: Bed mobility and sit to stand SBA. Pt amb 120' SBA with RW. Educated pt on safety at home.   Outcome Measures       Row Name 05/27/23 1410 05/25/23 1400          How much help from another person do you currently need...    Turning from your back to your side while in flat bed without using bedrails? 3  -WK --     Moving from lying on back to sitting on the side of a flat bed without bedrails? 3  -WK --     Moving to and from a bed to a chair (including a wheelchair)? 4  -WK --     Standing up from a chair using your arms (e.g., wheelchair, bedside chair)? 4  -WK --     Climbing 3-5 steps with a railing? 2  -WK --     To walk in hospital room? 3  -WK --     AM-PAC 6 Clicks Score (PT) 19  -WK --        How much help from another is currently needed...    Putting on and taking off regular lower body clothing? -- 2  -AC (r) WB (t) AC (c)     Bathing (including washing, rinsing, and drying) -- 2  -AC (r) WB (t) AC (c)     Toileting (which includes using toilet bed pan or urinal) -- 2  -AC (r) WB (t) AC (c)     Putting on and taking off regular upper body clothing -- 2  -AC (r) WB (t) AC (c)     Taking care of personal grooming (such as brushing teeth) -- 3  -AC (r) WB (t) AC (c)     Eating meals -- 4  -AC (r) WB (t) AC (c)     AM-PAC 6 Clicks Score (OT) -- 15  -AC (r) WB (t)        Functional Assessment    Outcome Measure Options AM-PAC 6 Clicks Basic Mobility (PT)  -WK AM-PAC 6 Clicks Daily Activity (OT)  -AC (r) WB (t) AC (c)                User Key  (r) = Recorded By, (t) = Taken By, (c) = Cosigned By      Initials Name Provider Type    AC Leonardo Duncan, OTR/L, CNT Occupational Therapist    WK Rika Walden PTA Physical Therapist Assistant    Arsalan Gao, OT Student OT Student                     Time Calculation:    PT Charges       Row Name 05/28/23 1035             Time Calculation    Start Time 1004  -WK      Stop Time 1032  -WK      Time Calculation (min) 28 min  -WK      PT Received On 05/28/23  -WK         Time Calculation- PT    Total Timed Code Minutes- PT 28 minute(s)  -WK                User Key  (r) = Recorded By, (t) = Taken By, (c) = Cosigned By      Initials Name Provider Type    WK Rika Walden PTA Physical Therapist Assistant                  Therapy Charges for Today       Code Description Service Date Service Provider Modifiers Qty    63414010842 HC GAIT TRAINING EA 15 MIN 5/27/2023 Rika Walden PTA GP 2    13720943400 HC GAIT TRAINING EA 15 MIN 5/28/2023 Rika Walden, NE GP 1    33139325868 HC PT THER PROC EA 15 MIN 5/28/2023 Rika Walden PTA GP 1            PT G-Codes  Outcome Measure Options: AM-PAC 6 Clicks Basic Mobility (PT)  AM-PAC 6 Clicks Score (PT): 19  AM-PAC 6 Clicks Score (OT): 18    Rika Walden PTA  5/28/2023

## 2023-05-29 LAB
APTT PPP: 32.1 SECONDS (ref 24.1–35)
BASOPHILS # BLD AUTO: 0 10*3/MM3 (ref 0–0.2)
BASOPHILS NFR BLD AUTO: 0 % (ref 0–1.5)
DEPRECATED RDW RBC AUTO: 50.8 FL (ref 37–54)
EOSINOPHIL # BLD AUTO: 0 10*3/MM3 (ref 0–0.4)
EOSINOPHIL NFR BLD AUTO: 0 % (ref 0.3–6.2)
ERYTHROCYTE [DISTWIDTH] IN BLOOD BY AUTOMATED COUNT: 15.1 % (ref 12.3–15.4)
FIBRINOGEN PPP-MCNC: 149 MG/DL (ref 240–460)
HCT VFR BLD AUTO: 24.8 % (ref 34–46.6)
HGB BLD-MCNC: 7.7 G/DL (ref 12–15.9)
INR PPP: 1.31 (ref 0.91–1.09)
LYMPHOCYTES # BLD AUTO: 0.65 10*3/MM3 (ref 0.7–3.1)
LYMPHOCYTES NFR BLD AUTO: 13.3 % (ref 19.6–45.3)
MCH RBC QN AUTO: 28.9 PG (ref 26.6–33)
MCHC RBC AUTO-ENTMCNC: 31 G/DL (ref 31.5–35.7)
MCV RBC AUTO: 93.2 FL (ref 79–97)
MONOCYTES # BLD AUTO: 0.36 10*3/MM3 (ref 0.1–0.9)
MONOCYTES NFR BLD AUTO: 7.4 % (ref 5–12)
NEUTROPHILS NFR BLD AUTO: 3.81 10*3/MM3 (ref 1.7–7)
NEUTROPHILS NFR BLD AUTO: 78.1 % (ref 42.7–76)
PLATELET # BLD AUTO: 104 10*3/MM3 (ref 140–450)
PMV BLD AUTO: 10.4 FL (ref 6–12)
PROTHROMBIN TIME: 16.5 SECONDS (ref 11.8–14.8)
RBC # BLD AUTO: 2.66 10*6/MM3 (ref 3.77–5.28)
WBC NRBC COR # BLD: 4.88 10*3/MM3 (ref 3.4–10.8)

## 2023-05-29 PROCEDURE — 25010000002 DEXAMETHASONE PER 1 MG: Performed by: PHYSICIAN ASSISTANT

## 2023-05-29 PROCEDURE — 88185 FLOWCYTOMETRY/TC ADD-ON: CPT

## 2023-05-29 PROCEDURE — 85384 FIBRINOGEN ACTIVITY: CPT | Performed by: INTERNAL MEDICINE

## 2023-05-29 PROCEDURE — 85025 COMPLETE CBC W/AUTO DIFF WBC: CPT | Performed by: PHYSICIAN ASSISTANT

## 2023-05-29 PROCEDURE — 85610 PROTHROMBIN TIME: CPT | Performed by: INTERNAL MEDICINE

## 2023-05-29 PROCEDURE — 25010000002 CEFAZOLIN PER 500 MG: Performed by: PHYSICIAN ASSISTANT

## 2023-05-29 PROCEDURE — 85730 THROMBOPLASTIN TIME PARTIAL: CPT | Performed by: INTERNAL MEDICINE

## 2023-05-29 PROCEDURE — 97535 SELF CARE MNGMENT TRAINING: CPT

## 2023-05-29 PROCEDURE — 97116 GAIT TRAINING THERAPY: CPT

## 2023-05-29 PROCEDURE — 88184 FLOWCYTOMETRY/ TC 1 MARKER: CPT | Performed by: INTERNAL MEDICINE

## 2023-05-29 RX ADMIN — BISOPROLOL FUMARATE: 5 TABLET ORAL at 08:41

## 2023-05-29 RX ADMIN — DULOXETINE HYDROCHLORIDE 60 MG: 30 CAPSULE, DELAYED RELEASE ORAL at 08:41

## 2023-05-29 RX ADMIN — NYSTATIN 1 APPLICATION: 100000 POWDER TOPICAL at 21:07

## 2023-05-29 RX ADMIN — DULOXETINE HYDROCHLORIDE 60 MG: 30 CAPSULE, DELAYED RELEASE ORAL at 21:06

## 2023-05-29 RX ADMIN — CEFAZOLIN 2 G: 2 INJECTION, POWDER, FOR SOLUTION INTRAMUSCULAR; INTRAVENOUS at 08:40

## 2023-05-29 RX ADMIN — OXYCODONE HYDROCHLORIDE AND ACETAMINOPHEN 2 TABLET: 5; 325 TABLET ORAL at 05:38

## 2023-05-29 RX ADMIN — LEVOTHYROXINE SODIUM 100 MCG: 100 TABLET ORAL at 05:38

## 2023-05-29 RX ADMIN — CEFAZOLIN 2 G: 2 INJECTION, POWDER, FOR SOLUTION INTRAMUSCULAR; INTRAVENOUS at 23:39

## 2023-05-29 RX ADMIN — OXYCODONE HYDROCHLORIDE AND ACETAMINOPHEN 2 TABLET: 5; 325 TABLET ORAL at 21:10

## 2023-05-29 RX ADMIN — OXYCODONE HYDROCHLORIDE AND ACETAMINOPHEN 2 TABLET: 5; 325 TABLET ORAL at 09:17

## 2023-05-29 RX ADMIN — DOCUSATE SODIUM 100 MG: 100 CAPSULE ORAL at 21:06

## 2023-05-29 RX ADMIN — OXYCODONE HYDROCHLORIDE AND ACETAMINOPHEN 2 TABLET: 5; 325 TABLET ORAL at 17:01

## 2023-05-29 RX ADMIN — Medication 5000 UNITS: at 08:41

## 2023-05-29 RX ADMIN — NYSTATIN 1 APPLICATION: 100000 POWDER TOPICAL at 08:41

## 2023-05-29 RX ADMIN — OXYCODONE HYDROCHLORIDE AND ACETAMINOPHEN 500 MG: 500 TABLET ORAL at 08:41

## 2023-05-29 RX ADMIN — PRAMIPEXOLE DIHYDROCHLORIDE 0.75 MG: 0.25 TABLET ORAL at 14:13

## 2023-05-29 RX ADMIN — LEVOFLOXACIN 750 MG: 750 TABLET, FILM COATED ORAL at 15:31

## 2023-05-29 RX ADMIN — DOCUSATE SODIUM 50 MG AND SENNOSIDES 8.6 MG 2 TABLET: 8.6; 5 TABLET, FILM COATED ORAL at 21:06

## 2023-05-29 RX ADMIN — Medication 10 ML: at 21:07

## 2023-05-29 RX ADMIN — PRAMIPEXOLE DIHYDROCHLORIDE 0.75 MG: 0.25 TABLET ORAL at 21:06

## 2023-05-29 RX ADMIN — PANTOPRAZOLE SODIUM 40 MG: 40 TABLET, DELAYED RELEASE ORAL at 05:38

## 2023-05-29 RX ADMIN — DEXAMETHASONE SODIUM PHOSPHATE 40 MG: 10 INJECTION INTRAMUSCULAR; INTRAVENOUS at 08:41

## 2023-05-29 RX ADMIN — Medication 10 ML: at 08:41

## 2023-05-29 RX ADMIN — PRAMIPEXOLE DIHYDROCHLORIDE 0.75 MG: 0.25 TABLET ORAL at 05:38

## 2023-05-29 RX ADMIN — CEFAZOLIN 2 G: 2 INJECTION, POWDER, FOR SOLUTION INTRAMUSCULAR; INTRAVENOUS at 15:31

## 2023-05-29 NOTE — PLAN OF CARE
Goal Outcome Evaluation:  Plan of Care Reviewed With: patient        Progress: improving  Outcome Evaluation: pt trans to EOB sba, sit-stand cga-sba, bathroom trans cga-sba, pt amb 200 feet cga-sba rwx, trans back to bed min assist with BLE, pt would benefit from HH

## 2023-05-29 NOTE — THERAPY TREATMENT NOTE
Acute Care - Physical Therapy Treatment Note  [facility]     Patient Name: Brenda Sneed  : 1950  MRN: [id number]  Today's Date: 2023   Onset of Illness/Injury or Date of Surgery: 23  Visit Dx:     ICD-10-CM ICD-9-CM   1. Lymphedema of both lower extremities  I89.0 457.1   2. Thrombocytopenia  D69.6 287.5   3. Cellulitis of left lower extremity  L03.116 682.6   4. Laceration of left lower extremity, subsequent encounter  S81.812D V58.89     894.0   5. Impaired mobility [Z74.09 (ICD-10-CM)]  Z74.09 799.89     Patient Active Problem List   Diagnosis    Spinal stenosis, lumbar    Secondary osteoarthritis of left shoulder due to rotator cuff arthropathy    Cellulitis of right lower extremity    HTN (hypertension)    Obesity, Class III, BMI 40-49.9 (morbid obesity)    Anxiety associated with depression    Traumatic rhabdomyolysis    Traumatic rhabdomyolysis, initial encounter    Laceration of left lower extremity    Cellulitis    Cellulitis of left lower extremity    Thrombocytopenia    Acute cystitis with hematuria     Past Medical History:   Diagnosis Date    Arthritis     Bronchitis     Cataract     right eye , cornea implant    Chronic back pain     Depression     Disease of thyroid gland     Edema     lower extremities    Hypertension     Kidney stones      Past Surgical History:   Procedure Laterality Date    BACK SURGERY       ([place]),      CHOLECYSTECTOMY OPEN      CORNEAL TRANSPLANT Right     INCISION AND DRAINAGE ABSCESS Left 2023    Procedure: INCISION AND DRAINAGE ABSCESS left leg;  Surgeon: Sarita Vazquez MD;  Location: [facility] OR;  Service: General;  Laterality: Left;    LACERATION REPAIR Left 2023    Procedure: washout and closure left leg wound ;  Surgeon: Sarita Vazquez MD;  Location: [facility] OR;  Service: General;  Laterality: Left;    LUMBAR FUSION Left 2017    Procedure: LEFT LUMBAR LATERAL INTERBODY FUSION WITH INSTRUMENTATION  L1-2;  Surgeon: MARCELINO  Farrukh Wright MD;  Location:  [facility] OR;  Service:     NECK SURGERY  2010    [place]    TOTAL HIP ARTHROPLASTY Right 2014    DR. KENDRICK     TOTAL KNEE ARTHROPLASTY Right 2000    TOTAL KNEE ARTHROPLASTY Left 2002    TOTAL SHOULDER ARTHROPLASTY W/ DISTAL CLAVICLE EXCISION Left 6/1/2020    Procedure: LEFT REVERSE TOTAL SHOULDER REPLACEMENT;  Surgeon: Saravanan Calvin MD;  Location:  [facility] OR;  Service: Orthopedics;  Laterality: Left;     PT Assessment (last 12 hours)       PT Evaluation and Treatment       Row Name 05/29/23 0945          Physical Therapy Time and Intention    Subjective Information no complaints  -     Document Type therapy note (daily note)  -     Mode of Treatment physical therapy  -       Row Name 05/29/23 0945          General Information    Existing Precautions/Restrictions fall  -       Row Name 05/29/23 0945          Pain    Pretreatment Pain Rating 0/10 - no pain  -     Posttreatment Pain Rating 0/10 - no pain  -     Pain Intervention(s) Medication (See MAR)  -       Row Name 05/29/23 0945          Bed Mobility    Supine-Sit [place] (Bed Mobility) standby assist  -     Sit-Supine [place] (Bed Mobility) minimum assist (75% patient effort)  assist with BLE  -[facility] Name 05/29/23 0945          Sit-Stand Transfer    Sit-Stand [place] (Transfers) standby assist  -[facility] Name 05/29/23 0945          Stand-Sit Transfer    Stand-Sit [place] (Transfers) standby assist  -[facility] Name 05/29/23 0945          Toilet Transfer    [place] Level (Toilet Transfer) standby assist  -     Assistive Device (Toilet Transfer) commode;grab bars/safety frame  -[facility] Name 05/29/23 0945          Gait/Stairs (Locomotion)    [place] Level (Gait) standby assist  -     Assistive Device (Gait) walker, front-wheeled  -     Distance in Feet (Gait) 200  -     Deviations/Abnormal Patterns (Gait) gait speed decreased  -[facility] Name              Wound 05/23/23 1900 Left proximal leg    Wound - Properties Group Placement Date: 05/23/23  -CR Placement Time: 1900  -CR Present on Hospital Admission: Y  -CR Side: Left  -CR Orientation: proximal  -CR Location: leg  -CR    Retired Wound - Properties Group Placement Date: 05/23/23  -CR Placement Time: 1900  -CR Present on Hospital Admission: Y  -CR Side: Left  -CR Orientation: proximal  -CR Location: leg  -CR    Retired Wound - Properties Group Date first assessed: 05/23/23  -CR Time first assessed: 1900  -CR Present on Hospital Admission: Y  -CR Side: Left  -CR Location: leg  -CR      Row Name             Wound 05/23/23 1900 Right proximal leg    Wound - Properties Group Placement Date: 05/23/23  -CR Placement Time: 1900  -CR Present on Hospital Admission: Y  -CR Side: Right  -CR Orientation: proximal  -CR Location: leg  -CR    Retired Wound - Properties Group Placement Date: 05/23/23  -CR Placement Time: 1900  -CR Present on Hospital Admission: Y  -CR Side: Right  -CR Orientation: proximal  -CR Location: leg  -CR    Retired Wound - Properties Group Date first assessed: 05/23/23  -CR Time first assessed: 1900  -CR Present on Hospital Admission: Y  -CR Side: Right  -CR Location: leg  -CR      Row Name             Wound 04/19/23 1140 Left lower leg Incision    Wound - Properties Group Placement Date: 04/19/23  -DT Placement Time: 1140  -DT Present on Hospital Admission: N  -DT Side: Left  -DT Orientation: lower  -DT Location: leg  -DT Primary Wound Type: Incision  -DT    Retired Wound - Properties Group Placement Date: 04/19/23  -DT Placement Time: 1140  -DT Present on Hospital Admission: N  -DT Side: Left  -DT Orientation: lower  -DT Location: leg  -DT Primary Wound Type: Incision  -DT    Retired Wound - Properties Group Date first assessed: 04/19/23  -DT Time first assessed: 1140  -DT Present on Hospital Admission: N  -DT Side: Left  -DT Location: leg  -DT Primary Wound Type: Incision  -DT      Row Name              Wound 05/25/23 1341 Left posterior hand Skin Tear    Wound - Properties Group Placement Date: 05/25/23  -DF Placement Time: 1341  -DF Side: Left  -DF Orientation: posterior  -DF Location: hand  -DF Primary Wound Type: Skin tear  -DF    Retired Wound - Properties Group Placement Date: 05/25/23  -DF Placement Time: 1341  -DF Side: Left  -DF Orientation: posterior  -DF Location: hand  -DF Primary Wound Type: Skin tear  -DF    Retired Wound - Properties Group Date first assessed: 05/25/23  -DF Time first assessed: 1341  -DF Side: Left  -DF Location: hand  -DF Primary Wound Type: Skin tear  -DF      Row Name 05/29/23 0945          Plan of Care Review    Plan of Care Reviewed With patient  -     Progress improving  -     Outcome Evaluation pt trans to EOB sba, sit-stand cga-sba, bathroom trans cga-sba, pt amb 200 feet cga-sba rwx, trans back to bed min assist with BLE, pt would benefit from   -       Row Name 05/29/23 0945          Positioning and Restraints    Pre-Treatment Position in bed  -     Post Treatment Position bed  -     In Bed fowlers;call light within reach;encouraged to call for assist;exit alarm on;legs elevated  -               User Key  (r) = Recorded By, (t) = Taken By, (c) = Cosigned By      Initials Name Provider Type     Margot Verma, PTA Physical Therapist Assistant    Earnestine Davila RN Registered Nurse    Ludwin Sam RN Registered Nurse    Alejandro Carr RN Registered Nurse                    Physical Therapy Education       Title: PT OT SLP Therapies (In Progress)       Topic: Physical Therapy (Done)       Point: Mobility training (Done)       Learning Progress Summary             Patient Acceptance, E, VU by CG at 5/26/2023 1325    Comment: Pt educated on POC, d/c recommendations, safety w/ transfers, safety ambulating                         Point: Body mechanics (Done)       Learning Progress Summary             Patient Acceptance, E, VU by CG at  5/26/2023 1325    Comment: Pt educated on POC, d/c recommendations, safety w/ transfers, safety ambulating                         Point: Precautions (Done)       Learning Progress Summary             Patient Acceptance, E, VU by CG at 5/26/2023 1325    Comment: Pt educated on POC, d/c recommendations, safety w/ transfers, safety ambulating                                         User Key       Initials Effective Dates Name Provider Type Discipline     04/27/23 -  Josafat Nieto, PT Student PT Student PT                  PT Recommendation and Plan     Plan of Care Reviewed With: patient  Progress: improving  Outcome Evaluation: pt trans to EOB sba, sit-stand cga-sba, bathroom trans cga-sba, pt amb 200 feet cga-sba rwx, trans back to bed min assist with BLE, pt would benefit from HH   Outcome Measures       Row Name 05/29/23 1000 05/28/23 1004 05/27/23 1410       How much help from another person do you currently need...    Turning from your back to your side while in flat bed without using bedrails? 4  - 4  -WK 3  -WK    Moving from lying on back to sitting on the side of a flat bed without bedrails? 4  - 4  -WK 3  -WK    Moving to and from a bed to a chair (including a wheelchair)? 3  - 4  -WK 4  -WK    Standing up from a chair using your arms (e.g., wheelchair, bedside chair)? 3  - 4  -WK 4  -WK    Climbing 3-5 steps with a railing? 2  - 2  -WK 2  -WK    To walk in hospital room? 3  - 3  -WK 3  -WK    AM-PAC 6 Clicks Score (PT) 19  - 21  -WK 19  -WK       Functional Assessment    Outcome Measure Options AM-PAC 6 Clicks Basic Mobility (PT)  -AH AM-PAC 6 Clicks Basic Mobility (PT)  -WK AM-PAC 6 Clicks Basic Mobility (PT)  -WK              User Key  (r) = Recorded By, (t) = Taken By, (c) = Cosigned By      Initials Name Provider Type     Mragot Verma PTA Physical Therapist Assistant    Rika Diallo PTA Physical Therapist Assistant                     Time Calculation:    PT Charges        Row Name 05/29/23 1014             Time Calculation    Start Time 0945  -      Stop Time 1010  -      Time Calculation (min) 25 min  -      PT Received On 05/29/23  -         Time Calculation- PT    Total Timed Code Minutes- PT 25 minute(s)  -         Timed Charges    72524 - Gait Training Minutes  25  -AH         Total Minutes    Timed Charges Total Minutes 25  -AH       Total Minutes 25  -AH                User Key  (r) = Recorded By, (t) = Taken By, (c) = Cosigned By      Initials Name Provider Type     Margot Verma PTA Physical Therapist Assistant                  Therapy Charges for Today       Code Description Service Date Service Provider Modifiers Qty    63728464874 HC GAIT TRAINING EA 15 MIN 5/29/2023 Margot Verma PTA GP 2            PT G-Codes  Outcome Measure Options: AM-PAC 6 Clicks Basic Mobility (PT)  AM-PAC 6 Clicks Score (PT): 19  AM-PAC 6 Clicks Score (OT): 18    Margot Verma PTA  5/29/2023

## 2023-05-29 NOTE — PLAN OF CARE
Goal Outcome Evaluation:  Plan of Care Reviewed With: patient        Progress: improving  Outcome Evaluation: Pt and FUENTES discussed home activity as well as importance of OOB activity. Pt acknowledged but stated she had just been to/from [place] and was very comfortable in bed.

## 2023-05-29 NOTE — PLAN OF CARE
Goal Outcome Evaluation:  Plan of Care Reviewed With: patient        Progress: improving  Outcome Evaluation: Patient received alert and orientated in no acute distress.  Patient OOB to bedside commode with one person assist for toileting.  Patient has Left upper arm PiCC with dressing clean, dry, and intact.  Patient received IV antibiotics through PiCC line with no complaints of discomfort or side effects.  Patient tearful throughout the night and states that she doesn't know why; patient did report getting into an argument with her grandson over the phone prior to the nurse giving her nightime medications.  Patient pain well controlled with ordered pain management.  Will continue to assess and treat patient per plan of care.

## 2023-05-29 NOTE — PROGRESS NOTES
Brenda Sneed is a 72 y.o. female patient.    Continues to make slow steady progress.  Appreciate wound care help.  Ambulated in halls with physical therapy   Reviewed hematology oncology note.        Current Facility-Administered Medications   Medication Dose Route Frequency Provider Last Rate Last Admin    acetaminophen (TYLENOL) tablet 650 mg  650 mg Oral Q4H PRN Sachin Dia MD   650 mg at 05/27/23 1602    ascorbic acid (VITAMIN C) tablet 500 mg  500 mg Oral Daily Sachin Dia MD   500 mg at 05/29/23 0841    sennosides-docusate (PERICOLACE) 8.6-50 MG per tablet 2 tablet  2 tablet Oral BID Sachin Dia MD   2 tablet at 05/27/23 0920    And    polyethylene glycol (MIRALAX) packet 17 g  17 g Oral Daily PRN Sachin Dia MD        And    bisacodyl (DULCOLAX) EC tablet 5 mg  5 mg Oral Daily PRN Sachin Dia MD        And    bisacodyl (DULCOLAX) suppository 10 mg  10 mg Rectal Daily PRN Sachin Dia MD        bisoprolol (ZEBeta) 5 mg, hydroCHLOROthiazide (HYDRODIURIL) 6.25 mg for Ziac 5-6.25   Oral Daily Sachin Dia MD   Given at 05/29/23 0841    calcium carbonate (TUMS) chewable tablet 500 mg (200 mg elemental)  2 tablet Oral TID PRN Sachin Dia MD   2 tablet at 05/26/23 0035    ceFAZolin 2 gm IVPB in 100 mL NS (MBP)  2 g Intravenous Q8H Sumeet Bowles PA   2 g at 05/29/23 0840    cyclobenzaprine (FLEXERIL) tablet 10 mg  10 mg Oral TID PRN Sachin Dia MD        docusate sodium (COLACE) capsule 100 mg  100 mg Oral BID Sachin Dia MD   100 mg at 05/27/23 0919    DULoxetine (CYMBALTA) DR capsule 60 mg  60 mg Oral BID Sachin Dia MD   60 mg at 05/29/23 0841    levoFLOXacin (LEVAQUIN) tablet 750 mg  750 mg Oral Q24H Sumeet Bowles PA   750 mg at 05/28/23 1506    levothyroxine (SYNTHROID, LEVOTHROID) tablet 100 mcg  100 mcg Oral Q AM Sachin Dia MD   100 mcg at 05/29/23 0522    melatonin tablet 5 mg  5 mg  "Oral Nightly PRN Sachin Dia MD        nystatin (MYCOSTATIN) powder 1 application  1 application Topical BID Sachin Dia MD   1 application at 05/29/23 0841    ondansetron (ZOFRAN) injection 4 mg  4 mg Intravenous Q6H PRN Sachin Dia MD        oxyCODONE-acetaminophen (PERCOCET) 5-325 MG per tablet 2 tablet  2 tablet Oral Q4H PRN Sachin Dia MD   2 tablet at 05/29/23 0917    pantoprazole (PROTONIX) EC tablet 40 mg  40 mg Oral Q AM Warren Marie PA   40 mg at 05/29/23 0538    pramipexole (MIRAPEX) tablet 0.75 mg  0.75 mg Oral Q8H Sachin Dia MD   0.75 mg at 05/29/23 0538    sodium chloride 0.9 % flush 10 mL  10 mL Intravenous Q12H Sachin Dia MD   10 mL at 05/29/23 0841    sodium chloride 0.9 % flush 10 mL  10 mL Intravenous PRN Sachin Dia MD        sodium chloride 0.9 % flush 20 mL  20 mL Intravenous PRN Sachin Dia MD   20 mL at 05/26/23 0814    sodium chloride 0.9 % infusion 40 mL  40 mL Intravenous MALIKN Sachin Dia MD        vitamin D3 capsule 5,000 Units  5,000 Units Oral Daily Sachin Dia MD   5,000 Units at 05/29/23 0841     ALLERGIES:    Allergies   Allergen Reactions    Codeine Itching and Rash     SEVERE RASH/ITCHING (TOLERATES PERCOCET)    Benadryl [Diphenhydramine] Other (See Comments)     KEEPS PATIENT AWAKE        Thrombocytopenia    HTN (hypertension)    Obesity, Class III, BMI 40-49.9 (morbid obesity)    Anxiety associated with depression    Acute cystitis with hematuria    Blood pressure 130/74, pulse 65, temperature 97.4 °F (36.3 °C), temperature source Oral, resp. rate 16, height 160 cm (63\"), weight 115 kg (254 lb), SpO2 97 %, not currently breastfeeding.      Subjective:  Symptoms:  Stable.    Diet:  Adequate intake.    Activity level: Impaired due to weakness.    Pain:  She complains of pain that is mild.  She reports pain is improving.  Pain is well controlled.    Review of " "Systems  Objective:  General Appearance:  Comfortable and well-appearing.    Vital signs: (most recent): Blood pressure 130/74, pulse 65, temperature 97.4 °F (36.3 °C), temperature source Oral, resp. rate 16, height 160 cm (63\"), weight 115 kg (254 lb), SpO2 97 %, not currently breastfeeding.  Vital signs are normal.    Output: Producing urine and minimal stool output.    HEENT: Normal HEENT exam.    Lungs:  Normal effort and normal respiratory rate.    Heart: Normal rate.  Regular rhythm.    Abdomen: Abdomen is soft.  There is generalized tenderness.     Extremities: Decreased range of motion.    Pulses: Distal pulses are intact.    Neurological: Patient is alert.              Labs:  Lab Results (last 72 hours)       Procedure Component Value Units Date/Time    Manual Differential [[id number]] Collected: 05/27/23 0549    Specimen: Blood Updated: 05/27/23 0600    CBC & Differential [[id number]] Collected: 05/27/23 0549    Specimen: Blood Updated: 05/27/23 0555    Narrative:      The following orders were created for panel order CBC & Differential.  Procedure                               Abnormality         Status                     ---------                               -----------         ------                     CBC Auto Differential[[id number]]                            In process                   Please view results for these tests on the individual orders.    CBC Auto Differential [[id number]] Collected: 05/27/23 0549    Specimen: Blood Updated: 05/27/23 0555    Protime-INR [[id number]] Collected: 05/27/23 0549    Specimen: Blood Updated: 05/27/23 0555    aPTT [[id number]] Collected: 05/27/23 0549    Specimen: Blood Updated: 05/27/23 0555    Fibrinogen [[id number]] Collected: 05/27/23 0549    Specimen: Blood Updated: 05/27/23 0555    DVTHMT72 Activity [[id number]] Collected: 05/23/23 1710    Specimen: Blood Updated: 05/26/23 2207     FHAXBG26 Activity 89.5 %     Narrative:      Test(s) 979954-CNDHLL05 " Activity  was developed and its performance characteristics determined  by [employer] It has not been cleared or approved by the Food  and Drug Administration.  Performed at:  [address] [place] [address]  [id number]  : Selwyn Cedeno MD, Phone:  [phone]    Comment [[id number]] Collected: 05/23/23 1710    Specimen: Blood Updated: 05/26/23 2207     Comment Comment     Comment: Severe deficiency of SUOBUE67 (less than 10% activity) is a  relatively specific finding in patients with a clinical diagnosis of  either hereditary or acquired thrombotic thrombocytopenic purpura  (TTP). Normal to moderately reduced QDNZRU22 activity results do not  exclude a diagnosis of TTP. Conditions that could have WXTVNF79  activity greater than 10% include hemolytic uremic syndrome (HUS),  atypical hemolytic uremic syndrome (aHUS), and other thrombotic  microangiopathies associated with hematopoietic stem cell and solid  organ transplantation, liver disease, DIC, sepsis, pregnancy, or  effects of certain medications (eg, clopidogrel, cyclosporine,  mitomycin C, quinine).       Narrative:      Performed at:  [address] [place] [address]  [id number]  : Selwyn Cedeno MD, Phone:  [phone]    Fibrinogen [[id number]]  (Abnormal) Collected: 05/26/23 1056    Specimen: Blood Updated: 05/26/23 1126     Fibrinogen 227 mg/dL     Urinalysis, Microscopic Only - Urine, Clean Catch [[id number]]  (Abnormal) Collected: 05/26/23 1006    Specimen: Urine, Clean Catch Updated: 05/26/23 1037     RBC, UA 13-20 /HPF      WBC, UA 13-20 /HPF      Bacteria, UA Trace /HPF      Squamous Epithelial Cells, UA 3-6 /HPF      Hyaline Casts, UA 7-12 /LPF      Methodology Automated Microscopy    Urinalysis With Culture If Indicated - Urine, Clean Catch [[id number]]  (Abnormal) Collected: 05/26/23 1006    Specimen: Urine, Clean Catch Updated: 05/26/23 1037     Color, UA Dark Yellow      Appearance, UA Clear     pH, UA 6.5     Specific Gravity, UA 1.022     Glucose, UA Negative     Ketones, UA Trace     Bilirubin, UA Small (1+)     Blood, UA Moderate (2+)     Protein, UA Negative     Leuk Esterase, UA Moderate (2+)     Nitrite, UA Negative     Urobilinogen, UA 1.0 E.U./dL    Narrative:      In absence of clinical symptoms, the presence of pyuria, bacteria, and/or nitrites on the urinalysis result does not correlate with infection.    Urine Culture - Urine, Urine, Clean Catch [[id number]] Collected: 05/26/23 1006    Specimen: Urine, Clean Catch Updated: 05/26/23 1037    CBC & Differential [[id number]]  (Abnormal) Collected: 05/26/23 0810    Specimen: Blood Updated: 05/26/23 0918    Narrative:      The following orders were created for panel order CBC & Differential.  Procedure                               Abnormality         Status                     ---------                               -----------         ------                     CBC Auto Differential[[id number]]        Abnormal            Final result                 Please view results for these tests on the individual orders.    CBC Auto Differential [[id number]]  (Abnormal) Collected: 05/26/23 0810    Specimen: Blood Updated: 05/26/23 0918     WBC 4.67 10*3/mm3      RBC 1.90 10*6/mm3      Hemoglobin 7.1 g/dL      Hematocrit 19.2 %      .1 fL      MCH 37.4 pg      MCHC 37.0 g/dL      RDW 18.5 %      RDW-SD 50.5 fl      Platelets 5 10*3/mm3      Neutrophil % 47.9 %      Lymphocyte % 37.0 %      Monocyte % 11.3 %      Eosinophil % 2.8 %      Basophil % 0.6 %      Immature Grans % 0.4 %      Neutrophils, Absolute 2.23 10*3/mm3      Lymphocytes, Absolute 1.73 10*3/mm3      Monocytes, Absolute 0.53 10*3/mm3      Eosinophils, Absolute 0.13 10*3/mm3      Basophils, Absolute 0.03 10*3/mm3      Immature Grans, Absolute 0.02 10*3/mm3      nRBC 0.4 /100 WBC     Cold Agglutinin Titer [[id number]] Collected: 05/26/23 0810    Specimen: Blood Updated:  05/26/23 0818    Blood Culture - Blood, Arm, Left [[id number]]  (Normal) Collected: 05/23/23 0548    Specimen: Blood from Arm, Left Updated: 05/26/23 0730     Blood Culture No growth at 3 days    Blood Culture - Blood, Arm, Left [[id number]]  (Normal) Collected: 05/23/23 0548    Specimen: Blood from Arm, Left Updated: 05/26/23 0730     Blood Culture No growth at 3 days    Tissue / Bone Culture - Tissue, Leg, Left [[id number]]  (Abnormal)  (Susceptibility) Collected: 05/24/23 1054    Specimen: Tissue from Leg, Left Updated: 05/26/23 0707     Tissue Culture Moderate growth (3+) Staphylococcus aureus      Moderate growth (3+) Klebsiella pneumoniae ssp pneumoniae      Moderate growth (3+) Escherichia coli     Gram Stain Moderate (3+) Gram negative bacilli      No WBCs seen    Susceptibility        Staphylococcus aureus      CHRISTIANO      Clindamycin Susceptible      Erythromycin Resistant      Inducible Clindamycin Resistance Negative      Oxacillin Susceptible      Rifampin Susceptible      Tetracycline Resistant      Trimethoprim + Sulfamethoxazole Susceptible      Vancomycin Susceptible                       Susceptibility        Klebsiella pneumoniae ssp pneumoniae      CHRISTIANO      Ampicillin Resistant      Ampicillin + Sulbactam Susceptible      Cefepime Susceptible      Ceftazidime Susceptible      Ceftriaxone Susceptible      Gentamicin Susceptible      Levofloxacin Susceptible      Piperacillin + Tazobactam Susceptible      Tetracycline Resistant      Trimethoprim + Sulfamethoxazole Susceptible                       Susceptibility Comments       Staphylococcus aureus    This isolate does not demonstrate inducible clindamycin resistance in vitro.        Klebsiella pneumoniae ssp pneumoniae    Cefazolin sensitivity will not be reported for Enterobacteriaceae in non-urine isolates. If cefazolin is preferred, please call the microbiology lab to request an E-test.  With the exception of urinary-sourced infections,  aminoglycosides should not be used as monotherapy.               aPTT [[id number]]  (Abnormal) Collected: 05/26/23 0543    Specimen: Blood Updated: 05/26/23 0613     PTT 35.3 seconds     Protime-INR [[id number]]  (Abnormal) Collected: 05/26/23 0543    Specimen: Blood Updated: 05/26/23 0613     Protime 15.7 Seconds      INR 1.23    Occult Blood X 1, Stool - Stool, Per Rectum [[id number]]  (Normal) Collected: 05/25/23 2311    Specimen: Stool from Per Rectum Updated: 05/25/23 2321     Fecal Occult Blood Negative    Immature Platelet Fraction [[id number]]  (Normal) Collected: 05/25/23 0947    Specimen: Blood Updated: 05/25/23 1024     IPF 5.40 %     CBC & Differential [[id number]]  (Abnormal) Collected: 05/25/23 0947    Specimen: Blood Updated: 05/25/23 1024    Narrative:      The following orders were created for panel order CBC & Differential.  Procedure                               Abnormality         Status                     ---------                               -----------         ------                     CBC Auto Differential[[id number]]        Abnormal            Final result                 Please view results for these tests on the individual orders.    CBC Auto Differential [[id number]]  (Abnormal) Collected: 05/25/23 0947    Specimen: Blood Updated: 05/25/23 1024     WBC 5.02 10*3/mm3      RBC 2.75 10*6/mm3      Hemoglobin 8.3 g/dL      Hematocrit 26.6 %      MCV 96.7 fL      MCH 30.2 pg      MCHC 31.2 g/dL      RDW 15.6 %      RDW-SD 53.1 fl      MPV 12.4 fL      Platelets 7 10*3/mm3      Neutrophil % 58.1 %      Lymphocyte % 26.5 %      Monocyte % 9.6 %      Eosinophil % 4.0 %      Basophil % 1.2 %      Neutrophils, Absolute 2.92 10*3/mm3      Lymphocytes, Absolute 1.33 10*3/mm3      Monocytes, Absolute 0.48 10*3/mm3      Eosinophils, Absolute 0.20 10*3/mm3      Basophils, Absolute 0.06 10*3/mm3     Basic Metabolic Panel [[id number]]  (Abnormal) Collected: 05/25/23 0947    Specimen: Blood Updated: 05/25/23  1018     Glucose 102 mg/dL      BUN 11 mg/dL      Creatinine 0.54 mg/dL      Sodium 139 mmol/L      Potassium 3.3 mmol/L      Comment: Slight hemolysis detected by analyzer. Results may be affected.        Chloride 102 mmol/L      CO2 28.0 mmol/L      Calcium 8.7 mg/dL      BUN/Creatinine Ratio 20.4     Anion Gap 9.0 mmol/L      eGFR 96.7 mL/min/1.73     Narrative:      GFR Normal >60  Chronic Kidney Disease <60  Kidney Failure <15    The GFR formula is only valid for adults with stable renal function between ages 18 and 70.    aPTT [[id number]]  (Normal) Collected: 05/25/23 0947    Specimen: Blood Updated: 05/25/23 1007     PTT 32.9 seconds     Protime-INR [[id number]]  (Abnormal) Collected: 05/25/23 0947    Specimen: Blood Updated: 05/25/23 1007     Protime 14.8 Seconds      INR 1.14    Haptoglobin [[id number]]  (Normal) Collected: 05/23/23 0109    Specimen: Blood Updated: 05/24/23 2013     Haptoglobin 71 mg/dL     Protime-INR [[id number]]  (Normal) Collected: 05/24/23 0844    Specimen: Blood Updated: 05/24/23 0902     Protime 13.8 Seconds      INR 1.04    aPTT [[id number]]  (Normal) Collected: 05/24/23 0844    Specimen: Blood Updated: 05/24/23 0902     PTT 30.1 seconds     Fibrinogen [[id number]]  (Normal) Collected: 05/24/23 0844    Specimen: Blood Updated: 05/24/23 0902     Fibrinogen 294 mg/dL     Peripheral Blood Smear [[id number]] Collected: 05/23/23 0230    Specimen: Blood Updated: 05/24/23 0837     Performed by: Kamilah Nogueira MD     Pathologist Interpretation --     Moderate macrocytic anemia  Red blood cell agglutination    Normal total white blood cell count with the following manual differential:  Neutrophils 48%  Bands 1%  Lymphocytes 38%  Monocytes 8%  Eosinophils 3%  Basophils 1%  Myelocytes 1%  Severe peripheral thrombocytopenia    No schistocytes identified  No platelet clumps identified  No morulae identified in granulocytes or monocytes            Imaging Results (Last 72 Hours)       Procedure  Component Value Units Date/Time    US Elastography Parenchyma [[id number]] Collected: 05/27/23 1343     Updated: 05/27/23 1351    Narrative:      EXAMINATION:  US ELASTOGRAPHY PARENCHYMA-  5/27/2023 12:42 PM CDT     HISTORY: Liver - cirrhosis. I89.0-Lymphedema, not elsewhere classified;  D69.6-Thrombocytopenia, unspecified; L03.116-Cellulitis of left lower  limb; S81.812D-Laceration without foreign body, left lower leg,  subsequent encounter; Z74.09-Other reduced mobility.      COMPARISON: 05/24/2023.     TECHNIQUE: Transverse and longitudinal sonographic images of the liver  and right upper quadrant were obtained using a Siemens shear wave  system. Elastography was also performed. Images are obtained in the  usual manner with right arm above shoulder rolled 30 degrees. A median  of 10 measurements was calculated. An interquartile range IQR/median  <0.3 considered reliable data.     FINDINGS: Please see the report on 05/24/2023 for evaluation of right  quadrant findings.     Additional images are obtained  in the usual manner with right arm above  shoulder rolled 30 degrees.     A median of 10 measurements was calculated. The median velocity measures  1.92 m/s.  The IQR/median value measures 0.14.     An interquartile range IQR/median <0.3 considered reliable data.          Impression:         1. Medial and velocity 1.92 m/s.  2. Metavir score F3.        Shear wave measurements may be affected by hepatic congestion,  steatosis, and inflammation. Shear wave speed measurements should be  interpreted in conjunction with other available clinical data, and they  should not be considered interchangeable with MRI-derived stiffness  measurements at this time.        Liver Fibrosis Staging      Metavir                    m/s     Mild-Moderate                    F2                     1.66-1.77 m/s     Moderate-Severe               F3                     1.77-1.99 m/s     Cirrhosis                              F4                      >1.99 m/s   This report was finalized on 05/27/2023 13:48 by Dr. Christopher Jennings MD.                  Assessment:    Condition: In stable condition.  Improving.       Plan:   Encourage ambulation and per physical therapy.   (    Pancytopenia-hemoglobin 7.1 yesterday platelets are slowly improving.  Work-up in progress with Dr. Cantu.  Repeat pending this morning.  Trial of high-dose Decadron and IVIG per hematology oncology.  Will add Accu-Cheks and sliding scale insulin.    Venous stasis ulcer left lower extremity-appreciate wound care help.  Continue with oral antibiotics and local wound care.    Urinary tract infection present on admission-apparently no culture was ordered on admission urinalysis repeated yesterday culture and sensitivity still pending.  Continue with Omnicef for now.    Fatty liver disease-ultrasound liver shows coarsened liver with mild surface nodularity consistent with chronic liver disease/cirrhosis.  Mild splenomegaly.  Reviewed elastogram ultrasound of liver and F3 which is mild/moderate cirrhosis    Overall making slow steady progress with PT OT.  Patient has much family support at home.  Plan for high-dose Decadron and IVIG with bone marrow on Tuesday.  Follow how counts respond to treatment plan.    ).   Problem List:     Thrombocytopenia    HTN (hypertension)    Obesity, Class III, BMI 40-49.9 (morbid obesity)    Anxiety associated with depression    Acute cystitis with hematuria    Sachin Todd MD  5/29/2023

## 2023-05-29 NOTE — PLAN OF CARE
Goal Outcome Evaluation:  Plan of Care Reviewed With: patient        Progress: no change  Outcome Evaluation: iid patent. iv abx cont. newton diet. denies pain. wound care to left leg cont. right leg wound dry and open to air. bilat lower ext edema cont. up with assist x1 and walker. cont to monitor.

## 2023-05-29 NOTE — PROGRESS NOTES
HEMATOLOGY AND MEDICAL ONCOLOGY PROGRESS NOTE    Patient name: Brenda Sneed  Patient : 1950  VISIT # [id number]  MR #[id number]  Room: [facility]    SUBJECTIVE: Sleeping and arouses easily. No new complaints this morning.         INTERVAL HISTORY:  Brenda Sneed is a 72-year-old  female admitted to [facility] through the ED on 2023 by Dr. Tristen Block with a several week history of increasing dyspnea on exertion, difficulty ambulating across the room in her house even accomplishing activities of daily living such as going to the bathroom etc.  When evaluated in the ED she is found to have anemia and thrombocytopenia.     CBC on 2023 had a WBC of 7.16 with 44.5% segmented neutrophils, 38.1% lymphocytes, 13% monocytes, 2.9% eosinophils and 0.7% basophils.  Hemoglobin and hematocrit were 7.1 and 23.3 respectively with a platelet count of 5000.      Significant comorbid associated medical problems include:  Morbid obesity  Significant bilateral lower extremity chronic venous stasis, massive edema and bilateral lower extremity cellulitis L>R, hypertension, arthritis, thyroid disease, anxiety/depression     Brenda was initially evaluated by Iram BAJWA on 2023 regarding thrombocytopenia.     HEMATOLOGICAL HISTORY FROM OFFICE RECORDS  Brenda was initially evaluated by Iram BAJWA on 2023 referred to the clinic by Dr. Dia for evaluation of Anemia and thrombocytopenia.      Review of prior CBCs at [facility] reveals chronic, intermittent anemia dating to 2015.  Chronic thrombocytopenia has been present dating to at least 2016 with platelet count ranging from 69,000 -136,000.     Brenda underwent laceration repair of her left leg by Dr. Sarita Vazquez 2023, status post fall with leg injury.  She was also treated for cellulitis with IV Vancomycin and Zosyn.  She underwent washout.  Cellulitis subsequently resolved.  She was discharged with dry dressing changes and oral  Keflex x7 days     CBC 4/28/2023: WBC: 5.6, Hgb: 10.6, MCV: 96, PLT: 65,000      Labs 5/12/2023  CBC: WBC 6.0, Hgb 9.4/MCV 98, platelets 102,000  CMP: Creatinine 0.81, GFR 76, calcium 8.8, total protein 5.7, alkaline phosphatase 138 ()  BNP: 72.2 (0-100) CRP: 21 (0-10)     Brenda denied melena or hematochezia.  Recent renal function was normal.  She denies overt bleeding, though has significant, scattered bruising throughout her body which is clearly visible on exposed areas during exam.    She denies regular alcohol use.  She drank a few margaritas for couple years in her early 40s but none since.  She has no known history of liver disease.  She is a never smoker     Serology 5/19/2023   Serum Fe 32  TIBC - 280  Fe sat - 11%  Ferritin - 83.1     B12 = 1,168  Folate - 6.5  Copper in process  Zinc in process     Hapto - 70  LDH - 312     Hep A, B, C - nonreactive  HIV - Nonreactive     SPEP in process  QI in process  Free serum light chain in process     ASSESSMENT and PLAN developed on 5/19/2023 hematology consultation visit:  She take ibuprofen 2-3 times per day, in addition to Cymbalta daily which can increase bleeding and can be exacerbated in the setting of thrombocytopenia.     Possible causes of chronic anemia and thrombocytopenia discussed.   Baseline serology requested. Obtain occult blood stools.  Can not rule out underlying bone marrow disorder.     Chronic anemia. Check anemia labs  Check stool for OB  Chronic thrombocytopenia  Check labs as noted below, include PBS  Obtain US liver/spleen re: elevated bilirubin (1.7 on 4/18/2023) and spleen size re: thrombocytopenia  If unrevealing, will need to consider BMAB, which was discussed   Suspect bruising exacerbated by Ibuprofen/Cymbalta in a setting of thrombocytopenia  Suspect platelets recently worsened r/t infection/medication (vancomycin);    OBJECTIVE:      Vitals:    05/29/23 0318   BP: 124/63   Pulse: 70   Resp: 16   Temp: 98.5 °F (36.9 °C)    SpO2: 95%       Intake/Output Summary (Last 24 hours) at 5/29/2023 0545  Last data filed at 5/28/2023 1800  Gross per 24 hour   Intake 1420 ml   Output 950 ml   Net 470 ml         PHYSICAL EXAM:  CONSTITUTIONAL: Alert, appropriate, weak and tired, morbid obesity  EYES: Minimally icteric  NECK: Supple, no masses   CHEST/LUNGS: CTA bilaterally, normal respiratory effort  CARDIOVASCULAR: RRR, no murmurs  ABDOMEN: soft non-tender, active bowel sounds, no HSM  EXTREMITIES: Morbid obesity with chronic venous stasis changes of the lower extremities with massive edema and cellulitis,  SKIN: venous stasis changes of the lower extremities with massive edema and erythema, Left leg wrapped, ecchymosis on arms, PICC left arm  LYMPH: No obvious cervical, clavicular, axillary, or inguinal lymphadenopathy, difficult to palpate due to morbid obesity  NEUROLOGIC: follows commands, mental status appears to be intact, she does not appear to have focal findings but neurological exam not performed in detail  PSYCH: mood and affect appropriate      CBC  Results from last 7 days   Lab Units 05/28/23  0436 05/27/23  1456 05/27/23  0549 05/26/23  0810   WBC 10*3/mm3 8.57  --  2.49* 4.67   HEMOGLOBIN g/dL 8.1* 7.9* 6.3* 7.1*   HEMATOCRIT % 26.0* 22.9* 20.8* 19.2*   PLATELETS 10*3/mm3 94*  --  20* 5*           Lab Results   Component Value Date     05/25/2023    K 3.3 (L) 05/25/2023     05/25/2023    CO2 28.0 05/25/2023    BUN 11 05/25/2023    CREATININE 0.54 (L) 05/25/2023    GLUCOSE 102 (H) 05/25/2023    CALCIUM 8.7 05/25/2023    BILITOT 1.4 (H) 05/24/2023    ALKPHOS 130 (H) 05/24/2023    AST 28 05/24/2023    ALT 8 05/24/2023    AGRATIO 1.3 05/24/2023    GLOB 2.4 05/24/2023       Lab Results   Component Value Date    INR 1.27 (H) 05/28/2023    INR 1.33 (H) 05/27/2023    INR 1.23 (H) 05/26/2023    PROTIME 16.0 (H) 05/28/2023    PROTIME 16.6 (H) 05/27/2023    PROTIME 15.7 (H) 05/26/2023       Cultures:    Lab Results   Component  Value Date    BLOODCX No growth at 5 days 05/23/2023    BLOODCX No growth at 5 days 05/23/2023     No components found for: URINCX    ASSESSMENT/PLAN:  #1  Pancytopenia - Severe anemia and thrombocytopenia    Review of prior CBCs at [facility] reveals chronic, intermittent anemia dating to 4/2015.  Chronic thrombocytopenia has been present dating to at least 7/2016 with platelet count ranging from 69,000 -136,000.     Brenda underwent laceration repair of her left leg by Dr. Sarita Vazquez 4/7/2023, status post fall with leg injury.  She was also treated for cellulitis with IV Vancomycin and Zosyn.  She underwent washout.  Cellulitis subsequently resolved.  She was discharged with dry dressing changes and oral Keflex x7 days     CBC 4/28/2023: WBC: 5.6, Hgb: 10.6, MCV: 96, PLT: 65,000      Labs 5/12/2023  CBC: WBC 6.0, Hgb 9.4/MCV 98, platelets 102,000  CMP: Creatinine 0.81, GFR 76, calcium 8.8, total protein 5.7, alkaline phosphatase 138 ()  BNP: 72.2 (0-100) CRP: 21 (0-10)     CBC w/diff            4/18/2023    18:00 4/21/2023    05:54 5/23/2023    02:30 5/23/2023    17:10   CBC w/Diff   WBC 5.57   4.59   7.16   3.08     RBC 2.51   2.78   2.40   2.57     Hemoglobin 9.0   8.6   7.1   7.4     Hematocrit 25.3   27.5   23.3   23.4     .8   98.9   97.1   91.1     MCH 35.9   30.9   29.6   28.8     MCHC 35.6   31.3   30.5   31.6     RDW 14.9   15.1   15.3   15.4     Platelets 104   112   5   2     Neutrophil Rel % 67.8     44.5       Immature Granulocyte Rel % 0.4           Lymphocyte Rel % 20.1     38.1       Monocyte Rel % 10.8     13.0       Eosinophil Rel % 0.7     2.9       Basophil Rel % 0.2     0.7          CMP reveals a normal creatinine of 0.84  Lactate - 1.8 WNL  BNP - 300.2 - WNL     Her admitting CBC is significantly different than it was on 5/12/2023.  Hgb has dropped to 7.1, platelet is down to 5000.  She is currently in the coronary care unit-room 9.  She is status post 1 unit packed red blood cell and  1 pheresis of platelets.  No active bleeding at present per nursing.     Brenda has a chronic pancytopenia dating back many years.  This could be due to hypersplenism secondary to fatty liver disease or a primary bone marrow disorder.  I believe that she has had an exacerbation of the mild pancytopenia resulting in the current hospitalization with worsening anemia and profound thrombocytopenia.       Serology 5/19/2023   Serum Fe 32  TIBC - 280  Fe sat - 11%  Ferritin - 83.1     B12 = 1,168  Folate - 6.5  Copper - 177.9  Zinc - 58     Hapto - 70  LDH - 312     Hep A, B, C - nonreactive  HIV - Nonreactive     SPEP in process  QI in process  Free serum light chain in process       Differential diagnosis of the current situation could include:   DIC, hemolytic anemia, ITP, TTP, infection (cellulitis, UTI) with bone marrow myelosuppression      Serology 5/23/2023 [facility] In[facility]  PT/INR - 15.1/1.17  PTT - 32.6  Fibrinogen - 258  FSP >5 </= 20    Tbili 1.7 with 1.0 indirect  Hapto - in process  Retic - 6.16% with absolute 0.1546  LDH - 283 (135-214)  LALY (Bry) - Negative    TSH - 6.190    Antplatelet ab -negative on 5/23/2023  ADAMTS 13 -89.5% (>66.8%) on 5/23/2023    Peripheral blood smear 5/24/2023   Moderate macrocytic anemia   Red blood cell agglutination   Normal total white blood cell count with the following manual differential:   Neutrophils 48%   Bands 1%   Lymphocytes 38%   Monocytes 8%   Eosinophils 3%   Basophils 1%   Myelocytes 1%   Severe peripheral thrombocytopenia   No schistocytes identified   No platelet clumps identified   No morulae identified in granulocytes or monocytes     FOBT on 5/25/2023 - NEGATIVE    Echo 5/24/2023  Left ventricular systolic function is normal. Left ventricular ejection fraction appears to be 66 - 70%.  Left ventricular diastolic dysfunction is noted.  The left atrial cavity is mildly dilated.  Normal right ventricular cavity size and systolic function noted.  There is no  significant (greater than mild) valvular dysfunction.  Estimated right ventricular systolic pressure from tricuspid regurgitation is normal (<35 mmHg).      #1  Thrombocytopenia    Differential includes DIC, ITP, infection (cellulitis, UTI) with bone marrow myelosuppression, hepatic dysfunction with splenomegaly     IV antibiotics ongoing with improvement in cellulitis    No schistocyts - No TTP  Immature platelet fraction (IPF) 5.4-normal on 5/25/2023 (interpretation of this with profound thrombocytopenia receiving platelet transfusions-indeterminate)      U/S abdomen 5/24/2023  1.  Coarsened liver with mild surface contour nodularity, concerning for chronic liver disease and cirrhosis.  2.  Perihepatic ascites.  3.  Mild splenomegaly 13.4 cm.       S/p 2 PLT on 5/23/2023  S/p 1 PLT on 5/24/2023  S/p 1 PLT on 5/25/2023  S/p 1 PLT on 5/26/2023          US hepatic elastography on 5/27/2023  Medial and velocity 1.92 m/s.  Metavir score F3.    Cold agglutuinins -pending  PNH panel -pending  Antiplatelet antibodies negative on 5/23/2023    Trial of high-dose Decadron 40 mg IV x 4 days initiated on 5/26/2023, day 4 of 4 today 5/29/2023  Received IVIG 45 g x1 on 5/26/2023    Transfuse 1 unit of platelets for platelet count < 10,000    Labs in process this morning, 5/29/2023.      #2  Anemia  Iron studies, B12 folic acid levels etc. are all normal.  Awaiting serology ordered    RBC agglutination on peripheral smear  No schistocytes    FOBT on 5/25/2023 - NEGATIVE    S/p 1 unit pRBC on 5/23/2023  S/p 1 unit pRBC on 5/27/2023      Transfuse 1 unit of PRBCs for hemoglobin <7.0      Labs in process this morning, 5/29/2023.    #3  UTI/ cellulitis    UA POSITIVE on 5/23/2023    Blood cx X 2 on 5/23/2023 - No growth at 4 days              Cefazolin 2 g IV x5 days began on 5/29/2023   Levaquin 750 mg PO x5 days began on 5/29/2023      Ursula Lake, GARETT    05/29/23  05:45 CDT      Physicians attestation and contribution:    I,  Morales Cantu, personally and independently performed an evaluation on Brenda Sneed  I have reviewed relevant medical information/data to include but not limited to the medication list, relevant appropriate lab work and imaging when applicable.  I reviewed other physician's notes, ancillary services and nurses assessments.  I have reviewed the above documentation completed by Ursula BAJWA  Please see my additional addended and/or modified contributions to the history of present illness, physical examination and assessment/medical decision-making and plan that reflects my findings and impressions.  I discussed the essential elements of the care plan with Ursula BAJWA and the patient.  I have encouraged and answered all the questions raised to the patient's understanding and satisfaction.  I concur with the above stated.    Subjective: Awake, alert and in good spirits.  No complaints today.    Objective:  Lower extremities wrapped.  Still persistent edema.    Assessment/plan:  Antibiotics ongoing cefazolin 2 g IV x5 days began on 5/29/2023,  Levaquin 750 mg PO x5 days began on 5/29/2023      Urine Culture >100,000 CFU/mL Escherichia coli ESBL Abnormal                 Today's CBC pending.  Lab called and they are working on resulting this.    Morales Cantu MD  5/29/2023 06:35 CDT

## 2023-05-29 NOTE — THERAPY TREATMENT NOTE
Acute Care - Occupational Therapy Treatment Note  [facility]     Patient Name: Brenda Sneed  : 1950  MRN: [id number]  Today's Date: 2023  Onset of Illness/Injury or Date of Surgery: 23  Date of Referral to OT: 23  Referring Physician: Dr. Dia    Admit Date: 2023       ICD-10-CM ICD-9-CM   1. Lymphedema of both lower extremities  I89.0 457.1   2. Thrombocytopenia  D69.6 287.5   3. Cellulitis of left lower extremity  L03.116 682.6   4. Laceration of left lower extremity, subsequent encounter  S81.812D V58.89     894.0   5. Impaired mobility [Z74.09 (ICD-10-CM)]  Z74.09 799.89   6. Decreased activities of daily living (ADL) [Z78.9 (ICD-10-CM)]  Z78.9 V49.89     Patient Active Problem List   Diagnosis    Spinal stenosis, lumbar    Secondary osteoarthritis of left shoulder due to rotator cuff arthropathy    Cellulitis of right lower extremity    HTN (hypertension)    Obesity, Class III, BMI 40-49.9 (morbid obesity)    Anxiety associated with depression    Traumatic rhabdomyolysis    Traumatic rhabdomyolysis, initial encounter    Laceration of left lower extremity    Cellulitis    Cellulitis of left lower extremity    Thrombocytopenia    Acute cystitis with hematuria     Past Medical History:   Diagnosis Date    Arthritis     Bronchitis     Cataract     right eye , cornea implant    Chronic back pain     Depression     Disease of thyroid gland     Edema     lower extremities    Hypertension     Kidney stones      Past Surgical History:   Procedure Laterality Date    BACK SURGERY       ([place]),      CHOLECYSTECTOMY OPEN      CORNEAL TRANSPLANT Right     INCISION AND DRAINAGE ABSCESS Left 2023    Procedure: INCISION AND DRAINAGE ABSCESS left leg;  Surgeon: Sarita Vazquez MD;  Location: [facility] OR;  Service: General;  Laterality: Left;    LACERATION REPAIR Left 2023    Procedure: washout and closure left leg wound ;  Surgeon: Sarita Vazquez MD;  Location:   [facility] OR;  Service: General;  Laterality: Left;    LUMBAR FUSION Left 8/2/2017    Procedure: LEFT LUMBAR LATERAL INTERBODY FUSION WITH INSTRUMENTATION  L1-2;  Surgeon: MARCELINO Wright MD;  Location:  [facility] OR;  Service:     NECK SURGERY  2010    [place]    TOTAL HIP ARTHROPLASTY Right 2014    DR. KENDRICK     TOTAL KNEE ARTHROPLASTY Right 2000    TOTAL KNEE ARTHROPLASTY Left 2002    TOTAL SHOULDER ARTHROPLASTY W/ DISTAL CLAVICLE EXCISION Left 6/1/2020    Procedure: LEFT REVERSE TOTAL SHOULDER REPLACEMENT;  Surgeon: Saravanan Calvin MD;  Location:  [facility] OR;  Service: Orthopedics;  Laterality: Left;         OT ASSESSMENT FLOWSHEET (last 12 hours)       OT Evaluation and Treatment       Row Name 05/29/23 1502                   OT Time and Intention    Subjective Information no complaints  -TS        Document Type therapy note (daily note)  -TS        Mode of Treatment occupational therapy  -TS        Patient Effort adequate  -TS           General Information    Existing Precautions/Restrictions fall  -TS           Cognition    Personal Safety Interventions fall prevention program maintained  -TS           Wound 05/23/23 1900 Left proximal leg    Wound - Properties Group Placement Date: 05/23/23  -CR Placement Time: 1900  -CR Present on Hospital Admission: Y  -CR Side: Left  -CR Orientation: proximal  -CR Location: leg  -CR    Retired Wound - Properties Group Placement Date: 05/23/23  -CR Placement Time: 1900  -CR Present on Hospital Admission: Y  -CR Side: Left  -CR Orientation: proximal  -CR Location: leg  -CR    Retired Wound - Properties Group Date first assessed: 05/23/23  -CR Time first assessed: 1900  -CR Present on Hospital Admission: Y  -CR Side: Left  -CR Location: leg  -CR       Wound 05/23/23 1900 Right proximal leg    Wound - Properties Group Placement Date: 05/23/23  -CR Placement Time: 1900  -CR Present on Hospital Admission: Y  -CR Side: Right  -CR Orientation: proximal  -CR Location: leg  -CR     Retired Wound - Properties Group Placement Date: 05/23/23  -CR Placement Time: 1900  -CR Present on Hospital Admission: Y  -CR Side: Right  -CR Orientation: proximal  -CR Location: leg  -CR    Retired Wound - Properties Group Date first assessed: 05/23/23  -CR Time first assessed: 1900  -CR Present on Hospital Admission: Y  -CR Side: Right  -CR Location: leg  -CR       Wound 04/19/23 1140 Left lower leg Incision    Wound - Properties Group Placement Date: 04/19/23  -DT Placement Time: 1140  -DT Present on Hospital Admission: N  -DT Side: Left  -DT Orientation: lower  -DT Location: leg  -DT Primary Wound Type: Incision  -DT    Retired Wound - Properties Group Placement Date: 04/19/23  -DT Placement Time: 1140  -DT Present on Hospital Admission: N  -DT Side: Left  -DT Orientation: lower  -DT Location: leg  -DT Primary Wound Type: Incision  -DT    Retired Wound - Properties Group Date first assessed: 04/19/23  -DT Time first assessed: 1140  -DT Present on Hospital Admission: N  -DT Side: Left  -DT Location: leg  -DT Primary Wound Type: Incision  -DT       Wound 05/25/23 1341 Left posterior hand Skin Tear    Wound - Properties Group Placement Date: 05/25/23  -DF Placement Time: 1341  -DF Side: Left  -DF Orientation: posterior  -DF Location: hand  -DF Primary Wound Type: Skin tear  -DF    Retired Wound - Properties Group Placement Date: 05/25/23  -DF Placement Time: 1341  -DF Side: Left  -DF Orientation: posterior  -DF Location: hand  -DF Primary Wound Type: Skin tear  -DF    Retired Wound - Properties Group Date first assessed: 05/25/23  -DF Time first assessed: 1341  -DF Side: Left  -DF Location: hand  -DF Primary Wound Type: Skin tear  -DF       Plan of Care Review    Plan of Care Reviewed With patient  -TS        Progress improving  -TS        Outcome Evaluation Pt and FUENTES discussed home activity as well as importance of OOB activity. Pt acknowledged but stated she had just been to/from [facility] and was very comfortable  in bed.  -TS           Positioning and Restraints    Pre-Treatment Position in bed  -TS        Post Treatment Position bed  -TS        In Bed fowlers;call light within reach;encouraged to call for assist;side rails up x2  -TS                  User Key  (r) = Recorded By, (t) = Taken By, (c) = Cosigned By      Initials Name Effective Dates    TS Yessy Marry ALFREDO SANTAMARIA 02/03/23 -     Earnestine Davila RN 06/16/21 -     Ludwin Sam RN 02/17/22 -     Alejandro Carr RN 03/03/23 -                      Occupational Therapy Education       Title: PT OT SLP Therapies (In Progress)       Topic: Occupational Therapy (In Progress)       Point: ADL training (Done)       Description:   Instruct learner(s) on proper safety adaptation and remediation techniques during self care or transfers.   Instruct in proper use of assistive devices.                  Learning Progress Summary             Patient Acceptance, E, VU by  at 5/25/2023 3344    Comment: AE options and safety techniques during ADL completion.                         Point: Home exercise program (Not Started)       Description:   Instruct learner(s) on appropriate technique for monitoring, assisting and/or progressing therapeutic exercises/activities.                  Learner Progress:  Not documented in this visit.              Point: Precautions (Not Started)       Description:   Instruct learner(s) on prescribed precautions during self-care and functional transfers.                  Learner Progress:  Not documented in this visit.              Point: Body mechanics (Not Started)       Description:   Instruct learner(s) on proper positioning and spine alignment during self-care, functional mobility activities and/or exercises.                  Learner Progress:  Not documented in this visit.                              User Key       Initials Effective Dates Name Provider Type Discipline     12/05/22 -  Arsalan Elizalde OT Student OT Student  OT                      OT Recommendation and Plan     Plan of Care Review  Plan of Care Reviewed With: patient  Progress: improving  Outcome Evaluation: Pt and FUENTES discussed home activity as well as importance of OOB activity. Pt acknowledged but stated she had just been to/from [facility] and was very comfortable in bed.  Plan of Care Reviewed With: patient  Outcome Evaluation: Pt and FUENTES discussed home activity as well as importance of OOB activity. Pt acknowledged but stated she had just been to/from [place] and was very comfortable in bed.     Outcome Measures       Row Name 05/29/23 1500 05/29/23 1000 05/28/23 1004       How much help from another person do you currently need...    Turning from your back to your side while in flat bed without using bedrails? -- 4  -AH 4  -WK    Moving from lying on back to sitting on the side of a flat bed without bedrails? -- 4  -AH 4  -WK    Moving to and from a bed to a chair (including a wheelchair)? -- 3  -AH 4  -WK    Standing up from a chair using your arms (e.g., wheelchair, bedside chair)? -- 3  -AH 4  -WK    Climbing 3-5 steps with a railing? -- 2  -AH 2  -WK    To walk in hospital room? -- 3  -AH 3  -WK    AM-PAC 6 Clicks Score (PT) -- 19  -AH 21  -WK       How much help from another is currently needed...    Putting on and taking off regular lower body clothing? 3  -TS -- --    Bathing (including washing, rinsing, and drying) 3  -TS -- --    Toileting (which includes using toilet bed pan or urinal) 3  -TS -- --    Putting on and taking off regular upper body clothing 3  -TS -- --    Taking care of personal grooming (such as brushing teeth) 3  -TS -- --    Eating meals 4  -TS -- --    AM-PAC 6 Clicks Score (OT) 19  -TS -- --       Functional Assessment    Outcome Measure Options -- AM-PAC 6 Clicks Basic Mobility (PT)  - AM-PAC 6 Clicks Basic Mobility (PT)  -WK      Row Name 05/27/23 1410             How much help from another person do you currently need...    Turning from  your back to your side while in flat bed without using bedrails? 3  -WK      Moving from lying on back to sitting on the side of a flat bed without bedrails? 3  -WK      Moving to and from a bed to a chair (including a wheelchair)? 4  -WK      Standing up from a chair using your arms (e.g., wheelchair, bedside chair)? 4  -WK      Climbing 3-5 steps with a railing? 2  -WK      To walk in hospital room? 3  -WK      AM-PAC 6 Clicks Score (PT) 19  -WK         Functional Assessment    Outcome Measure Options AM-PAC 6 Clicks Basic Mobility (PT)  -WK                User Key  (r) = Recorded By, (t) = Taken By, (c) = Cosigned By      Initials Name Provider Type    Margot Brown PTA Physical Therapist Assistant    Marry Littlejohn COTA Occupational Therapist Assistant    Rika Diallo PTA Physical Therapist Assistant                    Time Calculation:    Time Calculation- OT       Row Name 05/29/23 1538 05/29/23 1014          Time Calculation- OT    OT Start Time 1500  -TS --     OT Stop Time 1515  -TS --     OT Time Calculation (min) 15 min  -TS --     Total Timed Code Minutes- OT 15 minute(s)  -TS --     OT Received On 05/29/23  -TS --        Timed Charges    00338 - Gait Training Minutes  -- 25  -AH     72835 - OT Self Care/Mgmt Minutes 15  -TS --        Total Minutes    Timed Charges Total Minutes 15  -TS 25  -AH      Total Minutes 15  -TS 25  -AH               User Key  (r) = Recorded By, (t) = Taken By, (c) = Cosigned By      Initials Name Provider Type    Margot Brown PTA Physical Therapist Assistant     Marry Krishnamurthy COTA Occupational Therapist Assistant                  Therapy Charges for Today       Code Description Service Date Service Provider Modifiers Qty    28477946020 HC OT SELF CARE/MGMT/TRAIN EA 15 MIN 5/29/2023 Marry Krishnamurthy COTA GO 1                 Marry SANTAMARIA. ALFREDO Krishnamurthy  5/29/2023

## 2023-05-30 ENCOUNTER — READMISSION MANAGEMENT (OUTPATIENT)
Dept: CALL CENTER | Facility: HOSPITAL | Age: 73
End: 2023-05-30

## 2023-05-30 VITALS
BODY MASS INDEX: 45 KG/M2 | SYSTOLIC BLOOD PRESSURE: 137 MMHG | DIASTOLIC BLOOD PRESSURE: 56 MMHG | RESPIRATION RATE: 16 BRPM | OXYGEN SATURATION: 96 % | TEMPERATURE: 97.9 F | WEIGHT: 254 LBS | HEIGHT: 63 IN | HEART RATE: 99 BPM

## 2023-05-30 LAB
APTT PPP: 31.8 SECONDS (ref 24.1–35)
BACTERIA SPEC AEROBE CULT: ABNORMAL
BASOPHILS # BLD AUTO: 0 10*3/MM3 (ref 0–0.2)
BASOPHILS NFR BLD AUTO: 0 % (ref 0–1.5)
CA TITR SERPL AGGL: ABNORMAL {TITER}
DEPRECATED RDW RBC AUTO: 50.1 FL (ref 37–54)
EOSINOPHIL # BLD AUTO: 0 10*3/MM3 (ref 0–0.4)
EOSINOPHIL NFR BLD AUTO: 0 % (ref 0.3–6.2)
ERYTHROCYTE [DISTWIDTH] IN BLOOD BY AUTOMATED COUNT: 15.1 % (ref 12.3–15.4)
FIBRINOGEN PPP-MCNC: 133 MG/DL (ref 240–460)
GRAM STN SPEC: ABNORMAL
GRAM STN SPEC: ABNORMAL
HCT VFR BLD AUTO: 25.8 % (ref 34–46.6)
HGB BLD-MCNC: 8 G/DL (ref 12–15.9)
INR PPP: 1.4 (ref 0.91–1.09)
LYMPHOCYTES # BLD AUTO: 0.89 10*3/MM3 (ref 0.7–3.1)
LYMPHOCYTES NFR BLD AUTO: 17.8 % (ref 19.6–45.3)
MCH RBC QN AUTO: 29 PG (ref 26.6–33)
MCHC RBC AUTO-ENTMCNC: 31 G/DL (ref 31.5–35.7)
MCV RBC AUTO: 93.5 FL (ref 79–97)
MONOCYTES # BLD AUTO: 0.39 10*3/MM3 (ref 0.1–0.9)
MONOCYTES NFR BLD AUTO: 7.8 % (ref 5–12)
NEUTROPHILS NFR BLD AUTO: 3.64 10*3/MM3 (ref 1.7–7)
NEUTROPHILS NFR BLD AUTO: 72.6 % (ref 42.7–76)
PLATELET # BLD AUTO: 132 10*3/MM3 (ref 140–450)
PMV BLD AUTO: 10.4 FL (ref 6–12)
PROTHROMBIN TIME: 17.3 SECONDS (ref 11.8–14.8)
RBC # BLD AUTO: 2.76 10*6/MM3 (ref 3.77–5.28)
WBC NRBC COR # BLD: 5.01 10*3/MM3 (ref 3.4–10.8)

## 2023-05-30 PROCEDURE — 85384 FIBRINOGEN ACTIVITY: CPT | Performed by: INTERNAL MEDICINE

## 2023-05-30 PROCEDURE — 25010000002 CEFAZOLIN PER 500 MG: Performed by: PHYSICIAN ASSISTANT

## 2023-05-30 PROCEDURE — 85025 COMPLETE CBC W/AUTO DIFF WBC: CPT | Performed by: PHYSICIAN ASSISTANT

## 2023-05-30 PROCEDURE — 97116 GAIT TRAINING THERAPY: CPT

## 2023-05-30 PROCEDURE — 97535 SELF CARE MNGMENT TRAINING: CPT | Performed by: OCCUPATIONAL THERAPIST

## 2023-05-30 PROCEDURE — 85610 PROTHROMBIN TIME: CPT | Performed by: INTERNAL MEDICINE

## 2023-05-30 PROCEDURE — 85730 THROMBOPLASTIN TIME PARTIAL: CPT | Performed by: INTERNAL MEDICINE

## 2023-05-30 RX ORDER — CEPHALEXIN 500 MG/1
500 CAPSULE ORAL 2 TIMES DAILY
Qty: 14 CAPSULE | Refills: 0 | Status: SHIPPED | OUTPATIENT
Start: 2023-05-30

## 2023-05-30 RX ORDER — LEVOFLOXACIN 750 MG/1
750 TABLET ORAL EVERY 24 HOURS
Qty: 7 TABLET | Refills: 0 | Status: SHIPPED | OUTPATIENT
Start: 2023-05-30 | End: 2023-06-06

## 2023-05-30 RX ADMIN — PRAMIPEXOLE DIHYDROCHLORIDE 0.75 MG: 0.25 TABLET ORAL at 13:38

## 2023-05-30 RX ADMIN — DOCUSATE SODIUM 100 MG: 100 CAPSULE ORAL at 09:28

## 2023-05-30 RX ADMIN — PRAMIPEXOLE DIHYDROCHLORIDE 0.75 MG: 0.25 TABLET ORAL at 05:02

## 2023-05-30 RX ADMIN — PANTOPRAZOLE SODIUM 40 MG: 40 TABLET, DELAYED RELEASE ORAL at 05:02

## 2023-05-30 RX ADMIN — BISOPROLOL FUMARATE: 5 TABLET ORAL at 09:28

## 2023-05-30 RX ADMIN — Medication 10 ML: at 09:29

## 2023-05-30 RX ADMIN — Medication 5000 UNITS: at 09:28

## 2023-05-30 RX ADMIN — OXYCODONE HYDROCHLORIDE AND ACETAMINOPHEN 2 TABLET: 5; 325 TABLET ORAL at 05:02

## 2023-05-30 RX ADMIN — CEFAZOLIN 2 G: 2 INJECTION, POWDER, FOR SOLUTION INTRAMUSCULAR; INTRAVENOUS at 09:40

## 2023-05-30 RX ADMIN — DULOXETINE HYDROCHLORIDE 60 MG: 30 CAPSULE, DELAYED RELEASE ORAL at 09:28

## 2023-05-30 RX ADMIN — OXYCODONE HYDROCHLORIDE AND ACETAMINOPHEN 500 MG: 500 TABLET ORAL at 09:28

## 2023-05-30 RX ADMIN — LEVOTHYROXINE SODIUM 100 MCG: 100 TABLET ORAL at 05:02

## 2023-05-30 RX ADMIN — NYSTATIN 1 APPLICATION: 100000 POWDER TOPICAL at 09:29

## 2023-05-30 RX ADMIN — DOCUSATE SODIUM 50 MG AND SENNOSIDES 8.6 MG 2 TABLET: 8.6; 5 TABLET, FILM COATED ORAL at 09:28

## 2023-05-30 NOTE — PLAN OF CARE
Goal Outcome Evaluation:  Plan of Care Reviewed With: patient        Progress: no change  Outcome Evaluation: OT tx completed.  Pt. seated in chair.  Ms. Sneed was able to stand from chair & ambulate to bathroom.  Once standing near toilet she removed her 2nd gown acting as a robe & toileted herself at S. Ms. Sneed stood from toilet and performed standing sink side grooming.  She returned to bedside and got back into her bed.  Ms. Sneed's greatest concern is her bed mobility and she wishes to rent a hospital bed for home.  Per the pt she will have assist for IADLs from her niece.  Rec DC home with HH and assist for IADLs & showering

## 2023-05-30 NOTE — THERAPY TREATMENT NOTE
Acute Care - Physical Therapy Treatment Note  [facility]     Patient Name: Brenda Sneed  : 1950  MRN: [id number]  Today's Date: 2023   Onset of Illness/Injury or Date of Surgery: 23  Visit Dx:     ICD-10-CM ICD-9-CM   1. Lymphedema of both lower extremities  I89.0 457.1   2. Thrombocytopenia  D69.6 287.5   3. Cellulitis of left lower extremity  L03.116 682.6   4. Laceration of left lower extremity, subsequent encounter  S81.812D V58.89     894.0   5. Impaired mobility [Z74.09 (ICD-10-CM)]  Z74.09 799.89   6. Decreased activities of daily living (ADL) [Z78.9 (ICD-10-CM)]  Z78.9 V49.89     Patient Active Problem List   Diagnosis    Spinal stenosis, lumbar    Secondary osteoarthritis of left shoulder due to rotator cuff arthropathy    Cellulitis of right lower extremity    HTN (hypertension)    Obesity, Class III, BMI 40-49.9 (morbid obesity)    Anxiety associated with depression    Traumatic rhabdomyolysis    Traumatic rhabdomyolysis, initial encounter    Laceration of left lower extremity    Cellulitis    Cellulitis of left lower extremity    Thrombocytopenia    Acute cystitis with hematuria     Past Medical History:   Diagnosis Date    Arthritis     Bronchitis     Cataract     right eye , cornea implant    Chronic back pain     Depression     Disease of thyroid gland     Edema     lower extremities    Hypertension     Kidney stones      Past Surgical History:   Procedure Laterality Date    BACK SURGERY       ([place]),      CHOLECYSTECTOMY OPEN      CORNEAL TRANSPLANT Right     INCISION AND DRAINAGE ABSCESS Left 2023    Procedure: INCISION AND DRAINAGE ABSCESS left leg;  Surgeon: Sarita Vazquez MD;  Location: [facility] OR;  Service: General;  Laterality: Left;    LACERATION REPAIR Left 2023    Procedure: washout and closure left leg wound ;  Surgeon: Sarita Vazquez MD;  Location:  [facility] OR;  Service: General;  Laterality: Left;    LUMBAR FUSION Left 2017     Procedure: LEFT LUMBAR LATERAL INTERBODY FUSION WITH INSTRUMENTATION  L1-2;  Surgeon: MARCELINO Wright MD;  Location:  [facility] OR;  Service:     NECK SURGERY  2010    [place]    TOTAL HIP ARTHROPLASTY Right 2014    DR. KENDRICK     TOTAL KNEE ARTHROPLASTY Right 2000    TOTAL KNEE ARTHROPLASTY Left 2002    TOTAL SHOULDER ARTHROPLASTY W/ DISTAL CLAVICLE EXCISION Left 6/1/2020    Procedure: LEFT REVERSE TOTAL SHOULDER REPLACEMENT;  Surgeon: Saravanan Calvin MD;  Location:  [facility] OR;  Service: Orthopedics;  Laterality: Left;     PT Assessment (last 12 hours)       PT Evaluation and Treatment       Row Name 05/30/23 0749          Physical Therapy Time and Intention    Subjective Information no complaints  -     Document Type therapy note (daily note)  -     Mode of Treatment physical therapy  -       Row Name 05/30/23 0749          General Information    Existing Precautions/Restrictions fall  -       Row Name 05/30/23 0749          Pain    Pretreatment Pain Rating 0/10 - no pain  -     Posttreatment Pain Rating 0/10 - no pain  -       Row Name 05/30/23 0749          Bed Mobility    Supine-Sit [place] (Bed Mobility) standby assist  -     Sit-Supine [place] (Bed Mobility) --  chair  -       Row Name 05/30/23 0749          Sit-Stand Transfer    Sit-Stand [place] (Transfers) standby assist  -       Row Name 05/30/23 0749          Stand-Sit Transfer    Stand-Sit [place] (Transfers) standby assist  -       Row Name 05/30/23 0749          Gait/Stairs (Locomotion)    [place] Level (Gait) standby assist  -     Assistive Device (Gait) walker, front-wheeled  -     Distance in Feet (Gait) 300  -     Deviations/Abnormal Patterns (Gait) gait speed decreased  -       Row Name             Wound 05/23/23 1900 Left proximal leg    Wound - Properties Group Placement Date: 05/23/23  -CR Placement Time: 1900  -CR Present on Hospital Admission: Y  -CR Side: Left  -CR Orientation: proximal   -CR Location: leg  -CR    Retired Wound - Properties Group Placement Date: 05/23/23  -CR Placement Time: 1900  -CR Present on Hospital Admission: Y  -CR Side: Left  -CR Orientation: proximal  -CR Location: leg  -CR    Retired Wound - Properties Group Date first assessed: 05/23/23  -CR Time first assessed: 1900  -CR Present on Hospital Admission: Y  -CR Side: Left  -CR Location: leg  -CR      Row Name             Wound 05/23/23 1900 Right proximal leg    Wound - Properties Group Placement Date: 05/23/23  -CR Placement Time: 1900  -CR Present on Hospital Admission: Y  -CR Side: Right  -CR Orientation: proximal  -CR Location: leg  -CR    Retired Wound - Properties Group Placement Date: 05/23/23  -CR Placement Time: 1900  -CR Present on Hospital Admission: Y  -CR Side: Right  -CR Orientation: proximal  -CR Location: leg  -CR    Retired Wound - Properties Group Date first assessed: 05/23/23  -CR Time first assessed: 1900  -CR Present on Hospital Admission: Y  -CR Side: Right  -CR Location: leg  -CR      Row Name             Wound 04/19/23 1140 Left lower leg Incision    Wound - Properties Group Placement Date: 04/19/23  -DT Placement Time: 1140  -DT Present on Hospital Admission: N  -DT Side: Left  -DT Orientation: lower  -DT Location: leg  -DT Primary Wound Type: Incision  -DT    Retired Wound - Properties Group Placement Date: 04/19/23  -DT Placement Time: 1140  -DT Present on Hospital Admission: N  -DT Side: Left  -DT Orientation: lower  -DT Location: leg  -DT Primary Wound Type: Incision  -DT    Retired Wound - Properties Group Date first assessed: 04/19/23  -DT Time first assessed: 1140  -DT Present on Hospital Admission: N  -DT Side: Left  -DT Location: leg  -DT Primary Wound Type: Incision  -DT      Row Name             Wound 05/25/23 1341 Left posterior hand Skin Tear    Wound - Properties Group Placement Date: 05/25/23  -DF Placement Time: 1341  -DF Side: Left  -DF Orientation: posterior  -DF Location: hand  -DF  Primary Wound Type: Skin tear  -DF    Retired Wound - Properties Group Placement Date: 05/25/23  -DF Placement Time: 1341  -DF Side: Left  -DF Orientation: posterior  -DF Location: hand  -DF Primary Wound Type: Skin tear  -DF    Retired Wound - Properties Group Date first assessed: 05/25/23  -DF Time first assessed: 1341  -DF Side: Left  -DF Location: hand  -DF Primary Wound Type: Skin tear  -DF      Row Name 05/30/23 0749          Positioning and Restraints    Pre-Treatment Position in bed  -     Post Treatment Position chair  -     In Chair reclined;call light within reach;encouraged to call for assist  -               User Key  (r) = Recorded By, (t) = Taken By, (c) = Cosigned By      Initials Name Provider Type     Margot Verma PTA Physical Therapist Assistant    Earnestine Davila RN Registered Nurse    Ludwin Sam RN Registered Nurse    Alejandro Carr RN Registered Nurse                    Physical Therapy Education       Title: PT OT SLP Therapies (In Progress)       Topic: Physical Therapy (Done)       Point: Mobility training (Done)       Learning Progress Summary             Patient Acceptance, E, VU by CG at 5/26/2023 1325    Comment: Pt educated on POC, d/c recommendations, safety w/ transfers, safety ambulating                         Point: Body mechanics (Done)       Learning Progress Summary             Patient Acceptance, E, VU by CG at 5/26/2023 1325    Comment: Pt educated on POC, d/c recommendations, safety w/ transfers, safety ambulating                         Point: Precautions (Done)       Learning Progress Summary             Patient Acceptance, E, VU by CG at 5/26/2023 1325    Comment: Pt educated on POC, d/c recommendations, safety w/ transfers, safety ambulating                                         User Key       Initials Effective Dates Name Provider Type Discipline     04/27/23 -  Josafat Nieto, PT Student PT Student PT                  PT  Recommendation and Plan     Plan of Care Reviewed With: patient  Progress: improving  Outcome Evaluation: pt trans to EOB sba, sit-stand cga-sba, bathroom trans cga-sba, pt amb 200 feet cga-sba rwx, trans back to bed min assist with BLE, pt would benefit from    Outcome Measures       Row Name 05/30/23 0800 05/29/23 1500 05/29/23 1000       How much help from another person do you currently need...    Turning from your back to your side while in flat bed without using bedrails? 4  -AH -- 4  -AH    Moving from lying on back to sitting on the side of a flat bed without bedrails? 4  -AH -- 4  -AH    Moving to and from a bed to a chair (including a wheelchair)? 4  -AH -- 3  -AH    Standing up from a chair using your arms (e.g., wheelchair, bedside chair)? 3  -AH -- 3  -AH    Climbing 3-5 steps with a railing? 3  -AH -- 2  -AH    To walk in hospital room? 3  -AH -- 3  -AH    AM-PAC 6 Clicks Score (PT) 21  -AH -- 19  -AH       How much help from another is currently needed...    Putting on and taking off regular lower body clothing? -- 3  -TS --    Bathing (including washing, rinsing, and drying) -- 3  -TS --    Toileting (which includes using toilet bed pan or urinal) -- 3  -TS --    Putting on and taking off regular upper body clothing -- 3  -TS --    Taking care of personal grooming (such as brushing teeth) -- 3  -TS --    Eating meals -- 4  -TS --    AM-PAC 6 Clicks Score (OT) -- 19  -TS --       Functional Assessment    Outcome Measure Options AM-PAC 6 Clicks Basic Mobility (PT)  -AH -- AM-PAC 6 Clicks Basic Mobility (PT)  -      Row Name 05/28/23 1004 05/27/23 1410          How much help from another person do you currently need...    Turning from your back to your side while in flat bed without using bedrails? 4  -WK 3  -WK     Moving from lying on back to sitting on the side of a flat bed without bedrails? 4  -WK 3  -WK     Moving to and from a bed to a chair (including a wheelchair)? 4  -WK 4  -WK      Standing up from a chair using your arms (e.g., wheelchair, bedside chair)? 4  -WK 4  -WK     Climbing 3-5 steps with a railing? 2  -WK 2  -WK     To walk in hospital room? 3  -WK 3  -WK     AM-PAC 6 Clicks Score (PT) 21  -WK 19  -WK        Functional Assessment    Outcome Measure Options AM-PAC 6 Clicks Basic Mobility (PT)  -WK AM-PAC 6 Clicks Basic Mobility (PT)  -WK               User Key  (r) = Recorded By, (t) = Taken By, (c) = Cosigned By      Initials Name Provider Type    Margot Brown PTA Physical Therapist Assistant    TS Marry Krishnamurthy COTA Occupational Therapist Assistant    WK Rika Walden PTA Physical Therapist Assistant                     Time Calculation:    PT Charges       Row Name 05/30/23 0849             Time Calculation    Start Time 0749  -      Stop Time 0816  -      Time Calculation (min) 27 min  -      PT Received On 05/30/23  -         Time Calculation- PT    Total Timed Code Minutes- PT 27 minute(s)  -         Timed Charges    45507 - Gait Training Minutes  27  -AH         Total Minutes    Timed Charges Total Minutes 27  -AH       Total Minutes 27  -AH                User Key  (r) = Recorded By, (t) = Taken By, (c) = Cosigned By      Initials Name Provider Type    Margot Brown PTA Physical Therapist Assistant                  Therapy Charges for Today       Code Description Service Date Service Provider Modifiers Qty    00079850870 HC GAIT TRAINING EA 15 MIN 5/29/2023 Margot Verma, NE GP 2    39400566541 HC GAIT TRAINING EA 15 MIN 5/30/2023 Margot Verma, NE GP 2            PT G-Codes  Outcome Measure Options: AM-PAC 6 Clicks Basic Mobility (PT)  AM-PAC 6 Clicks Score (PT): 21  AM-PAC 6 Clicks Score (OT): 19    Margot Verma PTA  5/30/2023

## 2023-05-30 NOTE — PROGRESS NOTES
HEMATOLOGY AND MEDICAL ONCOLOGY PROGRESS NOTE    Patient name: Brenda Sneed  Patient : 1950  VISIT # [id number]  MR #[id number]  Room: [facility]    SUBJECTIVE: Sleeping and arouses easily. No new complaints this morning.         INTERVAL HISTORY:  Brenda Sneed is a 72-year-old  female admitted to [facility] through the ED on 2023 by Dr. Tristen Block with a several week history of increasing dyspnea on exertion, difficulty ambulating across the room in her house even accomplishing activities of daily living such as going to the bathroom etc.  When evaluated in the ED she is found to have anemia and thrombocytopenia.     CBC on 2023 had a WBC of 7.16 with 44.5% segmented neutrophils, 38.1% lymphocytes, 13% monocytes, 2.9% eosinophils and 0.7% basophils.  Hemoglobin and hematocrit were 7.1 and 23.3 respectively with a platelet count of 5000.      Significant comorbid associated medical problems include:  Morbid obesity  Significant bilateral lower extremity chronic venous stasis, massive edema and bilateral lower extremity cellulitis L>R, hypertension, arthritis, thyroid disease, anxiety/depression     Brenda was initially evaluated by Iram BAJWA on 2023 regarding thrombocytopenia.     HEMATOLOGICAL HISTORY FROM OFFICE RECORDS  Brenda was initially evaluated by Iram BAJWA on 2023 referred to the clinic by Dr. Dia for evaluation of Anemia and thrombocytopenia.      Review of prior CBCs at [facility] reveals chronic, intermittent anemia dating to 2015.  Chronic thrombocytopenia has been present dating to at least 2016 with platelet count ranging from 69,000 -136,000.     Brenda underwent laceration repair of her left leg by Dr. Sarita Vazquez 2023, status post fall with leg injury.  She was also treated for cellulitis with IV Vancomycin and Zosyn.  She underwent washout.  Cellulitis subsequently resolved.  She was discharged with dry dressing changes and oral  Keflex x7 days     CBC 4/28/2023: WBC: 5.6, Hgb: 10.6, MCV: 96, PLT: 65,000      Labs 5/12/2023  CBC: WBC 6.0, Hgb 9.4/MCV 98, platelets 102,000  CMP: Creatinine 0.81, GFR 76, calcium 8.8, total protein 5.7, alkaline phosphatase 138 ()  BNP: 72.2 (0-100) CRP: 21 (0-10)     Brenda denied melena or hematochezia.  Recent renal function was normal.  She denies overt bleeding, though has significant, scattered bruising throughout her body which is clearly visible on exposed areas during exam.    She denies regular alcohol use.  She drank a few margaritas for couple years in her early 40s but none since.  She has no known history of liver disease.  She is a never smoker     Serology 5/19/2023   Serum Fe 32  TIBC - 280  Fe sat - 11%  Ferritin - 83.1     B12 = 1,168  Folate - 6.5  Copper in process  Zinc in process     Hapto - 70  LDH - 312     Hep A, B, C - nonreactive  HIV - Nonreactive     SPEP in process  QI in process  Free serum light chain in process     ASSESSMENT and PLAN developed on 5/19/2023 hematology consultation visit:  She take ibuprofen 2-3 times per day, in addition to Cymbalta daily which can increase bleeding and can be exacerbated in the setting of thrombocytopenia.     Possible causes of chronic anemia and thrombocytopenia discussed.   Baseline serology requested. Obtain occult blood stools.  Can not rule out underlying bone marrow disorder.     Chronic anemia. Check anemia labs  Check stool for OB  Chronic thrombocytopenia  Check labs as noted below, include PBS  Obtain US liver/spleen re: elevated bilirubin (1.7 on 4/18/2023) and spleen size re: thrombocytopenia  If unrevealing, will need to consider BMAB, which was discussed   Suspect bruising exacerbated by Ibuprofen/Cymbalta in a setting of thrombocytopenia  Suspect platelets recently worsened r/t infection/medication (vancomycin);    OBJECTIVE:      Vitals:    05/30/23 0357   BP: 145/66   Pulse: 66   Resp: 16   Temp: 98 °F (36.7 °C)   SpO2:  93%       Intake/Output Summary (Last 24 hours) at 5/30/2023 0723  Last data filed at 5/30/2023 0652  Gross per 24 hour   Intake 240 ml   Output 950 ml   Net -710 ml       PHYSICAL EXAM:  CONSTITUTIONAL: Alert, appropriate, weak and tired, morbid obesity  EYES: Minimally icteric  NECK: Supple, no masses   CHEST/LUNGS: CTA bilaterally, normal respiratory effort  CARDIOVASCULAR: RRR, no murmurs  ABDOMEN: soft non-tender, active bowel sounds, no HSM  EXTREMITIES: Morbid obesity with chronic venous stasis changes of the lower extremities with massive edema and cellulitis,  SKIN: venous stasis changes of the lower extremities with massive edema and erythema, Left leg wrapped, ecchymosis on arms, PICC left arm  LYMPH: No obvious cervical, clavicular, axillary, or inguinal lymphadenopathy, difficult to palpate due to morbid obesity  NEUROLOGIC: follows commands, mental status appears to be intact, she does not appear to have focal findings but neurological exam not performed in detail  PSYCH: mood and affect appropriate      CBC  Results from last 7 days   Lab Units 05/30/23  0505 05/29/23  0536 05/28/23  0436   WBC 10*3/mm3 5.01 4.88 8.57   HEMOGLOBIN g/dL 8.0* 7.7* 8.1*   HEMATOCRIT % 25.8* 24.8* 26.0*   PLATELETS 10*3/mm3 132* 104* 94*         Lab Results   Component Value Date     05/25/2023    K 3.3 (L) 05/25/2023     05/25/2023    CO2 28.0 05/25/2023    BUN 11 05/25/2023    CREATININE 0.54 (L) 05/25/2023    GLUCOSE 102 (H) 05/25/2023    CALCIUM 8.7 05/25/2023    BILITOT 1.4 (H) 05/24/2023    ALKPHOS 130 (H) 05/24/2023    AST 28 05/24/2023    ALT 8 05/24/2023    AGRATIO 1.3 05/24/2023    GLOB 2.4 05/24/2023       Lab Results   Component Value Date    INR 1.40 (H) 05/30/2023    INR 1.31 (H) 05/29/2023    INR 1.27 (H) 05/28/2023    PROTIME 17.3 (H) 05/30/2023    PROTIME 16.5 (H) 05/29/2023    PROTIME 16.0 (H) 05/28/2023       Cultures:    Lab Results   Component Value Date    BLOODCX No growth at 5 days  05/23/2023    BLOODCX No growth at 5 days 05/23/2023     No components found for: URINCX    ASSESSMENT/PLAN:  #1  Pancytopenia - Severe anemia and thrombocytopenia    Review of prior CBCs at [facility] reveals chronic, intermittent anemia dating to 4/2015.  Chronic thrombocytopenia has been present dating to at least 7/2016 with platelet count ranging from 69,000 -136,000.     Brenda underwent laceration repair of her left leg by Dr. Sarita Vazquez 4/7/2023, status post fall with leg injury.  She was also treated for cellulitis with IV Vancomycin and Zosyn.  She underwent washout.  Cellulitis subsequently resolved.  She was discharged with dry dressing changes and oral Keflex x7 days     CBC 4/28/2023: WBC: 5.6, Hgb: 10.6, MCV: 96, PLT: 65,000      Labs 5/12/2023  CBC: WBC 6.0, Hgb 9.4/MCV 98, platelets 102,000  CMP: Creatinine 0.81, GFR 76, calcium 8.8, total protein 5.7, alkaline phosphatase 138 ()  BNP: 72.2 (0-100) CRP: 21 (0-10)     CBC w/diff            4/18/2023    18:00 4/21/2023    05:54 5/23/2023    02:30 5/23/2023    17:10   CBC w/Diff   WBC 5.57   4.59   7.16   3.08     RBC 2.51   2.78   2.40   2.57     Hemoglobin 9.0   8.6   7.1   7.4     Hematocrit 25.3   27.5   23.3   23.4     .8   98.9   97.1   91.1     MCH 35.9   30.9   29.6   28.8     MCHC 35.6   31.3   30.5   31.6     RDW 14.9   15.1   15.3   15.4     Platelets 104   112   5   2     Neutrophil Rel % 67.8     44.5       Immature Granulocyte Rel % 0.4           Lymphocyte Rel % 20.1     38.1       Monocyte Rel % 10.8     13.0       Eosinophil Rel % 0.7     2.9       Basophil Rel % 0.2     0.7          CMP reveals a normal creatinine of 0.84  Lactate - 1.8 WNL  BNP - 300.2 - WNL     Her admitting CBC is significantly different than it was on 5/12/2023.  Hgb has dropped to 7.1, platelet is down to 5000.  She is currently in the coronary care unit-room 9.  She is status post 1 unit packed red blood cell and 1 pheresis of platelets.  No active  bleeding at present per nursing.     Brenda has a chronic pancytopenia dating back many years.  This could be due to hypersplenism secondary to fatty liver disease or a primary bone marrow disorder.  I believe that she has had an exacerbation of the mild pancytopenia resulting in the current hospitalization with worsening anemia and profound thrombocytopenia.       Serology 5/19/2023   Serum Fe 32  TIBC - 280  Fe sat - 11%  Ferritin - 83.1     B12 = 1,168  Folate - 6.5  Copper - 177.9  Zinc - 58     Hapto - 70  LDH - 312     Hep A, B, C - nonreactive  HIV - Nonreactive     SPEP in process  QI in process  Free serum light chain in process       Differential diagnosis of the current situation could include:   DIC, hemolytic anemia, ITP, TTP, infection (cellulitis, UTI) with bone marrow myelosuppression      Serology 5/23/2023 [facility] In[facility]  PT/INR - 15.1/1.17  PTT - 32.6  Fibrinogen - 258  FSP >5 </= 20    Tbili 1.7 with 1.0 indirect  Hapto - in process  Retic - 6.16% with absolute 0.1546  LDH - 283 (135-214)  LALY (Bry) - Negative    TSH - 6.190    Antplatelet ab -negative on 5/23/2023  ADAMTS 13 -89.5% (>66.8%) on 5/23/2023    Peripheral blood smear 5/24/2023   Moderate macrocytic anemia   Red blood cell agglutination   Normal total white blood cell count with the following manual differential:   Neutrophils 48%   Bands 1%   Lymphocytes 38%   Monocytes 8%   Eosinophils 3%   Basophils 1%   Myelocytes 1%   Severe peripheral thrombocytopenia   No schistocytes identified   No platelet clumps identified   No morulae identified in granulocytes or monocytes     FOBT on 5/25/2023 - NEGATIVE    Echo 5/24/2023  Left ventricular systolic function is normal. Left ventricular ejection fraction appears to be 66 - 70%.  Left ventricular diastolic dysfunction is noted.  The left atrial cavity is mildly dilated.  Normal right ventricular cavity size and systolic function noted.  There is no significant (greater than mild) valvular  dysfunction.  Estimated right ventricular systolic pressure from tricuspid regurgitation is normal (<35 mmHg).      #1  Thrombocytopenia    Differential includes DIC, ITP, infection (cellulitis, UTI) with bone marrow myelosuppression, hepatic dysfunction with splenomegaly     IV antibiotics ongoing with improvement in cellulitis    No schistocyts - No TTP  Immature platelet fraction (IPF) 5.4-normal on 5/25/2023 (interpretation of this with profound thrombocytopenia receiving platelet transfusions-indeterminate)      U/S abdomen 5/24/2023  1.  Coarsened liver with mild surface contour nodularity, concerning for chronic liver disease and cirrhosis.  2.  Perihepatic ascites.  3.  Mild splenomegaly 13.4 cm.       S/p 2 PLT on 5/23/2023  S/p 1 PLT on 5/24/2023  S/p 1 PLT on 5/25/2023  S/p 1 PLT on 5/26/2023          US hepatic elastography on 5/27/2023  Medial and velocity 1.92 m/s.  Metavir score F3.    Cold agglutuinins -pending  PNH panel -pending  Antiplatelet antibodies negative on 5/23/2023    Trial of high-dose Decadron 40 mg IV x 4 days initiated on 5/26/2023 and was completed on 5/29/2023  Received IVIG 45 g x1 on 5/26/2023    CBC today, 5/30/2023 has a WBC of 5.01, hemoglobin 8.0 and a platelet count of 132,000  Platelets continue to improve, will monitor daily.  Response to steroids suggests likely ITP        #2  Anemia  Iron studies, B12 folic acid levels etc. are all normal.  Awaiting serology ordered    RBC agglutination on peripheral smear  No schistocytes    FOBT on 5/25/2023 - NEGATIVE    S/p 1 unit pRBC on 5/23/2023  S/p 1 unit pRBC on 5/27/2023      Transfuse 1 unit of PRBCs for hemoglobin <7.0    CBC today, 5/30/2023 has a WBC of 5.01, hemoglobin 8.0 and a platelet count of 132,000    #3  UTI/ cellulitis    UA POSITIVE on 5/23/2023    Blood cx X 2 on 5/23/2023 - No growth at 4 days          Urine Culture >100,000 CFU/mL Escherichia coli ESBL Abnormal           Cefazolin 2 g IV x5 days began on  5/29/2023   Levaquin 750 mg PO x5 days began on 5/29/2023      Morales Cantu MD    05/30/23  07:23 CDT

## 2023-05-30 NOTE — DISCHARGE SUMMARY
Hospital Discharge Summary    Brenda Sneed  :  1950  MRN:  [id number]    Admit date:  2023  Discharge date:  2023    Admitting Physician:    Sachin Dia MD    Discharge Diagnoses:      Thrombocytopenia    HTN (hypertension)    Obesity, Class III, BMI 40-49.9 (morbid obesity)    Anxiety associated with depression    Acute cystitis with hematuria      Hospital Course:       73 yo F who presented with worsening bilateral lower extremity wounds and thrombocytopenia.  Heme/Onc and wound care were consulted.  Her thrombocytopenia was likely secondary to ITP and she responded to steroids and IVIG, she will be followed as an outpatient with heme/onc.  She had worsening lower extremity edema from lymphedema and underwent wound culture, this grew staph aureus and klebsiella, she will complete a 7 day course of levaquin and keflex based on sensitivites.    She will also be discharged with a hospital bed to help with lymphedema induced mobility issues.      Discharge Medications:         Discharge Medications        New Medications        Instructions Start Date   cephalexin 500 MG capsule  Commonly known as: Keflex   500 mg, Oral, 2 Times Daily      levoFLOXacin 750 MG tablet  Commonly known as: LEVAQUIN   750 mg, Oral, Every 24 Hours             Continue These Medications        Instructions Start Date   bisoprolol-hydrochlorothiazide 5-6.25 MG per tablet  Commonly known as: ZIAC   1 tablet, Oral, Daily      cyclobenzaprine 10 MG tablet  Commonly known as: FLEXERIL   10 mg, Oral, 3 Times Daily PRN      docusate sodium 100 MG capsule  Commonly known as: Colace   100 mg, Oral, 2 Times Daily      DULoxetine 60 MG capsule  Commonly known as: CYMBALTA   1 capsule, Oral, 2 Times Daily      levothyroxine 100 MCG tablet  Commonly known as: SYNTHROID, LEVOTHROID   1 tablet, Oral, Daily      nystatin [id number] UNIT/GM powder  Commonly known as: MYCOSTATIN   1 application, Topical, 2 Times Daily       pramipexole 0.75 MG tablet  Commonly known as: MIRAPEX   1 tablet, Oral, Every Night at Bedtime      vitamin C 500 MG tablet  Commonly known as: ASCORBIC ACID   1 tablet, Oral, Daily      vitamin D3 125 MCG (5000 UT) capsule capsule   1 capsule, Oral, Daily               Consults:     Significant Diagnostic Studies:      XR Chest 2 View    Result Date: 5/23/2023   No acute findings. This report was finalized on 05/23/2023 06:55 by Dr. Bryce Mancera MD.    US Abdomen Complete    Result Date: 5/24/2023   1.  Coarsened liver with mild surface contour nodularity, concerning for chronic liver disease and cirrhosis. 2.  Perihepatic ascites. 3.  Mild splenomegaly. This report was finalized on 05/24/2023 16:48 by Dr. Bryce Mancera MD.    US Elastography Parenchyma    Result Date: 5/27/2023   1. Medial and velocity 1.92 m/s. 2. Metavir score F3.   Shear wave measurements may be affected by hepatic congestion, steatosis, and inflammation. Shear wave speed measurements should be interpreted in conjunction with other available clinical data, and they should not be considered interchangeable with MRI-derived stiffness measurements at this time.   Liver Fibrosis Staging      Metavir                    m/s  Mild-Moderate                    F2                     1.66-1.77 m/s  Moderate-Severe               F3                     1.77-1.99 m/s  Cirrhosis                              F4                     >1.99 m/s This report was finalized on 05/27/2023 13:48 by Dr. Christopher Jennings MD.        Treatments:   Abx, steroids, IVIG    Disposition:   Home  Follow up with Sachin Dia MD in 1 weeks.    Signed:  Sanjiv Block MD   5/30/2023, 14:02 CDT

## 2023-05-30 NOTE — THERAPY TREATMENT NOTE
Patient Name: Brenda Sneed  : 1950    MRN: [id number]                              Today's Date: 2023       Admit Date: 2023    Visit Dx:     ICD-10-CM ICD-9-CM   1. Lymphedema of both lower extremities  I89.0 457.1   2. Thrombocytopenia  D69.6 287.5   3. Cellulitis of left lower extremity  L03.116 682.6   4. Laceration of left lower extremity, subsequent encounter  S81.812D V58.89     894.0   5. Impaired mobility [Z74.09 (ICD-10-CM)]  Z74.09 799.89   6. Decreased activities of daily living (ADL) [Z78.9 (ICD-10-CM)]  Z78.9 V49.89     Patient Active Problem List   Diagnosis    Spinal stenosis, lumbar    Secondary osteoarthritis of left shoulder due to rotator cuff arthropathy    Cellulitis of right lower extremity    HTN (hypertension)    Obesity, Class III, BMI 40-49.9 (morbid obesity)    Anxiety associated with depression    Traumatic rhabdomyolysis    Traumatic rhabdomyolysis, initial encounter    Laceration of left lower extremity    Cellulitis    Cellulitis of left lower extremity    Thrombocytopenia    Acute cystitis with hematuria     Past Medical History:   Diagnosis Date    Arthritis     Bronchitis     Cataract     right eye , cornea implant    Chronic back pain     Depression     Disease of thyroid gland     Edema     lower extremities    Hypertension     Kidney stones      Past Surgical History:   Procedure Laterality Date    BACK SURGERY       ([place]),      CHOLECYSTECTOMY OPEN      CORNEAL TRANSPLANT Right     INCISION AND DRAINAGE ABSCESS Left 2023    Procedure: INCISION AND DRAINAGE ABSCESS left leg;  Surgeon: Sarita Vazquez MD;  Location:  [facility];  Service: General;  Laterality: Left;    LACERATION REPAIR Left 2023    Procedure: washout and closure left leg wound ;  Surgeon: Sarita Vazquez MD;  Location:  [facility];  Service: General;  Laterality: Left;    LUMBAR FUSION Left 2017    Procedure: LEFT LUMBAR LATERAL INTERBODY FUSION WITH  INSTRUMENTATION  L1-2;  Surgeon: MARCELINO Wright MD;  Location:  [facility];  Service:     NECK SURGERY  2010    [place]    TOTAL HIP ARTHROPLASTY Right 2014    DR. KENDRICK     TOTAL KNEE ARTHROPLASTY Right 2000    TOTAL KNEE ARTHROPLASTY Left 2002    TOTAL SHOULDER ARTHROPLASTY W/ DISTAL CLAVICLE EXCISION Left 6/1/2020    Procedure: LEFT REVERSE TOTAL SHOULDER REPLACEMENT;  Surgeon: Saravanan Calvin MD;  Location:  [facility];  Service: Orthopedics;  Laterality: Left;      General Information       Row Name 05/30/23 1030          OT Time and Intention    Document Type therapy note (daily note)  -     Mode of Treatment occupational therapy  -       Row Name 05/30/23 1030          General Information    Patient Profile Reviewed yes  -     Existing Precautions/Restrictions fall  -     Barriers to Rehab medically complex  -       Row Name 05/30/23 1030          Cognition    Orientation Status (Cognition) oriented x 4  -       Row Name 05/30/23 1030          Safety Issues, Functional Mobility    Impairments Affecting Function (Mobility) endurance/activity tolerance;shortness of breath  -               User Key  (r) = Recorded By, (t) = Taken By, (c) = Cosigned By      Initials Name Provider Type     Dorita Kendrick, OTR/L Occupational Therapist                     Mobility/ADL's       Row Name 05/30/23 1030          Bed Mobility    Bed Mobility sit-supine  -     Sit-Supine [place] (Bed Mobility) supervision  -     Assistive Device (Bed Mobility) bed rails  -       Row Name 05/30/23 1030          Transfers    Transfers sit-stand transfer;stand-sit transfer;toilet transfer  -       Row Name 05/30/23 1030          Sit-Stand Transfer    Sit-Stand [place] (Transfers) supervision  -     Assistive Device (Sit-Stand Transfers) walker, front-wheeled  -       Row Name 05/30/23 1030          Stand-Sit Transfer    Stand-Sit [place] (Transfers) supervision  -     Assistive Device (Stand-Sit  Transfers) walker, front-wheeled  -[facility] Name 05/30/23 1030          Toilet Transfer    Type (Toilet Transfer) sit-stand;stand-sit  -     [place] Level (Toilet Transfer) supervision  -     Assistive Device (Toilet Transfer) commode;grab bars/safety frame;walker, front-wheeled  -[facility] Name 05/30/23 1030          Functional Mobility    Functional Mobility- Ind. Level supervision required  -     Functional Mobility- Device walker, front-wheeled  -     Functional Mobility- Comment in room to bathroom & then back to bed  -[facility] Name 05/30/23 1030          Activities of Daily Living    BADL Assessment/Intervention toileting;upper body dressing;grooming  -CH       Row Name 05/30/23 1030          Toileting Assessment/Training    [place] Level (Toileting) supervision;toileting skills;adjust/manage clothing;change pad/brief;perform perineal hygiene  -     Position (Toileting) unsupported sitting;supported standing  -CH       Row Name 05/30/23 1030          Upper Body Dressing Assessment/Training    [place] Level (Upper Body Dressing) supervision;doff;pajama/robe  -     Position (Upper Body Dressing) unsupported standing  -[facility] Name 05/30/23 1030          Grooming Assessment/Training    [place] Level (Grooming) wash face, hands  -     Position (Grooming) unsupported standing;sink side  -               User Key  (r) = Recorded By, (t) = Taken By, (c) = Cosigned By      Initials Name Provider Type     Dorita Oropeza OTR/L Occupational Therapist                   Obj/Interventions       Row Name 05/30/23 1030          Balance    Balance Interventions sitting;standing;sit to stand;supported;static;dynamic  -               User Key  (r) = Recorded By, (t) = Taken By, (c) = Cosigned By      Initials Name Provider Type     Dorita Oropeza OTR/L Occupational Therapist                   Goals/Plan       [facility] Name 05/30/23 1030          Toileting Goal 1 (OT)     [place] Level/Cues Needed (Toileting Goal 1, OT) standby assist  -     Time Frame (Toileting Goal 1, OT) long term goal (LTG)  -     Progress/Outcome (Toileting Goal 1, OT) goal met  -               User Key  (r) = Recorded By, (t) = Taken By, (c) = Cosigned By      Initials Name Provider Type     Dorita Oropeza, OTR/L Occupational Therapist                   Clinical Impression       Row Name 05/30/23 1030          Pain Assessment    Pretreatment Pain Rating 0/10 - no pain  -     Posttreatment Pain Rating 0/10 - no pain  -     Pain Intervention(s) Medication (See MAR);Repositioned  -       Row Name 05/30/23 1030          Plan of Care Review    Plan of Care Reviewed With patient  -     Progress no change  -     Outcome Evaluation OT tx completed.  Pt. seated in chair.  Ms. Sneed was able to stand from chair & ambulate to bathroom.  Once standing near toilet she removed her 2nd gown acting as a robe & toileted herself at S. Ms. Sneed stood from toilet and performed standing sink side grooming.  She returned to bedside and got back into her bed.  Ms. Sneed's greatest concern is her bed mobility and she wishes to rent a hospital bed for home.  Per the pt she will have assist for IADLs from her niece.  Rec DC home with  and assist for IADLs & showering  -       Row Name 05/30/23 1030          Therapy Assessment/Plan (OT)    Rehab Potential (OT) good, to achieve stated therapy goals  -     Criteria for Skilled Therapeutic Interventions Met (OT) yes;skilled treatment is necessary  -     Therapy Frequency (OT) 3 times/wk  -       Row Name 05/30/23 1030          Therapy Plan Review/Discharge Plan (OT)    Anticipated Discharge Disposition (OT) home with home health;home with assist  -       Row Name 05/30/23 1030          Positioning and Restraints    Pre-Treatment Position sitting in chair/recliner  -     Post Treatment Position bed  -     In Bed fowlers;call light within  reach;encouraged to call for assist;side rails up x2  -CH               User Key  (r) = Recorded By, (t) = Taken By, (c) = Cosigned By      Initials Name Provider Type    Dorita Brown, OTR/L Occupational Therapist                   Outcome Measures       Row Name 05/30/23 1030          How much help from another is currently needed...    Putting on and taking off regular lower body clothing? 3  -CH     Bathing (including washing, rinsing, and drying) 3  -CH     Toileting (which includes using toilet bed pan or urinal) 4  -CH     Putting on and taking off regular upper body clothing 3  -CH     Taking care of personal grooming (such as brushing teeth) 4  -CH     Eating meals 4  -CH     AM-PAC 6 Clicks Score (OT) 21  -       Row Name 05/30/23 0928 05/30/23 0800       How much help from another person do you currently need...    Turning from your back to your side while in flat bed without using bedrails? 4  -DF 4  -AH    Moving from lying on back to sitting on the side of a flat bed without bedrails? 4  -DF 4  -AH    Moving to and from a bed to a chair (including a wheelchair)? 4  -DF 4  -AH    Standing up from a chair using your arms (e.g., wheelchair, bedside chair)? 3  -DF 3  -AH    Climbing 3-5 steps with a railing? 3  -DF 3  -AH    To walk in hospital room? 3  -DF 3  -AH    AM-PAC 6 Clicks Score (PT) 21  -DF 21  -AH    Highest level of mobility 6 --> Walked 10 steps or more  -DF 6 --> Walked 10 steps or more  -      Row Name 05/30/23 1030 05/30/23 0800       Functional Assessment    Outcome Measure Options AM-PAC 6 Clicks Daily Activity (OT)  - AM-PAC 6 Clicks Basic Mobility (PT)  -              User Key  (r) = Recorded By, (t) = Taken By, (c) = Cosigned By      Initials Name Provider Type     Margot Verma PTA Physical Therapist Assistant    Dorita Brown, OTR/L Occupational Therapist    Earnestine Davila RN Registered Nurse                    Occupational Therapy Education       Title:  PT OT SLP Therapies (In Progress)       Topic: Occupational Therapy (In Progress)       Point: ADL training (Done)       Description:   Instruct learner(s) on proper safety adaptation and remediation techniques during self care or transfers.   Instruct in proper use of assistive devices.                  Learning Progress Summary             Patient Acceptance, E,D, VU,NR by  at 5/30/2023 1201    Acceptance, E, VU by  at 5/25/2023 1425    Comment: AE options and safety techniques during ADL completion.                         Point: Home exercise program (Not Started)       Description:   Instruct learner(s) on appropriate technique for monitoring, assisting and/or progressing therapeutic exercises/activities.                  Learner Progress:  Not documented in this visit.              Point: Precautions (Not Started)       Description:   Instruct learner(s) on prescribed precautions during self-care and functional transfers.                  Learner Progress:  Not documented in this visit.              Point: Body mechanics (Done)       Description:   Instruct learner(s) on proper positioning and spine alignment during self-care, functional mobility activities and/or exercises.                  Learning Progress Summary             Patient Acceptance, E,D, VU,NR by  at 5/30/2023 1201                                         User Key       Initials Effective Dates Name Provider Type Discipline     06/16/21 -  Dorita Oropeza, OTR/L Occupational Therapist OT    WB 12/05/22 -  Arsalan Elizalde OT Student OT Student OT                  OT Recommendation and Plan  Therapy Frequency (OT): 3 times/wk  Plan of Care Review  Plan of Care Reviewed With: patient  Progress: no change  Outcome Evaluation: OT tx completed.  Pt. seated in chair.  Ms. Sneed was able to stand from chair & ambulate to bathroom.  Once standing near toilet she removed her 2nd gown acting as a robe & toileted herself at S. Ms. Sneed stood  from toilet and performed standing sink side grooming.  She returned to bedside and got back into her bed.  Ms. Sneed's greatest concern is her bed mobility and she wishes to rent a hospital bed for home.  Per the pt she will have assist for IADLs from her niece.  Rec DC home with HH and assist for IADLs & showering     Time Calculation:    Time Calculation- OT       Row Name 05/30/23 1030 05/30/23 0849          Time Calculation- OT    OT Start Time 1030  - --     OT Stop Time 1108  - --     OT Time Calculation (min) 38 min  - --     Total Timed Code Minutes- OT 38 minute(s)  - --     OT Received On 05/30/23  - --        Timed Charges    71737 - Gait Training Minutes  -- 27  -     50569 - OT Self Care/Mgmt Minutes 38  -CH --        Total Minutes    Timed Charges Total Minutes 38  -CH 27  -AH      Total Minutes 38  - 27  -AH               User Key  (r) = Recorded By, (t) = Taken By, (c) = Cosigned By      Initials Name Provider Type     Margot Verma, PTA Physical Therapist Assistant     Dorita Oropeza OTR/L Occupational Therapist                  Therapy Charges for Today       Code Description Service Date Service Provider Modifiers Qty    [id number] HC OT SELF CARE/MGMT/TRAIN EA 15 MIN 5/30/2023 Dorita Oropeza OTR/MAYLIN GO 3                 LINDA Quinn/MAYLIN  5/30/2023

## 2023-05-30 NOTE — OUTREACH NOTE
Prep Survey    Flowsheet Row Responses   [facility] facility patient discharged from? [place]   Is LACE score < 7 ? No   Eligibility Readm Mgmt   Discharge diagnosis Thrombocytopenia   Does the patient have one of the following disease processes/diagnoses(primary or secondary)? Other   Does the patient have Home health ordered? Yes   What is the Home health agency?  [facility]   Is there a DME ordered? Yes   What DME was ordered? hospital bed- [facility]   Prep survey completed? Yes          Iram MUNIZ - Registered Nurse

## 2023-05-30 NOTE — PROGRESS NOTES
"    Daily Progress Note  Brenda Sneed  MRN: [id number] LOS: 7    Admit Date: 2023 08:00 CDT    Subjective:         Interval History:    Reviewed overnight events and nursing notes.     Doing well.  No new complaints.  States she is feeling better.    ROS:  Review of Systems   Constitutional:  Positive for fatigue. Negative for chills and fever.   Respiratory:  Negative for cough, chest tightness and shortness of breath.    Cardiovascular:  Negative for chest pain.   Gastrointestinal:  Negative for abdominal pain, diarrhea, nausea and vomiting.     DIET:  Diet: Cardiac Diets; Healthy Heart (2-3 Na+); Texture: Regular Texture (IDDSI 7); Fluid Consistency: Thin (IDDSI 0)    Medications:      vitamin C, 500 mg, Oral, Daily  bisoprolol-hydroCHLOROthiazide (ZIAC) 5-6.25 mg combo tab, , Oral, Daily  ceFAZolin, 2 g, Intravenous, Q8H  docusate sodium, 100 mg, Oral, BID  DULoxetine, 60 mg, Oral, BID  levoFLOXacin, 750 mg, Oral, Q24H  levothyroxine, 100 mcg, Oral, Q AM  nystatin, 1 application, Topical, BID  pantoprazole, 40 mg, Oral, Q AM  pramipexole, 0.75 mg, Oral, Q8H  senna-docusate sodium, 2 tablet, Oral, BID  sodium chloride, 10 mL, Intravenous, Q12H  vitamin D3, 5,000 Units, Oral, Daily          Objective:     Vitals: /66 (BP Location: Right arm, Patient Position: Lying)   Pulse 66   Temp 98 °F (36.7 °C) (Oral)   Resp 16   Ht 160 cm (63\")   Wt 115 kg (254 lb)   SpO2 93%   BMI 44.99 kg/m²    Intake/Output Summary (Last 24 hours) at 2023 0800  Last data filed at 2023 0652  Gross per 24 hour   Intake 240 ml   Output 950 ml   Net -710 ml    Temp (24hrs), Av.9 °F (36.6 °C), Min:97.4 °F (36.3 °C), Max:98.3 °F (36.8 °C)    Glucose:  Lab Results   Component Value Date    POCGLU 100 (H) 2021     Physical Examination:   Physical Exam  Constitutional:       Appearance: She is obese. She is ill-appearing.   Cardiovascular:      Rate and Rhythm: Normal rate and regular rhythm. "      Heart sounds: No murmur heard.  Pulmonary:      Effort: Pulmonary effort is normal. No respiratory distress.   Abdominal:      General: Abdomen is flat.      Palpations: Abdomen is soft.      Tenderness: There is no abdominal tenderness.   Musculoskeletal:      Right lower leg: Edema present.      Left lower leg: Edema present.   Neurological:      Mental Status: She is alert.       Labs:  Lab Results (last 24 hours)       Procedure Component Value Units Date/Time    CBC & Differential [[id number]]  (Abnormal) Collected: 05/30/23 0505    Specimen: Blood, Central Line Updated: 05/30/23 0621    Narrative:      The following orders were created for panel order CBC & Differential.  Procedure                               Abnormality         Status                     ---------                               -----------         ------                     CBC Auto Differential[[id number]]        Abnormal            Final result                 Please view results for these tests on the individual orders.    CBC Auto Differential [[id number]]  (Abnormal) Collected: 05/30/23 0505    Specimen: Blood, Central Line Updated: 05/30/23 0621     WBC 5.01 10*3/mm3      RBC 2.76 10*6/mm3      Hemoglobin 8.0 g/dL      Hematocrit 25.8 %      MCV 93.5 fL      MCH 29.0 pg      MCHC 31.0 g/dL      RDW 15.1 %      RDW-SD 50.1 fl      MPV 10.4 fL      Platelets 132 10*3/mm3      Neutrophil % 72.6 %      Lymphocyte % 17.8 %      Monocyte % 7.8 %      Eosinophil % 0.0 %      Basophil % 0.0 %      Neutrophils, Absolute 3.64 10*3/mm3      Lymphocytes, Absolute 0.89 10*3/mm3      Monocytes, Absolute 0.39 10*3/mm3      Eosinophils, Absolute 0.00 10*3/mm3      Basophils, Absolute 0.00 10*3/mm3     Fibrinogen [[id number]]  (Abnormal) Collected: 05/30/23 0505    Specimen: Blood, Central Line Updated: 05/30/23 0613     Fibrinogen 133 mg/dL     aPTT [[id number]]  (Normal) Collected: 05/30/23 0505    Specimen: Blood, Central Line Updated: 05/30/23 0613      PTT 31.8 seconds     Protime-INR [[id number]]  (Abnormal) Collected: 05/30/23 0505    Specimen: Blood, Central Line Updated: 05/30/23 0613     Protime 17.3 Seconds      INR 1.40             Imaging:  No radiology results from the last 24 hrs       Assessment and Plan:     Primary Problem:  Thrombocytopenia    Hospital Problem list:    Thrombocytopenia    HTN (hypertension)    Obesity, Class III, BMI 40-49.9 (morbid obesity)    Anxiety associated with depression    Acute cystitis with hematuria      Plan:    -Spoke with Dr. Cantu this morning.  Platelets have recovered point she can probably go home with outpatient monitoring.  -We will continue antibiotics as an outpatient  -Can go home this afternoon if symptoms remain stable.    Discharge planning:   Home    Reviewed treatment plans with the patient and/or family.     Code Status:   Code Status and Medical Interventions:   Ordered at: 05/24/23 0728     Medical Intervention Limits:    NO intubation (DNI)    NO cardioversion    NO dialysis     Level Of Support Discussed With:    Patient     Code Status (Patient has no pulse and is not breathing):    No CPR (Do Not Attempt to Resuscitate)     Medical Interventions (Patient has pulse or is breathing):    Limited Support     Release to patient:    Routine Release       Electronically signed by Sanjiv Block MD on 5/30/2023 at 08:00 CDT

## 2023-05-30 NOTE — PLAN OF CARE
Pt stable at time of d/c; home health and wound clinic will follow patient after d/c; follow-up instructions provided verbal and written.

## 2023-05-30 NOTE — CASE MANAGEMENT/SOCIAL WORK
Continued Stay Note   [place]     Patient Name: Brenda Sneed  MRN: [id number]  Today's Date: 5/30/2023    Admit Date: 5/23/2023    Plan: Home Health   Discharge Plan       Row Name 05/30/23 1339       Plan    Plan Home Health    Patient/Family in Agreement with Plan yes    Final Discharge Disposition Code 06 - home with home health care    Final Note Pt is going home today. Resumption of care for [facility] has been inboxed and notified Glory LOVETT Pt has orders for a hospital bed so discussed options with her and she requests [facility]. Orders faxed to [phone] for them to deliver to pt's home.                   Discharge Codes    No documentation.                 Expected Discharge Date and Time       Expected Discharge Date Expected Discharge Time    May 30, 2023               KAITLIN Dillard

## 2023-05-30 NOTE — PLAN OF CARE
Goal Outcome Evaluation:  Plan of Care Reviewed With: patient        Progress: no change  Outcome Evaluation: IID; IV and po ABX; PICC to left upper arm; wound care to left leg; TALHA LE elevated on pillows; bedcheck; up with assist; voiding per bsc; RA; resting between care; safety maintained

## 2023-05-31 NOTE — THERAPY DISCHARGE NOTE
Acute Care - Physical Therapy Discharge Summary  [facility]       Patient Name: Brenda Sneed  : 1950  MRN: [id number]    Today's Date: 2023  Onset of Illness/Injury or Date of Surgery: 23       Referring Physician: Dr. Dia      Admit Date: 2023      PT Recommendation and Plan    Visit Dx:    ICD-10-CM ICD-9-CM   1. Lymphedema of both lower extremities  I89.0 457.1   2. Thrombocytopenia  D69.6 287.5   3. Cellulitis of left lower extremity  L03.116 682.6   4. Laceration of left lower extremity, subsequent encounter  S81.812D V58.89     894.0   5. Impaired mobility [Z74.09 (ICD-10-CM)]  Z74.09 799.89   6. Decreased activities of daily living (ADL) [Z78.9 (ICD-10-CM)]  Z78.9 V49.89        Outcome Measures       Row Name 23 0800 23 1500 23 1000       How much help from another person do you currently need...    Turning from your back to your side while in flat bed without using bedrails? 4  -AH -- 4  -AH    Moving from lying on back to sitting on the side of a flat bed without bedrails? 4  -AH -- 4  -AH    Moving to and from a bed to a chair (including a wheelchair)? 4  -AH -- 3  -AH    Standing up from a chair using your arms (e.g., wheelchair, bedside chair)? 3  -AH -- 3  -AH    Climbing 3-5 steps with a railing? 3  -AH -- 2  -AH    To walk in hospital room? 3  -AH -- 3  -AH    AM-PAC 6 Clicks Score (PT) 21  -AH -- 19  -AH       How much help from another is currently needed...    Putting on and taking off regular lower body clothing? -- 3  -TS --    Bathing (including washing, rinsing, and drying) -- 3  -TS --    Toileting (which includes using toilet bed pan or urinal) -- 3  -TS --    Putting on and taking off regular upper body clothing -- 3  -TS --    Taking care of personal grooming (such as brushing teeth) -- 3  -TS --    Eating meals -- 4  -TS --    AM-PAC 6 Clicks Score (OT) -- 19  -TS --       Functional Assessment    Outcome Measure Options AM-PAC 6 Clicks  Basic Mobility (PT)  - -- AM-[facility] 6 Clicks Basic Mobility (PT)  -      Row Name 05/28/23 1004             How much help from another person do you currently need...    Turning from your back to your side while in flat bed without using bedrails? 4  -WK      Moving from lying on back to sitting on the side of a flat bed without bedrails? 4  -WK      Moving to and from a bed to a chair (including a wheelchair)? 4  -WK      Standing up from a chair using your arms (e.g., wheelchair, bedside chair)? 4  -WK      Climbing 3-5 steps with a railing? 2  -WK      To walk in hospital room? 3  -WK      AM-[facility] 6 Clicks Score (PT) 21  -WK         Functional Assessment    Outcome Measure Options AM-[facility] 6 Clicks Basic Mobility (PT)  -WK                User Key  (r) = Recorded By, (t) = Taken By, (c) = Cosigned By      Initials Name Provider Type     Margot Verma, NE Physical Therapist Assistant     Marry Krishnamurthy COTA Occupational Therapist Assistant    WK Rika Walden PTA Physical Therapist Assistant                         PT Rehab Goals       Row Name 05/31/23 0738             Bed Mobility Goal 1 (PT)    Activity/Assistive Device (Bed Mobility Goal 1, PT) bed mobility activities, all  -      [place] Level/Cues Needed (Bed Mobility Goal 1, PT) independent  -      Time Frame (Bed Mobility Goal 1, PT) long term goal (LTG)  -      Progress/Outcomes (Bed Mobility Goal 1, PT) goal not met  -         Transfer Goal 1 (PT)    Activity/Assistive Device (Transfer Goal 1, PT) sit-to-stand/stand-to-sit  -      [place] Level/Cues Needed (Transfer Goal 1, PT) modified independence  -      Time Frame (Transfer Goal 1, PT) long term goal (LTG)  -      Progress/Outcome (Transfer Goal 1, PT) goal not met  -         Gait Training Goal 1 (PT)    Activity/Assistive Device (Gait Training Goal 1, PT) gait (walking locomotion);improve balance and speed;increase endurance/gait distance;cane, straight  -       [place] Level (Gait Training Goal 1, PT) independent  -      Distance (Gait Training Goal 1, PT) 40 feet to be able to walk outside  -      Progress/Outcome (Gait Training Goal 1, PT) goal met  -AH         Stairs Goal 1 (PT)    Activity/Assistive Device (Stairs Goal 1, PT) stairs, all skills  -      [place] Level/Cues Needed (Stairs Goal 1, PT) modified independence  -      Number of Stairs (Stairs Goal 1, PT) 1 to be able to get in and out of her house  -      Progress/Outcome (Stairs Goal 1, PT) goal not met  -                User Key  (r) = Recorded By, (t) = Taken By, (c) = Cosigned By      Initials Name Provider Type Discipline     Margot Verma PTA Physical Therapist Assistant PT                    Therapy Charges for Today       Code Description Service Date Service Provider Modifiers Qty    [id number]  GAIT TRAINING EA 15 MIN 5/30/2023 Margot Verma PTA GP 2            PT Discharge Summary  Reason for Discharge: Discharge from facility  Outcomes Achieved: Refer to plan of care for updates on goals achieved  Discharge Destination: Home with home health      Margot Verma PTA   5/31/2023

## 2023-05-31 NOTE — PAYOR COMM NOTE
"REF:     [id number]     Psychiatric  [place],   [phone]  OR  FAX   [phone]     Eyad Gurrola (72 y.o. Female)       Date of Birth   1950    Social Security Number       Address   [address] [place] [address]    Home Phone   [phone]    MRN   [id number]       [place]   Jainism    Marital Status                               Admission Date   5/23/23    Admission Type   Emergency    Admitting Provider   Sachin Dia MD    Attending Provider       Department, Room/Bed   Psychiatric 3C, 376/1       Discharge Date   5/30/2023    Discharge Disposition   Home-Health Care Sv    Discharge Destination                                 Attending Provider: (none)   Allergies: Codeine, Benadryl [Diphenhydramine]    Isolation: None   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)   Code Status: Prior    Ht: 160 cm (63\")   Wt: 115 kg (254 lb)    Admission Cmt: None   Principal Problem: Thrombocytopenia [D69.6]                   Active Insurance as of 5/23/2023       Primary Coverage       Payor Plan Insurance Group Employer/Plan Group    HUMANA MEDICARE REPLACEMENT HUMANA MEDICARE REPLACEMENT [id number]       Payor Plan Address Payor Plan Phone Number Payor Plan Fax Number Effective Dates    [address] [address] [phone]  1/1/2023 - None Entered    [facility] [id number]         Subscriber Name Subscriber Birth Date Member ID       EYAD GURROLA 1950 [id number]               Secondary Coverage       Payor Plan Insurance Group Employer/Plan Group    [id number]        Payor Plan Address Payor Plan Phone Number Payor Plan Fax Number Effective Dates    [address]   6/1/2021 - None Entered    [facility] [id number]         Subscriber Name Subscriber Birth Date Member ID       EYAD GURROLA E 1950 [id number]                     Emergency Contacts        (Rel.) Home Phone Work Phone Mobile Phone    ,Nella (Daughter) " [phone] -- [phone]    ODELL NELSON (Relative) -- -- [phone]                 Discharge Summary        Sanjiv Block MD at 23 1309              Hospital Discharge Summary    Brenda Sneed  :  1950  MRN:  [id number]    Admit date:  2023  Discharge date:  2023    Admitting Physician:    Sachin Dia MD    Discharge Diagnoses:      Thrombocytopenia    HTN (hypertension)    Obesity, Class III, BMI 40-49.9 (morbid obesity)    Anxiety associated with depression    Acute cystitis with hematuria      Hospital Course:       71 yo F who presented with worsening bilateral lower extremity wounds and thrombocytopenia.  Heme/Onc and wound care were consulted.  Her thrombocytopenia was likely secondary to ITP and she responded to steroids and IVIG, she will be followed as an outpatient with heme/onc.  She had worsening lower extremity edema from lymphedema and underwent wound culture, this grew staph aureus and klebsiella, she will complete a 7 day course of levaquin and keflex based on sensitivites.    She will also be discharged with a hospital bed to help with lymphedema induced mobility issues.      Discharge Medications:         Discharge Medications        New Medications        Instructions Start Date   cephalexin 500 MG capsule  Commonly known as: Keflex   500 mg, Oral, 2 Times Daily      levoFLOXacin 750 MG tablet  Commonly known as: LEVAQUIN   750 mg, Oral, Every 24 Hours             Continue These Medications        Instructions Start Date   bisoprolol-hydrochlorothiazide 5-6.25 MG per tablet  Commonly known as: ZIAC   1 tablet, Oral, Daily      cyclobenzaprine 10 MG tablet  Commonly known as: FLEXERIL   10 mg, Oral, 3 Times Daily PRN      docusate sodium 100 MG capsule  Commonly known as: Colace   100 mg, Oral, 2 Times Daily      DULoxetine 60 MG capsule  Commonly known as: CYMBALTA   1 capsule, Oral, 2 Times Daily      levothyroxine 100 MCG tablet  Commonly  known as: SYNTHROID, LEVOTHROID   1 tablet, Oral, Daily      nystatin 716844 UNIT/GM powder  Commonly known as: MYCOSTATIN   1 application, Topical, 2 Times Daily      pramipexole 0.75 MG tablet  Commonly known as: MIRAPEX   1 tablet, Oral, Every Night at Bedtime      vitamin C 500 MG tablet  Commonly known as: ASCORBIC ACID   1 tablet, Oral, Daily      vitamin D3 125 MCG (5000 UT) capsule capsule   1 capsule, Oral, Daily               Consults:     Significant Diagnostic Studies:      XR Chest 2 View    Result Date: 5/23/2023   No acute findings. This report was finalized on 05/23/2023 06:55 by Dr. Bryce Mancera MD.    US Abdomen Complete    Result Date: 5/24/2023   1.  Coarsened liver with mild surface contour nodularity, concerning for chronic liver disease and cirrhosis. 2.  Perihepatic ascites. 3.  Mild splenomegaly. This report was finalized on 05/24/2023 16:48 by Dr. Bryce Mancera MD.    US Elastography Parenchyma    Result Date: 5/27/2023   1. Medial and velocity 1.92 m/s. 2. Metavir score F3.   Shear wave measurements may be affected by hepatic congestion, steatosis, and inflammation. Shear wave speed measurements should be interpreted in conjunction with other available clinical data, and they should not be considered interchangeable with MRI-derived stiffness measurements at this time.   Liver Fibrosis Staging      Metavir                    m/s  Mild-Moderate                    F2                     1.66-1.77 m/s  Moderate-Severe               F3                     1.77-1.99 m/s  Cirrhosis                              F4                     >1.99 m/s This report was finalized on 05/27/2023 13:48 by Dr. Christopher Jennings MD.        Treatments:   Abx, steroids, IVIG    Disposition:   Home  Follow up with Sachin Dia MD in 1 weeks.    Signed:  Sanjiv Block MD   5/30/2023, 14:02 CDT      Electronically signed by Sanjiv Block MD at 05/30/23 1402       Discharge Order (From admission,  onward)       Start     Ordered    05/30/23 1306  Discharge patient  Once        Expected Discharge Date: 05/30/23    Expected Discharge Time: Afternoon    Discharge Disposition: Home-Health Care [facility]    Physician of Record for Attribution - Please select from Treatment Team: EVA ANN [[id number]]    Review needed by CMO to determine Physician of Record: No       Question Answer Comment   Physician of Record for Attribution - Please select from Treatment Team EVA ANN    Review needed by CMO to determine Physician of Record No        05/30/23 1308

## 2023-05-31 NOTE — THERAPY DISCHARGE NOTE
Acute Care - Occupational Therapy Discharge Summary  [facility]     Patient Name: Brenda Sneed  : 1950  MRN: [id number]    Today's Date: 2023  Onset of Illness/Injury or Date of Surgery: 23    Date of Referral to OT: 23  Referring Physician: Dr. Dia      Admit Date: 2023        OT Recommendation and Plan    Visit Dx:    ICD-10-CM ICD-9-CM   1. Lymphedema of both lower extremities  I89.0 457.1   2. Thrombocytopenia  D69.6 287.5   3. Cellulitis of left lower extremity  L03.116 682.6   4. Laceration of left lower extremity, subsequent encounter  S81.812D V58.89     894.0   5. Impaired mobility [Z74.09 (ICD-10-CM)]  Z74.09 799.89   6. Decreased activities of daily living (ADL) [Z78.9 (ICD-10-CM)]  Z78.9 V49.89                OT Rehab Goals       Row Name 23 1100             Bathing Goal 1 (OT)    Activity/Device (Bathing Goal 1, OT) bathing skills, all;grab bar, tub/shower;tub bench  -LS      [place] Level/Cues Needed (Bathing Goal 1, OT) standby assist  -LS      Time Frame (Bathing Goal 1, OT) long term goal (LTG)  -LS      Progress/Outcomes (Bathing Goal 1, OT) goal not met  -LS         Dressing Goal 1 (OT)    Activity/Device (Dressing Goal 1, OT) dressing skills, all;sock-aid  -LS      [place]/Cues Needed (Dressing Goal 1, OT) standby assist  -LS      Time Frame (Dressing Goal 1, OT) long term goal (LTG)  -LS      Progress/Outcome (Dressing Goal 1, OT) goal not met  -LS         Toileting Goal 1 (OT)    Activity/Device (Toileting Goal 1, OT) toileting skills, all  -LS      [place] Level/Cues Needed (Toileting Goal 1, OT) standby assist  -LS      Time Frame (Toileting Goal 1, OT) long term goal (LTG)  -LS      Progress/Outcome (Toileting Goal 1, OT) goal not met  -LS                User Key  (r) = Recorded By, (t) = Taken By, (c) = Cosigned By      Initials Name Provider Type Discipline    Brenda Ricketts COTA Occupational Therapist Assistant THERAPIES                      Outcome Measures       Row Name 05/30/23 0800 05/29/23 1500 05/29/23 1000       How much help from another person do you currently need...    Turning from your back to your side while in flat bed without using bedrails? 4  -AH -- 4  -AH    Moving from lying on back to sitting on the side of a flat bed without bedrails? 4  -AH -- 4  -AH    Moving to and from a bed to a chair (including a wheelchair)? 4  -AH -- 3  -AH    Standing up from a chair using your arms (e.g., wheelchair, bedside chair)? 3  -AH -- 3  -AH    Climbing 3-5 steps with a railing? 3  -AH -- 2  -AH    To walk in hospital room? 3  -AH -- 3  -AH    AM-PAC 6 Clicks Score (PT) 21  -AH -- 19  -AH       How much help from another is currently needed...    Putting on and taking off regular lower body clothing? -- 3  -TS --    Bathing (including washing, rinsing, and drying) -- 3  -TS --    Toileting (which includes using toilet bed pan or urinal) -- 3  -TS --    Putting on and taking off regular upper body clothing -- 3  -TS --    Taking care of personal grooming (such as brushing teeth) -- 3  -TS --    Eating meals -- 4  -TS --    AM-PAC 6 Clicks Score (OT) -- 19  -TS --       Functional Assessment    Outcome Measure Options AM-PAC 6 Clicks Basic Mobility (PT)  - -- AM-PAC 6 Clicks Basic Mobility (PT)  -              User Key  (r) = Recorded By, (t) = Taken By, (c) = Cosigned By      Initials Name Provider Type     Margot Verma, NE Physical Therapist Assistant    Marry Littlejohn COTA Occupational Therapist Assistant                            OT Discharge Summary  Anticipated Discharge Disposition (OT): home with home health, home with assist  Reason for Discharge: Discharge from facility  Outcomes Achieved: Refer to plan of care for updates on goals achieved  Discharge Destination: Home with home health      ALFREDO Florez  5/31/2023

## 2023-06-01 ENCOUNTER — HOME CARE VISIT (OUTPATIENT)
Dept: HOME HEALTH SERVICES | Facility: CLINIC | Age: 73
End: 2023-06-01
Payer: MEDICARE

## 2023-06-01 VITALS
DIASTOLIC BLOOD PRESSURE: 64 MMHG | HEART RATE: 60 BPM | OXYGEN SATURATION: 98 % | RESPIRATION RATE: 18 BRPM | TEMPERATURE: 97.9 F | SYSTOLIC BLOOD PRESSURE: 126 MMHG

## 2023-06-01 PROCEDURE — G0299 HHS/HOSPICE OF RN EA 15 MIN: HCPCS

## 2023-06-01 NOTE — HOME HEALTH
FLORESITA Note: Resumption of care following inpatient hospital stay for with worsening bilateral lower extremity wounds and thrombocytopenia and UTI.She underwent wound cultureand will complete a 7 day course of levaquin and keflex. A&Ox4, VSS. Pt c/o no pain today. Wound care performed today per MD most recent order without difficulty.    Home Health ordered for: SN    Reason for Hosp/Primary Dx/Co-morbidities: Thrombocytopenia, UTI     Focus of Care: Wound assessment/care to LLE, UTI treatment/ education on prevention     Current Functional status/mobility/DME: Transfer with assistance from chair/bed      HB status/Living Arrangements: Pt lives alone with daytime help    Skin Integrity/wound status: Wounds to LLE    Code Status: Full code    Fall Risk: 8

## 2023-06-02 LAB — CD59 CELLS BLD QL: NORMAL

## 2023-06-05 NOTE — CASE COMMUNICATION
FLORESITA Note: Resumption of care following inpatient hospital stay for with worsening bilateral lower extremity wounds and thrombocytopenia and UTI.She underwent wound cultureand will complete a 7 day course of levaquin and keflex. A&Ox4, VSS. Pt c/o no pain today. Wound care performed today per MD most recent order without difficulty.    Home Health ordered for: SN    Reason for Hosp/Primary Dx/Co-morbidities: Thrombocytopenia, UTI     Foc us of Care: Wound assessment/care to LLE, UTI treatment/ education on prevention     Current Functional status/mobility/DME: Transfer with assistance from chair/bed      HB status/Living Arrangements: Pt lives alone with daytime help     Skin Integrity/wound status: Wounds to LLE    Code Status: Full code    Fall Risk: 8

## 2023-06-08 ENCOUNTER — HOME CARE VISIT (OUTPATIENT)
Dept: HOME HEALTH SERVICES | Facility: CLINIC | Age: 73
End: 2023-06-08
Payer: MEDICARE

## 2023-06-08 ENCOUNTER — READMISSION MANAGEMENT (OUTPATIENT)
Dept: CALL CENTER | Facility: HOSPITAL | Age: 73
End: 2023-06-08
Payer: MEDICARE

## 2023-06-08 VITALS
RESPIRATION RATE: 18 BRPM | OXYGEN SATURATION: 95 % | TEMPERATURE: 97.7 F | SYSTOLIC BLOOD PRESSURE: 110 MMHG | HEART RATE: 65 BPM | DIASTOLIC BLOOD PRESSURE: 68 MMHG

## 2023-06-08 PROCEDURE — G0299 HHS/HOSPICE OF RN EA 15 MIN: HCPCS

## 2023-06-08 NOTE — OUTREACH NOTE
Medical Week 2 Survey      Flowsheet Row Responses   [facility] facility patient discharged from? [facility]   Does the patient have one of the following disease processes/diagnoses(primary or secondary)? Other   Week 2 attempt successful? No  [Reached daughter's s/o on daughter's mobile phone number-states will have patient call if has any needs.]   Unsuccessful attempts Attempt 1            Lakshmi ESQUIVEL - Registered Nurse

## 2023-06-08 NOTE — HOME HEALTH
58 Day Gov[age]gv63 year old female with wound to LLE due to fal with deep lacerations x 2 . Patient requires SN for wound assessments and dressing changes.Patient has history of falls with injuries.    Home Health need continues for: SN for wound assessments and wound care    Primary diagnoses/co-morbidities/recent procedures in past 60 days that impact current episode: Lacerations to left leg due to fall, Lymphademia, ,cellulitis of LLE, cystitis, dorsalgia, chronic pain, HTN, pancytopenia, depression and anxiety    Current level of functional ability: Ambulatory in own home with rolling walker     Homebound status and living arrangements: Ambulatory short distances with rolling walker, patient receives assistance from niece and patients daughters boyfriend    Goals accomplished and/or measurable progress toward unmet goals in past 60 days: Ongoing and progressong    Skilled need:  SN  for wound care assessments and wound care dressing changes, monitor lymphadema and cellulitis    Focus of care for next 60 days for each discipline ordered: Wound care to LLE    Skin integrity/wound status: Patient skin is intact except for LLE with multiple contusions to arms from the pancytopenia and hx of falls    Code status: Full code    Most recent fall risk: high    Estimated date when home care services will end When goals are met and wounds are healed      Plan of Care confirmed with  Dr. Dia on 6/8/23

## 2023-06-08 NOTE — Clinical Note
58 Day Kspxbgc38 year old female with wound to LLE due to fal with deep lacerations x 2 . Patient requires SN for wound assessments and dressing changes.Patient has history of falls with injuries.    Home Health need continues for: SN for wound assessments and wound care    Primary diagnoses/co-morbidities/recent procedures in past 60 days that impact current episode: Lacerations to left leg due to fall, Lymphademia, ,cellulitis of LLE, cystitis, dorsalgia, chronic pain, HTN, pancytopenia, depression and anxiety    Current level of functional ability:Ambulatory with rolling walker in home    Homebound status and living arrangements: Ambulatory short distances with rolling walker Patient lives alone with assistance from niwcw and patients daughters boyfriend    Goals accomplished and/or measurable progress toward unmet goals in past 60 days: Ongoing and progressong    Skilled need:  SN  for wound care assessments and wound care dressing changes, monitor lymphadema and cellulitis    Focus of care for next 60 days for each discipline ordered: Wound care to LLE    Skin integrity/wound status: Patient skin is intact except for LLE with multiple contusions to arms from the pancytopenia and hx of falls    Code status: Full code    Most recent fall risk: high    Estimated date when home care services will end When goals are met and wounds are healed      Plan of Care confirmed with  Dr. Dia on 6/8/23

## 2023-06-09 ENCOUNTER — HOME CARE VISIT (OUTPATIENT)
Dept: HOME HEALTH SERVICES | Facility: CLINIC | Age: 73
End: 2023-06-09
Payer: MEDICARE

## 2023-06-09 VITALS
RESPIRATION RATE: 18 BRPM | SYSTOLIC BLOOD PRESSURE: 110 MMHG | OXYGEN SATURATION: 100 % | TEMPERATURE: 97.6 F | DIASTOLIC BLOOD PRESSURE: 70 MMHG | HEART RATE: 63 BPM

## 2023-06-09 PROCEDURE — G0299 HHS/HOSPICE OF RN EA 15 MIN: HCPCS

## 2023-06-09 NOTE — CASE COMMUNICATION
Patient refused physical therapy evaluation per phone call for 6/8/2023.  Patient reports no need for physical therapy services at this time.

## 2023-06-10 NOTE — HOME HEALTH
FOCUS OF CARE/SKILLED NEED : woundcare     TEACHING/INTERVENTIONS: educated patient on fall precautions and edema control, woundcare and dressings changed per order ,Ace wrap applied to LLE, patient refuses RLE to have ace wrap    PATIENT/CAREGIVER RESPONSE : patient verbalizes understanding    PROGRESS TOWARD GOALS: ongoing    PHYSICAN CONTACT: no    FALLS SINCE LAST VISIT :no    MEDICATION CHANGE SINCE LAST VISIT: no    PLAN FOR NEXT VISIT: woundcare

## 2023-06-12 ENCOUNTER — HOME CARE VISIT (OUTPATIENT)
Dept: HOME HEALTH SERVICES | Facility: CLINIC | Age: 73
End: 2023-06-12
Payer: MEDICARE

## 2023-06-12 VITALS
DIASTOLIC BLOOD PRESSURE: 42 MMHG | OXYGEN SATURATION: 99 % | TEMPERATURE: 97.8 F | HEART RATE: 59 BPM | SYSTOLIC BLOOD PRESSURE: 112 MMHG | RESPIRATION RATE: 20 BRPM

## 2023-06-12 PROCEDURE — G0299 HHS/HOSPICE OF RN EA 15 MIN: HCPCS

## 2023-06-12 NOTE — HOME HEALTH
FOCUS OF CARE/ SKILLED NEED: wound care    TEACHING/INTERVENTIONS: assessed, measured and photoed wounds on leg, cleansed and wrapped legs. Patients needs more ABD pads for next visit. patient tolerated well. MD called patient during visit concerning her leg edema worsening and starting her on abx, appointment for her to come in on friday was made. Discussed managment of edema with patient and prevention and risk for falls. Patient verbalized understanding. Discussed medications, patient stated she takes lasix 40mg daily. Added lasix to her medications. Nurse from [facility] office verified patient should be taking furosemide daily not prn.      PROGRESS TOWARD GOALS: on-going    PHYSICIAN CONTACT: no    INSURANCE CHANGES? no    FALLS SINCE LAST VISIT? Pt denies.    MEDICATION CHANGES SINCE LAST VISIT? yes, patienst verbalized she takes lasix 40 mg daily    PLANS FOR NEXT VISIT: wound care    PRE-SCREENED FOR COVID?  Yes, no s/s of Covid, no recent exposure

## 2023-06-13 ENCOUNTER — READMISSION MANAGEMENT (OUTPATIENT)
Dept: CALL CENTER | Facility: HOSPITAL | Age: 73
End: 2023-06-13
Payer: MEDICARE

## 2023-06-13 NOTE — OUTREACH NOTE
Medical Week 2 Survey      Flowsheet Row Responses   [facility] patient discharged from? [place]   Does the patient have one of the following disease processes/diagnoses(primary or secondary)? Other   Week 2 attempt successful? Yes   Call start time 1540   Discharge diagnosis Thrombocytopenia   Call end time 1549   Meds reviewed with patient/caregiver? Yes   Is the patient having any side effects they believe may be caused by any medication additions or changes? No   Does the patient have all medications ordered at discharge? Yes   Is the patient taking all medications as directed (includes completed medication regime)? Yes   Medication comments Pt has finished ATBs but has been extended  now for additional week   Comments regarding appointments Wound care on 6/2/23   Does the patient have a primary care provider?  Yes   Does the patient have an appointment with their PCP within 7 days of discharge? Yes   Comments regarding PCP PCP on 6/2/23   Has the patient kept scheduled appointments due by today? Yes   What is the Home health agency?   [facility]   Has home health visited the patient within 72 hours of discharge? Yes   What DME was ordered? hospital bed- [employer]   Has all DME been delivered? Yes  [Pt has hospital bed but not what she thought it would be---her family has her some sort of cushion ordered for elevation]   Psychosocial issues? No   Did the patient receive a copy of their discharge instructions? Yes   Nursing interventions Reviewed instructions with patient   What is the patient's perception of their health status since discharge? Same  [Reports ongoing concerns with left foot swelling, redness and ATBS extended.  Feels she may have a blister developing also-- is assisting with wound care, wrapping and monitoring. Pt keeps elevated as much as possible.Aware worsening s/s]   Is the patient/caregiver able to teach back signs and symptoms related to disease process for when to call PCP?  Yes   Is the patient/caregiver able to teach back signs and symptoms related to disease process for when to call 911? Yes   Week 2 Call Completed? Yes            CHRISTIANE GIBSON - Registered Nurse

## 2023-06-16 ENCOUNTER — HOME CARE VISIT (OUTPATIENT)
Dept: HOME HEALTH SERVICES | Facility: CLINIC | Age: 73
End: 2023-06-16
Payer: MEDICARE

## 2023-06-16 VITALS
DIASTOLIC BLOOD PRESSURE: 68 MMHG | TEMPERATURE: 96.6 F | HEART RATE: 66 BPM | OXYGEN SATURATION: 96 % | SYSTOLIC BLOOD PRESSURE: 110 MMHG | RESPIRATION RATE: 18 BRPM

## 2023-06-16 PROCEDURE — G0299 HHS/HOSPICE OF RN EA 15 MIN: HCPCS

## 2023-06-16 NOTE — HOME HEALTH
FOCUS OF CARE/SKILLED NEED : WOUNDCARE     TEACHING/INTERVENTIONS: EDUCATED PATIENT ON FALL PRECAUTIONS AND PRESSURE SORE PREVENTION AND INFECTION PREVENTION , WOUNDCARE AND DRESSINGS CHANGED PER ORDER     PATIENT/CAREGIVER RESPONSE :PATIENT VERBALIZED UNDERSTANDING     PROGRESS TOWARD GOALS:ONGOING AND PROGRESSING     PHYSICAN CONTACT: NO    FALLS SINCE LAST VISIT : NO    MEDICATION CHANGE SINCE LAST VISIT: NO     PLAN FOR NEXT VISIT: WOUNDCARE

## 2023-07-11 ENCOUNTER — TELEPHONE (OUTPATIENT)
Dept: HEMATOLOGY | Age: 73
End: 2023-07-11

## 2023-07-11 NOTE — TELEPHONE ENCOUNTER
Spoke with Mack Su was questioning whether she needs the US of abd repeated. She was in \[facility] [place] [facility]\"" recently and had one done. Told her no the one already done would work and she does not need to repeat. Verbalizes understanding.

## 2023-07-24 ENCOUNTER — HOME CARE VISIT (OUTPATIENT)
Dept: HOME HEALTH SERVICES | Facility: CLINIC | Age: 73
End: 2023-07-24
Payer: MEDICARE

## 2023-07-24 VITALS
RESPIRATION RATE: 18 BRPM | TEMPERATURE: 97.4 F | DIASTOLIC BLOOD PRESSURE: 72 MMHG | SYSTOLIC BLOOD PRESSURE: 124 MMHG | OXYGEN SATURATION: 98 % | HEART RATE: 68 BPM

## 2023-07-24 PROCEDURE — G0300 HHS/HOSPICE OF LPN EA 15 MIN: HCPCS

## 2023-07-24 NOTE — HOME HEALTH
Routine Visit Note:    Skill/education provided: Wound care, fall prevention, edema prevention    Patient/caregiver response: on going    Plan for next visit: wound care, fall prevention, edema prevention    Other pertinent info: no s/s of COVID voiced or noted

## 2023-07-27 ENCOUNTER — HOME CARE VISIT (OUTPATIENT)
Dept: HOME HEALTH SERVICES | Facility: CLINIC | Age: 73
End: 2023-07-27
Payer: MEDICARE

## 2023-07-27 VITALS
HEART RATE: 60 BPM | OXYGEN SATURATION: 99 % | TEMPERATURE: 97.4 F | RESPIRATION RATE: 18 BRPM | DIASTOLIC BLOOD PRESSURE: 64 MMHG | SYSTOLIC BLOOD PRESSURE: 122 MMHG

## 2023-07-27 NOTE — HOME HEALTH
FOCUS OF CARE/SKILLED NEED: Pre discharge, lab draw and UA collection, wound care, edema assessment/management, fall safety/prevention, skin integrity     TEACHING/INTERVENTIONS: A&O X 4, VSS. Pt c/o no pain today. Unable to draw labs x 2 attempts. Pt unable to give UA today. MD notified this would be collected Monday. TC also placed to MD office regarding HH discharge Monday due to no insurance approval  for services. Wound care performed per MD most recent order without difficulty. Pt tolerated well.     PROGRESS TOWARD GOALS: Ongoing/progressing     PHYSICIAN CONTACT: Yes    INSURANCE CHANGES? No     FALLS SINCE LAST VISIT? No     MEDICATION CHANGES SINCE LAST VISIT? No     PRE-SCREENED FOR COVID? Yes, No s/s of COVID. No recent exposure.

## 2023-07-31 ENCOUNTER — HOME CARE VISIT (OUTPATIENT)
Dept: HOME HEALTH SERVICES | Facility: CLINIC | Age: 73
End: 2023-07-31
Payer: MEDICARE

## 2023-07-31 VITALS
SYSTOLIC BLOOD PRESSURE: 120 MMHG | DIASTOLIC BLOOD PRESSURE: 60 MMHG | OXYGEN SATURATION: 95 % | HEART RATE: 78 BPM | RESPIRATION RATE: 18 BRPM | TEMPERATURE: 97.9 F

## 2023-07-31 PROCEDURE — G0299 HHS/HOSPICE OF RN EA 15 MIN: HCPCS

## 2023-09-06 ENCOUNTER — HOME HEALTH ADMISSION (OUTPATIENT)
Dept: HOME HEALTH SERVICES | Facility: HOME HEALTHCARE | Age: 73
End: 2023-09-06
Payer: MEDICARE

## 2023-09-06 ENCOUNTER — TRANSCRIBE ORDERS (OUTPATIENT)
Dept: HOME HEALTH SERVICES | Facility: HOME HEALTHCARE | Age: 73
End: 2023-09-06
Payer: MEDICARE
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
## 2023-09-06 DIAGNOSIS — S59.901A: Primary | ICD-10-CM

## 2023-09-11 ENCOUNTER — TELEPHONE (OUTPATIENT)
Dept: HEMATOLOGY | Age: 73
End: 2023-09-11

## 2023-09-11 NOTE — PROGRESS NOTES
Progress Note      Pt Name: Minal Cortez  YOB: 1950  MRN: [id number]    Date of evaluation: 9/12/2023  History Obtained From:  Patient, daughter, EMR    Portions of this note have been copied forward, however, changed to reflect the most current clinical status of this patient. Chief Complaint   Patient presents with    Follow-up     Thrombocytopenia        HISTORY OF PRESENT ILLNESS:    INTERVAL HISTORY:  Minal Cortez is a 68-year-old  female admitted to [facility] through the ED on 5/23/2023 by Dr. Elli Paul with a several week history of increasing dyspnea on exertion, difficulty ambulating across the room in her house even accomplishing activities of daily living such as going to the bathroom etc.  When evaluated in the ED she is found to have anemia and thrombocytopenia. CBC on 5/23/2023 had a WBC of 7.16 with 44.5% segmented neutrophils, 38.1% lymphocytes, 13% monocytes, 2.9% eosinophils and 0.7% basophils. Hemoglobin and hematocrit were 7.1 and 23.3 respectively with a platelet count of 4215. Significant comorbid associated medical problems include: Morbid obesity  Significant bilateral lower extremity chronic venous stasis, massive edema and bilateral lower extremity cellulitis L>R, hypertension, arthritis, thyroid disease, anxiety/depression     Nereida Mejia was initially evaluated by Margot Essex APRN on 5/19/2023 regarding thrombocytopenia. HEMATOLOGY HISTORY:  Nereida Mejia was initially evaluated by Margot Essex APRN on 5/19/2023, referred to the clinic by Dr. Onelia Schaeffer for evaluation of Anemia and thrombocytopenia. Review of prior CBCs at [facility] reveals chronic, intermittent anemia dating to 4/2015. Chronic thrombocytopenia has been present dating to at least 7/2016 with platelet count ranging from 69,000 -136,000. Nereida Mejia underwent laceration repair of her left leg by Dr. Bianca Nunes, status post fall with leg injury.   She was also treated for cellulitis

## 2023-09-12 ENCOUNTER — OFFICE VISIT (OUTPATIENT)
Dept: HEMATOLOGY | Age: 73
End: 2023-09-12
Payer: MEDICARE

## 2023-09-12 ENCOUNTER — HOSPITAL ENCOUNTER (OUTPATIENT)
Dept: INFUSION THERAPY | Age: 73
Discharge: HOME | End: 2023-09-12
Payer: MEDICARE

## 2023-09-12 VITALS
HEIGHT: 63 IN | WEIGHT: 219 LBS | OXYGEN SATURATION: 97 % | HEART RATE: 53 BPM | BODY MASS INDEX: 38.8 KG/M2 | DIASTOLIC BLOOD PRESSURE: 62 MMHG | SYSTOLIC BLOOD PRESSURE: 124 MMHG

## 2023-09-12 DIAGNOSIS — R93.2 ABNORMAL FINDING ON IMAGING OF LIVER: ICD-10-CM

## 2023-09-12 DIAGNOSIS — R76.8 ELEVATED SERUM IMMUNOGLOBULIN FREE LIGHT CHAINS: ICD-10-CM

## 2023-09-12 DIAGNOSIS — Z71.89 CARE PLAN DISCUSSED WITH PATIENT: ICD-10-CM

## 2023-09-12 DIAGNOSIS — D64.9 NORMOCYTIC ANEMIA: ICD-10-CM

## 2023-09-12 DIAGNOSIS — D50.9 IRON DEFICIENCY ANEMIA, UNSPECIFIED IRON DEFICIENCY ANEMIA TYPE: ICD-10-CM

## 2023-09-12 DIAGNOSIS — D69.6 THROMBOCYTOPENIA (HCC): Primary | ICD-10-CM

## 2023-09-12 LAB
FERRITIN SERPL-MCNC: 65.9 NG/ML (ref 13–150)
IRON SATN MFR SERPL: 18 % (ref 14–50)
IRON SERPL-MCNC: 57 UG/DL (ref 37–145)
TIBC SERPL-MCNC: 315 UG/DL (ref 250–400)

## 2023-09-12 PROCEDURE — 1036F TOBACCO NON-USER: CPT | Performed by: NURSE PRACTITIONER

## 2023-09-12 PROCEDURE — G8427 DOCREV CUR MEDS BY ELIG CLIN: HCPCS | Performed by: NURSE PRACTITIONER

## 2023-09-12 PROCEDURE — G8400 PT W/DXA NO RESULTS DOC: HCPCS | Performed by: NURSE PRACTITIONER

## 2023-09-12 PROCEDURE — 3078F DIAST BP <80 MM HG: CPT | Performed by: NURSE PRACTITIONER

## 2023-09-12 PROCEDURE — 99214 OFFICE O/P EST MOD 30 MIN: CPT | Performed by: NURSE PRACTITIONER

## 2023-09-12 PROCEDURE — 99212 OFFICE O/P EST SF 10 MIN: CPT

## 2023-09-12 PROCEDURE — 1090F PRES/ABSN URINE INCON ASSESS: CPT | Performed by: NURSE PRACTITIONER

## 2023-09-12 PROCEDURE — 3017F COLORECTAL CA SCREEN DOC REV: CPT | Performed by: NURSE PRACTITIONER

## 2023-09-12 PROCEDURE — G8417 CALC BMI ABV UP PARAM F/U: HCPCS | Performed by: NURSE PRACTITIONER

## 2023-09-12 PROCEDURE — 3074F SYST BP LT 130 MM HG: CPT | Performed by: NURSE PRACTITIONER

## 2023-09-12 PROCEDURE — 1123F ACP DISCUSS/DSCN MKR DOCD: CPT | Performed by: NURSE PRACTITIONER

## 2023-09-13 DIAGNOSIS — D64.9 NORMOCYTIC ANEMIA: ICD-10-CM

## 2023-09-13 LAB
BASOPHILS # BLD: 0.1 K/UL (ref 0–0.2)
BASOPHILS NFR BLD: 1 % (ref 0–1)
EOSINOPHIL # BLD: 0.2 K/UL (ref 0–0.6)
EOSINOPHIL NFR BLD: 3.5 % (ref 0–5)
ERYTHROCYTE [DISTWIDTH] IN BLOOD BY AUTOMATED COUNT: 20.6 % (ref 11.5–14.5)
HCT VFR BLD AUTO: 29.6 % (ref 37–47)
HGB BLD-MCNC: 9.4 G/DL (ref 12–16)
IMM GRANULOCYTES # BLD: 0 K/UL
LYMPHOCYTES # BLD: 2 K/UL (ref 1.1–4.5)
LYMPHOCYTES NFR BLD: 38.1 % (ref 20–40)
MCH RBC QN AUTO: 28 PG (ref 27–31)
MCHC RBC AUTO-ENTMCNC: 31.8 G/DL (ref 33–37)
MCV RBC AUTO: 88.1 FL (ref 81–99)
MONOCYTES # BLD: 0.7 K/UL (ref 0–0.9)
MONOCYTES NFR BLD: 13.9 % (ref 0–10)
NEUTROPHILS # BLD: 2.2 K/UL (ref 1.5–7.5)
NEUTS SEG NFR BLD: 43.1 % (ref 50–65)
PLATELET # BLD AUTO: 81 K/UL (ref 130–400)
RBC # BLD AUTO: 3.36 M/UL (ref 4.2–5.4)
WBC # BLD AUTO: 5.2 K/UL (ref 4.8–10.8)

## 2023-09-13 PROCEDURE — 36415 COLL VENOUS BLD VENIPUNCTURE: CPT | Performed by: NURSE PRACTITIONER

## 2023-09-14 RX ORDER — DEXAMETHASONE 4 MG/1
TABLET ORAL
Qty: 40 TABLET | Refills: 0 | Status: SHIPPED | OUTPATIENT
Start: 2023-09-14

## 2023-09-15 ENCOUNTER — TELEPHONE (OUTPATIENT)
Dept: INFUSION THERAPY | Age: 73
End: 2023-09-15

## 2023-09-15 LAB
ALBUMIN SERPL-MCNC: 3.28 G/DL (ref 3.75–5.01)
ALPHA1 GLOB SERPL ELPH-MCNC: 0.34 G/DL (ref 0.19–0.46)
ALPHA2 GLOB SERPL ELPH-MCNC: 0.68 G/DL (ref 0.48–1.05)
B-GLOBULIN SERPL ELPH-MCNC: 0.77 G/DL (ref 0.48–1.1)
DEPRECATED KAPPA LC FREE/LAMBDA SER: 2.25 {RATIO} (ref 0.26–1.65)
EER MONOCLONAL PROTEIN AND FLC, SERUM: ABNORMAL
GAMMA GLOB SERPL ELPH-MCNC: 1.13 G/DL (ref 0.62–1.51)
IGA SERPL-MCNC: 293 MG/DL (ref 68–408)
IGG SERPL-MCNC: 1084 MG/DL (ref 768–1632)
IGM SERPL-MCNC: 219 MG/DL (ref 35–263)
INTERPRETATION SERPL IFE-IMP: ABNORMAL
INTERPRETATION SERPL IFE-IMP: ABNORMAL
KAPPA LC FREE SER-MCNC: 74.39 MG/L (ref 3.3–19.4)
LAMBDA LC FREE SERPL-MCNC: 33.06 MG/L (ref 5.71–26.3)
MONOCLONAL PROTEIN, SERUM: ABNORMAL G/DL
PROT SERPL-MCNC: 6.2 G/DL (ref 6.3–8.2)

## 2023-09-15 NOTE — TELEPHONE ENCOUNTER
Patient left  requesting results on labs completed 9/12/23. Attempted to return phone call to patient to inform her that we are still waiting on several labs to result. No answer from patient.

## 2023-09-16 ASSESSMENT — ENCOUNTER SYMPTOMS
VOMITING: 0
DIARRHEA: 0
BACK PAIN: 1
WHEEZING: 0
NAUSEA: 0
SORE THROAT: 1
SHORTNESS OF BREATH: 1
EYE DISCHARGE: 0
EYE ITCHING: 0
ABDOMINAL PAIN: 0
COUGH: 0
CONSTIPATION: 1

## 2023-09-26 ENCOUNTER — TELEPHONE (OUTPATIENT)
Dept: HEMATOLOGY | Age: 73
End: 2023-09-26

## 2023-09-26 DIAGNOSIS — D69.3 IMMUNE THROMBOCYTOPENIA (HCC): Primary | ICD-10-CM

## 2023-09-30 ENCOUNTER — APPOINTMENT (OUTPATIENT)
Dept: GENERAL RADIOLOGY | Facility: HOSPITAL | Age: 73
DRG: 871 | End: 2023-09-30
Payer: MEDICARE

## 2023-09-30 ENCOUNTER — HOSPITAL ENCOUNTER (INPATIENT)
Facility: HOSPITAL | Age: 73
LOS: 6 days | Discharge: NURSING, REHAB OR LONG-TERM CARE | DRG: 871 | End: 2023-10-06
Attending: EMERGENCY MEDICINE | Admitting: FAMILY MEDICINE
Payer: MEDICARE

## 2023-09-30 DIAGNOSIS — A41.9 SEPSIS, DUE TO UNSPECIFIED ORGANISM, UNSPECIFIED WHETHER ACUTE ORGAN DYSFUNCTION PRESENT: Primary | ICD-10-CM

## 2023-09-30 DIAGNOSIS — I89.0 LYMPHEDEMA: ICD-10-CM

## 2023-09-30 DIAGNOSIS — M48.061 SPINAL STENOSIS OF LUMBAR REGION, UNSPECIFIED WHETHER NEUROGENIC CLAUDICATION PRESENT: ICD-10-CM

## 2023-09-30 DIAGNOSIS — Z74.09 IMPAIRED MOBILITY: ICD-10-CM

## 2023-09-30 PROBLEM — B37.2 CANDIDIASIS OF SKIN: Status: ACTIVE | Noted: 2023-09-05

## 2023-09-30 PROBLEM — E03.9 ACQUIRED HYPOTHYROIDISM: Status: ACTIVE | Noted: 2018-03-19

## 2023-09-30 LAB
ALBUMIN SERPL-MCNC: 3.3 G/DL (ref 3.5–5.2)
ALBUMIN/GLOB SERPL: 1.1 G/DL
ALP SERPL-CCNC: 209 U/L (ref 39–117)
ALT SERPL W P-5'-P-CCNC: 15 U/L (ref 1–33)
ANION GAP SERPL CALCULATED.3IONS-SCNC: 15 MMOL/L (ref 5–15)
ANISOCYTOSIS BLD QL: ABNORMAL
AST SERPL-CCNC: 39 U/L (ref 1–32)
BACTERIA UR QL AUTO: ABNORMAL /HPF
BILIRUB SERPL-MCNC: 2.4 MG/DL (ref 0–1.2)
BILIRUB UR QL STRIP: ABNORMAL
BUN SERPL-MCNC: 39 MG/DL (ref 8–23)
BUN/CREAT SERPL: 24.1 (ref 7–25)
CALCIUM SPEC-SCNC: 9.3 MG/DL (ref 8.6–10.5)
CHLORIDE SERPL-SCNC: 99 MMOL/L (ref 98–107)
CLARITY UR: CLEAR
CO2 SERPL-SCNC: 21 MMOL/L (ref 22–29)
COLOR UR: ABNORMAL
CREAT SERPL-MCNC: 1.62 MG/DL (ref 0.57–1)
D-LACTATE SERPL-SCNC: 1.7 MMOL/L (ref 0.5–2)
D-LACTATE SERPL-SCNC: 3 MMOL/L (ref 0.5–2)
D-LACTATE SERPL-SCNC: 3.3 MMOL/L (ref 0.5–2)
D-LACTATE SERPL-SCNC: 3.7 MMOL/L (ref 0.5–2)
DEPRECATED RDW RBC AUTO: 60.6 FL (ref 37–54)
EGFRCR SERPLBLD CKD-EPI 2021: 33.2 ML/MIN/1.73
ERYTHROCYTE [DISTWIDTH] IN BLOOD BY AUTOMATED COUNT: 21.2 % (ref 12.3–15.4)
GLOBULIN UR ELPH-MCNC: 2.9 GM/DL
GLUCOSE SERPL-MCNC: 121 MG/DL (ref 65–99)
GLUCOSE UR STRIP-MCNC: NEGATIVE MG/DL
HCT VFR BLD AUTO: 30.1 % (ref 34–46.6)
HGB BLD-MCNC: 10.1 G/DL (ref 12–15.9)
HGB UR QL STRIP.AUTO: NEGATIVE
HYALINE CASTS UR QL AUTO: ABNORMAL /LPF
KETONES UR QL STRIP: NEGATIVE
LEUKOCYTE ESTERASE UR QL STRIP.AUTO: ABNORMAL
LYMPHOCYTES # BLD MANUAL: 1.58 10*3/MM3 (ref 0.7–3.1)
LYMPHOCYTES NFR BLD MANUAL: 6.2 % (ref 5–12)
MCH RBC QN AUTO: 29.9 PG (ref 26.6–33)
MCHC RBC AUTO-ENTMCNC: 33.6 G/DL (ref 31.5–35.7)
MCV RBC AUTO: 89.1 FL (ref 79–97)
MICROCYTES BLD QL: ABNORMAL
MONOCYTES # BLD: 1.36 10*3/MM3 (ref 0.1–0.9)
NEUTROPHILS # BLD AUTO: 19.02 10*3/MM3 (ref 1.7–7)
NEUTROPHILS NFR BLD MANUAL: 86.6 % (ref 42.7–76)
NITRITE UR QL STRIP: POSITIVE
PH UR STRIP.AUTO: 5.5 [PH] (ref 5–8)
PLATELET # BLD AUTO: 117 10*3/MM3 (ref 140–450)
PMV BLD AUTO: 11.1 FL (ref 6–12)
POIKILOCYTOSIS BLD QL SMEAR: ABNORMAL
POLYCHROMASIA BLD QL SMEAR: ABNORMAL
POTASSIUM SERPL-SCNC: 4.5 MMOL/L (ref 3.5–5.2)
PROCALCITONIN SERPL-MCNC: 1.01 NG/ML (ref 0–0.25)
PROT SERPL-MCNC: 6.2 G/DL (ref 6–8.5)
PROT UR QL STRIP: NEGATIVE
RBC # BLD AUTO: 3.38 10*6/MM3 (ref 3.77–5.28)
RBC # UR STRIP: ABNORMAL /HPF
REF LAB TEST METHOD: ABNORMAL
SMALL PLATELETS BLD QL SMEAR: ABNORMAL
SODIUM SERPL-SCNC: 135 MMOL/L (ref 136–145)
SP GR UR STRIP: 1.02 (ref 1–1.03)
SQUAMOUS #/AREA URNS HPF: ABNORMAL /HPF
UROBILINOGEN UR QL STRIP: ABNORMAL
VARIANT LYMPHS NFR BLD MANUAL: 7.2 % (ref 19.6–45.3)
WBC # UR STRIP: ABNORMAL /HPF
WBC MORPH BLD: NORMAL
WBC NRBC COR # BLD: 21.96 10*3/MM3 (ref 3.4–10.8)

## 2023-09-30 PROCEDURE — 83605 ASSAY OF LACTIC ACID: CPT | Performed by: EMERGENCY MEDICINE

## 2023-09-30 PROCEDURE — 85025 COMPLETE CBC W/AUTO DIFF WBC: CPT | Performed by: EMERGENCY MEDICINE

## 2023-09-30 PROCEDURE — 87086 URINE CULTURE/COLONY COUNT: CPT | Performed by: EMERGENCY MEDICINE

## 2023-09-30 PROCEDURE — 36415 COLL VENOUS BLD VENIPUNCTURE: CPT | Performed by: EMERGENCY MEDICINE

## 2023-09-30 PROCEDURE — 71045 X-RAY EXAM CHEST 1 VIEW: CPT

## 2023-09-30 PROCEDURE — 25010000002 CEFAZOLIN PER 500 MG: Performed by: FAMILY MEDICINE

## 2023-09-30 PROCEDURE — 99285 EMERGENCY DEPT VISIT HI MDM: CPT

## 2023-09-30 PROCEDURE — 87186 SC STD MICRODIL/AGAR DIL: CPT | Performed by: EMERGENCY MEDICINE

## 2023-09-30 PROCEDURE — 84145 PROCALCITONIN (PCT): CPT | Performed by: FAMILY MEDICINE

## 2023-09-30 PROCEDURE — 25010000002 CEFTRIAXONE PER 250 MG: Performed by: EMERGENCY MEDICINE

## 2023-09-30 PROCEDURE — 85007 BL SMEAR W/DIFF WBC COUNT: CPT | Performed by: EMERGENCY MEDICINE

## 2023-09-30 PROCEDURE — 25010000002 ENOXAPARIN PER 10 MG: Performed by: FAMILY MEDICINE

## 2023-09-30 PROCEDURE — 81001 URINALYSIS AUTO W/SCOPE: CPT | Performed by: EMERGENCY MEDICINE

## 2023-09-30 PROCEDURE — 87040 BLOOD CULTURE FOR BACTERIA: CPT | Performed by: EMERGENCY MEDICINE

## 2023-09-30 PROCEDURE — 80053 COMPREHEN METABOLIC PANEL: CPT | Performed by: EMERGENCY MEDICINE

## 2023-09-30 PROCEDURE — P9612 CATHETERIZE FOR URINE SPEC: HCPCS

## 2023-09-30 RX ORDER — FUROSEMIDE 40 MG/1
40 TABLET ORAL DAILY
Status: DISCONTINUED | OUTPATIENT
Start: 2023-09-30 | End: 2023-10-06 | Stop reason: HOSPADM

## 2023-09-30 RX ORDER — BISACODYL 10 MG
10 SUPPOSITORY, RECTAL RECTAL DAILY PRN
Status: DISCONTINUED | OUTPATIENT
Start: 2023-09-30 | End: 2023-10-06 | Stop reason: HOSPADM

## 2023-09-30 RX ORDER — DOCUSATE SODIUM 100 MG/1
100 CAPSULE, LIQUID FILLED ORAL 2 TIMES DAILY
Status: DISCONTINUED | OUTPATIENT
Start: 2023-09-30 | End: 2023-10-06 | Stop reason: HOSPADM

## 2023-09-30 RX ORDER — ACETAMINOPHEN 325 MG/1
650 TABLET ORAL EVERY 4 HOURS PRN
Status: DISCONTINUED | OUTPATIENT
Start: 2023-09-30 | End: 2023-10-06 | Stop reason: HOSPADM

## 2023-09-30 RX ORDER — BISACODYL 5 MG/1
5 TABLET, DELAYED RELEASE ORAL DAILY PRN
Status: DISCONTINUED | OUTPATIENT
Start: 2023-09-30 | End: 2023-10-06 | Stop reason: HOSPADM

## 2023-09-30 RX ORDER — BISOPROLOL FUMARATE AND HYDROCHLOROTHIAZIDE 5; 6.25 MG/1; MG/1
1 TABLET ORAL DAILY
Status: DISCONTINUED | OUTPATIENT
Start: 2023-09-30 | End: 2023-10-03

## 2023-09-30 RX ORDER — DULOXETIN HYDROCHLORIDE 30 MG/1
60 CAPSULE, DELAYED RELEASE ORAL 2 TIMES DAILY
Status: DISCONTINUED | OUTPATIENT
Start: 2023-09-30 | End: 2023-10-06 | Stop reason: HOSPADM

## 2023-09-30 RX ORDER — POLYETHYLENE GLYCOL 3350 17 G/17G
17 POWDER, FOR SOLUTION ORAL DAILY PRN
Status: DISCONTINUED | OUTPATIENT
Start: 2023-09-30 | End: 2023-10-06 | Stop reason: HOSPADM

## 2023-09-30 RX ORDER — AMOXICILLIN 250 MG
2 CAPSULE ORAL 2 TIMES DAILY
Status: DISCONTINUED | OUTPATIENT
Start: 2023-09-30 | End: 2023-10-06 | Stop reason: HOSPADM

## 2023-09-30 RX ORDER — HYDROCODONE BITARTRATE AND ACETAMINOPHEN 5; 325 MG/1; MG/1
1 TABLET ORAL EVERY 6 HOURS PRN
Status: DISCONTINUED | OUTPATIENT
Start: 2023-09-30 | End: 2023-10-01

## 2023-09-30 RX ORDER — LEVOTHYROXINE SODIUM 0.1 MG/1
100 TABLET ORAL
Status: DISCONTINUED | OUTPATIENT
Start: 2023-09-30 | End: 2023-10-06 | Stop reason: HOSPADM

## 2023-09-30 RX ORDER — NYSTATIN 100000 [USP'U]/G
1 POWDER TOPICAL 2 TIMES DAILY
Status: DISCONTINUED | OUTPATIENT
Start: 2023-09-30 | End: 2023-10-06 | Stop reason: HOSPADM

## 2023-09-30 RX ORDER — SODIUM CHLORIDE 9 MG/ML
100 INJECTION, SOLUTION INTRAVENOUS CONTINUOUS
Status: DISCONTINUED | OUTPATIENT
Start: 2023-09-30 | End: 2023-10-02

## 2023-09-30 RX ORDER — SODIUM CHLORIDE 0.9 % (FLUSH) 0.9 %
10 SYRINGE (ML) INJECTION EVERY 12 HOURS SCHEDULED
Status: DISCONTINUED | OUTPATIENT
Start: 2023-09-30 | End: 2023-10-06 | Stop reason: HOSPADM

## 2023-09-30 RX ORDER — SODIUM CHLORIDE 0.9 % (FLUSH) 0.9 %
10 SYRINGE (ML) INJECTION AS NEEDED
Status: DISCONTINUED | OUTPATIENT
Start: 2023-09-30 | End: 2023-10-06 | Stop reason: HOSPADM

## 2023-09-30 RX ORDER — CYCLOBENZAPRINE HCL 10 MG
10 TABLET ORAL 3 TIMES DAILY PRN
Status: DISCONTINUED | OUTPATIENT
Start: 2023-09-30 | End: 2023-10-06 | Stop reason: HOSPADM

## 2023-09-30 RX ORDER — OXYBUTYNIN CHLORIDE 5 MG/1
5 TABLET, EXTENDED RELEASE ORAL DAILY
Status: DISCONTINUED | OUTPATIENT
Start: 2023-09-30 | End: 2023-10-06 | Stop reason: HOSPADM

## 2023-09-30 RX ORDER — PRAMIPEXOLE DIHYDROCHLORIDE 0.25 MG/1
0.75 TABLET ORAL NIGHTLY
Status: DISCONTINUED | OUTPATIENT
Start: 2023-09-30 | End: 2023-10-06 | Stop reason: HOSPADM

## 2023-09-30 RX ORDER — ENOXAPARIN SODIUM 100 MG/ML
40 INJECTION SUBCUTANEOUS DAILY
Status: DISCONTINUED | OUTPATIENT
Start: 2023-09-30 | End: 2023-10-03

## 2023-09-30 RX ORDER — SODIUM CHLORIDE 9 MG/ML
40 INJECTION, SOLUTION INTRAVENOUS AS NEEDED
Status: DISCONTINUED | OUTPATIENT
Start: 2023-09-30 | End: 2023-10-06 | Stop reason: HOSPADM

## 2023-09-30 RX ADMIN — PRAMIPEXOLE DIHYDROCHLORIDE 0.75 MG: 0.25 TABLET ORAL at 21:00

## 2023-09-30 RX ADMIN — ENOXAPARIN SODIUM 40 MG: 100 INJECTION SUBCUTANEOUS at 22:08

## 2023-09-30 RX ADMIN — DOCUSATE SODIUM 100 MG: 100 CAPSULE, LIQUID FILLED ORAL at 20:59

## 2023-09-30 RX ADMIN — CEFAZOLIN 2000 MG: 2 INJECTION, POWDER, FOR SOLUTION INTRAMUSCULAR; INTRAVENOUS at 22:06

## 2023-09-30 RX ADMIN — NYSTATIN 1 APPLICATION: 100000 POWDER TOPICAL at 22:37

## 2023-09-30 RX ADMIN — SODIUM CHLORIDE 1572 ML: 900 INJECTION INTRAVENOUS at 16:23

## 2023-09-30 RX ADMIN — OXYBUTYNIN CHLORIDE 5 MG: 5 TABLET, EXTENDED RELEASE ORAL at 22:45

## 2023-09-30 RX ADMIN — DULOXETINE HYDROCHLORIDE 60 MG: 30 CAPSULE, DELAYED RELEASE ORAL at 20:59

## 2023-09-30 RX ADMIN — CEFTRIAXONE SODIUM 2000 MG: 2 INJECTION, POWDER, FOR SOLUTION INTRAMUSCULAR; INTRAVENOUS at 16:23

## 2023-09-30 RX ADMIN — Medication 10 ML: at 21:03

## 2023-09-30 RX ADMIN — DOCUSATE SODIUM 50 MG AND SENNOSIDES 8.6 MG 2 TABLET: 8.6; 5 TABLET, FILM COATED ORAL at 20:59

## 2023-09-30 NOTE — PLAN OF CARE
Goal Outcome Evaluation: Patient admitted from ER with sepsis. Patient complaints of bilateral leg pain with pain in left leg worse than in the right leg. Patient is extremely lethargic at this time, she will answer questions with repeated stimulation. Patient has a history of multiple falls. Fall risk protocol in place.  Bilateral legs have severe edema and patient has them wrapped at this time. Patient safety to be maintained this shift, continue to monitor and report abnormal to provider.

## 2023-09-30 NOTE — H&P
Patient Care Team:  Sachin Dia MD as PCP - General  Sachin Dia MD as PCP - Family Medicine  Sumeet Bowles PA as Physician Assistant (Family Medicine)  Sarita Vazquez MD as Surgeon (General Surgery)    Chief complaint fever and alteration in mentation    Subjective     Patient is a 72 y.o. female presents with altered mental status and fever. Onset of symptoms was abrupt starting 1 day ago.  Symptoms are associated with swelling and increased redness of bilateral lower extremities.  She does suffer from chronic lymphedema.  Has had increased soreness in the legs prior to admission.  Symptoms are aggravated by nothing known.   Symptoms improve with no interventions at home prior to arrival. Severity moderate.  Context again has chronic edema with venous stasis and skin issues that come and go.  Quality described as deep soreness in her legs.  Has history of urinary tract infections but denies any dysuria or frequency.  She has some difficulty in giving full history.  She is accompanied by her niece in the room.    Review of Systems   Review of systems could not be obtained due to   patient confusion.    History  Past Medical History:   Diagnosis Date    Arthritis     Bronchitis     Cataract     right eye , cornea implant    Chronic back pain     Depression     Disease of thyroid gland     Edema     lower extremities    Hypertension     Kidney stones      Past Surgical History:   Procedure Laterality Date    BACK SURGERY      2010 ([place]), 2013     CHOLECYSTECTOMY OPEN      CORNEAL TRANSPLANT Right 1999    INCISION AND DRAINAGE ABSCESS Left 4/19/2023    Procedure: INCISION AND DRAINAGE ABSCESS left leg;  Surgeon: Sarita Vazquez MD;  Location: [facility] OR;  Service: General;  Laterality: Left;    LACERATION REPAIR Left 4/7/2023    Procedure: washout and closure left leg wound ;  Surgeon: Sarita Vazquez MD;  Location:  [facility] OR;  Service: General;  Laterality: Left;    LUMBAR  FUSION Left 8/2/2017    Procedure: LEFT LUMBAR LATERAL INTERBODY FUSION WITH INSTRUMENTATION  L1-2;  Surgeon: MARCELINO Wright MD;  Location:  [facility] OR;  Service:     NECK SURGERY  2010    [place]    TOTAL HIP ARTHROPLASTY Right 2014    DR. KENDRICK     TOTAL KNEE ARTHROPLASTY Right 2000    TOTAL KNEE ARTHROPLASTY Left 2002    TOTAL SHOULDER ARTHROPLASTY W/ DISTAL CLAVICLE EXCISION Left 6/1/2020    Procedure: LEFT REVERSE TOTAL SHOULDER REPLACEMENT;  Surgeon: Saravanan Calvin MD;  Location:  [facility] OR;  Service: Orthopedics;  Laterality: Left;     Family History   Problem Relation Age of Onset    Breast cancer Neg Hx      Social History     Tobacco Use    Smoking status: Never    Smokeless tobacco: Never   Vaping Use    Vaping Use: Never used   Substance Use Topics    Alcohol use: No    Drug use: No     E-cigarette/Vaping    E-cigarette/Vaping Use Never User      E-cigarette/Vaping Substances     Medications Prior to Admission   Medication Sig Dispense Refill Last Dose    ALBUTEROL IN Inhale 4 (Four) Times a Day.       bisoprolol-hydrochlorothiazide (ZIAC) 5-6.25 MG per tablet Take 1 tablet by mouth Daily. Indications: High Blood Pressure Disorder       cephalexin (Keflex) 500 MG capsule Take 1 capsule by mouth 2 (Two) Times a Day. 14 capsule 0     cyclobenzaprine (FLEXERIL) 10 MG tablet Take 1 tablet by mouth 3 (Three) Times a Day As Needed for Muscle Spasms. 30 tablet 0     docusate sodium (Colace) 100 MG capsule Take 1 capsule by mouth 2 (Two) Times a Day. 60 capsule 1     DULoxetine (CYMBALTA) 60 MG capsule Take 1 capsule by mouth 2 (Two) Times a Day. Indications: Musculoskeletal Pain       furosemide (LASIX) 40 MG tablet Take 40 mg by mouth Daily. Indications: Edema       HYDROcodone-acetaminophen (NORCO) 5-325 MG per tablet Take 1 tablet by mouth Every 6 (Six) Hours As Needed.       levothyroxine (SYNTHROID, LEVOTHROID) 100 MCG tablet Take 1 tablet by mouth Daily. Indications: Underactive Thyroid        MORPHINE SULFATE PO Take  by mouth Daily.       nystatin (MYCOSTATIN) 288070 UNIT/GM powder Apply 1 application topically to the appropriate area as directed 2 (Two) Times a Day. Indications: Candida Infection of Skin and Mouth or Vagina       oxybutynin XL (DITROPAN-XL) 5 MG 24 hr tablet Take 5 mg by mouth Daily. Take at night  Indications: Urinary Incontinence       oxybutynin XL (DITROPAN-XL) 5 MG 24 hr tablet Take 5 mg by mouth Daily. Indications: Urinary Incontinence       pramipexole (MIRAPEX) 0.75 MG tablet Take 1 tablet by mouth every night at bedtime. Indications: Restless Leg Syndrome       vitamin C (ASCORBIC ACID) 500 MG tablet Take 1 tablet by mouth Daily. Indications: Inadequate Vitamin C       vitamin D3 125 MCG (5000 UT) capsule capsule Take 1 capsule by mouth Daily. Indications: Vitamin D Deficiency        Allergies:  Codeine, Amoxicillin, Levaquin [levofloxacin], and Benadryl [diphenhydramine]    Objective     Vital Signs  Temp:  [99.4 °F (37.4 °C)-101.1 °F (38.4 °C)] 99.4 °F (37.4 °C)  Heart Rate:  [80-86] 80  Resp:  [17-20] 20  BP: (129-138)/(51-60) 138/56    Physical Exam:    General Appearance: alert, appears stated age, interactive, cooperative, and morbidly obese  Head: normocephalic, without obvious abnormality and atraumatic  Eyes: lids and lashes normal, conjunctivae and sclerae normal, and no icterus  Neck: supple and trachea midline  Lungs: clear to auscultation, respirations regular, respirations even, and respirations unlabored  Heart: regular rhythm & normal rate, normal S1, S2, and no murmur, no gallop, no rub  Abdomen: normal bowel sounds, no masses, soft non-tender, and no guarding  Extremities: no ischemic changes, edema 2+ lower bilateral, redness coming out from above bilateral wraps that she has on her lower extremities, tenderness bilateral legs, and venous status dermatitis  Skin: Erythema and chronic skin changes lower extremities  Neurologic: Mental Status alertness alert  and awake, orientation person and think she is at [facility] unsure of the date, and mood/affect normal    Results Review:    I reviewed the patient's new clinical results.    Assessment & Plan       Sepsis    Cellulitis of right lower extremity    HTN (hypertension)    Obesity, Class III, BMI 40-49.9 (morbid obesity)    Traumatic rhabdomyolysis    Cellulitis of left lower extremity      Patient with evidence of sepsis possibly from cellulitis of lower extremity.  Urine test currently pending.  We will go ahead and place on antibiotics per sepsis treatment protocol and adjust according to results of pending cultures.  We will ask wound care to come and evaluate legs as well.  Follow lab reports.  Will likely need PT and OT eval but will try to get her symptoms and legs under control as I am not sure she would tolerate much in the way of therapy right now.    I discussed the patients findings and my recommendations with patient and family.     Tremaine Forrester MD  09/30/23  17:01 CDT    Time:  Greater than 40 minutes

## 2023-09-30 NOTE — ED PROVIDER NOTES
Subjective   History of Present Illness  Patient is brought to emergency room by ambulance after family found her with altered mental status.  They said they could not get her to respond to their phone calls so they went to check on her and found her sitting in her chair confused and very weak.  They try to get her to stand but she could not stand her she stood up was briefly and had to sit back down.  As far as they know she is normally alert and oriented and was until today.  I do not have any reports of cough or congestion.  The patient is unable to give us much complete information because of her altered mental status.    History provided by:  Patient   used: No    Weakness - Generalized  Severity:  Severe  Onset quality:  Sudden  Duration:  1 day  Timing:  Constant  Progression:  Unchanged  Chronicity:  New  Context: not alcohol use, not allergies, not change in medication, not decreased sleep, not dehydration, not drug use, not increased activity, not pinched nerve, not recent infection, not stress and not urinary tract infection    Relieved by:  Nothing  Worsened by:  Nothing  Ineffective treatments:  None tried  Associated symptoms: difficulty walking and lethargy    Associated symptoms: no abdominal pain, no anorexia, no aphasia, no arthralgias, no ataxia, no chest pain, no cough, no diarrhea, no dizziness, no drooling, no dysphagia, no dysuria, no numbness in extremities, no falls, no fever, no foul-smelling urine, no frequency, no headaches, no hematochezia, no loss of consciousness, no melena, no myalgias, no nausea, no near-syncope, no seizures, no sensory-motor deficit, no shortness of breath, no stroke symptoms, no syncope, no urgency, no vision change and no vomiting    Risk factors: no anemia, no congestive heart failure, no coronary artery disease, no diabetes, no excessive menstruation, no family hx of stroke, no heart disease, no neurologic disease, no new medications and no  recent stressors      Review of Systems   Constitutional: Negative.  Negative for fever.   HENT: Negative.  Negative for drooling.    Respiratory: Negative.  Negative for cough and shortness of breath.    Cardiovascular: Negative.  Negative for chest pain, syncope and near-syncope.   Gastrointestinal: Negative.  Negative for abdominal pain, anorexia, diarrhea, dysphagia, hematochezia, melena, nausea and vomiting.   Genitourinary: Negative.  Negative for dysuria, frequency and urgency.   Musculoskeletal: Negative.  Negative for arthralgias, falls and myalgias.   Skin: Negative.    Neurological:  Negative for dizziness, seizures, loss of consciousness and headaches.   Psychiatric/Behavioral: Negative.     All other systems reviewed and are negative.    Past Medical History:   Diagnosis Date    Arthritis     Bronchitis     Cataract     right eye , cornea implant    Chronic back pain     Depression     Disease of thyroid gland     Edema     lower extremities    Hypertension     Kidney stones        Allergies   Allergen Reactions    Codeine Itching and Rash     SEVERE RASH/ITCHING (TOLERATES PERCOCET)    Amoxicillin Other (See Comments)     unknown    Levaquin [Levofloxacin] Other (See Comments)     unknown    Benadryl [Diphenhydramine] Other (See Comments)     KEEPS PATIENT AWAKE        Past Surgical History:   Procedure Laterality Date    BACK SURGERY      2010 ([place]), 2013     CHOLECYSTECTOMY OPEN      CORNEAL TRANSPLANT Right 1999    INCISION AND DRAINAGE ABSCESS Left 4/19/2023    Procedure: INCISION AND DRAINAGE ABSCESS left leg;  Surgeon: Sarita Vazquez MD;  Location:  [facility];  Service: General;  Laterality: Left;    LACERATION REPAIR Left 4/7/2023    Procedure: washout and closure left leg wound ;  Surgeon: Sarita Vazquez MD;  Location:  [facility];  Service: General;  Laterality: Left;    LUMBAR FUSION Left 8/2/2017    Procedure: LEFT LUMBAR LATERAL INTERBODY FUSION WITH INSTRUMENTATION  L1-2;   Surgeon: MARCELINO Wright MD;  Location:  [facility];  Service:     NECK SURGERY  2010    [place]    TOTAL HIP ARTHROPLASTY Right 2014    DR. KENDRICK     TOTAL KNEE ARTHROPLASTY Right 2000    TOTAL KNEE ARTHROPLASTY Left 2002    TOTAL SHOULDER ARTHROPLASTY W/ DISTAL CLAVICLE EXCISION Left 6/1/2020    Procedure: LEFT REVERSE TOTAL SHOULDER REPLACEMENT;  Surgeon: Saravanan Calvin MD;  Location:  [facility];  Service: Orthopedics;  Laterality: Left;       Family History   Problem Relation Age of Onset    Breast cancer Neg Hx        Social History     Socioeconomic History    Marital status:    Tobacco Use    Smoking status: Never    Smokeless tobacco: Never   Vaping Use    Vaping Use: Never used   Substance and Sexual Activity    Alcohol use: No    Drug use: No    Sexual activity: Defer       Prior to Admission medications    Medication Sig Start Date End Date Taking? Authorizing Provider   ALBUTEROL IN Inhale 4 (Four) Times a Day.    ProviderLacie MD   bisoprolol-hydrochlorothiazide (ZIAC) 5-6.25 MG per tablet Take 1 tablet by mouth Daily. Indications: High Blood Pressure Disorder    ProviderLacie MD   cephalexin (Keflex) 500 MG capsule Take 1 capsule by mouth 2 (Two) Times a Day. 5/30/23   Sanjiv Block MD   cyclobenzaprine (FLEXERIL) 10 MG tablet Take 1 tablet by mouth 3 (Three) Times a Day As Needed for Muscle Spasms. 6/1/20   Saravanan Calvin MD   docusate sodium (Colace) 100 MG capsule Take 1 capsule by mouth 2 (Two) Times a Day. 6/1/20   Saravanan Calvin MD   DULoxetine (CYMBALTA) 60 MG capsule Take 1 capsule by mouth 2 (Two) Times a Day. Indications: Musculoskeletal Pain    ProviderLacie MD   furosemide (LASIX) 40 MG tablet Take 40 mg by mouth Daily. Indications: Edema 6/12/23   Lacie Hernandez MD   HYDROcodone-acetaminophen (NORCO) 5-325 MG per tablet Take 1 tablet by mouth Every 6 (Six) Hours As Needed.    Lacie Hernandez MD   levothyroxine (SYNTHROID,  LEVOTHROID) 100 MCG tablet Take 1 tablet by mouth Daily. Indications: Underactive Thyroid    Lacie Hernandez MD   MORPHINE SULFATE PO Take  by mouth Daily.    Lacie Hernandez MD   nystatin (MYCOSTATIN) 744811 UNIT/GM powder Apply 1 application topically to the appropriate area as directed 2 (Two) Times a Day. Indications: Candida Infection of Skin and Mouth or Vagina    Lacie Hernandez MD   oxybutynin XL (DITROPAN-XL) 5 MG 24 hr tablet Take 5 mg by mouth Daily. Take at night  Indications: Urinary Incontinence 7/10/23   Lacie Hernandez MD   oxybutynin XL (DITROPAN-XL) 5 MG 24 hr tablet Take 5 mg by mouth Daily. Indications: Urinary Incontinence 7/6/23   Lacie Hernandez MD   pramipexole (MIRAPEX) 0.75 MG tablet Take 1 tablet by mouth every night at bedtime. Indications: Restless Leg Syndrome    Lacie Hernandez MD   vitamin C (ASCORBIC ACID) 500 MG tablet Take 1 tablet by mouth Daily. Indications: Inadequate Vitamin C    Lacie Hernandez MD   vitamin D3 125 MCG (5000 UT) capsule capsule Take 1 capsule by mouth Daily. Indications: Vitamin D Deficiency    Lacie Hernandez MD       Medications   sodium chloride 0.9% - IBW for BMI > 30 bolus 1,572 mL (has no administration in time range)   sodium chloride 0.9 % flush 10 mL (has no administration in time range)   cefTRIAXone (ROCEPHIN) 2,000 mg in sodium chloride 0.9 % 100 mL IVPB (has no administration in time range)       Vitals:    09/30/23 1426   BP: 131/60   Pulse:    Resp:    Temp:    SpO2:          Objective   Physical Exam  Vitals and nursing note reviewed.   Constitutional:       Appearance: She is well-developed.   HENT:      Head: Normocephalic and atraumatic.   Cardiovascular:      Rate and Rhythm: Normal rate and regular rhythm.   Pulmonary:      Effort: Pulmonary effort is normal.      Breath sounds: Normal breath sounds.   Abdominal:      General: Bowel sounds are normal.      Palpations: Abdomen is soft.    Musculoskeletal:         General: Swelling and tenderness present. Normal range of motion.      Cervical back: Normal range of motion and neck supple.      Comments: Patient has chronic edema both lower legs and they are wrapped but the left leg has erythema and heat above the wrapping and below the knee.   Skin:     General: Skin is warm and dry.   Neurological:      Mental Status: She is alert. She is lethargic.   Psychiatric:         Mood and Affect: Mood normal.         Behavior: Behavior normal.       Procedures         Lab Results (last 24 hours)       Procedure Component Value Units Date/Time    CBC & Differential [[id number]]  (Abnormal) Collected: 09/30/23 1429    Specimen: Blood Updated: 09/30/23 1535    Narrative:      The following orders were created for panel order CBC & Differential.  Procedure                               Abnormality         Status                     ---------                               -----------         ------                     CBC Auto Differential[[id number]]        Abnormal            Final result                 Please view results for these tests on the individual orders.    Blood Culture - Blood, Arm, Right [[id number]] Collected: 09/30/23 1429    Specimen: Blood from Arm, Right Updated: 09/30/23 1435    Lactic Acid, Plasma [[id number]]  (Abnormal) Collected: 09/30/23 1429    Specimen: Blood Updated: 09/30/23 1456     Lactate 3.7 mmol/L     CBC Auto Differential [[id number]]  (Abnormal) Collected: 09/30/23 1429    Specimen: Blood Updated: 09/30/23 1535     WBC 21.96 10*3/mm3      Comment: Modified report. Previous result was 22.02 10*3/mm3 on 9/30/2023 at 1500 CDT.        RBC 3.38 10*6/mm3      Comment: Modified report. Previous result was 3.42 10*6/mm3 on 9/30/2023 at 1500 CDT.        Hemoglobin 10.1 g/dL      Comment: Modified report. Previous result was 10.0 g/dL on 9/30/2023 at 1500 CDT.        Hematocrit 30.1 %      Comment: Modified report. Previous result was 30.3  % on 9/30/2023 at 1500 CDT.        MCV 89.1 fL      Comment: Modified report. Previous result was 88.6 fL on 9/30/2023 at 1500 CDT.        MCH 29.9 pg      Comment: Modified report. Previous result was 29.2 pg on 9/30/2023 at 1500 CDT.        MCHC 33.6 g/dL      Comment: Modified report. Previous result was 33.0 g/dL on 9/30/2023 at 1500 CDT.        RDW 21.2 %      Comment: Modified report. Previous result was 20.5 % on 9/30/2023 at 1500 CDT.        RDW-SD 60.6 fl      Comment: Modified report. Previous result was 60.5 fl on 9/30/2023 at 1500 CDT.        MPV 11.1 fL      Platelets 117 10*3/mm3      Comment: Modified report. Previous result was 131 10*3/mm3 on 9/30/2023 at 1500 CDT.       Manual Differential [[id number]]  (Abnormal) Collected: 09/30/23 1429    Specimen: Blood Updated: 09/30/23 1539     Neutrophil % 86.6 %      Lymphocyte % 7.2 %      Monocyte % 6.2 %      Neutrophils Absolute 19.02 10*3/mm3      Lymphocytes Absolute 1.58 10*3/mm3      Monocytes Absolute 1.36 10*3/mm3      Anisocytosis Slight/1+     Microcytes Slight/1+     Poikilocytes Slight/1+     Polychromasia Mod/2+     WBC Morphology Normal     Platelet Estimate Decreased    Narrative:      Pt shows 4+ agglutination on slide.  Pt specimen was heated and reran.    Comprehensive Metabolic Panel [[id number]] Collected: 09/30/23 1536    Specimen: Blood Updated: 09/30/23 1552    Blood Culture - Blood, Arm, Left [[id number]] Collected: 09/30/23 1536    Specimen: Blood from Arm, Left Updated: 09/30/23 1554    STAT Lactic Acid, Reflex [[id number]] Collected: 09/30/23 1536    Specimen: Blood Updated: 09/30/23 1552            XR Chest 1 View   Final Result   No acute findings.       This report was signed and finalized on 9/30/2023 2:52 PM CDT by Dr. Bryce Mancera MD.              ED Course          MDM  Number of Diagnoses or Management Options  Sepsis, due to unspecified organism, unspecified whether acute organ dysfunction present: new and requires  workup  Diagnosis management comments: Patient has significant signs of infection.  The only obvious source at the present time appears to be the left lower leg but we will cultures to make sure.  I spoke with Dr. Barker who was on-call for her regular physician and we will admit for further care.       Amount and/or Complexity of Data Reviewed  Clinical lab tests: ordered and reviewed  Tests in the radiology section of CPT®: ordered and reviewed  Discuss the patient with other providers: yes    Risk of Complications, Morbidity, and/or Mortality  Presenting problems: moderate  Diagnostic procedures: moderate  Management options: moderate    Patient Progress  Patient progress: stable      Final diagnoses:   Sepsis, due to unspecified organism, unspecified whether acute organ dysfunction present          Barrett Wray Jr., MD  09/30/23 2493

## 2023-09-30 NOTE — ED NOTES
"Nursing report ED to floor  Brenda Sneed  72 y.o.  female    HPI:   Chief Complaint   Patient presents with    Altered Mental Status    Weakness - Generalized       Admitting doctor:   Sachin Dia MD    Consulting provider(s):  Consults       No orders found from 9/1/2023 to 10/1/2023.             Admitting diagnosis:   The encounter diagnosis was Sepsis, due to unspecified organism, unspecified whether acute organ dysfunction present.    Code status:   Current Code Status       Date Active Code Status Order ID Comments User Context       Prior            Allergies:   Codeine, Amoxicillin, Levaquin [levofloxacin], and Benadryl [diphenhydramine]    Intake and Output  No intake or output data in the 24 hours ending 09/30/23 1619    Weight:       09/30/23  1420   Weight: 116 kg (255 lb)       Most recent vitals:   Vitals:    09/30/23 1420 09/30/23 1426 09/30/23 1602   BP:  131/60 129/51   Pulse: 86  80   Resp: 17     Temp: (!) 101.1 °F (38.4 °C)     TempSrc: Oral     SpO2: 99%  100%   Weight: 116 kg (255 lb)     Height: 160 cm (63\")       Oxygen Therapy: .    Active LDAs/IV Access:   Lines, Drains & Airways       Active LDAs       Name Placement date Placement time Site Days    Peripheral IV 09/30/23 1541 Left Forearm 09/30/23  1541  Forearm  less than 1                    Labs (abnormal labs have a star):   Labs Reviewed   LACTIC ACID, PLASMA - Abnormal; Notable for the following components:       Result Value    Lactate 3.7 (*)     All other components within normal limits   URINALYSIS W/ CULTURE IF INDICATED - Abnormal; Notable for the following components:    Color, UA Dark Yellow (*)     Bilirubin, UA Small (1+) (*)     Leuk Esterase, UA Moderate (2+) (*)     Nitrite, UA Positive (*)     All other components within normal limits    Narrative:     In absence of clinical symptoms, the presence of pyuria, bacteria, and/or nitrites on the urinalysis result does not correlate with infection.   CBC WITH " AUTO DIFFERENTIAL - Abnormal; Notable for the following components:    WBC 21.96 (*)     RBC 3.38 (*)     Hemoglobin 10.1 (*)     Hematocrit 30.1 (*)     RDW 21.2 (*)     RDW-SD 60.6 (*)     Platelets 117 (*)     All other components within normal limits   MANUAL DIFFERENTIAL - Abnormal; Notable for the following components:    Neutrophil % 86.6 (*)     Lymphocyte % 7.2 (*)     Neutrophils Absolute 19.02 (*)     Monocytes Absolute 1.36 (*)     All other components within normal limits    Narrative:     Pt shows 4+ agglutination on slide.  Pt specimen was heated and reran.   LACTIC ACID, REFLEX - Abnormal; Notable for the following components:    Lactate 3.0 (*)     All other components within normal limits   URINALYSIS, MICROSCOPIC ONLY - Abnormal; Notable for the following components:    RBC, UA 0-2 (*)     WBC, UA 21-30 (*)     Bacteria, UA 4+ (*)     All other components within normal limits   BLOOD CULTURE   BLOOD CULTURE   URINE CULTURE   COMPREHENSIVE METABOLIC PANEL   LACTIC ACID, REFLEX   CBC AND DIFFERENTIAL    Narrative:     The following orders were created for panel order CBC & Differential.  Procedure                               Abnormality         Status                     ---------                               -----------         ------                     CBC Auto Differential[[id number]]        Abnormal            Final result                 Please view results for these tests on the individual orders.       Meds given in ED:   Medications   sodium chloride 0.9% - IBW for BMI > 30 bolus 1,572 mL (has no administration in time range)   sodium chloride 0.9 % flush 10 mL (has no administration in time range)   cefTRIAXone (ROCEPHIN) 2,000 mg in sodium chloride 0.9 % 100 mL IVPB (has no administration in time range)           NIH Stroke Scale:       Isolation/Infection(s):  No active isolations   ESBL E coli     COVID Testing  Collected .  Resulted .    Nursing report ED to floor:  Mental status:  .  Ambulatory status: .  Precautions: .    ED nurse phone extentsion- [phone]  Report given to KADEN Daily 3C

## 2023-10-01 LAB
ALBUMIN SERPL-MCNC: 2.5 G/DL (ref 3.5–5.2)
ALBUMIN/GLOB SERPL: 0.9 G/DL
ALP SERPL-CCNC: 165 U/L (ref 39–117)
ALT SERPL W P-5'-P-CCNC: 10 U/L (ref 1–33)
ANION GAP SERPL CALCULATED.3IONS-SCNC: 13 MMOL/L (ref 5–15)
ANISOCYTOSIS BLD QL: ABNORMAL
AST SERPL-CCNC: 32 U/L (ref 1–32)
BASOPHILS # BLD MANUAL: 0.1 10*3/MM3 (ref 0–0.2)
BASOPHILS NFR BLD MANUAL: 1 % (ref 0–1.5)
BILIRUB SERPL-MCNC: 1.5 MG/DL (ref 0–1.2)
BUN SERPL-MCNC: 40 MG/DL (ref 8–23)
BUN/CREAT SERPL: 23.4 (ref 7–25)
BURR CELLS BLD QL SMEAR: ABNORMAL
CALCIUM SPEC-SCNC: 8.6 MG/DL (ref 8.6–10.5)
CHLORIDE SERPL-SCNC: 101 MMOL/L (ref 98–107)
CO2 SERPL-SCNC: 20 MMOL/L (ref 22–29)
CREAT SERPL-MCNC: 1.71 MG/DL (ref 0.57–1)
D-LACTATE SERPL-SCNC: 1.9 MMOL/L (ref 0.5–2)
DEPRECATED RDW RBC AUTO: 63 FL (ref 37–54)
EGFRCR SERPLBLD CKD-EPI 2021: 31.1 ML/MIN/1.73
EOSINOPHIL # BLD MANUAL: 0.1 10*3/MM3 (ref 0–0.4)
EOSINOPHIL NFR BLD MANUAL: 1 % (ref 0.3–6.2)
ERYTHROCYTE [DISTWIDTH] IN BLOOD BY AUTOMATED COUNT: 21.7 % (ref 12.3–15.4)
GLOBULIN UR ELPH-MCNC: 2.7 GM/DL
GLUCOSE SERPL-MCNC: 117 MG/DL (ref 65–99)
HCT VFR BLD AUTO: 25.1 % (ref 34–46.6)
HGB BLD-MCNC: 8.1 G/DL (ref 12–15.9)
LYMPHOCYTES # BLD MANUAL: 0.76 10*3/MM3 (ref 0.7–3.1)
LYMPHOCYTES NFR BLD MANUAL: 3 % (ref 5–12)
MCH RBC QN AUTO: 29.9 PG (ref 26.6–33)
MCHC RBC AUTO-ENTMCNC: 32.3 G/DL (ref 31.5–35.7)
MCV RBC AUTO: 92.6 FL (ref 79–97)
MONOCYTES # BLD: 0.29 10*3/MM3 (ref 0.1–0.9)
NEUTROPHILS # BLD AUTO: 8.43 10*3/MM3 (ref 1.7–7)
NEUTROPHILS NFR BLD MANUAL: 85.1 % (ref 42.7–76)
NEUTS BAND NFR BLD MANUAL: 2 % (ref 0–5)
PLATELET # BLD AUTO: 83 10*3/MM3 (ref 140–450)
PMV BLD AUTO: 11.7 FL (ref 6–12)
POIKILOCYTOSIS BLD QL SMEAR: ABNORMAL
POLYCHROMASIA BLD QL SMEAR: ABNORMAL
POTASSIUM SERPL-SCNC: 3.9 MMOL/L (ref 3.5–5.2)
PROT SERPL-MCNC: 5.2 G/DL (ref 6–8.5)
RBC # BLD AUTO: 2.71 10*6/MM3 (ref 3.77–5.28)
SMALL PLATELETS BLD QL SMEAR: ABNORMAL
SODIUM SERPL-SCNC: 134 MMOL/L (ref 136–145)
TOXIC GRANULATION: ABNORMAL
VARIANT LYMPHS NFR BLD MANUAL: 1 % (ref 0–5)
VARIANT LYMPHS NFR BLD MANUAL: 6.9 % (ref 19.6–45.3)
WBC NRBC COR # BLD: 9.67 10*3/MM3 (ref 3.4–10.8)

## 2023-10-01 PROCEDURE — 25010000002 ENOXAPARIN PER 10 MG: Performed by: FAMILY MEDICINE

## 2023-10-01 PROCEDURE — 85025 COMPLETE CBC W/AUTO DIFF WBC: CPT | Performed by: FAMILY MEDICINE

## 2023-10-01 PROCEDURE — 25010000002 CEFAZOLIN PER 500 MG: Performed by: FAMILY MEDICINE

## 2023-10-01 PROCEDURE — 85007 BL SMEAR W/DIFF WBC COUNT: CPT | Performed by: FAMILY MEDICINE

## 2023-10-01 PROCEDURE — 83605 ASSAY OF LACTIC ACID: CPT | Performed by: FAMILY MEDICINE

## 2023-10-01 PROCEDURE — 80053 COMPREHEN METABOLIC PANEL: CPT | Performed by: FAMILY MEDICINE

## 2023-10-01 RX ORDER — OXYCODONE HYDROCHLORIDE AND ACETAMINOPHEN 5; 325 MG/1; MG/1
1 TABLET ORAL EVERY 4 HOURS PRN
Status: DISCONTINUED | OUTPATIENT
Start: 2023-10-01 | End: 2023-10-02

## 2023-10-01 RX ADMIN — CEFAZOLIN 2000 MG: 2 INJECTION, POWDER, FOR SOLUTION INTRAMUSCULAR; INTRAVENOUS at 12:30

## 2023-10-01 RX ADMIN — DOCUSATE SODIUM 100 MG: 100 CAPSULE, LIQUID FILLED ORAL at 09:27

## 2023-10-01 RX ADMIN — PRAMIPEXOLE DIHYDROCHLORIDE 0.75 MG: 0.25 TABLET ORAL at 21:22

## 2023-10-01 RX ADMIN — OXYBUTYNIN CHLORIDE 5 MG: 5 TABLET, EXTENDED RELEASE ORAL at 09:27

## 2023-10-01 RX ADMIN — DULOXETINE HYDROCHLORIDE 60 MG: 30 CAPSULE, DELAYED RELEASE ORAL at 09:27

## 2023-10-01 RX ADMIN — LEVOTHYROXINE SODIUM 100 MCG: 100 TABLET ORAL at 06:27

## 2023-10-01 RX ADMIN — CEFAZOLIN 2000 MG: 2 INJECTION, POWDER, FOR SOLUTION INTRAMUSCULAR; INTRAVENOUS at 04:26

## 2023-10-01 RX ADMIN — OXYCODONE HYDROCHLORIDE AND ACETAMINOPHEN 1 TABLET: 5; 325 TABLET ORAL at 11:10

## 2023-10-01 RX ADMIN — SODIUM CHLORIDE 100 ML/HR: 9 INJECTION, SOLUTION INTRAVENOUS at 21:28

## 2023-10-01 RX ADMIN — CEFAZOLIN 2000 MG: 2 INJECTION, POWDER, FOR SOLUTION INTRAMUSCULAR; INTRAVENOUS at 21:24

## 2023-10-01 RX ADMIN — CYCLOBENZAPRINE 10 MG: 10 TABLET, FILM COATED ORAL at 02:14

## 2023-10-01 RX ADMIN — HYDROCODONE BITARTRATE AND ACETAMINOPHEN 1 TABLET: 5; 325 TABLET ORAL at 01:37

## 2023-10-01 RX ADMIN — NYSTATIN 1 APPLICATION: 100000 POWDER TOPICAL at 21:23

## 2023-10-01 RX ADMIN — OXYCODONE HYDROCHLORIDE AND ACETAMINOPHEN 1 TABLET: 5; 325 TABLET ORAL at 21:21

## 2023-10-01 RX ADMIN — SODIUM CHLORIDE 100 ML/HR: 9 INJECTION, SOLUTION INTRAVENOUS at 04:06

## 2023-10-01 RX ADMIN — ENOXAPARIN SODIUM 40 MG: 100 INJECTION SUBCUTANEOUS at 09:27

## 2023-10-01 RX ADMIN — Medication 10 ML: at 21:20

## 2023-10-01 RX ADMIN — NYSTATIN 1 APPLICATION: 100000 POWDER TOPICAL at 09:27

## 2023-10-01 RX ADMIN — DOCUSATE SODIUM 50 MG AND SENNOSIDES 8.6 MG 2 TABLET: 8.6; 5 TABLET, FILM COATED ORAL at 09:27

## 2023-10-01 RX ADMIN — DULOXETINE HYDROCHLORIDE 60 MG: 30 CAPSULE, DELAYED RELEASE ORAL at 21:22

## 2023-10-01 NOTE — PROGRESS NOTES
Family Medicine Progress Note    Patient:  Brenda Sneed  YOB: 1950    MRN: [id number]     Acct: [id number]     Admit date: 9/30/2023    Patient Seen, Chart, Consults notes, Labs, Radiology studies reviewed.    Subjective: Day 1 of stay with sepsis from unclear source possibly urine versus cellulitic and most recent (in last 24 hours) has had complaint of pain in her legs.  She is remained a little on the somnolent side however.  She states that despite it being listed in her home medications she does not take hydrocodone that it does not work for her.  Previously a patient of pain management and it sounds as though she has been on also oral morphine and a patch of some sort in the past.    Past, Family, Social History unchanged from admission.    Diet: Diet: Regular/House Diet; Texture: Regular Texture (IDDSI 7); Fluid Consistency: Thin (IDDSI 0)    Medications:  Scheduled Meds:bisoprolol-hydrochlorothiazide, 1 tablet, Oral, Daily  ceFAZolin, 2,000 mg, Intravenous, Q8H  docusate sodium, 100 mg, Oral, BID  DULoxetine, 60 mg, Oral, BID  enoxaparin, 40 mg, Subcutaneous, Daily  furosemide, 40 mg, Oral, Daily  levothyroxine, 100 mcg, Oral, Q AM  nystatin, 1 application , Topical, BID  oxybutynin XL, 5 mg, Oral, Daily  pramipexole, 0.75 mg, Oral, Nightly  senna-docusate sodium, 2 tablet, Oral, BID  sodium chloride, 10 mL, Intravenous, Q12H      Continuous Infusions:sodium chloride, 100 mL/hr, Last Rate: 100 mL/hr (10/01/23 0406)      PRN Meds:  acetaminophen    senna-docusate sodium **AND** polyethylene glycol **AND** bisacodyl **AND** bisacodyl    cyclobenzaprine    oxyCODONE-acetaminophen    [COMPLETED] Insert Peripheral IV **AND** sodium chloride    sodium chloride    sodium chloride    Objective:    Lab Results (last 24 hours)       Procedure Component Value Units Date/Time    Manual Differential [[id number]]  (Abnormal) Collected: 10/01/23 0759    Specimen: Blood Updated: 10/01/23 0903      Neutrophil % 85.1 %      Lymphocyte % 6.9 %      Monocyte % 3.0 %      Eosinophil % 1.0 %      Basophil % 1.0 %      Bands %  2.0 %      Atypical Lymphocyte % 1.0 %      Neutrophils Absolute 8.43 10*3/mm3      Lymphocytes Absolute 0.76 10*3/mm3      Monocytes Absolute 0.29 10*3/mm3      Eosinophils Absolute 0.10 10*3/mm3      Basophils Absolute 0.10 10*3/mm3      Anisocytosis Slight/1+     Crenated RBC's Slight/1+     Poikilocytes Slight/1+     Polychromasia Slight/1+     Toxic Granulation Slight/1+     Platelet Estimate Decreased    CBC Auto Differential [[id number]]  (Abnormal) Collected: 10/01/23 0759    Specimen: Blood Updated: 10/01/23 0903     WBC 9.67 10*3/mm3      RBC 2.71 10*6/mm3      Hemoglobin 8.1 g/dL      Hematocrit 25.1 %      MCV 92.6 fL      MCH 29.9 pg      MCHC 32.3 g/dL      RDW 21.7 %      RDW-SD 63.0 fl      MPV 11.7 fL      Platelets 83 10*3/mm3     Narrative:      Repeated to confirm results.      Comprehensive Metabolic Panel [[id number]]  (Abnormal) Collected: 10/01/23 0759    Specimen: Blood Updated: 10/01/23 0839     Glucose 117 mg/dL      BUN 40 mg/dL      Creatinine 1.71 mg/dL      Sodium 134 mmol/L      Potassium 3.9 mmol/L      Comment: Slight hemolysis detected by analyzer. Results may be affected.        Chloride 101 mmol/L      CO2 20.0 mmol/L      Calcium 8.6 mg/dL      Total Protein 5.2 g/dL      Albumin 2.5 g/dL      ALT (SGPT) 10 U/L      AST (SGOT) 32 U/L      Alkaline Phosphatase 165 U/L      Total Bilirubin 1.5 mg/dL      Globulin 2.7 gm/dL      A/G Ratio 0.9 g/dL      BUN/Creatinine Ratio 23.4     Anion Gap 13.0 mmol/L      eGFR 31.1 mL/min/1.73     Narrative:      GFR Normal >60  Chronic Kidney Disease <60  Kidney Failure <15    The GFR formula is only valid for adults with stable renal function between ages 18 and 70.    Lactic Acid, Plasma [[id number]]  (Normal) Collected: 10/01/23 0759    Specimen: Blood Updated: 10/01/23 0836     Lactate 1.9 mmol/L     STAT Lactic Acid,  "Reflex [[id number]]  (Normal) Collected: 09/30/23 2117    Specimen: Blood Updated: 09/30/23 2141     Lactate 1.7 mmol/L     STAT Lactic Acid, Reflex [[id number]]  (Abnormal) Collected: 09/30/23 1805    Specimen: Blood Updated: 09/30/23 1832     Lactate 3.3 mmol/L     Procalcitonin [[id number]]  (Abnormal) Collected: 09/30/23 1611    Specimen: Blood Updated: 09/30/23 1722     Procalcitonin 1.01 ng/mL     Narrative:      As a Marker for Sepsis (Non-Neonates):    1. <0.5 ng/mL represents a low risk of severe sepsis and/or septic shock.  2. >2 ng/mL represents a high risk of severe sepsis and/or septic shock.    As a Marker for Lower Respiratory Tract Infections that require antibiotic therapy:    PCT on Admission    Antibiotic Therapy       6-12 Hrs later    >0.5                Strongly Recommended  >0.25 - <0.5        Recommended   0.1 - 0.25          Discouraged              Remeasure/reassess PCT  <0.1                Strongly Discouraged     Remeasure/reassess PCT    As 28 day mortality risk marker: \"Change in Procalcitonin Result\" (>80% or <=80%) if Day 0 (or Day 1) and Day 4 values are available. Refer to http://[email or web address]-pct-[email or web address]    Change in PCT <=80%  A decrease of PCT levels below or equal to 80% defines a positive change in PCT test result representing a higher risk for 28-day all-cause mortality of patients diagnosed with severe sepsis for septic shock.    Change in PCT >80%  A decrease of PCT levels of more than 80% defines a negative change in PCT result representing a lower risk for 28-day all-cause mortality of patients diagnosed with severe sepsis or septic shock.       Comprehensive Metabolic Panel [[id number]]  (Abnormal) Collected: 09/30/23 1611    Specimen: Blood Updated: 09/30/23 1640     Glucose 121 mg/dL      BUN 39 mg/dL      Creatinine 1.62 mg/dL      Sodium 135 mmol/L      Potassium 4.5 mmol/L      Comment: Slight hemolysis detected by analyzer. Results may be affected.        Chloride " 99 mmol/L      CO2 21.0 mmol/L      Calcium 9.3 mg/dL      Total Protein 6.2 g/dL      Albumin 3.3 g/dL      ALT (SGPT) 15 U/L      AST (SGOT) 39 U/L      Comment: Slight hemolysis detected by analyzer. Results may be affected.        Alkaline Phosphatase 209 U/L      Total Bilirubin 2.4 mg/dL      Globulin 2.9 gm/dL      A/G Ratio 1.1 g/dL      BUN/Creatinine Ratio 24.1     Anion Gap 15.0 mmol/L      eGFR 33.2 mL/min/1.73     Narrative:      GFR Normal >60  Chronic Kidney Disease <60  Kidney Failure <15    The GFR formula is only valid for adults with stable renal function between ages 18 and 70.    STAT Lactic Acid, Reflex [[id number]]  (Abnormal) Collected: 09/30/23 1536    Specimen: Blood Updated: 09/30/23 1615     Lactate 3.0 mmol/L     Urinalysis, Microscopic Only - Urine, Catheter [[id number]]  (Abnormal) Collected: 09/30/23 1559    Specimen: Urine, Catheter Updated: 09/30/23 1613     RBC, UA 0-2 /HPF      WBC, UA 21-30 /HPF      Bacteria, UA 4+ /HPF      Squamous Epithelial Cells, UA None Seen /HPF      Hyaline Casts, UA 3-6 /LPF      Methodology Automated Microscopy    Urine Culture - Urine, Urine, Catheter [[id number]] Collected: 09/30/23 1559    Specimen: Urine, Catheter Updated: 09/30/23 1613    Urinalysis With Culture If Indicated - Urine, Catheter [[id number]]  (Abnormal) Collected: 09/30/23 1559    Specimen: Urine, Catheter Updated: 09/30/23 1611     Color, UA Dark Yellow     Appearance, UA Clear     pH, UA 5.5     Specific Gravity, UA 1.017     Glucose, UA Negative     Ketones, UA Negative     Bilirubin, UA Small (1+)     Blood, UA Negative     Protein, UA Negative     Leuk Esterase, UA Moderate (2+)     Nitrite, UA Positive     Urobilinogen, UA 1.0 E.U./dL    Narrative:      In absence of clinical symptoms, the presence of pyuria, bacteria, and/or nitrites on the urinalysis result does not correlate with infection.    Blood Culture - Blood, Arm, Left [[id number]] Collected: 09/30/23 1536    Specimen:  Blood from Arm, Left Updated: 09/30/23 1554    Manual Differential [[id number]]  (Abnormal) Collected: 09/30/23 1429    Specimen: Blood Updated: 09/30/23 1539     Neutrophil % 86.6 %      Lymphocyte % 7.2 %      Monocyte % 6.2 %      Neutrophils Absolute 19.02 10*3/mm3      Lymphocytes Absolute 1.58 10*3/mm3      Monocytes Absolute 1.36 10*3/mm3      Anisocytosis Slight/1+     Microcytes Slight/1+     Poikilocytes Slight/1+     Polychromasia Mod/2+     WBC Morphology Normal     Platelet Estimate Decreased    Narrative:      Pt shows 4+ agglutination on slide.  Pt specimen was heated and reran.    CBC & Differential [[id number]]  (Abnormal) Collected: 09/30/23 1429    Specimen: Blood Updated: 09/30/23 1535    Narrative:      The following orders were created for panel order CBC & Differential.  Procedure                               Abnormality         Status                     ---------                               -----------         ------                     CBC Auto Differential[[id number]]        Abnormal            Final result                 Please view results for these tests on the individual orders.    CBC Auto Differential [[id number]]  (Abnormal) Collected: 09/30/23 1429    Specimen: Blood Updated: 09/30/23 1535     WBC 21.96 10*3/mm3      Comment: Modified report. Previous result was 22.02 10*3/mm3 on 9/30/2023 at 1500 CDT.        RBC 3.38 10*6/mm3      Comment: Modified report. Previous result was 3.42 10*6/mm3 on 9/30/2023 at 1500 CDT.        Hemoglobin 10.1 g/dL      Comment: Modified report. Previous result was 10.0 g/dL on 9/30/2023 at 1500 CDT.        Hematocrit 30.1 %      Comment: Modified report. Previous result was 30.3 % on 9/30/2023 at 1500 CDT.        MCV 89.1 fL      Comment: Modified report. Previous result was 88.6 fL on 9/30/2023 at 1500 CDT.        MCH 29.9 pg      Comment: Modified report. Previous result was 29.2 pg on 9/30/2023 at 1500 CDT.        MCHC 33.6 g/dL      Comment:  "Modified report. Previous result was 33.0 g/dL on 9/30/2023 at 1500 CDT.        RDW 21.2 %      Comment: Modified report. Previous result was 20.5 % on 9/30/2023 at 1500 CDT.        RDW-SD 60.6 fl      Comment: Modified report. Previous result was 60.5 fl on 9/30/2023 at 1500 CDT.        MPV 11.1 fL      Platelets 117 10*3/mm3      Comment: Modified report. Previous result was 131 10*3/mm3 on 9/30/2023 at 1500 CDT.       Lactic Acid, Plasma [[id number]]  (Abnormal) Collected: 09/30/23 1429    Specimen: Blood Updated: 09/30/23 1456     Lactate 3.7 mmol/L     Blood Culture - Blood, Arm, Right [[id number]] Collected: 09/30/23 1429    Specimen: Blood from Arm, Right Updated: 09/30/23 1435             Imaging Results (Last 72 Hours)       Procedure Component Value Units Date/Time    XR Chest 1 View [[id number]] Collected: 09/30/23 1451     Updated: 09/30/23 1455    Narrative:      EXAM/TECHNIQUE: XR CHEST 1 VW-     INDICATION: fever, weakness     COMPARISON: 5/23/2023     FINDINGS:     Cardiac silhouette is mildly enlarged but stable. No pleural effusion,  pneumothorax, or focal consolidation. Left shoulder arthroplasty.  Postoperative change in the upper lumbar spine and lower cervical spine.  No acute osseous finding.       Impression:      No acute findings.     This report was signed and finalized on 9/30/2023 2:52 PM CDT by Dr. Bryce Mancera MD.                Physical Exam:    Vitals: /46 (BP Location: Right arm, Patient Position: Lying)   Pulse 62   Temp 98.4 °F (36.9 °C) (Oral)   Resp 20   Ht 160 cm (63\")   Wt 116 kg (255 lb)   SpO2 98%   BMI 45.17 kg/m²   24 hour intake/output:  Intake/Output Summary (Last 24 hours) at 10/1/2023 0915  Last data filed at 10/1/2023 0438  Gross per 24 hour   Intake 1121 ml   Output 300 ml   Net 821 ml     Last 3 weights:  Wt Readings from Last 3 Encounters:   09/30/23 116 kg (255 lb)   05/24/23 115 kg (254 lb)   04/28/23 113 kg (250 lb)       General Appearance " appears stated age, fatigued, cooperative, and morbidly obese  Head normocephalic, without obvious abnormality and atraumatic  Eyes lids and lashes normal, conjunctivae and sclerae normal, no icterus, and no pallor  Neck no adenopathy, supple, and trachea midline  Lungs clear to auscultation, respirations regular, and respirations even  Heart regular rhythm & normal rate, normal S1, S2, and no murmur, no gallop, no rub  Abdomen normal bowel sounds, no masses, soft non-tender, and no guarding  Extremities edema 2+ lower bilateral, redness bilaterally below the knee, tenderness both legs, and venous status dermatitis  Skin  chronic skin changes both legs, left greater than right  Neurologic Mental Status alertness arousable and fatigue, orientation person and place, and mood/affect flat        Assessment:      Sepsis    Cellulitis of right lower extremity    HTN (hypertension)    Obesity, Class III, BMI 40-49.9 (morbid obesity)    Traumatic rhabdomyolysis    Cellulitis of left lower extremity          Plan:  Continue treatment with antibiotics.  Unclear source as urine looks suspicious but has obvious redness and swelling of bilateral legs.  She is currently on a cephalosporin as per sepsis treatment protocols.  Will await culture results to hopefully de-escalate antibiotics as we can.  We will need to use pain medication judiciously.  She is saying that hydrocodone is ineffective.  I will try to change her to oxycodone but want to avoid over sedation and she still appears a little bit on the somnolent side in terms of awareness.  Discussed with patient and her family member in the room.    Greater than 25 minutes spent in coordination of care today.      Electronically signed by Tremaine Forrester MD on 10/1/2023 at 09:15 CDT

## 2023-10-01 NOTE — PLAN OF CARE
Goal Outcome Evaluation:  Plan of Care Reviewed With: patient      Patient c/o pain in LLE, PRN pain med given. No c/o nausea. Patient lethargic at beginning of shift. A&O x4. Patient on room air. Turn Q2. Purewick in place. Patient on Lovenox. Right foot cool and pale, dorsalis pedis and posterior tibial pulse weak with doppler, MD made aware by previous shift. LLE red and rodney, left dorsalis pedis pulse weak with doppler. Left posterior tibial pulse good with doppler. Patient denies numbness or tingling in feet. IVF infusing and IV abx given. Tele on, sinus rhythm. VSS.

## 2023-10-01 NOTE — PLAN OF CARE
Goal Outcome Evaluation: medicated once for complaints of pain. Patient is resting in bed at this time.  IV antibiotics and IV fluids infusing as ordered. Family is requesting home health to see patient when she goes home. Patient safety to be maintained this shift, continue to monitor and report abnormal as ordered

## 2023-10-01 NOTE — NURSING NOTE
Patients pedal pulses were unable to be palpated and  pulses were able to be dopplered. Patients bilateral feet are discolored. Right foot with pale, purple and cold to the touch, left foot is red, rodney and not as cold as right foot. Provider was notified and no new orders received.

## 2023-10-02 LAB
ALBUMIN SERPL-MCNC: 2.5 G/DL (ref 3.5–5.2)
ALBUMIN/GLOB SERPL: 0.9 G/DL
ALP SERPL-CCNC: 161 U/L (ref 39–117)
ALT SERPL W P-5'-P-CCNC: <5 U/L (ref 1–33)
ANION GAP SERPL CALCULATED.3IONS-SCNC: 11 MMOL/L (ref 5–15)
AST SERPL-CCNC: 28 U/L (ref 1–32)
BASOPHILS # BLD AUTO: 0.03 10*3/MM3 (ref 0–0.2)
BASOPHILS NFR BLD AUTO: 0.4 % (ref 0–1.5)
BILIRUB SERPL-MCNC: 0.8 MG/DL (ref 0–1.2)
BUN SERPL-MCNC: 40 MG/DL (ref 8–23)
BUN/CREAT SERPL: 23.8 (ref 7–25)
CALCIUM SPEC-SCNC: 9 MG/DL (ref 8.6–10.5)
CHLORIDE SERPL-SCNC: 106 MMOL/L (ref 98–107)
CO2 SERPL-SCNC: 19 MMOL/L (ref 22–29)
CREAT SERPL-MCNC: 1.68 MG/DL (ref 0.57–1)
DEPRECATED RDW RBC AUTO: 59.3 FL (ref 37–54)
EGFRCR SERPLBLD CKD-EPI 2021: 31.8 ML/MIN/1.73
EOSINOPHIL # BLD AUTO: 0.28 10*3/MM3 (ref 0–0.4)
EOSINOPHIL NFR BLD AUTO: 4 % (ref 0.3–6.2)
ERYTHROCYTE [DISTWIDTH] IN BLOOD BY AUTOMATED COUNT: 18.7 % (ref 12.3–15.4)
GLOBULIN UR ELPH-MCNC: 2.7 GM/DL
GLUCOSE SERPL-MCNC: 84 MG/DL (ref 65–99)
HCT VFR BLD AUTO: 28.4 % (ref 34–46.6)
HGB BLD-MCNC: 9.2 G/DL (ref 12–15.9)
LYMPHOCYTES # BLD AUTO: 1.51 10*3/MM3 (ref 0.7–3.1)
LYMPHOCYTES NFR BLD AUTO: 21.8 % (ref 19.6–45.3)
MCH RBC QN AUTO: 27.9 PG (ref 26.6–33)
MCHC RBC AUTO-ENTMCNC: 32.4 G/DL (ref 31.5–35.7)
MCV RBC AUTO: 86.1 FL (ref 79–97)
MONOCYTES # BLD AUTO: 0.72 10*3/MM3 (ref 0.1–0.9)
MONOCYTES NFR BLD AUTO: 10.4 % (ref 5–12)
NEUTROPHILS NFR BLD AUTO: 4.36 10*3/MM3 (ref 1.7–7)
NEUTROPHILS NFR BLD AUTO: 63 % (ref 42.7–76)
PLATELET # BLD AUTO: 82 10*3/MM3 (ref 140–450)
PMV BLD AUTO: 11.4 FL (ref 6–12)
POTASSIUM SERPL-SCNC: 3.6 MMOL/L (ref 3.5–5.2)
PROT SERPL-MCNC: 5.2 G/DL (ref 6–8.5)
RBC # BLD AUTO: 3.3 10*6/MM3 (ref 3.77–5.28)
SODIUM SERPL-SCNC: 136 MMOL/L (ref 136–145)
WBC NRBC COR # BLD: 6.93 10*3/MM3 (ref 3.4–10.8)

## 2023-10-02 PROCEDURE — 80053 COMPREHEN METABOLIC PANEL: CPT | Performed by: FAMILY MEDICINE

## 2023-10-02 PROCEDURE — 25010000002 ENOXAPARIN PER 10 MG: Performed by: FAMILY MEDICINE

## 2023-10-02 PROCEDURE — 85025 COMPLETE CBC W/AUTO DIFF WBC: CPT | Performed by: FAMILY MEDICINE

## 2023-10-02 PROCEDURE — 99231 SBSQ HOSP IP/OBS SF/LOW 25: CPT | Performed by: NURSE PRACTITIONER

## 2023-10-02 PROCEDURE — 25010000002 CEFAZOLIN PER 500 MG: Performed by: FAMILY MEDICINE

## 2023-10-02 PROCEDURE — 97161 PT EVAL LOW COMPLEX 20 MIN: CPT | Performed by: PHYSICAL THERAPIST

## 2023-10-02 RX ORDER — MORPHINE SULFATE 15 MG/1
15 TABLET, FILM COATED, EXTENDED RELEASE ORAL 2 TIMES DAILY PRN
Status: DISCONTINUED | OUTPATIENT
Start: 2023-10-02 | End: 2023-10-06 | Stop reason: HOSPADM

## 2023-10-02 RX ORDER — BUPRENORPHINE 20 UG/H
1 PATCH TRANSDERMAL
COMMUNITY
End: 2023-10-06 | Stop reason: HOSPADM

## 2023-10-02 RX ORDER — ALBUTEROL SULFATE 2.5 MG/3ML
2.5 SOLUTION RESPIRATORY (INHALATION) 3 TIMES DAILY
COMMUNITY

## 2023-10-02 RX ORDER — AZELASTINE 1 MG/ML
2 SPRAY, METERED NASAL 2 TIMES DAILY
COMMUNITY

## 2023-10-02 RX ORDER — DOCUSATE SODIUM 100 MG/1
100 CAPSULE, LIQUID FILLED ORAL 2 TIMES DAILY PRN
COMMUNITY

## 2023-10-02 RX ADMIN — DOCUSATE SODIUM 50 MG AND SENNOSIDES 8.6 MG 2 TABLET: 8.6; 5 TABLET, FILM COATED ORAL at 21:57

## 2023-10-02 RX ADMIN — NYSTATIN 1 APPLICATION: 100000 POWDER TOPICAL at 08:14

## 2023-10-02 RX ADMIN — LEVOTHYROXINE SODIUM 100 MCG: 100 TABLET ORAL at 05:36

## 2023-10-02 RX ADMIN — DOCUSATE SODIUM 50 MG AND SENNOSIDES 8.6 MG 2 TABLET: 8.6; 5 TABLET, FILM COATED ORAL at 08:12

## 2023-10-02 RX ADMIN — DOCUSATE SODIUM 100 MG: 100 CAPSULE, LIQUID FILLED ORAL at 21:50

## 2023-10-02 RX ADMIN — OXYCODONE HYDROCHLORIDE AND ACETAMINOPHEN 1 TABLET: 5; 325 TABLET ORAL at 09:36

## 2023-10-02 RX ADMIN — Medication 10 ML: at 21:50

## 2023-10-02 RX ADMIN — DULOXETINE HYDROCHLORIDE 60 MG: 30 CAPSULE, DELAYED RELEASE ORAL at 08:12

## 2023-10-02 RX ADMIN — OXYCODONE HYDROCHLORIDE AND ACETAMINOPHEN 1 TABLET: 5; 325 TABLET ORAL at 05:36

## 2023-10-02 RX ADMIN — OXYBUTYNIN CHLORIDE 5 MG: 5 TABLET, EXTENDED RELEASE ORAL at 08:12

## 2023-10-02 RX ADMIN — ENOXAPARIN SODIUM 40 MG: 100 INJECTION SUBCUTANEOUS at 08:12

## 2023-10-02 RX ADMIN — CEFAZOLIN 2000 MG: 2 INJECTION, POWDER, FOR SOLUTION INTRAMUSCULAR; INTRAVENOUS at 21:53

## 2023-10-02 RX ADMIN — PRAMIPEXOLE DIHYDROCHLORIDE 0.75 MG: 0.25 TABLET ORAL at 21:50

## 2023-10-02 RX ADMIN — DOCUSATE SODIUM 100 MG: 100 CAPSULE, LIQUID FILLED ORAL at 08:12

## 2023-10-02 RX ADMIN — Medication 10 ML: at 08:14

## 2023-10-02 RX ADMIN — CEFAZOLIN 2000 MG: 2 INJECTION, POWDER, FOR SOLUTION INTRAMUSCULAR; INTRAVENOUS at 13:20

## 2023-10-02 RX ADMIN — MORPHINE SULFATE 15 MG: 15 TABLET, FILM COATED, EXTENDED RELEASE ORAL at 11:49

## 2023-10-02 RX ADMIN — CEFAZOLIN 2000 MG: 2 INJECTION, POWDER, FOR SOLUTION INTRAMUSCULAR; INTRAVENOUS at 04:31

## 2023-10-02 RX ADMIN — NYSTATIN 1 APPLICATION: 100000 POWDER TOPICAL at 21:50

## 2023-10-02 RX ADMIN — DULOXETINE HYDROCHLORIDE 60 MG: 30 CAPSULE, DELAYED RELEASE ORAL at 21:50

## 2023-10-02 NOTE — PROGRESS NOTES
"    Daily Progress Note  Brenda Sneed  MRN: [id number] LOS: 2    Admit Date: 2023   10/2/2023 08:30 CDT    Subjective:         Interval History:    Reviewed overnight events and nursing notes.     Doing well this morning.  No new complaints.    ROS:  Review of Systems   Constitutional:  Negative for chills and fever.   Respiratory:  Negative for cough, chest tightness and shortness of breath.    Cardiovascular:  Negative for chest pain.   Gastrointestinal:  Negative for abdominal pain, diarrhea, nausea and vomiting.     DIET:  Diet: Regular/House Diet; Texture: Regular Texture (IDDSI 7); Fluid Consistency: Thin (IDDSI 0)    Medications:   sodium chloride, 100 mL/hr, Last Rate: 100 mL/hr (10/01/23 2128)      bisoprolol-hydrochlorothiazide, 1 tablet, Oral, Daily  ceFAZolin, 2,000 mg, Intravenous, Q8H  docusate sodium, 100 mg, Oral, BID  DULoxetine, 60 mg, Oral, BID  enoxaparin, 40 mg, Subcutaneous, Daily  furosemide, 40 mg, Oral, Daily  levothyroxine, 100 mcg, Oral, Q AM  nystatin, 1 application , Topical, BID  oxybutynin XL, 5 mg, Oral, Daily  pramipexole, 0.75 mg, Oral, Nightly  senna-docusate sodium, 2 tablet, Oral, BID  sodium chloride, 10 mL, Intravenous, Q12H          Objective:     Vitals: /51 (BP Location: Right arm, Patient Position: Lying)   Pulse 62   Temp 97.5 °F (36.4 °C) (Oral)   Resp 18   Ht 160 cm (63\")   Wt 116 kg (255 lb)   SpO2 99%   BMI 45.17 kg/m²    Intake/Output Summary (Last 24 hours) at 10/2/2023 0830  Last data filed at 10/2/2023 0637  Gross per 24 hour   Intake --   Output 1200 ml   Net -1200 ml    Temp (24hrs), Av.1 °F (36.7 °C), Min:97.5 °F (36.4 °C), Max:98.5 °F (36.9 °C)    Glucose:  Lab Results   Component Value Date    POCGLU 100 (H) 2021     Physical Examination:   Physical Exam  Constitutional:       General: She is not in acute distress.     Appearance: Normal appearance. She is not ill-appearing or toxic-appearing.   Cardiovascular:      Rate and " Rhythm: Normal rate and regular rhythm.      Pulses: Normal pulses.      Heart sounds: Normal heart sounds. No murmur heard.  Pulmonary:      Effort: Pulmonary effort is normal. No respiratory distress.      Breath sounds: Normal breath sounds. No stridor. No wheezing or rales.   Abdominal:      General: Abdomen is flat. Bowel sounds are normal. There is no distension.      Palpations: Abdomen is soft.      Tenderness: There is no abdominal tenderness.   Neurological:      Mental Status: She is alert.       Labs:  Lab Results (last 24 hours)       Procedure Component Value Units Date/Time    Urine Culture - Urine, Urine, Catheter [[id number]]  (Normal) Collected: 09/30/23 1559    Specimen: Urine, Catheter Updated: 10/02/23 0822     Urine Culture Culture in progress    CBC & Differential [[id number]]  (Abnormal) Collected: 10/02/23 0521    Specimen: Blood Updated: 10/02/23 0552    Narrative:      The following orders were created for panel order CBC & Differential.  Procedure                               Abnormality         Status                     ---------                               -----------         ------                     CBC Auto Differential[[id number]]        Abnormal            Final result                 Please view results for these tests on the individual orders.    CBC Auto Differential [[id number]]  (Abnormal) Collected: 10/02/23 0521    Specimen: Blood Updated: 10/02/23 0552     WBC 6.93 10*3/mm3      RBC 3.30 10*6/mm3      Hemoglobin 9.2 g/dL      Hematocrit 28.4 %      MCV 86.1 fL      MCH 27.9 pg      MCHC 32.4 g/dL      RDW 18.7 %      RDW-SD 59.3 fl      MPV 11.4 fL      Platelets 82 10*3/mm3      Neutrophil % 63.0 %      Lymphocyte % 21.8 %      Monocyte % 10.4 %      Eosinophil % 4.0 %      Basophil % 0.4 %      Neutrophils, Absolute 4.36 10*3/mm3      Lymphocytes, Absolute 1.51 10*3/mm3      Monocytes, Absolute 0.72 10*3/mm3      Eosinophils, Absolute 0.28 10*3/mm3      Basophils,  Absolute 0.03 10*3/mm3     Comprehensive Metabolic Panel [[id number]]  (Abnormal) Collected: 10/02/23 0428    Specimen: Blood Updated: 10/02/23 0459     Glucose 84 mg/dL      BUN 40 mg/dL      Creatinine 1.68 mg/dL      Sodium 136 mmol/L      Potassium 3.6 mmol/L      Chloride 106 mmol/L      CO2 19.0 mmol/L      Calcium 9.0 mg/dL      Total Protein 5.2 g/dL      Albumin 2.5 g/dL      ALT (SGPT) <5 U/L      AST (SGOT) 28 U/L      Alkaline Phosphatase 161 U/L      Total Bilirubin 0.8 mg/dL      Globulin 2.7 gm/dL      A/G Ratio 0.9 g/dL      BUN/Creatinine Ratio 23.8     Anion Gap 11.0 mmol/L      eGFR 31.8 mL/min/1.73     Narrative:      GFR Normal >60  Chronic Kidney Disease <60  Kidney Failure <15    The GFR formula is only valid for adults with stable renal function between ages 18 and 70.    Blood Culture - Blood, Arm, Left [[id number]]  (Normal) Collected: 09/30/23 1536    Specimen: Blood from Arm, Left Updated: 10/01/23 1600     Blood Culture No growth at 24 hours    Blood Culture - Blood, Arm, Right [[id number]]  (Normal) Collected: 09/30/23 1429    Specimen: Blood from Arm, Right Updated: 10/01/23 1445     Blood Culture No growth at 24 hours    Manual Differential [[id number]]  (Abnormal) Collected: 10/01/23 0759    Specimen: Blood Updated: 10/01/23 0903     Neutrophil % 85.1 %      Lymphocyte % 6.9 %      Monocyte % 3.0 %      Eosinophil % 1.0 %      Basophil % 1.0 %      Bands %  2.0 %      Atypical Lymphocyte % 1.0 %      Neutrophils Absolute 8.43 10*3/mm3      Lymphocytes Absolute 0.76 10*3/mm3      Monocytes Absolute 0.29 10*3/mm3      Eosinophils Absolute 0.10 10*3/mm3      Basophils Absolute 0.10 10*3/mm3      Anisocytosis Slight/1+     Crenated RBC's Slight/1+     Poikilocytes Slight/1+     Polychromasia Slight/1+     Toxic Granulation Slight/1+     Platelet Estimate Decreased    CBC Auto Differential [[id number]]  (Abnormal) Collected: 10/01/23 0759    Specimen: Blood Updated: 10/01/23 0903     WBC 9.67  10*3/mm3      RBC 2.71 10*6/mm3      Hemoglobin 8.1 g/dL      Hematocrit 25.1 %      MCV 92.6 fL      MCH 29.9 pg      MCHC 32.3 g/dL      RDW 21.7 %      RDW-SD 63.0 fl      MPV 11.7 fL      Platelets 83 10*3/mm3     Narrative:      Repeated to confirm results.      Comprehensive Metabolic Panel [[id number]]  (Abnormal) Collected: 10/01/23 0759    Specimen: Blood Updated: 10/01/23 0839     Glucose 117 mg/dL      BUN 40 mg/dL      Creatinine 1.71 mg/dL      Sodium 134 mmol/L      Potassium 3.9 mmol/L      Comment: Slight hemolysis detected by analyzer. Results may be affected.        Chloride 101 mmol/L      CO2 20.0 mmol/L      Calcium 8.6 mg/dL      Total Protein 5.2 g/dL      Albumin 2.5 g/dL      ALT (SGPT) 10 U/L      AST (SGOT) 32 U/L      Alkaline Phosphatase 165 U/L      Total Bilirubin 1.5 mg/dL      Globulin 2.7 gm/dL      A/G Ratio 0.9 g/dL      BUN/Creatinine Ratio 23.4     Anion Gap 13.0 mmol/L      eGFR 31.1 mL/min/1.73     Narrative:      GFR Normal >60  Chronic Kidney Disease <60  Kidney Failure <15    The GFR formula is only valid for adults with stable renal function between ages 18 and 70.    Lactic Acid, Plasma [[id number]]  (Normal) Collected: 10/01/23 0759    Specimen: Blood Updated: 10/01/23 0836     Lactate 1.9 mmol/L              Imaging:  XR Chest 1 View    Result Date: 9/30/2023  EXAM/TECHNIQUE: XR CHEST 1 VW-  INDICATION: fever, weakness  COMPARISON: 5/23/2023  FINDINGS:  Cardiac silhouette is mildly enlarged but stable. No pleural effusion, pneumothorax, or focal consolidation. Left shoulder arthroplasty. Postoperative change in the upper lumbar spine and lower cervical spine. No acute osseous finding.      Impression: No acute findings.  This report was signed and finalized on 9/30/2023 2:52 PM CDT by Dr. Bryce Mancera MD.          Assessment and Plan:     Primary Problem:  Sepsis    Hospital Problem list:    Sepsis    Cellulitis of right lower extremity    HTN (hypertension)     Obesity, Class III, BMI 40-49.9 (morbid obesity)    Traumatic rhabdomyolysis    Cellulitis of left lower extremity        Plan:    -Sepsis probably secondary to urosepsis.  We will continue antibiotics.  Culture data not yet back but will narrow based on results.  -Hopefully up with physical and occupational therapy today so that we can plan to discharge tomorrow if all goes well.    Discharge planning:   Home      Reviewed treatment plans with the patient and/or family.     Code Status:   Code Status and Medical Interventions:   Ordered at: 09/30/23 1700     Code Status (Patient has no pulse and is not breathing):    CPR (Attempt to Resuscitate)     Medical Interventions (Patient has pulse or is breathing):    Full Support       Electronically signed by Sanjiv Block MD on 10/2/2023 at 08:30 CDT

## 2023-10-02 NOTE — PLAN OF CARE
Goal Outcome Evaluation:  Plan of Care Reviewed With: patient        Progress: improving  Outcome Evaluation: NTN assessment. Screen weight loss and poor appetite. Visited pt at bedside. Pt stated appetite has been poor the past week. Per intake report, 1-day intake average 0% over  2 meals. Pt stated that she consumed some of lunch, ~25%. Pt does consume ONS (ensure) daily at home. Ordering Boost Plus BID to prevent significant weight loss.  Encouraged intake. Pt stated she has lost ~20 lbs. Per weight report, weight has increased from 250 lbs. to 255 lbs. since 5/23/23. This is most likely due to edema. Pt stated ADL is very limited and niece mostly takes care of her. Advised pt to switch ONS at home to Ensure Complete daily. No reports of nausea/vomiting, per pt.  Last BM 10/1, per pt. Pt does use dentures but does not have them with her. Offered to modify diet texture, pt refused. Will continue to follow per protocol.

## 2023-10-02 NOTE — PLAN OF CARE
Goal Outcome Evaluation:  Plan of Care Reviewed With: patient      Patient c/o pain in LLE, PRN pain med given. No c/o nausea. Patient on room air. A&O x4. Turn Q2, patient refusing. External catheter in place, voiding. IVF and IV abx given. Bilateral dorsalis pedis and posterior tibial pulses good with doppler. BLE warm with good capillary refill. VSS.

## 2023-10-02 NOTE — THERAPY EVALUATION
Patient Name: Brenda Sneed  : 1950    MRN: [id number]                              Today's Date: 10/2/2023       Admit Date: 2023    Visit Dx:     ICD-10-CM ICD-9-CM   1. Sepsis, due to unspecified organism, unspecified whether acute organ dysfunction present  A41.9 038.9     995.91   2. Impaired mobility [Z74.09 (ICD-10-CM)]  Z74.09 799.89     Patient Active Problem List   Diagnosis    Spinal stenosis, lumbar    Secondary osteoarthritis of left shoulder due to rotator cuff arthropathy    Cellulitis of right lower extremity    HTN (hypertension)    Obesity, Class III, BMI 40-49.9 (morbid obesity)    Anxiety associated with depression    Traumatic rhabdomyolysis    Traumatic rhabdomyolysis, initial encounter    Laceration of left lower extremity    Cellulitis    Cellulitis of left lower extremity    Thrombocytopenia    Acute cystitis with hematuria    Sepsis    Candidiasis of skin    Acquired hypothyroidism     Past Medical History:   Diagnosis Date    Arthritis     Bronchitis     Cataract     right eye , cornea implant    Chronic back pain     Depression     Disease of thyroid gland     Edema     lower extremities    Hypertension     Kidney stones      Past Surgical History:   Procedure Laterality Date    BACK SURGERY       ([facility])2013     CHOLECYSTECTOMY OPEN      CORNEAL TRANSPLANT Right     INCISION AND DRAINAGE ABSCESS Left 2023    Procedure: INCISION AND DRAINAGE ABSCESS left leg;  Surgeon: Sarita Vazquez MD;  Location:  [facility];  Service: General;  Laterality: Left;    LACERATION REPAIR Left 2023    Procedure: washout and closure left leg wound ;  Surgeon: Sarita Vazquez MD;  Location:  [facility];  Service: General;  Laterality: Left;    LUMBAR FUSION Left 2017    Procedure: LEFT LUMBAR LATERAL INTERBODY FUSION WITH INSTRUMENTATION  L1-2;  Surgeon: MARCELINO Wright MD;  Location:  [facility];  Service:     NECK SURGERY      [facility]    TOTAL HIP  ARTHROPLASTY Right 2014    DR. KENDRICK     TOTAL KNEE ARTHROPLASTY Right 2000    TOTAL KNEE ARTHROPLASTY Left 2002    TOTAL SHOULDER ARTHROPLASTY W/ DISTAL CLAVICLE EXCISION Left 6/1/2020    Procedure: LEFT REVERSE TOTAL SHOULDER REPLACEMENT;  Surgeon: Saravanan Calvin MD;  Location: [facility];  Service: Orthopedics;  Laterality: Left;      General Information       Row Name 10/02/23 1030          Physical Therapy Time and Intention    Document Type evaluation  Admitted d/t altered mental status and fever, suspected sepsis.  -MS (r) JS (t) MS (c)     Mode of Treatment physical therapy  -MS (r) JS (t) MS (c)       Row Name 10/02/23 1030          General Information    Patient Profile Reviewed yes  -MS (r) JS (t) MS (c)     Prior Level of Function independent:;transfer;all household mobility;bed mobility;dressing;grooming;home management;min assist:;bathing  -MS (r) JS (t) MS (c)     Existing Precautions/Restrictions fall  -MS (r) JS (t) MS (c)     Barriers to Rehab visual deficit  -MS (r) JS (t) MS (c)       Row Name 10/02/23 1030          Living Environment    People in Home alone  -MS (r) JS (t) MS (c)       Row Name 10/02/23 1030          Home Main Entrance    Number of Stairs, Main Entrance two  -MS (r) JS (t) MS (c)     Stair Railings, Main Entrance railing on left side (ascending)  -MS (r) JS (t) MS (c)       Row Name 10/02/23 1030          Stairs Within Home, Primary    Number of Stairs, Within Home, Primary none  -MS (r) JS (t) MS (c)       Row Name 10/02/23 1030          Cognition    Orientation Status (Cognition) oriented x 4  -MS (r) JS (t) MS (c)       Row Name 10/02/23 1030          Safety Issues, Functional Mobility    Impairments Affecting Function (Mobility) balance;endurance/activity tolerance;pain;strength;shortness of breath;visual/perceptual  -MS (r) JS (t) MS (c)     Comment, Safety Issues/Impairments (Mobility) Step over tub with grab bars and shower chair. Elevated toilet seat. Regular bed.  -MS  (r) JS (t) MS (c)               User Key  (r) = Recorded By, (t) = Taken By, (c) = Cosigned By      Initials Name Provider Type    Lois Garrido BENEDICT, PT, DPT, NCS Physical Therapist    George Dominguez, PT Student PT Student                   Mobility       Row Name 10/02/23 1030          Bed-Chair Transfer    Bed-Chair [place] (Transfers) minimum assist (75% patient effort);verbal cues;2 person assist  -MS (r) JS (t) MS (c)     Assistive Device (Bed-Chair Transfers) walker, front-wheeled  -MS (r) JS (t) MS (c)       Row Name 10/02/23 1030          Sit-Stand Transfer    Sit-Stand [place] (Transfers) minimum assist (75% patient effort);verbal cues  -MS (r) JS (t) MS (c)     Assistive Device (Sit-Stand Transfers) walker, front-wheeled  -MS (r) JS (t) MS (c)       Row Name 10/02/23 1030          Gait/Stairs (Locomotion)    [place] Level (Gait) minimum assist (75% patient effort);verbal cues  -MS (r) JS (t) MS (c)     Assistive Device (Gait) walker, front-wheeled  -MS (r) JS (t) MS (c)     Distance in Feet (Gait) 3  -MS (r) JS (t) MS (c)     Deviations/Abnormal Patterns (Gait) other (see comments)  Patient in middle of bed>chair transfer w/ nursing on arrival, ambulated backwards to chair.  -MS (r) JS (t) MS (c)               User Key  (r) = Recorded By, (t) = Taken By, (c) = Cosigned By      Initials Name Provider Type    Lois Garrido BENEDICT, PT, DPT, NCS Physical Therapist    George Dominguez, PT Student PT Student                   Obj/Interventions       Row Name 10/02/23 1030          Range of Motion Comprehensive    Comment, General Range of Motion Shoulder abd/ER limited roughly 25%; B DF limited R 30%, L 50%.  -MS (r) JS (t) MS (c)       Row Name 10/02/23 1030          Strength Comprehensive (MMT)    Comment, General Manual Muscle Testing (MMT) Assessment UE: B shoulders 3/5; elbow flexion extension, wrist extension 4/5 B;  4/5 B; LE: B PF 4/5, DF 3+/5; Knee flexion 4/5; Knee extension  5/5; Hip flexion 3/5  -MS (r) JS (t) MS (c)       Row Name 10/02/23 1030          Balance    Balance Assessment sitting static balance;sitting dynamic balance;standing static balance;standing dynamic balance  -MS (r) JS (t) MS (c)     Static Sitting Balance supervision  -MS (r) JS (t) MS (c)     Dynamic Sitting Balance standby assist  -MS (r) JS (t) MS (c)     Position, Sitting Balance unsupported;sitting in chair  -MS (r) JS (t) MS (c)     Static Standing Balance contact guard  -MS (r) JS (t) MS (c)     Dynamic Standing Balance contact guard  -MS (r) JS (t) MS (c)     Position/Device Used, Standing Balance walker, front-wheeled  -MS (r) JS (t) MS (c)     Balance Interventions sitting;dynamic reaching  -MS (r) JS (t) MS (c)       Row Name 10/02/23 1030          Sensory Assessment (Somatosensory)    Sensory Assessment (Somatosensory) sensation intact  -MS (r) JS (t) MS (c)               User Key  (r) = Recorded By, (t) = Taken By, (c) = Cosigned By      Initials Name Provider Type    Lois Garrido, PT, DPT, NCS Physical Therapist    George Dominguez, PT Student PT Student                   Goals/Plan       Row Name 10/02/23 4574          Bed Mobility Goal 1 (PT)    Activity/Assistive Device (Bed Mobility Goal 1, PT) sit to supine;supine to sit  -MS (r) JS (t) MS (c)     [place] Level/Cues Needed (Bed Mobility Goal 1, PT) contact guard required  -MS (r) JS (t) MS (c)     Time Frame (Bed Mobility Goal 1, PT) long term goal (LTG);by discharge  -MS (r) JS (t) MS (c)     Progress/Outcomes (Bed Mobility Goal 1, PT) new goal  -MS (r) JS (t) MS (c)       Row Name 10/02/23 9364          Transfer Goal 1 (PT)    Activity/Assistive Device (Transfer Goal 1, PT) sit-to-stand/stand-to-sit;bed-to-chair/chair-to-bed  -MS (r) JS (t) MS (c)     [place] Level/Cues Needed (Transfer Goal 1, PT) standby assist  -MS (r) JS (t) MS (c)     Time Frame (Transfer Goal 1, PT) long term goal (LTG);by discharge  -MS (r) JS (t) MS  (c)     Progress/Outcome (Transfer Goal 1, PT) new goal  -MS (r) JS (t) MS (c)       Row Name 10/02/23 3419          Gait Training Goal 1 (PT)    Activity/Assistive Device (Gait Training Goal 1, PT) gait (walking locomotion);assistive device use;decrease fall risk;increase endurance/gait distance;improve balance and speed  -MS (r) JS (t) MS (c)     [place] Level (Gait Training Goal 1, PT) standby assist  -MS (r) JS (t) MS (c)     Distance (Gait Training Goal 1, PT) 30  -MS (r) JS (t) MS (c)     Time Frame (Gait Training Goal 1, PT) long term goal (LTG);by discharge  -MS (r) JS (t) MS (c)     Progress/Outcome (Gait Training Goal 1, PT) new goal  -MS (r) JS (t) MS (c)       Row Name 10/02/23 2836          Therapy Assessment/Plan (PT)    Planned Therapy Interventions (PT) balance training;bed mobility training;gait training;patient/family education;strengthening;transfer training  -MS (r) JS (t) MS (c)               User Key  (r) = Recorded By, (t) = Taken By, (c) = Cosigned By      Initials Name Provider Type    Lois Garrido, PT, DPT, NCS Physical Therapist    George Dominguez, PT Student PT Student                   Clinical Impression       Row Name 10/02/23 1030          Pain    Pretreatment Pain Rating 5/10  -MS (r) JS (t) MS (c)     Posttreatment Pain Rating 5/10  -MS (r) JS (t) MS (c)     Pain Location - abdomen  -MS (r) JS (t) MS (c)     Pain Intervention(s) Medication (See MAR);Repositioned  -MS (r) JS (t) MS (c)       Row Name 10/02/23 1030          Plan of Care Review    Plan of Care Reviewed With patient  -MS (r) JS (t) MS (c)     Progress improving  -MS (r) JS (t) MS (c)     Outcome Evaluation PT eval complete. Pt is A&Ox4 with complaints of 5/10 pain in stomach. She presents with generalized weakness and decreased functional mobility. Pt states she does not feel safe going home in current condition. 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 On vision testing, peripheral vision is similar  to R, but she was unable to accurately state numbers on therapist fingers. On arrival she was in the middle of transferring to chair with nursing, and required min A of 2 to perform. She performed sit>stand once more this visit with min A. She has chronic lymphedema in her BLE, which is limiting some of her DF ROM. She was able to ambulate a few steps backwards from bed>chair during transfer with min A and verbal cues to find surface. Her limiting factors currently for functional mobility are decreased strength, especially with weightbearing activities, and activity tolerance. Barring functional change, anticipate discharge to SNF.  -MS (r) JS (t) MS (c)       Row Name 10/02/23 1030          Therapy Assessment/Plan (PT)    Patient/Family Therapy Goals Statement (PT) Go home  -MS (r) JS (t) MS (c)     Rehab Potential (PT) good, to achieve stated therapy goals  -MS (r) JS (t) MS (c)     Criteria for Skilled Interventions Met (PT) yes;meets criteria;skilled treatment is necessary  -MS (r) JS (t) MS (c)     Therapy Frequency (PT) 2 times/day  -MS (r) JS (t) MS (c)     Predicted Duration of Therapy Intervention (PT) Until discharge  -MS (r) JS (t) MS (c)       Row Name 10/02/23 1030          Vital Signs    O2 Delivery Pre Treatment room air  -MS (r) JS (t) MS (c)     O2 Delivery Intra Treatment room air  -MS (r) JS (t) MS (c)     O2 Delivery Post Treatment room air  -MS (r) JS (t) MS (c)     Pre Patient Position Standing  -MS (r) JS (t) MS (c)     Intra Patient Position Standing  -MS (r) JS (t) MS (c)     Post Patient Position Sitting  -MS (r) JS (t) MS (c)       Row Name 10/02/23 1030          Positioning and Restraints    Pre-Treatment Position in bed  -MS (r) JS (t) MS (c)     Post Treatment Position chair  -MS (r) JS (t) MS (c)     In Chair sitting;call light within reach;encouraged to call for assist  -MS (r) JS (t) MS (c)               User Key  (r) = Recorded By, (t) = Taken By, (c) = Cosigned By      Initials  Name Provider Type    Lois Garrido R, PT, DPT, NCS Physical Therapist    George Dominguez, PT Student PT Student                   Outcome Measures       Row Name 10/02/23 1030 10/02/23 0812       How much help from another person do you currently need...    Turning from your back to your side while in flat bed without using bedrails? 3  -MS (r) JS (t) MS (c) 3  -AM    Moving from lying on back to sitting on the side of a flat bed without bedrails? 2  -MS (r) JS (t) MS (c) 2  -AM    Moving to and from a bed to a chair (including a wheelchair)? 2  -MS (r) JS (t) MS (c) 2  -AM    Standing up from a chair using your arms (e.g., wheelchair, bedside chair)? 3  -MS (r) JS (t) MS (c) 3  -AM    Climbing 3-5 steps with a railing? 2  -MS (r) JS (t) MS (c) 2  -AM    To walk in hospital room? 3  -MS (r) JS (t) MS (c) 2  -AM    AM-PAC 6 Clicks Score (PT) 15  -MS (r) JS (t) 14  -AM    Highest level of mobility 4 --> Transferred to chair/commode  -MS (r) JS (t) 4 --> Transferred to chair/commode  -AM      Row Name 10/02/23 1030          Functional Assessment    Outcome Measure Options AM-PAC 6 Clicks Basic Mobility (PT)  -MS (r) JS (t) MS (c)               User Key  (r) = Recorded By, (t) = Taken By, (c) = Cosigned By      Initials Name Provider Type    Lois Garrido BENEDICT, PT, DPT, NCS Physical Therapist    Katrin Urban, RN Registered Nurse    George Dominguez, PT Student PT Student                                 Physical Therapy Education       Title: PT OT SLP Therapies (Done)       Topic: Physical Therapy (Done)       Point: Mobility training (Done)       Learning Progress Summary             Patient Acceptance, E, VU,DU by LISSETTE at 10/2/2023 1030    Comment: Given cueing for safe and appropriate transfers with FWW. Benefits of ambulation and weight bearing exercise.                         Point: Body mechanics (Done)       Learning Progress Summary             Patient Acceptance, E, VU,DU by LISSETTE at 10/2/2023 1030     Comment: Given cueing for safe and appropriate transfers with FWW. Benefits of ambulation and weight bearing exercise.                                         User Key       Initials Effective Dates Name Provider Type Discipline     07/13/23 -  George Juarez, PT Student PT Student PT                  PT Recommendation and Plan  Planned Therapy Interventions (PT): balance training, bed mobility training, gait training, patient/family education, strengthening, transfer training  Plan of Care Reviewed With: patient  Progress: improving  Outcome Evaluation: PT eval complete. Pt is A&Ox4 with complaints of 5/10 pain in stomach. She presents with generalized weakness and decreased functional mobility. Pt states she does not feel safe going home in current condition. 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 On vision testing, peripheral vision is similar to R, but she was unable to accurately state numbers on therapist fingers. On arrival she was in the middle of transferring to chair with nursing, and required min A of 2 to perform. She performed sit>stand once more this visit with min A. She has chronic lymphedema in her BLE, which is limiting some of her DF ROM. She was able to ambulate a few steps backwards from bed>chair during transfer with min A and verbal cues to find surface. Her limiting factors currently for functional mobility are decreased strength, especially with weightbearing activities, and activity tolerance. Barring functional change, anticipate discharge to SNF.     Time Calculation:         PT Charges       Row Name 10/02/23 1030             Time Calculation    Start Time 1030  13 mins spent in chart  -MS (r) JS (t) MS (c)      Stop Time 1111  -MS (r) JS (t) MS (c)      Time Calculation (min) 41 min  -MS (r) JS (t)      PT Received On 10/02/23  -MS (r) JS (t) MS (c)      PT Goal Re-Cert Due Date 10/12/23  -MS (r) JS (t) MS (c)         Untimed Charges    PT Eval/Re-eval Minutes 54  -MS (r) JS  (t) MS (c)         Total Minutes    Untimed Charges Total Minutes 54  -MS (r) JS (t)       Total Minutes 54  -MS (r) JS (t)                User Key  (r) = Recorded By, (t) = Taken By, (c) = Cosigned By      Initials Name Provider Type    MS Wu Lois R, PT, DPT, NCS Physical Therapist    George Dominguez, PT Student PT Student                      PT G-Codes  Outcome Measure Options: AM-PAC 6 Clicks Basic Mobility (PT)  AM-PAC 6 Clicks Score (PT): 15  PT Discharge Summary  Anticipated Discharge Disposition (PT): skilled nursing facility    George Juarez, PT Student  10/2/2023

## 2023-10-02 NOTE — PAYOR COMM NOTE
"Eyad Gurrola (72 y.o. Female)   [id number]  admit 9/30  [facility] phone    fax         Date of Birth   1950    Social Security Number       Address   [address] [facility] [address]    Home Phone   [phone]    MRN   [id number]       Episcopalian   Orthodox    Marital Status                               Admission Date   9/30/23    Admission Type   Emergency    Admitting Provider   Sachin Dia MD    Attending Provider   Sachin Dia MD    Department, Room/Bed   [facility] 3C, 374/1       Discharge Date       Discharge Disposition       Discharge Destination                                 Attending Provider: Sachin Dia MD    Allergies: Codeine, Amoxicillin, Levaquin [Levofloxacin], Benadryl [Diphenhydramine]    Isolation: None   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)   Code Status: CPR    Ht: 160 cm (63\")   Wt: 116 kg (255 lb)    Admission Cmt: None   Principal Problem: Sepsis [A41.9]                   Active Insurance as of 9/30/2023       Primary Coverage       Payor Plan Insurance Group Employer/Plan Group    HUMANA MEDICARE REPLACEMENT HUMANA MEDICARE REPLACEMENT [id number]       Payor Plan Address Payor Plan Phone Number Payor Plan Fax Number Effective Dates    [address] [address] [phone]  1/1/2023 - None Entered    [facility] [id number]         Subscriber Name Subscriber Birth Date Member ID       EYAD GURROLA 1950 [id number]               Secondary Coverage       Payor Plan Insurance Group Employer/Plan Group    [id number]        Payor Plan Address Payor Plan Phone Number Payor Plan Fax Number Effective Dates    [address]   6/1/2021 - None Entered    [facility] [id number]         Subscriber Name Subscriber Birth Date Member ID       EYAD GURROLA 1950 [id number]                     Emergency Contacts        (Rel.) Home Phone Work Phone " Mobile Phone    Nella Law (Daughter) [phone] -- [phone]    ODELL NELSON (Relative) -- -- [phone]                 History & Physical        MentmoreTremaine wren MD at 09/30/23 1701              Patient Care Team:  Sachin Dia MD as PCP - General  Sachin Dia MD as PCP - Family Medicine  Sumeet Bowles PA as Physician Assistant (Family Medicine)  Sarita Vazquez MD as Surgeon (General Surgery)    Chief complaint fever and alteration in mentation    Subjective    Patient is a 72 y.o. female presents with altered mental status and fever. Onset of symptoms was abrupt starting 1 day ago.  Symptoms are associated with swelling and increased redness of bilateral lower extremities.  She does suffer from chronic lymphedema.  Has had increased soreness in the legs prior to admission.  Symptoms are aggravated by nothing known.   Symptoms improve with no interventions at home prior to arrival. Severity moderate.  Context again has chronic edema with venous stasis and skin issues that come and go.  Quality described as deep soreness in her legs.  Has history of urinary tract infections but denies any dysuria or frequency.  She has some difficulty in giving full history.  She is accompanied by her niece in the room.    Review of Systems   Review of systems could not be obtained due to   patient confusion.    History  Past Medical History:   Diagnosis Date    Arthritis     Bronchitis     Cataract     right eye , cornea implant    Chronic back pain     Depression     Disease of thyroid gland     Edema     lower extremities    Hypertension     Kidney stones      Past Surgical History:   Procedure Laterality Date    BACK SURGERY      2010 ([place]), 2013     CHOLECYSTECTOMY OPEN      CORNEAL TRANSPLANT Right 1999    INCISION AND DRAINAGE ABSCESS Left 4/19/2023    Procedure: INCISION AND DRAINAGE ABSCESS left leg;  Surgeon: Sarita Vazquez MD;  Location: [facility];  Service: General;   Laterality: Left;    LACERATION REPAIR Left 4/7/2023    Procedure: washout and closure left leg wound ;  Surgeon: Sarita Vazquez MD;  Location:  [facility];  Service: General;  Laterality: Left;    LUMBAR FUSION Left 8/2/2017    Procedure: LEFT LUMBAR LATERAL INTERBODY FUSION WITH INSTRUMENTATION  L1-2;  Surgeon: MARCELINO Wright MD;  Location:  [facility];  Service:     NECK SURGERY  2010    [place]    TOTAL HIP ARTHROPLASTY Right 2014    DR. KENDRICK     TOTAL KNEE ARTHROPLASTY Right 2000    TOTAL KNEE ARTHROPLASTY Left 2002    TOTAL SHOULDER ARTHROPLASTY W/ DISTAL CLAVICLE EXCISION Left 6/1/2020    Procedure: LEFT REVERSE TOTAL SHOULDER REPLACEMENT;  Surgeon: Saravanan Calvin MD;  Location:  [facility];  Service: Orthopedics;  Laterality: Left;     Family History   Problem Relation Age of Onset    Breast cancer Neg Hx      Social History     Tobacco Use    Smoking status: Never    Smokeless tobacco: Never   Vaping Use    Vaping Use: Never used   Substance Use Topics    Alcohol use: No    Drug use: No     E-cigarette/Vaping    E-cigarette/Vaping Use Never User      E-cigarette/Vaping Substances     Medications Prior to Admission   Medication Sig Dispense Refill Last Dose    ALBUTEROL IN Inhale 4 (Four) Times a Day.       bisoprolol-hydrochlorothiazide (ZIAC) 5-6.25 MG per tablet Take 1 tablet by mouth Daily. Indications: High Blood Pressure Disorder       cephalexin (Keflex) 500 MG capsule Take 1 capsule by mouth 2 (Two) Times a Day. 14 capsule 0     cyclobenzaprine (FLEXERIL) 10 MG tablet Take 1 tablet by mouth 3 (Three) Times a Day As Needed for Muscle Spasms. 30 tablet 0     docusate sodium (Colace) 100 MG capsule Take 1 capsule by mouth 2 (Two) Times a Day. 60 capsule 1     DULoxetine (CYMBALTA) 60 MG capsule Take 1 capsule by mouth 2 (Two) Times a Day. Indications: Musculoskeletal Pain       furosemide (LASIX) 40 MG tablet Take 40 mg by mouth Daily. Indications: Edema       HYDROcodone-acetaminophen (NORCO)  5-325 MG per tablet Take 1 tablet by mouth Every 6 (Six) Hours As Needed.       levothyroxine (SYNTHROID, LEVOTHROID) 100 MCG tablet Take 1 tablet by mouth Daily. Indications: Underactive Thyroid       MORPHINE SULFATE PO Take  by mouth Daily.       nystatin (MYCOSTATIN) 734362 UNIT/GM powder Apply 1 application topically to the appropriate area as directed 2 (Two) Times a Day. Indications: Candida Infection of Skin and Mouth or Vagina       oxybutynin XL (DITROPAN-XL) 5 MG 24 hr tablet Take 5 mg by mouth Daily. Take at night  Indications: Urinary Incontinence       oxybutynin XL (DITROPAN-XL) 5 MG 24 hr tablet Take 5 mg by mouth Daily. Indications: Urinary Incontinence       pramipexole (MIRAPEX) 0.75 MG tablet Take 1 tablet by mouth every night at bedtime. Indications: Restless Leg Syndrome       vitamin C (ASCORBIC ACID) 500 MG tablet Take 1 tablet by mouth Daily. Indications: Inadequate Vitamin C       vitamin D3 125 MCG (5000 UT) capsule capsule Take 1 capsule by mouth Daily. Indications: Vitamin D Deficiency        Allergies:  Codeine, Amoxicillin, Levaquin [levofloxacin], and Benadryl [diphenhydramine]    Objective    Vital Signs  Temp:  [99.4 °F (37.4 °C)-101.1 °F (38.4 °C)] 99.4 °F (37.4 °C)  Heart Rate:  [80-86] 80  Resp:  [17-20] 20  BP: (129-138)/(51-60) 138/56    Physical Exam:    General Appearance: alert, appears stated age, interactive, cooperative, and morbidly obese  Head: normocephalic, without obvious abnormality and atraumatic  Eyes: lids and lashes normal, conjunctivae and sclerae normal, and no icterus  Neck: supple and trachea midline  Lungs: clear to auscultation, respirations regular, respirations even, and respirations unlabored  Heart: regular rhythm & normal rate, normal S1, S2, and no murmur, no gallop, no rub  Abdomen: normal bowel sounds, no masses, soft non-tender, and no guarding  Extremities: no ischemic changes, edema 2+ lower bilateral, redness coming out from above bilateral  wraps that she has on her lower extremities, tenderness bilateral legs, and venous status dermatitis  Skin: Erythema and chronic skin changes lower extremities  Neurologic: Mental Status alertness alert and awake, orientation person and think she is at [facility] unsure of the date, and mood/affect normal    Results Review:    I reviewed the patient's new clinical results.    Assessment & Plan      Sepsis    Cellulitis of right lower extremity    HTN (hypertension)    Obesity, Class III, BMI 40-49.9 (morbid obesity)    Traumatic rhabdomyolysis    Cellulitis of left lower extremity      Patient with evidence of sepsis possibly from cellulitis of lower extremity.  Urine test currently pending.  We will go ahead and place on antibiotics per sepsis treatment protocol and adjust according to results of pending cultures.  We will ask wound care to come and evaluate legs as well.  Follow lab reports.  Will likely need PT and OT eval but will try to get her symptoms and legs under control as I am not sure she would tolerate much in the way of therapy right now.    I discussed the patients findings and my recommendations with patient and family.     Tremaine Forrester MD  09/30/23  17:01 CDT    Time:  Greater than 40 minutes        Electronically signed by Tremaine Forrester MD at 09/30/23 1707          Emergency Department Notes        Carolin Baez, RN at 09/30/23 1615          Nursing report ED to floor  Brenda Sneed  72 y.o.  female    HPI:   Chief Complaint   Patient presents with    Altered Mental Status    Weakness - Generalized       Admitting doctor:   Sachin Dia MD    Consulting provider(s):  Consults       No orders found from 9/1/2023 to 10/1/2023.             Admitting diagnosis:   The encounter diagnosis was Sepsis, due to unspecified organism, unspecified whether acute organ dysfunction present.    Code status:   Current Code Status       Date Active Code Status Order ID Comments  "User Context       Prior            Allergies:   Codeine, Amoxicillin, Levaquin [levofloxacin], and Benadryl [diphenhydramine]    Intake and Output  No intake or output data in the 24 hours ending 09/30/23 1619    Weight:       09/30/23  1420   Weight: 116 kg (255 lb)       Most recent vitals:   Vitals:    09/30/23 1420 09/30/23 1426 09/30/23 1602   BP:  131/60 129/51   Pulse: 86  80   Resp: 17     Temp: (!) 101.1 °F (38.4 °C)     TempSrc: Oral     SpO2: 99%  100%   Weight: 116 kg (255 lb)     Height: 160 cm (63\")       Oxygen Therapy: .    Active LDAs/IV Access:   Lines, Drains & Airways       Active LDAs       Name Placement date Placement time Site Days    Peripheral IV 09/30/23 1541 Left Forearm 09/30/23  1541  Forearm  less than 1                    Labs (abnormal labs have a star):   Labs Reviewed   LACTIC ACID, PLASMA - Abnormal; Notable for the following components:       Result Value    Lactate 3.7 (*)     All other components within normal limits   URINALYSIS W/ CULTURE IF INDICATED - Abnormal; Notable for the following components:    Color, UA Dark Yellow (*)     Bilirubin, UA Small (1+) (*)     Leuk Esterase, UA Moderate (2+) (*)     Nitrite, UA Positive (*)     All other components within normal limits    Narrative:     In absence of clinical symptoms, the presence of pyuria, bacteria, and/or nitrites on the urinalysis result does not correlate with infection.   CBC WITH AUTO DIFFERENTIAL - Abnormal; Notable for the following components:    WBC 21.96 (*)     RBC 3.38 (*)     Hemoglobin 10.1 (*)     Hematocrit 30.1 (*)     RDW 21.2 (*)     RDW-SD 60.6 (*)     Platelets 117 (*)     All other components within normal limits   MANUAL DIFFERENTIAL - Abnormal; Notable for the following components:    Neutrophil % 86.6 (*)     Lymphocyte % 7.2 (*)     Neutrophils Absolute 19.02 (*)     Monocytes Absolute 1.36 (*)     All other components within normal limits    Narrative:     Pt shows 4+ agglutination on " slide.  Pt specimen was heated and reran.   LACTIC ACID, REFLEX - Abnormal; Notable for the following components:    Lactate 3.0 (*)     All other components within normal limits   URINALYSIS, MICROSCOPIC ONLY - Abnormal; Notable for the following components:    RBC, UA 0-2 (*)     WBC, UA 21-30 (*)     Bacteria, UA 4+ (*)     All other components within normal limits   BLOOD CULTURE   BLOOD CULTURE   URINE CULTURE   COMPREHENSIVE METABOLIC PANEL   LACTIC ACID, REFLEX   CBC AND DIFFERENTIAL    Narrative:     The following orders were created for panel order CBC & Differential.  Procedure                               Abnormality         Status                     ---------                               -----------         ------                     CBC Auto Differential[[id number]]        Abnormal            Final result                 Please view results for these tests on the individual orders.       Meds given in ED:   Medications   sodium chloride 0.9% - IBW for BMI > 30 bolus 1,572 mL (has no administration in time range)   sodium chloride 0.9 % flush 10 mL (has no administration in time range)   cefTRIAXone (ROCEPHIN) 2,000 mg in sodium chloride 0.9 % 100 mL IVPB (has no administration in time range)           NIH Stroke Scale:       Isolation/Infection(s):  No active isolations   ESBL E coli     COVID Testing  Collected .  Resulted .    Nursing report ED to floor:  Mental status: .  Ambulatory status: .  Precautions: .    ED nurse phone extentsion- .2180  Report given to KADEN Daily 3C      Electronically signed by Carolin Baez RN at 09/30/23 0216       Barrett Wray Jr., MD at 09/30/23 0667          Subjective   History of Present Illness  Patient is brought to emergency room by ambulance after family found her with altered mental status.  They said they could not get her to respond to their phone calls so they went to check on her and found her sitting in her chair confused and very weak.  They try to  get her to stand but she could not stand her she stood up was briefly and had to sit back down.  As far as they know she is normally alert and oriented and was until today.  I do not have any reports of cough or congestion.  The patient is unable to give us much complete information because of her altered mental status.    History provided by:  Patient   used: No    Weakness - Generalized  Severity:  Severe  Onset quality:  Sudden  Duration:  1 day  Timing:  Constant  Progression:  Unchanged  Chronicity:  New  Context: not alcohol use, not allergies, not change in medication, not decreased sleep, not dehydration, not drug use, not increased activity, not pinched nerve, not recent infection, not stress and not urinary tract infection    Relieved by:  Nothing  Worsened by:  Nothing  Ineffective treatments:  None tried  Associated symptoms: difficulty walking and lethargy    Associated symptoms: no abdominal pain, no anorexia, no aphasia, no arthralgias, no ataxia, no chest pain, no cough, no diarrhea, no dizziness, no drooling, no dysphagia, no dysuria, no numbness in extremities, no falls, no fever, no foul-smelling urine, no frequency, no headaches, no hematochezia, no loss of consciousness, no melena, no myalgias, no nausea, no near-syncope, no seizures, no sensory-motor deficit, no shortness of breath, no stroke symptoms, no syncope, no urgency, no vision change and no vomiting    Risk factors: no anemia, no congestive heart failure, no coronary artery disease, no diabetes, no excessive menstruation, no family hx of stroke, no heart disease, no neurologic disease, no new medications and no recent stressors      Review of Systems   Constitutional: Negative.  Negative for fever.   HENT: Negative.  Negative for drooling.    Respiratory: Negative.  Negative for cough and shortness of breath.    Cardiovascular: Negative.  Negative for chest pain, syncope and near-syncope.   Gastrointestinal:  Negative.  Negative for abdominal pain, anorexia, diarrhea, dysphagia, hematochezia, melena, nausea and vomiting.   Genitourinary: Negative.  Negative for dysuria, frequency and urgency.   Musculoskeletal: Negative.  Negative for arthralgias, falls and myalgias.   Skin: Negative.    Neurological:  Negative for dizziness, seizures, loss of consciousness and headaches.   Psychiatric/Behavioral: Negative.     All other systems reviewed and are negative.    Past Medical History:   Diagnosis Date    Arthritis     Bronchitis     Cataract     right eye , cornea implant    Chronic back pain     Depression     Disease of thyroid gland     Edema     lower extremities    Hypertension     Kidney stones        Allergies   Allergen Reactions    Codeine Itching and Rash     SEVERE RASH/ITCHING (TOLERATES PERCOCET)    Amoxicillin Other (See Comments)     unknown    Levaquin [Levofloxacin] Other (See Comments)     unknown    Benadryl [Diphenhydramine] Other (See Comments)     KEEPS PATIENT AWAKE        Past Surgical History:   Procedure Laterality Date    BACK SURGERY      2010 ([place]), 2013     CHOLECYSTECTOMY OPEN      CORNEAL TRANSPLANT Right 1999    INCISION AND DRAINAGE ABSCESS Left 4/19/2023    Procedure: INCISION AND DRAINAGE ABSCESS left leg;  Surgeon: Sariat Vazquez MD;  Location:  [facility];  Service: General;  Laterality: Left;    LACERATION REPAIR Left 4/7/2023    Procedure: washout and closure left leg wound ;  Surgeon: Sarita Vazquez MD;  Location:  [facility];  Service: General;  Laterality: Left;    LUMBAR FUSION Left 8/2/2017    Procedure: LEFT LUMBAR LATERAL INTERBODY FUSION WITH INSTRUMENTATION  L1-2;  Surgeon: MARCELINO Wright MD;  Location:  [facility];  Service:     NECK SURGERY  2010    [place]    TOTAL HIP ARTHROPLASTY Right 2014    DR. KENDRICK     TOTAL KNEE ARTHROPLASTY Right 2000    TOTAL KNEE ARTHROPLASTY Left 2002    TOTAL SHOULDER ARTHROPLASTY W/ DISTAL CLAVICLE EXCISION Left 6/1/2020     Procedure: LEFT REVERSE TOTAL SHOULDER REPLACEMENT;  Surgeon: Saravanan Calvin MD;  Location: [facility];  Service: Orthopedics;  Laterality: Left;       Family History   Problem Relation Age of Onset    Breast cancer Neg Hx        Social History     Socioeconomic History    Marital status:    Tobacco Use    Smoking status: Never    Smokeless tobacco: Never   Vaping Use    Vaping Use: Never used   Substance and Sexual Activity    Alcohol use: No    Drug use: No    Sexual activity: Defer       Prior to Admission medications    Medication Sig Start Date End Date Taking? Authorizing Provider   ALBUTEROL IN Inhale 4 (Four) Times a Day.    Lacie Hernandez MD   bisoprolol-hydrochlorothiazide (ZIAC) 5-6.25 MG per tablet Take 1 tablet by mouth Daily. Indications: High Blood Pressure Disorder    Lacie Hernandez MD   cephalexin (Keflex) 500 MG capsule Take 1 capsule by mouth 2 (Two) Times a Day. 5/30/23   Sanjiv Block MD   cyclobenzaprine (FLEXERIL) 10 MG tablet Take 1 tablet by mouth 3 (Three) Times a Day As Needed for Muscle Spasms. 6/1/20   Saravanan Calvin MD   docusate sodium (Colace) 100 MG capsule Take 1 capsule by mouth 2 (Two) Times a Day. 6/1/20   Saravanan Calvin MD   DULoxetine (CYMBALTA) 60 MG capsule Take 1 capsule by mouth 2 (Two) Times a Day. Indications: Musculoskeletal Pain    Lacie Hernandez MD   furosemide (LASIX) 40 MG tablet Take 40 mg by mouth Daily. Indications: Edema 6/12/23   Lacie Hernandez MD   HYDROcodone-acetaminophen (NORCO) 5-325 MG per tablet Take 1 tablet by mouth Every 6 (Six) Hours As Needed.    Lacie Hernandez MD   levothyroxine (SYNTHROID, LEVOTHROID) 100 MCG tablet Take 1 tablet by mouth Daily. Indications: Underactive Thyroid    ProviderLacie MD   MORPHINE SULFATE PO Take  by mouth Daily.    Lacie Hernandez MD   nystatin (MYCOSTATIN) 578809 UNIT/GM powder Apply 1 application topically to the appropriate area as directed 2  (Two) Times a Day. Indications: Candida Infection of Skin and Mouth or Vagina    Lacie Hernandez MD   oxybutynin XL (DITROPAN-XL) 5 MG 24 hr tablet Take 5 mg by mouth Daily. Take at night  Indications: Urinary Incontinence 7/10/23   Lacie Hernandez MD   oxybutynin XL (DITROPAN-XL) 5 MG 24 hr tablet Take 5 mg by mouth Daily. Indications: Urinary Incontinence 7/6/23   Lacie Hernandez MD   pramipexole (MIRAPEX) 0.75 MG tablet Take 1 tablet by mouth every night at bedtime. Indications: Restless Leg Syndrome    Lacie Hernandez MD   vitamin C (ASCORBIC ACID) 500 MG tablet Take 1 tablet by mouth Daily. Indications: Inadequate Vitamin C    Lacie Hernandez MD   vitamin D3 125 MCG (5000 UT) capsule capsule Take 1 capsule by mouth Daily. Indications: Vitamin D Deficiency    Lacie Hernandez MD       Medications   sodium chloride 0.9% - IBW for BMI > 30 bolus 1,572 mL (has no administration in time range)   sodium chloride 0.9 % flush 10 mL (has no administration in time range)   cefTRIAXone (ROCEPHIN) 2,000 mg in sodium chloride 0.9 % 100 mL IVPB (has no administration in time range)       Vitals:    09/30/23 1426   BP: 131/60   Pulse:    Resp:    Temp:    SpO2:          Objective   Physical Exam  Vitals and nursing note reviewed.   Constitutional:       Appearance: She is well-developed.   HENT:      Head: Normocephalic and atraumatic.   Cardiovascular:      Rate and Rhythm: Normal rate and regular rhythm.   Pulmonary:      Effort: Pulmonary effort is normal.      Breath sounds: Normal breath sounds.   Abdominal:      General: Bowel sounds are normal.      Palpations: Abdomen is soft.   Musculoskeletal:         General: Swelling and tenderness present. Normal range of motion.      Cervical back: Normal range of motion and neck supple.      Comments: Patient has chronic edema both lower legs and they are wrapped but the left leg has erythema and heat above the wrapping and below the knee.    Skin:     General: Skin is warm and dry.   Neurological:      Mental Status: She is alert. She is lethargic.   Psychiatric:         Mood and Affect: Mood normal.         Behavior: Behavior normal.       Procedures        Lab Results (last 24 hours)       Procedure Component Value Units Date/Time    CBC & Differential [[id number]]  (Abnormal) Collected: 09/30/23 1429    Specimen: Blood Updated: 09/30/23 1535    Narrative:      The following orders were created for panel order CBC & Differential.  Procedure                               Abnormality         Status                     ---------                               -----------         ------                     CBC Auto Differential[[id number]]        Abnormal            Final result                 Please view results for these tests on the individual orders.    Blood Culture - Blood, Arm, Right [[id number]] Collected: 09/30/23 1429    Specimen: Blood from Arm, Right Updated: 09/30/23 1435    Lactic Acid, Plasma [[id number]]  (Abnormal) Collected: 09/30/23 1429    Specimen: Blood Updated: 09/30/23 1456     Lactate 3.7 mmol/L     CBC Auto Differential [[id number]]  (Abnormal) Collected: 09/30/23 1429    Specimen: Blood Updated: 09/30/23 1535     WBC 21.96 10*3/mm3      Comment: Modified report. Previous result was 22.02 10*3/mm3 on 9/30/2023 at 1500 CDT.        RBC 3.38 10*6/mm3      Comment: Modified report. Previous result was 3.42 10*6/mm3 on 9/30/2023 at 1500 CDT.        Hemoglobin 10.1 g/dL      Comment: Modified report. Previous result was 10.0 g/dL on 9/30/2023 at 1500 CDT.        Hematocrit 30.1 %      Comment: Modified report. Previous result was 30.3 % on 9/30/2023 at 1500 CDT.        MCV 89.1 fL      Comment: Modified report. Previous result was 88.6 fL on 9/30/2023 at 1500 CDT.        MCH 29.9 pg      Comment: Modified report. Previous result was 29.2 pg on 9/30/2023 at 1500 CDT.        MCHC 33.6 g/dL      Comment: Modified report. Previous result was  33.0 g/dL on 9/30/2023 at 1500 CDT.        RDW 21.2 %      Comment: Modified report. Previous result was 20.5 % on 9/30/2023 at 1500 CDT.        RDW-SD 60.6 fl      Comment: Modified report. Previous result was 60.5 fl on 9/30/2023 at 1500 CDT.        MPV 11.1 fL      Platelets 117 10*3/mm3      Comment: Modified report. Previous result was 131 10*3/mm3 on 9/30/2023 at 1500 CDT.       Manual Differential [[id number]]  (Abnormal) Collected: 09/30/23 1429    Specimen: Blood Updated: 09/30/23 1539     Neutrophil % 86.6 %      Lymphocyte % 7.2 %      Monocyte % 6.2 %      Neutrophils Absolute 19.02 10*3/mm3      Lymphocytes Absolute 1.58 10*3/mm3      Monocytes Absolute 1.36 10*3/mm3      Anisocytosis Slight/1+     Microcytes Slight/1+     Poikilocytes Slight/1+     Polychromasia Mod/2+     WBC Morphology Normal     Platelet Estimate Decreased    Narrative:      Pt shows 4+ agglutination on slide.  Pt specimen was heated and reran.    Comprehensive Metabolic Panel [[id number]] Collected: 09/30/23 1536    Specimen: Blood Updated: 09/30/23 1552    Blood Culture - Blood, Arm, Left [[id number]] Collected: 09/30/23 1536    Specimen: Blood from Arm, Left Updated: 09/30/23 1554    STAT Lactic Acid, Reflex [[id number]] Collected: 09/30/23 1536    Specimen: Blood Updated: 09/30/23 1552            XR Chest 1 View   Final Result   No acute findings.       This report was signed and finalized on 9/30/2023 2:52 PM CDT by Dr. Bryce Mancera MD.              ED Course          MDM  Number of Diagnoses or Management Options  Sepsis, due to unspecified organism, unspecified whether acute organ dysfunction present: new and requires workup  Diagnosis management comments: Patient has significant signs of infection.  The only obvious source at the present time appears to be the left lower leg but we will cultures to make sure.  I spoke with Dr. Barker who was on-call for her regular physician and we will admit for further care.        Amount and/or Complexity of Data Reviewed  Clinical lab tests: ordered and reviewed  Tests in the radiology section of CPT®: ordered and reviewed  Discuss the patient with other providers: yes    Risk of Complications, Morbidity, and/or Mortality  Presenting problems: moderate  Diagnostic procedures: moderate  Management options: moderate    Patient Progress  Patient progress: stable      Final diagnoses:   Sepsis, due to unspecified organism, unspecified whether acute organ dysfunction present          Barrett Wray Jr., MD  09/30/23 1602      Electronically signed by Barrett Wray Jr., MD at 09/30/23 1602       Vital Signs (last 2 days)       Date/Time Temp Temp src Pulse Resp BP Patient Position SpO2    10/02/23 0719 97.5 (36.4) Oral 62 18 125/51 Lying 99    10/02/23 0339 98 (36.7) Oral 59 18 141/55 Lying 97    10/01/23 2336 98.5 (36.9) Oral 70 18 137/59 Lying 99    10/01/23 1934 98 (36.7) Oral 60 18 115/48 Lying 100    10/01/23 1659 98.4 (36.9) Oral 62 18 114/50 Lying 98    10/01/23 1305 98.2 (36.8) Oral 79 18 117/44 Lying 97    10/01/23 0825 98.4 (36.9) Oral 62 20 113/46 Lying 98    10/01/23 0319 98.1 (36.7) Oral 68 20 127/41 Lying 98    10/01/23 0004 98.3 (36.8) Oral 69 20 113/41 Lying 98    09/30/23 1930 99.3 (37.4) Oral 70 20 117/41 Lying 100    09/30/23 1640 99.4 (37.4) Oral 80 20 138/56 Lying 100    09/30/23 1602 -- -- 80 -- 129/51 -- 100    09/30/23 1426 -- -- -- -- 131/60 -- --    09/30/23 1420 101.1 (38.4) Oral 86 17 -- -- 99          Current Facility-Administered Medications   Medication Dose Route Frequency Provider Last Rate Last Admin    acetaminophen (TYLENOL) tablet 650 mg  650 mg Oral Q4H PRN Tremaine Forrester MD        sennosides-docusate (PERICOLACE) 8.6-50 MG per tablet 2 tablet  2 tablet Oral BID Tremaine Forrester MD   2 tablet at 10/02/23 0812    And    polyethylene glycol (MIRALAX) packet 17 g  17 g Oral Daily PRN Tremaine Forrester MD        And    bisacodyl (DULCOLAX)  EC tablet 5 mg  5 mg Oral Daily PRN Tremaine Forrester MD        And    bisacodyl (DULCOLAX) suppository 10 mg  10 mg Rectal Daily PRN Tremaine Forrester MD        bisoprolol-hydrochlorothiazide (ZIAC) 5-6.25 MG per tablet 1 tablet  1 tablet Oral Daily Tremaine Forrester MD        ceFAZolin 2000 mg IVPB in 100 mL NS (MBP)  2,000 mg Intravenous Q8H Tremaine Forrester MD   2,000 mg at 10/02/23 0431    cyclobenzaprine (FLEXERIL) tablet 10 mg  10 mg Oral TID PRN Tremaine Forrester MD   10 mg at 10/01/23 0214    docusate sodium (COLACE) capsule 100 mg  100 mg Oral BID Tremaine Forrester MD   100 mg at 10/02/23 0812    DULoxetine (CYMBALTA) DR capsule 60 mg  60 mg Oral BID Tremaine Forrester MD   60 mg at 10/02/23 0812    Enoxaparin Sodium (LOVENOX) syringe 40 mg  40 mg Subcutaneous Daily Tremaine Forrester MD   40 mg at 10/02/23 0812    furosemide (LASIX) tablet 40 mg  40 mg Oral Daily Tremaine Forrester MD        levothyroxine (SYNTHROID, LEVOTHROID) tablet 100 mcg  100 mcg Oral Q AM Tremaine Forrester MD   100 mcg at 10/02/23 0536    nystatin (MYCOSTATIN) powder 1 application   1 application  Topical BID Tremaine Forrester MD   1 application  at 10/02/23 0814    oxybutynin XL (DITROPAN-XL) 24 hr tablet 5 mg  5 mg Oral Daily Tremaine Forrester MD   5 mg at 10/02/23 0812    oxyCODONE-acetaminophen (PERCOCET) 5-325 MG per tablet 1 tablet  1 tablet Oral Q4H PRN Tremaine Forrester MD   1 tablet at 10/02/23 0936    pramipexole (MIRAPEX) tablet 0.75 mg  0.75 mg Oral Nightly Tremaine Forrester MD   0.75 mg at 10/01/23 2122    sodium chloride 0.9 % flush 10 mL  10 mL Intravenous PRN Usama Tremaine Odell, MD        sodium chloride 0.9 % flush 10 mL  10 mL Intravenous Q12H Tremaine Forrester MD   10 mL at 10/02/23 0814    sodium chloride 0.9 % flush 10 mL  10 mL Intravenous Tremaine Hopkins MD        sodium chloride 0.9 % infusion 40 mL  40 mL Intravenous PRN Parish, Tremaine  "MD Odell            Physician Progress Notes (last 48 hours)        Sanjiv Block MD at 10/02/23 0830              Daily Progress Note  Brenda Sneed  MRN: [id number] LOS: 2    Admit Date: 2023   10/2/2023 08:30 CDT    Subjective:         Interval History:    Reviewed overnight events and nursing notes.     Doing well this morning.  No new complaints.    ROS:  Review of Systems   Constitutional:  Negative for chills and fever.   Respiratory:  Negative for cough, chest tightness and shortness of breath.    Cardiovascular:  Negative for chest pain.   Gastrointestinal:  Negative for abdominal pain, diarrhea, nausea and vomiting.     DIET:  Diet: Regular/House Diet; Texture: Regular Texture (IDDSI 7); Fluid Consistency: Thin (IDDSI 0)    Medications:   sodium chloride, 100 mL/hr, Last Rate: 100 mL/hr (10/01/23 2128)      bisoprolol-hydrochlorothiazide, 1 tablet, Oral, Daily  ceFAZolin, 2,000 mg, Intravenous, Q8H  docusate sodium, 100 mg, Oral, BID  DULoxetine, 60 mg, Oral, BID  enoxaparin, 40 mg, Subcutaneous, Daily  furosemide, 40 mg, Oral, Daily  levothyroxine, 100 mcg, Oral, Q AM  nystatin, 1 application , Topical, BID  oxybutynin XL, 5 mg, Oral, Daily  pramipexole, 0.75 mg, Oral, Nightly  senna-docusate sodium, 2 tablet, Oral, BID  sodium chloride, 10 mL, Intravenous, Q12H          Objective:     Vitals: /51 (BP Location: Right arm, Patient Position: Lying)   Pulse 62   Temp 97.5 °F (36.4 °C) (Oral)   Resp 18   Ht 160 cm (63\")   Wt 116 kg (255 lb)   SpO2 99%   BMI 45.17 kg/m²    Intake/Output Summary (Last 24 hours) at 10/2/2023 08  Last data filed at 10/2/2023 0637  Gross per 24 hour   Intake --   Output 1200 ml   Net -1200 ml    Temp (24hrs), Av.1 °F (36.7 °C), Min:97.5 °F (36.4 °C), Max:98.5 °F (36.9 °C)    Glucose:  Lab Results   Component Value Date    POCGLU 100 (H) 2021     Physical Examination:   Physical Exam  Constitutional:       General: She is not in acute " distress.     Appearance: Normal appearance. She is not ill-appearing or toxic-appearing.   Cardiovascular:      Rate and Rhythm: Normal rate and regular rhythm.      Pulses: Normal pulses.      Heart sounds: Normal heart sounds. No murmur heard.  Pulmonary:      Effort: Pulmonary effort is normal. No respiratory distress.      Breath sounds: Normal breath sounds. No stridor. No wheezing or rales.   Abdominal:      General: Abdomen is flat. Bowel sounds are normal. There is no distension.      Palpations: Abdomen is soft.      Tenderness: There is no abdominal tenderness.   Neurological:      Mental Status: She is alert.       Labs:  Lab Results (last 24 hours)       Procedure Component Value Units Date/Time    Urine Culture - Urine, Urine, Catheter [[id number]]  (Normal) Collected: 09/30/23 1559    Specimen: Urine, Catheter Updated: 10/02/23 0822     Urine Culture Culture in progress    CBC & Differential [[id number]]  (Abnormal) Collected: 10/02/23 0521    Specimen: Blood Updated: 10/02/23 0552    Narrative:      The following orders were created for panel order CBC & Differential.  Procedure                               Abnormality         Status                     ---------                               -----------         ------                     CBC Auto Differential[[id number]]        Abnormal            Final result                 Please view results for these tests on the individual orders.    CBC Auto Differential [[id number]]  (Abnormal) Collected: 10/02/23 0521    Specimen: Blood Updated: 10/02/23 0552     WBC 6.93 10*3/mm3      RBC 3.30 10*6/mm3      Hemoglobin 9.2 g/dL      Hematocrit 28.4 %      MCV 86.1 fL      MCH 27.9 pg      MCHC 32.4 g/dL      RDW 18.7 %      RDW-SD 59.3 fl      MPV 11.4 fL      Platelets 82 10*3/mm3      Neutrophil % 63.0 %      Lymphocyte % 21.8 %      Monocyte % 10.4 %      Eosinophil % 4.0 %      Basophil % 0.4 %      Neutrophils, Absolute 4.36 10*3/mm3      Lymphocytes,  Absolute 1.51 10*3/mm3      Monocytes, Absolute 0.72 10*3/mm3      Eosinophils, Absolute 0.28 10*3/mm3      Basophils, Absolute 0.03 10*3/mm3     Comprehensive Metabolic Panel [[id number]]  (Abnormal) Collected: 10/02/23 0428    Specimen: Blood Updated: 10/02/23 0459     Glucose 84 mg/dL      BUN 40 mg/dL      Creatinine 1.68 mg/dL      Sodium 136 mmol/L      Potassium 3.6 mmol/L      Chloride 106 mmol/L      CO2 19.0 mmol/L      Calcium 9.0 mg/dL      Total Protein 5.2 g/dL      Albumin 2.5 g/dL      ALT (SGPT) <5 U/L      AST (SGOT) 28 U/L      Alkaline Phosphatase 161 U/L      Total Bilirubin 0.8 mg/dL      Globulin 2.7 gm/dL      A/G Ratio 0.9 g/dL      BUN/Creatinine Ratio 23.8     Anion Gap 11.0 mmol/L      eGFR 31.8 mL/min/1.73     Narrative:      GFR Normal >60  Chronic Kidney Disease <60  Kidney Failure <15    The GFR formula is only valid for adults with stable renal function between ages 18 and 70.    Blood Culture - Blood, Arm, Left [[id number]]  (Normal) Collected: 09/30/23 1536    Specimen: Blood from Arm, Left Updated: 10/01/23 1600     Blood Culture No growth at 24 hours    Blood Culture - Blood, Arm, Right [[id number]]  (Normal) Collected: 09/30/23 1429    Specimen: Blood from Arm, Right Updated: 10/01/23 1445     Blood Culture No growth at 24 hours    Manual Differential [[id number]]  (Abnormal) Collected: 10/01/23 0759    Specimen: Blood Updated: 10/01/23 0903     Neutrophil % 85.1 %      Lymphocyte % 6.9 %      Monocyte % 3.0 %      Eosinophil % 1.0 %      Basophil % 1.0 %      Bands %  2.0 %      Atypical Lymphocyte % 1.0 %      Neutrophils Absolute 8.43 10*3/mm3      Lymphocytes Absolute 0.76 10*3/mm3      Monocytes Absolute 0.29 10*3/mm3      Eosinophils Absolute 0.10 10*3/mm3      Basophils Absolute 0.10 10*3/mm3      Anisocytosis Slight/1+     Crenated RBC's Slight/1+     Poikilocytes Slight/1+     Polychromasia Slight/1+     Toxic Granulation Slight/1+     Platelet Estimate Decreased    CBC  Auto Differential [[id number]]  (Abnormal) Collected: 10/01/23 0759    Specimen: Blood Updated: 10/01/23 0903     WBC 9.67 10*3/mm3      RBC 2.71 10*6/mm3      Hemoglobin 8.1 g/dL      Hematocrit 25.1 %      MCV 92.6 fL      MCH 29.9 pg      MCHC 32.3 g/dL      RDW 21.7 %      RDW-SD 63.0 fl      MPV 11.7 fL      Platelets 83 10*3/mm3     Narrative:      Repeated to confirm results.      Comprehensive Metabolic Panel [[id number]]  (Abnormal) Collected: 10/01/23 0759    Specimen: Blood Updated: 10/01/23 0839     Glucose 117 mg/dL      BUN 40 mg/dL      Creatinine 1.71 mg/dL      Sodium 134 mmol/L      Potassium 3.9 mmol/L      Comment: Slight hemolysis detected by analyzer. Results may be affected.        Chloride 101 mmol/L      CO2 20.0 mmol/L      Calcium 8.6 mg/dL      Total Protein 5.2 g/dL      Albumin 2.5 g/dL      ALT (SGPT) 10 U/L      AST (SGOT) 32 U/L      Alkaline Phosphatase 165 U/L      Total Bilirubin 1.5 mg/dL      Globulin 2.7 gm/dL      A/G Ratio 0.9 g/dL      BUN/Creatinine Ratio 23.4     Anion Gap 13.0 mmol/L      eGFR 31.1 mL/min/1.73     Narrative:      GFR Normal >60  Chronic Kidney Disease <60  Kidney Failure <15    The GFR formula is only valid for adults with stable renal function between ages 18 and 70.    Lactic Acid, Plasma [[id number]]  (Normal) Collected: 10/01/23 0759    Specimen: Blood Updated: 10/01/23 0836     Lactate 1.9 mmol/L              Imaging:  XR Chest 1 View    Result Date: 9/30/2023  EXAM/TECHNIQUE: XR CHEST 1 VW-  INDICATION: fever, weakness  COMPARISON: 5/23/2023  FINDINGS:  Cardiac silhouette is mildly enlarged but stable. No pleural effusion, pneumothorax, or focal consolidation. Left shoulder arthroplasty. Postoperative change in the upper lumbar spine and lower cervical spine. No acute osseous finding.      Impression: No acute findings.  This report was signed and finalized on 9/30/2023 2:52 PM CDT by Dr. Bryce Mancera MD.          Assessment and Plan:     Primary  Problem:  Sepsis    Hospital Problem list:    Sepsis    Cellulitis of right lower extremity    HTN (hypertension)    Obesity, Class III, BMI 40-49.9 (morbid obesity)    Traumatic rhabdomyolysis    Cellulitis of left lower extremity        Plan:    -Sepsis probably secondary to urosepsis.  We will continue antibiotics.  Culture data not yet back but will narrow based on results.  -Hopefully up with physical and occupational therapy today so that we can plan to discharge tomorrow if all goes well.    Discharge planning:   Home      Reviewed treatment plans with the patient and/or family.     Code Status:   Code Status and Medical Interventions:   Ordered at: 09/30/23 1700     Code Status (Patient has no pulse and is not breathing):    CPR (Attempt to Resuscitate)     Medical Interventions (Patient has pulse or is breathing):    Full Support       Electronically signed by Sanjiv Block MD on 10/2/2023 at 08:30 CDT      Electronically signed by Sanjiv Block MD at 10/02/23 0831       RuthannTremaine MD at 10/01/23 0912           Family Medicine Progress Note    Patient:  Brenda Sneed  YOB: 1950    MRN: [id number]     Acct: [id number]     Admit date: 9/30/2023    Patient Seen, Chart, Consults notes, Labs, Radiology studies reviewed.    Subjective: Day 1 of stay with sepsis from unclear source possibly urine versus cellulitic and most recent (in last 24 hours) has had complaint of pain in her legs.  She is remained a little on the somnolent side however.  She states that despite it being listed in her home medications she does not take hydrocodone that it does not work for her.  Previously a patient of pain management and it sounds as though she has been on also oral morphine and a patch of some sort in the past.    Past, Family, Social History unchanged from admission.    Diet: Diet: Regular/House Diet; Texture: Regular Texture (IDDSI 7); Fluid Consistency: Thin (IDDSI  0)    Medications:  Scheduled Meds:bisoprolol-hydrochlorothiazide, 1 tablet, Oral, Daily  ceFAZolin, 2,000 mg, Intravenous, Q8H  docusate sodium, 100 mg, Oral, BID  DULoxetine, 60 mg, Oral, BID  enoxaparin, 40 mg, Subcutaneous, Daily  furosemide, 40 mg, Oral, Daily  levothyroxine, 100 mcg, Oral, Q AM  nystatin, 1 application , Topical, BID  oxybutynin XL, 5 mg, Oral, Daily  pramipexole, 0.75 mg, Oral, Nightly  senna-docusate sodium, 2 tablet, Oral, BID  sodium chloride, 10 mL, Intravenous, Q12H      Continuous Infusions:sodium chloride, 100 mL/hr, Last Rate: 100 mL/hr (10/01/23 0406)      PRN Meds:  acetaminophen    senna-docusate sodium **AND** polyethylene glycol **AND** bisacodyl **AND** bisacodyl    cyclobenzaprine    oxyCODONE-acetaminophen    [COMPLETED] Insert Peripheral IV **AND** sodium chloride    sodium chloride    sodium chloride    Objective:    Lab Results (last 24 hours)       Procedure Component Value Units Date/Time    Manual Differential [[id number]]  (Abnormal) Collected: 10/01/23 0759    Specimen: Blood Updated: 10/01/23 0903     Neutrophil % 85.1 %      Lymphocyte % 6.9 %      Monocyte % 3.0 %      Eosinophil % 1.0 %      Basophil % 1.0 %      Bands %  2.0 %      Atypical Lymphocyte % 1.0 %      Neutrophils Absolute 8.43 10*3/mm3      Lymphocytes Absolute 0.76 10*3/mm3      Monocytes Absolute 0.29 10*3/mm3      Eosinophils Absolute 0.10 10*3/mm3      Basophils Absolute 0.10 10*3/mm3      Anisocytosis Slight/1+     Crenated RBC's Slight/1+     Poikilocytes Slight/1+     Polychromasia Slight/1+     Toxic Granulation Slight/1+     Platelet Estimate Decreased    CBC Auto Differential [[id number]]  (Abnormal) Collected: 10/01/23 0759    Specimen: Blood Updated: 10/01/23 0903     WBC 9.67 10*3/mm3      RBC 2.71 10*6/mm3      Hemoglobin 8.1 g/dL      Hematocrit 25.1 %      MCV 92.6 fL      MCH 29.9 pg      MCHC 32.3 g/dL      RDW 21.7 %      RDW-SD 63.0 fl      MPV 11.7 fL      Platelets 83 10*3/mm3      Narrative:      Repeated to confirm results.      Comprehensive Metabolic Panel [[id number]]  (Abnormal) Collected: 10/01/23 0759    Specimen: Blood Updated: 10/01/23 0839     Glucose 117 mg/dL      BUN 40 mg/dL      Creatinine 1.71 mg/dL      Sodium 134 mmol/L      Potassium 3.9 mmol/L      Comment: Slight hemolysis detected by analyzer. Results may be affected.        Chloride 101 mmol/L      CO2 20.0 mmol/L      Calcium 8.6 mg/dL      Total Protein 5.2 g/dL      Albumin 2.5 g/dL      ALT (SGPT) 10 U/L      AST (SGOT) 32 U/L      Alkaline Phosphatase 165 U/L      Total Bilirubin 1.5 mg/dL      Globulin 2.7 gm/dL      A/G Ratio 0.9 g/dL      BUN/Creatinine Ratio 23.4     Anion Gap 13.0 mmol/L      eGFR 31.1 mL/min/1.73     Narrative:      GFR Normal >60  Chronic Kidney Disease <60  Kidney Failure <15    The GFR formula is only valid for adults with stable renal function between ages 18 and 70.    Lactic Acid, Plasma [[id number]]  (Normal) Collected: 10/01/23 0759    Specimen: Blood Updated: 10/01/23 0836     Lactate 1.9 mmol/L     STAT Lactic Acid, Reflex [[id number]]  (Normal) Collected: 09/30/23 2117    Specimen: Blood Updated: 09/30/23 2141     Lactate 1.7 mmol/L     STAT Lactic Acid, Reflex [[id number]]  (Abnormal) Collected: 09/30/23 1805    Specimen: Blood Updated: 09/30/23 1832     Lactate 3.3 mmol/L     Procalcitonin [[id number]]  (Abnormal) Collected: 09/30/23 1611    Specimen: Blood Updated: 09/30/23 1722     Procalcitonin 1.01 ng/mL     Narrative:      As a Marker for Sepsis (Non-Neonates):    1. <0.5 ng/mL represents a low risk of severe sepsis and/or septic shock.  2. >2 ng/mL represents a high risk of severe sepsis and/or septic shock.    As a Marker for Lower Respiratory Tract Infections that require antibiotic therapy:    PCT on Admission    Antibiotic Therapy       6-12 Hrs later    >0.5                Strongly Recommended  >0.25 - <0.5        Recommended   0.1 - 0.25          Discouraged               "Remeasure/reassess PCT  <0.1                Strongly Discouraged     Remeasure/reassess PCT    As 28 day mortality risk marker: \"Change in Procalcitonin Result\" (>80% or <=80%) if Day 0 (or Day 1) and Day 4 values are available. Refer to http://[email or web address]-pct-[email or web address]    Change in PCT <=80%  A decrease of PCT levels below or equal to 80% defines a positive change in PCT test result representing a higher risk for 28-day all-cause mortality of patients diagnosed with severe sepsis for septic shock.    Change in PCT >80%  A decrease of PCT levels of more than 80% defines a negative change in PCT result representing a lower risk for 28-day all-cause mortality of patients diagnosed with severe sepsis or septic shock.       Comprehensive Metabolic Panel [[id number]]  (Abnormal) Collected: 09/30/23 1611    Specimen: Blood Updated: 09/30/23 1640     Glucose 121 mg/dL      BUN 39 mg/dL      Creatinine 1.62 mg/dL      Sodium 135 mmol/L      Potassium 4.5 mmol/L      Comment: Slight hemolysis detected by analyzer. Results may be affected.        Chloride 99 mmol/L      CO2 21.0 mmol/L      Calcium 9.3 mg/dL      Total Protein 6.2 g/dL      Albumin 3.3 g/dL      ALT (SGPT) 15 U/L      AST (SGOT) 39 U/L      Comment: Slight hemolysis detected by analyzer. Results may be affected.        Alkaline Phosphatase 209 U/L      Total Bilirubin 2.4 mg/dL      Globulin 2.9 gm/dL      A/G Ratio 1.1 g/dL      BUN/Creatinine Ratio 24.1     Anion Gap 15.0 mmol/L      eGFR 33.2 mL/min/1.73     Narrative:      GFR Normal >60  Chronic Kidney Disease <60  Kidney Failure <15    The GFR formula is only valid for adults with stable renal function between ages 18 and 70.    STAT Lactic Acid, Reflex [[id number]]  (Abnormal) Collected: 09/30/23 1536    Specimen: Blood Updated: 09/30/23 1615     Lactate 3.0 mmol/L     Urinalysis, Microscopic Only - Urine, Catheter [[id number]]  (Abnormal) Collected: 09/30/23 1559    Specimen: Urine, Catheter Updated: " 09/30/23 1613     RBC, UA 0-2 /HPF      WBC, UA 21-30 /HPF      Bacteria, UA 4+ /HPF      Squamous Epithelial Cells, UA None Seen /HPF      Hyaline Casts, UA 3-6 /LPF      Methodology Automated Microscopy    Urine Culture - Urine, Urine, Catheter [[id number]] Collected: 09/30/23 1559    Specimen: Urine, Catheter Updated: 09/30/23 1613    Urinalysis With Culture If Indicated - Urine, Catheter [[id number]]  (Abnormal) Collected: 09/30/23 1559    Specimen: Urine, Catheter Updated: 09/30/23 1611     Color, UA Dark Yellow     Appearance, UA Clear     pH, UA 5.5     Specific Gravity, UA 1.017     Glucose, UA Negative     Ketones, UA Negative     Bilirubin, UA Small (1+)     Blood, UA Negative     Protein, UA Negative     Leuk Esterase, UA Moderate (2+)     Nitrite, UA Positive     Urobilinogen, UA 1.0 E.U./dL    Narrative:      In absence of clinical symptoms, the presence of pyuria, bacteria, and/or nitrites on the urinalysis result does not correlate with infection.    Blood Culture - Blood, Arm, Left [[id number]] Collected: 09/30/23 1536    Specimen: Blood from Arm, Left Updated: 09/30/23 1554    Manual Differential [[id number]]  (Abnormal) Collected: 09/30/23 1429    Specimen: Blood Updated: 09/30/23 1539     Neutrophil % 86.6 %      Lymphocyte % 7.2 %      Monocyte % 6.2 %      Neutrophils Absolute 19.02 10*3/mm3      Lymphocytes Absolute 1.58 10*3/mm3      Monocytes Absolute 1.36 10*3/mm3      Anisocytosis Slight/1+     Microcytes Slight/1+     Poikilocytes Slight/1+     Polychromasia Mod/2+     WBC Morphology Normal     Platelet Estimate Decreased    Narrative:      Pt shows 4+ agglutination on slide.  Pt specimen was heated and reran.    CBC & Differential [[id number]]  (Abnormal) Collected: 09/30/23 1429    Specimen: Blood Updated: 09/30/23 1535    Narrative:      The following orders were created for panel order CBC & Differential.  Procedure                               Abnormality         Status                      ---------                               -----------         ------                     CBC Auto Differential[[id number]]        Abnormal            Final result                 Please view results for these tests on the individual orders.    CBC Auto Differential [[id number]]  (Abnormal) Collected: 09/30/23 1429    Specimen: Blood Updated: 09/30/23 1535     WBC 21.96 10*3/mm3      Comment: Modified report. Previous result was 22.02 10*3/mm3 on 9/30/2023 at 1500 CDT.        RBC 3.38 10*6/mm3      Comment: Modified report. Previous result was 3.42 10*6/mm3 on 9/30/2023 at 1500 CDT.        Hemoglobin 10.1 g/dL      Comment: Modified report. Previous result was 10.0 g/dL on 9/30/2023 at 1500 CDT.        Hematocrit 30.1 %      Comment: Modified report. Previous result was 30.3 % on 9/30/2023 at 1500 CDT.        MCV 89.1 fL      Comment: Modified report. Previous result was 88.6 fL on 9/30/2023 at 1500 CDT.        MCH 29.9 pg      Comment: Modified report. Previous result was 29.2 pg on 9/30/2023 at 1500 CDT.        MCHC 33.6 g/dL      Comment: Modified report. Previous result was 33.0 g/dL on 9/30/2023 at 1500 CDT.        RDW 21.2 %      Comment: Modified report. Previous result was 20.5 % on 9/30/2023 at 1500 CDT.        RDW-SD 60.6 fl      Comment: Modified report. Previous result was 60.5 fl on 9/30/2023 at 1500 CDT.        MPV 11.1 fL      Platelets 117 10*3/mm3      Comment: Modified report. Previous result was 131 10*3/mm3 on 9/30/2023 at 1500 CDT.       Lactic Acid, Plasma [[id number]]  (Abnormal) Collected: 09/30/23 1429    Specimen: Blood Updated: 09/30/23 1456     Lactate 3.7 mmol/L     Blood Culture - Blood, Arm, Right [[id number]] Collected: 09/30/23 1429    Specimen: Blood from Arm, Right Updated: 09/30/23 1435             Imaging Results (Last 72 Hours)       Procedure Component Value Units Date/Time    XR Chest 1 View [[id number]] Collected: 09/30/23 1451     Updated: 09/30/23 1455    Narrative:       "EXAM/TECHNIQUE: XR CHEST 1 VW-     INDICATION: fever, weakness     COMPARISON: 5/23/2023     FINDINGS:     Cardiac silhouette is mildly enlarged but stable. No pleural effusion,  pneumothorax, or focal consolidation. Left shoulder arthroplasty.  Postoperative change in the upper lumbar spine and lower cervical spine.  No acute osseous finding.       Impression:      No acute findings.     This report was signed and finalized on 9/30/2023 2:52 PM CDT by Dr. Bryce Mancera MD.                Physical Exam:    Vitals: /46 (BP Location: Right arm, Patient Position: Lying)   Pulse 62   Temp 98.4 °F (36.9 °C) (Oral)   Resp 20   Ht 160 cm (63\")   Wt 116 kg (255 lb)   SpO2 98%   BMI 45.17 kg/m²   24 hour intake/output:  Intake/Output Summary (Last 24 hours) at 10/1/2023 0915  Last data filed at 10/1/2023 0438  Gross per 24 hour   Intake 1121 ml   Output 300 ml   Net 821 ml     Last 3 weights:  Wt Readings from Last 3 Encounters:   09/30/23 116 kg (255 lb)   05/24/23 115 kg (254 lb)   04/28/23 113 kg (250 lb)       General Appearance appears stated age, fatigued, cooperative, and morbidly obese  Head normocephalic, without obvious abnormality and atraumatic  Eyes lids and lashes normal, conjunctivae and sclerae normal, no icterus, and no pallor  Neck no adenopathy, supple, and trachea midline  Lungs clear to auscultation, respirations regular, and respirations even  Heart regular rhythm & normal rate, normal S1, S2, and no murmur, no gallop, no rub  Abdomen normal bowel sounds, no masses, soft non-tender, and no guarding  Extremities edema 2+ lower bilateral, redness bilaterally below the knee, tenderness both legs, and venous status dermatitis  Skin  chronic skin changes both legs, left greater than right  Neurologic Mental Status alertness arousable and fatigue, orientation person and place, and mood/affect flat        Assessment:      Sepsis    Cellulitis of right lower extremity    HTN (hypertension)    " Obesity, Class III, BMI 40-49.9 (morbid obesity)    Traumatic rhabdomyolysis    Cellulitis of left lower extremity          Plan:  Continue treatment with antibiotics.  Unclear source as urine looks suspicious but has obvious redness and swelling of bilateral legs.  She is currently on a cephalosporin as per sepsis treatment protocols.  Will await culture results to hopefully de-escalate antibiotics as we can.  We will need to use pain medication judiciously.  She is saying that hydrocodone is ineffective.  I will try to change her to oxycodone but want to avoid over sedation and she still appears a little bit on the somnolent side in terms of awareness.  Discussed with patient and her family member in the room.    Greater than 25 minutes spent in coordination of care today.      Electronically signed by Tremaine Forrester MD on 10/1/2023 at 09:15 CDT              Electronically signed by Tremaine Forrester MD at 10/01/23 0922         10/1      Goal Outcome Evaluation:  Plan of Care Reviewed With: patient   Patient c/o pain in LLE, PRN pain med given. No c/o nausea. Patient lethargic at beginning of shift. A&O x4. Patient on room air. Turn Q2. Purewick in place. Patient on Lovenox. Right foot cool and pale, dorsalis pedis and posterior tibial pulse weak with doppler, MD made aware by previous shift. LLE red and rodney, left dorsalis pedis pulse weak with doppler. Left posterior tibial pulse good with doppler. Patient denies numbness or tingling in feet. IVF infusing and IV abx given. Tele on, sinus rhythm. VSS.

## 2023-10-02 NOTE — NURSING NOTE
Attempted to change patient's bed sheets, patient refused the bed change. Patient educated on the importance of turning and repositioning in bed Q2 hours, patient is refusing to turn or reposition.

## 2023-10-02 NOTE — CONSULTS
[facility]  INPATIENT WOUND & OSTOMY CONSULTATION    Today's Date: 10/02/23    Patient Name: Brenda Sneed  MRN: [id number]  CSN: [id number]  PCP: Sachin Dia MD  Referring Provider:   Consulting Provider (From admission, onward)      Start Ordered     Status Ordering Provider    09/30/23 1659 09/30/23 1700  Wound / Ostomy  Care Provider Consult  Once        Specialty:  Wound Care  Provider:  Julieta Murray APRN Acknowledged PARISH, KYLE DOUGLAS           Attending Provider: Sachin Dia MD  Length of Stay: 2    SUBJECTIVE   Chief Complaint: ***    HPI: Brenda Sneed, a 72 y.o.female, presents with a past medical history of ***.  A full past medical history as listed below.  ***    Inpatient wound care consulted due to lymphedema of BLE. Patient reports she has had it for 3 months. Unsure if this is accurate. She reports her legs look better since admission. Her main concern at this time is that she cannot go home as she lives by herself and cannot care for herself at this time.       Visit Dx:    ICD-10-CM ICD-9-CM   1. Sepsis, due to unspecified organism, unspecified whether acute organ dysfunction present  A41.9 038.9     995.91   2. Impaired mobility [Z74.09 (ICD-10-CM)]  Z74.09 799.89       Hospital Problem List:     Sepsis    Cellulitis of right lower extremity    HTN (hypertension)    Obesity, Class III, BMI 40-49.9 (morbid obesity)    Traumatic rhabdomyolysis    Cellulitis of left lower extremity      History:   Past Medical History:   Diagnosis Date    Arthritis     Bronchitis     Cataract     right eye , cornea implant    Chronic back pain     Depression     Disease of thyroid gland     Edema     lower extremities    Hypertension     Kidney stones      Past Surgical History:   Procedure Laterality Date    BACK SURGERY      2010 ([place]), 2013     CHOLECYSTECTOMY OPEN      CORNEAL TRANSPLANT Right 1999    INCISION AND DRAINAGE ABSCESS Left 4/19/2023     Procedure: INCISION AND DRAINAGE ABSCESS left leg;  Surgeon: Sarita Vazquez MD;  Location:  [facility] OR;  Service: General;  Laterality: Left;    LACERATION REPAIR Left 4/7/2023    Procedure: washout and closure left leg wound ;  Surgeon: Sarita Vazquez MD;  Location:  [facility] OR;  Service: General;  Laterality: Left;    LUMBAR FUSION Left 8/2/2017    Procedure: LEFT LUMBAR LATERAL INTERBODY FUSION WITH INSTRUMENTATION  L1-2;  Surgeon: MARCELINO Wright MD;  Location:  [facility] OR;  Service:     NECK SURGERY  2010    [place]    TOTAL HIP ARTHROPLASTY Right 2014    DR. KENDRICK     TOTAL KNEE ARTHROPLASTY Right 2000    TOTAL KNEE ARTHROPLASTY Left 2002    TOTAL SHOULDER ARTHROPLASTY W/ DISTAL CLAVICLE EXCISION Left 6/1/2020    Procedure: LEFT REVERSE TOTAL SHOULDER REPLACEMENT;  Surgeon: Saravanan Calvin MD;  Location:  [facility] OR;  Service: Orthopedics;  Laterality: Left;     Social History     Socioeconomic History    Marital status:    Tobacco Use    Smoking status: Never    Smokeless tobacco: Never   Vaping Use    Vaping Use: Never used   Substance and Sexual Activity    Alcohol use: No    Drug use: No    Sexual activity: Defer     Family History   Problem Relation Age of Onset    Breast cancer Neg Hx        Allergies:  Allergies   Allergen Reactions    Codeine Itching and Rash     SEVERE RASH/ITCHING (TOLERATES PERCOCET)    Amoxicillin Other (See Comments)     unknown    Levaquin [Levofloxacin] Other (See Comments)     unknown    Benadryl [Diphenhydramine] Other (See Comments)     KEEPS PATIENT AWAKE        Medications:    Current Facility-Administered Medications:     acetaminophen (TYLENOL) tablet 650 mg, 650 mg, Oral, Q4H PRN, Tremaine Forrester MD    sennosides-docusate (PERICOLACE) 8.6-50 MG per tablet 2 tablet, 2 tablet, Oral, BID, 2 tablet at 10/02/23 0812 **AND** polyethylene glycol (MIRALAX) packet 17 g, 17 g, Oral, Daily PRN **AND** bisacodyl (DULCOLAX) EC tablet 5 mg, 5 mg, Oral, Daily PRN  **AND** bisacodyl (DULCOLAX) suppository 10 mg, 10 mg, Rectal, Daily PRN, Tremaine Forrester MD    bisoprolol-hydrochlorothiazide (ZIAC) 5-6.25 MG per tablet 1 tablet, 1 tablet, Oral, Daily, Tremaine Forrester MD    ceFAZolin 2000 mg IVPB in 100 mL NS (MBP), 2,000 mg, Intravenous, Q8H, Tremaine Forrester MD, 2,000 mg at 10/02/23 1320    cyclobenzaprine (FLEXERIL) tablet 10 mg, 10 mg, Oral, TID PRN, Tremaine Forrester MD, 10 mg at 10/01/23 0214    docusate sodium (COLACE) capsule 100 mg, 100 mg, Oral, BID, Tremaine Forrester MD, 100 mg at 10/02/23 0812    DULoxetine (CYMBALTA) DR capsule 60 mg, 60 mg, Oral, BID, Tremaine Forrester MD, 60 mg at 10/02/23 0812    Enoxaparin Sodium (LOVENOX) syringe 40 mg, 40 mg, Subcutaneous, Daily, Tremaine Forrester MD, 40 mg at 10/02/23 0812    furosemide (LASIX) tablet 40 mg, 40 mg, Oral, Daily, Tremaine Forrester MD    levothyroxine (SYNTHROID, LEVOTHROID) tablet 100 mcg, 100 mcg, Oral, Q AM, Tremaine Forrester MD, 100 mcg at 10/02/23 0536    Morphine (MS CONTIN) 12 hr tablet 15 mg, 15 mg, Oral, BID PRN, Sanjiv Block MD, 15 mg at 10/02/23 1149    nystatin (MYCOSTATIN) powder 1 application , 1 application , Topical, BID, Tremaine Forrester MD, 1 application  at 10/02/23 0814    oxybutynin XL (DITROPAN-XL) 24 hr tablet 5 mg, 5 mg, Oral, Daily, Tremaine Forrester MD, 5 mg at 10/02/23 0812    pramipexole (MIRAPEX) tablet 0.75 mg, 0.75 mg, Oral, Nightly, Tremaine Forrester MD, 0.75 mg at 10/01/23 2122    [COMPLETED] Insert Peripheral IV, , , Once **AND** sodium chloride 0.9 % flush 10 mL, 10 mL, Intravenous, PRN, Tremaine Forrester MD    sodium chloride 0.9 % flush 10 mL, 10 mL, Intravenous, Q12H, Tremaine Forrester MD, 10 mL at 10/02/23 0814    sodium chloride 0.9 % flush 10 mL, 10 mL, Intravenous, PRN, Tremaine Forrester MD    sodium chloride 0.9 % infusion 40 mL, 40 mL, Intravenous, Usama MAYER Kyle Douglas, MD    Review of  Systems: ***  Review of Systems      OBJECTIVE     Vitals:    10/02/23 1157   BP: 93/67   Pulse: 91   Resp: 18   Temp: 97.3 °F (36.3 °C)   SpO2: 100%       PHYSICAL EXAM: ***  Physical Exam       Results Review:  Lab Results (last 48 hours)       Procedure Component Value Units Date/Time    Blood Culture - Blood, Arm, Right [[id number]]  (Normal) Collected: 09/30/23 1429    Specimen: Blood from Arm, Right Updated: 10/02/23 1445     Blood Culture No growth at 2 days    Urine Culture - Urine, Urine, Catheter [[id number]]  (Normal) Collected: 09/30/23 1559    Specimen: Urine, Catheter Updated: 10/02/23 0822     Urine Culture Culture in progress    CBC & Differential [[id number]]  (Abnormal) Collected: 10/02/23 0521    Specimen: Blood Updated: 10/02/23 0552    Narrative:      The following orders were created for panel order CBC & Differential.  Procedure                               Abnormality         Status                     ---------                               -----------         ------                     CBC Auto Differential[[id number]]        Abnormal            Final result                 Please view results for these tests on the individual orders.    CBC Auto Differential [[id number]]  (Abnormal) Collected: 10/02/23 0521    Specimen: Blood Updated: 10/02/23 0552     WBC 6.93 10*3/mm3      RBC 3.30 10*6/mm3      Hemoglobin 9.2 g/dL      Hematocrit 28.4 %      MCV 86.1 fL      MCH 27.9 pg      MCHC 32.4 g/dL      RDW 18.7 %      RDW-SD 59.3 fl      MPV 11.4 fL      Platelets 82 10*3/mm3      Neutrophil % 63.0 %      Lymphocyte % 21.8 %      Monocyte % 10.4 %      Eosinophil % 4.0 %      Basophil % 0.4 %      Neutrophils, Absolute 4.36 10*3/mm3      Lymphocytes, Absolute 1.51 10*3/mm3      Monocytes, Absolute 0.72 10*3/mm3      Eosinophils, Absolute 0.28 10*3/mm3      Basophils, Absolute 0.03 10*3/mm3     Comprehensive Metabolic Panel [[id number]]  (Abnormal) Collected: 10/02/23 0428    Specimen: Blood Updated:  10/02/23 0459     Glucose 84 mg/dL      BUN 40 mg/dL      Creatinine 1.68 mg/dL      Sodium 136 mmol/L      Potassium 3.6 mmol/L      Chloride 106 mmol/L      CO2 19.0 mmol/L      Calcium 9.0 mg/dL      Total Protein 5.2 g/dL      Albumin 2.5 g/dL      ALT (SGPT) <5 U/L      AST (SGOT) 28 U/L      Alkaline Phosphatase 161 U/L      Total Bilirubin 0.8 mg/dL      Globulin 2.7 gm/dL      A/G Ratio 0.9 g/dL      BUN/Creatinine Ratio 23.8     Anion Gap 11.0 mmol/L      eGFR 31.8 mL/min/1.73     Narrative:      GFR Normal >60  Chronic Kidney Disease <60  Kidney Failure <15    The GFR formula is only valid for adults with stable renal function between ages 18 and 70.    Blood Culture - Blood, Arm, Left [[id number]]  (Normal) Collected: 09/30/23 1536    Specimen: Blood from Arm, Left Updated: 10/01/23 1600     Blood Culture No growth at 24 hours    Manual Differential [[id number]]  (Abnormal) Collected: 10/01/23 0759    Specimen: Blood Updated: 10/01/23 0903     Neutrophil % 85.1 %      Lymphocyte % 6.9 %      Monocyte % 3.0 %      Eosinophil % 1.0 %      Basophil % 1.0 %      Bands %  2.0 %      Atypical Lymphocyte % 1.0 %      Neutrophils Absolute 8.43 10*3/mm3      Lymphocytes Absolute 0.76 10*3/mm3      Monocytes Absolute 0.29 10*3/mm3      Eosinophils Absolute 0.10 10*3/mm3      Basophils Absolute 0.10 10*3/mm3      Anisocytosis Slight/1+     Crenated RBC's Slight/1+     Poikilocytes Slight/1+     Polychromasia Slight/1+     Toxic Granulation Slight/1+     Platelet Estimate Decreased    CBC Auto Differential [[id number]]  (Abnormal) Collected: 10/01/23 0759    Specimen: Blood Updated: 10/01/23 0903     WBC 9.67 10*3/mm3      RBC 2.71 10*6/mm3      Hemoglobin 8.1 g/dL      Hematocrit 25.1 %      MCV 92.6 fL      MCH 29.9 pg      MCHC 32.3 g/dL      RDW 21.7 %      RDW-SD 63.0 fl      MPV 11.7 fL      Platelets 83 10*3/mm3     Narrative:      Repeated to confirm results.      Comprehensive Metabolic Panel [[id number]]   (Abnormal) Collected: 10/01/23 0759    Specimen: Blood Updated: 10/01/23 0839     Glucose 117 mg/dL      BUN 40 mg/dL      Creatinine 1.71 mg/dL      Sodium 134 mmol/L      Potassium 3.9 mmol/L      Comment: Slight hemolysis detected by analyzer. Results may be affected.        Chloride 101 mmol/L      CO2 20.0 mmol/L      Calcium 8.6 mg/dL      Total Protein 5.2 g/dL      Albumin 2.5 g/dL      ALT (SGPT) 10 U/L      AST (SGOT) 32 U/L      Alkaline Phosphatase 165 U/L      Total Bilirubin 1.5 mg/dL      Globulin 2.7 gm/dL      A/G Ratio 0.9 g/dL      BUN/Creatinine Ratio 23.4     Anion Gap 13.0 mmol/L      eGFR 31.1 mL/min/1.73     Narrative:      GFR Normal >60  Chronic Kidney Disease <60  Kidney Failure <15    The GFR formula is only valid for adults with stable renal function between ages 18 and 70.    Lactic Acid, Plasma [[id number]]  (Normal) Collected: 10/01/23 0759    Specimen: Blood Updated: 10/01/23 0836     Lactate 1.9 mmol/L     STAT Lactic Acid, Reflex [[id number]]  (Normal) Collected: 09/30/23 2117    Specimen: Blood Updated: 09/30/23 2141     Lactate 1.7 mmol/L     STAT Lactic Acid, Reflex [[id number]]  (Abnormal) Collected: 09/30/23 1805    Specimen: Blood Updated: 09/30/23 1832     Lactate 3.3 mmol/L     Procalcitonin [[id number]]  (Abnormal) Collected: 09/30/23 1611    Specimen: Blood Updated: 09/30/23 1722     Procalcitonin 1.01 ng/mL     Narrative:      As a Marker for Sepsis (Non-Neonates):    1. <0.5 ng/mL represents a low risk of severe sepsis and/or septic shock.  2. >2 ng/mL represents a high risk of severe sepsis and/or septic shock.    As a Marker for Lower Respiratory Tract Infections that require antibiotic therapy:    PCT on Admission    Antibiotic Therapy       6-12 Hrs later    >0.5                Strongly Recommended  >0.25 - <0.5        Recommended   0.1 - 0.25          Discouraged              Remeasure/reassess PCT  <0.1                Strongly Discouraged     Remeasure/reassess  "PCT    As 28 day mortality risk marker: \"Change in Procalcitonin Result\" (>80% or <=80%) if Day 0 (or Day 1) and Day 4 values are available. Refer to http://www.[facility]-pct-calculator.com    Change in PCT <=80%  A decrease of PCT levels below or equal to 80% defines a positive change in PCT test result representing a higher risk for 28-day all-cause mortality of patients diagnosed with severe sepsis for septic shock.    Change in PCT >80%  A decrease of PCT levels of more than 80% defines a negative change in PCT result representing a lower risk for 28-day all-cause mortality of patients diagnosed with severe sepsis or septic shock.       Comprehensive Metabolic Panel [[id number]]  (Abnormal) Collected: 09/30/23 1611    Specimen: Blood Updated: 09/30/23 1640     Glucose 121 mg/dL      BUN 39 mg/dL      Creatinine 1.62 mg/dL      Sodium 135 mmol/L      Potassium 4.5 mmol/L      Comment: Slight hemolysis detected by analyzer. Results may be affected.        Chloride 99 mmol/L      CO2 21.0 mmol/L      Calcium 9.3 mg/dL      Total Protein 6.2 g/dL      Albumin 3.3 g/dL      ALT (SGPT) 15 U/L      AST (SGOT) 39 U/L      Comment: Slight hemolysis detected by analyzer. Results may be affected.        Alkaline Phosphatase 209 U/L      Total Bilirubin 2.4 mg/dL      Globulin 2.9 gm/dL      A/G Ratio 1.1 g/dL      BUN/Creatinine Ratio 24.1     Anion Gap 15.0 mmol/L      eGFR 33.2 mL/min/1.73     Narrative:      GFR Normal >60  Chronic Kidney Disease <60  Kidney Failure <15    The GFR formula is only valid for adults with stable renal function between ages 18 and 70.    STAT Lactic Acid, Reflex [[id number]]  (Abnormal) Collected: 09/30/23 1536    Specimen: Blood Updated: 09/30/23 1615     Lactate 3.0 mmol/L     Urinalysis, Microscopic Only - Urine, Catheter [[id number]]  (Abnormal) Collected: 09/30/23 1559    Specimen: Urine, Catheter Updated: 09/30/23 1613     RBC, UA 0-2 /HPF      WBC, UA 21-30 /HPF      Bacteria, UA 4+ /HPF  "     Squamous Epithelial Cells, UA None Seen /HPF      Hyaline Casts, UA 3-6 /LPF      Methodology Automated Microscopy    Urinalysis With Culture If Indicated - Urine, Catheter [[id number]]  (Abnormal) Collected: 09/30/23 1559    Specimen: Urine, Catheter Updated: 09/30/23 1611     Color, UA Dark Yellow     Appearance, UA Clear     pH, UA 5.5     Specific Gravity, UA 1.017     Glucose, UA Negative     Ketones, UA Negative     Bilirubin, UA Small (1+)     Blood, UA Negative     Protein, UA Negative     Leuk Esterase, UA Moderate (2+)     Nitrite, UA Positive     Urobilinogen, UA 1.0 E.U./dL    Narrative:      In absence of clinical symptoms, the presence of pyuria, bacteria, and/or nitrites on the urinalysis result does not correlate with infection.    Manual Differential [[id number]]  (Abnormal) Collected: 09/30/23 1429    Specimen: Blood Updated: 09/30/23 1539     Neutrophil % 86.6 %      Lymphocyte % 7.2 %      Monocyte % 6.2 %      Neutrophils Absolute 19.02 10*3/mm3      Lymphocytes Absolute 1.58 10*3/mm3      Monocytes Absolute 1.36 10*3/mm3      Anisocytosis Slight/1+     Microcytes Slight/1+     Poikilocytes Slight/1+     Polychromasia Mod/2+     WBC Morphology Normal     Platelet Estimate Decreased    Narrative:      Pt shows 4+ agglutination on slide.  Pt specimen was heated and reran.    CBC & Differential [[id number]]  (Abnormal) Collected: 09/30/23 1429    Specimen: Blood Updated: 09/30/23 1535    Narrative:      The following orders were created for panel order CBC & Differential.  Procedure                               Abnormality         Status                     ---------                               -----------         ------                     CBC Auto Differential[[id number]]        Abnormal            Final result                 Please view results for these tests on the individual orders.    CBC Auto Differential [[id number]]  (Abnormal) Collected: 09/30/23 1429    Specimen: Blood Updated:  09/30/23 1535     WBC 21.96 10*3/mm3      Comment: Modified report. Previous result was 22.02 10*3/mm3 on 9/30/2023 at 1500 CDT.        RBC 3.38 10*6/mm3      Comment: Modified report. Previous result was 3.42 10*6/mm3 on 9/30/2023 at 1500 CDT.        Hemoglobin 10.1 g/dL      Comment: Modified report. Previous result was 10.0 g/dL on 9/30/2023 at 1500 CDT.        Hematocrit 30.1 %      Comment: Modified report. Previous result was 30.3 % on 9/30/2023 at 1500 CDT.        MCV 89.1 fL      Comment: Modified report. Previous result was 88.6 fL on 9/30/2023 at 1500 CDT.        MCH 29.9 pg      Comment: Modified report. Previous result was 29.2 pg on 9/30/2023 at 1500 CDT.        MCHC 33.6 g/dL      Comment: Modified report. Previous result was 33.0 g/dL on 9/30/2023 at 1500 CDT.        RDW 21.2 %      Comment: Modified report. Previous result was 20.5 % on 9/30/2023 at 1500 CDT.        RDW-SD 60.6 fl      Comment: Modified report. Previous result was 60.5 fl on 9/30/2023 at 1500 CDT.        MPV 11.1 fL      Platelets 117 10*3/mm3      Comment: Modified report. Previous result was 131 10*3/mm3 on 9/30/2023 at 1500 CDT.             Imaging Results (Last 72 Hours)       Procedure Component Value Units Date/Time    XR Chest 1 View [[id number]] Collected: 09/30/23 1451     Updated: 09/30/23 1455    Narrative:      EXAM/TECHNIQUE: XR CHEST 1 VW-     INDICATION: fever, weakness     COMPARISON: 5/23/2023     FINDINGS:     Cardiac silhouette is mildly enlarged but stable. No pleural effusion,  pneumothorax, or focal consolidation. Left shoulder arthroplasty.  Postoperative change in the upper lumbar spine and lower cervical spine.  No acute osseous finding.       Impression:      No acute findings.     This report was signed and finalized on 9/30/2023 2:52 PM CDT by Dr. Bryce Mancera MD.                  ASSESSMENT/PLAN       Examination and evaluation of wound(s) was performed.    DIAGNOSIS:   Lymphedema of BLE    Sepsis     Cellulitis of right lower extremity    HTN (hypertension)    Obesity, Class III, BMI 40-49.9 (morbid obesity)    Traumatic rhabdomyolysis    Cellulitis of left lower extremity       PLAN:   Elevate BLE above heart level when possible.   Referral placed for outpatient rehab for lymphedema therapy.   No other needs from wound care at this time.   Will sign off at this time. If needs arise, please re-consult.     Discussed findings and treatment plan including risks, benefits, and treatment options with patient in detail. Patient agreed with treatment plan.      This document has been electronically signed by GARETT Garcia on 10/2/2023 15:10 CDT     acute findings.     This report was signed and finalized on 9/30/2023 2:52 PM CDT by Dr. Bryce Mancera MD.                  ASSESSMENT/PLAN       Examination and evaluation of wound(s) was performed.    DIAGNOSIS:   Lymphedema of BLE    Sepsis    Cellulitis of right lower extremity    HTN (hypertension)    Obesity, Class III, BMI 40-49.9 (morbid obesity)    Traumatic rhabdomyolysis    Cellulitis of left lower extremity       PLAN:   Elevate BLE above heart level when possible.   Referral placed for outpatient rehab for lymphedema therapy.   No other needs from wound care at this time.   Will sign off at this time. If needs arise, please re-consult.     Discussed findings and treatment plan including risks, benefits, and treatment options with patient in detail. Patient agreed with treatment plan.      This document has been electronically signed by GARETT Garcia on 10/2/2023 15:10 CDT

## 2023-10-02 NOTE — PLAN OF CARE
Goal Outcome Evaluation:  Plan of Care Reviewed With: patient        Progress: improving  Outcome Evaluation: Patient resting in bed. Up in chair today with 2 assist and walker. Generilzed weakness. Abx given as ordered. Doppler pulses +2. Refused to turn every 2 hours. Purewick in place. Lovenox. Regular diet; tolerated well. Complaints of pain; see MAR. Possibly home tomorrow. Safety maintained.

## 2023-10-02 NOTE — PLAN OF CARE
Goal Outcome Evaluation:  Plan of Care Reviewed With: patient        Progress: improving  Outcome Evaluation: PT eval complete. Pt is A&Ox4 with complaints of 5/10 pain in stomach. She presents with generalized weakness and decreased functional mobility. Pt states she does not feel safe going home in current condition. 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 On vision testing, peripheral vision is similar to R, but she was unable to accurately state numbers on therapist fingers. On arrival she was in the middle of transferring to chair with nursing, and required min A of 2 to perform. She performed sit>stand once more this visit with min A. She has chronic lymphedema in her BLE, which is limiting some of her DF ROM. She was able to ambulate a few steps backwards from bed>chair during transfer with min A and verbal cues to find surface. Her limiting factors currently for functional mobility are decreased strength, especially with weightbearing activities, and activity tolerance. Barring functional change, anticipate discharge to SNF.      Anticipated Discharge Disposition (PT): skilled nursing facility

## 2023-10-03 LAB
ALBUMIN SERPL-MCNC: 2.8 G/DL (ref 3.5–5.2)
ALBUMIN/GLOB SERPL: 1.1 G/DL
ALP SERPL-CCNC: 160 U/L (ref 39–117)
ALT SERPL W P-5'-P-CCNC: <5 U/L (ref 1–33)
ANION GAP SERPL CALCULATED.3IONS-SCNC: 11 MMOL/L (ref 5–15)
ANISOCYTOSIS BLD QL: NORMAL
AST SERPL-CCNC: 30 U/L (ref 1–32)
BASOPHILS # BLD AUTO: 0.02 10*3/MM3 (ref 0–0.2)
BASOPHILS NFR BLD AUTO: 0.5 % (ref 0–1.5)
BILIRUB SERPL-MCNC: 0.7 MG/DL (ref 0–1.2)
BUN SERPL-MCNC: 39 MG/DL (ref 8–23)
BUN/CREAT SERPL: 27.1 (ref 7–25)
BURR CELLS BLD QL SMEAR: NORMAL
CALCIUM SPEC-SCNC: 9 MG/DL (ref 8.6–10.5)
CHLORIDE SERPL-SCNC: 106 MMOL/L (ref 98–107)
CO2 SERPL-SCNC: 22 MMOL/L (ref 22–29)
CREAT SERPL-MCNC: 1.44 MG/DL (ref 0.57–1)
DEPRECATED RDW RBC AUTO: 63.7 FL (ref 37–54)
EGFRCR SERPLBLD CKD-EPI 2021: 38.2 ML/MIN/1.73
EOSINOPHIL # BLD AUTO: 0.25 10*3/MM3 (ref 0–0.4)
EOSINOPHIL NFR BLD AUTO: 5.6 % (ref 0.3–6.2)
ERYTHROCYTE [DISTWIDTH] IN BLOOD BY AUTOMATED COUNT: 20 % (ref 12.3–15.4)
GLOBULIN UR ELPH-MCNC: 2.6 GM/DL
GLUCOSE SERPL-MCNC: 84 MG/DL (ref 65–99)
HCT VFR BLD AUTO: 30 % (ref 34–46.6)
HGB BLD-MCNC: 9.4 G/DL (ref 12–15.9)
IMM GRANULOCYTES # BLD AUTO: 0.01 10*3/MM3 (ref 0–0.05)
IMM GRANULOCYTES NFR BLD AUTO: 0.2 % (ref 0–0.5)
LYMPHOCYTES # BLD AUTO: 1.69 10*3/MM3 (ref 0.7–3.1)
LYMPHOCYTES NFR BLD AUTO: 38.1 % (ref 19.6–45.3)
MACROCYTES BLD QL SMEAR: NORMAL
MCH RBC QN AUTO: 29.1 PG (ref 26.6–33)
MCHC RBC AUTO-ENTMCNC: 31.3 G/DL (ref 31.5–35.7)
MCV RBC AUTO: 92.9 FL (ref 79–97)
MONOCYTES # BLD AUTO: 0.62 10*3/MM3 (ref 0.1–0.9)
MONOCYTES NFR BLD AUTO: 14 % (ref 5–12)
NEUTROPHILS NFR BLD AUTO: 1.85 10*3/MM3 (ref 1.7–7)
NEUTROPHILS NFR BLD AUTO: 41.6 % (ref 42.7–76)
NRBC BLD AUTO-RTO: 0 /100 WBC (ref 0–0.2)
PLATELET # BLD AUTO: 106 10*3/MM3 (ref 140–450)
PMV BLD AUTO: 10.7 FL (ref 6–12)
POIKILOCYTOSIS BLD QL SMEAR: NORMAL
POLYCHROMASIA BLD QL SMEAR: NORMAL
POTASSIUM SERPL-SCNC: 3.9 MMOL/L (ref 3.5–5.2)
PROT SERPL-MCNC: 5.4 G/DL (ref 6–8.5)
RBC # BLD AUTO: 3.23 10*6/MM3 (ref 3.77–5.28)
SMALL PLATELETS BLD QL SMEAR: NORMAL
SODIUM SERPL-SCNC: 139 MMOL/L (ref 136–145)
WBC MORPH BLD: NORMAL
WBC NRBC COR # BLD: 4.44 10*3/MM3 (ref 3.4–10.8)

## 2023-10-03 PROCEDURE — 85025 COMPLETE CBC W/AUTO DIFF WBC: CPT | Performed by: FAMILY MEDICINE

## 2023-10-03 PROCEDURE — 97110 THERAPEUTIC EXERCISES: CPT

## 2023-10-03 PROCEDURE — 93010 ELECTROCARDIOGRAM REPORT: CPT | Performed by: HOSPITALIST

## 2023-10-03 PROCEDURE — 97530 THERAPEUTIC ACTIVITIES: CPT

## 2023-10-03 PROCEDURE — 25010000002 CEFAZOLIN PER 500 MG: Performed by: FAMILY MEDICINE

## 2023-10-03 PROCEDURE — 25010000002 ENOXAPARIN PER 10 MG: Performed by: FAMILY MEDICINE

## 2023-10-03 PROCEDURE — 93005 ELECTROCARDIOGRAM TRACING: CPT | Performed by: STUDENT IN AN ORGANIZED HEALTH CARE EDUCATION/TRAINING PROGRAM

## 2023-10-03 PROCEDURE — 80053 COMPREHEN METABOLIC PANEL: CPT | Performed by: FAMILY MEDICINE

## 2023-10-03 PROCEDURE — 85007 BL SMEAR W/DIFF WBC COUNT: CPT | Performed by: FAMILY MEDICINE

## 2023-10-03 RX ORDER — METOPROLOL TARTRATE 50 MG/1
50 TABLET, FILM COATED ORAL EVERY 12 HOURS SCHEDULED
Status: DISCONTINUED | OUTPATIENT
Start: 2023-10-03 | End: 2023-10-04

## 2023-10-03 RX ADMIN — DULOXETINE HYDROCHLORIDE 60 MG: 30 CAPSULE, DELAYED RELEASE ORAL at 20:22

## 2023-10-03 RX ADMIN — LEVOTHYROXINE SODIUM 100 MCG: 100 TABLET ORAL at 05:42

## 2023-10-03 RX ADMIN — APIXABAN 5 MG: 5 TABLET, FILM COATED ORAL at 20:22

## 2023-10-03 RX ADMIN — OXYBUTYNIN CHLORIDE 5 MG: 5 TABLET, EXTENDED RELEASE ORAL at 08:19

## 2023-10-03 RX ADMIN — CEFAZOLIN 2000 MG: 2 INJECTION, POWDER, FOR SOLUTION INTRAMUSCULAR; INTRAVENOUS at 05:42

## 2023-10-03 RX ADMIN — NYSTATIN 1 APPLICATION: 100000 POWDER TOPICAL at 20:25

## 2023-10-03 RX ADMIN — DOCUSATE SODIUM 100 MG: 100 CAPSULE, LIQUID FILLED ORAL at 20:22

## 2023-10-03 RX ADMIN — Medication 10 ML: at 08:21

## 2023-10-03 RX ADMIN — NYSTATIN 1 APPLICATION: 100000 POWDER TOPICAL at 08:21

## 2023-10-03 RX ADMIN — FUROSEMIDE 40 MG: 40 TABLET ORAL at 08:19

## 2023-10-03 RX ADMIN — CEFAZOLIN 2000 MG: 2 INJECTION, POWDER, FOR SOLUTION INTRAMUSCULAR; INTRAVENOUS at 12:57

## 2023-10-03 RX ADMIN — APIXABAN 5 MG: 5 TABLET, FILM COATED ORAL at 09:38

## 2023-10-03 RX ADMIN — ENOXAPARIN SODIUM 40 MG: 100 INJECTION SUBCUTANEOUS at 08:19

## 2023-10-03 RX ADMIN — DULOXETINE HYDROCHLORIDE 60 MG: 30 CAPSULE, DELAYED RELEASE ORAL at 08:19

## 2023-10-03 RX ADMIN — MORPHINE SULFATE 15 MG: 15 TABLET, FILM COATED, EXTENDED RELEASE ORAL at 20:59

## 2023-10-03 RX ADMIN — DOCUSATE SODIUM 50 MG AND SENNOSIDES 8.6 MG 2 TABLET: 8.6; 5 TABLET, FILM COATED ORAL at 08:19

## 2023-10-03 RX ADMIN — Medication 10 ML: at 20:25

## 2023-10-03 RX ADMIN — DOCUSATE SODIUM 100 MG: 100 CAPSULE, LIQUID FILLED ORAL at 08:19

## 2023-10-03 RX ADMIN — CEFAZOLIN 2000 MG: 2 INJECTION, POWDER, FOR SOLUTION INTRAMUSCULAR; INTRAVENOUS at 20:24

## 2023-10-03 RX ADMIN — MORPHINE SULFATE 15 MG: 15 TABLET, FILM COATED, EXTENDED RELEASE ORAL at 07:12

## 2023-10-03 RX ADMIN — PRAMIPEXOLE DIHYDROCHLORIDE 0.75 MG: 0.25 TABLET ORAL at 20:22

## 2023-10-03 RX ADMIN — METOPROLOL TARTRATE 50 MG: 50 TABLET, FILM COATED ORAL at 09:38

## 2023-10-03 NOTE — NURSING NOTE
Patient converted to A-fib, verified by monitor room. Patient asymptomatic at this time. Dr. Block notified of a-fib 120's-140's, no new orders at this time.

## 2023-10-03 NOTE — PLAN OF CARE
Goal Outcome Evaluation:  Plan of Care Reviewed With: patient      Patient denies pain. No c/o nausea. Patient on room air. A&O x4. Turn Q2, patient educated on importance of turning and repositioning in bed, patient refusing. Patient on Lovenox. IV abx given. Patient incontinent of urine, purewick in place.

## 2023-10-03 NOTE — CASE MANAGEMENT/SOCIAL WORK
Discharge Planning Assessment  [facility]     Patient Name: Brenda Sneed  MRN: [id number]  Today's Date: 10/3/2023    Admit Date: 9/30/2023        Discharge Needs Assessment       Row Name 10/03/23 1442       Living Environment    People in Home alone    Current Living Arrangements home    Potentially Unsafe Housing Conditions none    Primary Care Provided by self    Provides Primary Care For no one    Quality of Family Relationships helpful;involved;supportive    Able to Return to Prior Arrangements no       Resource/Environmental Concerns    Resource/Environmental Concerns none       Transition Planning    Patient/Family Anticipates Transition to inpatient rehabilitation facility    Patient/Family Anticipated Services at Transition skilled nursing    Transportation Anticipated health plan transportation       Discharge Needs Assessment    Readmission Within the Last 30 Days no previous admission in last 30 days    Equipment Currently Used at Home cane, quad;walker, rolling;walker, standard    Concerns to be Addressed care coordination/care conferences;discharge planning    Anticipated Changes Related to Illness inability to care for self    Outpatient/Agency/Support Group Needs skilled nursing facility    Discharge Facility/Level of Care Needs nursing facility, skilled    Provided Post Acute Provider List? Yes    Post Acute Provider List Nursing Home    Provided Post Acute Provider Quality & Resource List? Yes    Post Acute Provider Quality and Resource List Nursing Home    Delivered To Patient    Method of Delivery In person    Current Discharge Risk lives alone    Discharge Coordination/Progress Spoke with pt to discuss d/c needs. Pt lives at home alone. She states she is not able to take care of herself at this time. Discussed options and she requests a referral to [facility] and [facility] with [facility] being the first choice. Referrals being sent.                   Discharge Plan    No  documentation.                 Continued Care and Services - Admitted Since 9/30/2023       Destination       Service Provider Request Status Selected Services Address Phone Fax Patient Preferred    [facility] Pending - Request Sent N/A [address] [facility] [address] [phone] --    [facility] Pending - Request Sent N/A [address] [facility] [address] [phone] --                     Demographic Summary    No documentation.                  Functional Status    No documentation.                  Psychosocial    No documentation.                  Abuse/Neglect    No documentation.                  Legal    No documentation.                  Substance Abuse    No documentation.                  Patient Forms    No documentation.                     KAITLIN Dillard

## 2023-10-03 NOTE — PROGRESS NOTES
"    Daily Progress Note  Brenda Sneed  MRN: [id number] LOS: 3    Admit Date: 2023   10/3/2023 08:21 CDT    Subjective:         Interval History:    Reviewed overnight events and nursing notes.     New onset atrial fibrillation with rates 120-140 overnight.  Asymptomatic.  No new complaints, actually continues to feel better daily.    ROS:  Review of Systems   Constitutional:  Negative for chills and fever.   Respiratory:  Negative for cough, chest tightness and shortness of breath.    Cardiovascular:  Negative for chest pain.   Gastrointestinal:  Negative for abdominal pain, diarrhea, nausea and vomiting.     DIET:  Diet: Regular/House Diet; Texture: Regular Texture (IDDSI 7); Fluid Consistency: Thin (IDDSI 0)    Medications:      bisoprolol-hydrochlorothiazide, 1 tablet, Oral, Daily  ceFAZolin, 2,000 mg, Intravenous, Q8H  docusate sodium, 100 mg, Oral, BID  DULoxetine, 60 mg, Oral, BID  enoxaparin, 40 mg, Subcutaneous, Daily  furosemide, 40 mg, Oral, Daily  levothyroxine, 100 mcg, Oral, Q AM  nystatin, 1 application , Topical, BID  oxybutynin XL, 5 mg, Oral, Daily  pramipexole, 0.75 mg, Oral, Nightly  senna-docusate sodium, 2 tablet, Oral, BID  sodium chloride, 10 mL, Intravenous, Q12H          Objective:     Vitals: /61 (BP Location: Right arm, Patient Position: Lying)   Pulse (!) 121 Comment: RN notified  Temp 97.6 °F (36.4 °C) (Oral)   Resp 18   Ht 160 cm (63\")   Wt 116 kg (255 lb)   SpO2 90%   BMI 45.17 kg/m²    Intake/Output Summary (Last 24 hours) at 10/3/2023 0821  Last data filed at 10/3/2023 0425  Gross per 24 hour   Intake 240 ml   Output 700 ml   Net -460 ml    Temp (24hrs), Av.5 °F (36.4 °C), Min:97.3 °F (36.3 °C), Max:97.6 °F (36.4 °C)    Glucose:  Lab Results   Component Value Date    POCGLU 100 (H) 2021     Physical Examination:   Physical Exam  Constitutional:       General: She is not in acute distress.     Appearance: Normal appearance. She is not ill-appearing or " toxic-appearing.   Cardiovascular:      Rate and Rhythm: Tachycardia present. Rhythm irregular.      Pulses: Normal pulses.      Heart sounds: Normal heart sounds. No murmur heard.  Pulmonary:      Effort: Pulmonary effort is normal. No respiratory distress.      Breath sounds: Normal breath sounds. No stridor. No wheezing or rales.   Abdominal:      General: Abdomen is flat. Bowel sounds are normal. There is no distension.      Palpations: Abdomen is soft.      Tenderness: There is no abdominal tenderness.   Neurological:      Mental Status: She is alert.       Labs:  Lab Results (last 24 hours)       Procedure Component Value Units Date/Time    CBC & Differential [[id number]]  (Abnormal) Collected: 10/03/23 0407    Specimen: Blood Updated: 10/03/23 0515    Narrative:      The following orders were created for panel order CBC & Differential.  Procedure                               Abnormality         Status                     ---------                               -----------         ------                     CBC Auto Differential[[id number]]        Abnormal            Final result               Scan Slide[[id number]]                                       Final result                 Please view results for these tests on the individual orders.    Scan Slide [[id number]] Collected: 10/03/23 0407    Specimen: Blood Updated: 10/03/23 0515     Anisocytosis Slight/1+     Crenated RBC's Slight/1+     Macrocytes Slight/1+     Poikilocytes Slight/1+     Polychromasia Slight/1+     WBC Morphology Normal     Platelet Estimate Decreased    CBC Auto Differential [[id number]]  (Abnormal) Collected: 10/03/23 0407    Specimen: Blood Updated: 10/03/23 0515     WBC 4.44 10*3/mm3      RBC 3.23 10*6/mm3      Hemoglobin 9.4 g/dL      Hematocrit 30.0 %      MCV 92.9 fL      MCH 29.1 pg      MCHC 31.3 g/dL      RDW 20.0 %      RDW-SD 63.7 fl      MPV 10.7 fL      Platelets 106 10*3/mm3      Neutrophil % 41.6 %      Lymphocyte % 38.1 %       Monocyte % 14.0 %      Eosinophil % 5.6 %      Basophil % 0.5 %      Immature Grans % 0.2 %      Neutrophils, Absolute 1.85 10*3/mm3      Lymphocytes, Absolute 1.69 10*3/mm3      Monocytes, Absolute 0.62 10*3/mm3      Eosinophils, Absolute 0.25 10*3/mm3      Basophils, Absolute 0.02 10*3/mm3      Immature Grans, Absolute 0.01 10*3/mm3      nRBC 0.0 /100 WBC     Comprehensive Metabolic Panel [[id number]]  (Abnormal) Collected: 10/03/23 0407    Specimen: Blood Updated: 10/03/23 0445     Glucose 84 mg/dL      BUN 39 mg/dL      Creatinine 1.44 mg/dL      Sodium 139 mmol/L      Potassium 3.9 mmol/L      Chloride 106 mmol/L      CO2 22.0 mmol/L      Calcium 9.0 mg/dL      Total Protein 5.4 g/dL      Albumin 2.8 g/dL      ALT (SGPT) <5 U/L      AST (SGOT) 30 U/L      Alkaline Phosphatase 160 U/L      Total Bilirubin 0.7 mg/dL      Globulin 2.6 gm/dL      A/G Ratio 1.1 g/dL      BUN/Creatinine Ratio 27.1     Anion Gap 11.0 mmol/L      eGFR 38.2 mL/min/1.73     Narrative:      GFR Normal >60  Chronic Kidney Disease <60  Kidney Failure <15    The GFR formula is only valid for adults with stable renal function between ages 18 and 70.    Blood Culture - Blood, Arm, Left [[id number]]  (Normal) Collected: 09/30/23 1536    Specimen: Blood from Arm, Left Updated: 10/02/23 1600     Blood Culture No growth at 2 days    Blood Culture - Blood, Arm, Right [[id number]]  (Normal) Collected: 09/30/23 1429    Specimen: Blood from Arm, Right Updated: 10/02/23 1445     Blood Culture No growth at 2 days    Urine Culture - Urine, Urine, Catheter [[id number]]  (Normal) Collected: 09/30/23 1559    Specimen: Urine, Catheter Updated: 10/02/23 0822     Urine Culture Culture in progress             Imaging:  No radiology results from the last 24 hrs       Assessment and Plan:     Primary Problem:  Sepsis    Hospital Problem list:    Sepsis    Cellulitis of right lower extremity    HTN (hypertension)    Obesity, Class III, BMI 40-49.9 (morbid  obesity)    Traumatic rhabdomyolysis    Cellulitis of left lower extremity        Assessment: 72-year-old female with past medical history 3 of hypertension, hyperlipidemia, and morbid obesity who presents for urinary tract infection found to have new onset A-fib    Plan:    Atrial fibrillation  Going to do an echo and change on meds today.  We are going to stop her bisoprolol HCTZ combination and go on metoprolol for rate control.  We will also add Eliquis for anticoagulation given her elevated [id number]    Urinary tract infection  Culture ending, continue empiric antibiotics.    Overall, she is improving and I anticipate her being ready for discharge later today or tomorrow.  She worked with physical therapy yesterday and got SNF recommendations.  She is agreeable to going to a rehabilitation facility and I think this is appropriate.  We will consult case management.      Discharge planning:   Rehab    Reviewed treatment plans with the patient and/or family.     Code Status:   Code Status and Medical Interventions:   Ordered at: 09/30/23 1700     Code Status (Patient has no pulse and is not breathing):    CPR (Attempt to Resuscitate)     Medical Interventions (Patient has pulse or is breathing):    Full Support       Electronically signed by Sanjiv Block MD on 10/3/2023 at 08:21 CDT

## 2023-10-03 NOTE — THERAPY TREATMENT NOTE
Acute Care - Physical Therapy Treatment Note  [facility]     Patient Name: Brenda Sneed  : 1950  MRN: [id number]  Today's Date: 10/3/2023      Visit Dx:     ICD-10-CM ICD-9-CM   1. Sepsis, due to unspecified organism, unspecified whether acute organ dysfunction present  A41.9 038.9     995.91   2. Impaired mobility [Z74.09 (ICD-10-CM)]  Z74.09 799.89   3. Lymphedema  I89.0 457.1     Patient Active Problem List   Diagnosis    Spinal stenosis, lumbar    Secondary osteoarthritis of left shoulder due to rotator cuff arthropathy    Cellulitis of right lower extremity    HTN (hypertension)    Obesity, Class III, BMI 40-49.9 (morbid obesity)    Anxiety associated with depression    Traumatic rhabdomyolysis    Traumatic rhabdomyolysis, initial encounter    Laceration of left lower extremity    Cellulitis    Cellulitis of left lower extremity    Thrombocytopenia    Acute cystitis with hematuria    Sepsis    Candidiasis of skin    Acquired hypothyroidism     Past Medical History:   Diagnosis Date    Arthritis     Bronchitis     Cataract     right eye , cornea implant    Chronic back pain     Depression     Disease of thyroid gland     Edema     lower extremities    Hypertension     Kidney stones      Past Surgical History:   Procedure Laterality Date    BACK SURGERY       ([place]),      CHOLECYSTECTOMY OPEN      CORNEAL TRANSPLANT Right     INCISION AND DRAINAGE ABSCESS Left 2023    Procedure: INCISION AND DRAINAGE ABSCESS left leg;  Surgeon: Sarita Vazquez MD;  Location: [facility] OR;  Service: General;  Laterality: Left;    LACERATION REPAIR Left 2023    Procedure: washout and closure left leg wound ;  Surgeon: Sarita Vazquez MD;  Location:  [facility] OR;  Service: General;  Laterality: Left;    LUMBAR FUSION Left 2017    Procedure: LEFT LUMBAR LATERAL INTERBODY FUSION WITH INSTRUMENTATION  L1-2;  Surgeon: MARCELINO Wright MD;  Location: [facility] OR;  Service:     NECK SURGERY       [place]    TOTAL HIP ARTHROPLASTY Right 2014    DR. KENDRICK     TOTAL KNEE ARTHROPLASTY Right 2000    TOTAL KNEE ARTHROPLASTY Left 2002    TOTAL SHOULDER ARTHROPLASTY W/ DISTAL CLAVICLE EXCISION Left 6/1/2020    Procedure: LEFT REVERSE TOTAL SHOULDER REPLACEMENT;  Surgeon: Saravanan Calvin MD;  Location: [facility];  Service: Orthopedics;  Laterality: Left;     PT Assessment (last 12 hours)       PT Evaluation and Treatment       Row Name 10/03/23 1410          Physical Therapy Time and Intention    Subjective Information complains of;fatigue  -TB     Document Type therapy note (daily note)  -TB     Mode of Treatment physical therapy  -TB       Row Name 10/03/23 1410          General Information    Existing Precautions/Restrictions fall  -TB       Row Name 10/03/23 1410          Pain    Pretreatment Pain Rating 3/10  -TB     Posttreatment Pain Rating 3/10  -TB     Pain Location - Side/Orientation Left  -TB     Pain Location - extremity  -TB       Row Name 10/03/23 1410          Bed Mobility    Bed Mobility scooting/bridging;supine-sit  -TB     Scooting/Bridging [place] (Bed Mobility) standby assist  -TB     Supine-Sit [place] (Bed Mobility) standby assist  -TB     Assistive Device (Bed Mobility) bed rails;head of bed elevated  -TB       Row Name 10/03/23 1410          Transfers    Transfers sit-stand transfer;stand-sit transfer;bed-chair transfer  -TB       Row Name 10/03/23 1410          Bed-Chair Transfer    Bed-Chair [place] (Transfers) minimum assist (75% patient effort);verbal cues  -TB     Assistive Device (Bed-Chair Transfers) walker, front-wheeled  -TB       Row Name 10/03/23 1410          Sit-Stand Transfer    Sit-Stand [place] (Transfers) minimum assist (75% patient effort)  -TB     Assistive Device (Sit-Stand Transfers) walker, front-wheeled  -TB       Row Name 10/03/23 1410          Stand-Sit Transfer    Stand-Sit [place] (Transfers) minimum assist (75% patient effort);verbal  cues  -TB     Assistive Device (Stand-Sit Transfers) walker, front-wheeled  -TB       Row Name 10/03/23 1410          Gait/Stairs (Locomotion)    [place] (Gait) minimum assist (75% patient effort);verbal cues  -TB     Assistive Device (Gait) walker, front-wheeled  -TB     Distance in Feet (Gait) 3  -TB       Row Name 10/03/23 1410          Balance    Balance Assessment sitting static balance;sitting dynamic balance  -TB     Static Sitting Balance supervision  -TB     Dynamic Sitting Balance supervision  -TB     Position, Sitting Balance sitting edge of bed  -TB       Row Name 10/03/23 1410          Motor Skills    Therapeutic Exercise --  AROM/AAROM BLE  -TB       Row Name 10/03/23 1410          Plan of Care Review    Plan of Care Reviewed With patient  -TB     Progress improving  -TB     Outcome Evaluation PT tx complete. Bed mob SBA to EOB w/ HOB elevated and use of bed rails. Sitting balance Sup. Stood Min 1-2. Amb from bed to chair w/ RW and Min A. Pt needs increased time and cues. She doesn't need physical assist, but just lots of verbal cues. Performed AROM/AAROM BLE in sitting. Pt c/o L leg painmostly. Will cont POC.  -TB       Row Name 10/03/23 1410          Positioning and Restraints    Pre-Treatment Position in bed  -TB     Post Treatment Position chair  -TB     In Chair notified nsg;reclined;sitting;call light within reach;encouraged to call for assist;legs elevated  -TB               User Key  (r) = Recorded By, (t) = Taken By, (c) = Cosigned By      Initials Name Provider Type    TB Rosina Swain, PTA Physical Therapist Assistant                    Physical Therapy Education       Title: PT OT SLP Therapies (Done)       Topic: Physical Therapy (Done)       Point: Mobility training (Done)       Learning Progress Summary             Patient Acceptance, E, MORIAH,DU by JS at 10/2/2023 1030    Comment: Given cueing for safe and appropriate transfers with FWW. Benefits of ambulation and weight  bearing exercise.                         Point: Body mechanics (Done)       Learning Progress Summary             Patient Acceptance, E, VU,DU by  at 10/2/2023 1030    Comment: Given cueing for safe and appropriate transfers with FWW. Benefits of ambulation and weight bearing exercise.                                         User Key       Initials Effective Dates Name Provider Type Discipline     07/13/23 -  George Juarez, PT Student PT Student PT                  PT Recommendation and Plan     Plan of Care Reviewed With: patient  Progress: improving  Outcome Evaluation: PT tx complete. Bed mob SBA to EOB w/ HOB elevated and use of bed rails. Sitting balance Sup. Stood Min 1-2. Amb from bed to chair w/ RW and Min A. Pt needs increased time and cues. She doesn't need physical assist, but just lots of verbal cues. Performed AROM/AAROM BLE in sitting. Pt c/o L leg painmostly. Will cont POC.   Outcome Measures       Row Name 10/03/23 1410             How much help from another person do you currently need...    Turning from your back to your side while in flat bed without using bedrails? 3  -TB      Moving from lying on back to sitting on the side of a flat bed without bedrails? 4  -TB      Moving to and from a bed to a chair (including a wheelchair)? 3  -TB      Standing up from a chair using your arms (e.g., wheelchair, bedside chair)? 3  -TB      Climbing 3-5 steps with a railing? 2  -TB      To walk in hospital room? 2  -TB      AM-PAC 6 Clicks Score (PT) 17  -TB         Functional Assessment    Outcome Measure Options AM-PAC 6 Clicks Basic Mobility (PT)  -TB                User Key  (r) = Recorded By, (t) = Taken By, (c) = Cosigned By      Initials Name Provider Type    TB Rosina Swain, PTA Physical Therapist Assistant                     Time Calculation:    PT Charges       Row Name 10/03/23 1519             Time Calculation    Start Time 1410  -TB      Stop Time 1449  -TB      Time Calculation  (min) 39 min  -TB      PT Received On 10/03/23  -TB         Time Calculation- PT    Total Timed Code Minutes- PT 39 minute(s)  -TB                User Key  (r) = Recorded By, (t) = Taken By, (c) = Cosigned By      Initials Name Provider Type    TB Rosina Swain PTA Physical Therapist Assistant                  Therapy Charges for Today       Code Description Service Date Service Provider Modifiers Qty    50036488198 HC PT THER PROC EA 15 MIN 10/3/2023 Rosina Swain PTA GP 1    67994459723 HC PT THERAPEUTIC ACT EA 15 MIN 10/3/2023 Rosina Swain PTA GP 2            PT G-Codes  Outcome Measure Options: AM-PAC 6 Clicks Basic Mobility (PT)  AM-PAC 6 Clicks Score (PT): 17    Rosina Swain PTA  10/3/2023

## 2023-10-03 NOTE — PLAN OF CARE
Goal Outcome Evaluation:  Plan of Care Reviewed With: patient        Progress: no change  Outcome Evaluation: Patient converted to normal sinus at 12:28 today. Lopressor and Eliquis given this morning. EKG completed. Echo ordered. Complaints of left lower extremity pain; see MAR. Doppler pulses +2. Abx given as ordered. Lovenox. Up with 2 assist and walker. Plan for SNF placement upon discharge. Safety maintained.

## 2023-10-03 NOTE — PLAN OF CARE
Goal Outcome Evaluation:  Plan of Care Reviewed With: patient        Progress: improving  Outcome Evaluation: PT tx complete. Bed mob SBA to EOB w/ HOB elevated and use of bed rails. Sitting balance Sup. Stood Min 1-2. Amb from bed to chair w/ RW and Min A. Pt needs increased time and cues. She doesn't need physical assist, but just lots of verbal cues. Performed AROM/AAROM BLE in sitting. Pt c/o L leg painmostly. Will cont POC.

## 2023-10-03 NOTE — DISCHARGE PLACEMENT REQUEST
"Eyad Gurrola (72 y.o. Female)       Date of Birth   1950    Social Security Number       Address   [address] [id number]    Home Phone   [phone]    MRN   [id number]       [facility]    Marital Status                               Admission Date   9/30/23    Admission Type   Emergency    Admitting Provider   Sachin Dia MD    Attending Provider   Sachin Dia MD    Department, Room/Bed   [facility] 3C, 374/1       Discharge Date       Discharge Disposition       Discharge Destination                                 Attending Provider: Sachin Dia MD    Allergies: Codeine, Amoxicillin, Levaquin [Levofloxacin], Benadryl [Diphenhydramine]    Isolation: None   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)   Code Status: CPR    Ht: 160 cm (63\")   Wt: 116 kg (255 lb)    Admission Cmt: None   Principal Problem: Sepsis [A41.9]                   Active Insurance as of 9/30/2023       Primary Coverage       Payor Plan Insurance Group Employer/Plan Group    HU[employer] MEDICARE REPLACEMENT HU[employer] MEDICARE REPLACEMENT [id number]       Payor Plan Address Payor Plan Phone Number Payor Plan Fax Number Effective Dates    [address] [address] [phone]  1/1/2023 - None Entered    [facility] [address]         Subscriber Name Subscriber Birth Date Member ID       EYAD GURROLA 1950 [id number]               Secondary Coverage       Payor Plan Insurance Group Employer/Plan Group    [id number]        Payor Plan Address Payor Plan Phone Number Payor Plan Fax Number Effective Dates    [address] 6/1/2021 - None Entered    [facility] [id number]         Subscriber Name Subscriber Birth Date Member ID       EYAD GURROLA 1950 [id number]                     Emergency Contacts        (Rel.) Home Phone Work Phone Mobile Phone    Nella Law (Daughter) [phone] -- [phone]    ODELL NELSON (Relative) -- -- " [phone]

## 2023-10-04 ENCOUNTER — APPOINTMENT (OUTPATIENT)
Dept: CARDIOLOGY | Facility: HOSPITAL | Age: 73
DRG: 871 | End: 2023-10-04
Payer: MEDICARE

## 2023-10-04 LAB
ALBUMIN SERPL-MCNC: 2.4 G/DL (ref 3.5–5.2)
ALBUMIN/GLOB SERPL: 0.9 G/DL
ALP SERPL-CCNC: 161 U/L (ref 39–117)
ALT SERPL W P-5'-P-CCNC: <5 U/L (ref 1–33)
ANION GAP SERPL CALCULATED.3IONS-SCNC: 11 MMOL/L (ref 5–15)
ANISOCYTOSIS BLD QL: ABNORMAL
AST SERPL-CCNC: 37 U/L (ref 1–32)
BACTERIA SPEC AEROBE CULT: ABNORMAL
BILIRUB SERPL-MCNC: 0.4 MG/DL (ref 0–1.2)
BUN SERPL-MCNC: 34 MG/DL (ref 8–23)
BUN/CREAT SERPL: 27.2 (ref 7–25)
CALCIUM SPEC-SCNC: 8.6 MG/DL (ref 8.6–10.5)
CHLORIDE SERPL-SCNC: 105 MMOL/L (ref 98–107)
CO2 SERPL-SCNC: 18 MMOL/L (ref 22–29)
CREAT SERPL-MCNC: 1.25 MG/DL (ref 0.57–1)
DEPRECATED RDW RBC AUTO: 60.6 FL (ref 37–54)
EGFRCR SERPLBLD CKD-EPI 2021: 45.3 ML/MIN/1.73
EOSINOPHIL # BLD MANUAL: 0.4 10*3/MM3 (ref 0–0.4)
EOSINOPHIL NFR BLD MANUAL: 11.5 % (ref 0.3–6.2)
ERYTHROCYTE [DISTWIDTH] IN BLOOD BY AUTOMATED COUNT: 18.8 % (ref 12.3–15.4)
GLOBULIN UR ELPH-MCNC: 2.7 GM/DL
GLUCOSE SERPL-MCNC: 104 MG/DL (ref 65–99)
HCT VFR BLD AUTO: 28.4 % (ref 34–46.6)
HGB BLD-MCNC: 9 G/DL (ref 12–15.9)
LYMPHOCYTES # BLD MANUAL: 1.06 10*3/MM3 (ref 0.7–3.1)
LYMPHOCYTES NFR BLD MANUAL: 9.4 % (ref 5–12)
MCH RBC QN AUTO: 28.3 PG (ref 26.6–33)
MCHC RBC AUTO-ENTMCNC: 31.7 G/DL (ref 31.5–35.7)
MCV RBC AUTO: 89.3 FL (ref 79–97)
MONOCYTES # BLD: 0.33 10*3/MM3 (ref 0.1–0.9)
NEUTROPHILS # BLD AUTO: 1.72 10*3/MM3 (ref 1.7–7)
NEUTROPHILS NFR BLD MANUAL: 46.9 % (ref 42.7–76)
NEUTS BAND NFR BLD MANUAL: 2.1 % (ref 0–5)
PLATELET # BLD AUTO: 113 10*3/MM3 (ref 140–450)
PMV BLD AUTO: 10.5 FL (ref 6–12)
POTASSIUM SERPL-SCNC: 5.1 MMOL/L (ref 3.5–5.2)
PROT SERPL-MCNC: 5.1 G/DL (ref 6–8.5)
QT INTERVAL: 276 MS
QTC INTERVAL: 401 MS
RBC # BLD AUTO: 3.18 10*6/MM3 (ref 3.77–5.28)
SMALL PLATELETS BLD QL SMEAR: ABNORMAL
SODIUM SERPL-SCNC: 134 MMOL/L (ref 136–145)
VARIANT LYMPHS NFR BLD MANUAL: 1 % (ref 0–5)
VARIANT LYMPHS NFR BLD MANUAL: 29.2 % (ref 19.6–45.3)
WBC MORPH BLD: NORMAL
WBC NRBC COR # BLD: 3.52 10*3/MM3 (ref 3.4–10.8)

## 2023-10-04 PROCEDURE — 25510000001 PERFLUTREN 6.52 MG/ML SUSPENSION: Performed by: FAMILY MEDICINE

## 2023-10-04 PROCEDURE — 25010000002 CEFAZOLIN PER 500 MG: Performed by: FAMILY MEDICINE

## 2023-10-04 PROCEDURE — 85007 BL SMEAR W/DIFF WBC COUNT: CPT | Performed by: FAMILY MEDICINE

## 2023-10-04 PROCEDURE — 93306 TTE W/DOPPLER COMPLETE: CPT

## 2023-10-04 PROCEDURE — 85025 COMPLETE CBC W/AUTO DIFF WBC: CPT | Performed by: FAMILY MEDICINE

## 2023-10-04 PROCEDURE — 93306 TTE W/DOPPLER COMPLETE: CPT | Performed by: INTERNAL MEDICINE

## 2023-10-04 PROCEDURE — 80053 COMPREHEN METABOLIC PANEL: CPT | Performed by: FAMILY MEDICINE

## 2023-10-04 RX ADMIN — MORPHINE SULFATE 15 MG: 15 TABLET, FILM COATED, EXTENDED RELEASE ORAL at 17:17

## 2023-10-04 RX ADMIN — METOPROLOL TARTRATE 25 MG: 50 TABLET, FILM COATED ORAL at 08:49

## 2023-10-04 RX ADMIN — CEFAZOLIN 2000 MG: 2 INJECTION, POWDER, FOR SOLUTION INTRAMUSCULAR; INTRAVENOUS at 04:11

## 2023-10-04 RX ADMIN — DULOXETINE HYDROCHLORIDE 60 MG: 30 CAPSULE, DELAYED RELEASE ORAL at 20:02

## 2023-10-04 RX ADMIN — Medication 10 ML: at 08:50

## 2023-10-04 RX ADMIN — CEFAZOLIN 2000 MG: 2 INJECTION, POWDER, FOR SOLUTION INTRAMUSCULAR; INTRAVENOUS at 13:53

## 2023-10-04 RX ADMIN — PERFLUTREN 8.48 MG: 6.52 INJECTION, SUSPENSION INTRAVENOUS at 14:29

## 2023-10-04 RX ADMIN — DOCUSATE SODIUM 50 MG AND SENNOSIDES 8.6 MG 2 TABLET: 8.6; 5 TABLET, FILM COATED ORAL at 20:02

## 2023-10-04 RX ADMIN — CEFAZOLIN 2000 MG: 2 INJECTION, POWDER, FOR SOLUTION INTRAMUSCULAR; INTRAVENOUS at 20:03

## 2023-10-04 RX ADMIN — Medication 10 ML: at 20:03

## 2023-10-04 RX ADMIN — OXYBUTYNIN CHLORIDE 5 MG: 5 TABLET, EXTENDED RELEASE ORAL at 08:49

## 2023-10-04 RX ADMIN — APIXABAN 5 MG: 5 TABLET, FILM COATED ORAL at 20:02

## 2023-10-04 RX ADMIN — DOCUSATE SODIUM 50 MG AND SENNOSIDES 8.6 MG 2 TABLET: 8.6; 5 TABLET, FILM COATED ORAL at 08:49

## 2023-10-04 RX ADMIN — APIXABAN 5 MG: 5 TABLET, FILM COATED ORAL at 08:50

## 2023-10-04 RX ADMIN — DOCUSATE SODIUM 100 MG: 100 CAPSULE, LIQUID FILLED ORAL at 20:02

## 2023-10-04 RX ADMIN — METOPROLOL TARTRATE 25 MG: 50 TABLET, FILM COATED ORAL at 20:02

## 2023-10-04 RX ADMIN — NYSTATIN 1 APPLICATION: 100000 POWDER TOPICAL at 08:50

## 2023-10-04 RX ADMIN — LEVOTHYROXINE SODIUM 100 MCG: 100 TABLET ORAL at 05:13

## 2023-10-04 RX ADMIN — FUROSEMIDE 40 MG: 40 TABLET ORAL at 08:49

## 2023-10-04 RX ADMIN — DOCUSATE SODIUM 100 MG: 100 CAPSULE, LIQUID FILLED ORAL at 08:50

## 2023-10-04 RX ADMIN — NYSTATIN 1 APPLICATION: 100000 POWDER TOPICAL at 20:03

## 2023-10-04 RX ADMIN — PRAMIPEXOLE DIHYDROCHLORIDE 0.75 MG: 0.25 TABLET ORAL at 20:02

## 2023-10-04 RX ADMIN — DULOXETINE HYDROCHLORIDE 60 MG: 30 CAPSULE, DELAYED RELEASE ORAL at 08:50

## 2023-10-04 NOTE — CASE MANAGEMENT/SOCIAL WORK
Continued Stay Note   [facility]     Patient Name: Brenda Sneed  MRN: [id number]  Today's Date: 10/4/2023    Admit Date: 9/30/2023    Plan: [facility]- when ins. precert complete   Discharge Plan       Row Name 10/04/23 1343       Plan    Plan [facility]- when ins. precert complete    Patient/Family in Agreement with Plan yes    Plan Comments Pt prefers [facility] and bed offered from there.  Rodney GIRON starting ins. precert request with [employer]. Will follow.                   Discharge Codes    No documentation.                       KAITLIN Rojas

## 2023-10-04 NOTE — DISCHARGE PLACEMENT REQUEST
"Eyad Gurrola (72 y.o. Female)       Date of Birth   1950    Social Security Number       Address   [address] [id number]    Home Phone   [phone]    MRN   [id number]       [facility]    Marital Status                               Admission Date   9/30/23    Admission Type   Emergency    Admitting Provider   Sachin Dia MD    Attending Provider   Sachin Dia MD    Department, Room/Bed   [facility] 3C, 374/1       Discharge Date       Discharge Disposition       Discharge Destination                                 Attending Provider: Sachin Dia MD    Allergies: Codeine, Amoxicillin, Levaquin [Levofloxacin], Benadryl [Diphenhydramine]    Isolation: None   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)   Code Status: CPR    Ht: 160 cm (63\")   Wt: 116 kg (255 lb)    Admission Cmt: None   Principal Problem: Sepsis [A41.9]                   Active Insurance as of 9/30/2023       Primary Coverage       Payor Plan Insurance Group Employer/Plan Group    HUMANA MEDICARE REPLACEMENT HUMANA MEDICARE REPLACEMENT [id number]       Payor Plan Address Payor Plan Phone Number Payor Plan Fax Number Effective Dates    [address] [address] [phone]  1/1/2023 - None Entered    [facility] [id number]         Subscriber Name Subscriber Birth Date Member ID       EYAD GURROLA 1950 [id number]               Secondary Coverage       Payor Plan Insurance Group Employer/Plan Group    [id number]        Payor Plan Address Payor Plan Phone Number Payor Plan Fax Number Effective Dates    [address] 6/1/2021 - None Entered    [facility] [id number]         Subscriber Name Subscriber Birth Date Member ID       EYAD GURROLA 1950 [id number]                     Emergency Contacts        (Rel.) Home Phone Work Phone Mobile Phone    Nella Law (Daughter) [phone] -- [phone]    ODELL NELSON (Relative) -- -- " [phone]                 History & Physical        NezperceTremaine MD at 09/30/23 1701              Patient Care Team:  Sachin Dia MD as PCP - General  Sachin Dia MD as PCP - Family Medicine  Sumeet Bowles PA as Physician Assistant (Family Medicine)  Sarita Vazquez MD as Surgeon (General Surgery)    Chief complaint fever and alteration in mentation    Subjective    Patient is a 72 y.o. female presents with altered mental status and fever. Onset of symptoms was abrupt starting 1 day ago.  Symptoms are associated with swelling and increased redness of bilateral lower extremities.  She does suffer from chronic lymphedema.  Has had increased soreness in the legs prior to admission.  Symptoms are aggravated by nothing known.   Symptoms improve with no interventions at home prior to arrival. Severity moderate.  Context again has chronic edema with venous stasis and skin issues that come and go.  Quality described as deep soreness in her legs.  Has history of urinary tract infections but denies any dysuria or frequency.  She has some difficulty in giving full history.  She is accompanied by her niece in the room.    Review of Systems   Review of systems could not be obtained due to   patient confusion.    History  Past Medical History:   Diagnosis Date    Arthritis     Bronchitis     Cataract     right eye , cornea implant    Chronic back pain     Depression     Disease of thyroid gland     Edema     lower extremities    Hypertension     Kidney stones      Past Surgical History:   Procedure Laterality Date    BACK SURGERY      2010 ([place]), 2013     CHOLECYSTECTOMY OPEN      CORNEAL TRANSPLANT Right 1999    INCISION AND DRAINAGE ABSCESS Left 4/19/2023    Procedure: INCISION AND DRAINAGE ABSCESS left leg;  Surgeon: Sarita Vazquez MD;  Location: [facility] OR;  Service: General;  Laterality: Left;    LACERATION REPAIR Left 4/7/2023    Procedure: washout and closure left leg wound ;   Surgeon: Sarita Vazquez MD;  Location:  [facility] OR;  Service: General;  Laterality: Left;    LUMBAR FUSION Left 8/2/2017    Procedure: LEFT LUMBAR LATERAL INTERBODY FUSION WITH INSTRUMENTATION  L1-2;  Surgeon: MARCELINO Wright MD;  Location:  [facility] OR;  Service:     NECK SURGERY  2010    [place]    TOTAL HIP ARTHROPLASTY Right 2014    DR. KENDRICK     TOTAL KNEE ARTHROPLASTY Right 2000    TOTAL KNEE ARTHROPLASTY Left 2002    TOTAL SHOULDER ARTHROPLASTY W/ DISTAL CLAVICLE EXCISION Left 6/1/2020    Procedure: LEFT REVERSE TOTAL SHOULDER REPLACEMENT;  Surgeon: Saravanan Calvin MD;  Location:  [facility] OR;  Service: Orthopedics;  Laterality: Left;     Family History   Problem Relation Age of Onset    Breast cancer Neg Hx      Social History     Tobacco Use    Smoking status: Never    Smokeless tobacco: Never   Vaping Use    Vaping Use: Never used   Substance Use Topics    Alcohol use: No    Drug use: No     E-cigarette/Vaping    E-cigarette/Vaping Use Never User      E-cigarette/Vaping Substances     Medications Prior to Admission   Medication Sig Dispense Refill Last Dose    ALBUTEROL IN Inhale 4 (Four) Times a Day.       bisoprolol-hydrochlorothiazide (ZIAC) 5-6.25 MG per tablet Take 1 tablet by mouth Daily. Indications: High Blood Pressure Disorder       cephalexin (Keflex) 500 MG capsule Take 1 capsule by mouth 2 (Two) Times a Day. 14 capsule 0     cyclobenzaprine (FLEXERIL) 10 MG tablet Take 1 tablet by mouth 3 (Three) Times a Day As Needed for Muscle Spasms. 30 tablet 0     docusate sodium (Colace) 100 MG capsule Take 1 capsule by mouth 2 (Two) Times a Day. 60 capsule 1     DULoxetine (CYMBALTA) 60 MG capsule Take 1 capsule by mouth 2 (Two) Times a Day. Indications: Musculoskeletal Pain       furosemide (LASIX) 40 MG tablet Take 40 mg by mouth Daily. Indications: Edema       HYDROcodone-acetaminophen (NORCO) 5-325 MG per tablet Take 1 tablet by mouth Every 6 (Six) Hours As Needed.       levothyroxine (SYNTHROID,  LEVOTHROID) 100 MCG tablet Take 1 tablet by mouth Daily. Indications: Underactive Thyroid       MORPHINE SULFATE PO Take  by mouth Daily.       nystatin (MYCOSTATIN) 390847 UNIT/GM powder Apply 1 application topically to the appropriate area as directed 2 (Two) Times a Day. Indications: Candida Infection of Skin and Mouth or Vagina       oxybutynin XL (DITROPAN-XL) 5 MG 24 hr tablet Take 5 mg by mouth Daily. Take at night  Indications: Urinary Incontinence       oxybutynin XL (DITROPAN-XL) 5 MG 24 hr tablet Take 5 mg by mouth Daily. Indications: Urinary Incontinence       pramipexole (MIRAPEX) 0.75 MG tablet Take 1 tablet by mouth every night at bedtime. Indications: Restless Leg Syndrome       vitamin C (ASCORBIC ACID) 500 MG tablet Take 1 tablet by mouth Daily. Indications: Inadequate Vitamin C       vitamin D3 125 MCG (5000 UT) capsule capsule Take 1 capsule by mouth Daily. Indications: Vitamin D Deficiency        Allergies:  Codeine, Amoxicillin, Levaquin [levofloxacin], and Benadryl [diphenhydramine]    Objective    Vital Signs  Temp:  [99.4 °F (37.4 °C)-101.1 °F (38.4 °C)] 99.4 °F (37.4 °C)  Heart Rate:  [80-86] 80  Resp:  [17-20] 20  BP: (129-138)/(51-60) 138/56    Physical Exam:    General Appearance: alert, appears stated age, interactive, cooperative, and morbidly obese  Head: normocephalic, without obvious abnormality and atraumatic  Eyes: lids and lashes normal, conjunctivae and sclerae normal, and no icterus  Neck: supple and trachea midline  Lungs: clear to auscultation, respirations regular, respirations even, and respirations unlabored  Heart: regular rhythm & normal rate, normal S1, S2, and no murmur, no gallop, no rub  Abdomen: normal bowel sounds, no masses, soft non-tender, and no guarding  Extremities: no ischemic changes, edema 2+ lower bilateral, redness coming out from above bilateral wraps that she has on her lower extremities, tenderness bilateral legs, and venous status dermatitis  Skin:  Erythema and chronic skin changes lower extremities  Neurologic: Mental Status alertness alert and awake, orientation person and think she is at [facility] unsure of the date, and mood/affect normal    Results Review:    I reviewed the patient's new clinical results.    Assessment & Plan      Sepsis    Cellulitis of right lower extremity    HTN (hypertension)    Obesity, Class III, BMI 40-49.9 (morbid obesity)    Traumatic rhabdomyolysis    Cellulitis of left lower extremity      Patient with evidence of sepsis possibly from cellulitis of lower extremity.  Urine test currently pending.  We will go ahead and place on antibiotics per sepsis treatment protocol and adjust according to results of pending cultures.  We will ask wound care to come and evaluate legs as well.  Follow lab reports.  Will likely need PT and OT eval but will try to get her symptoms and legs under control as I am not sure she would tolerate much in the way of therapy right now.    I discussed the patients findings and my recommendations with patient and family.     Tremaine Forrester MD  09/30/23  17:01 CDT    Time:  Greater than 40 minutes        Electronically signed by Tremaine Forrester MD at 09/30/23 1707          Physician Progress Notes (last 24 hours)        Sanjiv Block MD at 10/04/23 0745              Daily Progress Note  Brenda Sneed  MRN: [id number] LOS: 4    Admit Date: 9/30/2023   10/4/2023 07:45 CDT    Subjective:         Interval History:    Reviewed overnight events and nursing notes.     Doing well this morning.  Converted to sinus rhythm yesterday afternoon.  Currently sinus bradycardia on telemetry review.  No new complaints.    ROS:  Review of Systems   Constitutional:  Negative for chills and fever.   Respiratory:  Negative for cough, chest tightness and shortness of breath.    Cardiovascular:  Negative for chest pain.   Gastrointestinal:  Negative for abdominal pain, diarrhea, nausea and vomiting.  "    DIET:  Diet: Regular/House Diet; Texture: Regular Texture (IDDSI 7); Fluid Consistency: Thin (IDDSI 0)    Medications:      apixaban, 5 mg, Oral, Q12H  ceFAZolin, 2,000 mg, Intravenous, Q8H  docusate sodium, 100 mg, Oral, BID  DULoxetine, 60 mg, Oral, BID  furosemide, 40 mg, Oral, Daily  levothyroxine, 100 mcg, Oral, Q AM  metoprolol tartrate, 25 mg, Oral, Q12H  nystatin, 1 application , Topical, BID  oxybutynin XL, 5 mg, Oral, Daily  pramipexole, 0.75 mg, Oral, Nightly  senna-docusate sodium, 2 tablet, Oral, BID  sodium chloride, 10 mL, Intravenous, Q12H          Objective:     Vitals: /57 (BP Location: Left arm, Patient Position: Lying)   Pulse 60   Temp 97.7 °F (36.5 °C) (Oral)   Resp 16   Ht 160 cm (63\")   Wt 116 kg (255 lb)   SpO2 100%   BMI 45.17 kg/m²    Intake/Output Summary (Last 24 hours) at 10/4/2023 0745  Last data filed at 10/4/2023 0021  Gross per 24 hour   Intake 600 ml   Output 1100 ml   Net -500 ml    Temp (24hrs), Av.7 °F (36.5 °C), Min:97.2 °F (36.2 °C), Max:98.4 °F (36.9 °C)    Glucose:  Lab Results   Component Value Date    POCGLU 100 (H) 2021     Physical Examination:   Physical Exam  Constitutional:       General: She is not in acute distress.     Appearance: Normal appearance. She is not ill-appearing or toxic-appearing.   Cardiovascular:      Rate and Rhythm: Normal rate and regular rhythm.      Pulses: Normal pulses.      Heart sounds: Normal heart sounds. No murmur heard.  Pulmonary:      Effort: Pulmonary effort is normal. No respiratory distress.      Breath sounds: Normal breath sounds. No stridor. No wheezing or rales.   Abdominal:      General: Abdomen is flat. Bowel sounds are normal. There is no distension.      Palpations: Abdomen is soft.      Tenderness: There is no abdominal tenderness.   Neurological:      Mental Status: She is alert.       Labs:  Lab Results (last 24 hours)       Procedure Component Value Units Date/Time    Manual Differential " [[id number]] Collected: 10/04/23 0621    Specimen: Blood Updated: 10/04/23 0634    CBC & Differential [[id number]] Collected: 10/04/23 0621    Specimen: Blood Updated: 10/04/23 0630    Narrative:      The following orders were created for panel order CBC & Differential.  Procedure                               Abnormality         Status                     ---------                               -----------         ------                     CBC Auto Differential[[id number]]                            In process                   Please view results for these tests on the individual orders.    CBC Auto Differential [[id number]] Collected: 10/04/23 0621    Specimen: Blood Updated: 10/04/23 0630    Comprehensive Metabolic Panel [[id number]]  (Abnormal) Collected: 10/04/23 0418    Specimen: Blood Updated: 10/04/23 0531     Glucose 104 mg/dL      BUN 34 mg/dL      Creatinine 1.25 mg/dL      Sodium 134 mmol/L      Potassium 5.1 mmol/L      Comment: Specimen hemolyzed.  Results may be affected.        Chloride 105 mmol/L      CO2 18.0 mmol/L      Calcium 8.6 mg/dL      Total Protein 5.1 g/dL      Albumin 2.4 g/dL      ALT (SGPT) <5 U/L      Comment: Specimen hemolyzed.  Results may be affected.        AST (SGOT) 37 U/L      Comment: Specimen hemolyzed.  Results may be affected.        Alkaline Phosphatase 161 U/L      Total Bilirubin 0.4 mg/dL      Globulin 2.7 gm/dL      A/G Ratio 0.9 g/dL      BUN/Creatinine Ratio 27.2     Anion Gap 11.0 mmol/L      eGFR 45.3 mL/min/1.73     Narrative:      GFR Normal >60  Chronic Kidney Disease <60  Kidney Failure <15    The GFR formula is only valid for adults with stable renal function between ages 18 and 70.    Blood Culture - Blood, Arm, Left [[id number]]  (Normal) Collected: 09/30/23 1536    Specimen: Blood from Arm, Left Updated: 10/03/23 1600     Blood Culture No growth at 3 days    Blood Culture - Blood, Arm, Right [[id number]]  (Normal) Collected: 09/30/23 1429    Specimen: Blood  from Arm, Right Updated: 10/03/23 1445     Blood Culture No growth at 3 days    Urine Culture - Urine, Urine, Catheter [[id number]]  (Abnormal) Collected: 09/30/23 1559    Specimen: Urine, Catheter Updated: 10/03/23 0841     Urine Culture >100,000 CFU/mL Gram Negative Bacilli    Narrative:      Colonization of the urinary tract without infection is common. Treatment is discouraged unless the patient is symptomatic, pregnant, or undergoing an invasive urologic procedure.             Imaging:  No radiology results from the last 24 hrs       Assessment and Plan:     Primary Problem:  Sepsis    Hospital Problem list:    Sepsis    Cellulitis of right lower extremity    HTN (hypertension)    Obesity, Class III, BMI 40-49.9 (morbid obesity)    Traumatic rhabdomyolysis    Cellulitis of left lower extremity      Assessment: 72-year-old female with past medical history 3 of hypertension, hyperlipidemia, and morbid obesity who presents for urinary tract infection found to have new onset A-fib     Plan:     Atrial fibrillation  Echo pending.  Converted to sinus rhythm last night.  Currently sinus bradycardia so we will decrease metoprolol from 50 to 25.  Continue Eliquis for stroke prophylaxis.     Urinary tract infection  Culture showing gram-negative bacilli, speciation pending.  Continue empiric antibiotics.    Medically stable for discharge once placement is occurred at skilled nursing facility.    Discharge planning:   Rehab    Reviewed treatment plans with the patient and/or family.     Code Status:   Code Status and Medical Interventions:   Ordered at: 09/30/23 1700     Code Status (Patient has no pulse and is not breathing):    CPR (Attempt to Resuscitate)     Medical Interventions (Patient has pulse or is breathing):    Full Support       Electronically signed by Sanjiv Block MD on 10/4/2023 at 07:45 CDT      Electronically signed by Sanjiv Block MD at 10/04/23 0747          Physical Therapy Notes (last 48  hours)        George Juarez, PT Student at 10/02/23 1531  Version 1 of 1      Attestation signed by Lois Wu PT, DPT, NCS at 10/02/23 1531    I reviewed the documentation and agree.                   Goal Outcome Evaluation:  Plan of Care Reviewed With: patient        Progress: improving  Outcome Evaluation: PT eval complete. Pt is A&Ox4 with complaints of 5/10 pain in stomach. She presents with generalized weakness and decreased functional mobility. Pt states she does not feel safe going home in current condition. 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 On vision testing, peripheral vision is similar to R, but she was unable to accurately state numbers on therapist fingers. On arrival she was in the middle of transferring to chair with nursing, and required min A of 2 to perform. She performed sit>stand once more this visit with min A. She has chronic lymphedema in her BLE, which is limiting some of her DF ROM. She was able to ambulate a few steps backwards from bed>chair during transfer with min A and verbal cues to find surface. Her limiting factors currently for functional mobility are decreased strength, especially with weightbearing activities, and activity tolerance. Barring functional change, anticipate discharge to SNF.      Anticipated Discharge Disposition (PT): skilled nursing facility    Electronically signed by Lois Wu PT, DPT, NCS at 10/02/23 1531       George Juarez, PT Student at 10/02/23 1531  Version 1 of 1      Attestation signed by Lois Wu PT, DPT, NCS at 10/02/23 1532    I reviewed the documentation and agree.                   Patient Name: Brenda Sneed  : 1950    MRN: [id number]                              Today's Date: 10/2/2023       Admit Date: 2023    Visit Dx:     ICD-10-CM ICD-9-CM   1. Sepsis, due to unspecified organism, unspecified whether acute organ dysfunction present  A41.9 038.9     995.91   2. Impaired mobility [Z74.09  (ICD-10-CM)]  Z74.09 799.89     Patient Active Problem List   Diagnosis    Spinal stenosis, lumbar    Secondary osteoarthritis of left shoulder due to rotator cuff arthropathy    Cellulitis of right lower extremity    HTN (hypertension)    Obesity, Class III, BMI 40-49.9 (morbid obesity)    Anxiety associated with depression    Traumatic rhabdomyolysis    Traumatic rhabdomyolysis, initial encounter    Laceration of left lower extremity    Cellulitis    Cellulitis of left lower extremity    Thrombocytopenia    Acute cystitis with hematuria    Sepsis    Candidiasis of skin    Acquired hypothyroidism     Past Medical History:   Diagnosis Date    Arthritis     Bronchitis     Cataract     right eye , cornea implant    Chronic back pain     Depression     Disease of thyroid gland     Edema     lower extremities    Hypertension     Kidney stones      Past Surgical History:   Procedure Laterality Date    BACK SURGERY      2010 ([place]), 2013     CHOLECYSTECTOMY OPEN      CORNEAL TRANSPLANT Right 1999    INCISION AND DRAINAGE ABSCESS Left 4/19/2023    Procedure: INCISION AND DRAINAGE ABSCESS left leg;  Surgeon: Sarita Vazquez MD;  Location:  [facility] OR;  Service: General;  Laterality: Left;    LACERATION REPAIR Left 4/7/2023    Procedure: washout and closure left leg wound ;  Surgeon: Sarita Vazquez MD;  Location:  [facility] OR;  Service: General;  Laterality: Left;    LUMBAR FUSION Left 8/2/2017    Procedure: LEFT LUMBAR LATERAL INTERBODY FUSION WITH INSTRUMENTATION  L1-2;  Surgeon: MARCELINO Wright MD;  Location:  [facility] OR;  Service:     NECK SURGERY  2010    [place]    TOTAL HIP ARTHROPLASTY Right 2014    DR. KENDRICK     TOTAL KNEE ARTHROPLASTY Right 2000    TOTAL KNEE ARTHROPLASTY Left 2002    TOTAL SHOULDER ARTHROPLASTY W/ DISTAL CLAVICLE EXCISION Left 6/1/2020    Procedure: LEFT REVERSE TOTAL SHOULDER REPLACEMENT;  Surgeon: Saravanan Calvin MD;  Location:  [facility] OR;  Service: Orthopedics;  Laterality: Left;       General Information       Row Name 10/02/23 1030          Physical Therapy Time and Intention    Document Type evaluation  Admitted d/t altered mental status and fever, suspected sepsis.  -MS (r) JS (t) MS (c)     Mode of Treatment physical therapy  -MS (r) JS (t) MS (c)       Row Name 10/02/23 1030          General Information    Patient Profile Reviewed yes  -MS (r) JS (t) MS (c)     Prior Level of Function independent:;transfer;all household mobility;bed mobility;dressing;grooming;home management;min assist:;bathing  -MS (r) JS (t) MS (c)     Existing Precautions/Restrictions fall  -MS (r) JS (t) MS (c)     Barriers to Rehab visual deficit  -MS (r) JS (t) MS (c)       Row Name 10/02/23 1030          Living Environment    People in Home alone  -MS (r) JS (t) MS (c)       Row Name 10/02/23 1030          Home Main Entrance    Number of Stairs, Main Entrance two  -MS (r) JS (t) MS (c)     Stair Railings, Main Entrance railing on left side (ascending)  -MS (r) JS (t) MS (c)       Row Name 10/02/23 1030          Stairs Within Home, Primary    Number of Stairs, Within Home, Primary none  -MS (r) JS (t) MS (c)       Row Name 10/02/23 1030          Cognition    Orientation Status (Cognition) oriented x 4  -MS (r) JS (t) MS (c)       Row Name 10/02/23 1030          Safety Issues, Functional Mobility    Impairments Affecting Function (Mobility) balance;endurance/activity tolerance;pain;strength;shortness of breath;visual/perceptual  -MS (r) JS (t) MS (c)     Comment, Safety Issues/Impairments (Mobility) Step over tub with grab bars and shower chair. Elevated toilet seat. Regular bed.  -MS (r) JS (t) MS (c)               User Key  (r) = Recorded By, (t) = Taken By, (c) = Cosigned By      Initials Name Provider Type    Lois Garrido, PT, DPT, NCS Physical Therapist    George Dominguez, PT Student PT Student                   Mobility       Row Name 10/02/23 1030          Bed-Chair Transfer    Bed-Chair [place]  (Transfers) minimum assist (75% patient effort);verbal cues;2 person assist  -MS (r) JS (t) MS (c)     Assistive Device (Bed-Chair Transfers) walker, front-wheeled  -MS (r) JS (t) MS (c)       Row Name 10/02/23 1030          Sit-Stand Transfer    Sit-Stand [place] (Transfers) minimum assist (75% patient effort);verbal cues  -MS (r) JS (t) MS (c)     Assistive Device (Sit-Stand Transfers) walker, front-wheeled  -MS (r) JS (t) MS (c)       Row Name 10/02/23 1030          Gait/Stairs (Locomotion)    [place] Level (Gait) minimum assist (75% patient effort);verbal cues  -MS (r) JS (t) MS (c)     Assistive Device (Gait) walker, front-wheeled  -MS (r) JS (t) MS (c)     Distance in Feet (Gait) 3  -MS (r) JS (t) MS (c)     Deviations/Abnormal Patterns (Gait) other (see comments)  Patient in middle of bed>chair transfer w/ nursing on arrival, ambulated backwards to chair.  -MS (r) JS (t) MS (c)               User Key  (r) = Recorded By, (t) = Taken By, (c) = Cosigned By      Initials Name Provider Type    Lois Garrido, PT, DPT, NCS Physical Therapist    George Dominguez, PT Student PT Student                   Obj/Interventions       Row Name 10/02/23 1030          Range of Motion Comprehensive    Comment, General Range of Motion Shoulder abd/ER limited roughly 25%; B DF limited R 30%, L 50%.  -MS (r) JS (t) MS (c)       Row Name 10/02/23 1030          Strength Comprehensive (MMT)    Comment, General Manual Muscle Testing (MMT) Assessment UE: B shoulders 3/5; elbow flexion extension, wrist extension 4/5 B;  4/5 B; LE: B PF 4/5, DF 3+/5; Knee flexion 4/5; Knee extension 5/5; Hip flexion 3/5  -MS (r) JS (t) MS (c)       Row Name 10/02/23 1030          Balance    Balance Assessment sitting static balance;sitting dynamic balance;standing static balance;standing dynamic balance  -MS (r) JS (t) MS (c)     Static Sitting Balance supervision  -MS (r) JS (t) MS (c)     Dynamic Sitting Balance standby assist  -MS  (r) JS (t) MS (c)     Position, Sitting Balance unsupported;sitting in chair  -MS (r) JS (t) MS (c)     Static Standing Balance contact guard  -MS (r) JS (t) MS (c)     Dynamic Standing Balance contact guard  -MS (r) JS (t) MS (c)     Position/Device Used, Standing Balance walker, front-wheeled  -MS (r) JS (t) MS (c)     Balance Interventions sitting;dynamic reaching  -MS (r) JS (t) MS (c)       Row Name 10/02/23 1030          Sensory Assessment (Somatosensory)    Sensory Assessment (Somatosensory) sensation intact  -MS (r) JS (t) MS (c)               User Key  (r) = Recorded By, (t) = Taken By, (c) = Cosigned By      Initials Name Provider Type    Lois Garrido, PT, DPT, NCS Physical Therapist    George Dominguez, PT Student PT Student                   Goals/Plan       Row Name 10/02/23 1133          Bed Mobility Goal 1 (PT)    Activity/Assistive Device (Bed Mobility Goal 1, PT) sit to supine;supine to sit  -MS (r) JS (t) MS (c)     [place] Level/Cues Needed (Bed Mobility Goal 1, PT) contact guard required  -MS (r) JS (t) MS (c)     Time Frame (Bed Mobility Goal 1, PT) long term goal (LTG);by discharge  -MS (r) JS (t) MS (c)     Progress/Outcomes (Bed Mobility Goal 1, PT) new goal  -MS (r) JS (t) MS (c)       Row Name 10/02/23 3322          Transfer Goal 1 (PT)    Activity/Assistive Device (Transfer Goal 1, PT) sit-to-stand/stand-to-sit;bed-to-chair/chair-to-bed  -MS (r) JS (t) MS (c)     [place] Level/Cues Needed (Transfer Goal 1, PT) standby assist  -MS (r) JS (t) MS (c)     Time Frame (Transfer Goal 1, PT) long term goal (LTG);by discharge  -MS (r) JS (t) MS (c)     Progress/Outcome (Transfer Goal 1, PT) new goal  -MS (r) JS (t) MS (c)       Row Name 10/02/23 1133          Gait Training Goal 1 (PT)    Activity/Assistive Device (Gait Training Goal 1, PT) gait (walking locomotion);assistive device use;decrease fall risk;increase endurance/gait distance;improve balance and speed  -MS (r) JS (t) MS  (c)     [place] Level (Gait Training Goal 1, PT) standby assist  -MS (r) JS (t) MS (c)     Distance (Gait Training Goal 1, PT) 30  -MS (r) JS (t) MS (c)     Time Frame (Gait Training Goal 1, PT) long term goal (LTG);by discharge  -MS (r) JS (t) MS (c)     Progress/Outcome (Gait Training Goal 1, PT) new goal  -MS (r) JS (t) MS (c)       Row Name 10/02/23 1132          Therapy Assessment/Plan (PT)    Planned Therapy Interventions (PT) balance training;bed mobility training;gait training;patient/family education;strengthening;transfer training  -MS (r) JS (t) MS (c)               User Key  (r) = Recorded By, (t) = Taken By, (c) = Cosigned By      Initials Name Provider Type    MS Bryce Lois BENEDICT, PT, DPT, NCS Physical Therapist    George Dominguez, PT Student PT Student                   Clinical Impression       Row Name 10/02/23 1030          Pain    Pretreatment Pain Rating 5/10  -MS (r) JS (t) MS (c)     Posttreatment Pain Rating 5/10  -MS (r) JS (t) MS (c)     Pain Location - abdomen  -MS (r) JS (t) MS (c)     Pain Intervention(s) Medication (See MAR);Repositioned  -MS (r) JS (t) MS (c)       Row Name 10/02/23 1030          Plan of Care Review    Plan of Care Reviewed With patient  -MS (r) JS (t) MS (c)     Progress improving  -MS (r) JS (t) MS (c)     Outcome Evaluation PT eval complete. Pt is A&Ox4 with complaints of 5/10 pain in stomach. She presents with generalized weakness and decreased functional mobility. Pt states she does not feel safe going home in current condition. 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 On vision testing, peripheral vision is similar to R, but she was unable to accurately state numbers on therapist fingers. On arrival she was in the middle of transferring to chair with nursing, and required min A of 2 to perform. She performed sit>stand once more this visit with min A. She has chronic lymphedema in her BLE, which is limiting some of her DF ROM. She was able to ambulate a  few steps backwards from bed>chair during transfer with min A and verbal cues to find surface. Her limiting factors currently for functional mobility are decreased strength, especially with weightbearing activities, and activity tolerance. Barring functional change, anticipate discharge to SNF.  -MS (r) JS (t) MS (c)       Row Name 10/02/23 1030          Therapy Assessment/Plan (PT)    Patient/Family Therapy Goals Statement (PT) Go home  -MS (r) JS (t) MS (c)     Rehab Potential (PT) good, to achieve stated therapy goals  -MS (r) JS (t) MS (c)     Criteria for Skilled Interventions Met (PT) yes;meets criteria;skilled treatment is necessary  -MS (r) JS (t) MS (c)     Therapy Frequency (PT) 2 times/day  -MS (r) JS (t) MS (c)     Predicted Duration of Therapy Intervention (PT) Until discharge  -MS (r) JS (t) MS (c)       Row Name 10/02/23 1030          Vital Signs    O2 Delivery Pre Treatment room air  -MS (r) JS (t) MS (c)     O2 Delivery Intra Treatment room air  -MS (r) JS (t) MS (c)     O2 Delivery Post Treatment room air  -MS (r) JS (t) MS (c)     Pre Patient Position Standing  -MS (r) JS (t) MS (c)     Intra Patient Position Standing  -MS (r) JS (t) MS (c)     Post Patient Position Sitting  -MS (r) JS (t) MS (c)       Row Name 10/02/23 1030          Positioning and Restraints    Pre-Treatment Position in bed  -MS (r) JS (t) MS (c)     Post Treatment Position chair  -MS (r) JS (t) MS (c)     In Chair sitting;call light within reach;encouraged to call for assist  -MS (r) JS (t) MS (c)               User Key  (r) = Recorded By, (t) = Taken By, (c) = Cosigned By      Initials Name Provider Type    Lois Garrido, PT, DPT, NCS Physical Therapist    George Dominguez, PT Student PT Student                   Outcome Measures       Row Name 10/02/23 1030 10/02/23 0812       How much help from another person do you currently need...    Turning from your back to your side while in flat bed without using bedrails? 3   -MS (r) JS (t) MS (c) 3  -AM    Moving from lying on back to sitting on the side of a flat bed without bedrails? 2  -MS (r) JS (t) MS (c) 2  -AM    Moving to and from a bed to a chair (including a wheelchair)? 2  -MS (r) JS (t) MS (c) 2  -AM    Standing up from a chair using your arms (e.g., wheelchair, bedside chair)? 3  -MS (r) JS (t) MS (c) 3  -AM    Climbing 3-5 steps with a railing? 2  -MS (r) JS (t) MS (c) 2  -AM    To walk in hospital room? 3  -MS (r) JS (t) MS (c) 2  -AM    AM-PAC 6 Clicks Score (PT) 15  -MS (r) JS (t) 14  -AM    Highest level of mobility 4 --> Transferred to chair/commode  -MS (r) JS (t) 4 --> Transferred to chair/commode  -AM      Row Name 10/02/23 1030          Functional Assessment    Outcome Measure Options AM-PAC 6 Clicks Basic Mobility (PT)  -MS (r) JS (t) MS (c)               User Key  (r) = Recorded By, (t) = Taken By, (c) = Cosigned By      Initials Name Provider Type    Lois Garrido, PT, DPT, NCS Physical Therapist    AM Katrin Pizarro, RN Registered Nurse    George Dominguez, PT Student PT Student                                 Physical Therapy Education       Title: PT OT SLP Therapies (Done)       Topic: Physical Therapy (Done)       Point: Mobility training (Done)       Learning Progress Summary             Patient Acceptance, E, VU,DU by  at 10/2/2023 1030    Comment: Given cueing for safe and appropriate transfers with FWW. Benefits of ambulation and weight bearing exercise.                         Point: Body mechanics (Done)       Learning Progress Summary             Patient Acceptance, E, VU,DU by LISSETTE at 10/2/2023 1030    Comment: Given cueing for safe and appropriate transfers with FWW. Benefits of ambulation and weight bearing exercise.                                         User Key       Initials Effective Dates Name Provider Type Discipline     07/13/23 -  George Juarez, PT Student PT Student PT                  PT Recommendation and Plan  Planned  Therapy Interventions (PT): balance training, bed mobility training, gait training, patient/family education, strengthening, transfer training  Plan of Care Reviewed With: patient  Progress: improving  Outcome Evaluation: PT eval complete. Pt is A&Ox4 with complaints of 5/10 pain in stomach. She presents with generalized weakness and decreased functional mobility. Pt states she does not feel safe going home in current condition. 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 On vision testing, peripheral vision is similar to R, but she was unable to accurately state numbers on therapist fingers. On arrival she was in the middle of transferring to chair with nursing, and required min A of 2 to perform. She performed sit>stand once more this visit with min A. She has chronic lymphedema in her BLE, which is limiting some of her DF ROM. She was able to ambulate a few steps backwards from bed>chair during transfer with min A and verbal cues to find surface. Her limiting factors currently for functional mobility are decreased strength, especially with weightbearing activities, and activity tolerance. Barring functional change, anticipate discharge to SNF.     Time Calculation:         PT Charges       Row Name 10/02/23 1030             Time Calculation    Start Time 1030  13 mins spent in chart  -MS (r) JS (t) MS (c)      Stop Time 1111  -MS (r) JS (t) MS (c)      Time Calculation (min) 41 min  -MS (r) JS (t)      PT Received On 10/02/23  -MS (r) JS (t) MS (c)      PT Goal Re-Cert Due Date 10/12/23  -MS (r) JS (t) MS (c)         Untimed Charges    PT Eval/Re-eval Minutes 54  -MS (r) JS (t) MS (c)         Total Minutes    Untimed Charges Total Minutes 54  -MS (r) JS (t)       Total Minutes 54  -MS (r) JS (t)                User Key  (r) = Recorded By, (t) = Taken By, (c) = Cosigned By      Initials Name Provider Type    MS Bryce Lois BENEDICT, PT, DPT, NCS Physical Therapist    George Dominguez, PT Student PT Student                       PT G-Codes  Outcome Measure Options: AM-PAC 6 Clicks Basic Mobility (PT)  AM-PAC 6 Clicks Score (PT): 15  PT Discharge Summary  Anticipated Discharge Disposition (PT): skilled nursing facility    George Juarez, PT Student  10/2/2023      Electronically signed by Lois Wu, PT, DPT, NCS at 10/02/23 1532       Rosina Swain PTA at 10/03/23 1449  Version 1 of 1         Goal Outcome Evaluation:  Plan of Care Reviewed With: patient        Progress: improving  Outcome Evaluation: PT tx complete. Bed mob SBA to EOB w/ HOB elevated and use of bed rails. Sitting balance Sup. Stood Min 1-2. Amb from bed to chair w/ RW and Min A. Pt needs increased time and cues. She doesn't need physical assist, but just lots of verbal cues. Performed AROM/AAROM BLE in sitting. Pt c/o L leg painmostly. Will cont POC.           Electronically signed by Rosina Swain PTA at 10/03/23 1519       Rosina Swain PTA at 10/03/23 144  Version 1 of 1         Acute Care - Physical Therapy Treatment Note  [facility]     Patient Name: Brenda Sneed  : 1950  MRN: [id number]  Today's Date: 10/3/2023      Visit Dx:     ICD-10-CM ICD-9-CM   1. Sepsis, due to unspecified organism, unspecified whether acute organ dysfunction present  A41.9 038.9     995.91   2. Impaired mobility [Z74.09 (ICD-10-CM)]  Z74.09 799.89   3. Lymphedema  I89.0 457.1     Patient Active Problem List   Diagnosis    Spinal stenosis, lumbar    Secondary osteoarthritis of left shoulder due to rotator cuff arthropathy    Cellulitis of right lower extremity    HTN (hypertension)    Obesity, Class III, BMI 40-49.9 (morbid obesity)    Anxiety associated with depression    Traumatic rhabdomyolysis    Traumatic rhabdomyolysis, initial encounter    Laceration of left lower extremity    Cellulitis    Cellulitis of left lower extremity    Thrombocytopenia    Acute cystitis with hematuria    Sepsis    Candidiasis of skin    Acquired  hypothyroidism     Past Medical History:   Diagnosis Date    Arthritis     Bronchitis     Cataract     right eye , cornea implant    Chronic back pain     Depression     Disease of thyroid gland     Edema     lower extremities    Hypertension     Kidney stones      Past Surgical History:   Procedure Laterality Date    BACK SURGERY      2010 ([facility]), 2013     CHOLECYSTECTOMY OPEN      CORNEAL TRANSPLANT Right 1999    INCISION AND DRAINAGE ABSCESS Left 4/19/2023    Procedure: INCISION AND DRAINAGE ABSCESS left leg;  Surgeon: Sarita Vazquez MD;  Location:  [facility] OR;  Service: General;  Laterality: Left;    LACERATION REPAIR Left 4/7/2023    Procedure: washout and closure left leg wound ;  Surgeon: Sarita Vazquez MD;  Location:  [facility] OR;  Service: General;  Laterality: Left;    LUMBAR FUSION Left 8/2/2017    Procedure: LEFT LUMBAR LATERAL INTERBODY FUSION WITH INSTRUMENTATION  L1-2;  Surgeon: MARCELINO Wright MD;  Location:  [facility] OR;  Service:     NECK SURGERY  2010    [facility]    TOTAL HIP ARTHROPLASTY Right 2014    DR. KENDRICK     TOTAL KNEE ARTHROPLASTY Right 2000    TOTAL KNEE ARTHROPLASTY Left 2002    TOTAL SHOULDER ARTHROPLASTY W/ DISTAL CLAVICLE EXCISION Left 6/1/2020    Procedure: LEFT REVERSE TOTAL SHOULDER REPLACEMENT;  Surgeon: Saravanan Calvin MD;  Location:  [facility] OR;  Service: Orthopedics;  Laterality: Left;     PT Assessment (last 12 hours)       PT Evaluation and Treatment       Row Name 10/03/23 1410          Physical Therapy Time and Intention    Subjective Information complains of;fatigue  -TB     Document Type therapy note (daily note)  -TB     Mode of Treatment physical therapy  -TB       Row Name 10/03/23 1410          General Information    Existing Precautions/Restrictions fall  -TB       Row Name 10/03/23 1410          Pain    Pretreatment Pain Rating 3/10  -TB     Posttreatment Pain Rating 3/10  -TB     Pain Location - Side/Orientation Left  -TB     Pain Location - extremity  -TB        Row Name 10/03/23 1410          Bed Mobility    Bed Mobility scooting/bridging;supine-sit  -TB     Scooting/Bridging [place] (Bed Mobility) standby assist  -TB     Supine-Sit [place] (Bed Mobility) standby assist  -TB     Assistive Device (Bed Mobility) bed rails;head of bed elevated  -TB       Row Name 10/03/23 1410          Transfers    Transfers sit-stand transfer;stand-sit transfer;bed-chair transfer  -TB       Row Name 10/03/23 1410          Bed-Chair Transfer    Bed-Chair [place] (Transfers) minimum assist (75% patient effort);verbal cues  -TB     Assistive Device (Bed-Chair Transfers) walker, front-wheeled  -TB       Row Name 10/03/23 1410          Sit-Stand Transfer    Sit-Stand [place] (Transfers) minimum assist (75% patient effort)  -TB     Assistive Device (Sit-Stand Transfers) walker, front-wheeled  -TB       Row Name 10/03/23 1410          Stand-Sit Transfer    Stand-Sit [place] (Transfers) minimum assist (75% patient effort);verbal cues  -TB     Assistive Device (Stand-Sit Transfers) walker, front-wheeled  -TB       Row Name 10/03/23 1410          Gait/Stairs (Locomotion)    [place] Level (Gait) minimum assist (75% patient effort);verbal cues  -TB     Assistive Device (Gait) walker, front-wheeled  -TB     Distance in Feet (Gait) 3  -TB       Row Name 10/03/23 1410          Balance    Balance Assessment sitting static balance;sitting dynamic balance  -TB     Static Sitting Balance supervision  -TB     Dynamic Sitting Balance supervision  -TB     Position, Sitting Balance sitting edge of bed  -TB       Row Name 10/03/23 1410          Motor Skills    Therapeutic Exercise --  AROM/AAROM BLE  -TB       Row Name 10/03/23 1410          Plan of Care Review    Plan of Care Reviewed With patient  -TB     Progress improving  -TB     Outcome Evaluation PT tx complete. Bed mob SBA to EOB w/ HOB elevated and use of bed rails. Sitting balance Sup. Stood Min 1-2. Amb from bed to  chair w/ RW and Min A. Pt needs increased time and cues. She doesn't need physical assist, but just lots of verbal cues. Performed AROM/AAROM BLE in sitting. Pt c/o L leg painmostly. Will cont POC.  -TB       Row Name 10/03/23 1410          Positioning and Restraints    Pre-Treatment Position in bed  -TB     Post Treatment Position chair  -TB     In Chair notified nsg;reclined;sitting;call light within reach;encouraged to call for assist;legs elevated  -TB               User Key  (r) = Recorded By, (t) = Taken By, (c) = Cosigned By      Initials Name Provider Type    TB Rosina Swain, PTA Physical Therapist Assistant                    Physical Therapy Education       Title: PT OT SLP Therapies (Done)       Topic: Physical Therapy (Done)       Point: Mobility training (Done)       Learning Progress Summary             Patient Acceptance, E, VU,DU by  at 10/2/2023 1030    Comment: Given cueing for safe and appropriate transfers with FWW. Benefits of ambulation and weight bearing exercise.                         Point: Body mechanics (Done)       Learning Progress Summary             Patient Acceptance, E, VU,DU by  at 10/2/2023 1030    Comment: Given cueing for safe and appropriate transfers with FWW. Benefits of ambulation and weight bearing exercise.                                         User Key       Initials Effective Dates Name Provider Type Discipline     07/13/23 -  George Juarez, PT Student PT Student PT                  PT Recommendation and Plan     Plan of Care Reviewed With: patient  Progress: improving  Outcome Evaluation: PT tx complete. Bed mob SBA to EOB w/ HOB elevated and use of bed rails. Sitting balance Sup. Stood Min 1-2. Amb from bed to chair w/ RW and Min A. Pt needs increased time and cues. She doesn't need physical assist, but just lots of verbal cues. Performed AROM/AAROM BLE in sitting. Pt c/o L leg painmostly. Will cont POC.   Outcome Measures       Row Name 10/03/23  1410             How much help from another person do you currently need...    Turning from your back to your side while in flat bed without using bedrails? 3  -TB      Moving from lying on back to sitting on the side of a flat bed without bedrails? 4  -TB      Moving to and from a bed to a chair (including a wheelchair)? 3  -TB      Standing up from a chair using your arms (e.g., wheelchair, bedside chair)? 3  -TB      Climbing 3-5 steps with a railing? 2  -TB      To walk in hospital room? 2  -TB      AM-PAC 6 Clicks Score (PT) 17  -TB         Functional Assessment    Outcome Measure Options AM-PAC 6 Clicks Basic Mobility (PT)  -TB                User Key  (r) = Recorded By, (t) = Taken By, (c) = Cosigned By      Initials Name Provider Type    Rosina Royal PTA Physical Therapist Assistant                     Time Calculation:    PT Charges       Row Name 10/03/23 1519             Time Calculation    Start Time 1410  -TB      Stop Time 1449  -TB      Time Calculation (min) 39 min  -TB      PT Received On 10/03/23  -TB         Time Calculation- PT    Total Timed Code Minutes- PT 39 minute(s)  -TB                User Key  (r) = Recorded By, (t) = Taken By, (c) = Cosigned By      Initials Name Provider Type    Rosina Royal PTA Physical Therapist Assistant                  Therapy Charges for Today       Code Description Service Date Service Provider Modifiers Qty    15822337843 HC PT THER PROC EA 15 MIN 10/3/2023 Rosina Swain PTA GP 1    79446963775 HC PT THERAPEUTIC ACT EA 15 MIN 10/3/2023 Rosina Swain PTA GP 2            PT G-Codes  Outcome Measure Options: AM-PAC 6 Clicks Basic Mobility (PT)  AM-PAC 6 Clicks Score (PT): 17    Rosina Swain PTA  10/3/2023      Electronically signed by Rosina Swain PTA at 10/03/23 1520       Occupational Therapy Notes (last 48 hours)  Notes from 10/02/23 1415 through 10/04/23 1415   No notes exist for this encounter.

## 2023-10-04 NOTE — PLAN OF CARE
Goal Outcome Evaluation:  Plan of Care Reviewed With: patient      Patient c/o pain in LLE, PRN pain med given. No c/o nausea. Patient on room air. A&O x4. Patient educated on importance of turning and repositioning, patient refusing to reposition. Patient on Eliquis. Tele on, sinus maria esther. External catheter in place, voiding. VSS.

## 2023-10-04 NOTE — PLAN OF CARE
Goal Outcome Evaluation:  Plan of Care Reviewed With: patient        Progress: improving  Outcome Evaluation: A&OX4, VSS. No c/o pain. Assist x2 to transfer to chair, BLE edema present, elevated at all times. NSR on telemetry all shift, Eliquis for VTE prevention. ECHO completed this shift. IV ABX given as ordered. Patient agreed to SNF placement at [facility], awaiting approval. Alarms set, on room air. Call light in reach, safety maintained, and continue to monitor.

## 2023-10-04 NOTE — PROGRESS NOTES
"    Daily Progress Note  Brenda Sneed  MRN: [id number] LOS: 4    Admit Date: 2023   10/4/2023 07:45 CDT    Subjective:         Interval History:    Reviewed overnight events and nursing notes.     Doing well this morning.  Converted to sinus rhythm yesterday afternoon.  Currently sinus bradycardia on telemetry review.  No new complaints.    ROS:  Review of Systems   Constitutional:  Negative for chills and fever.   Respiratory:  Negative for cough, chest tightness and shortness of breath.    Cardiovascular:  Negative for chest pain.   Gastrointestinal:  Negative for abdominal pain, diarrhea, nausea and vomiting.     DIET:  Diet: Regular/House Diet; Texture: Regular Texture (IDDSI 7); Fluid Consistency: Thin (IDDSI 0)    Medications:      apixaban, 5 mg, Oral, Q12H  ceFAZolin, 2,000 mg, Intravenous, Q8H  docusate sodium, 100 mg, Oral, BID  DULoxetine, 60 mg, Oral, BID  furosemide, 40 mg, Oral, Daily  levothyroxine, 100 mcg, Oral, Q AM  metoprolol tartrate, 25 mg, Oral, Q12H  nystatin, 1 application , Topical, BID  oxybutynin XL, 5 mg, Oral, Daily  pramipexole, 0.75 mg, Oral, Nightly  senna-docusate sodium, 2 tablet, Oral, BID  sodium chloride, 10 mL, Intravenous, Q12H          Objective:     Vitals: /57 (BP Location: Left arm, Patient Position: Lying)   Pulse 60   Temp 97.7 °F (36.5 °C) (Oral)   Resp 16   Ht 160 cm (63\")   Wt 116 kg (255 lb)   SpO2 100%   BMI 45.17 kg/m²    Intake/Output Summary (Last 24 hours) at 10/4/2023 0745  Last data filed at 10/4/2023 0021  Gross per 24 hour   Intake 600 ml   Output 1100 ml   Net -500 ml    Temp (24hrs), Av.7 °F (36.5 °C), Min:97.2 °F (36.2 °C), Max:98.4 °F (36.9 °C)    Glucose:  Lab Results   Component Value Date    POCGLU 100 (H) 2021     Physical Examination:   Physical Exam  Constitutional:       General: She is not in acute distress.     Appearance: Normal appearance. She is not ill-appearing or toxic-appearing.   Cardiovascular:      Rate " and Rhythm: Normal rate and regular rhythm.      Pulses: Normal pulses.      Heart sounds: Normal heart sounds. No murmur heard.  Pulmonary:      Effort: Pulmonary effort is normal. No respiratory distress.      Breath sounds: Normal breath sounds. No stridor. No wheezing or rales.   Abdominal:      General: Abdomen is flat. Bowel sounds are normal. There is no distension.      Palpations: Abdomen is soft.      Tenderness: There is no abdominal tenderness.   Neurological:      Mental Status: She is alert.       Labs:  Lab Results (last 24 hours)       Procedure Component Value Units Date/Time    Manual Differential [[id number]] Collected: 10/04/23 0621    Specimen: Blood Updated: 10/04/23 0634    CBC & Differential [[id number]] Collected: 10/04/23 0621    Specimen: Blood Updated: 10/04/23 0630    Narrative:      The following orders were created for panel order CBC & Differential.  Procedure                               Abnormality         Status                     ---------                               -----------         ------                     CBC Auto Differential[[id number]]                            In process                   Please view results for these tests on the individual orders.    CBC Auto Differential [[id number]] Collected: 10/04/23 0621    Specimen: Blood Updated: 10/04/23 0630    Comprehensive Metabolic Panel [[id number]]  (Abnormal) Collected: 10/04/23 0418    Specimen: Blood Updated: 10/04/23 0531     Glucose 104 mg/dL      BUN 34 mg/dL      Creatinine 1.25 mg/dL      Sodium 134 mmol/L      Potassium 5.1 mmol/L      Comment: Specimen hemolyzed.  Results may be affected.        Chloride 105 mmol/L      CO2 18.0 mmol/L      Calcium 8.6 mg/dL      Total Protein 5.1 g/dL      Albumin 2.4 g/dL      ALT (SGPT) <5 U/L      Comment: Specimen hemolyzed.  Results may be affected.        AST (SGOT) 37 U/L      Comment: Specimen hemolyzed.  Results may be affected.        Alkaline Phosphatase 161 U/L       Total Bilirubin 0.4 mg/dL      Globulin 2.7 gm/dL      A/G Ratio 0.9 g/dL      BUN/Creatinine Ratio 27.2     Anion Gap 11.0 mmol/L      eGFR 45.3 mL/min/1.73     Narrative:      GFR Normal >60  Chronic Kidney Disease <60  Kidney Failure <15    The GFR formula is only valid for adults with stable renal function between ages 18 and 70.    Blood Culture - Blood, Arm, Left [[id number]]  (Normal) Collected: 09/30/23 1536    Specimen: Blood from Arm, Left Updated: 10/03/23 1600     Blood Culture No growth at 3 days    Blood Culture - Blood, Arm, Right [[id number]]  (Normal) Collected: 09/30/23 1429    Specimen: Blood from Arm, Right Updated: 10/03/23 1445     Blood Culture No growth at 3 days    Urine Culture - Urine, Urine, Catheter [[id number]]  (Abnormal) Collected: 09/30/23 1559    Specimen: Urine, Catheter Updated: 10/03/23 0841     Urine Culture >100,000 CFU/mL Gram Negative Bacilli    Narrative:      Colonization of the urinary tract without infection is common. Treatment is discouraged unless the patient is symptomatic, pregnant, or undergoing an invasive urologic procedure.             Imaging:  No radiology results from the last 24 hrs       Assessment and Plan:     Primary Problem:  Sepsis    Hospital Problem list:    Sepsis    Cellulitis of right lower extremity    HTN (hypertension)    Obesity, Class III, BMI 40-49.9 (morbid obesity)    Traumatic rhabdomyolysis    Cellulitis of left lower extremity      Assessment: 72-year-old female with past medical history 3 of hypertension, hyperlipidemia, and morbid obesity who presents for urinary tract infection found to have new onset A-fib     Plan:     Atrial fibrillation  Echo pending.  Converted to sinus rhythm last night.  Currently sinus bradycardia so we will decrease metoprolol from 50 to 25.  Continue Eliquis for stroke prophylaxis.     Urinary tract infection  Culture showing gram-negative bacilli, speciation pending.  Continue empiric  antibiotics.    Medically stable for discharge once placement is occurred at skilled nursing facility.    Discharge planning:   Rehab    Reviewed treatment plans with the patient and/or family.     Code Status:   Code Status and Medical Interventions:   Ordered at: 09/30/23 1700     Code Status (Patient has no pulse and is not breathing):    CPR (Attempt to Resuscitate)     Medical Interventions (Patient has pulse or is breathing):    Full Support       Electronically signed by Sanjiv Block MD on 10/4/2023 at 07:45 CDT

## 2023-10-04 NOTE — CASE MANAGEMENT/SOCIAL WORK
Continued Stay Note   Anita     Patient Name: Brenda Sneed  MRN: [id number]  Today's Date: 10/4/2023    Admit Date: 9/30/2023    Plan: SNF   Discharge Plan       Row Name 10/04/23 0941       Plan    Plan SNF    Patient/Family in Agreement with Plan yes    Plan Comments Informed pt of bed offer from [facility]. She is wanting to know decision from [facility] so left message for Arely there to call this SW back [phone]. Will follow for decision.                   Discharge Codes    No documentation.                       KAITLIN Rojas

## 2023-10-05 LAB
BACTERIA SPEC AEROBE CULT: NORMAL
BACTERIA SPEC AEROBE CULT: NORMAL
BH CV ECHO MEAS - AO MAX PG: 6.8 MMHG
BH CV ECHO MEAS - AO MEAN PG: 3 MMHG
BH CV ECHO MEAS - AO ROOT DIAM: 3.3 CM
BH CV ECHO MEAS - AO V2 MAX: 130 CM/SEC
BH CV ECHO MEAS - AO V2 VTI: 29.8 CM
BH CV ECHO MEAS - AVA(I,D): 2.6 CM2
BH CV ECHO MEAS - EDV(CUBED): 123.5 ML
BH CV ECHO MEAS - EDV(MOD-SP4): 119 ML
BH CV ECHO MEAS - EF(MOD-SP4): 67.3 %
BH CV ECHO MEAS - ESV(CUBED): 41.8 ML
BH CV ECHO MEAS - ESV(MOD-SP4): 38.9 ML
BH CV ECHO MEAS - FS: 30.3 %
BH CV ECHO MEAS - IVS/LVPW: 1.31 CM
BH CV ECHO MEAS - IVSD: 1.29 CM
BH CV ECHO MEAS - LA DIMENSION: 4 CM
BH CV ECHO MEAS - LAT PEAK E' VEL: 9.9 CM/SEC
BH CV ECHO MEAS - LV DIASTOLIC VOL/BSA (35-75): 55.5 CM2
BH CV ECHO MEAS - LV MASS(C)D: 215.3 GRAMS
BH CV ECHO MEAS - LV MAX PG: 2.9 MMHG
BH CV ECHO MEAS - LV MEAN PG: 1 MMHG
BH CV ECHO MEAS - LV SYSTOLIC VOL/BSA (12-30): 18.1 CM2
BH CV ECHO MEAS - LV V1 MAX: 85.7 CM/SEC
BH CV ECHO MEAS - LV V1 VTI: 22 CM
BH CV ECHO MEAS - LVIDD: 5 CM
BH CV ECHO MEAS - LVIDS: 3.5 CM
BH CV ECHO MEAS - LVOT AREA: 3.5 CM2
BH CV ECHO MEAS - LVOT DIAM: 2.1 CM
BH CV ECHO MEAS - LVPWD: 0.98 CM
BH CV ECHO MEAS - MED PEAK E' VEL: 5.8 CM/SEC
BH CV ECHO MEAS - MV A MAX VEL: 101 CM/SEC
BH CV ECHO MEAS - MV DEC TIME: 0.27 SEC
BH CV ECHO MEAS - MV E MAX VEL: 105 CM/SEC
BH CV ECHO MEAS - MV E/A: 1.04
BH CV ECHO MEAS - RAP SYSTOLE: 5 MMHG
BH CV ECHO MEAS - RVSP: 21 MMHG
BH CV ECHO MEAS - SI(MOD-SP4): 37.4 ML/M2
BH CV ECHO MEAS - SV(LVOT): 76.2 ML
BH CV ECHO MEAS - SV(MOD-SP4): 80.1 ML
BH CV ECHO MEAS - TR MAX PG: 16 MMHG
BH CV ECHO MEAS - TR MAX VEL: 200 CM/SEC
BH CV ECHO MEASUREMENTS AVERAGE E/E' RATIO: 13.38
LEFT ATRIUM VOLUME INDEX: 26.4 ML/M2
LEFT ATRIUM VOLUME: 56.6 ML

## 2023-10-05 PROCEDURE — 97530 THERAPEUTIC ACTIVITIES: CPT

## 2023-10-05 PROCEDURE — 25010000002 CEFAZOLIN PER 500 MG: Performed by: FAMILY MEDICINE

## 2023-10-05 PROCEDURE — 97110 THERAPEUTIC EXERCISES: CPT

## 2023-10-05 RX ORDER — HYDROXYZINE HYDROCHLORIDE 25 MG/1
25 TABLET, FILM COATED ORAL 4 TIMES DAILY PRN
Status: DISCONTINUED | OUTPATIENT
Start: 2023-10-05 | End: 2023-10-06 | Stop reason: HOSPADM

## 2023-10-05 RX ADMIN — FUROSEMIDE 40 MG: 40 TABLET ORAL at 08:52

## 2023-10-05 RX ADMIN — DOCUSATE SODIUM 100 MG: 100 CAPSULE, LIQUID FILLED ORAL at 08:52

## 2023-10-05 RX ADMIN — PRAMIPEXOLE DIHYDROCHLORIDE 0.75 MG: 0.25 TABLET ORAL at 20:22

## 2023-10-05 RX ADMIN — METOPROLOL TARTRATE 25 MG: 50 TABLET, FILM COATED ORAL at 08:52

## 2023-10-05 RX ADMIN — APIXABAN 5 MG: 5 TABLET, FILM COATED ORAL at 20:22

## 2023-10-05 RX ADMIN — CEFAZOLIN 2000 MG: 2 INJECTION, POWDER, FOR SOLUTION INTRAMUSCULAR; INTRAVENOUS at 05:11

## 2023-10-05 RX ADMIN — Medication 10 ML: at 09:00

## 2023-10-05 RX ADMIN — ACETAMINOPHEN 650 MG: 325 TABLET, FILM COATED ORAL at 14:33

## 2023-10-05 RX ADMIN — NYSTATIN 1 APPLICATION: 100000 POWDER TOPICAL at 08:55

## 2023-10-05 RX ADMIN — DULOXETINE HYDROCHLORIDE 60 MG: 30 CAPSULE, DELAYED RELEASE ORAL at 08:52

## 2023-10-05 RX ADMIN — NYSTATIN 1 APPLICATION: 100000 POWDER TOPICAL at 20:22

## 2023-10-05 RX ADMIN — DULOXETINE HYDROCHLORIDE 60 MG: 30 CAPSULE, DELAYED RELEASE ORAL at 20:22

## 2023-10-05 RX ADMIN — HYDROXYZINE HYDROCHLORIDE 25 MG: 25 TABLET, FILM COATED ORAL at 15:38

## 2023-10-05 RX ADMIN — MORPHINE SULFATE 15 MG: 15 TABLET, FILM COATED, EXTENDED RELEASE ORAL at 08:53

## 2023-10-05 RX ADMIN — MORPHINE SULFATE 15 MG: 15 TABLET, FILM COATED, EXTENDED RELEASE ORAL at 23:54

## 2023-10-05 RX ADMIN — CEFAZOLIN 2000 MG: 2 INJECTION, POWDER, FOR SOLUTION INTRAMUSCULAR; INTRAVENOUS at 13:47

## 2023-10-05 RX ADMIN — Medication 10 ML: at 20:22

## 2023-10-05 RX ADMIN — DOCUSATE SODIUM 50 MG AND SENNOSIDES 8.6 MG 2 TABLET: 8.6; 5 TABLET, FILM COATED ORAL at 08:52

## 2023-10-05 RX ADMIN — LEVOTHYROXINE SODIUM 100 MCG: 100 TABLET ORAL at 05:11

## 2023-10-05 RX ADMIN — DOCUSATE SODIUM 100 MG: 100 CAPSULE, LIQUID FILLED ORAL at 20:22

## 2023-10-05 RX ADMIN — OXYBUTYNIN CHLORIDE 5 MG: 5 TABLET, EXTENDED RELEASE ORAL at 08:52

## 2023-10-05 RX ADMIN — APIXABAN 5 MG: 5 TABLET, FILM COATED ORAL at 08:52

## 2023-10-05 NOTE — CASE MANAGEMENT/SOCIAL WORK
Continued Stay Note   Mer[place]old     Patient Name: Brenda Sneed  MRN: [id number]  Today's Date: 10/5/2023    Admit Date: 9/30/2023    Plan: Waiting on return call from [facility]   Discharge Plan       Row Name 10/05/23 1237       Plan    Plan Waiting on return call from [facility]    Plan Comments Left message for Karyn at [facility] to call this SW back to see if they are ready for pt today as ins. precert complete.  Provided them Auth Number so they can verify. Will call MD office if they can accept today.                   Discharge Codes    No documentation.                 Expected Discharge Date and Time       Expected Discharge Date Expected Discharge Time    Oct 5, 2023               KAITLIN Rojas

## 2023-10-05 NOTE — PAYOR COMM NOTE
"Eyad Gurrola (72 y.o. Female)       Date of Birth   1950    Social Security Number       Address   [address] [id number]    Home Phone   [phone]    MRN   [id number]       [facility]    Marital Status                               Admission Date   9/30/23    Admission Type   Emergency    Admitting Provider   Sachin Dia MD    Attending Provider   Sachin Dia MD    Department, Room/Bed   [facility] 3C, 374/1       Discharge Date       Discharge Disposition       Discharge Destination                                 Attending Provider: Sachin Dia MD    Allergies: Codeine, Amoxicillin, Levaquin [Levofloxacin], Benadryl [Diphenhydramine]    Isolation: None   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)   Code Status: CPR    Ht: 160 cm (63\")   Wt: 116 kg (255 lb)    Admission Cmt: None   Principal Problem: Sepsis [A41.9]                   Active Insurance as of 9/30/2023       Primary Coverage       Payor Plan Insurance Group Employer/Plan Group    HUMANA MEDICARE REPLACEMENT HUMANA MEDICARE REPLACEMENT [id number]       Payor Plan Address Payor Plan Phone Number Payor Plan Fax Number Effective Dates    [address] [address] [phone]  1/1/2023 - None Entered    [facility] [address]         Subscriber Name Subscriber Birth Date Member ID       EYAD GURROLA 1950 [id number]               Secondary Coverage       Payor Plan Insurance Group Employer/Plan Group    [id number]        Payor Plan Address Payor Plan Phone Number Payor Plan Fax Number Effective Dates    [address] 6/1/2021 - None Entered    [facility] [id number]         Subscriber Name Subscriber Birth Date Member ID       EYAD GURROLA 1950 [id number]                     Emergency Contacts        (Rel.) Home Phone Work Phone Mobile Phone    Nella Law (Daughter) [phone] -- [phone]    ODELL NELSON (Relative) -- -- " [phone]              Insurance Information                  HUMANA MEDICARE REPLACEMENT/HUMANA MEDICARE REPLACEMENT Phone: [phone]    Subscriber: Brenda Sneed Subscriber#: [id number]    Group#: [id number] Precert#: --        [place] Phone: --    Subscriber: Brenda Sneed Subscriber#: [id number]    Group#: -- Precert#: [id number]             History & Physical        Tremaine Forrester MD at 09/30/23 1701              Patient Care Team:  Sachin Dia MD as PCP - General  Sachin Dia MD as PCP - Family Medicine  Sumeet Bowles PA as Physician Assistant (Family Medicine)  Sarita Vazquez MD as Surgeon (General Surgery)    Chief complaint fever and alteration in mentation    Subjective    Patient is a 72 y.o. female presents with altered mental status and fever. Onset of symptoms was abrupt starting 1 day ago.  Symptoms are associated with swelling and increased redness of bilateral lower extremities.  She does suffer from chronic lymphedema.  Has had increased soreness in the legs prior to admission.  Symptoms are aggravated by nothing known.   Symptoms improve with no interventions at home prior to arrival. Severity moderate.  Context again has chronic edema with venous stasis and skin issues that come and go.  Quality described as deep soreness in her legs.  Has history of urinary tract infections but denies any dysuria or frequency.  She has some difficulty in giving full history.  She is accompanied by her niece in the room.    Review of Systems   Review of systems could not be obtained due to   patient confusion.    History  Past Medical History:   Diagnosis Date    Arthritis     Bronchitis     Cataract     right eye , cornea implant    Chronic back pain     Depression     Disease of thyroid gland     Edema     lower extremities    Hypertension     Kidney stones      Past Surgical History:   Procedure Laterality Date    BACK SURGERY      2010  ([place]), 2013     CHOLECYSTECTOMY OPEN      CORNEAL TRANSPLANT Right 1999    INCISION AND DRAINAGE ABSCESS Left 4/19/2023    Procedure: INCISION AND DRAINAGE ABSCESS left leg;  Surgeon: Sarita Vazquez MD;  Location:  [facility] OR;  Service: General;  Laterality: Left;    LACERATION REPAIR Left 4/7/2023    Procedure: washout and closure left leg wound ;  Surgeon: Sarita Vazquez MD;  Location:  [facility] OR;  Service: General;  Laterality: Left;    LUMBAR FUSION Left 8/2/2017    Procedure: LEFT LUMBAR LATERAL INTERBODY FUSION WITH INSTRUMENTATION  L1-2;  Surgeon: MARCELINO Wright MD;  Location:  [facility] OR;  Service:     NECK SURGERY  2010    [place]    TOTAL HIP ARTHROPLASTY Right 2014    DR. KENDRICK     TOTAL KNEE ARTHROPLASTY Right 2000    TOTAL KNEE ARTHROPLASTY Left 2002    TOTAL SHOULDER ARTHROPLASTY W/ DISTAL CLAVICLE EXCISION Left 6/1/2020    Procedure: LEFT REVERSE TOTAL SHOULDER REPLACEMENT;  Surgeon: Saravanan Calvin MD;  Location:  [facility] OR;  Service: Orthopedics;  Laterality: Left;     Family History   Problem Relation Age of Onset    Breast cancer Neg Hx      Social History     Tobacco Use    Smoking status: Never    Smokeless tobacco: Never   Vaping Use    Vaping Use: Never used   Substance Use Topics    Alcohol use: No    Drug use: No     E-cigarette/Vaping    E-cigarette/Vaping Use Never User      E-cigarette/Vaping Substances     Medications Prior to Admission   Medication Sig Dispense Refill Last Dose    ALBUTEROL IN Inhale 4 (Four) Times a Day.       bisoprolol-hydrochlorothiazide (ZIAC) 5-6.25 MG per tablet Take 1 tablet by mouth Daily. Indications: High Blood Pressure Disorder       cephalexin (Keflex) 500 MG capsule Take 1 capsule by mouth 2 (Two) Times a Day. 14 capsule 0     cyclobenzaprine (FLEXERIL) 10 MG tablet Take 1 tablet by mouth 3 (Three) Times a Day As Needed for Muscle Spasms. 30 tablet 0     docusate sodium (Colace) 100 MG capsule Take 1 capsule by mouth 2 (Two) Times a Day. 60  capsule 1     DULoxetine (CYMBALTA) 60 MG capsule Take 1 capsule by mouth 2 (Two) Times a Day. Indications: Musculoskeletal Pain       furosemide (LASIX) 40 MG tablet Take 40 mg by mouth Daily. Indications: Edema       HYDROcodone-acetaminophen (NORCO) 5-325 MG per tablet Take 1 tablet by mouth Every 6 (Six) Hours As Needed.       levothyroxine (SYNTHROID, LEVOTHROID) 100 MCG tablet Take 1 tablet by mouth Daily. Indications: Underactive Thyroid       MORPHINE SULFATE PO Take  by mouth Daily.       nystatin (MYCOSTATIN) 926820 UNIT/GM powder Apply 1 application topically to the appropriate area as directed 2 (Two) Times a Day. Indications: Candida Infection of Skin and Mouth or Vagina       oxybutynin XL (DITROPAN-XL) 5 MG 24 hr tablet Take 5 mg by mouth Daily. Take at night  Indications: Urinary Incontinence       oxybutynin XL (DITROPAN-XL) 5 MG 24 hr tablet Take 5 mg by mouth Daily. Indications: Urinary Incontinence       pramipexole (MIRAPEX) 0.75 MG tablet Take 1 tablet by mouth every night at bedtime. Indications: Restless Leg Syndrome       vitamin C (ASCORBIC ACID) 500 MG tablet Take 1 tablet by mouth Daily. Indications: Inadequate Vitamin C       vitamin D3 125 MCG (5000 UT) capsule capsule Take 1 capsule by mouth Daily. Indications: Vitamin D Deficiency        Allergies:  Codeine, Amoxicillin, Levaquin [levofloxacin], and Benadryl [diphenhydramine]    Objective    Vital Signs  Temp:  [99.4 °F (37.4 °C)-101.1 °F (38.4 °C)] 99.4 °F (37.4 °C)  Heart Rate:  [80-86] 80  Resp:  [17-20] 20  BP: (129-138)/(51-60) 138/56    Physical Exam:    General Appearance: alert, appears stated age, interactive, cooperative, and morbidly obese  Head: normocephalic, without obvious abnormality and atraumatic  Eyes: lids and lashes normal, conjunctivae and sclerae normal, and no icterus  Neck: supple and trachea midline  Lungs: clear to auscultation, respirations regular, respirations even, and respirations unlabored  Heart:  regular rhythm & normal rate, normal S1, S2, and no murmur, no gallop, no rub  Abdomen: normal bowel sounds, no masses, soft non-tender, and no guarding  Extremities: no ischemic changes, edema 2+ lower bilateral, redness coming out from above bilateral wraps that she has on her lower extremities, tenderness bilateral legs, and venous status dermatitis  Skin: Erythema and chronic skin changes lower extremities  Neurologic: Mental Status alertness alert and awake, orientation person and think she is at [facility] unsure of the date, and mood/affect normal    Results Review:    I reviewed the patient's new clinical results.    Assessment & Plan      Sepsis    Cellulitis of right lower extremity    HTN (hypertension)    Obesity, Class III, BMI 40-49.9 (morbid obesity)    Traumatic rhabdomyolysis    Cellulitis of left lower extremity      Patient with evidence of sepsis possibly from cellulitis of lower extremity.  Urine test currently pending.  We will go ahead and place on antibiotics per sepsis treatment protocol and adjust according to results of pending cultures.  We will ask wound care to come and evaluate legs as well.  Follow lab reports.  Will likely need PT and OT eval but will try to get her symptoms and legs under control as I am not sure she would tolerate much in the way of therapy right now.    I discussed the patients findings and my recommendations with patient and family.     Tremaine Forrester MD  09/30/23  17:01 CDT    Time:  Greater than 40 minutes        Electronically signed by Tremaine Forrester MD at 09/30/23 1707          Physician Progress Notes (last 24 hours)        Sanjiv Block MD at 10/05/23 0801              Daily Progress Note  Brenda Sneed  MRN: [id number] LOS: 5    Admit Date: 9/30/2023   10/5/2023 08:01 CDT    Subjective:         Interval History:    Reviewed overnight events and nursing notes.     Doing well this morning.  No new complaints.  Appears to still be  "in sinus rhythm.  Bed offer to [facility], pre-CERT pending.    ROS:  Review of Systems   Constitutional:  Negative for chills and fever.   Respiratory:  Negative for cough, chest tightness and shortness of breath.    Cardiovascular:  Negative for chest pain.   Gastrointestinal:  Negative for abdominal pain, diarrhea, nausea and vomiting.     DIET:  Diet: Regular/House Diet; Texture: Regular Texture (IDDSI 7); Fluid Consistency: Thin (IDDSI 0)    Medications:      apixaban, 5 mg, Oral, Q12H  ceFAZolin, 2,000 mg, Intravenous, Q8H  docusate sodium, 100 mg, Oral, BID  DULoxetine, 60 mg, Oral, BID  furosemide, 40 mg, Oral, Daily  levothyroxine, 100 mcg, Oral, Q AM  metoprolol tartrate, 25 mg, Oral, Q12H  nystatin, 1 application , Topical, BID  oxybutynin XL, 5 mg, Oral, Daily  pramipexole, 0.75 mg, Oral, Nightly  senna-docusate sodium, 2 tablet, Oral, BID  sodium chloride, 10 mL, Intravenous, Q12H          Objective:     Vitals: /53 (BP Location: Left arm, Patient Position: Lying)   Pulse 58   Temp 98.1 °F (36.7 °C) (Oral)   Resp 16   Ht 160 cm (63\")   Wt 116 kg (255 lb)   SpO2 95%   BMI 45.17 kg/m²    Intake/Output Summary (Last 24 hours) at 10/5/2023 0801  Last data filed at 10/5/2023 0538  Gross per 24 hour   Intake 1080 ml   Output 1800 ml   Net -720 ml    Temp (24hrs), Av.7 °F (36.5 °C), Min:97.4 °F (36.3 °C), Max:98.1 °F (36.7 °C)    Glucose:  Lab Results   Component Value Date    POCGLU 100 (H) 2021     Physical Examination:   Physical Exam  Constitutional:       General: She is not in acute distress.     Appearance: Normal appearance. She is not ill-appearing or toxic-appearing.   Cardiovascular:      Rate and Rhythm: Normal rate and regular rhythm.      Pulses: Normal pulses.      Heart sounds: Normal heart sounds. No murmur heard.  Pulmonary:      Effort: Pulmonary effort is normal. No respiratory distress.      Breath sounds: Normal breath sounds. No stridor. No wheezing or rales. "   Abdominal:      General: Abdomen is flat. Bowel sounds are normal. There is no distension.      Palpations: Abdomen is soft.      Tenderness: There is no abdominal tenderness.   Neurological:      Mental Status: She is alert.       Labs:  Lab Results (last 24 hours)       Procedure Component Value Units Date/Time    Blood Culture - Blood, Arm, Left [[id number]]  (Normal) Collected: 09/30/23 1536    Specimen: Blood from Arm, Left Updated: 10/04/23 1600     Blood Culture No growth at 4 days    Blood Culture - Blood, Arm, Right [[id number]]  (Normal) Collected: 09/30/23 1429    Specimen: Blood from Arm, Right Updated: 10/04/23 1445     Blood Culture No growth at 4 days    Urine Culture - Urine, Urine, Catheter [[id number]]  (Abnormal)  (Susceptibility) Collected: 09/30/23 1559    Specimen: Urine, Catheter Updated: 10/04/23 0929     Urine Culture >100,000 CFU/mL Klebsiella pneumoniae ssp pneumoniae    Narrative:      Colonization of the urinary tract without infection is common. Treatment is discouraged unless the patient is symptomatic, pregnant, or undergoing an invasive urologic procedure.    Susceptibility        Klebsiella pneumoniae ssp pneumoniae      CHRISTIANO      Ampicillin Resistant      Ampicillin + Sulbactam Susceptible      Cefazolin Susceptible      Cefepime Susceptible      Ceftazidime Susceptible      Ceftriaxone Susceptible      Gentamicin Susceptible      Levofloxacin Susceptible      Nitrofurantoin Susceptible      Piperacillin + Tazobactam Susceptible      Trimethoprim + Sulfamethoxazole Susceptible                                    Imaging:  No radiology results from the last 24 hrs       Assessment and Plan:     Primary Problem:  Sepsis    Hospital Problem list:    Sepsis    Cellulitis of right lower extremity    HTN (hypertension)    Obesity, Class III, BMI 40-49.9 (morbid obesity)    Traumatic rhabdomyolysis    Cellulitis of left lower extremity      Assessment: 72-year-old female with past medical  history 3 of hypertension, hyperlipidemia, and morbid obesity who presents for urinary tract infection found to have new onset A-fib     Plan:     Atrial fibrillation  Echo read pending.  Converted to sinus rhythm.  Currently sinus, continue metoprolol 25 daily. Continue Eliquis for stroke prophylaxis.     Urinary tract infection  Urine culture growing Klebsiella sensitive to cefazolin, continue same.     Medically stable for discharge once placement is occurred at skilled nursing facility.      Discharge planning:   Nursing Home    Reviewed treatment plans with the patient and/or family.     Code Status:   Code Status and Medical Interventions:   Ordered at: 23 1700     Code Status (Patient has no pulse and is not breathing):    CPR (Attempt to Resuscitate)     Medical Interventions (Patient has pulse or is breathing):    Full Support       Electronically signed by Sanjiv Block MD on 10/5/2023 at 08:01 CDT      Electronically signed by Sanjiv Block MD at 10/05/23 0803       Consult Notes (last 24 hours)  Notes from 10/04/23 1111 through 10/05/23 1111   No notes of this type exist for this encounter.          Physical Therapy Notes (last 24 hours)        Margot Verma PTA at 10/05/23 09  Version 1 of 1         Goal Outcome Evaluation:  Plan of Care Reviewed With: patient        Progress: no change  Outcome Evaluation: pt trans to EOB sba, performed BLE AROM 10 x 2 reps, sit-stand min-mod, took 2-3 side steps to R with rwx min, trans back to bed min, pt would benefit from SNF           Electronically signed by Margot Verma PTA at 10/05/23 0909       Margot eVrma PTA at 10/05/23 0910  Version 1 of 1         Acute Care - Physical Therapy Treatment Note   [place]     Patient Name: Brenda Sneed  : 1950  MRN: [id number]  Today's Date: 10/5/2023      Visit Dx:     ICD-10-CM ICD-9-CM   1. Sepsis, due to unspecified organism, unspecified whether acute organ dysfunction present   A41.9 038.9     995.91   2. Impaired mobility [Z74.09 (ICD-10-CM)]  Z74.09 799.89   3. Lymphedema  I89.0 457.1     Patient Active Problem List   Diagnosis    Spinal stenosis, lumbar    Secondary osteoarthritis of left shoulder due to rotator cuff arthropathy    Cellulitis of right lower extremity    HTN (hypertension)    Obesity, Class III, BMI 40-49.9 (morbid obesity)    Anxiety associated with depression    Traumatic rhabdomyolysis    Traumatic rhabdomyolysis, initial encounter    Laceration of left lower extremity    Cellulitis    Cellulitis of left lower extremity    Thrombocytopenia    Acute cystitis with hematuria    Sepsis    Candidiasis of skin    Acquired hypothyroidism     Past Medical History:   Diagnosis Date    Arthritis     Bronchitis     Cataract     right eye , cornea implant    Chronic back pain     Depression     Disease of thyroid gland     Edema     lower extremities    Hypertension     Kidney stones      Past Surgical History:   Procedure Laterality Date    BACK SURGERY      2010 ([place]), 2013     CHOLECYSTECTOMY OPEN      CORNEAL TRANSPLANT Right 1999    INCISION AND DRAINAGE ABSCESS Left 4/19/2023    Procedure: INCISION AND DRAINAGE ABSCESS left leg;  Surgeon: Sarita Vazquez MD;  Location:  [facility];  Service: General;  Laterality: Left;    LACERATION REPAIR Left 4/7/2023    Procedure: washout and closure left leg wound ;  Surgeon: Sarita Vazquez MD;  Location:  [facility] OR;  Service: General;  Laterality: Left;    LUMBAR FUSION Left 8/2/2017    Procedure: LEFT LUMBAR LATERAL INTERBODY FUSION WITH INSTRUMENTATION  L1-2;  Surgeon: MARCELINO Wright MD;  Location:  [facility] OR;  Service:     NECK SURGERY  2010    [place]    TOTAL HIP ARTHROPLASTY Right 2014    DR. KENDRICK     TOTAL KNEE ARTHROPLASTY Right 2000    TOTAL KNEE ARTHROPLASTY Left 2002    TOTAL SHOULDER ARTHROPLASTY W/ DISTAL CLAVICLE EXCISION Left 6/1/2020    Procedure: LEFT REVERSE TOTAL SHOULDER REPLACEMENT;  Surgeon: Nikunj  Saravanan Cosby MD;  Location:  [facility] OR;  Service: Orthopedics;  Laterality: Left;     PT Assessment (last 12 hours)       PT Evaluation and Treatment       Row Name 10/05/23 0834 10/05/23 0753       Physical Therapy Time and Intention    Subjective Information complains of;pain;fatigue;weakness  - --  -    Document Type therapy note (daily note)  - --    Mode of Treatment physical therapy  - --    Session Not Performed -- other (see comments)  -    Comment, Session Not Performed -- bathing with nsg  -      Row Name 10/05/23 0834          General Information    Existing Precautions/Restrictions fall  -       Row Name 10/05/23 0834          Pain    Pretreatment Pain Rating 6/10  -     Posttreatment Pain Rating 10/10  -     Pain Location - Side/Orientation Left  -     Pain Location lower  -     Pain Location - extremity  -     Pain Intervention(s) Medication (See MAR);Repositioned  -       Row Name 10/05/23 0834          Bed Mobility    Bed Mobility scooting/bridging;supine-sit;sit-supine  -     Scooting/Bridging Golden (Bed Mobility) moderate assist (50% patient effort);2 person assist  -     Supine-Sit Golden (Bed Mobility) standby assist  -     Sit-Supine Golden (Bed Mobility) minimum assist (75% patient effort);verbal cues  -     Assistive Device (Bed Mobility) bed rails;head of bed elevated  -       Row Name 10/05/23 0834          Transfers    Transfers sit-stand transfer;stand-sit transfer;bed-chair transfer  -       Row Name 10/05/23 0834          Sit-Stand Transfer    Sit-Stand Golden (Transfers) moderate assist (50% patient effort);verbal cues  -     Assistive Device (Sit-Stand Transfers) walker, front-wheeled  -       Row Name 10/05/23 0834          Stand-Sit Transfer    Stand-Sit Golden (Transfers) minimum assist (75% patient effort);verbal cues  -     Assistive Device (Stand-Sit Transfers) walker, front-wheeled  -[facility] Name  10/05/23 0834          Gait/Stairs (Locomotion)    [place] Level (Gait) minimum assist (75% patient effort);verbal cues  -     Assistive Device (Gait) walker, front-wheeled  -     Distance in Feet (Gait) 2-3 side steps to R  -       Row Name 10/05/23 0834          Motor Skills    Comments, Therapeutic Exercise BLE AROM 10 x 2 reps  -     Additional Documentation Comments, Therapeutic Exercise (Row)  -       Row Name 10/05/23 0834          Plan of Care Review    Plan of Care Reviewed With patient  -     Progress no change  -     Outcome Evaluation pt trans to EOB sba, performed BLE AROM 10 x 2 reps, sit-stand min-mod, took 2-3 side steps to R with rwx min, trans back to bed min, pt would benefit from SNF  -       Row Name 10/05/23 0834          Positioning and Restraints    Pre-Treatment Position in bed  -     Post Treatment Position bed  -     In Bed fowlers;notified nsg;call light within reach;encouraged to call for assist;exit alarm on  -               User Key  (r) = Recorded By, (t) = Taken By, (c) = Cosigned By      Initials Name Provider Type     Margot Verma, PTA Physical Therapist Assistant                    Physical Therapy Education       Title: PT OT SLP Therapies (Done)       Topic: Physical Therapy (Done)       Point: Mobility training (Done)       Learning Progress Summary             Patient Acceptance, E, VU,DU by  at 10/2/2023 1030    Comment: Given cueing for safe and appropriate transfers with FWW. Benefits of ambulation and weight bearing exercise.                         Point: Body mechanics (Done)       Learning Progress Summary             Patient Acceptance, E, VU,DU by  at 10/2/2023 1030    Comment: Given cueing for safe and appropriate transfers with FWW. Benefits of ambulation and weight bearing exercise.                                         User Key       Initials Effective Dates Name Provider Type [facility] 07/13/23 -  George Juarez, PT  Student PT Student PT                  PT Recommendation and Plan     Plan of Care Reviewed With: patient  Progress: no change  Outcome Evaluation: pt trans to EOB sba, performed BLE AROM 10 x 2 reps, sit-stand min-mod, took 2-3 side steps to R with rwx min, trans back to bed min, pt would benefit from SNF   Outcome Measures       Row Name 10/05/23 0900 10/03/23 1410          How much help from another person do you currently need...    Turning from your back to your side while in flat bed without using bedrails? 3  -AH 3  -TB     Moving from lying on back to sitting on the side of a flat bed without bedrails? 3  -AH 4  -TB     Moving to and from a bed to a chair (including a wheelchair)? 2  -AH 3  -TB     Standing up from a chair using your arms (e.g., wheelchair, bedside chair)? 2  -AH 3  -TB     Climbing 3-5 steps with a railing? 1  -AH 2  -TB     To walk in hospital room? 1  -AH 2  -TB     AM-PAC 6 Clicks Score (PT) 12  -AH 17  -TB        Functional Assessment    Outcome Measure Options AM-PAC 6 Clicks Basic Mobility (PT)  -AH AM-PAC 6 Clicks Basic Mobility (PT)  -TB               User Key  (r) = Recorded By, (t) = Taken By, (c) = Cosigned By      Initials Name Provider Type     Margot Verma, PTA Physical Therapist Assistant    TB Rosina Swain, PTA Physical Therapist Assistant                     Time Calculation:    PT Charges       Row Name 10/05/23 0910             Time Calculation    Start Time 0834  -      Stop Time 0904  -      Time Calculation (min) 30 min  -      PT Received On 10/05/23  -         Time Calculation- PT    Total Timed Code Minutes- PT 30 minute(s)  -AH         Timed Charges    57295 - PT Therapeutic Exercise Minutes 15  -AH      16457 - PT Therapeutic Activity Minutes 15  -AH         Total Minutes    Timed Charges Total Minutes 30  -AH       Total Minutes 30  -AH                User Key  (r) = Recorded By, (t) = Taken By, (c) = Cosigned By      Initials Name Provider  Type     Margot Verma PTA Physical Therapist Assistant                  Therapy Charges for Today       Code Description Service Date Service Provider Modifiers Qty    01379333518 HC PT THER PROC EA 15 MIN 10/5/2023 Margot Verma PTA GP 1    43408191816 HC PT THERAPEUTIC ACT EA 15 MIN 10/5/2023 Margot Verma PTA GP 1            PT G-Codes  Outcome Measure Options: AM-PAC 6 Clicks Basic Mobility (PT)  AM-PAC 6 Clicks Score (PT): 12    Margot Verma PTA  10/5/2023      Electronically signed by Margot Verma PTA at 10/05/23 0910       Occupational Therapy Notes (last 24 hours)  Notes from 10/04/23 1111 through 10/05/23 1111   No notes exist for this encounter.        George Juarez, PT Student   PT Student  Physical Therapy     Therapy Evaluation      Attested     Date of Service: 10/02/23 1531  Creation Time: 10/02/23 1531     Attested           Attestation signed by Lois Wu PT, DPT, NCS at 10/02/23 1532     I reviewed the documentation and agree.                Expand All Collapse All    Patient Name: Brenda Sneed               : 1950                      MRN: [id number]                              Today's Date: 10/2/2023                                   Admit Date: 2023                        Visit Dx:   Visit Diagnosis       ICD-10-CM ICD-9-CM   1. Sepsis, due to unspecified organism, unspecified whether acute organ dysfunction present  A41.9 038.9       995.91   2. Impaired mobility [Z74.09 (ICD-10-CM)]  Z74.09 799.89         Problem List       Patient Active Problem List   Diagnosis    Spinal stenosis, lumbar    Secondary osteoarthritis of left shoulder due to rotator cuff arthropathy    Cellulitis of right lower extremity    HTN (hypertension)    Obesity, Class III, BMI 40-49.9 (morbid obesity)    Anxiety associated with depression    Traumatic rhabdomyolysis    Traumatic rhabdomyolysis, initial encounter    Laceration of left lower extremity    Cellulitis     Cellulitis of left lower extremity    Thrombocytopenia    Acute cystitis with hematuria    Sepsis    Candidiasis of skin    Acquired hypothyroidism         Medical History        Past Medical History:   Diagnosis Date    Arthritis      Bronchitis      Cataract       right eye , cornea implant    Chronic back pain      Depression      Disease of thyroid gland      Edema       lower extremities    Hypertension      Kidney stones           Surgical History         Past Surgical History:   Procedure Laterality Date    BACK SURGERY         2010 ([place]), 2013     CHOLECYSTECTOMY OPEN        CORNEAL TRANSPLANT Right 1999    INCISION AND DRAINAGE ABSCESS Left 4/19/2023     Procedure: INCISION AND DRAINAGE ABSCESS left leg;  Surgeon: Sarita Vazquez MD;  Location:  [facility];  Service: General;  Laterality: Left;    LACERATION REPAIR Left 4/7/2023     Procedure: washout and closure left leg wound ;  Surgeon: Sarita Vazquez MD;  Location:  [facility];  Service: General;  Laterality: Left;    LUMBAR FUSION Left 8/2/2017     Procedure: LEFT LUMBAR LATERAL INTERBODY FUSION WITH INSTRUMENTATION  L1-2;  Surgeon: MARCELINO Wright MD;  Location:  [facility];  Service:     NECK SURGERY   2010     [facility]    TOTAL HIP ARTHROPLASTY Right 2014     DR. KENDRICK     TOTAL KNEE ARTHROPLASTY Right 2000    TOTAL KNEE ARTHROPLASTY Left 2002    TOTAL SHOULDER ARTHROPLASTY W/ DISTAL CLAVICLE EXCISION Left 6/1/2020     Procedure: LEFT REVERSE TOTAL SHOULDER REPLACEMENT;  Surgeon: Saravanan Calvin MD;  Location:  [facility];  Service: Orthopedics;  Laterality: Left;           General Information         Row Name 10/02/23 1030                 Physical Therapy Time and Intention     Document Type evaluation  Admitted d/t altered mental status and fever, suspected sepsis.  -MS (r) JS (t) MS (c)       Mode of Treatment physical therapy  -MS (r) JS (t) MS (c)          Row Name 10/02/23 1030                 General Information     Patient Profile  Reviewed yes  -MS (r) JS (t) MS (c)       Prior Level of Function independent:;transfer;all household mobility;bed mobility;dressing;grooming;home management;min assist:;bathing  -MS (r) JS (t) MS (c)       Existing Precautions/Restrictions fall  -MS (r) JS (t) MS (c)       Barriers to Rehab visual deficit  -MS (r) JS (t) MS (c)          Row Name 10/02/23 1030                 Living Environment     People in Home alone  -MS (r) JS (t) MS (c)          Row Name 10/02/23 1030                 Home Main Entrance     Number of Stairs, Main Entrance two  -MS (r) JS (t) MS (c)       Stair Railings, Main Entrance railing on left side (ascending)  -MS (r) JS (t) MS (c)          Row Name 10/02/23 1030                 Stairs Within Home, Primary     Number of Stairs, Within Home, Primary none  -MS (r) JS (t) MS (c)          Row Name 10/02/23 1030                 Cognition     Orientation Status (Cognition) oriented x 4  -MS (r) JS (t) MS (c)          Row Name 10/02/23 1030                 Safety Issues, Functional Mobility     Impairments Affecting Function (Mobility) balance;endurance/activity tolerance;pain;strength;shortness of breath;visual/perceptual  -MS (r) JS (t) MS (c)       Comment, Safety Issues/Impairments (Mobility) Step over tub with grab bars and shower chair. Elevated toilet seat. Regular bed.  -MS (r) JS (t) MS (c)                       User Key  (r) = Recorded By, (t) = Taken By, (c) = Cosigned By        Initials Name Provider Type     Lois Garrido, PT, DPT, NCS Physical Therapist     George Dominguez, PT Student PT Student                            Mobility         Row Name 10/02/23 1030                 Bed-Chair Transfer     Bed-Chair [place] (Transfers) minimum assist (75% patient effort);verbal cues;2 person assist  -MS (r) JS (t) MS (c)       Assistive Device (Bed-Chair Transfers) walker, front-wheeled  -MS (r) JS (t) MS (c)          Row Name 10/02/23 1030                 Sit-Stand Transfer      Sit-Stand [place] (Transfers) minimum assist (75% patient effort);verbal cues  -MS (r) JS (t) MS (c)       Assistive Device (Sit-Stand Transfers) walker, front-wheeled  -MS (r) JS (t) MS (c)          Row Name 10/02/23 1030                 Gait/Stairs (Locomotion)     [place] Level (Gait) minimum assist (75% patient effort);verbal cues  -MS (r) JS (t) MS (c)       Assistive Device (Gait) walker, front-wheeled  -MS (r) JS (t) MS (c)       Distance in Feet (Gait) 3  -MS (r) JS (t) MS (c)       Deviations/Abnormal Patterns (Gait) other (see comments)  Patient in middle of bed>chair transfer w/ nursing on arrival, ambulated backwards to chair.  -MS (r) JS (t) MS (c)                       User Key  (r) = Recorded By, (t) = Taken By, (c) = Cosigned By        Initials Name Provider Type     Lois Garrido, PT, DPT, NCS Physical Therapist     Geroge Dominguez, PT Student PT Student                            Obj/Interventions         Row Name 10/02/23 1030                 Range of Motion Comprehensive     Comment, General Range of Motion Shoulder abd/ER limited roughly 25%; B DF limited R 30%, L 50%.  -MS (r) JS (t) MS (c)          Row Name 10/02/23 1030                 Strength Comprehensive (MMT)     Comment, General Manual Muscle Testing (MMT) Assessment UE: B shoulders 3/5; elbow flexion extension, wrist extension 4/5 B;  4/5 B; LE: B PF 4/5, DF 3+/5; Knee flexion 4/5; Knee extension 5/5; Hip flexion 3/5  -MS (r) JS (t) MS (c)          Row Name 10/02/23 1030                 Balance     Balance Assessment sitting static balance;sitting dynamic balance;standing static balance;standing dynamic balance  -MS (r) JS (t) MS (c)       Static Sitting Balance supervision  -MS (r) JS (t) MS (c)       Dynamic Sitting Balance standby assist  -MS (r) JS (t) MS (c)       Position, Sitting Balance unsupported;sitting in chair  -MS (r) JS (t) MS (c)       Static Standing Balance contact guard  -MS (r) JS (t) MS  (c)       Dynamic Standing Balance contact guard  -MS (r) JS (t) MS (c)       Position/Device Used, Standing Balance walker, front-wheeled  -MS (r) JS (t) MS (c)       Balance Interventions sitting;dynamic reaching  -MS (r) JS (t) MS (c)          Row Name 10/02/23 1030                 Sensory Assessment (Somatosensory)     Sensory Assessment (Somatosensory) sensation intact  -MS (r) JS (t) MS (c)                       User Key  (r) = Recorded By, (t) = Taken By, (c) = Cosigned By        Initials Name Provider Type     MS Bryce Lois BENEDICT, PT, DPT, NCS Physical Therapist     George Dominguez, PT Student PT Student                            Goals/Plan         Row Name 10/02/23 1183                 Bed Mobility Goal 1 (PT)     Activity/Assistive Device (Bed Mobility Goal 1, PT) sit to supine;supine to sit  -MS (r) JS (t) MS (c)       [place] Level/Cues Needed (Bed Mobility Goal 1, PT) contact guard required  -MS (r) JS (t) MS (c)       Time Frame (Bed Mobility Goal 1, PT) long term goal (LTG);by discharge  -MS (r) JS (t) MS (c)       Progress/Outcomes (Bed Mobility Goal 1, PT) new goal  -MS (r) JS (t) MS (c)          Row Name 10/02/23 5530                 Transfer Goal 1 (PT)     Activity/Assistive Device (Transfer Goal 1, PT) sit-to-stand/stand-to-sit;bed-to-chair/chair-to-bed  -MS (r) JS (t) MS (c)       [place] Level/Cues Needed (Transfer Goal 1, PT) standby assist  -MS (r) JS (t) MS (c)       Time Frame (Transfer Goal 1, PT) long term goal (LTG);by discharge  -MS (r) JS (t) MS (c)       Progress/Outcome (Transfer Goal 1, PT) new goal  -MS (r) JS (t) MS (c)          Row Name 10/02/23 9637                 Gait Training Goal 1 (PT)     Activity/Assistive Device (Gait Training Goal 1, PT) gait (walking locomotion);assistive device use;decrease fall risk;increase endurance/gait distance;improve balance and speed  -MS (r) JS (t) MS (c)       [place] Level (Gait Training Goal 1, PT) standby assist  -MS  (r) JS (t) MS (c)       Distance (Gait Training Goal 1, PT) 30  -MS (r) JS (t) MS (c)       Time Frame (Gait Training Goal 1, PT) long term goal (LTG);by discharge  -MS (r) JS (t) MS (c)       Progress/Outcome (Gait Training Goal 1, PT) new goal  -MS (r) JS (t) MS (c)          Row Name 10/02/23 1133                 Therapy Assessment/Plan (PT)     Planned Therapy Interventions (PT) balance training;bed mobility training;gait training;patient/family education;strengthening;transfer training  -MS (r) JS (t) MS (c)                    User Key  (r) = Recorded By, (t) = Taken By, (c) = Cosigned By        Initials Name Provider Type     MS Wu Lois BENEDICT, PT, DPT, NCS Physical Therapist     George Dominguez, PT Student PT Student                         Clinical Impression         Row Name 10/02/23 1030                 Pain     Pretreatment Pain Rating 5/10  -MS (r) JS (t) MS (c)       Posttreatment Pain Rating 5/10  -MS (r) JS (t) MS (c)       Pain Location - abdomen  -MS (r) JS (t) MS (c)       Pain Intervention(s) Medication (See MAR);Repositioned  -MS (r) JS (t) MS (c)          Row Name 10/02/23 1030                 Plan of Care Review     Plan of Care Reviewed With patient  -MS (r) JS (t) MS (c)       Progress improving  -MS (r) JS (t) MS (c)       Outcome Evaluation PT eval complete. Pt is A&Ox4 with complaints of 5/10 pain in stomach. She presents with generalized weakness and decreased functional mobility. Pt states she does not feel safe going home in current condition. 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 On vision testing, peripheral vision is similar to R, but she was unable to accurately state numbers on therapist fingers. On arrival she was in the middle of transferring to chair with nursing, and required min A of 2 to perform. She performed sit>stand once more this visit with min A. She has chronic lymphedema in her BLE, which is limiting some of her DF ROM. She was able to ambulate a few steps  backwards from bed>chair during transfer with min A and verbal cues to find surface. Her limiting factors currently for functional mobility are decreased strength, especially with weightbearing activities, and activity tolerance. Barring functional change, anticipate discharge to SNF.  -MS (r) JS (t) MS (c)          Row Name 10/02/23 1030                 Therapy Assessment/Plan (PT)     Patient/Family Therapy Goals Statement (PT) Go home  -MS (r) JS (t) MS (c)       Rehab Potential (PT) good, to achieve stated therapy goals  -MS (r) JS (t) MS (c)       Criteria for Skilled Interventions Met (PT) yes;meets criteria;skilled treatment is necessary  -MS (r) JS (t) MS (c)       Therapy Frequency (PT) 2 times/day  -MS (r) JS (t) MS (c)       Predicted Duration of Therapy Intervention (PT) Until discharge  -MS (r) JS (t) MS (c)          Row Name 10/02/23 1030                 Vital Signs     O2 Delivery Pre Treatment room air  -MS (r) JS (t) MS (c)       O2 Delivery Intra Treatment room air  -MS (r) JS (t) MS (c)       O2 Delivery Post Treatment room air  -MS (r) JS (t) MS (c)       Pre Patient Position Standing  -MS (r) JS (t) MS (c)       Intra Patient Position Standing  -MS (r) JS (t) MS (c)       Post Patient Position Sitting  -MS (r) JS (t) MS (c)          Row Name 10/02/23 1030                 Positioning and Restraints     Pre-Treatment Position in bed  -MS (r) JS (t) MS (c)       Post Treatment Position chair  -MS (r) JS (t) MS (c)       In Chair sitting;call light within reach;encouraged to call for assist  -MS (r) JS (t) MS (c)                       User Key  (r) = Recorded By, (t) = Taken By, (c) = Cosigned By        Initials Name Provider Type     Lois Garrido, PT, DPT, NCS Physical Therapist     George Dominguez, PT Student PT Student                            Outcome Measures         Row Name 10/02/23 1030 10/02/23 0812            How much help from another person do you currently need...     Turning  from your back to your side while in flat bed without using bedrails? 3  -MS (r) JS (t) MS (c) 3  -AM     Moving from lying on back to sitting on the side of a flat bed without bedrails? 2  -MS (r) JS (t) MS (c) 2  -AM     Moving to and from a bed to a chair (including a wheelchair)? 2  -MS (r) JS (t) MS (c) 2  -AM     Standing up from a chair using your arms (e.g., wheelchair, bedside chair)? 3  -MS (r) JS (t) MS (c) 3  -AM     Climbing 3-5 steps with a railing? 2  -MS (r) JS (t) MS (c) 2  -AM     To walk in hospital room? 3  -MS (r) JS (t) MS (c) 2  -AM     AM-PAC 6 Clicks Score (PT) 15  -MS (r) JS (t) 14  -AM     Highest level of mobility 4 --> Transferred to chair/commode  -MS (r) JS (t) 4 --> Transferred to chair/commode  -AM        Row Name 10/02/23 1030                 Functional Assessment     Outcome Measure Options AM-PAC 6 Clicks Basic Mobility (PT)  -MS (r) JS (t) MS (c)                       User Key  (r) = Recorded By, (t) = Taken By, (c) = Cosigned By        Initials Name Provider Type     MS Bryce Lois BENEDICT, PT, DPT, NCS Physical Therapist     AM Katrin Pizarro, RN Registered Nurse     George Dominguez, MJ Student PT Student                          Physical Therapy Education            Title: PT OT SLP Therapies (Done)         Topic: Physical Therapy (Done)         Point: Mobility training (Done)         Learning Progress Summary               Patient Acceptance, E, VU,DU by LISSETTE at 10/2/2023 1030     Comment: Given cueing for safe and appropriate transfers with FWW. Benefits of ambulation and weight bearing exercise.                               Point: Body mechanics (Done)         Learning Progress Summary               Patient Acceptance, E, VU,DU by LISSETTE at 10/2/2023 1030     Comment: Given cueing for safe and appropriate transfers with FWW. Benefits of ambulation and weight bearing exercise.                                                   User Key         Initials Effective Dates Name  Provider Type Discipline      07/13/23 -  George Juarez, PT Student PT Student PT                          PT Recommendation and Plan  Planned Therapy Interventions (PT): balance training, bed mobility training, gait training, patient/family education, strengthening, transfer training  Plan of Care Reviewed With: patient  Progress: improving  Outcome Evaluation: PT eval complete. Pt is A&Ox4 with complaints of 5/10 pain in stomach. She presents with generalized weakness and decreased functional mobility. Pt states she does not feel safe going home in current condition. 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 On vision testing, peripheral vision is similar to R, but she was unable to accurately state numbers on therapist fingers. On arrival she was in the middle of transferring to chair with nursing, and required min A of 2 to perform. She performed sit>stand once more this visit with min A. She has chronic lymphedema in her BLE, which is limiting some of her DF ROM. She was able to ambulate a few steps backwards from bed>chair during transfer with min A and verbal cues to find surface. Her limiting factors currently for functional mobility are decreased strength, especially with weightbearing activities, and activity tolerance. Barring functional change, anticipate discharge to SNF.      Time Calculation:        PT Charges         Row Name 10/02/23 1030                       Time Calculation     Start Time 1030  13 mins spent in chart  -MS (r) JS (t) MS (c)         Stop Time 1111  -MS (r) JS (t) MS (c)         Time Calculation (min) 41 min  -MS (r) JS (t)         PT Received On 10/02/23  -MS (r) JS (t) MS (c)         PT Goal Re-Cert Due Date 10/12/23  -MS (r) JS (t) MS (c)                 Untimed Charges     PT Eval/Re-eval Minutes 54  -MS (r) JS (t) MS (c)                 Total Minutes     Untimed Charges Total Minutes 54  -MS (r) JS (t)          Total Minutes 54  -MS (r) JS (t)                      User  Key  (r) = Recorded By, (t) = Taken By, (c) = Cosigned By        Initials Name Provider Type     MS Lois Wu, PT, DPT, NCS Physical Therapist     George Dominguez, PT Student PT Student                             PT G-Codes  Outcome Measure Options: AM-PAC 6 Clicks Basic Mobility (PT)  AM-PAC 6 Clicks Score (PT): 15  PT Discharge Summary  Anticipated Discharge Disposition (PT): skilled nursing facility     George Juarez, MJ Student               10/2/2023                                Cosigned by: Lois Wu, PT, DPT, NCS at 10/02/23 1532     Revision History

## 2023-10-05 NOTE — PLAN OF CARE
Goal Outcome Evaluation:           Progress: improving     VSS.  RA.  Medicated PRN for pain.  Call placed to Dr Dia office at patient request for PRN for anxiety.  Medicated x1 with Atarax with good results.  Tolerating regular diet.  Good UO - purewick in place.  NSR per telemetry - maria esther at times.  Safety maintained.

## 2023-10-05 NOTE — PLAN OF CARE
Goal Outcome Evaluation:  Plan of Care Reviewed With: patient        Progress: no change  Outcome Evaluation: pt trans to [facility], performed BLE AROM 10 x 2 reps, sit-stand min-mod, took 2-3 side steps to R with rwx min, trans back to bed min, pt would benefit from SNF

## 2023-10-05 NOTE — PROGRESS NOTES
"    Daily Progress Note  Brenda Sneed  MRN: [id number] LOS: 5    Admit Date: 2023   10/5/2023 08:01 CDT    Subjective:         Interval History:    Reviewed overnight events and nursing notes.     Doing well this morning.  No new complaints.  Appears to still be in sinus rhythm.  Bed offer to [facility], pre-CERT pending.    ROS:  Review of Systems   Constitutional:  Negative for chills and fever.   Respiratory:  Negative for cough, chest tightness and shortness of breath.    Cardiovascular:  Negative for chest pain.   Gastrointestinal:  Negative for abdominal pain, diarrhea, nausea and vomiting.     DIET:  Diet: Regular/House Diet; Texture: Regular Texture (IDDSI 7); Fluid Consistency: Thin (IDDSI 0)    Medications:      apixaban, 5 mg, Oral, Q12H  ceFAZolin, 2,000 mg, Intravenous, Q8H  docusate sodium, 100 mg, Oral, BID  DULoxetine, 60 mg, Oral, BID  furosemide, 40 mg, Oral, Daily  levothyroxine, 100 mcg, Oral, Q AM  metoprolol tartrate, 25 mg, Oral, Q12H  nystatin, 1 application , Topical, BID  oxybutynin XL, 5 mg, Oral, Daily  pramipexole, 0.75 mg, Oral, Nightly  senna-docusate sodium, 2 tablet, Oral, BID  sodium chloride, 10 mL, Intravenous, Q12H          Objective:     Vitals: /53 (BP Location: Left arm, Patient Position: Lying)   Pulse 58   Temp 98.1 °F (36.7 °C) (Oral)   Resp 16   Ht 160 cm (63\")   Wt 116 kg (255 lb)   SpO2 95%   BMI 45.17 kg/m²    Intake/Output Summary (Last 24 hours) at 10/5/2023 0801  Last data filed at 10/5/2023 0538  Gross per 24 hour   Intake 1080 ml   Output 1800 ml   Net -720 ml    Temp (24hrs), Av.7 °F (36.5 °C), Min:97.4 °F (36.3 °C), Max:98.1 °F (36.7 °C)    Glucose:  Lab Results   Component Value Date    POCGLU 100 (H) 2021     Physical Examination:   Physical Exam  Constitutional:       General: She is not in acute distress.     Appearance: Normal appearance. She is not ill-appearing or toxic-appearing.   Cardiovascular:      Rate and " Rhythm: Normal rate and regular rhythm.      Pulses: Normal pulses.      Heart sounds: Normal heart sounds. No murmur heard.  Pulmonary:      Effort: Pulmonary effort is normal. No respiratory distress.      Breath sounds: Normal breath sounds. No stridor. No wheezing or rales.   Abdominal:      General: Abdomen is flat. Bowel sounds are normal. There is no distension.      Palpations: Abdomen is soft.      Tenderness: There is no abdominal tenderness.   Neurological:      Mental Status: She is alert.       Labs:  Lab Results (last 24 hours)       Procedure Component Value Units Date/Time    Blood Culture - Blood, Arm, Left [[id number]]  (Normal) Collected: 09/30/23 1536    Specimen: Blood from Arm, Left Updated: 10/04/23 1600     Blood Culture No growth at 4 days    Blood Culture - Blood, Arm, Right [[id number]]  (Normal) Collected: 09/30/23 1429    Specimen: Blood from Arm, Right Updated: 10/04/23 1445     Blood Culture No growth at 4 days    Urine Culture - Urine, Urine, Catheter [[id number]]  (Abnormal)  (Susceptibility) Collected: 09/30/23 1559    Specimen: Urine, Catheter Updated: 10/04/23 0929     Urine Culture >100,000 CFU/mL Klebsiella pneumoniae ssp pneumoniae    Narrative:      Colonization of the urinary tract without infection is common. Treatment is discouraged unless the patient is symptomatic, pregnant, or undergoing an invasive urologic procedure.    Susceptibility        Klebsiella pneumoniae ssp pneumoniae      CHRISTIANO      Ampicillin Resistant      Ampicillin + Sulbactam Susceptible      Cefazolin Susceptible      Cefepime Susceptible      Ceftazidime Susceptible      Ceftriaxone Susceptible      Gentamicin Susceptible      Levofloxacin Susceptible      Nitrofurantoin Susceptible      Piperacillin + Tazobactam Susceptible      Trimethoprim + Sulfamethoxazole Susceptible                                    Imaging:  No radiology results from the last 24 hrs       Assessment and Plan:     Primary  Problem:  Sepsis    Hospital Problem list:    Sepsis    Cellulitis of right lower extremity    HTN (hypertension)    Obesity, Class III, BMI 40-49.9 (morbid obesity)    Traumatic rhabdomyolysis    Cellulitis of left lower extremity      Assessment: 72-year-old female with past medical history 3 of hypertension, hyperlipidemia, and morbid obesity who presents for urinary tract infection found to have new onset A-fib     Plan:     Atrial fibrillation  Echo read pending.  Converted to sinus rhythm.  Currently sinus, continue metoprolol 25 daily. Continue Eliquis for stroke prophylaxis.     Urinary tract infection  Urine culture growing Klebsiella sensitive to cefazolin, continue same.     Medically stable for discharge once placement is occurred at skilled nursing facility.      Discharge planning:   Nursing Home    Reviewed treatment plans with the patient and/or family.     Code Status:   Code Status and Medical Interventions:   Ordered at: 09/30/23 1700     Code Status (Patient has no pulse and is not breathing):    CPR (Attempt to Resuscitate)     Medical Interventions (Patient has pulse or is breathing):    Full Support       Electronically signed by Sanjiv Block MD on 10/5/2023 at 08:01 CDT

## 2023-10-05 NOTE — THERAPY TREATMENT NOTE
Acute Care - Physical Therapy Treatment Note  [facility]     Patient Name: Brenda Sneed  : 1950  MRN: [id number]  Today's Date: 10/5/2023      Visit Dx:     ICD-10-CM ICD-9-CM   1. Sepsis, due to unspecified organism, unspecified whether acute organ dysfunction present  A41.9 038.9     995.91   2. Impaired mobility [Z74.09 (ICD-10-CM)]  Z74.09 799.89   3. Lymphedema  I89.0 457.1     Patient Active Problem List   Diagnosis    Spinal stenosis, lumbar    Secondary osteoarthritis of left shoulder due to rotator cuff arthropathy    Cellulitis of right lower extremity    HTN (hypertension)    Obesity, Class III, BMI 40-49.9 (morbid obesity)    Anxiety associated with depression    Traumatic rhabdomyolysis    Traumatic rhabdomyolysis, initial encounter    Laceration of left lower extremity    Cellulitis    Cellulitis of left lower extremity    Thrombocytopenia    Acute cystitis with hematuria    Sepsis    Candidiasis of skin    Acquired hypothyroidism     Past Medical History:   Diagnosis Date    Arthritis     Bronchitis     Cataract     right eye , cornea implant    Chronic back pain     Depression     Disease of thyroid gland     Edema     lower extremities    Hypertension     Kidney stones      Past Surgical History:   Procedure Laterality Date    BACK SURGERY       ([place]),      CHOLECYSTECTOMY OPEN      CORNEAL TRANSPLANT Right     INCISION AND DRAINAGE ABSCESS Left 2023    Procedure: INCISION AND DRAINAGE ABSCESS left leg;  Surgeon: Sarita Vazquez MD;  Location: [facility] OR;  Service: General;  Laterality: Left;    LACERATION REPAIR Left 2023    Procedure: washout and closure left leg wound ;  Surgeon: Sarita Vazquez MD;  Location:  [facility] OR;  Service: General;  Laterality: Left;    LUMBAR FUSION Left 2017    Procedure: LEFT LUMBAR LATERAL INTERBODY FUSION WITH INSTRUMENTATION  L1-2;  Surgeon: MARCELINO Wright MD;  Location: [facility] OR;  Service:     NECK SURGERY       [place]    TOTAL HIP ARTHROPLASTY Right 2014    DR. KENDRICK     TOTAL KNEE ARTHROPLASTY Right 2000    TOTAL KNEE ARTHROPLASTY Left 2002    TOTAL SHOULDER ARTHROPLASTY W/ DISTAL CLAVICLE EXCISION Left 6/1/2020    Procedure: LEFT REVERSE TOTAL SHOULDER REPLACEMENT;  Surgeon: Saravanan Calvin MD;  Location: [facility];  Service: Orthopedics;  Laterality: Left;     PT Assessment (last 12 hours)       PT Evaluation and Treatment       [facility] Name 10/05/23 0834 10/05/23 0753       Physical Therapy Time and Intention    Subjective Information complains of;pain;fatigue;weakness  - --  -    Document Type therapy note (daily note)  - --    Mode of Treatment physical therapy  - --    Session Not Performed -- other (see comments)  -    Comment, Session Not Performed -- bathing with nsg  -[facility] Name 10/05/23 0834          General Information    Existing Precautions/Restrictions fall  -[facility] Name 10/05/23 0834          Pain    Pretreatment Pain Rating 6/10  -     Posttreatment Pain Rating 10/10  -     Pain Location - Side/Orientation Left  -     Pain Location lower  -     Pain Location - extremity  -     Pain Intervention(s) Medication (See MAR);Repositioned  -[facility] Name 10/05/23 0834          Bed Mobility    Bed Mobility scooting/bridging;supine-sit;sit-supine  -     Scooting/Bridging [place] (Bed Mobility) moderate assist (50% patient effort);2 person assist  -     Supine-Sit [place] (Bed Mobility) standby assist  -     Sit-Supine [place] (Bed Mobility) minimum assist (75% patient effort);verbal cues  -     Assistive Device (Bed Mobility) bed rails;head of bed elevated  -[facility] Name 10/05/23 0834          Transfers    Transfers sit-stand transfer;stand-sit transfer;bed-chair transfer  -[facility] Name 10/05/23 0834          Sit-Stand Transfer    Sit-Stand [place] (Transfers) moderate assist (50% patient effort);verbal cues  -     Assistive Device  (Sit-Stand Transfers) walker, front-wheeled  -       Row Name 10/05/23 0834          Stand-Sit Transfer    Stand-Sit [place] (Transfers) minimum assist (75% patient effort);verbal cues  -     Assistive Device (Stand-Sit Transfers) walker, front-wheeled  -       Row Name 10/05/23 0834          Gait/Stairs (Locomotion)    [place] Level (Gait) minimum assist (75% patient effort);verbal cues  -     Assistive Device (Gait) walker, front-wheeled  -     Distance in Feet (Gait) 2-3 side steps to R  -       Row Name 10/05/23 0834          Motor Skills    Comments, Therapeutic Exercise BLE AROM 10 x 2 reps  -     Additional Documentation Comments, Therapeutic Exercise (Row)  -       Row Name 10/05/23 0834          Plan of Care Review    Plan of Care Reviewed With patient  -     Progress no change  -     Outcome Evaluation pt trans to EOB sba, performed BLE AROM 10 x 2 reps, sit-stand min-mod, took 2-3 side steps to R with rwx min, trans back to bed min, pt would benefit from [facility]  -       Row Name 10/05/23 0834          Positioning and Restraints    Pre-Treatment Position in bed  -     Post Treatment Position bed  -     In Bed fowlers;notified nsg;call light within reach;encouraged to call for assist;exit alarm on  [facility]               User Key  (r) = Recorded By, (t) = Taken By, (c) = Cosigned By      Initials Name Provider Type     Margot Verma, PTA Physical Therapist Assistant                    Physical Therapy Education       Title: PT OT SLP Therapies (Done)       Topic: Physical Therapy (Done)       Point: Mobility training (Done)       Learning Progress Summary             Patient Acceptance, E, VU,DU by LISSETTE at 10/2/2023 1030    Comment: Given cueing for safe and appropriate transfers with FWW. Benefits of ambulation and weight bearing exercise.                         Point: Body mechanics (Done)       Learning Progress Summary             Patient Acceptance, E, VU,DU by LISSETTE at  10/2/2023 1030    Comment: Given cueing for safe and appropriate transfers with FWW. Benefits of ambulation and weight bearing exercise.                                         User Key       Initials Effective Dates Name Provider Type Discipline     07/13/23 -  George Juarez, PT Student PT Student PT                  PT Recommendation and Plan     Plan of Care Reviewed With: patient  Progress: no change  Outcome Evaluation: pt trans to EOB sba, performed BLE AROM 10 x 2 reps, sit-stand min-mod, took 2-3 side steps to R with rwx min, trans back to bed min, pt would benefit from SNF   Outcome Measures       Row Name 10/05/23 0900 10/03/23 1410          How much help from another person do you currently need...    Turning from your back to your side while in flat bed without using bedrails? 3  -AH 3  -TB     Moving from lying on back to sitting on the side of a flat bed without bedrails? 3  - 4  -TB     Moving to and from a bed to a chair (including a wheelchair)? 2  - 3  -TB     Standing up from a chair using your arms (e.g., wheelchair, bedside chair)? 2  - 3  -TB     Climbing 3-5 steps with a railing? 1  - 2  -TB     To walk in hospital room? 1  - 2  -TB     AM-PAC 6 Clicks Score (PT) 12  - 17  -TB        Functional Assessment    Outcome Measure Options AM-PAC 6 Clicks Basic Mobility (PT)  - AM-PAC 6 Clicks Basic Mobility (PT)  -TB               User Key  (r) = Recorded By, (t) = Taken By, (c) = Cosigned By      Initials Name Provider Type     Margot Verma, PTA Physical Therapist Assistant     Rosina Swain, NE Physical Therapist Assistant                     Time Calculation:    PT Charges       Row Name 10/05/23 0910             Time Calculation    Start Time 0834  -      Stop Time 0904  -      Time Calculation (min) 30 min  -      PT Received On 10/05/23  -         Time Calculation- PT    Total Timed Code Minutes- PT 30 minute(s)  -         Timed Charges    23298 - PT  Therapeutic Exercise Minutes 15  -AH      25366 - PT Therapeutic Activity Minutes 15  -AH         Total Minutes    Timed Charges Total Minutes 30  -AH       Total Minutes 30  -AH                User Key  (r) = Recorded By, (t) = Taken By, (c) = Cosigned By      Initials Name Provider Type    Margot Brown PTA Physical Therapist Assistant                  Therapy Charges for Today       Code Description Service Date Service Provider Modifiers Qty    70962560220 HC PT THER PROC EA 15 MIN 10/5/2023 Margot Verma PTA GP 1    88285616944 HC PT THERAPEUTIC ACT EA 15 MIN 10/5/2023 Margot Verma PTA GP 1            PT G-Codes  Outcome Measure Options: AM-PAC 6 Clicks Basic Mobility (PT)  AM-PAC 6 Clicks Score (PT): 12    Margot Verma PTA  10/5/2023

## 2023-10-06 VITALS
HEIGHT: 63 IN | WEIGHT: 255 LBS | SYSTOLIC BLOOD PRESSURE: 111 MMHG | BODY MASS INDEX: 45.18 KG/M2 | TEMPERATURE: 98 F | DIASTOLIC BLOOD PRESSURE: 55 MMHG | HEART RATE: 72 BPM | OXYGEN SATURATION: 98 % | RESPIRATION RATE: 18 BRPM

## 2023-10-06 PROCEDURE — 97530 THERAPEUTIC ACTIVITIES: CPT

## 2023-10-06 RX ORDER — MORPHINE SULFATE 15 MG/1
15 TABLET, FILM COATED, EXTENDED RELEASE ORAL 2 TIMES DAILY PRN
Qty: 10 TABLET | Refills: 0 | Status: SHIPPED | OUTPATIENT
Start: 2023-10-06 | End: 2023-10-11

## 2023-10-06 RX ORDER — PRAMIPEXOLE DIHYDROCHLORIDE 0.75 MG/1
0.75 TABLET ORAL NIGHTLY
Qty: 90 TABLET | Refills: 1
Start: 2023-10-06

## 2023-10-06 RX ORDER — MORPHINE SULFATE 15 MG/1
15 TABLET, FILM COATED, EXTENDED RELEASE ORAL 2 TIMES DAILY PRN
Qty: 10 TABLET | Refills: 0 | Status: SHIPPED | OUTPATIENT
Start: 2023-10-06 | End: 2023-10-06 | Stop reason: SDUPTHER

## 2023-10-06 RX ORDER — METOPROLOL SUCCINATE 25 MG/1
25 TABLET, EXTENDED RELEASE ORAL DAILY
Qty: 90 TABLET | Refills: 1 | Status: SHIPPED | OUTPATIENT
Start: 2023-10-06

## 2023-10-06 RX ADMIN — OXYBUTYNIN CHLORIDE 5 MG: 5 TABLET, EXTENDED RELEASE ORAL at 09:01

## 2023-10-06 RX ADMIN — FUROSEMIDE 40 MG: 40 TABLET ORAL at 09:01

## 2023-10-06 RX ADMIN — DOCUSATE SODIUM 100 MG: 100 CAPSULE, LIQUID FILLED ORAL at 20:38

## 2023-10-06 RX ADMIN — DOCUSATE SODIUM 100 MG: 100 CAPSULE, LIQUID FILLED ORAL at 09:02

## 2023-10-06 RX ADMIN — APIXABAN 5 MG: 5 TABLET, FILM COATED ORAL at 09:01

## 2023-10-06 RX ADMIN — METOPROLOL TARTRATE 25 MG: 50 TABLET, FILM COATED ORAL at 09:02

## 2023-10-06 RX ADMIN — DULOXETINE HYDROCHLORIDE 60 MG: 30 CAPSULE, DELAYED RELEASE ORAL at 09:01

## 2023-10-06 RX ADMIN — Medication 10 ML: at 09:02

## 2023-10-06 RX ADMIN — LEVOTHYROXINE SODIUM 100 MCG: 100 TABLET ORAL at 05:22

## 2023-10-06 RX ADMIN — METOPROLOL TARTRATE 25 MG: 50 TABLET, FILM COATED ORAL at 20:38

## 2023-10-06 RX ADMIN — MORPHINE SULFATE 15 MG: 15 TABLET, FILM COATED, EXTENDED RELEASE ORAL at 15:06

## 2023-10-06 RX ADMIN — APIXABAN 5 MG: 5 TABLET, FILM COATED ORAL at 20:38

## 2023-10-06 RX ADMIN — DOCUSATE SODIUM 50 MG AND SENNOSIDES 8.6 MG 2 TABLET: 8.6; 5 TABLET, FILM COATED ORAL at 20:38

## 2023-10-06 RX ADMIN — DULOXETINE HYDROCHLORIDE 60 MG: 30 CAPSULE, DELAYED RELEASE ORAL at 20:38

## 2023-10-06 RX ADMIN — DOCUSATE SODIUM 50 MG AND SENNOSIDES 8.6 MG 2 TABLET: 8.6; 5 TABLET, FILM COATED ORAL at 09:06

## 2023-10-06 RX ADMIN — NYSTATIN 1 APPLICATION: 100000 POWDER TOPICAL at 20:43

## 2023-10-06 RX ADMIN — NYSTATIN 1 APPLICATION: 100000 POWDER TOPICAL at 09:02

## 2023-10-06 RX ADMIN — BISACODYL 10 MG: 10 SUPPOSITORY RECTAL at 09:06

## 2023-10-06 RX ADMIN — PRAMIPEXOLE DIHYDROCHLORIDE 0.75 MG: 0.25 TABLET ORAL at 20:38

## 2023-10-06 NOTE — DISCHARGE SUMMARY
Hospital Discharge Summary    Brenda Sneed  :  1950  MRN:  [id number]    Admit date:  2023  Discharge date:  10/6/2023    Admitting Physician:    Sachin Dia MD    Discharge Diagnoses:      Sepsis    Cellulitis of right lower extremity    HTN (hypertension)    Obesity, Class III, BMI 40-49.9 (morbid obesity)    Traumatic rhabdomyolysis    Cellulitis of left lower extremity      Hospital Course:       72-year-old female with past medical history of hypertension, hyperlipidemia, lymphedema, and morbid obesity who presents with sepsis of multifactorial etiology.  Etiology was suspected to be due to urinary tract infection and cellulitis.  She was empirically treated with cefazolin with improvement in her lower extremity symptoms.  Urine culture grew Klebsiella which was essentially pansensitive.  Given that she was in bed for several days and had altered mental status on presentation, she was evaluated physical therapy who determined she would be best suited for skilled nursing facility.    Of note, during the hospitalization she developed new onset atrial fibrillation without symptoms.  Her home bisoprolol HCTZ was changed to metoprolol monotherapy and she was started on Eliquis for stroke prophylaxis.        Discharge Medications:         Discharge Medications        New Medications        Instructions Start Date   apixaban 5 MG tablet tablet  Commonly known as: ELIQUIS   5 mg, Oral, Every 12 Hours Scheduled      metoprolol succinate XL 25 MG 24 hr tablet  Commonly known as: Toprol XL   25 mg, Oral, Daily      Morphine 15 MG 12 hr tablet  Commonly known as: MS CONTIN   15 mg, Oral, 2 Times Daily PRN             Changes to Medications        Instructions Start Date   pramipexole 0.75 MG tablet  Commonly known as: MIRAPEX  What changed: when to take this   0.75 mg, Oral, Nightly             Continue These Medications        Instructions Start Date   albuterol (2.5 MG/3ML) 0.083% nebulizer  solution  Commonly known as: PROVENTIL   2.5 mg, Nebulization, 3 Times Daily      azelastine 0.1 % nasal spray  Commonly known as: ASTELIN   2 sprays, Nasal, 2 Times Daily, Use in each nostril as directed      cyclobenzaprine 10 MG tablet  Commonly known as: FLEXERIL   10 mg, Oral, 3 Times Daily PRN      docusate sodium 100 MG capsule  Commonly known as: COLACE   100 mg, Oral, 2 Times Daily PRN      DULoxetine 60 MG capsule  Commonly known as: CYMBALTA   1 capsule, Oral, 2 Times Daily      furosemide 40 MG tablet  Commonly known as: LASIX   40 mg, Oral, Daily PRN      levothyroxine 100 MCG tablet  Commonly known as: SYNTHROID, LEVOTHROID   1 tablet, Oral, Daily      nystatin 968301 UNIT/GM powder  Commonly known as: MYCOSTATIN   1 application , Topical, 2 Times Daily      oxybutynin XL 5 MG 24 hr tablet  Commonly known as: DITROPAN-XL   5 mg, Oral, Nightly, Take at night             Stop These Medications      bisoprolol-hydrochlorothiazide 5-6.25 MG per tablet  Commonly known as: ZIAC     Buprenorphine 20 MCG/HR patch weekly              Significant Diagnostic Studies:      XR Chest 1 View    Result Date: 9/30/2023  No acute findings.  This report was signed and finalized on 9/30/2023 2:52 PM CDT by Dr. Bryce Mancera MD.          Disposition:   Skilled nursing facility  Follow up with Sachin Dia MD in 1 weeks.    Signed:  Sanjiv Block MD   10/6/2023, 15:25 CDT

## 2023-10-06 NOTE — PLAN OF CARE
Goal Outcome Evaluation:  Plan of Care Reviewed With: patient        Progress: no change  Outcome Evaluation: Pt c/o pain PRN meds given per orders, incont, meds given per orders, [place] diet

## 2023-10-06 NOTE — THERAPY TREATMENT NOTE
Acute Care - Physical Therapy Treatment Note  [facility]     Patient Name: Brenda Sneed  : 1950  MRN: [id number]  Today's Date: 10/6/2023      Visit Dx:     ICD-10-CM ICD-9-CM   1. Sepsis, due to unspecified organism, unspecified whether acute organ dysfunction present  A41.9 038.9     995.91   2. Impaired mobility [Z74.09 (ICD-10-CM)]  Z74.09 799.89   3. Lymphedema  I89.0 457.1   4. Spinal stenosis of lumbar region, unspecified whether neurogenic claudication present  M48.061 724.02     Patient Active Problem List   Diagnosis    Spinal stenosis, lumbar    Secondary osteoarthritis of left shoulder due to rotator cuff arthropathy    Cellulitis of right lower extremity    HTN (hypertension)    Obesity, Class III, BMI 40-49.9 (morbid obesity)    Anxiety associated with depression    Traumatic rhabdomyolysis    Traumatic rhabdomyolysis, initial encounter    Laceration of left lower extremity    Cellulitis    Cellulitis of left lower extremity    Thrombocytopenia    Acute cystitis with hematuria    Sepsis    Candidiasis of skin    Acquired hypothyroidism     Past Medical History:   Diagnosis Date    Arthritis     Bronchitis     Cataract     right eye , cornea implant    Chronic back pain     Depression     Disease of thyroid gland     Edema     lower extremities    Hypertension     Kidney stones      Past Surgical History:   Procedure Laterality Date    BACK SURGERY       ([place]),      CHOLECYSTECTOMY OPEN      CORNEAL TRANSPLANT Right     INCISION AND DRAINAGE ABSCESS Left 2023    Procedure: INCISION AND DRAINAGE ABSCESS left leg;  Surgeon: Sarita Vazquez MD;  Location: [facility] OR;  Service: General;  Laterality: Left;    LACERATION REPAIR Left 2023    Procedure: washout and closure left leg wound ;  Surgeon: Sarita Vazquez MD;  Location: [facility] OR;  Service: General;  Laterality: Left;    LUMBAR FUSION Left 2017    Procedure: LEFT LUMBAR LATERAL INTERBODY FUSION WITH  INSTRUMENTATION  L1-2;  Surgeon: MARCELINO Wright MD;  Location:  [facility] OR;  Service:     NECK SURGERY  2010    [place]    TOTAL HIP ARTHROPLASTY Right 2014    DR. KENDRICK     TOTAL KNEE ARTHROPLASTY Right 2000    TOTAL KNEE ARTHROPLASTY Left 2002    TOTAL SHOULDER ARTHROPLASTY W/ DISTAL CLAVICLE EXCISION Left 6/1/2020    Procedure: LEFT REVERSE TOTAL SHOULDER REPLACEMENT;  Surgeon: Saravanan Calvin MD;  Location:  [facility] OR;  Service: Orthopedics;  Laterality: Left;     PT Assessment (last 12 hours)       PT Evaluation and Treatment       [facility] Name 10/06/23 0753          Physical Therapy Time and Intention    Subjective Information complains of;weakness;fatigue;pain  -     Document Type therapy note (daily note)  -     Mode of Treatment physical therapy  -[facility] Name 10/06/23 0753          General Information    Existing Precautions/Restrictions fall  -[facility] Name 10/06/23 0753          Pain    Pretreatment Pain Rating 6/10  -     Posttreatment Pain Rating 8/10  -     Pain Location - Side/Orientation Left  -     Pain Location lower  -     Pain Location - extremity  -     Pain Intervention(s) Repositioned  -[facility] Name 10/06/23 0753          Bed Mobility    Bed Mobility scooting/bridging;supine-sit;sit-supine  -     Scooting/Bridging [place] (Bed Mobility) moderate assist (50% patient effort);2 person assist  -     Supine-Sit [place] (Bed Mobility) minimum assist (75% patient effort);verbal cues  -     Sit-Supine [place] (Bed Mobility) minimum assist (75% patient effort);verbal cues  -     Assistive Device (Bed Mobility) bed rails;head of bed elevated  -[facility] Name 10/06/23 0753          Transfers    Transfers sit-stand transfer;stand-sit transfer;bed-chair transfer;toilet transfer  -[facility] Name 10/06/23 0753          Sit-Stand Transfer    Sit-Stand [place] (Transfers) moderate assist (50% patient effort);verbal cues  -     Assistive Device  (Sit-Stand Transfers) walker, front-wheeled  -AH       Row Name 10/06/23 0753          Stand-Sit Transfer    Stand-Sit [place] (Transfers) minimum assist (75% patient effort);verbal cues  -     Assistive Device (Stand-Sit Transfers) walker, front-wheeled  -AH       Row Name 10/06/23 0753          Toilet Transfer    [place] Level (Toilet Transfer) minimum assist (75% patient effort);moderate assist (50% patient effort);verbal cues  -     Assistive Device (Toilet Transfer) commode, 3-in-1;walker, front-wheeled  -AH       Row Name 10/06/23 0753          Positioning and Restraints    Pre-Treatment Position in bed  -     Post Treatment Position bed  -     In Bed fowlers;call light within reach;encouraged to call for assist;exit alarm on  -               User Key  (r) = Recorded By, (t) = Taken By, (c) = Cosigned By      Initials Name Provider Type     Margot Verma, PTA Physical Therapist Assistant                    Physical Therapy Education       Title: PT OT SLP Therapies (Done)       Topic: Physical Therapy (Done)       Point: Mobility training (Done)       Learning Progress Summary             Patient Acceptance, E, VU,DU by  at 10/2/2023 1030    Comment: Given cueing for safe and appropriate transfers with FWW. Benefits of ambulation and weight bearing exercise.                         Point: Body mechanics (Done)       Learning Progress Summary             Patient Acceptance, E, VU,DU by  at 10/2/2023 1030    Comment: Given cueing for safe and appropriate transfers with FWW. Benefits of ambulation and weight bearing exercise.                                         User Key       Initials Effective Dates Name Provider Type [facility] 07/13/23 -  George Juarez, PT Student PT Student PT                  PT Recommendation and Plan     Plan of Care Reviewed With: patient  Progress: no change  Outcome Evaluation: pt trans to EOB sba, performed BLE AROM 10 x 2 reps, sit-stand min-mod,  took 2-3 side steps to R with rwx min, trans back to bed min, pt would benefit from SNF   Outcome Measures       Row Name 10/06/23 0800 10/05/23 0900 10/03/23 1410       How much help from another person do you currently need...    Turning from your back to your side while in flat bed without using bedrails? 3  -AH 3  -AH 3  -TB    Moving from lying on back to sitting on the side of a flat bed without bedrails? 3  -AH 3  -AH 4  -TB    Moving to and from a bed to a chair (including a wheelchair)? 2  -AH 2  -AH 3  -TB    Standing up from a chair using your arms (e.g., wheelchair, bedside chair)? 2  -AH 2  -AH 3  -TB    Climbing 3-5 steps with a railing? 1  -AH 1  -AH 2  -TB    To walk in hospital room? 1  -AH 1  -AH 2  -TB    AM-PAC 6 Clicks Score (PT) 12  -AH 12  -AH 17  -TB       Functional Assessment    Outcome Measure Options AM-PAC 6 Clicks Basic Mobility (PT)  -AH AM-PAC 6 Clicks Basic Mobility (PT)  -AH AM-PAC 6 Clicks Basic Mobility (PT)  -TB              User Key  (r) = Recorded By, (t) = Taken By, (c) = Cosigned By      Initials Name Provider Type    Margot Brown PTA Physical Therapist Assistant    Rosina Royal PTA Physical Therapist Assistant                     Time Calculation:    PT Charges       Row Name 10/06/23 0826             Time Calculation    Start Time 0753  -      Stop Time 0820  -      Time Calculation (min) 27 min  -      PT Received On 10/06/23  -         Time Calculation- PT    Total Timed Code Minutes- PT 27 minute(s)  -         Timed Charges    57175 - PT Therapeutic Activity Minutes 27  -AH         Total Minutes    Timed Charges Total Minutes 27  -AH       Total Minutes 27  -AH                User Key  (r) = Recorded By, (t) = Taken By, (c) = Cosigned By      Initials Name Provider Type    Margot Brown PTA Physical Therapist Assistant                  Therapy Charges for Today       Code Description Service Date Service Provider Modifiers Qty     51464273619  PT THER PROC EA 15 MIN 10/5/2023 Margot Verma, PTA GP 1    90440494405 HC PT THERAPEUTIC ACT EA 15 MIN 10/5/2023 Margot Verma, PTA GP 1    51573951701 HC PT THERAPEUTIC ACT EA 15 MIN 10/6/2023 Margot Verma, PTA GP 2            PT G-Codes  Outcome Measure Options: AM-PAC 6 Clicks Basic Mobility (PT)  AM-PAC 6 Clicks Score (PT): 12    Margot Verma PTA  10/6/2023

## 2023-10-06 NOTE — DISCHARGE PLACEMENT REQUEST
"Eyad Gurrola (72 y.o. Female)       Date of Birth   1950    Social Security Number       Address   [address] [id number]    Home Phone   [phone]    MRN   [id number]       [facility]    Marital Status                               Admission Date   9/30/23    Admission Type   Emergency    Admitting Provider   Sachin Dia MD    Attending Provider   Sachin Dia MD    Department, Room/Bed   [facility] 3C, 374/1       Discharge Date       Discharge Disposition   Skilled Nursing Facility (DC - External)    Discharge Destination                                 Attending Provider: Sachin Dia MD    Allergies: Codeine, Amoxicillin, Levaquin [Levofloxacin], Benadryl [Diphenhydramine]    Isolation: None   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)   Code Status: CPR    Ht: 160 cm (63\")   Wt: 116 kg (255 lb)    Admission Cmt: None   Principal Problem: Sepsis [A41.9]                   Active Insurance as of 9/30/2023       Primary Coverage       Payor Plan Insurance Group Employer/Plan Group    HUMANA MEDICARE REPLACEMENT HU[employer]NA MEDICARE REPLACEMENT [id number]       Payor Plan Address Payor Plan Phone Number Payor Plan Fax Number Effective Dates    [address] [address] [phone]  1/1/2023 - None Entered    [facility] [address]         Subscriber Name Subscriber Birth Date Member ID       EYAD GURROLA 1950 [id number]               Secondary Coverage       Payor Plan Insurance Group Employer/Plan Group    [id number]        Payor Plan Address Payor Plan Phone Number Payor Plan Fax Number Effective Dates    [address]   6/1/2021 - None Entered    [facility] [id number]         Subscriber Name Subscriber Birth Date Member ID       EYAD GURROLA E 1950 [id number]                     Emergency Contacts        (Rel.) Home Phone Work Phone Mobile Phone    Nella (Daughter) [phone] -- " [phone]    ODELL NELSON (Relative) -- -- [phone]

## 2023-10-06 NOTE — PLAN OF CARE
Goal Outcome Evaluation:           Progress: improving          VSS.  Goals met.  Patient being discharged to [place] later this shift.

## 2023-10-06 NOTE — CASE MANAGEMENT/SOCIAL WORK
Continued Stay Note   [place]     Patient Name: Brenda Sneed  MRN: [id number]  Today's Date: 10/6/2023    Admit Date: 9/30/2023    Plan: SNF- see notes   Discharge Plan       Row Name 10/06/23 0852       Plan    Plan Milind Voss- today after admission paperwork done with facility    Patient/Family in Agreement with Plan yes    Plan Comments Milind Voss saying Admissions person out due to Covid (she is the one that offered bed to patient).  Informed that pt is discharged and needed to know something from another staff member there as pt has ins. approval, they are informing [facility] to call this SW back.  If this does not work out then this SW will inform family and try to get in [facility] as they offered previously.  Will follow.    Spoke with [facility] and they will accept pt after admission paperwork complete, they will call this SW back when ready for pt to be sent.  Per Nursing pt will need to travel via [place] [facility] [phone].    [facility]   [phone]  Fax: [phone] or [phone] (preferred)  [facility] [phone] or [phone]                   Discharge Codes    No documentation.                 Expected Discharge Date and Time       Expected Discharge Date Expected Discharge Time    Oct 5, 2023               KAITLIN Rojas

## 2023-10-07 NOTE — NURSING NOTE
Contacted at 2014 per [facility] about them coming to transport patient to [facility]. They stated [facility] had called them and stated since patient was a new admit to them they would not accept her if they did not have her there by 2100. Stated they should arrive to [facility] hopefully in the next 10 minutes and to try and have anything ready so it was an easy transition. KADEN Vaughn and myself made sure patient was cleaned up and had all her stuff ready for when EMS arrived. Discussed situation with [facility] supervisor just to be on standby and was told at that time not to discharge patient out of computer until we knew what the situation was. She stated to contact on call case management just to keep them informed. EMS arrived around 2055, and I stated what had been relayed to me. Stated they would still be taking the patient and left around 2058. On call  contacted by KADEN Vaughn and said they were not aware of a policy with [facility] not taking new patients after 2100 and if they did not accept she would most likely have to come back through ER. House supervisor updated. House supervisor then placed called to [facility] around 2144 to make sure they had no issues. They stated facility did end up accepting the patient and she had been transported and dropped off. House supervisor then stated we were okay to discharge patient out of computer.

## 2023-10-08 NOTE — THERAPY DISCHARGE NOTE
Acute Care - Physical Therapy Discharge Summary  [facility]       Patient Name: Brenda Sneed  : 1950  MRN: [id number]    Today's Date: 10/8/2023                 Admit Date: 2023      PT Recommendation and Plan    Visit Dx:    ICD-10-CM ICD-9-CM   1. Sepsis, due to unspecified organism, unspecified whether acute organ dysfunction present  A41.9 038.9     995.91   2. Impaired mobility [Z74.09 (ICD-10-CM)]  Z74.09 799.89   3. Lymphedema  I89.0 457.1   4. Spinal stenosis of lumbar region, unspecified whether neurogenic claudication present  M48.061 724.02        Outcome Measures       Row Name 10/06/23 0800 10/05/23 09          How much help from another person do you currently need...    Turning from your back to your side while in flat bed without using bedrails? 3  -AH 3  -AH     Moving from lying on back to sitting on the side of a flat bed without bedrails? 3  -AH 3  -AH     Moving to and from a bed to a chair (including a wheelchair)? 2  -AH 2  -AH     Standing up from a chair using your arms (e.g., wheelchair, bedside chair)? 2  -AH 2  -AH     Climbing 3-5 steps with a railing? 1  -AH 1  -AH     To walk in hospital room? 1  -AH 1  -AH     AM-PAC 6 Clicks Score (PT) 12  -AH 12  -        Functional Assessment    Outcome Measure Options AM-PAC 6 Clicks Basic Mobility (PT)  - AM-PAC 6 Clicks Basic Mobility (PT)  -               User Key  (r) = Recorded By, (t) = Taken By, (c) = Cosigned By      Initials Name Provider Type    Margot Brown PTA Physical Therapist Assistant                         PT Rehab Goals       Row Name 10/08/23 0700             Bed Mobility Goal 1 (PT)    Activity/Assistive Device (Bed Mobility Goal 1, PT) sit to supine;supine to sit  -AB      [place] Level/Cues Needed (Bed Mobility Goal 1, PT) contact guard required  -AB      Time Frame (Bed Mobility Goal 1, PT) long term goal (LTG);by discharge  -AB      Progress/Outcomes (Bed Mobility Goal 1, PT) goal not  met  -AB         Transfer Goal 1 (PT)    Activity/Assistive Device (Transfer Goal 1, PT) sit-to-stand/stand-to-sit;bed-to-chair/chair-to-bed  -AB      [place] Level/Cues Needed (Transfer Goal 1, PT) standby assist  -AB      Time Frame (Transfer Goal 1, PT) long term goal (LTG);by discharge  -AB      Progress/Outcome (Transfer Goal 1, PT) goal not met  -AB         Gait Training Goal 1 (PT)    Activity/Assistive Device (Gait Training Goal 1, PT) gait (walking locomotion);assistive device use;decrease fall risk;increase endurance/gait distance;improve balance and speed  -AB      [place] Level (Gait Training Goal 1, PT) standby assist  -AB      Distance (Gait Training Goal 1, PT) 30  -AB      Time Frame (Gait Training Goal 1, PT) long term goal (LTG);by discharge  -AB      Progress/Outcome (Gait Training Goal 1, PT) goal not met  -AB                User Key  (r) = Recorded By, (t) = Taken By, (c) = Cosigned By      Initials Name Provider Type Discipline    Prabha Grissom PTA Physical Therapist Assistant PT                        PT Discharge Summary  Anticipated Discharge Disposition (PT): skilled nursing facility  Reason for Discharge: Discharge from facility  Outcomes Achieved: Refer to plan of care for updates on goals achieved  Discharge Destination: SNF      Prabha Bauer PTA   10/8/2023

## 2023-10-09 ENCOUNTER — LAB REQUISITION (OUTPATIENT)
Dept: LAB | Facility: HOSPITAL | Age: 73
End: 2023-10-09
Payer: MEDICARE

## 2023-10-09 LAB
ANION GAP SERPL CALCULATED.3IONS-SCNC: 8 MMOL/L (ref 5–15)
BASOPHILS # BLD AUTO: 0 10*3/MM3 (ref 0–0.2)
BASOPHILS NFR BLD AUTO: 0 % (ref 0–1.5)
BUN SERPL-MCNC: 21 MG/DL (ref 8–23)
BUN/CREAT SERPL: 19.4 (ref 7–25)
CALCIUM SPEC-SCNC: 8.4 MG/DL (ref 8.6–10.5)
CHLORIDE SERPL-SCNC: 104 MMOL/L (ref 98–107)
CO2 SERPL-SCNC: 30 MMOL/L (ref 22–29)
CREAT SERPL-MCNC: 1.08 MG/DL (ref 0.57–1)
DEPRECATED RDW RBC AUTO: 63.5 FL (ref 37–54)
EGFRCR SERPLBLD CKD-EPI 2021: 54 ML/MIN/1.73
EOSINOPHIL # BLD AUTO: 0.15 10*3/MM3 (ref 0–0.4)
EOSINOPHIL NFR BLD AUTO: 5.7 % (ref 0.3–6.2)
ERYTHROCYTE [DISTWIDTH] IN BLOOD BY AUTOMATED COUNT: 19.4 % (ref 12.3–15.4)
GLUCOSE SERPL-MCNC: 98 MG/DL (ref 65–99)
HCT VFR BLD AUTO: 26.2 % (ref 34–46.6)
HGB BLD-MCNC: 8.2 G/DL (ref 12–15.9)
LYMPHOCYTES # BLD AUTO: 1.26 10*3/MM3 (ref 0.7–3.1)
LYMPHOCYTES NFR BLD AUTO: 48.3 % (ref 19.6–45.3)
MCH RBC QN AUTO: 28.5 PG (ref 26.6–33)
MCHC RBC AUTO-ENTMCNC: 31.3 G/DL (ref 31.5–35.7)
MCV RBC AUTO: 91 FL (ref 79–97)
MONOCYTES # BLD AUTO: 0.39 10*3/MM3 (ref 0.1–0.9)
MONOCYTES NFR BLD AUTO: 14.9 % (ref 5–12)
NEUTROPHILS NFR BLD AUTO: 0.81 10*3/MM3 (ref 1.7–7)
NEUTROPHILS NFR BLD AUTO: 31.1 % (ref 42.7–76)
PLATELET # BLD AUTO: 112 10*3/MM3 (ref 140–450)
PMV BLD AUTO: 10.1 FL (ref 6–12)
POTASSIUM SERPL-SCNC: 3.1 MMOL/L (ref 3.5–5.2)
RBC # BLD AUTO: 2.88 10*6/MM3 (ref 3.77–5.28)
SODIUM SERPL-SCNC: 142 MMOL/L (ref 136–145)
WBC NRBC COR # BLD: 2.61 10*3/MM3 (ref 3.4–10.8)

## 2023-10-09 PROCEDURE — 85025 COMPLETE CBC W/AUTO DIFF WBC: CPT | Performed by: INTERNAL MEDICINE

## 2023-10-09 PROCEDURE — 36415 COLL VENOUS BLD VENIPUNCTURE: CPT | Performed by: INTERNAL MEDICINE

## 2023-10-09 PROCEDURE — 80048 BASIC METABOLIC PNL TOTAL CA: CPT | Performed by: INTERNAL MEDICINE

## 2023-10-09 NOTE — PAYOR COMM NOTE
"REF:   [id number]     [facility]  FAX  [phone]    Eyad Gurrola (72 y.o. Female)       Date of Birth   1950    Social Security Number       Address   [address] [address]    Home Phone   [phone]    MRN   [id number]       M[employer]ism   [facility]    Marital Status                               Admission Date   9/30/23    Admission Type   Emergency    Admitting Provider   Sachin Dia MD    Attending Provider       Department, Room/Bed   [facility] 3C, 374/1       Discharge Date   10/6/2023    Discharge Disposition   Skilled Nursing Facility (DC - External)    Discharge Destination                                 Attending Provider: (none)   Allergies: Codeine, Amoxicillin, Levaquin [Levofloxacin], Benadryl [Diphenhydramine]    Isolation: None   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)   Code Status: Prior    Ht: 160 cm (63\")   Wt: 116 kg (255 lb)    Admission Cmt: None   Principal Problem: Sepsis [A41.9]                   Active Insurance as of 9/30/2023       Primary Coverage       Payor Plan Insurance Group Employer/Plan Group    HUMANA MEDICARE REPLACEMENT HUMANA MEDICARE REPLACEMENT [id number]       Payor Plan Address Payor Plan Phone Number Payor Plan Fax Number Effective Dates    [address] [address] [phone]  1/1/2023 - None Entered    [facility] [address]         Subscriber Name Subscriber Birth Date Member ID       EYAD GURROLA 1950 [id number]               Secondary Coverage       Payor Plan Insurance Group Employer/Plan Group    [id number]        Payor Plan Address Payor Plan Phone Number Payor Plan Fax Number Effective Dates    [address]   6/1/2021 - None Entered    [facility] [address]         Subscriber Name Subscriber Birth Date Member ID       EYAD GURROLA E 1950 [id number]                     Emergency Contacts        (Rel.) Home Phone Work Phone Mobile Phone    ,Nella " (Daughter) [phone] -- [phone]    ODELL NELSON (Relative) -- -- [phone]                 Discharge Summary        Sanjiv Block MD at 10/06/23 0744              Hospital Discharge Summary    Brenda Sneed  :  1950  MRN:  [id number]    Admit date:  2023  Discharge date:  10/6/2023    Admitting Physician:    Sachin Dia MD    Discharge Diagnoses:      Sepsis    Cellulitis of right lower extremity    HTN (hypertension)    Obesity, Class III, BMI 40-49.9 (morbid obesity)    Traumatic rhabdomyolysis    Cellulitis of left lower extremity      Hospital Course:       72-year-old female with past medical history of hypertension, hyperlipidemia, lymphedema, and morbid obesity who presents with sepsis of multifactorial etiology.  Etiology was suspected to be due to urinary tract infection and cellulitis.  She was empirically treated with cefazolin with improvement in her lower extremity symptoms.  Urine culture grew Klebsiella which was essentially pansensitive.  Given that she was in bed for several days and had altered mental status on presentation, she was evaluated physical therapy who determined she would be best suited for skilled nursing facility.    Of note, during the hospitalization she developed new onset atrial fibrillation without symptoms.  Her home bisoprolol HCTZ was changed to metoprolol monotherapy and she was started on Eliquis for stroke prophylaxis.        Discharge Medications:         Discharge Medications        New Medications        Instructions Start Date   apixaban 5 MG tablet tablet  Commonly known as: ELIQUIS   5 mg, Oral, Every 12 Hours Scheduled      metoprolol succinate XL 25 MG 24 hr tablet  Commonly known as: Toprol XL   25 mg, Oral, Daily      Morphine 15 MG 12 hr tablet  Commonly known as: MS CONTIN   15 mg, Oral, 2 Times Daily PRN             Changes to Medications        Instructions Start Date   pramipexole 0.75 MG tablet  Commonly known  as: MIRAPEX  What changed: when to take this   0.75 mg, Oral, Nightly             Continue These Medications        Instructions Start Date   albuterol (2.5 MG/3ML) 0.083% nebulizer solution  Commonly known as: PROVENTIL   2.5 mg, Nebulization, 3 Times Daily      azelastine 0.1 % nasal spray  Commonly known as: ASTELIN   2 sprays, Nasal, 2 Times Daily, Use in each nostril as directed      cyclobenzaprine 10 MG tablet  Commonly known as: FLEXERIL   10 mg, Oral, 3 Times Daily PRN      docusate sodium 100 MG capsule  Commonly known as: COLACE   100 mg, Oral, 2 Times Daily PRN      DULoxetine 60 MG capsule  Commonly known as: CYMBALTA   1 capsule, Oral, 2 Times Daily      furosemide 40 MG tablet  Commonly known as: LASIX   40 mg, Oral, Daily PRN      levothyroxine 100 MCG tablet  Commonly known as: SYNTHROID, LEVOTHROID   1 tablet, Oral, Daily      nystatin 082091 UNIT/GM powder  Commonly known as: MYCOSTATIN   1 application , Topical, 2 Times Daily      oxybutynin XL 5 MG 24 hr tablet  Commonly known as: DITROPAN-XL   5 mg, Oral, Nightly, Take at night             Stop These Medications      bisoprolol-hydrochlorothiazide 5-6.25 MG per tablet  Commonly known as: ZIAC     Buprenorphine 20 MCG/HR patch weekly              Significant Diagnostic Studies:      XR Chest 1 View    Result Date: 9/30/2023  No acute findings.  This report was signed and finalized on 9/30/2023 2:52 PM CDT by Dr. Bryce Mancera MD.          Disposition:   Skilled nursing facility  Follow up with Sachin Dia MD in 1 weeks.    Signed:  Sanjiv Block MD   10/6/2023, 15:25 CDT      Electronically signed by Sanjiv Block MD at 10/06/23 1525       Discharge Order (From admission, onward)       Start     Ordered    10/06/23 0725  Discharge patient  Once        Expected Discharge Date: 10/05/23   Expected Discharge Time: Midday   Discharge Disposition: Skilled Nursing Facility (DC - External)   Physician of Record for Attribution -  Please select from Treatment Team: EVA ANN [[id number]]   Review needed by CMO to determine Physician of Record: No      Question Answer Comment   Physician of Record for Attribution - Please select from Treatment Team EVA ANN    Review needed by CMO to determine Physician of Record No        10/06/23 0726

## 2023-10-11 NOTE — PROGRESS NOTES
"Enter Query Response Below      Query Response:     Metabolic encephalopathy due sepsis     Electronically signed by Sanjiv Block MD, 10/11/23, 4:22 PM CDT.           If applicable, please update the problem list.   Patient: Brenda Sneed        : 1950  Account: [id number]           Admit Date: 2023        How to Respond to this query:       a. Click New Note     b. Answer query within the yellow box.                c. Update the Problem List, if applicable.      If you have any questions about this query contact me at: [email or web address]  [phone]      Dr. Block:     72 y F admitted (23) \"Sepsis, Cellulitis of right and left lower extremity, UTI, and Traumatic rhabdomyolysis.\"     ED noted, \"...family found her with altered mental status.  They said they could not get her to respond to their phone calls so they went to check on her and found her sitting in her chair confused and very weak.  They try to get her to stand but she could not stand her she stood up was briefly and had to sit back down.  As far as they know she is normally alert and oriented and was until today. The patient is unable to give us much complete information because of her altered mental status.\" Also noted, \"She is alert. She is lethargic.\"     H&P noted, \"Review of systems could not be obtained due to   patient confusion. Mental Status alertness alert and awake, orientation person and think she is at [facility] unsure of the date, and mood/affect normal.\" She received IV fluid 1572 mL bolus, IV Cefazolin, IV Rocephin, and IV fluids @ 100 cc/hr.      On 10/02 she was documented as A&Ox4 by physical therapy. \"No new complaints, actually continues to feel better daily.  Mental Status: She is alert,\" per progress note (10/03).     Discharge summary hospital course included, \"Given that she was in bed for several days and had altered mental status on presentation, she was evaluated physical  therapy " "who determined she would be best suited for skilled nursing facility.\"    Can the patient's \"altered mental status on presentation\" be further specified as:    Metabolic encephalopathy due sepsis   Metabolic encephalopathy due to other (please specify) ____________  Other- specify__________  Unable to determine    By submitting this query, we are merely seeking further clarification of documentation to accurately reflect all conditions that you are monitoring, evaluating, treating or that extend the hospitalization or utilize additional resources of care. Please utilize your independent clinical judgment when addressing the question(s) above.     This query and your response, once completed, will be entered into the legal medical record.    Sincerely,    JESSY García, RN, CCDS  Clinical Documentation Integrity Program     "

## 2023-11-10 ENCOUNTER — HOSPITAL ENCOUNTER (EMERGENCY)
Facility: HOSPITAL | Age: 73
Discharge: HOME | End: 2023-11-11
Attending: STUDENT IN AN ORGANIZED HEALTH CARE EDUCATION/TRAINING PROGRAM
Payer: MEDICARE

## 2023-11-10 VITALS
DIASTOLIC BLOOD PRESSURE: 55 MMHG | WEIGHT: 230 LBS | TEMPERATURE: 98.2 F | BODY MASS INDEX: 40.75 KG/M2 | RESPIRATION RATE: 14 BRPM | OXYGEN SATURATION: 98 % | SYSTOLIC BLOOD PRESSURE: 134 MMHG | HEIGHT: 63 IN | HEART RATE: 61 BPM

## 2023-11-10 DIAGNOSIS — I89.0 LYMPHEDEMA: ICD-10-CM

## 2023-11-10 DIAGNOSIS — R25.2 LEG CRAMPING: Primary | ICD-10-CM

## 2023-11-10 DIAGNOSIS — Z86.39 HISTORY OF HYPOKALEMIA: ICD-10-CM

## 2023-11-10 DIAGNOSIS — G89.29 CHRONIC PAIN OF LOWER EXTREMITY, UNSPECIFIED LATERALITY: ICD-10-CM

## 2023-11-10 DIAGNOSIS — M79.606 CHRONIC PAIN OF LOWER EXTREMITY, UNSPECIFIED LATERALITY: ICD-10-CM

## 2023-11-10 PROCEDURE — 96372 THER/PROPH/DIAG INJ SC/IM: CPT

## 2023-11-10 PROCEDURE — 99283 EMERGENCY DEPT VISIT LOW MDM: CPT

## 2023-11-10 RX ORDER — SODIUM CHLORIDE 0.9 % (FLUSH) 0.9 %
10 SYRINGE (ML) INJECTION AS NEEDED
Status: DISCONTINUED | OUTPATIENT
Start: 2023-11-10 | End: 2023-11-11 | Stop reason: HOSPADM

## 2023-11-11 ENCOUNTER — NURSE TRIAGE (OUTPATIENT)
Dept: CALL CENTER | Facility: HOSPITAL | Age: 73
End: 2023-11-11
Payer: MEDICARE

## 2023-11-11 VITALS
RESPIRATION RATE: 16 BRPM | TEMPERATURE: 98.1 F | OXYGEN SATURATION: 100 % | HEART RATE: 68 BPM | HEIGHT: 63 IN | WEIGHT: 230 LBS | BODY MASS INDEX: 40.75 KG/M2 | SYSTOLIC BLOOD PRESSURE: 137 MMHG | DIASTOLIC BLOOD PRESSURE: 57 MMHG

## 2023-11-11 DIAGNOSIS — R63.8 DECREASED ORAL INTAKE: ICD-10-CM

## 2023-11-11 DIAGNOSIS — R25.2 LEG CRAMPING: ICD-10-CM

## 2023-11-11 DIAGNOSIS — G89.4 CHRONIC PAIN SYNDROME: ICD-10-CM

## 2023-11-11 DIAGNOSIS — Z76.5 DRUG-SEEKING BEHAVIOR: Primary | ICD-10-CM

## 2023-11-11 DIAGNOSIS — R79.89 ELEVATED SERUM CREATININE: ICD-10-CM

## 2023-11-11 DIAGNOSIS — Z86.39 HISTORY OF HYPOKALEMIA: ICD-10-CM

## 2023-11-11 LAB
ALBUMIN SERPL-MCNC: 3.1 G/DL (ref 3.5–5.2)
ALBUMIN SERPL-MCNC: 3.2 G/DL (ref 3.5–5.2)
ALBUMIN/GLOB SERPL: 1 G/DL
ALBUMIN/GLOB SERPL: 1.1 G/DL
ALP SERPL-CCNC: 189 U/L (ref 39–117)
ALP SERPL-CCNC: 210 U/L (ref 39–117)
ALT SERPL W P-5'-P-CCNC: 15 U/L (ref 1–33)
ALT SERPL W P-5'-P-CCNC: 17 U/L (ref 1–33)
ANION GAP SERPL CALCULATED.3IONS-SCNC: 9 MMOL/L (ref 5–15)
ANION GAP SERPL CALCULATED.3IONS-SCNC: 9 MMOL/L (ref 5–15)
AST SERPL-CCNC: 42 U/L (ref 1–32)
AST SERPL-CCNC: 53 U/L (ref 1–32)
BASOPHILS # BLD AUTO: 0.02 10*3/MM3 (ref 0–0.2)
BASOPHILS NFR BLD AUTO: 0.4 % (ref 0–1.5)
BILIRUB SERPL-MCNC: 0.5 MG/DL (ref 0–1.2)
BILIRUB SERPL-MCNC: 0.6 MG/DL (ref 0–1.2)
BUN SERPL-MCNC: 32 MG/DL (ref 8–23)
BUN SERPL-MCNC: 32 MG/DL (ref 8–23)
BUN/CREAT SERPL: 15.2 (ref 7–25)
BUN/CREAT SERPL: 15.4 (ref 7–25)
CALCIUM SPEC-SCNC: 8.6 MG/DL (ref 8.6–10.5)
CALCIUM SPEC-SCNC: 8.6 MG/DL (ref 8.6–10.5)
CHLORIDE SERPL-SCNC: 100 MMOL/L (ref 98–107)
CHLORIDE SERPL-SCNC: 101 MMOL/L (ref 98–107)
CO2 SERPL-SCNC: 22 MMOL/L (ref 22–29)
CO2 SERPL-SCNC: 24 MMOL/L (ref 22–29)
CREAT SERPL-MCNC: 2.08 MG/DL (ref 0.57–1)
CREAT SERPL-MCNC: 2.1 MG/DL (ref 0.57–1)
DEPRECATED RDW RBC AUTO: 53.4 FL (ref 37–54)
EGFRCR SERPLBLD CKD-EPI 2021: 24.3 ML/MIN/1.73
EGFRCR SERPLBLD CKD-EPI 2021: 24.6 ML/MIN/1.73
EOSINOPHIL # BLD AUTO: 0.22 10*3/MM3 (ref 0–0.4)
EOSINOPHIL NFR BLD AUTO: 4.5 % (ref 0.3–6.2)
ERYTHROCYTE [DISTWIDTH] IN BLOOD BY AUTOMATED COUNT: 16.8 % (ref 12.3–15.4)
GLOBULIN UR ELPH-MCNC: 2.9 GM/DL
GLOBULIN UR ELPH-MCNC: 3.3 GM/DL
GLUCOSE SERPL-MCNC: 116 MG/DL (ref 65–99)
GLUCOSE SERPL-MCNC: 134 MG/DL (ref 65–99)
HCT VFR BLD AUTO: 29 % (ref 34–46.6)
HGB BLD-MCNC: 9.5 G/DL (ref 12–15.9)
LYMPHOCYTES # BLD AUTO: 1.4 10*3/MM3 (ref 0.7–3.1)
LYMPHOCYTES NFR BLD AUTO: 28.5 % (ref 19.6–45.3)
MCH RBC QN AUTO: 30.3 PG (ref 26.6–33)
MCHC RBC AUTO-ENTMCNC: 32.8 G/DL (ref 31.5–35.7)
MCV RBC AUTO: 92.4 FL (ref 79–97)
MONOCYTES # BLD AUTO: 0.51 10*3/MM3 (ref 0.1–0.9)
MONOCYTES NFR BLD AUTO: 10.4 % (ref 5–12)
NEUTROPHILS NFR BLD AUTO: 2.74 10*3/MM3 (ref 1.7–7)
NEUTROPHILS NFR BLD AUTO: 55.8 % (ref 42.7–76)
PLATELET # BLD AUTO: 115 10*3/MM3 (ref 140–450)
PMV BLD AUTO: 13 FL (ref 6–12)
POTASSIUM SERPL-SCNC: 3.9 MMOL/L (ref 3.5–5.2)
POTASSIUM SERPL-SCNC: 6.4 MMOL/L (ref 3.5–5.2)
PROT SERPL-MCNC: 6 G/DL (ref 6–8.5)
PROT SERPL-MCNC: 6.5 G/DL (ref 6–8.5)
RBC # BLD AUTO: 3.14 10*6/MM3 (ref 3.77–5.28)
SODIUM SERPL-SCNC: 131 MMOL/L (ref 136–145)
SODIUM SERPL-SCNC: 134 MMOL/L (ref 136–145)
WBC NRBC COR # BLD: 4.91 10*3/MM3 (ref 3.4–10.8)

## 2023-11-11 PROCEDURE — 36415 COLL VENOUS BLD VENIPUNCTURE: CPT

## 2023-11-11 PROCEDURE — 80053 COMPREHEN METABOLIC PANEL: CPT | Performed by: STUDENT IN AN ORGANIZED HEALTH CARE EDUCATION/TRAINING PROGRAM

## 2023-11-11 PROCEDURE — 25010000002 MORPHINE PER 10 MG: Performed by: STUDENT IN AN ORGANIZED HEALTH CARE EDUCATION/TRAINING PROGRAM

## 2023-11-11 PROCEDURE — 96372 THER/PROPH/DIAG INJ SC/IM: CPT

## 2023-11-11 PROCEDURE — 99282 EMERGENCY DEPT VISIT SF MDM: CPT

## 2023-11-11 PROCEDURE — 25010000002 KETOROLAC TROMETHAMINE PER 15 MG: Performed by: STUDENT IN AN ORGANIZED HEALTH CARE EDUCATION/TRAINING PROGRAM

## 2023-11-11 PROCEDURE — 85025 COMPLETE CBC W/AUTO DIFF WBC: CPT | Performed by: STUDENT IN AN ORGANIZED HEALTH CARE EDUCATION/TRAINING PROGRAM

## 2023-11-11 RX ORDER — MORPHINE SULFATE 2 MG/ML
2 INJECTION, SOLUTION INTRAMUSCULAR; INTRAVENOUS ONCE
Status: COMPLETED | OUTPATIENT
Start: 2023-11-11 | End: 2023-11-11

## 2023-11-11 RX ORDER — HYDROMORPHONE HYDROCHLORIDE 1 MG/ML
0.5 INJECTION, SOLUTION INTRAMUSCULAR; INTRAVENOUS; SUBCUTANEOUS ONCE
Status: CANCELLED | OUTPATIENT
Start: 2023-11-11 | End: 2023-11-11

## 2023-11-11 RX ORDER — POTASSIUM CHLORIDE 750 MG/1
40 CAPSULE, EXTENDED RELEASE ORAL ONCE
Status: COMPLETED | OUTPATIENT
Start: 2023-11-11 | End: 2023-11-11

## 2023-11-11 RX ORDER — KETOROLAC TROMETHAMINE 15 MG/ML
15 INJECTION, SOLUTION INTRAMUSCULAR; INTRAVENOUS ONCE
Status: COMPLETED | OUTPATIENT
Start: 2023-11-11 | End: 2023-11-11

## 2023-11-11 RX ADMIN — POTASSIUM CHLORIDE 40 MEQ: 10 CAPSULE, COATED, EXTENDED RELEASE ORAL at 00:37

## 2023-11-11 RX ADMIN — MORPHINE SULFATE 2 MG: 2 INJECTION, SOLUTION INTRAMUSCULAR; INTRAVENOUS at 05:34

## 2023-11-11 RX ADMIN — KETOROLAC TROMETHAMINE 15 MG: 15 INJECTION, SOLUTION INTRAMUSCULAR; INTRAVENOUS at 00:37

## 2023-11-11 NOTE — ED NOTES
removed Roselia boot material on LLE, obtained pulse by doppler witnessed by KADEN Bojorquez, marked skin location, informed Dr Lux

## 2023-11-11 NOTE — ED NOTES
Pt initially c/o bilateral leg pain and stated that she has lymphedema. Once roomed, pt stated that she was only here for a pinched nerve in her left hip that is causing pain to radiate down her leg. MD Lux aware of change in chief complaint.

## 2023-11-11 NOTE — DISCHARGE INSTRUCTIONS
Today you are seen for your chronic pain.  I wanted to pursue lab work because you had some leg cramping is important to check for things like low potassium that could contribute.  Your initial test him was falsely high and we repeated it and it was now normal.  However your kidney function is significantly up from prior.  Dehydration with decreased intake can cause a sodium important they stay well-hydrated try to drink as much water as possible.  Also want to follow-up soon as possible your primary care provider for a repeat check to make sure it improves.  Separate as we discussed I know it can be a long struggle with chronic pain and you do not have an appoint with your pain doctor for almost a month so I think it is extremely important you discuss with your primary care provider as you need to be a long-term management plan prior to that.  If any of your symptoms worsen please immediate return to the emergency department.

## 2023-11-11 NOTE — ED PROVIDER NOTES
"EMERGENCY DEPARTMENT ATTENDING NOTE    Patient Name: Brenda Sneed    Chief Complaint   Patient presents with    Leg Pain       PATIENT PRESENTATION:  Brenda Sneed is a very pleasant 72 y.o. female with history of chronic pain who was seen in this emergency department specifically requesting Toradol and not wanting any further work-up for her chronic pain now presenting specifically requesting morphine for her chronic pain.    On my interview with the patient she is now representing specifically requesting IV morphine.  Her family who was here earlier is also here with her.  Discussed long-term pain management history with both of them.  Patient was on opiate and gabapentin therapy for quite some time had a recent admission for a medical issue which led to her being placed in a nursing home.  She was receiving frequent morphine at her nursing home and then was discharged.  She missed her pain management appointment and now does not have pain management appointment scheduled till December 8, 2023 which is why she currently does not have any chronic pain medications.  Reportedly she was supposed to discontinue gabapentin due to issues with lab abnormality secondary to gabapentin use.  She states that her pain is consistent with chronic pain.    As per my note from her very recent prior visit she has had a history of hypokalemia with her leg cramping last potassium chart was 3.1.  Her family also does state that she has had some decreased oral intake patient states she does not feel dehydrated.      PHYSICAL EXAM:   VS: /57   Pulse 68   Temp 98.1 °F (36.7 °C) (Oral)   Resp 16   Ht 160 cm (63\")   Wt 104 kg (230 lb)   SpO2 100%   BMI 40.74 kg/m²   GENERAL: Well-appearing elderly woman sitting up in stretcher no acute distress; well-nourished, well-developed, awake, alert, no acute distress, nontoxic appearing, comfortable  CARDIOVASCULAR: dorsalis pedis pulses palpable  MUSCULOSKELETAL/EXTREMITIES: " Lymphedema of the bilateral lower extremities  SKIN: warm and dry with no obvious rashes  PSYCHIATRIC: alert, pleasant and cooperative. Appropriate mood and affect.      MEDICAL DECISION MAKING:    Brenda Sneed is a 72 y.o. female with history of chronic lymphedema chronic pain presenting to the emergency department specifically requesting a dose of morphine after being previously seen in the emergency department specifically requesting Toradol for her chronic pain and not wanting any work-up for chronic leg pain secondary to her lymphedema.    On her prior visit I had very much wanted to obtain lab work as there are lab abnormalities that can contribute to leg cramping such as dehydration hypokalemia but patient is refusing labs for evaluation and specifically wanted Toradol.  Given patient's age Toradol as a rescue medicine as per my prior note but given her prior normal creatinine on most recent labs as well as low potassium I felt it was reasonable given patient's desire for Toradol.  A dose to be given.  Also given her prior hypokalemic she is on some potassium supplementation I gave her a single dose of oral 40 mill equivalent potassium empirically.    I was going to give patient 1 dose of intramuscular morphine so long as we could obtain labs.  Labs and have been obtained.  CMP did hemolyzed but somewhat concerning labs based on her priors as her creatinine is now significantly elevated her potassium is hemolyzed and elevated so is unclear if this is now hyperkalemia which would be surprising given her history of hypokalemia.  Her creatinine is now 2.10 from prior of 1.08.  Otherwise normal white blood cell count in the low range with prior history of leukopenia.  Chronic anemia consistent with priors.  Stated that she does not want on her morphine she did not want to pursue any labs despite my conversation that I am concerned about her elevated creatinine is continuing elevations in her creatinine and  kidney function could ultimately result on dialysis if this continues to worsen and she states she still do not want to pursue labs she is on her payments and go home which is highly concerning for drug-seeking behavior.  Ultimately I was able to convince patient to let us redraw another CMP.    Repeat CMP was obtained given hemolysis and repeat value.  Clearly false lab value due to hemolyzation as repeat potassium is 3.9.  However kidney function is elevated at 2.08 up from 1.08 one month prior.  Prior to that patient has had evidence of CKD with creatinine about 1.45 but this is significant from her priors.      Labs Ordered:  Labs Reviewed   COMPREHENSIVE METABOLIC PANEL - Abnormal; Notable for the following components:       Result Value    Glucose 134 (*)     BUN 32 (*)     Creatinine 2.10 (*)     Sodium 131 (*)     Potassium 6.4 (*)     Albumin 3.2 (*)     AST (SGOT) 53 (*)     Alkaline Phosphatase 210 (*)     eGFR 24.3 (*)     All other components within normal limits    Narrative:     GFR Normal >60  Chronic Kidney Disease <60  Kidney Failure <15    The GFR formula is only valid for adults with stable renal function between ages 18 and 70.   CBC WITH AUTO DIFFERENTIAL - Abnormal; Notable for the following components:    RBC 3.14 (*)     Hemoglobin 9.5 (*)     Hematocrit 29.0 (*)     RDW 16.8 (*)     MPV 13.0 (*)     Platelets 115 (*)     All other components within normal limits    Narrative:     Appended report. These results have been appended to a previously verified report.   COMPREHENSIVE METABOLIC PANEL - Abnormal; Notable for the following components:    Glucose 116 (*)     BUN 32 (*)     Creatinine 2.08 (*)     Sodium 134 (*)     Albumin 3.1 (*)     AST (SGOT) 42 (*)     Alkaline Phosphatase 189 (*)     eGFR 24.6 (*)     All other components within normal limits    Narrative:     GFR Normal >60  Chronic Kidney Disease <60  Kidney Failure <15    The GFR formula is only valid for adults with stable  renal function between ages 18 and 70.   CBC AND DIFFERENTIAL    Narrative:     The following orders were created for panel order CBC & Differential.  Procedure                               Abnormality         Status                     ---------                               -----------         ------                     CBC Auto Differential[[id number]]        Abnormal            Final result                 Please view results for these tests on the individual orders.        Internal chart review:   Past Medical History:   Diagnosis Date    Arthritis     Bronchitis     Cataract     right eye , cornea implant    Chronic back pain     Depression     Disease of thyroid gland     Edema     lower extremities    Hypertension     Kidney stones     Lymphedema        Past Surgical History:   Procedure Laterality Date    BACK SURGERY      2010 ([place]), 2013     CHOLECYSTECTOMY OPEN      CORNEAL TRANSPLANT Right 1999    INCISION AND DRAINAGE ABSCESS Left 4/19/2023    Procedure: INCISION AND DRAINAGE ABSCESS left leg;  Surgeon: Sarita Vazquez MD;  Location:  [facility];  Service: General;  Laterality: Left;    LACERATION REPAIR Left 4/7/2023    Procedure: washout and closure left leg wound ;  Surgeon: Sarita Vazquez MD;  Location:  [facility];  Service: General;  Laterality: Left;    LUMBAR FUSION Left 8/2/2017    Procedure: LEFT LUMBAR LATERAL INTERBODY FUSION WITH INSTRUMENTATION  L1-2;  Surgeon: MARCELINO Wright MD;  Location:  [facility];  Service:     NECK SURGERY  2010    [place]    TOTAL HIP ARTHROPLASTY Right 2014    DR. KENDRICK     TOTAL KNEE ARTHROPLASTY Right 2000    TOTAL KNEE ARTHROPLASTY Left 2002    TOTAL SHOULDER ARTHROPLASTY W/ DISTAL CLAVICLE EXCISION Left 6/1/2020    Procedure: LEFT REVERSE TOTAL SHOULDER REPLACEMENT;  Surgeon: Saravanan Calvin MD;  Location:  [facility];  Service: Orthopedics;  Laterality: Left;       Allergies   Allergen Reactions    Codeine Itching and Rash     SEVERE RASH/ITCHING  (TOLERATES PERCOCET)    Amoxicillin Other (See Comments)     unknown    Levaquin [Levofloxacin] Other (See Comments)     unknown    Benadryl [Diphenhydramine] Other (See Comments)     KEEPS PATIENT AWAKE        No current facility-administered medications for this encounter.    Current Outpatient Medications:     albuterol (PROVENTIL) (2.5 MG/3ML) 0.083% nebulizer solution, Take 2.5 mg by nebulization 3 (Three) Times a Day., Disp: , Rfl:     apixaban (ELIQUIS) 5 MG tablet tablet, Take 1 tablet by mouth Every 12 (Twelve) Hours. Indications: Atrial Fibrillation, Disp: 180 tablet, Rfl: 1    azelastine (ASTELIN) 0.1 % nasal spray, 2 sprays into the nostril(s) as directed by provider 2 (Two) Times a Day. Use in each nostril as directed, Disp: , Rfl:     cyclobenzaprine (FLEXERIL) 10 MG tablet, Take 1 tablet by mouth 3 (Three) Times a Day As Needed for Muscle Spasms., Disp: 30 tablet, Rfl: 0    docusate sodium (COLACE) 100 MG capsule, Take 1 capsule by mouth 2 (Two) Times a Day As Needed for Constipation., Disp: , Rfl:     DULoxetine (CYMBALTA) 60 MG capsule, Take 1 capsule by mouth 2 (Two) Times a Day. Indications: Musculoskeletal Pain, Disp: , Rfl:     furosemide (LASIX) 40 MG tablet, Take 1 tablet by mouth Daily As Needed (edema). Indications: Edema, Disp: , Rfl:     levothyroxine (SYNTHROID, LEVOTHROID) 100 MCG tablet, Take 1 tablet by mouth Daily. Indications: Underactive Thyroid, Disp: , Rfl:     metoprolol succinate XL (Toprol XL) 25 MG 24 hr tablet, Take 1 tablet by mouth Daily., Disp: 90 tablet, Rfl: 1    nystatin (MYCOSTATIN) 584089 UNIT/GM powder, Apply 1 application  topically to the appropriate area as directed 2 (Two) Times a Day. Indications: Candida Infection of Skin and Mouth or Vagina, Disp: , Rfl:     oxybutynin XL (DITROPAN-XL) 5 MG 24 hr tablet, Take 1 tablet by mouth Every Night. Take at night  Indications: Urinary Incontinence, Disp: , Rfl:     pramipexole (MIRAPEX) 0.75 MG tablet, Take 1 tablet by  mouth Every Night. Indications: Restless Leg Syndrome, Disp: 90 tablet, Rfl: 1      ED Diagnosis:  Chronic pain syndrome; Leg cramping; Elevated serum creatinine; History of hypokalemia; Decreased oral intake; Drug-seeking behavior    Disposition: to home  Follow up plan: PCP follow up within 2 days, return to ED immediately if symptoms worsen        Signed:  Terry Lux MD  Emergency Medicine Physician    Please note that portions of this note were completed with a voice recognition program.      Terry Lux MD  11/11/23 0602

## 2023-11-11 NOTE — TELEPHONE ENCOUNTER
"Reason for Disposition   Patient sounds very sick or weak to the triager    Additional Information   Negative: Looks like a broken bone or dislocated joint (e.g., crooked or deformed)   Negative: Sounds like a life-threatening emergency to the triager   Negative: Followed a leg injury   Negative: Leg swelling is main symptom   Negative: Back pain radiating (shooting) into leg(s)   Negative: Knee pain is main symptom   Negative: Ankle pain is main symptom   Negative: Pregnant   Negative: Postpartum (from 0 to 6 weeks after delivery)   Negative: Chest pain   Negative: Difficulty breathing   Negative: Entire foot is cool or blue in comparison to other side   Negative: Unable to walk   Negative: [1] Red area or streak AND [2] fever   Negative: [1] Swollen joint AND [2] fever   Negative: [1] Cast on leg or ankle AND [2] now increased pain    Answer Assessment - Initial Assessment Questions  1. ONSET: \"When did the pain start?\"       unsure  2. LOCATION: \"Where is the pain located?\"       legs  3. PAIN: \"How bad is the pain?\"    (Scale 1-10; or mild, moderate, severe)    -  MILD (1-3): doesn't interfere with normal activities     -  MODERATE (4-7): interferes with normal activities (e.g., work or school) or awakens from sleep, limping     -  SEVERE (8-10): excruciating pain, unable to do any normal activities, unable to walk      severe  4. WORK OR EXERCISE: \"Has there been any recent work or exercise that involved this part of the body?\"          5. CAUSE: \"What do you think is causing the leg pain?\"      Lymphedema  6. OTHER SYMPTOMS: \"Do you have any other symptoms?\" (e.g., chest pain, back pain, breathing difficulty, swelling, rash, fever, numbness, weakness)      Increase in pain    Protocols used: Leg Pain-ADULT-AH    "

## 2023-11-11 NOTE — DISCHARGE INSTRUCTIONS
You are seen for your leg cramping.  I know you did not want any labs just wanted a dose of Toradol.  As we discussed you have had low potassium so low potassium can cause increased leg cramping which is I wanted to check it.  We gave him potassium.  Eating bananas and potatoes that are high potassium can help with this too.  Please call your primary care provider schedule close follow-up appointment early this week.  I have also given the number for wound care if you ever would like to follow-up with them.  If any of your symptoms worsen prior please return to the emergency department immediately.

## 2023-11-11 NOTE — ED PROVIDER NOTES
"EMERGENCY DEPARTMENT ATTENDING NOTE    Patient Name: Brenda Sneed    Chief Complaint   Patient presents with    Leg Pain       PATIENT PRESENTATION:  Brenda Sneed is a very pleasant 72 y.o. female with a history of chronic lymphedema presenting the emergency department due to acute on chronic leg cramping.    In triage patient stated that she has been having some aching of her legs she states this happens chronically due to sciatica and lymphedema.  States there is no acute changes she just having chronic symptoms.  She is specifically requesting a shot of Toradol.  States she has had hypokalemia the past but she does not want to get any labs checked she just wants the medicine and then wants to go home.    PHYSICAL EXAM:   VS: /55 (BP Location: Right arm, Patient Position: Sitting)   Pulse 61   Temp 98.2 °F (36.8 °C) (Oral)   Resp 14   Ht 160 cm (63\")   Wt 104 kg (230 lb)   SpO2 98%   BMI 40.74 kg/m²   GENERAL: well-nourished, well-developed, awake, alert, no acute distress, nontoxic appearing, comfortable  MUSCULOSKELETAL/EXTREMITIES: Chronic lymphedema of the bilateral lower extremities; extremities without obvious deformity  SKIN: Legs are warm and dry with no obvious rashes  NEUROLOGIC: moving all 4 extremities symmetrically  PSYCHIATRIC: alert, pleasant and cooperative. Appropriate mood and affect.      MEDICAL DECISION MAKING:    Brenda Sneed is a 72 y.o. female who presented to the ED with chronic leg cramping and pain in the setting of chronic lymphedema.  I had ordered labs to evaluate things such as hyperkalemia acutely given this patient has a history of hypokalemia but patient told nursing she really just wanted a shot of Toradol and did not want to pursue any laboratory work-up.  Reviewed her prior labs she has normal kidney function and so despite her age I think is reasonable to give 1 dose of Toradol although I would not this patient on long-term NSAIDs in the setting of her " age and risk of stomach ulcer.  Patient's exam consistent chronic edema she will does not have any acute changes she was just requesting medications.  Gave patient's oral potassium repletion empirically.  Counseled patient regarding results as well as plan for follow-up with her primary care provider early next week and will care for management of her long-term lymphedema and patient was discharged home with return precautions to the emergency department for worsening symptoms.      Internal chart review:   Past Medical History:   Diagnosis Date    Arthritis     Bronchitis     Cataract     right eye , cornea implant    Chronic back pain     Depression     Disease of thyroid gland     Edema     lower extremities    Hypertension     Kidney stones     Lymphedema        Past Surgical History:   Procedure Laterality Date    BACK SURGERY      2010 ([place]), 2013     CHOLECYSTECTOMY OPEN      CORNEAL TRANSPLANT Right 1999    INCISION AND DRAINAGE ABSCESS Left 4/19/2023    Procedure: INCISION AND DRAINAGE ABSCESS left leg;  Surgeon: Sarita Vazquez MD;  Location:  [facility];  Service: General;  Laterality: Left;    LACERATION REPAIR Left 4/7/2023    Procedure: washout and closure left leg wound ;  Surgeon: Sarita Vazquez MD;  Location:  [facility];  Service: General;  Laterality: Left;    LUMBAR FUSION Left 8/2/2017    Procedure: LEFT LUMBAR LATERAL INTERBODY FUSION WITH INSTRUMENTATION  L1-2;  Surgeon: MARCELINO Wright MD;  Location:  [facility];  Service:     NECK SURGERY  2010    [place]    TOTAL HIP ARTHROPLASTY Right 2014    DR. KENDRICK     TOTAL KNEE ARTHROPLASTY Right 2000    TOTAL KNEE ARTHROPLASTY Left 2002    TOTAL SHOULDER ARTHROPLASTY W/ DISTAL CLAVICLE EXCISION Left 6/1/2020    Procedure: LEFT REVERSE TOTAL SHOULDER REPLACEMENT;  Surgeon: Saravanan Calvin MD;  Location:  [facility];  Service: Orthopedics;  Laterality: Left;       Allergies   Allergen Reactions    Codeine Itching and Rash     SEVERE  RASH/ITCHING (TOLERATES PERCOCET)    Amoxicillin Other (See Comments)     unknown    Levaquin [Levofloxacin] Other (See Comments)     unknown    Benadryl [Diphenhydramine] Other (See Comments)     KEEPS PATIENT AWAKE          Current Facility-Administered Medications:     Insert Peripheral IV, , , Once **AND** sodium chloride 0.9 % flush 10 mL, 10 mL, Intravenous, PRN, Terry Lux MD    Current Outpatient Medications:     albuterol (PROVENTIL) (2.5 MG/3ML) 0.083% nebulizer solution, Take 2.5 mg by nebulization 3 (Three) Times a Day., Disp: , Rfl:     apixaban (ELIQUIS) 5 MG tablet tablet, Take 1 tablet by mouth Every 12 (Twelve) Hours. Indications: Atrial Fibrillation, Disp: 180 tablet, Rfl: 1    azelastine (ASTELIN) 0.1 % nasal spray, 2 sprays into the nostril(s) as directed by provider 2 (Two) Times a Day. Use in each nostril as directed, Disp: , Rfl:     cyclobenzaprine (FLEXERIL) 10 MG tablet, Take 1 tablet by mouth 3 (Three) Times a Day As Needed for Muscle Spasms., Disp: 30 tablet, Rfl: 0    docusate sodium (COLACE) 100 MG capsule, Take 1 capsule by mouth 2 (Two) Times a Day As Needed for Constipation., Disp: , Rfl:     DULoxetine (CYMBALTA) 60 MG capsule, Take 1 capsule by mouth 2 (Two) Times a Day. Indications: Musculoskeletal Pain, Disp: , Rfl:     furosemide (LASIX) 40 MG tablet, Take 1 tablet by mouth Daily As Needed (edema). Indications: Edema, Disp: , Rfl:     levothyroxine (SYNTHROID, LEVOTHROID) 100 MCG tablet, Take 1 tablet by mouth Daily. Indications: Underactive Thyroid, Disp: , Rfl:     metoprolol succinate XL (Toprol XL) 25 MG 24 hr tablet, Take 1 tablet by mouth Daily., Disp: 90 tablet, Rfl: 1    nystatin (MYCOSTATIN) 944643 UNIT/GM powder, Apply 1 application  topically to the appropriate area as directed 2 (Two) Times a Day. Indications: Candida Infection of Skin and Mouth or Vagina, Disp: , Rfl:     oxybutynin XL (DITROPAN-XL) 5 MG 24 hr tablet, Take 1 tablet by mouth Every Night. Take  at night  Indications: Urinary Incontinence, Disp: , Rfl:     pramipexole (MIRAPEX) 0.75 MG tablet, Take 1 tablet by mouth Every Night. Indications: Restless Leg Syndrome, Disp: 90 tablet, Rfl: 1    External documents reviewed:   Basic Metabolic Panel  Order: [id number]  Status: Final result       Visible to patient: No (not released)       Next appt: None    Specimen Information: Blood   0 Result Notes            Component  Ref Range & Units 1 mo ago  (10/9/23) 1 mo ago  (10/4/23) 1 mo ago  (10/3/23) 1 mo ago  (10/2/23) 1 mo ago  (10/1/23) 1 mo ago  (9/30/23) 5 mo ago  (5/25/23)   Glucose  65 - 99 mg/dL 98 104 High  84 84 117 High  121 High  102 High    BUN  8 - 23 mg/dL 21 34 High  39 High  40 High  40 High  39 High  11   Creatinine  0.57 - 1.00 mg/dL 1.08 High  1.25 High  1.44 High  1.68 High  1.71 High  1.62 High  0.54 Low    Sodium  136 - 145 mmol/L 142 134 Low  139 136 134 Low  135 Low  139   Potassium  3.5 - 5.2 mmol/L 3.1 Low  5.1 CM 3.9 3.6 3.9 CM 4.5 CM 3.3 Low  CM   Chloride  98 - 107 mmol/L 104 105 106 106 101 99 102   CO2  22.0 - 29.0 mmol/L 30.0 High  18.0 Low  22.0 19.0 Low  20.0 Low  21.0 Low  28.0   Calcium  8.6 - 10.5 mg/dL 8.4 Low  8.6 9.0 9.0 8.6 9.3 8.7   BUN/Creatinine Ratio  7.0 - 25.0 19.4 27.2 High  27.1 High  23.8 23.4 24.1 20.4   Anion Gap  5.0 - 15.0 mmol/L 8.0 11.0 11.0 11.0 13.0 15.0 9.0   eGFR  >60.0 mL/min/1.73 54.0 Low  45.3 Low  38.2 Low  31.8 Low  31.1 Low  33.2 Low  96.7   Resulting Agency  [facility]              Narrative  Performed by:  [facility]  GFR Normal >60  Chronic Kidney Disease <60  Kidney Failure <15    The GFR formula is only valid for adults with stable renal function between ages 18 and 70.      Specimen Collected: 10/09/23 09:14 CDT Last Resulted: 10/09/23 12:59 CDT             ED Diagnosis:  Leg cramping; Chronic pain of lower extremity, unspecified laterality; Lymphedema; History of  hypokalemia    Disposition: to home  Follow up plan: PCP follow up within 4 days, wound care as an outpatient as needed for chronic lymphedema, return to ED immediately if symptoms worsen        Signed:  Terry Lux MD  Emergency Medicine Physician    Please note that portions of this note were completed with a voice recognition program.      Terry Lux MD  11/11/23 0235

## 2023-11-15 LAB
ALBUMIN SERPL-MCNC: 3.1 G/DL (ref 3.5–5.2)
ALP SERPL-CCNC: 225 U/L (ref 35–104)
ALT SERPL-CCNC: 17 U/L (ref 5–33)
ANION GAP SERPL CALCULATED.3IONS-SCNC: 10 MMOL/L (ref 7–19)
AST SERPL-CCNC: 44 U/L (ref 5–32)
BILIRUB SERPL-MCNC: 0.5 MG/DL (ref 0.2–1.2)
BUN SERPL-MCNC: 26 MG/DL (ref 8–23)
CALCIUM SERPL-MCNC: 8.7 MG/DL (ref 8.8–10.2)
CHLORIDE SERPL-SCNC: 104 MMOL/L (ref 98–111)
CO2 SERPL-SCNC: 25 MMOL/L (ref 22–29)
CREAT SERPL-MCNC: 1.3 MG/DL (ref 0.5–0.9)
GLUCOSE SERPL-MCNC: 97 MG/DL (ref 74–109)
POTASSIUM SERPL-SCNC: 4.2 MMOL/L (ref 3.5–5)
PROT SERPL-MCNC: 6.2 G/DL (ref 6.6–8.7)
SODIUM SERPL-SCNC: 139 MMOL/L (ref 136–145)

## 2023-11-29 LAB
BASOPHILS # BLD: 0 K/UL (ref 0–0.2)
BASOPHILS NFR BLD: 0.4 % (ref 0–1)
EOSINOPHIL # BLD: 0.1 K/UL (ref 0–0.6)
EOSINOPHIL NFR BLD: 1.8 % (ref 0–5)
ERYTHROCYTE [DISTWIDTH] IN BLOOD BY AUTOMATED COUNT: 18.8 % (ref 11.5–14.5)
HCT VFR BLD AUTO: 27.6 % (ref 37–47)
HGB BLD-MCNC: 9.7 G/DL (ref 12–16)
IMM GRANULOCYTES # BLD: 0 K/UL
LYMPHOCYTES # BLD: 1 K/UL (ref 1.1–4.5)
LYMPHOCYTES NFR BLD: 36.2 % (ref 20–40)
MCH RBC QN AUTO: 33.7 PG (ref 27–31)
MCHC RBC AUTO-ENTMCNC: 35.1 G/DL (ref 33–37)
MCV RBC AUTO: 95.8 FL (ref 81–99)
MONOCYTES # BLD: 0.5 K/UL (ref 0–0.9)
MONOCYTES NFR BLD: 16.7 % (ref 0–10)
NEUTROPHILS # BLD: 1.3 K/UL (ref 1.5–7.5)
NEUTS SEG NFR BLD: 44.5 % (ref 50–65)
PLATELET # BLD AUTO: 113 K/UL (ref 130–400)
PMV BLD AUTO: 10.6 FL (ref 9.4–12.3)
RBC # BLD AUTO: 2.88 M/UL (ref 4.2–5.4)
WBC # BLD AUTO: 2.8 K/UL (ref 4.8–10.8)

## 2024-01-05 ENCOUNTER — HOSPITAL ENCOUNTER (INPATIENT)
Facility: HOSPITAL | Age: 74
LOS: 7 days | Discharge: NURSING, REHAB OR LONG-TERM CARE | DRG: 683 | End: 2024-01-12
Attending: FAMILY MEDICINE | Admitting: FAMILY MEDICINE
Payer: MEDICARE

## 2024-01-05 ENCOUNTER — APPOINTMENT (OUTPATIENT)
Dept: GENERAL RADIOLOGY | Facility: HOSPITAL | Age: 74
DRG: 683 | End: 2024-01-05
Payer: MEDICARE
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
## 2024-01-05 DIAGNOSIS — N17.9 AKI (ACUTE KIDNEY INJURY): ICD-10-CM

## 2024-01-05 DIAGNOSIS — G89.4 CHRONIC PAIN SYNDROME: ICD-10-CM

## 2024-01-05 DIAGNOSIS — I89.0 LYMPHEDEMA: Primary | ICD-10-CM

## 2024-01-05 DIAGNOSIS — N39.0 ACUTE UTI: ICD-10-CM

## 2024-01-05 DIAGNOSIS — R60.9 CHRONIC EDEMA: ICD-10-CM

## 2024-01-05 DIAGNOSIS — S49.92XA INJURY OF LEFT SHOULDER, INITIAL ENCOUNTER: ICD-10-CM

## 2024-01-05 DIAGNOSIS — E87.1 HYPONATREMIA: ICD-10-CM

## 2024-01-05 DIAGNOSIS — Z74.09 IMPAIRED FUNCTIONAL MOBILITY AND ACTIVITY TOLERANCE: ICD-10-CM

## 2024-01-05 LAB
ALBUMIN SERPL-MCNC: 2.8 G/DL (ref 3.5–5.2)
ALBUMIN/GLOB SERPL: 0.9 G/DL
ALP SERPL-CCNC: 164 U/L (ref 39–117)
ALT SERPL W P-5'-P-CCNC: 20 U/L (ref 1–33)
ANION GAP SERPL CALCULATED.3IONS-SCNC: 14 MMOL/L (ref 5–15)
AST SERPL-CCNC: 48 U/L (ref 1–32)
B PARAPERT DNA SPEC QL NAA+PROBE: NOT DETECTED
B PERT DNA SPEC QL NAA+PROBE: NOT DETECTED
BACTERIA UR QL AUTO: ABNORMAL /HPF
BASOPHILS # BLD AUTO: 0.03 10*3/MM3 (ref 0–0.2)
BASOPHILS NFR BLD AUTO: 0.2 % (ref 0–1.5)
BILIRUB SERPL-MCNC: 1.8 MG/DL (ref 0–1.2)
BILIRUB UR QL STRIP: NEGATIVE
BUN SERPL-MCNC: 62 MG/DL (ref 8–23)
BUN/CREAT SERPL: 18.9 (ref 7–25)
C PNEUM DNA NPH QL NAA+NON-PROBE: NOT DETECTED
CALCIUM SPEC-SCNC: 8.7 MG/DL (ref 8.6–10.5)
CHLORIDE SERPL-SCNC: 92 MMOL/L (ref 98–107)
CLARITY UR: ABNORMAL
CO2 SERPL-SCNC: 22 MMOL/L (ref 22–29)
COLOR UR: ABNORMAL
CREAT SERPL-MCNC: 3.28 MG/DL (ref 0.57–1)
D-LACTATE SERPL-SCNC: 1.5 MMOL/L (ref 0.5–2)
D-LACTATE SERPL-SCNC: 2.1 MMOL/L (ref 0.5–2)
DEPRECATED RDW RBC AUTO: 47.2 FL (ref 37–54)
EGFRCR SERPLBLD CKD-EPI 2021: 14.2 ML/MIN/1.73
EOSINOPHIL # BLD AUTO: 0.27 10*3/MM3 (ref 0–0.4)
EOSINOPHIL NFR BLD AUTO: 2.2 % (ref 0.3–6.2)
ERYTHROCYTE [DISTWIDTH] IN BLOOD BY AUTOMATED COUNT: 16.9 % (ref 12.3–15.4)
FLUAV SUBTYP SPEC NAA+PROBE: NOT DETECTED
FLUBV RNA ISLT QL NAA+PROBE: NOT DETECTED
GLOBULIN UR ELPH-MCNC: 3 GM/DL
GLUCOSE SERPL-MCNC: 125 MG/DL (ref 65–99)
GLUCOSE UR STRIP-MCNC: NEGATIVE MG/DL
HADV DNA SPEC NAA+PROBE: NOT DETECTED
HCOV 229E RNA SPEC QL NAA+PROBE: NOT DETECTED
HCOV HKU1 RNA SPEC QL NAA+PROBE: NOT DETECTED
HCOV NL63 RNA SPEC QL NAA+PROBE: NOT DETECTED
HCOV OC43 RNA SPEC QL NAA+PROBE: NOT DETECTED
HCT VFR BLD AUTO: 28.8 % (ref 34–46.6)
HGB BLD-MCNC: 9.4 G/DL (ref 12–15.9)
HGB UR QL STRIP.AUTO: NEGATIVE
HMPV RNA NPH QL NAA+NON-PROBE: NOT DETECTED
HPIV1 RNA ISLT QL NAA+PROBE: NOT DETECTED
HPIV2 RNA SPEC QL NAA+PROBE: NOT DETECTED
HPIV3 RNA NPH QL NAA+PROBE: NOT DETECTED
HPIV4 P GENE NPH QL NAA+PROBE: NOT DETECTED
HYALINE CASTS UR QL AUTO: ABNORMAL /LPF
KETONES UR QL STRIP: NEGATIVE
LEUKOCYTE ESTERASE UR QL STRIP.AUTO: ABNORMAL
LYMPHOCYTES # BLD AUTO: 1.35 10*3/MM3 (ref 0.7–3.1)
LYMPHOCYTES NFR BLD AUTO: 11 % (ref 19.6–45.3)
M PNEUMO IGG SER IA-ACNC: NOT DETECTED
MCH RBC QN AUTO: 28 PG (ref 26.6–33)
MCHC RBC AUTO-ENTMCNC: 32.6 G/DL (ref 31.5–35.7)
MCV RBC AUTO: 85.7 FL (ref 79–97)
MONOCYTES # BLD AUTO: 1.92 10*3/MM3 (ref 0.1–0.9)
MONOCYTES NFR BLD AUTO: 15.6 % (ref 5–12)
NEUTROPHILS NFR BLD AUTO: 70.3 % (ref 42.7–76)
NEUTROPHILS NFR BLD AUTO: 8.64 10*3/MM3 (ref 1.7–7)
NITRITE UR QL STRIP: NEGATIVE
PH UR STRIP.AUTO: 5.5 [PH] (ref 5–8)
PLATELET # BLD AUTO: 142 10*3/MM3 (ref 140–450)
PMV BLD AUTO: 9.8 FL (ref 6–12)
POTASSIUM SERPL-SCNC: 3.4 MMOL/L (ref 3.5–5.2)
PROCALCITONIN SERPL-MCNC: 4.8 NG/ML (ref 0–0.25)
PROT SERPL-MCNC: 5.8 G/DL (ref 6–8.5)
PROT UR QL STRIP: NEGATIVE
RBC # BLD AUTO: 3.36 10*6/MM3 (ref 3.77–5.28)
RBC # UR STRIP: ABNORMAL /HPF
REF LAB TEST METHOD: ABNORMAL
RHINOVIRUS RNA SPEC NAA+PROBE: NOT DETECTED
RSV RNA NPH QL NAA+NON-PROBE: NOT DETECTED
SARS-COV-2 RNA NPH QL NAA+NON-PROBE: NOT DETECTED
SODIUM SERPL-SCNC: 128 MMOL/L (ref 136–145)
SP GR UR STRIP: 1.01 (ref 1–1.03)
SQUAMOUS #/AREA URNS HPF: ABNORMAL /HPF
UROBILINOGEN UR QL STRIP: ABNORMAL
WBC # UR STRIP: ABNORMAL /HPF
WBC CLUMPS # UR AUTO: ABNORMAL /HPF
WBC NRBC COR # BLD AUTO: 12.3 10*3/MM3 (ref 3.4–10.8)

## 2024-01-05 PROCEDURE — 73060 X-RAY EXAM OF HUMERUS: CPT

## 2024-01-05 PROCEDURE — P9612 CATHETERIZE FOR URINE SPEC: HCPCS

## 2024-01-05 PROCEDURE — 87186 SC STD MICRODIL/AGAR DIL: CPT | Performed by: FAMILY MEDICINE

## 2024-01-05 PROCEDURE — 85025 COMPLETE CBC W/AUTO DIFF WBC: CPT | Performed by: PHYSICIAN ASSISTANT

## 2024-01-05 PROCEDURE — 87086 URINE CULTURE/COLONY COUNT: CPT | Performed by: FAMILY MEDICINE

## 2024-01-05 PROCEDURE — 71045 X-RAY EXAM CHEST 1 VIEW: CPT

## 2024-01-05 PROCEDURE — 87077 CULTURE AEROBIC IDENTIFY: CPT | Performed by: FAMILY MEDICINE

## 2024-01-05 PROCEDURE — 36415 COLL VENOUS BLD VENIPUNCTURE: CPT

## 2024-01-05 PROCEDURE — 84145 PROCALCITONIN (PCT): CPT | Performed by: PHYSICIAN ASSISTANT

## 2024-01-05 PROCEDURE — 73030 X-RAY EXAM OF SHOULDER: CPT

## 2024-01-05 PROCEDURE — 0202U NFCT DS 22 TRGT SARS-COV-2: CPT | Performed by: PHYSICIAN ASSISTANT

## 2024-01-05 PROCEDURE — 73080 X-RAY EXAM OF ELBOW: CPT

## 2024-01-05 PROCEDURE — 87081 CULTURE SCREEN ONLY: CPT | Performed by: FAMILY MEDICINE

## 2024-01-05 PROCEDURE — 99285 EMERGENCY DEPT VISIT HI MDM: CPT

## 2024-01-05 PROCEDURE — 83605 ASSAY OF LACTIC ACID: CPT | Performed by: PHYSICIAN ASSISTANT

## 2024-01-05 PROCEDURE — 25810000003 SODIUM CHLORIDE 0.9 % SOLUTION: Performed by: PHYSICIAN ASSISTANT

## 2024-01-05 PROCEDURE — 25010000002 CEFTRIAXONE PER 250 MG: Performed by: PHYSICIAN ASSISTANT

## 2024-01-05 PROCEDURE — 81001 URINALYSIS AUTO W/SCOPE: CPT | Performed by: EMERGENCY MEDICINE

## 2024-01-05 PROCEDURE — 80053 COMPREHEN METABOLIC PANEL: CPT | Performed by: PHYSICIAN ASSISTANT

## 2024-01-05 PROCEDURE — 87040 BLOOD CULTURE FOR BACTERIA: CPT | Performed by: PHYSICIAN ASSISTANT

## 2024-01-05 RX ORDER — POTASSIUM CHLORIDE 1.5 G/1.58G
20 POWDER, FOR SOLUTION ORAL ONCE
Status: COMPLETED | OUTPATIENT
Start: 2024-01-05 | End: 2024-01-05

## 2024-01-05 RX ORDER — SODIUM CHLORIDE 0.9 % (FLUSH) 0.9 %
10 SYRINGE (ML) INJECTION EVERY 12 HOURS SCHEDULED
Status: DISCONTINUED | OUTPATIENT
Start: 2024-01-05 | End: 2024-01-12 | Stop reason: HOSPADM

## 2024-01-05 RX ORDER — SODIUM CHLORIDE 0.9 % (FLUSH) 0.9 %
10 SYRINGE (ML) INJECTION AS NEEDED
Status: DISCONTINUED | OUTPATIENT
Start: 2024-01-05 | End: 2024-01-12 | Stop reason: HOSPADM

## 2024-01-05 RX ORDER — SODIUM CHLORIDE AND POTASSIUM CHLORIDE 150; 900 MG/100ML; MG/100ML
100 INJECTION, SOLUTION INTRAVENOUS CONTINUOUS
Status: DISCONTINUED | OUTPATIENT
Start: 2024-01-05 | End: 2024-01-06

## 2024-01-05 RX ORDER — SODIUM CHLORIDE 9 MG/ML
40 INJECTION, SOLUTION INTRAVENOUS AS NEEDED
Status: DISCONTINUED | OUTPATIENT
Start: 2024-01-05 | End: 2024-01-12 | Stop reason: HOSPADM

## 2024-01-05 RX ORDER — SODIUM CHLORIDE 9 MG/ML
125 INJECTION, SOLUTION INTRAVENOUS CONTINUOUS
Status: DISCONTINUED | OUTPATIENT
Start: 2024-01-05 | End: 2024-01-06

## 2024-01-05 RX ADMIN — SODIUM CHLORIDE 1000 MG: 900 INJECTION INTRAVENOUS at 20:41

## 2024-01-05 RX ADMIN — SODIUM CHLORIDE 500 ML: 9 INJECTION, SOLUTION INTRAVENOUS at 20:00

## 2024-01-05 RX ADMIN — POTASSIUM CHLORIDE 20 MEQ: 1.5 POWDER, FOR SOLUTION ORAL at 21:55

## 2024-01-06 PROBLEM — E87.1 HYPONATREMIA: Status: ACTIVE | Noted: 2024-01-06

## 2024-01-06 PROBLEM — N18.31 CHRONIC KIDNEY DISEASE, STAGE 3A: Status: ACTIVE | Noted: 2024-01-06

## 2024-01-06 PROBLEM — G89.4 CHRONIC PAIN SYNDROME: Status: ACTIVE | Noted: 2024-01-06

## 2024-01-06 LAB
ANION GAP SERPL CALCULATED.3IONS-SCNC: 13 MMOL/L (ref 5–15)
BASOPHILS # BLD AUTO: 0.03 10*3/MM3 (ref 0–0.2)
BASOPHILS NFR BLD AUTO: 0.4 % (ref 0–1.5)
BUN SERPL-MCNC: 58 MG/DL (ref 8–23)
BUN/CREAT SERPL: 22.4 (ref 7–25)
CALCIUM SPEC-SCNC: 8.4 MG/DL (ref 8.6–10.5)
CHLORIDE SERPL-SCNC: 97 MMOL/L (ref 98–107)
CO2 SERPL-SCNC: 23 MMOL/L (ref 22–29)
CREAT SERPL-MCNC: 2.59 MG/DL (ref 0.57–1)
DEPRECATED RDW RBC AUTO: 47.5 FL (ref 37–54)
EGFRCR SERPLBLD CKD-EPI 2021: 18.8 ML/MIN/1.73
EOSINOPHIL # BLD AUTO: 0.28 10*3/MM3 (ref 0–0.4)
EOSINOPHIL NFR BLD AUTO: 3.7 % (ref 0.3–6.2)
ERYTHROCYTE [DISTWIDTH] IN BLOOD BY AUTOMATED COUNT: 16.9 % (ref 12.3–15.4)
GLUCOSE SERPL-MCNC: 103 MG/DL (ref 65–99)
HCT VFR BLD AUTO: 27 % (ref 34–46.6)
HGB BLD-MCNC: 8.8 G/DL (ref 12–15.9)
LYMPHOCYTES # BLD AUTO: 0.97 10*3/MM3 (ref 0.7–3.1)
LYMPHOCYTES NFR BLD AUTO: 12.7 % (ref 19.6–45.3)
MCH RBC QN AUTO: 27.5 PG (ref 26.6–33)
MCHC RBC AUTO-ENTMCNC: 32.6 G/DL (ref 31.5–35.7)
MCV RBC AUTO: 84.4 FL (ref 79–97)
MONOCYTES # BLD AUTO: 1.18 10*3/MM3 (ref 0.1–0.9)
MONOCYTES NFR BLD AUTO: 15.4 % (ref 5–12)
NEUTROPHILS NFR BLD AUTO: 5.09 10*3/MM3 (ref 1.7–7)
NEUTROPHILS NFR BLD AUTO: 66.5 % (ref 42.7–76)
PLATELET # BLD AUTO: 119 10*3/MM3 (ref 140–450)
PMV BLD AUTO: 9.8 FL (ref 6–12)
POTASSIUM SERPL-SCNC: 3.3 MMOL/L (ref 3.5–5.2)
RBC # BLD AUTO: 3.2 10*6/MM3 (ref 3.77–5.28)
SODIUM SERPL-SCNC: 133 MMOL/L (ref 136–145)
WBC NRBC COR # BLD AUTO: 7.65 10*3/MM3 (ref 3.4–10.8)

## 2024-01-06 PROCEDURE — 25010000002 SODIUM CHLORIDE 0.9 % WITH KCL 20 MEQ 20-0.9 MEQ/L-% SOLUTION: Performed by: EMERGENCY MEDICINE

## 2024-01-06 PROCEDURE — 85025 COMPLETE CBC W/AUTO DIFF WBC: CPT | Performed by: FAMILY MEDICINE

## 2024-01-06 PROCEDURE — 25810000003 SODIUM CHLORIDE 0.9 % SOLUTION: Performed by: FAMILY MEDICINE

## 2024-01-06 PROCEDURE — 25010000002 CEFTRIAXONE PER 250 MG: Performed by: FAMILY MEDICINE

## 2024-01-06 PROCEDURE — 94799 UNLISTED PULMONARY SVC/PX: CPT

## 2024-01-06 PROCEDURE — 80048 BASIC METABOLIC PNL TOTAL CA: CPT | Performed by: FAMILY MEDICINE

## 2024-01-06 PROCEDURE — 94664 DEMO&/EVAL PT USE INHALER: CPT

## 2024-01-06 PROCEDURE — 94640 AIRWAY INHALATION TREATMENT: CPT

## 2024-01-06 RX ORDER — BISACODYL 10 MG
10 SUPPOSITORY, RECTAL RECTAL DAILY PRN
Status: DISCONTINUED | OUTPATIENT
Start: 2024-01-06 | End: 2024-01-12 | Stop reason: HOSPADM

## 2024-01-06 RX ORDER — AMOXICILLIN 250 MG
2 CAPSULE ORAL 2 TIMES DAILY
Status: DISCONTINUED | OUTPATIENT
Start: 2024-01-06 | End: 2024-01-12 | Stop reason: HOSPADM

## 2024-01-06 RX ORDER — DULOXETIN HYDROCHLORIDE 30 MG/1
60 CAPSULE, DELAYED RELEASE ORAL 2 TIMES DAILY
Status: DISCONTINUED | OUTPATIENT
Start: 2024-01-06 | End: 2024-01-12 | Stop reason: HOSPADM

## 2024-01-06 RX ORDER — NYSTATIN 100000 [USP'U]/G
1 POWDER TOPICAL 2 TIMES DAILY
Status: DISCONTINUED | OUTPATIENT
Start: 2024-01-06 | End: 2024-01-12 | Stop reason: HOSPADM

## 2024-01-06 RX ORDER — SODIUM CHLORIDE 9 MG/ML
40 INJECTION, SOLUTION INTRAVENOUS AS NEEDED
Status: DISCONTINUED | OUTPATIENT
Start: 2024-01-06 | End: 2024-01-12 | Stop reason: HOSPADM

## 2024-01-06 RX ORDER — METOPROLOL SUCCINATE 25 MG/1
25 TABLET, EXTENDED RELEASE ORAL DAILY
Status: DISCONTINUED | OUTPATIENT
Start: 2024-01-06 | End: 2024-01-12 | Stop reason: HOSPADM

## 2024-01-06 RX ORDER — SODIUM CHLORIDE 9 MG/ML
100 INJECTION, SOLUTION INTRAVENOUS CONTINUOUS
Status: DISCONTINUED | OUTPATIENT
Start: 2024-01-06 | End: 2024-01-07

## 2024-01-06 RX ORDER — LEVOTHYROXINE SODIUM 0.1 MG/1
100 TABLET ORAL DAILY
Status: DISCONTINUED | OUTPATIENT
Start: 2024-01-06 | End: 2024-01-12 | Stop reason: HOSPADM

## 2024-01-06 RX ORDER — BISACODYL 5 MG/1
5 TABLET, DELAYED RELEASE ORAL DAILY PRN
Status: DISCONTINUED | OUTPATIENT
Start: 2024-01-06 | End: 2024-01-12 | Stop reason: HOSPADM

## 2024-01-06 RX ORDER — MELOXICAM 7.5 MG/1
7.5 TABLET ORAL DAILY PRN
COMMUNITY
End: 2024-01-12 | Stop reason: HOSPADM

## 2024-01-06 RX ORDER — ALBUTEROL SULFATE 2.5 MG/3ML
2.5 SOLUTION RESPIRATORY (INHALATION) 3 TIMES DAILY
Status: DISCONTINUED | OUTPATIENT
Start: 2024-01-06 | End: 2024-01-12 | Stop reason: HOSPADM

## 2024-01-06 RX ORDER — CYCLOBENZAPRINE HCL 10 MG
10 TABLET ORAL 3 TIMES DAILY PRN
Status: DISCONTINUED | OUTPATIENT
Start: 2024-01-06 | End: 2024-01-08

## 2024-01-06 RX ORDER — SODIUM CHLORIDE 1 G/1
1 TABLET ORAL
Status: DISCONTINUED | OUTPATIENT
Start: 2024-01-06 | End: 2024-01-07

## 2024-01-06 RX ORDER — MORPHINE SULFATE 15 MG/1
15 TABLET, FILM COATED, EXTENDED RELEASE ORAL EVERY 12 HOURS SCHEDULED
Status: DISCONTINUED | OUTPATIENT
Start: 2024-01-06 | End: 2024-01-12 | Stop reason: HOSPADM

## 2024-01-06 RX ORDER — POTASSIUM CHLORIDE 750 MG/1
40 CAPSULE, EXTENDED RELEASE ORAL ONCE
Status: COMPLETED | OUTPATIENT
Start: 2024-01-06 | End: 2024-01-06

## 2024-01-06 RX ORDER — PRAMIPEXOLE DIHYDROCHLORIDE 0.25 MG/1
0.75 TABLET ORAL NIGHTLY
Status: DISCONTINUED | OUTPATIENT
Start: 2024-01-06 | End: 2024-01-12 | Stop reason: HOSPADM

## 2024-01-06 RX ORDER — HYDROCODONE BITARTRATE AND ACETAMINOPHEN 7.5; 325 MG/1; MG/1
1 TABLET ORAL EVERY 8 HOURS PRN
Status: DISCONTINUED | OUTPATIENT
Start: 2024-01-06 | End: 2024-01-07

## 2024-01-06 RX ORDER — FUROSEMIDE 40 MG/1
40 TABLET ORAL DAILY PRN
Status: DISCONTINUED | OUTPATIENT
Start: 2024-01-06 | End: 2024-01-06

## 2024-01-06 RX ORDER — SODIUM CHLORIDE 0.9 % (FLUSH) 0.9 %
10 SYRINGE (ML) INJECTION EVERY 12 HOURS SCHEDULED
Status: DISCONTINUED | OUTPATIENT
Start: 2024-01-06 | End: 2024-01-12 | Stop reason: HOSPADM

## 2024-01-06 RX ORDER — SODIUM CHLORIDE 0.9 % (FLUSH) 0.9 %
10 SYRINGE (ML) INJECTION AS NEEDED
Status: DISCONTINUED | OUTPATIENT
Start: 2024-01-06 | End: 2024-01-12 | Stop reason: HOSPADM

## 2024-01-06 RX ORDER — MORPHINE SULFATE 15 MG/1
15 TABLET, FILM COATED, EXTENDED RELEASE ORAL EVERY 12 HOURS
Status: ON HOLD | COMMUNITY
End: 2024-01-12 | Stop reason: SDUPTHER

## 2024-01-06 RX ORDER — HYDROCODONE BITARTRATE AND ACETAMINOPHEN 7.5; 325 MG/1; MG/1
1 TABLET ORAL EVERY 6 HOURS PRN
Status: DISCONTINUED | OUTPATIENT
Start: 2024-01-06 | End: 2024-01-06

## 2024-01-06 RX ORDER — BISOPROLOL FUMARATE AND HYDROCHLOROTHIAZIDE 5; 6.25 MG/1; MG/1
1 TABLET ORAL DAILY
COMMUNITY
End: 2024-01-12 | Stop reason: HOSPADM

## 2024-01-06 RX ORDER — POLYETHYLENE GLYCOL 3350 17 G/17G
17 POWDER, FOR SOLUTION ORAL DAILY PRN
Status: DISCONTINUED | OUTPATIENT
Start: 2024-01-06 | End: 2024-01-12 | Stop reason: HOSPADM

## 2024-01-06 RX ORDER — FUROSEMIDE 20 MG/1
20 TABLET ORAL
Status: DISCONTINUED | OUTPATIENT
Start: 2024-01-06 | End: 2024-01-07

## 2024-01-06 RX ORDER — CYCLOBENZAPRINE HCL 10 MG
10 TABLET ORAL 3 TIMES DAILY PRN
Status: DISCONTINUED | OUTPATIENT
Start: 2024-01-06 | End: 2024-01-06

## 2024-01-06 RX ADMIN — SODIUM CHLORIDE TAB 1 GM 1 G: 1 TAB at 18:50

## 2024-01-06 RX ADMIN — Medication 10 ML: at 08:33

## 2024-01-06 RX ADMIN — Medication 10 ML: at 01:43

## 2024-01-06 RX ADMIN — SODIUM CHLORIDE TAB 1 GM 1 G: 1 TAB at 13:38

## 2024-01-06 RX ADMIN — MORPHINE SULFATE 15 MG: 15 TABLET, FILM COATED, EXTENDED RELEASE ORAL at 14:00

## 2024-01-06 RX ADMIN — SODIUM CHLORIDE TAB 1 GM 1 G: 1 TAB at 08:33

## 2024-01-06 RX ADMIN — Medication 10 ML: at 21:03

## 2024-01-06 RX ADMIN — DULOXETINE HYDROCHLORIDE 60 MG: 30 CAPSULE, DELAYED RELEASE ORAL at 21:04

## 2024-01-06 RX ADMIN — HYDROCODONE BITARTRATE AND ACETAMINOPHEN 1 TABLET: 7.5; 325 TABLET ORAL at 08:31

## 2024-01-06 RX ADMIN — POTASSIUM CHLORIDE 40 MEQ: 10 CAPSULE, COATED, EXTENDED RELEASE ORAL at 16:47

## 2024-01-06 RX ADMIN — DOCUSATE SODIUM 50 MG AND SENNOSIDES 8.6 MG 2 TABLET: 8.6; 5 TABLET, FILM COATED ORAL at 08:39

## 2024-01-06 RX ADMIN — APIXABAN 5 MG: 5 TABLET, FILM COATED ORAL at 21:04

## 2024-01-06 RX ADMIN — ALBUTEROL SULFATE 2.5 MG: 2.5 SOLUTION RESPIRATORY (INHALATION) at 07:33

## 2024-01-06 RX ADMIN — ALBUTEROL SULFATE 2.5 MG: 2.5 SOLUTION RESPIRATORY (INHALATION) at 21:35

## 2024-01-06 RX ADMIN — FUROSEMIDE 20 MG: 20 TABLET ORAL at 18:50

## 2024-01-06 RX ADMIN — POTASSIUM CHLORIDE AND SODIUM CHLORIDE 100 ML/HR: 900; 150 INJECTION, SOLUTION INTRAVENOUS at 01:43

## 2024-01-06 RX ADMIN — SODIUM CHLORIDE 1000 MG: 900 INJECTION INTRAVENOUS at 08:31

## 2024-01-06 RX ADMIN — NYSTATIN 1 APPLICATION: 100000 POWDER TOPICAL at 21:07

## 2024-01-06 RX ADMIN — PRAMIPEXOLE DIHYDROCHLORIDE 0.75 MG: 0.25 TABLET ORAL at 21:04

## 2024-01-06 RX ADMIN — LEVOTHYROXINE SODIUM 100 MCG: 100 TABLET ORAL at 08:32

## 2024-01-06 RX ADMIN — SODIUM CHLORIDE 100 ML/HR: 9 INJECTION, SOLUTION INTRAVENOUS at 08:40

## 2024-01-06 RX ADMIN — DULOXETINE HYDROCHLORIDE 60 MG: 30 CAPSULE, DELAYED RELEASE ORAL at 08:32

## 2024-01-06 RX ADMIN — APIXABAN 5 MG: 5 TABLET, FILM COATED ORAL at 08:31

## 2024-01-06 NOTE — ED NOTES
Nursing report ED to floor  Brenda Sneed  73 y.o.  female    HPI:   Chief Complaint   Patient presents with    FOUL URINE SMELL          Urinary Tract Infection       Admitting doctor:   Sachin Dia MD    Consulting provider(s):  Consults       No orders found from 12/7/2023 to 1/6/2024.             Admitting diagnosis:   The primary encounter diagnosis was Acute UTI. Diagnoses of CHRISTINE (acute kidney injury), Hyponatremia, Fall, initial encounter, Injury of left shoulder, initial encounter, and Chronic edema were also pertinent to this visit.    Code status:   Current Code Status       Date Active Code Status Order ID Comments User Context       Prior            Allergies:   Codeine, Amoxicillin, Levaquin [levofloxacin], and Benadryl [diphenhydramine]    Intake and Output  No intake or output data in the 24 hours ending 01/05/24 2244    Weight:       01/05/24  1855   Weight: 103 kg (226 lb)       Most recent vitals:   Vitals:    01/05/24 2019 01/05/24 2031 01/05/24 2132 01/05/24 2242   BP:  106/61  92/56   BP Location:    Left arm   Patient Position:    Lying   Pulse: 66  62 71   Resp:    20   Temp:    97.9 °F (36.6 °C)   SpO2: 100%  100% 98%   Weight:       Height:         Oxygen Therapy: .    Active LDAs/IV Access:   Lines, Drains & Airways       Active LDAs       Name Placement date Placement time Site Days    Peripheral IV 01/05/24 1955 Anterior;Proximal;Right Forearm 01/05/24 1955  Forearm  less than 1                    Labs (abnormal labs have a star):   Labs Reviewed   URINALYSIS W/ CULTURE IF INDICATED - Abnormal; Notable for the following components:       Result Value    Color, UA Dark Yellow (*)     Appearance, UA Cloudy (*)     Leuk Esterase, UA Small (1+) (*)     All other components within normal limits    Narrative:     In absence of clinical symptoms, the presence of pyuria, bacteria, and/or nitrites on the urinalysis result does not correlate with infection.   COMPREHENSIVE METABOLIC  "PANEL - Abnormal; Notable for the following components:    Glucose 125 (*)     BUN 62 (*)     Creatinine 3.28 (*)     Sodium 128 (*)     Potassium 3.4 (*)     Chloride 92 (*)     Total Protein 5.8 (*)     Albumin 2.8 (*)     AST (SGOT) 48 (*)     Alkaline Phosphatase 164 (*)     Total Bilirubin 1.8 (*)     eGFR 14.2 (*)     All other components within normal limits    Narrative:     GFR Normal >60  Chronic Kidney Disease <60  Kidney Failure <15    The GFR formula is only valid for adults with stable renal function between ages 18 and 70.   PROCALCITONIN - Abnormal; Notable for the following components:    Procalcitonin 4.80 (*)     All other components within normal limits    Narrative:     As a Marker for Sepsis (Non-Neonates):    1. <0.5 ng/mL represents a low risk of severe sepsis and/or septic shock.  2. >2 ng/mL represents a high risk of severe sepsis and/or septic shock.    As a Marker for Lower Respiratory Tract Infections that require antibiotic therapy:    PCT on Admission    Antibiotic Therapy       6-12 Hrs later    >0.5                Strongly Recommended  >0.25 - <0.5        Recommended   0.1 - 0.25          Discouraged              Remeasure/reassess PCT  <0.1                Strongly Discouraged     Remeasure/reassess PCT    As 28 day mortality risk marker: \"Change in Procalcitonin Result\" (>80% or <=80%) if Day 0 (or Day 1) and Day 4 values are available. Refer to http://www.[email or web address]-pct-calculator.com    Change in PCT <=80%  A decrease of PCT levels below or equal to 80% defines a positive change in PCT test result representing a higher risk for 28-day all-cause mortality of patients diagnosed with severe sepsis for septic shock.    Change in PCT >80%  A decrease of PCT levels of more than 80% defines a negative change in PCT result representing a lower risk for 28-day all-cause mortality of patients diagnosed with severe sepsis or septic shock.      LACTIC ACID, PLASMA - Abnormal; Notable for the " following components:    Lactate 2.1 (*)     All other components within normal limits   CBC WITH AUTO DIFFERENTIAL - Abnormal; Notable for the following components:    WBC 12.30 (*)     RBC 3.36 (*)     Hemoglobin 9.4 (*)     Hematocrit 28.8 (*)     RDW 16.9 (*)     Lymphocyte % 11.0 (*)     Monocyte % 15.6 (*)     Neutrophils, Absolute 8.64 (*)     Monocytes, Absolute 1.92 (*)     All other components within normal limits    Narrative:     Modified report. Previous result was Hemogram + Auto diff on 1/5/2024 at 2032 CST.   URINALYSIS, MICROSCOPIC ONLY - Abnormal; Notable for the following components:    WBC, UA 11-20 (*)     Bacteria, UA 4+ (*)     Squamous Epithelial Cells, UA 3-6 (*)     All other components within normal limits   RESPIRATORY PANEL PCR W/ COVID-19 (SARS-COV-2), NP SWAB IN UTM/VTP, 2 HR TAT - Normal    Narrative:     In the setting of a positive respiratory panel with a viral infection PLUS a negative procalcitonin without other underlying concern for bacterial infection, consider observing off antibiotics or discontinuation of antibiotics and continue supportive care. If the respiratory panel is positive for atypical bacterial infection (Bordetella pertussis, Chlamydophila pneumoniae, or Mycoplasma pneumoniae), consider antibiotic de-escalation to target atypical bacterial infection.   URINE CULTURE   BLOOD CULTURE   BLOOD CULTURE   LACTIC ACID, REFLEX   CBC AND DIFFERENTIAL    Narrative:     The following orders were created for panel order CBC & Differential.  Procedure                               Abnormality         Status                     ---------                               -----------         ------                     CBC Auto Differential[[id number]]        Abnormal            Edited Result - FINAL        Please view results for these tests on the individual orders.       Meds given in ED:   Medications   sodium chloride 0.9 % flush 10 mL (has no administration in time range)    sodium chloride 0.9 % infusion (has no administration in time range)   sodium chloride 0.9 % flush 10 mL (has no administration in time range)   sodium chloride 0.9 % flush 10 mL (has no administration in time range)   sodium chloride 0.9 % infusion 40 mL (has no administration in time range)   sodium chloride 0.9 % with KCl 20 mEq/L infusion (has no administration in time range)   sodium chloride 0.9 % bolus 500 mL (0 mL Intravenous Stopped 1/5/24 2041)   cefTRIAXone (ROCEPHIN) 1,000 mg in sodium chloride 0.9 % 100 mL IVPB (0 mg Intravenous Stopped 1/5/24 2137)   potassium chloride (KLOR-CON) packet 20 mEq (20 mEq Oral Given 1/5/24 2155)     sodium chloride, 125 mL/hr  sodium chloride 0.9 % with KCl 20 mEq, 100 mL/hr         NIH Stroke Scale:       Isolation/Infection(s):  No active isolations   ESBL E coli, COVID (rule out)     COVID Testing  Collected .  Resulted .    Nursing report ED to floor:  Mental status: .  Ambulatory status: .  Precautions: .    ED nurse phone extentsion- ..

## 2024-01-06 NOTE — H&P
History and Physical      CHIEF COMPLAINT: I do not feel well    Reason for Admission: Urinary tract infection present on admission/hyponatremia/chronic kidney disease with acute kidney injury    History Obtained From: Patient/ER records/office    HISTORY OF PRESENT ILLNESS:      The patient is a 73 y.o. female with significant past medical history of chronic pain syndrome with underlying chronic kidney disease hypertension and morbid obesity who presents with worsening of creatinine and decreased oral intake for the past 3 to 4 days.  Patient also describes cough cold congestion for about the same duration.  Patient has not been able to get out of bed.  She lives in a 1 bedroom apartment and cannot even get to the restroom.  With all the above she is brought into the emergency room with a significant increase in her creatinine to 3.3.  She had a positive urinalysis for infection as well as coughing yellowish-green sputum.  Chest x-ray and respiratory panel were unremarkable nonetheless we elected to admit Ms. Brenda Townsend for further investigative studies and delineation of care.  Of note her sodium was 125 and it has been low in the past and she has similar type of symptoms.  No other precipitating or relieving factors noted, her pain which is chronic was 4-5 out of 10.    Past Medical History:    Past Medical History:   Diagnosis Date    Arthritis     Bronchitis     Cataract     right eye , cornea implant    Chronic back pain     Depression     Disease of thyroid gland     Edema     lower extremities    Hypertension     Kidney stones     Lymphedema        Past Surgical History:    Past Surgical History:   Procedure Laterality Date    BACK SURGERY      2010 ([place]), 2013     CHOLECYSTECTOMY OPEN      CORNEAL TRANSPLANT Right 1999    INCISION AND DRAINAGE ABSCESS Left 4/19/2023    Procedure: INCISION AND DRAINAGE ABSCESS left leg;  Surgeon: Sarita Vazquez MD;  Location: [facility];  Service:  General;  Laterality: Left;    LACERATION REPAIR Left 4/7/2023    Procedure: washout and closure left leg wound ;  Surgeon: Sarita Vazquez MD;  Location:  [facility];  Service: General;  Laterality: Left;    LUMBAR FUSION Left 8/2/2017    Procedure: LEFT LUMBAR LATERAL INTERBODY FUSION WITH INSTRUMENTATION  L1-2;  Surgeon: MARCELINO Wright MD;  Location:  [facility];  Service:     NECK SURGERY  2010    [place]    TOTAL HIP ARTHROPLASTY Right 2014    DR. KENDRICK     TOTAL KNEE ARTHROPLASTY Right 2000    TOTAL KNEE ARTHROPLASTY Left 2002    TOTAL SHOULDER ARTHROPLASTY W/ DISTAL CLAVICLE EXCISION Left 6/1/2020    Procedure: LEFT REVERSE TOTAL SHOULDER REPLACEMENT;  Surgeon: Saravanan Calvin MD;  Location:  [facility];  Service: Orthopedics;  Laterality: Left;       Medications Prior to Admission:    Medications Prior to Admission   Medication Sig Dispense Refill Last Dose    albuterol (PROVENTIL) (2.5 MG/3ML) 0.083% nebulizer solution Take 2.5 mg by nebulization 3 (Three) Times a Day.       apixaban (ELIQUIS) 5 MG tablet tablet Take 1 tablet by mouth Every 12 (Twelve) Hours. Indications: Atrial Fibrillation 180 tablet 1     azelastine (ASTELIN) 0.1 % nasal spray 2 sprays into the nostril(s) as directed by provider 2 (Two) Times a Day. Use in each nostril as directed       cyclobenzaprine (FLEXERIL) 10 MG tablet Take 1 tablet by mouth 3 (Three) Times a Day As Needed for Muscle Spasms. 30 tablet 0     docusate sodium (COLACE) 100 MG capsule Take 1 capsule by mouth 2 (Two) Times a Day As Needed for Constipation.       DULoxetine (CYMBALTA) 60 MG capsule Take 1 capsule by mouth 2 (Two) Times a Day. Indications: Musculoskeletal Pain       furosemide (LASIX) 40 MG tablet Take 1 tablet by mouth Daily As Needed (edema). Indications: Edema       levothyroxine (SYNTHROID, LEVOTHROID) 100 MCG tablet Take 1 tablet by mouth Daily. Indications: Underactive Thyroid       metoprolol succinate XL (Toprol XL) 25 MG 24 hr tablet Take 1  tablet by mouth Daily. 90 tablet 1     nystatin (MYCOSTATIN) 413007 UNIT/GM powder Apply 1 application  topically to the appropriate area as directed 2 (Two) Times a Day. Indications: Candida Infection of Skin and Mouth or Vagina       oxybutynin XL (DITROPAN-XL) 5 MG 24 hr tablet Take 1 tablet by mouth Every Night. Take at night  Indications: Urinary Incontinence       pramipexole (MIRAPEX) 0.75 MG tablet Take 1 tablet by mouth Every Night. Indications: Restless Leg Syndrome 90 tablet 1        Allergies:  Codeine, Amoxicillin, Levaquin [levofloxacin], and Benadryl [diphenhydramine]    Social History:   Social History     Socioeconomic History    Marital status:    Tobacco Use    Smoking status: Never    Smokeless tobacco: Never   Vaping Use    Vaping Use: Never used   Substance and Sexual Activity    Alcohol use: Not Currently    Drug use: No    Sexual activity: Defer       Family History:   Family History   Problem Relation Age of Onset    Breast cancer Neg Hx        REVIEW OF SYSTEMS:    CONSTITUTIONAL:  Negative for anorexia, chills, fevers, night sweats and weight loss, multiple somatic body complaints.  Patient has been relatively medically stable until illness outlined in HPI  EYES:  negative for eye dryness, icterus and redness, no significant changes in vision  HEENT:   negative for dental problems, epistaxis or sinus congestion , no history of facial trauma or difficulty swallowing  RESPIRATORY: See HPI  CARDIOVASCULAR: No history of chest pain, dyspnea, exertional chest pressure/discomfort, irregular heart beat, or PND  GASTROINTESTINAL:  negative for abdominal pain, nausea or vomiting, no history of hematemesis, jaundice, melena or rectal bleeding  MUSCULOSKELETAL:  negative for muscle weakness, myalgias and neck pain, no significant arthralgias  NEUROLOGICAL:   negative for dizziness, headaches, seizures, speech problems, tremors and vertigo, mentation has been at baseline  INTEGUMENT: negative  for pruritus, rash, skin color change and skin lesion(s)       Vital Signs   Temp:  [97.3 °F (36.3 °C)-98.6 °F (37 °C)] 98 °F (36.7 °C)  Heart Rate:  [58-71] 58  Resp:  [18-20] 18  BP: ()/(41-64) 95/41          Physical Exam:  Constitutional: The patient is oriented to person, place, and time. They appear well-developed.  Able to answer questions appropriately  HEENT: Grossly normal sitting up in bed  Head: Normocephalic and atraumatic.   Eyes: Pupils are equal, round, and reactive to light.  Extraocular muscles appear to be intact  Neck: Neck supple without masses or carotid bruit.  Cardiovascular: Regular rhythm and normal heart sounds.  No extra or lift rub or murmur appreciated  Pulmonary/Chest: Effort normal and breath sounds normal. CTAB, no appreciable rales or wheezes, cough nonproductive  Abdominal: Soft. Bowel sounds are normal. There is  no distension or tenderness appreciated. There is no rebound and no guarding.   Musculoskeletal: Normal range of motion. There is  no edema or tenderness.  No significant joint swelling  Neurological: Pt is alert and oriented to person, place, and time. They have normal reflexes. CN 2-12 appear grossly intact  Skin: Skin is warm and dry without significant rashes     Results Review:   I reviewed the patient's new imaging results and agree with the interpretation.    DATA:  Lab Results (last 24 hours)       Procedure Component Value Units Date/Time    STAT Lactic Acid, Reflex [[id number]]  (Normal) Collected: 01/05/24 2247    Specimen: Blood Updated: 01/05/24 2306     Lactate 1.5 mmol/L     Blood Culture - Blood, Arm, Right [[id number]] Collected: 01/05/24 2041    Specimen: Blood from Arm, Right Updated: 01/05/24 2054    Blood Culture - Blood, Arm, Right [[id number]] Collected: 01/05/24 2041    Specimen: Blood from Arm, Right Updated: 01/05/24 2054    Respiratory Panel PCR w/COVID-19(SARS-CoV-2) [facility] In-House, NP Swab in UTM/VTM, 2 HR TAT - Swab,  Nasopharynx [[id number]]  (Normal) Collected: 01/05/24 1939    Specimen: Swab from Nasopharynx Updated: 01/05/24 2039     ADENOVIRUS, PCR Not Detected     Coronavirus 229E Not Detected     Coronavirus HKU1 Not Detected     Coronavirus NL63 Not Detected     Coronavirus OC43 Not Detected     COVID19 Not Detected     Human Metapneumovirus Not Detected     Human Rhinovirus/Enterovirus Not Detected     Influenza A PCR Not Detected     Influenza B PCR Not Detected     Parainfluenza Virus 1 Not Detected     Parainfluenza Virus 2 Not Detected     Parainfluenza Virus 3 Not Detected     Parainfluenza Virus 4 Not Detected     RSV, PCR Not Detected     Bordetella pertussis pcr Not Detected     Bordetella parapertussis PCR Not Detected     Chlamydophila pneumoniae PCR Not Detected     Mycoplasma pneumo by PCR Not Detected    Narrative:      In the setting of a positive respiratory panel with a viral infection PLUS a negative procalcitonin without other underlying concern for bacterial infection, consider observing off antibiotics or discontinuation of antibiotics and continue supportive care. If the respiratory panel is positive for atypical bacterial infection (Bordetella pertussis, Chlamydophila pneumoniae, or Mycoplasma pneumoniae), consider antibiotic de-escalation to target atypical bacterial infection.    CBC & Differential [[id number]]  (Abnormal) Collected: 01/05/24 1937    Specimen: Blood Updated: 01/05/24 2033    Narrative:      The following orders were created for panel order CBC & Differential.  Procedure                               Abnormality         Status                     ---------                               -----------         ------                     CBC Auto Differential[[id number]]        Abnormal            Edited Result - FINAL        Please view results for these tests on the individual orders.    CBC Auto Differential [[id number]]  (Abnormal) Collected: 01/05/24 1937    Specimen: Blood Updated:  "01/05/24 2033     WBC 12.30 10*3/mm3      RBC 3.36 10*6/mm3      Hemoglobin 9.4 g/dL      Hematocrit 28.8 %      MCV 85.7 fL      MCH 28.0 pg      MCHC 32.6 g/dL      RDW 16.9 %      RDW-SD 47.2 fl      MPV 9.8 fL      Platelets 142 10*3/mm3      Neutrophil % 70.3 %      Lymphocyte % 11.0 %      Monocyte % 15.6 %      Eosinophil % 2.2 %      Basophil % 0.2 %      Neutrophils, Absolute 8.64 10*3/mm3      Lymphocytes, Absolute 1.35 10*3/mm3      Monocytes, Absolute 1.92 10*3/mm3      Eosinophils, Absolute 0.27 10*3/mm3      Basophils, Absolute 0.03 10*3/mm3     Narrative:      Modified report. Previous result was Hemogram + Auto diff on 1/5/2024 at 2032 CST.    Procalcitonin [[id number]]  (Abnormal) Collected: 01/05/24 1937    Specimen: Blood Updated: 01/05/24 2030     Procalcitonin 4.80 ng/mL     Narrative:      As a Marker for Sepsis (Non-Neonates):    1. <0.5 ng/mL represents a low risk of severe sepsis and/or septic shock.  2. >2 ng/mL represents a high risk of severe sepsis and/or septic shock.    As a Marker for Lower Respiratory Tract Infections that require antibiotic therapy:    PCT on Admission    Antibiotic Therapy       6-12 Hrs later    >0.5                Strongly Recommended  >0.25 - <0.5        Recommended   0.1 - 0.25          Discouraged              Remeasure/reassess PCT  <0.1                Strongly Discouraged     Remeasure/reassess PCT    As 28 day mortality risk marker: \"Change in Procalcitonin Result\" (>80% or <=80%) if Day 0 (or Day 1) and Day 4 values are available. Refer to http://www.[facility]-pct-calculator.com    Change in PCT <=80%  A decrease of PCT levels below or equal to 80% defines a positive change in PCT test result representing a higher risk for 28-day all-cause mortality of patients diagnosed with severe sepsis for septic shock.    Change in PCT >80%  A decrease of PCT levels of more than 80% defines a negative change in PCT result representing a lower risk for 28-day all-cause " mortality of patients diagnosed with severe sepsis or septic shock.       Comprehensive Metabolic Panel [[id number]]  (Abnormal) Collected: 01/05/24 1937    Specimen: Blood Updated: 01/05/24 2024     Glucose 125 mg/dL      BUN 62 mg/dL      Creatinine 3.28 mg/dL      Sodium 128 mmol/L      Potassium 3.4 mmol/L      Chloride 92 mmol/L      CO2 22.0 mmol/L      Calcium 8.7 mg/dL      Total Protein 5.8 g/dL      Albumin 2.8 g/dL      ALT (SGPT) 20 U/L      AST (SGOT) 48 U/L      Alkaline Phosphatase 164 U/L      Total Bilirubin 1.8 mg/dL      Globulin 3.0 gm/dL      A/G Ratio 0.9 g/dL      BUN/Creatinine Ratio 18.9     Anion Gap 14.0 mmol/L      eGFR 14.2 mL/min/1.73      Comment: <15 Indicative of kidney failure       Narrative:      GFR Normal >60  Chronic Kidney Disease <60  Kidney Failure <15    The GFR formula is only valid for adults with stable renal function between ages 18 and 70.    Lactic Acid, Plasma [[id number]]  (Abnormal) Collected: 01/05/24 1937    Specimen: Blood Updated: 01/05/24 2023     Lactate 2.1 mmol/L     Urinalysis With Culture If Indicated - Straight Cath [[id number]]  (Abnormal) Collected: 01/05/24 1926    Specimen: Urine from Straight Cath Updated: 01/05/24 1959     Color, UA Dark Yellow     Appearance, UA Cloudy     pH, UA 5.5     Specific Gravity, UA 1.014     Glucose, UA Negative     Ketones, UA Negative     Bilirubin, UA Negative     Blood, UA Negative     Protein, UA Negative     Leuk Esterase, UA Small (1+)     Nitrite, UA Negative     Urobilinogen, UA 1.0 E.U./dL    Narrative:      In absence of clinical symptoms, the presence of pyuria, bacteria, and/or nitrites on the urinalysis result does not correlate with infection.    Urinalysis, Microscopic Only - Straight Cath [[id number]]  (Abnormal) Collected: 01/05/24 1926    Specimen: Urine from Straight Cath Updated: 01/05/24 1959     RBC, UA 0-2 /HPF      WBC, UA 11-20 /HPF      Bacteria, UA 4+ /HPF      Squamous Epithelial Cells, UA 3-6  /HPF      Hyaline Casts, UA 0-2 /LPF      WBC Clumps, UA Moderate/2+ /HPF      Methodology Manual Light Microscopy    Urine Culture - Urine, Straight Cath [[id number]] Collected: 01/05/24 1926    Specimen: Urine from Straight Cath Updated: 01/05/24 1959          Imaging Results (Last 24 Hours)       Procedure Component Value Units Date/Time    XR Shoulder 2+ View Left [[id number]] Collected: 01/05/24 2013     Updated: 01/05/24 2018    Narrative:      EXAMINATION: XR SHOULDER 2+ VW LEFT-  1/5/2024 8:14 PM     HISTORY: Fall with pain.     FINDINGS: 2 view exam of the left shoulder reveals the patient has  undergone reverse total shoulder arthroplasty. It appears that one of  the anchor screws may be fractured but this I feel was present on a  previous chest radiograph from September of last year. There is normal  articulation of the prosthesis. I do not see any evidence of  periprosthetic fracture. 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       Impression:      1. No acute fracture or dislocation.     This report was signed and finalized on 1/5/2024 8:15 PM by Dr. Elian Bonilla MD.       XR Elbow 3+ View Left [[id number]] Collected: 01/05/24 2013     Updated: 01/05/24 2016    Narrative:      LEFT ELBOW, 2 views 1/5/2024 6:49 PM     HISTORY: fall pain     COMPARISON: None      FINDINGS:   Frontal and lateral views of the left elbow were obtained.      There is no fracture or dislocation. No significant joint space  narrowing is noted. There is no significant joint effusion.       No gross soft tissue abnormality is visualized.        Impression:      1. Unremarkable radiographs of the left elbow.           This report was signed and finalized on 1/5/2024 8:13 PM by Dr. Elian Bonilla MD.       XR Humerus Left [[id number]] Collected: 01/05/24 2012     Updated: 01/05/24 2016    Narrative:      EXAMINATION: XR HUMERUS LEFT-  1/5/2024 8:12 PM     HISTORY: Fall with pain.     FINDINGS: 2 view exam of the left humerus reveals  the patient has  undergone previous reverse left total shoulder arthroplasty. There is  normal articulation at the shoulder. No acute bony injury.       Impression:      1. No acute fracture.     This report was signed and finalized on 1/5/2024 8:12 PM by Dr. Elian Bonilla MD.       XR Chest 1 View [[id number]] Collected: 01/1950     Updated: 01/05/24 1952    Narrative:      EXAMINATION: XR CHEST 1 VW-  1/5/2024 7:48 PM     HISTORY: Cough. HISTORY of atrial fibrillation.     FINDINGS: Upright frontal projection of the chest is compared to prior  exam of 9/30/2023. Right basilar atelectasis is present with mild  elevation of the right diaphragm. Lungs are otherwise clear. The  thoracic aorta and great vessels are tortuous.       Impression:      1.. Mild right basilar atelectasis. Lungs are otherwise clear.     This report was signed and finalized on 1/5/2024 7:49 PM by Dr. Elian Bonilla MD.                 ASSESSMENT AND PLAN:      1.     CHRISTINE (acute kidney injury)    HTN (hypertension)    Obesity, Class III, BMI 40-49.9 (morbid obesity)    Anxiety associated with depression    Chronic pain syndrome    Hyponatremia    Chronic kidney disease, stage 3a    2.   Hyponatremia-hypovolemic, gently rehydrate, salt tablets and Lasix for now recheck sodium in AM.  3.   Chronic kidney disease stage IIIa with acute kidney injury-creatinine usually 1 5, currently at 3.3.  Avoid nephrotoxic medications gently rehydrate recheck in AM.  4.   Upper respiratory/acute bronchitis-chest x-ray and pulmonary PCR panel are unremarkable, symptomatic treatment with Mucinex nebulized treatments and the Rocephin for her urinary tract infection present on admission would cover most respiratory issues.  5.  Acute cystitis/hematuria present on admission-culture urine continue with IV Rocephin for now.    Patient with chronic pain and multiple somatic body complaints which have been chronic in nature, judicious use of opioid analgesics  and sedating medications especially with respiratory condition.  Encourage PT and OT up in a chair and ambulate in hallways.  Conference with nursing staff at bedside today.      Sachin Dia MD  07:14 CST 1/6/2024

## 2024-01-06 NOTE — PLAN OF CARE
Goal Outcome Evaluation:  Plan of Care Reviewed With: patient        Progress: no change  Outcome Evaluation: New admit from ED. IVF as ordered. Safety maintained. Purewick in place. PRN pain meds ordered. Unna boots in place, pt refused removal on admission to floor.

## 2024-01-06 NOTE — ED PROVIDER NOTES
Subjective   History of Present Illness    Patient is a pleasant 73-year-old female who presents to ED with king.  Both are historians.  Chief complaint is not feeling well with possible UTI and cough and congestion.    Patient does have significant medical history of chronic lower extremity pain with lymphedema, bronchitis, chronic back pain, disease of thyroid gland, hypertension, kidney stones, recurrent UTIs.    Niece describes for the past 4 to 5 days, she has felt unwell.  She has had cough and congestion.  She resides in a small 1 bedroom apartment on her own.  She normally does not wear oxygen.  She is eating less and is less mobile.  Particularly, she is less ambulatory in the past 24 hours.  She often presents like this whenever she has a urinary tract infection.  Niece noted a malodorous urine.  Patient denies abdominal pain, chest pain, pressure, tightness.  She is on Eliquis for history of paroxysmal atrial fibrillation.  She denies any shortness of breath but does complain of a scant cough.  Niece is more concerned that she has a UTI.  Patient really did not want to be here but felt unwell, so she wanted to come to the hospital.    Review of Systems   Constitutional:  Positive for activity change, appetite change and fatigue. Negative for fever.   HENT: Negative.     Respiratory:  Positive for cough. Negative for chest tightness and stridor.    Cardiovascular: Negative.  Negative for chest pain.   Gastrointestinal: Negative.  Negative for abdominal pain, nausea and vomiting.   Genitourinary: Negative.    Musculoskeletal: Negative.    Skin: Negative.    Neurological:  Positive for weakness.   Psychiatric/Behavioral: Negative.     All other systems reviewed and are negative.      Past Medical History:   Diagnosis Date    Arthritis     Bronchitis     Cataract     right eye , cornea implant    Chronic back pain     Depression     Disease of thyroid gland     Edema     lower extremities    Hypertension      Kidney stones     Lymphedema        Allergies   Allergen Reactions    Codeine Itching and Rash     SEVERE RASH/ITCHING (TOLERATES PERCOCET)    Amoxicillin Other (See Comments)     unknown    Levaquin [Levofloxacin] Other (See Comments)     unknown    Benadryl [Diphenhydramine] Other (See Comments)     KEEPS PATIENT AWAKE        Past Surgical History:   Procedure Laterality Date    BACK SURGERY      2010 ([place]), 2013     CHOLECYSTECTOMY OPEN      CORNEAL TRANSPLANT Right 1999    INCISION AND DRAINAGE ABSCESS Left 4/19/2023    Procedure: INCISION AND DRAINAGE ABSCESS left leg;  Surgeon: Sarita Vazquez MD;  Location:  [facility];  Service: General;  Laterality: Left;    LACERATION REPAIR Left 4/7/2023    Procedure: washout and closure left leg wound ;  Surgeon: Sarita Vazquez MD;  Location:  [facility];  Service: General;  Laterality: Left;    LUMBAR FUSION Left 8/2/2017    Procedure: LEFT LUMBAR LATERAL INTERBODY FUSION WITH INSTRUMENTATION  L1-2;  Surgeon: MARCELINO Wright MD;  Location:  [facility];  Service:     NECK SURGERY  2010    [place]    TOTAL HIP ARTHROPLASTY Right 2014    DR. KENDRICK     TOTAL KNEE ARTHROPLASTY Right 2000    TOTAL KNEE ARTHROPLASTY Left 2002    TOTAL SHOULDER ARTHROPLASTY W/ DISTAL CLAVICLE EXCISION Left 6/1/2020    Procedure: LEFT REVERSE TOTAL SHOULDER REPLACEMENT;  Surgeon: Saravanan Calvin MD;  Location:  [facility];  Service: Orthopedics;  Laterality: Left;       Family History   Problem Relation Age of Onset    Breast cancer Neg Hx        Social History     Socioeconomic History    Marital status:    Tobacco Use    Smoking status: Never    Smokeless tobacco: Never   Vaping Use    Vaping Use: Never used   Substance and Sexual Activity    Alcohol use: No    Drug use: No    Sexual activity: Defer       Prior to Admission medications    Medication Sig Start Date End Date Taking? Authorizing Provider   albuterol (PROVENTIL) (2.5 MG/3ML) 0.083% nebulizer solution Take 2.5 mg  by nebulization 3 (Three) Times a Day.    Lacie Hernandez MD   apixaban (ELIQUIS) 5 MG tablet tablet Take 1 tablet by mouth Every 12 (Twelve) Hours. Indications: Atrial Fibrillation 10/6/23   Sanjiv Block MD   azelastine (ASTELIN) 0.1 % nasal spray 2 sprays into the nostril(s) as directed by provider 2 (Two) Times a Day. Use in each nostril as directed    Lacie Hernandez MD   cyclobenzaprine (FLEXERIL) 10 MG tablet Take 1 tablet by mouth 3 (Three) Times a Day As Needed for Muscle Spasms. 6/1/20   Saravanan Calvin MD   docusate sodium (COLACE) 100 MG capsule Take 1 capsule by mouth 2 (Two) Times a Day As Needed for Constipation.    Lacie Hernandez MD   DULoxetine (CYMBALTA) 60 MG capsule Take 1 capsule by mouth 2 (Two) Times a Day. Indications: Musculoskeletal Pain    Lacie Hernandez MD   furosemide (LASIX) 40 MG tablet Take 1 tablet by mouth Daily As Needed (edema). Indications: Edema 6/12/23   Lacie Hernandez MD   levothyroxine (SYNTHROID, LEVOTHROID) 100 MCG tablet Take 1 tablet by mouth Daily. Indications: Underactive Thyroid    Lacie Hernandez MD   metoprolol succinate XL (Toprol XL) 25 MG 24 hr tablet Take 1 tablet by mouth Daily. 10/6/23   Sanjiv Block MD   nystatin (MYCOSTATIN) 884732 UNIT/GM powder Apply 1 application  topically to the appropriate area as directed 2 (Two) Times a Day. Indications: Candida Infection of Skin and Mouth or Vagina    Lacie Hernandez MD   oxybutynin XL (DITROPAN-XL) 5 MG 24 hr tablet Take 1 tablet by mouth Every Night. Take at night  Indications: Urinary Incontinence 7/10/23   Lacie Hernandez MD   pramipexole (MIRAPEX) 0.75 MG tablet Take 1 tablet by mouth Every Night. Indications: Restless Leg Syndrome 10/6/23   Sanjiv Block MD       Medications   sodium chloride 0.9 % flush 10 mL (has no administration in time range)   sodium chloride 0.9 % infusion (has no administration in time range)   sodium chloride 0.9 %  "flush 10 mL (has no administration in time range)   sodium chloride 0.9 % flush 10 mL (has no administration in time range)   sodium chloride 0.9 % infusion 40 mL (has no administration in time range)   sodium chloride 0.9 % with KCl 20 mEq/L infusion (has no administration in time range)   sodium chloride 0.9 % bolus 500 mL (0 mL Intravenous Stopped 1/5/24 2041)   cefTRIAXone (ROCEPHIN) 1,000 mg in sodium chloride 0.9 % 100 mL IVPB (0 mg Intravenous Stopped 1/5/24 2137)   potassium chloride (KLOR-CON) packet 20 mEq (20 mEq Oral Given 1/5/24 2155)       /61   Pulse 62   Temp 98.6 °F (37 °C)   Resp 20   Ht 160 cm (63\")   Wt 103 kg (226 lb)   SpO2 100%   BMI 40.03 kg/m²       Objective   Physical Exam  Vitals and nursing note reviewed.   Constitutional:       General: She is not in acute distress.     Appearance: Normal appearance. She is well-developed. She is obese. She is not diaphoretic.   HENT:      Head: Normocephalic and atraumatic.   Eyes:      Extraocular Movements: Extraocular movements intact.      Conjunctiva/sclera: Conjunctivae normal.      Pupils: Pupils are equal, round, and reactive to light.   Neck:      Trachea: No tracheal deviation.   Cardiovascular:      Rate and Rhythm: Normal rate and regular rhythm.      Heart sounds: Normal heart sounds. No murmur heard.  Pulmonary:      Effort: Pulmonary effort is normal. No respiratory distress.      Breath sounds: Normal breath sounds. No stridor. No wheezing, rhonchi or rales.   Chest:      Chest wall: No tenderness.   Abdominal:      General: Bowel sounds are normal. There is no distension.      Palpations: Abdomen is soft. There is no mass.      Tenderness: There is no abdominal tenderness. There is no guarding or rebound.   Musculoskeletal:         General: Tenderness and signs of injury present.      Cervical back: Normal range of motion and neck supple.      Right lower leg: Edema present.      Left lower leg: Edema present.      " Comments: Patient would not raise left upper extremity.  She is tender to palpate on the left glenohumeral space, proximal humerus, and left olecranon process.  Palpable pulses bilaterally.  Normal sensation.   Skin:     General: Skin is warm and dry.      Capillary Refill: Capillary refill takes less than 2 seconds.   Neurological:      General: No focal deficit present.      Mental Status: She is alert and oriented to person, place, and time.      Deep Tendon Reflexes: Reflexes are normal and symmetric.   Psychiatric:         Behavior: Behavior normal.         Thought Content: Thought content normal.         Judgment: Judgment normal.         Procedures         Lab Results (last 24 hours)       Procedure Component Value Units Date/Time    Urinalysis With Culture If Indicated - Straight Cath [[id number]]  (Abnormal) Collected: 01/05/24 1926    Specimen: Urine from Straight Cath Updated: 01/05/24 1959     Color, UA Dark Yellow     Appearance, UA Cloudy     pH, UA 5.5     Specific Gravity, UA 1.014     Glucose, UA Negative     Ketones, UA Negative     Bilirubin, UA Negative     Blood, UA Negative     Protein, UA Negative     Leuk Esterase, UA Small (1+)     Nitrite, UA Negative     Urobilinogen, UA 1.0 E.U./dL    Narrative:      In absence of clinical symptoms, the presence of pyuria, bacteria, and/or nitrites on the urinalysis result does not correlate with infection.    Urinalysis, Microscopic Only - Straight Cath [[id number]]  (Abnormal) Collected: 01/05/24 1926    Specimen: Urine from Straight Cath Updated: 01/05/24 1959     RBC, UA 0-2 /HPF      WBC, UA 11-20 /HPF      Bacteria, UA 4+ /HPF      Squamous Epithelial Cells, UA 3-6 /HPF      Hyaline Casts, UA 0-2 /LPF      WBC Clumps, UA Moderate/2+ /HPF      Methodology Manual Light Microscopy    Urine Culture - Urine, Straight Cath [[id number]] Collected: 01/05/24 1926    Specimen: Urine from Straight Cath Updated: 01/05/24 1959    CBC & Differential [[id number]]   (Abnormal) Collected: 01/05/24 1937    Specimen: Blood Updated: 01/05/24 2033    Narrative:      The following orders were created for panel order CBC & Differential.  Procedure                               Abnormality         Status                     ---------                               -----------         ------                     CBC Auto Differential[[id number]]        Abnormal            Edited Result - FINAL        Please view results for these tests on the individual orders.    Comprehensive Metabolic Panel [[id number]]  (Abnormal) Collected: 01/05/24 1937    Specimen: Blood Updated: 01/05/24 2024     Glucose 125 mg/dL      BUN 62 mg/dL      Creatinine 3.28 mg/dL      Sodium 128 mmol/L      Potassium 3.4 mmol/L      Chloride 92 mmol/L      CO2 22.0 mmol/L      Calcium 8.7 mg/dL      Total Protein 5.8 g/dL      Albumin 2.8 g/dL      ALT (SGPT) 20 U/L      AST (SGOT) 48 U/L      Alkaline Phosphatase 164 U/L      Total Bilirubin 1.8 mg/dL      Globulin 3.0 gm/dL      A/G Ratio 0.9 g/dL      BUN/Creatinine Ratio 18.9     Anion Gap 14.0 mmol/L      eGFR 14.2 mL/min/1.73      Comment: <15 Indicative of kidney failure       Narrative:      GFR Normal >60  Chronic Kidney Disease <60  Kidney Failure <15    The GFR formula is only valid for adults with stable renal function between ages 18 and 70.    Procalcitonin [[id number]]  (Abnormal) Collected: 01/05/24 1937    Specimen: Blood Updated: 01/05/24 2030     Procalcitonin 4.80 ng/mL     Narrative:      As a Marker for Sepsis (Non-Neonates):    1. <0.5 ng/mL represents a low risk of severe sepsis and/or septic shock.  2. >2 ng/mL represents a high risk of severe sepsis and/or septic shock.    As a Marker for Lower Respiratory Tract Infections that require antibiotic therapy:    PCT on Admission    Antibiotic Therapy       6-12 Hrs later    >0.5                Strongly Recommended  >0.25 - <0.5        Recommended   0.1 - 0.25          Discouraged               "Remeasure/reassess PCT  <0.1                Strongly Discouraged     Remeasure/reassess PCT    As 28 day mortality risk marker: \"Change in Procalcitonin Result\" (>80% or <=80%) if Day 0 (or Day 1) and Day 4 values are available. Refer to http://www.[facility]-pct-calculator.com    Change in PCT <=80%  A decrease of PCT levels below or equal to 80% defines a positive change in PCT test result representing a higher risk for 28-day all-cause mortality of patients diagnosed with severe sepsis for septic shock.    Change in PCT >80%  A decrease of PCT levels of more than 80% defines a negative change in PCT result representing a lower risk for 28-day all-cause mortality of patients diagnosed with severe sepsis or septic shock.       Lactic Acid, Plasma [[id number]]  (Abnormal) Collected: 01/05/24 1937    Specimen: Blood Updated: 01/05/24 2023     Lactate 2.1 mmol/L     CBC Auto Differential [[id number]]  (Abnormal) Collected: 01/05/24 1937    Specimen: Blood Updated: 01/05/24 2033     WBC 12.30 10*3/mm3      RBC 3.36 10*6/mm3      Hemoglobin 9.4 g/dL      Hematocrit 28.8 %      MCV 85.7 fL      MCH 28.0 pg      MCHC 32.6 g/dL      RDW 16.9 %      RDW-SD 47.2 fl      MPV 9.8 fL      Platelets 142 10*3/mm3      Neutrophil % 70.3 %      Lymphocyte % 11.0 %      Monocyte % 15.6 %      Eosinophil % 2.2 %      Basophil % 0.2 %      Neutrophils, Absolute 8.64 10*3/mm3      Lymphocytes, Absolute 1.35 10*3/mm3      Monocytes, Absolute 1.92 10*3/mm3      Eosinophils, Absolute 0.27 10*3/mm3      Basophils, Absolute 0.03 10*3/mm3     Narrative:      Modified report. Previous result was Hemogram + Auto diff on 1/5/2024 at 2032 CST.    Respiratory Panel PCR w/COVID-19(SARS-CoV-2) [facility], NP Swab in UTM/VTM, 2 HR TAT - Swab, Nasopharynx [[id number]]  (Normal) Collected: 01/05/24 1939    Specimen: Swab from Nasopharynx Updated: 01/05/24 2039     ADENOVIRUS, PCR Not Detected     Coronavirus 229E Not Detected     " Coronavirus HKU1 Not Detected     Coronavirus NL63 Not Detected     Coronavirus OC43 Not Detected     COVID19 Not Detected     Human Metapneumovirus Not Detected     Human Rhinovirus/Enterovirus Not Detected     Influenza A PCR Not Detected     Influenza B PCR Not Detected     Parainfluenza Virus 1 Not Detected     Parainfluenza Virus 2 Not Detected     Parainfluenza Virus 3 Not Detected     Parainfluenza Virus 4 Not Detected     RSV, PCR Not Detected     Bordetella pertussis pcr Not Detected     Bordetella parapertussis PCR Not Detected     Chlamydophila pneumoniae PCR Not Detected     Mycoplasma pneumo by PCR Not Detected    Narrative:      In the setting of a positive respiratory panel with a viral infection PLUS a negative procalcitonin without other underlying concern for bacterial infection, consider observing off antibiotics or discontinuation of antibiotics and continue supportive care. If the respiratory panel is positive for atypical bacterial infection (Bordetella pertussis, Chlamydophila pneumoniae, or Mycoplasma pneumoniae), consider antibiotic de-escalation to target atypical bacterial infection.    Blood Culture - Blood, Arm, Right [[id number]] Collected: 01/05/24 2041    Specimen: Blood from Arm, Right Updated: 01/05/24 2054    Blood Culture - Blood, Arm, Right [[id number]] Collected: 01/05/24 2041    Specimen: Blood from Arm, Right Updated: 01/05/24 2054            XR Shoulder 2+ View Left    Result Date: 1/5/2024  Narrative: EXAMINATION: XR SHOULDER 2+ VW LEFT-  1/5/2024 8:14 PM  HISTORY: Fall with pain.  FINDINGS: 2 view exam of the left shoulder reveals the patient has undergone reverse total shoulder arthroplasty. It appears that one of the anchor screws may be fractured but this I feel was present on a previous chest radiograph from September of last year. There is normal articulation of the prosthesis. I do not see any evidence of periprosthetic fracture. 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       Impression: 1. No acute fracture or dislocation.  This report was signed and finalized on 1/5/2024 8:15 PM by Dr. Elian Bonilla MD.      XR Elbow 3+ View Left    Result Date: 1/5/2024  Narrative: LEFT ELBOW, 2 views 1/5/2024 6:49 PM  HISTORY: fall pain  COMPARISON: None  FINDINGS: Frontal and lateral views of the left elbow were obtained.  There is no fracture or dislocation. No significant joint space narrowing is noted. There is no significant joint effusion.   No gross soft tissue abnormality is visualized.      Impression: 1. Unremarkable radiographs of the left elbow.    This report was signed and finalized on 1/5/2024 8:13 PM by Dr. Elian Bonilla MD.      XR Humerus Left    Result Date: 1/5/2024  Narrative: EXAMINATION: XR HUMERUS LEFT-  1/5/2024 8:12 PM  HISTORY: Fall with pain.  FINDINGS: 2 view exam of the left humerus reveals the patient has undergone previous reverse left total shoulder arthroplasty. There is normal articulation at the shoulder. No acute bony injury.      Impression: 1. No acute fracture.  This report was signed and finalized on 1/5/2024 8:12 PM by Dr. Elian Bonilla MD.      XR Chest 1 View    Result Date: 1/5/2024  Narrative: EXAMINATION: XR CHEST 1 VW-  1/5/2024 7:48 PM  HISTORY: Cough. HISTORY of atrial fibrillation.  FINDINGS: Upright frontal projection of the chest is compared to prior exam of 9/30/2023. Right basilar atelectasis is present with mild elevation of the right diaphragm. Lungs are otherwise clear. The thoracic aorta and great vessels are tortuous.      Impression: 1.. Mild right basilar atelectasis. Lungs are otherwise clear.  This report was signed and finalized on 1/5/2024 7:49 PM by Dr. Elian Bonilla MD.       ED Course  ED Course as of 01/05/24 2219   Fri Jan 05, 2024   2018 rine Culture - Urine, Urine, Catheter [[id number]] (Order [id number])  Order  Date: 9/30/2023 Department: [address] Released By: Lab, Background User  (auto-released) Authorizing: Barrett Wray Jr., MD    Reprint Order Requisition    Urine Culture - Urine, Urine, Catheter (Order #[id number]) on 9/30/23        Contains abnormal data Urine Culture - Urine, Urine, Catheter  Order: [id number] - Reflex for Order [id number]  Status: Final result       Visible to patient: No (not released)       Next appt: None    Specimen Information: Urine, Catheter  0 Result Notes  Urine Culture   >100,000 CFU/mL Klebsiella pneumoniae ssp pneumoniae Abnormal       Colonization of the urinary tract without infection is common. Treatment is discouraged unless the patient is symptomatic, pregnant, or undergoing an invasive urologic procedure.      Resulting Agency: [facility]    Susceptibility       Klebsiella pneumoniae ssp pneumoniae    CHRISTIANO    Ampicillin >=32 ug/ml Resistant    Ampicillin + Sulbactam 4 ug/ml Susceptible    Cefazolin <=4 ug/ml Susceptible    Cefepime <=1 ug/ml Susceptible    Ceftazidime <=1 ug/ml Susceptible    Ceftriaxone <=1 ug/ml Susceptible    Gentamicin <=1 ug/ml Susceptible    Levofloxacin <=0.12 ug/ml Susceptible    Nitrofurantoin <=16 ug/ml Susceptible    Piperacillin + Tazobactam <=4 ug/ml Susceptible    Trimethoprim + Sulfamethoxazole <=20 ug/ml Susceptible               Linear View           [TK]   7154 I have spoken with the patient, her niece, and her daughter, Dominga Law on the phone, at great length of her abnormal studies consistent with a UTI.  I did evaluate her previous urinalysis and she was responsive to ceftriaxone.  Patient's kidney functions have been monitored for the past couple months and is declining.  She does have CHRISTINE with her urine infection today.  Procalcitonin is elevated 4.8.  WBCs 4.3.  Respiratory panel is negative.  Lactate is elevated 2.1.  I discussed this case with Dr. Medellin, the primary care provider.  He is gracious admit the patient under his care.    Also of note, the patient has been guarding her left arm after  several falls.  I am concerned she is developing a frozen shoulder. [TK]      ED Course User Index  [TK] Charo Bhat PA          MDM     Amount and/or Complexity of Data Reviewed  Decide to obtain previous medical records or to obtain history from someone other than the patient: yes        Final diagnoses:   Acute UTI   CHRISTINE (acute kidney injury)   Hyponatremia   Fall, initial encounter   Injury of left shoulder, initial encounter   Chronic edema       Disposition: Patient will be admitted under Dr. Medellin's care.       Charo Bhat PA  01/05/24 3388

## 2024-01-06 NOTE — CASE MANAGEMENT/SOCIAL WORK
Discharge Planning Assessment  [facility]     Patient Name: Brenda Sneed  MRN: [id number]  Today's Date: 1/6/2024    Admit Date: 1/5/2024        Discharge Needs Assessment       Row Name 01/06/24 0902       Living Environment    People in Home alone    Current Living Arrangements home    Potentially Unsafe Housing Conditions none    Primary Care Provided by self    Provides Primary Care For no one    Family Caregiver if Needed child(shasha), adult    Family Caregiver Names dtr.    Quality of Family Relationships helpful;involved;supportive    Able to Return to Prior Arrangements yes       Resource/Environmental Concerns    Resource/Environmental Concerns none       Transition Planning    Patient/Family Anticipates Transition to home    Patient/Family Anticipated Services at Transition home health care    Transportation Anticipated family or friend will provide       Discharge Needs Assessment    Readmission Within the Last 30 Days no previous admission in last 30 days    Current Outpatient/Agency/Support Group homecare agency  Pt is current with [facility].  SW verified with Breana at [facility]; they will need resumption orders upon dc.  [phone].    Equipment Currently Used at Home rollator;nebulizer    Concerns to be Addressed no discharge needs identified    Anticipated Changes Related to Illness none    Equipment Needed After Discharge none    Current Discharge Risk lives alone    Discharge Coordination/Progress Pt resides alone.  Pt has PCP, RX coverage and can afford medications.  Pt is current with [facility] and plans to return home and resume these services.  SW will follow for resumption orders.                   Discharge Plan    No documentation.                 Continued Care and Services - Admitted Since 1/5/2024       Home Medical Care Coordination complete.      Service Provider Request Status Selected Services Address Phone Fax Patient Preferred    [facility]  Selected Home Nursing  [address] [place] [address] [address] [address] [phone] --                     Demographic Summary    No documentation.                  Functional Status    No documentation.                  Psychosocial    No documentation.                  Abuse/Neglect    No documentation.                  Legal    No documentation.                  Substance Abuse    No documentation.                  Patient Forms    No documentation.                     Silvia Gore, PEDROW

## 2024-01-07 LAB
ANION GAP SERPL CALCULATED.3IONS-SCNC: 13 MMOL/L (ref 5–15)
BASOPHILS # BLD AUTO: 0.04 10*3/MM3 (ref 0–0.2)
BASOPHILS NFR BLD AUTO: 0.4 % (ref 0–1.5)
BUN SERPL-MCNC: 51 MG/DL (ref 8–23)
BUN/CREAT SERPL: 26.8 (ref 7–25)
CALCIUM SPEC-SCNC: 8.5 MG/DL (ref 8.6–10.5)
CHLORIDE SERPL-SCNC: 100 MMOL/L (ref 98–107)
CO2 SERPL-SCNC: 22 MMOL/L (ref 22–29)
CREAT SERPL-MCNC: 1.9 MG/DL (ref 0.57–1)
DEPRECATED RDW RBC AUTO: 48.4 FL (ref 37–54)
EGFRCR SERPLBLD CKD-EPI 2021: 27.3 ML/MIN/1.73
EOSINOPHIL # BLD AUTO: 0.21 10*3/MM3 (ref 0–0.4)
EOSINOPHIL NFR BLD AUTO: 2.1 % (ref 0.3–6.2)
ERYTHROCYTE [DISTWIDTH] IN BLOOD BY AUTOMATED COUNT: 18.1 % (ref 12.3–15.4)
GLUCOSE SERPL-MCNC: 101 MG/DL (ref 65–99)
HCT VFR BLD AUTO: 25.6 % (ref 34–46.6)
HGB BLD-MCNC: 8.7 G/DL (ref 12–15.9)
IMM GRANULOCYTES # BLD AUTO: 0.21 10*3/MM3 (ref 0–0.05)
IMM GRANULOCYTES NFR BLD AUTO: 2.1 % (ref 0–0.5)
LYMPHOCYTES # BLD AUTO: 1.46 10*3/MM3 (ref 0.7–3.1)
LYMPHOCYTES NFR BLD AUTO: 14.6 % (ref 19.6–45.3)
MCH RBC QN AUTO: 29.4 PG (ref 26.6–33)
MCHC RBC AUTO-ENTMCNC: 34 G/DL (ref 31.5–35.7)
MCV RBC AUTO: 86.5 FL (ref 79–97)
MONOCYTES # BLD AUTO: 1.68 10*3/MM3 (ref 0.1–0.9)
MONOCYTES NFR BLD AUTO: 16.8 % (ref 5–12)
NEUTROPHILS NFR BLD AUTO: 6.41 10*3/MM3 (ref 1.7–7)
NEUTROPHILS NFR BLD AUTO: 64 % (ref 42.7–76)
NRBC BLD AUTO-RTO: 0.2 /100 WBC (ref 0–0.2)
OSMOLALITY UR: 357 MOSM/KG (ref 50–1400)
PLATELET # BLD AUTO: 122 10*3/MM3 (ref 140–450)
PMV BLD AUTO: 10.2 FL (ref 6–12)
POTASSIUM SERPL-SCNC: 3.8 MMOL/L (ref 3.5–5.2)
RBC # BLD AUTO: 2.96 10*6/MM3 (ref 3.77–5.28)
SODIUM SERPL-SCNC: 135 MMOL/L (ref 136–145)
SODIUM UR-SCNC: <20 MMOL/L
WBC NRBC COR # BLD AUTO: 10.01 10*3/MM3 (ref 3.4–10.8)

## 2024-01-07 PROCEDURE — 80048 BASIC METABOLIC PNL TOTAL CA: CPT | Performed by: FAMILY MEDICINE

## 2024-01-07 PROCEDURE — 85025 COMPLETE CBC W/AUTO DIFF WBC: CPT | Performed by: FAMILY MEDICINE

## 2024-01-07 PROCEDURE — 83935 ASSAY OF URINE OSMOLALITY: CPT | Performed by: FAMILY MEDICINE

## 2024-01-07 PROCEDURE — 94799 UNLISTED PULMONARY SVC/PX: CPT

## 2024-01-07 PROCEDURE — 25010000002 CEFTRIAXONE PER 250 MG: Performed by: FAMILY MEDICINE

## 2024-01-07 PROCEDURE — 25810000003 SODIUM CHLORIDE 0.9 % SOLUTION: Performed by: FAMILY MEDICINE

## 2024-01-07 PROCEDURE — 84300 ASSAY OF URINE SODIUM: CPT | Performed by: FAMILY MEDICINE

## 2024-01-07 RX ORDER — GUAIFENESIN AND DEXTROMETHORPHAN HYDROBROMIDE 600; 30 MG/1; MG/1
2 TABLET, EXTENDED RELEASE ORAL EVERY 12 HOURS SCHEDULED
Status: DISCONTINUED | OUTPATIENT
Start: 2024-01-07 | End: 2024-01-12 | Stop reason: HOSPADM

## 2024-01-07 RX ORDER — FUROSEMIDE 40 MG/1
40 TABLET ORAL DAILY PRN
Status: DISCONTINUED | OUTPATIENT
Start: 2024-01-07 | End: 2024-01-12 | Stop reason: HOSPADM

## 2024-01-07 RX ORDER — TRAMADOL HYDROCHLORIDE 50 MG/1
50 TABLET ORAL EVERY 8 HOURS PRN
Status: DISCONTINUED | OUTPATIENT
Start: 2024-01-07 | End: 2024-01-08

## 2024-01-07 RX ADMIN — PRAMIPEXOLE DIHYDROCHLORIDE 0.75 MG: 0.25 TABLET ORAL at 21:51

## 2024-01-07 RX ADMIN — MORPHINE SULFATE 15 MG: 15 TABLET, FILM COATED, EXTENDED RELEASE ORAL at 08:33

## 2024-01-07 RX ADMIN — APIXABAN 5 MG: 5 TABLET, FILM COATED ORAL at 08:33

## 2024-01-07 RX ADMIN — ALBUTEROL SULFATE 2.5 MG: 2.5 SOLUTION RESPIRATORY (INHALATION) at 07:27

## 2024-01-07 RX ADMIN — Medication 10 ML: at 08:47

## 2024-01-07 RX ADMIN — DULOXETINE HYDROCHLORIDE 60 MG: 30 CAPSULE, DELAYED RELEASE ORAL at 08:33

## 2024-01-07 RX ADMIN — APIXABAN 5 MG: 5 TABLET, FILM COATED ORAL at 21:51

## 2024-01-07 RX ADMIN — ALBUTEROL SULFATE 2.5 MG: 2.5 SOLUTION RESPIRATORY (INHALATION) at 13:40

## 2024-01-07 RX ADMIN — LEVOTHYROXINE SODIUM 100 MCG: 100 TABLET ORAL at 06:32

## 2024-01-07 RX ADMIN — Medication 10 ML: at 21:50

## 2024-01-07 RX ADMIN — GUAIFENESIN AND DEXTROMETHORPHAN HYDROBROMIDE 2 TABLET: 600; 30 TABLET, EXTENDED RELEASE ORAL at 21:51

## 2024-01-07 RX ADMIN — NYSTATIN 1 APPLICATION: 100000 POWDER TOPICAL at 21:53

## 2024-01-07 RX ADMIN — GUAIFENESIN AND DEXTROMETHORPHAN HYDROBROMIDE 2 TABLET: 600; 30 TABLET, EXTENDED RELEASE ORAL at 08:33

## 2024-01-07 RX ADMIN — SODIUM CHLORIDE 1000 MG: 900 INJECTION INTRAVENOUS at 08:33

## 2024-01-07 RX ADMIN — SODIUM CHLORIDE 100 ML/HR: 9 INJECTION, SOLUTION INTRAVENOUS at 06:30

## 2024-01-07 RX ADMIN — DULOXETINE HYDROCHLORIDE 60 MG: 30 CAPSULE, DELAYED RELEASE ORAL at 21:51

## 2024-01-07 RX ADMIN — ALBUTEROL SULFATE 2.5 MG: 2.5 SOLUTION RESPIRATORY (INHALATION) at 21:45

## 2024-01-07 NOTE — PROGRESS NOTES
Brenda Sneed is a 73 y.o. female patient.    Current Facility-Administered Medications   Medication Dose Route Frequency Provider Last Rate Last Admin    albuterol (PROVENTIL) nebulizer solution 0.083% 2.5 mg/3mL  2.5 mg Nebulization TID Sachin Dia MD   2.5 mg at 01/06/24 2135    apixaban (ELIQUIS) tablet 5 mg  5 mg Oral Q12H Sachin Dia MD   5 mg at 01/06/24 2104    sennosides-docusate (PERICOLACE) 8.6-50 MG per tablet 2 tablet  2 tablet Oral BID Sachin Dia MD   2 tablet at 01/06/24 0839    And    polyethylene glycol (MIRALAX) packet 17 g  17 g Oral Daily PRN Sachin Dia MD        And    bisacodyl (DULCOLAX) EC tablet 5 mg  5 mg Oral Daily PRN Sachin Dia MD        And    bisacodyl (DULCOLAX) suppository 10 mg  10 mg Rectal Daily PRN Sachin Dia MD        Calcium Replacement - Follow Nurse / BPA Driven Protocol   Does not apply PRN Sachin Dia MD        cefTRIAXone (ROCEPHIN) 1,000 mg in sodium chloride 0.9 % 100 mL IVPB  1,000 mg Intravenous Daily Sachin iDa  mL/hr at 01/06/24 0831 1,000 mg at 01/06/24 0831    cyclobenzaprine (FLEXERIL) tablet 10 mg  10 mg Oral TID PRN Sachin Dia MD        DULoxetine (CYMBALTA) DR capsule 60 mg  60 mg Oral BID Sachin Dia MD   60 mg at 01/06/24 2104    furosemide (LASIX) tablet 20 mg  20 mg Oral BID Sachin Dia MD   20 mg at 01/06/24 1850    HYDROcodone-acetaminophen (NORCO) 7.5-325 MG per tablet 1 tablet  1 tablet Oral Q8H PRN Hector Tubbs APRN        levothyroxine (SYNTHROID, LEVOTHROID) tablet 100 mcg  100 mcg Oral Daily Sachin Dia MD   100 mcg at 01/06/24 0832    Magnesium Standard Dose Replacement - Follow Nurse / BPA Driven Protocol   Does not apply PRN Sachin Dia MD        metoprolol succinate XL (TOPROL-XL) 24 hr tablet 25 mg  25 mg Oral Daily Sachin Dia MD        Morphine (MS CONTIN) 12 hr tablet 15 mg  15 mg  Oral Q12H Hector Tubbs APRN   15 mg at 01/06/24 1400    nystatin (MYCOSTATIN) powder 1 application   1 application  Topical BID Sachin Dia MD   1 application  at 01/06/24 2107    Phosphorus Replacement - Follow Nurse / BPA Driven Protocol   Does not apply PRN Sachin Dia MD        Potassium Replacement - Follow Nurse / BPA Driven Protocol   Does not apply PRN Sachin Dia MD        pramipexole (MIRAPEX) tablet 0.75 mg  0.75 mg Oral Nightly Sachin Dia MD   0.75 mg at 01/06/24 2104    sodium chloride 0.9 % flush 10 mL  10 mL Intravenous PRN Charo Bhat PA        sodium chloride 0.9 % flush 10 mL  10 mL Intravenous Q12H Barrett Wray Jr., MD   10 mL at 01/06/24 2103    sodium chloride 0.9 % flush 10 mL  10 mL Intravenous PRN Barrett Wray Jr., MD        sodium chloride 0.9 % flush 10 mL  10 mL Intravenous Q12H Sachin Dia MD   10 mL at 01/06/24 2103    sodium chloride 0.9 % flush 10 mL  10 mL Intravenous PRN Sachin Dia MD        sodium chloride 0.9 % infusion 40 mL  40 mL Intravenous PRN Barrett Wray Jr., MD        sodium chloride 0.9 % infusion 40 mL  40 mL Intravenous PRN Sachin Dia MD        sodium chloride 0.9 % infusion  100 mL/hr Intravenous Continuous Sachin Dia  mL/hr at 01/06/24 0840 100 mL/hr at 01/06/24 0840    sodium chloride tablet 1 g  1 g Oral TID With Meals Sachin Dia MD   1 g at 01/06/24 1850     ALLERGIES:    Allergies   Allergen Reactions    Codeine Itching and Rash     SEVERE RASH/ITCHING (TOLERATES PERCOCET)    Amoxicillin Other (See Comments)     unknown    Levaquin [Levofloxacin] Other (See Comments)     unknown    Benadryl [Diphenhydramine] Other (See Comments)     KEEPS PATIENT AWAKE        CHRISTINE (acute kidney injury)    HTN (hypertension)    Obesity, Class III, BMI 40-49.9 (morbid obesity)    Anxiety associated with depression    Chronic pain syndrome     "Hyponatremia    Chronic kidney disease, stage 3a    Blood pressure 101/46, pulse 77, temperature 98.4 °F (36.9 °C), temperature source Oral, resp. rate 18, height 160 cm (63\"), weight 101 kg (223 lb 1.7 oz), SpO2 98%, not currently breastfeeding.      Subjective:  Symptoms:  Improved.  She reports weakness and anxiety.    Diet:  Adequate intake.    Activity level: Impaired due to weakness.    Pain:  She complains of pain that is moderate.  She reports pain is unchanged.  Pain is partially controlled.      Review of Systems   Neurological:  Positive for weakness.     Objective:  General Appearance:  Comfortable, well-appearing and in no acute distress.    Vital signs: (most recent): Blood pressure 101/46, pulse 77, temperature 98.4 °F (36.9 °C), temperature source Oral, resp. rate 18, height 160 cm (63\"), weight 101 kg (223 lb 1.7 oz), SpO2 98%, not currently breastfeeding.  Vital signs are normal.    Output: Producing urine and minimal stool output.    HEENT: Normal HEENT exam.  (Edentulous)    Lungs:  Normal effort and normal respiratory rate.  There are wheezes.  (Deep inspiration produces a cough)  Heart: Normal rate.    Abdomen: Abdomen is soft.  (Obese).  Bowel sounds are normal.   There is no abdominal tenderness.     Extremities: Normal range of motion.    Pulses: Distal pulses are intact.    Neurological: Patient is alert and oriented to person, place and time.    Skin:  Warm and dry.                Labs:  Lab Results (last 72 hours)       Procedure Component Value Units Date/Time    Basic Metabolic Panel [[id number]]  (Abnormal) Collected: 01/07/24 0532    Specimen: Blood Updated: 01/07/24 0611     Glucose 101 mg/dL      BUN 51 mg/dL      Creatinine 1.90 mg/dL      Sodium 135 mmol/L      Potassium 3.8 mmol/L      Comment: Slight hemolysis detected by analyzer. Result may be falsely elevated.        Chloride 100 mmol/L      CO2 22.0 mmol/L      Calcium 8.5 mg/dL      BUN/Creatinine Ratio 26.8     Anion Gap " 13.0 mmol/L      eGFR 27.3 mL/min/1.73     Narrative:      GFR Normal >60  Chronic Kidney Disease <60  Kidney Failure <15    The GFR formula is only valid for adults with stable renal function between ages 18 and 70.    Manual Differential [[id number]] Collected: 01/07/24 0532    Specimen: Blood Updated: 01/07/24 0546    CBC & Differential [[id number]] Collected: 01/07/24 0532    Specimen: Blood Updated: 01/07/24 0539    Narrative:      The following orders were created for panel order CBC & Differential.  Procedure                               Abnormality         Status                     ---------                               -----------         ------                     CBC Auto Differential[[id number]]                            In process                   Please view results for these tests on the individual orders.    CBC Auto Differential [[id number]] Collected: 01/07/24 0532    Specimen: Blood Updated: 01/07/24 0539    Blood Culture - Blood, Arm, Right [[id number]]  (Normal) Collected: 01/05/24 2041    Specimen: Blood from Arm, Right Updated: 01/06/24 2100     Blood Culture No growth at 24 hours    Blood Culture - Blood, Arm, Right [[id number]]  (Normal) Collected: 01/05/24 2041    Specimen: Blood from Arm, Right Updated: 01/06/24 2100     Blood Culture No growth at 24 hours    CBC & Differential [[id number]]  (Abnormal) Collected: 01/06/24 1221    Specimen: Blood Updated: 01/06/24 1305    Narrative:      The following orders were created for panel order CBC & Differential.  Procedure                               Abnormality         Status                     ---------                               -----------         ------                     CBC Auto Differential[[id number]]        Abnormal            Final result                 Please view results for these tests on the individual orders.    CBC Auto Differential [[id number]]  (Abnormal) Collected: 01/06/24 1221    Specimen: Blood Updated: 01/06/24 1305      WBC 7.65 10*3/mm3      RBC 3.20 10*6/mm3      Hemoglobin 8.8 g/dL      Hematocrit 27.0 %      MCV 84.4 fL      MCH 27.5 pg      MCHC 32.6 g/dL      RDW 16.9 %      RDW-SD 47.5 fl      MPV 9.8 fL      Platelets 119 10*3/mm3      Neutrophil % 66.5 %      Lymphocyte % 12.7 %      Monocyte % 15.4 %      Eosinophil % 3.7 %      Basophil % 0.4 %      Neutrophils, Absolute 5.09 10*3/mm3      Lymphocytes, Absolute 0.97 10*3/mm3      Monocytes, Absolute 1.18 10*3/mm3      Eosinophils, Absolute 0.28 10*3/mm3      Basophils, Absolute 0.03 10*3/mm3     Urine Culture - Urine, Straight Cath [[id number]]  (Abnormal) Collected: 01/05/24 1926    Specimen: Urine from Straight Cath Updated: 01/06/24 1301     Urine Culture >100,000 CFU/mL Gram Negative Bacilli    Narrative:      Colonization of the urinary tract without infection is common. Treatment is discouraged unless the patient is symptomatic, pregnant, or undergoing an invasive urologic procedure.    Basic Metabolic Panel [[id number]]  (Abnormal) Collected: 01/06/24 1221    Specimen: Blood Updated: 01/06/24 1259     Glucose 103 mg/dL      BUN 58 mg/dL      Creatinine 2.59 mg/dL      Sodium 133 mmol/L      Potassium 3.3 mmol/L      Chloride 97 mmol/L      CO2 23.0 mmol/L      Calcium 8.4 mg/dL      BUN/Creatinine Ratio 22.4     Anion Gap 13.0 mmol/L      eGFR 18.8 mL/min/1.73     Narrative:      GFR Normal >60  Chronic Kidney Disease <60  Kidney Failure <15    The GFR formula is only valid for adults with stable renal function between ages 18 and 70.    STAT Lactic Acid, Reflex [[id number]]  (Normal) Collected: 01/05/24 2247    Specimen: Blood Updated: 01/05/24 2306     Lactate 1.5 mmol/L     Respiratory Panel PCR w/COVID-19(SARS-CoV-2) JANKI/MAYDA/OLIMPIA/PAD/COR/PRISCILLA In-House, NP Swab in UTM/VTM, 2 HR TAT - Swab, Nasopharynx [[id number]]  (Normal) Collected: 01/05/24 1939    Specimen: Swab from Nasopharynx Updated: 01/05/24 2039     ADENOVIRUS, PCR Not Detected     Coronavirus 229E Not  Detected     Coronavirus HKU1 Not Detected     Coronavirus NL63 Not Detected     Coronavirus OC43 Not Detected     COVID19 Not Detected     Human Metapneumovirus Not Detected     Human Rhinovirus/Enterovirus Not Detected     Influenza A PCR Not Detected     Influenza B PCR Not Detected     Parainfluenza Virus 1 Not Detected     Parainfluenza Virus 2 Not Detected     Parainfluenza Virus 3 Not Detected     Parainfluenza Virus 4 Not Detected     RSV, PCR Not Detected     Bordetella pertussis pcr Not Detected     Bordetella parapertussis PCR Not Detected     Chlamydophila pneumoniae PCR Not Detected     Mycoplasma pneumo by PCR Not Detected    Narrative:      In the setting of a positive respiratory panel with a viral infection PLUS a negative procalcitonin without other underlying concern for bacterial infection, consider observing off antibiotics or discontinuation of antibiotics and continue supportive care. If the respiratory panel is positive for atypical bacterial infection (Bordetella pertussis, Chlamydophila pneumoniae, or Mycoplasma pneumoniae), consider antibiotic de-escalation to target atypical bacterial infection.    CBC & Differential [[id number]]  (Abnormal) Collected: 01/05/24 1937    Specimen: Blood Updated: 01/05/24 2033    Narrative:      The following orders were created for panel order CBC & Differential.  Procedure                               Abnormality         Status                     ---------                               -----------         ------                     CBC Auto Differential[[id number]]        Abnormal            Edited Result - FINAL        Please view results for these tests on the individual orders.    CBC Auto Differential [[id number]]  (Abnormal) Collected: 01/05/24 1937    Specimen: Blood Updated: 01/05/24 2033     WBC 12.30 10*3/mm3      RBC 3.36 10*6/mm3      Hemoglobin 9.4 g/dL      Hematocrit 28.8 %      MCV 85.7 fL      MCH 28.0 pg      MCHC 32.6 g/dL      RDW 16.9 %   "    RDW-SD 47.2 fl      MPV 9.8 fL      Platelets 142 10*3/mm3      Neutrophil % 70.3 %      Lymphocyte % 11.0 %      Monocyte % 15.6 %      Eosinophil % 2.2 %      Basophil % 0.2 %      Neutrophils, Absolute 8.64 10*3/mm3      Lymphocytes, Absolute 1.35 10*3/mm3      Monocytes, Absolute 1.92 10*3/mm3      Eosinophils, Absolute 0.27 10*3/mm3      Basophils, Absolute 0.03 10*3/mm3     Narrative:      Modified report. Previous result was Hemogram + Auto diff on 1/5/2024 at 2032 CST.    Procalcitonin [[id number]]  (Abnormal) Collected: 01/05/24 1937    Specimen: Blood Updated: 01/05/24 2030     Procalcitonin 4.80 ng/mL     Narrative:      As a Marker for Sepsis (Non-Neonates):    1. <0.5 ng/mL represents a low risk of severe sepsis and/or septic shock.  2. >2 ng/mL represents a high risk of severe sepsis and/or septic shock.    As a Marker for Lower Respiratory Tract Infections that require antibiotic therapy:    PCT on Admission    Antibiotic Therapy       6-12 Hrs later    >0.5                Strongly Recommended  >0.25 - <0.5        Recommended   0.1 - 0.25          Discouraged              Remeasure/reassess PCT  <0.1                Strongly Discouraged     Remeasure/reassess PCT    As 28 day mortality risk marker: \"Change in Procalcitonin Result\" (>80% or <=80%) if Day 0 (or Day 1) and Day 4 values are available. Refer to http://[email or web address]-pct-calculator.[email or web address]    Change in PCT <=80%  A decrease of PCT levels below or equal to 80% defines a positive change in PCT test result representing a higher risk for 28-day all-cause mortality of patients diagnosed with severe sepsis for septic shock.    Change in PCT >80%  A decrease of PCT levels of more than 80% defines a negative change in PCT result representing a lower risk for 28-day all-cause mortality of patients diagnosed with severe sepsis or septic shock.       Comprehensive Metabolic Panel [[id number]]  (Abnormal) Collected: 01/05/24 1937    Specimen: Blood Updated: " 01/05/24 2024     Glucose 125 mg/dL      BUN 62 mg/dL      Creatinine 3.28 mg/dL      Sodium 128 mmol/L      Potassium 3.4 mmol/L      Chloride 92 mmol/L      CO2 22.0 mmol/L      Calcium 8.7 mg/dL      Total Protein 5.8 g/dL      Albumin 2.8 g/dL      ALT (SGPT) 20 U/L      AST (SGOT) 48 U/L      Alkaline Phosphatase 164 U/L      Total Bilirubin 1.8 mg/dL      Globulin 3.0 gm/dL      A/G Ratio 0.9 g/dL      BUN/Creatinine Ratio 18.9     Anion Gap 14.0 mmol/L      eGFR 14.2 mL/min/1.73      Comment: <15 Indicative of kidney failure       Narrative:      GFR Normal >60  Chronic Kidney Disease <60  Kidney Failure <15    The GFR formula is only valid for adults with stable renal function between ages 18 and 70.    Lactic Acid, Plasma [[id number]]  (Abnormal) Collected: 01/05/24 1937    Specimen: Blood Updated: 01/05/24 2023     Lactate 2.1 mmol/L     Urinalysis With Culture If Indicated - Straight Cath [[id number]]  (Abnormal) Collected: 01/05/24 1926    Specimen: Urine from Straight Cath Updated: 01/05/24 1959     Color, UA Dark Yellow     Appearance, UA Cloudy     pH, UA 5.5     Specific Gravity, UA 1.014     Glucose, UA Negative     Ketones, UA Negative     Bilirubin, UA Negative     Blood, UA Negative     Protein, UA Negative     Leuk Esterase, UA Small (1+)     Nitrite, UA Negative     Urobilinogen, UA 1.0 E.U./dL    Narrative:      In absence of clinical symptoms, the presence of pyuria, bacteria, and/or nitrites on the urinalysis result does not correlate with infection.    Urinalysis, Microscopic Only - Straight Cath [[id number]]  (Abnormal) Collected: 01/05/24 1926    Specimen: Urine from Straight Cath Updated: 01/05/24 1959     RBC, UA 0-2 /HPF      WBC, UA 11-20 /HPF      Bacteria, UA 4+ /HPF      Squamous Epithelial Cells, UA 3-6 /HPF      Hyaline Casts, UA 0-2 /LPF      WBC Clumps, UA Moderate/2+ /HPF      Methodology Manual Light Microscopy            Imaging Results (Last 72 Hours)       Procedure Component  Value Units Date/Time    XR Shoulder 2+ View Left [[id number]] Collected: 01/05/24 2013     Updated: 01/05/24 2018    Narrative:      EXAMINATION: XR SHOULDER 2+ VW LEFT-  1/5/2024 8:14 PM     HISTORY: Fall with pain.     FINDINGS: 2 view exam of the left shoulder reveals the patient has  undergone reverse total shoulder arthroplasty. It appears that one of  the anchor screws may be fractured but this I feel was present on a  previous chest radiograph from September of last year. There is normal  articulation of the prosthesis. I do not see any evidence of  periprosthetic fracture. 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       Impression:      1. No acute fracture or dislocation.     This report was signed and finalized on 1/5/2024 8:15 PM by Dr. Elian Bonilla MD.       XR Elbow 3+ View Left [[id number]] Collected: 01/05/24 2013     Updated: 01/05/24 2016    Narrative:      LEFT ELBOW, 2 views 1/5/2024 6:49 PM     HISTORY: fall pain     COMPARISON: None      FINDINGS:   Frontal and lateral views of the left elbow were obtained.      There is no fracture or dislocation. No significant joint space  narrowing is noted. There is no significant joint effusion.       No gross soft tissue abnormality is visualized.        Impression:      1. Unremarkable radiographs of the left elbow.           This report was signed and finalized on 1/5/2024 8:13 PM by Dr. Elian Bonilla MD.       XR Humerus Left [[id number]] Collected: 01/05/24 2012     Updated: 01/05/24 2016    Narrative:      EXAMINATION: XR HUMERUS LEFT-  1/5/2024 8:12 PM     HISTORY: Fall with pain.     FINDINGS: 2 view exam of the left humerus reveals the patient has  undergone previous reverse left total shoulder arthroplasty. There is  normal articulation at the shoulder. No acute bony injury.       Impression:      1. No acute fracture.     This report was signed and finalized on 1/5/2024 8:12 PM by Dr. Elian Bonilla MD.       XR Chest 1 View [[id number]]  Collected: 01/1950     Updated: 01/05/24 1952    Narrative:      EXAMINATION: XR CHEST 1 VW-  1/5/2024 7:48 PM     HISTORY: Cough. HISTORY of atrial fibrillation.     FINDINGS: Upright frontal projection of the chest is compared to prior  exam of 9/30/2023. Right basilar atelectasis is present with mild  elevation of the right diaphragm. Lungs are otherwise clear. The  thoracic aorta and great vessels are tortuous.       Impression:      1.. Mild right basilar atelectasis. Lungs are otherwise clear.     This report was signed and finalized on 1/5/2024 7:49 PM by Dr. Elian Bonilla MD.                     Assessment:    Condition: In stable condition.  Improving.       Plan:   Encourage ambulation.   (    Acute kidney injury with chronic kidney disease stage IIIa-creatinine down to 1.9 today with 1.5 baseline.  Continue with gentle rehydration, avoid nephrotoxic medication and maintain adequate blood pressures.  Will Hep-Lock IV today in hopes of her returning home tomorrow.    Urinary tract infection/acute cystitis with hematuria present on admission.  Culture grew out gram-negative bacillus-do not recommend treatment will continue with Rocephin for now for pulmonary coverage    Acute bronchitis-much improved today maintaining oxygen saturations on room air.  Respiratory PCR panel from the emergency room is all negative.  Add Mucinex today    Hyponatremia sodium 128 on admission up to 135 today.  Hypokalemia corrected.  Urine studies never collected    Overall much improved will feed and increase activity with hopes of returning home with home health care.  She lives in a 1 bedroom apartment alone and prior to admission was unable to get from her bed to her bathroom hopefully with some therapy she will be able to return home in 1-2 days.  She seemed oversedated today therefore will discontinue as needed Norco along with her scheduled MS Contin.  Will use tramadol.  Patient needs to return to pain management  for her opioids.    ).     Problem List:     CHRISTINE (acute kidney injury)    HTN (hypertension)    Obesity, Class III, BMI 40-49.9 (morbid obesity)    Anxiety associated with depression    Chronic pain syndrome    Hyponatremia    Chronic kidney disease, stage 3a    Sachin Dia MD  1/7/2024

## 2024-01-07 NOTE — PLAN OF CARE
Goal Outcome Evaluation:              Outcome Evaluation: A&O. Weaned off O2 today; sats mid-high 90's on RA. IVF. Voiding per external urinary catheter. Renal diet; tolerating. Pt has no teeth and dentures are absent; no difficulty with food provided on trays. Potassium replaced per order. C/o pain; see MAR. Unable to collect urine for sample, will pass onto oncoming nurse. Eliquis for VTE prevention. Safety maintained.

## 2024-01-08 LAB
ANION GAP SERPL CALCULATED.3IONS-SCNC: 13 MMOL/L (ref 5–15)
BACTERIA UR QL AUTO: ABNORMAL /HPF
BASOPHILS # BLD AUTO: 0.02 10*3/MM3 (ref 0–0.2)
BASOPHILS NFR BLD AUTO: 0.2 % (ref 0–1.5)
BILIRUB UR QL STRIP: NEGATIVE
BUN SERPL-MCNC: 45 MG/DL (ref 8–23)
BUN/CREAT SERPL: 29.8 (ref 7–25)
CALCIUM SPEC-SCNC: 8.6 MG/DL (ref 8.6–10.5)
CHLORIDE SERPL-SCNC: 101 MMOL/L (ref 98–107)
CLARITY UR: CLEAR
CO2 SERPL-SCNC: 23 MMOL/L (ref 22–29)
COLOR UR: ABNORMAL
CREAT SERPL-MCNC: 1.51 MG/DL (ref 0.57–1)
DEPRECATED RDW RBC AUTO: 49.1 FL (ref 37–54)
EGFRCR SERPLBLD CKD-EPI 2021: 35.9 ML/MIN/1.73
EOSINOPHIL # BLD AUTO: 0.16 10*3/MM3 (ref 0–0.4)
EOSINOPHIL NFR BLD AUTO: 1.8 % (ref 0.3–6.2)
ERYTHROCYTE [DISTWIDTH] IN BLOOD BY AUTOMATED COUNT: 17.4 % (ref 12.3–15.4)
GLUCOSE SERPL-MCNC: 101 MG/DL (ref 65–99)
GLUCOSE UR STRIP-MCNC: NEGATIVE MG/DL
HCT VFR BLD AUTO: 25.1 % (ref 34–46.6)
HGB BLD-MCNC: 8.4 G/DL (ref 12–15.9)
HGB UR QL STRIP.AUTO: NEGATIVE
HYALINE CASTS UR QL AUTO: ABNORMAL /LPF
KETONES UR QL STRIP: ABNORMAL
LEUKOCYTE ESTERASE UR QL STRIP.AUTO: ABNORMAL
LYMPHOCYTES # BLD AUTO: 1.43 10*3/MM3 (ref 0.7–3.1)
LYMPHOCYTES NFR BLD AUTO: 16.5 % (ref 19.6–45.3)
MCH RBC QN AUTO: 28.1 PG (ref 26.6–33)
MCHC RBC AUTO-ENTMCNC: 33.5 G/DL (ref 31.5–35.7)
MCV RBC AUTO: 83.9 FL (ref 79–97)
MONOCYTES # BLD AUTO: 1.18 10*3/MM3 (ref 0.1–0.9)
MONOCYTES NFR BLD AUTO: 13.6 % (ref 5–12)
NEUTROPHILS NFR BLD AUTO: 5.63 10*3/MM3 (ref 1.7–7)
NEUTROPHILS NFR BLD AUTO: 65.1 % (ref 42.7–76)
NITRITE UR QL STRIP: NEGATIVE
PH UR STRIP.AUTO: 5.5 [PH] (ref 5–8)
PLATELET # BLD AUTO: 118 10*3/MM3 (ref 140–450)
PMV BLD AUTO: 9.6 FL (ref 6–12)
POTASSIUM SERPL-SCNC: 3.2 MMOL/L (ref 3.5–5.2)
POTASSIUM SERPL-SCNC: 5 MMOL/L (ref 3.5–5.2)
PROT UR QL STRIP: NEGATIVE
RBC # BLD AUTO: 2.99 10*6/MM3 (ref 3.77–5.28)
RBC # UR STRIP: ABNORMAL /HPF
REF LAB TEST METHOD: ABNORMAL
SODIUM SERPL-SCNC: 137 MMOL/L (ref 136–145)
SP GR UR STRIP: 1.02 (ref 1–1.03)
SQUAMOUS #/AREA URNS HPF: ABNORMAL /HPF
UROBILINOGEN UR QL STRIP: ABNORMAL
WBC # UR STRIP: ABNORMAL /HPF
WBC NRBC COR # BLD AUTO: 8.66 10*3/MM3 (ref 3.4–10.8)

## 2024-01-08 PROCEDURE — 81001 URINALYSIS AUTO W/SCOPE: CPT | Performed by: INTERNAL MEDICINE

## 2024-01-08 PROCEDURE — 25010000002 CEFTRIAXONE PER 250 MG: Performed by: FAMILY MEDICINE

## 2024-01-08 PROCEDURE — 25010000002 CEFTAZIDIME-AVIBACTAM 2.5 (2-0.5) G RECONSTITUTED SOLUTION 1 EACH VIAL: Performed by: INTERNAL MEDICINE

## 2024-01-08 PROCEDURE — 94799 UNLISTED PULMONARY SVC/PX: CPT

## 2024-01-08 PROCEDURE — 84132 ASSAY OF SERUM POTASSIUM: CPT | Performed by: FAMILY MEDICINE

## 2024-01-08 PROCEDURE — 85025 COMPLETE CBC W/AUTO DIFF WBC: CPT | Performed by: FAMILY MEDICINE

## 2024-01-08 PROCEDURE — 99221 1ST HOSP IP/OBS SF/LOW 40: CPT | Performed by: INTERNAL MEDICINE

## 2024-01-08 PROCEDURE — 80048 BASIC METABOLIC PNL TOTAL CA: CPT | Performed by: FAMILY MEDICINE

## 2024-01-08 RX ORDER — CYCLOBENZAPRINE HCL 5 MG
5 TABLET ORAL 3 TIMES DAILY PRN
Status: DISCONTINUED | OUTPATIENT
Start: 2024-01-08 | End: 2024-01-12 | Stop reason: HOSPADM

## 2024-01-08 RX ORDER — POTASSIUM CHLORIDE 750 MG/1
40 CAPSULE, EXTENDED RELEASE ORAL EVERY 4 HOURS
Status: COMPLETED | OUTPATIENT
Start: 2024-01-08 | End: 2024-01-08

## 2024-01-08 RX ADMIN — Medication 10 ML: at 21:14

## 2024-01-08 RX ADMIN — APIXABAN 5 MG: 5 TABLET, FILM COATED ORAL at 21:14

## 2024-01-08 RX ADMIN — ALBUTEROL SULFATE 2.5 MG: 2.5 SOLUTION RESPIRATORY (INHALATION) at 05:56

## 2024-01-08 RX ADMIN — DULOXETINE HYDROCHLORIDE 60 MG: 30 CAPSULE, DELAYED RELEASE ORAL at 21:14

## 2024-01-08 RX ADMIN — PRAMIPEXOLE DIHYDROCHLORIDE 0.75 MG: 0.25 TABLET ORAL at 21:14

## 2024-01-08 RX ADMIN — DULOXETINE HYDROCHLORIDE 60 MG: 30 CAPSULE, DELAYED RELEASE ORAL at 09:20

## 2024-01-08 RX ADMIN — GUAIFENESIN AND DEXTROMETHORPHAN HYDROBROMIDE 2 TABLET: 600; 30 TABLET, EXTENDED RELEASE ORAL at 09:20

## 2024-01-08 RX ADMIN — METOPROLOL SUCCINATE 25 MG: 25 TABLET, EXTENDED RELEASE ORAL at 09:20

## 2024-01-08 RX ADMIN — Medication 10 ML: at 09:21

## 2024-01-08 RX ADMIN — LEVOTHYROXINE SODIUM 100 MCG: 100 TABLET ORAL at 05:48

## 2024-01-08 RX ADMIN — ALBUTEROL SULFATE 2.5 MG: 2.5 SOLUTION RESPIRATORY (INHALATION) at 20:57

## 2024-01-08 RX ADMIN — Medication 10 ML: at 09:22

## 2024-01-08 RX ADMIN — NYSTATIN 1 APPLICATION: 100000 POWDER TOPICAL at 09:21

## 2024-01-08 RX ADMIN — DOCUSATE SODIUM 50 MG AND SENNOSIDES 8.6 MG 2 TABLET: 8.6; 5 TABLET, FILM COATED ORAL at 09:20

## 2024-01-08 RX ADMIN — POTASSIUM CHLORIDE 40 MEQ: 10 CAPSULE, COATED, EXTENDED RELEASE ORAL at 12:07

## 2024-01-08 RX ADMIN — APIXABAN 5 MG: 5 TABLET, FILM COATED ORAL at 09:20

## 2024-01-08 RX ADMIN — GUAIFENESIN AND DEXTROMETHORPHAN HYDROBROMIDE 2 TABLET: 600; 30 TABLET, EXTENDED RELEASE ORAL at 21:14

## 2024-01-08 RX ADMIN — SODIUM CHLORIDE 1000 MG: 900 INJECTION INTRAVENOUS at 09:20

## 2024-01-08 RX ADMIN — ALBUTEROL SULFATE 2.5 MG: 2.5 SOLUTION RESPIRATORY (INHALATION) at 11:12

## 2024-01-08 RX ADMIN — POTASSIUM CHLORIDE 40 MEQ: 10 CAPSULE, COATED, EXTENDED RELEASE ORAL at 09:20

## 2024-01-08 RX ADMIN — CEFTAZIDIME, AVIBACTAM 1.25 G: 2; .5 POWDER, FOR SOLUTION INTRAVENOUS at 17:57

## 2024-01-08 RX ADMIN — NYSTATIN 1 APPLICATION: 100000 POWDER TOPICAL at 21:14

## 2024-01-08 RX ADMIN — MORPHINE SULFATE 15 MG: 15 TABLET, FILM COATED, EXTENDED RELEASE ORAL at 21:14

## 2024-01-08 NOTE — PLAN OF CARE
Goal Outcome Evaluation:  Plan of Care Reviewed With: patient        Progress: no change  Outcome Evaluation: A&OX4, VSS. No c/o pain at this time. Encouraged increased activity throughout day, pt refused to sit in chair and ultimately refused physical therapy and any movement thus far. Incontience at times and pure-wick in place d/t pt request. ID consulted. Urine + for CRE, MD notifed. IV ABX, on room air. Potassium replaced per protocol. Eliquis for VTE prevention. Bed alarm set, call light in reach. Safety maintained and continue to monitor.

## 2024-01-08 NOTE — PROGRESS NOTES
Brenda Sneed is a 73 y.o. female patient.    Current Facility-Administered Medications   Medication Dose Route Frequency Provider Last Rate Last Admin    albuterol (PROVENTIL) nebulizer solution 0.083% 2.5 mg/3mL  2.5 mg Nebulization TID Sachin Dia MD   2.5 mg at 01/08/24 0556    apixaban (ELIQUIS) tablet 5 mg  5 mg Oral Q12H Sachin Dia MD   5 mg at 01/07/24 2151    sennosides-docusate (PERICOLACE) 8.6-50 MG per tablet 2 tablet  2 tablet Oral BID Sachin Dia MD   2 tablet at 01/06/24 0839    And    polyethylene glycol (MIRALAX) packet 17 g  17 g Oral Daily PRN Sachin Dia MD        And    bisacodyl (DULCOLAX) EC tablet 5 mg  5 mg Oral Daily PRN Sachin Dia MD        And    bisacodyl (DULCOLAX) suppository 10 mg  10 mg Rectal Daily PRN Sachin Dia MD        Calcium Replacement - Follow Nurse / BPA Driven Protocol   Does not apply PRN Sachin Dia MD        cefTRIAXone (ROCEPHIN) 1,000 mg in sodium chloride 0.9 % 100 mL IVPB  1,000 mg Intravenous Daily Sachin Dia  mL/hr at 01/07/24 0833 1,000 mg at 01/07/24 0833    cyclobenzaprine (FLEXERIL) tablet 5 mg  5 mg Oral TID PRN Sachin Dia MD        DULoxetine (CYMBALTA) DR capsule 60 mg  60 mg Oral BID Sachin Dia MD   60 mg at 01/07/24 2151    furosemide (LASIX) tablet 40 mg  40 mg Oral Daily PRN Sachin Dia MD        guaifenesin-dextromethorphan (MUCINEX DM) tablet 2 tablet  2 tablet Oral Q12H Sachin Dia MD   2 tablet at 01/07/24 2151    levothyroxine (SYNTHROID, LEVOTHROID) tablet 100 mcg  100 mcg Oral Daily Sachin Dia MD   100 mcg at 01/08/24 0548    Magnesium Standard Dose Replacement - Follow Nurse / BPA Driven Protocol   Does not apply PRN Sachin Dia MD        metoprolol succinate XL (TOPROL-XL) 24 hr tablet 25 mg  25 mg Oral Daily Sachin Dia MD        Morphine (MS CONTIN) 12 hr tablet 15 mg  15 mg Oral  Q12H Hector Tubbs APRN   15 mg at 01/07/24 0833    nystatin (MYCOSTATIN) powder 1 application   1 application  Topical BID Sachin Dia MD   1 application  at 01/07/24 2153    Phosphorus Replacement - Follow Nurse / BPA Driven Protocol   Does not apply Sachin Garrido MD        potassium chloride (MICRO-K/KLOR-CON) CR capsule  40 mEq Oral Q4H Sachin Dia MD        Potassium Replacement - Follow Nurse / BPA Driven Protocol   Does not apply PRN Sachin Dia MD        pramipexole (MIRAPEX) tablet 0.75 mg  0.75 mg Oral Nightly Sachin Dia MD   0.75 mg at 01/07/24 2151    sodium chloride 0.9 % flush 10 mL  10 mL Intravenous PRN Charo Bhat PA        sodium chloride 0.9 % flush 10 mL  10 mL Intravenous Q12H Barrett Wray Jr., MD   10 mL at 01/07/24 2150    sodium chloride 0.9 % flush 10 mL  10 mL Intravenous PRN Barrett Wray Jr., MD        sodium chloride 0.9 % flush 10 mL  10 mL Intravenous Q12H Sachin Dia MD   10 mL at 01/07/24 2150    sodium chloride 0.9 % flush 10 mL  10 mL Intravenous PRN Sachin Dia MD        sodium chloride 0.9 % infusion 40 mL  40 mL Intravenous PRN Barrett Wray Jr., MD        sodium chloride 0.9 % infusion 40 mL  40 mL Intravenous PRN Sachin Dia MD         ALLERGIES:    Allergies   Allergen Reactions    Codeine Itching and Rash     SEVERE RASH/ITCHING (TOLERATES PERCOCET)    Amoxicillin Other (See Comments)     unknown    Levaquin [Levofloxacin] Other (See Comments)     unknown    Benadryl [Diphenhydramine] Other (See Comments)     KEEPS PATIENT AWAKE        CHRISTINE (acute kidney injury)    HTN (hypertension)    Obesity, Class III, BMI 40-49.9 (morbid obesity)    Anxiety associated with depression    Chronic pain syndrome    Hyponatremia    Chronic kidney disease, stage 3a    Blood pressure 114/61, pulse 80, temperature 97.4 °F (36.3 °C), temperature source Oral, resp. rate 18, height 160  "cm (63\"), weight 101 kg (223 lb 1.7 oz), SpO2 95%, not currently breastfeeding.      Subjective:  Symptoms:  Improved.  She reports weakness and anxiety.    Diet:  Adequate intake.    Activity level: Impaired due to weakness.    Pain:  She complains of pain that is moderate.  She reports pain is unchanged.  Pain is partially controlled.      Review of Systems   Neurological:  Positive for weakness.     Objective:  General Appearance:  Comfortable, well-appearing and in no acute distress.    Vital signs: (most recent): Blood pressure 114/61, pulse 80, temperature 97.4 °F (36.3 °C), temperature source Oral, resp. rate 18, height 160 cm (63\"), weight 101 kg (223 lb 1.7 oz), SpO2 95%, not currently breastfeeding.  Vital signs are normal.    Output: Producing urine and minimal stool output.    HEENT: Normal HEENT exam.  (Edentulous)    Lungs:  Normal effort and normal respiratory rate.  There are wheezes.  (Deep inspiration produces a cough)  Heart: Normal rate.    Abdomen: Abdomen is soft.  (Obese).  Bowel sounds are normal.   There is no abdominal tenderness.     Extremities: Normal range of motion.    Pulses: Distal pulses are intact.    Neurological: Patient is alert and oriented to person, place and time.    Skin:  Warm and dry.                Labs:  Lab Results (last 72 hours)       Procedure Component Value Units Date/Time    Basic Metabolic Panel [[id number]]  (Abnormal) Collected: 01/07/24 0532    Specimen: Blood Updated: 01/07/24 0611     Glucose 101 mg/dL      BUN 51 mg/dL      Creatinine 1.90 mg/dL      Sodium 135 mmol/L      Potassium 3.8 mmol/L      Comment: Slight hemolysis detected by analyzer. Result may be falsely elevated.        Chloride 100 mmol/L      CO2 22.0 mmol/L      Calcium 8.5 mg/dL      BUN/Creatinine Ratio 26.8     Anion Gap 13.0 mmol/L      eGFR 27.3 mL/min/1.73     Narrative:      GFR Normal >60  Chronic Kidney Disease <60  Kidney Failure <15    The GFR formula is only valid for adults " with stable renal function between ages 18 and 70.    Manual Differential [[id number]] Collected: 01/07/24 0532    Specimen: Blood Updated: 01/07/24 0546    CBC & Differential [[id number]] Collected: 01/07/24 0532    Specimen: Blood Updated: 01/07/24 0539    Narrative:      The following orders were created for panel order CBC & Differential.  Procedure                               Abnormality         Status                     ---------                               -----------         ------                     CBC Auto Differential[[id number]]                            In process                   Please view results for these tests on the individual orders.    CBC Auto Differential [[id number]] Collected: 01/07/24 0532    Specimen: Blood Updated: 01/07/24 0539    Blood Culture - Blood, Arm, Right [[id number]]  (Normal) Collected: 01/05/24 2041    Specimen: Blood from Arm, Right Updated: 01/06/24 2100     Blood Culture No growth at 24 hours    Blood Culture - Blood, Arm, Right [[id number]]  (Normal) Collected: 01/05/24 2041    Specimen: Blood from Arm, Right Updated: 01/06/24 2100     Blood Culture No growth at 24 hours    CBC & Differential [[id number]]  (Abnormal) Collected: 01/06/24 1221    Specimen: Blood Updated: 01/06/24 1305    Narrative:      The following orders were created for panel order CBC & Differential.  Procedure                               Abnormality         Status                     ---------                               -----------         ------                     CBC Auto Differential[[id number]]        Abnormal            Final result                 Please view results for these tests on the individual orders.    CBC Auto Differential [[id number]]  (Abnormal) Collected: 01/06/24 1221    Specimen: Blood Updated: 01/06/24 1305     WBC 7.65 10*3/mm3      RBC 3.20 10*6/mm3      Hemoglobin 8.8 g/dL      Hematocrit 27.0 %      MCV 84.4 fL      MCH 27.5 pg      MCHC 32.6 g/dL      RDW 16.9 %       RDW-SD 47.5 fl      MPV 9.8 fL      Platelets 119 10*3/mm3      Neutrophil % 66.5 %      Lymphocyte % 12.7 %      Monocyte % 15.4 %      Eosinophil % 3.7 %      Basophil % 0.4 %      Neutrophils, Absolute 5.09 10*3/mm3      Lymphocytes, Absolute 0.97 10*3/mm3      Monocytes, Absolute 1.18 10*3/mm3      Eosinophils, Absolute 0.28 10*3/mm3      Basophils, Absolute 0.03 10*3/mm3     Urine Culture - Urine, Straight Cath [[id number]]  (Abnormal) Collected: 01/05/24 1926    Specimen: Urine from Straight Cath Updated: 01/06/24 1301     Urine Culture >100,000 CFU/mL Gram Negative Bacilli    Narrative:      Colonization of the urinary tract without infection is common. Treatment is discouraged unless the patient is symptomatic, pregnant, or undergoing an invasive urologic procedure.    Basic Metabolic Panel [[id number]]  (Abnormal) Collected: 01/06/24 1221    Specimen: Blood Updated: 01/06/24 1259     Glucose 103 mg/dL      BUN 58 mg/dL      Creatinine 2.59 mg/dL      Sodium 133 mmol/L      Potassium 3.3 mmol/L      Chloride 97 mmol/L      CO2 23.0 mmol/L      Calcium 8.4 mg/dL      BUN/Creatinine Ratio 22.4     Anion Gap 13.0 mmol/L      eGFR 18.8 mL/min/1.73     Narrative:      GFR Normal >60  Chronic Kidney Disease <60  Kidney Failure <15    The GFR formula is only valid for adults with stable renal function between ages 18 and 70.    STAT Lactic Acid, Reflex [[id number]]  (Normal) Collected: 01/05/24 2247    Specimen: Blood Updated: 01/05/24 2306     Lactate 1.5 mmol/L     Respiratory Panel PCR w/COVID-19(SARS-CoV-2) [facility]ANKI/MAYDA/OLIMPIA/PAD/[facility]/PRISCILLA In-House, NP Swab in UTM/VTM, 2 HR TAT - Swab, Nasopharynx [[id number]]  (Normal) Collected: 01/05/24 1939    Specimen: Swab from Nasopharynx Updated: 01/05/24 2039     ADENOVIRUS, PCR Not Detected     Coronavirus 229E Not Detected     Coronavirus HKU1 Not Detected     Coronavirus NL63 Not Detected     Coronavirus OC43 Not Detected     COVID19 Not Detected     Human Metapneumovirus Not  Detected     Human Rhinovirus/Enterovirus Not Detected     Influenza A PCR Not Detected     Influenza B PCR Not Detected     Parainfluenza Virus 1 Not Detected     Parainfluenza Virus 2 Not Detected     Parainfluenza Virus 3 Not Detected     Parainfluenza Virus 4 Not Detected     RSV, PCR Not Detected     Bordetella pertussis pcr Not Detected     Bordetella parapertussis PCR Not Detected     Chlamydophila pneumoniae PCR Not Detected     Mycoplasma pneumo by PCR Not Detected    Narrative:      In the setting of a positive respiratory panel with a viral infection PLUS a negative procalcitonin without other underlying concern for bacterial infection, consider observing off antibiotics or discontinuation of antibiotics and continue supportive care. If the respiratory panel is positive for atypical bacterial infection (Bordetella pertussis, Chlamydophila pneumoniae, or Mycoplasma pneumoniae), consider antibiotic de-escalation to target atypical bacterial infection.    CBC & Differential [[id number]]  (Abnormal) Collected: 01/05/24 1937    Specimen: Blood Updated: 01/05/24 2033    Narrative:      The following orders were created for panel order CBC & Differential.  Procedure                               Abnormality         Status                     ---------                               -----------         ------                     CBC Auto Differential[[id number]]        Abnormal            Edited Result - FINAL        Please view results for these tests on the individual orders.    CBC Auto Differential [[id number]]  (Abnormal) Collected: 01/05/24 1937    Specimen: Blood Updated: 01/05/24 2033     WBC 12.30 10*3/mm3      RBC 3.36 10*6/mm3      Hemoglobin 9.4 g/dL      Hematocrit 28.8 %      MCV 85.7 fL      MCH 28.0 pg      MCHC 32.6 g/dL      RDW 16.9 %      RDW-SD 47.2 fl      MPV 9.8 fL      Platelets 142 10*3/mm3      Neutrophil % 70.3 %      Lymphocyte % 11.0 %      Monocyte % 15.6 %      Eosinophil % 2.2 %       "Basophil % 0.2 %      Neutrophils, Absolute 8.64 10*3/mm3      Lymphocytes, Absolute 1.35 10*3/mm3      Monocytes, Absolute 1.92 10*3/mm3      Eosinophils, Absolute 0.27 10*3/mm3      Basophils, Absolute 0.03 10*3/mm3     Narrative:      Modified report. Previous result was Hemogram + Auto diff on 1/5/2024 at 2032 CST.    Procalcitonin [[id number]]  (Abnormal) Collected: 01/05/24 1937    Specimen: Blood Updated: 01/05/24 2030     Procalcitonin 4.80 ng/mL     Narrative:      As a Marker for Sepsis (Non-Neonates):    1. <0.5 ng/mL represents a low risk of severe sepsis and/or septic shock.  2. >2 ng/mL represents a high risk of severe sepsis and/or septic shock.    As a Marker for Lower Respiratory Tract Infections that require antibiotic therapy:    PCT on Admission    Antibiotic Therapy       6-12 Hrs later    >0.5                Strongly Recommended  >0.25 - <0.5        Recommended   0.1 - 0.25          Discouraged              Remeasure/reassess PCT  <0.1                Strongly Discouraged     Remeasure/reassess PCT    As 28 day mortality risk marker: \"Change in Procalcitonin Result\" (>80% or <=80%) if Day 0 (or Day 1) and Day 4 values are available. Refer to http://www.[employer]-pct-calculator.com    Change in PCT <=80%  A decrease of PCT levels below or equal to 80% defines a positive change in PCT test result representing a higher risk for 28-day all-cause mortality of patients diagnosed with severe sepsis for septic shock.    Change in PCT >80%  A decrease of PCT levels of more than 80% defines a negative change in PCT result representing a lower risk for 28-day all-cause mortality of patients diagnosed with severe sepsis or septic shock.       Comprehensive Metabolic Panel [[id number]]  (Abnormal) Collected: 01/05/24 1937    Specimen: Blood Updated: 01/05/24 2024     Glucose 125 mg/dL      BUN 62 mg/dL      Creatinine 3.28 mg/dL      Sodium 128 mmol/L      Potassium 3.4 mmol/L      Chloride 92 mmol/L      CO2 " 22.0 mmol/L      Calcium 8.7 mg/dL      Total Protein 5.8 g/dL      Albumin 2.8 g/dL      ALT (SGPT) 20 U/L      AST (SGOT) 48 U/L      Alkaline Phosphatase 164 U/L      Total Bilirubin 1.8 mg/dL      Globulin 3.0 gm/dL      A/G Ratio 0.9 g/dL      BUN/Creatinine Ratio 18.9     Anion Gap 14.0 mmol/L      eGFR 14.2 mL/min/1.73      Comment: <15 Indicative of kidney failure       Narrative:      GFR Normal >60  Chronic Kidney Disease <60  Kidney Failure <15    The GFR formula is only valid for adults with stable renal function between ages 18 and 70.    Lactic Acid, Plasma [[id number]]  (Abnormal) Collected: 01/05/24 1937    Specimen: Blood Updated: 01/05/24 2023     Lactate 2.1 mmol/L     Urinalysis With Culture If Indicated - Straight Cath [[id number]]  (Abnormal) Collected: 01/05/24 1926    Specimen: Urine from Straight Cath Updated: 01/05/24 1959     Color, UA Dark Yellow     Appearance, UA Cloudy     pH, UA 5.5     Specific Gravity, UA 1.014     Glucose, UA Negative     Ketones, UA Negative     Bilirubin, UA Negative     Blood, UA Negative     Protein, UA Negative     Leuk Esterase, UA Small (1+)     Nitrite, UA Negative     Urobilinogen, UA 1.0 E.U./dL    Narrative:      In absence of clinical symptoms, the presence of pyuria, bacteria, and/or nitrites on the urinalysis result does not correlate with infection.    Urinalysis, Microscopic Only - Straight Cath [[id number]]  (Abnormal) Collected: 01/05/24 1926    Specimen: Urine from Straight Cath Updated: 01/05/24 1959     RBC, UA 0-2 /HPF      WBC, UA 11-20 /HPF      Bacteria, UA 4+ /HPF      Squamous Epithelial Cells, UA 3-6 /HPF      Hyaline Casts, UA 0-2 /LPF      WBC Clumps, UA Moderate/2+ /HPF      Methodology Manual Light Microscopy            Imaging Results (Last 72 Hours)       Procedure Component Value Units Date/Time    XR Shoulder 2+ View Left [[id number]] Collected: 01/05/24 2013     Updated: 01/05/24 2018    Narrative:      EXAMINATION: XR SHOULDER 2+  VW LEFT-  1/5/2024 8:14 PM     HISTORY: Fall with pain.     FINDINGS: 2 view exam of the left shoulder reveals the patient has  undergone reverse total shoulder arthroplasty. It appears that one of  the anchor screws may be fractured but this I feel was present on a  previous chest radiograph from September of last year. There is normal  articulation of the prosthesis. I do not see any evidence of  periprosthetic fracture. 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       Impression:      1. No acute fracture or dislocation.     This report was signed and finalized on 1/5/2024 8:15 PM by Dr. Elian Bonilla MD.       XR Elbow 3+ View Left [[id number]] Collected: 01/05/24 2013     Updated: 01/05/24 2016    Narrative:      LEFT ELBOW, 2 views 1/5/2024 6:49 PM     HISTORY: fall pain     COMPARISON: None      FINDINGS:   Frontal and lateral views of the left elbow were obtained.      There is no fracture or dislocation. No significant joint space  narrowing is noted. There is no significant joint effusion.       No gross soft tissue abnormality is visualized.        Impression:      1. Unremarkable radiographs of the left elbow.           This report was signed and finalized on 1/5/2024 8:13 PM by Dr. Elian Bonilla MD.       XR Humerus Left [[id number]] Collected: 01/05/24 2012     Updated: 01/05/24 2016    Narrative:      EXAMINATION: XR HUMERUS LEFT-  1/5/2024 8:12 PM     HISTORY: Fall with pain.     FINDINGS: 2 view exam of the left humerus reveals the patient has  undergone previous reverse left total shoulder arthroplasty. There is  normal articulation at the shoulder. No acute bony injury.       Impression:      1. No acute fracture.     This report was signed and finalized on 1/5/2024 8:12 PM by Dr. Elian Bonilla MD.       XR Chest 1 View [[id number]] Collected: 01/1950     Updated: 01/05/24 1952    Narrative:      EXAMINATION: XR CHEST 1 VW-  1/5/2024 7:48 PM     HISTORY: Cough. HISTORY of atrial  fibrillation.     FINDINGS: Upright frontal projection of the chest is compared to prior  exam of 9/30/2023. Right basilar atelectasis is present with mild  elevation of the right diaphragm. Lungs are otherwise clear. The  thoracic aorta and great vessels are tortuous.       Impression:      1.. Mild right basilar atelectasis. Lungs are otherwise clear.     This report was signed and finalized on 1/5/2024 7:49 PM by Dr. Elain Bonilla MD.                     Assessment:    Condition: In stable condition.  Improving.       Plan:   Encourage ambulation.   (    Acute kidney injury with chronic kidney disease stage IIIa-creatinine down to 1.9 today with 1.5 baseline.  Continue with gentle rehydration, avoid nephrotoxic medication and maintain adequate blood pressures.  Will Hep-Lock IV today in hopes of her returning home tomorrow.    Urinary tract infection/acute cystitis with hematuria present on admission.  Culture grew out gram-negative bacillus-do not recommend treatment will continue with Rocephin for now for pulmonary coverage    Acute bronchitis-much improved today maintaining oxygen saturations on room air.  Respiratory PCR panel from the emergency room is all negative.  Add Mucinex today.    Hyponatremia sodium 128 on admission up to 135 today.  Hypokalemia corrected.  Urine studies never collected    Overall much improved will feed and increase activity with hopes of returning home with home health care.  She lives in a 1 bedroom apartment alone and prior to admission was unable to get from her bed to her bathroom hopefully with some therapy she will be able to return home on Tuesday morning.   Patient needs to return to pain management for her opioids.    Discussed with nursing staff to help limit opioid as much as possible).     Problem List:     CHRISTINE (acute kidney injury)    HTN (hypertension)    Obesity, Class III, BMI 40-49.9 (morbid obesity)    Anxiety associated with depression    Chronic pain  syndrome    Hyponatremia    Chronic kidney disease, stage 3a    Sachin Dia MD  1/8/2024

## 2024-01-08 NOTE — PLAN OF CARE
Goal Outcome Evaluation:           Progress: improving  Outcome Evaluation: Ambulation encouraged. Pt resistant to most activity, refused walking. Up in chair. Up to BSC with 2 assist. Remains on RA this shift. Voiding. Urine specimen collected; see results. Renal diet; tolerating. C/o pain; see MAR. Safety maintained.

## 2024-01-08 NOTE — PLAN OF CARE
Goal Outcome Evaluation:  Plan of Care Reviewed With: patient        Progress: no change  Outcome Evaluation: Patient refused MS Contin, pt sleeping between care, rm air, IID.

## 2024-01-08 NOTE — CONSULTS
"INFECTIOUS DISEASES CONSULT NOTE    Patient:  Brenda Sneed 73 y.o. female  ROOM # 389/1  YOB: 1950  MRN: [id number]  [facility]:  [id number]  Admit date: 1/5/2024   Admitting Physician: Sachin Dia MD  Primary Care Physician: Sachin Dia MD  REFERRING PROVIDER: Sachin Dia MD    Reason for Consultation: \"CRE on urine culture\"    History of Present Illness/Chief Complaint: 73-year-old woman.  She was admitted to the hospital on January 6, 2024.  When asked what brought her to the hospital she indicated some cough and congestion.  She indicates symptoms are present for a few days prior to admission.  She can recall having 1 day where she experienced some chills.  She indicates her niece had told her, \"aunt Brenda is about time you go get checked out.\"  She indicates her memory at times is a little bit poor.  She indicates she cannot recall a whole lot of detail about how she was feeling prior to admission.  She indicates she was not having any abdominal, suprapubic, or flank pain.  She indicated she was not experiencing dysuria or urinary incontinence.  She does not indicate a past history of frequent urinary tract infections.  As part of her evaluation during this hospital stay she had had a urinalysis done on the fifth that demonstrated 11-20 white blood cells, 4+ bacteria and 3-6 squamous epithelial cells which might indicate it was a contaminated specimen.  That specimen has grown both Enterococcus and what preliminarily may be a carbapenemace resistant Klebsiella.  Because of the urine culture infectious diseases asked to evaluate and offer recommendations.  She indicates from a congestion standpoint she is shown improvement.  She is on room air at present with an oxygen saturation of 96%.    Current Scheduled Medications:   albuterol, 2.5 mg, Nebulization, TID  apixaban, 5 mg, Oral, Q12H  cefTRIAXone, 1,000 mg, Intravenous, Daily  DULoxetine, 60 mg, Oral, " BID  guaifenesin-dextromethorphan, 2 tablet, Oral, Q12H  levothyroxine, 100 mcg, Oral, Daily  metoprolol succinate XL, 25 mg, Oral, Daily  Morphine, 15 mg, Oral, Q12H  nystatin, 1 application , Topical, BID  pramipexole, 0.75 mg, Oral, Nightly  senna-docusate sodium, 2 tablet, Oral, BID  sodium chloride, 10 mL, Intravenous, Q12H  sodium chloride, 10 mL, Intravenous, Q12H      Current PRN Medications:    senna-docusate sodium **AND** polyethylene glycol **AND** bisacodyl **AND** bisacodyl    Calcium Replacement - Follow Nurse / BPA Driven Protocol    cyclobenzaprine    furosemide    Magnesium Standard Dose Replacement - Follow Nurse / BPA Driven Protocol    Phosphorus Replacement - Follow Nurse / BPA Driven Protocol    Potassium Replacement - Follow Nurse / BPA Driven Protocol    [COMPLETED] Insert Peripheral IV **AND** sodium chloride    sodium chloride    sodium chloride    sodium chloride    sodium chloride    Allergies:    Allergies   Allergen Reactions    Codeine Itching and Rash     SEVERE RASH/ITCHING (TOLERATES PERCOCET)    Amoxicillin Other (See Comments)     unknown    Levaquin [Levofloxacin] Other (See Comments)     unknown    Benadryl [Diphenhydramine] Other (See Comments)     KEEPS PATIENT AWAKE      Past Medical History: Chronic back pain.  Thyroid dysfunction.  Hypertension.  Nephrolithiasis.  Edema.  Degenerative arthritis.    Past Surgical History: Back surgery.  Cholecystectomy.  Corneal transplant.  Leg laceration repair.  Lumbar fusion.  Neck surgery.  Hip arthroplasty.  Knee arthroplasty.  Shoulder arthroplasty.    Social History: .  No tobacco use.  No alcohol use.  No illicit drug use.    Family History: Breast cancer    Exposure History: She does not describe any close contacts that have been ill she denies any nausea, vomiting, diarrhea, or abdominal pain.    Review of Systems    Vital Signs:  /71 (BP Location: Left arm, Patient Position: Lying)   Pulse 120   Temp 97.8 °F  "(36.6 °C)   Resp 18   Ht 160 cm (63\")   Wt 101 kg (223 lb 1.7 oz)   SpO2 97%   BMI 39.52 kg/m²  Temp (24hrs), Av.6 °F (36.4 °C), Min:97.4 °F (36.3 °C), Max:97.9 °F (36.6 °C)    Physical Exam  Vital signs - reviewed.  Line/IV site - No erythema or tenderness.  Pleasant, comfortable appearing, and in no distress  She did not appear dyspneic  She was not coughing during evaluation  Lungs without significant crackles  Heart somewhat distant heart sounds.  No apparent murmur.  Abdomen is soft and nontender.  No guarding or rebound.  No suprapubic tenderness.  Pure wick catheter in place    Lab Results:  CBC:   Results from last 7 days   Lab Units 24  0439 24  0532 24  1221 24  1937   WBC 10*3/mm3 8.66 10.01 7.65 12.30*   HEMOGLOBIN g/dL 8.4* 8.7* 8.8* 9.4*   HEMATOCRIT % 25.1* 25.6* 27.0* 28.8*   PLATELETS 10*3/mm3 118* 122* 119* 142     CMP:   Results from last 7 days   Lab Units 24  1535 24  0439 24  0532 24  1221 24  1937   SODIUM mmol/L  --  137 135* 133* 128*   POTASSIUM mmol/L 5.0 3.2* 3.8 3.3* 3.4*   CHLORIDE mmol/L  --  101 100 97* 92*   CO2 mmol/L  --  23.0 22.0 23.0 22.0   BUN mg/dL  --  45* 51* 58* 62*   CREATININE mg/dL  --  1.51* 1.90* 2.59* 3.28*   CALCIUM mg/dL  --  8.6 8.5* 8.4* 8.7   BILIRUBIN mg/dL  --   --   --   --  1.8*   ALK PHOS U/L  --   --   --   --  164*   ALT (SGPT) U/L  --   --   --   --  20   AST (SGOT) U/L  --   --   --   --  48*   GLUCOSE mg/dL  --  101* 101* 103* 125*     Urinalysis:              Component  Ref Range & Units 3 d ago  (24) 3 mo ago  (23) 7 mo ago  (23) 7 mo ago  (23) 1 yr ago  (22) 7 yr ago  (7/3/16) 8 yr ago  (4/30/15)   RBC, UA  None Seen, 0-2 /HPF 0-2 0-2 Abnormal  R 13-20 Abnormal  R 21-30 Abnormal  R 0-2 Abnormal  R 3-5 Abnormal  R 0-2 Abnormal  R   WBC, UA  None Seen, 0-2 /HPF 11-20 Abnormal  21-30 Abnormal  R 13-20 Abnormal  R 31-50 Abnormal  R 3-5 Abnormal  R, CM 6-12 Abnormal  R " 0-2 Abnormal  R   Bacteria, UA  None Seen /HPF 4+ Abnormal  4+ Abnormal  Trace Abnormal  2+ Abnormal  1+ Abnormal  2+ Abnormal  R 4+ Abnormal  R   Squamous Epithelial Cells, UA  None Seen, 0-2 /HPF 3-6 Abnormal  None Seen 3-6 Abnormal  None Seen 7-12 Abnormal      Hyaline Casts, UA  None Seen /LPF 0-2 3-6 7-12 3-6 None Seen     WBC Clumps, UA  None Seen /HPF Moderate/2+         Methodology Manual Light Microscopy Automated Microscopy Automated Microscopy Automated Microscopy Manual Light Microscopy            Urine culture 1/5/2024:  Susceptibility   Klebsiella pneumoniae ssp pneumoniae CRE Enterococcus faecalis     CEPHEID GENEXPERT CHRISTIANO     Ampicillin  <=2 ug/ml Susceptible     IMP Not Detected       KPC Detected       Levofloxacin  1 ug/ml Susceptible     NDM Not Detected       Nitrofurantoin  <=16 ug/ml Susceptible     OXA48 Not Detected       Vancomycin  1 ug/ml Susceptible     VIM Not Detected                 Radiology:   Chest x-ray on admission personally reviewed:  IMPRESSION:  1.. Mild right basilar atelectasis. Lungs are otherwise clear.      Additional Studies Reviewed:   2D echocardiogram done on October 5, 2023:    Left ventricular ejection fraction appears to be 56 - 60%.    Left ventricular diastolic function was indeterminate.    Estimated right ventricular systolic pressure from tricuspid regurgitation is normal (<35 mmHg).    Impression:   Possible UTI secondary to CRE    Recommendations:    In-N-Out catheterization for urinalysis with reflex to culture  Discontinue current antibiotic treatment  Begin treatment with Avycaz  Await follow-up urinalysis and urine culture results  Continue to follow      Clint Johnson MD  01/08/24  16:23 CST

## 2024-01-08 NOTE — PAYOR COMM NOTE
"Eyad Gurrola (73 y.o. Female)         [facility] [phone]        Date of Birth   1950    Social Security Number       Address   [address] [address]    Home Phone   [phone]    MRN   [id number]       [employer]ehovah's witness   [facility]    Marital Status                               Admission Date   1/5/24    Admission Type   Emergency    Admitting Provider   Sachin Dia MD    Attending Provider   Sachin Dia MD    Department, Room/Bed   [facility] 3C, 389/1       Discharge Date       Discharge Disposition       Discharge Destination                                 Attending Provider: Sachin Dia MD    Allergies: Codeine, Amoxicillin, Levaquin [Levofloxacin], Benadryl [Diphenhydramine]    Isolation: None   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), 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)   Code Status: CPR    Ht: 160 cm (63\")   Wt: 101 kg (223 lb 1.7 oz)    Admission Cmt: None   Principal Problem: CHRISTINE (acute kidney injury) [N17.9]                   Active Insurance as of 1/5/2024       Primary Coverage       Payor Plan Insurance Group Employer/Plan Group    HUMANA MEDICARE REPLACEMENT HU[employer]NA MEDICARE REPLACEMENT [id number]       Payor Plan Address Payor Plan Phone Number Payor Plan Fax Number Effective Dates    [address] [address] [phone]  1/1/2023 - None Entered    [facility] [id number]         Subscriber Name Subscriber Birth Date Member ID       EYAD GURROLA 1950 [id number]                     Emergency Contacts        (Rel.) Home Phone Work Phone Mobile Phone    Nella (Daughter) [phone] -- [phone]    ODELL NELSON (Relative) -- -- [phone]          4:39  (1/8/24) 1 d ago  (1/7/24) 2 d ago  (1/6/24) 3 d ago  (1/5/24) 1 mo ago  (11/11/23) 3 mo ago  (10/9/23) 3 mo ago  (10/4/23) 3 mo ago  (10/4/23)    WBC  3.40 - 10.80 10*3/mm3 8.66 10.01 7.65 12.30 High  4.91 CM 2.61 Low   3.52   RBC  3.77 - 5.28 10*6/mm3 2.99 " Low  2.96 Low  3.20 Low  3.36 Low  3.14 Low  CM 2.88 Low   3.18 Low    Hemoglobin  12.0 - 15.9 g/dL 8.4 Low  8.7 Low  8.8 Low  9.4 Low  9.5 Low  CM 8.2 Low   9.0 Low    Hematocrit  34.0 - 46.6 % 25.1 Low  25.6 Low  27.0 Low  28.8 Low  29.0 Low  CM 26.2 Low   28.4 Low    MCV  79.0 - 97.0 fL 83.9 86.5 84.4 85.7 92.4 CM 91.0  89.3   MCH  26.6 - 33.0 pg 28.1 29.4 27.5 28.0 30.3 CM 28.5  28.3   MCHC  31.5 - 35.7 g/dL 33.5 34.0 32.6 32.6 32.8 CM 31.3 Low   31.7   RDW  12.3 - 15.4 % 17.4 High  18.1 High  16.9 High  16.9 High  16.8 High  CM 19.4 High   18.8 High    RDW-SD  37.0 - 54.0 fl 49.1 48.4 47.5 47.2 53.4 CM 63.5 High   60.6 High    MPV  6.0 - 12.0 fL 9.6 10.2 9.8 9.8 13.0 High  CM 10.1  10.5   Platelets  140 - 450 10*3/mm3 118 Low           Status: Final result       Visible to patient: No (scheduled for 1/8/2024  6:27 AM)       Next appt: None    Specimen Information: Blood   0 Result Notes            Component  Ref Range & Units 04:39  (1/8/24) 1 d ago  (1/7/24) 2 d ago  (1/6/24) 3 d ago  (1/5/24) 1 mo ago  (11/11/23) 1 mo ago  (11/11/23) 3 mo ago  (10/9/23)   Glucose  65 - 99 mg/dL 101 High  101 High  103 High  125 High  116 High  134 High  98   BUN  8 - 23 mg/dL 45 High  51 High  58 High  62 High  32 High  32 High  21   Creatinine  0.57 - 1.00 mg/dL 1.51 High  1.90 High  2.59 High  3.28 High  2.08 High  2.10 High  1.08 High    Sodium  136 - 145 mmol/L 137 135 Low  133 Low  128 Low  134 Low  131 Low  142   Potassium  3.5 - 5.2 mmol/L 3.2 Low             bolic Panel  Order: [id number]  Status: Final result       Visible to patient: No (not released)       Next appt: None    Specimen Information: Blood   0 Result Notes            Component  Ref Range & Units 2 d ago  (1/6/24) 3 d ago  (1/5/24) 1 mo ago  (11/11/23) 1 mo ago  (11/11/23) 3 mo ago  (10/9/23) 3 mo ago  (10/4/23) 3 mo ago  (10/3/23)   Glucose  65 - 99 mg/dL 103 High  125 High  116 High  134 High  98 104 High  84   BUN  8 - 23 mg/dL 58 High  62 High  32  High  32 High  21 34 High  39 High    Creatinine  0.57 - 1.00 mg/dL 2.59 High  3.28 High  2.08 High  2.10 High  1.08 High  1.25 High  1.44 High    Sodium  136 - 145 mmol/L 133 Low  128 Low  134 Low  131 Low  142 134 Low  139   Potassium  3.5 - 5.2 mmol/L 3.3 Low  3.4 Low  3.9 6.4 High Critical  CM 3.1 Low  5.1 CM 3.9   Chloride  98 - 107 mmol/L 97 Low  92 Low                        Current Facility-Administered Medications   Medication Dose Route Frequency Provider Last Rate Last Admin    albuterol (PROVENTIL) nebulizer solution 0.083% 2.5 mg/3mL  2.5 mg Nebulization TID Sachin Dia MD   2.5 mg at 01/08/24 1112    apixaban (ELIQUIS) tablet 5 mg  5 mg Oral Q12H Sachin Dia MD   5 mg at 01/08/24 0920    sennosides-docusate (PERICOLACE) 8.6-50 MG per tablet 2 tablet  2 tablet Oral BID Sachin Dia MD   2 tablet at 01/08/24 0920    And    polyethylene glycol (MIRALAX) packet 17 g  17 g Oral Daily PRN Sachin Dia MD        And    bisacodyl (DULCOLAX) EC tablet 5 mg  5 mg Oral Daily PRN Sachin Dia MD        And    bisacodyl (DULCOLAX) suppository 10 mg  10 mg Rectal Daily PRN Sachin Dia MD        Calcium Replacement - Follow Nurse / BPA Driven Protocol   Does not apply PRN Sachin Dia MD        cefTRIAXone (ROCEPHIN) 1,000 mg in sodium chloride 0.9 % 100 mL IVPB  1,000 mg Intravenous Daily Sachin Dia  mL/hr at 01/08/24 0920 1,000 mg at 01/08/24 0920    cyclobenzaprine (FLEXERIL) tablet 5 mg  5 mg Oral TID PRN Sachin Dia MD        DULoxetine (CYMBALTA) DR capsule 60 mg  60 mg Oral BID Sachin Dia MD   60 mg at 01/08/24 0920    furosemide (LASIX) tablet 40 mg  40 mg Oral Daily PRN Sachin Dia MD        guaifenesin-dextromethorphan (MUCINEX DM) tablet 2 tablet  2 tablet Oral Q12H Sachin Dia MD   2 tablet at 01/08/24 0920    levothyroxine (SYNTHROID, LEVOTHROID) tablet 100 mcg  100 mcg Oral Daily  Sachin Dia MD   100 mcg at 01/08/24 0548    Magnesium Standard Dose Replacement - Follow Nurse / BPA Driven Protocol   Does not apply PRN Sachin Dia MD        metoprolol succinate XL (TOPROL-XL) 24 hr tablet 25 mg  25 mg Oral Daily Sachin Dia MD   25 mg at 01/08/24 0920    Morphine (MS CONTIN) 12 hr tablet 15 mg  15 mg Oral Q12H eHctor Tubbs APRN   15 mg at 01/07/24 0833    nystatin (MYCOSTATIN) powder 1 application   1 application  Topical BID Sachin Dia MD   1 application  at 01/08/24 0921    Phosphorus Replacement - Follow Nurse / BPA Driven Protocol   Does not apply PRN Sachin Dia MD        Potassium Replacement - Follow Nurse / BPA Driven Protocol   Does not apply PRN Sachin Dia MD        pramipexole (MIRAPEX) tablet 0.75 mg  0.75 mg Oral Nightly Sachin Dia MD   0.75 mg at 01/07/24 2151    sodium chloride 0.9 % flush 10 mL  10 mL Intravenous PRN Charo Bhat, PA        sodium chloride 0.9 % flush 10 mL  10 mL Intravenous Q12H Barrett Wray Jr., MD   10 mL at 01/08/24 0922    sodium chloride 0.9 % flush 10 mL  10 mL Intravenous PRN Barrett Wray Jr., MD        sodium chloride 0.9 % flush 10 mL  10 mL Intravenous Q12H Sachin Dia MD   10 mL at 01/08/24 0921    sodium chloride 0.9 % flush 10 mL  10 mL Intravenous PRN Sachin Dia MD        sodium chloride 0.9 % infusion 40 mL  40 mL Intravenous PRN Barrett Wray Jr., MD        sodium chloride 0.9 % infusion 40 mL  40 mL Intravenous PRN Sachin Dia MD

## 2024-01-08 NOTE — PAYOR COMM NOTE
"1/8/24 [facility] [phone]  [phone]    ER ADMIT ON 1/5/24. INPATIENT ORDER.              Eyad Gurrola (73 y.o. Female)       Date of Birth   1950    Social Security Number       Address   [address] [facility] [address] [facility] [address]    Home Phone   [phone]    MRN   [id number]       Christianity   [facility]    Marital Status                               Admission Date   1/5/24    Admission Type   Emergency    Admitting Provider   Sachin Dia MD    Attending Provider   Sachin Dia MD    Department, Room/Bed   [facility] 3C, 389/1       Discharge Date       Discharge Disposition       Discharge Destination                                 Attending Provider: Sachin Dia MD    Allergies: Codeine, Amoxicillin, Levaquin [Levofloxacin], Benadryl [Diphenhydramine]    Isolation: None   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), 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)   Code Status: CPR    Ht: 160 cm (63\")   Wt: 101 kg (223 lb 1.7 oz)    Admission Cmt: None   Principal Problem: CHRISTINE (acute kidney injury) [N17.9]                   Active Insurance as of 1/5/2024       Primary Coverage       Payor Plan Insurance Group Employer/Plan Group    HUMANA MEDICARE REPLACEMENT HUMANA MEDICARE REPLACEMENT [id number]       Payor Plan Address Payor Plan Phone Number Payor Plan Fax Number Effective Dates    [address] [address] [phone]  1/1/2023 - None Entered    [facility] [address]         Subscriber Name Subscriber Birth Date Member ID       EYAD GURROLA 1950 [id number]                     Emergency Contacts        (Rel.) Home Phone Work Phone Mobile Phone    Nella (Daughter) [phone] -- [phone]    ODELL NELSON (Relative) -- -- [phone]             [facility] Encounter Date/Time: 1/5/2024 [facility]   Hospital Account: [id number]    MRN: [id number]   Patient:  Eyad Gurrola   Contact Serial #: [id number]   SSN:  "         ENCOUNTER             Patient Class: Inpatient   Unit: [address] Service: Medicine     Bed: 389/1   Admitting Provider: Sachin Dia MD   Referring Physician: Sachin Dia   Attending Provider: Sachin Dia MD   Adm Diagnosis: CHRISTINE (acute kidney injury*               PATIENT          Name: Eyad Gurrola : 1950 (73 yrs)   Address: [address] [place] [address] Sex: Female   City: [place] [address]   County: [facility]   Marital Status:  Ethnicity: NOT                                                                              Race: WHITE   Primary Care Provider: Sachin Dia MD Patients Phone: Home Phone: [phone]         EMERGENCY CONTACT   Contact Name Legal Guardian? Relationship to Patient Home Phone Work Phone   1. Nella Law  2. LESLIEHARMEETODELL No  No Daughter  Relative [phone]              GUARANTOR            Guarantor: Eyad Gurrola     : 1950   Address: [address] [place] [address] Sex: [place] [address]     Relation to Patient: Self       Home Phone: [phone]   Guarantor ID: [id number]       Work Phone:     GUARANTOR EMPLOYER   Employer:           Status: RETIRED   COVERAGE          PRIMARY INSURANCE   Payor: HUMANA MEDICARE REPLACEMENT Plan: HUMANA MEDICARE REPLACEMENT   Group Number: [id number] Insurance Type: INDEMNITY   Subscriber Name: EYAD GURROLA Subscriber : 1950   Subscriber ID: [id number] Coverage Address: [address] [address]   Pat. Rel. to Subscriber: Self Coverage Phone: [phone]   SECONDARY INSURANCE   Payor: N/A Plan: N/A   Group Number:   Insurance Type:     Subscriber Name:   Subscriber :     Subscriber ID:   Coverage Address:     Pat. Rel. to Subscriber:   Coverage Phone:        Contact Serial # ([id number])         2024    Chart ID ([id number])      Charo Bhat PA   Physician Assistant  Emergency Medicine     ED Provider Notes       Attested     Date of Service: 01/05/24 1935  Creation Time: 01/05/24 1935     Attested           Attestation signed by Barrett Wray Jr., MD at 01/06/24 0531           SUPERVISE: For this patient encounter, I reviewed the APC's documentation, treatment plan, and medical decision making.  Barrett Wray Jr, MD 1/6/2024 05:31 CST                                   Expand All Collapse All       Subjective []Expand by Default  History of Present Illness     Patient is a pleasant 73-year-old female who presents to ED with niece.  Both are historians.  Chief complaint is not feeling well with possible UTI and cough and congestion.     Patient does have significant medical history of chronic lower extremity pain with lymphedema, bronchitis, chronic back pain, disease of thyroid gland, hypertension, kidney stones, recurrent UTIs.     Ray describes for the past 4 to 5 days, she has felt unwell.  She has had cough and congestion.  She resides in a small 1 bedroom apartment on her own.  She normally does not wear oxygen.  She is eating less and is less mobile.  Particularly, she is less ambulatory in the past 24 hours.  She often presents like this whenever she has a urinary tract infection.  Niece noted a malodorous urine.  Patient denies abdominal pain, chest pain, pressure, tightness.  She is on Eliquis for history of paroxysmal atrial fibrillation.  She denies any shortness of breath but does complain of a scant cough.  Niece is more concerned that she has a UTI.  Patient really did not want to be here but felt unwell, so she wanted to come to the hospital.     Review of Systems   Constitutional:  Positive for activity change, appetite change and fatigue. Negative for fever.   HENT: Negative.     Respiratory:  Positive for cough. Negative for chest tightness and stridor.    Cardiovascular: Negative.  Negative for chest pain.   Gastrointestinal: Negative.  Negative for abdominal pain, nausea and vomiting.    Genitourinary: Negative.    Musculoskeletal: Negative.    Skin: Negative.    Neurological:  Positive for weakness.   Psychiatric/Behavioral: Negative.     All other systems reviewed and are negative.        Medical History        Past Medical History:   Diagnosis Date    Arthritis      Bronchitis      Cataract       right eye , cornea implant    Chronic back pain      Depression      Disease of thyroid gland      Edema       lower extremities    Hypertension      Kidney stones      Lymphedema                    Allergies   Allergen Reactions    Codeine Itching and Rash       SEVERE RASH/ITCHING (TOLERATES PERCOCET)    Amoxicillin Other (See Comments)       unknown    Levaquin [Levofloxacin] Other (See Comments)       unknown    Benadryl [Diphenhydramine] Other (See Comments)       KEEPS PATIENT AWAKE          Surgical History         Past Surgical History:   Procedure Laterality Date    BACK SURGERY         2010 ([facility]), 2013     CHOLECYSTECTOMY OPEN        CORNEAL TRANSPLANT Right 1999    INCISION AND DRAINAGE ABSCESS Left 4/19/2023     Procedure: INCISION AND DRAINAGE ABSCESS left leg;  Surgeon: Sarita Vazquez MD;  Location:  [facility] OR;  Service: General;  Laterality: Left;    LACERATION REPAIR Left 4/7/2023     Procedure: washout and closure left leg wound ;  Surgeon: Sarita Vazquez MD;  Location:  [facility] OR;  Service: General;  Laterality: Left;    LUMBAR FUSION Left 8/2/2017     Procedure: LEFT LUMBAR LATERAL INTERBODY FUSION WITH INSTRUMENTATION  L1-2;  Surgeon: MARCELINO Wright MD;  Location:  [facility] OR;  Service:     NECK SURGERY   2010     [facility]    TOTAL HIP ARTHROPLASTY Right 2014     DR. KENDRICK     TOTAL KNEE ARTHROPLASTY Right 2000    TOTAL KNEE ARTHROPLASTY Left 2002    TOTAL SHOULDER ARTHROPLASTY W/ DISTAL CLAVICLE EXCISION Left 6/1/2020     Procedure: LEFT REVERSE TOTAL SHOULDER REPLACEMENT;  Surgeon: Saravanan Calvin MD;  Location:  [facility] OR;  Service: Orthopedics;  Laterality: Left;                   Family History   Problem Relation Age of Onset    Breast cancer Neg Hx           Social History   Social History           Socioeconomic History    Marital status:    Tobacco Use    Smoking status: Never    Smokeless tobacco: Never   Vaping Use    Vaping Use: Never used   Substance and Sexual Activity    Alcohol use: No    Drug use: No    Sexual activity: Defer                    Prior to Admission medications    Medication Sig Start Date End Date Taking? Authorizing Provider   albuterol (PROVENTIL) (2.5 MG/3ML) 0.083% nebulizer solution Take 2.5 mg by nebulization 3 (Three) Times a Day.       Lacie Hernandez MD   apixaban (ELIQUIS) 5 MG tablet tablet Take 1 tablet by mouth Every 12 (Twelve) Hours. Indications: Atrial Fibrillation 10/6/23     Sanjiv Block MD   azelastine (ASTELIN) 0.1 % nasal spray 2 sprays into the nostril(s) as directed by provider 2 (Two) Times a Day. Use in each nostril as directed       Lacie Hernandez MD   cyclobenzaprine (FLEXERIL) 10 MG tablet Take 1 tablet by mouth 3 (Three) Times a Day As Needed for Muscle Spasms. 6/1/20     Saravanan Calvin MD   docusate sodium (COLACE) 100 MG capsule Take 1 capsule by mouth 2 (Two) Times a Day As Needed for Constipation.       Lacie Hernandez MD   DULoxetine (CYMBALTA) 60 MG capsule Take 1 capsule by mouth 2 (Two) Times a Day. Indications: Musculoskeletal Pain       Lacie Hernandez MD   furosemide (LASIX) 40 MG tablet Take 1 tablet by mouth Daily As Needed (edema). Indications: Edema 6/12/23     Lacie Hernandez MD   levothyroxine (SYNTHROID, LEVOTHROID) 100 MCG tablet Take 1 tablet by mouth Daily. Indications: Underactive Thyroid       Lacie Hernandez MD   metoprolol succinate XL (Toprol XL) 25 MG 24 hr tablet Take 1 tablet by mouth Daily. 10/6/23     Sanjiv Block MD   nystatin (MYCOSTATIN) 337009 UNIT/GM powder Apply 1 application  topically to the appropriate area as directed 2  "(Two) Times a Day. Indications: Candida Infection of Skin and Mouth or Vagina       Provider, MD Lacie   oxybutynin XL (DITROPAN-XL) 5 MG 24 hr tablet Take 1 tablet by mouth Every Night. Take at night  Indications: Urinary Incontinence 7/10/23     ProviderLacie MD   pramipexole (MIRAPEX) 0.75 MG tablet Take 1 tablet by mouth Every Night. Indications: Restless Leg Syndrome 10/6/23     Sanjiv Block MD         Medications   sodium chloride 0.9 % flush 10 mL (has no administration in time range)   sodium chloride 0.9 % infusion (has no administration in time range)   sodium chloride 0.9 % flush 10 mL (has no administration in time range)   sodium chloride 0.9 % flush 10 mL (has no administration in time range)   sodium chloride 0.9 % infusion 40 mL (has no administration in time range)   sodium chloride 0.9 % with KCl 20 mEq/L infusion (has no administration in time range)   sodium chloride 0.9 % bolus 500 mL (0 mL Intravenous Stopped 1/5/24 2041)   cefTRIAXone (ROCEPHIN) 1,000 mg in sodium chloride 0.9 % 100 mL IVPB (0 mg Intravenous Stopped 1/5/24 2137)   potassium chloride (KLOR-CON) packet 20 mEq (20 mEq Oral Given 1/5/24 2155)         /61   Pulse 62   Temp 98.6 °F (37 °C)   Resp 20   Ht 160 cm (63\")   Wt 103 kg (226 lb)   SpO2 100%   BMI 40.03 kg/m²               Objective   Physical Exam  Vitals and nursing note reviewed.   Constitutional:       General: She is not in acute distress.     Appearance: Normal appearance. She is well-developed. She is obese. She is not diaphoretic.   HENT:      Head: Normocephalic and atraumatic.   Eyes:      Extraocular Movements: Extraocular movements intact.      Conjunctiva/sclera: Conjunctivae normal.      Pupils: Pupils are equal, round, and reactive to light.   Neck:      Trachea: No tracheal deviation.   Cardiovascular:      Rate and Rhythm: Normal rate and regular rhythm.      Heart sounds: Normal heart sounds. No murmur heard.  Pulmonary:    "   Effort: Pulmonary effort is normal. No respiratory distress.      Breath sounds: Normal breath sounds. No stridor. No wheezing, rhonchi or rales.   Chest:      Chest wall: No tenderness.   Abdominal:      General: Bowel sounds are normal. There is no distension.      Palpations: Abdomen is soft. There is no mass.      Tenderness: There is no abdominal tenderness. There is no guarding or rebound.   Musculoskeletal:         General: Tenderness and signs of injury present.      Cervical back: Normal range of motion and neck supple.      Right lower leg: Edema present.      Left lower leg: Edema present.      Comments: Patient would not raise left upper extremity.  She is tender to palpate on the left glenohumeral space, proximal humerus, and left olecranon process.  Palpable pulses bilaterally.  Normal sensation.   Skin:     General: Skin is warm and dry.      Capillary Refill: Capillary refill takes less than 2 seconds.   Neurological:      General: No focal deficit present.      Mental Status: She is alert and oriented to person, place, and time.      Deep Tendon Reflexes: Reflexes are normal and symmetric.   Psychiatric:         Behavior: Behavior normal.         Thought Content: Thought content normal.         Judgment: Judgment normal.            Procedures              Lab Results (last 24 hours)         Procedure Component Value Units Date/Time     Urinalysis With Culture If Indicated - Straight Cath [[id number]]  (Abnormal) Collected: 01/05/24 1926     Specimen: Urine from Straight Cath Updated: 01/05/24 1959       Color, UA Dark Yellow       Appearance, UA Cloudy       pH, UA 5.5       Specific Gravity, UA 1.014       Glucose, UA Negative       Ketones, UA Negative       Bilirubin, UA Negative       Blood, UA Negative       Protein, UA Negative       Leuk Esterase, UA Small (1+)       Nitrite, UA Negative       Urobilinogen, UA 1.0 E.U./dL     Narrative:       In absence of clinical symptoms, the presence of  pyuria, bacteria, and/or nitrites on the urinalysis result does not correlate with infection.     Urinalysis, Microscopic Only - Straight Cath [[id number]]  (Abnormal) Collected: 01/05/24 1926     Specimen: Urine from Straight Cath Updated: 01/05/24 1959       RBC, UA 0-2 /HPF         WBC, UA 11-20 /HPF         Bacteria, UA 4+ /HPF         Squamous Epithelial Cells, UA 3-6 /HPF         Hyaline Casts, UA 0-2 /LPF         WBC Clumps, UA Moderate/2+ /HPF         Methodology Manual Light Microscopy     Urine Culture - Urine, Straight Cath [[id number]] Collected: 01/05/24 1926     Specimen: Urine from Straight Cath Updated: 01/05/24 1959     CBC & Differential [[id number]]  (Abnormal) Collected: 01/05/24 1937     Specimen: Blood Updated: 01/05/24 2033     Narrative:       The following orders were created for panel order CBC & Differential.  Procedure                               Abnormality         Status                     ---------                               -----------         ------                     CBC Auto Differential[[id number]]        Abnormal            Edited Result - FINAL         Please view results for these tests on the individual orders.     Comprehensive Metabolic Panel [[id number]]  (Abnormal) Collected: 01/05/24 1937     Specimen: Blood Updated: 01/05/24 2024       Glucose 125 mg/dL         BUN 62 mg/dL         Creatinine 3.28 mg/dL         Sodium 128 mmol/L         Potassium 3.4 mmol/L         Chloride 92 mmol/L         CO2 22.0 mmol/L         Calcium 8.7 mg/dL         Total Protein 5.8 g/dL         Albumin 2.8 g/dL         ALT (SGPT) 20 U/L         AST (SGOT) 48 U/L         Alkaline Phosphatase 164 U/L         Total Bilirubin 1.8 mg/dL         Globulin 3.0 gm/dL         A/G Ratio 0.9 g/dL         BUN/Creatinine Ratio 18.9       Anion Gap 14.0 mmol/L         eGFR 14.2 mL/min/1.73         Comment: <15 Indicative of kidney failure        Narrative:       GFR Normal >60  Chronic Kidney Disease  "<60  Kidney Failure <15     The GFR formula is only valid for adults with stable renal function between ages 18 and 70.     Procalcitonin [[id number]]  (Abnormal) Collected: 01/05/24 1937     Specimen: Blood Updated: 01/05/24 2030       Procalcitonin 4.80 ng/mL       Narrative:       As a Marker for Sepsis (Non-Neonates):     1. <0.5 ng/mL represents a low risk of severe sepsis and/or septic shock.  2. >2 ng/mL represents a high risk of severe sepsis and/or septic shock.     As a Marker for Lower Respiratory Tract Infections that require antibiotic therapy:     PCT on Admission    Antibiotic Therapy       6-12 Hrs later     >0.5                Strongly Recommended  >0.25 - <0.5        Recommended   0.1 - 0.25          Discouraged              Remeasure/reassess PCT  <0.1                Strongly Discouraged     Remeasure/reassess PCT     As 28 day mortality risk marker: \"Change in Procalcitonin Result\" (>80% or <=80%) if Day 0 (or Day 1) and Day 4 values are available. Refer to http://www.[employer]-pct-calculator.com     Change in PCT <=80%  A decrease of PCT levels below or equal to 80% defines a positive change in PCT test result representing a higher risk for 28-day all-cause mortality of patients diagnosed with severe sepsis for septic shock.     Change in PCT >80%  A decrease of PCT levels of more than 80% defines a negative change in PCT result representing a lower risk for 28-day all-cause mortality of patients diagnosed with severe sepsis or septic shock.         Lactic Acid, Plasma [[id number]]  (Abnormal) Collected: 01/05/24 1937     Specimen: Blood Updated: 01/05/24 2023       Lactate 2.1 mmol/L       CBC Auto Differential [[id number]]  (Abnormal) Collected: 01/05/24 1937     Specimen: Blood Updated: 01/05/24 2033       WBC 12.30 10*3/mm3         RBC 3.36 10*6/mm3         Hemoglobin 9.4 g/dL         Hematocrit 28.8 %         MCV 85.7 fL         MCH 28.0 pg         MCHC 32.6 g/dL         RDW 16.9 %         RDW-SD " 47.2 fl         MPV 9.8 fL         Platelets 142 10*3/mm3         Neutrophil % 70.3 %         Lymphocyte % 11.0 %         Monocyte % 15.6 %         Eosinophil % 2.2 %         Basophil % 0.2 %         Neutrophils, Absolute 8.64 10*3/mm3         Lymphocytes, Absolute 1.35 10*3/mm3         Monocytes, Absolute 1.92 10*3/mm3         Eosinophils, Absolute 0.27 10*3/mm3         Basophils, Absolute 0.03 10*3/mm3       Narrative:       Modified report. Previous result was Hemogram + Auto diff on 1/5/2024 at 2032 CST.     Respiratory Panel PCR w/COVID-19(SARS-CoV-2) [facility] In-House, NP Swab in UTM/VTM, 2 HR TAT - Swab, Nasopharynx [[id number]]  (Normal) Collected: 01/05/24 1939     Specimen: Swab from Nasopharynx Updated: 01/05/24 2039       ADENOVIRUS, PCR Not Detected       Coronavirus 229E Not Detected       Coronavirus HKU1 Not Detected       Coronavirus NL63 Not Detected       Coronavirus OC43 Not Detected       COVID19 Not Detected       Human Metapneumovirus Not Detected       Human Rhinovirus/Enterovirus Not Detected       Influenza A PCR Not Detected       Influenza B PCR Not Detected       Parainfluenza Virus 1 Not Detected       Parainfluenza Virus 2 Not Detected       Parainfluenza Virus 3 Not Detected       Parainfluenza Virus 4 Not Detected       RSV, PCR Not Detected       Bordetella pertussis pcr Not Detected       Bordetella parapertussis PCR Not Detected       Chlamydophila pneumoniae PCR Not Detected       Mycoplasma pneumo by PCR Not Detected     Narrative:       In the setting of a positive respiratory panel with a viral infection PLUS a negative procalcitonin without other underlying concern for bacterial infection, consider observing off antibiotics or discontinuation of antibiotics and continue supportive care. If the respiratory panel is positive for atypical bacterial infection (Bordetella pertussis, Chlamydophila pneumoniae, or Mycoplasma pneumoniae), consider antibiotic  de-escalation to target atypical bacterial infection.     Blood Culture - Blood, Arm, Right [[id number]] Collected: 01/05/24 2041     Specimen: Blood from Arm, Right Updated: 01/05/24 2054     Blood Culture - Blood, Arm, Right [[id number]] Collected: 01/05/24 2041     Specimen: Blood from Arm, Right Updated: 01/05/24 2054                XR Shoulder 2+ View Left     Result Date: 1/5/2024  Narrative: EXAMINATION: XR SHOULDER 2+ VW LEFT-  1/5/2024 8:14 PM  HISTORY: Fall with pain.  FINDINGS: 2 view exam of the left shoulder reveals the patient has undergone reverse total shoulder arthroplasty. It appears that one of the anchor screws may be fractured but this I feel was present on a previous chest radiograph from September of last year. There is normal articulation of the prosthesis. I do not see any evidence of periprosthetic fracture. 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       Impression: 1. No acute fracture or dislocation.  This report was signed and finalized on 1/5/2024 8:15 PM by Dr. Elian Bonilla MD.       XR Elbow 3+ View Left     Result Date: 1/5/2024  Narrative: LEFT ELBOW, 2 views 1/5/2024 6:49 PM  HISTORY: fall pain  COMPARISON: None  FINDINGS: Frontal and lateral views of the left elbow were obtained.  There is no fracture or dislocation. No significant joint space narrowing is noted. There is no significant joint effusion.   No gross soft tissue abnormality is visualized.       Impression: 1. Unremarkable radiographs of the left elbow.    This report was signed and finalized on 1/5/2024 8:13 PM by Dr. Elian Bonilla MD.       XR Humerus Left     Result Date: 1/5/2024  Narrative: EXAMINATION: XR HUMERUS LEFT-  1/5/2024 8:12 PM  HISTORY: Fall with pain.  FINDINGS: 2 view exam of the left humerus reveals the patient has undergone previous reverse left total shoulder arthroplasty. There is normal articulation at the shoulder. No acute bony injury.       Impression: 1. No acute fracture.  This report was  signed and finalized on 1/5/2024 8:12 PM by Dr. Elian Bonilla MD.       XR Chest 1 View     Result Date: 1/5/2024  Narrative: EXAMINATION: XR CHEST 1 VW-  1/5/2024 7:48 PM  HISTORY: Cough. HISTORY of atrial fibrillation.  FINDINGS: Upright frontal projection of the chest is compared to prior exam of 9/30/2023. Right basilar atelectasis is present with mild elevation of the right diaphragm. Lungs are otherwise clear. The thoracic aorta and great vessels are tortuous.       Impression: 1.. Mild right basilar atelectasis. Lungs are otherwise clear.  This report was signed and finalized on 1/5/2024 7:49 PM by Dr. Elian Bonilla MD.        ED Course      ED Course as of 01/05/24 2219 Fri Jan 05, 2024   2018 rine Culture - Urine, Urine, Catheter [[id number]] (Order [id number])  Order  Date: 9/30/2023       Department: [address]    Released By: Lab, Background User (auto-released)   Authorizing: Barrett Wray Jr., MD     Reprint Order Requisition     Urine Culture - Urine, Urine, Catheter (Order #[id number]) on 9/30/23         Contains abnormal data Urine Culture - Urine, Urine, Catheter  Order: [id number] - Reflex for Order [id number]  Status: Final result       Visible to patient: No (not released)       Next appt: None    Specimen Information:    Urine, Catheter  0 Result Notes  Urine Culture             >100,000 CFU/mL Klebsiella pneumoniae ssp pneumoniae Abnormal         Colonization of the urinary tract without infection is common. Treatment is discouraged unless the patient is symptomatic, pregnant, or undergoing an invasive urologic procedure.        Resulting Agency: [facility]     Susceptibility                    Klebsiella pneumoniae ssp pneumoniae                         CHRISTIANO                          Ampicillin            >=32 ug/ml  Resistant                          Ampicillin + Sulbactam   4 ug/ml  Susceptible                          Cefazolin            <=4 ug/ml  Susceptible                           Cefepime            <=1 ug/ml  Susceptible                          Ceftazidime            <=1 ug/ml  Susceptible                          Ceftriaxone            <=1 ug/ml  Susceptible                          Gentamicin            <=1 ug/ml  Susceptible                          Levofloxacin            <=0.12 ug/ml  Susceptible                          Nitrofurantoin            <=16 ug/ml  Susceptible                          Piperacillin + Tazobactam            <=4 ug/ml  Susceptible                          Trimethoprim + Sulfamethoxazole   <=20 ug/ml  Susceptible                                                                                  Linear View               [TK]   2203 I have spoken with the patient, her niece, and her daughter, Dominga Law on the phone, at great length of her abnormal studies consistent with a UTI.  I did evaluate her previous urinalysis and she was responsive to ceftriaxone.  Patient's kidney functions have been monitored for the past couple months and is declining.  She does have CHRISTINE with her urine infection today.  Procalcitonin is elevated 4.8.  WBCs 4.3.  Respiratory panel is negative.  Lactate is elevated 2.1.  I discussed this case with Dr. Medellin, the primary care provider.  He is gracious admit the patient under his care.     Also of note, the patient has been guarding her left arm after several falls.  I am concerned she is developing a frozen shoulder. [TK]       ED Course User Index  [TK] Charo Bhat PA MDM     Amount and/or Complexity of Data Reviewed  Decide to obtain previous medical records or to obtain history from someone other than the patient: yes           Final diagnoses:   Acute UTI   CHRISTINE (acute kidney injury)   Hyponatremia   Fall, initial encounter   Injury of left shoulder, initial encounter   Chronic edema         Disposition: Patient will be admitted under Dr. Medellin's care.        Charo Bhat  PA  01/05/24 2219               Cosigned by: Barrett Wray Jr., MD at 01/06/24 0531   Sachin Dia MD   Physician  Medicine     H&P      Signed     Date of Service: 01/06/24 0713  Creation Time: 01/06/24 0713     Signed       Expand All Collapse All              History and Physical        CHIEF COMPLAINT: I do not feel well     Reason for Admission: Urinary tract infection present on admission/hyponatremia/chronic kidney disease with acute kidney injury     History Obtained From: Patient/ER records/office     HISTORY OF PRESENT ILLNESS:       The patient is a 73 y.o. female with significant past medical history of chronic pain syndrome with underlying chronic kidney disease hypertension and morbid obesity who presents with worsening of creatinine and decreased oral intake for the past 3 to 4 days.  Patient also describes cough cold congestion for about the same duration.  Patient has not been able to get out of bed.  She lives in a 1 bedroom apartment and cannot even get to the restroom.  With all the above she is brought into the emergency room with a significant increase in her creatinine to 3.3.  She had a positive urinalysis for infection as well as coughing yellowish-green sputum.  Chest x-ray and respiratory panel were unremarkable nonetheless we elected to admit Ms. Brenda Townsend for further investigative studies and delineation of care.  Of note her sodium was 125 and it has been low in the past and she has similar type of symptoms.  No other precipitating or relieving factors noted, her pain which is chronic was 4-5 out of 10.     Past Medical History:    Medical History[]Expand by Default        Past Medical History:   Diagnosis Date    Arthritis      Bronchitis      Cataract       right eye , cornea implant    Chronic back pain      Depression      Disease of thyroid gland      Edema       lower extremities    Hypertension      Kidney stones      Lymphedema              Past Surgical  History:    Surgical History         Past Surgical History:   Procedure Laterality Date    BACK SURGERY         2010 ([facility]), 2013     CHOLECYSTECTOMY OPEN        CORNEAL TRANSPLANT Right 1999    INCISION AND DRAINAGE ABSCESS Left 4/19/2023     Procedure: INCISION AND DRAINAGE ABSCESS left leg;  Surgeon: Sarita Vazquez MD;  Location:  [facility] OR;  Service: General;  Laterality: Left;    LACERATION REPAIR Left 4/7/2023     Procedure: washout and closure left leg wound ;  Surgeon: Sarita Vazquez MD;  Location:  [facility];  Service: General;  Laterality: Left;    LUMBAR FUSION Left 8/2/2017     Procedure: LEFT LUMBAR LATERAL INTERBODY FUSION WITH INSTRUMENTATION  L1-2;  Surgeon: MARCELINO Wright MD;  Location:  [facility];  Service:     NECK SURGERY   2010     [facility]    TOTAL HIP ARTHROPLASTY Right 2014     DR. KENDRICK     TOTAL KNEE ARTHROPLASTY Right 2000    TOTAL KNEE ARTHROPLASTY Left 2002    TOTAL SHOULDER ARTHROPLASTY W/ DISTAL CLAVICLE EXCISION Left 6/1/2020     Procedure: LEFT REVERSE TOTAL SHOULDER REPLACEMENT;  Surgeon: Saravanan Calvin MD;  Location:  [facility];  Service: Orthopedics;  Laterality: Left;            Medications Prior to Admission:    Prescriptions Prior to Admission           Medications Prior to Admission   Medication Sig Dispense Refill Last Dose    albuterol (PROVENTIL) (2.5 MG/3ML) 0.083% nebulizer solution Take 2.5 mg by nebulization 3 (Three) Times a Day.          apixaban (ELIQUIS) 5 MG tablet tablet Take 1 tablet by mouth Every 12 (Twelve) Hours. Indications: Atrial Fibrillation 180 tablet 1      azelastine (ASTELIN) 0.1 % nasal spray 2 sprays into the nostril(s) as directed by provider 2 (Two) Times a Day. Use in each nostril as directed          cyclobenzaprine (FLEXERIL) 10 MG tablet Take 1 tablet by mouth 3 (Three) Times a Day As Needed for Muscle Spasms. 30 tablet 0      docusate sodium (COLACE) 100 MG capsule Take 1 capsule by mouth 2 (Two) Times a Day As Needed for  Constipation.          DULoxetine (CYMBALTA) 60 MG capsule Take 1 capsule by mouth 2 (Two) Times a Day. Indications: Musculoskeletal Pain          furosemide (LASIX) 40 MG tablet Take 1 tablet by mouth Daily As Needed (edema). Indications: Edema          levothyroxine (SYNTHROID, LEVOTHROID) 100 MCG tablet Take 1 tablet by mouth Daily. Indications: Underactive Thyroid          metoprolol succinate XL (Toprol XL) 25 MG 24 hr tablet Take 1 tablet by mouth Daily. 90 tablet 1      nystatin (MYCOSTATIN) 210415 UNIT/GM powder Apply 1 application  topically to the appropriate area as directed 2 (Two) Times a Day. Indications: Candida Infection of Skin and Mouth or Vagina          oxybutynin XL (DITROPAN-XL) 5 MG 24 hr tablet Take 1 tablet by mouth Every Night. Take at night  Indications: Urinary Incontinence          pramipexole (MIRAPEX) 0.75 MG tablet Take 1 tablet by mouth Every Night. Indications: Restless Leg Syndrome 90 tablet 1              Allergies:  Codeine, Amoxicillin, Levaquin [levofloxacin], and Benadryl [diphenhydramine]     Social History:   Social History   Social History           Socioeconomic History    Marital status:    Tobacco Use    Smoking status: Never    Smokeless tobacco: Never   Vaping Use    Vaping Use: Never used   Substance and Sexual Activity    Alcohol use: Not Currently    Drug use: No    Sexual activity: Defer            Family History:         Family History   Problem Relation Age of Onset    Breast cancer Neg Hx           REVIEW OF SYSTEMS:     CONSTITUTIONAL:  Negative for anorexia, chills, fevers, night sweats and weight loss, multiple somatic body complaints.  Patient has been relatively medically stable until illness outlined in HPI  EYES:  negative for eye dryness, icterus and redness, no significant changes in vision  HEENT:   negative for dental problems, epistaxis or sinus congestion , no history of facial trauma or difficulty swallowing  RESPIRATORY: See  HPI  CARDIOVASCULAR: No history of chest pain, dyspnea, exertional chest pressure/discomfort, irregular heart beat, or PND  GASTROINTESTINAL:  negative for abdominal pain, nausea or vomiting, no history of hematemesis, jaundice, melena or rectal bleeding  MUSCULOSKELETAL:  negative for muscle weakness, myalgias and neck pain, no significant arthralgias  NEUROLOGICAL:   negative for dizziness, headaches, seizures, speech problems, tremors and vertigo, mentation has been at baseline  INTEGUMENT: negative for pruritus, rash, skin color change and skin lesion(s)            Vital Signs   Temp:  [97.3 °F (36.3 °C)-98.6 °F (37 °C)] 98 °F (36.7 °C)  Heart Rate:  [58-71] 58  Resp:  [18-20] 18  BP: ()/(41-64) 95/41            Physical Exam:  Constitutional: The patient is oriented to person, place, and time. They appear well-developed.  Able to answer questions appropriately  HEENT: Grossly normal sitting up in bed  Head: Normocephalic and atraumatic.   Eyes: Pupils are equal, round, and reactive to light.  Extraocular muscles appear to be intact  Neck: Neck supple without masses or carotid bruit.  Cardiovascular: Regular rhythm and normal heart sounds.  No extra or lift rub or murmur appreciated  Pulmonary/Chest: Effort normal and breath sounds normal. CTAB, no appreciable rales or wheezes, cough nonproductive  Abdominal: Soft. Bowel sounds are normal. There is  no distension or tenderness appreciated. There is no rebound and no guarding.   Musculoskeletal: Normal range of motion. There is  no edema or tenderness.  No significant joint swelling  Neurological: Pt is alert and oriented to person, place, and time. They have normal reflexes. CN 2-12 appear grossly intact  Skin: Skin is warm and dry without significant rashes      Results Review:              I reviewed the patient's new imaging results and agree with the interpretation.     DATA:  Lab Results (last 24 hours)         Procedure Component Value Units  Date/Time     STAT Lactic Acid, Reflex [[id number]]  (Normal) Collected: 01/05/24 2247     Specimen: Blood Updated: 01/05/24 2306       Lactate 1.5 mmol/L       Blood Culture - Blood, Arm, Right [[id number]] Collected: 01/05/24 2041     Specimen: Blood from Arm, Right Updated: 01/05/24 2054     Blood Culture - Blood, Arm, Right [[id number]] Collected: 01/05/24 2041     Specimen: Blood from Arm, Right Updated: 01/05/24 2054     Respiratory Panel PCR w/COVID-19(SARS-CoV-2) [facility], NP Swab in UTM/VTM, 2 HR TAT - Swab, Nasopharynx [[id number]]  (Normal) Collected: 01/05/24 1939     Specimen: Swab from Nasopharynx Updated: 01/05/24 2039       ADENOVIRUS, PCR Not Detected       Coronavirus 229E Not Detected       Coronavirus HKU1 Not Detected       Coronavirus NL63 Not Detected       Coronavirus OC43 Not Detected       COVID19 Not Detected       Human Metapneumovirus Not Detected       Human Rhinovirus/Enterovirus Not Detected       Influenza A PCR Not Detected       Influenza B PCR Not Detected       Parainfluenza Virus 1 Not Detected       Parainfluenza Virus 2 Not Detected       Parainfluenza Virus 3 Not Detected       Parainfluenza Virus 4 Not Detected       RSV, PCR Not Detected       Bordetella pertussis pcr Not Detected       Bordetella parapertussis PCR Not Detected       Chlamydophila pneumoniae PCR Not Detected       Mycoplasma pneumo by PCR Not Detected     Narrative:       In the setting of a positive respiratory panel with a viral infection PLUS a negative procalcitonin without other underlying concern for bacterial infection, consider observing off antibiotics or discontinuation of antibiotics and continue supportive care. If the respiratory panel is positive for atypical bacterial infection (Bordetella pertussis, Chlamydophila pneumoniae, or Mycoplasma pneumoniae), consider antibiotic de-escalation to target atypical bacterial infection.     CBC & Differential [[id number]]  (Abnormal)  Collected: 01/05/24 1937     Specimen: Blood Updated: 01/05/24 2033     Narrative:       The following orders were created for panel order CBC & Differential.  Procedure                               Abnormality         Status                     ---------                               -----------         ------                     CBC Auto Differential[[id number]]        Abnormal            Edited Result - FINAL         Please view results for these tests on the individual orders.     CBC Auto Differential [[id number]]  (Abnormal) Collected: 01/05/24 1937     Specimen: Blood Updated: 01/05/24 2033       WBC 12.30 10*3/mm3         RBC 3.36 10*6/mm3         Hemoglobin 9.4 g/dL         Hematocrit 28.8 %         MCV 85.7 fL         MCH 28.0 pg         MCHC 32.6 g/dL         RDW 16.9 %         RDW-SD 47.2 fl         MPV 9.8 fL         Platelets 142 10*3/mm3         Neutrophil % 70.3 %         Lymphocyte % 11.0 %         Monocyte % 15.6 %         Eosinophil % 2.2 %         Basophil % 0.2 %         Neutrophils, Absolute 8.64 10*3/mm3         Lymphocytes, Absolute 1.35 10*3/mm3         Monocytes, Absolute 1.92 10*3/mm3         Eosinophils, Absolute 0.27 10*3/mm3         Basophils, Absolute 0.03 10*3/mm3       Narrative:       Modified report. Previous result was Hemogram + Auto diff on 1/5/2024 at 2032 CST.     Procalcitonin [[id number]]  (Abnormal) Collected: 01/05/24 1937     Specimen: Blood Updated: 01/05/24 2030       Procalcitonin 4.80 ng/mL       Narrative:       As a Marker for Sepsis (Non-Neonates):     1. <0.5 ng/mL represents a low risk of severe sepsis and/or septic shock.  2. >2 ng/mL represents a high risk of severe sepsis and/or septic shock.     As a Marker for Lower Respiratory Tract Infections that require antibiotic therapy:     PCT on Admission    Antibiotic Therapy       6-12 Hrs later     >0.5                Strongly Recommended  >0.25 - <0.5        Recommended   0.1 - 0.25          Discouraged             "  Remeasure/reassess PCT  <0.1                Strongly Discouraged     Remeasure/reassess PCT     As 28 day mortality risk marker: \"Change in Procalcitonin Result\" (>80% or <=80%) if Day 0 (or Day 1) and Day 4 values are available. Refer to http://www.[facility]-pct-calculator.com     Change in PCT <=80%  A decrease of PCT levels below or equal to 80% defines a positive change in PCT test result representing a higher risk for 28-day all-cause mortality of patients diagnosed with severe sepsis for septic shock.     Change in PCT >80%  A decrease of PCT levels of more than 80% defines a negative change in PCT result representing a lower risk for 28-day all-cause mortality of patients diagnosed with severe sepsis or septic shock.         Comprehensive Metabolic Panel [[id number]]  (Abnormal) Collected: 01/05/24 1937     Specimen: Blood Updated: 01/05/24 2024       Glucose 125 mg/dL         BUN 62 mg/dL         Creatinine 3.28 mg/dL         Sodium 128 mmol/L         Potassium 3.4 mmol/L         Chloride 92 mmol/L         CO2 22.0 mmol/L         Calcium 8.7 mg/dL         Total Protein 5.8 g/dL         Albumin 2.8 g/dL         ALT (SGPT) 20 U/L         AST (SGOT) 48 U/L         Alkaline Phosphatase 164 U/L         Total Bilirubin 1.8 mg/dL         Globulin 3.0 gm/dL         A/G Ratio 0.9 g/dL         BUN/Creatinine Ratio 18.9       Anion Gap 14.0 mmol/L         eGFR 14.2 mL/min/1.73         Comment: <15 Indicative of kidney failure        Narrative:       GFR Normal >60  Chronic Kidney Disease <60  Kidney Failure <15     The GFR formula is only valid for adults with stable renal function between ages 18 and 70.     Lactic Acid, Plasma [[id number]]  (Abnormal) Collected: 01/05/24 1937     Specimen: Blood Updated: 01/05/24 2023       Lactate 2.1 mmol/L       Urinalysis With Culture If Indicated - Straight Cath [[id number]]  (Abnormal) Collected: 01/05/24 1926     Specimen: Urine from Straight Cath Updated: 01/05/24 1959       " Color, UA Dark Yellow       Appearance, UA Cloudy       pH, UA 5.5       Specific Gravity, UA 1.014       Glucose, UA Negative       Ketones, UA Negative       Bilirubin, UA Negative       Blood, UA Negative       Protein, UA Negative       Leuk Esterase, UA Small (1+)       Nitrite, UA Negative       Urobilinogen, UA 1.0 E.U./dL     Narrative:       In absence of clinical symptoms, the presence of pyuria, bacteria, and/or nitrites on the urinalysis result does not correlate with infection.     Urinalysis, Microscopic Only - Straight Cath [[id number]]  (Abnormal) Collected: 01/05/24 1926     Specimen: Urine from Straight Cath Updated: 01/05/24 1959       RBC, UA 0-2 /HPF         WBC, UA 11-20 /HPF         Bacteria, UA 4+ /HPF         Squamous Epithelial Cells, UA 3-6 /HPF         Hyaline Casts, UA 0-2 /LPF         WBC Clumps, UA Moderate/2+ /HPF         Methodology Manual Light Microscopy     Urine Culture - Urine, Straight Cath [[id number]] Collected: 01/05/24 1926     Specimen: Urine from Straight Cath Updated: 01/05/24 1959             Imaging Results (Last 24 Hours)         Procedure Component Value Units Date/Time     XR Shoulder 2+ View Left [[id number]] Collected: 01/05/24 2013       Updated: 01/05/24 2018     Narrative:       EXAMINATION: XR SHOULDER 2+ VW LEFT-  1/5/2024 8:14 PM     HISTORY: Fall with pain.     FINDINGS: 2 view exam of the left shoulder reveals the patient has  undergone reverse total shoulder arthroplasty. It appears that one of  the anchor screws may be fractured but this I feel was present on a  previous chest radiograph from September of last year. There is normal  articulation of the prosthesis. I do not see any evidence of  periprosthetic fracture. 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        Impression:       1. No acute fracture or dislocation.     This report was signed and finalized on 1/5/2024 8:15 PM by Dr. Elian Bonilla MD.        XR Elbow 3+ View Left [[id number]] Collected:  01/05/24 2013       Updated: 01/05/24 2016     Narrative:       LEFT ELBOW, 2 views 1/5/2024 6:49 PM     HISTORY: fall pain     COMPARISON: None      FINDINGS:   Frontal and lateral views of the left elbow were obtained.      There is no fracture or dislocation. No significant joint space  narrowing is noted. There is no significant joint effusion.       No gross soft tissue abnormality is visualized.         Impression:       1. Unremarkable radiographs of the left elbow.           This report was signed and finalized on 1/5/2024 8:13 PM by Dr. Elian Bonilla MD.        XR Humerus Left [[id number]] Collected: 01/05/24 2012       Updated: 01/05/24 2016     Narrative:       EXAMINATION: XR HUMERUS LEFT-  1/5/2024 8:12 PM     HISTORY: Fall with pain.     FINDINGS: 2 view exam of the left humerus reveals the patient has  undergone previous reverse left total shoulder arthroplasty. There is  normal articulation at the shoulder. No acute bony injury.        Impression:       1. No acute fracture.     This report was signed and finalized on 1/5/2024 8:12 PM by Dr. Elian Bonilla MD.        XR Chest 1 View [[id number]] Collected: 01/1950       Updated: 01/05/24 1952     Narrative:       EXAMINATION: XR CHEST 1 VW-  1/5/2024 7:48 PM     HISTORY: Cough. HISTORY of atrial fibrillation.     FINDINGS: Upright frontal projection of the chest is compared to prior  exam of 9/30/2023. Right basilar atelectasis is present with mild  elevation of the right diaphragm. Lungs are otherwise clear. The  thoracic aorta and great vessels are tortuous.        Impression:       1.. Mild right basilar atelectasis. Lungs are otherwise clear.     This report was signed and finalized on 1/5/2024 7:49 PM by Dr. Elian Bonilla MD.                      ASSESSMENT AND PLAN:       1.     CHRISTINE (acute kidney injury)    HTN (hypertension)    Obesity, Class III, BMI 40-49.9 (morbid obesity)    Anxiety associated with depression    Chronic pain  syndrome    Hyponatremia    Chronic kidney disease, stage 3a     2.   Hyponatremia-hypovolemic, gently rehydrate, salt tablets and Lasix for now recheck sodium in AM.  3.   Chronic kidney disease stage IIIa with acute kidney injury-creatinine usually 1 5, currently at 3.3.  Avoid nephrotoxic medications gently rehydrate recheck in AM.  4.   Upper respiratory/acute bronchitis-chest x-ray and pulmonary PCR panel are unremarkable, symptomatic treatment with Mucinex nebulized treatments and the Rocephin for her urinary tract infection present on admission would cover most respiratory issues.  5.  Acute cystitis/hematuria present on admission-culture urine continue with IV Rocephin for now.     Patient with chronic pain and multiple somatic body complaints which have been chronic in nature, judicious use of opioid analgesics and sedating medications especially with respiratory condition.  Encourage PT and OT up in a chair and ambulate in hallways.  Conference with nursing staff at bedside today.        Sachin Dia MD  07:14 CST 1/6/2024                     Sachin Dia MD   Physician  Medicine     Progress Notes      Addendum     Date of Service: 01/07/24 0614  Creation Time: 01/07/24 0614     Expand All Collapse All    Brenda Sneed is a 73 y.o. female patient.     Current Medications             Current Facility-Administered Medications   Medication Dose Route Frequency Provider Last Rate Last Admin    albuterol (PROVENTIL) nebulizer solution 0.083% 2.5 mg/3mL  2.5 mg Nebulization TID Sachin Dia MD   2.5 mg at 01/06/24 2135    apixaban (ELIQUIS) tablet 5 mg  5 mg Oral Q12H Sachin Dia MD   5 mg at 01/06/24 2104    sennosides-docusate (PERICOLACE) 8.6-50 MG per tablet 2 tablet  2 tablet Oral BID Sachin Dia MD   2 tablet at 01/06/24 0839     And    polyethylene glycol (MIRALAX) packet 17 g  17 g Oral Daily PRN Sachin Dia MD         And    bisacodyl  (DULCOLAX) EC tablet 5 mg  5 mg Oral Daily PRN Sachin Dia MD         And    bisacodyl (DULCOLAX) suppository 10 mg  10 mg Rectal Daily PRN Sachin Dia MD        Calcium Replacement - Follow Nurse / BPA Driven Protocol   Does not apply PRN Sachin Dia MD        cefTRIAXone (ROCEPHIN) 1,000 mg in sodium chloride 0.9 % 100 mL IVPB  1,000 mg Intravenous Daily Sachin Dia  mL/hr at 01/06/24 0831 1,000 mg at 01/06/24 0831    cyclobenzaprine (FLEXERIL) tablet 10 mg  10 mg Oral TID PRN Sachin Dia MD        DULoxetine (CYMBALTA) DR capsule 60 mg  60 mg Oral BID Sachin Dia MD   60 mg at 01/06/24 2104    furosemide (LASIX) tablet 20 mg  20 mg Oral BID Sachin Dia MD   20 mg at 01/06/24 1850    HYDROcodone-acetaminophen (NORCO) 7.5-325 MG per tablet 1 tablet  1 tablet Oral Q8H PRN Hector Tubbs APRN        levothyroxine (SYNTHROID, LEVOTHROID) tablet 100 mcg  100 mcg Oral Daily Sachin Dia MD   100 mcg at 01/06/24 0832    Magnesium Standard Dose Replacement - Follow Nurse / [facility]PA Driven Protocol   Does not apply PRN Sachin Dia MD        metoprolol succinate XL (TOPROL-XL) 24 hr tablet 25 mg  25 mg Oral Daily Sachin Dia MD        Morphine (MS CONTIN) 12 hr tablet 15 mg  15 mg Oral Q12H Hector Tubbs APRN   15 mg at 01/06/24 1400    nystatin (MYCOSTATIN) powder 1 application   1 application  Topical BID Sachin Dia MD   1 application  at 01/06/24 2107    Phosphorus Replacement - Follow Nurse / [facility]PA Driven Protocol   Does not apply PRN Sachin Dia MD        Potassium Replacement - Follow Nurse / BPA Driven Protocol   Does not apply PRN Sachin Dia MD        pramipexole (MIRAPEX) tablet 0.75 mg  0.75 mg Oral Nightly Sachin Dia MD   0.75 mg at 01/06/24 2104    sodium chloride 0.9 % flush 10 mL  10 mL Intravenous PRN Charo Bhat PA        sodium chloride 0.9 %  "flush 10 mL  10 mL Intravenous Q12H Barrett Wray Jr., MD   10 mL at 01/06/24 2103    sodium chloride 0.9 % flush 10 mL  10 mL Intravenous PRN Barrett Wray Jr., MD        sodium chloride 0.9 % flush 10 mL  10 mL Intravenous Q12H Sachin Dia MD   10 mL at 01/06/24 2103    sodium chloride 0.9 % flush 10 mL  10 mL Intravenous PRN Sachin Dia MD        sodium chloride 0.9 % infusion 40 mL  40 mL Intravenous PRN Barrett Wray Jr., MD        sodium chloride 0.9 % infusion 40 mL  40 mL Intravenous PRN Sachin Dia MD        sodium chloride 0.9 % infusion  100 mL/hr Intravenous Continuous Sachin Dia  mL/hr at 01/06/24 0840 100 mL/hr at 01/06/24 0840    sodium chloride tablet 1 g  1 g Oral TID With Meals Sachin Dia MD   1 g at 01/06/24 1850         ALLERGIES:          Allergies   Allergen Reactions    Codeine Itching and Rash       SEVERE RASH/ITCHING (TOLERATES PERCOCET)    Amoxicillin Other (See Comments)       unknown    Levaquin [Levofloxacin] Other (See Comments)       unknown    Benadryl [Diphenhydramine] Other (See Comments)       KEEPS PATIENT AWAKE         CHRISTINE (acute kidney injury)    HTN (hypertension)    Obesity, Class III, BMI 40-49.9 (morbid obesity)    Anxiety associated with depression    Chronic pain syndrome    Hyponatremia    Chronic kidney disease, stage 3a     Blood pressure 101/46, pulse 77, temperature 98.4 °F (36.9 °C), temperature source Oral, resp. rate 18, height 160 cm (63\"), weight 101 kg (223 lb 1.7 oz), SpO2 98%, not currently breastfeeding.        Subjective:  Symptoms:  Improved.  She reports weakness and anxiety.    Diet:  Adequate intake.    Activity level: Impaired due to weakness.    Pain:  She complains of pain that is moderate.  She reports pain is unchanged.  Pain is partially controlled.       Review of Systems   Neurological:  Positive for weakness.      Objective:  General Appearance:  Comfortable, well-appearing " "and in no acute distress.    Vital signs: (most recent): Blood pressure 101/46, pulse 77, temperature 98.4 °F (36.9 °C), temperature source Oral, resp. rate 18, height 160 cm (63\"), weight 101 kg (223 lb 1.7 oz), SpO2 98%, not currently breastfeeding.  Vital signs are normal.    Output: Producing urine and minimal stool output.    HEENT: Normal HEENT exam.  (Edentulous)    Lungs:  Normal effort and normal respiratory rate.  There are wheezes.  (Deep inspiration produces a cough)  Heart: Normal rate.    Abdomen: Abdomen is soft.  (Obese).  Bowel sounds are normal.   There is no abdominal tenderness.     Extremities: Normal range of motion.    Pulses: Distal pulses are intact.    Neurological: Patient is alert and oriented to person, place and time.    Skin:  Warm and dry.                      Labs:  Lab Results (last 72 hours)         Procedure Component Value Units Date/Time     Basic Metabolic Panel [[id number]]  (Abnormal) Collected: 01/07/24 0532     Specimen: Blood Updated: 01/07/24 0611       Glucose 101 mg/dL         BUN 51 mg/dL         Creatinine 1.90 mg/dL         Sodium 135 mmol/L         Potassium 3.8 mmol/L         Comment: Slight hemolysis detected by analyzer. Result may be falsely elevated.          Chloride 100 mmol/L         CO2 22.0 mmol/L         Calcium 8.5 mg/dL         BUN/Creatinine Ratio 26.8       Anion Gap 13.0 mmol/L         eGFR 27.3 mL/min/1.73       Narrative:       GFR Normal >60  Chronic Kidney Disease <60  Kidney Failure <15     The GFR formula is only valid for adults with stable renal function between ages 18 and 70.     Manual Differential [[id number]] Collected: 01/07/24 0532     Specimen: Blood Updated: 01/07/24 0546     CBC & Differential [[id number]] Collected: 01/07/24 0532     Specimen: Blood Updated: 01/07/24 0539     Narrative:       The following orders were created for panel order CBC & Differential.  Procedure                               Abnormality         Status        "              ---------                               -----------         ------                     CBC Auto Differential[[id number]]                            In process                    Please view results for these tests on the individual orders.     CBC Auto Differential [[id number]] Collected: 01/07/24 0532     Specimen: Blood Updated: 01/07/24 0539     Blood Culture - Blood, Arm, Right [[id number]]  (Normal) Collected: 01/05/24 2041     Specimen: Blood from Arm, Right Updated: 01/06/24 2100       Blood Culture No growth at 24 hours     Blood Culture - Blood, Arm, Right [[id number]]  (Normal) Collected: 01/05/24 2041     Specimen: Blood from Arm, Right Updated: 01/06/24 2100       Blood Culture No growth at 24 hours     CBC & Differential [[id number]]  (Abnormal) Collected: 01/06/24 1221     Specimen: Blood Updated: 01/06/24 1305     Narrative:       The following orders were created for panel order CBC & Differential.  Procedure                               Abnormality         Status                     ---------                               -----------         ------                     CBC Auto Differential[[id number]]        Abnormal            Final result                  Please view results for these tests on the individual orders.     CBC Auto Differential [[id number]]  (Abnormal) Collected: 01/06/24 1221     Specimen: Blood Updated: 01/06/24 1305       WBC 7.65 10*3/mm3         RBC 3.20 10*6/mm3         Hemoglobin 8.8 g/dL         Hematocrit 27.0 %         MCV 84.4 fL         MCH 27.5 pg         MCHC 32.6 g/dL         RDW 16.9 %         RDW-SD 47.5 fl         MPV 9.8 fL         Platelets 119 10*3/mm3         Neutrophil % 66.5 %         Lymphocyte % 12.7 %         Monocyte % 15.4 %         Eosinophil % 3.7 %         Basophil % 0.4 %         Neutrophils, Absolute 5.09 10*3/mm3         Lymphocytes, Absolute 0.97 10*3/mm3         Monocytes, Absolute 1.18 10*3/mm3         Eosinophils, Absolute 0.28 10*3/mm3          Basophils, Absolute 0.03 10*3/mm3       Urine Culture - Urine, Straight Cath [[id number]]  (Abnormal) Collected: 01/05/24 1926     Specimen: Urine from Straight Cath Updated: 01/06/24 1301       Urine Culture >100,000 CFU/mL Gram Negative Bacilli     Narrative:       Colonization of the urinary tract without infection is common. Treatment is discouraged unless the patient is symptomatic, pregnant, or undergoing an invasive urologic procedure.     Basic Metabolic Panel [[id number]]  (Abnormal) Collected: 01/06/24 1221     Specimen: Blood Updated: 01/06/24 1259       Glucose 103 mg/dL         BUN 58 mg/dL         Creatinine 2.59 mg/dL         Sodium 133 mmol/L         Potassium 3.3 mmol/L         Chloride 97 mmol/L         CO2 23.0 mmol/L         Calcium 8.4 mg/dL         BUN/Creatinine Ratio 22.4       Anion Gap 13.0 mmol/L         eGFR 18.8 mL/min/1.73       Narrative:       GFR Normal >60  Chronic Kidney Disease <60  Kidney Failure <15     The GFR formula is only valid for adults with stable renal function between ages 18 and 70.     STAT Lactic Acid, Reflex [[id number]]  (Normal) Collected: 01/05/24 2247     Specimen: Blood Updated: 01/05/24 2306       Lactate 1.5 mmol/L       Respiratory Panel PCR w/COVID-19(SARS-CoV-2) JANKI/MAYDA/OLIMPIA/PAD/COR/PRISCILLA In-House, NP Swab in UTM/VTM, 2 HR TAT - Swab, Nasopharynx [[id number]]  (Normal) Collected: 01/05/24 1939     Specimen: Swab from Nasopharynx Updated: 01/05/24 2039       ADENOVIRUS, PCR Not Detected       Coronavirus 229E Not Detected       Coronavirus HKU1 Not Detected       Coronavirus NL63 Not Detected       Coronavirus OC43 Not Detected       COVID19 Not Detected       Human Metapneumovirus Not Detected       Human Rhinovirus/Enterovirus Not Detected       Influenza A PCR Not Detected       Influenza B PCR Not Detected       Parainfluenza Virus 1 Not Detected       Parainfluenza Virus 2 Not Detected       Parainfluenza Virus 3 Not Detected       Parainfluenza Virus  4 Not Detected       RSV, PCR Not Detected       Bordetella pertussis pcr Not Detected       Bordetella parapertussis PCR Not Detected       Chlamydophila pneumoniae PCR Not Detected       Mycoplasma pneumo by PCR Not Detected     Narrative:       In the setting of a positive respiratory panel with a viral infection PLUS a negative procalcitonin without other underlying concern for bacterial infection, consider observing off antibiotics or discontinuation of antibiotics and continue supportive care. If the respiratory panel is positive for atypical bacterial infection (Bordetella pertussis, Chlamydophila pneumoniae, or Mycoplasma pneumoniae), consider antibiotic de-escalation to target atypical bacterial infection.     CBC & Differential [[id number]]  (Abnormal) Collected: 01/05/24 1937     Specimen: Blood Updated: 01/05/24 2033     Narrative:       The following orders were created for panel order CBC & Differential.  Procedure                               Abnormality         Status                     ---------                               -----------         ------                     CBC Auto Differential[[id number]]        Abnormal            Edited Result - FINAL         Please view results for these tests on the individual orders.     CBC Auto Differential [[id number]]  (Abnormal) Collected: 01/05/24 1937     Specimen: Blood Updated: 01/05/24 2033       WBC 12.30 10*3/mm3         RBC 3.36 10*6/mm3         Hemoglobin 9.4 g/dL         Hematocrit 28.8 %         MCV 85.7 fL         MCH 28.0 pg         MCHC 32.6 g/dL         RDW 16.9 %         RDW-SD 47.2 fl         MPV 9.8 fL         Platelets 142 10*3/mm3         Neutrophil % 70.3 %         Lymphocyte % 11.0 %         Monocyte % 15.6 %         Eosinophil % 2.2 %         Basophil % 0.2 %         Neutrophils, Absolute 8.64 10*3/mm3         Lymphocytes, Absolute 1.35 10*3/mm3         Monocytes, Absolute 1.92 10*3/mm3         Eosinophils, Absolute 0.27 10*3/mm3        "  Basophils, Absolute 0.03 10*3/mm3       Narrative:       Modified report. Previous result was Hemogram + Auto diff on 1/5/2024 at 2032 CST.     Procalcitonin [[id number]]  (Abnormal) Collected: 01/05/24 1937     Specimen: Blood Updated: 01/05/24 2030       Procalcitonin 4.80 ng/mL       Narrative:       As a Marker for Sepsis (Non-Neonates):     1. <0.5 ng/mL represents a low risk of severe sepsis and/or septic shock.  2. >2 ng/mL represents a high risk of severe sepsis and/or septic shock.     As a Marker for Lower Respiratory Tract Infections that require antibiotic therapy:     PCT on Admission    Antibiotic Therapy       6-12 Hrs later     >0.5                Strongly Recommended  >0.25 - <0.5        Recommended   0.1 - 0.25          Discouraged              Remeasure/reassess PCT  <0.1                Strongly Discouraged     Remeasure/reassess PCT     As 28 day mortality risk marker: \"Change in Procalcitonin Result\" (>80% or <=80%) if Day 0 (or Day 1) and Day 4 values are available. Refer to [email or web address]     Change in PCT <=80%  A decrease of PCT levels below or equal to 80% defines a positive change in PCT test result representing a higher risk for 28-day all-cause mortality of patients diagnosed with severe sepsis for septic shock.     Change in PCT >80%  A decrease of PCT levels of more than 80% defines a negative change in PCT result representing a lower risk for 28-day all-cause mortality of patients diagnosed with severe sepsis or septic shock.         Comprehensive Metabolic Panel [[id number]]  (Abnormal) Collected: 01/05/24 1937     Specimen: Blood Updated: 01/05/24 2024       Glucose 125 mg/dL         BUN 62 mg/dL         Creatinine 3.28 mg/dL         Sodium 128 mmol/L         Potassium 3.4 mmol/L         Chloride 92 mmol/L         CO2 22.0 mmol/L         Calcium 8.7 mg/dL         Total Protein 5.8 g/dL         Albumin 2.8 g/dL         ALT (SGPT) 20 U/L         AST (SGOT) 48 U/L "         Alkaline Phosphatase 164 U/L         Total Bilirubin 1.8 mg/dL         Globulin 3.0 gm/dL         A/G Ratio 0.9 g/dL         BUN/Creatinine Ratio 18.9       Anion Gap 14.0 mmol/L         eGFR 14.2 mL/min/1.73         Comment: <15 Indicative of kidney failure        Narrative:       GFR Normal >60  Chronic Kidney Disease <60  Kidney Failure <15     The GFR formula is only valid for adults with stable renal function between ages 18 and 70.     Lactic Acid, Plasma [[id number]]  (Abnormal) Collected: 01/05/24 1937     Specimen: Blood Updated: 01/05/24 2023       Lactate 2.1 mmol/L       Urinalysis With Culture If Indicated - Straight Cath [[id number]]  (Abnormal) Collected: 01/05/24 1926     Specimen: Urine from Straight Cath Updated: 01/05/24 1959       Color, UA Dark Yellow       Appearance, UA Cloudy       pH, UA 5.5       Specific Gravity, UA 1.014       Glucose, UA Negative       Ketones, UA Negative       Bilirubin, UA Negative       Blood, UA Negative       Protein, UA Negative       Leuk Esterase, UA Small (1+)       Nitrite, UA Negative       Urobilinogen, UA 1.0 E.U./dL     Narrative:       In absence of clinical symptoms, the presence of pyuria, bacteria, and/or nitrites on the urinalysis result does not correlate with infection.     Urinalysis, Microscopic Only - Straight Cath [[id number]]  (Abnormal) Collected: 01/05/24 1926     Specimen: Urine from Straight Cath Updated: 01/05/24 1959       RBC, UA 0-2 /HPF         WBC, UA 11-20 /HPF         Bacteria, UA 4+ /HPF         Squamous Epithelial Cells, UA 3-6 /HPF         Hyaline Casts, UA 0-2 /LPF         WBC Clumps, UA Moderate/2+ /HPF         Methodology Manual Light Microscopy                Imaging Results (Last 72 Hours)         Procedure Component Value Units Date/Time     XR Shoulder 2+ View Left [[id number]] Collected: 01/05/24 2013       Updated: 01/05/24 2018     Narrative:       EXAMINATION: XR SHOULDER 2+ VW LEFT-  1/5/2024 8:14 PM     HISTORY:  Fall with pain.     FINDINGS: 2 view exam of the left shoulder reveals the patient has  undergone reverse total shoulder arthroplasty. It appears that one of  the anchor screws may be fractured but this I feel was present on a  previous chest radiograph from September of last year. There is normal  articulation of the prosthesis. I do not see any evidence of  periprosthetic fracture. 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        Impression:       1. No acute fracture or dislocation.     This report was signed and finalized on 1/5/2024 8:15 PM by Dr. Elian Bonilla MD.        XR Elbow 3+ View Left [[id number]] Collected: 01/05/24 2013       Updated: 01/05/24 2016     Narrative:       LEFT ELBOW, 2 views 1/5/2024 6:49 PM     HISTORY: fall pain     COMPARISON: None      FINDINGS:   Frontal and lateral views of the left elbow were obtained.      There is no fracture or dislocation. No significant joint space  narrowing is noted. There is no significant joint effusion.       No gross soft tissue abnormality is visualized.         Impression:       1. Unremarkable radiographs of the left elbow.           This report was signed and finalized on 1/5/2024 8:13 PM by Dr. Elian Bonilla MD.        XR Humerus Left [[id number]] Collected: 01/05/24 2012       Updated: 01/05/24 2016     Narrative:       EXAMINATION: XR HUMERUS LEFT-  1/5/2024 8:12 PM     HISTORY: Fall with pain.     FINDINGS: 2 view exam of the left humerus reveals the patient has  undergone previous reverse left total shoulder arthroplasty. There is  normal articulation at the shoulder. No acute bony injury.        Impression:       1. No acute fracture.     This report was signed and finalized on 1/5/2024 8:12 PM by Dr. Elian Bonilla MD.        XR Chest 1 View [[id number]] Collected: 01/1950       Updated: 01/05/24 1952     Narrative:       EXAMINATION: XR CHEST 1 VW-  1/5/2024 7:48 PM     HISTORY: Cough. HISTORY of atrial fibrillation.     FINDINGS:  Upright frontal projection of the chest is compared to prior  exam of 9/30/2023. Right basilar atelectasis is present with mild  elevation of the right diaphragm. Lungs are otherwise clear. The  thoracic aorta and great vessels are tortuous.        Impression:       1.. Mild right basilar atelectasis. Lungs are otherwise clear.     This report was signed and finalized on 1/5/2024 7:49 PM by Dr. Elian Bonilla MD.                            Assessment:     Condition: In stable condition.  Improving.        Plan:   Encourage ambulation.   (     Acute kidney injury with chronic kidney disease stage IIIa-creatinine down to 1.9 today with 1.5 baseline.  Continue with gentle rehydration, avoid nephrotoxic medication and maintain adequate blood pressures.  Will Hep-Lock IV today in hopes of her returning home tomorrow.     Urinary tract infection/acute cystitis with hematuria present on admission.  Culture grew out gram-negative bacillus-do not recommend treatment will continue with Rocephin for now for pulmonary coverage     Acute bronchitis-much improved today maintaining oxygen saturations on room air.  Respiratory PCR panel from the emergency room is all negative.  Add Mucinex today     Hyponatremia sodium 128 on admission up to 135 today.  Hypokalemia corrected.  Urine studies never collected     Overall much improved will feed and increase activity with hopes of returning home with home health care.  She lives in a 1 bedroom apartment alone and prior to admission was unable to get from her bed to her bathroom hopefully with some therapy she will be able to return home in 1-2 days.  She seemed oversedated today therefore will discontinue as needed Norco along with her scheduled MS Contin.  Will use tramadol.  Patient needs to return to pain management for her opioids.     ).      Problem List:     CHRISTINE (acute kidney injury)    HTN (hypertension)    Obesity, Class III, BMI 40-49.9 (morbid obesity)    Anxiety  associated with depression    Chronic pain syndrome    Hyponatremia    Chronic kidney disease, stage 3a     Sachin Dia MD  1/7/2024            Sachin Dia MD   Physician  Medicine     Progress Notes      Signed     Date of Service: 01/08/24 0757  Creation Time: 01/08/24 0757     Signed       Expand All Collapse All    Brenda Sneed is a 73 y.o. female patient.     Current Medications             Current Facility-Administered Medications   Medication Dose Route Frequency Provider Last Rate Last Admin    albuterol (PROVENTIL) nebulizer solution 0.083% 2.5 mg/3mL  2.5 mg Nebulization TID Sachin Dia MD   2.5 mg at 01/08/24 0556    apixaban (ELIQUIS) tablet 5 mg  5 mg Oral Q12H Sachin Dia MD   5 mg at 01/07/24 2151    sennosides-docusate (PERICOLACE) 8.6-50 MG per tablet 2 tablet  2 tablet Oral BID Sachin Dia MD   2 tablet at 01/06/24 0839     And    polyethylene glycol (MIRALAX) packet 17 g  17 g Oral Daily PRN Sachin Dia MD         And    bisacodyl (DULCOLAX) EC tablet 5 mg  5 mg Oral Daily PRN Sachin Dia MD         And    bisacodyl (DULCOLAX) suppository 10 mg  10 mg Rectal Daily PRN Sachin Dia MD        Calcium Replacement - Follow Nurse / BPA Driven Protocol   Does not apply PRN Sachin Dia MD        cefTRIAXone (ROCEPHIN) 1,000 mg in sodium chloride 0.9 % 100 mL IVPB  1,000 mg Intravenous Daily Sachin Dia  mL/hr at 01/07/24 0833 1,000 mg at 01/07/24 0833    cyclobenzaprine (FLEXERIL) tablet 5 mg  5 mg Oral TID PRN Sachin Dia MD        DULoxetine (CYMBALTA) DR capsule 60 mg  60 mg Oral BID Sachin Dia MD   60 mg at 01/07/24 2151    furosemide (LASIX) tablet 40 mg  40 mg Oral Daily PRN Sachin Dia MD        guaifenesin-dextromethorphan (MUCINEX DM) tablet 2 tablet  2 tablet Oral Q12H Sachin Dia MD   2 tablet at 01/07/24 7073    levothyroxine (SYNTHROID,  LEVOTHROID) tablet 100 mcg  100 mcg Oral Daily Sachin Dia MD   100 mcg at 01/08/24 0548    Magnesium Standard Dose Replacement - Follow Nurse / BPA Driven Protocol   Does not apply PRN Sachin Dia MD        metoprolol succinate XL (TOPROL-XL) 24 hr tablet 25 mg  25 mg Oral Daily Sachin Dia MD        Morphine (MS CONTIN) 12 hr tablet 15 mg  15 mg Oral Q12H Hector Tubbs APRN   15 mg at 01/07/24 0833    nystatin (MYCOSTATIN) powder 1 application   1 application  Topical BID Sachin Dia MD   1 application  at 01/07/24 2153    Phosphorus Replacement - Follow Nurse / BPA Driven Protocol   Does not apply PRN Sachin Dia MD        potassium chloride (MICRO-K/KLOR-CON) CR capsule  40 mEq Oral Q4H Sachin Dia MD        Potassium Replacement - Follow Nurse / BPA Driven Protocol   Does not apply PRN Sachin Dia MD        pramipexole (MIRAPEX) tablet 0.75 mg  0.75 mg Oral Nightly Sachin Dia MD   0.75 mg at 01/07/24 2151    sodium chloride 0.9 % flush 10 mL  10 mL Intravenous PRN Charo Bhat PA        sodium chloride 0.9 % flush 10 mL  10 mL Intravenous Q12H Barrett Wray Jr., MD   10 mL at 01/07/24 2150    sodium chloride 0.9 % flush 10 mL  10 mL Intravenous PRN Barrett Wray Jr., MD        sodium chloride 0.9 % flush 10 mL  10 mL Intravenous Q12H Sachin Dia MD   10 mL at 01/07/24 2150    sodium chloride 0.9 % flush 10 mL  10 mL Intravenous PRN Sachin Dia MD        sodium chloride 0.9 % infusion 40 mL  40 mL Intravenous PRN Barrett Wray Jr., MD        sodium chloride 0.9 % infusion 40 mL  40 mL Intravenous PRN Sachin Dia MD             ALLERGIES:          Allergies   Allergen Reactions    Codeine Itching and Rash       SEVERE RASH/ITCHING (TOLERATES PERCOCET)    Amoxicillin Other (See Comments)       unknown    Levaquin [Levofloxacin] Other (See Comments)       unknown    Benadryl  "[Diphenhydramine] Other (See Comments)       KEEPS PATIENT AWAKE         CHRISTINE (acute kidney injury)    HTN (hypertension)    Obesity, Class III, BMI 40-49.9 (morbid obesity)    Anxiety associated with depression    Chronic pain syndrome    Hyponatremia    Chronic kidney disease, stage 3a     Blood pressure 114/61, pulse 80, temperature 97.4 °F (36.3 °C), temperature source Oral, resp. rate 18, height 160 cm (63\"), weight 101 kg (223 lb 1.7 oz), SpO2 95%, not currently breastfeeding.        Subjective:  Symptoms:  Improved.  She reports weakness and anxiety.    Diet:  Adequate intake.    Activity level: Impaired due to weakness.    Pain:  She complains of pain that is moderate.  She reports pain is unchanged.  Pain is partially controlled.       Review of Systems   Neurological:  Positive for weakness.      Objective:  General Appearance:  Comfortable, well-appearing and in no acute distress.    Vital signs: (most recent): Blood pressure 114/61, pulse 80, temperature 97.4 °F (36.3 °C), temperature source Oral, resp. rate 18, height 160 cm (63\"), weight 101 kg (223 lb 1.7 oz), SpO2 95%, not currently breastfeeding.  Vital signs are normal.    Output: Producing urine and minimal stool output.    HEENT: Normal HEENT exam.  (Edentulous)    Lungs:  Normal effort and normal respiratory rate.  There are wheezes.  (Deep inspiration produces a cough)  Heart: Normal rate.    Abdomen: Abdomen is soft.  (Obese).  Bowel sounds are normal.   There is no abdominal tenderness.     Extremities: Normal range of motion.    Pulses: Distal pulses are intact.    Neurological: Patient is alert and oriented to person, place and time.    Skin:  Warm and dry.                      Labs:  Lab Results (last 72 hours)         Procedure Component Value Units Date/Time     Basic Metabolic Panel [[id number]]  (Abnormal) Collected: 01/07/24 0532     Specimen: Blood Updated: 01/07/24 0611       Glucose 101 mg/dL         BUN 51 mg/dL         Creatinine " 1.90 mg/dL         Sodium 135 mmol/L         Potassium 3.8 mmol/L         Comment: Slight hemolysis detected by analyzer. Result may be falsely elevated.          Chloride 100 mmol/L         CO2 22.0 mmol/L         Calcium 8.5 mg/dL         BUN/Creatinine Ratio 26.8       Anion Gap 13.0 mmol/L         eGFR 27.3 mL/min/1.73       Narrative:       GFR Normal >60  Chronic Kidney Disease <60  Kidney Failure <15     The GFR formula is only valid for adults with stable renal function between ages 18 and 70.     Manual Differential [[id number]] Collected: 01/07/24 0532     Specimen: Blood Updated: 01/07/24 0546     CBC & Differential [[id number]] Collected: 01/07/24 0532     Specimen: Blood Updated: 01/07/24 0539     Narrative:       The following orders were created for panel order CBC & Differential.  Procedure                               Abnormality         Status                     ---------                               -----------         ------                     CBC Auto Differential[[id number]]                            In process                    Please view results for these tests on the individual orders.     CBC Auto Differential [[id number]] Collected: 01/07/24 0532     Specimen: Blood Updated: 01/07/24 0539     Blood Culture - Blood, Arm, Right [[id number]]  (Normal) Collected: 01/05/24 2041     Specimen: Blood from Arm, Right Updated: 01/06/24 2100       Blood Culture No growth at 24 hours     Blood Culture - Blood, Arm, Right [[id number]]  (Normal) Collected: 01/05/24 2041     Specimen: Blood from Arm, Right Updated: 01/06/24 2100       Blood Culture No growth at 24 hours     CBC & Differential [[id number]]  (Abnormal) Collected: 01/06/24 1221     Specimen: Blood Updated: 01/06/24 1305     Narrative:       The following orders were created for panel order CBC & Differential.  Procedure                               Abnormality         Status                     ---------                                -----------         ------                     CBC Auto Differential[[id number]]        Abnormal            Final result                  Please view results for these tests on the individual orders.     CBC Auto Differential [[id number]]  (Abnormal) Collected: 01/06/24 1221     Specimen: Blood Updated: 01/06/24 1305       WBC 7.65 10*3/mm3         RBC 3.20 10*6/mm3         Hemoglobin 8.8 g/dL         Hematocrit 27.0 %         MCV 84.4 fL         MCH 27.5 pg         MCHC 32.6 g/dL         RDW 16.9 %         RDW-SD 47.5 fl         MPV 9.8 fL         Platelets 119 10*3/mm3         Neutrophil % 66.5 %         Lymphocyte % 12.7 %         Monocyte % 15.4 %         Eosinophil % 3.7 %         Basophil % 0.4 %         Neutrophils, Absolute 5.09 10*3/mm3         Lymphocytes, Absolute 0.97 10*3/mm3         Monocytes, Absolute 1.18 10*3/mm3         Eosinophils, Absolute 0.28 10*3/mm3         Basophils, Absolute 0.03 10*3/mm3       Urine Culture - Urine, Straight Cath [[id number]]  (Abnormal) Collected: 01/05/24 1926     Specimen: Urine from Straight Cath Updated: 01/06/24 1301       Urine Culture >100,000 CFU/mL Gram Negative Bacilli     Narrative:       Colonization of the urinary tract without infection is common. Treatment is discouraged unless the patient is symptomatic, pregnant, or undergoing an invasive urologic procedure.     Basic Metabolic Panel [[id number]]  (Abnormal) Collected: 01/06/24 1221     Specimen: Blood Updated: 01/06/24 1259       Glucose 103 mg/dL         BUN 58 mg/dL         Creatinine 2.59 mg/dL         Sodium 133 mmol/L         Potassium 3.3 mmol/L         Chloride 97 mmol/L         CO2 23.0 mmol/L         Calcium 8.4 mg/dL         BUN/Creatinine Ratio 22.4       Anion Gap 13.0 mmol/L         eGFR 18.8 mL/min/1.73       Narrative:       GFR Normal >60  Chronic Kidney Disease <60  Kidney Failure <15     The GFR formula is only valid for adults with stable renal function between ages 18 and 70.     STAT  Lactic Acid, Reflex [[id number]]  (Normal) Collected: 01/05/24 2247     Specimen: Blood Updated: 01/05/24 2306       Lactate 1.5 mmol/L       Respiratory Panel PCR w/COVID-19(SARS-CoV-2) JANKI/MAYDA/OLIMPIA/PAD/COR/PRISCILLA In-House, NP Swab in UTM/VTM, 2 HR TAT - Swab, Nasopharynx [[id number]]  (Normal) Collected: 01/05/24 1939     Specimen: Swab from Nasopharynx Updated: 01/05/24 2039       ADENOVIRUS, PCR Not Detected       Coronavirus 229E Not Detected       Coronavirus HKU1 Not Detected       Coronavirus NL63 Not Detected       Coronavirus OC43 Not Detected       COVID19 Not Detected       Human Metapneumovirus Not Detected       Human Rhinovirus/Enterovirus Not Detected       Influenza A PCR Not Detected       Influenza B PCR Not Detected       Parainfluenza Virus 1 Not Detected       Parainfluenza Virus 2 Not Detected       Parainfluenza Virus 3 Not Detected       Parainfluenza Virus 4 Not Detected       RSV, PCR Not Detected       Bordetella pertussis pcr Not Detected       Bordetella parapertussis PCR Not Detected       Chlamydophila pneumoniae PCR Not Detected       Mycoplasma pneumo by PCR Not Detected     Narrative:       In the setting of a positive respiratory panel with a viral infection PLUS a negative procalcitonin without other underlying concern for bacterial infection, consider observing off antibiotics or discontinuation of antibiotics and continue supportive care. If the respiratory panel is positive for atypical bacterial infection (Bordetella pertussis, Chlamydophila pneumoniae, or Mycoplasma pneumoniae), consider antibiotic de-escalation to target atypical bacterial infection.     CBC & Differential [[id number]]  (Abnormal) Collected: 01/05/24 1937     Specimen: Blood Updated: 01/05/24 2033     Narrative:       The following orders were created for panel order CBC & Differential.  Procedure                               Abnormality         Status                     ---------                                "-----------         ------                     CBC Auto Differential[[id number]]        Abnormal            Edited Result - FINAL         Please view results for these tests on the individual orders.     CBC Auto Differential [[id number]]  (Abnormal) Collected: 01/05/24 1937     Specimen: Blood Updated: 01/05/24 2033       WBC 12.30 10*3/mm3         RBC 3.36 10*6/mm3         Hemoglobin 9.4 g/dL         Hematocrit 28.8 %         MCV 85.7 fL         MCH 28.0 pg         MCHC 32.6 g/dL         RDW 16.9 %         RDW-SD 47.2 fl         MPV 9.8 fL         Platelets 142 10*3/mm3         Neutrophil % 70.3 %         Lymphocyte % 11.0 %         Monocyte % 15.6 %         Eosinophil % 2.2 %         Basophil % 0.2 %         Neutrophils, Absolute 8.64 10*3/mm3         Lymphocytes, Absolute 1.35 10*3/mm3         Monocytes, Absolute 1.92 10*3/mm3         Eosinophils, Absolute 0.27 10*3/mm3         Basophils, Absolute 0.03 10*3/mm3       Narrative:       Modified report. Previous result was Hemogram + Auto diff on 1/5/2024 at 2032 CST.     Procalcitonin [[id number]]  (Abnormal) Collected: 01/05/24 1937     Specimen: Blood Updated: 01/05/24 2030       Procalcitonin 4.80 ng/mL       Narrative:       As a Marker for Sepsis (Non-Neonates):     1. <0.5 ng/mL represents a low risk of severe sepsis and/or septic shock.  2. >2 ng/mL represents a high risk of severe sepsis and/or septic shock.     As a Marker for Lower Respiratory Tract Infections that require antibiotic therapy:     PCT on Admission    Antibiotic Therapy       6-12 Hrs later     >0.5                Strongly Recommended  >0.25 - <0.5        Recommended   0.1 - 0.25          Discouraged              Remeasure/reassess PCT  <0.1                Strongly Discouraged     Remeasure/reassess PCT     As 28 day mortality risk marker: \"Change in Procalcitonin Result\" (>80% or <=80%) if Day 0 (or Day 1) and Day 4 values are available. Refer to [email or web address]   "   Change in PCT <=80%  A decrease of PCT levels below or equal to 80% defines a positive change in PCT test result representing a higher risk for 28-day all-cause mortality of patients diagnosed with severe sepsis for septic shock.     Change in PCT >80%  A decrease of PCT levels of more than 80% defines a negative change in PCT result representing a lower risk for 28-day all-cause mortality of patients diagnosed with severe sepsis or septic shock.         Comprehensive Metabolic Panel [[id number]]  (Abnormal) Collected: 01/05/24 1937     Specimen: Blood Updated: 01/05/24 2024       Glucose 125 mg/dL         BUN 62 mg/dL         Creatinine 3.28 mg/dL         Sodium 128 mmol/L         Potassium 3.4 mmol/L         Chloride 92 mmol/L         CO2 22.0 mmol/L         Calcium 8.7 mg/dL         Total Protein 5.8 g/dL         Albumin 2.8 g/dL         ALT (SGPT) 20 U/L         AST (SGOT) 48 U/L         Alkaline Phosphatase 164 U/L         Total Bilirubin 1.8 mg/dL         Globulin 3.0 gm/dL         A/G Ratio 0.9 g/dL         BUN/Creatinine Ratio 18.9       Anion Gap 14.0 mmol/L         eGFR 14.2 mL/min/1.73         Comment: <15 Indicative of kidney failure        Narrative:       GFR Normal >60  Chronic Kidney Disease <60  Kidney Failure <15     The GFR formula is only valid for adults with stable renal function between ages 18 and 70.     Lactic Acid, Plasma [[id number]]  (Abnormal) Collected: 01/05/24 1937     Specimen: Blood Updated: 01/05/24 2023       Lactate 2.1 mmol/L       Urinalysis With Culture If Indicated - Straight Cath [[id number]]  (Abnormal) Collected: 01/05/24 1926     Specimen: Urine from Straight Cath Updated: 01/05/24 1959       Color, UA Dark Yellow       Appearance, UA Cloudy       pH, UA 5.5       Specific Gravity, UA 1.014       Glucose, UA Negative       Ketones, UA Negative       Bilirubin, UA Negative       Blood, UA Negative       Protein, UA Negative       Leuk Esterase, UA Small (1+)       Nitrite,  UA Negative       Urobilinogen, UA 1.0 E.U./dL     Narrative:       In absence of clinical symptoms, the presence of pyuria, bacteria, and/or nitrites on the urinalysis result does not correlate with infection.     Urinalysis, Microscopic Only - Straight Cath [[id number]]  (Abnormal) Collected: 01/05/24 1926     Specimen: Urine from Straight Cath Updated: 01/05/24 1959       RBC, UA 0-2 /HPF         WBC, UA 11-20 /HPF         Bacteria, UA 4+ /HPF         Squamous Epithelial Cells, UA 3-6 /HPF         Hyaline Casts, UA 0-2 /LPF         WBC Clumps, UA Moderate/2+ /HPF         Methodology Manual Light Microscopy                Imaging Results (Last 72 Hours)         Procedure Component Value Units Date/Time     XR Shoulder 2+ View Left [[id number]] Collected: 01/05/24 2013       Updated: 01/05/24 2018     Narrative:       EXAMINATION: XR SHOULDER 2+ VW LEFT-  1/5/2024 8:14 PM     HISTORY: Fall with pain.     FINDINGS: 2 view exam of the left shoulder reveals the patient has  undergone reverse total shoulder arthroplasty. It appears that one of  the anchor screws may be fractured but this I feel was present on a  previous chest radiograph from September of last year. There is normal  articulation of the prosthesis. I do not see any evidence of  periprosthetic fracture. 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        Impression:       1. No acute fracture or dislocation.     This report was signed and finalized on 1/5/2024 8:15 PM by Dr. Elian Bonilla MD.        XR Elbow 3+ View Left [[id number]] Collected: 01/05/24 2013       Updated: 01/05/24 2016     Narrative:       LEFT ELBOW, 2 views 1/5/2024 6:49 PM     HISTORY: fall pain     COMPARISON: None      FINDINGS:   Frontal and lateral views of the left elbow were obtained.      There is no fracture or dislocation. No significant joint space  narrowing is noted. There is no significant joint effusion.       No gross soft tissue abnormality is visualized.         Impression:        1. Unremarkable radiographs of the left elbow.           This report was signed and finalized on 1/5/2024 8:13 PM by Dr. Elian Bonilla MD.        XR Humerus Left [[id number]] Collected: 01/05/24 2012       Updated: 01/05/24 2016     Narrative:       EXAMINATION: XR HUMERUS LEFT-  1/5/2024 8:12 PM     HISTORY: Fall with pain.     FINDINGS: 2 view exam of the left humerus reveals the patient has  undergone previous reverse left total shoulder arthroplasty. There is  normal articulation at the shoulder. No acute bony injury.        Impression:       1. No acute fracture.     This report was signed and finalized on 1/5/2024 8:12 PM by Dr. Elian Bonilla MD.        XR Chest 1 View [[id number]] Collected: 01/1950       Updated: 01/05/24 1952     Narrative:       EXAMINATION: XR CHEST 1 VW-  1/5/2024 7:48 PM     HISTORY: Cough. HISTORY of atrial fibrillation.     FINDINGS: Upright frontal projection of the chest is compared to prior  exam of 9/30/2023. Right basilar atelectasis is present with mild  elevation of the right diaphragm. Lungs are otherwise clear. The  thoracic aorta and great vessels are tortuous.        Impression:       1.. Mild right basilar atelectasis. Lungs are otherwise clear.     This report was signed and finalized on 1/5/2024 7:49 PM by Dr. Elian Bonilla MD.                            Assessment:     Condition: In stable condition.  Improving.        Plan:   Encourage ambulation.   (     Acute kidney injury with chronic kidney disease stage IIIa-creatinine down to 1.9 today with 1.5 baseline.  Continue with gentle rehydration, avoid nephrotoxic medication and maintain adequate blood pressures.  Will Hep-Lock IV today in hopes of her returning home tomorrow.     Urinary tract infection/acute cystitis with hematuria present on admission.  Culture grew out gram-negative bacillus-do not recommend treatment will continue with Rocephin for now for pulmonary coverage     Acute  bronchitis-much improved today maintaining oxygen saturations on room air.  Respiratory PCR panel from the emergency room is all negative.  Add Mucinex today.     Hyponatremia sodium 128 on admission up to 135 today.  Hypokalemia corrected.  Urine studies never collected     Overall much improved will feed and increase activity with hopes of returning home with home health care.  She lives in a 1 bedroom apartment alone and prior to admission was unable to get from her bed to her bathroom hopefully with some therapy she will be able to return home on Tuesday morning.   Patient needs to return to pain management for her opioids.     Discussed with nursing staff to help limit opioid as much as possible).      Problem List:     CHRISTINE (acute kidney injury)    HTN (hypertension)    Obesity, Class III, BMI 40-49.9 (morbid obesity)    Anxiety associated with depression    Chronic pain syndrome    Hyponatremia    Chronic kidney disease, stage 3a     Sachin Dia MD  1/8/2024                         4 1507 98 (36.7) 64 18 105/41 Lying room air -- 98   01/06/24 1148 97.7 (36.5) 63 18 92/40 Lying nasal cannula 2 97   01/06/24 0800 -- -- -- -- -- nasal cannula 2 --   01/06/24 0744 97.4 (36.3) 62 18 95/42 Lying nasal cannula 2 100   01/06/24 0733 -- 61 18 -- -- nasal cannula 2 98   01/06/24 0                                  Current Facility-Administered Medications   Medication Dose Route Frequency Provider Last Rate Last Admin    albuterol (PROVENTIL) nebulizer solution 0.083% 2.5 mg/3mL  2.5 mg Nebulization TID Sachin Dia MD   2.5 mg at 01/08/24 1112    apixaban (ELIQUIS) tablet 5 mg  5 mg Oral Q12H Sachin Dia MD   5 mg at 01/08/24 0920    sennosides-docusate (PERICOLACE) 8.6-50 MG per tablet 2 tablet  2 tablet Oral BID Sachin Dia MD   2 tablet at 01/08/24 0920    And    polyethylene glycol (MIRALAX) packet 17 g  17 g Oral Daily PRN Sachin Dia MD        And    bisacodyl  (DULCOLAX) EC tablet 5 mg  5 mg Oral Daily PRN Sachin Dia MD        And    bisacodyl (DULCOLAX) suppository 10 mg  10 mg Rectal Daily PRN Sachin Dia MD        Calcium Replacement - Follow Nurse / BPA Driven Protocol   Does not apply PRN Sachin Dia MD        cefTRIAXone (ROCEPHIN) 1,000 mg in sodium chloride 0.9 % 100 mL IVPB  1,000 mg Intravenous Daily Sachin Dia  mL/hr at 01/08/24 0920 1,000 mg at 01/08/24 0920    cyclobenzaprine (FLEXERIL) tablet 5 mg  5 mg Oral TID PRN Sachin Dia MD        DULoxetine (CYMBALTA) DR capsule 60 mg  60 mg Oral BID Sachin Dia MD   60 mg at 01/08/24 0920    furosemide (LASIX) tablet 40 mg  40 mg Oral Daily PRN Sachin Dia MD        guaifenesin-dextromethorphan (MUCINEX DM) tablet 2 tablet  2 tablet Oral Q12H Sachin Dia MD   2 tablet at 01/08/24 0920    levothyroxine (SYNTHROID, LEVOTHROID) tablet 100 mcg  100 mcg Oral Daily Sachin Dia MD   100 mcg at 01/08/24 0548    Magnesium Standard Dose Replacement - Follow Nurse / BPA Driven Protocol   Does not apply PRN Sachin Dia MD        metoprolol succinate XL (TOPROL-XL) 24 hr tablet 25 mg  25 mg Oral Daily Sachin Dia MD   25 mg at 01/08/24 0920    Morphine (MS CONTIN) 12 hr tablet 15 mg  15 mg Oral Q12H Hcetor Tubbs APRN   15 mg at 01/07/24 0833    nystatin (MYCOSTATIN) powder 1 application   1 application  Topical BID Sachin Dia MD   1 application  at 01/08/24 0921    Phosphorus Replacement - Follow Nurse / BPA Driven Protocol   Does not apply PRN Sachin Dia MD        potassium chloride (MICRO-K/KLOR-CON) CR capsule  40 mEq Oral Q4H Sachin Dia MD   40 mEq at 01/08/24 0920    Potassium Replacement - Follow Nurse / BPA Driven Protocol   Does not apply PRN Sachin Dia MD        pramipexole (MIRAPEX) tablet 0.75 mg  0.75 mg Oral Nightly Sachin Dia MD   0.75 mg  at 01/07/24 2151    sodium chloride 0.9 % flush 10 mL  10 mL Intravenous PRN Charo Bhat, PA        sodium chloride 0.9 % flush 10 mL  10 mL Intravenous Q12H Barrett Wray Jr., MD   10 mL at 01/08/24 0922    sodium chloride 0.9 % flush 10 mL  10 mL Intravenous PRN Barrett Wray Jr., MD        sodium chloride 0.9 % flush 10 mL  10 mL Intravenous Q12H Sachin Dia MD   10 mL at 01/08/24 0921    sodium chloride 0.9 % flush 10 mL  10 mL Intravenous PRN Sachin Dia MD        sodium chloride 0.9 % infusion 40 mL  40 mL Intravenous PRN Barrett Wray Jr., MD        sodium chloride 0.9 % infusion 40 mL  40 mL Intravenous PRN Sachin Dia MD

## 2024-01-08 NOTE — PLAN OF CARE
Detail Level: Detailed Goal Outcome Evaluation:  Plan of Care Reviewed With: patient        Progress: no change  Outcome Evaluation: PT eval attempted. Pt adamantly refused therapy eval despite education and encouragement. Will check back 1/9.          Add 17483 Cpt? (Important Note: In 2017 The Use Of 40132 Is Being Tracked By Cms To Determine Future Global Period Reimbursement For Global Periods): no Wound Evaluated By: Dr. Fortune

## 2024-01-09 LAB
ANION GAP SERPL CALCULATED.3IONS-SCNC: 10 MMOL/L (ref 5–15)
ANISOCYTOSIS BLD QL: ABNORMAL
BACTERIA SPEC AEROBE CULT: ABNORMAL
BACTERIA SPEC AEROBE CULT: ABNORMAL
BUN SERPL-MCNC: 37 MG/DL (ref 8–23)
BUN/CREAT SERPL: 31.6 (ref 7–25)
CALCIUM SPEC-SCNC: 8.2 MG/DL (ref 8.6–10.5)
CHLORIDE SERPL-SCNC: 103 MMOL/L (ref 98–107)
CLUMPED PLATELETS: PRESENT
CO2 SERPL-SCNC: 23 MMOL/L (ref 22–29)
CREAT SERPL-MCNC: 1.17 MG/DL (ref 0.57–1)
DEPRECATED RDW RBC AUTO: 53 FL (ref 37–54)
EGFRCR SERPLBLD CKD-EPI 2021: 48.8 ML/MIN/1.73
EOSINOPHIL # BLD MANUAL: 0.32 10*3/MM3 (ref 0–0.4)
EOSINOPHIL NFR BLD MANUAL: 7.2 % (ref 0.3–6.2)
ERYTHROCYTE [DISTWIDTH] IN BLOOD BY AUTOMATED COUNT: 18.2 % (ref 12.3–15.4)
GLUCOSE SERPL-MCNC: 78 MG/DL (ref 65–99)
HCT VFR BLD AUTO: 26 % (ref 34–46.6)
HGB BLD-MCNC: 8.3 G/DL (ref 12–15.9)
LYMPHOCYTES # BLD MANUAL: 0.88 10*3/MM3 (ref 0.7–3.1)
LYMPHOCYTES NFR BLD MANUAL: 9.3 % (ref 5–12)
MACROCYTES BLD QL SMEAR: ABNORMAL
MCH RBC QN AUTO: 27.3 PG (ref 26.6–33)
MCHC RBC AUTO-ENTMCNC: 31.9 G/DL (ref 31.5–35.7)
MCV RBC AUTO: 85.5 FL (ref 79–97)
MONOCYTES # BLD: 0.42 10*3/MM3 (ref 0.1–0.9)
NEUTROPHILS # BLD AUTO: 2.86 10*3/MM3 (ref 1.7–7)
NEUTROPHILS NFR BLD MANUAL: 57.7 % (ref 42.7–76)
NEUTS BAND NFR BLD MANUAL: 6.2 % (ref 0–5)
PLATELET # BLD AUTO: 90 10*3/MM3 (ref 140–450)
PMV BLD AUTO: 9.2 FL (ref 6–12)
POIKILOCYTOSIS BLD QL SMEAR: ABNORMAL
POTASSIUM SERPL-SCNC: 3.8 MMOL/L (ref 3.5–5.2)
RBC # BLD AUTO: 3.04 10*6/MM3 (ref 3.77–5.28)
SMALL PLATELETS BLD QL SMEAR: ABNORMAL
SODIUM SERPL-SCNC: 136 MMOL/L (ref 136–145)
VARIANT LYMPHS NFR BLD MANUAL: 15.5 % (ref 19.6–45.3)
VARIANT LYMPHS NFR BLD MANUAL: 4.1 % (ref 0–5)
WBC MORPH BLD: NORMAL
WBC NRBC COR # BLD AUTO: 4.47 10*3/MM3 (ref 3.4–10.8)

## 2024-01-09 PROCEDURE — 80048 BASIC METABOLIC PNL TOTAL CA: CPT | Performed by: FAMILY MEDICINE

## 2024-01-09 PROCEDURE — 99221 1ST HOSP IP/OBS SF/LOW 40: CPT | Performed by: NURSE PRACTITIONER

## 2024-01-09 PROCEDURE — 94799 UNLISTED PULMONARY SVC/PX: CPT

## 2024-01-09 PROCEDURE — 97535 SELF CARE MNGMENT TRAINING: CPT | Performed by: OCCUPATIONAL THERAPIST

## 2024-01-09 PROCEDURE — 85007 BL SMEAR W/DIFF WBC COUNT: CPT | Performed by: FAMILY MEDICINE

## 2024-01-09 PROCEDURE — 97166 OT EVAL MOD COMPLEX 45 MIN: CPT | Performed by: OCCUPATIONAL THERAPIST

## 2024-01-09 PROCEDURE — 25010000002 CEFTAZIDIME-AVIBACTAM 2.5 (2-0.5) G RECONSTITUTED SOLUTION 1 EACH VIAL: Performed by: INTERNAL MEDICINE

## 2024-01-09 PROCEDURE — 85025 COMPLETE CBC W/AUTO DIFF WBC: CPT | Performed by: FAMILY MEDICINE

## 2024-01-09 PROCEDURE — 97162 PT EVAL MOD COMPLEX 30 MIN: CPT

## 2024-01-09 RX ADMIN — CEFTAZIDIME, AVIBACTAM 1.25 G: 2; .5 POWDER, FOR SOLUTION INTRAVENOUS at 09:05

## 2024-01-09 RX ADMIN — Medication 10 ML: at 21:36

## 2024-01-09 RX ADMIN — LEVOTHYROXINE SODIUM 100 MCG: 100 TABLET ORAL at 05:25

## 2024-01-09 RX ADMIN — ALBUTEROL SULFATE 2.5 MG: 2.5 SOLUTION RESPIRATORY (INHALATION) at 11:14

## 2024-01-09 RX ADMIN — MORPHINE SULFATE 15 MG: 15 TABLET, FILM COATED, EXTENDED RELEASE ORAL at 21:28

## 2024-01-09 RX ADMIN — Medication 10 ML: at 09:04

## 2024-01-09 RX ADMIN — ALBUTEROL SULFATE 2.5 MG: 2.5 SOLUTION RESPIRATORY (INHALATION) at 20:26

## 2024-01-09 RX ADMIN — NYSTATIN 1 APPLICATION: 100000 POWDER TOPICAL at 21:36

## 2024-01-09 RX ADMIN — CEFTAZIDIME, AVIBACTAM 1.25 G: 2; .5 POWDER, FOR SOLUTION INTRAVENOUS at 02:04

## 2024-01-09 RX ADMIN — CEFTAZIDIME, AVIBACTAM 1.25 G: 2; .5 POWDER, FOR SOLUTION INTRAVENOUS at 16:49

## 2024-01-09 RX ADMIN — DOCUSATE SODIUM 50 MG AND SENNOSIDES 8.6 MG 2 TABLET: 8.6; 5 TABLET, FILM COATED ORAL at 09:05

## 2024-01-09 RX ADMIN — DULOXETINE HYDROCHLORIDE 60 MG: 30 CAPSULE, DELAYED RELEASE ORAL at 21:27

## 2024-01-09 RX ADMIN — APIXABAN 5 MG: 5 TABLET, FILM COATED ORAL at 09:05

## 2024-01-09 RX ADMIN — DULOXETINE HYDROCHLORIDE 60 MG: 30 CAPSULE, DELAYED RELEASE ORAL at 09:05

## 2024-01-09 RX ADMIN — PRAMIPEXOLE DIHYDROCHLORIDE 0.75 MG: 0.25 TABLET ORAL at 21:27

## 2024-01-09 RX ADMIN — NYSTATIN 1 APPLICATION: 100000 POWDER TOPICAL at 09:06

## 2024-01-09 RX ADMIN — GUAIFENESIN AND DEXTROMETHORPHAN HYDROBROMIDE 2 TABLET: 600; 30 TABLET, EXTENDED RELEASE ORAL at 09:05

## 2024-01-09 RX ADMIN — ALBUTEROL SULFATE 2.5 MG: 2.5 SOLUTION RESPIRATORY (INHALATION) at 05:52

## 2024-01-09 RX ADMIN — APIXABAN 5 MG: 5 TABLET, FILM COATED ORAL at 21:27

## 2024-01-09 RX ADMIN — DOCUSATE SODIUM 50 MG AND SENNOSIDES 8.6 MG 2 TABLET: 8.6; 5 TABLET, FILM COATED ORAL at 21:28

## 2024-01-09 RX ADMIN — MORPHINE SULFATE 15 MG: 15 TABLET, FILM COATED, EXTENDED RELEASE ORAL at 09:05

## 2024-01-09 RX ADMIN — GUAIFENESIN AND DEXTROMETHORPHAN HYDROBROMIDE 2 TABLET: 600; 30 TABLET, EXTENDED RELEASE ORAL at 21:28

## 2024-01-09 NOTE — PLAN OF CARE
Goal Outcome Evaluation:      Pt working with PT/OT. Wounds to bilateral lower extremities. Uploaded pictures in the chart. Wound care consulted and seen. See new order. Physical therapy notified. Waiting on supplies for unna boots. Continuing IV antibiotic- Avycaz. Monitoring labs.

## 2024-01-09 NOTE — PLAN OF CARE
Goal Outcome Evaluation:  Plan of Care Reviewed With: patient        Progress: no change  Outcome Evaluation: OT eval completed.  Pt fowhelio, Beth x 3, & agreeable to eval.  Ms. Sneed was able to come to EOB at [facility] & stand with rwx.  She had a noteably wet/soiled gown and OTR then requested for her to t/f by pivoting to BSC.  She required Min A to t/f to BSC and cues for sequencing.  After sitting & changing her gown the pt attempted to have BM.  Max A for brief change & hygiene. The pt appears to overwhelm & fatigue easily.  RN entered room and requested to remove unna boots for skin inspection.  Assisted pt with returning to bed.  Ms. Sneed reports living at home alone with assist from family for IADLs.  She has had a fxl decline and would benefit from cont'd OT tx to improve her indep in self care & fxl mob.  Anticipate DC to SNF.      Anticipated Discharge Disposition (OT): skilled nursing facility

## 2024-01-09 NOTE — THERAPY EVALUATION
Patient Name: Brenda Sneed  : 1950    MRN: [id number]                              Today's Date: 2024       Admit Date: 2024    Visit Dx:     ICD-10-CM ICD-9-CM   1. Lymphedema  I89.0 457.1   2. Acute UTI  N39.0 599.0   3. CHRISTINE (acute kidney injury)  N17.9 584.9   4. Hyponatremia  E87.1 276.1   5. Fall, initial encounter  W19.XXXA E888.9   6. Injury of left shoulder, initial encounter  S49.92XA 959.2   7. Chronic edema  R60.9 782.3     Patient Active Problem List   Diagnosis    Spinal stenosis, lumbar    Secondary osteoarthritis of left shoulder due to rotator cuff arthropathy    Cellulitis of right lower extremity    HTN (hypertension)    Obesity, Class III, BMI 40-49.9 (morbid obesity)    Anxiety associated with depression    Traumatic rhabdomyolysis    Traumatic rhabdomyolysis, initial encounter    Laceration of left lower extremity    Cellulitis    Cellulitis of left lower extremity    Thrombocytopenia    Acute cystitis with hematuria    Sepsis    Candidiasis of skin    Acquired hypothyroidism    CHRISTINE (acute kidney injury)    Chronic pain syndrome    Hyponatremia    Chronic kidney disease, stage 3a     Past Medical History:   Diagnosis Date    Arthritis     Bronchitis     Cataract     right eye , cornea implant    Chronic back pain     Depression     Disease of thyroid gland     Edema     lower extremities    Hypertension     Kidney stones     Lymphedema      Past Surgical History:   Procedure Laterality Date    BACK SURGERY       ([place])2013     CHOLECYSTECTOMY OPEN      CORNEAL TRANSPLANT Right     INCISION AND DRAINAGE ABSCESS Left 2023    Procedure: INCISION AND DRAINAGE ABSCESS left leg;  Surgeon: Sarita Vazquez MD;  Location:  [facility];  Service: General;  Laterality: Left;    LACERATION REPAIR Left 2023    Procedure: washout and closure left leg wound ;  Surgeon: Sarita Vazquez MD;  Location:  [facility];  Service: General;  Laterality: Left;    LUMBAR FUSION  Left 8/2/2017    Procedure: LEFT LUMBAR LATERAL INTERBODY FUSION WITH INSTRUMENTATION  L1-2;  Surgeon: MARCELINO Wright MD;  Location:  [facility];  Service:     NECK SURGERY  2010    [place]    TOTAL HIP ARTHROPLASTY Right 2014    DR. KENDRICK     TOTAL KNEE ARTHROPLASTY Right 2000    TOTAL KNEE ARTHROPLASTY Left 2002    TOTAL SHOULDER ARTHROPLASTY W/ DISTAL CLAVICLE EXCISION Left 6/1/2020    Procedure: LEFT REVERSE TOTAL SHOULDER REPLACEMENT;  Surgeon: Saravanan Calvin MD;  Location:  [facility];  Service: Orthopedics;  Laterality: Left;      General Information       Row Name 01/09/24 1008          OT Time and Intention    Document Type evaluation  -     Mode of Treatment occupational therapy  -       Row Name 01/09/24 1008          General Information    Patient Profile Reviewed yes  -     Prior Level of Function independent:;ADL's;all household mobility  -     Existing Precautions/Restrictions fall  -     Barriers to Rehab medically complex  -       Row Name 01/09/24 1008          Occupational Profile    Reason for Services/Referral (Occupational Profile) CRE in urine  -     Environmental Supports and Barriers (Occupational Profile) ramp, tub shower combo  -       Row Name 01/09/24 1008          Living Environment    People in Home alone  -       Row Name 01/09/24 1008          Home Main Entrance    Number of Stairs, Main Entrance none  -     Stair Railings, Main Entrance none  -       Row Name 01/09/24 1008          Stairs Within Home, Primary    Number of Stairs, Within Home, Primary none  -     Stair Railings, Within Home, Primary none  -       Row Name 01/09/24 1008          Cognition    Orientation Status (Cognition) oriented to;person;place;situation  -       Row Name 01/09/24 1008          Safety Issues, Functional Mobility    Safety Issues Affecting Function (Mobility) friction/shear risk;insight into deficits/self-awareness;judgment;at risk behavior observed  -      Impairments Affecting Function (Mobility) balance;endurance/activity tolerance;strength;shortness of breath  -               User Key  (r) = Recorded By, (t) = Taken By, (c) = Cosigned By      Initials Name Provider Type     Dorita Oropeza OTR/L Occupational Therapist                     Mobility/ADL's       Row Name 01/09/24 1008          Bed Mobility    Bed Mobility supine-sit;sit-supine  -     Supine-Sit [place] (Bed Mobility) minimum assist (75% patient effort);nonverbal cues (demo/gesture);verbal cues  -     Sit-Supine [place] (Bed Mobility) minimum assist (75% patient effort);verbal cues;nonverbal cues (demo/gesture)  -     Bed Mobility, Safety Issues decreased use of legs for bridging/pushing  -     Assistive Device (Bed Mobility) head of bed elevated;bed rails  -       Row Name 01/09/24 1008          Transfers    Transfers sit-stand transfer;toilet transfer;stand-sit transfer  -       Row Name 01/09/24 1008          Sit-Stand Transfer    Sit-Stand [place] (Transfers) minimum assist (75% patient effort)  -     Assistive Device (Sit-Stand Transfers) walker, front-wheeled  -       Row Name 01/09/24 1008          Stand-Sit Transfer    Stand-Sit [place] (Transfers) minimum assist (75% patient effort)  -     Assistive Device (Stand-Sit Transfers) walker, front-wheeled  -[facility] Name 01/09/24 1008          Toilet Transfer    Type (Toilet Transfer) sit-stand;stand-sit;stand pivot/stand step  -     [place] Level (Toilet Transfer) minimum assist (75% patient effort);verbal cues;nonverbal cues (demo/gesture)  -     Assistive Device (Toilet Transfer) commode, 3-in-1  -       Row Name 01/09/24 1008          Activities of Daily Living    BADL Assessment/Intervention upper body dressing;lower body dressing;grooming;toileting  -       Row Name 01/09/24 1008          Upper Body Dressing Assessment/Training    [place] Level (Upper Body Dressing) minimum assist  (75% patient effort);don;doff  -     Position (Upper Body Dressing) supported sitting;unsupported sitting  -     Comment, (Upper Body Dressing) hospital gown  -       Row Name 01/09/24 1008          Lower Body Dressing Assessment/Training    [place] Level (Lower Body Dressing) maximum assist (25% patient effort);don  -     Position (Lower Body Dressing) supported standing  -     Comment, (Lower Body Dressing) disposable brief  -       Row Name 01/09/24 1008          Toileting Assessment/Training    [place] Level (Toileting) maximum assist (25% patient effort);perform perineal hygiene;adjust/manage clothing;change pad/brief  -CH     Assistive Devices (Toileting) commode, 3-in-1  -CH     Position (Toileting) supported standing;supported sitting  -               User Key  (r) = Recorded By, (t) = Taken By, (c) = Cosigned By      Initials Name Provider Type     Dorita Oropeza, OTR/L Occupational Therapist                   Obj/Interventions       Row Name 01/09/24 1008          Range of Motion Comprehensive    General Range of Motion upper extremity range of motion deficits identified  -     Comment, General Range of Motion bilat shoulders achieve 75% of AROM, all other jts WFL  -       Row Name 01/09/24 1008          Strength Comprehensive (MMT)    Comment, General Manual Muscle Testing (MMT) Assessment bilat shoulders 3-, all other jts 4-/5  -CH       Row Name 01/09/24 1008          Balance    Balance Assessment sitting static balance;sitting dynamic balance;sit to stand dynamic balance;standing static balance;standing dynamic balance  -     Static Sitting Balance supervision  -     Dynamic Sitting Balance contact guard  -     Position, Sitting Balance supported;unsupported;sitting edge of bed;sitting in chair  -     Sit to Stand Dynamic Balance minimal assist;verbal cues;non-verbal cues (demo/gesture)  -     Static Standing Balance contact guard;non-verbal cues  (demo/gesture);verbal cues  -     Dynamic Standing Balance minimal assist;verbal cues;non-verbal cues (demo/gesture)  -     Position/Device Used, Standing Balance walker, rolling  -     Balance Interventions sitting;standing;sit to stand;supported;static;dynamic  -               User Key  (r) = Recorded By, (t) = Taken By, (c) = Cosigned By      Initials Name Provider Type     Dorita Oropeza, OTR/L Occupational Therapist                   Goals/Plan       Row Name 01/09/24 1008          Transfer Goal 1 (OT)    Activity/Assistive Device (Transfer Goal 1, OT) sit-to-stand/stand-to-sit;bed-to-chair/chair-to-bed;commode, 3-in-1  -     [place] Level/Cues Needed (Transfer Goal 1, OT) contact guard required  -     Time Frame (Transfer Goal 1, OT) long term goal (LTG);by discharge  -     Progress/Outcome (Transfer Goal 1, OT) new goal  -       Row Name 01/09/24 1008          Bathing Goal 1 (OT)    Activity/Device (Bathing Goal 1, OT) bathing skills, all;grab bar, tub/shower;hand-held shower spray hose;shower chair  -     [place] Level/Cues Needed (Bathing Goal 1, OT) minimum assist (75% or more patient effort)  -     Time Frame (Bathing Goal 1, OT) long term goal (LTG);by discharge  -     Progress/Outcomes (Bathing Goal 1, OT) new goal  -       Row Name 01/09/24 1008          Dressing Goal 1 (OT)    Activity/Device (Dressing Goal 1, OT) lower body dressing  -     [place]/Cues Needed (Dressing Goal 1, OT) minimum assist (75% or more patient effort)  -     Time Frame (Dressing Goal 1, OT) long term goal (LTG);by discharge  -     Progress/Outcome (Dressing Goal 1, OT) new goal  -       Row Name 01/09/24 1008          Therapy Assessment/Plan (OT)    Planned Therapy Interventions (OT) activity tolerance training;adaptive equipment training;edema control/reduction;functional balance retraining;patient/caregiver education/training;occupation/activity based  interventions;strengthening exercise;transfer/mobility retraining  -               User Key  (r) = Recorded By, (t) = Taken By, (c) = Cosigned By      Initials Name Provider Type     Dorita Oropeza, OTR/L Occupational Therapist                   Clinical Impression       Row Name 01/09/24 1008          Pain Assessment    Additional Documentation Pain Scale: FACES Pre/Post-Treatment (Group)  -       Row Name 01/09/24 1008          Pain Scale: FACES Pre/Post-Treatment    Pain: FACES Scale, Pretreatment 0-->no hurt  -     Posttreatment Pain Rating 2-->hurts little bit  -     Pain Location generalized  -       Row Name 01/09/24 1008          Plan of Care Review    Plan of Care Reviewed With patient  -     Progress no change  -     Outcome Evaluation OT eval completed.  Pt toshia, Beth x 3, & agreeable to eval.  Ms. Sneed was able to come to EOB at [facility] & stand with rwx.  She had a noteably wet/soiled gown and OTR then requested for her to t/f by pivoting to BSC.  She required Min A to t/f to BSC and cues for sequencing.  After sitting & changing her gown the pt attempted to have BM.  Max A for brief change & hygiene. The pt appears to overwhelm & fatigue easily.  RN entered room and requested to remove unna boots for skin inspection.  Assisted pt with returning to bed.  Ms. Sneed reports living at home alone with assist from family for IADLs.  She has had a fxl decline and would benefit from cont'd OT tx to improve her indep in self care & fxl mob.  Anticipate DC to SNF.  -       Row Name 01/09/24 1008          Therapy Assessment/Plan (OT)    Patient/Family Therapy Goal Statement (OT) improve fxn  -     Rehab Potential (OT) good, to achieve stated therapy goals  -     Criteria for Skilled Therapeutic Interventions Met (OT) yes;skilled treatment is necessary  -     Therapy Frequency (OT) 5 times/wk  -     Predicted Duration of Therapy Intervention (OT) until DC  -       Row Name  01/09/24 1008          Therapy Plan Review/Discharge Plan (OT)    Equipment Needs Upon Discharge (OT) walker, rolling;commode chair  -     Anticipated Discharge Disposition (OT) skilled nursing facility  -       Row Name 01/09/24 1008          Positioning and Restraints    Pre-Treatment Position in bed  -CH     Post Treatment Position bed  -     In Bed fowlers;call light within reach;encouraged to call for assist;side rails up x2;with nsg  -               User Key  (r) = Recorded By, (t) = Taken By, (c) = Cosigned By      Initials Name Provider Type     Dorita Oropeza, OTR/L Occupational Therapist                   Outcome Measures       Row Name 01/09/24 1008          How much help from another is currently needed...    Putting on and taking off regular lower body clothing? 2  -CH     Bathing (including washing, rinsing, and drying) 2  -CH     Toileting (which includes using toilet bed pan or urinal) 2  -CH     Putting on and taking off regular upper body clothing 3  -CH     Taking care of personal grooming (such as brushing teeth) 4  -CH     Eating meals 4  -CH     AM-PAC 6 Clicks Score (OT) 17  -CH       Row Name 01/09/24 0800          How much help from another person do you currently need...    Turning from your back to your side while in flat bed without using bedrails? 3  -CM     Moving from lying on back to sitting on the side of a flat bed without bedrails? 3  -CM     Moving to and from a bed to a chair (including a wheelchair)? 2  -CM     Standing up from a chair using your arms (e.g., wheelchair, bedside chair)? 2  -CM     Climbing 3-5 steps with a railing? 2  -CM     To walk in hospital room? 2  -CM     AM-PAC 6 Clicks Score (PT) 14  -CM     Highest Level of Mobility Goal 4 --> Transfer to chair/commode  -CM       Row Name 01/09/24 1008          Functional Assessment    Outcome Measure Options AM-PAC 6 Clicks Daily Activity (OT)  -               User Key  (r) = Recorded By, (t) = Taken By,  (c) = Cosigned By      Initials Name Provider Type     Dorita Oropeza, OTR/L Occupational Therapist    Alicia Bunn, RN Registered Nurse                    Occupational Therapy Education       Title: PT OT SLP Therapies (In Progress)       Topic: Occupational Therapy (In Progress)       Point: ADL training (Done)       Description:   Instruct learner(s) on proper safety adaptation and remediation techniques during self care or transfers.   Instruct in proper use of assistive devices.                  Learning Progress Summary             Patient Acceptance, E, VU,NR by  at 1/9/2024 1509                         Point: Home exercise program (Not Started)       Description:   Instruct learner(s) on appropriate technique for monitoring, assisting and/or progressing therapeutic exercises/activities.                  Learner Progress:  Not documented in this visit.              Point: Precautions (Done)       Description:   Instruct learner(s) on prescribed precautions during self-care and functional transfers.                  Learning Progress Summary             Patient Acceptance, E, VU,NR by  at 1/9/2024 1509                         Point: Body mechanics (Done)       Description:   Instruct learner(s) on proper positioning and spine alignment during self-care, functional mobility activities and/or exercises.                  Learning Progress Summary             Patient Acceptance, E, VU,NR by  at 1/9/2024 1509                                         User Key       Initials Effective Dates Name Provider Type Discipline     07/11/23 -  Dorita Oropeza, OTR/L Occupational Therapist OT                  OT Recommendation and Plan  Planned Therapy Interventions (OT): activity tolerance training, adaptive equipment training, edema control/reduction, functional balance retraining, patient/caregiver education/training, occupation/activity based interventions, strengthening exercise, transfer/mobility  retraining  Therapy Frequency (OT): 5 times/wk  Plan of Care Review  Plan of Care Reviewed With: patient  Progress: no change  Outcome Evaluation: OT eval completed.  Pt fowlers, AxO x 3, & agreeable to eval.  Ms. Sneed was able to come to EOB at [facility] & stand with rwx.  She had a noteably wet/soiled gown and OTR then requested for her to t/f by pivoting to BSC.  She required Min A to t/f to BSC and cues for sequencing.  After sitting & changing her gown the pt attempted to have BM.  Max A for brief change & hygiene. The pt appears to overwhelm & fatigue easily.  RN entered room and requested to remove unna boots for skin inspection.  Assisted pt with returning to bed.  Ms. Sneed reports living at home alone with assist from family for IADLs.  She has had a fxl decline and would benefit from cont'd OT tx to improve her indep in self care & fxl mob.  Anticipate DC to SNF.     Time Calculation:         Time Calculation- OT       Row Name 01/09/24 1008             Time Calculation- OT    OT Start Time 1008  -CH      OT Stop Time 1120  -CH      OT Time Calculation (min) 72 min  -CH      Total Timed Code Minutes- OT 12 minute(s)  -CH      OT Received On 01/09/24  -      OT Goal Re-Cert Due Date 01/19/24  -CH         Timed Charges    40587 - OT Self Care/Mgmt Minutes 12  -CH         Untimed Charges    OT Eval/Re-eval Minutes 60  -CH         Total Minutes    Timed Charges Total Minutes 12  -CH      Untimed Charges Total Minutes 60  -CH       Total Minutes 72  -CH                User Key  (r) = Recorded By, (t) = Taken By, (c) = Cosigned By      Initials Name Provider Type     Dorita Oropeza OTR/L Occupational Therapist                  Therapy Charges for Today       Code Description Service Date Service Provider Modifiers Qty    68717370929 HC OT SELF CARE/MGMT/TRAIN EA 15 MIN 1/9/2024 Dorita Oropeza OTR/L GO 1    06459099931 HC OT EVAL MOD COMPLEXITY 4 1/9/2024 Dorita Oropeza OTR/L GO 1                  Dorita Oropeza, OTR/L  1/9/2024

## 2024-01-09 NOTE — PLAN OF CARE
Goal Outcome Evaluation:           Progress: no change  Outcome Evaluation: Pt c/o pain scheduled pain meds given per orders, cont. IV ABX, IID, up with assist x2, pure wick in place, voiding, [place] diet

## 2024-01-09 NOTE — CASE MANAGEMENT/SOCIAL WORK
Continued Stay Note  FRANCISCA Howard     Patient Name: Brenda Sneed  MRN: [id number]  Today's Date: 1/9/2024    Admit Date: 1/5/2024    Plan: Home Health   Discharge Plan       Row Name 01/09/24 1252       Plan    Plan Home Health    Patient/Family in Agreement with Plan yes    Plan Comments Pt lives at home alone and plans to return home at d/c. She will need resumption of care orders for [facility]. Noted therapy attempted to work with her yesterday but she refused. Will follow.                   Discharge Codes    No documentation.                 Expected Discharge Date and Time       Expected Discharge Date Expected Discharge Time    Fredis 10, 2024               KAITLIN Dillard     [[email or web address]] : Patient was seen for a videostroboscopy this AM. Please see scanned document for full SLP report and images. 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

## 2024-01-09 NOTE — THERAPY EVALUATION
Patient Name: Brenda Sneed  : 1950    MRN: [id number]                              Today's Date: 2024       Admit Date: 2024    Visit Dx:     ICD-10-CM ICD-9-CM   1. Lymphedema  I89.0 457.1   2. Acute UTI  N39.0 599.0   3. CHRISTINE (acute kidney injury)  N17.9 584.9   4. Hyponatremia  E87.1 276.1   5. Fall, initial encounter  W19.XXXA E888.9   6. Injury of left shoulder, initial encounter  S49.92XA 959.2   7. Chronic edema  R60.9 782.3   8. Impaired functional mobility and activity tolerance [Z74.09]  Z74.09 V49.89     Patient Active Problem List   Diagnosis    Spinal stenosis, lumbar    Secondary osteoarthritis of left shoulder due to rotator cuff arthropathy    Cellulitis of right lower extremity    HTN (hypertension)    Obesity, Class III, BMI 40-49.9 (morbid obesity)    Anxiety associated with depression    Traumatic rhabdomyolysis    Traumatic rhabdomyolysis, initial encounter    Laceration of left lower extremity    Cellulitis    Cellulitis of left lower extremity    Thrombocytopenia    Acute cystitis with hematuria    Sepsis    Candidiasis of skin    Acquired hypothyroidism    CHRISTINE (acute kidney injury)    Chronic pain syndrome    Hyponatremia    Chronic kidney disease, stage 3a     Past Medical History:   Diagnosis Date    Arthritis     Bronchitis     Cataract     right eye , cornea implant    Chronic back pain     Depression     Disease of thyroid gland     Edema     lower extremities    Hypertension     Kidney stones     Lymphedema      Past Surgical History:   Procedure Laterality Date    BACK SURGERY       ([place])2013     CHOLECYSTECTOMY OPEN      CORNEAL TRANSPLANT Right     INCISION AND DRAINAGE ABSCESS Left 2023    Procedure: INCISION AND DRAINAGE ABSCESS left leg;  Surgeon: Sarita Vazquez MD;  Location: [facility] OR;  Service: General;  Laterality: Left;    LACERATION REPAIR Left 2023    Procedure: washout and closure left leg wound ;  Surgeon: Sarita Vazquez,  MD;  Location:  [facility] OR;  Service: General;  Laterality: Left;    LUMBAR FUSION Left 8/2/2017    Procedure: LEFT LUMBAR LATERAL INTERBODY FUSION WITH INSTRUMENTATION  L1-2;  Surgeon: MARCELINO Wright MD;  Location:  [facility] OR;  Service:     NECK SURGERY  2010    [place]    TOTAL HIP ARTHROPLASTY Right 2014    DR. KENDRICK     TOTAL KNEE ARTHROPLASTY Right 2000    TOTAL KNEE ARTHROPLASTY Left 2002    TOTAL SHOULDER ARTHROPLASTY W/ DISTAL CLAVICLE EXCISION Left 6/1/2020    Procedure: LEFT REVERSE TOTAL SHOULDER REPLACEMENT;  Surgeon: Saravanan Calvin MD;  Location:  [facility] OR;  Service: Orthopedics;  Laterality: Left;      General Information       Row Name 01/09/24 1535          Physical Therapy Time and Intention    Document Type evaluation;other (see comments)  see MAR  -JE     Mode of Treatment physical therapy  -       Row Name 01/09/24 1535          General Information    Patient Profile Reviewed yes  -JE     Prior Level of Function independent:;all household mobility;ADL's  -JE     Existing Precautions/Restrictions fall;other (see comments)  open wounds LEs  -JE     Barriers to Rehab medically complex  -       Row Name 01/09/24 1535          Living Environment    People in Home alone  -       Row Name 01/09/24 1535          Home Main Entrance    Number of Stairs, Main Entrance none  -JE       Row Name 01/09/24 1535          Stairs Within Home, Primary    Number of Stairs, Within Home, Primary none  -JE       Row Name 01/09/24 1535          Cognition    Orientation Status (Cognition) oriented to;person;place;situation;other (see comments)  time not formally assessed  -       Row Name 01/09/24 1535          Safety Issues, Functional Mobility    Safety Issues Affecting Function (Mobility) at risk behavior observed;friction/shear risk;safety precaution awareness  -JE     Impairments Affecting Function (Mobility) balance;endurance/activity tolerance;pain;range of motion (ROM);shortness of breath;strength   -               User Key  (r) = Recorded By, (t) = Taken By, (c) = Cosigned By      Initials Name Provider Type    Didi Roldan, PT Physical Therapist                   Mobility       Row Name 01/09/24 1535          Bed Mobility    Bed Mobility scooting/bridging;supine-sit;sit-supine  -     Scooting/Bridging [place] (Bed Mobility) verbal cues;maximum assist (25% patient effort);2 person assist  -TERRELL     Supine-Sit [place] (Bed Mobility) minimum assist (75% patient effort);verbal cues  -TERRELL     Sit-Supine [place] (Bed Mobility) moderate assist (50% patient effort);2 person assist;verbal cues  -TERRELL     Assistive Device (Bed Mobility) bed rails;draw sheet;head of bed elevated  -       Row Name 01/09/24 1535          Sit-Stand Transfer    Sit-Stand [place] (Transfers) minimum assist (75% patient effort);2 person assist;verbal cues  -TERRELL       Row Name 01/09/24 1535          Gait/Stairs (Locomotion)    [place] Level (Gait) contact guard;verbal cues  -TERRELL     Assistive Device (Gait) walker, front-wheeled  -TERRELL     Distance in Feet (Gait) ~ 18 ft in room  -TERRELL     Deviations/Abnormal Patterns (Gait) gait speed decreased;stride length decreased  -     Bilateral Gait Deviations forward flexed posture;heel strike decreased  -               User Key  (r) = Recorded By, (t) = Taken By, (c) = Cosigned By      Initials Name Provider Type    Didi Roldan, PT Physical Therapist                   Obj/Interventions       Row Name 01/09/24 1535          Range of Motion Comprehensive    Comment, General Range of Motion AROM all 4 extremities WFLs except L shld limited to less than 50% active and passively w/ pain at posterior lateral shld, noted hx of L shld surgery; family reports she has had several falls w/ recent negative xrays for fracture L UE  -       Row Name 01/09/24 1535          Strength Comprehensive (MMT)    Comment, General Manual Muscle Testing (MMT) Assessment pt moves all 4  extremities I, however gaurded and slow to move, no formal assessment at this time  -       Row Name 01/09/24 1535          Balance    Balance Assessment sitting static balance;sitting dynamic balance;sit to stand dynamic balance;standing static balance;standing dynamic balance  -     Static Sitting Balance standby assist  -     Dynamic Sitting Balance standby assist  -     Position, Sitting Balance supported;sitting edge of bed  -     Sit to Stand Dynamic Balance minimal assist;2-person assist;verbal cues  -     Static Standing Balance contact guard;verbal cues  -     Dynamic Standing Balance contact guard;verbal cues  -     Position/Device Used, Standing Balance supported;walker, rolling  -               User Key  (r) = Recorded By, (t) = Taken By, (c) = Cosigned By      Initials Name Provider Type    Didi Roldan, PT Physical Therapist                   Goals/Plan       Row Name 01/09/24 1535          Bed Mobility Goal 1 (PT)    Activity/Assistive Device (Bed Mobility Goal 1, PT) rolling to left;rolling to right;bridging;scooting;sit to supine/supine to sit;sidelying to sit/sit to sidelying  -     [place] Level/Cues Needed (Bed Mobility Goal 1, PT) contact guard required;minimum assist (75% or more patient effort)  -     Time Frame (Bed Mobility Goal 1, PT) long term goal (LTG);10 days  -     Progress/Outcomes (Bed Mobility Goal 1, PT) goal ongoing  -[facility] Name 01/09/24 1535          Transfer Goal 1 (PT)    Activity/Assistive Device (Transfer Goal 1, PT) sit-to-stand/stand-to-sit;bed-to-chair/chair-to-bed;other (see comments)  rwx for bed to/from chair  -     [place] Level/Cues Needed (Transfer Goal 1, PT) contact guard required;standby assist  -     Time Frame (Transfer Goal 1, PT) long term goal (LTG);10 days  -     Progress/Outcome (Transfer Goal 1, PT) goal ongoing  -[facility] Name 01/09/24 1535          Gait Training Goal 1 (PT)    Activity/Assistive  Device (Gait Training Goal 1, PT) gait (walking locomotion);assistive device use;decrease fall risk;diminish gait deviation;improve balance and speed;increase endurance/gait distance;increase energy conservation;walker, rolling  -     [place] Level (Gait Training Goal 1, PT) standby assist  -JE     Distance (Gait Training Goal 1, PT) ~ 40+ ft  -     Time Frame (Gait Training Goal 1, PT) long term goal (LTG);10 days  -     Progress/Outcome (Gait Training Goal 1, PT) goal ongoing  -       Row Name 01/09/24 1535          Therapy Assessment/Plan (PT)    Planned Therapy Interventions (PT) balance training;bed mobility training;gait training;home exercise program;postural re-education;patient/family education;ROM (range of motion);strengthening;transfer training;other (see comments)  safety/falls prevention, skin protection/pressure relief, edema mgmt  -               User Key  (r) = Recorded By, (t) = Taken By, (c) = Cosigned By      Initials Name Provider Type    Didi Roldan, PT Physical Therapist                   Clinical Impression       Row Name 01/09/24 1531          Pain    Pre/Posttreatment Pain Comment reports pain all over; increase verbalization of c/os w/ activity  -     Pain Intervention(s) Repositioned;Rest  -       Row Name 01/09/24 1536          Pain Scale: FACES Pre/Post-Treatment    Pain: FACES Scale, Pretreatment 2-->hurts little bit  -     Posttreatment Pain Rating 2-->hurts little bit  -     Pain Location generalized  -       Row Name 01/09/24 1533          Plan of Care Review    Plan of Care Reviewed With patient;family  -     Progress improving  -     Outcome Evaluation PT eval completed.  Pt pleasant and agreeable to therapy.  Reports she has been told she was going to get up and start moving if she wanted to go home and she has a cat, Radha she is eager to see again.  Pt w/ dressings intact B LEs.  Orders for Unna boots to be placed, however currently supplies  not available.  Nsg and Wound care have been informed.  RN reports they plan to provide wound care until supplies for unna boots available for PT to place.  Pt demonstrates generalized weakness w/ decrease activity tolerance.  Requires assist of 1-2 for bed mobility and tfers.  Once in standing able to ambulate w/ rwx CGA tolerating ~ 18 ft this date.  Noted flexed posture, decrease gait speed, step length and foot clearance w/ decrease heel strike.  Decrease balance w/ increase fall risk.  Pt will benefit from continued PT services to assist w/ skin protection/pressure relief and edema,  will re-evaluate for unna boot placement when supplies available, therapy to increase activity tolerance, safety awareness, to improve overall strength, balance, and to improve I w/ functional mobility reducing fall risk.  Recommend continued skilled care in SNF at discharge.  Will follow for progress and needs.  -       Row Name 01/09/24 1535          Therapy Assessment/Plan (PT)    Patient/Family Therapy Goals Statement (PT) improve mobility, return home  -     Rehab Potential (PT) good, to achieve stated therapy goals  -     Criteria for Skilled Interventions Met (PT) yes;meets criteria;skilled treatment is necessary  -     Therapy Frequency (PT) 2 times/day  -     Predicted Duration of Therapy Intervention (PT) until discharge or goals achieved  -       Row Name 01/09/24 1535          Vital Signs    O2 Delivery Pre Treatment room air  -     O2 Delivery Intra Treatment room air  -     Post SpO2 (%) 93  -     O2 Delivery Post Treatment room air  -     Pre Patient Position Supine  -     Intra Patient Position Standing  -     Post Patient Position Supine  -       Row Name 01/09/24 1539          Positioning and Restraints    Pre-Treatment Position in bed  -     Post Treatment Position bed  -JE     In Bed fowlers;call light within reach;encouraged to call for assist;exit alarm on;with family/caregiver;side  rails up x3  -               User Key  (r) = Recorded By, (t) = Taken By, (c) = Cosigned By      Initials Name Provider Type    Didi Roldan, PT Physical Therapist                   Outcome Measures       Row Name 01/09/24 1535 01/09/24 0800       How much help from another person do you currently need...    Turning from your back to your side while in flat bed without using bedrails? 3  -JE 3  -CM    Moving from lying on back to sitting on the side of a flat bed without bedrails? 3  -JE 3  -CM    Moving to and from a bed to a chair (including a wheelchair)? 3  -JE 2  -CM    Standing up from a chair using your arms (e.g., wheelchair, bedside chair)? 3  -JE 2  -CM    Climbing 3-5 steps with a railing? 2  -JE 2  -CM    To walk in hospital room? 3  -JE 2  -CM    AM-PAC 6 Clicks Score (PT) 17  - 14  -CM    Highest Level of Mobility Goal 5 --> Static standing  - 4 --> Transfer to chair/commode  -CM      Row Name 01/09/24 1535 01/09/24 1008       Functional Assessment    Outcome Measure Options AM-PAC 6 Clicks Basic Mobility (PT)  - AM-PAC 6 Clicks Daily Activity (OT)  -              User Key  (r) = Recorded By, (t) = Taken By, (c) = Cosigned By      Initials Name Provider Type     Dorita Oropeza, OTR/L Occupational Therapist    Didi Roldan, PT Physical Therapist    Alicia Bunn, RN Registered Nurse                                 Physical Therapy Education       Title: PT OT SLP Therapies (In Progress)       Topic: Physical Therapy (Done)       Point: Mobility training (Done)       Learning Progress Summary             Patient Acceptance, E,TB,D, MORIAH,DU,NR by  at 1/9/2024 1645    Comment: education re: purpose of PT/importance of activity, for safety/falls prevention, improved tech w/ breathing, for upright posture in standing, for improved tech w/ bed mob, tfers and gait w/ use of rwx   Family Acceptance, E,TB,D, VU,DU,NR by  at 1/9/2024 1645    Comment: education re: purpose of  PT/importance of activity, for safety/falls prevention, improved tech w/ breathing, for upright posture in standing, for improved tech w/ bed mob, tfers and gait w/ use of rwx                         Point: Home exercise program (Done)       Learning Progress Summary             Patient Acceptance, E,TB,D, VU,DU,NR by  at 1/9/2024 1645    Comment: education re: purpose of PT/importance of activity, for safety/falls prevention, improved tech w/ breathing, for upright posture in standing, for improved tech w/ bed mob, tfers and gait w/ use of rwx   Family Acceptance, E,TB,D, VU,DU,NR by  at 1/9/2024 1645    Comment: education re: purpose of PT/importance of activity, for safety/falls prevention, improved tech w/ breathing, for upright posture in standing, for improved tech w/ bed mob, tfers and gait w/ use of rwx                         Point: Precautions (Done)       Learning Progress Summary             Patient Acceptance, E,TB,D, VU,DU,NR by  at 1/9/2024 1645    Comment: education re: purpose of PT/importance of activity, for safety/falls prevention, improved tech w/ breathing, for upright posture in standing, for improved tech w/ bed mob, tfers and gait w/ use of rwx   Family Acceptance, E,TB,D, VU,DU,NR by  at 1/9/2024 1645    Comment: education re: purpose of PT/importance of activity, for safety/falls prevention, improved tech w/ breathing, for upright posture in standing, for improved tech w/ bed mob, tfers and gait w/ use of rwx                                         User Key       Initials Effective Dates Name Provider Type Discipline     08/02/18 -  Didi Conner, PT Physical Therapist PT                  PT Recommendation and Plan  Planned Therapy Interventions (PT): balance training, bed mobility training, gait training, home exercise program, postural re-education, patient/family education, ROM (range of motion), strengthening, transfer training, other (see comments) (safety/falls  prevention, skin protection/pressure relief, edema mgmt)  Plan of Care Reviewed With: patient, family  Progress: improving  Outcome Evaluation: PT eval completed.  Pt pleasant and agreeable to therapy.  Reports she has been told she was going to get up and start moving if she wanted to go home and she has a cat, Radha she is eager to see again.  Pt w/ dressings intact B LEs.  Orders for Unna boots to be placed, however currently supplies not available.  Nsg and Wound care have been informed.  RN reports they plan to provide wound care until supplies for unna boots available for PT to place.  Pt demonstrates generalized weakness w/ decrease activity tolerance.  Requires assist of 1-2 for bed mobility and tfers.  Once in standing able to ambulate w/ rwx CGA tolerating ~ 18 ft this date.  Noted flexed posture, decrease gait speed, step length and foot clearance w/ decrease heel strike.  Decrease balance w/ increase fall risk.  Pt will benefit from continued PT services to assist w/ skin protection/pressure relief and edema,  will re-evaluate for unna boot placement when supplies available, therapy to increase activity tolerance, safety awareness, to improve overall strength, balance, and to improve I w/ functional mobility reducing fall risk.  Recommend continued skilled care in SNF at discharge.  Will follow for progress and needs.     Time Calculation:         PT Charges       Row Name 01/09/24 1643             Time Calculation    Start Time 1535  -      Stop Time 1634  -      Time Calculation (min) 59 min  -      PT Received On 01/09/24  -      PT Goal Re-Cert Due Date 01/19/24  -                User Key  (r) = Recorded By, (t) = Taken By, (c) = Cosigned By      Initials Name Provider Type    Didi Roldan, PT Physical Therapist                  Therapy Charges for Today       Code Description Service Date Service Provider Modifiers Qty    73016438003 HC PT EVAL MOD COMPLEXITY 4 1/9/2024 [place]  Didi GIBSON, PT GP 1            PT G-Codes  Outcome Measure Options: AM-PAC 6 Clicks Basic Mobility (PT)  AM-PAC 6 Clicks Score (PT): 17  AM-PAC 6 Clicks Score (OT): 17  PT Discharge Summary  Anticipated Discharge Disposition (PT): skilled nursing facility    Didi Conner, PT  1/9/2024

## 2024-01-09 NOTE — PLAN OF CARE
Goal Outcome Evaluation:  Received order for unna boots.  However, part of the dressing is not available at this time.  This is on order. When available PT will assess and provide care.  Nsg to be notified.

## 2024-01-09 NOTE — NURSING NOTE
[facility]  INPATIENT WOUND & OSTOMY CARE    Today's Date: 01/09/24    Patient Name: Brenda Sneed  MRN: [id number]  CSN: [id number]  PCP: Sachin Dia MD  Attending Provider: Sachin Dia MD  Length of Stay: 4    Wound care consulted for BLE redness/edema. Unna boots recently removed. See Julieta Murray's note for details. PT ordered for unna boot placement.      Inpatient wound care will continue to follow during hospital stay.  Please contact if any issues or concerns arise.     This document has been electronically signed by Alecia Hurd RN on 1/9/2024 13:50 CST

## 2024-01-09 NOTE — PROGRESS NOTES
"    Daily Progress Note  Brenda Sneed  MRN: [id number] LOS: 4    Admit Date: 2024 07:17 CST    Subjective:         Interval History:    Reviewed overnight events and nursing notes.     Doing well this morning.  No new complaints.    ROS:  Review of Systems   Constitutional:  Negative for chills and fever.   Respiratory:  Negative for cough, chest tightness and shortness of breath.    Cardiovascular:  Negative for chest pain.   Gastrointestinal:  Negative for abdominal pain, diarrhea, nausea and vomiting.       DIET:  Diet: Renal Diets; Low Sodium (2-3g), Low Potassium, Low Phosphorus; Texture: Regular Texture (IDDSI 7); Fluid Consistency: Thin (IDDSI 0)    Medications:      albuterol, 2.5 mg, Nebulization, TID  apixaban, 5 mg, Oral, Q12H  ceftazidime-avibactam (AVYCAZ) in NS IVPB, 1.25 g, Intravenous, Q8H  DULoxetine, 60 mg, Oral, BID  guaifenesin-dextromethorphan, 2 tablet, Oral, Q12H  levothyroxine, 100 mcg, Oral, Daily  metoprolol succinate XL, 25 mg, Oral, Daily  Morphine, 15 mg, Oral, Q12H  nystatin, 1 application , Topical, BID  pramipexole, 0.75 mg, Oral, Nightly  senna-docusate sodium, 2 tablet, Oral, BID  sodium chloride, 10 mL, Intravenous, Q12H  sodium chloride, 10 mL, Intravenous, Q12H          Objective:     Vitals: /48 (BP Location: Left arm, Patient Position: Lying)   Pulse 68   Temp 97.8 °F (36.6 °C) (Oral)   Resp 16   Ht 160 cm (63\")   Wt 101 kg (223 lb 1.7 oz)   SpO2 97%   BMI 39.52 kg/m²    Intake/Output Summary (Last 24 hours) at 2024 0717  Last data filed at 2024 0515  Gross per 24 hour   Intake 360 ml   Output 900 ml   Net -540 ml    Temp (24hrs), Av.7 °F (36.5 °C), Min:97.5 °F (36.4 °C), Max:97.9 °F (36.6 °C)    Glucose:  Lab Results   Component Value Date    POCGLU 100 (H) 2021     Physical Examination:   Physical Exam  Constitutional:       General: She is not in acute distress.     Appearance: Normal appearance. She is not ill-appearing or " toxic-appearing.   Cardiovascular:      Rate and Rhythm: Normal rate and regular rhythm.      Pulses: Normal pulses.      Heart sounds: Normal heart sounds. No murmur heard.  Pulmonary:      Effort: Pulmonary effort is normal. No respiratory distress.      Breath sounds: Normal breath sounds. No stridor. No wheezing or rales.   Abdominal:      General: Abdomen is flat. Bowel sounds are normal. There is no distension.      Palpations: Abdomen is soft.      Tenderness: There is no abdominal tenderness.   Musculoskeletal:      Right lower leg: Edema present.      Left lower leg: Edema present.   Neurological:      Mental Status: She is alert.         Labs:  Lab Results (last 24 hours)       Procedure Component Value Units Date/Time    Manual Differential [[id number]]  (Abnormal) Collected: 01/09/24 0439    Specimen: Blood Updated: 01/09/24 0628     Neutrophil % 57.7 %      Lymphocyte % 15.5 %      Monocyte % 9.3 %      Eosinophil % 7.2 %      Bands %  6.2 %      Atypical Lymphocyte % 4.1 %      Neutrophils Absolute 2.86 10*3/mm3      Lymphocytes Absolute 0.88 10*3/mm3      Monocytes Absolute 0.42 10*3/mm3      Eosinophils Absolute 0.32 10*3/mm3      Anisocytosis Slight/1+     Macrocytes Slight/1+     Poikilocytes Slight/1+     WBC Morphology Normal     Platelet Estimate Decreased     Clumped Platelets Present    CBC & Differential [[id number]]  (Abnormal) Collected: 01/09/24 0439    Specimen: Blood Updated: 01/09/24 0628    Narrative:      The following orders were created for panel order CBC & Differential.  Procedure                               Abnormality         Status                     ---------                               -----------         ------                     CBC Auto Differential[[id number]]        Abnormal            Final result                 Please view results for these tests on the individual orders.    CBC Auto Differential [[id number]]  (Abnormal) Collected: 01/09/24 0439    Specimen: Blood  Updated: 01/09/24 0628     WBC 4.47 10*3/mm3      RBC 3.04 10*6/mm3      Hemoglobin 8.3 g/dL      Hematocrit 26.0 %      MCV 85.5 fL      MCH 27.3 pg      MCHC 31.9 g/dL      RDW 18.2 %      RDW-SD 53.0 fl      MPV 9.2 fL      Platelets 90 10*3/mm3     Basic Metabolic Panel [[id number]]  (Abnormal) Collected: 01/09/24 0439    Specimen: Blood Updated: 01/09/24 0551     Glucose 78 mg/dL      BUN 37 mg/dL      Creatinine 1.17 mg/dL      Sodium 136 mmol/L      Potassium 3.8 mmol/L      Chloride 103 mmol/L      CO2 23.0 mmol/L      Calcium 8.2 mg/dL      BUN/Creatinine Ratio 31.6     Anion Gap 10.0 mmol/L      eGFR 48.8 mL/min/1.73     Narrative:      GFR Normal >60  Chronic Kidney Disease <60  Kidney Failure <15    The GFR formula is only valid for adults with stable renal function between ages 18 and 70.    Blood Culture - Blood, Arm, Right [[id number]]  (Normal) Collected: 01/05/24 2041    Specimen: Blood from Arm, Right Updated: 01/08/24 2100     Blood Culture No growth at 3 days    Blood Culture - Blood, Arm, Right [[id number]]  (Normal) Collected: 01/05/24 2041    Specimen: Blood from Arm, Right Updated: 01/08/24 2100     Blood Culture No growth at 3 days    Urinalysis With Culture If Indicated - Straight Cath [[id number]]  (Abnormal) Collected: 01/08/24 1717    Specimen: Urine from Straight Cath Updated: 01/08/24 1728     Color, UA Dark Yellow     Appearance, UA Clear     pH, UA 5.5     Specific Gravity, UA 1.018     Glucose, UA Negative     Ketones, UA Trace     Bilirubin, UA Negative     Blood, UA Negative     Protein, UA Negative     Leuk Esterase, UA Trace     Nitrite, UA Negative     Urobilinogen, UA 1.0 E.U./dL    Narrative:      In absence of clinical symptoms, the presence of pyuria, bacteria, and/or nitrites on the urinalysis result does not correlate with infection.    Urinalysis, Microscopic Only - Straight Cath [[id number]]  (Abnormal) Collected: 01/08/24 1717    Specimen: Urine from Straight Cath Updated:  01/08/24 1728     RBC, UA 6-10 /HPF      WBC, UA 3-5 /HPF      Comment: Urine culture not indicated.        Bacteria, UA None Seen /HPF      Squamous Epithelial Cells, UA 0-2 /HPF      Hyaline Casts, UA 0-2 /LPF      Methodology Automated Microscopy    Potassium [[id number]]  (Normal) Collected: 01/08/24 1535    Specimen: Blood Updated: 01/08/24 1613     Potassium 5.0 mmol/L      Comment: Slight hemolysis detected by analyzer. Result may be falsely elevated.       Urine Culture - Urine, Straight Cath [[id number]]  (Abnormal)  (Susceptibility) Collected: 01/05/24 1926    Specimen: Urine from Straight Cath Updated: 01/08/24 1202     Urine Culture >100,000 CFU/mL Klebsiella pneumoniae ssp pneumoniae CRE     Comment:   Carbapenem resistant Enterobacteriaceae, patient may be an isolation risk.  Consider infectious disease consult.  Susceptibility results may not correlate to clinical outcomes.  Carbapenem resistant Enterobacteriaceae, patient may be an isolation risk.         50,000 CFU/mL Enterococcus faecalis    Narrative:      Colonization of the urinary tract without infection is common. Treatment is discouraged unless the patient is symptomatic, pregnant, or undergoing an invasive urologic procedure.    Susceptibility        Enterococcus faecalis      CHRISTIANO      Ampicillin Susceptible      Levofloxacin Susceptible      Nitrofurantoin Susceptible      Vancomycin Susceptible                       Susceptibility Comments              For carbapenemase-producing infections, susceptibility results may not correlate to clinical outcomes.  Please consider infectious disease consult.                        Imaging:  No radiology results from the last 24 hrs       Assessment and Plan:     Primary Problem:  CHRISTINE (acute kidney injury)    Hospital Problem list:    CHRISTINE (acute kidney injury)    HTN (hypertension)    Obesity, Class III, BMI 40-49.9 (morbid obesity)    Anxiety associated with depression    Chronic pain syndrome     Hyponatremia    Chronic kidney disease, stage 3a        Assessment: Chronically ill 73-year-old female who presents with sepsis and acute kidney injury.    Plan:    Acute kidney injury  Resolved.  No changes.    Possible CRE UTI  Difficult to tell if her presenting symptoms are related to a UTI or not.  At this point I do not really suspect they are.  Infectious diseases following and proceeding with In-N-Out catheterization for more accurate urinalysis.    Will have her work with physical and Occupational Therapy today.    Discharge planning:   Home    Reviewed treatment plans with the patient and/or family.     Code Status:   Code Status and Medical Interventions:   Ordered at: 01/06/24 0626     Code Status (Patient has no pulse and is not breathing):    CPR (Attempt to Resuscitate)     Medical Interventions (Patient has pulse or is breathing):    Full Support       Electronically signed by Sanjiv Block MD on 1/9/2024 at 07:17 CST

## 2024-01-09 NOTE — PLAN OF CARE
Goal Outcome Evaluation:  Plan of Care Reviewed With: patient, family        Progress: improving  Outcome Evaluation: PT eval completed.  Pt pleasant and agreeable to therapy.  Reports she has been told she was going to get up and start moving if she wanted to go home and she has a cat, Radha she is eager to see again.  Pt w/ dressings intact B LEs.  Orders for Unna boots to be placed, however currently supplies not available.  Nsg and Wound care have been informed.  RN reports they plan to provide wound care until supplies for unna boots available for PT to place.  Pt demonstrates generalized weakness w/ decrease activity tolerance.  Requires assist of 1-2 for bed mobility and tfers.  Once in standing able to ambulate w/ rwx CGA tolerating ~ 18 ft this date.  Noted flexed posture, decrease gait speed, step length and foot clearance w/ decrease heel strike.  Decrease balance w/ increase fall risk.  Pt will benefit from continued PT services to assist w/ skin protection/pressure relief and edema,  will re-evaluate for unna boot placement when supplies available, therapy to increase activity tolerance, safety awareness, to improve overall strength, balance, and to improve I w/ functional mobility reducing fall risk.  Recommend continued skilled care in SNF at discharge.  Will follow for progress and needs.      Anticipated Discharge Disposition (PT): skilled nursing facility

## 2024-01-09 NOTE — CONSULTS
[facility]  INPATIENT WOUND & OSTOMY CONSULTATION    Today's Date: 01/09/24    Patient Name: Brenda Sneed  MRN: [id number]  CSN: [id number]  PCP: Radha Dia MD  Referring Provider:   Consulting Provider (From admission, onward)      Start Ordered     Status Ordering Provider    01/09/24 1110 01/09/24 1110  Inpatient Wound Care MD Consult  Once        Specialty:  Wound Care  Provider:  Julieta Murray APRN    Acknowledged RADHA DIA           Attending Provider: Radha Dia MD  Length of Stay: 4    SUBJECTIVE   Chief Complaint: ***    HPI: Brenda Sneed, a 73 y.o.female, presents with a past medical history of ***.  A full past medical history as listed below.  ***    Inpatient wound care consulted due to ***      Visit Dx:    ICD-10-CM ICD-9-CM   1. Acute UTI  N39.0 599.0   2. CHRISTINE (acute kidney injury)  N17.9 584.9   3. Hyponatremia  E87.1 276.1   4. Fall, initial encounter  W19.XXXA E888.9   5. Injury of left shoulder, initial encounter  S49.92XA 959.2   6. Chronic edema  R60.9 782.3       Hospital Problem List:     CHRISTINE (acute kidney injury)    HTN (hypertension)    Obesity, Class III, BMI 40-49.9 (morbid obesity)    Anxiety associated with depression    Chronic pain syndrome    Hyponatremia    Chronic kidney disease, stage 3a      History:   Past Medical History:   Diagnosis Date    Arthritis     Bronchitis     Cataract     right eye , cornea implant    Chronic back pain     Depression     Disease of thyroid gland     Edema     lower extremities    Hypertension     Kidney stones     Lymphedema      Past Surgical History:   Procedure Laterality Date    BACK SURGERY      2010 ([place]), 2013     CHOLECYSTECTOMY OPEN      CORNEAL TRANSPLANT Right 1999    INCISION AND DRAINAGE ABSCESS Left 4/19/2023    Procedure: INCISION AND DRAINAGE ABSCESS left leg;  Surgeon: Sarita Vazquez MD;  Location: [facility];  Service: General;  Laterality: Left;    LACERATION  REPAIR Left 4/7/2023    Procedure: washout and closure left leg wound ;  Surgeon: Sarita Vazquez MD;  Location:  [facility];  Service: General;  Laterality: Left;    LUMBAR FUSION Left 8/2/2017    Procedure: LEFT LUMBAR LATERAL INTERBODY FUSION WITH INSTRUMENTATION  L1-2;  Surgeon: MARCELINO Wright MD;  Location:  [facility];  Service:     NECK SURGERY  2010    [facility]    TOTAL HIP ARTHROPLASTY Right 2014    DR. KENDRICK     TOTAL KNEE ARTHROPLASTY Right 2000    TOTAL KNEE ARTHROPLASTY Left 2002    TOTAL SHOULDER ARTHROPLASTY W/ DISTAL CLAVICLE EXCISION Left 6/1/2020    Procedure: LEFT REVERSE TOTAL SHOULDER REPLACEMENT;  Surgeon: Saravanan Calvin MD;  Location:  [facility];  Service: Orthopedics;  Laterality: Left;     Social History     Socioeconomic History    Marital status:    Tobacco Use    Smoking status: Never    Smokeless tobacco: Never   Vaping Use    Vaping Use: Never used   Substance and Sexual Activity    Alcohol use: Not Currently    Drug use: No    Sexual activity: Defer     Family History   Problem Relation Age of Onset    Breast cancer Neg Hx        Allergies:  Allergies   Allergen Reactions    Codeine Itching and Rash     SEVERE RASH/ITCHING (TOLERATES PERCOCET)    Amoxicillin Other (See Comments)     unknown    Levaquin [Levofloxacin] Other (See Comments)     unknown    Benadryl [Diphenhydramine] Other (See Comments)     KEEPS PATIENT AWAKE        Medications:    Current Facility-Administered Medications:     albuterol (PROVENTIL) nebulizer solution 0.083% 2.5 mg/3mL, 2.5 mg, Nebulization, TID, Sachin Dia MD, 2.5 mg at 01/09/24 1114    apixaban (ELIQUIS) tablet 5 mg, 5 mg, Oral, Q12H, Sachin Dia MD, 5 mg at 01/09/24 0905    sennosides-docusate (PERICOLACE) 8.6-50 MG per tablet 2 tablet, 2 tablet, Oral, BID, 2 tablet at 01/09/24 0905 **AND** polyethylene glycol (MIRALAX) packet 17 g, 17 g, Oral, Daily PRN **AND** bisacodyl (DULCOLAX) EC tablet 5 mg, 5 mg, Oral, Daily PRN  **AND** bisacodyl (DULCOLAX) suppository 10 mg, 10 mg, Rectal, Daily PRN, Sachin Dia MD    Calcium Replacement - Follow Nurse / BPA Driven Protocol, , Does not apply, PRN, Sachin Dia MD    ceftazidime-avibactam (AVYCAZ) 1.25 g in sodium chloride 0.9 % 100 mL IVPB, 1.25 g, Intravenous, Q8H, Clint Moctezuma MD, 1.25 g at 01/09/24 0905    cyclobenzaprine (FLEXERIL) tablet 5 mg, 5 mg, Oral, TID PRN, Sachin Dia MD    DULoxetine (CYMBALTA) DR capsule 60 mg, 60 mg, Oral, BID, Sachin Dia MD, 60 mg at 01/09/24 0905    furosemide (LASIX) tablet 40 mg, 40 mg, Oral, Daily PRN, Sachin Dia MD    guaifenesin-dextromethorphan (MUCINEX DM) tablet 2 tablet, 2 tablet, Oral, Q12H, Sachin Dia MD, 2 tablet at 01/09/24 0905    levothyroxine (SYNTHROID, LEVOTHROID) tablet 100 mcg, 100 mcg, Oral, Daily, Sachin Dia MD, 100 mcg at 01/09/24 0525    Magnesium Standard Dose Replacement - Follow Nurse / BPA Driven Protocol, , Does not apply, PRN, Sachin Dia MD    metoprolol succinate XL (TOPROL-XL) 24 hr tablet 25 mg, 25 mg, Oral, Daily, Sachin Dia MD, 25 mg at 01/08/24 0920    Morphine (MS CONTIN) 12 hr tablet 15 mg, 15 mg, Oral, Q12H, Hector Tubbs APRN, 15 mg at 01/09/24 0905    nystatin (MYCOSTATIN) powder 1 application , 1 application , Topical, BID, Sachin Dia MD, 1 application  at 01/09/24 0906    Phosphorus Replacement - Follow Nurse / BPA Driven Protocol, , Does not apply, PRN, Sachin Dia MD    Potassium Replacement - Follow Nurse / BPA Driven Protocol, , Does not apply, PRN, Sachin Dia MD    pramipexole (MIRAPEX) tablet 0.75 mg, 0.75 mg, Oral, Nightly, Sachin Dia MD, 0.75 mg at 01/08/24 2114    [COMPLETED] Insert Peripheral IV, , , Once **AND** sodium chloride 0.9 % flush 10 mL, 10 mL, Intravenous, PRN, Charo Bhat PA    sodium chloride 0.9 % flush 10 mL, 10 mL, Intravenous,  Q12H, Barrett Wray Jr., MD, 10 mL at 01/09/24 0904    sodium chloride 0.9 % flush 10 mL, 10 mL, Intravenous, PRN, Barrett Wray Jr., MD    sodium chloride 0.9 % flush 10 mL, 10 mL, Intravenous, Q12H, Sachin Dia MD, 10 mL at 01/09/24 0904    sodium chloride 0.9 % flush 10 mL, 10 mL, Intravenous, PRN, Sachin Dia MD    sodium chloride 0.9 % infusion 40 mL, 40 mL, Intravenous, PRN, Barrett Wray Jr., MD    sodium chloride 0.9 % infusion 40 mL, 40 mL, Intravenous, PRIfeoma SANTAMARIA James Noah, MD    Review of Systems: ***  Review of Systems      OBJECTIVE     Vitals:    01/09/24 1146   BP: 106/62   Pulse: 66   Resp: 16   Temp: 97.4 °F (36.3 °C)   SpO2: 97%       PHYSICAL EXAM: ***  Physical Exam  Vitals and nursing note reviewed.   Constitutional:       General: She is awake.   Neurological:      Mental Status: She is oriented to person, place, and time. She is lethargic and confused.   Psychiatric:         Behavior: Behavior is cooperative.         Pictures taken by nursing staff today               Results Review:  Lab Results (last 48 hours)       Procedure Component Value Units Date/Time    Urine Culture - Urine, Straight Cath [[id number]]  (Abnormal)  (Susceptibility) Collected: 01/05/24 1926    Specimen: Urine from Straight Cath Updated: 01/09/24 1128     Urine Culture >100,000 CFU/mL Klebsiella pneumoniae ssp pneumoniae CRE     Comment:   Carbapenemase .  Carbapenem resistant Enterobacteriaceae, patient may be an isolation risk.         50,000 CFU/mL Enterococcus faecalis    Narrative:      Colonization of the urinary tract without infection is common. Treatment is discouraged unless the patient is symptomatic, pregnant, or undergoing an invasive urologic procedure.    Susceptibility        Klebsiella pneumoniae ssp pneumoniae CRE      CHRISTIANO      Aztreonam Resistant      Cefepime Susceptible      Ceftazidime + Avibactam Susceptible      Gentamicin Susceptible      Meropenem  Resistant                       Susceptibility        Enterococcus faecalis      CHRISTIANO      Ampicillin Susceptible      Levofloxacin Susceptible      Nitrofurantoin Susceptible      Vancomycin Susceptible                       Susceptibility Comments       Klebsiella pneumoniae ssp pneumoniae CRE      For carbapenemase-producing infections, susceptibility results may not correlate to clinical outcomes.  Please consider infectious disease consult.               Manual Differential [[id number]]  (Abnormal) Collected: 01/09/24 0439    Specimen: Blood Updated: 01/09/24 0628     Neutrophil % 57.7 %      Lymphocyte % 15.5 %      Monocyte % 9.3 %      Eosinophil % 7.2 %      Bands %  6.2 %      Atypical Lymphocyte % 4.1 %      Neutrophils Absolute 2.86 10*3/mm3      Lymphocytes Absolute 0.88 10*3/mm3      Monocytes Absolute 0.42 10*3/mm3      Eosinophils Absolute 0.32 10*3/mm3      Anisocytosis Slight/1+     Macrocytes Slight/1+     Poikilocytes Slight/1+     WBC Morphology Normal     Platelet Estimate Decreased     Clumped Platelets Present    CBC & Differential [[id number]]  (Abnormal) Collected: 01/09/24 0439    Specimen: Blood Updated: 01/09/24 0628    Narrative:      The following orders were created for panel order CBC & Differential.  Procedure                               Abnormality         Status                     ---------                               -----------         ------                     CBC Auto Differential[[id number]]        Abnormal            Final result                 Please view results for these tests on the individual orders.    CBC Auto Differential [[id number]]  (Abnormal) Collected: 01/09/24 0439    Specimen: Blood Updated: 01/09/24 0628     WBC 4.47 10*3/mm3      RBC 3.04 10*6/mm3      Hemoglobin 8.3 g/dL      Hematocrit 26.0 %      MCV 85.5 fL      MCH 27.3 pg      MCHC 31.9 g/dL      RDW 18.2 %      RDW-SD 53.0 fl      MPV 9.2 fL      Platelets 90 10*3/mm3     Basic Metabolic Panel  [[id number]]  (Abnormal) Collected: 01/09/24 0439    Specimen: Blood Updated: 01/09/24 0551     Glucose 78 mg/dL      BUN 37 mg/dL      Creatinine 1.17 mg/dL      Sodium 136 mmol/L      Potassium 3.8 mmol/L      Chloride 103 mmol/L      CO2 23.0 mmol/L      Calcium 8.2 mg/dL      BUN/Creatinine Ratio 31.6     Anion Gap 10.0 mmol/L      eGFR 48.8 mL/min/1.73     Narrative:      GFR Normal >60  Chronic Kidney Disease <60  Kidney Failure <15    The GFR formula is only valid for adults with stable renal function between ages 18 and 70.    Blood Culture - Blood, Arm, Right [[id number]]  (Normal) Collected: 01/05/24 2041    Specimen: Blood from Arm, Right Updated: 01/08/24 2100     Blood Culture No growth at 3 days    Blood Culture - Blood, Arm, Right [[id number]]  (Normal) Collected: 01/05/24 2041    Specimen: Blood from Arm, Right Updated: 01/08/24 2100     Blood Culture No growth at 3 days    Urinalysis With Culture If Indicated - Straight Cath [[id number]]  (Abnormal) Collected: 01/08/24 1717    Specimen: Urine from Straight Cath Updated: 01/08/24 1728     Color, UA Dark Yellow     Appearance, UA Clear     pH, UA 5.5     Specific Gravity, UA 1.018     Glucose, UA Negative     Ketones, UA Trace     Bilirubin, UA Negative     Blood, UA Negative     Protein, UA Negative     Leuk Esterase, UA Trace     Nitrite, UA Negative     Urobilinogen, UA 1.0 E.U./dL    Narrative:      In absence of clinical symptoms, the presence of pyuria, bacteria, and/or nitrites on the urinalysis result does not correlate with infection.    Urinalysis, Microscopic Only - Straight Cath [[id number]]  (Abnormal) Collected: 01/08/24 1717    Specimen: Urine from Straight Cath Updated: 01/08/24 1728     RBC, UA 6-10 /HPF      WBC, UA 3-5 /HPF      Comment: Urine culture not indicated.        Bacteria, UA None Seen /HPF      Squamous Epithelial Cells, UA 0-2 /HPF      Hyaline Casts, UA 0-2 /LPF      Methodology Automated Microscopy    Potassium [[id number]]   (Normal) Collected: 01/08/24 1535    Specimen: Blood Updated: 01/08/24 1613     Potassium 5.0 mmol/L      Comment: Slight hemolysis detected by analyzer. Result may be falsely elevated.       CBC & Differential [[id number]]  (Abnormal) Collected: 01/08/24 0439    Specimen: Blood Updated: 01/08/24 0557    Narrative:      The following orders were created for panel order CBC & Differential.  Procedure                               Abnormality         Status                     ---------                               -----------         ------                     CBC Auto Differential[[id number]]        Abnormal            Final result                 Please view results for these tests on the individual orders.    CBC Auto Differential [[id number]]  (Abnormal) Collected: 01/08/24 0439    Specimen: Blood Updated: 01/08/24 0557     WBC 8.66 10*3/mm3      RBC 2.99 10*6/mm3      Hemoglobin 8.4 g/dL      Hematocrit 25.1 %      MCV 83.9 fL      MCH 28.1 pg      MCHC 33.5 g/dL      RDW 17.4 %      RDW-SD 49.1 fl      MPV 9.6 fL      Platelets 118 10*3/mm3      Neutrophil % 65.1 %      Lymphocyte % 16.5 %      Monocyte % 13.6 %      Eosinophil % 1.8 %      Basophil % 0.2 %      Neutrophils, Absolute 5.63 10*3/mm3      Lymphocytes, Absolute 1.43 10*3/mm3      Monocytes, Absolute 1.18 10*3/mm3      Eosinophils, Absolute 0.16 10*3/mm3      Basophils, Absolute 0.02 10*3/mm3     Basic Metabolic Panel [[id number]]  (Abnormal) Collected: 01/08/24 0439    Specimen: Blood Updated: 01/08/24 0527     Glucose 101 mg/dL      BUN 45 mg/dL      Creatinine 1.51 mg/dL      Sodium 137 mmol/L      Potassium 3.2 mmol/L      Chloride 101 mmol/L      CO2 23.0 mmol/L      Calcium 8.6 mg/dL      BUN/Creatinine Ratio 29.8     Anion Gap 13.0 mmol/L      eGFR 35.9 mL/min/1.73     Narrative:      GFR Normal >60  Chronic Kidney Disease <60  Kidney Failure <15    The GFR formula is only valid for adults with stable renal function between ages 18 and 70.           Imaging Results (Last 72 Hours)       ** No results found for the last 72 hours. **               ASSESSMENT/PLAN       Examination and evaluation of wound(s) was performed.    DIAGNOSIS:   Lymphedema of BLE  Non-pressure chronic ulcer of right lower leg limited to skin breakdown  Non-pressure chronic ulcer of left lower leg limited to skin breakdown  Obesity - Body mass index is 39.52 kg/m².     PLAN:   Orders placed for unna boot with Opticell Ag to small open areas of anterior lower legs as listed below. PT to place.   Continue with appointment to outpatient lymphedema clinic after discharge.   Referral to [facility] after discharge placed today.         Start     Ordered    01/09/24 1350  Application Unnaboot  Until Discontinued        Comments: BLE unna boots. Opticel AG to any open wounds    01/09/24 1349    01/09/24 1110  Inpatient Wound Care MD Consult  Once        Specialty:  Wound Care  Provider:  Julieta Murray APRN   Question Answer Comment   Reason for Consult bilateral lower extremity wounds. Follows [facility].    Type of Care Required: Request Advice or Opinion for Condition    Consulting Provider Contacted No        01/09/24 1110    01/09/24 0000  Ambulatory Referral to Wound Clinic         01/09/24 1350            Discussed findings and treatment plan including risks, benefits, and treatment options with *** in detail. Patient agreed with treatment plan.      This document has been electronically signed by GARETT Garcia on 1/9/2024 13:48 CST     formula is only valid for adults with stable renal function between ages 18 and 70.          Imaging Results (Last 72 Hours)       ** No results found for the last 72 hours. **               ASSESSMENT/PLAN       Examination and evaluation of wound(s) was performed.    DIAGNOSIS:   Lymphedema of BLE  Non-pressure chronic ulcer of right lower leg limited to skin breakdown  Non-pressure chronic ulcer of left lower leg limited to skin breakdown  Obesity - Body mass index is 39.52 kg/m².     PLAN:   Orders placed for unna boot with Opticell Ag to small open areas of anterior lower legs as listed below. PT to place.   Continue with appointment to outpatient lymphedema clinic after discharge.   Referral to [facility] after discharge placed today.         Start     Ordered    01/09/24 1350  Application Unnaboot  Until Discontinued        Comments: BLE unna boots. Opticel AG to any open wounds    01/09/24 1349    01/09/24 1110  Inpatient Wound Care MD Consult  Once        Specialty:  Wound Care  Provider:  Julieta Murray APRN   Question Answer Comment   Reason for Consult bilateral lower extremity wounds. Follows [facility].    Type of Care Required: Request Advice or Opinion for Condition    Consulting Provider Contacted No        01/09/24 1110    01/09/24 0000  Ambulatory Referral to Wound Clinic         01/09/24 1350            This document has been electronically signed by GARETT Garcia on 1/9/2024 13:48 CST

## 2024-01-10 LAB
BACTERIA SPEC AEROBE CULT: NORMAL
BACTERIA SPEC AEROBE CULT: NORMAL

## 2024-01-10 PROCEDURE — 94799 UNLISTED PULMONARY SVC/PX: CPT

## 2024-01-10 PROCEDURE — 25010000002 CEFTAZIDIME-AVIBACTAM 2.5 (2-0.5) G RECONSTITUTED SOLUTION 1 EACH VIAL: Performed by: INTERNAL MEDICINE

## 2024-01-10 PROCEDURE — 97116 GAIT TRAINING THERAPY: CPT

## 2024-01-10 PROCEDURE — 97530 THERAPEUTIC ACTIVITIES: CPT

## 2024-01-10 PROCEDURE — 94664 DEMO&/EVAL PT USE INHALER: CPT

## 2024-01-10 PROCEDURE — 97164 PT RE-EVAL EST PLAN CARE: CPT

## 2024-01-10 PROCEDURE — 29580 STRAPPING UNNA BOOT: CPT

## 2024-01-10 PROCEDURE — 97110 THERAPEUTIC EXERCISES: CPT

## 2024-01-10 PROCEDURE — 99232 SBSQ HOSP IP/OBS MODERATE 35: CPT | Performed by: INTERNAL MEDICINE

## 2024-01-10 PROCEDURE — 97535 SELF CARE MNGMENT TRAINING: CPT

## 2024-01-10 RX ADMIN — MORPHINE SULFATE 15 MG: 15 TABLET, FILM COATED, EXTENDED RELEASE ORAL at 21:16

## 2024-01-10 RX ADMIN — DOCUSATE SODIUM 50 MG AND SENNOSIDES 8.6 MG 1 TABLET: 8.6; 5 TABLET, FILM COATED ORAL at 21:13

## 2024-01-10 RX ADMIN — GUAIFENESIN AND DEXTROMETHORPHAN HYDROBROMIDE 2 TABLET: 600; 30 TABLET, EXTENDED RELEASE ORAL at 08:39

## 2024-01-10 RX ADMIN — PRAMIPEXOLE DIHYDROCHLORIDE 0.75 MG: 0.25 TABLET ORAL at 21:15

## 2024-01-10 RX ADMIN — GUAIFENESIN AND DEXTROMETHORPHAN HYDROBROMIDE 2 TABLET: 600; 30 TABLET, EXTENDED RELEASE ORAL at 21:15

## 2024-01-10 RX ADMIN — APIXABAN 5 MG: 5 TABLET, FILM COATED ORAL at 08:39

## 2024-01-10 RX ADMIN — CEFTAZIDIME, AVIBACTAM 1.25 G: 2; .5 POWDER, FOR SOLUTION INTRAVENOUS at 08:39

## 2024-01-10 RX ADMIN — MORPHINE SULFATE 15 MG: 15 TABLET, FILM COATED, EXTENDED RELEASE ORAL at 08:39

## 2024-01-10 RX ADMIN — Medication 10 ML: at 08:39

## 2024-01-10 RX ADMIN — METOPROLOL SUCCINATE 25 MG: 25 TABLET, EXTENDED RELEASE ORAL at 08:39

## 2024-01-10 RX ADMIN — CEFTAZIDIME, AVIBACTAM 1.25 G: 2; .5 POWDER, FOR SOLUTION INTRAVENOUS at 01:27

## 2024-01-10 RX ADMIN — DULOXETINE HYDROCHLORIDE 60 MG: 30 CAPSULE, DELAYED RELEASE ORAL at 21:15

## 2024-01-10 RX ADMIN — DOCUSATE SODIUM 50 MG AND SENNOSIDES 8.6 MG 2 TABLET: 8.6; 5 TABLET, FILM COATED ORAL at 08:39

## 2024-01-10 RX ADMIN — CEFTAZIDIME, AVIBACTAM 1.25 G: 2; .5 POWDER, FOR SOLUTION INTRAVENOUS at 16:47

## 2024-01-10 RX ADMIN — ALBUTEROL SULFATE 2.5 MG: 2.5 SOLUTION RESPIRATORY (INHALATION) at 18:09

## 2024-01-10 RX ADMIN — APIXABAN 5 MG: 5 TABLET, FILM COATED ORAL at 21:14

## 2024-01-10 RX ADMIN — Medication 10 ML: at 21:15

## 2024-01-10 RX ADMIN — NYSTATIN 1 APPLICATION: 100000 POWDER TOPICAL at 08:40

## 2024-01-10 RX ADMIN — DULOXETINE HYDROCHLORIDE 60 MG: 30 CAPSULE, DELAYED RELEASE ORAL at 08:39

## 2024-01-10 RX ADMIN — ALBUTEROL SULFATE 2.5 MG: 2.5 SOLUTION RESPIRATORY (INHALATION) at 11:02

## 2024-01-10 RX ADMIN — ALBUTEROL SULFATE 2.5 MG: 2.5 SOLUTION RESPIRATORY (INHALATION) at 05:32

## 2024-01-10 RX ADMIN — NYSTATIN 1 APPLICATION: 100000 POWDER TOPICAL at 21:16

## 2024-01-10 RX ADMIN — LEVOTHYROXINE SODIUM 100 MCG: 100 TABLET ORAL at 05:48

## 2024-01-10 NOTE — THERAPY TREATMENT NOTE
Acute Care - Physical Therapy Treatment Note  [facility]     Patient Name: Brenda Sneed  : 1950  MRN: [id number]  Today's Date: 1/10/2024      Visit Dx:     ICD-10-CM ICD-9-CM   1. Lymphedema  I89.0 457.1   2. Acute UTI  N39.0 599.0   3. CHRISTINE (acute kidney injury)  N17.9 584.9   4. Hyponatremia  E87.1 276.1   5. Fall, initial encounter  W19.XXXA E888.9   6. Injury of left shoulder, initial encounter  S49.92XA 959.2   7. Chronic edema  R60.9 782.3   8. Impaired functional mobility and activity tolerance [Z74.09]  Z74.09 V49.89     Patient Active Problem List   Diagnosis    Spinal stenosis, lumbar    Secondary osteoarthritis of left shoulder due to rotator cuff arthropathy    Cellulitis of right lower extremity    HTN (hypertension)    Obesity, Class III, BMI 40-49.9 (morbid obesity)    Anxiety associated with depression    Traumatic rhabdomyolysis    Traumatic rhabdomyolysis, initial encounter    Laceration of left lower extremity    Cellulitis    Cellulitis of left lower extremity    Thrombocytopenia    Acute cystitis with hematuria    Sepsis    Candidiasis of skin    Acquired hypothyroidism    CHRISTINE (acute kidney injury)    Chronic pain syndrome    Hyponatremia    Chronic kidney disease, stage 3a     Past Medical History:   Diagnosis Date    Arthritis     Bronchitis     Cataract     right eye , cornea implant    Chronic back pain     Depression     Disease of thyroid gland     Edema     lower extremities    Hypertension     Kidney stones     Lymphedema      Past Surgical History:   Procedure Laterality Date    BACK SURGERY       ([place])2013     CHOLECYSTECTOMY OPEN      CORNEAL TRANSPLANT Right     INCISION AND DRAINAGE ABSCESS Left 2023    Procedure: INCISION AND DRAINAGE ABSCESS left leg;  Surgeon: Sarita Vazquez MD;  Location: [facility];  Service: General;  Laterality: Left;    LACERATION REPAIR Left 2023    Procedure: washout and closure left leg wound ;  Surgeon: George  Sarita CARLISLE MD;  Location:  [facility];  Service: General;  Laterality: Left;    LUMBAR FUSION Left 8/2/2017    Procedure: LEFT LUMBAR LATERAL INTERBODY FUSION WITH INSTRUMENTATION  L1-2;  Surgeon: MARCELINO Wright MD;  Location:  [facility];  Service:     NECK SURGERY  2010    [place]    TOTAL HIP ARTHROPLASTY Right 2014    DR. KENDRICK     TOTAL KNEE ARTHROPLASTY Right 2000    TOTAL KNEE ARTHROPLASTY Left 2002    TOTAL SHOULDER ARTHROPLASTY W/ DISTAL CLAVICLE EXCISION Left 6/1/2020    Procedure: LEFT REVERSE TOTAL SHOULDER REPLACEMENT;  Surgeon: Saravanan Calvin MD;  Location:  [facility];  Service: Orthopedics;  Laterality: Left;     PT Assessment (last 12 hours)       PT Evaluation and Treatment       Row Name 01/10/24 1440 01/10/24 0851       Physical Therapy Time and Intention    Subjective Information complains of;weakness;dyspnea  -KJ --    Document Type therapy note (daily note)  -KJ --    Mode of Treatment physical therapy  -KJ --    Patient Effort adequate  -KJ --    Comment -- LLE unna boot  -LH      Row Name 01/10/24 0850          Physical Therapy Time and Intention    Comment RLE unna boot  -LH       Row Name 01/10/24 1440          General Information    Existing Precautions/Restrictions fall  B unn boots  -KJ       Row Name 01/10/24 1440          Pain    Pretreatment Pain Rating 0/10 - no pain  -KJ     Posttreatment Pain Rating 0/10 - no pain  -KJ       Row Name 01/10/24 1440          Bed Mobility    Supine-Sit [place] (Bed Mobility) verbal cues;minimum assist (75% patient effort)  -KJ     Sit-Supine [place] (Bed Mobility) moderate assist (50% patient effort);verbal cues  -KJ     Bed Mobility, Safety Issues decreased use of legs for bridging/pushing  -KJ       Row Name 01/10/24 1440          Sit-Stand Transfer    Sit-Stand [place] (Transfers) verbal cues;minimum assist (75% patient effort)  -KJ     Assistive Device (Sit-Stand Transfers) walker, front-wheeled  -KJ       Row Name 01/10/24 1440           Stand-Sit Transfer    Stand-Sit [place] (Transfers) verbal cues;minimum assist (75% patient effort)  -KJ     Assistive Device (Stand-Sit Transfers) walker, front-wheeled  -KJ       Row Name 01/10/24 1440          Gait/Stairs (Locomotion)    [place] Level (Gait) verbal cues;contact guard;minimum assist (75% patient effort)  -KJ     Assistive Device (Gait) walker, front-wheeled  -KJ     Distance in Feet (Gait) 10', sitting rest, 10'x 2, sitting rest, 10'  -KJ     Deviations/Abnormal Patterns (Gait) gait speed decreased;stride length decreased  -KJ     Bilateral Gait Deviations forward flexed posture  -KJ       Row Name 01/10/24 0851          Wound 01/09/24 1108 Left lower leg    Wound - Properties Group Placement Date: 01/09/24  -CM Placement Time: 1108  -CM Side: Left  -CM Orientation: lower  -CM Location: leg  -CM    Drainage Amount none  -LH     Dressing Care dressing changed  Opticel Ag over open wound, unna boot, kerlex, coban  -LH     Retired Wound - Properties Group Placement Date: 01/09/24  -CM Placement Time: 1108  -CM Side: Left  -CM Orientation: lower  -CM Location: leg  -CM    Retired Wound - Properties Group Date first assessed: 01/09/24  -CM Time first assessed: 1108  -CM Side: Left  -CM Location: leg  -CM      Row Name 01/10/24 0850          Wound 01/09/24 1109 Right lower leg    Wound - Properties Group Placement Date: 01/09/24  -CM Placement Time: 1109  -CM Side: Right  -CM Orientation: lower  -CM Location: leg  -CM    Wound Image --  photo of BLE in Media  -     Drainage Amount none  -     Dressing Care dressing changed  Opticel Ag over open wound, unna boot, kerlex, coban  -LH     Retired Wound - Properties Group Placement Date: 01/09/24  -CM Placement Time: 1109  -CM Side: Right  -CM Orientation: lower  -CM Location: leg  -CM    Retired Wound - Properties Group Date first assessed: 01/09/24  -CM Time first assessed: 1109  -CM Side: Right  -CM Location: leg  -CM      Row Name  01/10/24 1440          Positioning and Restraints    Pre-Treatment Position in bed  -KJ     Post Treatment Position bed  -KJ     In Bed call light within reach  -KJ       Row Name 01/10/24 0850          Physical Therapy Goals    Wound Care Goal Selection (PT) wound care, PT goal 1  -       Row Name 01/10/24 0850          Wound Care Goal 1 (PT)    Wound Care Goal 1 (PT) BLE with no evidence of open wounds and decreased edema and redness.  -     Time Frame (Wound Care Goal 1, PT) 1 week  -     Progress/Outcome (Wound Care Goal 1, PT) new goal  -               User Key  (r) = Recorded By, (t) = Taken By, (c) = Cosigned By      Initials Name Provider Type     Jamil Lerma, PT Physical Therapist    Iram Jones, PTA Physical Therapist Assistant    Alicia Bunn, RN Registered Nurse                    Physical Therapy Education       Title: PT OT SLP Therapies (In Progress)       Topic: Physical Therapy (Done)       Point: Mobility training (Done)       Learning Progress Summary             Patient Acceptance, E,TB,D, MORIAH,DU,NR by  at 1/9/2024 1645    Comment: education re: purpose of PT/importance of activity, for safety/falls prevention, improved tech w/ breathing, for upright posture in standing, for improved tech w/ bed mob, tfers and gait w/ use of rwx   Family Acceptance, E,TB,D, MORIAH,DU,NR by  at 1/9/2024 1645    Comment: education re: purpose of PT/importance of activity, for safety/falls prevention, improved tech w/ breathing, for upright posture in standing, for improved tech w/ bed mob, tfers and gait w/ use of rwx                         Point: Home exercise program (Done)       Learning Progress Summary             Patient Acceptance, E,TB,D, VU,DU,NR by  at 1/9/2024 1645    Comment: education re: purpose of PT/importance of activity, for safety/falls prevention, improved tech w/ breathing, for upright posture in standing, for improved tech w/ bed mob, tfers and gait w/ use of rwx    Family Acceptance, E,TB,D, VU,DU,NR by  at 1/9/2024 1645    Comment: education re: purpose of PT/importance of activity, for safety/falls prevention, improved tech w/ breathing, for upright posture in standing, for improved tech w/ bed mob, tfers and gait w/ use of rwx                         Point: Precautions (Done)       Learning Progress Summary             Patient Acceptance, E,TB,D, VU,DU,NR by  at 1/9/2024 1645    Comment: education re: purpose of PT/importance of activity, for safety/falls prevention, improved tech w/ breathing, for upright posture in standing, for improved tech w/ bed mob, tfers and gait w/ use of rwx   Family Acceptance, E,TB,D, MORIAH,DU,NR by  at 1/9/2024 1645    Comment: education re: purpose of PT/importance of activity, for safety/falls prevention, improved tech w/ breathing, for upright posture in standing, for improved tech w/ bed mob, tfers and gait w/ use of rwx                                         User Key       Initials Effective Dates Name Provider Type Discipline     08/02/18 -  Didi Conner, PT Physical Therapist PT                  PT Recommendation and Plan     Plan of Care Reviewed With: patient  Progress: improving  Outcome Evaluation: PT tx completed. Pt c/o shortness of breath with activity. Exercise sats in mid 90's on room air. Rolando bed mobility, Rolando sit<>stand, amb short distance in room utilizing r wx. Required 2 sitting rests due to SOB. Recommend SNF for cont PT.   Outcome Measures       Row Name 01/10/24 1500             How much help from another person do you currently need...    Turning from your back to your side while in flat bed without using bedrails? 3  -KJ      Moving from lying on back to sitting on the side of a flat bed without bedrails? 3  -KJ      Moving to and from a bed to a chair (including a wheelchair)? 3  -KJ      Standing up from a chair using your arms (e.g., wheelchair, bedside chair)? 3  -KJ      Climbing 3-5 steps with a  railing? 3  -KJ      To walk in hospital room? 3  -KJ      AM-PAC 6 Clicks Score (PT) 18  -KJ      Highest Level of Mobility Goal 6 --> Walk 10 steps or more  -KJ         Functional Assessment    Outcome Measure Options AM-PAC 6 Clicks Basic Mobility (PT)  -KJ                User Key  (r) = Recorded By, (t) = Taken By, (c) = Cosigned By      Initials Name Provider Type    Iram Jones PTA Physical Therapist Assistant                     Time Calculation:    PT Charges       Row Name 01/10/24 1516 01/10/24 0959          Time Calculation    Start Time 1440  -KJ 0850  -     Stop Time 1518  -KJ 0950  -     Time Calculation (min) 38 min  -KJ 60 min  -     PT Received On 01/10/24  -KJ 01/10/24  -     PT Goal Re-Cert Due Date 01/20/24  -KJ 01/20/24  -        Time Calculation- PT    Total Timed Code Minutes- PT 38 minute(s)  -KJ --        Untimed Charges    PT Eval/Re-eval Minutes -- 30  -LH     Wound Care -- 06807 Unna boot  -LH     [id number] Boot -- 30  -LH        Total Minutes    Untimed Charges Total Minutes -- 60  -LH      Total Minutes -- 60  -LH               User Key  (r) = Recorded By, (t) = Taken By, (c) = Cosigned By      Initials Name Provider Type     Jamil Lerma, PT Physical Therapist    Iram Jones PTA Physical Therapist Assistant                  Therapy Charges for Today       Code Description Service Date Service Provider Modifiers Qty    13085553847 HC PT THER PROC EA 15 MIN 1/10/2024 Iram Kirkpatrick, PTA GP 1    69729837673 HC PT THERAPEUTIC ACT EA 15 MIN 1/10/2024 Iram Kirkpatrick, PTA GP 1    56756142767 HC GAIT TRAINING EA 15 MIN 1/10/2024 Iram Kirkpatrick, PTA GP 1            PT G-Codes  Outcome Measure Options: AM-PAC 6 Clicks Basic Mobility (PT)  AM-PAC 6 Clicks Score (PT): 18  AM-PAC 6 Clicks Score (OT): 16    Iram Kirkpatrick PTA  1/10/2024

## 2024-01-10 NOTE — PLAN OF CARE
Problem: Adult Inpatient Plan of Care  Goal: Plan of Care Review  Recent Flowsheet Documentation  Taken 1/10/2024 0850 by Jamil Lerma, PT  Outcome Evaluation: PT reevaluation complete for application of BLE unna boots with Opticel Ag over open wounds. Opticel Ag over open wounds followed by 3-layer unna boots (unna boot, kerlex, coban).  Good capillary refill after application.  PT to check unna boots daily and change by 1.17.24 or sooner if needed.  PT to continue mobility POC as well.   Goal Outcome Evaluation:              Outcome Evaluation: PT reevaluation complete for application of BLE unna boots with Opticel Ag over open wounds. Opticel Ag over open wounds followed by 3-layer unna boots (unna boot, kerlex, coban).  Good capillary refill after application.  PT to check unna boots daily and change by 1.17.24 or sooner if needed.  PT to continue mobility POC as well.      Anticipated Discharge Disposition (PT): skilled nursing facility

## 2024-01-10 NOTE — PLAN OF CARE
Goal Outcome Evaluation:           Progress: no change  Outcome Evaluation: Pt c/o pain scheduled pain meds given per orders, cont. IV ABX, pt incont at times up x2 to [facility], [place] diet

## 2024-01-10 NOTE — CASE MANAGEMENT/SOCIAL WORK
Continued Stay Note   [place]     Patient Name: Brenda Sneed  MRN: [id number]  Today's Date: 1/10/2024    Admit Date: 1/5/2024    Plan: SNF   Discharge Plan       Row Name 01/10/24 1413       Plan    Plan SNF    Patient/Family in Agreement with Plan yes    Provided Post Acute Provider List? Yes    Post Acute Provider List Nursing Home    Provided Post Acute Provider Quality & Resource List? Yes    Post Acute Provider Quality and Resource List Nursing Home    Plan Comments Pt is now leaning toward going to a snf. She requests a referral to [facility]. Referral sent.                   Discharge Codes    No documentation.                 Expected Discharge Date and Time       Expected Discharge Date Expected Discharge Time    Fredis 10, 2024               KAITLIN Dillard

## 2024-01-10 NOTE — PROGRESS NOTES
"    Daily Progress Note  Brenda Sneed  MRN: [id number] LOS: 5    Admit Date: 2024   1/10/2024 07:40 CST    Subjective:         Interval History:    Reviewed overnight events and nursing notes.     Doing well this morning.  No new changes.  Having some subacute left shoulder pain worse with movement especially with abduction of the left upper extremity.  Also some pain with internal/external rotation of her left upper extremity.    ROS:  Review of Systems   Constitutional:  Negative for chills and fever.   Respiratory:  Negative for cough, chest tightness and shortness of breath.    Cardiovascular:  Negative for chest pain.   Gastrointestinal:  Negative for abdominal pain, diarrhea, nausea and vomiting.       DIET:  Diet: Renal Diets; Low Sodium (2-3g), Low Potassium, Low Phosphorus; Texture: Regular Texture (IDDSI 7); Fluid Consistency: Thin (IDDSI 0)    Medications:      albuterol, 2.5 mg, Nebulization, TID  apixaban, 5 mg, Oral, Q12H  ceftazidime-avibactam (AVYCAZ) in NS IVPB, 1.25 g, Intravenous, Q8H  DULoxetine, 60 mg, Oral, BID  guaifenesin-dextromethorphan, 2 tablet, Oral, Q12H  levothyroxine, 100 mcg, Oral, Daily  metoprolol succinate XL, 25 mg, Oral, Daily  Morphine, 15 mg, Oral, Q12H  nystatin, 1 application , Topical, BID  pramipexole, 0.75 mg, Oral, Nightly  senna-docusate sodium, 2 tablet, Oral, BID  sodium chloride, 10 mL, Intravenous, Q12H  sodium chloride, 10 mL, Intravenous, Q12H          Objective:     Vitals: /56 (BP Location: Left arm, Patient Position: Lying)   Pulse 78   Temp 97.7 °F (36.5 °C) (Oral)   Resp 18   Ht 160 cm (63\")   Wt 101 kg (223 lb 1.7 oz)   SpO2 96%   BMI 39.52 kg/m²    Intake/Output Summary (Last 24 hours) at 1/10/2024 0740  Last data filed at 1/10/2024 0448  Gross per 24 hour   Intake 220 ml   Output 300 ml   Net -80 ml    Temp (24hrs), Av.5 °F (36.4 °C), Min:97.4 °F (36.3 °C), Max:97.7 °F (36.5 °C)    Glucose:  Lab Results   Component Value Date    " POCGLU 100 (H) 02/13/2021     Physical Examination:   Physical Exam  Constitutional:       General: She is not in acute distress.     Appearance: Normal appearance. She is not ill-appearing or toxic-appearing.   Cardiovascular:      Rate and Rhythm: Normal rate and regular rhythm.      Pulses: Normal pulses.      Heart sounds: Normal heart sounds. No murmur heard.  Pulmonary:      Effort: Pulmonary effort is normal. No respiratory distress.      Breath sounds: Normal breath sounds. No stridor. No wheezing or rales.   Abdominal:      General: Abdomen is flat. Bowel sounds are normal. There is no distension.      Palpations: Abdomen is soft.      Tenderness: There is no abdominal tenderness.   Musculoskeletal:      Right lower leg: Edema present.      Left lower leg: Edema present.      Comments: No significant tenderness to palpation of the left shoulder, worsening pain with abduction, internal and external rotation of the left arm.   Neurological:      Mental Status: She is alert.         Labs:  Lab Results (last 24 hours)       Procedure Component Value Units Date/Time    Blood Culture - Blood, Arm, Right [[id number]]  (Normal) Collected: 01/05/24 2041    Specimen: Blood from Arm, Right Updated: 01/09/24 2100     Blood Culture No growth at 4 days    Blood Culture - Blood, Arm, Right [[id number]]  (Normal) Collected: 01/05/24 2041    Specimen: Blood from Arm, Right Updated: 01/09/24 2100     Blood Culture No growth at 4 days    Urine Culture - Urine, Straight Cath [[id number]]  (Abnormal)  (Susceptibility) Collected: 01/05/24 1926    Specimen: Urine from Straight Cath Updated: 01/09/24 1128     Urine Culture >100,000 CFU/mL Klebsiella pneumoniae ssp pneumoniae CRE     Comment:   Carbapenemase .  Carbapenem resistant Enterobacteriaceae, patient may be an isolation risk.         50,000 CFU/mL Enterococcus faecalis    Narrative:      Colonization of the urinary tract without infection is common. Treatment is  discouraged unless the patient is symptomatic, pregnant, or undergoing an invasive urologic procedure.    Susceptibility        Klebsiella pneumoniae ssp pneumoniae CRE      CHRISTIANO      Aztreonam Resistant      Cefepime Susceptible      Ceftazidime + Avibactam Susceptible      Gentamicin Susceptible      Meropenem Resistant                       Susceptibility        Enterococcus faecalis      CHRISTIANO      Ampicillin Susceptible      Levofloxacin Susceptible      Nitrofurantoin Susceptible      Vancomycin Susceptible                       Susceptibility Comments       Klebsiella pneumoniae ssp pneumoniae CRE      For carbapenemase-producing infections, susceptibility results may not correlate to clinical outcomes.  Please consider infectious disease consult.                        Imaging:  No radiology results from the last 24 hrs       Assessment and Plan:     Primary Problem:  CHRISTINE (acute kidney injury)    Hospital Problem list:    CHRISTINE (acute kidney injury)    HTN (hypertension)    Obesity, Class III, BMI 40-49.9 (morbid obesity)    Anxiety associated with depression    Chronic pain syndrome    Hyponatremia    Chronic kidney disease, stage 3a        Assessment: Chronically ill 73-year-old female who presents with sepsis and acute kidney injury.     Plan:     Acute kidney injury  Resolved.  No changes.     Possible CRE UTI  Difficult to tell if her presenting symptoms are related to a UTI or not.  At this point I do not really suspect they are.  Dr. Aguirre following, he had ordered an In-N-Out cath for purposes of obtaining a urine culture but it seems a urinalysis without culture was performed.     She did work with physical and Occupational Therapy.  They recommended skilled nursing at discharge.  On our conversation this morning, she flatly declines going to skilled nursing facility.  Will continue this conversation but anticipate her being medically ready for discharge either this afternoon or tomorrow depending on  infectious disease plans with her urine culture.    Will continue physical therapy for her likely rotator cuff tendinitis.  Patient agreed that MRI is probably not the best course of action at this time.    Reviewed treatment plans with the patient and/or family.     Code Status:   Code Status and Medical Interventions:   Ordered at: 01/06/24 0626     Code Status (Patient has no pulse and is not breathing):    CPR (Attempt to Resuscitate)     Medical Interventions (Patient has pulse or is breathing):    Full Support       Electronically signed by Sanjiv Block MD on 1/10/2024 at 07:40 CST

## 2024-01-10 NOTE — THERAPY TREATMENT NOTE
Patient Name: Brenda Sneed  : 1950    MRN: [id number]                              Today's Date: 1/10/2024       Admit Date: 2024    Visit Dx:     ICD-10-CM ICD-9-CM   1. Lymphedema  I89.0 457.1   2. Acute UTI  N39.0 599.0   3. CHRISTINE (acute kidney injury)  N17.9 584.9   4. Hyponatremia  E87.1 276.1   5. Fall, initial encounter  W19.XXXA E888.9   6. Injury of left shoulder, initial encounter  S49.92XA 959.2   7. Chronic edema  R60.9 782.3   8. Impaired functional mobility and activity tolerance [Z74.09]  Z74.09 V49.89     Patient Active Problem List   Diagnosis    Spinal stenosis, lumbar    Secondary osteoarthritis of left shoulder due to rotator cuff arthropathy    Cellulitis of right lower extremity    HTN (hypertension)    Obesity, Class III, BMI 40-49.9 (morbid obesity)    Anxiety associated with depression    Traumatic rhabdomyolysis    Traumatic rhabdomyolysis, initial encounter    Laceration of left lower extremity    Cellulitis    Cellulitis of left lower extremity    Thrombocytopenia    Acute cystitis with hematuria    Sepsis    Candidiasis of skin    Acquired hypothyroidism    CHRISTINE (acute kidney injury)    Chronic pain syndrome    Hyponatremia    Chronic kidney disease, stage 3a     Past Medical History:   Diagnosis Date    Arthritis     Bronchitis     Cataract     right eye , cornea implant    Chronic back pain     Depression     Disease of thyroid gland     Edema     lower extremities    Hypertension     Kidney stones     Lymphedema      Past Surgical History:   Procedure Laterality Date    BACK SURGERY       ([place])2013     CHOLECYSTECTOMY OPEN      CORNEAL TRANSPLANT Right     INCISION AND DRAINAGE ABSCESS Left 2023    Procedure: INCISION AND DRAINAGE ABSCESS left leg;  Surgeon: Sarita Vazquez MD;  Location: [facility] OR;  Service: General;  Laterality: Left;    LACERATION REPAIR Left 2023    Procedure: washout and closure left leg wound ;  Surgeon: Sarita Vazquez,  MD;  Location:  [facility] OR;  Service: General;  Laterality: Left;    LUMBAR FUSION Left 8/2/2017    Procedure: LEFT LUMBAR LATERAL INTERBODY FUSION WITH INSTRUMENTATION  L1-2;  Surgeon: MARCELINO Wright MD;  Location:  [facility] OR;  Service:     NECK SURGERY  2010    [place]    TOTAL HIP ARTHROPLASTY Right 2014    DR. KENDRICK     TOTAL KNEE ARTHROPLASTY Right 2000    TOTAL KNEE ARTHROPLASTY Left 2002    TOTAL SHOULDER ARTHROPLASTY W/ DISTAL CLAVICLE EXCISION Left 6/1/2020    Procedure: LEFT REVERSE TOTAL SHOULDER REPLACEMENT;  Surgeon: Saravanan Calvin MD;  Location:  [facility] OR;  Service: Orthopedics;  Laterality: Left;      General Information       Row Name 01/10/24 1109          OT Time and Intention    Document Type therapy note (daily note)  -LR     Mode of Treatment occupational therapy  -LR       Row Name 01/10/24 1109          General Information    Patient Profile Reviewed yes  -LR     Existing Precautions/Restrictions fall  B unna boots  -LR               User Key  (r) = Recorded By, (t) = Taken By, (c) = Cosigned By      Initials Name Provider Type    LR Wilda Faulkner, OTR/L Occupational Therapist                     Mobility/ADL's       Row Name 01/10/24 1109          Bed Mobility    Comment, (Bed Mobility) sitting in chair  -LR       Row Name 01/10/24 1109          Transfers    Transfers sit-stand transfer;stand-sit transfer  -LR       Row Name 01/10/24 1109          Sit-Stand Transfer    Sit-Stand [place] (Transfers) moderate assist (50% patient effort);contact guard;verbal cues  -LR     Assistive Device (Sit-Stand Transfers) walker, front-wheeled  -LR     Comment, (Sit-Stand Transfer) Mod A for initial stand d/t low surface height, CGA for remaining t/f's  -LR       Row Name 01/10/24 1109          Stand-Sit Transfer    Stand-Sit [place] (Transfers) contact guard;verbal cues  -LR     Assistive Device (Stand-Sit Transfers) walker, front-wheeled  -LR       Row Name 01/10/24 1109           Functional Mobility    Functional Mobility- Ind. Level contact guard assist;verbal cues required  -LR     Functional Mobility- Device walker, front-wheeled  -LR     Functional Mobility- Comment 2 seated RBs required  -LR       Row Name 01/10/24 1109          Activities of Daily Living    BADL Assessment/Intervention grooming  -LR       Row Name 01/10/24 1109          Grooming Assessment/Training    [place] Level (Grooming) hair care, combing/brushing;wash face, hands;supervision;set up  -LR     Position (Grooming) supported sitting  -LR     Comment, (Grooming) apply chapstick and deodorant  -LR               User Key  (r) = Recorded By, (t) = Taken By, (c) = Cosigned By      Initials Name Provider Type    LR Wilda Faulkner, GAGER/L Occupational Therapist                   Obj/Interventions       Row Name 01/10/24 1109          Balance    Balance Assessment sitting static balance;sitting dynamic balance;standing static balance;standing dynamic balance  -LR     Static Sitting Balance supervision  -LR     Dynamic Sitting Balance standby assist  -LR     Position, Sitting Balance supported;sitting in chair  -LR     Static Standing Balance contact guard  -LR     Dynamic Standing Balance contact guard;moderate assist  -LR     Position/Device Used, Standing Balance supported;walker, front-wheeled  -LR               User Key  (r) = Recorded By, (t) = Taken By, (c) = Cosigned By      Initials Name Provider Type    LR Wilda Faulkner, GAGER/L Occupational Therapist                   Goals/Plan    No documentation.                  Clinical Impression       Row Name 01/10/24 1109          Pain Assessment    Pretreatment Pain Rating 0/10 - no pain  -LR     Posttreatment Pain Rating 0/10 - no pain  -LR       Row Name 01/10/24 1109          Plan of Care Review    Plan of Care Reviewed With patient  -LR     Progress improving  -LR     Outcome Evaluation OT tx completed. Pt presents sitting in chair, willing to  participate. Pt required Mod A for sit>stand t/f at chair d/t low surface height. Pt completed fxl mobility using FWW with CGA, requiring 2 seated RBs d/t SOA. Pt O2 sats 99%+ during all activities. Pt required CGA for all other sit<>stand t/f's with cues for tech. Pt refused grooming tasks at sink-side, completed in sitting with Supervision after set-up. Pt with decreased attention to task at times, requiring redirection. Continue OT POC in order to increase pt safety and I during ADLs, fxl mobility, and fxl t/f's. Communication completed with pt regarding D/C plans and recommendations. Pt would benefit from SNF placement and pt agreeable, SW notified.  -LR       Row Name 01/10/24 1109          Therapy Plan Review/Discharge Plan (OT)    Anticipated Discharge Disposition (OT) skilled nursing facility  -       Row Name 01/10/24 1109          Vital Signs    O2 Delivery Pre Treatment room air  -LR     Intra SpO2 (%) 99  -LR     O2 Delivery Intra Treatment room air  -LR     O2 Delivery Post Treatment room air  -LR     Pre Patient Position Sitting  -LR     Intra Patient Position Standing  -LR     Post Patient Position Sitting  -LR       Row Name 01/10/24 1109          Positioning and Restraints    Pre-Treatment Position sitting in chair/recliner  -LR     Post Treatment Position chair  -LR     In Chair notified nsg;reclined;call light within reach;encouraged to call for assist;legs elevated  -LR               User Key  (r) = Recorded By, (t) = Taken By, (c) = Cosigned By      Initials Name Provider Type    LR Wilda Faulkner, OTR/L Occupational Therapist                   Outcome Measures       Row Name 01/10/24 1109          How much help from another is currently needed...    Putting on and taking off regular lower body clothing? 2  -LR     Bathing (including washing, rinsing, and drying) 2  -LR     Toileting (which includes using toilet bed pan or urinal) 2  -LR     Putting on and taking off regular upper body  clothing 3  -LR     Taking care of personal grooming (such as brushing teeth) 3  -LR     Eating meals 4  -LR     AM-PAC 6 Clicks Score (OT) 16  -LR       Row Name 01/10/24 0800          How much help from another person do you currently need...    Turning from your back to your side while in flat bed without using bedrails? 3  -CM     Moving from lying on back to sitting on the side of a flat bed without bedrails? 3  -CM     Moving to and from a bed to a chair (including a wheelchair)? 2  -CM     Standing up from a chair using your arms (e.g., wheelchair, bedside chair)? 2  -CM     Climbing 3-5 steps with a railing? 1  -CM     To walk in hospital room? 2  -CM     AM-PAC 6 Clicks Score (PT) 13  -CM     Highest Level of Mobility Goal 4 --> Transfer to chair/commode  -CM       Row Name 01/10/24 1109          Functional Assessment    Outcome Measure Options AM-PAC 6 Clicks Daily Activity (OT)  -LR               User Key  (r) = Recorded By, (t) = Taken By, (c) = Cosigned By      Initials Name Provider Type    Alicia Bunn, RN Registered Nurse    Wilda Coker, OTR/L Occupational Therapist                    Occupational Therapy Education       Title: PT OT SLP Therapies (In Progress)       Topic: Occupational Therapy (In Progress)       Point: ADL training (Done)       Description:   Instruct learner(s) on proper safety adaptation and remediation techniques during self care or transfers.   Instruct in proper use of assistive devices.                  Learning Progress Summary             Patient Acceptance, E,D, VU,NR by LR at 1/10/2024 1256    Acceptance, E, VU,NR by  at 1/9/2024 1509                         Point: Home exercise program (Not Started)       Description:   Instruct learner(s) on appropriate technique for monitoring, assisting and/or progressing therapeutic exercises/activities.                  Learner Progress:  Not documented in this visit.              Point: Precautions (Done)        Description:   Instruct learner(s) on prescribed precautions during self-care and functional transfers.                  Learning Progress Summary             Patient Acceptance, E,D, VU,NR by  at 1/10/2024 1256    Acceptance, E, VU by  at 1/10/2024 0953    Comment: call RN if unna boots become too tight    Acceptance, E, VU,NR by  at 1/9/2024 1509                         Point: Body mechanics (Done)       Description:   Instruct learner(s) on proper positioning and spine alignment during self-care, functional mobility activities and/or exercises.                  Learning Progress Summary             Patient Acceptance, E,D, VU,NR by LR at 1/10/2024 1256    Acceptance, E, VU,NR by  at 1/9/2024 1509                                         User Key       Initials Effective Dates Name Provider Type Discipline     02/03/23 -  Jamil Lerma, PT Physical Therapist PT     07/11/23 -  Dorita Oropeza, OTR/L Occupational Therapist OT     04/25/23 -  Wilda Faulkner OTR/L Occupational Therapist OT                  OT Recommendation and Plan     Plan of Care Review  Plan of Care Reviewed With: patient  Progress: improving  Outcome Evaluation: OT tx completed. Pt presents sitting in chair, willing to participate. Pt required Mod A for sit>stand t/f at chair d/t low surface height. Pt completed fxl mobility using FWW with CGA, requiring 2 seated RBs d/t SOA. Pt O2 sats 99%+ during all activities. Pt required CGA for all other sit<>stand t/f's with cues for tech. Pt refused grooming tasks at sink-side, completed in sitting with Supervision after set-up. Pt with decreased attention to task at times, requiring redirection. Continue OT POC in order to increase pt safety and I during ADLs, fxl mobility, and fxl t/f's. Communication completed with pt regarding D/C plans and recommendations. Pt would benefit from SNF placement and pt agreeable, SW notified.     Time Calculation:         Time Calculation- OT        Row Name 01/10/24 1109             Time Calculation- OT    OT Start Time 1109  -LR      OT Stop Time 1203  -LR      OT Time Calculation (min) 54 min  -LR      Total Timed Code Minutes- OT 54 minute(s)  -LR      OT Received On 01/10/24  -LR         Timed Charges    92785 - OT Self Care/Mgmt Minutes 54  -LR         Total Minutes    Timed Charges Total Minutes 54  -LR       Total Minutes 54  -LR                User Key  (r) = Recorded By, (t) = Taken By, (c) = Cosigned By      Initials Name Provider Type    LR Wilda Faulkner OTR/L Occupational Therapist                  Therapy Charges for Today       Code Description Service Date Service Provider Modifiers Qty    [id number] HC OT SELF CARE/MGMT/TRAIN EA 15 MIN 1/10/2024 Wilda Faulkner OTR/L GO 4                 LINDA Garcia/MAYLIN  1/10/2024

## 2024-01-10 NOTE — PLAN OF CARE
Goal Outcome Evaluation:  Plan of Care Reviewed With: patient        Progress: improving  Outcome Evaluation: PT tx completed. Pt c/o shortness of breath with activity. Exercise sats in mid 90's on room air. Rolando bed mobility, [place] sit<>stand, amb short distance in room utilizing r wx. Required 2 sitting rests due to SOB. Recommend SNF for cont PT.

## 2024-01-10 NOTE — THERAPY RE-EVALUATION
Patient Name: Brenda Sneed  : 1950    MRN: [id number]                              Today's Date: 1/10/2024       Admit Date: 2024    Visit Dx:     ICD-10-CM ICD-9-CM   1. Lymphedema  I89.0 457.1   2. Acute UTI  N39.0 599.0   3. CHRISTINE (acute kidney injury)  N17.9 584.9   4. Hyponatremia  E87.1 276.1   5. Fall, initial encounter  W19.XXXA E888.9   6. Injury of left shoulder, initial encounter  S49.92XA 959.2   7. Chronic edema  R60.9 782.3   8. Impaired functional mobility and activity tolerance [Z74.09]  Z74.09 V49.89     Patient Active Problem List   Diagnosis    Spinal stenosis, lumbar    Secondary osteoarthritis of left shoulder due to rotator cuff arthropathy    Cellulitis of right lower extremity    HTN (hypertension)    Obesity, Class III, BMI 40-49.9 (morbid obesity)    Anxiety associated with depression    Traumatic rhabdomyolysis    Traumatic rhabdomyolysis, initial encounter    Laceration of left lower extremity    Cellulitis    Cellulitis of left lower extremity    Thrombocytopenia    Acute cystitis with hematuria    Sepsis    Candidiasis of skin    Acquired hypothyroidism    CHRISTINE (acute kidney injury)    Chronic pain syndrome    Hyponatremia    Chronic kidney disease, stage 3a     Past Medical History:   Diagnosis Date    Arthritis     Bronchitis     Cataract     right eye , cornea implant    Chronic back pain     Depression     Disease of thyroid gland     Edema     lower extremities    Hypertension     Kidney stones     Lymphedema      Past Surgical History:   Procedure Laterality Date    BACK SURGERY       ([place])2013     CHOLECYSTECTOMY OPEN      CORNEAL TRANSPLANT Right     INCISION AND DRAINAGE ABSCESS Left 2023    Procedure: INCISION AND DRAINAGE ABSCESS left leg;  Surgeon: Sarita Vazquez MD;  Location: [facility] OR;  Service: General;  Laterality: Left;    LACERATION REPAIR Left 2023    Procedure: washout and closure left leg wound ;  Surgeon: Sarita Vazquez,  MD;  Location:  [facility] OR;  Service: General;  Laterality: Left;    LUMBAR FUSION Left 8/2/2017    Procedure: LEFT LUMBAR LATERAL INTERBODY FUSION WITH INSTRUMENTATION  L1-2;  Surgeon: MARCELINO Wright MD;  Location:  [facility] OR;  Service:     NECK SURGERY  2010    [facility]    TOTAL HIP ARTHROPLASTY Right 2014    DR. KENDRICK     TOTAL KNEE ARTHROPLASTY Right 2000    TOTAL KNEE ARTHROPLASTY Left 2002    TOTAL SHOULDER ARTHROPLASTY W/ DISTAL CLAVICLE EXCISION Left 6/1/2020    Procedure: LEFT REVERSE TOTAL SHOULDER REPLACEMENT;  Surgeon: Saravanan Calvin MD;  Location:  [facility] OR;  Service: Orthopedics;  Laterality: Left;      General Information       [facility] Name 01/10/24 0850          Physical Therapy Time and Intention    Document Type re-evaluation;wound care  BLE unna boots w/ Opticel Ag over open wounds  -     Mode of Treatment physical therapy  -       Row Name 01/10/24 0850          General Information    Patient Profile Reviewed yes  -[facility] Name 01/10/24 0850          Cognition    Orientation Status (Cognition) oriented x 4  -               User Key  (r) = Recorded By, (t) = Taken By, (c) = Cosigned By      Initials Name Provider Type     Jamil Lerma, PT Physical Therapist                   Mobility    No documentation.                  Obj/Interventions    No documentation.                  Goals/Plan       [facility] Name 01/10/24 0850          Bed Mobility Goal 1 (PT)    Progress/Outcomes (Bed Mobility Goal 1, PT) goal ongoing  -[facility] Name 01/10/24 0850          Transfer Goal 1 (PT)    Progress/Outcome (Transfer Goal 1, PT) goal ongoing  -[facility] Name 01/10/24 0850          Gait Training Goal 1 (PT)    Progress/Outcome (Gait Training Goal 1, PT) goal ongoing  -[facility] Name 01/10/24 0850          Therapy Assessment/Plan (PT)    Planned Therapy Interventions (PT) balance training;bed mobility training;gait training;home exercise program;postural re-education;patient/family education;ROM  (range of motion);strengthening;transfer training;wound care;other (see comments)  safety/fall prevention, skin protection/pressure relief, edema mngmt  -               User Key  (r) = Recorded By, (t) = Taken By, (c) = Cosigned By      Initials Name Provider Type     Jamil Lerma, PT Physical Therapist                   Clinical Impression       Row Name 01/10/24 0850          Pain    Pretreatment Pain Rating 0/10 - no pain  -     Posttreatment Pain Rating 0/10 - no pain  -     Pain Intervention(s) Medication (See MAR)  -       Row Name 01/10/24 0850          Plan of Care Review    Outcome Evaluation PT reevaluation complete for application of BLE unna boots with Opticel Ag over open wounds. Opticel Ag over open wounds followed by 3-layer unna boots (unna boot, kerlex, coban).  Good capillary refill after application.  PT to check unna boots daily and change by 1.17.24 or sooner if needed.  PT to continue mobility POC as well.  -       Row Name 01/10/24 0850          Therapy Assessment/Plan (PT)    Patient/Family Therapy Goals Statement (PT) healed wounds  -     Rehab Potential (PT) good, to achieve stated therapy goals  -     Criteria for Skilled Interventions Met (PT) yes;skilled treatment is necessary  -     Therapy Frequency (PT) 2 times/day  -     Predicted Duration of Therapy Intervention (PT) unti dc  -[facility] Name 01/10/24 0850          Positioning and Restraints    Pre-Treatment Position sitting in chair/recliner  -     Post Treatment Position chair  -     In Chair reclined;call light within reach;encouraged to call for assist;legs elevated  -               User Key  (r) = Recorded By, (t) = Taken By, (c) = Cosigned By      Initials Name Provider Type     Jamil Lerma, PT Physical Therapist                   Outcome Measures    No documentation.                       PT Assessment (last 12 hours)       PT Evaluation and Treatment       Row Name 01/10/24 0851 01/10/24 0850        Physical Therapy Time and Intention    Comment LLE unna boot  -LH RLE unna boot  -LH      Row Name 01/10/24 0851          Wound 01/09/24 1108 Left lower leg    Wound - Properties Group Placement Date: 01/09/24  -CM Placement Time: 1108  -CM Side: Left  -CM Orientation: lower  -CM Location: leg  -CM    Drainage Amount none  -LH     Dressing Care dressing changed  Opticel Ag over open wound, unna boot, kerlex, coban  -LH     Retired Wound - Properties Group Placement Date: 01/09/24  -CM Placement Time: 1108  -CM Side: Left  -CM Orientation: lower  -CM Location: leg  -CM    Retired Wound - Properties Group Date first assessed: 01/09/24  -CM Time first assessed: 1108  -CM Side: Left  -CM Location: leg  -CM      Row Name 01/10/24 0850          Wound 01/09/24 1109 Right lower leg    Wound - Properties Group Placement Date: 01/09/24  -CM Placement Time: 1109  -CM Side: Right  -CM Orientation: lower  -CM Location: leg  -CM    Wound Image --  photo of BLE in Media  -LH     Drainage Amount none  -LH     Dressing Care dressing changed  Opticel Ag over open wound, unna boot, kerlex, coban  -LH     Retired Wound - Properties Group Placement Date: 01/09/24  -CM Placement Time: 1109  -CM Side: Right  -CM Orientation: lower  -CM Location: leg  -CM    Retired Wound - Properties Group Date first assessed: 01/09/24  -CM Time first assessed: 1109  -CM Side: Right  -CM Location: leg  -CM      Row Name 01/10/24 0850          Physical Therapy Goals    Wound Care Goal Selection (PT) wound care, PT goal 1  -       Row Name 01/10/24 0850          Wound Care Goal 1 (PT)    Wound Care Goal 1 (PT) BLE with no evidence of open wounds and decreased edema and redness.  -     Time Frame (Wound Care Goal 1, PT) 1 week  -     Progress/Outcome (Wound Care Goal 1, PT) new goal  -               User Key  (r) = Recorded By, (t) = Taken By, (c) = Cosigned By      Initials Name Provider Type     Jamil Lerma, PT Physical Therapist    CM  Alicia Zarate, RN Registered Nurse                            Physical Therapy Education       Title: PT OT SLP Therapies (In Progress)       Topic: Physical Therapy (Done)       Point: Mobility training (Done)       Learning Progress Summary             Patient Acceptance, E,TB,D, VU,DU,NR by  at 1/9/2024 1645    Comment: education re: purpose of PT/importance of activity, for safety/falls prevention, improved tech w/ breathing, for upright posture in standing, for improved tech w/ bed mob, tfers and gait w/ use of rwx   Family Acceptance, E,TB,D, VU,DU,NR by  at 1/9/2024 1645    Comment: education re: purpose of PT/importance of activity, for safety/falls prevention, improved tech w/ breathing, for upright posture in standing, for improved tech w/ bed mob, tfers and gait w/ use of rwx                         Point: Home exercise program (Done)       Learning Progress Summary             Patient Acceptance, E,TB,D, VU,DU,NR by  at 1/9/2024 1645    Comment: education re: purpose of PT/importance of activity, for safety/falls prevention, improved tech w/ breathing, for upright posture in standing, for improved tech w/ bed mob, tfers and gait w/ use of rwx   Family Acceptance, E,TB,D, VU,DU,NR by  at 1/9/2024 1645    Comment: education re: purpose of PT/importance of activity, for safety/falls prevention, improved tech w/ breathing, for upright posture in standing, for improved tech w/ bed mob, tfers and gait w/ use of rwx                         Point: Precautions (Done)       Learning Progress Summary             Patient Acceptance, E,TB,D, VU,DU,NR by  at 1/9/2024 1645    Comment: education re: purpose of PT/importance of activity, for safety/falls prevention, improved tech w/ breathing, for upright posture in standing, for improved tech w/ bed mob, tfers and gait w/ use of rwx   Family Acceptance, E,TB,D, VU,DU,NR by  at 1/9/2024 1645    Comment: education re: purpose of PT/importance of  activity, for safety/falls prevention, improved tech w/ breathing, for upright posture in standing, for improved tech w/ bed mob, tfers and gait w/ use of rwx                                         User Key       Initials Effective Dates Name Provider Type Discipline     08/02/18 -  Didi Conner, PT Physical Therapist PT                  PT Recommendation and Plan  Planned Therapy Interventions (PT): balance training, bed mobility training, gait training, home exercise program, postural re-education, patient/family education, ROM (range of motion), strengthening, transfer training, wound care, other (see comments) (safety/fall prevention, skin protection/pressure relief, edema mngmt)  Outcome Evaluation: PT reevaluation complete for application of BLE unna boots with Opticel Ag over open wounds. Opticel Ag over open wounds followed by 3-layer unna boots (unna boot, kerlex, coban).  Good capillary refill after application.  PT to check unna boots daily and change by 1.17.24 or sooner if needed.  PT to continue mobility POC as well.     Time Calculation:         PT Charges       Row Name 01/10/24 0959             Time Calculation    Start Time 0850  -      Stop Time 0950  -      Time Calculation (min) 60 min  -      PT Received On 01/10/24  -      PT Goal Re-Cert Due Date 01/20/24  -         Untimed Charges    PT Eval/Re-eval Minutes 30  -      Wound Care 16250 Unna boot  -LH      29580-Unna Boot 30  -         Total Minutes    Untimed Charges Total Minutes 60  -LH       Total Minutes 60  -LH                User Key  (r) = Recorded By, (t) = Taken By, (c) = Cosigned By      Initials Name Provider Type     Jamil Lerma, PT Physical Therapist                  Therapy Charges for Today       Code Description Service Date Service Provider Modifiers Qty    31780579367 HC PT RE-EVAL ESTABLISHED PLAN 2 1/10/2024 Jamil Lerma, PT GP 1    37558763642 HC PT STAPPING UNNA BOOT 1/10/2024 Jamil Lerma, PT  GP 2            PT G-Codes  Outcome Measure Options: AM-PAC 6 Clicks Basic Mobility (PT)  AM-PAC 6 Clicks Score (PT): 17  AM-PAC 6 Clicks Score (OT): 17  PT Discharge Summary  Anticipated Discharge Disposition (PT): skilled nursing facility    Jamil Lerma, PT  1/10/2024

## 2024-01-10 NOTE — PROGRESS NOTES
Infectious Diseases Progress Note    Patient:  Brenda Sneed  YOB: 1950  MRN: [id number]   Admit date: 1/5/2024   Admitting Physician: Sachin Dia MD  Primary Care Physician: Sachin Dia MD    Chief Complaint/Interval History: She feels okay.  She has no new symptoms.  She has no abdominal or flank pain.  She is without fever.  Tolerating antibiotic treatment without side effect.  She is not coughing.  She is not dyspneic.  She does not seem to have any ongoing cardiopulmonary symptoms.  She lives alone.        Intake/Output Summary (Last 24 hours) at 1/10/2024 1726  Last data filed at 1/10/2024 1536  Gross per 24 hour   Intake 340 ml   Output 300 ml   Net 40 ml     Allergies:   Allergies   Allergen Reactions    Codeine Itching and Rash     SEVERE RASH/ITCHING (TOLERATES PERCOCET)    Amoxicillin Other (See Comments)     unknown    Levaquin [Levofloxacin] Other (See Comments)     unknown    Benadryl [Diphenhydramine] Other (See Comments)     KEEPS PATIENT AWAKE      Current Scheduled Medications:   albuterol, 2.5 mg, Nebulization, TID  apixaban, 5 mg, Oral, Q12H  ceftazidime-avibactam (AVYCAZ) in NS IVPB, 1.25 g, Intravenous, Q8H  DULoxetine, 60 mg, Oral, BID  guaifenesin-dextromethorphan, 2 tablet, Oral, Q12H  levothyroxine, 100 mcg, Oral, Daily  metoprolol succinate XL, 25 mg, Oral, Daily  Morphine, 15 mg, Oral, Q12H  nystatin, 1 application , Topical, BID  pramipexole, 0.75 mg, Oral, Nightly  senna-docusate sodium, 2 tablet, Oral, BID  sodium chloride, 10 mL, Intravenous, Q12H  sodium chloride, 10 mL, Intravenous, Q12H      Current PRN Medications:    senna-docusate sodium **AND** polyethylene glycol **AND** bisacodyl **AND** bisacodyl    Calcium Replacement - Follow Nurse / BPA Driven Protocol    cyclobenzaprine    furosemide    Magnesium Standard Dose Replacement - Follow Nurse / BPA Driven Protocol    Phosphorus Replacement - Follow Nurse / BPA Driven Protocol    Potassium  "Replacement - Follow Nurse / BPA Driven Protocol    [COMPLETED] Insert Peripheral IV **AND** sodium chloride    sodium chloride    sodium chloride    sodium chloride    sodium chloride    Review of Systems see HPI    Vital Signs:  /59 (BP Location: Left arm, Patient Position: Lying)   Pulse 72   Temp 97.7 °F (36.5 °C) (Oral)   Resp 18   Ht 160 cm (63\")   Wt 101 kg (223 lb 1.7 oz)   SpO2 99%   BMI 39.52 kg/m²     Physical Exam  Vital signs - reviewed.  Line/IV site - No erythema, warmth, induration, or tenderness.  Abdomen soft and nontender  No suprapubic or flank tenderness  Lungs clear without crackles  Alert, pleasant, comfortable appearing, and in no distress.  She was not coughing or dyspneic during evaluation.  Lab Results:  CBC:   Results from last 7 days   Lab Units 01/09/24 0439 01/08/24  0439 01/07/24  0532 01/06/24  1221 01/05/24  1937   WBC 10*3/mm3 4.47 8.66 10.01 7.65 12.30*   HEMOGLOBIN g/dL 8.3* 8.4* 8.7* 8.8* 9.4*   HEMATOCRIT % 26.0* 25.1* 25.6* 27.0* 28.8*   PLATELETS 10*3/mm3 90* 118* 122* 119* 142     BMP:  Results from last 7 days   Lab Units 01/09/24 0439 01/08/24  1535 01/08/24  0439 01/07/24  0532 01/06/24  1221 01/05/24  1937   SODIUM mmol/L 136  --  137 135* 133* 128*   POTASSIUM mmol/L 3.8 5.0 3.2* 3.8 3.3* 3.4*   CHLORIDE mmol/L 103  --  101 100 97* 92*   CO2 mmol/L 23.0  --  23.0 22.0 23.0 22.0   BUN mg/dL 37*  --  45* 51* 58* 62*   CREATININE mg/dL 1.17*  --  1.51* 1.90* 2.59* 3.28*   GLUCOSE mg/dL 78  --  101* 101* 103* 125*   CALCIUM mg/dL 8.2*  --  8.6 8.5* 8.4* 8.7   ALT (SGPT) U/L  --   --   --   --   --  20     Urinalysis done January 8, 2024-trace leukocyte Estrace, nitrate negative  6-10 red blood cells per high-power field  3-5 white blood cells per high-power field    Culture Results:   Blood Culture   Date Value Ref Range Status   01/05/2024 No growth at 4 days  Preliminary   01/05/2024 No growth at 4 days  Preliminary     No urine culture sent from January 8, " 2024 and out catheterization sample as there was not sufficient pyuria to warrant culture.    Urine Culture   Date Value Ref Range Status   01/05/2024 (C)  Final    >100,000 CFU/mL Klebsiella pneumoniae ssp pneumoniae CRE     Comment:       Carbapenemase .  Carbapenem resistant Enterobacteriaceae, patient may be an isolation risk.   01/05/2024 50,000 CFU/mL Enterococcus faecalis (A)  Final   Susceptibility       Klebsiella pneumoniae ssp pneumoniae CRE Enterococcus faecalis     CEPHEID GENEXPERT CHRISTIANO CHRISTIANO     Ampicillin    <=2 ug/ml Susceptible     Aztreonam  16 ug/ml Resistant       Cefepime  <=1 ug/ml Susceptible       Ceftazidime + Avibactam  0.25 ug/ml Susceptible       Gentamicin  <=1 ug/ml Susceptible       IMP Not Detected         KPC Detected         Levofloxacin    1 ug/ml Susceptible     Meropenem  >=16 ug/ml Resistant       NDM Not Detected         Nitrofurantoin    <=16 ug/ml Susceptible     OXA48 Not Detected         Vancomycin    1 ug/ml Susceptible     VIM Not Detected          Radiology: None    Additional Studies Reviewed: None    Impression:   She had possible UTI secondary to CRE-symptomatically any symptoms improved/resolved.  She has insignificant pyuria on her current urinalysis.  Feel she is completed adequate course of antimicrobial treatment.    Recommendations:   Feel treatment with Avycaz can be discontinued  Do not feel she needs any further antibiotic therapy  Okay with me for discharge home if ready for release per others  Will discuss with her primary service  She can follow-up with infectious diseases as needed    Clint Johnson MD

## 2024-01-10 NOTE — PLAN OF CARE
Goal Outcome Evaluation:  Plan of Care Reviewed With: patient        Progress: no change  Outcome Evaluation: Remote Access Entry. NTN assessment. Screen wounds. Pt has wounds on left/right lower leg. Spoke with pt on phone. Pt stated appetite is poor due to early satiety. Pt denies nausea/vomiting. No significant weight changes noted in weight report. Per intake report, 2-day intake average 26% over 5 meals. Pt has +3 edema on lower extremities. Indicators of malnutrition identified. Pt has moderate malnutrition in the context of acute illness/injury. Offered ONS to help meet estimated calorie-protein needs to prevent significant weight loss, pt accepted. Ordering Mighty Shake TID. Encouraged intake. Pt denies difficulty chewing/swallowing. Continue current diet. Last BM 1/9, per pt. Will continue to follow per protocol.

## 2024-01-10 NOTE — DISCHARGE PLACEMENT REQUEST
"Eyad Gurrola (73 y.o. Female)       Date of Birth   1950    Social Security Number       Address   [address] [address]    Home Phone   [phone]    MRN   [id number]       [facility]    Marital Status                               Admission Date   1/5/24    Admission Type   Emergency    Admitting Provider   aSchin Dia MD    Attending Provider   Sachin Dia MD    Department, Room/Bed   [facility] 3C, 389/1       Discharge Date       Discharge Disposition       Discharge Destination                                 Attending Provider: Sahcin Dia MD    Allergies: Codeine, Amoxicillin, Levaquin [Levofloxacin], Benadryl [Diphenhydramine]    Isolation: Contact   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), 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)   Code Status: CPR    Ht: 160 cm (63\")   Wt: 101 kg (223 lb 1.7 oz)    Admission Cmt: None   Principal Problem: CHRISTINE (acute kidney injury) [N17.9]                   Active Insurance as of 1/5/2024       Primary Coverage       Payor Plan Insurance Group Employer/Plan Group    HUMANA MEDICARE REPLACEMENT HUMANA MEDICARE REPLACEMENT [id number]       Payor Plan Address Payor Plan Phone Number Payor Plan Fax Number Effective Dates    [address] [address] [phone]  1/1/2023 - None Entered    [facility] [id number]         Subscriber Name Subscriber Birth Date Member ID       EYAD GURROLA 1950 [id number]                     Emergency Contacts        (Rel.) Home Phone Work Phone Mobile Phone    Nella (Daughter) [phone] -- [phone]    ODELL NELSON (Relative) -- -- [phone]            "

## 2024-01-10 NOTE — PROGRESS NOTES
Malnutrition Severity Assessment    Patient Name:  Brenda Sneed  YOB: 1950  MRN: [id number]  Admit Date:  1/5/2024    Patient meets criteria for : Moderate (non-severe) Malnutrition    Malnutrition Severity Assessment  Malnutrition Type: Acute Disease or Injury - Related Malnutrition  Malnutrition Type (last 8 hours)       Malnutrition Severity Assessment       Row Name 01/10/24 0934       Malnutrition Severity Assessment    Malnutrition Type Acute Disease or Injury - Related Malnutrition      Row Name 01/10/24 0934       Insufficient Energy Intake     Insufficient Energy Intake Findings Moderate    Insufficient Energy Intake  < or equal to 50% of est. energy requirement for > or equal to 5d)      Row Name 01/10/24 0934       Fluid Accumulation (Edema)    Fluid Acumulation Findings Severe    Fluid Accumulation  Severe equals 3+ or 4+ pitting edema      Row Name 01/10/24 0934       Criteria Met (Must meet criteria for severity in at least 2 of these categories: M Wasting, Fat Loss, Fluid, Secondary Signs, Wt. Status, Intake)    Patient meets criteria for  Moderate (non-severe) Malnutrition                    Electronically signed by:  Fab Peña RD  01/10/24 09:41 CST

## 2024-01-10 NOTE — PLAN OF CARE
Goal Outcome Evaluation:  Plan of Care Reviewed With: patient        Progress: improving  Outcome Evaluation: OT tx completed. Pt presents sitting in chair, willing to participate. Pt required Mod A for sit>stand t/f at chair d/t low surface height. Pt completed fxl mobility using FWW with CGA, requiring 2 seated RBs d/t SOA. Pt O2 sats 99%+ during all activities. Pt required CGA for all other sit<>stand t/f's with cues for tech. Pt refused grooming tasks at sink-side, completed in sitting with Supervision after set-up. Pt with decreased attention to task at times, requiring redirection. Continue OT POC in order to increase pt safety and I during ADLs, fxl mobility, and fxl t/f's. Communication completed with pt regarding D/C plans and recommendations. Pt would benefit from SNF placement and pt agreeable, SW notified.      Anticipated Discharge Disposition (OT): skilled nursing facility

## 2024-01-11 PROCEDURE — 94799 UNLISTED PULMONARY SVC/PX: CPT

## 2024-01-11 PROCEDURE — 94664 DEMO&/EVAL PT USE INHALER: CPT

## 2024-01-11 PROCEDURE — 97116 GAIT TRAINING THERAPY: CPT

## 2024-01-11 PROCEDURE — 97110 THERAPEUTIC EXERCISES: CPT

## 2024-01-11 PROCEDURE — 29580 STRAPPING UNNA BOOT: CPT

## 2024-01-11 PROCEDURE — 25010000002 CEFTAZIDIME-AVIBACTAM 2.5 (2-0.5) G RECONSTITUTED SOLUTION 1 EACH VIAL: Performed by: INTERNAL MEDICINE

## 2024-01-11 PROCEDURE — 97530 THERAPEUTIC ACTIVITIES: CPT

## 2024-01-11 RX ADMIN — Medication 10 ML: at 08:45

## 2024-01-11 RX ADMIN — MORPHINE SULFATE 15 MG: 15 TABLET, FILM COATED, EXTENDED RELEASE ORAL at 20:13

## 2024-01-11 RX ADMIN — DULOXETINE HYDROCHLORIDE 60 MG: 30 CAPSULE, DELAYED RELEASE ORAL at 20:05

## 2024-01-11 RX ADMIN — ALBUTEROL SULFATE 2.5 MG: 2.5 SOLUTION RESPIRATORY (INHALATION) at 15:30

## 2024-01-11 RX ADMIN — LEVOTHYROXINE SODIUM 100 MCG: 100 TABLET ORAL at 05:01

## 2024-01-11 RX ADMIN — DOCUSATE SODIUM 50 MG AND SENNOSIDES 8.6 MG 2 TABLET: 8.6; 5 TABLET, FILM COATED ORAL at 22:27

## 2024-01-11 RX ADMIN — CEFTAZIDIME, AVIBACTAM 1.25 G: 2; .5 POWDER, FOR SOLUTION INTRAVENOUS at 01:38

## 2024-01-11 RX ADMIN — NYSTATIN 1 APPLICATION: 100000 POWDER TOPICAL at 20:05

## 2024-01-11 RX ADMIN — APIXABAN 5 MG: 5 TABLET, FILM COATED ORAL at 08:46

## 2024-01-11 RX ADMIN — Medication 10 ML: at 20:06

## 2024-01-11 RX ADMIN — PRAMIPEXOLE DIHYDROCHLORIDE 0.75 MG: 0.25 TABLET ORAL at 22:27

## 2024-01-11 RX ADMIN — ALBUTEROL SULFATE 2.5 MG: 2.5 SOLUTION RESPIRATORY (INHALATION) at 19:02

## 2024-01-11 RX ADMIN — CEFTAZIDIME, AVIBACTAM 1.25 G: 2; .5 POWDER, FOR SOLUTION INTRAVENOUS at 08:49

## 2024-01-11 RX ADMIN — Medication 10 ML: at 20:05

## 2024-01-11 RX ADMIN — NYSTATIN 1 APPLICATION: 100000 POWDER TOPICAL at 08:45

## 2024-01-11 RX ADMIN — MORPHINE SULFATE 15 MG: 15 TABLET, FILM COATED, EXTENDED RELEASE ORAL at 08:49

## 2024-01-11 RX ADMIN — ALBUTEROL SULFATE 2.5 MG: 2.5 SOLUTION RESPIRATORY (INHALATION) at 07:09

## 2024-01-11 RX ADMIN — GUAIFENESIN AND DEXTROMETHORPHAN HYDROBROMIDE 2 TABLET: 600; 30 TABLET, EXTENDED RELEASE ORAL at 20:05

## 2024-01-11 RX ADMIN — GUAIFENESIN AND DEXTROMETHORPHAN HYDROBROMIDE 2 TABLET: 600; 30 TABLET, EXTENDED RELEASE ORAL at 08:45

## 2024-01-11 RX ADMIN — DULOXETINE HYDROCHLORIDE 60 MG: 30 CAPSULE, DELAYED RELEASE ORAL at 08:44

## 2024-01-11 RX ADMIN — APIXABAN 5 MG: 5 TABLET, FILM COATED ORAL at 20:05

## 2024-01-11 NOTE — CASE MANAGEMENT/SOCIAL WORK
Continued Stay Note  [facility]     Patient Name: Brenda Sneed  MRN: [id number]  Today's Date: 1/11/2024    Admit Date: 1/5/2024    Plan: SNF   Discharge Plan       Row Name 01/11/24 1326       Plan    Plan SNF    Patient/Family in Agreement with Plan yes    Plan Comments [facility] not have a bed. She is wanting a private room and [facility] does not have one currently, but may have one this weekend. She asked about [facility] and they do have a private room. Referral sent.      Row Name 01/11/24 1125       Plan    Plan SNF    Patient/Family in Agreement with Plan yes    Plan Comments [facility] has offered a bed. Informed pt and she now also wants a referral to [facility]. Referral sent.                   Discharge Codes    No documentation.                 Expected Discharge Date and Time       Expected Discharge Date Expected Discharge Time    Jan 12, 2024               KAITLIN Dillard

## 2024-01-11 NOTE — THERAPY TREATMENT NOTE
Acute Care - Physical Therapy Treatment Note  [facility]     Patient Name: Brenda Sneed  : 1950  MRN: [id number]  Today's Date: 2024      Visit Dx:     ICD-10-CM ICD-9-CM   1. Lymphedema  I89.0 457.1   2. Acute UTI  N39.0 599.0   3. CHRISTINE (acute kidney injury)  N17.9 584.9   4. Hyponatremia  E87.1 276.1   5. Fall, initial encounter  W19.XXXA E888.9   6. Injury of left shoulder, initial encounter  S49.92XA 959.2   7. Chronic edema  R60.9 782.3   8. Impaired functional mobility and activity tolerance [Z74.09]  Z74.09 V49.89     Patient Active Problem List   Diagnosis    Spinal stenosis, lumbar    Secondary osteoarthritis of left shoulder due to rotator cuff arthropathy    Cellulitis of right lower extremity    HTN (hypertension)    Obesity, Class III, BMI 40-49.9 (morbid obesity)    Anxiety associated with depression    Traumatic rhabdomyolysis    Traumatic rhabdomyolysis, initial encounter    Laceration of left lower extremity    Cellulitis    Cellulitis of left lower extremity    Thrombocytopenia    Acute cystitis with hematuria    Sepsis    Candidiasis of skin    Acquired hypothyroidism    CHRISTINE (acute kidney injury)    Chronic pain syndrome    Hyponatremia    Chronic kidney disease, stage 3a     Past Medical History:   Diagnosis Date    Arthritis     Bronchitis     Cataract     right eye , cornea implant    Chronic back pain     Depression     Disease of thyroid gland     Edema     lower extremities    Hypertension     Kidney stones     Lymphedema      Past Surgical History:   Procedure Laterality Date    BACK SURGERY       ([place])2013     CHOLECYSTECTOMY OPEN      CORNEAL TRANSPLANT Right     INCISION AND DRAINAGE ABSCESS Left 2023    Procedure: INCISION AND DRAINAGE ABSCESS left leg;  Surgeon: Sarita Vazquez MD;  Location: [facility];  Service: General;  Laterality: Left;    LACERATION REPAIR Left 2023    Procedure: washout and closure left leg wound ;  Surgeon: George  Sarita CARLISLE MD;  Location:  [facility];  Service: General;  Laterality: Left;    LUMBAR FUSION Left 8/2/2017    Procedure: LEFT LUMBAR LATERAL INTERBODY FUSION WITH INSTRUMENTATION  L1-2;  Surgeon: MARCELINO Wright MD;  Location:  [facility];  Service:     NECK SURGERY  2010    [place]    TOTAL HIP ARTHROPLASTY Right 2014    DR. KENDRICK     TOTAL KNEE ARTHROPLASTY Right 2000    TOTAL KNEE ARTHROPLASTY Left 2002    TOTAL SHOULDER ARTHROPLASTY W/ DISTAL CLAVICLE EXCISION Left 6/1/2020    Procedure: LEFT REVERSE TOTAL SHOULDER REPLACEMENT;  Surgeon: Saravanan Calvin MD;  Location:  [facility];  Service: Orthopedics;  Laterality: Left;     PT Assessment (last 12 hours)       PT Evaluation and Treatment       Row Name 01/11/24 1005          Physical Therapy Time and Intention    Subjective Information no complaints  -KJ     Document Type therapy note (daily note)  -KJ     Mode of Treatment physical therapy  -KJ     Patient Effort good  -KJ     Comment changed B LE unna boots; she felt like they were too tight  -KJ       Row Name 01/11/24 1005          General Information    Existing Precautions/Restrictions fall  -KJ       Row Name 01/11/24 1005          Pain    Pretreatment Pain Rating 4/10  -KJ     Posttreatment Pain Rating 4/10  -KJ     Pain Location - Side/Orientation Bilateral  -KJ     Pain Location lower  -KJ     Pain Location - extremity  -KJ       Row Name 01/11/24 1005          Bed Mobility    Supine-Sit [place] (Bed Mobility) minimum assist (75% patient effort);verbal cues  -KJ       Row Name 01/11/24 1005          Sit-Stand Transfer    Sit-Stand [place] (Transfers) minimum assist (75% patient effort);verbal cues  -KJ     Assistive Device (Sit-Stand Transfers) walker, front-wheeled  -KJ       Row Name 01/11/24 1005          Stand-Sit Transfer    Stand-Sit [place] (Transfers) verbal cues;contact guard  -KJ     Assistive Device (Stand-Sit Transfers) walker, front-wheeled  -KJ       Row Name 01/11/24 1005           Toilet Transfer    Type (Toilet Transfer) sit-stand;stand-sit  -KJ     [place] Level (Toilet Transfer) verbal cues;contact guard;minimum assist (75% patient effort)  -KJ       Row Name 01/11/24 1005          Gait/Stairs (Locomotion)    [place] Level (Gait) minimum assist (75% patient effort)  -KJ     Assistive Device (Gait) walker, front-wheeled  -KJ     Distance in Feet (Gait) 15' x 2  -KJ     Deviations/Abnormal Patterns (Gait) gait speed decreased;stride length decreased  -KJ     Bilateral Gait Deviations forward flexed posture  -KJ       Row Name 01/11/24 1005          Wound 01/09/24 1108 Left lower leg    Wound - Properties Group Placement Date: 01/09/24  -CM Placement Time: 1108  -CM Side: Left  -CM Orientation: lower  -CM Location: leg  -CM    Dressing Appearance intact  -KJ     Drainage Amount none  -KJ     Care, Wound fernando boot  unna boot and coban only; no kerlex pt is not having any drainage  -KJ     Dressing Care dressing changed  opticel AG over small scab  -KJ     Retired Wound - Properties Group Placement Date: 01/09/24  -CM Placement Time: 1108  -CM Side: Left  -CM Orientation: lower  -CM Location: leg  -CM    Retired Wound - Properties Group Date first assessed: 01/09/24  -CM Time first assessed: 1108  -CM Side: Left  -CM Location: leg  -CM      Row Name 01/11/24 1005          Wound 01/09/24 1109 Right lower leg    Wound - Properties Group Placement Date: 01/09/24  -CM Placement Time: 1109  -CM Side: Right  -CM Orientation: lower  -CM Location: leg  -CM    Dressing Appearance intact  -KJ     Drainage Amount none  -KJ     Care, Wound fernando boot  unna boot and coban only due to no drainage.  -KJ     Dressing Care dressing changed  opticel AG over small scab  -KJ     Retired Wound - Properties Group Placement Date: 01/09/24  -CM Placement Time: 1109  -CM Side: Right  -CM Orientation: lower  -CM Location: leg  -CM    Retired Wound - Properties Group Date first assessed: 01/09/24  -CM  Time first assessed: 1109  -CM Side: Right  -CM Location: leg  -CM      Row Name 01/11/24 1005          Positioning and Restraints    Pre-Treatment Position in bed  -KJ     Post Treatment Position chair  -KJ     In Bed call light within reach  -KJ               User Key  (r) = Recorded By, (t) = Taken By, (c) = Cosigned By      Initials Name Provider Type    Iram Jones, PTA Physical Therapist Assistant    Alicia Bunn, RN Registered Nurse                    Physical Therapy Education       Title: PT OT SLP Therapies (In Progress)       Topic: Physical Therapy (Done)       Point: Mobility training (Done)       Learning Progress Summary             Patient Acceptance, E,TB,D, VU,DU,NR by  at 1/9/2024 1645    Comment: education re: purpose of PT/importance of activity, for safety/falls prevention, improved tech w/ breathing, for upright posture in standing, for improved tech w/ bed mob, tfers and gait w/ use of rwx   Family Acceptance, E,TB,D, VU,DU,NR by  at 1/9/2024 1645    Comment: education re: purpose of PT/importance of activity, for safety/falls prevention, improved tech w/ breathing, for upright posture in standing, for improved tech w/ bed mob, tfers and gait w/ use of rwx                         Point: Home exercise program (Done)       Learning Progress Summary             Patient Acceptance, E,TB,D, VU,DU,NR by  at 1/9/2024 1645    Comment: education re: purpose of PT/importance of activity, for safety/falls prevention, improved tech w/ breathing, for upright posture in standing, for improved tech w/ bed mob, tfers and gait w/ use of rwx   Family Acceptance, E,TB,D, VU,DU,NR by  at 1/9/2024 1645    Comment: education re: purpose of PT/importance of activity, for safety/falls prevention, improved tech w/ breathing, for upright posture in standing, for improved tech w/ bed mob, tfers and gait w/ use of rwx                         Point: Precautions (Done)       Learning Progress  Summary             Patient Acceptance, E,TB,D, VU,DU,NR by TERRELL at 1/9/2024 1645    Comment: education re: purpose of PT/importance of activity, for safety/falls prevention, improved tech w/ breathing, for upright posture in standing, for improved tech w/ bed mob, tfers and gait w/ use of rwx   Family Acceptance, E,TB,D, MORIAH,DU,NR by  at 1/9/2024 1645    Comment: education re: purpose of PT/importance of activity, for safety/falls prevention, improved tech w/ breathing, for upright posture in standing, for improved tech w/ bed mob, tfers and gait w/ use of rwx                                         User Key       Initials Effective Dates Name Provider Type Discipline     08/02/18 -  Didi Conner, PT Physical Therapist PT                  PT Recommendation and Plan     Plan of Care Reviewed With: patient  Progress: improving  Outcome Evaluation: PT tx completed. Pt c/o unna boots feeling too tight. They were both replaced this am. Will check daily and change PRN or on the 18th. Rolando bed mobility, Rolando sit<>stand, amb 15' x 2 with r wx Rolando. Requires sitting rest due to fatigue. Recommend SNF for cont PT   Outcome Measures       Row Name 01/11/24 1100 01/10/24 1500          How much help from another person do you currently need...    Turning from your back to your side while in flat bed without using bedrails? 3  -KJ 3  -KJ     Moving from lying on back to sitting on the side of a flat bed without bedrails? 3  -KJ 3  -KJ     Moving to and from a bed to a chair (including a wheelchair)? 3  -KJ 3  -KJ     Standing up from a chair using your arms (e.g., wheelchair, bedside chair)? 3  -KJ 3  -KJ     Climbing 3-5 steps with a railing? 3  -KJ 3  -KJ     To walk in hospital room? 3  -KJ 3  -KJ     AM-PAC 6 Clicks Score (PT) 18  -KJ 18  -KJ     Highest Level of Mobility Goal 6 --> Walk 10 steps or more  -KJ 6 --> Walk 10 steps or more  -KJ        Functional Assessment    Outcome Measure Options AM-PAC 6 Clicks Basic  Mobility (PT)  -KJ AM-PAC 6 Clicks Basic Mobility (PT)  -KJ               User Key  (r) = Recorded By, (t) = Taken By, (c) = Cosigned By      Initials Name Provider Type    Iram Jones, NE Physical Therapist Assistant                     Time Calculation:    PT Charges       Row Name 01/11/24 1113             Time Calculation    Start Time 1005  -KJ      Stop Time 1110  -KJ      Time Calculation (min) 65 min  -KJ      PT Received On 01/11/24  -KJ      PT Goal Re-Cert Due Date 01/20/24  -KJ         Time Calculation- PT    Total Timed Code Minutes- PT 65 minute(s)  -KJ                User Key  (r) = Recorded By, (t) = Taken By, (c) = Cosigned By      Initials Name Provider Type    Iram Jones, PTA Physical Therapist Assistant                  Therapy Charges for Today       Code Description Service Date Service Provider Modifiers Qty    04439881098 HC PT THER PROC EA 15 MIN 1/10/2024 Iram Kirkpatrick, PTA GP 1    72897209634 HC PT THERAPEUTIC ACT EA 15 MIN 1/10/2024 Iram Kirkpatrick, PTA GP 1    43450351900 HC GAIT TRAINING EA 15 MIN 1/10/2024 Iram Kirkpatrick, PTA GP 1    01618862336 HC GAIT TRAINING EA 15 MIN 1/11/2024 Iram Kirkpatrick, PTA GP 1    53645113136 HC PT THERAPEUTIC ACT EA 15 MIN 1/11/2024 Iram Kirkpatrick, PTA GP 1    94957867048 HC PT STAPPING UNNA BOOT 1/11/2024 Iram Kirkpatrick, PTA GP 2            PT G-Codes  Outcome Measure Options: AM-PAC 6 Clicks Basic Mobility (PT)  AM-PAC 6 Clicks Score (PT): 18  AM-PAC 6 Clicks Score (OT): 16    Iram Kirkpatrick PTA  1/11/2024

## 2024-01-11 NOTE — THERAPY WOUND CARE TREATMENT
Acute Care - Wound/Debridement Treatment Note  [facility]     Patient Name: Brenda Sneed  : 1950  MRN: [id number]  Today's Date: 2024                Admit Date: 2024    Visit Dx:    ICD-10-CM ICD-9-CM   1. Lymphedema  I89.0 457.1   2. Acute UTI  N39.0 599.0   3. CHRISTINE (acute kidney injury)  N17.9 584.9   4. Hyponatremia  E87.1 276.1   5. Fall, initial encounter  W19.XXXA E888.9   6. Injury of left shoulder, initial encounter  S49.92XA 959.2   7. Chronic edema  R60.9 782.3   8. Impaired functional mobility and activity tolerance [Z74.09]  Z74.09 V49.89       Patient Active Problem List   Diagnosis    Spinal stenosis, lumbar    Secondary osteoarthritis of left shoulder due to rotator cuff arthropathy    Cellulitis of right lower extremity    HTN (hypertension)    Obesity, Class III, BMI 40-49.9 (morbid obesity)    Anxiety associated with depression    Traumatic rhabdomyolysis    Traumatic rhabdomyolysis, initial encounter    Laceration of left lower extremity    Cellulitis    Cellulitis of left lower extremity    Thrombocytopenia    Acute cystitis with hematuria    Sepsis    Candidiasis of skin    Acquired hypothyroidism    CHRISTINE (acute kidney injury)    Chronic pain syndrome    Hyponatremia    Chronic kidney disease, stage 3a        Past Medical History:   Diagnosis Date    Arthritis     Bronchitis     Cataract     right eye , cornea implant    Chronic back pain     Depression     Disease of thyroid gland     Edema     lower extremities    Hypertension     Kidney stones     Lymphedema         Past Surgical History:   Procedure Laterality Date    BACK SURGERY       ([place]),      CHOLECYSTECTOMY OPEN      CORNEAL TRANSPLANT Right     INCISION AND DRAINAGE ABSCESS Left 2023    Procedure: INCISION AND DRAINAGE ABSCESS left leg;  Surgeon: Sarita Vazquez MD;  Location: [facility];  Service: General;  Laterality: Left;    LACERATION REPAIR Left 2023    Procedure: washout and  closure left leg wound ;  Surgeon: Sarita Vazquez MD;  Location:  [facility];  Service: General;  Laterality: Left;    LUMBAR FUSION Left 8/2/2017    Procedure: LEFT LUMBAR LATERAL INTERBODY FUSION WITH INSTRUMENTATION  L1-2;  Surgeon: MARCELINO Wright MD;  Location:  [facility];  Service:     NECK SURGERY  2010    [place]    TOTAL HIP ARTHROPLASTY Right 2014    DR. KENDRICK     TOTAL KNEE ARTHROPLASTY Right 2000    TOTAL KNEE ARTHROPLASTY Left 2002    TOTAL SHOULDER ARTHROPLASTY W/ DISTAL CLAVICLE EXCISION Left 6/1/2020    Procedure: LEFT REVERSE TOTAL SHOULDER REPLACEMENT;  Surgeon: Saravanan Calvin MD;  Location:  [facility];  Service: Orthopedics;  Laterality: Left;           Wound 01/09/24 1108 Left lower leg (Active)   Dressing Appearance intact 01/11/24 1005   Closure Unable to assess 01/11/24 0800   Base dressing in place, unable to visualize 01/11/24 0800   Drainage Amount none 01/11/24 1005   Care, Wound fernando boot 01/11/24 1005   Dressing Care dressing changed 01/11/24 1005       Wound 01/09/24 1109 Right lower leg (Active)   Dressing Appearance intact 01/11/24 1005   Closure Unable to assess 01/11/24 0800   Base dressing in place, unable to visualize 01/11/24 0800   Drainage Amount none 01/11/24 1005   Care, Wound fernando boot 01/11/24 1005   Dressing Care dressing changed 01/11/24 1005         WOUND DEBRIDEMENT                     PT Assessment (last 12 hours)       PT Evaluation and Treatment       Row Name 01/11/24 1005          Physical Therapy Time and Intention    Subjective Information no complaints  -KJ     Document Type therapy note (daily note)  -KJ     Mode of Treatment physical therapy  -KJ     Patient Effort good  -KJ     Comment changed B LE unna boots; she felt like they were too tight  -KJ       Row Name 01/11/24 1005          General Information    Existing Precautions/Restrictions fall  -KJ       Row Name 01/11/24 1005          Pain    Pretreatment Pain Rating 4/10  -KJ     Posttreatment Pain  Rating 4/10  -KJ     Pain Location - Side/Orientation Bilateral  -KJ     Pain Location lower  -KJ     Pain Location - extremity  -KJ       Row Name 01/11/24 1005          Bed Mobility    Supine-Sit [place] (Bed Mobility) minimum assist (75% patient effort);verbal cues  -KJ       Row Name 01/11/24 1005          Sit-Stand Transfer    Sit-Stand [place] (Transfers) minimum assist (75% patient effort);verbal cues  -KJ     Assistive Device (Sit-Stand Transfers) walker, front-wheeled  -KJ       Row Name 01/11/24 1005          Stand-Sit Transfer    Stand-Sit [place] (Transfers) verbal cues;contact guard  -KJ     Assistive Device (Stand-Sit Transfers) walker, front-wheeled  -KJ       Row Name 01/11/24 1005          Toilet Transfer    Type (Toilet Transfer) sit-stand;stand-sit  -KJ     [place] Level (Toilet Transfer) verbal cues;contact guard;minimum assist (75% patient effort)  -KJ       Row Name 01/11/24 1005          Gait/Stairs (Locomotion)    [place] Level (Gait) minimum assist (75% patient effort)  -KJ     Assistive Device (Gait) walker, front-wheeled  -KJ     Distance in Feet (Gait) 15' x 2  -KJ     Deviations/Abnormal Patterns (Gait) gait speed decreased;stride length decreased  -KJ     Bilateral Gait Deviations forward flexed posture  -KJ       Row Name 01/11/24 1005          Wound 01/09/24 1108 Left lower leg    Wound - Properties Group Placement Date: 01/09/24  -CM Placement Time: 1108  -CM Side: Left  -CM Orientation: lower  -CM Location: leg  -CM    Dressing Appearance intact  -KJ     Drainage Amount none  -KJ     Care, Wound fernando boot  unna boot and coban only; no kerlex pt is not having any drainage  -KJ     Dressing Care dressing changed  opticel AG over small scab  -KJ     Retired Wound - Properties Group Placement Date: 01/09/24  -CM Placement Time: 1108  -CM Side: Left  -CM Orientation: lower  -CM Location: leg  -CM    Retired Wound - Properties Group Date first assessed: 01/09/24   -CM Time first assessed: 1108  -CM Side: Left  -CM Location: leg  -CM      Row Name 01/11/24 1005          Wound 01/09/24 1109 Right lower leg    Wound - Properties Group Placement Date: 01/09/24  -CM Placement Time: 1109  -CM Side: Right  -CM Orientation: lower  -CM Location: leg  -CM    Dressing Appearance intact  -KJ     Drainage Amount none  -KJ     Care, Wound fernando boot  unna boot and coban only due to no drainage.  -KJ     Dressing Care dressing changed  opticel AG over small scab  -KJ     Retired Wound - Properties Group Placement Date: 01/09/24  -CM Placement Time: 1109  -CM Side: Right  -CM Orientation: lower  -CM Location: leg  -CM    Retired Wound - Properties Group Date first assessed: 01/09/24  -CM Time first assessed: 1109  -CM Side: Right  -CM Location: leg  -CM      Row Name 01/11/24 1005          Positioning and Restraints    Pre-Treatment Position in bed  -KJ     Post Treatment Position chair  -KJ     In Bed call light within reach  -KJ               User Key  (r) = Recorded By, (t) = Taken By, (c) = Cosigned By      Initials Name Provider Type    Iram Jones, PTA Physical Therapist Assistant    Alicia Bunn RN Registered Nurse                  Physical Therapy Education       Title: PT OT SLP Therapies (In Progress)       Topic: Physical Therapy (Done)       Point: Mobility training (Done)       Learning Progress Summary             Patient Acceptance, E,TB,SIERRA, MORIAH,ADITI,NR by TERRELL at 1/9/2024 1645    Comment: education re: purpose of PT/importance of activity, for safety/falls prevention, improved tech w/ breathing, for upright posture in standing, for improved tech w/ bed mob, tfers and gait w/ use of rwx   Family Acceptance, E,TB,SIERRA, MORIAH,ADITI,NR by TERRELL at 1/9/2024 1645    Comment: education re: purpose of PT/importance of activity, for safety/falls prevention, improved tech w/ breathing, for upright posture in standing, for improved tech w/ bed mob, tfers and gait w/ use of rwx                          Point: Home exercise program (Done)       Learning Progress Summary             Patient Acceptance, E,TB,D, VU,DU,NR by  at 1/9/2024 1645    Comment: education re: purpose of PT/importance of activity, for safety/falls prevention, improved tech w/ breathing, for upright posture in standing, for improved tech w/ bed mob, tfers and gait w/ use of rwx   Family Acceptance, E,TB,D, VU,DU,NR by  at 1/9/2024 1645    Comment: education re: purpose of PT/importance of activity, for safety/falls prevention, improved tech w/ breathing, for upright posture in standing, for improved tech w/ bed mob, tfers and gait w/ use of rwx                         Point: Precautions (Done)       Learning Progress Summary             Patient Acceptance, E,TB,D, VU,DU,NR by  at 1/9/2024 1645    Comment: education re: purpose of PT/importance of activity, for safety/falls prevention, improved tech w/ breathing, for upright posture in standing, for improved tech w/ bed mob, tfers and gait w/ use of rwx   Family Acceptance, E,TB,D, VU,DU,NR by  at 1/9/2024 1645    Comment: education re: purpose of PT/importance of activity, for safety/falls prevention, improved tech w/ breathing, for upright posture in standing, for improved tech w/ bed mob, tfers and gait w/ use of rwx                                         User Key       Initials Effective Dates Name Provider Type Discipline     08/02/18 -  Didi Conner, PT Physical Therapist PT                    Recommendation and Plan  Anticipated Discharge Disposition (PT): skilled nursing facility  Planned Therapy Interventions (PT): balance training, bed mobility training, gait training, home exercise program, postural re-education, patient/family education, ROM (range of motion), strengthening, transfer training, wound care, other (see comments) (safety/fall prevention, skin protection/pressure relief, edema mngmt)  Therapy Frequency (PT): 2 times/day  Plan of Care Reviewed  With: patient   Progress: improving       Progress: improving  Outcome Evaluation: PT tx completed. Pt c/o unna boots feeling too tight. They were both replaced this am. Will check daily and change PRN or on the 18th. Rolando bed mobility, Rolando sit<>stand, amb 15' x 2 with r wx Rolando. Requires sitting rest due to fatigue. Recommend SNF for cont PT  Plan of Care Reviewed With: patient       Outcome Measures       Row Name 01/11/24 1100 01/10/24 1500          How much help from another person do you currently need...    Turning from your back to your side while in flat bed without using bedrails? 3  -KJ 3  -KJ     Moving from lying on back to sitting on the side of a flat bed without bedrails? 3  -KJ 3  -KJ     Moving to and from a bed to a chair (including a wheelchair)? 3  -KJ 3  -KJ     Standing up from a chair using your arms (e.g., wheelchair, bedside chair)? 3  -KJ 3  -KJ     Climbing 3-5 steps with a railing? 3  -KJ 3  -KJ     To walk in hospital room? 3  -KJ 3  -KJ     AM-PAC 6 Clicks Score (PT) 18  -KJ 18  -KJ     Highest Level of Mobility Goal 6 --> Walk 10 steps or more  -KJ 6 --> Walk 10 steps or more  -KJ        Functional Assessment    Outcome Measure Options AM-PAC 6 Clicks Basic Mobility (PT)  -KJ AM-PAC 6 Clicks Basic Mobility (PT)  -KJ               User Key  (r) = Recorded By, (t) = Taken By, (c) = Cosigned By      Initials Name Provider Type    Iram Jones PTA Physical Therapist Assistant                      Time Calculation   PT Charges       Row Name 01/11/24 1113             Time Calculation    Start Time 1005  -KJ      Stop Time 1110  -KJ      Time Calculation (min) 65 min  -KJ      PT Received On 01/11/24  -KJ      PT Goal Re-Cert Due Date 01/20/24  -KJ         Time Calculation- PT    Total Timed Code Minutes- PT 65 minute(s)  -KJ                User Key  (r) = Recorded By, (t) = Taken By, (c) = Cosigned By      Initials Name Provider Type    Iram Jones PTA Physical Therapist  Assistant                      Therapy Charges for Today       Code Description Service Date Service Provider Modifiers Qty    98624465792 HC PT THER PROC EA 15 MIN 1/10/2024 Iram Kirkpatrick, PTA GP 1    36521689092 HC PT THERAPEUTIC ACT EA 15 MIN 1/10/2024 Iram Kirkpatrick, PTA GP 1    94757971454 HC GAIT TRAINING EA 15 MIN 1/10/2024 Iram Kirkpatrick, PTA GP 1    86697647629 HC GAIT TRAINING EA 15 MIN 1/11/2024 Iram Kirkpatrick, PTA GP 1    93264060564 HC PT THERAPEUTIC ACT EA 15 MIN 1/11/2024 Iram Kirkpatrick, PTA GP 1    85831166004 HC PT STAPPING UNNA BOOT 1/11/2024 Iram Kirkpatrick, PTA GP 2              PT G-Codes  Outcome Measure Options: AM-PAC 6 Clicks Basic Mobility (PT)  AM-PAC 6 Clicks Score (PT): 18  AM-PAC 6 Clicks Score (OT): 16       Iram Kirkpatrick PTA  1/11/2024

## 2024-01-11 NOTE — PLAN OF CARE
Goal Outcome Evaluation:  Plan of Care Reviewed With: patient        Progress: improving  Outcome Evaluation: PT/OT CONT. TO WORK WITH PT. ISOLATION OBSERVED. PURE WICK INTACT. ROOM AIR.  FOLLOWING FOR DISCHARGE PLANNING. NO REQUEST FOR PRN PAIN MED. NO DISTRESS NOTED.

## 2024-01-11 NOTE — THERAPY TREATMENT NOTE
Acute Care - Physical Therapy Treatment Note  [facility]     Patient Name: Brenda Sneed  : 1950  MRN: [id number]  Today's Date: 2024      Visit Dx:     ICD-10-CM ICD-9-CM   1. Lymphedema  I89.0 457.1   2. Acute UTI  N39.0 599.0   3. CHRISTINE (acute kidney injury)  N17.9 584.9   4. Hyponatremia  E87.1 276.1   5. Fall, initial encounter  W19.XXXA E888.9   6. Injury of left shoulder, initial encounter  S49.92XA 959.2   7. Chronic edema  R60.9 782.3   8. Impaired functional mobility and activity tolerance [Z74.09]  Z74.09 V49.89     Patient Active Problem List   Diagnosis    Spinal stenosis, lumbar    Secondary osteoarthritis of left shoulder due to rotator cuff arthropathy    Cellulitis of right lower extremity    HTN (hypertension)    Obesity, Class III, BMI 40-49.9 (morbid obesity)    Anxiety associated with depression    Traumatic rhabdomyolysis    Traumatic rhabdomyolysis, initial encounter    Laceration of left lower extremity    Cellulitis    Cellulitis of left lower extremity    Thrombocytopenia    Acute cystitis with hematuria    Sepsis    Candidiasis of skin    Acquired hypothyroidism    CHRISTINE (acute kidney injury)    Chronic pain syndrome    Hyponatremia    Chronic kidney disease, stage 3a     Past Medical History:   Diagnosis Date    Arthritis     Bronchitis     Cataract     right eye , cornea implant    Chronic back pain     Depression     Disease of thyroid gland     Edema     lower extremities    Hypertension     Kidney stones     Lymphedema      Past Surgical History:   Procedure Laterality Date    BACK SURGERY       ([place])2013     CHOLECYSTECTOMY OPEN      CORNEAL TRANSPLANT Right     INCISION AND DRAINAGE ABSCESS Left 2023    Procedure: INCISION AND DRAINAGE ABSCESS left leg;  Surgeon: Sarita Vazquez MD;  Location: [facility];  Service: General;  Laterality: Left;    LACERATION REPAIR Left 2023    Procedure: washout and closure left leg wound ;  Surgeon: George  Sarita CARLISLE MD;  Location:  [facility];  Service: General;  Laterality: Left;    LUMBAR FUSION Left 8/2/2017    Procedure: LEFT LUMBAR LATERAL INTERBODY FUSION WITH INSTRUMENTATION  L1-2;  Surgeon: MARCELINO Wright MD;  Location:  [facility];  Service:     NECK SURGERY  2010    [place]    TOTAL HIP ARTHROPLASTY Right 2014    DR. KENDRICK     TOTAL KNEE ARTHROPLASTY Right 2000    TOTAL KNEE ARTHROPLASTY Left 2002    TOTAL SHOULDER ARTHROPLASTY W/ DISTAL CLAVICLE EXCISION Left 6/1/2020    Procedure: LEFT REVERSE TOTAL SHOULDER REPLACEMENT;  Surgeon: Saravanan Calvin MD;  Location:  [facility];  Service: Orthopedics;  Laterality: Left;     PT Assessment (last 12 hours)       PT Evaluation and Treatment       Row Name 01/11/24 1401 01/11/24 1005       Physical Therapy Time and Intention    Subjective Information no complaints  -KJ no complaints  -KJ    Document Type therapy note (daily note)  -KJ therapy note (daily note)  -KJ    Mode of Treatment physical therapy  -KJ physical therapy  -KJ    Patient Effort good  -KJ good  -KJ    Comment -- changed B LE unna boots; she felt like they were too tight  -KJ      Row Name 01/11/24 1401 01/11/24 1005       General Information    Existing Precautions/Restrictions fall  -KJ fall  -KJ      Row Name 01/11/24 1401 01/11/24 1005       Pain    Pretreatment Pain Rating 4/10  -KJ 4/10  -KJ    Posttreatment Pain Rating 4/10  -KJ 4/10  -KJ    Pain Location - Side/Orientation Bilateral  -KJ Bilateral  -KJ    Pain Location lower  -KJ lower  -KJ    Pain Location - extremity  -KJ extremity  -KJ      Row Name 01/11/24 1401 01/11/24 1005       Bed Mobility    Supine-Sit [place] (Bed Mobility) -- minimum assist (75% patient effort);verbal cues  -KJ    Sit-Supine [place] (Bed Mobility) verbal cues;moderate assist (50% patient effort)  -KJ --      Row Name 01/11/24 1401 01/11/24 1005       Sit-Stand Transfer    Sit-Stand [place] (Transfers) minimum assist (75% patient effort);verbal cues   -KJ minimum assist (75% patient effort);verbal cues  -KJ    Assistive Device (Sit-Stand Transfers) walker, front-wheeled  -KJ walker, front-wheeled  -KJ      Row Name 01/11/24 1401 01/11/24 1005       Stand-Sit Transfer    Stand-Sit [place] (Transfers) verbal cues;contact guard  -KJ verbal cues;contact guard  -KJ    Assistive Device (Stand-Sit Transfers) walker, front-wheeled  -KJ walker, front-wheeled  -KJ      Row Name 01/11/24 1401 01/11/24 1005       Toilet Transfer    Type (Toilet Transfer) sit-stand;stand-sit  -KJ sit-stand;stand-sit  -KJ    [place] Level (Toilet Transfer) verbal cues;contact guard;minimum assist (75% patient effort)  -KJ verbal cues;contact guard;minimum assist (75% patient effort)  -KJ      Row Name 01/11/24 1401 01/11/24 1005       Gait/Stairs (Locomotion)    [place] Level (Gait) minimum assist (75% patient effort)  -KJ minimum assist (75% patient effort)  -KJ    Assistive Device (Gait) walker, front-wheeled  -KJ walker, front-wheeled  -KJ    Distance in Feet (Gait) 15' x 2(to BR)  -KJ 15' x 2  -KJ    Deviations/Abnormal Patterns (Gait) gait speed decreased;stride length decreased  -KJ gait speed decreased;stride length decreased  -KJ    Bilateral Gait Deviations forward flexed posture  -KJ forward flexed posture  -KJ      Row Name 01/11/24 1401 01/11/24 1005       Wound 01/09/24 1108 Left lower leg    Wound - Properties Group Placement Date: 01/09/24  -CM Placement Time: 1108  -CM Side: Left  -CM Orientation: lower  -CM Location: leg  -CM    Dressing Appearance -- intact  -KJ    Drainage Amount none  -KJ none  -KJ    Care, Wound -- fernando boot  unna boot and coban only; no kerlex pt is not having any drainage  -KJ    Dressing Care -- dressing changed  opticel AG over small scab  -KJ    Retired Wound - Properties Group Placement Date: 01/09/24  -CM Placement Time: 1108  -CM Side: Left  -CM Orientation: lower  -CM Location: leg  -CM    Retired Wound - Properties Group Date first  assessed: 01/09/24  -CM Time first assessed: 1108  -CM Side: Left  -CM Location: leg  -CM      Row Name 01/11/24 1401 01/11/24 1005       Wound 01/09/24 1109 Right lower leg    Wound - Properties Group Placement Date: 01/09/24  -CM Placement Time: 1109  -CM Side: Right  -CM Orientation: lower  -CM Location: leg  -CM    Dressing Appearance -- intact  -KJ    Drainage Amount none  -KJ none  -KJ    Care, Wound -- fernando boot  unna boot and coban only due to no drainage.  -KJ    Dressing Care -- dressing changed  opticel AG over small scab  -KJ    Retired Wound - Properties Group Placement Date: 01/09/24  -CM Placement Time: 1109  -CM Side: Right  -CM Orientation: lower  -CM Location: leg  -CM    Retired Wound - Properties Group Date first assessed: 01/09/24  -CM Time first assessed: 1109  -CM Side: Right  -CM Location: leg  -CM      Row Name 01/11/24 1401 01/11/24 1005       Positioning and Restraints    Pre-Treatment Position sitting in chair/recliner  -KJ in bed  -KJ    Post Treatment Position bed  -KJ chair  -KJ    In Bed -- call light within reach  -KJ              User Key  (r) = Recorded By, (t) = Taken By, (c) = Cosigned By      Initials Name Provider Type    Iram Jones, PTA Physical Therapist Assistant    Alicia Bunn RN Registered Nurse                    Physical Therapy Education       Title: PT OT SLP Therapies (In Progress)       Topic: Physical Therapy (Done)       Point: Mobility training (Done)       Learning Progress Summary             Patient Acceptance, E,SIERRA TERAN VU, DU,NR by TERRELL at 1/9/2024 1645    Comment: education re: purpose of PT/importance of activity, for safety/falls prevention, improved tech w/ breathing, for upright posture in standing, for improved tech w/ bed mob, tfers and gait w/ use of rwx   Family Acceptance, EPARUL D, VU, DU,NR by TERRELL at 1/9/2024 1645    Comment: education re: purpose of PT/importance of activity, for safety/falls prevention, improved tech w/ breathing, for  upright posture in standing, for improved tech w/ bed mob, tfers and gait w/ use of rwx                         Point: Home exercise program (Done)       Learning Progress Summary             Patient Acceptance, E,TB,D, VU,DU,NR by  at 1/9/2024 1645    Comment: education re: purpose of PT/importance of activity, for safety/falls prevention, improved tech w/ breathing, for upright posture in standing, for improved tech w/ bed mob, tfers and gait w/ use of rwx   Family Acceptance, E,TB,D, VU,DU,NR by  at 1/9/2024 1645    Comment: education re: purpose of PT/importance of activity, for safety/falls prevention, improved tech w/ breathing, for upright posture in standing, for improved tech w/ bed mob, tfers and gait w/ use of rwx                         Point: Precautions (Done)       Learning Progress Summary             Patient Acceptance, E,TB,D, VU,DU,NR by  at 1/9/2024 1645    Comment: education re: purpose of PT/importance of activity, for safety/falls prevention, improved tech w/ breathing, for upright posture in standing, for improved tech w/ bed mob, tfers and gait w/ use of rwx   Family Acceptance, E,TB,D, VU,DU,NR by  at 1/9/2024 1645    Comment: education re: purpose of PT/importance of activity, for safety/falls prevention, improved tech w/ breathing, for upright posture in standing, for improved tech w/ bed mob, tfers and gait w/ use of rwx                                         User Key       Initials Effective Dates Name Provider Type Discipline     08/02/18 -  Didi Conner, PT Physical Therapist PT                  PT Recommendation and Plan     Plan of Care Reviewed With: patient  Progress: improving  Outcome Evaluation: PT tx completed. Pt c/o unna boots feeling too tight. They were both replaced this am. Will check daily and change PRN or on the 18th. Rolando bed mobility, [place] sit<>stand, amb 15' x 2 with r wx [place]. Requires sitting rest due to fatigue. Recommend SNF for cont PT   Outcome  Measures       Row Name 01/11/24 1100 01/10/24 1500          How much help from another person do you currently need...    Turning from your back to your side while in flat bed without using bedrails? 3  -KJ 3  -KJ     Moving from lying on back to sitting on the side of a flat bed without bedrails? 3  -KJ 3  -KJ     Moving to and from a bed to a chair (including a wheelchair)? 3  -KJ 3  -KJ     Standing up from a chair using your arms (e.g., wheelchair, bedside chair)? 3  -KJ 3  -KJ     Climbing 3-5 steps with a railing? 3  -KJ 3  -KJ     To walk in hospital room? 3  -KJ 3  -KJ     AM-PAC 6 Clicks Score (PT) 18  -KJ 18  -KJ     Highest Level of Mobility Goal 6 --> Walk 10 steps or more  -KJ 6 --> Walk 10 steps or more  -KJ        Functional Assessment    Outcome Measure Options AM-PAC 6 Clicks Basic Mobility (PT)  -KJ AM-PAC 6 Clicks Basic Mobility (PT)  -KJ               User Key  (r) = Recorded By, (t) = Taken By, (c) = Cosigned By      Initials Name Provider Type    Iram Jones PTA Physical Therapist Assistant                     Time Calculation:    PT Charges       Row Name 01/11/24 1441 01/11/24 1113          Time Calculation    Start Time 1401  -KJ 1005  -KJ     Stop Time 1442  -KJ 1110  -KJ     Time Calculation (min) 41 min  -KJ 65 min  -KJ     PT Received On -- 01/11/24  -KJ     PT Goal Re-Cert Due Date -- 01/20/24  -KJ        Time Calculation- PT    Total Timed Code Minutes- PT 41 minute(s)  -KJ 65 minute(s)  -KJ               User Key  (r) = Recorded By, (t) = Taken By, (c) = Cosigned By      Initials Name Provider Type    Iram Jones PTA Physical Therapist Assistant                  Therapy Charges for Today       Code Description Service Date Service Provider Modifiers Qty    18919618659 HC PT THER PROC EA 15 MIN 1/10/2024 Iram Kirkpatrick, PTA GP 1    55278775423 HC PT THERAPEUTIC ACT EA 15 MIN 1/10/2024 Iram Kirkpatrick PTA GP 1    00927522705 HC GAIT TRAINING EA 15 MIN 1/10/2024  Iram Kirkpatrick, PTA GP 1    59946405706 HC GAIT TRAINING EA 15 MIN 1/11/2024 Iram Kirkpatrick, PTA GP 1    63902701505 HC PT THERAPEUTIC ACT EA 15 MIN 1/11/2024 Iram Kirkpatrick, PTA GP 1    02894335283 HC PT STAPPING UNNA BOOT 1/11/2024 Iram Kirkpatrick, PTA GP 2    79430246942 HC GAIT TRAINING EA 15 MIN 1/11/2024 Iram Kirkpatrick, PTA GP 1    96576300383 HC PT THER PROC EA 15 MIN 1/11/2024 Iram Kirkpatrick, PTA GP 1    42540321009 HC PT THERAPEUTIC ACT EA 15 MIN 1/11/2024 Iram Kirkpatrick, PTA GP 1            PT G-Codes  Outcome Measure Options: AM-PAC 6 Clicks Basic Mobility (PT)  AM-PAC 6 Clicks Score (PT): 18  AM-PAC 6 Clicks Score (OT): 16    Iram Kirkpatrick PTA  1/11/2024

## 2024-01-11 NOTE — PROGRESS NOTES
Discussed with Dr. Block.  Feels she is ready for discharge home on no antimicrobial therapy from ID standpoint.  Would be happy to reassess if any recurrent symptoms or problems.  She can otherwise follow-up with infectious diseases as needed.  CBL      Clint Johnson MD

## 2024-01-11 NOTE — DISCHARGE PLACEMENT REQUEST
"Eyad Gurrola (73 y.o. Female)       Date of Birth   1950    Social Security Number       Address   [address] [address]    Home Phone   [phone]    MRN   [id number]       [facility]    Marital Status                               Admission Date   1/5/24    Admission Type   Emergency    Admitting Provider   Sachin Dia MD    Attending Provider   Sachin Dia MD    Department, Room/Bed   [facility] 3C, 389/1       Discharge Date       Discharge Disposition       Discharge Destination                                 Attending Provider: Sachin Dia MD    Allergies: Codeine, Amoxicillin, Levaquin [Levofloxacin], Benadryl [Diphenhydramine]    Isolation: Contact   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), 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)   Code Status: CPR    Ht: 160 cm (63\")   Wt: 101 kg (223 lb 1.7 oz)    Admission Cmt: None   Principal Problem: CHRISTINE (acute kidney injury) [N17.9]                   Active Insurance as of 1/5/2024       Primary Coverage       Payor Plan Insurance Group Employer/Plan Group    HUMANA MEDICARE REPLACEMENT HUMANA MEDICARE REPLACEMENT [id number]       Payor Plan Address Payor Plan Phone Number Payor Plan Fax Number Effective Dates    [address] [address] [phone]  1/1/2023 - None Entered    Formerly [facility] [id number]         Subscriber Name Subscriber Birth Date Member ID       EYAD GURROLA 1950 [id number]                     Emergency Contacts        (Rel.) Home Phone Work Phone Mobile Phone    Nella (Daughter) [phone] -- [phone]    ODELL NELSON (Relative) -- -- [phone]            Clint Moctezuma MD   Physician  Infectious Disease  Progress Notes      Signed  Date of Service:  01/11/24 0955  Creation Time:  01/11/24 0955     Signed        Discussed with Dr. Block.  Feels she is ready for discharge home on no antimicrobial therapy from ID standpoint.  Would be happy to reassess if any " "recurrent symptoms or problems.  She can otherwise follow-up with infectious diseases as needed.  CBL        MD Umair Campbell Samuel F, MD   Physician  Medicine  Progress Notes      Signed  Date of Service:  01/11/24 0725  Creation Time:  01/11/24 0725     Signed        Expand All Collapse All[]Expand All by Default       Daily Progress Note  Brenda Sneed  MRN: [id number] LOS: 6    Admit Date: 1/5/2024 1/11/2024 07:25 CST     Subjective:            Interval History:    Reviewed overnight events and nursing notes.      Doing well this morning.  No complaints.  Denies urinary symptoms.  Having bowel movements.     ROS:  Review of Systems   Constitutional:  Negative for chills and fever.   Respiratory:  Negative for cough, chest tightness and shortness of breath.    Cardiovascular:  Negative for chest pain.   Gastrointestinal:  Negative for abdominal pain, diarrhea, nausea and vomiting.         DIET:  Diet: Renal Diets; Low Sodium (2-3g), Low Potassium, Low Phosphorus; Texture: Regular Texture (IDDSI 7); Fluid Consistency: Thin (IDDSI 0)     Medications:   albuterol, 2.5 mg, Nebulization, TID  apixaban, 5 mg, Oral, Q12H  ceftazidime-avibactam (AVYCAZ) in NS IVPB, 1.25 g, Intravenous, Q8H  DULoxetine, 60 mg, Oral, BID  guaifenesin-dextromethorphan, 2 tablet, Oral, Q12H  levothyroxine, 100 mcg, Oral, Daily  metoprolol succinate XL, 25 mg, Oral, Daily  Morphine, 15 mg, Oral, Q12H  nystatin, 1 application , Topical, BID  pramipexole, 0.75 mg, Oral, Nightly  senna-docusate sodium, 2 tablet, Oral, BID  sodium chloride, 10 mL, Intravenous, Q12H  sodium chloride, 10 mL, Intravenous, Q12H              Objective:      Vitals: /47 (BP Location: Left arm, Patient Position: Lying)   Pulse 72   Temp 98.2 °F (36.8 °C) (Oral)   Resp 18   Ht 160 cm (63\")   Wt 101 kg (223 lb 1.7 oz)   SpO2 98%   BMI 39.52 kg/m²    Intake/Output Summary (Last 24 hours) at 1/11/2024 0725  Last data filed at " 2024 0503      Gross per 24 hour   Intake 580 ml   Output 600 ml   Net -20 ml    Temp (24hrs), Av.9 °F (36.6 °C), Min:97.7 °F (36.5 °C), Max:98.2 °F (36.8 °C)     Glucose:        Lab Results   Component Value Date     POCGLU 100 (H) 2021      Physical Examination:   Physical Exam  Constitutional:       General: She is not in acute distress.     Appearance: She is obese. She is ill-appearing. She is not toxic-appearing.   Cardiovascular:      Rate and Rhythm: Normal rate and regular rhythm.      Pulses: Normal pulses.      Heart sounds: Normal heart sounds. No murmur heard.  Pulmonary:      Effort: Pulmonary effort is normal. No respiratory distress.      Breath sounds: Normal breath sounds. No stridor. No wheezing or rales.   Abdominal:      General: Abdomen is flat. Bowel sounds are normal. There is no distension.      Palpations: Abdomen is soft.      Tenderness: There is no abdominal tenderness.   Musculoskeletal:      Right lower leg: Edema present.      Left lower leg: Edema present.   Neurological:      Mental Status: She is alert.            Labs:  Lab Results (last 24 hours)         Procedure Component Value Units Date/Time     Blood Culture - Blood, Arm, Right [[id number]]  (Normal) Collected: 24     Specimen: Blood from Arm, Right Updated: 01/10/24 2100       Blood Culture No growth at 5 days     Blood Culture - Blood, Arm, Right [[id number]]  (Normal) Collected: 24     Specimen: Blood from Arm, Right Updated: 01/10/24 2100       Blood Culture No growth at 5 days                Imaging:  Radiology results from the last 24 hours:   No radiology results from the last 24 hrs            Assessment and Plan:      Primary Problem:  CHRISTINE (acute kidney injury)     Hospital Problem list:    CHRISTINE (acute kidney injury)    HTN (hypertension)    Obesity, Class III, BMI 40-49.9 (morbid obesity)    Anxiety associated with depression    Chronic pain syndrome    Hyponatremia    Chronic  kidney disease, stage 3a        Assessment: Chronically ill 73-year-old female who presents with sepsis and acute kidney injury.     Plan:     Acute kidney injury  Resolved.  No changes.     Possible CRE UTI  Difficult to tell if her presenting symptoms are related to a UTI or not.  At this point I do not really suspect they are.  Dr. Johnsno following.  Urine culture is pending.     At this point she is probably stable for discharge unless ID has other plans for antimicrobials.  She is being referred out for skilled nursing facility so hopefully she can have a spot tomorrow or over the weekend.     Discharge planning:   Skilled nursing facility     Reviewed treatment plans with the patient and/or family.      Code Status:       Code Status and Medical Interventions:   Ordered at: 01/06/24 0626     Code Status (Patient has no pulse and is not breathing):     CPR (Attempt to Resuscitate)     Medical Interventions (Patient has pulse or is breathing):     Full Support         Electronically signed by Sanjiv Block MD on 1/11/2024 at 07:25 CST                   Sachin Dia MD   Physician  Medicine  H&P      Signed  Date of Service:  01/06/24 0713  Creation Time:  01/06/24 0713     Signed        Expand All Collapse All[]Expand All by Default            History and Physical        CHIEF COMPLAINT: I do not feel well     Reason for Admission: Urinary tract infection present on admission/hyponatremia/chronic kidney disease with acute kidney injury     History Obtained From: Patient/ER records/office     HISTORY OF PRESENT ILLNESS:       The patient is a 73 y.o. female with significant past medical history of chronic pain syndrome with underlying chronic kidney disease hypertension and morbid obesity who presents with worsening of creatinine and decreased oral intake for the past 3 to 4 days.  Patient also describes cough cold congestion for about the same duration.  Patient has not been able to get out of bed.   She lives in a 1 bedroom apartment and cannot even get to the restroom.  With all the above she is brought into the emergency room with a significant increase in her creatinine to 3.3.  She had a positive urinalysis for infection as well as coughing yellowish-green sputum.  Chest x-ray and respiratory panel were unremarkable nonetheless we elected to admit Ms. Brenda Townsend for further investigative studies and delineation of care.  Of note her sodium was 125 and it has been low in the past and she has similar type of symptoms.  No other precipitating or relieving factors noted, her pain which is chronic was 4-5 out of 10.     Past Medical History:    Medical History        Past Medical History:   Diagnosis Date    Arthritis      Bronchitis      Cataract       right eye , cornea implant    Chronic back pain      Depression      Disease of thyroid gland      Edema       lower extremities    Hypertension      Kidney stones      Lymphedema              Past Surgical History:    Surgical History         Past Surgical History:   Procedure Laterality Date    BACK SURGERY         2010 ([facility]), 2013     CHOLECYSTECTOMY OPEN        CORNEAL TRANSPLANT Right 1999    INCISION AND DRAINAGE ABSCESS Left 4/19/2023     Procedure: INCISION AND DRAINAGE ABSCESS left leg;  Surgeon: Sarita Vazquez MD;  Location:  [facility] OR;  Service: General;  Laterality: Left;    LACERATION REPAIR Left 4/7/2023     Procedure: washout and closure left leg wound ;  Surgeon: Sarita Vazquez MD;  Location:  [facility] OR;  Service: General;  Laterality: Left;    LUMBAR FUSION Left 8/2/2017     Procedure: LEFT LUMBAR LATERAL INTERBODY FUSION WITH INSTRUMENTATION  L1-2;  Surgeon: MARCELINO Wright MD;  Location:  [facility] OR;  Service:     NECK SURGERY   2010     [facility]    TOTAL HIP ARTHROPLASTY Right 2014     DR. KENDRICK     TOTAL KNEE ARTHROPLASTY Right 2000    TOTAL KNEE ARTHROPLASTY Left 2002    TOTAL SHOULDER ARTHROPLASTY W/ DISTAL CLAVICLE EXCISION  Left 6/1/2020     Procedure: LEFT REVERSE TOTAL SHOULDER REPLACEMENT;  Surgeon: Saravanan Calvin MD;  Location: [facility];  Service: Orthopedics;  Laterality: Left;            Medications Prior to Admission:    Prescriptions Prior to Admission           Medications Prior to Admission   Medication Sig Dispense Refill Last Dose    albuterol (PROVENTIL) (2.5 MG/3ML) 0.083% nebulizer solution Take 2.5 mg by nebulization 3 (Three) Times a Day.          apixaban (ELIQUIS) 5 MG tablet tablet Take 1 tablet by mouth Every 12 (Twelve) Hours. Indications: Atrial Fibrillation 180 tablet 1      azelastine (ASTELIN) 0.1 % nasal spray 2 sprays into the nostril(s) as directed by provider 2 (Two) Times a Day. Use in each nostril as directed          cyclobenzaprine (FLEXERIL) 10 MG tablet Take 1 tablet by mouth 3 (Three) Times a Day As Needed for Muscle Spasms. 30 tablet 0      docusate sodium (COLACE) 100 MG capsule Take 1 capsule by mouth 2 (Two) Times a Day As Needed for Constipation.          DULoxetine (CYMBALTA) 60 MG capsule Take 1 capsule by mouth 2 (Two) Times a Day. Indications: Musculoskeletal Pain          furosemide (LASIX) 40 MG tablet Take 1 tablet by mouth Daily As Needed (edema). Indications: Edema          levothyroxine (SYNTHROID, LEVOTHROID) 100 MCG tablet Take 1 tablet by mouth Daily. Indications: Underactive Thyroid          metoprolol succinate XL (Toprol XL) 25 MG 24 hr tablet Take 1 tablet by mouth Daily. 90 tablet 1      nystatin (MYCOSTATIN) 664964 UNIT/GM powder Apply 1 application  topically to the appropriate area as directed 2 (Two) Times a Day. Indications: Candida Infection of Skin and Mouth or Vagina          oxybutynin XL (DITROPAN-XL) 5 MG 24 hr tablet Take 1 tablet by mouth Every Night. Take at night  Indications: Urinary Incontinence          pramipexole (MIRAPEX) 0.75 MG tablet Take 1 tablet by mouth Every Night. Indications: Restless Leg Syndrome 90 tablet 1              Allergies:  Codeine,  Amoxicillin, Levaquin [levofloxacin], and Benadryl [diphenhydramine]     Social History:   Social History   Social History           Socioeconomic History    Marital status:    Tobacco Use    Smoking status: Never    Smokeless tobacco: Never   Vaping Use    Vaping Use: Never used   Substance and Sexual Activity    Alcohol use: Not Currently    Drug use: No    Sexual activity: Defer            Family History:         Family History   Problem Relation Age of Onset    Breast cancer Neg Hx           REVIEW OF SYSTEMS:     CONSTITUTIONAL:  Negative for anorexia, chills, fevers, night sweats and weight loss, multiple somatic body complaints.  Patient has been relatively medically stable until illness outlined in HPI  EYES:  negative for eye dryness, icterus and redness, no significant changes in vision  HEENT:   negative for dental problems, epistaxis or sinus congestion , no history of facial trauma or difficulty swallowing  RESPIRATORY: See HPI  CARDIOVASCULAR: No history of chest pain, dyspnea, exertional chest pressure/discomfort, irregular heart beat, or PND  GASTROINTESTINAL:  negative for abdominal pain, nausea or vomiting, no history of hematemesis, jaundice, melena or rectal bleeding  MUSCULOSKELETAL:  negative for muscle weakness, myalgias and neck pain, no significant arthralgias  NEUROLOGICAL:   negative for dizziness, headaches, seizures, speech problems, tremors and vertigo, mentation has been at baseline  INTEGUMENT: negative for pruritus, rash, skin color change and skin lesion(s)            Vital Signs   Temp:  [97.3 °F (36.3 °C)-98.6 °F (37 °C)] 98 °F (36.7 °C)  Heart Rate:  [58-71] 58  Resp:  [18-20] 18  BP: ()/(41-64) 95/41            Physical Exam:  Constitutional: The patient is oriented to person, place, and time. They appear well-developed.  Able to answer questions appropriately  HEENT: Grossly normal sitting up in bed  Head: Normocephalic and atraumatic.   Eyes: Pupils are equal,  round, and reactive to light.  Extraocular muscles appear to be intact  Neck: Neck supple without masses or carotid bruit.  Cardiovascular: Regular rhythm and normal heart sounds.  No extra or lift rub or murmur appreciated  Pulmonary/Chest: Effort normal and breath sounds normal. CTAB, no appreciable rales or wheezes, cough nonproductive  Abdominal: Soft. Bowel sounds are normal. There is  no distension or tenderness appreciated. There is no rebound and no guarding.   Musculoskeletal: Normal range of motion. There is  no edema or tenderness.  No significant joint swelling  Neurological: Pt is alert and oriented to person, place, and time. They have normal reflexes. CN 2-12 appear grossly intact  Skin: Skin is warm and dry without significant rashes      Results Review:              I reviewed the patient's new imaging results and agree with the interpretation.     DATA:  Lab Results (last 24 hours)         Procedure Component Value Units Date/Time     STAT Lactic Acid, Reflex [[id number]]  (Normal) Collected: 01/05/24 2247     Specimen: Blood Updated: 01/05/24 2306       Lactate 1.5 mmol/L       Blood Culture - Blood, Arm, Right [[id number]] Collected: 01/05/24 2041     Specimen: Blood from Arm, Right Updated: 01/05/24 2054     Blood Culture - Blood, Arm, Right [[id number]] Collected: 01/05/24 2041     Specimen: Blood from Arm, Right Updated: 01/05/24 2054     Respiratory Panel PCR w/COVID-19(SARS-CoV-2) [facility] In-House, NP Swab in UTM/VTM, 2 HR TAT - Swab, Nasopharynx [[id number]]  (Normal) Collected: 01/05/24 1939     Specimen: Swab from Nasopharynx Updated: 01/05/24 2039       ADENOVIRUS, PCR Not Detected       Coronavirus 229E Not Detected       Coronavirus HKU1 Not Detected       Coronavirus NL63 Not Detected       Coronavirus OC43 Not Detected       COVID19 Not Detected       Human Metapneumovirus Not Detected       Human Rhinovirus/Enterovirus Not Detected       Influenza A PCR Not Detected        Influenza B PCR Not Detected       Parainfluenza Virus 1 Not Detected       Parainfluenza Virus 2 Not Detected       Parainfluenza Virus 3 Not Detected       Parainfluenza Virus 4 Not Detected       RSV, PCR Not Detected       Bordetella pertussis pcr Not Detected       Bordetella parapertussis PCR Not Detected       Chlamydophila pneumoniae PCR Not Detected       Mycoplasma pneumo by PCR Not Detected     Narrative:       In the setting of a positive respiratory panel with a viral infection PLUS a negative procalcitonin without other underlying concern for bacterial infection, consider observing off antibiotics or discontinuation of antibiotics and continue supportive care. If the respiratory panel is positive for atypical bacterial infection (Bordetella pertussis, Chlamydophila pneumoniae, or Mycoplasma pneumoniae), consider antibiotic de-escalation to target atypical bacterial infection.     CBC & Differential [[id number]]  (Abnormal) Collected: 01/05/24 1937     Specimen: Blood Updated: 01/05/24 2033     Narrative:       The following orders were created for panel order CBC & Differential.  Procedure                               Abnormality         Status                     ---------                               -----------         ------                     CBC Auto Differential[[id number]]        Abnormal            Edited Result - FINAL         Please view results for these tests on the individual orders.     CBC Auto Differential [[id number]]  (Abnormal) Collected: 01/05/24 1937     Specimen: Blood Updated: 01/05/24 2033       WBC 12.30 10*3/mm3         RBC 3.36 10*6/mm3         Hemoglobin 9.4 g/dL         Hematocrit 28.8 %         MCV 85.7 fL         MCH 28.0 pg         MCHC 32.6 g/dL         RDW 16.9 %         RDW-SD 47.2 fl         MPV 9.8 fL         Platelets 142 10*3/mm3         Neutrophil % 70.3 %         Lymphocyte % 11.0 %         Monocyte % 15.6 %         Eosinophil % 2.2 %         Basophil % 0.2 %   "       Neutrophils, Absolute 8.64 10*3/mm3         Lymphocytes, Absolute 1.35 10*3/mm3         Monocytes, Absolute 1.92 10*3/mm3         Eosinophils, Absolute 0.27 10*3/mm3         Basophils, Absolute 0.03 10*3/mm3       Narrative:       Modified report. Previous result was Hemogram + Auto diff on 1/5/2024 at 2032 CST.     Procalcitonin [[id number]]  (Abnormal) Collected: 01/05/24 1937     Specimen: Blood Updated: 01/05/24 2030       Procalcitonin 4.80 ng/mL       Narrative:       As a Marker for Sepsis (Non-Neonates):     1. <0.5 ng/mL represents a low risk of severe sepsis and/or septic shock.  2. >2 ng/mL represents a high risk of severe sepsis and/or septic shock.     As a Marker for Lower Respiratory Tract Infections that require antibiotic therapy:     PCT on Admission    Antibiotic Therapy       6-12 Hrs later     >0.5                Strongly Recommended  >0.25 - <0.5        Recommended   0.1 - 0.25          Discouraged              Remeasure/reassess PCT  <0.1                Strongly Discouraged     Remeasure/reassess PCT     As 28 day mortality risk marker: \"Change in Procalcitonin Result\" (>80% or <=80%) if Day 0 (or Day 1) and Day 4 values are available. Refer to http://[email or web address]-pct-calculator.com     Change in PCT <=80%  A decrease of PCT levels below or equal to 80% defines a positive change in PCT test result representing a higher risk for 28-day all-cause mortality of patients diagnosed with severe sepsis for septic shock.     Change in PCT >80%  A decrease of PCT levels of more than 80% defines a negative change in PCT result representing a lower risk for 28-day all-cause mortality of patients diagnosed with severe sepsis or septic shock.         Comprehensive Metabolic Panel [[id number]]  (Abnormal) Collected: 01/05/24 1937     Specimen: Blood Updated: 01/05/24 2024       Glucose 125 mg/dL         BUN 62 mg/dL         Creatinine 3.28 mg/dL         Sodium 128 mmol/L         Potassium 3.4 mmol/L       "   Chloride 92 mmol/L         CO2 22.0 mmol/L         Calcium 8.7 mg/dL         Total Protein 5.8 g/dL         Albumin 2.8 g/dL         ALT (SGPT) 20 U/L         AST (SGOT) 48 U/L         Alkaline Phosphatase 164 U/L         Total Bilirubin 1.8 mg/dL         Globulin 3.0 gm/dL         A/G Ratio 0.9 g/dL         BUN/Creatinine Ratio 18.9       Anion Gap 14.0 mmol/L         eGFR 14.2 mL/min/1.73         Comment: <15 Indicative of kidney failure        Narrative:       GFR Normal >60  Chronic Kidney Disease <60  Kidney Failure <15     The GFR formula is only valid for adults with stable renal function between ages 18 and 70.     Lactic Acid, Plasma [[id number]]  (Abnormal) Collected: 01/05/24 1937     Specimen: Blood Updated: 01/05/24 2023       Lactate 2.1 mmol/L       Urinalysis With Culture If Indicated - Straight Cath [[id number]]  (Abnormal) Collected: 01/05/24 1926     Specimen: Urine from Straight Cath Updated: 01/05/24 1959       Color, UA Dark Yellow       Appearance, UA Cloudy       pH, UA 5.5       Specific Gravity, UA 1.014       Glucose, UA Negative       Ketones, UA Negative       Bilirubin, UA Negative       Blood, UA Negative       Protein, UA Negative       Leuk Esterase, UA Small (1+)       Nitrite, UA Negative       Urobilinogen, UA 1.0 E.U./dL     Narrative:       In absence of clinical symptoms, the presence of pyuria, bacteria, and/or nitrites on the urinalysis result does not correlate with infection.     Urinalysis, Microscopic Only - Straight Cath [[id number]]  (Abnormal) Collected: 01/05/24 1926     Specimen: Urine from Straight Cath Updated: 01/05/24 1959       RBC, UA 0-2 /HPF         WBC, UA 11-20 /HPF         Bacteria, UA 4+ /HPF         Squamous Epithelial Cells, UA 3-6 /HPF         Hyaline Casts, UA 0-2 /LPF         WBC Clumps, UA Moderate/2+ /HPF         Methodology Manual Light Microscopy     Urine Culture - Urine, Straight Cath [[id number]] Collected: 01/05/24 1926     Specimen: Urine from  Straight Cath Updated: 01/05/24 1959             Imaging Results (Last 24 Hours)         Procedure Component Value Units Date/Time     XR Shoulder 2+ View Left [[id number]] Collected: 01/05/24 2013       Updated: 01/05/24 2018     Narrative:       EXAMINATION: XR SHOULDER 2+ VW LEFT-  1/5/2024 8:14 PM     HISTORY: Fall with pain.     FINDINGS: 2 view exam of the left shoulder reveals the patient has  undergone reverse total shoulder arthroplasty. It appears that one of  the anchor screws may be fractured but this I feel was present on a  previous chest radiograph from September of last year. There is normal  articulation of the prosthesis. I do not see any evidence of  periprosthetic fracture. 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        Impression:       1. No acute fracture or dislocation.     This report was signed and finalized on 1/5/2024 8:15 PM by Dr. Elian Bonilla MD.        XR Elbow 3+ View Left [[id number]] Collected: 01/05/24 2013       Updated: 01/05/24 2016     Narrative:       LEFT ELBOW, 2 views 1/5/2024 6:49 PM     HISTORY: fall pain     COMPARISON: None      FINDINGS:   Frontal and lateral views of the left elbow were obtained.      There is no fracture or dislocation. No significant joint space  narrowing is noted. There is no significant joint effusion.       No gross soft tissue abnormality is visualized.         Impression:       1. Unremarkable radiographs of the left elbow.           This report was signed and finalized on 1/5/2024 8:13 PM by Dr. Elian Bonilla MD.        XR Humerus Left [[id number]] Collected: 01/05/24 2012       Updated: 01/05/24 2016     Narrative:       EXAMINATION: XR HUMERUS LEFT-  1/5/2024 8:12 PM     HISTORY: Fall with pain.     FINDINGS: 2 view exam of the left humerus reveals the patient has  undergone previous reverse left total shoulder arthroplasty. There is  normal articulation at the shoulder. No acute bony injury.        Impression:       1. No acute  fracture.     This report was signed and finalized on 1/5/2024 8:12 PM by Dr. Elian Bonilla MD.        XR Chest 1 View [[id number]] Collected: 01/1950       Updated: 01/05/24 1952     Narrative:       EXAMINATION: XR CHEST 1 VW-  1/5/2024 7:48 PM     HISTORY: Cough. HISTORY of atrial fibrillation.     FINDINGS: Upright frontal projection of the chest is compared to prior  exam of 9/30/2023. Right basilar atelectasis is present with mild  elevation of the right diaphragm. Lungs are otherwise clear. The  thoracic aorta and great vessels are tortuous.        Impression:       1.. Mild right basilar atelectasis. Lungs are otherwise clear.     This report was signed and finalized on 1/5/2024 7:49 PM by Dr. Elian Bonilla MD.                      ASSESSMENT AND PLAN:       1.     CHRISTINE (acute kidney injury)    HTN (hypertension)    Obesity, Class III, BMI 40-49.9 (morbid obesity)    Anxiety associated with depression    Chronic pain syndrome    Hyponatremia    Chronic kidney disease, stage 3a     2.   Hyponatremia-hypovolemic, gently rehydrate, salt tablets and Lasix for now recheck sodium in AM.  3.   Chronic kidney disease stage IIIa with acute kidney injury-creatinine usually 1 5, currently at 3.3.  Avoid nephrotoxic medications gently rehydrate recheck in AM.  4.   Upper respiratory/acute bronchitis-chest x-ray and pulmonary PCR panel are unremarkable, symptomatic treatment with Mucinex nebulized treatments and the Rocephin for her urinary tract infection present on admission would cover most respiratory issues.  5.  Acute cystitis/hematuria present on admission-culture urine continue with IV Rocephin for now.     Patient with chronic pain and multiple somatic body complaints which have been chronic in nature, judicious use of opioid analgesics and sedating medications especially with respiratory condition.  Encourage PT and OT up in a chair and ambulate in hallways.  Conference with nursing staff at bedside  today.        Sachin Dia MD  07:14 CST 1/6/2024                 Routing History

## 2024-01-11 NOTE — CASE MANAGEMENT/SOCIAL WORK
Continued Stay Note   Anita     Patient Name: Brenda Sneed  MRN: [id number]  Today's Date: 1/11/2024    Admit Date: 1/5/2024    Plan: SNF   Discharge Plan       Row Name 01/11/24 1125       Plan    Plan SNF    Patient/Family in Agreement with Plan yes    Plan Comments [facility] has offered a bed. Informed pt and she now also wants a referral to [facility]. Referral sent.                   Discharge Codes    No documentation.                 Expected Discharge Date and Time       Expected Discharge Date Expected Discharge Time    Jan 12, 2024               KAITLIN Dillard

## 2024-01-11 NOTE — PLAN OF CARE
Goal Outcome Evaluation:  Plan of Care Reviewed With: patient        Progress: no change  Outcome Evaluation: MANDEEP boots to bilat. lower extremities in place;  purewick in place;  contact isolation maintained;  fall risk;  PT/OT ;  renal and lo sodium diet;  Infectious Dz is on the case;  cough is frequent at times;  pt is more agreeable to rehab treatment ;  safety maintained

## 2024-01-11 NOTE — PLAN OF CARE
Goal Outcome Evaluation:  Plan of Care Reviewed With: patient        Progress: improving  Outcome Evaluation: PT tx completed. Pt c/o unna boots feeling too tight. They were both replaced this am. Will check daily and change PRN or on the 18th. Rolando bed mobility, Rolando sit<>stand, amb 15' x 2 with r wx Rolando. Requires sitting rest due to fatigue. Recommend SNF for cont PT

## 2024-01-11 NOTE — DISCHARGE PLACEMENT REQUEST
Iram Kirkpatrick, PTA   Physical Therapist Assistant  Specialty:  Physical Therapy  Therapy Treatment Note      Signed  Date of Service:  24  Creation Time:  24     Signed        Expand All Collapse All[]Expand All by Default  Acute Care - Physical Therapy Treatment Note   [place]     Patient Name: Brenda Sneed               : 1950                    MRN: [id number]  Today's Date: 2024                                  Visit Dx:   Visit Diagnosis       ICD-10-CM ICD-9-CM   1. Lymphedema  I89.0 457.1   2. Acute UTI  N39.0 599.0   3. CHRISTINE (acute kidney injury)  N17.9 584.9   4. Hyponatremia  E87.1 276.1   5. Fall, initial encounter  W19.XXXA E888.9   6. Injury of left shoulder, initial encounter  S49.92XA 959.2   7. Chronic edema  R60.9 782.3   8. Impaired functional mobility and activity tolerance [Z74.09]  Z74.09 V49.89         Problem List       Patient Active Problem List   Diagnosis    Spinal stenosis, lumbar    Secondary osteoarthritis of left shoulder due to rotator cuff arthropathy    Cellulitis of right lower extremity    HTN (hypertension)    Obesity, Class III, BMI 40-49.9 (morbid obesity)    Anxiety associated with depression    Traumatic rhabdomyolysis    Traumatic rhabdomyolysis, initial encounter    Laceration of left lower extremity    Cellulitis    Cellulitis of left lower extremity    Thrombocytopenia    Acute cystitis with hematuria    Sepsis    Candidiasis of skin    Acquired hypothyroidism    CHRISTINE (acute kidney injury)    Chronic pain syndrome    Hyponatremia    Chronic kidney disease, stage 3a         Medical History        Past Medical History:   Diagnosis Date    Arthritis      Bronchitis      Cataract       right eye , cornea implant    Chronic back pain      Depression      Disease of thyroid gland      Edema       lower extremities    Hypertension      Kidney stones      Lymphedema           Surgical History         Past Surgical History:   Procedure  Laterality Date    BACK SURGERY         2010 ([place]), 2013     CHOLECYSTECTOMY OPEN        CORNEAL TRANSPLANT Right 1999    INCISION AND DRAINAGE ABSCESS Left 4/19/2023     Procedure: INCISION AND DRAINAGE ABSCESS left leg;  Surgeon: Sarita Vazquez MD;  Location:  [facility] OR;  Service: General;  Laterality: Left;    LACERATION REPAIR Left 4/7/2023     Procedure: washout and closure left leg wound ;  Surgeon: Sarita Vazquez MD;  Location:  [facility];  Service: General;  Laterality: Left;    LUMBAR FUSION Left 8/2/2017     Procedure: LEFT LUMBAR LATERAL INTERBODY FUSION WITH INSTRUMENTATION  L1-2;  Surgeon: MARCELINO Wright MD;  Location:  [facility];  Service:     NECK SURGERY   2010     [place]    TOTAL HIP ARTHROPLASTY Right 2014     DR. KENDRICK     TOTAL KNEE ARTHROPLASTY Right 2000    TOTAL KNEE ARTHROPLASTY Left 2002    TOTAL SHOULDER ARTHROPLASTY W/ DISTAL CLAVICLE EXCISION Left 6/1/2020     Procedure: LEFT REVERSE TOTAL SHOULDER REPLACEMENT;  Surgeon: Saravanan Calvin MD;  Location:  [facility] OR;  Service: Orthopedics;  Laterality: Left;         PT Assessment (last 12 hours)            PT Evaluation and Treatment         Row Name 01/11/24 1005                 Physical Therapy Time and Intention     Subjective Information no complaints  -KJ       Document Type therapy note (daily note)  -KJ       Mode of Treatment physical therapy  -KJ       Patient Effort good  -KJ       Comment changed B LE unna boots; she felt like they were too tight  -KJ          Row Name 01/11/24 1005                 General Information     Existing Precautions/Restrictions fall  -KJ          Row Name 01/11/24 1005                 Pain     Pretreatment Pain Rating 4/10  -KJ       Posttreatment Pain Rating 4/10  -KJ       Pain Location - Side/Orientation Bilateral  -KJ       Pain Location lower  -KJ       Pain Location - extremity  -KJ          Row Name 01/11/24 1005                 Bed Mobility     Supine-Sit [place] (Bed  Mobility) minimum assist (75% patient effort);verbal cues  -KJ          Row Name 01/11/24 1005                 Sit-Stand Transfer     Sit-Stand [place] (Transfers) minimum assist (75% patient effort);verbal cues  -KJ       Assistive Device (Sit-Stand Transfers) walker, front-wheeled  -KJ          Row Name 01/11/24 1005                 Stand-Sit Transfer     Stand-Sit [place] (Transfers) verbal cues;contact guard  -KJ       Assistive Device (Stand-Sit Transfers) walker, front-wheeled  -KJ          Row Name 01/11/24 1005                 Toilet Transfer     Type (Toilet Transfer) sit-stand;stand-sit  -KJ       [place] Level (Toilet Transfer) verbal cues;contact guard;minimum assist (75% patient effort)  -KJ          Row Name 01/11/24 1005                 Gait/Stairs (Locomotion)     [place] Level (Gait) minimum assist (75% patient effort)  -KJ       Assistive Device (Gait) walker, front-wheeled  -KJ       Distance in Feet (Gait) 15' x 2  -KJ       Deviations/Abnormal Patterns (Gait) gait speed decreased;stride length decreased  -KJ       Bilateral Gait Deviations forward flexed posture  -KJ          Row Name 01/11/24 1005                 Wound 01/09/24 1108 Left lower leg     Wound - Properties Group Placement Date: 01/09/24  -CM Placement Time: 1108  -CM Side: Left  -CM Orientation: lower  -CM Location: leg  -CM     Dressing Appearance intact  -KJ       Drainage Amount none  -KJ       Care, Wound fernando boot  unna boot and coban only; no kerlex pt is not having any drainage  -KJ       Dressing Care dressing changed  opticel AG over small scab  -KJ       Retired Wound - Properties Group Placement Date: 01/09/24  -CM Placement Time: 1108  -CM Side: Left  -CM Orientation: lower  -CM Location: leg  -CM     Retired Wound - Properties Group Date first assessed: 01/09/24  -CM Time first assessed: 1108  -CM Side: Left  -CM Location: leg  -CM        Row Name 01/11/24 1005                 Wound 01/09/24 1109  Right lower leg     Wound - Properties Group Placement Date: 01/09/24  -CM Placement Time: 1109  -CM Side: Right  -CM Orientation: lower  -CM Location: leg  -CM     Dressing Appearance intact  -KJ       Drainage Amount none  -KJ       Care, Wound fernando boot  unna boot and coban only due to no drainage.  -KJ       Dressing Care dressing changed  opticel AG over small scab  -KJ       Retired Wound - Properties Group Placement Date: 01/09/24  -CM Placement Time: 1109  -CM Side: Right  -CM Orientation: lower  -CM Location: leg  -CM     Retired Wound - Properties Group Date first assessed: 01/09/24  -CM Time first assessed: 1109  -CM Side: Right  -CM Location: leg  -CM        Row Name 01/11/24 1005                 Positioning and Restraints     Pre-Treatment Position in bed  -KJ       Post Treatment Position chair  -KJ       In Bed call light within reach  -KJ                    User Key  (r) = Recorded By, (t) = Taken By, (c) = Cosigned By        Initials Name Provider Type     Iram Jones, NE Physical Therapist Assistant     Alicia Bunn, RN Registered Nurse                             Physical Therapy Education            Title: PT OT SLP Therapies (In Progress)         Topic: Physical Therapy (Done)         Point: Mobility training (Done)         Learning Progress Summary               Patient Acceptance, E,TB,SIERRA, ADITI MAJOR,NR by TERRELL at 1/9/2024 1645     Comment: education re: purpose of PT/importance of activity, for safety/falls prevention, improved tech w/ breathing, for upright posture in standing, for improved tech w/ bed mob, tfers and gait w/ use of rwx   Family Acceptance, E,TB,SIERRA, ADITI MAJOR,NR by TERRELL at 1/9/2024 1645     Comment: education re: purpose of PT/importance of activity, for safety/falls prevention, improved tech w/ breathing, for upright posture in standing, for improved tech w/ bed mob, tfers and gait w/ use of rwx                               Point: Home exercise program (Done)          Learning Progress Summary               Patient Acceptance, E,TB,D, VU,DU,NR by  at 1/9/2024 1645     Comment: education re: purpose of PT/importance of activity, for safety/falls prevention, improved tech w/ breathing, for upright posture in standing, for improved tech w/ bed mob, tfers and gait w/ use of rwx   Family Acceptance, E,TB,D, VU,DU,NR by  at 1/9/2024 1645     Comment: education re: purpose of PT/importance of activity, for safety/falls prevention, improved tech w/ breathing, for upright posture in standing, for improved tech w/ bed mob, tfers and gait w/ use of rwx                               Point: Precautions (Done)         Learning Progress Summary               Patient Acceptance, E,TB,D, VU,DU,NR by  at 1/9/2024 1645     Comment: education re: purpose of PT/importance of activity, for safety/falls prevention, improved tech w/ breathing, for upright posture in standing, for improved tech w/ bed mob, tfers and gait w/ use of rwx   Family Acceptance, E,TB,D, VU,DU,NR by  at 1/9/2024 1645     Comment: education re: purpose of PT/importance of activity, for safety/falls prevention, improved tech w/ breathing, for upright posture in standing, for improved tech w/ bed mob, tfers and gait w/ use of rwx                                                   User Key         Initials Effective Dates Name Provider Type Discipline      08/02/18 -  Didi Conner, PT Physical Therapist PT                          PT Recommendation and Plan  Plan of Care Reviewed With: patient  Progress: improving  Outcome Evaluation: PT tx completed. Pt c/o unna boots feeling too tight. They were both replaced this am. Will check daily and change PRN or on the 18th. Rolando bed mobility, Rolando sit<>stand, amb 15' x 2 with r wx Rolando. Requires sitting rest due to fatigue. Recommend SNF for cont PT    Outcome Measures         Row Name 01/11/24 1100 01/10/24 1500                  How much help from another person do you  currently need...     Turning from your back to your side while in flat bed without using bedrails? 3  -KJ 3  -KJ       Moving from lying on back to sitting on the side of a flat bed without bedrails? 3  -KJ 3  -KJ       Moving to and from a bed to a chair (including a wheelchair)? 3  -KJ 3  -KJ       Standing up from a chair using your arms (e.g., wheelchair, bedside chair)? 3  -KJ 3  -KJ       Climbing 3-5 steps with a railing? 3  -KJ 3  -KJ       To walk in hospital room? 3  -KJ 3  -KJ       AM-PAC 6 Clicks Score (PT) 18  -KJ 18  -KJ       Highest Level of Mobility Goal 6 --> Walk 10 steps or more  -KJ 6 --> Walk 10 steps or more  -KJ               Functional Assessment     Outcome Measure Options AM-PAC 6 Clicks Basic Mobility (PT)  -KJ AM-PAC 6 Clicks Basic Mobility (PT)  -KJ                       User Key  (r) = Recorded By, (t) = Taken By, (c) = Cosigned By        Initials Name Provider Type     Iram Jones PTA Physical Therapist Assistant                              Time Calculation:     PT Charges         Row Name 01/11/24 1113                       Time Calculation     Start Time 1005  -KJ         Stop Time 1110  -KJ         Time Calculation (min) 65 min  -KJ         PT Received On 01/11/24  -KJ         PT Goal Re-Cert Due Date 01/20/24  -KJ                 Time Calculation- PT     Total Timed Code Minutes- PT 65 minute(s)  -KJ                      User Key  (r) = Recorded By, (t) = Taken By, (c) = Cosigned By        Initials Name Provider Type     Iram Jones PTA Physical Therapist Assistant                       Therapy Charges for Today         Code Description Service Date Service Provider Modifiers Qty     02188157442 HC PT THER PROC EA 15 MIN 1/10/2024 Iram Kirkpatrick, PTA GP 1     33795272279 HC PT THERAPEUTIC ACT EA 15 MIN 1/10/2024 Iram Kirkpatrick, PTA GP 1     05559493419 HC GAIT TRAINING EA 15 MIN 1/10/2024 Iram Krikpatrick, PTA GP 1     01290642647 HC GAIT TRAINING EA 15 MIN  2024 Iram Kirkpatrick, PTA GP 1     36839391941 HC PT THERAPEUTIC ACT EA 15 MIN 2024 Iram Kirkpatrick, PTA GP 1     65044673654 HC PT STAPPING UNNA BOOT 2024 Iram Kirkpatrick, PTA GP 2                PT G-Codes  Outcome Measure Options: AM-PAC 6 Clicks Basic Mobility (PT)  AM-PAC 6 Clicks Score (PT): 18  AM-PAC 6 Clicks Score (OT): 16     Iram Kirkpatrick PTA               2024                                    Didi Conner, PT   Physical Therapist  Physical Therapy  Therapy Evaluation      Signed  Date of Service:  24  Creation Time:  24     Signed        Expand All Collapse All[]Expand All by Default  Patient Name: Brenda Sneed               : 1950                      MRN: [id number]                              Today's Date: 2024                                     Admit Date: 2024               Visit Dx:   Visit Diagnosis       ICD-10-CM ICD-9-CM   1. Lymphedema  I89.0 457.1   2. Acute UTI  N39.0 599.0   3. CHRISTINE (acute kidney injury)  N17.9 584.9   4. Hyponatremia  E87.1 276.1   5. Fall, initial encounter  W19.XXXA E888.9   6. Injury of left shoulder, initial encounter  S49.92XA 959.2   7. Chronic edema  R60.9 782.3   8. Impaired functional mobility and activity tolerance [Z74.09]  Z74.09 V49.89         Problem List       Patient Active Problem List   Diagnosis    Spinal stenosis, lumbar    Secondary osteoarthritis of left shoulder due to rotator cuff arthropathy    Cellulitis of right lower extremity    HTN (hypertension)    Obesity, Class III, BMI 40-49.9 (morbid obesity)    Anxiety associated with depression    Traumatic rhabdomyolysis    Traumatic rhabdomyolysis, initial encounter    Laceration of left lower extremity    Cellulitis    Cellulitis of left lower extremity    Thrombocytopenia    Acute cystitis with hematuria    Sepsis    Candidiasis of skin    Acquired hypothyroidism    CHRISTINE (acute kidney injury)    Chronic pain syndrome     Hyponatremia    Chronic kidney disease, stage 3a         Medical History        Past Medical History:   Diagnosis Date    Arthritis      Bronchitis      Cataract       right eye , cornea implant    Chronic back pain      Depression      Disease of thyroid gland      Edema       lower extremities    Hypertension      Kidney stones      Lymphedema           Surgical History         Past Surgical History:   Procedure Laterality Date    BACK SURGERY         2010 ([place]), 2013     CHOLECYSTECTOMY OPEN        CORNEAL TRANSPLANT Right 1999    INCISION AND DRAINAGE ABSCESS Left 4/19/2023     Procedure: INCISION AND DRAINAGE ABSCESS left leg;  Surgeon: Sarita Vazquez MD;  Location:  [facility];  Service: General;  Laterality: Left;    LACERATION REPAIR Left 4/7/2023     Procedure: washout and closure left leg wound ;  Surgeon: Sarita Vazquez MD;  Location:  [facility];  Service: General;  Laterality: Left;    LUMBAR FUSION Left 8/2/2017     Procedure: LEFT LUMBAR LATERAL INTERBODY FUSION WITH INSTRUMENTATION  L1-2;  Surgeon: MARCELINO Wright MD;  Location:  [facility];  Service:     NECK SURGERY   2010     [place]    TOTAL HIP ARTHROPLASTY Right 2014     DR. KENDRICK     TOTAL KNEE ARTHROPLASTY Right 2000    TOTAL KNEE ARTHROPLASTY Left 2002    TOTAL SHOULDER ARTHROPLASTY W/ DISTAL CLAVICLE EXCISION Left 6/1/2020     Procedure: LEFT REVERSE TOTAL SHOULDER REPLACEMENT;  Surgeon: Saravanan Calvin MD;  Location: [facility];  Service: Orthopedics;  Laterality: Left;           General Information         Row Name 01/09/24 1535                 Physical Therapy Time and Intention     Document Type evaluation;other (see comments)  see MAR  -TERRELL       Mode of Treatment physical therapy  -JE          Row Name 01/09/24 1535                 General Information     Patient Profile Reviewed yes  -JE       Prior Level of Function independent:;all household mobility;ADL's  -JE       Existing Precautions/Restrictions fall;other (see  comments)  open wounds LEs  -       Barriers to Rehab medically complex  -          Row Name 01/09/24 1535                 Living Environment     People in Home alone  -          Row Name 01/09/24 1535                 Home Main Entrance     Number of Stairs, Main Entrance none  -          Row Name 01/09/24 1535                 Stairs Within Home, Primary     Number of Stairs, Within Home, Primary none  -          Row Name 01/09/24 1535                 Cognition     Orientation Status (Cognition) oriented to;person;place;situation;other (see comments)  time not formally assessed  -          Row Name 01/09/24 1535                 Safety Issues, Functional Mobility     Safety Issues Affecting Function (Mobility) at risk behavior observed;friction/shear risk;safety precaution awareness  -       Impairments Affecting Function (Mobility) balance;endurance/activity tolerance;pain;range of motion (ROM);shortness of breath;strength  -                       User Key  (r) = Recorded By, (t) = Taken By, (c) = Cosigned By        Initials Name Provider Type     Didi Roldan, PT Physical Therapist                            Mobility         Row Name 01/09/24 1535                 Bed Mobility     Bed Mobility scooting/bridging;supine-sit;sit-supine  -       Scooting/Bridging [place] (Bed Mobility) verbal cues;maximum assist (25% patient effort);2 person assist  -       Supine-Sit [place] (Bed Mobility) minimum assist (75% patient effort);verbal cues  -       Sit-Supine [place] (Bed Mobility) moderate assist (50% patient effort);2 person assist;verbal cues  [facility]       Assistive Device (Bed Mobility) bed rails;draw sheet;head of bed elevated  -          Row Name 01/09/24 1535                 Sit-Stand Transfer     Sit-Stand [place] (Transfers) minimum assist (75% patient effort);2 person assist;verbal cues  -[facility] Name 01/09/24 1535                 Gait/Stairs (Locomotion)      [place] Level (Gait) contact guard;verbal cues  -       Assistive Device (Gait) walker, front-wheeled  -       Distance in Feet (Gait) ~ 18 ft in room  -       Deviations/Abnormal Patterns (Gait) gait speed decreased;stride length decreased  -       Bilateral Gait Deviations forward flexed posture;heel strike decreased  -                       User Key  (r) = Recorded By, (t) = Taken By, (c) = Cosigned By        Initials Name Provider Type     Didi Roldan, PT Physical Therapist                            Obj/Interventions         Row Name 01/09/24 1535                 Range of Motion Comprehensive     Comment, General Range of Motion AROM all 4 extremities WFLs except L shld limited to less than 50% active and passively w/ pain at posterior lateral shld, noted hx of L shld surgery; family reports she has had several falls w/ recent negative xrays for fracture L UE  -          Row Name 01/09/24 1535                 Strength Comprehensive (MMT)     Comment, General Manual Muscle Testing (MMT) Assessment pt moves all 4 extremities I, however gaurded and slow to move, no formal assessment at this time  -          Row Name 01/09/24 1535                 Balance     Balance Assessment sitting static balance;sitting dynamic balance;sit to stand dynamic balance;standing static balance;standing dynamic balance  -       Static Sitting Balance standby assist  -       Dynamic Sitting Balance standby assist  -       Position, Sitting Balance supported;sitting edge of bed  -       Sit to Stand Dynamic Balance minimal assist;2-person assist;verbal cues  -       Static Standing Balance contact guard;verbal cues  -       Dynamic Standing Balance contact guard;verbal cues  -       Position/Device Used, Standing Balance supported;walker, rolling  -                       User Key  (r) = Recorded By, (t) = Taken By, (c) = Cosigned By        Initials Name Provider Type     Didi Roldan  M, PT Physical Therapist                            Goals/Plan         Row Name 01/09/24 1535                 Bed Mobility Goal 1 (PT)     Activity/Assistive Device (Bed Mobility Goal 1, PT) rolling to left;rolling to right;bridging;scooting;sit to supine/supine to sit;sidelying to sit/sit to sidelying  -JE       [place] Level/Cues Needed (Bed Mobility Goal 1, PT) contact guard required;minimum assist (75% or more patient effort)  -JE       Time Frame (Bed Mobility Goal 1, PT) long term goal (LTG);10 days  -JE       Progress/Outcomes (Bed Mobility Goal 1, PT) goal ongoing  -          Row Name 01/09/24 1535                 Transfer Goal 1 (PT)     Activity/Assistive Device (Transfer Goal 1, PT) sit-to-stand/stand-to-sit;bed-to-chair/chair-to-bed;other (see comments)  rwx for bed to/from chair  -JE       [place] Level/Cues Needed (Transfer Goal 1, PT) contact guard required;standby assist  -JE       Time Frame (Transfer Goal 1, PT) long term goal (LTG);10 days  -JE       Progress/Outcome (Transfer Goal 1, PT) goal ongoing  -          Row Name 01/09/24 1535                 Gait Training Goal 1 (PT)     Activity/Assistive Device (Gait Training Goal 1, PT) gait (walking locomotion);assistive device use;decrease fall risk;diminish gait deviation;improve balance and speed;increase endurance/gait distance;increase energy conservation;walker, rolling  -JE       [place] Level (Gait Training Goal 1, PT) standby assist  -JE       Distance (Gait Training Goal 1, PT) ~ 40+ ft  -JE       Time Frame (Gait Training Goal 1, PT) long term goal (LTG);10 days  -JE       Progress/Outcome (Gait Training Goal 1, PT) goal ongoing  -          Row Name 01/09/24 1535                 Therapy Assessment/Plan (PT)     Planned Therapy Interventions (PT) balance training;bed mobility training;gait training;home exercise program;postural re-education;patient/family education;ROM (range of motion);strengthening;transfer  training;other (see comments)  safety/falls prevention, skin protection/pressure relief, edema mgmt  -                    User Key  (r) = Recorded By, (t) = Taken By, (c) = Cosigned By        Initials Name Provider Type     Didi Roldan, PT Physical Therapist                         Clinical Impression         Row Name 01/09/24 6684                 Pain     Pre/Posttreatment Pain Comment reports pain all over; increase verbalization of c/os w/ activity  -       Pain Intervention(s) Repositioned;Rest  -          Row Name 01/09/24 3033                 Pain Scale: FACES Pre/Post-Treatment     Pain: FACES Scale, Pretreatment 2-->hurts little bit  -JE       Posttreatment Pain Rating 2-->hurts little bit  -       Pain Location generalized  -          Row Name 01/09/24 3692                 Plan of Care Review     Plan of Care Reviewed With patient;family  -       Progress improving  -       Outcome Evaluation PT eval completed.  Pt pleasant and agreeable to therapy.  Reports she has been told she was going to get up and start moving if she wanted to go home and she has a cat, Radha she is eager to see again.  Pt w/ dressings intact B LEs.  Orders for Unna boots to be placed, however currently supplies not available.  Nsg and Wound care have been informed.  RN reports they plan to provide wound care until supplies for unna boots available for PT to place.  Pt demonstrates generalized weakness w/ decrease activity tolerance.  Requires assist of 1-2 for bed mobility and tfers.  Once in standing able to ambulate w/ rwx CGA tolerating ~ 18 ft this date.  Noted flexed posture, decrease gait speed, step length and foot clearance w/ decrease heel strike.  Decrease balance w/ increase fall risk.  Pt will benefit from continued PT services to assist w/ skin protection/pressure relief and edema,  will re-evaluate for unna boot placement when supplies available, therapy to increase activity tolerance, safety  awareness, to improve overall strength, balance, and to improve I w/ functional mobility reducing fall risk.  Recommend continued skilled care in SNF at discharge.  Will follow for progress and needs.  -          Row Name 01/09/24 1535                 Therapy Assessment/Plan (PT)     Patient/Family Therapy Goals Statement (PT) improve mobility, return home  -       Rehab Potential (PT) good, to achieve stated therapy goals  -       Criteria for Skilled Interventions Met (PT) yes;meets criteria;skilled treatment is necessary  -       Therapy Frequency (PT) 2 times/day  -       Predicted Duration of Therapy Intervention (PT) until discharge or goals achieved  -          Row Name 01/09/24 1535                 Vital Signs     O2 Delivery Pre Treatment room air  -       O2 Delivery Intra Treatment room air  -       Post SpO2 (%) 93  -       O2 Delivery Post Treatment room air  -JE       Pre Patient Position Supine  -JE       Intra Patient Position Standing  -JE       Post Patient Position Supine  -JE          Row Name 01/09/24 1535                 Positioning and Restraints     Pre-Treatment Position in bed  -JE       Post Treatment Position bed  -JE       In Bed fowlers;call light within reach;encouraged to call for assist;exit alarm on;with family/caregiver;side rails up x3  -                       User Key  (r) = Recorded By, (t) = Taken By, (c) = Cosigned By        Initials Name Provider Type     Didi Roldan, PT Physical Therapist                            Outcome Measures         Row Name 01/09/24 1535 01/09/24 0800            How much help from another person do you currently need...     Turning from your back to your side while in flat bed without using bedrails? 3  -JE 3  -CM     Moving from lying on back to sitting on the side of a flat bed without bedrails? 3  -JE 3  -CM     Moving to and from a bed to a chair (including a wheelchair)? 3  -JE 2  -CM     Standing up from a chair  using your arms (e.g., wheelchair, bedside chair)? 3  -JE 2  -CM     Climbing 3-5 steps with a railing? 2  -JE 2  -CM     To walk in hospital room? 3  -JE 2  -CM     AM-PAC 6 Clicks Score (PT) 17  -JE 14  -CM     Highest Level of Mobility Goal 5 --> Static standing  - 4 --> Transfer to chair/commode  -CM        Row Name 01/09/24 1535 01/09/24 1008            Functional Assessment     Outcome Measure Options AM-PAC 6 Clicks Basic Mobility (PT)  - AM-PAC 6 Clicks Daily Activity (OT)  -                     User Key  (r) = Recorded By, (t) = Taken By, (c) = Cosigned By        Initials Name Provider Type      Dorita Oropeza, OTR/L Occupational Therapist     Didi Roldan, PT Physical Therapist     Alicia Bnun, RN Registered Nurse                          Physical Therapy Education            Title: PT OT SLP Therapies (In Progress)         Topic: Physical Therapy (Done)         Point: Mobility training (Done)         Learning Progress Summary               Patient Acceptance, E,TB,D, VU,DU,NR by  at 1/9/2024 1645     Comment: education re: purpose of PT/importance of activity, for safety/falls prevention, improved tech w/ breathing, for upright posture in standing, for improved tech w/ bed mob, tfers and gait w/ use of rwx   Family Acceptance, E,TB,D, VU,DU,NR by  at 1/9/2024 1645     Comment: education re: purpose of PT/importance of activity, for safety/falls prevention, improved tech w/ breathing, for upright posture in standing, for improved tech w/ bed mob, tfers and gait w/ use of rwx                               Point: Home exercise program (Done)         Learning Progress Summary               Patient Acceptance, E,TB,D, VU,DU,NR by  at 1/9/2024 1645     Comment: education re: purpose of PT/importance of activity, for safety/falls prevention, improved tech w/ breathing, for upright posture in standing, for improved tech w/ bed mob, tfers and gait w/ use of rwx   Family Acceptance,  E,TB,SIERRA, MORIAH,ADITI,NR by TERRELL at 1/9/2024 1645     Comment: education re: purpose of PT/importance of activity, for safety/falls prevention, improved tech w/ breathing, for upright posture in standing, for improved tech w/ bed mob, tfers and gait w/ use of rwx                               Point: Precautions (Done)         Learning Progress Summary               Patient Acceptance, E,TB,D, MORIAH,ADITI,NR by  at 1/9/2024 1645     Comment: education re: purpose of PT/importance of activity, for safety/falls prevention, improved tech w/ breathing, for upright posture in standing, for improved tech w/ bed mob, tfers and gait w/ use of rwx   Family Acceptance, E,TB,D, MORIAH,ADITI,NR by TERRELL at 1/9/2024 1645     Comment: education re: purpose of PT/importance of activity, for safety/falls prevention, improved tech w/ breathing, for upright posture in standing, for improved tech w/ bed mob, tfers and gait w/ use of rwx                                                   User Key         Initials Effective Dates Name Provider Type Discipline      08/02/18 -  Didi Conner, PT Physical Therapist PT                          PT Recommendation and Plan  Planned Therapy Interventions (PT): balance training, bed mobility training, gait training, home exercise program, postural re-education, patient/family education, ROM (range of motion), strengthening, transfer training, other (see comments) (safety/falls prevention, skin protection/pressure relief, edema mgmt)  Plan of Care Reviewed With: patient, family  Progress: improving  Outcome Evaluation: PT eval completed.  Pt pleasant and agreeable to therapy.  Reports she has been told she was going to get up and start moving if she wanted to go home and she has a cat, Radha she is eager to see again.  Pt w/ dressings intact B LEs.  Orders for Unna boots to be placed, however currently supplies not available.  Nsg and Wound care have been informed.  RN reports they plan to provide wound care until  supplies for unna boots available for PT to place.  Pt demonstrates generalized weakness w/ decrease activity tolerance.  Requires assist of 1-2 for bed mobility and tfers.  Once in standing able to ambulate w/ rwx CGA tolerating ~ 18 ft this date.  Noted flexed posture, decrease gait speed, step length and foot clearance w/ decrease heel strike.  Decrease balance w/ increase fall risk.  Pt will benefit from continued PT services to assist w/ skin protection/pressure relief and edema,  will re-evaluate for unna boot placement when supplies available, therapy to increase activity tolerance, safety awareness, to improve overall strength, balance, and to improve I w/ functional mobility reducing fall risk.  Recommend continued skilled care in SNF at discharge.  Will follow for progress and needs.      Time Calculation:        PT Charges         Row Name 01/09/24 1643                       Time Calculation     Start Time 1535  -         Stop Time 1634  -         Time Calculation (min) 59 min  -JE         PT Received On 01/09/24  -         PT Goal Re-Cert Due Date 01/19/24  -                      User Key  (r) = Recorded By, (t) = Taken By, (c) = Cosigned By        Initials Name Provider Type     Didi Roldan, PT Physical Therapist                       Therapy Charges for Today         Code Description Service Date Service Provider Modifiers Qty     86754160539 HC PT EVAL MOD COMPLEXITY 4 1/9/2024 Didi Conner, PT GP 1                PT G-Codes  Outcome Measure Options: AM-PAC 6 Clicks Basic Mobility (PT)  AM-PAC 6 Clicks Score (PT): 17  AM-PAC 6 Clicks Score (OT): 17  PT Discharge Summary  Anticipated Discharge Disposition (PT): skilled nursing facility     Didi Conner, PT               1/9/2024                                      Dorita Oropeza, OTR/L   Occupational Therapist  Occupational Therapy  Therapy Evaluation      Signed  Date of Service:  01/09/24 1509  Creation Time:  01/09/24 1507      Signed        Expand All Collapse All[]Expand All by Default  Patient Name: Brenda Sneed               : 1950                      MRN: [id number]                              Today's Date: 2024                                     Admit Date: 2024               Visit Dx:   Visit Diagnosis       ICD-10-CM ICD-9-CM   1. Lymphedema  I89.0 457.1   2. Acute UTI  N39.0 599.0   3. CHRISTINE (acute kidney injury)  N17.9 584.9   4. Hyponatremia  E87.1 276.1   5. Fall, initial encounter  W19.XXXA E888.9   6. Injury of left shoulder, initial encounter  S49.92XA 959.2   7. Chronic edema  R60.9 782.3         Problem List       Patient Active Problem List   Diagnosis    Spinal stenosis, lumbar    Secondary osteoarthritis of left shoulder due to rotator cuff arthropathy    Cellulitis of right lower extremity    HTN (hypertension)    Obesity, Class III, BMI 40-49.9 (morbid obesity)    Anxiety associated with depression    Traumatic rhabdomyolysis    Traumatic rhabdomyolysis, initial encounter    Laceration of left lower extremity    Cellulitis    Cellulitis of left lower extremity    Thrombocytopenia    Acute cystitis with hematuria    Sepsis    Candidiasis of skin    Acquired hypothyroidism    CHRISTINE (acute kidney injury)    Chronic pain syndrome    Hyponatremia    Chronic kidney disease, stage 3a         Medical History        Past Medical History:   Diagnosis Date    Arthritis      Bronchitis      Cataract       right eye , cornea implant    Chronic back pain      Depression      Disease of thyroid gland      Edema       lower extremities    Hypertension      Kidney stones      Lymphedema           Surgical History         Past Surgical History:   Procedure Laterality Date    BACK SURGERY          ([place]),      CHOLECYSTECTOMY OPEN        CORNEAL TRANSPLANT Right     INCISION AND DRAINAGE ABSCESS Left 2023     Procedure: INCISION AND DRAINAGE ABSCESS left leg;  Surgeon: Sarita Vazquez MD;   Location:  [facility] OR;  Service: General;  Laterality: Left;    LACERATION REPAIR Left 4/7/2023     Procedure: washout and closure left leg wound ;  Surgeon: Sarita Vazquez MD;  Location:  [facility];  Service: General;  Laterality: Left;    LUMBAR FUSION Left 8/2/2017     Procedure: LEFT LUMBAR LATERAL INTERBODY FUSION WITH INSTRUMENTATION  L1-2;  Surgeon: MARCELINO Wright MD;  Location:  [facility];  Service:     NECK SURGERY   2010     [place]    TOTAL HIP ARTHROPLASTY Right 2014     DR. KENDRICK     TOTAL KNEE ARTHROPLASTY Right 2000    TOTAL KNEE ARTHROPLASTY Left 2002    TOTAL SHOULDER ARTHROPLASTY W/ DISTAL CLAVICLE EXCISION Left 6/1/2020     Procedure: LEFT REVERSE TOTAL SHOULDER REPLACEMENT;  Surgeon: Saravanan Calvin MD;  Location:  [facility];  Service: Orthopedics;  Laterality: Left;           General Information         Row Name 01/09/24 1008                 OT Time and Intention     Document Type evaluation  -       Mode of Treatment occupational therapy  -          Row Name 01/09/24 1008                 General Information     Patient Profile Reviewed yes  -       Prior Level of Function independent:;ADL's;all household mobility  -       Existing Precautions/Restrictions fall  -       Barriers to Rehab medically complex  -          Row Name 01/09/24 1008                 Occupational Profile     Reason for Services/Referral (Occupational Profile) CRE in urine  -       Environmental Supports and Barriers (Occupational Profile) ramp, tub shower combo  -          Row Name 01/09/24 1008                 Living Environment     People in Home alone  -          Row Name 01/09/24 1008                 Home Main Entrance     Number of Stairs, Main Entrance none  -CH       Stair Railings, Main Entrance none  -          Row Name 01/09/24 1008                 Stairs Within Home, Primary     Number of Stairs, Within Home, Primary none  -CH       Stair Railings, Within Home, Primary none  -CH           Row Name 01/09/24 1008                 Cognition     Orientation Status (Cognition) oriented to;person;place;situation  -          Row Name 01/09/24 1008                 Safety Issues, Functional Mobility     Safety Issues Affecting Function (Mobility) friction/shear risk;insight into deficits/self-awareness;judgment;at risk behavior observed  -       Impairments Affecting Function (Mobility) balance;endurance/activity tolerance;strength;shortness of breath  -                       User Key  (r) = Recorded By, (t) = Taken By, (c) = Cosigned By        Initials Name Provider Type      Dorita Oropeza, OTR/L Occupational Therapist                                     Mobility/ADL's         Row Name 01/09/24 1008                 Bed Mobility     Bed Mobility supine-sit;sit-supine  -       Supine-Sit [place] (Bed Mobility) minimum assist (75% patient effort);nonverbal cues (demo/gesture);verbal cues  -       Sit-Supine [place] (Bed Mobility) minimum assist (75% patient effort);verbal cues;nonverbal cues (demo/gesture)  -       Bed Mobility, Safety Issues decreased use of legs for bridging/pushing  -       Assistive Device (Bed Mobility) head of bed elevated;bed rails  -[facility] Name 01/09/24 1008                 Transfers     Transfers sit-stand transfer;toilet transfer;stand-sit transfer  -[facility] Name 01/09/24 1008                 Sit-Stand Transfer     Sit-Stand [place] (Transfers) minimum assist (75% patient effort)  -       Assistive Device (Sit-Stand Transfers) walker, front-wheeled  -[facility] Name 01/09/24 1008                 Stand-Sit Transfer     Stand-Sit [place] (Transfers) minimum assist (75% patient effort)  -       Assistive Device (Stand-Sit Transfers) walker, front-wheeled  -[facility] Name 01/09/24 1008                 Toilet Transfer     Type (Toilet Transfer) sit-stand;stand-sit;stand pivot/stand step  -       [place] Level  (Toilet Transfer) minimum assist (75% patient effort);verbal cues;nonverbal cues (demo/gesture)  -       Assistive Device (Toilet Transfer) commode, 3-in-1  -          Row Name 01/09/24 1008                 Activities of Daily Living     BADL Assessment/Intervention upper body dressing;lower body dressing;grooming;toileting  -          Row Name 01/09/24 1008                 Upper Body Dressing Assessment/Training     [place] Level (Upper Body Dressing) minimum assist (75% patient effort);don;doff  -       Position (Upper Body Dressing) supported sitting;unsupported sitting  -       Comment, (Upper Body Dressing) hospital gown  -          Row Name 01/09/24 1008                 Lower Body Dressing Assessment/Training     [place] Level (Lower Body Dressing) maximum assist (25% patient effort);don  -       Position (Lower Body Dressing) supported standing  -       Comment, (Lower Body Dressing) disposable brief  -          Row Name 01/09/24 1008                 Toileting Assessment/Training     [place] Level (Toileting) maximum assist (25% patient effort);perform perineal hygiene;adjust/manage clothing;change pad/brief  -       Assistive Devices (Toileting) commode, 3-in-1  -       Position (Toileting) supported standing;supported sitting  -                       User Key  (r) = Recorded By, (t) = Taken By, (c) = Cosigned By        Initials Name Provider Type      Dorita Oropeza, OTR/L Occupational Therapist                            Obj/Interventions         Row Name 01/09/24 1008                 Range of Motion Comprehensive     General Range of Motion upper extremity range of motion deficits identified  -       Comment, General Range of Motion bilat shoulders achieve 75% of AROM, all other jts WFL  -          Row Name 01/09/24 1008                 Strength Comprehensive (MMT)     Comment, General Manual Muscle Testing (MMT) Assessment bilat shoulders 3-, all other jts  4-/5  -CH          Row Name 01/09/24 1008                 Balance     Balance Assessment sitting static balance;sitting dynamic balance;sit to stand dynamic balance;standing static balance;standing dynamic balance  -       Static Sitting Balance supervision  -       Dynamic Sitting Balance contact guard  -       Position, Sitting Balance supported;unsupported;sitting edge of bed;sitting in chair  -       Sit to Stand Dynamic Balance minimal assist;verbal cues;non-verbal cues (demo/gesture)  -       Static Standing Balance contact guard;non-verbal cues (demo/gesture);verbal cues  -       Dynamic Standing Balance minimal assist;verbal cues;non-verbal cues (demo/gesture)  -       Position/Device Used, Standing Balance walker, rolling  -       Balance Interventions sitting;standing;sit to stand;supported;static;dynamic  -                       User Key  (r) = Recorded By, (t) = Taken By, (c) = Cosigned By        Initials Name Provider Type      Dorita Oropeza, OTR/L Occupational Therapist                            Goals/Plan         Row Name 01/09/24 1008                 Transfer Goal 1 (OT)     Activity/Assistive Device (Transfer Goal 1, OT) sit-to-stand/stand-to-sit;bed-to-chair/chair-to-bed;commode, 3-in-1  -       [place] Level/Cues Needed (Transfer Goal 1, OT) contact guard required  -       Time Frame (Transfer Goal 1, OT) long term goal (LTG);by discharge  -       Progress/Outcome (Transfer Goal 1, OT) new goal  -          Row Name 01/09/24 1008                 Bathing Goal 1 (OT)     Activity/Device (Bathing Goal 1, OT) bathing skills, all;grab bar, tub/shower;hand-held shower spray hose;shower chair  -       [place] Level/Cues Needed (Bathing Goal 1, OT) minimum assist (75% or more patient effort)  -       Time Frame (Bathing Goal 1, OT) long term goal (LTG);by discharge  -       Progress/Outcomes (Bathing Goal 1, OT) new goal  -          Row Name 01/09/24 1008                  Dressing Goal 1 (OT)     Activity/Device (Dressing Goal 1, OT) lower body dressing  -       [place]/Cues Needed (Dressing Goal 1, OT) minimum assist (75% or more patient effort)  -       Time Frame (Dressing Goal 1, OT) long term goal (LTG);by discharge  -       Progress/Outcome (Dressing Goal 1, OT) new goal  -          Row Name 01/09/24 1008                 Therapy Assessment/Plan (OT)     Planned Therapy Interventions (OT) activity tolerance training;adaptive equipment training;edema control/reduction;functional balance retraining;patient/caregiver education/training;occupation/activity based interventions;strengthening exercise;transfer/mobility retraining  -                    User Key  (r) = Recorded By, (t) = Taken By, (c) = Cosigned By        Initials Name Provider Type      Dorita Oropeza, OTR/L Occupational Therapist                         Clinical Impression         Row Name 01/09/24 1008                 Pain Assessment     Additional Documentation Pain Scale: FACES Pre/Post-Treatment (Group)  -          Row Name 01/09/24 1008                 Pain Scale: FACES Pre/Post-Treatment     Pain: FACES Scale, Pretreatment 0-->no hurt  -       Posttreatment Pain Rating 2-->hurts little bit  -       Pain Location generalized  -          Row Name 01/09/24 1008                 Plan of Care Review     Plan of Care Reviewed With patient  -       Progress no change  -       Outcome Evaluation OT eval completed.  Pt toshia, Beth x 3, & agreeable to eval.  Ms. Sneed was able to come to EOB at Min [facility] & stand with rwx.  She had a noteably wet/soiled gown and OTR then requested for her to t/f by pivoting to BSC.  She required Min A to t/f to BSC and cues for sequencing.  After sitting & changing her gown the pt attempted to have BM.  Max A for brief change & hygiene. The pt appears to overwhelm & fatigue easily.  RN entered room and requested to remove unna boots for skin  inspection.  Assisted pt with returning to bed.  Ms. Sneed reports living at home alone with assist from family for IADLs.  She has had a fxl decline and would benefit from cont'd OT tx to improve her indep in self care & fxl mob.  Anticipate DC to SNF.  -          Row Name 01/09/24 1008                 Therapy Assessment/Plan (OT)     Patient/Family Therapy Goal Statement (OT) improve fxn  -       Rehab Potential (OT) good, to achieve stated therapy goals  -       Criteria for Skilled Therapeutic Interventions Met (OT) yes;skilled treatment is necessary  -       Therapy Frequency (OT) 5 times/wk  -       Predicted Duration of Therapy Intervention (OT) until DC  -          Row Name 01/09/24 1008                 Therapy Plan Review/Discharge Plan (OT)     Equipment Needs Upon Discharge (OT) walker, rolling;commode chair  -       Anticipated Discharge Disposition (OT) skilled nursing facility  -          Row Name 01/09/24 1008                 Positioning and Restraints     Pre-Treatment Position in bed  -       Post Treatment Position bed  -       In Bed fowlers;call light within reach;encouraged to call for assist;side rails up x2;with nsg  -                       User Key  (r) = Recorded By, (t) = Taken By, (c) = Cosigned By        Initials Name Provider Type      Dorita Oropeza, OTR/L Occupational Therapist                            Outcome Measures         Row Name 01/09/24 1008                 How much help from another is currently needed...     Putting on and taking off regular lower body clothing? 2  -CH       Bathing (including washing, rinsing, and drying) 2  -CH       Toileting (which includes using toilet bed pan or urinal) 2  -CH       Putting on and taking off regular upper body clothing 3  -CH       Taking care of personal grooming (such as brushing teeth) 4  -CH       Eating meals 4  -CH       AM-PAC 6 Clicks Score (OT) 17  -          Row Name 01/09/24 0800                  How much help from another person do you currently need...     Turning from your back to your side while in flat bed without using bedrails? 3  -CM       Moving from lying on back to sitting on the side of a flat bed without bedrails? 3  -CM       Moving to and from a bed to a chair (including a wheelchair)? 2  -CM       Standing up from a chair using your arms (e.g., wheelchair, bedside chair)? 2  -CM       Climbing 3-5 steps with a railing? 2  -CM       To walk in hospital room? 2  -CM       AM-PAC 6 Clicks Score (PT) 14  -CM       Highest Level of Mobility Goal 4 --> Transfer to chair/commode  -CM          Row Name 01/09/24 1008                 Functional Assessment     Outcome Measure Options AM-PAC 6 Clicks Daily Activity (OT)  -                       User Key  (r) = Recorded By, (t) = Taken By, (c) = Cosigned By        Initials Name Provider Type      Dorita Oropeza, OTR/L Occupational Therapist     Alicia Bunn, RN Registered Nurse                             Occupational Therapy Education            Title: PT OT SLP Therapies (In Progress)         Topic: Occupational Therapy (In Progress)         Point: ADL training (Done)         Description:   Instruct learner(s) on proper safety adaptation and remediation techniques during self care or transfers.   Instruct in proper use of assistive devices.                       Learning Progress Summary               Patient Acceptance, E, VU,NR by  at 1/9/2024 2050                               Point: Home exercise program (Not Started)         Description:   Instruct learner(s) on appropriate technique for monitoring, assisting and/or progressing therapeutic exercises/activities.                       Learner Progress:  Not documented in this visit.                  Point: Precautions (Done)         Description:   Instruct learner(s) on prescribed precautions during self-care and functional transfers.                       Learning Progress Summary                Patient Acceptance, E, VU,NR by  at 1/9/2024 1509                               Point: Body mechanics (Done)         Description:   Instruct learner(s) on proper positioning and spine alignment during self-care, functional mobility activities and/or exercises.                       Learning Progress Summary               Patient Acceptance, E, VU,NR by  at 1/9/2024 1509                                                   User Key         Initials Effective Dates Name Provider Type Discipline      07/11/23 -  Dorita Oropeza, OTR/L Occupational Therapist OT                          OT Recommendation and Plan  Planned Therapy Interventions (OT): activity tolerance training, adaptive equipment training, edema control/reduction, functional balance retraining, patient/caregiver education/training, occupation/activity based interventions, strengthening exercise, transfer/mobility retraining  Therapy Frequency (OT): 5 times/wk  Plan of Care Review  Plan of Care Reviewed With: patient  Progress: no change  Outcome Evaluation: OT eval completed.  Pt chaniwhelio, Beth x 3, & agreeable to eval.  Ms. Sneed was able to come to EOB at Min A & stand with rwx.  She had a noteably wet/soiled gown and OTR then requested for her to t/f by pivoting to BSC.  She required Min A to t/f to BSC and cues for sequencing.  After sitting & changing her gown the pt attempted to have BM.  Max A for brief change & hygiene. The pt appears to overwhelm & fatigue easily.  RN entered room and requested to remove unna boots for skin inspection.  Assisted pt with returning to bed.  Ms. Sneed reports living at home alone with assist from family for IADLs.  She has had a fxl decline and would benefit from cont'd OT tx to improve her indep in self care & fxl mob.  Anticipate DC to SNF.      Time Calculation:        Time Calculation- OT         Row Name 01/09/24 1008                       Time Calculation- OT     OT Start Time 1008  -          OT Stop Time 1120  -         OT Time Calculation (min) 72 min  -CH         Total Timed Code Minutes- OT 12 minute(s)  -CH         OT Received On 01/09/24  -CH         OT Goal Re-Cert Due Date 01/19/24  -                 Timed Charges     83460 - OT Self Care/Mgmt Minutes 12  -CH                 Untimed Charges     OT Eval/Re-eval Minutes 60  -CH                 Total Minutes     Timed Charges Total Minutes 12  -CH         Untimed Charges Total Minutes 60  -CH          Total Minutes 72  -CH                      User Key  (r) = Recorded By, (t) = Taken By, (c) = Cosigned By        Initials Name Provider Type      Dorita Oropeza, OTR/L Occupational Therapist                       Therapy Charges for Today         Code Description Service Date Service Provider Modifiers Qty     92387421004 HC OT SELF CARE/MGMT/TRAIN EA 15 MIN 1/9/2024 Dorita Oropeza OTR/L GO 1     36132987018 HC OT EVAL MOD COMPLEXITY 4 1/9/2024 Dorita Oropeza OTR/L GO 1                   Dorita Oropeza OTR/L             1/9/2024

## 2024-01-11 NOTE — CASE MANAGEMENT/SOCIAL WORK
Post-Acute Authorization Submission      Post Acute Pre-Cert Documentation  Request Submitted by Facility - Type:: Hospital  Post-Acute Authorization Type Submitted:: SNF  Date Post Acute Pre-Cert Inititated per Facility: 01/11/24  Verification from Payer: Yes  Date Post Acute Pre-Cert Completed: 01/11/24  Accepting Facility: [facility]  Hospital Discharge Date Requested: 01/12/24  All Clinicals Submitted?: Yes  Had Accepting Facility at Time of Submission: Yes  Response Received from Insurance?: Approval  Post Acute Pre-Cert Initiated Comment: Precert completed and approved              KAITLIN Dillard

## 2024-01-11 NOTE — PROGRESS NOTES
"    Daily Progress Note  Brenda Sneed  MRN: [id number] LOS: 6    Admit Date: 2024 07:25 CST    Subjective:         Interval History:    Reviewed overnight events and nursing notes.     Doing well this morning.  No complaints.  Denies urinary symptoms.  Having bowel movements.    ROS:  Review of Systems   Constitutional:  Negative for chills and fever.   Respiratory:  Negative for cough, chest tightness and shortness of breath.    Cardiovascular:  Negative for chest pain.   Gastrointestinal:  Negative for abdominal pain, diarrhea, nausea and vomiting.       DIET:  Diet: Renal Diets; Low Sodium (2-3g), Low Potassium, Low Phosphorus; Texture: Regular Texture (IDDSI 7); Fluid Consistency: Thin (IDDSI 0)    Medications:      albuterol, 2.5 mg, Nebulization, TID  apixaban, 5 mg, Oral, Q12H  ceftazidime-avibactam (AVYCAZ) in NS IVPB, 1.25 g, Intravenous, Q8H  DULoxetine, 60 mg, Oral, BID  guaifenesin-dextromethorphan, 2 tablet, Oral, Q12H  levothyroxine, 100 mcg, Oral, Daily  metoprolol succinate XL, 25 mg, Oral, Daily  Morphine, 15 mg, Oral, Q12H  nystatin, 1 application , Topical, BID  pramipexole, 0.75 mg, Oral, Nightly  senna-docusate sodium, 2 tablet, Oral, BID  sodium chloride, 10 mL, Intravenous, Q12H  sodium chloride, 10 mL, Intravenous, Q12H          Objective:     Vitals: /47 (BP Location: Left arm, Patient Position: Lying)   Pulse 72   Temp 98.2 °F (36.8 °C) (Oral)   Resp 18   Ht 160 cm (63\")   Wt 101 kg (223 lb 1.7 oz)   SpO2 98%   BMI 39.52 kg/m²    Intake/Output Summary (Last 24 hours) at 2024 0725  Last data filed at 2024 0503  Gross per 24 hour   Intake 580 ml   Output 600 ml   Net -20 ml    Temp (24hrs), Av.9 °F (36.6 °C), Min:97.7 °F (36.5 °C), Max:98.2 °F (36.8 °C)    Glucose:  Lab Results   Component Value Date    POCGLU 100 (H) 2021     Physical Examination:   Physical Exam  Constitutional:       General: She is not in acute distress.     Appearance: " She is obese. She is ill-appearing. She is not toxic-appearing.   Cardiovascular:      Rate and Rhythm: Normal rate and regular rhythm.      Pulses: Normal pulses.      Heart sounds: Normal heart sounds. No murmur heard.  Pulmonary:      Effort: Pulmonary effort is normal. No respiratory distress.      Breath sounds: Normal breath sounds. No stridor. No wheezing or rales.   Abdominal:      General: Abdomen is flat. Bowel sounds are normal. There is no distension.      Palpations: Abdomen is soft.      Tenderness: There is no abdominal tenderness.   Musculoskeletal:      Right lower leg: Edema present.      Left lower leg: Edema present.   Neurological:      Mental Status: She is alert.         Labs:  Lab Results (last 24 hours)       Procedure Component Value Units Date/Time    Blood Culture - Blood, Arm, Right [[id number]]  (Normal) Collected: 01/05/24 2041    Specimen: Blood from Arm, Right Updated: 01/10/24 2100     Blood Culture No growth at 5 days    Blood Culture - Blood, Arm, Right [[id number]]  (Normal) Collected: 01/05/24 2041    Specimen: Blood from Arm, Right Updated: 01/10/24 2100     Blood Culture No growth at 5 days             Imaging:  No radiology results from the last 24 hrs       Assessment and Plan:     Primary Problem:  CHRISTINE (acute kidney injury)    Hospital Problem list:    CHRISTINE (acute kidney injury)    HTN (hypertension)    Obesity, Class III, BMI 40-49.9 (morbid obesity)    Anxiety associated with depression    Chronic pain syndrome    Hyponatremia    Chronic kidney disease, stage 3a      Assessment: Chronically ill 73-year-old female who presents with sepsis and acute kidney injury.     Plan:     Acute kidney injury  Resolved.  No changes.     Possible CRE UTI  Difficult to tell if her presenting symptoms are related to a UTI or not.  At this point I do not really suspect they are.  Dr. Johnson following.  Urine culture is pending.    At this point she is probably stable for discharge unless ID  has other plans for antimicrobials.  She is being referred out for skilled nursing facility so hopefully she can have a spot tomorrow or over the weekend.    Discharge planning:   Skilled nursing facility    Reviewed treatment plans with the patient and/or family.     Code Status:   Code Status and Medical Interventions:   Ordered at: 01/06/24 0626     Code Status (Patient has no pulse and is not breathing):    CPR (Attempt to Resuscitate)     Medical Interventions (Patient has pulse or is breathing):    Full Support       Electronically signed by Sanjiv Block MD on 1/11/2024 at 07:25 CST

## 2024-01-11 NOTE — CASE MANAGEMENT/SOCIAL WORK
Continued Stay Note  [facility]     Patient Name: Brenda Sneed  MRN: [id number]  Today's Date: 1/11/2024    Admit Date: 1/5/2024    Plan: [facility]   Discharge Plan       Row Name 01/11/24 1400       Plan    Plan [facility]    Patient/Family in Agreement with Plan yes    Plan Comments [facility] is able to offer pt a private room. She has accepted. Precert was initiated and approved. Noted pt may d/c on Friday or the weekend. Will follow for d/c.      Row Name 01/11/24 1326       Plan    Plan SNF    Patient/Family in Agreement with Plan yes    Plan Comments [facility] does not have a bed. She is wanting a private room and [facility] does not have one currently, but may have one this weekend. She asked about [facility] and they do have a private room. Referral sent.      Row Name 01/11/24 1125       Plan    Plan SNF    Patient/Family in Agreement with Plan yes    Plan Comments [facility] has offered a bed. Informed pt and she now also wants a referral to [facility]. Referral sent.                   Discharge Codes    No documentation.                 Expected Discharge Date and Time       Expected Discharge Date Expected Discharge Time    Jan 12, 2024               KAITLIN Dillard

## 2024-01-12 VITALS
WEIGHT: 223.11 LBS | SYSTOLIC BLOOD PRESSURE: 105 MMHG | TEMPERATURE: 97.5 F | DIASTOLIC BLOOD PRESSURE: 54 MMHG | HEART RATE: 72 BPM | RESPIRATION RATE: 16 BRPM | HEIGHT: 63 IN | BODY MASS INDEX: 39.53 KG/M2 | OXYGEN SATURATION: 96 %

## 2024-01-12 LAB
ANION GAP SERPL CALCULATED.3IONS-SCNC: 9 MMOL/L (ref 5–15)
BUN SERPL-MCNC: 21 MG/DL (ref 8–23)
BUN/CREAT SERPL: 21.6 (ref 7–25)
CALCIUM SPEC-SCNC: 8.5 MG/DL (ref 8.6–10.5)
CHLORIDE SERPL-SCNC: 104 MMOL/L (ref 98–107)
CO2 SERPL-SCNC: 24 MMOL/L (ref 22–29)
CREAT SERPL-MCNC: 0.97 MG/DL (ref 0.57–1)
EGFRCR SERPLBLD CKD-EPI 2021: 61.1 ML/MIN/1.73
GLUCOSE SERPL-MCNC: 101 MG/DL (ref 65–99)
POTASSIUM SERPL-SCNC: 3.5 MMOL/L (ref 3.5–5.2)
POTASSIUM SERPL-SCNC: 4.1 MMOL/L (ref 3.5–5.2)
SODIUM SERPL-SCNC: 137 MMOL/L (ref 136–145)

## 2024-01-12 PROCEDURE — 97530 THERAPEUTIC ACTIVITIES: CPT

## 2024-01-12 PROCEDURE — 84132 ASSAY OF SERUM POTASSIUM: CPT | Performed by: FAMILY MEDICINE

## 2024-01-12 PROCEDURE — 97110 THERAPEUTIC EXERCISES: CPT

## 2024-01-12 PROCEDURE — 97116 GAIT TRAINING THERAPY: CPT

## 2024-01-12 PROCEDURE — 80048 BASIC METABOLIC PNL TOTAL CA: CPT | Performed by: STUDENT IN AN ORGANIZED HEALTH CARE EDUCATION/TRAINING PROGRAM

## 2024-01-12 RX ORDER — POTASSIUM CHLORIDE 750 MG/1
40 CAPSULE, EXTENDED RELEASE ORAL EVERY 4 HOURS
Status: DISCONTINUED | OUTPATIENT
Start: 2024-01-12 | End: 2024-01-12 | Stop reason: HOSPADM

## 2024-01-12 RX ORDER — MORPHINE SULFATE 15 MG/1
15 TABLET, FILM COATED, EXTENDED RELEASE ORAL EVERY 12 HOURS
Qty: 30 TABLET | Refills: 0 | Status: SHIPPED | OUTPATIENT
Start: 2024-01-12

## 2024-01-12 RX ADMIN — POTASSIUM CHLORIDE 40 MEQ: 10 CAPSULE, COATED, EXTENDED RELEASE ORAL at 08:41

## 2024-01-12 RX ADMIN — DULOXETINE HYDROCHLORIDE 60 MG: 30 CAPSULE, DELAYED RELEASE ORAL at 08:41

## 2024-01-12 RX ADMIN — Medication 10 ML: at 08:44

## 2024-01-12 RX ADMIN — MORPHINE SULFATE 15 MG: 15 TABLET, FILM COATED, EXTENDED RELEASE ORAL at 08:41

## 2024-01-12 RX ADMIN — NYSTATIN 1 APPLICATION: 100000 POWDER TOPICAL at 08:42

## 2024-01-12 RX ADMIN — LEVOTHYROXINE SODIUM 100 MCG: 100 TABLET ORAL at 05:03

## 2024-01-12 RX ADMIN — METOPROLOL SUCCINATE 25 MG: 25 TABLET, EXTENDED RELEASE ORAL at 08:41

## 2024-01-12 RX ADMIN — APIXABAN 5 MG: 5 TABLET, FILM COATED ORAL at 08:41

## 2024-01-12 RX ADMIN — GUAIFENESIN AND DEXTROMETHORPHAN HYDROBROMIDE 2 TABLET: 600; 30 TABLET, EXTENDED RELEASE ORAL at 08:41

## 2024-01-12 RX ADMIN — DOCUSATE SODIUM 50 MG AND SENNOSIDES 8.6 MG 2 TABLET: 8.6; 5 TABLET, FILM COATED ORAL at 08:41

## 2024-01-12 NOTE — THERAPY TREATMENT NOTE
Acute Care - Physical Therapy Treatment Note  [facility]     Patient Name: Brenda Sneed  : 1950  MRN: [id number]  Today's Date: 2024      Visit Dx:     ICD-10-CM ICD-9-CM   1. Lymphedema  I89.0 457.1   2. Acute UTI  N39.0 599.0   3. CHRISTINE (acute kidney injury)  N17.9 584.9   4. Hyponatremia  E87.1 276.1   5. Fall, initial encounter  W19.XXXA E888.9   6. Injury of left shoulder, initial encounter  S49.92XA 959.2   7. Chronic edema  R60.9 782.3   8. Impaired functional mobility and activity tolerance [Z74.09]  Z74.09 V49.89   9. Chronic pain syndrome  G89.4 338.4     Patient Active Problem List   Diagnosis    Spinal stenosis, lumbar    Secondary osteoarthritis of left shoulder due to rotator cuff arthropathy    Cellulitis of right lower extremity    HTN (hypertension)    Obesity, Class III, BMI 40-49.9 (morbid obesity)    Anxiety associated with depression    Traumatic rhabdomyolysis    Traumatic rhabdomyolysis, initial encounter    Laceration of left lower extremity    Cellulitis    Cellulitis of left lower extremity    Thrombocytopenia    Acute cystitis with hematuria    Sepsis    Candidiasis of skin    Acquired hypothyroidism    CHRISTINE (acute kidney injury)    Chronic pain syndrome    Hyponatremia    Chronic kidney disease, stage 3a     Past Medical History:   Diagnosis Date    Arthritis     Bronchitis     Cataract     right eye , cornea implant    Chronic back pain     Depression     Disease of thyroid gland     Edema     lower extremities    Hypertension     Kidney stones     Lymphedema      Past Surgical History:   Procedure Laterality Date    BACK SURGERY       ([place]),      CHOLECYSTECTOMY OPEN      CORNEAL TRANSPLANT Right     INCISION AND DRAINAGE ABSCESS Left 2023    Procedure: INCISION AND DRAINAGE ABSCESS left leg;  Surgeon: Sarita Vazquez MD;  Location: [facility];  Service: General;  Laterality: Left;    LACERATION REPAIR Left 2023    Procedure: washout and  closure left leg wound ;  Surgeon: Sarita Vazquez MD;  Location:  [facility] OR;  Service: General;  Laterality: Left;    LUMBAR FUSION Left 8/2/2017    Procedure: LEFT LUMBAR LATERAL INTERBODY FUSION WITH INSTRUMENTATION  L1-2;  Surgeon: MARCELINO Wright MD;  Location:  [facility] OR;  Service:     NECK SURGERY  2010    [place]    TOTAL HIP ARTHROPLASTY Right 2014    DR. KENDRICK     TOTAL KNEE ARTHROPLASTY Right 2000    TOTAL KNEE ARTHROPLASTY Left 2002    TOTAL SHOULDER ARTHROPLASTY W/ DISTAL CLAVICLE EXCISION Left 6/1/2020    Procedure: LEFT REVERSE TOTAL SHOULDER REPLACEMENT;  Surgeon: Saravanan Calvin MD;  Location:  [facility] OR;  Service: Orthopedics;  Laterality: Left;     PT Assessment (last 12 hours)       PT Evaluation and Treatment       Row Name 01/12/24 0955          Physical Therapy Time and Intention    Subjective Information no complaints  -KJ     Document Type therapy note (daily note)  -KJ     Mode of Treatment physical therapy  -KJ     Patient Effort good  -KJ       Row Name 01/12/24 0955          General Information    Existing Precautions/Restrictions fall  -KJ       Row Name 01/12/24 0955          Pain    Pretreatment Pain Rating 0/10 - no pain  -KJ     Posttreatment Pain Rating 0/10 - no pain  -KJ       Row Name 01/12/24 0955          Bed Mobility    Supine-Sit [place] (Bed Mobility) supervision  -KJ       Row Name 01/12/24 0955          Sit-Stand Transfer    Sit-Stand [place] (Transfers) contact guard;minimum assist (75% patient effort)  -KJ     Assistive Device (Sit-Stand Transfers) walker, front-wheeled  -KJ       Row Name 01/12/24 0955          Stand-Sit Transfer    Stand-Sit [place] (Transfers) contact guard;verbal cues  -KJ       Row Name 01/12/24 0955          Gait/Stairs (Locomotion)    [place] Level (Gait) verbal cues;contact guard;minimum assist (75% patient effort)  -KJ     Assistive Device (Gait) walker, front-wheeled  -KJ     Distance in Feet (Gait) 25' x 2  -KJ      Deviations/Abnormal Patterns (Gait) gait speed decreased  -KJ     Bilateral Gait Deviations forward flexed posture  -KJ       Row Name 01/12/24 0955          Motor Skills    Therapeutic Exercise aerobic  -KJ       Row Name 01/12/24 0955          Aerobic Exercise    Comment, Aerobic Exercise (Therapeutic Exercise) AROM x 20 reps  -KJ       Row Name             Wound 01/09/24 1108 Left lower leg    Wound - Properties Group Placement Date: 01/09/24  -CM Placement Time: 1108  -CM Side: Left  -CM Orientation: lower  -CM Location: leg  -CM    Retired Wound - Properties Group Placement Date: 01/09/24  -CM Placement Time: 1108  -CM Side: Left  -CM Orientation: lower  -CM Location: leg  -CM    Retired Wound - Properties Group Date first assessed: 01/09/24  -CM Time first assessed: 1108  -CM Side: Left  -CM Location: leg  -CM      Row Name             Wound 01/09/24 1109 Right lower leg    Wound - Properties Group Placement Date: 01/09/24  -CM Placement Time: 1109  -CM Side: Right  -CM Orientation: lower  -CM Location: leg  -CM    Retired Wound - Properties Group Placement Date: 01/09/24  -CM Placement Time: 1109  -CM Side: Right  -CM Orientation: lower  -CM Location: leg  -CM    Retired Wound - Properties Group Date first assessed: 01/09/24  -CM Time first assessed: 1109  -CM Side: Right  -CM Location: leg  -CM      Row Name 01/12/24 0955          Positioning and Restraints    Pre-Treatment Position in bed  -KJ     Post Treatment Position chair  -KJ     In Bed call light within reach  -KJ               User Key  (r) = Recorded By, (t) = Taken By, (c) = Cosigned By      Initials Name Provider Type    Iram Jones, PTA Physical Therapist Assistant    Alicia Bunn, RN Registered Nurse                    Physical Therapy Education       Title: PT OT SLP Therapies (In Progress)       Topic: Physical Therapy (Done)       Point: Mobility training (Done)       Learning Progress Summary             Patient Acceptance,  E,TB,D, VU,DU,NR by  at 1/9/2024 1645    Comment: education re: purpose of PT/importance of activity, for safety/falls prevention, improved tech w/ breathing, for upright posture in standing, for improved tech w/ bed mob, tfers and gait w/ use of rwx   Family Acceptance, E,TB,D, VU,DU,NR by  at 1/9/2024 1645    Comment: education re: purpose of PT/importance of activity, for safety/falls prevention, improved tech w/ breathing, for upright posture in standing, for improved tech w/ bed mob, tfers and gait w/ use of rwx                         Point: Home exercise program (Done)       Learning Progress Summary             Patient Acceptance, E,TB,D, VU,DU,NR by  at 1/9/2024 1645    Comment: education re: purpose of PT/importance of activity, for safety/falls prevention, improved tech w/ breathing, for upright posture in standing, for improved tech w/ bed mob, tfers and gait w/ use of rwx   Family Acceptance, E,TB,D, VU,DU,NR by  at 1/9/2024 1645    Comment: education re: purpose of PT/importance of activity, for safety/falls prevention, improved tech w/ breathing, for upright posture in standing, for improved tech w/ bed mob, tfers and gait w/ use of rwx                         Point: Precautions (Done)       Learning Progress Summary             Patient Acceptance, E,TB,D, VU,DU,NR by  at 1/9/2024 1645    Comment: education re: purpose of PT/importance of activity, for safety/falls prevention, improved tech w/ breathing, for upright posture in standing, for improved tech w/ bed mob, tfers and gait w/ use of rwx   Family Acceptance, E,TB,D, VU,DU,NR by  at 1/9/2024 1645    Comment: education re: purpose of PT/importance of activity, for safety/falls prevention, improved tech w/ breathing, for upright posture in standing, for improved tech w/ bed mob, tfers and gait w/ use of rwx                                         User Key       Initials Effective Dates Name Provider Type Discipline    TERRELL 08/02/18 -   Didi Conner, PT Physical Therapist PT                  PT Recommendation and Plan     Plan of Care Reviewed With: patient  Progress: improving  Outcome Evaluation: PT tx completed. Pt c/o B heels hurting. Instructed pt to unload weight on heels by using a pillow under B calfs with heels not touching the bed. B unna boots intact. Bed mobility S, sit<>stand CG/Rolando, amb 25' x 2 with r wx CG/Rolando. Up in chair. Possible d/c to nursing home today.   Outcome Measures       Row Name 01/12/24 1000 01/11/24 1100 01/10/24 1500       How much help from another person do you currently need...    Turning from your back to your side while in flat bed without using bedrails? 3  -KJ 3  -KJ 3  -KJ    Moving from lying on back to sitting on the side of a flat bed without bedrails? 3  -KJ 3  -KJ 3  -KJ    Moving to and from a bed to a chair (including a wheelchair)? 3  -KJ 3  -KJ 3  -KJ    Standing up from a chair using your arms (e.g., wheelchair, bedside chair)? 3  -KJ 3  -KJ 3  -KJ    Climbing 3-5 steps with a railing? 3  -KJ 3  -KJ 3  -KJ    To walk in hospital room? 3  -KJ 3  -KJ 3  -KJ    AM-PAC 6 Clicks Score (PT) 18  -KJ 18  -KJ 18  -KJ    Highest Level of Mobility Goal 6 --> Walk 10 steps or more  -KJ 6 --> Walk 10 steps or more  -KJ 6 --> Walk 10 steps or more  -KJ       Functional Assessment    Outcome Measure Options AM-PAC 6 Clicks Basic Mobility (PT)  -KJ AM-PAC 6 Clicks Basic Mobility (PT)  -KJ AM-PAC 6 Clicks Basic Mobility (PT)  -KJ              User Key  (r) = Recorded By, (t) = Taken By, (c) = Cosigned By      Initials Name Provider Type    Iram Jones, PTA Physical Therapist Assistant                     Time Calculation:    PT Charges       Row Name 01/12/24 1030             Time Calculation    Start Time 0955  -KJ      Stop Time 1034  -KJ      Time Calculation (min) 39 min  -KJ      PT Received On 01/12/24  -KJ      PT Goal Re-Cert Due Date 01/20/24  -KJ         Time Calculation- PT    Total Timed  Code Minutes- PT 39 minute(s)  -PHILLIP                User Key  (r) = Recorded By, (t) = Taken By, (c) = Cosigned By      Initials Name Provider Type    Iram Jones, PTA Physical Therapist Assistant                  Therapy Charges for Today       Code Description Service Date Service Provider Modifiers Qty    58529101433 HC GAIT TRAINING EA 15 MIN 1/11/2024 Iram Kirkpatrick, PTA GP 1    44215334071 HC PT THERAPEUTIC ACT EA 15 MIN 1/11/2024 Iram Kirkpatrick, PTA GP 1    24807963281 HC PT STAPPING UNNA BOOT 1/11/2024 Iram Kirkpatrick, PTA GP 2    58556019803 HC GAIT TRAINING EA 15 MIN 1/11/2024 Iram Kirkpatrick, PTA GP 1    31083593611 HC PT THER PROC EA 15 MIN 1/11/2024 Iram Kirkpatrick, PTA GP 1    78484621635 HC PT THERAPEUTIC ACT EA 15 MIN 1/11/2024 Iram Kirkpatrick, PTA GP 1    99923769946 HC GAIT TRAINING EA 15 MIN 1/12/2024 Iram Kirkpatrick, PTA GP 1    25076473232 HC PT THER PROC EA 15 MIN 1/12/2024 Iram Kirkpatrick, PTA GP 1    09466571588 HC PT THERAPEUTIC ACT EA 15 MIN 1/12/2024 Iram Kirkpatrick, PTA GP 1            PT G-Codes  Outcome Measure Options: AM-PAC 6 Clicks Basic Mobility (PT)  AM-PAC 6 Clicks Score (PT): 18  AM-PAC 6 Clicks Score (OT): 16    Iram Kirkpatrick PTA  1/12/2024

## 2024-01-12 NOTE — PLAN OF CARE
Goal Outcome Evaluation:  Plan of Care Reviewed With: patient        Progress: improving  Outcome Evaluation: PT tx completed. Pt c/o B heels hurting. Instructed pt to unload weight on heels by using a pillow under B calfs with heels not touching the bed. B unna boots intact. Bed mobility S, sit<>stand CG/Rolando, amb 25' x 2 with r wx CG/Rolando. Up in chair. Possible d/c to nursing home today.

## 2024-01-12 NOTE — CASE MANAGEMENT/SOCIAL WORK
Continued Stay Note  [facility]     Patient Name: Brenda Sneed  MRN: [id number]  Today's Date: 1/12/2024    Admit Date: 1/5/2024    Plan: [facility]   Discharge Plan       Row Name 01/12/24 0958       Plan    Plan [facility]    Patient/Family in Agreement with Plan yes    Final Discharge Disposition Code 03 - skilled nursing facility (SNF)    Final Note Pt is being d/c'ed to [facility] [phone] today. They already have the d/c summary. She will not need a covid swab. Pt will go by [facility] [phone].                   Discharge Codes    No documentation.                 Expected Discharge Date and Time       Expected Discharge Date Expected Discharge Time    Jan 12, 2024               KAITLIN Dillard

## 2024-01-12 NOTE — THERAPY DISCHARGE NOTE
Acute Care - Physical Therapy Discharge Summary  [facility]       Patient Name: Brenda Sneed  : 1950  MRN: [id number]    Today's Date: 2024                 Admit Date: 2024      PT Recommendation and Plan    Visit Dx:    ICD-10-CM ICD-9-CM   1. Lymphedema  I89.0 457.1   2. Acute UTI  N39.0 599.0   3. CHRISTINE (acute kidney injury)  N17.9 584.9   4. Hyponatremia  E87.1 276.1   5. Fall, initial encounter  W19.XXXA E888.9   6. Injury of left shoulder, initial encounter  S49.92XA 959.2   7. Chronic edema  R60.9 782.3   8. Impaired functional mobility and activity tolerance [Z74.09]  Z74.09 V49.89   9. Chronic pain syndrome  G89.4 338.4        Outcome Measures       Row Name 24 1000 24 1100 01/10/24 1500       How much help from another person do you currently need...    Turning from your back to your side while in flat bed without using bedrails? 3  -KJ 3  -KJ 3  -KJ    Moving from lying on back to sitting on the side of a flat bed without bedrails? 3  -KJ 3  -KJ 3  -KJ    Moving to and from a bed to a chair (including a wheelchair)? 3  -KJ 3  -KJ 3  -KJ    Standing up from a chair using your arms (e.g., wheelchair, bedside chair)? 3  -KJ 3  -KJ 3  -KJ    Climbing 3-5 steps with a railing? 3  -KJ 3  -KJ 3  -KJ    To walk in hospital room? 3  -KJ 3  -KJ 3  -KJ    AM-PAC 6 Clicks Score (PT) 18  -KJ 18  -KJ 18  -KJ    Highest Level of Mobility Goal 6 --> Walk 10 steps or more  -KJ 6 --> Walk 10 steps or more  -KJ 6 --> Walk 10 steps or more  -KJ       Functional Assessment    Outcome Measure Options AM-PAC 6 Clicks Basic Mobility (PT)  -KJ AM-PAC 6 Clicks Basic Mobility (PT)  -KJ AM-PAC 6 Clicks Basic Mobility (PT)  -KJ              User Key  (r) = Recorded By, (t) = Taken By, (c) = Cosigned By      Initials Name Provider Type    Iram Jones, PTA Physical Therapist Assistant                     PT Charges       Row Name 24 1030             Time Calculation    Start Time 9254  -LO       Stop Time 1034  -KJ      Time Calculation (min) 39 min  -KJ      PT Received On 01/12/24  -KJ      PT Goal Re-Cert Due Date 01/20/24  -KJ         Time Calculation- PT    Total Timed Code Minutes- PT 39 minute(s)  -KJ                User Key  (r) = Recorded By, (t) = Taken By, (c) = Cosigned By      Initials Name Provider Type    Iram Jones, PTA Physical Therapist Assistant                     PT Rehab Goals       Row Name 01/12/24 1617             Bed Mobility Goal 1 (PT)    Activity/Assistive Device (Bed Mobility Goal 1, PT) rolling to left;rolling to right;bridging;scooting;sit to supine/supine to sit;sidelying to sit/sit to sidelying  -MF      [place] Level/Cues Needed (Bed Mobility Goal 1, PT) contact guard required;minimum assist (75% or more patient effort)  -MF      Time Frame (Bed Mobility Goal 1, PT) long term goal (LTG);10 days  -MF      Progress/Outcomes (Bed Mobility Goal 1, PT) goal not met  -MF         Transfer Goal 1 (PT)    Activity/Assistive Device (Transfer Goal 1, PT) sit-to-stand/stand-to-sit;bed-to-chair/chair-to-bed;other (see comments)  rwx for bed to/from chair  -MF      [place] Level/Cues Needed (Transfer Goal 1, PT) contact guard required;standby assist  -MF      Time Frame (Transfer Goal 1, PT) long term goal (LTG);10 days  -MF      Progress/Outcome (Transfer Goal 1, PT) goal not met  -MF         Gait Training Goal 1 (PT)    Activity/Assistive Device (Gait Training Goal 1, PT) gait (walking locomotion);assistive device use;decrease fall risk;diminish gait deviation;improve balance and speed;increase endurance/gait distance;increase energy conservation;walker, rolling  -MF      [place] Level (Gait Training Goal 1, PT) standby assist  -MF      Distance (Gait Training Goal 1, PT) ~ 40+ ft  -MF      Time Frame (Gait Training Goal 1, PT) long term goal (LTG);10 days  -MF      Progress/Outcome (Gait Training Goal 1, PT) goal not met  -MF         Wound Care Goal 1 (PT)     Wound Care Goal 1 (PT) BLE with no evidence of open wounds and decreased edema and redness.  -      Time Frame (Wound Care Goal 1, PT) 1 week  -MF      Progress/Outcome (Wound Care Goal 1, PT) new goal;goal not met  -                User Key  (r) = Recorded By, (t) = Taken By, (c) = Cosigned By      Initials Name Provider Type Discipline    Noni Jolley, PTA Physical Therapist Assistant PT                        PT Discharge Summary  Anticipated Discharge Disposition (PT): skilled nursing facility  Reason for Discharge: Discharge from facility  Outcomes Achieved: Unable to make functional progress toward goals at this time  Discharge Destination: SNF      Noni Choi PTA   1/12/2024

## 2024-01-12 NOTE — PLAN OF CARE
Goal Outcome Evaluation:              Outcome Evaluation: Pt. ALIX4. room air. minimal c/o pain. completed antibiotics for UTI. DC to SNF. Referall sent to [place] for private room per social workers note. sleeping good.

## 2024-01-12 NOTE — CASE MANAGEMENT/SOCIAL WORK
Post-Acute Authorization Submission      Post Acute Pre-Cert Documentation  Request Submitted by Facility - Type:: Hospital  Post-Acute Authorization Type Submitted:: SNF  Date Post Acute Pre-Cert Inititated per Facility: 01/11/24  Verification from Payer: Yes  Date Post Acute Pre-Cert Completed: 01/11/24  Accepting Facility: [facility] Discharge Date Requested: 01/12/24  All Clinicals Submitted?: Yes  Had Accepting Facility at Time of Submission: Yes  Response Received from Insurance?: Approval  Authorization Number:: [id number]  Post Acute Pre-Cert Initiated Comment: Precert completed and approved              KAITLIN Dillard   Consent 2/Introductory Paragraph: Mohs surgery was explained to the patient and consent was obtained. The risks, benefits and alternatives to therapy were discussed in detail. Specifically, the risks of infection, scarring, bleeding, prolonged wound healing, incomplete removal, allergy to anesthesia, nerve injury and recurrence were addressed. Prior to the procedure, the treatment site was clearly identified and confirmed by the patient. All components of Universal Protocol/PAUSE Rule completed.

## 2024-01-12 NOTE — DISCHARGE SUMMARY
Hospital Discharge Summary    Brenda Sneed  :  1950  MRN:  [id number]    Admit date:  2024  Discharge date:  2024    Admitting Physician:    Sachin Dia MD    Discharge Diagnoses:      CHRISTINE (acute kidney injury)    HTN (hypertension)    Obesity, Class III, BMI 40-49.9 (morbid obesity)    Anxiety associated with depression    Chronic pain syndrome    Hyponatremia    Chronic kidney disease, stage 3a      Hospital Course:       73-year-old female with past medical history of CKD, hypertension, heart failure with preserved ejection fraction, and lymphedema complicated by super morbid obesity who presented with acute kidney injury that was likely due to overdiuresis.  She was treated with IV fluids with good results and normalization in her creatinine.  Unfortunately, she experienced weakness while she was in the hospital and will need at least rehab if not long-term care at [facility].  She will be discharged on as needed Lasix and will need lower extremity wrapping as well as elevation for lymphedema.  Of note, I did stop her bisoprolol/HCTZ but otherwise no changes were made to her home medication regimen.,        Discharge Medications:         Discharge Medications        Continue These Medications        Instructions Start Date   albuterol (2.5 MG/3ML) 0.083% nebulizer solution  Commonly known as: PROVENTIL   2.5 mg, Nebulization, 3 Times Daily      apixaban 5 MG tablet tablet  Commonly known as: ELIQUIS   5 mg, Oral, Every 12 Hours Scheduled      azelastine 0.1 % nasal spray  Commonly known as: ASTELIN   2 sprays, Nasal, 2 Times Daily, Use in each nostril as directed      cyclobenzaprine 10 MG tablet  Commonly known as: FLEXERIL   10 mg, Oral, 3 Times Daily PRN      docusate sodium 100 MG capsule  Commonly known as: COLACE   100 mg, Oral, 2 Times Daily PRN      DULoxetine 60 MG capsule  Commonly known as: CYMBALTA   1 capsule, Oral, 2 Times Daily      furosemide 40 MG  tablet  Commonly known as: LASIX   40 mg, Oral, Daily PRN      levothyroxine 100 MCG tablet  Commonly known as: SYNTHROID, LEVOTHROID   1 tablet, Oral, Daily      Morphine 15 MG 12 hr tablet  Commonly known as: MS CONTIN   15 mg, Oral, Every 12 Hours      nystatin [id number] UNIT/GM powder  Commonly known as: MYCOSTATIN   1 application , Topical, 2 Times Daily      pramipexole 0.75 MG tablet  Commonly known as: MIRAPEX   0.75 mg, Oral, Nightly             Stop These Medications      bisoprolol-hydrochlorothiazide 5-6.25 MG per tablet  Commonly known as: ZIAC     meloxicam 7.5 MG tablet  Commonly known as: MOBIC     oxybutynin XL 5 MG 24 hr tablet  Commonly known as: DITROPAN-XL                Significant Diagnostic Studies:      XR Shoulder 2+ View Left    Result Date: 1/5/2024  1. No acute fracture or dislocation.  This report was signed and finalized on 1/5/2024 8:15 PM by Dr. Elian Bonilla MD.      XR Elbow 3+ View Left    Result Date: 1/5/2024  1. Unremarkable radiographs of the left elbow.    This report was signed and finalized on 1/5/2024 8:13 PM by Dr. Elian Bonilla MD.      XR Humerus Left    Result Date: 1/5/2024  1. No acute fracture.  This report was signed and finalized on 1/5/2024 8:12 PM by Dr. Elian Bonilla MD.      XR Chest 1 View    Result Date: 1/5/2024  1.. Mild right basilar atelectasis. Lungs are otherwise clear.  This report was signed and finalized on 1/5/2024 7:49 PM by Dr. Elian Bonilla MD.          Disposition:   [facility]  Follow up with Sachin Dia MD in 1 weeks.    Signed:  Sanjiv Block MD   1/12/2024, 07:20 CST

## 2024-01-13 NOTE — THERAPY DISCHARGE NOTE
Acute Care - Occupational Therapy Discharge Summary  [facility]     Patient Name: Brenda Sneed  : 1950  MRN: [id number]    Today's Date: 2024                 Admit Date: 2024        OT Recommendation and Plan    Visit Dx:    ICD-10-CM ICD-9-CM   1. Lymphedema  I89.0 457.1   2. Acute UTI  N39.0 599.0   3. CHRISTINE (acute kidney injury)  N17.9 584.9   4. Hyponatremia  E87.1 276.1   5. Fall, initial encounter  W19.XXXA E888.9   6. Injury of left shoulder, initial encounter  S49.92XA 959.2   7. Chronic edema  R60.9 782.3   8. Impaired functional mobility and activity tolerance [Z74.09]  Z74.09 V49.89   9. Chronic pain syndrome  G89.4 338.4                OT Rehab Goals       Row Name 24 1300             Transfer Goal 1 (OT)    Activity/Assistive Device (Transfer Goal 1, OT) sit-to-stand/stand-to-sit;bed-to-chair/chair-to-bed;commode, 3-in-1  -LS      [place] Level/Cues Needed (Transfer Goal 1, OT) contact guard required  -LS      Time Frame (Transfer Goal 1, OT) long term goal (LTG);by discharge  -LS      Progress/Outcome (Transfer Goal 1, OT) goal not met  -LS         Bathing Goal 1 (OT)    Activity/Device (Bathing Goal 1, OT) bathing skills, all;grab bar, tub/shower;hand-held shower spray hose;shower chair  -LS      [place] Level/Cues Needed (Bathing Goal 1, OT) minimum assist (75% or more patient effort)  -LS      Time Frame (Bathing Goal 1, OT) long term goal (LTG);by discharge  -LS      Progress/Outcomes (Bathing Goal 1, OT) goal not met  -LS         Dressing Goal 1 (OT)    Activity/Device (Dressing Goal 1, OT) lower body dressing  -LS      [place]/Cues Needed (Dressing Goal 1, OT) minimum assist (75% or more patient effort)  -LS      Time Frame (Dressing Goal 1, OT) long term goal (LTG);by discharge  -LS      Progress/Outcome (Dressing Goal 1, OT) goal not met  -LS                User Key  (r) = Recorded By, (t) = Taken By, (c) = Cosigned By      Initials Name Provider Type  Discipline    Brenda Ricketts COTA Occupational Therapist Assistant THERAPIES                     Outcome Measures       Row Name 01/12/24 1000 01/11/24 1100 01/10/24 1500       How much help from another person do you currently need...    Turning from your back to your side while in flat bed without using bedrails? 3  -KJ 3  -KJ 3  -KJ    Moving from lying on back to sitting on the side of a flat bed without bedrails? 3  -KJ 3  -KJ 3  -KJ    Moving to and from a bed to a chair (including a wheelchair)? 3  -KJ 3  -KJ 3  -KJ    Standing up from a chair using your arms (e.g., wheelchair, bedside chair)? 3  -KJ 3  -KJ 3  -KJ    Climbing 3-5 steps with a railing? 3  -KJ 3  -KJ 3  -KJ    To walk in hospital room? 3  -KJ 3  -KJ 3  -KJ    AM-PAC 6 Clicks Score (PT) 18  -KJ 18  -KJ 18  -KJ    Highest Level of Mobility Goal 6 --> Walk 10 steps or more  -KJ 6 --> Walk 10 steps or more  -KJ 6 --> Walk 10 steps or more  -KJ       Functional Assessment    Outcome Measure Options AM-PAC 6 Clicks Basic Mobility (PT)  -KJ AM-PAC 6 Clicks Basic Mobility (PT)  -KJ AM-PAC 6 Clicks Basic Mobility (PT)  -KJ              User Key  (r) = Recorded By, (t) = Taken By, (c) = Cosigned By      Initials Name Provider Type    Iram Jones, PTA Physical Therapist Assistant                            OT Discharge Summary  Anticipated Discharge Disposition (OT): skilled nursing facility  Reason for Discharge: Discharge from facility  Outcomes Achieved: Refer to plan of care for updates on goals achieved  Discharge Destination: SNF      ALFREDO Florez  1/13/2024

## 2024-01-15 NOTE — PAYOR COMM NOTE
"[place] [address]        Eyad Gurrola (73 y.o. Female)       Date of Birth   1950    Social Security Number       Address   [address] [id number]    Home Phone   [phone]    MRN   [id number]       [facility]    Marital Status                               Admission Date   24    Admission Type   Emergency    Admitting Provider   Sachin Dia MD    Attending Provider       Department, Room/Bed   [facility] 3C, 389/1       Discharge Date   2024    Discharge Disposition   Skilled Nursing Facility (DC - External)    Discharge Destination                                 Attending Provider: (none)   Allergies: Codeine, Amoxicillin, Levaquin [Levofloxacin], Benadryl [Diphenhydramine]    Isolation: None   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), 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)   Code Status: Prior    Ht: 160 cm (63\")   Wt: 101 kg (223 lb 1.7 oz)    Admission Cmt: None   Principal Problem: CHRISTINE (acute kidney injury) [N17.9]                   Active Insurance as of 2024       Primary Coverage       Payor Plan Insurance Group Employer/Plan Group    HUMANA MEDICARE REPLACEMENT HUMANA MEDICARE REPLACEMENT [id number]       Payor Plan Address Payor Plan Phone Number Payor Plan Fax Number Effective Dates    [address] [address] [phone]  2023 - None Entered    [facility] [id number]         Subscriber Name Subscriber Birth Date Member ID       EYAD GURROLA 1950 [id number]                     Emergency Contacts        (Rel.) Home Phone Work Phone Mobile Phone    Nella (Daughter) [phone] -- [phone]    LESLIEODELL DIAZ (Relative) -- -- [phone]                 Discharge Summary        Sanjiv Block MD at 24 0720              Hospital Discharge Summary    Eyad Gurrola  :  1950  MRN:  [id number]    Admit date:  2024  Discharge date:  2024    Admitting Physician:    Sachin Dia MD    Discharge " Diagnoses:      CHRISTINE (acute kidney injury)    HTN (hypertension)    Obesity, Class III, BMI 40-49.9 (morbid obesity)    Anxiety associated with depression    Chronic pain syndrome    Hyponatremia    Chronic kidney disease, stage 3a      Hospital Course:       73-year-old female with past medical history of CKD, hypertension, heart failure with preserved ejection fraction, and lymphedema complicated by super morbid obesity who presented with acute kidney injury that was likely due to overdiuresis.  She was treated with IV fluids with good results and normalization in her creatinine.  Unfortunately, she experienced weakness while she was in the hospital and will need at least rehab if not long-term care at [facility].  She will be discharged on as needed Lasix and will need lower extremity wrapping as well as elevation for lymphedema.  Of note, I did stop her bisoprolol/HCTZ but otherwise no changes were made to her home medication regimen.,        Discharge Medications:         Discharge Medications        Continue These Medications        Instructions Start Date   albuterol (2.5 MG/3ML) 0.083% nebulizer solution  Commonly known as: PROVENTIL   2.5 mg, Nebulization, 3 Times Daily      apixaban 5 MG tablet tablet  Commonly known as: ELIQUIS   5 mg, Oral, Every 12 Hours Scheduled      azelastine 0.1 % nasal spray  Commonly known as: ASTELIN   2 sprays, Nasal, 2 Times Daily, Use in each nostril as directed      cyclobenzaprine 10 MG tablet  Commonly known as: FLEXERIL   10 mg, Oral, 3 Times Daily PRN      docusate sodium 100 MG capsule  Commonly known as: COLACE   100 mg, Oral, 2 Times Daily PRN      DULoxetine 60 MG capsule  Commonly known as: CYMBALTA   1 capsule, Oral, 2 Times Daily      furosemide 40 MG tablet  Commonly known as: LASIX   40 mg, Oral, Daily PRN      levothyroxine 100 MCG tablet  Commonly known as: SYNTHROID, LEVOTHROID   1 tablet, Oral, Daily      Morphine 15 MG 12 hr tablet  Commonly known as:  MS CONTIN   15 mg, Oral, Every 12 Hours      nystatin 938533 UNIT/GM powder  Commonly known as: MYCOSTATIN   1 application , Topical, 2 Times Daily      pramipexole 0.75 MG tablet  Commonly known as: MIRAPEX   0.75 mg, Oral, Nightly             Stop These Medications      bisoprolol-hydrochlorothiazide 5-6.25 MG per tablet  Commonly known as: ZIAC     meloxicam 7.5 MG tablet  Commonly known as: MOBIC     oxybutynin XL 5 MG 24 hr tablet  Commonly known as: DITROPAN-XL                Significant Diagnostic Studies:      XR Shoulder 2+ View Left    Result Date: 1/5/2024  1. No acute fracture or dislocation.  This report was signed and finalized on 1/5/2024 8:15 PM by Dr. Elian Bonilla MD.      XR Elbow 3+ View Left    Result Date: 1/5/2024  1. Unremarkable radiographs of the left elbow.    This report was signed and finalized on 1/5/2024 8:13 PM by Dr. Elian Bonilla MD.      XR Humerus Left    Result Date: 1/5/2024  1. No acute fracture.  This report was signed and finalized on 1/5/2024 8:12 PM by Dr. Elain Bonilla MD.      XR Chest 1 View    Result Date: 1/5/2024  1.. Mild right basilar atelectasis. Lungs are otherwise clear.  This report was signed and finalized on 1/5/2024 7:49 PM by Dr. Elian Bonilla MD.          Disposition:   [facility]  Follow up with Sachin Dia MD in 1 weeks.    Signed:  Sanjiv Block MD   1/12/2024, 07:20 CST      Electronically signed by Sanjiv Block MD at 01/12/24 0737

## 2024-01-16 NOTE — PROGRESS NOTES
"Enter Query Response Below      Query Response:     Malnutrition not present     Electronically signed by Sanjiv Block MD, 01/15/24, 9:29 PM CST.               If applicable, please update the problem list.       Patient: Brenda Sneed        : 1950  Account: [id number]           Admit Date: 2024        How to Respond to this query:       a. Click New Note     b. Answer query within the yellow box.                c. Update the Problem List, if applicable.      If you have any questions about this query contact me at: Sincerely,    Shad Laws RN, BSN  Clinical Documentation Integrity  [facility]  [email or web address]       ,     On 2024, 73 year old female with chronic pain, CKD and morbid obesity presented with decreased oral intake, worsening creatinine level, initially felt to have UTI, \"possible CRE\" on urine culture and chronic lower extremity lymphedema.  Dietician assessment 1/10 included \" Patient meets criteria for : Moderate (non-severe) Malnutrition, acute disease of injury-related malnutrition\" with findings of insufficient energy intake, severe fluid accumulation with 3+-4+ pitting edema.  Status was monitored.    Please clarify diagnosis treated/monitored:    Non-Severe (Moderate) acute illness related malnutrition  Malnutrition, unspecified  Malnutrition not present  Other- specify __________  Unable to determine       By submitting this query, we are merely seeking further clarification of documentation to accurately reflect all conditions that you are monitoring, evaluating, treating or that extend the hospitalization or utilize additional resources of care. Please utilize your independent clinical judgment when addressing the question(s) above.     This query and your response, once completed, will be entered into the legal medical record.    Sincerely,  Shad Laws  Clinical Documentation Integrity Program     "

## 2024-01-16 NOTE — PROGRESS NOTES
"Enter Query Response Below      Query Response:       Other- not a UTI but rather bacterial colonozation    Electronically signed by Sanjiv Block MD, 01/15/24, 9:30 PM CST.         If applicable, please update the problem list.       Patient: Brenda Sneed        : 1950  Account: [id number]           Admit Date:         How to Respond to this query:       a. Click New Note     b. Answer query within the yellow box.                c. Update the Problem List, if applicable.      If you have any questions about this query contact me at: Sincerely,    Shad Laws RN, BSN  Clinical Documentation Integrity  [facility]  [email or web address]       ,     On 2024, 73 year old female with chronic pain, CKD and morbid obesity presented with decreased oral intake, worsening creatinine level, initially felt to have UTI, \"possible CRE\" on urine culture.      -Progress note  mentions \"Chronically ill 73-year-old female who presents with sepsis and acute kidney injury.\"    -CDI query sent  with return response \"Acute kidney injury  due to sepsis .\"    -Infectious disease note 1/10- \"She had possible UTI secondary to CRE-symptomatically any symptoms improved/resolved.  She has insignificant pyuria on her current urinalysis,\" and antibiotics discontinued.    -Progress note - \" Difficult to tell if her presenting symptoms are related to a UTI or not.  At this point I do not really suspect they are.\"    -Urine culture - \"Klebsiella Pneumoniae SSP Pneumoniae CRE Enterococcus Faecalis    Please clarify the following:    Sepsis with UTI and associated CHRISTINE present on admission  Sepsis ruled out; UTI only  Sepsis and UTI both ruled out  Other- specify______  Unable to determine      By submitting this query, we are merely seeking further clarification of documentation to accurately reflect all conditions that you are monitoring, evaluating, treating or that extend the " hospitalization or utilize additional resources of care. Please utilize your independent clinical judgment when addressing the question(s) above.     This query and your response, once completed, will be entered into the legal medical record.    Sincerely,  Shad Laws  Clinical Documentation Integrity Program

## 2024-01-19 ENCOUNTER — TELEPHONE (OUTPATIENT)
Dept: HEMATOLOGY | Age: 74
End: 2024-01-19

## 2024-01-19 NOTE — TELEPHONE ENCOUNTER
Called pt. to remind them of appointment on 1/22/2024 and had to leave a detailed voicemail with appointment date and time

## 2024-02-02 ENCOUNTER — TRANSCRIBE ORDERS (OUTPATIENT)
Dept: HOME HEALTH SERVICES | Facility: HOME HEALTHCARE | Age: 74
End: 2024-02-02
Payer: MEDICARE

## 2024-02-02 ENCOUNTER — HOME HEALTH ADMISSION (OUTPATIENT)
Dept: HOME HEALTH SERVICES | Facility: HOME HEALTHCARE | Age: 74
End: 2024-02-02
Payer: MEDICARE

## 2024-02-02 DIAGNOSIS — I50.9 HEART FAILURE, UNSPECIFIED HF CHRONICITY, UNSPECIFIED HEART FAILURE TYPE: Primary | ICD-10-CM

## 2024-02-05 ENCOUNTER — HOME CARE VISIT (OUTPATIENT)
Dept: HOME HEALTH SERVICES | Facility: CLINIC | Age: 74
End: 2024-02-05
Payer: MEDICARE

## 2024-02-05 VITALS — HEIGHT: 63 IN | BODY MASS INDEX: 34.91 KG/M2 | WEIGHT: 197 LBS

## 2024-02-05 PROCEDURE — G0299 HHS/HOSPICE OF RN EA 15 MIN: HCPCS

## 2024-02-06 NOTE — HOME HEALTH
"  SOC Note: 73 year old female that has had several hospitalization over past several months.  Recently hospitalized with stay at skilled nurse for chf exacerbation. Patients balance poor requires walker for ambulation. 3+ pitting edema to left leg and 2+ to right. Patient hx lymphedema.   Patient also diagnosed with UTI during hospital stay and sent home on antibiotic for UTI.      Home Health ordered for: disciplines  SN,PT    Reason for Hosp/Primary Dx/Co-morbidities: CHF, UTI hx of HTN, lymphedema    Focus of Care: CHF    Patient's goal(s): \"to get around better, no more falls\"    Current Functional status/mobility/DME: requires rolling walker to ambulate and wheelchair and another person to leave home.  Unable to stand for long periods of time due to weakness in legs.  chronic back pain and wears a pain patch.  Patinet with poor visual field with limited site out of left eye    HB status/Living Arrangements: lives alone in single story apartment    Skin Integrity/wound status: no breakdown noted, edema due to lymphedema to bilateral lower extremities    Code Status: full code    Fall Risk/Safety concerns: recent falls, weakness, poor vision, pain    Educated on Emergency Plan, steps to take prior to going to the ER and when to Call [facility]:  educated patient at home visit     Medication issues/Concerns: none    Additional Problems/Concerns: none    SDOH Barriers (i.e. caregiver concerns, social isolation, transportation, food insecurity, environment, income etc.)/Need for MSW: none    Plan for next visit: instruct on disease process, edema managment, medication mangagment"

## 2024-02-06 NOTE — CASE COMMUNICATION
"SOC Note: 73 year old female that has had several hospitalization over past several months.  Recently hospitalized with stay at skilled nurse for chf exacerbation. Patients balance poor requires walker for ambulation. 3+ pitting edema to left leg and 2+ to right. Patient hx lymphedema.   Patient also diagnosed with UTI during hospital stay and sent home on antibiotic for UTI.      Home Health ordered for: disciplines  SN,PT    Reason fo r Hosp/Primary Dx/Co-morbidities: CHF, UTI hx of HTN, lymphedema    Focus of Care: CHF    Patient's goal(s): \"to get around better, no more falls\"    Current Functional status/mobility/DME: requires rolling walker to ambulate and wheelchair and another person to leave home.  Unable to stand for long periods of time due to weakness in legs.  chronic back pain and wears a pain patch.  Patinet with poor visual field with limited site out o f left eye    HB status/Living Arrangements: lives alone in single story apartment    Skin Integrity/wound status: no breakdown noted, edema due to lymphedema to bilateral lower extremities    Code Status: full code    Fall Risk/Safety concerns: recent falls, weakness, poor vision, pain    Educated on Emergency Plan, steps to take prior to going to the ER and when to Call [facility]:  educated patient at home visit     Medication  issues/Concerns: none    Additional Problems/Concerns: none    SDOH Barriers (i.e. caregiver concerns, social isolation, transportation, food insecurity, environment, income etc.)/Need for MSW: none    Plan for next visit: instruct on disease process, edema managment, medication mangagment"

## 2024-02-07 ENCOUNTER — HOME CARE VISIT (OUTPATIENT)
Dept: HOME HEALTH SERVICES | Facility: CLINIC | Age: 74
End: 2024-02-07
Payer: MEDICARE

## 2024-02-07 PROCEDURE — G0151 HHCP-SERV OF PT,EA 15 MIN: HCPCS

## 2024-02-07 PROCEDURE — G0299 HHS/HOSPICE OF RN EA 15 MIN: HCPCS

## 2024-02-09 VITALS
HEART RATE: 62 BPM | SYSTOLIC BLOOD PRESSURE: 112 MMHG | RESPIRATION RATE: 18 BRPM | OXYGEN SATURATION: 97 % | TEMPERATURE: 97 F | DIASTOLIC BLOOD PRESSURE: 70 MMHG

## 2024-02-09 VITALS
DIASTOLIC BLOOD PRESSURE: 72 MMHG | OXYGEN SATURATION: 97 % | TEMPERATURE: 97 F | SYSTOLIC BLOOD PRESSURE: 112 MMHG | RESPIRATION RATE: 18 BRPM | HEART RATE: 62 BPM

## 2024-02-09 NOTE — HOME HEALTH
Routine Visit Note:Alert and pleasant. Amb in apartment with walker.    Skill/education provided: Edema assessment and care. CP assessment    Patient/caregiver response: cooperative    Plan for next visit: edema assessment and care. Med assesessment and care.    Other pertinent info: No s/s of covid 19

## 2024-02-09 NOTE — HOME HEALTH
PT Eval Note    Patient's goal:  Patient states she is doing as well      Problems identified:  1. LE weakness  2. decreased balance  3. decrease safety with AD    Describe the Functional status and safety: Patient ambulates with rolling walker in the home, requires assistance for transfers and ambulation.  Patient requires v/c for posture    Describe any environmental issues:patient with throw rugs    Any equipment needs: none    Plan for next visit:  PT to est HEP next visit

## 2024-02-12 ENCOUNTER — HOME CARE VISIT (OUTPATIENT)
Dept: HOME HEALTH SERVICES | Facility: CLINIC | Age: 74
End: 2024-02-12
Payer: MEDICARE

## 2024-02-20 ENCOUNTER — HOSPITAL ENCOUNTER (INPATIENT)
Facility: HOSPITAL | Age: 74
LOS: 11 days | Discharge: HOME | DRG: 689 | End: 2024-03-02
Attending: FAMILY MEDICINE | Admitting: FAMILY MEDICINE
Payer: MEDICARE

## 2024-02-20 ENCOUNTER — APPOINTMENT (OUTPATIENT)
Dept: GENERAL RADIOLOGY | Facility: HOSPITAL | Age: 74
DRG: 689 | End: 2024-02-20
Payer: MEDICARE

## 2024-02-20 DIAGNOSIS — I83.018 VENOUS STASIS ULCER OF OTHER PART OF RIGHT LOWER LEG WITH FAT LAYER EXPOSED, UNSPECIFIED WHETHER VARICOSE VEINS PRESENT: ICD-10-CM

## 2024-02-20 DIAGNOSIS — I83.028 VENOUS STASIS ULCER OF OTHER PART OF LEFT LOWER LEG WITH FAT LAYER EXPOSED, UNSPECIFIED WHETHER VARICOSE VEINS PRESENT: ICD-10-CM

## 2024-02-20 DIAGNOSIS — Z74.09 IMPAIRED MOBILITY: ICD-10-CM

## 2024-02-20 DIAGNOSIS — L97.812 VENOUS STASIS ULCER OF OTHER PART OF RIGHT LOWER LEG WITH FAT LAYER EXPOSED, UNSPECIFIED WHETHER VARICOSE VEINS PRESENT: ICD-10-CM

## 2024-02-20 DIAGNOSIS — R53.1 GENERALIZED WEAKNESS: ICD-10-CM

## 2024-02-20 DIAGNOSIS — L97.822 VENOUS STASIS ULCER OF OTHER PART OF LEFT LOWER LEG WITH FAT LAYER EXPOSED, UNSPECIFIED WHETHER VARICOSE VEINS PRESENT: ICD-10-CM

## 2024-02-20 DIAGNOSIS — R13.12 OROPHARYNGEAL DYSPHAGIA: ICD-10-CM

## 2024-02-20 DIAGNOSIS — M25.512 CHRONIC LEFT SHOULDER PAIN: ICD-10-CM

## 2024-02-20 DIAGNOSIS — G89.29 CHRONIC LEFT SHOULDER PAIN: ICD-10-CM

## 2024-02-20 DIAGNOSIS — I89.0 LYMPHEDEMA: Primary | Chronic | ICD-10-CM

## 2024-02-20 DIAGNOSIS — N39.0 URINARY TRACT INFECTION WITHOUT HEMATURIA, SITE UNSPECIFIED: ICD-10-CM

## 2024-02-20 LAB
ALBUMIN SERPL-MCNC: 2.9 G/DL (ref 3.5–5.2)
ALBUMIN/GLOB SERPL: 0.9 G/DL
ALP SERPL-CCNC: 137 U/L (ref 39–117)
ALT SERPL W P-5'-P-CCNC: 15 U/L (ref 1–33)
ANION GAP SERPL CALCULATED.3IONS-SCNC: 13 MMOL/L (ref 5–15)
AST SERPL-CCNC: 35 U/L (ref 1–32)
BACTERIA UR QL AUTO: ABNORMAL /HPF
BASOPHILS # BLD AUTO: 0.01 10*3/MM3 (ref 0–0.2)
BASOPHILS NFR BLD AUTO: 0.1 % (ref 0–1.5)
BILIRUB SERPL-MCNC: 1.8 MG/DL (ref 0–1.2)
BILIRUB UR QL STRIP: NEGATIVE
BUN SERPL-MCNC: 46 MG/DL (ref 8–23)
BUN/CREAT SERPL: 23.5 (ref 7–25)
CALCIUM SPEC-SCNC: 7.8 MG/DL (ref 8.6–10.5)
CHLORIDE SERPL-SCNC: 95 MMOL/L (ref 98–107)
CLARITY UR: CLEAR
CO2 SERPL-SCNC: 24 MMOL/L (ref 22–29)
COLOR UR: ABNORMAL
CREAT SERPL-MCNC: 1.96 MG/DL (ref 0.57–1)
DEPRECATED RDW RBC AUTO: 58.3 FL (ref 37–54)
EGFRCR SERPLBLD CKD-EPI 2021: 26.3 ML/MIN/1.73
EOSINOPHIL # BLD AUTO: 0 10*3/MM3 (ref 0–0.4)
EOSINOPHIL NFR BLD AUTO: 0 % (ref 0.3–6.2)
ERYTHROCYTE [DISTWIDTH] IN BLOOD BY AUTOMATED COUNT: 22.1 % (ref 12.3–15.4)
FLUAV RNA RESP QL NAA+PROBE: NOT DETECTED
FLUBV RNA RESP QL NAA+PROBE: NOT DETECTED
GLOBULIN UR ELPH-MCNC: 3.1 GM/DL
GLUCOSE SERPL-MCNC: 94 MG/DL (ref 65–99)
GLUCOSE UR STRIP-MCNC: NEGATIVE MG/DL
HCT VFR BLD AUTO: 28.2 % (ref 34–46.6)
HGB BLD-MCNC: 10.5 G/DL (ref 12–15.9)
HGB UR QL STRIP.AUTO: NEGATIVE
HYALINE CASTS UR QL AUTO: ABNORMAL /LPF
IMM GRANULOCYTES # BLD AUTO: 0.11 10*3/MM3 (ref 0–0.05)
IMM GRANULOCYTES NFR BLD AUTO: 0.7 % (ref 0–0.5)
KETONES UR QL STRIP: NEGATIVE
LEUKOCYTE ESTERASE UR QL STRIP.AUTO: ABNORMAL
LYMPHOCYTES # BLD AUTO: 0.73 10*3/MM3 (ref 0.7–3.1)
LYMPHOCYTES NFR BLD AUTO: 4.7 % (ref 19.6–45.3)
MAGNESIUM SERPL-MCNC: 1.7 MG/DL (ref 1.6–2.4)
MCH RBC QN AUTO: 33.8 PG (ref 26.6–33)
MCHC RBC AUTO-ENTMCNC: 37.2 G/DL (ref 31.5–35.7)
MCV RBC AUTO: 90.7 FL (ref 79–97)
MONOCYTES # BLD AUTO: 1.12 10*3/MM3 (ref 0.1–0.9)
MONOCYTES NFR BLD AUTO: 7.2 % (ref 5–12)
NEUTROPHILS NFR BLD AUTO: 13.67 10*3/MM3 (ref 1.7–7)
NEUTROPHILS NFR BLD AUTO: 87.3 % (ref 42.7–76)
NITRITE UR QL STRIP: POSITIVE
NRBC BLD AUTO-RTO: 0 /100 WBC (ref 0–0.2)
PH UR STRIP.AUTO: <=5 [PH] (ref 5–8)
PLATELET # BLD AUTO: 73 10*3/MM3 (ref 140–450)
PMV BLD AUTO: 11.5 FL (ref 6–12)
POTASSIUM SERPL-SCNC: 3.3 MMOL/L (ref 3.5–5.2)
PROT SERPL-MCNC: 6 G/DL (ref 6–8.5)
PROT UR QL STRIP: NEGATIVE
RBC # BLD AUTO: 3.11 10*6/MM3 (ref 3.77–5.28)
RBC # UR STRIP: ABNORMAL /HPF
REF LAB TEST METHOD: ABNORMAL
RSV RNA RESP QL NAA+PROBE: NOT DETECTED
SARS-COV-2 RNA RESP QL NAA+PROBE: DETECTED
SODIUM SERPL-SCNC: 132 MMOL/L (ref 136–145)
SP GR UR STRIP: 1.02 (ref 1–1.03)
SQUAMOUS #/AREA URNS HPF: ABNORMAL /HPF
TSH SERPL DL<=0.05 MIU/L-ACNC: 6.37 UIU/ML (ref 0.27–4.2)
UROBILINOGEN UR QL STRIP: ABNORMAL
WBC # UR STRIP: ABNORMAL /HPF
WBC NRBC COR # BLD AUTO: 15.64 10*3/MM3 (ref 3.4–10.8)

## 2024-02-20 PROCEDURE — 87086 URINE CULTURE/COLONY COUNT: CPT

## 2024-02-20 PROCEDURE — 25010000002 CEFEPIME PER 500 MG

## 2024-02-20 PROCEDURE — 71045 X-RAY EXAM CHEST 1 VIEW: CPT

## 2024-02-20 PROCEDURE — 87637 SARSCOV2&INF A&B&RSV AMP PRB: CPT

## 2024-02-20 PROCEDURE — P9612 CATHETERIZE FOR URINE SPEC: HCPCS

## 2024-02-20 PROCEDURE — 87077 CULTURE AEROBIC IDENTIFY: CPT

## 2024-02-20 PROCEDURE — 84466 ASSAY OF TRANSFERRIN: CPT | Performed by: FAMILY MEDICINE

## 2024-02-20 PROCEDURE — 83735 ASSAY OF MAGNESIUM: CPT

## 2024-02-20 PROCEDURE — 73030 X-RAY EXAM OF SHOULDER: CPT

## 2024-02-20 PROCEDURE — 25010000002 VANCOMYCIN 10 G RECONSTITUTED SOLUTION

## 2024-02-20 PROCEDURE — G0378 HOSPITAL OBSERVATION PER HR: HCPCS

## 2024-02-20 PROCEDURE — 85025 COMPLETE CBC W/AUTO DIFF WBC: CPT

## 2024-02-20 PROCEDURE — 81001 URINALYSIS AUTO W/SCOPE: CPT

## 2024-02-20 PROCEDURE — 99285 EMERGENCY DEPT VISIT HI MDM: CPT

## 2024-02-20 PROCEDURE — 84443 ASSAY THYROID STIM HORMONE: CPT

## 2024-02-20 PROCEDURE — 36415 COLL VENOUS BLD VENIPUNCTURE: CPT

## 2024-02-20 PROCEDURE — 93010 ELECTROCARDIOGRAM REPORT: CPT | Performed by: STUDENT IN AN ORGANIZED HEALTH CARE EDUCATION/TRAINING PROGRAM

## 2024-02-20 PROCEDURE — 87186 SC STD MICRODIL/AGAR DIL: CPT

## 2024-02-20 PROCEDURE — 83540 ASSAY OF IRON: CPT | Performed by: FAMILY MEDICINE

## 2024-02-20 PROCEDURE — 80053 COMPREHEN METABOLIC PANEL: CPT

## 2024-02-20 PROCEDURE — 93005 ELECTROCARDIOGRAM TRACING: CPT

## 2024-02-20 PROCEDURE — 25810000003 SODIUM CHLORIDE 0.9 % SOLUTION

## 2024-02-20 RX ORDER — ACETAMINOPHEN 500 MG
1000 TABLET ORAL ONCE
Status: COMPLETED | OUTPATIENT
Start: 2024-02-20 | End: 2024-02-20

## 2024-02-20 RX ORDER — CYCLOBENZAPRINE HCL 10 MG
10 TABLET ORAL ONCE
Status: COMPLETED | OUTPATIENT
Start: 2024-02-20 | End: 2024-02-20

## 2024-02-20 RX ORDER — MORPHINE SULFATE 15 MG/1
15 TABLET, FILM COATED, EXTENDED RELEASE ORAL ONCE
Status: COMPLETED | OUTPATIENT
Start: 2024-02-20 | End: 2024-02-20

## 2024-02-20 RX ORDER — SODIUM CHLORIDE 9 MG/ML
40 INJECTION, SOLUTION INTRAVENOUS AS NEEDED
Status: DISCONTINUED | OUTPATIENT
Start: 2024-02-20 | End: 2024-03-02 | Stop reason: HOSPADM

## 2024-02-20 RX ORDER — SODIUM CHLORIDE 0.9 % (FLUSH) 0.9 %
10 SYRINGE (ML) INJECTION EVERY 12 HOURS SCHEDULED
Status: DISCONTINUED | OUTPATIENT
Start: 2024-02-20 | End: 2024-03-02 | Stop reason: HOSPADM

## 2024-02-20 RX ORDER — ONDANSETRON 2 MG/ML
4 INJECTION INTRAMUSCULAR; INTRAVENOUS EVERY 6 HOURS PRN
Status: DISCONTINUED | OUTPATIENT
Start: 2024-02-20 | End: 2024-03-02 | Stop reason: HOSPADM

## 2024-02-20 RX ORDER — LIDOCAINE 50 MG/G
1 PATCH TOPICAL ONCE
Status: COMPLETED | OUTPATIENT
Start: 2024-02-20 | End: 2024-02-21

## 2024-02-20 RX ORDER — SODIUM CHLORIDE 0.9 % (FLUSH) 0.9 %
10 SYRINGE (ML) INJECTION AS NEEDED
Status: DISCONTINUED | OUTPATIENT
Start: 2024-02-20 | End: 2024-03-02 | Stop reason: HOSPADM

## 2024-02-20 RX ADMIN — MORPHINE SULFATE 15 MG: 15 TABLET, FILM COATED, EXTENDED RELEASE ORAL at 16:52

## 2024-02-20 RX ADMIN — Medication 1750 MG: at 21:14

## 2024-02-20 RX ADMIN — ACETAMINOPHEN 1000 MG: 500 TABLET ORAL at 16:55

## 2024-02-20 RX ADMIN — LIDOCAINE 1 PATCH: 700 PATCH TOPICAL at 16:56

## 2024-02-20 RX ADMIN — CEFEPIME 2000 MG: 2 INJECTION, POWDER, FOR SOLUTION INTRAVENOUS at 19:37

## 2024-02-20 RX ADMIN — CYCLOBENZAPRINE HYDROCHLORIDE 10 MG: 10 TABLET, FILM COATED ORAL at 16:55

## 2024-02-20 RX ADMIN — SODIUM CHLORIDE 500 ML: 9 INJECTION, SOLUTION INTRAVENOUS at 18:27

## 2024-02-20 NOTE — ED PROVIDER NOTES
"Subjective   History of Present Illness  Patient is a 73-year-old female who presents emergency department with complaints of generalized weakness.  Her niece is at the bedside helping provide history.  Niece states that the patient was fallen over the last several months, however she denies any recent falls.  She does state that she slid down in her bed last night, however she did not fall out of her bed.  Patient is complaining of pain in her left shoulder that has been ongoing for a while.  Patient had an x-ray done last month which was negative for acute findings.  Niece reports that patient had a yeast infection under her breast and she try to put a sports bra line and that is when the pain initially started.  Patient follows with Dr. Jones for chronic neck and back pain.  She is currently taking pain patches, but is not taking anything else.  Patient does have history of lymphedema and niece reports that her legs are \"weeping from the waist down\".  Patient does have home health that comes out 3 times a week and does wound care on these.  Patient actually has an appointment coming up with wound care outpatient.  Patient was recently discharged from the nursing home 2 weeks ago for rehab.  Patient denies any chest pain.  Denies shortness of breath.  Denies abdominal pain.  Denies nausea, vomiting, diarrhea, fevers.  Niece also reports that whenever she had her follow-up appointment with her primary care provider 1 week ago she had a cough and tested positive for COVID at that time.  Patient does live at home by herself.  She is able to walk with a walker.  Patient with a past medical history of arthritis, chronic pain, lymphedema, hypertension.  Patient denies any history of congestive heart failure.  Denies history of DVT or pulmonary embolism.  Patient does have history of atrial fibrillation and is anticoagulated with Eliquis.        Review of Systems   Cardiovascular:  Positive for leg swelling. "   Musculoskeletal:  Positive for arthralgias.   Neurological:  Positive for weakness.   All other systems reviewed and are negative.      Past Medical History:   Diagnosis Date    Arthritis     Bronchitis     Cataract     right eye , cornea implant    Chronic back pain     Depression     Disease of thyroid gland     Edema     lower extremities    Hypertension     Kidney stones     Lymphedema        Allergies   Allergen Reactions    Codeine Itching and Rash     SEVERE RASH/ITCHING (TOLERATES PERCOCET)    Amoxicillin Other (See Comments)     unknown    Levaquin [Levofloxacin] Other (See Comments)     unknown    Benadryl [Diphenhydramine] Other (See Comments)     KEEPS PATIENT AWAKE        Past Surgical History:   Procedure Laterality Date    BACK SURGERY      2010 ([facility]), 2013     CHOLECYSTECTOMY OPEN      CORNEAL TRANSPLANT Right 1999    INCISION AND DRAINAGE ABSCESS Left 4/19/2023    Procedure: INCISION AND DRAINAGE ABSCESS left leg;  Surgeon: Sarita Vazquez MD;  Location:  [facility] OR;  Service: General;  Laterality: Left;    LACERATION REPAIR Left 4/7/2023    Procedure: washout and closure left leg wound ;  Surgeon: Sarita Vazquez MD;  Location:  [facility];  Service: General;  Laterality: Left;    LUMBAR FUSION Left 8/2/2017    Procedure: LEFT LUMBAR LATERAL INTERBODY FUSION WITH INSTRUMENTATION  L1-2;  Surgeon: MARCELINO Wright MD;  Location:  [facility];  Service:     NECK SURGERY  2010    [facility]    TOTAL HIP ARTHROPLASTY Right 2014    DR. KENDRICK     TOTAL KNEE ARTHROPLASTY Right 2000    TOTAL KNEE ARTHROPLASTY Left 2002    TOTAL SHOULDER ARTHROPLASTY W/ DISTAL CLAVICLE EXCISION Left 6/1/2020    Procedure: LEFT REVERSE TOTAL SHOULDER REPLACEMENT;  Surgeon: Saravanan Calvin MD;  Location:  [facility];  Service: Orthopedics;  Laterality: Left;       Family History   Problem Relation Age of Onset    Breast cancer Neg Hx        Social History     Socioeconomic History    Marital status:    Tobacco Use     Smoking status: Never    Smokeless tobacco: Never   Vaping Use    Vaping Use: Never used   Substance and Sexual Activity    Alcohol use: Not Currently    Drug use: No    Sexual activity: Defer           Objective   Physical Exam  Vitals and nursing note reviewed.   Constitutional:       General: She is not in acute distress.     Appearance: Normal appearance. She is normal weight. She is ill-appearing. She is not toxic-appearing.      Comments: Chronically ill-appearing   HENT:      Nose: Nose normal.   Cardiovascular:      Rate and Rhythm: Normal rate and regular rhythm.      Pulses: Normal pulses.      Heart sounds: Normal heart sounds.   Pulmonary:      Effort: Pulmonary effort is normal.      Breath sounds: Normal breath sounds.   Abdominal:      General: Abdomen is flat. Bowel sounds are normal. There is no distension.      Palpations: Abdomen is soft.      Tenderness: There is no abdominal tenderness.   Musculoskeletal:         General: Tenderness present.      Cervical back: Normal range of motion and neck supple.      Right lower leg: Edema present.      Left lower leg: Edema present.      Comments: Edema present to BLE.  Patient does have Ace bandages present on bilateral lower extremities that have been placed from home health wound care.  Patient is having tenderness in her left shoulder.  Range of motion is limited in her left shoulder.   Skin:     General: Skin is warm and dry.   Neurological:      General: No focal deficit present.      Mental Status: She is alert and oriented to person, place, and time. Mental status is at baseline.      GCS: GCS eye subscore is 4. GCS verbal subscore is 5. GCS motor subscore is 6.      Sensory: Sensation is intact.   Psychiatric:         Mood and Affect: Mood normal.         Behavior: Behavior normal.         Thought Content: Thought content normal.         Judgment: Judgment normal.         Procedures           ED Course  ED Course as of 02/20/24 2036 Tue Feb  20, 2024 1916 Call placed to Dr. Dia for admission. [KR]   1924 Patient with history of E. coli ESBL and CRE.  IV cefepime and IV vancomycin has been ordered at this time.  Patient does have a UTI.  Urinalysis revealed positive nitrate, 4+ bacteria, 3-5 WBCs.  Culture is pending at this time.  Patient with leukocytosis on CBC.  Stable H&H.  Creatinine 1.96, sodium 132, potassium 3.3.  Patient still testing positive for COVID. [KR]   2009 Chest x-ray reviewed by radiologist which revealed no acute findings.  X-ray left shoulder revealed   IMPRESSION:  1.  Findings suspicious for hardware complication of the total shoulder arthroplasty. I suspect that there may be grade 4 scapular notching and the inferior glenoid component screw is fractured. Orthopedic follow-up nonemergent CT with metal suppression protocol is recommended to further characterize the degree of scapular notching. No acute osseous abnormality identified on this exam.  [KR]   2010 Case discussed with ALISA Israel with Dr. Dia.  He has agreed to accept this patient for further management.  Patient and family member agreeable to plan. [KR]      ED Course User Index  [KR] Elba Rockwell APRN                                             Medical Decision Making  Problems Addressed:  Chronic left shoulder pain: complicated acute illness or injury  Generalized weakness: complicated acute illness or injury  Urinary tract infection without hematuria, site unspecified: complicated acute illness or injury    Amount and/or Complexity of Data Reviewed  Labs: ordered.  Radiology: ordered.  ECG/medicine tests: ordered.    Risk  OTC drugs.  Prescription drug management.  Decision regarding hospitalization.        Final diagnoses:   Urinary tract infection without hematuria, site unspecified   Generalized weakness   Chronic left shoulder pain       ED Disposition  ED Disposition       ED Disposition   Decision to Admit    Condition   --    Comment   Level of  Care: Med/Surg [1]   Diagnosis: UTI (urinary tract infection) [804279]   Admitting Physician: RADHA RYAN [[id number]]                 No follow-up provider specified.       Medication List      No changes were made to your prescriptions during this visit.            Elba Rockwell, GARETT  02/20/24 6646

## 2024-02-21 PROBLEM — U07.1 COVID-19 VIRUS DETECTED: Status: ACTIVE | Noted: 2024-02-21

## 2024-02-21 LAB
25(OH)D3 SERPL-MCNC: 14.2 NG/ML (ref 30–100)
FOLATE SERPL-MCNC: 2.59 NG/ML (ref 4.78–24.2)
IRON 24H UR-MRATE: 28 MCG/DL (ref 37–145)
IRON SATN MFR SERPL: 13 % (ref 20–50)
TIBC SERPL-MCNC: 209 MCG/DL (ref 298–536)
TRANSFERRIN SERPL-MCNC: 140 MG/DL (ref 200–360)
VIT B12 BLD-MCNC: 1641 PG/ML (ref 211–946)

## 2024-02-21 PROCEDURE — G0378 HOSPITAL OBSERVATION PER HR: HCPCS

## 2024-02-21 PROCEDURE — 82306 VITAMIN D 25 HYDROXY: CPT | Performed by: FAMILY MEDICINE

## 2024-02-21 PROCEDURE — 25010000002 CEFEPIME PER 500 MG: Performed by: FAMILY MEDICINE

## 2024-02-21 PROCEDURE — 94799 UNLISTED PULMONARY SVC/PX: CPT

## 2024-02-21 PROCEDURE — 99497 ADVNCD CARE PLAN 30 MIN: CPT | Performed by: CLINICAL NURSE SPECIALIST

## 2024-02-21 PROCEDURE — 97166 OT EVAL MOD COMPLEX 45 MIN: CPT

## 2024-02-21 PROCEDURE — 99223 1ST HOSP IP/OBS HIGH 75: CPT | Performed by: CLINICAL NURSE SPECIALIST

## 2024-02-21 PROCEDURE — 94640 AIRWAY INHALATION TREATMENT: CPT

## 2024-02-21 PROCEDURE — 25810000003 SODIUM CHLORIDE 0.9 % SOLUTION: Performed by: FAMILY MEDICINE

## 2024-02-21 PROCEDURE — 25010000002 VANCOMYCIN 10 G RECONSTITUTED SOLUTION: Performed by: FAMILY MEDICINE

## 2024-02-21 PROCEDURE — 94761 N-INVAS EAR/PLS OXIMETRY MLT: CPT

## 2024-02-21 PROCEDURE — 82746 ASSAY OF FOLIC ACID SERUM: CPT | Performed by: FAMILY MEDICINE

## 2024-02-21 PROCEDURE — 82607 VITAMIN B-12: CPT | Performed by: FAMILY MEDICINE

## 2024-02-21 RX ORDER — FUROSEMIDE 20 MG/1
40 TABLET ORAL DAILY PRN
Status: DISCONTINUED | OUTPATIENT
Start: 2024-02-21 | End: 2024-03-02 | Stop reason: HOSPADM

## 2024-02-21 RX ORDER — AMOXICILLIN 250 MG
2 CAPSULE ORAL 2 TIMES DAILY PRN
Status: DISCONTINUED | OUTPATIENT
Start: 2024-02-21 | End: 2024-03-02 | Stop reason: HOSPADM

## 2024-02-21 RX ORDER — MORPHINE SULFATE 15 MG/1
15 TABLET ORAL EVERY 6 HOURS PRN
Status: DISCONTINUED | OUTPATIENT
Start: 2024-02-21 | End: 2024-02-22

## 2024-02-21 RX ORDER — BISACODYL 5 MG/1
5 TABLET, DELAYED RELEASE ORAL DAILY PRN
Status: DISCONTINUED | OUTPATIENT
Start: 2024-02-21 | End: 2024-03-02 | Stop reason: HOSPADM

## 2024-02-21 RX ORDER — POLYETHYLENE GLYCOL 3350 17 G/17G
17 POWDER, FOR SOLUTION ORAL DAILY PRN
Status: DISCONTINUED | OUTPATIENT
Start: 2024-02-21 | End: 2024-03-02 | Stop reason: HOSPADM

## 2024-02-21 RX ORDER — SODIUM CHLORIDE 9 MG/ML
40 INJECTION, SOLUTION INTRAVENOUS AS NEEDED
Status: DISCONTINUED | OUTPATIENT
Start: 2024-02-21 | End: 2024-03-02 | Stop reason: HOSPADM

## 2024-02-21 RX ORDER — SODIUM CHLORIDE 0.9 % (FLUSH) 0.9 %
10 SYRINGE (ML) INJECTION EVERY 12 HOURS SCHEDULED
Status: DISCONTINUED | OUTPATIENT
Start: 2024-02-21 | End: 2024-03-02 | Stop reason: HOSPADM

## 2024-02-21 RX ORDER — SALIVA STIMULANT COMB. NO.3
1 SPRAY, NON-AEROSOL (ML) MUCOUS MEMBRANE
Status: DISCONTINUED | OUTPATIENT
Start: 2024-02-21 | End: 2024-03-02 | Stop reason: HOSPADM

## 2024-02-21 RX ORDER — DULOXETIN HYDROCHLORIDE 30 MG/1
60 CAPSULE, DELAYED RELEASE ORAL 2 TIMES DAILY
Status: DISCONTINUED | OUTPATIENT
Start: 2024-02-21 | End: 2024-02-21

## 2024-02-21 RX ORDER — SODIUM CHLORIDE 0.9 % (FLUSH) 0.9 %
10 SYRINGE (ML) INJECTION AS NEEDED
Status: DISCONTINUED | OUTPATIENT
Start: 2024-02-21 | End: 2024-03-02 | Stop reason: HOSPADM

## 2024-02-21 RX ORDER — MORPHINE SULFATE 15 MG/1
15 TABLET, FILM COATED, EXTENDED RELEASE ORAL EVERY 12 HOURS SCHEDULED
Status: DISCONTINUED | OUTPATIENT
Start: 2024-02-21 | End: 2024-02-23

## 2024-02-21 RX ORDER — PRAMIPEXOLE DIHYDROCHLORIDE 0.25 MG/1
0.75 TABLET ORAL NIGHTLY
Status: DISCONTINUED | OUTPATIENT
Start: 2024-02-21 | End: 2024-03-02 | Stop reason: HOSPADM

## 2024-02-21 RX ORDER — ALBUTEROL SULFATE 2.5 MG/3ML
2.5 SOLUTION RESPIRATORY (INHALATION) 3 TIMES DAILY
Status: DISCONTINUED | OUTPATIENT
Start: 2024-02-21 | End: 2024-02-21

## 2024-02-21 RX ORDER — SACCHAROMYCES BOULARDII 250 MG
250 CAPSULE ORAL 2 TIMES DAILY
Status: DISCONTINUED | OUTPATIENT
Start: 2024-02-21 | End: 2024-03-02 | Stop reason: HOSPADM

## 2024-02-21 RX ORDER — GABAPENTIN 100 MG/1
100 CAPSULE ORAL EVERY 12 HOURS SCHEDULED
Status: DISCONTINUED | OUTPATIENT
Start: 2024-02-21 | End: 2024-02-28

## 2024-02-21 RX ORDER — DULOXETIN HYDROCHLORIDE 30 MG/1
30 CAPSULE, DELAYED RELEASE ORAL DAILY
Status: DISCONTINUED | OUTPATIENT
Start: 2024-02-21 | End: 2024-03-02 | Stop reason: HOSPADM

## 2024-02-21 RX ORDER — BUSPIRONE HYDROCHLORIDE 5 MG/1
5 TABLET ORAL
Status: DISCONTINUED | OUTPATIENT
Start: 2024-02-21 | End: 2024-02-28

## 2024-02-21 RX ORDER — DOCUSATE SODIUM 100 MG/1
100 CAPSULE, LIQUID FILLED ORAL 2 TIMES DAILY PRN
Status: DISCONTINUED | OUTPATIENT
Start: 2024-02-21 | End: 2024-03-02 | Stop reason: HOSPADM

## 2024-02-21 RX ORDER — LEVOTHYROXINE SODIUM 0.1 MG/1
100 TABLET ORAL
Status: DISCONTINUED | OUTPATIENT
Start: 2024-02-21 | End: 2024-02-28

## 2024-02-21 RX ORDER — ALBUTEROL SULFATE 90 UG/1
2 AEROSOL, METERED RESPIRATORY (INHALATION)
Status: DISCONTINUED | OUTPATIENT
Start: 2024-02-21 | End: 2024-02-29

## 2024-02-21 RX ORDER — BISOPROLOL FUMARATE AND HYDROCHLOROTHIAZIDE 5; 6.25 MG/1; MG/1
1 TABLET ORAL DAILY
COMMUNITY
End: 2024-03-02 | Stop reason: HOSPADM

## 2024-02-21 RX ORDER — NYSTATIN 100000 [USP'U]/G
1 POWDER TOPICAL 2 TIMES DAILY
Status: DISCONTINUED | OUTPATIENT
Start: 2024-02-21 | End: 2024-03-02 | Stop reason: HOSPADM

## 2024-02-21 RX ORDER — BISACODYL 10 MG
10 SUPPOSITORY, RECTAL RECTAL DAILY PRN
Status: DISCONTINUED | OUTPATIENT
Start: 2024-02-21 | End: 2024-03-02 | Stop reason: HOSPADM

## 2024-02-21 RX ORDER — SODIUM CHLORIDE 9 MG/ML
100 INJECTION, SOLUTION INTRAVENOUS CONTINUOUS
Status: DISCONTINUED | OUTPATIENT
Start: 2024-02-21 | End: 2024-02-23

## 2024-02-21 RX ORDER — CYCLOBENZAPRINE HCL 10 MG
10 TABLET ORAL 3 TIMES DAILY PRN
Status: DISCONTINUED | OUTPATIENT
Start: 2024-02-21 | End: 2024-03-02 | Stop reason: HOSPADM

## 2024-02-21 RX ADMIN — Medication 1 SPRAY: at 18:31

## 2024-02-21 RX ADMIN — ALBUTEROL SULFATE 2 PUFF: 90 AEROSOL, METERED RESPIRATORY (INHALATION) at 10:29

## 2024-02-21 RX ADMIN — MORPHINE SULFATE 15 MG: 15 TABLET, FILM COATED, EXTENDED RELEASE ORAL at 21:38

## 2024-02-21 RX ADMIN — Medication 250 MG: at 11:56

## 2024-02-21 RX ADMIN — Medication 10 ML: at 10:00

## 2024-02-21 RX ADMIN — GABAPENTIN 100 MG: 100 CAPSULE ORAL at 21:38

## 2024-02-21 RX ADMIN — Medication 10 ML: at 21:39

## 2024-02-21 RX ADMIN — NYSTATIN 1 APPLICATION: 100000 POWDER TOPICAL at 21:39

## 2024-02-21 RX ADMIN — MORPHINE SULFATE 15 MG: 15 TABLET ORAL at 06:55

## 2024-02-21 RX ADMIN — CEFEPIME 2000 MG: 2 INJECTION, POWDER, FOR SOLUTION INTRAVENOUS at 20:30

## 2024-02-21 RX ADMIN — Medication 1 SPRAY: at 21:40

## 2024-02-21 RX ADMIN — GABAPENTIN 100 MG: 100 CAPSULE ORAL at 12:29

## 2024-02-21 RX ADMIN — LEVOTHYROXINE SODIUM 100 MCG: 100 TABLET ORAL at 11:57

## 2024-02-21 RX ADMIN — MORPHINE SULFATE 15 MG: 15 TABLET, FILM COATED, EXTENDED RELEASE ORAL at 14:11

## 2024-02-21 RX ADMIN — Medication 10 ML: at 14:10

## 2024-02-21 RX ADMIN — VANCOMYCIN HYDROCHLORIDE 750 MG: 10 INJECTION, POWDER, LYOPHILIZED, FOR SOLUTION INTRAVENOUS at 18:31

## 2024-02-21 RX ADMIN — BUSPIRONE HYDROCHLORIDE 5 MG: 5 TABLET ORAL at 12:10

## 2024-02-21 RX ADMIN — NYSTATIN 1 APPLICATION: 100000 POWDER TOPICAL at 12:14

## 2024-02-21 RX ADMIN — ALBUTEROL SULFATE 2 PUFF: 90 AEROSOL, METERED RESPIRATORY (INHALATION) at 15:20

## 2024-02-21 RX ADMIN — BISOPROLOL FUMARATE: 5 TABLET ORAL at 11:55

## 2024-02-21 RX ADMIN — BUSPIRONE HYDROCHLORIDE 5 MG: 5 TABLET ORAL at 18:32

## 2024-02-21 RX ADMIN — DULOXETINE HYDROCHLORIDE 30 MG: 30 CAPSULE, DELAYED RELEASE ORAL at 11:56

## 2024-02-21 RX ADMIN — Medication 250 MG: at 21:40

## 2024-02-21 RX ADMIN — SODIUM CHLORIDE 100 ML/HR: 9 INJECTION, SOLUTION INTRAVENOUS at 14:53

## 2024-02-21 RX ADMIN — APIXABAN 5 MG: 5 TABLET, FILM COATED ORAL at 11:56

## 2024-02-21 RX ADMIN — Medication 1 SPRAY: at 14:03

## 2024-02-21 RX ADMIN — ALBUTEROL SULFATE 2 PUFF: 90 AEROSOL, METERED RESPIRATORY (INHALATION) at 20:33

## 2024-02-21 RX ADMIN — PRAMIPEXOLE DIHYDROCHLORIDE 0.75 MG: 0.25 TABLET ORAL at 21:39

## 2024-02-21 RX ADMIN — APIXABAN 5 MG: 5 TABLET, FILM COATED ORAL at 21:39

## 2024-02-21 NOTE — PLAN OF CARE
Goal Outcome Evaluation:  Plan of Care Reviewed With: patient        Progress: no change  Outcome Evaluation: OT evaluation completed.  Pt is A&Ox4.  Pt complains of pain in her L shoulder with minimal movement achieved in L shoulder and elbow.  RN notified of potential need for Ortho consult per H&P.  OT will assume NWB or LUE at this time due to hardware complication of TSA and possible screw fx.  Pt required mod A for bed mobility.  Pt completed functional transfer sit to stand with min/mod A and hand held assist of RUE.  Pt able to take side steps to the L with min/mod A and held held assist of RUE.  Pt reports significant burning in her feet (R>L) upon sitting.  RN notified however wrapping present to BLEs therefore insepection was not able to be completed.  Pt req dep A for LB dressing of her socks and set-up of her meal tray.  She was only able to take 2 bites of her eggs and reports that since she does not have her teeth here, she is only able to eat soft textured food.  RN notified.  OT will cont to follow to maximize her I and safety with ADLs and functional mobility.  It is likely pt will require additional therapy at SNF upon hospital discharge.      Anticipated Discharge Disposition (OT): skilled nursing facility

## 2024-02-21 NOTE — PLAN OF CARE
Goal Outcome Evaluation:         Pt slept well but this am in tears related to her chronic leg pain and acute new left shoulder pain. On call MD  notified.

## 2024-02-21 NOTE — PROGRESS NOTES
"Pharmacy Dosing Service  Pharmacokinetics  Vancomycin Initial Evaluation  Assessment/Action/Plan:  Loading dose: 1750mg given last night   Initiated: Vancomycin 750 mg IVPB every 24 hours  Current end date:2/22/24  Additional antimicrobial agent(s): Cefepime    Vancomycin dosage initiated based on population pharmacokinetic parameters. Predicted steady state concentrations below. Pharmacy will continue to follow daily and adjust dose accordingly.    AUC Model Data:  Regimen: 750 mg IV every 24 hours.  Exposure target: AUC24 (range)400-600 mg/L.hr   AUC24,ss: 432 mg/L.hr  PAUC*: 59 %  Ctrough,ss: 14.7 mg/L  Pconc*: 21 %  Tox.: 10 %     Subjective:  Brenda Sneed is a 73 y.o. female with a Vancomycin \"Pharmacy to Dose\" consult for the treatment of UTI , day 2 of 3 of treatment.      Objective:  Ht: 160 cm (63\"); Wt: 91.6 kg (202 lb)  Estimated Creatinine Clearance: 26.7 mL/min (A) (by C-G formula based on SCr of 1.96 mg/dL (H)).   Creatinine   Date Value Ref Range Status   02/20/2024 1.96 (H) 0.57 - 1.00 mg/dL Final   Final   Final   Final   Final   Final      Lab Results   Component Value Date    WBC 15.64 (H) 02/20/2024    WBC 4.47 01/09/2024    WBC 8.66 01/08/2024      Baseline culture results:  Microbiology Results (last 10 days)       Procedure Component Value - Date/Time    COVID PRE-OP / PRE-PROCEDURE SCREENING ORDER (NO ISOLATION) - Swab, Nasopharynx [[id number]]  (Abnormal) Collected: 02/20/24 1606    Lab Status: Final result Specimen: Swab from Nasopharynx Updated: 02/20/24 9014    Narrative:      The following orders were created for panel order COVID PRE-OP / PRE-PROCEDURE SCREENING ORDER (NO ISOLATION) - Swab, Nasopharynx.  Procedure                               Abnormality         Status                     ---------                               -----------         ------                     COVID-19, FLU A/B, RSV P...[[id number]]  Abnormal            Final result                 Please view results " for these tests on the individual orders.    COVID-19, FLU A/B, RSV PCR 1 HR TAT - Swab, Nasopharynx [[id number]]  (Abnormal) Collected: 02/20/24 1606    Lab Status: Final result Specimen: Swab from Nasopharynx Updated: 02/20/24 8321     COVID19 Detected     Influenza A PCR Not Detected     Influenza B PCR Not Detected     RSV, PCR Not Detected    Narrative:      Fact sheet for providers: https://www.fda.gov/media/142042/download    Fact sheet for patients: https://www.fda.gov/media/497142/download    Test performed by PCR.            JESSA Cuellar PharmD  02/21/24 07:24 CST

## 2024-02-21 NOTE — PROGRESS NOTES
"Pharmacy Dosing Service  Antimicrobial Dosing  Cefepime    Assessment/Action/Plan:  Based on indication and renal function, Cefepime dosed at  2 gm IV every 24 hours via extended infusion. Pharmacy will continue to monitor daily and make further adjustment(s) accordingly.     Subjective:  Brenda Sneed is a 73 y.o. female with a  \"Pharmacy to Dose Cefepime\" consult for the treatment of UTI , day 2 of 3 of treatment.    Objective:  Ht: 160 cm (63\"); Wt: 91.6 kg (202 lb)  Estimated Creatinine Clearance: 26.7 mL/min (A) (by C-G formula based on SCr of 1.96 mg/dL (H)).   Creatinine   Date Value Ref Range Status   02/20/2024 1.96 (H) 0.57 - 1.00 mg/dL Final   Final   Final   Final   Final   Final      Lab Results   Component Value Date    WBC 15.64 (H) 02/20/2024      Baseline culture results:  Microbiology Results (last 10 days)       Procedure Component Value - Date/Time    COVID PRE-OP / PRE-PROCEDURE SCREENING ORDER (NO ISOLATION) - Swab, Nasopharynx [[id number]]  (Abnormal) Collected: 02/20/24 1606    Lab Status: Final result Specimen: Swab from Nasopharynx Updated: 02/20/24 1743    Narrative:      The following orders were created for panel order COVID PRE-OP / PRE-PROCEDURE SCREENING ORDER (NO ISOLATION) - Swab, Nasopharynx.  Procedure                               Abnormality         Status                     ---------                               -----------         ------                     COVID-19, FLU A/B, RSV P...[[id number]]  Abnormal            Final result                 Please view results for these tests on the individual orders.    COVID-19, FLU A/B, RSV PCR 1 HR TAT - Swab, Nasopharynx [[id number]]  (Abnormal) Collected: 02/20/24 1606    Lab Status: Final result Specimen: Swab from Nasopharynx Updated: 02/20/24 1743     COVID19 Detected     Influenza A PCR Not Detected     Influenza B PCR Not Detected     RSV, PCR Not Detected    Narrative:      Fact sheet for providers: " https://www.fda.gov/media/871884/download    Fact sheet for patients: https://www.fda.gov/media/344715/download    Test performed by MIKE.            JESSA Cuellar PharmD  02/21/24 07:29 CST

## 2024-02-21 NOTE — CONSULTS
[facility] Palliative Care Services    Palliative Care Initial Consult   Attending Physician: Sachin Dia MD  Referring Provider: Sachin Dia MD    Reason for Referral: assistance with clarification of goals of care and non-pain symptoms  Family/Support: daughter    Code Status and Medical Interventions:   Ordered at: 02/21/24 0724     Code Status (Patient has no pulse and is not breathing):    CPR (Attempt to Resuscitate)     Medical Interventions (Patient has pulse or is breathing):    Full Support     Goals of Care: TBD.    HPI:   73 y.o. female has a past medical history of atrial fibrillation-Eliquis, chronic kidney disease stage III, arthritis, Bronchitis, Cataract, Chronic back pain-spondylosis followed by Dr. Jones pain management, Depression, Disease of thyroid gland,  Hypertension, iron deficiency anemia, thrombocytopenia, impaired mobility-walker and Lymphedema-managed by home health with wound care 3 times a week.  Recent hospitalization 1/5-1/12 due to acute kidney injury. Patient presented to [facility] on 2/20/2024 related to: Recurrent UTI.  Recently discharged from rehab 2 weeks prior.  Recently diagnosed with COVID 1 week prior.  ED workup shows urinalysis with 1+ leuk esterase/positive nitrite/4+ bacteria/WBCs, COVID-19 virus detected, leukocytosis, anemia, thrombocytopenia, TSH 6.37, creatinine 1.96 (0.97 on 1/12), hypokalemia, hypoalbuminemia. Urine culture pending. Shoulder imaging shows findings suspicious for hardware complication of total shoulder arthroplasty recommend orthopedic follow-up..  Chest imaging shows low lung volumes with mild bibasilar parenchymal opacities, likely atelectasis.  Started on broad-spectrum IV antibiotics for UTI and bilateral lower extremity cellulitis.  Wound care consulted.  Orthopedics consulted due to abnormal imaging left shoulder as above.  Laying in bed with eyes closed and awakens easily with touch.   "Complains of left shoulder pain and decreased mobility of left upper extremity.  Chronic lymphedema with burning to ankles and feet.  She was recently restarted on her Butrans transdermal patch by pain management and a new patch was placed on 2/19 marking #2 week on new regimen. States she had taken this in the past for several months and when she went through her surgical procedures this was discontinued.  She is tearful throughout entirety of conversation given level of pain.  Complains of xerostomia, chronic in nature and uses Biotene at home.  No family at bedside.  Palliative Care Spoke With: patient  Reports most recently discharged home from nursing facility a few weeks ago and followed up with pain management and pain patch restarted at that time.  States events leading to current hospitalization she had navigated 4 stairs and was falling backwards.  She was able to reach a neighbor and her niece and family paramedics called for assistance. Due to the Palliative Care Topics Discussed: palliative care, goals of care, care options, resuscitation status, [facility], and discharge options we will establish an advance care plan.   Advance Care Planning   Advance Care Planning Discussion:   Spoke with patient in room with regard to events leading to current hospitalization, recent discharge from rehab facility, fall, and independent living.  States her niece is supportive with any needs and transportation.  Her daughter is debilitated secondary to lymphedema as well.  We explored high likelihood of re-hospitalizations and she is exceptionally tearful with this information, but not amenable to hospice \"I am not ready for that\".  We further addressed medical priorities including CODE STATUS and medical interventions and states that she recently completed a and EMS DO NOT RESUSCITATE document.  She has elected to de-escalate care measures to no CPR/limited support interventions, no intubation, no permanent feeding " tube.  She has a living will directive on file.  Based upon current goals we will plan to complete a MOST document prior to discharge.  We developed a plan to address her multiple symptoms, in addition to concerns with regard to decline in renal function and current medications in place.  Patient is agreeable to treatment plan as outlined.  Spoke with patient's daughter, Dominga Law with regard to  conversation as above.  Multiple questions answered to family satisfaction and agreeable to plan.     Review of Systems   Constitutional: Positive for malaise/fatigue.   Cardiovascular:  Positive for leg swelling.        Lymphedema bilateral lower extremities   Respiratory:  Negative for shortness of breath.    Skin:  Positive for poor wound healing.   Musculoskeletal:  Positive for falls, joint pain, joint swelling, muscle weakness and myalgias.   Genitourinary:  Negative for dysuria.   Neurological:  Positive for weakness.   Psychiatric/Behavioral:  Positive for depression. The patient is nervous/anxious.      1- Pain Assessment  Pain Location: shoulder    Past Medical History:   Diagnosis Date    Arthritis     Bronchitis     Cataract     right eye , cornea implant    Chronic back pain     Depression     Disease of thyroid gland     Edema     lower extremities    Hypertension     Kidney stones     Lymphedema      Past Surgical History:   Procedure Laterality Date    BACK SURGERY      2010 ([facility]), 2013     CHOLECYSTECTOMY OPEN      CORNEAL TRANSPLANT Right 1999    INCISION AND DRAINAGE ABSCESS Left 4/19/2023    Procedure: INCISION AND DRAINAGE ABSCESS left leg;  Surgeon: Sarita Vazquez MD;  Location:  [facility] OR;  Service: General;  Laterality: Left;    LACERATION REPAIR Left 4/7/2023    Procedure: washout and closure left leg wound ;  Surgeon: Sarita Vazquez MD;  Location:  [facility] OR;  Service: General;  Laterality: Left;    LUMBAR FUSION Left 8/2/2017    Procedure: LEFT LUMBAR LATERAL INTERBODY FUSION WITH  INSTRUMENTATION  L1-2;  Surgeon: MARCELINO Wright MD;  Location:  [facility] OR;  Service:     NECK SURGERY  2010    [place]    TOTAL HIP ARTHROPLASTY Right 2014    DR. KENDRICK     TOTAL KNEE ARTHROPLASTY Right 2000    TOTAL KNEE ARTHROPLASTY Left 2002    TOTAL SHOULDER ARTHROPLASTY W/ DISTAL CLAVICLE EXCISION Left 6/1/2020    Procedure: LEFT REVERSE TOTAL SHOULDER REPLACEMENT;  Surgeon: Saravanan Calvin MD;  Location:  [facility] OR;  Service: Orthopedics;  Laterality: Left;     Social History     Socioeconomic History    Marital status:    Tobacco Use    Smoking status: Never    Smokeless tobacco: Never   Vaping Use    Vaping Use: Never used   Substance and Sexual Activity    Alcohol use: Not Currently    Drug use: No    Sexual activity: Defer         Current Facility-Administered Medications:     albuterol sulfate HFA (PROVENTIL HFA;VENTOLIN HFA;PROAIR HFA) inhaler 2 puff, 2 puff, Inhalation, TID - RT, Sachin Dia MD    apixaban (ELIQUIS) tablet 5 mg, 5 mg, Oral, Q12H, Sachin Dia MD    sennosides-docusate (PERICOLACE) 8.6-50 MG per tablet 2 tablet, 2 tablet, Oral, BID PRN **AND** polyethylene glycol (MIRALAX) packet 17 g, 17 g, Oral, Daily PRN **AND** bisacodyl (DULCOLAX) EC tablet 5 mg, 5 mg, Oral, Daily PRN **AND** bisacodyl (DULCOLAX) suppository 10 mg, 10 mg, Rectal, Daily PRN, Sachin Dia MD    bisoprolol (ZEBeta) 5 mg, hydroCHLOROthiazide 6.25 mg for Ziac 5-6.25, , Oral, Daily, Sachin Dia MD    Calcium Replacement - Follow Nurse / BPA Driven Protocol, , Does not apply, PRN, Sachin Dia MD    cefepime 2000 mg IVPB in 100 mL NS (MBP), 2,000 mg, Intravenous, Q24H, Sachin Dia MD    cyclobenzaprine (FLEXERIL) tablet 10 mg, 10 mg, Oral, TID PRN, Sachin Dia MD    docusate sodium (COLACE) capsule 100 mg, 100 mg, Oral, BID PRN, Sachin Dia MD    DULoxetine (CYMBALTA) DR capsule 60 mg, 60 mg, Oral, BID, Sachin Dia MD     furosemide (LASIX) tablet 40 mg, 40 mg, Oral, Daily PRN, Sachin Dia MD    levothyroxine (SYNTHROID, LEVOTHROID) tablet 100 mcg, 100 mcg, Oral, Q AM, Sachin Dia MD    Magnesium Standard Dose Replacement - Follow Nurse / BPA Driven Protocol, , Does not apply, PRN, Sachin Dia MD    Morphine (MS CONTIN) 12 hr tablet 15 mg, 15 mg, Oral, Q12H, Sachin Dia MD    Morphine (MSIR) tablet 15 mg, 15 mg, Oral, Q6H PRN, Sumeet Bowles PA, 15 mg at 02/21/24 0655    nystatin (MYCOSTATIN) powder 1 Application, 1 application , Topical, BID, Sachin iDa MD    ondansetron (ZOFRAN) injection 4 mg, 4 mg, Intravenous, Q6H PRN, Elba Rockwell, GARETT    Phosphorus Replacement - Follow Nurse / BPA Driven Protocol, , Does not apply, PRN, Sachin Dia MD    Potassium Replacement - Follow Nurse / BPA Driven Protocol, , Does not apply, PRN, Sachin Dia MD    pramipexole (MIRAPEX) tablet 0.75 mg, 0.75 mg, Oral, Nightly, Sachin Dia MD    saccharomyces boulardii (FLORASTOR) capsule 250 mg, 250 mg, Oral, BID, Sachin Dia MD    sodium chloride 0.9 % flush 10 mL, 10 mL, Intravenous, Q12H, Elba Rockwell, APRN    sodium chloride 0.9 % flush 10 mL, 10 mL, Intravenous, PRN, Elba Rockwell, APRN    sodium chloride 0.9 % flush 10 mL, 10 mL, Intravenous, Q12H, Sachin Dia MD    sodium chloride 0.9 % flush 10 mL, 10 mL, Intravenous, PRN, Sachin Dia MD    sodium chloride 0.9 % infusion 40 mL, 40 mL, Intravenous, PRN, Moiz, Elba, APRN    sodium chloride 0.9 % infusion 40 mL, 40 mL, Intravenous, PRN, Sachin Dia MD    sodium chloride 0.9 % infusion, 100 mL/hr, Intravenous, Continuous, Sachin Dia MD    vancomycin 750 mg/250 mL 0.9% NS IVPB ([facility]), 750 mg, Intravenous, Q24H, Sachin Dia MD    Allergies   Allergen Reactions    Codeine Itching and Rash     SEVERE RASH/ITCHING (TOLERATES PERCOCET)    Amoxicillin Other (See  "Comments)     unknown    Levaquin [Levofloxacin] Other (See Comments)     unknown    Benadryl [Diphenhydramine] Other (See Comments)     KEEPS PATIENT AWAKE      I have utilized all available immediate resources to obtain, update, or review the patient's current medications (including all prescriptions, over-the-counter products, herbals, cannabis/cannabidiol products, and vitamin/mineral/dietary (nutritional) supplements) for name, route of administration, type, dose and frequency.    No intake or output data in the 24 hours ending 02/21/24 0848    Physical Exam:    Diagnostics: Reviewed  /46 (BP Location: Right arm, Patient Position: Lying)   Pulse 74   Temp 98.4 °F (36.9 °C) (Axillary)   Resp 20   Ht 160 cm (63\")   Wt 91.6 kg (202 lb)   SpO2 95%   BMI 35.78 kg/m²     Vitals and nursing note reviewed.   Constitutional:       Appearance: Ill-appearing and chronically ill-appearing.      Comments: Ace wraps to bilateral lower extremities  Butrans transdermal patch in place   Pulmonary:      Effort: Pulmonary effort is normal.   Cardiovascular:      Normal rate.   Edema:     Peripheral edema present.  Abdominal:      Palpations: Abdomen is soft.   Musculoskeletal:      Cervical back: Neck supple. Skin:     General: Skin is warm.   Neurological:      Mental Status: Alert.     Patient status: Disease state: Controlled with current treatments.  Current Functional status: Palliative Performance Scale Score: Performance 40% based on the following measures: Ambulation: Mainly in bed, Activity and Evidence of Disease: Unable to do any work, extensive evidence of disease, Self-Care: Mainly assistance required,  Intake: Normal or reduced, LOC: Full, drowsy or confusion   Baseline Functional status: Palliative Performance Scale Score: Performance 60% based on the following measures: Ambulation: Reduced, Activity and Evidence of Disease: Unable to do hobby or some work, significant evidence of disease, Self-Care: " Occasional assist necessary,  Intake: Normal or reduced, LOC: Full or confusion   Nutritional status: Albumin 2.9 Body mass index is 35.78 kg/m².         Hospital Problem List      UTI (urinary tract infection)    Secondary osteoarthritis of left shoulder due to rotator cuff arthropathy    Cellulitis of right lower extremity    HTN (hypertension)    Anxiety associated with depression    Cellulitis of left lower extremity    Acute cystitis with hematuria    Candidiasis of skin    CHRISTNIE (acute kidney injury)    Chronic kidney disease, stage 3a    COVID-19 virus detected    Impression/Problem List:    Acute kidney injury on chronic kidney disease stage III  Acute UTI with hematuria  COVID-19 virus detected  Candidiasis of skin  Secondary osteoarthritis of left shoulder due to rotator cuff arthroplasty, abnormal imaging  Cellulitis bilateral lower extremities  Atrial fibrillation-Eliquis  Chronic back pain-spondylosis followed by Dr. Jones pain management  Depression  Disease of thyroid gland  Hypertension  Iron deficiency anemia  Thrombocytopenia  Impaired mobility-walker  Lymphedema-managed by home health with wound care 3 times a week      Recommendations/Plan:  1. plan: Goals of care include CODE STATUS no CPR/limited support interventions.    Family support: The patient receives support from her daughter and extended family..  Advance Directives: On file     POA/Healthcare surrogate-Dominga wagner-next of kin.    2.  Palliative care encounter  - Prognosis is fair long-term secondary to acute kidney injury, acute UTI, COVID-19, and multiple comorbidities.  -Patient appears to have fair prognostic awareness.     -Recently discharged from rehab 2 weeks prior.  Recently diagnosed with COVID 1 week prior.      -Urine culture pending. Started on broad-spectrum IV antibiotics for UTI and bilateral lower extremity cellulitis  -Wound care consulted.    -Orthopedics consulted due to abnormal imaging left shoulder as  above.    2/21-CODE STATUS changes no CPR/Limited interventions, no intubation, no permanent feeding tube.  Will plan to complete a MOST document  -High risk rehospitalization  -Awaiting Ortho recommendations  -dietician evaluation protein calorie malnutrition and dietary supplements per family request. Family reports patient does not use dentures and eats soft consistency foods at baseline.  Also drinks ensure supplements  -Will plan to reevaluate living will directive prior to discharge    3. Pain  -patient has Butrans pain patch on lower right abd changed by patient 2/19. Recently restarted second week.Weekly patch,need to ensure on MAR to continue      4. Xerostomia  -Biotene    5. Anxiety  -Buspar three times a day    6. Neuropathy  -gabapentin twice day    7.  Depression  -On Cymbalta, consider adding Lexapro to augment regimen      Thank you for this consult and allowing us to participate in patient's plan of care. Palliative Care Team will continue to follow patient.     30 minutes spent on advance care planning-discussing with patient and/or family, answering questions, providing some guidance about a plan and documentation of care, and coordinating care face to face.    Earnestine Gan, APRN  2/21/2024  08:48 CST

## 2024-02-21 NOTE — H&P
"        History and Physical      CHIEF COMPLAINT: Fall and recurrent urinary tract infection    Reason for Admission: Urosepsis    History Obtained From: Patient/niece    HISTORY OF PRESENT ILLNESS:    Patient is a 73-year-old female who presents emergency department with complaints of generalized weakness.  Her niece is at the bedside helping provide history.  Niece states that the patient was fallen over the last several months, however she denies any recent falls.  She does state that she slid down in her bed last night, however she did not fall out of her bed.  Patient is complaining of pain in her left shoulder that has been ongoing for a while.  Patient had an x-ray done last month which was negative for acute findings.  Niece reports that patient had a yeast infection under her breast and she try to put a sports bra line and that is when the pain initially started.  Patient follows with Dr. Jones for chronic neck and back pain.  She is currently taking pain patches, but is not taking anything else.  Patient does have history of lymphedema and niece reports that her legs are \"weeping from the waist down\".  Patient does have home health that comes out 3 times a week and does wound care on these.  Patient actually has an appointment coming up with wound care outpatient.  Patient was recently discharged from the nursing home 2 weeks ago for rehab.  Patient denies any chest pain.  Denies shortness of breath.  Denies abdominal pain.  Denies nausea, vomiting, diarrhea, fevers.  Niece also reports that whenever she had her follow-up appointment in our office 1 week ago she had a cough and tested positive for COVID at that time.  Patient does live at home by herself.  She is able to walk with a walker.  Patient with a past medical history of arthritis, chronic pain, lymphedema, hypertension.  Patient denies any history of congestive heart failure.  Denies history of DVT or pulmonary embolism.  Patient does have " history of atrial fibrillation and is anticoagulated with Eliquis.  Patient seems focused on her pain in the past, much less alert currently this morning then reported in the emergency room.       Past Medical History:    Past Medical History:   Diagnosis Date    Arthritis     Bronchitis     Cataract     right eye , cornea implant    Chronic back pain     Depression     Disease of thyroid gland     Edema     lower extremities    Hypertension     Kidney stones     Lymphedema        Past Surgical History:    Past Surgical History:   Procedure Laterality Date    BACK SURGERY      2010 ([facility]), 2013     CHOLECYSTECTOMY OPEN      CORNEAL TRANSPLANT Right 1999    INCISION AND DRAINAGE ABSCESS Left 4/19/2023    Procedure: INCISION AND DRAINAGE ABSCESS left leg;  Surgeon: Sarita Vazquez MD;  Location:  [facility];  Service: General;  Laterality: Left;    LACERATION REPAIR Left 4/7/2023    Procedure: washout and closure left leg wound ;  Surgeon: Sarita Vazquez MD;  Location:  [facility];  Service: General;  Laterality: Left;    LUMBAR FUSION Left 8/2/2017    Procedure: LEFT LUMBAR LATERAL INTERBODY FUSION WITH INSTRUMENTATION  L1-2;  Surgeon: MARCELINO Wright MD;  Location:  [facility];  Service:     NECK SURGERY  2010    [facility]    TOTAL HIP ARTHROPLASTY Right 2014    DR. KENDRICK     TOTAL KNEE ARTHROPLASTY Right 2000    TOTAL KNEE ARTHROPLASTY Left 2002    TOTAL SHOULDER ARTHROPLASTY W/ DISTAL CLAVICLE EXCISION Left 6/1/2020    Procedure: LEFT REVERSE TOTAL SHOULDER REPLACEMENT;  Surgeon: Saravanan Calvin MD;  Location:  [facility];  Service: Orthopedics;  Laterality: Left;       Medications Prior to Admission:    Medications Prior to Admission   Medication Sig Dispense Refill Last Dose    albuterol (PROVENTIL) (2.5 MG/3ML) 0.083% nebulizer solution Take 2.5 mg by nebulization 3 (Three) Times a Day. Indications: Spasm of Lung Air Passages       apixaban (ELIQUIS) 5 MG tablet tablet Take 1 tablet by mouth Every 12  (Twelve) Hours. Indications: Atrial Fibrillation 180 tablet 1     azelastine (ASTELIN) 0.1 % nasal spray 2 sprays into the nostril(s) as directed by provider 2 (Two) Times a Day. Use in each nostril as directed  Indications: afib       bisoprolol 5 MG tablet 5 mg, hydroCHLOROthiazide 12.5 MG tablet 6.25 mg Take 1 dose by mouth Daily. Indications: htn       Buprenorphine (Butrans) 20 MCG/HR patch weekly Apply 20 mcg topically to the appropriate area as directed 1 (One) Time Per Week. Indications: pain       cyclobenzaprine (FLEXERIL) 10 MG tablet Take 1 tablet by mouth 3 (Three) Times a Day As Needed for Muscle Spasms. 30 tablet 0     docusate sodium (COLACE) 100 MG capsule Take 1 capsule by mouth 2 (Two) Times a Day As Needed for Constipation. Indications: Constipation       DULoxetine (CYMBALTA) 60 MG capsule Take 1 capsule by mouth 2 (Two) Times a Day. Indications: Musculoskeletal Pain       furosemide (LASIX) 40 MG tablet Take 1 tablet by mouth Daily As Needed (edema). Indications: Edema       levothyroxine (SYNTHROID, LEVOTHROID) 100 MCG tablet Take 1 tablet by mouth Daily. Indications: Underactive Thyroid       Morphine (MS CONTIN) 15 MG 12 hr tablet Take 1 tablet by mouth Every 12 (Twelve) Hours. 30 tablet 0     nystatin (MYCOSTATIN) 487498 UNIT/GM powder Apply 1 application  topically to the appropriate area as directed 2 (Two) Times a Day. Indications: Candida Infection of Skin and Mouth or Vagina       pramipexole (MIRAPEX) 0.75 MG tablet Take 1 tablet by mouth Every Night. Indications: Restless Leg Syndrome 90 tablet 1     saccharomyces boulardii (FLORASTOR) 250 MG capsule Take 250 mg by mouth 2 (Two) Times a Day.          Allergies:  Codeine, Amoxicillin, Levaquin [levofloxacin], and Benadryl [diphenhydramine]    Social History:   Social History     Socioeconomic History    Marital status:    Tobacco Use    Smoking status: Never    Smokeless tobacco: Never   Vaping Use    Vaping Use: Never used    Substance and Sexual Activity    Alcohol use: Not Currently    Drug use: No    Sexual activity: Defer       Family History:   Family History   Problem Relation Age of Onset    Breast cancer Neg Hx        REVIEW OF SYSTEMS:    CONSTITUTIONAL: Generally declined since discharge from skilled nursing facility 2 weeks ago, seems sedated today and difficult to get accurate review of systems most is obtained from emergency room reports and niece.  EYES:  negative for eye dryness, icterus and redness, no significant changes in vision  HEENT:   negative for dental problems, epistaxis or sinus congestion , no history of facial trauma or difficulty swallowing  RESPIRATORY: See HPI  CARDIOVASCULAR: Chronic atrial fibrillation rate controlled anticoagulated with Eliquis  GASTROINTESTINAL:  negative for abdominal pain, nausea or vomiting, no history of hematemesis, jaundice, melena or rectal bleeding  MUSCULOSKELETAL: Since fall out of bed left shoulder has been painful and unable to move  NEUROLOGICAL:   Intermittent confusion, fixation on pain medicine noted  INTEGUMENT: Yeast in inguinal folds       Vital Signs   Temp:  [97.8 °F (36.6 °C)-98.8 °F (37.1 °C)] 98.4 °F (36.9 °C)  Heart Rate:  [] 68  Resp:  [18-20] 20  BP: ()/(48-76) 104/49          Physical Exam:  Constitutional: Difficult to obtain complaints  HEENT: Grossly normal sitting up in bed  Head: Normocephalic and atraumatic.   Eyes: Pupils are equal, round, and reactive to light.  Extraocular muscles appear to be intact  Neck: Neck supple without masses or carotid bruit.  Cardiovascular: Regular rhythm and normal heart sounds.  No extra or lift rub or murmur appreciated  Pulmonary/Chest: Effort normal and breath sounds normal. CTAB, no appreciable rales or wheezes  Abdominal: Soft. Bowel sounds are normal. There is  no distension or tenderness appreciated. There is no rebound and no guarding.   Musculoskeletal: Decreased range of motion of  shoulder  Neurological: She seems sedated from pain medication this morning  Skin: Yeast noted    Results Review:   I reviewed the patient's new imaging results and agree with the interpretation.    DATA:  Lab Results (last 24 hours)       Procedure Component Value Units Date/Time    Urinalysis With Culture If Indicated - Straight Cath [[id number]]  (Abnormal) Collected: 02/20/24 1837    Specimen: Urine from Straight Cath Updated: 02/20/24 1917     Color, UA [place]     Appearance, UA Clear     pH, UA <=5.0     Specific Gravity, UA 1.019     Glucose, UA Negative     Ketones, UA Negative     Bilirubin, UA Negative     Blood, UA Negative     Protein, UA Negative     Leuk Esterase, UA Small (1+)     Nitrite, UA Positive     Urobilinogen, UA 0.2 E.U./dL    Narrative:      Dipstick results may be inaccurate due to color interference.    Urine Culture - Urine, Straight Cath [[id number]] Collected: 02/20/24 1837    Specimen: Urine from Straight Cath Updated: 02/20/24 1917    Urinalysis, Microscopic Only - Straight Cath [[id number]]  (Abnormal) Collected: 02/20/24 1837    Specimen: Urine from Straight Cath Updated: 02/20/24 1910     RBC, UA 3-5 /HPF      WBC, UA 3-5 /HPF      Comment: Urine culture not indicated.        Bacteria, UA 4+ /HPF      Squamous Epithelial Cells, UA 0-2 /HPF      Hyaline Casts, UA 7-12 /LPF      Methodology Automated Microscopy    COVID PRE-OP / PRE-PROCEDURE SCREENING ORDER (NO ISOLATION) - Swab, Nasopharynx [[id number]]  (Abnormal) Collected: 02/20/24 1606    Specimen: Swab from Nasopharynx Updated: 02/20/24 1743    Narrative:      The following orders were created for panel order COVID PRE-OP / PRE-PROCEDURE SCREENING ORDER (NO ISOLATION) - Swab, Nasopharynx.  Procedure                               Abnormality         Status                     ---------                               -----------         ------                     COVID-19, FLU A/B, RSV P...[[id number]]  Abnormal            Final  result                 Please view results for these tests on the individual orders.    COVID-19, FLU A/B, RSV PCR 1 HR TAT - Swab, Nasopharynx [[id number]]  (Abnormal) Collected: 02/20/24 1606    Specimen: Swab from Nasopharynx Updated: 02/20/24 1743     COVID19 Detected     Influenza A PCR Not Detected     Influenza B PCR Not Detected     RSV, PCR Not Detected    Narrative:      Fact sheet for providers: https://www.fda.gov/media/966203/download    Fact sheet for patients: https://www.fda.gov/media/628081/download    Test performed by PCR.    CBC & Differential [[id number]]  (Abnormal) Collected: 02/20/24 1605    Specimen: Blood from Arm, Right Updated: 02/20/24 1643    Narrative:      The following orders were created for panel order CBC & Differential.  Procedure                               Abnormality         Status                     ---------                               -----------         ------                     CBC Auto Differential[[id number]]        Abnormal            Final result                 Please view results for these tests on the individual orders.    CBC Auto Differential [[id number]]  (Abnormal) Collected: 02/20/24 1605    Specimen: Blood from Arm, Right Updated: 02/20/24 1643     WBC 15.64 10*3/mm3      RBC 3.11 10*6/mm3      Hemoglobin 10.5 g/dL      Hematocrit 28.2 %      MCV 90.7 fL      MCH 33.8 pg      MCHC 37.2 g/dL      RDW 22.1 %      RDW-SD 58.3 fl      MPV 11.5 fL      Platelets 73 10*3/mm3      Neutrophil % 87.3 %      Lymphocyte % 4.7 %      Monocyte % 7.2 %      Eosinophil % 0.0 %      Basophil % 0.1 %      Immature Grans % 0.7 %      Neutrophils, Absolute 13.67 10*3/mm3      Lymphocytes, Absolute 0.73 10*3/mm3      Monocytes, Absolute 1.12 10*3/mm3      Eosinophils, Absolute 0.00 10*3/mm3      Basophils, Absolute 0.01 10*3/mm3      Immature Grans, Absolute 0.11 10*3/mm3      nRBC 0.0 /100 WBC     TSH [[id number]]  (Abnormal) Collected: 02/20/24 1605    Specimen: Blood from  Arm, Right Updated: 02/20/24 1642     TSH 6.370 uIU/mL     Comprehensive Metabolic Panel [[id number]]  (Abnormal) Collected: 02/20/24 1605    Specimen: Blood from Arm, Right Updated: 02/20/24 1636     Glucose 94 mg/dL      BUN 46 mg/dL      Creatinine 1.96 mg/dL      Sodium 132 mmol/L      Potassium 3.3 mmol/L      Chloride 95 mmol/L      CO2 24.0 mmol/L      Calcium 7.8 mg/dL      Total Protein 6.0 g/dL      Albumin 2.9 g/dL      ALT (SGPT) 15 U/L      AST (SGOT) 35 U/L      Alkaline Phosphatase 137 U/L      Total Bilirubin 1.8 mg/dL      Globulin 3.1 gm/dL      A/G Ratio 0.9 g/dL      BUN/Creatinine Ratio 23.5     Anion Gap 13.0 mmol/L      eGFR 26.3 mL/min/1.73     Narrative:      GFR Normal >60  Chronic Kidney Disease <60  Kidney Failure <15    The GFR formula is only valid for adults with stable renal function between ages 18 and 70.    Magnesium [[id number]]  (Normal) Collected: 02/20/24 1605    Specimen: Blood from Arm, Right Updated: 02/20/24 1631     Magnesium 1.7 mg/dL           Imaging Results (Last 24 Hours)       Procedure Component Value Units Date/Time    XR Shoulder 2+ View Left [[id number]] Collected: 02/20/24 1533     Updated: 02/20/24 1543    Narrative:      EXAMINATION: XR SHOULDER 2+ VW LEFT-  2/20/2024 3:33 PM     HISTORY: pain     COMPARISON: 1/5/2024     TECHNIQUE:  Frontal views with internal and external rotation of the LEFT shoulder  are submitted. 2 total views.     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 There is fracture of  the inferior glenoid anchor screw/post with probable high-grade scapular  notching. CT with metal suppression protocol recommended to evaluate the  degree of scapular notching. I am suspicious that this may represent  grade 4 scapular notching. This is not an acute finding and CT can be  performed as an outpatient to ensure optimized protocol performed. Mild  AC joint arthropathy.          Impression:         1.  Findings suspicious for hardware  complication of the total shoulder  arthroplasty. I suspect that there may be grade 4 scapular notching and  the inferior glenoid component screw is fractured. Orthopedic follow-up  nonemergent CT with metal suppression protocol is recommended to further  characterize the degree of scapular notching.     2.  No acute osseous abnormality identified on this exam.     This report was signed and finalized on 2/20/2024 3:40 PM by Dr. Jamal Loya MD.       XR Chest 1 View [[id number]] Collected: 02/20/24 1533     Updated: 02/20/24 1537    Narrative:      EXAM: XR CHEST 1 VW- 2/20/2024 2:27 PM     HISTORY: generalized weakness       COMPARISON: 1/5/2024.     TECHNIQUE: Single frontal radiograph of the chest was obtained.     FINDINGS:     Support Devices: None.     Cardiac and Mediastinal Silhouettes: Heart size borderline enlarged.     Lungs/Pleura: Lung volumes are low. Mild bibasilar parenchymal  opacities. No sizable pleural effusion. No visible pneumothorax.     Osseous structures: No acute osseous finding.     Other: None.       Impression:         Low lung volumes with mild bibasilar parenchymal opacities, likely  atelectasis.           This report was signed and finalized on 2/20/2024 3:34 PM by Tin Garcia.                 ASSESSMENT AND PLAN:      1.     UTI (urinary tract infection)    2.   COVID-19  3.   Hardware failure shoulder  4.   Lymphedema/cellulitis bilateral lower extremities    Will place her on broad-spectrum IV antibiotics both for her urinary tract infection present on admission as well as cellulitis of bilateral lower extremities.  Patient has been followed by wound care will reconsult them.  Notify orthopedics of admission and failed hardware and shoulder.  Patient is 1 week out from COVID-19 infection, not a candidate for antivirals at this point in time.  Symptomatic treatment with IV antibiotics nebulized treatments and aggressive pulmonary toilet.  Difficult to get accurate history  from patient as she is sedated from pain medication this morning.  Patient is very noncompliant with care and as noted in HPI is only been taking her pain medication and seems fixated on her opioids.  Will get palliative care to discuss with family the aggressiveness of treatment plan forward.          Sachin Dia MD  07:15 CST 2/21/2024

## 2024-02-21 NOTE — THERAPY EVALUATION
Patient Name: Brenda Sneed  : 1950    MRN: [id number]                              Today's Date: 2024       Admit Date: 2024    Visit Dx:     ICD-10-CM ICD-9-CM   1. Urinary tract infection without hematuria, site unspecified  N39.0 599.0   2. Generalized weakness  R53.1 780.79   3. Chronic left shoulder pain  M25.512 719.41    G89.29 338.29     Patient Active Problem List   Diagnosis    Spinal stenosis, lumbar    Secondary osteoarthritis of left shoulder due to rotator cuff arthropathy    Cellulitis of right lower extremity    HTN (hypertension)    Obesity, Class III, BMI 40-49.9 (morbid obesity)    Anxiety associated with depression    Traumatic rhabdomyolysis    Traumatic rhabdomyolysis, initial encounter    Laceration of left lower extremity    Cellulitis    Cellulitis of left lower extremity    Thrombocytopenia    Acute cystitis with hematuria    Sepsis    Candidiasis of skin    Acquired hypothyroidism    CHRISTINE (acute kidney injury)    Chronic pain syndrome    Hyponatremia    Chronic kidney disease, stage 3a    UTI (urinary tract infection)    COVID-19 virus detected     Past Medical History:   Diagnosis Date    Arthritis     Bronchitis     Cataract     right eye , cornea implant    Chronic back pain     Depression     Disease of thyroid gland     Edema     lower extremities    Hypertension     Kidney stones     Lymphedema      Past Surgical History:   Procedure Laterality Date    BACK SURGERY       ([place]),      CHOLECYSTECTOMY OPEN      CORNEAL TRANSPLANT Right     INCISION AND DRAINAGE ABSCESS Left 2023    Procedure: INCISION AND DRAINAGE ABSCESS left leg;  Surgeon: Sarita Vazquez MD;  Location:  [facility];  Service: General;  Laterality: Left;    LACERATION REPAIR Left 2023    Procedure: washout and closure left leg wound ;  Surgeon: Sarita Vazquez MD;  Location:  [facility];  Service: General;  Laterality: Left;    LUMBAR FUSION Left 2017    Procedure:  LEFT LUMBAR LATERAL INTERBODY FUSION WITH INSTRUMENTATION  L1-2;  Surgeon: MARCELINO Wright MD;  Location:  [facility];  Service:     NECK SURGERY  2010    [place]    TOTAL HIP ARTHROPLASTY Right 2014    DR. KENDRICK     TOTAL KNEE ARTHROPLASTY Right 2000    TOTAL KNEE ARTHROPLASTY Left 2002    TOTAL SHOULDER ARTHROPLASTY W/ DISTAL CLAVICLE EXCISION Left 6/1/2020    Procedure: LEFT REVERSE TOTAL SHOULDER REPLACEMENT;  Surgeon: Saravanan Calvin MD;  Location:  [facility];  Service: Orthopedics;  Laterality: Left;      General Information       Row Name 02/21/24 0943          OT Time and Intention    Document Type evaluation  Pt presents with generalized weakness, L shoulder pain.  Dx: COVID, UTI, chronic lymphadema and BLE cellulitis, hardware complication L TSA with possible fx of screw and scapular notching (Ortho to be consulted)  -CS     Mode of Treatment occupational therapy  -CS       Row Name 02/21/24 0943          General Information    Patient Profile Reviewed yes  -CS     Prior Level of Function independent:;all household mobility;dressing;bathing  -CS     Existing Precautions/Restrictions fall;non-weight bearing;left  assumed NWB of LUE until Ortho clears  -CS     Barriers to Rehab medically complex  -CS       Row Name 02/21/24 0943          Occupational Profile    Environmental Supports and Barriers (Occupational Profile) tub shower, pt completes sink side baths, RWX, cane  -CS       Row Name 02/21/24 0943          Living Environment    People in Home alone  Pt's kaci assists her  -CS       Row Name 02/21/24 0943          Cognition    Orientation Status (Cognition) oriented x 4  -CS       Row Name 02/21/24 0943          Safety Issues, Functional Mobility    Impairments Affecting Function (Mobility) balance;pain;endurance/activity tolerance;strength;range of motion (ROM)  -CS               User Key  (r) = Recorded By, (t) = Taken By, (c) = Cosigned By      Initials Name Provider Type    CS Almaz Rodriguez  OTR/L, CNT Occupational Therapist                     Mobility/ADL's       Row Name 02/21/24 0943          Bed Mobility    Bed Mobility supine-sit;sit-supine;scooting/bridging  -CS     Scooting/Bridging [place] (Bed Mobility) maximum assist (25% patient effort);2 person assist;verbal cues  -CS     Supine-Sit [place] (Bed Mobility) moderate assist (50% patient effort);verbal cues  -CS     Sit-Supine [place] (Bed Mobility) maximum assist (25% patient effort);verbal cues  -CS     Bed Mobility, Safety Issues decreased use of arms for pushing/pulling  -CS     Assistive Device (Bed Mobility) head of bed elevated;bed rails;draw sheet  -       Row Name 02/21/24 0943          Transfers    Transfers sit-stand transfer;stand-sit transfer  -       Row Name 02/21/24 0943          Sit-Stand Transfer    Sit-Stand [place] (Transfers) minimum assist (75% patient effort);verbal cues  -CS     Assistive Device (Sit-Stand Transfers) --  R hand held assist  -       Row Name 02/21/24 0943          Stand-Sit Transfer    Stand-Sit [place] (Transfers) minimum assist (75% patient effort);verbal cues  -CS     Assistive Device (Stand-Sit Transfers) --  R hand held assist  -       Row Name 02/21/24 0943          Functional Mobility    Functional Mobility- Ind. Level minimum assist (75% patient effort);moderate assist (50% patient effort);verbal cues required  -     Functional Mobility- Device --  R hand held assist  -CS     Functional Mobility- Comment Pt took 3-4 steps to the L toward the HOB  -       Row Name 02/21/24 0943          Activities of Daily Living    BADL Assessment/Intervention lower body dressing;feeding  -       Row Name 02/21/24 0943          Lower Body Dressing Assessment/Training    [place] Level (Lower Body Dressing) don;doff;socks;dependent (less than 25% patient effort)  -     Position (Lower Body Dressing) edge of bed sitting  -       Row Name 02/21/24 0943           Self-Feeding Assessment/Training    [place] Level (Feeding) scoop food and bring to mouth;independent  -CS     Position (Self-Feeding) edge of bed sitting  -CS     Comment, (Feeding) set-up of tray required  -CS               User Key  (r) = Recorded By, (t) = Taken By, (c) = Cosigned By      Initials Name Provider Type    Almaz Rios, OTR/L, KIRSTIN Occupational Therapist                   Obj/Interventions       Row Name 02/21/24 0943          Sensory Assessment (Somatosensory)    Sensory Assessment (Somatosensory) UE sensation intact  -       Row Name 02/21/24 0943          Range of Motion Comprehensive    Comment, General Range of Motion RUE AROM WFL, LUE shoulder unable to actively or passivel range due to pain, L elbow AROM limited 75% again due to pain. L wrist and  WFL  -       Row Name 02/21/24 0943          Strength Comprehensive (MMT)    Comment, General Manual Muscle Testing (MMT) Assessment LUE NT due to pain and assumed NWB, RUE: 4/5  -       Row Name 02/21/24 0943          Balance    Balance Assessment sitting static balance;sitting dynamic balance;standing static balance;standing dynamic balance  -CS     Static Sitting Balance standby assist;verbal cues  -CS     Dynamic Sitting Balance contact guard;verbal cues  -CS     Position, Sitting Balance sitting edge of bed  -CS     Static Standing Balance minimal assist;verbal cues  -CS     Dynamic Standing Balance minimal assist;moderate assist;verbal cues  -CS     Position/Device Used, Standing Balance --  R hand held assist  -CS               User Key  (r) = Recorded By, (t) = Taken By, (c) = Cosigned By      Initials Name Provider Type    Almaz Rios, OTR/L, KIRSTIN Occupational Therapist                   Goals/Plan       Row Name 02/21/24 0943          Transfer Goal 1 (OT)    Activity/Assistive Device (Transfer Goal 1, OT) bed-to-chair/chair-to-bed;toilet  -CS     [place] Level/Cues Needed (Transfer Goal 1, OT) standby  assist  -CS     Time Frame (Transfer Goal 1, OT) long term goal (LTG)  -CS     Progress/Outcome (Transfer Goal 1, OT) new goal  -CS       Row Name 02/21/24 0943          Dressing Goal 1 (OT)    Activity/Device (Dressing Goal 1, OT) dressing skills, all  -CS     [place]/Cues Needed (Dressing Goal 1, OT) minimum assist (75% or more patient effort)  -CS     Time Frame (Dressing Goal 1, OT) long term goal (LTG)  -CS     Progress/Outcome (Dressing Goal 1, OT) new goal  -CS       Row Name 02/21/24 0943          Toileting Goal 1 (OT)    Activity/Device (Toileting Goal 1, OT) toileting skills, all  -CS     [place] Level/Cues Needed (Toileting Goal 1, OT) standby assist  -CS     Time Frame (Toileting Goal 1, OT) long term goal (LTG)  -CS     Progress/Outcome (Toileting Goal 1, OT) new goal  -CS       Row Name 02/21/24 0943          Therapy Assessment/Plan (OT)    Planned Therapy Interventions (OT) activity tolerance training;adaptive equipment training;BADL retraining;transfer/mobility retraining;strengthening exercise;occupation/activity based interventions;patient/caregiver education/training;functional balance retraining  -CS               User Key  (r) = Recorded By, (t) = Taken By, (c) = Cosigned By      Initials Name Provider Type    CS Almaz Rodriguez, OTR/L, CNT Occupational Therapist                   Clinical Impression       Row Name 02/21/24 0943          Pain Assessment    Pretreatment Pain Rating 7/10  -CS     Pain Location - Side/Orientation Left  -CS     Pain Location - back;shoulder  -CS     Pre/Posttreatment Pain Comment amisha feet burning upon sitting  -CS     Pain Intervention(s) Medication (See MAR);Repositioned;Ambulation/increased activity  -CS     Additional Documentation Pain Scale: Word Pre/Post-Treatment (Group)  -CS       Row Name 02/21/24 0943          Plan of Care Review    Plan of Care Reviewed With patient  -CS     Progress no change  -CS     Outcome Evaluation OT evaluation  completed.  Pt is A&Ox4.  Pt complains of pain in her L shoulder with minimal movement achieved in L shoulder and elbow.  RN notified of potential need for Ortho consult per H&P.  OT will assume NWB or LUE at this time due to hardware complication of TSA and possible screw fx.  Pt required mod A for bed mobility.  Pt completed functional transfer sit to stand with min/mod A and hand held assist of RUE.  Pt able to take side steps to the L with min/mod A and held held assist of RUE.  Pt reports significant burning in her feet (R>L) upon sitting.  RN notified however wrapping present to BLEs therefore insepection was not able to be completed.  Pt req dep A for LB dressing of her socks and set-up of her meal tray.  She was only able to take 2 bites of her eggs and reports that since she does not have her teeth here, she is only able to eat soft textured food.  RN notified.  OT will cont to follow to maximize her I and safety with ADLs and functional mobility.  It is likely pt will require additional therapy at SNF upon hospital discharge.  -       Row Name 02/21/24 0943          Therapy Assessment/Plan (OT)    Patient/Family Therapy Goal Statement (OT) to go home  -CS     Rehab Potential (OT) good, to achieve stated therapy goals  -CS     Criteria for Skilled Therapeutic Interventions Met (OT) yes;skilled treatment is necessary  -CS     Therapy Frequency (OT) 5 times/wk  -CS     Predicted Duration of Therapy Intervention (OT) until hospital discharge  -CS       Row Name 02/21/24 0943          Therapy Plan Review/Discharge Plan (OT)    Anticipated Discharge Disposition (OT) skilled nursing facility  -CS       Row Name 02/21/24 0943          Positioning and Restraints    Pre-Treatment Position in bed  -CS     Post Treatment Position bed  -CS     In Bed fowlers;call light within reach;encouraged to call for assist;notified nsg;side rails up x3  -CS       Row Name 02/21/24 0943          Pain Scale: Word  Pre/Post-Treatment    Posttreatment Pain Rating 10 - excruciating pain  -CS     Pain Location - Side/Orientation Left  -CS     Pain Location - shoulder;back  -CS     Pre/Posttreatment Pain Comment amisha feet burning  -CS               User Key  (r) = Recorded By, (t) = Taken By, (c) = Cosigned By      Initials Name Provider Type    Almaz Rios OTR/L, KIRSTIN Occupational Therapist                   Outcome Measures       Row Name 02/21/24 0943          How much help from another is currently needed...    Putting on and taking off regular lower body clothing? 1  -CS     Bathing (including washing, rinsing, and drying) 2  -CS     Toileting (which includes using toilet bed pan or urinal) 2  -CS     Putting on and taking off regular upper body clothing 2  -CS     Taking care of personal grooming (such as brushing teeth) 3  -CS     Eating meals 3  -CS     AM-PAC 6 Clicks Score (OT) 13  -CS       Row Name 02/21/24 0943          Functional Assessment    Outcome Measure Options AM-PAC 6 Clicks Daily Activity (OT)  -CS               User Key  (r) = Recorded By, (t) = Taken By, (c) = Cosigned By      Initials Name Provider Type    Almaz Rios, OTR/L, KIRSTIN Occupational Therapist                    Occupational Therapy Education       Title: PT OT SLP Therapies (Done)       Topic: Occupational Therapy (Done)       Point: ADL training (Done)       Description:   Instruct learner(s) on proper safety adaptation and remediation techniques during self care or transfers.   Instruct in proper use of assistive devices.                  Learning Progress Summary             Patient Acceptance, E, VU,NR by CS at 2/21/2024 1100                         Point: Home exercise program (Done)       Description:   Instruct learner(s) on appropriate technique for monitoring, assisting and/or progressing therapeutic exercises/activities.                  Learning Progress Summary             Patient Acceptance, E, VU,NR by CS at  2/21/2024 1100                         Point: Precautions (Done)       Description:   Instruct learner(s) on prescribed precautions during self-care and functional transfers.                  Learning Progress Summary             Patient Acceptance, E, VU,NR by  at 2/21/2024 1100                         Point: Body mechanics (Done)       Description:   Instruct learner(s) on proper positioning and spine alignment during self-care, functional mobility activities and/or exercises.                  Learning Progress Summary             Patient Acceptance, E, VU,NR by  at 2/21/2024 1100                                         User Key       Initials Effective Dates Name Provider Type Discipline     02/03/23 -  Almaz Rodriguez S, OTR/L, CNT Occupational Therapist OT                  OT Recommendation and Plan  Planned Therapy Interventions (OT): activity tolerance training, adaptive equipment training, BADL retraining, transfer/mobility retraining, strengthening exercise, occupation/activity based interventions, patient/caregiver education/training, functional balance retraining  Therapy Frequency (OT): 5 times/wk  Plan of Care Review  Plan of Care Reviewed With: patient  Progress: no change  Outcome Evaluation: OT evaluation completed.  Pt is A&Ox4.  Pt complains of pain in her L shoulder with minimal movement achieved in L shoulder and elbow.  RN notified of potential need for Ortho consult per H&P.  OT will assume NWB or LUE at this time due to hardware complication of TSA and possible screw fx.  Pt required mod A for bed mobility.  Pt completed functional transfer sit to stand with min/mod A and hand held assist of RUE.  Pt able to take side steps to the L with min/mod A and held held assist of RUE.  Pt reports significant burning in her feet (R>L) upon sitting.  RN notified however wrapping present to BLEs therefore insepection was not able to be completed.  Pt req dep A for LB dressing of her socks and set-up  of her meal tray.  She was only able to take 2 bites of her eggs and reports that since she does not have her teeth here, she is only able to eat soft textured food.  RN notified.  OT will cont to follow to maximize her I and safety with ADLs and functional mobility.  It is likely pt will require additional therapy at SNF upon hospital discharge.     Time Calculation:         Time Calculation- OT       Row Name 02/21/24 1059             Time Calculation- OT    OT Start Time 0943  -CS      OT Stop Time 1038  -CS      OT Time Calculation (min) 55 min  -CS      OT Received On 02/21/24  -CS      OT Goal Re-Cert Due Date 03/02/24  -CS         Untimed Charges    OT Eval/Re-eval Minutes 55  -CS         Total Minutes    Untimed Charges Total Minutes 55  -CS       Total Minutes 55  -CS                User Key  (r) = Recorded By, (t) = Taken By, (c) = Cosigned By      Initials Name Provider Type    CS Almaz Rodriguez OTR/L, KIRSTIN Occupational Therapist                  Therapy Charges for Today       Code Description Service Date Service Provider Modifiers Qty    31198881826 HC OT EVAL MOD COMPLEXITY 4 2/21/2024 Almaz Rodriguez OTR/L, CNT GO 1                 LINDA Lim/L, CNT  2/21/2024

## 2024-02-22 ENCOUNTER — APPOINTMENT (OUTPATIENT)
Dept: GENERAL RADIOLOGY | Facility: HOSPITAL | Age: 74
DRG: 689 | End: 2024-02-22
Payer: MEDICARE

## 2024-02-22 LAB
ALBUMIN SERPL-MCNC: 1.8 G/DL (ref 3.5–5.2)
ALBUMIN/GLOB SERPL: 0.8 G/DL
ALP SERPL-CCNC: 99 U/L (ref 39–117)
ALT SERPL W P-5'-P-CCNC: 8 U/L (ref 1–33)
ANION GAP SERPL CALCULATED.3IONS-SCNC: 9 MMOL/L (ref 5–15)
AST SERPL-CCNC: 18 U/L (ref 1–32)
BACTERIA SPEC AEROBE CULT: ABNORMAL
BACTERIA SPEC AEROBE CULT: ABNORMAL
BASOPHILS # BLD AUTO: 0.01 10*3/MM3 (ref 0–0.2)
BASOPHILS NFR BLD AUTO: 0.1 % (ref 0–1.5)
BILIRUB SERPL-MCNC: 1.9 MG/DL (ref 0–1.2)
BUN SERPL-MCNC: 50 MG/DL (ref 8–23)
BUN/CREAT SERPL: 32.7 (ref 7–25)
CALCIUM SPEC-SCNC: 7.6 MG/DL (ref 8.6–10.5)
CHLORIDE SERPL-SCNC: 101 MMOL/L (ref 98–107)
CO2 SERPL-SCNC: 23 MMOL/L (ref 22–29)
CREAT SERPL-MCNC: 1.53 MG/DL (ref 0.57–1)
DEPRECATED RDW RBC AUTO: 56 FL (ref 37–54)
EGFRCR SERPLBLD CKD-EPI 2021: 35.3 ML/MIN/1.73
EOSINOPHIL # BLD AUTO: 0.05 10*3/MM3 (ref 0–0.4)
EOSINOPHIL NFR BLD AUTO: 0.6 % (ref 0.3–6.2)
ERYTHROCYTE [DISTWIDTH] IN BLOOD BY AUTOMATED COUNT: 18.5 % (ref 12.3–15.4)
GLOBULIN UR ELPH-MCNC: 2.2 GM/DL
GLUCOSE SERPL-MCNC: 91 MG/DL (ref 65–99)
HCT VFR BLD AUTO: 23 % (ref 34–46.6)
HGB BLD-MCNC: 7.8 G/DL (ref 12–15.9)
LYMPHOCYTES # BLD AUTO: 0.97 10*3/MM3 (ref 0.7–3.1)
LYMPHOCYTES NFR BLD AUTO: 12.5 % (ref 19.6–45.3)
MCH RBC QN AUTO: 28.7 PG (ref 26.6–33)
MCHC RBC AUTO-ENTMCNC: 33.9 G/DL (ref 31.5–35.7)
MCV RBC AUTO: 84.6 FL (ref 79–97)
MONOCYTES # BLD AUTO: 0.78 10*3/MM3 (ref 0.1–0.9)
MONOCYTES NFR BLD AUTO: 10 % (ref 5–12)
NEUTROPHILS NFR BLD AUTO: 5.93 10*3/MM3 (ref 1.7–7)
NEUTROPHILS NFR BLD AUTO: 76.4 % (ref 42.7–76)
PLATELET # BLD AUTO: 60 10*3/MM3 (ref 140–450)
PMV BLD AUTO: 11.6 FL (ref 6–12)
POTASSIUM SERPL-SCNC: 3.3 MMOL/L (ref 3.5–5.2)
POTASSIUM SERPL-SCNC: 4 MMOL/L (ref 3.5–5.2)
PROT SERPL-MCNC: 4 G/DL (ref 6–8.5)
RBC # BLD AUTO: 2.72 10*6/MM3 (ref 3.77–5.28)
SODIUM SERPL-SCNC: 133 MMOL/L (ref 136–145)
WBC NRBC COR # BLD AUTO: 7.77 10*3/MM3 (ref 3.4–10.8)

## 2024-02-22 PROCEDURE — 94799 UNLISTED PULMONARY SVC/PX: CPT

## 2024-02-22 PROCEDURE — G0378 HOSPITAL OBSERVATION PER HR: HCPCS

## 2024-02-22 PROCEDURE — 73620 X-RAY EXAM OF FOOT: CPT

## 2024-02-22 PROCEDURE — 94761 N-INVAS EAR/PLS OXIMETRY MLT: CPT

## 2024-02-22 PROCEDURE — 25810000003 SODIUM CHLORIDE 0.9 % SOLUTION: Performed by: FAMILY MEDICINE

## 2024-02-22 PROCEDURE — 80053 COMPREHEN METABOLIC PANEL: CPT | Performed by: FAMILY MEDICINE

## 2024-02-22 PROCEDURE — 97535 SELF CARE MNGMENT TRAINING: CPT

## 2024-02-22 PROCEDURE — 99232 SBSQ HOSP IP/OBS MODERATE 35: CPT | Performed by: CLINICAL NURSE SPECIALIST

## 2024-02-22 PROCEDURE — 94664 DEMO&/EVAL PT USE INHALER: CPT

## 2024-02-22 PROCEDURE — 25010000002 CEFEPIME PER 500 MG: Performed by: FAMILY MEDICINE

## 2024-02-22 PROCEDURE — 97162 PT EVAL MOD COMPLEX 30 MIN: CPT | Performed by: PHYSICAL THERAPIST

## 2024-02-22 PROCEDURE — 85025 COMPLETE CBC W/AUTO DIFF WBC: CPT | Performed by: FAMILY MEDICINE

## 2024-02-22 PROCEDURE — 84132 ASSAY OF SERUM POTASSIUM: CPT | Performed by: FAMILY MEDICINE

## 2024-02-22 RX ORDER — MORPHINE SULFATE 15 MG/1
15 TABLET ORAL EVERY 6 HOURS PRN
Status: DISCONTINUED | OUTPATIENT
Start: 2024-02-22 | End: 2024-02-26

## 2024-02-22 RX ORDER — MORPHINE SULFATE 15 MG/1
7.5 TABLET ORAL EVERY 6 HOURS PRN
Status: DISCONTINUED | OUTPATIENT
Start: 2024-02-22 | End: 2024-02-27

## 2024-02-22 RX ORDER — POTASSIUM CHLORIDE 750 MG/1
40 CAPSULE, EXTENDED RELEASE ORAL EVERY 4 HOURS
Status: COMPLETED | OUTPATIENT
Start: 2024-02-22 | End: 2024-02-22

## 2024-02-22 RX ADMIN — Medication 250 MG: at 21:17

## 2024-02-22 RX ADMIN — NYSTATIN 1 APPLICATION: 100000 POWDER TOPICAL at 09:41

## 2024-02-22 RX ADMIN — ALBUTEROL SULFATE 2 PUFF: 90 AEROSOL, METERED RESPIRATORY (INHALATION) at 19:31

## 2024-02-22 RX ADMIN — Medication 10 ML: at 09:49

## 2024-02-22 RX ADMIN — APIXABAN 5 MG: 5 TABLET, FILM COATED ORAL at 09:42

## 2024-02-22 RX ADMIN — MORPHINE SULFATE 15 MG: 15 TABLET, FILM COATED, EXTENDED RELEASE ORAL at 09:41

## 2024-02-22 RX ADMIN — BUSPIRONE HYDROCHLORIDE 5 MG: 5 TABLET ORAL at 18:34

## 2024-02-22 RX ADMIN — NYSTATIN 1 APPLICATION: 100000 POWDER TOPICAL at 21:18

## 2024-02-22 RX ADMIN — GABAPENTIN 100 MG: 100 CAPSULE ORAL at 09:41

## 2024-02-22 RX ADMIN — APIXABAN 5 MG: 5 TABLET, FILM COATED ORAL at 21:18

## 2024-02-22 RX ADMIN — POTASSIUM CHLORIDE 40 MEQ: 10 CAPSULE, COATED, EXTENDED RELEASE ORAL at 15:31

## 2024-02-22 RX ADMIN — LEVOTHYROXINE SODIUM 100 MCG: 100 TABLET ORAL at 06:01

## 2024-02-22 RX ADMIN — Medication 1 SPRAY: at 06:30

## 2024-02-22 RX ADMIN — GABAPENTIN 100 MG: 100 CAPSULE ORAL at 21:18

## 2024-02-22 RX ADMIN — BUSPIRONE HYDROCHLORIDE 5 MG: 5 TABLET ORAL at 11:56

## 2024-02-22 RX ADMIN — Medication 1 SPRAY: at 15:31

## 2024-02-22 RX ADMIN — SODIUM CHLORIDE 100 ML/HR: 9 INJECTION, SOLUTION INTRAVENOUS at 11:55

## 2024-02-22 RX ADMIN — DULOXETINE HYDROCHLORIDE 30 MG: 30 CAPSULE, DELAYED RELEASE ORAL at 09:42

## 2024-02-22 RX ADMIN — ALBUTEROL SULFATE 2 PUFF: 90 AEROSOL, METERED RESPIRATORY (INHALATION) at 07:08

## 2024-02-22 RX ADMIN — Medication 250 MG: at 09:45

## 2024-02-22 RX ADMIN — Medication 1 SPRAY: at 18:33

## 2024-02-22 RX ADMIN — Medication 10 ML: at 21:19

## 2024-02-22 RX ADMIN — MORPHINE SULFATE 15 MG: 15 TABLET, FILM COATED, EXTENDED RELEASE ORAL at 21:18

## 2024-02-22 RX ADMIN — Medication 1 SPRAY: at 21:30

## 2024-02-22 RX ADMIN — ALBUTEROL SULFATE 2 PUFF: 90 AEROSOL, METERED RESPIRATORY (INHALATION) at 14:50

## 2024-02-22 RX ADMIN — Medication 1 SPRAY: at 11:56

## 2024-02-22 RX ADMIN — BUSPIRONE HYDROCHLORIDE 5 MG: 5 TABLET ORAL at 09:45

## 2024-02-22 RX ADMIN — POTASSIUM CHLORIDE 40 MEQ: 10 CAPSULE, COATED, EXTENDED RELEASE ORAL at 11:55

## 2024-02-22 RX ADMIN — PRAMIPEXOLE DIHYDROCHLORIDE 0.75 MG: 0.25 TABLET ORAL at 21:17

## 2024-02-22 RX ADMIN — MORPHINE SULFATE 7.5 MG: 15 TABLET ORAL at 09:41

## 2024-02-22 RX ADMIN — CEFEPIME 2000 MG: 2 INJECTION, POWDER, FOR SOLUTION INTRAVENOUS at 18:32

## 2024-02-22 NOTE — THERAPY EVALUATION
Patient Name: Brenda Sneed  : 1950    MRN: [id number]                              Today's Date: 2024       Admit Date: 2024    Visit Dx:     ICD-10-CM ICD-9-CM   1. Urinary tract infection without hematuria, site unspecified  N39.0 599.0   2. Generalized weakness  R53.1 780.79   3. Chronic left shoulder pain  M25.512 719.41    G89.29 338.29   4. Venous stasis ulcer of other part of right lower leg with fat layer exposed, unspecified whether varicose veins present  I83.018 454.0    L97.812    5. Venous stasis ulcer of other part of left lower leg with fat layer exposed, unspecified whether varicose veins present  I83.028 454.0    L97.822    6. Impaired mobility [Z74.09]  Z74.09 799.89     Patient Active Problem List   Diagnosis    Spinal stenosis, lumbar    Secondary osteoarthritis of left shoulder due to rotator cuff arthropathy    Cellulitis of right lower extremity    HTN (hypertension)    Obesity, Class III, BMI 40-49.9 (morbid obesity)    Anxiety associated with depression    Traumatic rhabdomyolysis    Traumatic rhabdomyolysis, initial encounter    Laceration of left lower extremity    Cellulitis    Cellulitis of left lower extremity    Thrombocytopenia    Acute cystitis with hematuria    Sepsis    Candidiasis of skin    Acquired hypothyroidism    CHRISTINE (acute kidney injury)    Chronic pain syndrome    Hyponatremia    Chronic kidney disease, stage 3a    UTI (urinary tract infection)    COVID-19 virus detected     Past Medical History:   Diagnosis Date    Arthritis     Bronchitis     Cataract     right eye , cornea implant    Chronic back pain     Depression     Disease of thyroid gland     Edema     lower extremities    Hypertension     Kidney stones     Lymphedema      Past Surgical History:   Procedure Laterality Date    BACK SURGERY       ([place]),      CHOLECYSTECTOMY OPEN      CORNEAL TRANSPLANT Right     INCISION AND DRAINAGE ABSCESS Left 2023    Procedure:  INCISION AND DRAINAGE ABSCESS left leg;  Surgeon: Sarita Vazquez MD;  Location:  [facility];  Service: General;  Laterality: Left;    LACERATION REPAIR Left 4/7/2023    Procedure: washout and closure left leg wound ;  Surgeon: Sarita Vazquez MD;  Location:  [facility];  Service: General;  Laterality: Left;    LUMBAR FUSION Left 8/2/2017    Procedure: LEFT LUMBAR LATERAL INTERBODY FUSION WITH INSTRUMENTATION  L1-2;  Surgeon: MARCELINO Wright MD;  Location:  [facility];  Service:     NECK SURGERY  2010    [place]    TOTAL HIP ARTHROPLASTY Right 2014    DR. KENDRICK     TOTAL KNEE ARTHROPLASTY Right 2000    TOTAL KNEE ARTHROPLASTY Left 2002    TOTAL SHOULDER ARTHROPLASTY W/ DISTAL CLAVICLE EXCISION Left 6/1/2020    Procedure: LEFT REVERSE TOTAL SHOULDER REPLACEMENT;  Surgeon: Saravanan Calvin MD;  Location:  [facility];  Service: Orthopedics;  Laterality: Left;      General Information       Row Name 02/22/24 0918          Physical Therapy Time and Intention    Document Type evaluation  Pt presents with generalized weakness, L shoulder pain.  Dx: COVID, UTI, chronic lymphadema and BLE cellulitis, hardware complication L TSA with possible fx of screw and scapular notching (Ortho to be consulted)  -SB     Mode of Treatment physical therapy  -SB       Row Name 02/22/24 0918          General Information    Patient Profile Reviewed yes  -SB     Prior Level of Function independent:;all household mobility;dressing;bathing  RW, cane, niece assists  -SB     Existing Precautions/Restrictions fall;non-weight bearing;left;other (see comments)  NWB LUE pending ortho  -SB     Barriers to Rehab medically complex  -SB       Row Name 02/22/24 0918          Living Environment    People in Home alone  -SB       Row Name 02/22/24 0918          Cognition    Orientation Status (Cognition) oriented x 4;verbal cues/prompts needed for orientation  -SB       Row Name 02/22/24 0918          Safety Issues, Functional Mobility    Safety Issues  Affecting Function (Mobility) friction/shear risk  -SB     Impairments Affecting Function (Mobility) balance;pain;endurance/activity tolerance;strength;range of motion (ROM);postural/trunk control  -SB               User Key  (r) = Recorded By, (t) = Taken By, (c) = Cosigned By      Initials Name Provider Type    Daniella Johnson PT DPT Physical Therapist                   Mobility       Row Name 02/22/24 0918          Bed Mobility    Bed Mobility supine-sit;sit-supine;scooting/bridging  -SB     Scooting/Bridging [place] (Bed Mobility) dependent (less than 25% patient effort);2 person assist;verbal cues  -SB     Supine-Sit [place] (Bed Mobility) moderate assist (50% patient effort);verbal cues  -SB     Sit-Supine [place] (Bed Mobility) moderate assist (50% patient effort);verbal cues  -SB     Assistive Device (Bed Mobility) bed rails;draw sheet;head of bed elevated  -SB       Row Name 02/22/24 0918          Sit-Stand Transfer    Sit-Stand [place] (Transfers) moderate assist (50% patient effort);maximum assist (25% patient effort);2 person assist;verbal cues  -SB     Assistive Device (Sit-Stand Transfers) other (see comments)  R HHA  -SB               User Key  (r) = Recorded By, (t) = Taken By, (c) = Cosigned By      Initials Name Provider Type    Daniella Johnson PT DPT Physical Therapist                   Obj/Interventions       Row Name 02/22/24 0918          Range of Motion Comprehensive    General Range of Motion bilateral lower extremity ROM WFL  -SB       Row Name 02/22/24 0918          Strength Comprehensive (MMT)    General Manual Muscle Testing (MMT) Assessment lower extremity strength deficits identified  -SB     Comment, General Manual Muscle Testing (MMT) Assessment BLE 3-/5  -SB       Row Name 02/22/24 0918          Balance    Balance Assessment sitting static balance;sitting dynamic balance;standing static balance;standing dynamic balance  -SB     Static Sitting Balance standby  assist  -SB     Dynamic Sitting Balance contact guard  -SB     Position, Sitting Balance unsupported;sitting edge of bed  -SB     Static Standing Balance minimal assist;moderate assist;2-person assist  -SB     Dynamic Standing Balance minimal assist;moderate assist;2-person assist  -SB     Position/Device Used, Standing Balance --  R HHA  -SB       Row Name 02/22/24 0918          Sensory Assessment (Somatosensory)    Sensory Assessment (Somatosensory) LE sensation intact  -SB               User Key  (r) = Recorded By, (t) = Taken By, (c) = Cosigned By      Initials Name Provider Type    SB Daniella Connelly, PT DPT Physical Therapist                   Goals/Plan       Row Name 02/22/24 0918          Bed Mobility Goal 1 (PT)    Activity/Assistive Device (Bed Mobility Goal 1, PT) sit to supine;supine to sit;rolling to left;rolling to right  -SB     [place] Level/Cues Needed (Bed Mobility Goal 1, PT) minimum assist (75% or more patient effort)  -SB     Time Frame (Bed Mobility Goal 1, PT) long term goal (LTG)  -SB     Progress/Outcomes (Bed Mobility Goal 1, PT) new goal  -SB       Row Name 02/22/24 0918          Transfer Goal 1 (PT)    Activity/Assistive Device (Transfer Goal 1, PT) sit-to-stand/stand-to-sit;bed-to-chair/chair-to-bed;other (see comments)  LRAD  -SB     Gladys Level/Cues Needed (Transfer Goal 1, PT) minimum assist (75% or more patient effort)  -SB     Time Frame (Transfer Goal 1, PT) long term goal (LTG)  -SB     Progress/Outcome (Transfer Goal 1, PT) new goal  -SB       Row Name 02/22/24 0918          Gait Training Goal 1 (PT)    Activity/Assistive Device (Gait Training Goal 1, PT) gait (walking locomotion);decrease fall risk;increase endurance/gait distance;other (see comments)  LRAD  -SB     Gladys Level (Gait Training Goal 1, PT) contact guard required  -SB     Distance (Gait Training Goal 1, PT) 20 feet  -SB     Time Frame (Gait Training Goal 1, PT) long term goal (LTG)  -SB      Progress/Outcome (Gait Training Goal 1, PT) new goal  -SB       Row Name 02/22/24 0918          Therapy Assessment/Plan (PT)    Planned Therapy Interventions (PT) balance training;strengthening;bed mobility training;gait training;patient/family education;transfer training  -SB               User Key  (r) = Recorded By, (t) = Taken By, (c) = Cosigned By      Initials Name Provider Type    SB Daniella Connelly, PT DPT Physical Therapist                   Clinical Impression       Row Name 02/22/24 0918          Pain    Pretreatment Pain Rating 7/10  -SB     Posttreatment Pain Rating 7/10  -SB     Pain Location - Side/Orientation Left;Bilateral  -SB     Pain Location - shoulder;foot  -SB     Pain Intervention(s) Medication (See MAR);Repositioned;Ambulation/increased activity  -SB     Additional Documentation Pain Scale: Numbers Pre/Post-Treatment (Group)  -SB       Row Name 02/22/24 0918          Plan of Care Review    Plan of Care Reviewed With patient  -SB     Progress no change  -SB     Outcome Evaluation PT eval completed. Pt alert and oriented x4 with c/o L shoulder pain and B feet pain and burning. Treating LUE as NWB while awaiting ortho consult. Pt reqd mod A to perform sup to sit t/f and requires increased time to scoot hips to EOB with assist. Pt demos generalized weakness and edema in BLE. Pt performs sit to stand t/f with min-mod A x2 with HHA on RUE. Pt takes a couple of sidesteps toward HOB before returning to seated position. Pt limited by pain, weakness and dec endurance and will benefit from skilled PT to improve in these areas. Recommend d/c SNF.  -SB       Row Name 02/22/24 0918          Therapy Assessment/Plan (PT)    Patient/Family Therapy Goals Statement (PT) improve function  -SB     Rehab Potential (PT) good, to achieve stated therapy goals  -SB     Criteria for Skilled Interventions Met (PT) yes;meets criteria;skilled treatment is necessary  -SB     Therapy Frequency (PT) 2 times/day  -SB      Predicted Duration of Therapy Intervention (PT) until d/c or goals met  -SB       Row Name 02/22/24 0918          Vital Signs    Pre SpO2 (%) 97  -SB     O2 Delivery Pre Treatment supplemental O2  -SB     Intra SpO2 (%) 95  -SB     O2 Delivery Intra Treatment room air  -SB     Post SpO2 (%) 96  -SB     O2 Delivery Post Treatment room air  -SB       Row Name 02/22/24 0918          Positioning and Restraints    Pre-Treatment Position in bed  -SB     Post Treatment Position bed  -SB     In Bed notified nsg;fowlers;call light within reach;encouraged to call for assist;side rails up x3;LUE elevated;legs elevated;waffle boots/both  -SB               User Key  (r) = Recorded By, (t) = Taken By, (c) = Cosigned By      Initials Name Provider Type    Daniella Johnson, MJ DPT Physical Therapist                   Outcome Measures       Row Name 02/22/24 0918 02/22/24 0845       How much help from another person do you currently need...    Turning from your back to your side while in flat bed without using bedrails? 3  -SB 2  -AN    Moving from lying on back to sitting on the side of a flat bed without bedrails? 2  -SB 2  -AN    Moving to and from a bed to a chair (including a wheelchair)? 2  -SB 2  -AN    Standing up from a chair using your arms (e.g., wheelchair, bedside chair)? 2  -SB 2  -AN    Climbing 3-5 steps with a railing? 1  -SB 1  -AN    To walk in hospital room? 2  -SB 1  -AN    AM-PAC 6 Clicks Score (PT) 12  -SB 10  -AN    Highest Level of Mobility Goal 4 --> Transfer to chair/commode  -SB 4 --> Transfer to chair/commode  -AN      Row Name 02/22/24 0925 02/22/24 0918       Functional Assessment    Outcome Measure Options AM-PAC 6 Clicks Daily Activity (OT)  -LR AM-PAC 6 Clicks Basic Mobility (PT)  -SB              User Key  (r) = Recorded By, (t) = Taken By, (c) = Cosigned By      Initials Name Provider Type    Daniella Johnson, MJ DPT Physical Therapist    LR Wilda Faulkner, OTR/L Occupational Therapist     Kem Jaquez RNA Registered Nurse                                 Physical Therapy Education       Title: PT OT SLP Therapies (In Progress)       Topic: Physical Therapy (In Progress)       Point: Mobility training (Done)       Learning Progress Summary             Patient Acceptance, E, VU by SB at 2/22/2024 1543    Comment: pt edu on POC, benefits of act, d/c plans                         Point: Home exercise program (Not Started)       Learner Progress:  Not documented in this visit.              Point: Body mechanics (Not Started)       Learner Progress:  Not documented in this visit.              Point: Precautions (Done)       Learning Progress Summary             Patient Acceptance, E, VU by SB at 2/22/2024 1543    Comment: pt edu on POC, benefits of act, d/c plans                                         User Key       Initials Effective Dates Name Provider Type Discipline    SB 07/11/23 -  Daniella Connelly, PT DPT Physical Therapist PT                  PT Recommendation and Plan  Planned Therapy Interventions (PT): balance training, strengthening, bed mobility training, gait training, patient/family education, transfer training  Plan of Care Reviewed With: patient  Progress: no change  Outcome Evaluation: PT eval completed. Pt alert and oriented x4 with c/o L shoulder pain and B feet pain and burning. Treating LUE as NWB while awaiting ortho consult. Pt reqd mod A to perform sup to sit t/f and requires increased time to scoot hips to EOB with assist. Pt demos generalized weakness and edema in BLE. Pt performs sit to stand t/f with min-mod A x2 with HHA on RUE. Pt takes a couple of sidesteps toward HOB before returning to seated position. Pt limited by pain, weakness and dec endurance and will benefit from skilled PT to improve in these areas. Recommend d/c SNF.     Time Calculation:         PT Charges       Row Name 02/22/24 1544             Time Calculation    Start Time 0918  -SB      Stop Time 1011  -SB       Time Calculation (min) 53 min  -SB      PT Received On 02/22/24  -SB      PT Goal Re-Cert Due Date 03/03/24  -SB                User Key  (r) = Recorded By, (t) = Taken By, (c) = Cosigned By      Initials Name Provider Type    Daniella Johnson, PT DPT Physical Therapist                  Therapy Charges for Today       Code Description Service Date Service Provider Modifiers Qty    [id number] HC PT EVAL MOD COMPLEXITY 4 2/22/2024 Daniella Connelly, PT DPT GP 1            PT G-Codes  Outcome Measure Options: AM-PAC 6 Clicks Daily Activity (OT)  AM-PAC 6 Clicks Score (PT): 12  AM-PAC 6 Clicks Score (OT): 13  PT Discharge Summary  Anticipated Discharge Disposition (PT): skilled nursing facility    Daniella Connelly PT [id number]  2/22/2024

## 2024-02-22 NOTE — PLAN OF CARE
"Goal Outcome Evaluation:  Plan of Care Reviewed With: patient        Progress: no change  Outcome Evaluation: OT tx completed. Pt presents in fowlers with nsg present in room. Pt c/o L shoulder pain and B feet \"burning\", but willing to participate. Pt came to EOB with Mod A and cues for NWB L UE. Pt Dep A for donning/doffing socks while seated EOB. Pt completed sit<>stand t/f with Mod/Max A x2 required and cues for tech. Pt took side-steps toward HOB with Min/Mod A x2 and R HHA. Pt demo posterior AMILCAR and impulsivity to sit, requiring cues. Mod A required for sit>supine and Dep A x2 required for scooting toward HOB. Continue OT POC in order to increase pt safety and I during ADLs, fxl mobility, and fxl t/f's.      Anticipated Discharge Disposition (OT): skilled nursing facility                        "

## 2024-02-22 NOTE — CASE MANAGEMENT/SOCIAL WORK
Continued Stay Note  Psychiatric     Patient Name: Brenda Sneed  MRN: [id number]  Today's Date: 2/22/2024    Admit Date: 2/20/2024    Plan: [facility]   Discharge Plan       Row Name 02/22/24 1017       Plan    Plan [facility]    Patient/Family in Agreement with Plan yes    Plan Comments Spoke with pt's dtr (pt working with therapy) to assess for d/c planning. Pt lives at home alone, was just discharged from [facility] recently.  They are not interested in return to facility, she will be living with dtr upon d/c - [place]ella  [id number] [place] [address] her number is [phone].  They are saying pt is followed by Thelma DENNIS, informed Jenny Barbour that will confirm when she is able and call this SW back.  Will inform Jenny of address plan change for patient.  They also are requesting a hospital bed, order entered and they prefer [facility], referral will be given today.  Pt has PCP/RX coverage. Will follow.                   Discharge Codes    No documentation.                       Antonia oMta, PEDROW

## 2024-02-22 NOTE — PLAN OF CARE
Goal Outcome Evaluation:  Plan of Care Reviewed With: patient        Progress: no change  Outcome Evaluation: PT eval completed. Pt alert and oriented x4 with c/o L shoulder pain and B feet pain and burning. Treating LUE as NWB while awaiting ortho consult. Pt reqd mod A to perform sup to sit t/f and requires increased time to scoot hips to EOB with assist. Pt demos generalized weakness and edema in BLE. Pt performs sit to stand t/f with min-mod A x2 with HHA on RUE. Pt takes a couple of sidesteps toward HOB before returning to seated position. Pt limited by pain, weakness and dec endurance and will benefit from skilled PT to improve in these areas. Recommend d/c SNF.      Anticipated Discharge Disposition (PT): skilled nursing facility

## 2024-02-22 NOTE — CONSULTS
[facility]  INPATIENT WOUND & OSTOMY CONSULTATION    Today's Date: 02/21/2024    Patient Name: Brenda Sneed  MRN: [id number]  CSN: [id number]  PCP: Sachin Dia MD  Referring Provider:   Consulting Provider (From admission, onward)      Start Ordered     Status Ordering Provider    02/21/24 0723 02/21/24 0723  Inpatient Wound Care MD Consult  Once        Specialty:  Wound Care  Provider:  Julieta Murray APRN Acknowledged EICKHOLZ, JAMES NOAH           Attending Provider: Sachin Dia MD  Length of Stay: 0    SUBJECTIVE   Chief Complaint: ***    HPI: Brenda Sneed, a 73 y.o.female, presents with a past medical history of ***.  A full past medical history as listed below.  ***    Inpatient wound care consulted due to ***      Visit Dx:    ICD-10-CM ICD-9-CM   1. Urinary tract infection without hematuria, site unspecified  N39.0 599.0   2. Generalized weakness  R53.1 780.79   3. Chronic left shoulder pain  M25.512 719.41    G89.29 338.29   4. Venous stasis ulcer of other part of right lower leg with fat layer exposed, unspecified whether varicose veins present  I83.018 454.0    L97.812    5. Venous stasis ulcer of other part of left lower leg with fat layer exposed, unspecified whether varicose veins present  I83.028 454.0    L97.822        Hospital Problem List:     UTI (urinary tract infection)    Secondary osteoarthritis of left shoulder due to rotator cuff arthropathy    Cellulitis of right lower extremity    HTN (hypertension)    Anxiety associated with depression    Cellulitis of left lower extremity    Acute cystitis with hematuria    Candidiasis of skin    CHRISTINE (acute kidney injury)    Chronic kidney disease, stage 3a    COVID-19 virus detected      History:   Past Medical History:   Diagnosis Date    Arthritis     Bronchitis     Cataract     right eye , cornea implant    Chronic back pain     Depression     Disease of thyroid gland     Edema     lower extremities     Hypertension     Kidney stones     Lymphedema      Past Surgical History:   Procedure Laterality Date    BACK SURGERY      2010 ([place]), 2013     CHOLECYSTECTOMY OPEN      CORNEAL TRANSPLANT Right 1999    INCISION AND DRAINAGE ABSCESS Left 4/19/2023    Procedure: INCISION AND DRAINAGE ABSCESS left leg;  Surgeon: Sarita Vazquez MD;  Location:  [facility] OR;  Service: General;  Laterality: Left;    LACERATION REPAIR Left 4/7/2023    Procedure: washout and closure left leg wound ;  Surgeon: Sarita Vazquez MD;  Location:  [facility] OR;  Service: General;  Laterality: Left;    LUMBAR FUSION Left 8/2/2017    Procedure: LEFT LUMBAR LATERAL INTERBODY FUSION WITH INSTRUMENTATION  L1-2;  Surgeon: MARCELINO Wright MD;  Location:  [facility] OR;  Service:     NECK SURGERY  2010    [place]    TOTAL HIP ARTHROPLASTY Right 2014    DR. KENDRICK     TOTAL KNEE ARTHROPLASTY Right 2000    TOTAL KNEE ARTHROPLASTY Left 2002    TOTAL SHOULDER ARTHROPLASTY W/ DISTAL CLAVICLE EXCISION Left 6/1/2020    Procedure: LEFT REVERSE TOTAL SHOULDER REPLACEMENT;  Surgeon: Saravanan Calvin MD;  Location:  [facility] OR;  Service: Orthopedics;  Laterality: Left;     Social History     Socioeconomic History    Marital status:    Tobacco Use    Smoking status: Never    Smokeless tobacco: Never   Vaping Use    Vaping Use: Never used   Substance and Sexual Activity    Alcohol use: Not Currently    Drug use: No    Sexual activity: Defer     Family History   Problem Relation Age of Onset    Breast cancer Neg Hx        Allergies:  Allergies   Allergen Reactions    Codeine Itching and Rash     SEVERE RASH/ITCHING (TOLERATES PERCOCET)    Amoxicillin Other (See Comments)     unknown    Levaquin [Levofloxacin] Other (See Comments)     unknown    Benadryl [Diphenhydramine] Other (See Comments)     KEEPS PATIENT AWAKE        Medications:    Current Facility-Administered Medications:     albuterol sulfate HFA (PROVENTIL HFA;VENTOLIN HFA;PROAIR HFA) inhaler 2  puff, 2 puff, Inhalation, TID - RT, Sachin Dia MD, 2 puff at 02/22/24 0708    apixaban (ELIQUIS) tablet 5 mg, 5 mg, Oral, Q12H, Sachin Dia MD, 5 mg at 02/22/24 0942    Biotene Dry Mouth Moisturizing (BIOTENE) 1 spray, 1 spray, Mouth/Throat, 5x Daily, Earnestine Gan APRN, 1 spray at 02/22/24 0630    sennosides-docusate (PERICOLACE) 8.6-50 MG per tablet 2 tablet, 2 tablet, Oral, BID PRN **AND** polyethylene glycol (MIRALAX) packet 17 g, 17 g, Oral, Daily PRN **AND** bisacodyl (DULCOLAX) EC tablet 5 mg, 5 mg, Oral, Daily PRN **AND** bisacodyl (DULCOLAX) suppository 10 mg, 10 mg, Rectal, Daily PRN, Sachin Dia MD    bisoprolol (ZEBeta) 5 mg, hydroCHLOROthiazide 6.25 mg for Ziac 5-6.25, , Oral, Daily, Sachin Dia MD, Given at 02/21/24 1155    busPIRone (BUSPAR) tablet 5 mg, 5 mg, Oral, TID With Meals, Earnestine Gan APRN, 5 mg at 02/22/24 0945    Calcium Replacement - Follow Nurse / BPA Driven Protocol, , Does not apply, PRN, Sachin Dia MD    cefepime 2000 mg IVPB in 100 mL NS (MBP), 2,000 mg, Intravenous, Q24H, Sachin Dia MD, 2,000 mg at 02/21/24 2030    cyclobenzaprine (FLEXERIL) tablet 10 mg, 10 mg, Oral, TID PRN, Sachin Dia MD    docusate sodium (COLACE) capsule 100 mg, 100 mg, Oral, BID PRN, Sachin Dia MD    DULoxetine (CYMBALTA) DR capsule 30 mg, 30 mg, Oral, Daily, Earnestine Gan APRN, 30 mg at 02/22/24 0942    furosemide (LASIX) tablet 40 mg, 40 mg, Oral, Daily PRN, Sachin Dia MD    gabapentin (NEURONTIN) capsule 100 mg, 100 mg, Oral, Q12H, Earnestine Gan APRN, 100 mg at 02/22/24 0941    levothyroxine (SYNTHROID, LEVOTHROID) tablet 100 mcg, 100 mcg, Oral, Q AM, Sachin Dia MD, 100 mcg at 02/22/24 0601    Magnesium Standard Dose Replacement - Follow Nurse / BPA Driven Protocol, , Does not apply, PRN, Sachin Dia MD    Morphine (MS CONTIN) 12 hr tablet 15 mg, 15 mg, Oral,  Q12H, aSchin Dia MD, 15 mg at 02/22/24 0941    Morphine (MSIR) tablet 15 mg, 15 mg, Oral, Q6H PRN **OR** Morphine (MSIR) tablet 7.5 mg, 7.5 mg, Oral, Q6H PRN, Earnestine Gan APRN, 7.5 mg at 02/22/24 0941    Non-Formulary / Patient Supplied Medication, 1 each, Topical, Weekly, Earnestine Gan APRN    nystatin (MYCOSTATIN) powder 1 Application, 1 application , Topical, BID, Sachin Dia MD, 1 Application at 02/22/24 0941    ondansetron (ZOFRAN) injection 4 mg, 4 mg, Intravenous, Q6H PRN, Elba Rockwell APRN    Phosphorus Replacement - Follow Nurse / BPA Driven Protocol, , Does not apply, PRN, Sachin Dia MD    potassium chloride (MICRO-K/KLOR-CON) CR capsule, 40 mEq, Oral, Q4H, Sachin Dia MD    Potassium Replacement - Follow Nurse / BPA Driven Protocol, , Does not apply, PRN, Sachin Dia MD    pramipexole (MIRAPEX) tablet 0.75 mg, 0.75 mg, Oral, Nightly, Sachin Dia MD, 0.75 mg at 02/21/24 2139    saccharomyces boulardii (FLORASTOR) capsule 250 mg, 250 mg, Oral, BID, Sachin Dia MD, 250 mg at 02/22/24 0945    sodium chloride 0.9 % flush 10 mL, 10 mL, Intravenous, Q12H, Elba Rockwell APRN, 10 mL at 02/22/24 0949    sodium chloride 0.9 % flush 10 mL, 10 mL, Intravenous, PRN, Elba Rockwell APRN    sodium chloride 0.9 % flush 10 mL, 10 mL, Intravenous, Q12H, Sachin Dia MD, 10 mL at 02/22/24 0949    sodium chloride 0.9 % flush 10 mL, 10 mL, Intravenous, PRN, Sachin Dia MD    sodium chloride 0.9 % infusion 40 mL, 40 mL, Intravenous, PRN, Elba Rockwell APRN    sodium chloride 0.9 % infusion 40 mL, 40 mL, Intravenous, PRN, Sachin Dia MD    sodium chloride 0.9 % infusion, 100 mL/hr, Intravenous, Continuous, Sachin Dia MD, Last Rate: 100 mL/hr at 02/21/24 2140, 100 mL/hr at 02/21/24 2140    Review of Systems: ***  Review of Systems      OBJECTIVE     Vitals:    02/22/24 0858   BP: 94/43   Pulse: 74    Resp: 18   Temp: 98.1 °F (36.7 °C)   SpO2: 94%       PHYSICAL EXAM: ***  Physical Exam       Results Review:  Lab Results (last 48 hours)       Procedure Component Value Units Date/Time    CBC Auto Differential [[id number]]  (Abnormal) Collected: 02/22/24 0833    Specimen: Blood Updated: 02/22/24 0949     WBC 7.77 10*3/mm3      RBC 2.72 10*6/mm3      Hemoglobin 7.8 g/dL      Hematocrit 23.0 %      MCV 84.6 fL      MCH 28.7 pg      MCHC 33.9 g/dL      RDW 18.5 %      RDW-SD 56.0 fl      MPV 11.6 fL      Platelets 60 10*3/mm3      Neutrophil % 76.4 %      Lymphocyte % 12.5 %      Monocyte % 10.0 %      Eosinophil % 0.6 %      Basophil % 0.1 %      Neutrophils, Absolute 5.93 10*3/mm3      Lymphocytes, Absolute 0.97 10*3/mm3      Monocytes, Absolute 0.78 10*3/mm3      Eosinophils, Absolute 0.05 10*3/mm3      Basophils, Absolute 0.01 10*3/mm3     Comprehensive Metabolic Panel [[id number]]  (Abnormal) Collected: 02/22/24 0833    Specimen: Blood Updated: 02/22/24 0927     Glucose 91 mg/dL      BUN 50 mg/dL      Creatinine 1.53 mg/dL      Sodium 133 mmol/L      Potassium 3.3 mmol/L      Comment: Slight hemolysis detected by analyzer. Result may be falsely elevated.        Chloride 101 mmol/L      CO2 23.0 mmol/L      Calcium 7.6 mg/dL      Total Protein 4.0 g/dL      Albumin 1.8 g/dL      ALT (SGPT) 8 U/L      AST (SGOT) 18 U/L      Alkaline Phosphatase 99 U/L      Total Bilirubin 1.9 mg/dL      Globulin 2.2 gm/dL      A/G Ratio 0.8 g/dL      BUN/Creatinine Ratio 32.7     Anion Gap 9.0 mmol/L      eGFR 35.3 mL/min/1.73     Narrative:      GFR Normal >60  Chronic Kidney Disease <60  Kidney Failure <15    The GFR formula is only valid for adults with stable renal function between ages 18 and 70.    Folate [[id number]]  (Abnormal) Collected: 02/21/24 1212    Specimen: Blood Updated: 02/21/24 2044     Folate 2.59 ng/mL     Narrative:      Results may be falsely increased if patient taking Biotin.      Vitamin B12 [[id number]]   (Abnormal) Collected: 02/21/24 1212    Specimen: Blood Updated: 02/21/24 2004     Vitamin B-12 1,641 pg/mL     Narrative:      Results may be falsely increased if patient taking Biotin.      Vitamin D,25-Hydroxy [[id number]]  (Abnormal) Collected: 02/21/24 1212    Specimen: Blood Updated: 02/21/24 2004     25 Hydroxy, Vitamin D 14.2 ng/ml     Narrative:      Reference Range for Total Vitamin D 25(OH)     Deficiency <20.0 ng/mL   Insufficiency 21-29 ng/mL   Sufficiency  ng/mL  Toxicity >100 ng/ml      Urine Culture - Urine, Straight Cath [[id number]]  (Abnormal) Collected: 02/20/24 1837    Specimen: Urine from Straight Cath Updated: 02/21/24 1929     Urine Culture 50,000 CFU/mL Gram Negative Bacilli    Narrative:      Colonization of the urinary tract without infection is common. Treatment is discouraged unless the patient is symptomatic, pregnant, or undergoing an invasive urologic procedure.    Iron Profile [[id number]]  (Abnormal) Collected: 02/20/24 1605    Specimen: Blood from Arm, Right Updated: 02/21/24 0749     Iron 28 mcg/dL      Iron Saturation (TSAT) 13 %      Transferrin 140 mg/dL      TIBC 209 mcg/dL     Urinalysis With Culture If Indicated - Straight Cath [[id number]]  (Abnormal) Collected: 02/20/24 1837    Specimen: Urine from Straight Cath Updated: 02/20/24 1917     Color, UA Dukes     Appearance, UA Clear     pH, UA <=5.0     Specific Gravity, UA 1.019     Glucose, UA Negative     Ketones, UA Negative     Bilirubin, UA Negative     Blood, UA Negative     Protein, UA Negative     Leuk Esterase, UA Small (1+)     Nitrite, UA Positive     Urobilinogen, UA 0.2 E.U./dL    Narrative:      Dipstick results may be inaccurate due to color interference.    Urinalysis, Microscopic Only - Straight Cath [[id number]]  (Abnormal) Collected: 02/20/24 1837    Specimen: Urine from Straight Cath Updated: 02/20/24 1910     RBC, UA 3-5 /HPF      WBC, UA 3-5 /HPF      Comment: Urine culture not indicated.         Bacteria, UA 4+ /HPF      Squamous Epithelial Cells, UA 0-2 /HPF      Hyaline Casts, UA 7-12 /LPF      Methodology Automated Microscopy    COVID PRE-OP / PRE-PROCEDURE SCREENING ORDER (NO ISOLATION) - Swab, Nasopharynx [[id number]]  (Abnormal) Collected: 02/20/24 1606    Specimen: Swab from Nasopharynx Updated: 02/20/24 1743    Narrative:      The following orders were created for panel order COVID PRE-OP / PRE-PROCEDURE SCREENING ORDER (NO ISOLATION) - Swab, Nasopharynx.  Procedure                               Abnormality         Status                     ---------                               -----------         ------                     COVID-19, FLU A/B, RSV P...[[id number]]  Abnormal            Final result                 Please view results for these tests on the individual orders.    COVID-19, FLU A/B, RSV PCR 1 HR TAT - Swab, Nasopharynx [[id number]]  (Abnormal) Collected: 02/20/24 1606    Specimen: Swab from Nasopharynx Updated: 02/20/24 1743     COVID19 Detected     Influenza A PCR Not Detected     Influenza B PCR Not Detected     RSV, PCR Not Detected    Narrative:      Fact sheet for providers: https://www.fda.gov/media/471014/download    Fact sheet for patients: https://www.fda.gov/media/384929/download    Test performed by PCR.    CBC & Differential [[id number]]  (Abnormal) Collected: 02/20/24 1605    Specimen: Blood from Arm, Right Updated: 02/20/24 1643    Narrative:      The following orders were created for panel order CBC & Differential.  Procedure                               Abnormality         Status                     ---------                               -----------         ------                     CBC Auto Differential[[id number]]        Abnormal            Final result                 Please view results for these tests on the individual orders.    CBC Auto Differential [[id number]]  (Abnormal) Collected: 02/20/24 1605    Specimen: Blood from Arm, Right Updated: 02/20/24 1643     WBC  15.64 10*3/mm3      RBC 3.11 10*6/mm3      Hemoglobin 10.5 g/dL      Hematocrit 28.2 %      MCV 90.7 fL      MCH 33.8 pg      MCHC 37.2 g/dL      RDW 22.1 %      RDW-SD 58.3 fl      MPV 11.5 fL      Platelets 73 10*3/mm3      Neutrophil % 87.3 %      Lymphocyte % 4.7 %      Monocyte % 7.2 %      Eosinophil % 0.0 %      Basophil % 0.1 %      Immature Grans % 0.7 %      Neutrophils, Absolute 13.67 10*3/mm3      Lymphocytes, Absolute 0.73 10*3/mm3      Monocytes, Absolute 1.12 10*3/mm3      Eosinophils, Absolute 0.00 10*3/mm3      Basophils, Absolute 0.01 10*3/mm3      Immature Grans, Absolute 0.11 10*3/mm3      nRBC 0.0 /100 WBC     TSH [[id number]]  (Abnormal) Collected: 02/20/24 1605    Specimen: Blood from Arm, Right Updated: 02/20/24 1642     TSH 6.370 uIU/mL     Comprehensive Metabolic Panel [[id number]]  (Abnormal) Collected: 02/20/24 1605    Specimen: Blood from Arm, Right Updated: 02/20/24 1636     Glucose 94 mg/dL      BUN 46 mg/dL      Creatinine 1.96 mg/dL      Sodium 132 mmol/L      Potassium 3.3 mmol/L      Chloride 95 mmol/L      CO2 24.0 mmol/L      Calcium 7.8 mg/dL      Total Protein 6.0 g/dL      Albumin 2.9 g/dL      ALT (SGPT) 15 U/L      AST (SGOT) 35 U/L      Alkaline Phosphatase 137 U/L      Total Bilirubin 1.8 mg/dL      Globulin 3.1 gm/dL      A/G Ratio 0.9 g/dL      BUN/Creatinine Ratio 23.5     Anion Gap 13.0 mmol/L      eGFR 26.3 mL/min/1.73     Narrative:      GFR Normal >60  Chronic Kidney Disease <60  Kidney Failure <15    The GFR formula is only valid for adults with stable renal function between ages 18 and 70.    Magnesium [[id number]]  (Normal) Collected: 02/20/24 1605    Specimen: Blood from Arm, Right Updated: 02/20/24 1631     Magnesium 1.7 mg/dL           Imaging Results (Last 72 Hours)       Procedure Component Value Units Date/Time    XR Shoulder 2+ View Left [[id number]] Collected: 02/20/24 1533     Updated: 02/20/24 1543    Narrative:      EXAMINATION: XR SHOULDER 2+ VW LEFT-   2/20/2024 3:33 PM     HISTORY: pain     COMPARISON: 1/5/2024     TECHNIQUE:  Frontal views with internal and external rotation of the LEFT shoulder  are submitted. 2 total views.     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 There is fracture of  the inferior glenoid anchor screw/post with probable high-grade scapular  notching. CT with metal suppression protocol recommended to evaluate the  degree of scapular notching. I am suspicious that this may represent  grade 4 scapular notching. This is not an acute finding and CT can be  performed as an outpatient to ensure optimized protocol performed. Mild  AC joint arthropathy.          Impression:         1.  Findings suspicious for hardware complication of the total shoulder  arthroplasty. I suspect that there may be grade 4 scapular notching and  the inferior glenoid component screw is fractured. Orthopedic follow-up  nonemergent CT with metal suppression protocol is recommended to further  characterize the degree of scapular notching.     2.  No acute osseous abnormality identified on this exam.     This report was signed and finalized on 2/20/2024 3:40 PM by Dr. Jamal Loya MD.       XR Chest 1 View [[id number]] Collected: 02/20/24 1533     Updated: 02/20/24 1537    Narrative:      EXAM: XR CHEST 1 VW- 2/20/2024 2:27 PM     HISTORY: generalized weakness       COMPARISON: 1/5/2024.     TECHNIQUE: Single frontal radiograph of the chest was obtained.     FINDINGS:     Support Devices: None.     Cardiac and Mediastinal Silhouettes: Heart size borderline enlarged.     Lungs/Pleura: Lung volumes are low. Mild bibasilar parenchymal  opacities. No sizable pleural effusion. No visible pneumothorax.     Osseous structures: No acute osseous finding.     Other: None.       Impression:         Low lung volumes with mild bibasilar parenchymal opacities, likely  atelectasis.           This report was signed and finalized on 2/20/2024 3:34 PM by  Tin Garcia.                  ASSESSMENT/PLAN       Examination and evaluation of wound(s) was performed.    DIAGNOSIS:   ***    PLAN:   Orders placed for wound care and pressure/moisture management as listed below.   ***        Start     Ordered    02/22/24 1027  Elevate Heels Off of Bed  Until Discontinued         02/22/24 1026    02/22/24 1026  Turn Patient  Now Then Every 2 Hours         02/22/24 1026    02/22/24 1026  Elevate Heels Off of Bed  Until Discontinued         02/22/24 1026    02/22/24 1026  Use Seat Cushion When Up In Chair  Continuous         02/22/24 1026    02/22/24 1026  Silicone Border Dressing to Bony Prominences  Every Shift       02/22/24 1026    02/22/24 1026  Do NOT Rub or Massage Any Bony Prominence  Continuous         02/22/24 1026    02/22/24 1026  Use Repositioning Wedge to Position Patient  Continuous        Comments: Use Comfort Glide repositioning sheet and wedges to position patient.    02/22/24 1026    02/22/24 1024  Wound Care  Daily      Question Answer Comment   Wound Locations Bilateral lower legs    Wound Care Instructions Clean with NS.  Apply opticell over wound bed. Cover with silicone border foam dressing. Change daily.    Cleanse Normal Saline    Intervention Other    Other Opticell Ag    Moistened? No    Dressing: Silicone Border Dressing        02/22/24 1023    02/22/24 0000  Ambulatory Referral to Wound Clinic         02/22/24 1024    Unscheduled  Wound Care  As Needed      Question:  Wound Care Instructions  Answer:  Apply Moisture Barrier After Any Incontinence    02/22/24 1026     US Ankle / Brachial Indices Extremity Complete [[id number]] (Order [id number])  Order  Date: 2/22/2024 Department: [facility]A Ordering/Authorizing: Julieta Murray APRN        Exam reason Vascular study prior to unna boot application, concern for insufficiency   Note: Enter reason for exam     US Venous Doppler Lower Extremity Bilat Duplex for Insufficiency [[id number]]  (Order [id number])   Exam reason Assess for blood clots and venous reflux, assessment prior to unna boot placement.   Note: Enter reason for exam    XR Foot 3+ View Right [[id number]] (Order [id number])            Discussed findings and treatment plan including risks, benefits, and treatment options with patient in detail. Patient agreed with treatment plan.      This document has been electronically signed by GARETT Garcia on 2/22/2024 10:24 CST     Brachial Indices Extremity Complete [[id number]] (Order [id number])  Order  Date: 2/22/2024 Department: [address] Ordering/Authorizing: Julieta Murray APRN        Exam reason Vascular study prior to unna boot application, concern for insufficiency   Note: Enter reason for exam     US Venous Doppler Lower Extremity Bilat Duplex for Insufficiency [[id number]] (Order [id number])   Exam reason Assess for blood clots and venous reflux, assessment prior to unna boot placement.   Note: Enter reason for exam    XR Foot 3+ View Right [[id number]] (Order [id number])            Discussed findings and treatment plan including risks, benefits, and treatment options with patient in detail. Patient agreed with treatment plan.      This document has been electronically signed by GARETT Garcia on 2/22/2024 10:24 CST

## 2024-02-22 NOTE — PLAN OF CARE
Goal Outcome Evaluation:  Plan of Care Reviewed With: patient        Progress: no change       Pt a/ox4. Due to pain management, pt requested to stay in the bed today. Pt c/o LT shoulder pain- see mar. Butrans pain patch located on rt abdomen near umbilicus to be changed weekly. 2L n/c. Bilateral lower leg chronic lymphedema with ace bandage in place. [place]. Call light within reach.

## 2024-02-22 NOTE — DISCHARGE PLACEMENT REQUEST
"Carolina Mota  [phone]  Eyad Gurrola (73 y.o. Female)       Date of Birth   1950    Social Security Number       Address   [address] [id number]    Home Phone   [phone]    MRN   [id number]       Congregation   [facility]    Marital Status                               Admission Date   24    Admission Type   Emergency    Admitting Provider   Sachin Dia MD    Attending Provider   Sachin Dia MD    Department, Room/Bed   [address], 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), COVID (confirmed) (24)   Code Status: No CPR    Ht: 160 cm (63\")   Wt: 91.6 kg (202 lb)    Admission Cmt: None   Principal Problem: UTI (urinary tract infection) [N39.0]                   Active Insurance as of 2024       Primary Coverage       Payor Plan Insurance Group Employer/Plan Group    HUMANA MEDICARE REPLACEMENT HUMANA MED ADV PPO [id number]       Payor Plan Address Payor Plan Phone Number Payor Plan Fax Number Effective Dates    [address] [address] [phone]  2023 - None Entered    [facility] [address]         Subscriber Name Subscriber Birth Date Member ID       EYAD GURROLA 1950 [id number]                     Emergency Contacts        (Rel.) Home Phone Work Phone Mobile Phone    Nella (Daughter) [phone] -- [phone]    ODELL NELSON (Relative) -- -- [phone]          [address] [address]  Phone:  [phone]  Fax:  [phone]        Patient: ROOM: [facility]   Eyad Gurrola MRN:  [id number]   [address] [facility] [address] :  1950  SSN:  "   Phone: [phone] Sex:  F   PCP: Sachin Dia                Emergency Contact Information      Name Relation Home Work Mobile     Nella Daughter [phone]     ODELL NELSON Relative     [phone]          INSURANCE PAYOR PLAN GROUP # SUBSCRIBER ID   Primary:    HUMANA MEDICARE REPLACEMENT [id number]   Admitting Diagnosis: UTI (urinary tract infection) [N39.0]  Order Date:  Feb 22, 2024        Case Management  Consult       (Order ID: [id number])     Diagnosis:         Priority:  Routine Expected Date:   Expiration Date:        Interval:   Count:    Is discharge planning needed? If yes, who is requesting discharge planning? Patient  Reason for Consult: Daughter is requesting hospital bed for when the pt is d/c        Verbal Order Mode: Per protocol: no cosign required  Authorizing Provider:Sachin Dia MD  Authorizing Provider's NPI: [id number]  Order Entered By: Kem Ly RNA 2/22/2024 10:07 AM     Electronically signed by: N/A          Physician Progress Notes (last 24 hours)        Sanjiv Block MD at 02/22/24 0857              Daily Progress Note  Brenda Sneed  MRN: [id number] LOS: 0    Admit Date: 2/20/2024 2/22/2024 08:57 CST    Subjective:         Interval History:    Reviewed overnight events and nursing notes.     C/o left shoulder pain, no other complaints.    ROS:  Review of Systems   Constitutional:  Negative for chills and fever.   Respiratory:  Negative for cough, chest tightness and shortness of breath.    Cardiovascular:  Negative for chest pain.   Gastrointestinal:  Negative for abdominal pain, diarrhea, nausea and vomiting.       DIET:  Diet: Diabetic Diets; Consistent Carbohydrate; Texture: Regular Texture (IDDSI 7); Fluid Consistency: Thin (IDDSI 0)    Medications:   sodium chloride, 100 mL/hr, Last Rate: 100 mL/hr (02/21/24 2140)      albuterol sulfate HFA, 2 puff, Inhalation, TID - RT  apixaban, 5  "mg, Oral, Q12H  Biotene Dry Mouth Moisturizing, 1 spray, Mouth/Throat, 5x Daily  bisoprolol (ZEBeta) 5 mg, hydroCHLOROthiazide 6.25 mg for Ziac 5-6.25, , Oral, Daily  busPIRone, 5 mg, Oral, TID With Meals  cefepime, 2,000 mg, Intravenous, Q24H  DULoxetine, 30 mg, Oral, Daily  gabapentin, 100 mg, Oral, Q12H  levothyroxine, 100 mcg, Oral, Q AM  Morphine, 15 mg, Oral, Q12H  Non-Formulary / Patient Supplied Medication, 1 each, Topical, Weekly  nystatin, 1 application , Topical, BID  pramipexole, 0.75 mg, Oral, Nightly  saccharomyces boulardii, 250 mg, Oral, BID  sodium chloride, 10 mL, Intravenous, Q12H  sodium chloride, 10 mL, Intravenous, Q12H          Objective:     Vitals: /54 (BP Location: Right arm, Patient Position: Lying)   Pulse 68   Temp 98 °F (36.7 °C) (Oral)   Resp 16   Ht 160 cm (63\")   Wt 91.6 kg (202 lb)   SpO2 96%   BMI 35.78 kg/m²    Intake/Output Summary (Last 24 hours) at 2024 0857  Last data filed at 2024 0532  Gross per 24 hour   Intake 420 ml   Output 600 ml   Net -180 ml    Temp (24hrs), Av.3 °F (36.8 °C), Min:98 °F (36.7 °C), Max:98.7 °F (37.1 °C)    Glucose:  Lab Results   Component Value Date    POCGLU 100 (H) 2021     Physical Examination:   Physical Exam  Constitutional:       General: She is not in acute distress.     Appearance: She is obese. She is ill-appearing. She is not toxic-appearing.   Cardiovascular:      Rate and Rhythm: Normal rate and regular rhythm.      Heart sounds: No murmur heard.  Pulmonary:      Effort: Pulmonary effort is normal. No respiratory distress.   Abdominal:      Palpations: Abdomen is soft.   Musculoskeletal:      Right lower leg: Edema present.      Left lower leg: Edema present.         Labs:  Lab Results (last 24 hours)       Procedure Component Value Units Date/Time    CBC Auto Differential [[id number]] Collected: 24 0833    Specimen: Blood Updated: 24 0854    Comprehensive Metabolic Panel [[id number]] Collected: " 02/22/24 0833    Specimen: Blood Updated: 02/22/24 0854    Folate [[id number]]  (Abnormal) Collected: 02/21/24 1212    Specimen: Blood Updated: 02/21/24 2044     Folate 2.59 ng/mL     Narrative:      Results may be falsely increased if patient taking Biotin.      Vitamin B12 [[id number]]  (Abnormal) Collected: 02/21/24 1212    Specimen: Blood Updated: 02/21/24 2004     Vitamin B-12 1,641 pg/mL     Narrative:      Results may be falsely increased if patient taking Biotin.      Vitamin D,25-Hydroxy [[id number]]  (Abnormal) Collected: 02/21/24 1212    Specimen: Blood Updated: 02/21/24 2004     25 Hydroxy, Vitamin D 14.2 ng/ml     Narrative:      Reference Range for Total Vitamin D 25(OH)     Deficiency <20.0 ng/mL   Insufficiency 21-29 ng/mL   Sufficiency  ng/mL  Toxicity >100 ng/ml      Urine Culture - Urine, Straight Cath [[id number]]  (Abnormal) Collected: 02/20/24 1837    Specimen: Urine from Straight Cath Updated: 02/21/24 1929     Urine Culture 50,000 CFU/mL Gram Negative Bacilli    Narrative:      Colonization of the urinary tract without infection is common. Treatment is discouraged unless the patient is symptomatic, pregnant, or undergoing an invasive urologic procedure.             Imaging:  XR Shoulder 2+ View Left    Result Date: 2/20/2024  EXAMINATION: XR SHOULDER 2+ VW LEFT-  2/20/2024 3:33 PM  HISTORY: pain  COMPARISON: 1/5/2024  TECHNIQUE: Frontal views with internal and external rotation of the LEFT shoulder are submitted. 2 total views.  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 There is fracture of the inferior glenoid anchor screw/post with probable high-grade scapular notching. CT with metal suppression protocol recommended to evaluate the degree of scapular notching. I am suspicious that this may represent grade 4 scapular notching. This is not an acute finding and CT can be performed as an outpatient to ensure optimized protocol performed. Mild AC joint arthropathy.        Impression:  1.  Findings suspicious for hardware complication of the total shoulder arthroplasty. I suspect that there may be grade 4 scapular notching and the inferior glenoid component screw is fractured. Orthopedic follow-up nonemergent CT with metal suppression protocol is recommended to further characterize the degree of scapular notching.  2.  No acute osseous abnormality identified on this exam.  This report was signed and finalized on 2/20/2024 3:40 PM by Dr. Jamal Loya MD.      XR Chest 1 View    Result Date: 2/20/2024  EXAM: XR CHEST 1 VW- 2/20/2024 2:27 PM  HISTORY: generalized weakness   COMPARISON: 1/5/2024.  TECHNIQUE: Single frontal radiograph of the chest was obtained.  FINDINGS:  Support Devices: None.  Cardiac and Mediastinal Silhouettes: Heart size borderline enlarged.  Lungs/Pleura: Lung volumes are low. Mild bibasilar parenchymal opacities. No sizable pleural effusion. No visible pneumothorax.  Osseous structures: No acute osseous finding.  Other: None.      Impression:  Low lung volumes with mild bibasilar parenchymal opacities, likely atelectasis.    This report was signed and finalized on 2/20/2024 3:34 PM by Tin Garcia.          Assessment and Plan:     Primary Problem:  UTI (urinary tract infection)    Hospital Problem list:    UTI (urinary tract infection)    Secondary osteoarthritis of left shoulder due to rotator cuff arthropathy    Cellulitis of right lower extremity    HTN (hypertension)    Anxiety associated with depression    Cellulitis of left lower extremity    Acute cystitis with hematuria    Candidiasis of skin    CHRISTINE (acute kidney injury)    Chronic kidney disease, stage 3a    COVID-19 virus detected        Plan:    UTI  E. Coli in culture, d/c vanc and continue zosyn, narrow as able.    Possible cellulitis  Hold vanc as above, suspect this is due to stasis dermatitis from lymphedema, continue wound care and lymphedema care.    Left shoulder pain  Concern for  "prothesis failure on Xray, ortho consultation pending.    Labs are pending for today to evaluate CHRISTINE and other electrolyte abnormalities.      Reviewed treatment plans with the patient and/or family.     Code Status:   Code Status and Medical Interventions:   Ordered at: 02/21/24 1123     Medical Intervention Limits:    NO intubation (DNI)    Other     Code Status (Patient has no pulse and is not breathing):    No CPR (Do Not Attempt to Resuscitate)     Medical Interventions (Patient has pulse or is breathing):    Limited Support     Additional Medical Interventions Limits:    no permanent feeding tube       Electronically signed by Sanjiv Block MD on 2/22/2024 at 08:57 CST      Electronically signed by Sanjiv Block MD at 02/22/24 0901       Earnestine Gan APRN at 02/22/24 0832                      [facility]    Palliative Care Daily Progress Note   Chief complaint-follow up support for patient/family and pain/symptom management    Code Status:   Code Status and Medical Interventions:   Ordered at: 02/21/24 1123     Medical Intervention Limits:    NO intubation (DNI)    Other     Code Status (Patient has no pulse and is not breathing):    No CPR (Do Not Attempt to Resuscitate)     Medical Interventions (Patient has pulse or is breathing):    Limited Support     Additional Medical Interventions Limits:    no permanent feeding tube      Advanced Directives: Advance Directive Status: Patient has advance directive, copy in chart   Goals of Care: Ongoing.     S: Medical record reviewed. Events noted.  No new labs to review. A MOST document completed by palliative SW this AM, attending to complete. Noted to have quite a bit of discomfort per staff, has not received PRN adjuvants as ordered over the last 24 hours. States pain mostly to left shoulder \"even moving 1/2\". She is more alert this morning and eating breakfast. States leg pain improved after repositioning.  " Received 76 mg oral morphine equivalent over the last 24 hours in the form of Butrans transdermal patch and MS ER.  Ortho consulted on 2/21 and awaiting recommendations, discussed with charge nurse who has reached out to group this morning. Anxiety improved. Nursing to provide breakthrough pain medication this morning in addition to scheduled adjuvants.     Review of Systems   Constitutional: Positive for malaise/fatigue.   Cardiovascular:  Positive for leg swelling.        Lymphedema bilateral lower extremities   Respiratory:  Negative for shortness of breath.    Skin:  Positive for poor wound healing.   Musculoskeletal:  Positive for falls, joint pain, joint swelling, muscle weakness and myalgias.   Genitourinary:  Negative for dysuria.   Neurological:  Positive for weakness.   Psychiatric/Behavioral:  Positive for depression. The patient is nervous/anxious.      Pain Assessment  Preferred Pain Scale: number (Numeric Rating Pain Scale)  Pain Location: shoulder    O:     Intake/Output Summary (Last 24 hours) at 2/22/2024 0832  Last data filed at 2/22/2024 0532  Gross per 24 hour   Intake 420 ml   Output 600 ml   Net -180 ml       Diagnostics and current medications: Reviewed.      Current Facility-Administered Medications:     albuterol sulfate HFA (PROVENTIL HFA;VENTOLIN HFA;PROAIR HFA) inhaler 2 puff, 2 puff, Inhalation, TID - RT, Sachin Dia MD, 2 puff at 02/22/24 0708    apixaban (ELIQUIS) tablet 5 mg, 5 mg, Oral, Q12H, Sachin Dia MD, 5 mg at 02/21/24 2139    Biotene Dry Mouth Moisturizing (BIOTENE) 1 spray, 1 spray, Mouth/Throat, 5x Daily, Earnestine Gan APRN, 1 spray at 02/22/24 0630    sennosides-docusate (PERICOLACE) 8.6-50 MG per tablet 2 tablet, 2 tablet, Oral, BID PRN **AND** polyethylene glycol (MIRALAX) packet 17 g, 17 g, Oral, Daily PRN **AND** bisacodyl (DULCOLAX) EC tablet 5 mg, 5 mg, Oral, Daily PRN **AND** bisacodyl (DULCOLAX) suppository 10 mg, 10 mg, Rectal, Daily PRN,  Scahin Dia MD    bisoprolol (ZEBeta) 5 mg, hydroCHLOROthiazide 6.25 mg for Ziac 5-6.25, , Oral, Daily, Sachin Dia MD, Given at 02/21/24 1155    busPIRone (BUSPAR) tablet 5 mg, 5 mg, Oral, TID With Meals, Earnestine Gan APRN, 5 mg at 02/21/24 1832    Calcium Replacement - Follow Nurse / BPA Driven Protocol, , Does not apply, PRN, Sachin Dia MD    cefepime 2000 mg IVPB in 100 mL NS (MBP), 2,000 mg, Intravenous, Q24H, Sachin Dia MD, 2,000 mg at 02/21/24 2030    cyclobenzaprine (FLEXERIL) tablet 10 mg, 10 mg, Oral, TID PRN, Sachin Dia MD    docusate sodium (COLACE) capsule 100 mg, 100 mg, Oral, BID PRN, Sachin Dia MD    DULoxetine (CYMBALTA) DR capsule 30 mg, 30 mg, Oral, Daily, Earnestine Gan APRN, 30 mg at 02/21/24 1156    furosemide (LASIX) tablet 40 mg, 40 mg, Oral, Daily PRN, Sachin Dia MD    gabapentin (NEURONTIN) capsule 100 mg, 100 mg, Oral, Q12H, Earnestine Gan APRN, 100 mg at 02/21/24 2138    levothyroxine (SYNTHROID, LEVOTHROID) tablet 100 mcg, 100 mcg, Oral, Q AM, Sachin Dia MD, 100 mcg at 02/22/24 0601    Magnesium Standard Dose Replacement - Follow Nurse / BPA Driven Protocol, , Does not apply, PRN, Sachin Dia MD    Morphine (MS CONTIN) 12 hr tablet 15 mg, 15 mg, Oral, Q12H, Sachin Dia MD, 15 mg at 02/21/24 2138    Morphine (MSIR) tablet 15 mg, 15 mg, Oral, Q6H PRN, Sumeet Bowles PA, 15 mg at 02/21/24 0655    Non-Formulary / Patient Supplied Medication, 1 each, Topical, Weekly, Earnestine Gan APRN    nystatin (MYCOSTATIN) powder 1 Application, 1 application , Topical, BID, Sachin Dia MD, 1 Application at 02/21/24 6207    ondansetron (ZOFRAN) injection 4 mg, 4 mg, Intravenous, Q6H PRN, Elba Rockwell APRN    Phosphorus Replacement - Follow Nurse / BPA Driven Protocol, , Does not apply, PRN, Sachin Dia MD    Potassium Replacement - Follow Nurse / BPA  "Driven Protocol, , Does not apply, PRN, Sachin Dia MD    pramipexole (MIRAPEX) tablet 0.75 mg, 0.75 mg, Oral, Nightly, Sachin Dia MD, 0.75 mg at 02/21/24 2139    saccharomyces boulardii (FLORASTOR) capsule 250 mg, 250 mg, Oral, BID, Sachin Dia MD, 250 mg at 02/21/24 2140    sodium chloride 0.9 % flush 10 mL, 10 mL, Intravenous, Q12H, Elba Rockwell, APRN, 10 mL at 02/21/24 2139    sodium chloride 0.9 % flush 10 mL, 10 mL, Intravenous, PRN, Elba Rockwell, APRN    sodium chloride 0.9 % flush 10 mL, 10 mL, Intravenous, Q12H, Sachin Dia MD, 10 mL at 02/21/24 2139    sodium chloride 0.9 % flush 10 mL, 10 mL, Intravenous, PRN, Sachin Dia MD    sodium chloride 0.9 % infusion 40 mL, 40 mL, Intravenous, PRN, Elba Rockwell, APRN    sodium chloride 0.9 % infusion 40 mL, 40 mL, Intravenous, PRN, Sachin Dia MD    sodium chloride 0.9 % infusion, 100 mL/hr, Intravenous, Continuous, Sachin Dia MD, Last Rate: 100 mL/hr at 02/21/24 2140, 100 mL/hr at 02/21/24 2140    Allergies   Allergen Reactions    Codeine Itching and Rash     SEVERE RASH/ITCHING (TOLERATES PERCOCET)    Amoxicillin Other (See Comments)     unknown    Levaquin [Levofloxacin] Other (See Comments)     unknown    Benadryl [Diphenhydramine] Other (See Comments)     KEEPS PATIENT AWAKE      I have utilized all available immediate resources to obtain, update, or review the patient's current medications (including all prescriptions, over-the-counter products, herbals, cannabis/cannabidiol products, and vitamin/mineral/dietary (nutritional) supplements) for name, route of administration, type, dose and frequency.    A:    /54 (BP Location: Right arm, Patient Position: Lying)   Pulse 68   Temp 98 °F (36.7 °C) (Oral)   Resp 16   Ht 160 cm (63\")   Wt 91.6 kg (202 lb)   SpO2 96%   BMI 35.78 kg/m²     Vitals and nursing note reviewed.   Constitutional:       Appearance: Ill-appearing and " chronically ill-appearing.      Comments: Ace wraps to bilateral lower extremities  Pulmonary:      Effort: Pulmonary effort is normal.   Cardiovascular:      Normal rate.   Edema:     Peripheral edema present.  Musculoskeletal:      Cervical back: Neck supple.   Skin:     General: Skin is warm.   Neurological:      Mental Status: Alert, eating breakfast.      Patient status: Disease state: Controlled with current treatments.  Current Functional status: Palliative Performance Scale Score: Performance 40% based on the following measures: Ambulation: Mainly in bed, Activity and Evidence of Disease: Unable to do any work, extensive evidence of disease, Self-Care: Mainly assistance required,  Intake: Normal or reduced, LOC: Full, drowsy or confusion   Baseline Functional status: Palliative Performance Scale Score: Performance 60% based on the following measures: Ambulation: Reduced, Activity and Evidence of Disease: Unable to do hobby or some work, significant evidence of disease, Self-Care: Occasional assist necessary,  Intake: Normal or reduced, LOC: Full or confusion   Nutritional status: Albumin 2.9 Body mass index is 35.78 kg/m².      Hospital Problem List      UTI (urinary tract infection)    Secondary osteoarthritis of left shoulder due to rotator cuff arthropathy    Cellulitis of right lower extremity    HTN (hypertension)    Anxiety associated with depression    Cellulitis of left lower extremity    Acute cystitis with hematuria    Candidiasis of skin    CHRISTINE (acute kidney injury)    Chronic kidney disease, stage 3a    COVID-19 virus detected     Impression/Problem List:     Acute kidney injury on chronic kidney disease stage III  Acute UTI with hematuria  COVID-19 virus detected  Candidiasis of skin  Secondary osteoarthritis of left shoulder due to rotator cuff arthroplasty, abnormal imaging  Cellulitis bilateral lower extremities  Atrial fibrillation-Eliquis  Chronic back pain-spondylosis followed by Dr. Jones  pain management  Depression  Disease of thyroid gland  Hypertension  Iron deficiency anemia  Thrombocytopenia  Impaired mobility-walker  Lymphedema-managed by home health with wound care 3 times a week       Recommendations/Plan:  1. plan: Goals of care include CODE STATUS no CPR/limited support interventions.     Family support: The patient receives support from her daughter and extended family..  Advance Directives: On file     POA/Healthcare surrogate-Dominga wagner-next of kin.     2.  Palliative care encounter  - Prognosis is fair long-term secondary to acute kidney injury, acute UTI, COVID-19, and multiple comorbidities.  -Patient appears to have fair prognostic awareness.      -Recently discharged from rehab 2 weeks prior.  Recently diagnosed with COVID 1 week prior.       -Urine culture pending. Started on broad-spectrum IV antibiotics for UTI and bilateral lower extremity cellulitis  -Wound care consulted.    -Orthopedics consulted due to abnormal imaging left shoulder as above.     2/21-CODE STATUS changes no CPR/Limited interventions, no intubation, no permanent feeding tube.  Will plan to complete a MOST document  -High risk rehospitalization  -Awaiting Ortho recommendations  -dietician evaluation protein calorie malnutrition and dietary supplements per family request. Family reports patient does not use dentures and eats soft consistency foods at baseline.  Also drinks ensure supplements  -Will plan to reevaluate living will directive prior to discharge    2/22-continue supportive measures  -awaiting ortho to see. Discussed with charge nurse who has reached out this morning.     3. Pain  -patient has Butrans pain patch on lower left abd changed by patient 2/19. Recently restarted by pain management on second week. Weekly patch,added to MAR to continue. Discussed with family 2/21 may need to provide home medications as this is not on formulary.   -given kidney dysfunction and increased weakness with  fall decreased Cymbalta to 30 mg daily 2/21.   -MSER scheduled and MSIR as needed, ranging dosing for breakthrough pain.  -flexeril as needed     4. Xerostomia  -Biotene     5. Anxiety  -Buspar three times a day     6. Neuropathy  -gabapentin twice day     7.  Depression  -On Cymbalta, consider adding Lexapro to augment regimen      Thank you for allowing us to participate in patient's plan of care. Palliative Care Team will continue to follow patient.     GARETT Riddle  2/22/2024  08:32 CST     Electronically signed by Earnestine Gan APRN at 02/22/24 0917       Consult Notes (last 24 hours)  Notes from 02/21/24 1027 through 02/22/24 1027   No notes of this type exist for this encounter.

## 2024-02-22 NOTE — CONSULTS
Nutrition Services    Patient Name:  Brenda Sneed  YOB: 1950  MRN: [id number]  Admit Date:  2/20/2024    Malnutrition Assessment consult received. Pt with weight loss over the past four months per wt report. Limited malnutrition assessment completed at this time. Pt has Boost Glucose Control daily with lunch, Mighty shakes with breakfast and dinner. Con to follow for completion of MSA.    Electronically signed by:  Paola Espinoza MS,RDN,LD  02/22/24 17:22 CST

## 2024-02-22 NOTE — PLAN OF CARE
Goal Outcome Evaluation:  Plan of Care Reviewed With: patient        Progress: no change       Disoriented to time and place at times. O2 on at 2 l/m per nasal cannula. VSS. Safety maintained. Ace wraps on BLE's.

## 2024-02-22 NOTE — PROGRESS NOTES
"            [facility]    Palliative Care Daily Progress Note   Chief complaint-follow up support for patient/family and pain/symptom management    Code Status:   Code Status and Medical Interventions:   Ordered at: 02/21/24 1123     Medical Intervention Limits:    NO intubation (DNI)    Other     Code Status (Patient has no pulse and is not breathing):    No CPR (Do Not Attempt to Resuscitate)     Medical Interventions (Patient has pulse or is breathing):    Limited Support     Additional Medical Interventions Limits:    no permanent feeding tube      Advanced Directives: Advance Directive Status: Patient has advance directive, copy in chart   Goals of Care: Ongoing.     S: Medical record reviewed. Events noted.  No new labs to review. A MOST document completed by palliative SW this AM, attending to complete. Noted to have quite a bit of discomfort per staff, has not received PRN adjuvants as ordered over the last 24 hours. States pain mostly to left shoulder \"even moving 1/2\". She is more alert this morning and eating breakfast. States leg pain improved after repositioning.  Received 76 mg oral morphine equivalent over the last 24 hours in the form of Butrans transdermal patch and MS ER.  Ortho consulted on 2/21 and awaiting recommendations, discussed with charge nurse who has reached out to group this morning. Anxiety improved. Nursing to provide breakthrough pain medication this morning in addition to scheduled adjuvants.     Review of Systems   Constitutional: Positive for malaise/fatigue.   Cardiovascular:  Positive for leg swelling.        Lymphedema bilateral lower extremities   Respiratory:  Negative for shortness of breath.    Skin:  Positive for poor wound healing.   Musculoskeletal:  Positive for falls, joint pain, joint swelling, muscle weakness and myalgias.   Genitourinary:  Negative for dysuria.   Neurological:  Positive for weakness.   Psychiatric/Behavioral:  " Positive for depression. The patient is nervous/anxious.      Pain Assessment  Preferred Pain Scale: number (Numeric Rating Pain Scale)  Pain Location: shoulder    O:     Intake/Output Summary (Last 24 hours) at 2/22/2024 0832  Last data filed at 2/22/2024 0532  Gross per 24 hour   Intake 420 ml   Output 600 ml   Net -180 ml       Diagnostics and current medications: Reviewed.      Current Facility-Administered Medications:     albuterol sulfate HFA (PROVENTIL HFA;VENTOLIN HFA;PROAIR HFA) inhaler 2 puff, 2 puff, Inhalation, TID - RT, Sachin Dia MD, 2 puff at 02/22/24 0708    apixaban (ELIQUIS) tablet 5 mg, 5 mg, Oral, Q12H, Sachin Dia MD, 5 mg at 02/21/24 2139    Biotene Dry Mouth Moisturizing (BIOTENE) 1 spray, 1 spray, Mouth/Throat, 5x Daily, Earnestine Gan APRN, 1 spray at 02/22/24 0630    sennosides-docusate (PERICOLACE) 8.6-50 MG per tablet 2 tablet, 2 tablet, Oral, BID PRN **AND** polyethylene glycol (MIRALAX) packet 17 g, 17 g, Oral, Daily PRN **AND** bisacodyl (DULCOLAX) EC tablet 5 mg, 5 mg, Oral, Daily PRN **AND** bisacodyl (DULCOLAX) suppository 10 mg, 10 mg, Rectal, Daily PRN, Sachin Dia MD    bisoprolol (ZEBeta) 5 mg, hydroCHLOROthiazide 6.25 mg for Ziac 5-6.25, , Oral, Daily, Sachin Dia MD, Given at 02/21/24 1155    busPIRone (BUSPAR) tablet 5 mg, 5 mg, Oral, TID With Meals, Earnestine Gan APRN, 5 mg at 02/21/24 1832    Calcium Replacement - Follow Nurse / BPA Driven Protocol, , Does not apply, PRN, Sachin Dia MD    cefepime 2000 mg IVPB in 100 mL NS (MBP), 2,000 mg, Intravenous, Q24H, Sachin Dia MD, 2,000 mg at 02/21/24 2030    cyclobenzaprine (FLEXERIL) tablet 10 mg, 10 mg, Oral, TID PRN, Sachin Dia MD    docusate sodium (COLACE) capsule 100 mg, 100 mg, Oral, BID PRN, Sachin Dia MD    DULoxetine (CYMBALTA) DR capsule 30 mg, 30 mg, Oral, Daily, Earnestine Gan, APRN, 30 mg at 02/21/24 8279     furosemide (LASIX) tablet 40 mg, 40 mg, Oral, Daily PRN, Sachin Dia MD    gabapentin (NEURONTIN) capsule 100 mg, 100 mg, Oral, Q12H, Earnestine Gan APRN, 100 mg at 02/21/24 2138    levothyroxine (SYNTHROID, LEVOTHROID) tablet 100 mcg, 100 mcg, Oral, Q AM, Sachin Dia MD, 100 mcg at 02/22/24 0601    Magnesium Standard Dose Replacement - Follow Nurse / BPA Driven Protocol, , Does not apply, PRN, Sachin Dai MD    Morphine (MS CONTIN) 12 hr tablet 15 mg, 15 mg, Oral, Q12H, Sachin Dia MD, 15 mg at 02/21/24 2138    Morphine (MSIR) tablet 15 mg, 15 mg, Oral, Q6H PRN, Sumeet Bowles PA, 15 mg at 02/21/24 0655    Non-Formulary / Patient Supplied Medication, 1 each, Topical, Weekly, Earnestine Gan APRN    nystatin (MYCOSTATIN) powder 1 Application, 1 application , Topical, BID, Sachin Dia MD, 1 Application at 02/21/24 2139    ondansetron (ZOFRAN) injection 4 mg, 4 mg, Intravenous, Q6H PRN, Elba Rockwell APRN    Phosphorus Replacement - Follow Nurse / BPA Driven Protocol, , Does not apply, PRN, Sachin Dia MD    Potassium Replacement - Follow Nurse / BPA Driven Protocol, , Does not apply, PRN, Sachin Dia MD    pramipexole (MIRAPEX) tablet 0.75 mg, 0.75 mg, Oral, Nightly, Sachin Dia MD, 0.75 mg at 02/21/24 2139    saccharomyces boulardii (FLORASTOR) capsule 250 mg, 250 mg, Oral, BID, Sachin Dia MD, 250 mg at 02/21/24 2140    sodium chloride 0.9 % flush 10 mL, 10 mL, Intravenous, Q12H, Elba Rockwell APRN, 10 mL at 02/21/24 2139    sodium chloride 0.9 % flush 10 mL, 10 mL, Intravenous, PRN, Elba Rockwell APRN    sodium chloride 0.9 % flush 10 mL, 10 mL, Intravenous, Q12H, Sachin Dia MD, 10 mL at 02/21/24 2139    sodium chloride 0.9 % flush 10 mL, 10 mL, Intravenous, PRN, Sachin Dia MD    sodium chloride 0.9 % infusion 40 mL, 40 mL, Intravenous, PRN, Elba Rockwell APRN    sodium chloride 0.9 %  "infusion 40 mL, 40 mL, Intravenous, PRN, Sachin Dia MD    sodium chloride 0.9 % infusion, 100 mL/hr, Intravenous, Continuous, Sachin Dia MD, Last Rate: 100 mL/hr at 02/21/24 2140, 100 mL/hr at 02/21/24 2140    Allergies   Allergen Reactions    Codeine Itching and Rash     SEVERE RASH/ITCHING (TOLERATES PERCOCET)    Amoxicillin Other (See Comments)     unknown    Levaquin [Levofloxacin] Other (See Comments)     unknown    Benadryl [Diphenhydramine] Other (See Comments)     KEEPS PATIENT AWAKE      I have utilized all available immediate resources to obtain, update, or review the patient's current medications (including all prescriptions, over-the-counter products, herbals, cannabis/cannabidiol products, and vitamin/mineral/dietary (nutritional) supplements) for name, route of administration, type, dose and frequency.    A:    /54 (BP Location: Right arm, Patient Position: Lying)   Pulse 68   Temp 98 °F (36.7 °C) (Oral)   Resp 16   Ht 160 cm (63\")   Wt 91.6 kg (202 lb)   SpO2 96%   BMI 35.78 kg/m²     Vitals and nursing note reviewed.   Constitutional:       Appearance: Ill-appearing and chronically ill-appearing.      Comments: Ace wraps to bilateral lower extremities  Pulmonary:      Effort: Pulmonary effort is normal.   Cardiovascular:      Normal rate.   Edema:     Peripheral edema present.  Musculoskeletal:      Cervical back: Neck supple.   Skin:     General: Skin is warm.   Neurological:      Mental Status: Alert, eating breakfast.      Patient status: Disease state: Controlled with current treatments.  Current Functional status: Palliative Performance Scale Score: Performance 40% based on the following measures: Ambulation: Mainly in bed, Activity and Evidence of Disease: Unable to do any work, extensive evidence of disease, Self-Care: Mainly assistance required,  Intake: Normal or reduced, LOC: Full, drowsy or confusion   Baseline Functional status: Palliative Performance " Scale Score: Performance 60% based on the following measures: Ambulation: Reduced, Activity and Evidence of Disease: Unable to do hobby or some work, significant evidence of disease, Self-Care: Occasional assist necessary,  Intake: Normal or reduced, LOC: Full or confusion   Nutritional status: Albumin 2.9 Body mass index is 35.78 kg/m².      Hospital Problem List      UTI (urinary tract infection)    Secondary osteoarthritis of left shoulder due to rotator cuff arthropathy    Cellulitis of right lower extremity    HTN (hypertension)    Anxiety associated with depression    Cellulitis of left lower extremity    Acute cystitis with hematuria    Candidiasis of skin    CHRISTINE (acute kidney injury)    Chronic kidney disease, stage 3a    COVID-19 virus detected     Impression/Problem List:     Acute kidney injury on chronic kidney disease stage III  Acute UTI with hematuria  COVID-19 virus detected  Candidiasis of skin  Secondary osteoarthritis of left shoulder due to rotator cuff arthroplasty, abnormal imaging  Cellulitis bilateral lower extremities  Atrial fibrillation-Eliquis  Chronic back pain-spondylosis followed by Dr. Jones pain management  Depression  Disease of thyroid gland  Hypertension  Iron deficiency anemia  Thrombocytopenia  Impaired mobility-walker  Lymphedema-managed by home health with wound care 3 times a week       Recommendations/Plan:  1. plan: Goals of care include CODE STATUS no CPR/limited support interventions.     Family support: The patient receives support from her daughter and extended family..  Advance Directives: On file     POA/Healthcare surrogate-daughterDominga-next of kin.     2.  Palliative care encounter  - Prognosis is fair long-term secondary to acute kidney injury, acute UTI, COVID-19, and multiple comorbidities.  -Patient appears to have fair prognostic awareness.      -Recently discharged from rehab 2 weeks prior.  Recently diagnosed with COVID 1 week prior.       -Urine culture  pending. Started on broad-spectrum IV antibiotics for UTI and bilateral lower extremity cellulitis  -Wound care consulted.    -Orthopedics consulted due to abnormal imaging left shoulder as above.     2/21-CODE STATUS changes no CPR/Limited interventions, no intubation, no permanent feeding tube.  Will plan to complete a MOST document  -High risk rehospitalization  -Awaiting Ortho recommendations  -dietician evaluation protein calorie malnutrition and dietary supplements per family request. Family reports patient does not use dentures and eats soft consistency foods at baseline.  Also drinks ensure supplements  -Will plan to reevaluate living will directive prior to discharge    2/22-continue supportive measures  -awaiting ortho to see. Discussed with charge nurse who has reached out this morning.     3. Pain  -patient has Butrans pain patch on lower left abd changed by patient 2/19. Recently restarted by pain management on second week. Weekly patch,added to MAR to continue. Discussed with family 2/21 may need to provide home medications as this is not on formulary.   -given kidney dysfunction and increased weakness with fall decreased Cymbalta to 30 mg daily 2/21.   -MSER scheduled and MSIR as needed, ranging dosing for breakthrough pain.  -flexeril as needed     4. Xerostomia  -Biotene     5. Anxiety  -Buspar three times a day     6. Neuropathy  -gabapentin twice day     7.  Depression  -On Cymbalta, consider adding Lexapro to augment regimen      Thank you for allowing us to participate in patient's plan of care. Palliative Care Team will continue to follow patient.     Earnestine Gan, APRN  2/22/2024  08:32 CST

## 2024-02-22 NOTE — CONSULTS
Orthopaedic Surgery Consult Note      2/22/2024   17:36 CST    Name:  Brenda Sneed  MRN:    [id number]     Acct:     [id number]   Room:  [facility]/  IP Day: 0     Admit Date: 2/20/2024  PCP: Sachin Dia MD        Subjective:     C/C: Abnormal appearance of reverse total shoulder arthroplasty, left shoulder. We are seeing this patient in consultation for an orthopaedic evaluation.  The patient is currently admitted due to COVID infection and UTI.  She has difficulty staying awake currently though she is arousable.  She indicates that her shoulder has been bothering her for quite some time.  Review of the EMR demonstrates a number of falls in the recent year which may have contributed to her shoulder pain.    Pain: 4 /10      Code Status:    Code Status and Medical Interventions:   Ordered at: 02/21/24 1123     Medical Intervention Limits:    NO intubation (DNI)    Other     Code Status (Patient has no pulse and is not breathing):    No CPR (Do Not Attempt to Resuscitate)     Medical Interventions (Patient has pulse or is breathing):    Limited Support     Additional Medical Interventions Limits:    no permanent feeding tube         Past Medical History:    Past Medical History:   Diagnosis Date    Arthritis     Bronchitis     Cataract     right eye , cornea implant    Chronic back pain     Depression     Disease of thyroid gland     Edema     lower extremities    Hypertension     Kidney stones     Lymphedema        Past Surgical History:    Past Surgical History:   Procedure Laterality Date    BACK SURGERY      2010 ([place]), 2013     CHOLECYSTECTOMY OPEN      CORNEAL TRANSPLANT Right 1999    INCISION AND DRAINAGE ABSCESS Left 4/19/2023    Procedure: INCISION AND DRAINAGE ABSCESS left leg;  Surgeon: Sarita Vazquez MD;  Location: [facility];  Service: General;  Laterality: Left;    LACERATION REPAIR Left 4/7/2023    Procedure: washout and closure left leg wound ;  Surgeon: Sarita Vazquez MD;   Location:  [facility];  Service: General;  Laterality: Left;    LUMBAR FUSION Left 8/2/2017    Procedure: LEFT LUMBAR LATERAL INTERBODY FUSION WITH INSTRUMENTATION  L1-2;  Surgeon: MARCELINO rWight MD;  Location:  [facility];  Service:     NECK SURGERY  2010    [place]    TOTAL HIP ARTHROPLASTY Right 2014    DR. KENDRICK     TOTAL KNEE ARTHROPLASTY Right 2000    TOTAL KNEE ARTHROPLASTY Left 2002    TOTAL SHOULDER ARTHROPLASTY W/ DISTAL CLAVICLE EXCISION Left 6/1/2020    Procedure: LEFT REVERSE TOTAL SHOULDER REPLACEMENT;  Surgeon: Saravanan Cavlin MD;  Location:  [facility];  Service: Orthopedics;  Laterality: Left;       Current Medications:   Prior to Admission medications    Medication Sig Start Date End Date Taking? Authorizing Provider   albuterol (PROVENTIL) (2.5 MG/3ML) 0.083% nebulizer solution Take 2.5 mg by nebulization 3 (Three) Times a Day. Indications: Spasm of Lung Air Passages    ProviderLacie MD   apixaban (ELIQUIS) 5 MG tablet tablet Take 1 tablet by mouth Every 12 (Twelve) Hours. Indications: Atrial Fibrillation 10/6/23   Sanjiv Block MD   bisoprolol-hydrochlorothiazide (ZIAC) 5-6.25 MG per tablet Take 1 tablet by mouth Daily.    ProviderLacie MD   Buprenorphine (Butrans) 20 MCG/HR patch weekly Apply 20 mcg topically to the appropriate area as directed 1 (One) Time Per Week. Indications: pain 2/5/24   Lacie Hernandez MD   cyclobenzaprine (FLEXERIL) 10 MG tablet Take 1 tablet by mouth 3 (Three) Times a Day As Needed for Muscle Spasms. 6/1/20   Saravanan Calvin MD   docusate sodium (COLACE) 100 MG capsule Take 1 capsule by mouth 2 (Two) Times a Day As Needed for Constipation. Indications: Constipation    Lacie Hernandez MD   DULoxetine (CYMBALTA) 60 MG capsule Take 1 capsule by mouth 2 (Two) Times a Day. Indications: Musculoskeletal Pain    Lacie Hernandez MD   furosemide (LASIX) 40 MG tablet Take 1 tablet by mouth Daily As Needed (edema). Indications: Edema  "23   ProviderLacie MD   levothyroxine (SYNTHROID, LEVOTHROID) 100 MCG tablet Take 1 tablet by mouth Daily. Indications: Underactive Thyroid    ProviderLacie MD   Morphine (MS CONTIN) 15 MG 12 hr tablet Take 1 tablet by mouth Every 12 (Twelve) Hours. 24   Sanjiv Block MD   nystatin (MYCOSTATIN) 450749 UNIT/GM powder Apply 1 application  topically to the appropriate area as directed 2 (Two) Times a Day. Indications: Candida Infection of Skin and Mouth or Vagina    Provider, MD Lacie       Allergies:  Codeine, Amoxicillin, Levaquin [levofloxacin], and Benadryl [diphenhydramine]    Social History:   Social History     Socioeconomic History    Marital status:    Tobacco Use    Smoking status: Never    Smokeless tobacco: Never   Vaping Use    Vaping Use: Never used   Substance and Sexual Activity    Alcohol use: Not Currently    Drug use: No    Sexual activity: Defer       Family History:   Family History   Problem Relation Age of Onset    Breast cancer Neg Hx        REVIEW OF SYSTEMS:    Review of systems from admission H&P is noted to be unchanged.    Vitals:  BP 93/73 (BP Location: Right arm, Patient Position: Lying)   Pulse 70   Temp 98 °F (36.7 °C) (Oral)   Resp 16   Ht 160 cm (63\")   Wt 91.6 kg (202 lb)   SpO2 95%   BMI 35.78 kg/m²   Temp (24hrs), Av.1 °F (36.7 °C), Min:98 °F (36.7 °C), Max:98.4 °F (36.9 °C)      I/O (24Hr):    Intake/Output Summary (Last 24 hours) at 2024 1736  Last data filed at 2024 0858  Gross per 24 hour   Intake 520 ml   Output 600 ml   Net -80 ml       Labs:  Lab Results (last 72 hours)       Procedure Component Value Units Date/Time    Urine Culture - Urine, Straight Cath [[id number]]  (Abnormal)  (Susceptibility) Collected: 24 9987    Specimen: Urine from Straight Cath Updated: 24 1200     Urine Culture 50,000 CFU/mL Pseudomonas aeruginosa MDRO     Comment:   Multi drug resistant Pseudomonas, patient may be an " isolation risk.  Multi drug resistant Pseudomonas, patient may be an isolation risk.         50,000 CFU/mL Enterococcus faecalis    Narrative:      Colonization of the urinary tract without infection is common. Treatment is discouraged unless the patient is symptomatic, pregnant, or undergoing an invasive urologic procedure.    Susceptibility        Pseudomonas aeruginosa MDRO      CHRISTIANO      Cefepime Susceptible      Ceftazidime Susceptible      Ciprofloxacin Susceptible      Levofloxacin Intermediate      Meropenem Intermediate      Piperacillin + Tazobactam Susceptible      Tobramycin Susceptible                       Susceptibility        Enterococcus faecalis      CHRISTIANO      Ampicillin Susceptible      Levofloxacin Resistant      Nitrofurantoin Susceptible      Vancomycin Susceptible                       Susceptibility Comments       Pseudomonas aeruginosa MDRO    For MDR Pseudomonas infections, susceptibility results may not correlate to clinical outcomes. Please consider infectious disease consult.               CBC Auto Differential [[id number]]  (Abnormal) Collected: 02/22/24 0833    Specimen: Blood Updated: 02/22/24 0949     WBC 7.77 10*3/mm3      RBC 2.72 10*6/mm3      Hemoglobin 7.8 g/dL      Hematocrit 23.0 %      MCV 84.6 fL      MCH 28.7 pg      MCHC 33.9 g/dL      RDW 18.5 %      RDW-SD 56.0 fl      MPV 11.6 fL      Platelets 60 10*3/mm3      Neutrophil % 76.4 %      Lymphocyte % 12.5 %      Monocyte % 10.0 %      Eosinophil % 0.6 %      Basophil % 0.1 %      Neutrophils, Absolute 5.93 10*3/mm3      Lymphocytes, Absolute 0.97 10*3/mm3      Monocytes, Absolute 0.78 10*3/mm3      Eosinophils, Absolute 0.05 10*3/mm3      Basophils, Absolute 0.01 10*3/mm3     Comprehensive Metabolic Panel [[id number]]  (Abnormal) Collected: 02/22/24 0833    Specimen: Blood Updated: 02/22/24 0927     Glucose 91 mg/dL      BUN 50 mg/dL      Creatinine 1.53 mg/dL      Sodium 133 mmol/L      Potassium 3.3 mmol/L      Comment:  Slight hemolysis detected by analyzer. Result may be falsely elevated.        Chloride 101 mmol/L      CO2 23.0 mmol/L      Calcium 7.6 mg/dL      Total Protein 4.0 g/dL      Albumin 1.8 g/dL      ALT (SGPT) 8 U/L      AST (SGOT) 18 U/L      Alkaline Phosphatase 99 U/L      Total Bilirubin 1.9 mg/dL      Globulin 2.2 gm/dL      A/G Ratio 0.8 g/dL      BUN/Creatinine Ratio 32.7     Anion Gap 9.0 mmol/L      eGFR 35.3 mL/min/1.73     Narrative:      GFR Normal >60  Chronic Kidney Disease <60  Kidney Failure <15    The GFR formula is only valid for adults with stable renal function between ages 18 and 70.    Folate [[id number]]  (Abnormal) Collected: 02/21/24 1212    Specimen: Blood Updated: 02/21/24 2044     Folate 2.59 ng/mL     Narrative:      Results may be falsely increased if patient taking Biotin.      Vitamin B12 [[id number]]  (Abnormal) Collected: 02/21/24 1212    Specimen: Blood Updated: 02/21/24 2004     Vitamin B-12 1,641 pg/mL     Narrative:      Results may be falsely increased if patient taking Biotin.      Vitamin D,25-Hydroxy [[id number]]  (Abnormal) Collected: 02/21/24 1212    Specimen: Blood Updated: 02/21/24 2004     25 Hydroxy, Vitamin D 14.2 ng/ml     Narrative:      Reference Range for Total Vitamin D 25(OH)     Deficiency <20.0 ng/mL   Insufficiency 21-29 ng/mL   Sufficiency  ng/mL  Toxicity >100 ng/ml      Iron Profile [[id number]]  (Abnormal) Collected: 02/20/24 1605    Specimen: Blood from Arm, Right Updated: 02/21/24 0749     Iron 28 mcg/dL      Iron Saturation (TSAT) 13 %      Transferrin 140 mg/dL      TIBC 209 mcg/dL     Urinalysis With Culture If Indicated - Straight Cath [[id number]]  (Abnormal) Collected: 02/20/24 1837    Specimen: Urine from Straight Cath Updated: 02/20/24 1917     Color, UA [place]     Appearance, UA Clear     pH, UA <=5.0     Specific Gravity, UA 1.019     Glucose, UA Negative     Ketones, UA Negative     Bilirubin, UA Negative     Blood, UA Negative     Protein, UA  Negative     Leuk Esterase, UA Small (1+)     Nitrite, UA Positive     Urobilinogen, UA 0.2 E.U./dL    Narrative:      Dipstick results may be inaccurate due to color interference.    Urinalysis, Microscopic Only - Straight Cath [[id number]]  (Abnormal) Collected: 02/20/24 1837    Specimen: Urine from Straight Cath Updated: 02/20/24 1910     RBC, UA 3-5 /HPF      WBC, UA 3-5 /HPF      Comment: Urine culture not indicated.        Bacteria, UA 4+ /HPF      Squamous Epithelial Cells, UA 0-2 /HPF      Hyaline Casts, UA 7-12 /LPF      Methodology Automated Microscopy    COVID PRE-OP / PRE-PROCEDURE SCREENING ORDER (NO ISOLATION) - Swab, Nasopharynx [[id number]]  (Abnormal) Collected: 02/20/24 1606    Specimen: Swab from Nasopharynx Updated: 02/20/24 1743    Narrative:      The following orders were created for panel order COVID PRE-OP / PRE-PROCEDURE SCREENING ORDER (NO ISOLATION) - Swab, Nasopharynx.  Procedure                               Abnormality         Status                     ---------                               -----------         ------                     COVID-19, FLU A/B, RSV P...[[id number]]  Abnormal            Final result                 Please view results for these tests on the individual orders.    COVID-19, FLU A/B, RSV PCR 1 HR TAT - Swab, Nasopharynx [[id number]]  (Abnormal) Collected: 02/20/24 1606    Specimen: Swab from Nasopharynx Updated: 02/20/24 1743     COVID19 Detected     Influenza A PCR Not Detected     Influenza B PCR Not Detected     RSV, PCR Not Detected    Narrative:      Fact sheet for providers: https://www.fda.gov/media/802362/download    Fact sheet for patients: https://www.fda.gov/media/632975/download    Test performed by PCR.    CBC & Differential [[id number]]  (Abnormal) Collected: 02/20/24 1605    Specimen: Blood from Arm, Right Updated: 02/20/24 1643    Narrative:      The following orders were created for panel order CBC & Differential.  Procedure                                Abnormality         Status                     ---------                               -----------         ------                     CBC Auto Differential[[id number]]        Abnormal            Final result                 Please view results for these tests on the individual orders.    CBC Auto Differential [[id number]]  (Abnormal) Collected: 02/20/24 1605    Specimen: Blood from Arm, Right Updated: 02/20/24 1643     WBC 15.64 10*3/mm3      RBC 3.11 10*6/mm3      Hemoglobin 10.5 g/dL      Hematocrit 28.2 %      MCV 90.7 fL      MCH 33.8 pg      MCHC 37.2 g/dL      RDW 22.1 %      RDW-SD 58.3 fl      MPV 11.5 fL      Platelets 73 10*3/mm3      Neutrophil % 87.3 %      Lymphocyte % 4.7 %      Monocyte % 7.2 %      Eosinophil % 0.0 %      Basophil % 0.1 %      Immature Grans % 0.7 %      Neutrophils, Absolute 13.67 10*3/mm3      Lymphocytes, Absolute 0.73 10*3/mm3      Monocytes, Absolute 1.12 10*3/mm3      Eosinophils, Absolute 0.00 10*3/mm3      Basophils, Absolute 0.01 10*3/mm3      Immature Grans, Absolute 0.11 10*3/mm3      nRBC 0.0 /100 WBC     TSH [[id number]]  (Abnormal) Collected: 02/20/24 1605    Specimen: Blood from Arm, Right Updated: 02/20/24 1642     TSH 6.370 uIU/mL     Comprehensive Metabolic Panel [[id number]]  (Abnormal) Collected: 02/20/24 1605    Specimen: Blood from Arm, Right Updated: 02/20/24 1636     Glucose 94 mg/dL      BUN 46 mg/dL      Creatinine 1.96 mg/dL      Sodium 132 mmol/L      Potassium 3.3 mmol/L      Chloride 95 mmol/L      CO2 24.0 mmol/L      Calcium 7.8 mg/dL      Total Protein 6.0 g/dL      Albumin 2.9 g/dL      ALT (SGPT) 15 U/L      AST (SGOT) 35 U/L      Alkaline Phosphatase 137 U/L      Total Bilirubin 1.8 mg/dL      Globulin 3.1 gm/dL      A/G Ratio 0.9 g/dL      BUN/Creatinine Ratio 23.5     Anion Gap 13.0 mmol/L      eGFR 26.3 mL/min/1.73     Narrative:      GFR Normal >60  Chronic Kidney Disease <60  Kidney Failure <15    The GFR formula is only valid for adults with  stable renal function between ages 18 and 70.    Magnesium [[id number]]  (Normal) Collected: 02/20/24 1605    Specimen: Blood from Arm, Right Updated: 02/20/24 1631     Magnesium 1.7 mg/dL             Radiology:  Imaging Results (Last 72 Hours)       Procedure Component Value Units Date/Time    XR Foot 2 View Right [[id number]] Collected: 02/22/24 1224     Updated: 02/22/24 1233    Narrative:      EXAMINATION: XR FOOT 2 VW RIGHT-  2/22/2024 12:24 PM     HISTORY: chronic wound; N39.0-Urinary tract infection, site not  specified; R53.1-Weakness; M25.512-Pain in left shoulder; G89.29-Other  chronic pain; I83.018-Varicose veins of right lower extremity with ulcer  other part of lower leg; L97.812-Non-pressure chronic ulcer of other  part of right lower leg with fat layer exposed; I83.028-Varicose veins  of left lower extremity with ulcer other part of lower leg;      COMPARISON: None      TECHNIQUE:  Frontal and lateral radiographs of the right foot were provided for  review. No oblique images were obtained.     FINDINGS:  Joint space narrowing at the first IP joint. No definite acute fracture  or destructive bony changes. There is joint space loss at the third DIP  joint. There is some crossover of the fourth and fifth digits. It may be  from positioning but the foot has the appearance of pes cavus. This  could be confirmed or refuted with weightbearing images if clinically  warranted. Positioning of the toes with hammertoe deformities makes  evaluation of alignment suboptimal. Mild diffuse soft tissue swelling.          Impression:         1.  Limited 2 view exam without any definite acute osseous abnormality.  There is soft tissue swelling.             2.  There are degenerative changes in the toes. Hammertoe deformities of  the second through fifth digits are suspected.     3.  Possible pes cavus; however, this would be better evaluated with  weightbearing images.           This report was signed and finalized on  2/22/2024 12:30 PM by Dr. Jamal Loya MD.       XR Shoulder 2+ View Left [[id number]] Collected: 02/20/24 1533     Updated: 02/20/24 1543    Narrative:      EXAMINATION: XR SHOULDER 2+ VW LEFT-  2/20/2024 3:33 PM     HISTORY: pain     COMPARISON: 1/5/2024     TECHNIQUE:  Frontal views with internal and external rotation of the LEFT shoulder  are submitted. 2 total views.     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 There is fracture of  the inferior glenoid anchor screw/post with probable high-grade scapular  notching. CT with metal suppression protocol recommended to evaluate the  degree of scapular notching. I am suspicious that this may represent  grade 4 scapular notching. This is not an acute finding and CT can be  performed as an outpatient to ensure optimized protocol performed. Mild  AC joint arthropathy.          Impression:         1.  Findings suspicious for hardware complication of the total shoulder  arthroplasty. I suspect that there may be grade 4 scapular notching and  the inferior glenoid component screw is fractured. Orthopedic follow-up  nonemergent CT with metal suppression protocol is recommended to further  characterize the degree of scapular notching.     2.  No acute osseous abnormality identified on this exam.     This report was signed and finalized on 2/20/2024 3:40 PM by Dr. Jamal Loya MD.       XR Chest 1 View [[id number]] Collected: 02/20/24 1533     Updated: 02/20/24 1537    Narrative:      EXAM: XR CHEST 1 VW- 2/20/2024 2:27 PM     HISTORY: generalized weakness       COMPARISON: 1/5/2024.     TECHNIQUE: Single frontal radiograph of the chest was obtained.     FINDINGS:     Support Devices: None.     Cardiac and Mediastinal Silhouettes: Heart size borderline enlarged.     Lungs/Pleura: Lung volumes are low. Mild bibasilar parenchymal  opacities. No sizable pleural effusion. No visible pneumothorax.     Osseous structures: No acute osseous finding.     Other:  None.       Impression:         Low lung volumes with mild bibasilar parenchymal opacities, likely  atelectasis.           This report was signed and finalized on 2/20/2024 3:34 PM by Tin Garcia.               Physical Exam:     PHYSICAL EXAM:      Physical Examination:  Vitals:   Vitals:    02/22/24 0708 02/22/24 0858 02/22/24 1159 02/22/24 1450   BP:  94/43 93/73    BP Location:  Right arm Right arm    Patient Position:  Lying Lying    Pulse: 68 74 71 70   Resp: 16 18 18 16   Temp:  98.1 °F (36.7 °C) 98 °F (36.7 °C)    TempSrc:  Oral Oral    SpO2: 96% 94% 95% 95%   Weight:       Height:         General:  Appears stated age, no distress.  Orientation:  Alert and oriented to time, place, and person.  Mood and Affect:  Cooperative and pleasant.  Gait:  Resting comfortably in bed.  Cardiovascular:  Symmetric 1-2 plus pulses in upper and lower extremities.  Lymph:  No cervical or inguinal lymphadenopathy noted.  Sensation:  Grossly intact to light touch.  DTR:  Normal, no pathologic reflexes.  Coordination/balance:  Normal    Musculoskeletal:  Left upper extremity exam:  There is no tenderness to palpation about the elbow, wrist or hand, and though the patient does not indicate there is overt discomfort with palpation about the shoulder, she does have weakness with attempted range of motion.  There is no obvious instability.  The overlying skin is intact.  She does have lymphedema into the left upper extremity.     Radiology Review  My review of patient's x-rays shows the patient to have displacement of the baseplate and glenosphere of her reverse total shoulder arthroplasty construct with inferior screw fracture    Assessment:     Primary Problem  UTI (urinary tract infection) - displacement of the baseplate and glenosphere of her reverse total shoulder arthroplasty construct with inferior screw fracture     Plan:   The patient has difficulty communicating currently, it was explained to her at length that  revision reverse total shoulder arthroplasty may be a consideration.  That would be more ideally undertaken when she is more medically stable and not compromise from a respiratory standpoint.  2.   Sling to left upper extremity for comfort  3.   Gentle activity of left upper extremity as tolerated  4.   Follow-up in the outpatient setting to discuss potential revision arthroplasty

## 2024-02-22 NOTE — PROGRESS NOTES
"    Daily Progress Note  Brenda Sneed  MRN: [id number] LOS: 0    Admit Date: 2024 08:57 CST    Subjective:         Interval History:    Reviewed overnight events and nursing notes.     C/o left shoulder pain, no other complaints.    ROS:  Review of Systems   Constitutional:  Negative for chills and fever.   Respiratory:  Negative for cough, chest tightness and shortness of breath.    Cardiovascular:  Negative for chest pain.   Gastrointestinal:  Negative for abdominal pain, diarrhea, nausea and vomiting.       DIET:  Diet: Diabetic Diets; Consistent Carbohydrate; Texture: Regular Texture (IDDSI 7); Fluid Consistency: Thin (IDDSI 0)    Medications:   sodium chloride, 100 mL/hr, Last Rate: 100 mL/hr (240)      albuterol sulfate HFA, 2 puff, Inhalation, TID - RT  apixaban, 5 mg, Oral, Q12H  Biotene Dry Mouth Moisturizing, 1 spray, Mouth/Throat, 5x Daily  bisoprolol (ZEBeta) 5 mg, hydroCHLOROthiazide 6.25 mg for Ziac 5-6.25, , Oral, Daily  busPIRone, 5 mg, Oral, TID With Meals  cefepime, 2,000 mg, Intravenous, Q24H  DULoxetine, 30 mg, Oral, Daily  gabapentin, 100 mg, Oral, Q12H  levothyroxine, 100 mcg, Oral, Q AM  Morphine, 15 mg, Oral, Q12H  Non-Formulary / Patient Supplied Medication, 1 each, Topical, Weekly  nystatin, 1 application , Topical, BID  pramipexole, 0.75 mg, Oral, Nightly  saccharomyces boulardii, 250 mg, Oral, BID  sodium chloride, 10 mL, Intravenous, Q12H  sodium chloride, 10 mL, Intravenous, Q12H          Objective:     Vitals: /54 (BP Location: Right arm, Patient Position: Lying)   Pulse 68   Temp 98 °F (36.7 °C) (Oral)   Resp 16   Ht 160 cm (63\")   Wt 91.6 kg (202 lb)   SpO2 96%   BMI 35.78 kg/m²    Intake/Output Summary (Last 24 hours) at 2024 0857  Last data filed at 2024 0532  Gross per 24 hour   Intake 420 ml   Output 600 ml   Net -180 ml    Temp (24hrs), Av.3 °F (36.8 °C), Min:98 °F (36.7 °C), Max:98.7 °F (37.1 °C)    Glucose:  Lab Results "   Component Value Date    POCGLU 100 (H) 02/13/2021     Physical Examination:   Physical Exam  Constitutional:       General: She is not in acute distress.     Appearance: She is obese. She is ill-appearing. She is not toxic-appearing.   Cardiovascular:      Rate and Rhythm: Normal rate and regular rhythm.      Heart sounds: No murmur heard.  Pulmonary:      Effort: Pulmonary effort is normal. No respiratory distress.   Abdominal:      Palpations: Abdomen is soft.   Musculoskeletal:      Right lower leg: Edema present.      Left lower leg: Edema present.         Labs:  Lab Results (last 24 hours)       Procedure Component Value Units Date/Time    CBC Auto Differential [[id number]] Collected: 02/22/24 0833    Specimen: Blood Updated: 02/22/24 0854    Comprehensive Metabolic Panel [[id number]] Collected: 02/22/24 0833    Specimen: Blood Updated: 02/22/24 0854    Folate [[id number]]  (Abnormal) Collected: 02/21/24 1212    Specimen: Blood Updated: 02/21/24 2044     Folate 2.59 ng/mL     Narrative:      Results may be falsely increased if patient taking Biotin.      Vitamin B12 [[id number]]  (Abnormal) Collected: 02/21/24 1212    Specimen: Blood Updated: 02/21/24 2004     Vitamin B-12 1,641 pg/mL     Narrative:      Results may be falsely increased if patient taking Biotin.      Vitamin D,25-Hydroxy [[id number]]  (Abnormal) Collected: 02/21/24 1212    Specimen: Blood Updated: 02/21/24 2004     25 Hydroxy, Vitamin D 14.2 ng/ml     Narrative:      Reference Range for Total Vitamin D 25(OH)     Deficiency <20.0 ng/mL   Insufficiency 21-29 ng/mL   Sufficiency  ng/mL  Toxicity >100 ng/ml      Urine Culture - Urine, Straight Cath [[id number]]  (Abnormal) Collected: 02/20/24 1837    Specimen: Urine from Straight Cath Updated: 02/21/24 1929     Urine Culture 50,000 CFU/mL Gram Negative Bacilli    Narrative:      Colonization of the urinary tract without infection is common. Treatment is discouraged unless the patient is  symptomatic, pregnant, or undergoing an invasive urologic procedure.             Imaging:  XR Shoulder 2+ View Left    Result Date: 2/20/2024  EXAMINATION: XR SHOULDER 2+ VW LEFT-  2/20/2024 3:33 PM  HISTORY: pain  COMPARISON: 1/5/2024  TECHNIQUE: Frontal views with internal and external rotation of the LEFT shoulder are submitted. 2 total views.  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 There is fracture of the inferior glenoid anchor screw/post with probable high-grade scapular notching. CT with metal suppression protocol recommended to evaluate the degree of scapular notching. I am suspicious that this may represent grade 4 scapular notching. This is not an acute finding and CT can be performed as an outpatient to ensure optimized protocol performed. Mild AC joint arthropathy.       Impression:  1.  Findings suspicious for hardware complication of the total shoulder arthroplasty. I suspect that there may be grade 4 scapular notching and the inferior glenoid component screw is fractured. Orthopedic follow-up nonemergent CT with metal suppression protocol is recommended to further characterize the degree of scapular notching.  2.  No acute osseous abnormality identified on this exam.  This report was signed and finalized on 2/20/2024 3:40 PM by Dr. Jamal Loya MD.      XR Chest 1 View    Result Date: 2/20/2024  EXAM: XR CHEST 1 VW- 2/20/2024 2:27 PM  HISTORY: generalized weakness   COMPARISON: 1/5/2024.  TECHNIQUE: Single frontal radiograph of the chest was obtained.  FINDINGS:  Support Devices: None.  Cardiac and Mediastinal Silhouettes: Heart size borderline enlarged.  Lungs/Pleura: Lung volumes are low. Mild bibasilar parenchymal opacities. No sizable pleural effusion. No visible pneumothorax.  Osseous structures: No acute osseous finding.  Other: None.      Impression:  Low lung volumes with mild bibasilar parenchymal opacities, likely atelectasis.    This report was signed and finalized  on 2/20/2024 3:34 PM by Tin Garcia.          Assessment and Plan:     Primary Problem:  UTI (urinary tract infection)    Hospital Problem list:    UTI (urinary tract infection)    Secondary osteoarthritis of left shoulder due to rotator cuff arthropathy    Cellulitis of right lower extremity    HTN (hypertension)    Anxiety associated with depression    Cellulitis of left lower extremity    Acute cystitis with hematuria    Candidiasis of skin    CHRISTINE (acute kidney injury)    Chronic kidney disease, stage 3a    COVID-19 virus detected        Plan:    UTI  E. Coli in culture, d/c vanc and continue zosyn, narrow as able.    Possible cellulitis  Hold vanc as above, suspect this is due to stasis dermatitis from lymphedema, continue wound care and lymphedema care.    Left shoulder pain  Concern for prothesis failure on Xray, ortho consultation pending.    Labs are pending for today to evaluate CHRISTINE and other electrolyte abnormalities.      Reviewed treatment plans with the patient and/or family.     Code Status:   Code Status and Medical Interventions:   Ordered at: 02/21/24 1123     Medical Intervention Limits:    NO intubation (DNI)    Other     Code Status (Patient has no pulse and is not breathing):    No CPR (Do Not Attempt to Resuscitate)     Medical Interventions (Patient has pulse or is breathing):    Limited Support     Additional Medical Interventions Limits:    no permanent feeding tube       Electronically signed by Sanjiv Block MD on 2/22/2024 at 08:57 CST

## 2024-02-22 NOTE — CASE MANAGEMENT/SOCIAL WORK
Discharge Planning Assessment   [place]     Patient Name: Brenda Sneed  MRN: [id number]  Today's Date: 2/22/2024    Admit Date: 2/20/2024    Plan: [facility]   Discharge Needs Assessment       Row Name 02/22/24 1014       Living Environment    People in Home alone    Current Living Arrangements home    Potentially Unsafe Housing Conditions none    In the past 12 months has the electric, gas, oil, or water company threatened to shut off services in your home? No    Primary Care Provided by self;child(shasha)    Provides Primary Care For no one, unable/limited ability to care for self    Family Caregiver if Needed child(shasha), adult    Quality of Family Relationships supportive;involved;helpful    Able to Return to Prior Arrangements no  will be living with her dtr       Resource/Environmental Concerns    Resource/Environmental Concerns none       Transportation Needs    In the past 12 months, has lack of transportation kept you from medical appointments or from getting medications? no    In the past 12 months, has lack of transportation kept you from meetings, work, or from getting things needed for daily living? No       Food Insecurity    Within the past 12 months, you worried that your food would run out before you got the money to buy more. Never true    Within the past 12 months, the food you bought just didn't last and you didn't have money to get more. Never true       Transition Planning    Patient/Family Anticipates Transition to home with family;home with help/services    Patient/Family Anticipated Services at Transition home health care;durable medical equipment    Transportation Anticipated family or friend will provide       Discharge Needs Assessment    Readmission Within the Last 30 Days no previous admission in last 30 days    Current Outpatient/Agency/Support Group homecare agency    Equipment Currently Used at Home walker, rolling;commode;wheelchair    Concerns to be Addressed basic  needs;discharge planning    Anticipated Changes Related to Illness inability to care for self    Equipment Needed After Discharge hospital bed    Outpatient/Agency/Support Group Needs homecare agency    Discharge Facility/Level of Care Needs home with home health    Current Discharge Risk lives alone;chronically ill                   Discharge Plan       Row Name 02/22/24 1017       Plan    Plan [facility]    Patient/Family in Agreement with Plan yes    Plan Comments Spoke with pt's dtr (pt working with therapy) to assess for d/c planning. Pt lives at home alone, was just discharged from [facility] recently.  They are not interested in return to facility, she will be living with dtr upon d/c - Nella  [address] [facility] [address] her number is [phone].  They are saying pt is followed by [facility] , informed Jenny Barbour that will confirm when she is able and call this SW back.  Will inform Jenny of address plan change for patient.  They also are requesting a hospital bed, order entered and they prefer [facility], referral will be given today.  Pt has PCP/RX coverage. Will follow.    Spoke with Mariam from [facility] [phone] and sent referral for Hospital bed and informed to contact dtr re: delivery. Jenny Barbour from  confirmed they do follow pt at home.                  Continued Care and Services - Admitted Since 2/20/2024    Coordination has not been started for this encounter.       Selected Continued Care - Prior Encounters Includes continued care and service providers with selected services from prior encounters from 11/22/2023 to 2/22/2024      Discharged on 1/12/2024 Admission date: 1/5/2024 - Discharge disposition: Skilled Nursing Facility (DC - External)      Destination       Service Provider Selected Services Address Phone Fax Patient Preferred    [facility] [address] [facility] [id number] [phone] --              Home Medical Care        Service Provider Selected Services Address Phone Fax Patient Preferred    [facility] [address] [place] [address] [address], [facility] [address] [phone] --                             Demographic Summary    No documentation.                  Functional Status    No documentation.                  Psychosocial    No documentation.                  Abuse/Neglect    No documentation.                  Legal    No documentation.                  Substance Abuse    No documentation.                  Patient Forms    No documentation.                     PEDRO RojasW

## 2024-02-22 NOTE — PLAN OF CARE
Goal Outcome Evaluation:  Plan of Care Reviewed With: other (see comments)        Progress: no change  Outcome Evaluation: Ntn assessment completed. Oral intake avg 57% of four meals. MSA consult received. Pt with weight loss over the past four months per weight report. CCHO diet. Boost Glucose Control daily with lunch, Mighty shakes with breakfast and dinner meals. Con to follow for plan of care.

## 2024-02-22 NOTE — THERAPY TREATMENT NOTE
Patient Name: Brenda Sneed  : 1950    MRN: [id number]                              Today's Date: 2024       Admit Date: 2024    Visit Dx:     ICD-10-CM ICD-9-CM   1. Urinary tract infection without hematuria, site unspecified  N39.0 599.0   2. Generalized weakness  R53.1 780.79   3. Chronic left shoulder pain  M25.512 719.41    G89.29 338.29   4. Venous stasis ulcer of other part of right lower leg with fat layer exposed, unspecified whether varicose veins present  I83.018 454.0    L97.812    5. Venous stasis ulcer of other part of left lower leg with fat layer exposed, unspecified whether varicose veins present  I83.028 454.0    L97.822      Patient Active Problem List   Diagnosis    Spinal stenosis, lumbar    Secondary osteoarthritis of left shoulder due to rotator cuff arthropathy    Cellulitis of right lower extremity    HTN (hypertension)    Obesity, Class III, BMI 40-49.9 (morbid obesity)    Anxiety associated with depression    Traumatic rhabdomyolysis    Traumatic rhabdomyolysis, initial encounter    Laceration of left lower extremity    Cellulitis    Cellulitis of left lower extremity    Thrombocytopenia    Acute cystitis with hematuria    Sepsis    Candidiasis of skin    Acquired hypothyroidism    CHRISTINE (acute kidney injury)    Chronic pain syndrome    Hyponatremia    Chronic kidney disease, stage 3a    UTI (urinary tract infection)    COVID-19 virus detected     Past Medical History:   Diagnosis Date    Arthritis     Bronchitis     Cataract     right eye , cornea implant    Chronic back pain     Depression     Disease of thyroid gland     Edema     lower extremities    Hypertension     Kidney stones     Lymphedema      Past Surgical History:   Procedure Laterality Date    BACK SURGERY       ([place]),      CHOLECYSTECTOMY OPEN      CORNEAL TRANSPLANT Right     INCISION AND DRAINAGE ABSCESS Left 2023    Procedure: INCISION AND DRAINAGE ABSCESS left leg;  Surgeon:  Sarita Vazquez MD;  Location:  [facility];  Service: General;  Laterality: Left;    LACERATION REPAIR Left 4/7/2023    Procedure: washout and closure left leg wound ;  Surgeon: Sarita Vazquez MD;  Location:  [facility];  Service: General;  Laterality: Left;    LUMBAR FUSION Left 8/2/2017    Procedure: LEFT LUMBAR LATERAL INTERBODY FUSION WITH INSTRUMENTATION  L1-2;  Surgeon: MARCELINO Wright MD;  Location:  [facility];  Service:     NECK SURGERY  2010    [place]    TOTAL HIP ARTHROPLASTY Right 2014    DR. KENDRICK     TOTAL KNEE ARTHROPLASTY Right 2000    TOTAL KNEE ARTHROPLASTY Left 2002    TOTAL SHOULDER ARTHROPLASTY W/ DISTAL CLAVICLE EXCISION Left 6/1/2020    Procedure: LEFT REVERSE TOTAL SHOULDER REPLACEMENT;  Surgeon: Saravanan Calvin MD;  Location:  [facility];  Service: Orthopedics;  Laterality: Left;      General Information       Row Name 02/22/24 0925          OT Time and Intention    Document Type therapy note (daily note)  -LR     Mode of Treatment occupational therapy;co-treatment  -LR       Row Name 02/22/24 0925          General Information    Patient Profile Reviewed yes  -LR     Existing Precautions/Restrictions fall;non-weight bearing;left  NWB L UE pending ortho consult  -LR       Row Name 02/22/24 0925          Cognition    Orientation Status (Cognition) oriented x 4;verbal cues/prompts needed for orientation  -LR               User Key  (r) = Recorded By, (t) = Taken By, (c) = Cosigned By      Initials Name Provider Type    LR Wilda Faulkner OTR/L Occupational Therapist                     Mobility/ADL's       Row Name 02/22/24 0925          Bed Mobility    Bed Mobility supine-sit;sit-supine;scooting/bridging  -LR     Scooting/Bridging [place] (Bed Mobility) dependent (less than 25% patient effort);2 person assist;verbal cues  -LR     Supine-Sit [place] (Bed Mobility) moderate assist (50% patient effort);verbal cues  -LR     Sit-Supine [place] (Bed Mobility) moderate assist  (50% patient effort);verbal cues  -LR     Bed Mobility, Safety Issues decreased use of arms for pushing/pulling;decreased use of legs for bridging/pushing  -LR     Assistive Device (Bed Mobility) bed rails;draw sheet;head of bed elevated  -LR       Row Name 02/22/24 0925          Transfers    Transfers sit-stand transfer;stand-sit transfer  -LR       Row Name 02/22/24 0925          Sit-Stand Transfer    Sit-Stand [place] (Transfers) moderate assist (50% patient effort);maximum assist (25% patient effort);2 person assist;verbal cues  -LR     Assistive Device (Sit-Stand Transfers) other (see comments)  R HHA  -LR       Row Name 02/22/24 0925          Stand-Sit Transfer    Stand-Sit [place] (Transfers) moderate assist (50% patient effort);maximum assist (25% patient effort);2 person assist;verbal cues  -LR     Assistive Device (Stand-Sit Transfers) other (see comments)  R HHA  -LR       Row Name 02/22/24 0925          Functional Mobility    Functional Mobility- Ind. Level minimum assist (75% patient effort);moderate assist (50% patient effort);2 person assist required;verbal cues required  -LR     Functional Mobility- Device other (see comments)  R HHA  -LR     Functional Mobility- Comment side-steps toward HOB  -LR       Row Name 02/22/24 0925          Activities of Daily Living    BADL Assessment/Intervention lower body dressing  -LR       Row Name 02/22/24 0925          Lower Body Dressing Assessment/Training    [place] Level (Lower Body Dressing) don;doff;socks;dependent (less than 25% patient effort)  -LR     Position (Lower Body Dressing) edge of bed sitting  -LR               User Key  (r) = Recorded By, (t) = Taken By, (c) = Cosigned By      Initials Name Provider Type    LR Wilda Faulkner, GAGER/L Occupational Therapist                   Obj/Interventions       Row Name 02/22/24 0925          Balance    Balance Assessment sitting static balance;sitting dynamic balance;standing static  "balance;standing dynamic balance  -LR     Static Sitting Balance standby assist  -LR     Dynamic Sitting Balance standby assist;contact guard  -LR     Position, Sitting Balance unsupported;sitting edge of bed  -LR     Static Standing Balance minimal assist;moderate assist;2-person assist  -LR     Dynamic Standing Balance minimal assist;moderate assist;2-person assist  -LR     Position/Device Used, Standing Balance supported;other (see comments)  R HHA  -LR     Comment, Balance posterior AMILCAR with impulsivity to sit noted  -LR               User Key  (r) = Recorded By, (t) = Taken By, (c) = Cosigned By      Initials Name Provider Type    LR Wilda Faulkner, OTR/L Occupational Therapist                   Goals/Plan    No documentation.                  Clinical Impression       Row Name 02/22/24 0925          Pain Assessment    Pretreatment Pain Rating 7/10  -LR     Posttreatment Pain Rating 7/10  -LR     Pain Location - Side/Orientation Left;Bilateral  -LR     Pain Location - shoulder;foot  -LR     Pre/Posttreatment Pain Comment B feet \"burning\"  -LR     Pain Intervention(s) Medication (See MAR);Repositioned;Ambulation/increased activity;Nursing Notified  -LR       Row Name 02/22/24 0925          Plan of Care Review    Plan of Care Reviewed With patient  -LR     Progress no change  -LR     Outcome Evaluation OT tx completed. Pt presents in fowlers with nsg present in room. Pt c/o L shoulder pain and B feet \"burning\", but willing to participate. Pt came to EOB with Mod A and cues for NWB L UE. Pt Dep A for donning/doffing socks while seated EOB. Pt completed sit<>stand t/f with Mod/Max A x2 required and cues for tech. Pt took side-steps toward HOB with Min/Mod A x2 and R HHA. Pt demo posterior AMILCAR and impulsivity to sit, requiring cues. Mod A required for sit>supine and Dep A x2 required for scooting toward HOB. Continue OT POC in order to increase pt safety and I during ADLs, fxl mobility, and fxl t/f's.  -LR  "      Row Name 02/22/24 0925          Therapy Plan Review/Discharge Plan (OT)    Anticipated Discharge Disposition (OT) skilled nursing facility  -LR       Row Name 02/22/24 0925          Vital Signs    Pre SpO2 (%) 97  -LR     O2 Delivery Pre Treatment supplemental O2  1.5 L  -LR     Intra SpO2 (%) 95  -LR     O2 Delivery Intra Treatment room air  -LR     Post SpO2 (%) 96  -LR     O2 Delivery Post Treatment room air  -LR     Pre Patient Position Supine  -LR     Intra Patient Position Standing  -LR     Post Patient Position Supine  -LR       Row Name 02/22/24 0925          Positioning and Restraints    Pre-Treatment Position in bed  -LR     Post Treatment Position bed  -LR     In Bed notified nsg;fowlers;call light within reach;encouraged to call for assist;side rails up x3;LUE elevated;legs elevated  B heel protectors  -LR               User Key  (r) = Recorded By, (t) = Taken By, (c) = Cosigned By      Initials Name Provider Type    Wilda Coker OTR/L Occupational Therapist                   Outcome Measures       Row Name 02/22/24 0925          How much help from another is currently needed...    Putting on and taking off regular lower body clothing? 1  -LR     Bathing (including washing, rinsing, and drying) 2  -LR     Toileting (which includes using toilet bed pan or urinal) 1  -LR     Putting on and taking off regular upper body clothing 2  -LR     Taking care of personal grooming (such as brushing teeth) 3  -LR     Eating meals 4  -LR     AM-PAC 6 Clicks Score (OT) 13  -LR       Row Name 02/22/24 0925 02/22/24 0918       Functional Assessment    Outcome Measure Options AM-PAC 6 Clicks Daily Activity (OT)  -LR AM-PAC 6 Clicks Basic Mobility (PT)  -SB              User Key  (r) = Recorded By, (t) = Taken By, (c) = Cosigned By      Initials Name Provider Type    Daniella Johnson PT DPT Physical Therapist    LR Wilda Faulkner OTR/L Occupational Therapist                    Occupational Therapy  Education       Title: PT OT SLP Therapies (Done)       Topic: Occupational Therapy (Done)       Point: ADL training (Done)       Description:   Instruct learner(s) on proper safety adaptation and remediation techniques during self care or transfers.   Instruct in proper use of assistive devices.                  Learning Progress Summary             Patient Acceptance, E,D, VU,NR by LR at 2/22/2024 1036    Acceptance, E, VU by  at 2/21/2024 2324    Acceptance, E, VU,NR by CS at 2/21/2024 1100                         Point: Home exercise program (Done)       Description:   Instruct learner(s) on appropriate technique for monitoring, assisting and/or progressing therapeutic exercises/activities.                  Learning Progress Summary             Patient Acceptance, E, VU by  at 2/21/2024 2324    Acceptance, E, VU,NR by CS at 2/21/2024 1100                         Point: Precautions (Done)       Description:   Instruct learner(s) on prescribed precautions during self-care and functional transfers.                  Learning Progress Summary             Patient Acceptance, E,D, VU,NR by LR at 2/22/2024 1036    Acceptance, E, VU by  at 2/21/2024 2324    Acceptance, E, VU,NR by  at 2/21/2024 1100                         Point: Body mechanics (Done)       Description:   Instruct learner(s) on proper positioning and spine alignment during self-care, functional mobility activities and/or exercises.                  Learning Progress Summary             Patient Acceptance, E,D, VU,NR by LR at 2/22/2024 1036    Acceptance, E, VU by  at 2/21/2024 2324    Acceptance, E, VU,NR by  at 2/21/2024 1100                                         User Key       Initials Effective Dates Name Provider Type Discipline     02/03/23 -  Almaz Rodriguez, OTR/L, CNT Occupational Therapist OT    LR 04/25/23 -  Wilda Faulkner OTR/L Occupational Therapist OT     04/25/23 -  Warren Mccauley, RN Registered Nurse Nurse          "         OT Recommendation and Plan     Plan of Care Review  Plan of Care Reviewed With: patient  Progress: no change  Outcome Evaluation: OT tx completed. Pt presents in fowlers with nsg present in room. Pt c/o L shoulder pain and B feet \"burning\", but willing to participate. Pt came to EOB with Mod A and cues for NWB L UE. Pt Dep A for donning/doffing socks while seated EOB. Pt completed sit<>stand t/f with Mod/Max A x2 required and cues for tech. Pt took side-steps toward HOB with Min/Mod A x2 and R HHA. Pt demo posterior AMILCAR and impulsivity to sit, requiring cues. Mod A required for sit>supine and Dep A x2 required for scooting toward HOB. Continue OT POC in order to increase pt safety and I during ADLs, fxl mobility, and fxl t/f's.     Time Calculation:         Time Calculation- OT       Row Name 02/22/24 0925             Time Calculation- OT    OT Start Time 0925  -LR      OT Stop Time 1010  -LR      OT Time Calculation (min) 45 min  -LR      Total Timed Code Minutes- OT 45 minute(s)  -LR      OT Received On 02/22/24  -LR         Timed Charges    41275 - OT Self Care/Mgmt Minutes 45  -LR         Total Minutes    Timed Charges Total Minutes 45  -LR       Total Minutes 45  -LR                User Key  (r) = Recorded By, (t) = Taken By, (c) = Cosigned By      Initials Name Provider Type    LR Wilda Faulkner OTR/L Occupational Therapist                  Therapy Charges for Today       Code Description Service Date Service Provider Modifiers Qty    [id number] HC OT SELF CARE/MGMT/TRAIN EA 15 MIN 2/22/2024 Wilda Faulkner OTR/L GO 3                 LINDA Garcia/MAYLIN  2/22/2024  "

## 2024-02-23 ENCOUNTER — TELEPHONE (OUTPATIENT)
Age: 74
End: 2024-02-23
Payer: MEDICARE

## 2024-02-23 PROCEDURE — 97530 THERAPEUTIC ACTIVITIES: CPT

## 2024-02-23 PROCEDURE — 25010000002 CEFEPIME PER 500 MG: Performed by: FAMILY MEDICINE

## 2024-02-23 PROCEDURE — 94799 UNLISTED PULMONARY SVC/PX: CPT

## 2024-02-23 PROCEDURE — 99232 SBSQ HOSP IP/OBS MODERATE 35: CPT | Performed by: CLINICAL NURSE SPECIALIST

## 2024-02-23 PROCEDURE — 92610 EVALUATE SWALLOWING FUNCTION: CPT

## 2024-02-23 PROCEDURE — 99222 1ST HOSP IP/OBS MODERATE 55: CPT | Performed by: INTERNAL MEDICINE

## 2024-02-23 RX ORDER — MORPHINE SULFATE 15 MG/1
15 TABLET, FILM COATED, EXTENDED RELEASE ORAL DAILY
Status: DISCONTINUED | OUTPATIENT
Start: 2024-02-23 | End: 2024-02-26

## 2024-02-23 RX ORDER — LIDOCAINE 4 G/G
1 PATCH TOPICAL
Status: DISCONTINUED | OUTPATIENT
Start: 2024-02-23 | End: 2024-03-02 | Stop reason: HOSPADM

## 2024-02-23 RX ADMIN — APIXABAN 5 MG: 5 TABLET, FILM COATED ORAL at 09:46

## 2024-02-23 RX ADMIN — Medication 250 MG: at 09:46

## 2024-02-23 RX ADMIN — MORPHINE SULFATE 15 MG: 15 TABLET, FILM COATED, EXTENDED RELEASE ORAL at 09:46

## 2024-02-23 RX ADMIN — APIXABAN 5 MG: 5 TABLET, FILM COATED ORAL at 21:27

## 2024-02-23 RX ADMIN — ALBUTEROL SULFATE 2 PUFF: 90 AEROSOL, METERED RESPIRATORY (INHALATION) at 14:50

## 2024-02-23 RX ADMIN — Medication 10 ML: at 09:46

## 2024-02-23 RX ADMIN — Medication 250 MG: at 21:26

## 2024-02-23 RX ADMIN — Medication 1 SPRAY: at 06:30

## 2024-02-23 RX ADMIN — BUSPIRONE HYDROCHLORIDE 5 MG: 5 TABLET ORAL at 13:46

## 2024-02-23 RX ADMIN — ALBUTEROL SULFATE 2 PUFF: 90 AEROSOL, METERED RESPIRATORY (INHALATION) at 19:06

## 2024-02-23 RX ADMIN — PRAMIPEXOLE DIHYDROCHLORIDE 0.75 MG: 0.25 TABLET ORAL at 21:26

## 2024-02-23 RX ADMIN — BUSPIRONE HYDROCHLORIDE 5 MG: 5 TABLET ORAL at 17:18

## 2024-02-23 RX ADMIN — ALBUTEROL SULFATE 2 PUFF: 90 AEROSOL, METERED RESPIRATORY (INHALATION) at 07:35

## 2024-02-23 RX ADMIN — FUROSEMIDE 40 MG: 20 TABLET ORAL at 17:18

## 2024-02-23 RX ADMIN — Medication 1 SPRAY: at 21:47

## 2024-02-23 RX ADMIN — LIDOCAINE 1 PATCH: 4 PATCH TOPICAL at 17:21

## 2024-02-23 RX ADMIN — Medication 1 SPRAY: at 13:46

## 2024-02-23 RX ADMIN — NYSTATIN 1 APPLICATION: 100000 POWDER TOPICAL at 21:27

## 2024-02-23 RX ADMIN — LEVOTHYROXINE SODIUM 100 MCG: 100 TABLET ORAL at 05:59

## 2024-02-23 RX ADMIN — NYSTATIN 1 APPLICATION: 100000 POWDER TOPICAL at 09:46

## 2024-02-23 RX ADMIN — Medication 1 SPRAY: at 17:17

## 2024-02-23 RX ADMIN — MORPHINE SULFATE 7.5 MG: 15 TABLET ORAL at 17:18

## 2024-02-23 RX ADMIN — CEFEPIME 2000 MG: 2 INJECTION, POWDER, FOR SOLUTION INTRAVENOUS at 05:59

## 2024-02-23 RX ADMIN — Medication 1 SPRAY: at 13:48

## 2024-02-23 RX ADMIN — GABAPENTIN 100 MG: 100 CAPSULE ORAL at 21:26

## 2024-02-23 RX ADMIN — GABAPENTIN 100 MG: 100 CAPSULE ORAL at 09:46

## 2024-02-23 RX ADMIN — DULOXETINE HYDROCHLORIDE 30 MG: 30 CAPSULE, DELAYED RELEASE ORAL at 09:46

## 2024-02-23 RX ADMIN — BUSPIRONE HYDROCHLORIDE 5 MG: 5 TABLET ORAL at 09:46

## 2024-02-23 NOTE — THERAPY EVALUATION
Acute Care - Speech Language Pathology   Swallow Initial Evaluation [facility]     Patient Name: Brenda Sneed  : 1950  MRN: [id number]  Today's Date: 2024               Admit Date: 2024    SPEECH-LANGUAGE PATHOLOGY EVALUATION - SWALLOW  Subjective: The patient was seen on this date for a Clinical Swallow evaluation.  Patient was lethargic, but able to awaken for evaluation.  Significant history: Patient is admitted with CHRISTINE, UTI, Covid, osteoarthritis, and cellulitis.    Objective: Textures given included thin liquid, nectar thick liquid, and mechanical soft consistency.    Assessment: Difficulties were noted with thin liquid, nectar thick liquid, and mechanical soft consistency.    Observations: Patient is endentulous with generalized weakness. Limited awareness of deficits. During swallow trials,   reduced oral control, oral residue, inefficient mastication, and cough were noted, specifically with thin liquids. Patient mastication is limited due to being endentulous.     SLP Findings:  Patient presents with moderate oropharyngeal dysphagia, without esophageal component.     Recommendations: Diet Textures: nectar thick liquid, soft with chopped meats food.  Medications should be taken whole with puree.     Recommended Strategies: Upright for PO, small bites and sips, may use straw, alternate liquids and solids, and supervision with all PO. Oral care before breakfast, after all meals and PRN.    Other Recommended Evaluations: Re-evaluation at bedside.      Dysphagia therapy is recommended. Rationale: diet tolerance and advancement.    Carolin Negron, SLP 2024 15:28 CST      Visit Dx:     ICD-10-CM ICD-9-CM   1. Urinary tract infection without hematuria, site unspecified  N39.0 599.0   2. Generalized weakness  R53.1 780.79   3. Chronic left shoulder pain  M25.512 719.41    G89.29 338.29   4. Venous stasis ulcer of other part of right lower leg with fat layer exposed, unspecified whether  varicose veins present  I83.018 454.0    L97.812    5. Venous stasis ulcer of other part of left lower leg with fat layer exposed, unspecified whether varicose veins present  I83.028 454.0    L97.822    6. Impaired mobility [Z74.09]  Z74.09 799.89   7. Oropharyngeal dysphagia  R13.12 787.22     Patient Active Problem List   Diagnosis    Spinal stenosis, lumbar    Secondary osteoarthritis of left shoulder due to rotator cuff arthropathy    Cellulitis of right lower extremity    HTN (hypertension)    Obesity, Class III, BMI 40-49.9 (morbid obesity)    Anxiety associated with depression    Traumatic rhabdomyolysis    Traumatic rhabdomyolysis, initial encounter    Laceration of left lower extremity    Cellulitis    Cellulitis of left lower extremity    Thrombocytopenia    Acute cystitis with hematuria    Sepsis    Candidiasis of skin    Acquired hypothyroidism    CHRISTINE (acute kidney injury)    Chronic pain syndrome    Hyponatremia    Chronic kidney disease, stage 3a    UTI (urinary tract infection)    COVID-19 virus detected     Past Medical History:   Diagnosis Date    Arthritis     Bronchitis     Cataract     right eye , cornea implant    Chronic back pain     Depression     Disease of thyroid gland     Edema     lower extremities    Hypertension     Kidney stones     Lymphedema      Past Surgical History:   Procedure Laterality Date    BACK SURGERY      2010 ([place]), 2013     CHOLECYSTECTOMY OPEN      CORNEAL TRANSPLANT Right 1999    INCISION AND DRAINAGE ABSCESS Left 4/19/2023    Procedure: INCISION AND DRAINAGE ABSCESS left leg;  Surgeon: Sarita Vazquez MD;  Location:  [facility] OR;  Service: General;  Laterality: Left;    LACERATION REPAIR Left 4/7/2023    Procedure: washout and closure left leg wound ;  Surgeon: Sarita Vazquez MD;  Location:  [facility] OR;  Service: General;  Laterality: Left;    LUMBAR FUSION Left 8/2/2017    Procedure: LEFT LUMBAR LATERAL INTERBODY FUSION WITH INSTRUMENTATION  L1-2;  Surgeon: MARCELINO  Farrukh Wright MD;  Location:  [facility] OR;  Service:     NECK SURGERY  2010    [place]    TOTAL HIP ARTHROPLASTY Right 2014    DR. KENDRICK     TOTAL KNEE ARTHROPLASTY Right 2000    TOTAL KNEE ARTHROPLASTY Left 2002    TOTAL SHOULDER ARTHROPLASTY W/ DISTAL CLAVICLE EXCISION Left 6/1/2020    Procedure: LEFT REVERSE TOTAL SHOULDER REPLACEMENT;  Surgeon: Saravanan Calvin MD;  Location:  [facility] OR;  Service: Orthopedics;  Laterality: Left;       SLP Recommendation and Plan  SLP Swallowing Diagnosis: mild-moderate, oral dysphagia, suspected pharyngeal dysphagia (02/23/24 1447)  SLP Diet Recommendation: soft to chew textures, chopped, nectar thick liquids (02/23/24 1447)  Recommended Precautions and Strategies: upright posture during/after eating, small bites of food and sips of liquid, alternate between small bites of food and sips of liquid, check mouth frequently for oral residue/pocketing, general aspiration precautions, fatigue precautions, 1:1 supervision (02/23/24 1447)  SLP Rec. for Method of Medication Administration: meds whole, with puree (02/23/24 1447)     Monitor for Signs of Aspiration: yes, notify SLP if any concerns (02/23/24 1447)  Recommended Diagnostics: reassess via clinical swallow evaluation (02/23/24 1447)  Swallow Criteria for Skilled Therapeutic Interventions Met: demonstrates skilled criteria (02/23/24 1447)  Anticipated Discharge Disposition (SLP): unknown (02/23/24 1447)  Rehab Potential/Prognosis, Swallowing: adequate, monitor progress closely (02/23/24 1447)  Therapy Frequency (Swallow): at least, 2 days per week (02/23/24 1447)  Predicted Duration Therapy Intervention (Days): until discharge (02/23/24 1447)  Oral Care Recommendations: Oral Care BID/PRN (02/23/24 1447)                                        Plan of Care Reviewed With: patient, caregiver  Progress: improving      SWALLOW EVALUATION (last 72 hours)       SLP Adult Swallow Evaluation       Row Name 02/23/24 1447                    Rehab Evaluation    Document Type evaluation  -MD        Subjective Information complains of  coughing with eating/drinking  -MD        Patient Observations cooperative;lethargic  -MD        Patient/Family/Caregiver Comments/Observations no family present  -MD        Patient Effort adequate  -MD        Symptoms Noted During/After Treatment shortness of breath  -MD        Symptoms Noted, Comment resolved easliy  -MD           General Information    Patient Profile Reviewed yes  -MD        Pertinent History Of Current Problem Patient is admitted with generalized weakness, CHRISTINE, UTI, Covid, osteoarthritis in the left shoulder, and cellulitis in the lower extremities. Patient was coughin with oral intake per RN staff.  -MD        Current Method of Nutrition regular textures;thin liquids  -MD        Precautions/Limitations, Vision WFL;for purposes of eval  -MD        Precautions/Limitations, Hearing WFL;for purposes of eval  -MD        Prior Level of Function-Communication unknown  -MD        Prior Level of Function-Swallowing no diet consistency restrictions  -MD        Plans/Goals Discussed with patient  -MD        Barriers to Rehab none identified  -MD        Patient's Goals for Discharge patient did not state  -MD           Pain Scale: Numbers Pre/Post-Treatment    Pretreatment Pain Rating 8/10  -MD        Posttreatment Pain Rating 8/10  -MD        Pain Location - Side/Orientation Left  -MD        Pain Location - shoulder  -MD        Pain Intervention(s) Repositioned;Nursing Notified  -MD           Oral Motor Structure and Function    Dentition Assessment edentulous  -MD        Secretion Management WNL/WFL  -MD        Mucosal Quality moist, healthy  -MD        Gag Response absent or diminished  -MD        Volitional Swallow delayed;weak  -MD           Oral Musculature and Cranial Nerve Assessment    Oral Motor General Assessment generalized oral motor weakness  -MD           General Eating/Swallowing Observations     Respiratory Support Currently in Use room air  -MD        Eating/Swallowing Skills fed by SLP  -MD        Positioning During Eating upright in bed  -MD        Utensils Used spoon;straw  -MD        Consistencies Trialed soft to chew textures;chopped;thin liquids;nectar/syrup-thick liquids  -MD           Clinical Swallow Eval    Oral Prep Phase impaired  -MD        Oral Transit impaired  -MD        Oral Residue impaired  -MD        Pharyngeal Phase suspected pharyngeal impairment  -MD        Esophageal Phase unremarkable  -MD        Clinical Swallow Evaluation Summary see note  -MD           Oral Prep Concerns    Oral Prep Concerns inefficient mastication;increased prep time  -MD        Inefficient Mastication other (see comments);mechanical soft  -MD        Increased Prep Time mixed consistencies  -MD           Oral Transit Concerns    Oral Transit Concerns delayed initiation of bolus transit;increased oral transit time  -MD        Delayed Intiation of Bolus Transit all consistencies  -MD        Increased Oral Transit Time all consistencies  -MD           Oral Residue Concerns    Oral Residue Concerns diffuse residue throughout oral cavity  -MD        Diffuse Residue Throughout Oral Cavity mixed consistencies  -MD           Pharyngeal Phase Concerns    Pharyngeal Phase Concerns cough;multiple swallows  -MD        Multiple Swallows thin  -MD        Cough thin  -MD           SLP Evaluation Clinical Impression    SLP Swallowing Diagnosis mild-moderate;oral dysphagia;suspected pharyngeal dysphagia  -MD        Functional Impact risk of aspiration/pneumonia;risk of malnutrition;risk of dehydration  -MD        Rehab Potential/Prognosis, Swallowing adequate, monitor progress closely  -MD        Swallow Criteria for Skilled Therapeutic Interventions Met demonstrates skilled criteria  -MD           Recommendations    Therapy Frequency (Swallow) at least;2 days per week  -MD        Predicted Duration Therapy Intervention (Days)  until discharge  -MD        SLP Diet Recommendation soft to chew textures;chopped;nectar thick liquids  -MD        Recommended Diagnostics reassess via clinical swallow evaluation  -MD        Recommended Precautions and Strategies upright posture during/after eating;small bites of food and sips of liquid;alternate between small bites of food and sips of liquid;check mouth frequently for oral residue/pocketing;general aspiration precautions;fatigue precautions;1:1 supervision  -MD        Oral Care Recommendations Oral Care BID/PRN  -MD        SLP Rec. for Method of Medication Administration meds whole;with puree  -MD        Monitor for Signs of Aspiration yes;notify SLP if any concerns  -MD        Anticipated Discharge Disposition (SLP) unknown  -MD           Swallow Goals (SLP)    Swallow LTGs Patient will demonstrate functional swallow for  -MD        Swallow STGs diet tolerance goal selection (SLP);pharyngeal strengthening exercise goal selection (SLP)  -MD        Diet Tolerance Goal Selection (SLP) Patient will tolerate trials of  -MD        Pharyngeal Strengthening Exercise Goal Selection (SLP) pharyngeal strengthening exercise, SLP goal 1  -MD           (LTG) Patient will demonstrate functional swallow for    Diet Texture (Demonstrate functional swallow) soft to chew (whole) textures  -MD        Liquid viscosity (Demonstrate functional swallow) thin liquids  -MD        [place] (Demonstrate functional swallow) independently (over 90% accuracy)  -MD        Time Frame (Demonstrate functional swallow) by discharge  -MD        Barriers (Demonstrate functional swallow) n/a  -MD        Progress/Outcomes (Demonstrate functional swallow) new goal  -MD           (STG) Patient will tolerate trials of    Consistencies Trialed (Tolerate trials) soft to chew (chopped) textures;nectar/ mildly thick liquids  -MD        Desired Outcome (Tolerate trials) without signs/symptoms of aspiration;with adequate oral  prep/transit/clearance  -MD        [place] (Tolerate trials) with minimal cues (75-90% accuracy)  -MD        Time Frame (Tolerate trials) by discharge  -MD        Progress/Outcomes (Tolerate trials) new goal  -MD           (STG) Pharyngeal Strengthening Exercise Goal 1 (SLP)    Activity (Pharyngeal Strengthening Goal 1, SLP) increase pharyngeal sensation;increase timing  -MD        Increase Pharyngeal Sensation gustatory stimulation (sour/cold)  -MD        Increase Timing breath hold exercises;prepping - 3 second prep or suck swallow or 3-step swallow  -MD        [place]/Accuracy (Pharyngeal Strengthening Goal 1, SLP) with minimal cues (75-90% accuracy)  -MD        Time Frame (Pharyngeal Strengthening Goal 1, SLP) by discharge  -MD        Barriers (Pharyngeal Strengthening Goal 1, SLP) n/a  -MD        Progress/Outcomes (Pharyngeal Strengthening Goal 1, SLP) new goal  -MD                  User Key  (r) = Recorded By, (t) = Taken By, (c) = Cosigned By      Initials Name Effective Dates    Carolin Stein, SLP 06/21/22 -                     EDUCATION  The patient has been educated in the following areas:   Dysphagia (Swallowing Impairment).        SLP GOALS       Row Name 02/23/24 4027             (LTG) Patient will demonstrate functional swallow for    Diet Texture (Demonstrate functional swallow) soft to chew (whole) textures  -MD      Liquid viscosity (Demonstrate functional swallow) thin liquids  -MD      [place] (Demonstrate functional swallow) independently (over 90% accuracy)  -MD      Time Frame (Demonstrate functional swallow) by discharge  -MD      Barriers (Demonstrate functional swallow) n/a  -MD      Progress/Outcomes (Demonstrate functional swallow) new goal  -MD         (STG) Patient will tolerate trials of    Consistencies Trialed (Tolerate trials) soft to chew (chopped) textures;nectar/ mildly thick liquids  -MD      Desired Outcome (Tolerate trials) without signs/symptoms of  aspiration;with adequate oral prep/transit/clearance  -MD      [place] (Tolerate trials) with minimal cues (75-90% accuracy)  -MD      Time Frame (Tolerate trials) by discharge  -MD      Progress/Outcomes (Tolerate trials) new goal  -MD         (STG) Pharyngeal Strengthening Exercise Goal 1 (SLP)    Activity (Pharyngeal Strengthening Goal 1, SLP) increase pharyngeal sensation;increase timing  -MD      Increase Pharyngeal Sensation gustatory stimulation (sour/cold)  -MD      Increase Timing breath hold exercises;prepping - 3 second prep or suck swallow or 3-step swallow  -MD      [place]/Accuracy (Pharyngeal Strengthening Goal 1, SLP) with minimal cues (75-90% accuracy)  -MD      Time Frame (Pharyngeal Strengthening Goal 1, SLP) by discharge  -MD      Barriers (Pharyngeal Strengthening Goal 1, SLP) n/a  -MD      Progress/Outcomes (Pharyngeal Strengthening Goal 1, SLP) new goal  -MD                User Key  (r) = Recorded By, (t) = Taken By, (c) = Cosigned By      Initials Name Provider Type    Carolin Stein, SLP Speech and Language Pathologist                       Time Calculation:    Time Calculation- SLP       Row Name 02/23/24 1527             Time Calculation- SLP    SLP Start Time 1447  -MD      SLP Stop Time 1527  -MD      SLP Time Calculation (min) 40 min  -MD      SLP Received On 02/23/24  -MD      SLP Goal Re-Cert Due Date 03/04/24  -MD         Untimed Charges    [id number] Eval Oral Pharyng Swallow Minutes 40  -MD         Total Minutes    Untimed Charges Total Minutes 40  -MD       Total Minutes 40  -MD                User Key  (r) = Recorded By, (t) = Taken By, (c) = Cosigned By      Initials Name Provider Type    Carolin Stein, SLP Speech and Language Pathologist                    Therapy Charges for Today       Code Description Service Date Service Provider Modifiers Qty    [id number]  ST EVAL ORAL PHARYNG SWALLOW 3 2/23/2024 Carolin Negron, SLP GN 1                 Carolin Negron  SLP  2/23/2024

## 2024-02-23 NOTE — PLAN OF CARE
Goal Outcome Evaluation:  Plan of Care Reviewed With: patient        Progress: no change  Outcome Evaluation: Completed AROM with MARKE and KIARA with L elbow, wrist and digits to assist in edema reduction. Pt would benefit from SNF at discharge. Continue OT POC

## 2024-02-23 NOTE — THERAPY TREATMENT NOTE
Acute Care - Physical Therapy Treatment Note  [facility]     Patient Name: Brenda Sneed  : 1950  MRN: [id number]  Today's Date: 2024      Visit Dx:     ICD-10-CM ICD-9-CM   1. Urinary tract infection without hematuria, site unspecified  N39.0 599.0   2. Generalized weakness  R53.1 780.79   3. Chronic left shoulder pain  M25.512 719.41    G89.29 338.29   4. Venous stasis ulcer of other part of right lower leg with fat layer exposed, unspecified whether varicose veins present  I83.018 454.0    L97.812    5. Venous stasis ulcer of other part of left lower leg with fat layer exposed, unspecified whether varicose veins present  I83.028 454.0    L97.822    6. Impaired mobility [Z74.09]  Z74.09 799.89     Patient Active Problem List   Diagnosis    Spinal stenosis, lumbar    Secondary osteoarthritis of left shoulder due to rotator cuff arthropathy    Cellulitis of right lower extremity    HTN (hypertension)    Obesity, Class III, BMI 40-49.9 (morbid obesity)    Anxiety associated with depression    Traumatic rhabdomyolysis    Traumatic rhabdomyolysis, initial encounter    Laceration of left lower extremity    Cellulitis    Cellulitis of left lower extremity    Thrombocytopenia    Acute cystitis with hematuria    Sepsis    Candidiasis of skin    Acquired hypothyroidism    CHRISTINE (acute kidney injury)    Chronic pain syndrome    Hyponatremia    Chronic kidney disease, stage 3a    UTI (urinary tract infection)    COVID-19 virus detected     Past Medical History:   Diagnosis Date    Arthritis     Bronchitis     Cataract     right eye , cornea implant    Chronic back pain     Depression     Disease of thyroid gland     Edema     lower extremities    Hypertension     Kidney stones     Lymphedema      Past Surgical History:   Procedure Laterality Date    BACK SURGERY       ([place]),      CHOLECYSTECTOMY OPEN      CORNEAL TRANSPLANT Right     INCISION AND DRAINAGE ABSCESS Left 2023    Procedure:  INCISION AND DRAINAGE ABSCESS left leg;  Surgeon: Sarita Vazquez MD;  Location:  [facility];  Service: General;  Laterality: Left;    LACERATION REPAIR Left 4/7/2023    Procedure: washout and closure left leg wound ;  Surgeon: Sarita Vazquez MD;  Location:  [facility];  Service: General;  Laterality: Left;    LUMBAR FUSION Left 8/2/2017    Procedure: LEFT LUMBAR LATERAL INTERBODY FUSION WITH INSTRUMENTATION  L1-2;  Surgeon: MARCELINO Wright MD;  Location:  [facility];  Service:     NECK SURGERY  2010    [place]    TOTAL HIP ARTHROPLASTY Right 2014    DR. KENDRICK     TOTAL KNEE ARTHROPLASTY Right 2000    TOTAL KNEE ARTHROPLASTY Left 2002    TOTAL SHOULDER ARTHROPLASTY W/ DISTAL CLAVICLE EXCISION Left 6/1/2020    Procedure: LEFT REVERSE TOTAL SHOULDER REPLACEMENT;  Surgeon: Saravanan Calvin MD;  Location:  [facility];  Service: Orthopedics;  Laterality: Left;     PT Assessment (last 12 hours)       PT Evaluation and Treatment       Row Name 02/23/24 1036          Physical Therapy Time and Intention    Subjective Information complains of;pain;weakness;fatigue;swelling  -     Document Type therapy note (daily note)  -     Mode of Treatment physical therapy  -     Comment per Dr.Beck MARYSE shrestha act as tolerated  -       Row Name 02/23/24 1036          General Information    Existing Precautions/Restrictions fall;left  per : MARYSE gentle act as tolerated  -       Row Name 02/23/24 1036          Pain    Pretreatment Pain Rating 8/10  -     Posttreatment Pain Rating 8/10  -     Pain Location - Side/Orientation Left;Bilateral  -     Pain Location - shoulder;foot  -     Pain Intervention(s) Medication (See MAR);Repositioned  -       Row Name 02/23/24 1036          Bed Mobility    Supine-Sit [place] (Bed Mobility) moderate assist (50% patient effort);verbal cues  -     Sit-Supine [place] (Bed Mobility) moderate assist (50% patient effort);2 person assist;verbal cues  nsg assisted  -        Row Name 02/23/24 1036          Transfers    Comment, (Transfers) stood x 2, nsg assist  -       Row Name 02/23/24 1036          Sit-Stand Transfer    Sit-Stand [place] (Transfers) moderate assist (50% patient effort);2 person assist;verbal cues  [facility]     Assistive Device (Sit-Stand Transfers) --  with chair in front of   -       Row Name 02/23/24 1036          Stand-Sit Transfer    Stand-Sit [place] (Transfers) minimum assist (75% patient effort);2 person assist;verbal cues  -       Row Name             Wound 02/20/24 Right lower leg    Wound - Properties Group Placement Date: 02/20/24  -CK Side: Right  -CK Orientation: lower  -CK Location: leg  -CK    Retired Wound - Properties Group Placement Date: 02/20/24  -CK Side: Right  -CK Orientation: lower  -CK Location: leg  -CK    Retired Wound - Properties Group Date first assessed: 02/20/24  -CK Side: Right  -CK Location: leg  -CK      Row Name             Wound 02/20/24 Left lower leg    Wound - Properties Group Placement Date: 02/20/24  -CK Side: Left  -CK Orientation: lower  -CK Location: leg  -CK    Retired Wound - Properties Group Placement Date: 02/20/24  -CK Side: Left  -CK Orientation: lower  -CK Location: leg  -CK    Retired Wound - Properties Group Date first assessed: 02/20/24  -CK Side: Left  -CK Location: leg  -CK      Row Name 02/23/24 1036          Plan of Care Review    Plan of Care Reviewed With patient  -     Progress improving  -     Outcome Evaluation pt cont to c/o pain L shld, BLE 8/10, pt trans to EOB mod assist, with HOB elevated, sat EOB 20 minutes cga-sba, sit-stand mod 2, pt stood x 2, trans back to bed mod-max 2, pt would benefit from [facility]  -       Row Name 02/23/24 1036          Positioning and Restraints    Pre-Treatment Position in bed  -     Post Treatment Position bed  -     In Bed fowlers;call light within reach;encouraged to call for assist;exit alarm on;LUE elevated  [facility]               User Key  (r) =  Recorded By, (t) = Taken By, (c) = Cosigned By      Initials Name Provider Type    AH Margot Verma, PTA Physical Therapist Assistant    Aida Bass, RN Registered Nurse                    Physical Therapy Education       Title: PT OT SLP Therapies (Done)       Topic: Physical Therapy (Done)       Point: Mobility training (Done)       Learning Progress Summary             Patient Acceptance, E, VU by  at 2/22/2024 2300    Acceptance, E, VU by  at 2/22/2024 1543    Comment: pt edu on POC, benefits of act, d/c plans                         Point: Home exercise program (Done)       Learning Progress Summary             Patient Acceptance, E, VU by  at 2/22/2024 2300                         Point: Body mechanics (Done)       Learning Progress Summary             Patient Acceptance, E, VU by  at 2/22/2024 2300                         Point: Precautions (Done)       Learning Progress Summary             Patient Acceptance, E, VU by  at 2/22/2024 2300    Acceptance, E, VU by  at 2/22/2024 1543    Comment: pt edu on POC, benefits of act, d/c plans                                         User Key       Initials Effective Dates Name Provider Type Discipline     07/11/23 -  Daniella Connelly, PT DPT Physical Therapist PT     04/25/23 -  Warren Mccauley, RN Registered Nurse Nurse                  PT Recommendation and Plan     Plan of Care Reviewed With: patient  Progress: improving  Outcome Evaluation: pt cont to c/o pain L shld, BLE 8/10, pt trans to EOB mod assist, with HOB elevated, sat EOB 20 minutes cga-sba, sit-stand mod 2, pt stood x 2, trans back to bed mod-max 2, pt would benefit from SNF   Outcome Measures       Row Name 02/23/24 1100             How much help from another person do you currently need...    Turning from your back to your side while in flat bed without using bedrails? 2  -AH      Moving from lying on back to sitting on the side of a flat bed without bedrails? 2  -AH      Moving to and  from a bed to a chair (including a wheelchair)? 2  -AH      Standing up from a chair using your arms (e.g., wheelchair, bedside chair)? 2  -AH      Climbing 3-5 steps with a railing? 1  -AH      To walk in hospital room? 1  -AH      AM-PAC 6 Clicks Score (PT) 10  -      Highest Level of Mobility Goal 4 --> Transfer to chair/commode  -         Functional Assessment    Outcome Measure Options AM-PAC 6 Clicks Basic Mobility (PT)  -                User Key  (r) = Recorded By, (t) = Taken By, (c) = Cosigned By      Initials Name Provider Type     Margot Verma PTA Physical Therapist Assistant                     Time Calculation:    PT Charges       Row Name 02/23/24 1127             Time Calculation    Start Time 1036  -      Stop Time 1120  -      Time Calculation (min) 44 min  -      PT Received On 02/23/24  -         Time Calculation- PT    Total Timed Code Minutes- PT 44 minute(s)  -         Timed Charges    82038 - PT Therapeutic Activity Minutes 44  -AH         Total Minutes    Timed Charges Total Minutes 44  -AH       Total Minutes 44  -AH                User Key  (r) = Recorded By, (t) = Taken By, (c) = Cosigned By      Initials Name Provider Type     Margot Verma PTA Physical Therapist Assistant                  Therapy Charges for Today       Code Description Service Date Service Provider Modifiers Qty    56455998907  PT THERAPEUTIC ACT EA 15 MIN 2/23/2024 Margot Verma PTA GP 3            PT G-Codes  Outcome Measure Options: AM-PAC 6 Clicks Basic Mobility (PT)  AM-PAC 6 Clicks Score (PT): 10  AM-PAC 6 Clicks Score (OT): 13    Margot Verma PTA  2/23/2024

## 2024-02-23 NOTE — THERAPY TREATMENT NOTE
Acute Care - Occupational Therapy Treatment Note  [facility]     Patient Name: Brenda Sneed  : 1950  MRN: [id number]  Today's Date: 2024             Admit Date: 2024       ICD-10-CM ICD-9-CM   1. Urinary tract infection without hematuria, site unspecified  N39.0 599.0   2. Generalized weakness  R53.1 780.79   3. Chronic left shoulder pain  M25.512 719.41    G89.29 338.29   4. Venous stasis ulcer of other part of right lower leg with fat layer exposed, unspecified whether varicose veins present  I83.018 454.0    L97.812    5. Venous stasis ulcer of other part of left lower leg with fat layer exposed, unspecified whether varicose veins present  I83.028 454.0    L97.822    6. Impaired mobility [Z74.09]  Z74.09 799.89     Patient Active Problem List   Diagnosis    Spinal stenosis, lumbar    Secondary osteoarthritis of left shoulder due to rotator cuff arthropathy    Cellulitis of right lower extremity    HTN (hypertension)    Obesity, Class III, BMI 40-49.9 (morbid obesity)    Anxiety associated with depression    Traumatic rhabdomyolysis    Traumatic rhabdomyolysis, initial encounter    Laceration of left lower extremity    Cellulitis    Cellulitis of left lower extremity    Thrombocytopenia    Acute cystitis with hematuria    Sepsis    Candidiasis of skin    Acquired hypothyroidism    CHRISTINE (acute kidney injury)    Chronic pain syndrome    Hyponatremia    Chronic kidney disease, stage 3a    UTI (urinary tract infection)    COVID-19 virus detected     Past Medical History:   Diagnosis Date    Arthritis     Bronchitis     Cataract     right eye , cornea implant    Chronic back pain     Depression     Disease of thyroid gland     Edema     lower extremities    Hypertension     Kidney stones     Lymphedema      Past Surgical History:   Procedure Laterality Date    BACK SURGERY       ([place]),      CHOLECYSTECTOMY OPEN      CORNEAL TRANSPLANT Right     INCISION AND DRAINAGE ABSCESS Left  4/19/2023    Procedure: INCISION AND DRAINAGE ABSCESS left leg;  Surgeon: Sarita Vazquez MD;  Location:  [facility];  Service: General;  Laterality: Left;    LACERATION REPAIR Left 4/7/2023    Procedure: washout and closure left leg wound ;  Surgeon: Sarita Vazquez MD;  Location:  [facility];  Service: General;  Laterality: Left;    LUMBAR FUSION Left 8/2/2017    Procedure: LEFT LUMBAR LATERAL INTERBODY FUSION WITH INSTRUMENTATION  L1-2;  Surgeon: MARCELINO Wright MD;  Location:  [facility];  Service:     NECK SURGERY  2010    [place]    TOTAL HIP ARTHROPLASTY Right 2014    DR. KENDRICK     TOTAL KNEE ARTHROPLASTY Right 2000    TOTAL KNEE ARTHROPLASTY Left 2002    TOTAL SHOULDER ARTHROPLASTY W/ DISTAL CLAVICLE EXCISION Left 6/1/2020    Procedure: LEFT REVERSE TOTAL SHOULDER REPLACEMENT;  Surgeon: Saravanan Calvin MD;  Location:  [facility];  Service: Orthopedics;  Laterality: Left;         OT ASSESSMENT FLOWSHEET (last 12 hours)       OT Evaluation and Treatment       Row Name 02/23/24 1400                   OT Time and Intention    Subjective Information complains of;pain  -TS        Document Type therapy note (daily note)  -TS        Mode of Treatment occupational therapy  -TS        Patient Effort fair  -TS        Comment per Dr.Beck SERRA gentle act as tolerated  -TS           General Information    Existing Precautions/Restrictions fall;left;non-weight bearing  -TS           Pain Assessment    Pretreatment Pain Rating 8/10  -TS        Posttreatment Pain Rating 8/10  -TS        Pain Location - Side/Orientation Left  -TS        Pain Location - shoulder  -TS        Pain Intervention(s) Repositioned;Elevated  -TS           Cognition    Orientation Status (Cognition) oriented to;person;place;situation;time  -TS        Personal Safety Interventions fall prevention program maintained  -TS           Motor Skills    Therapeutic Exercise aerobic  -TS           Aerobic Exercise    Comment, Aerobic Exercise (Therapeutic  Exercise) completed AAROM on L elbow, wrist and digits as tolerated to assist in edema reduction. AROM RUE for endurance  -TS           Wound 02/20/24 Right lower leg    Wound - Properties Group Placement Date: 02/20/24  -CK Side: Right  -CK Orientation: lower  -CK Location: leg  -CK    Retired Wound - Properties Group Placement Date: 02/20/24  -CK Side: Right  -CK Orientation: lower  -CK Location: leg  -CK    Retired Wound - Properties Group Date first assessed: 02/20/24  -CK Side: Right  -CK Location: leg  -CK       Wound 02/20/24 Left lower leg    Wound - Properties Group Placement Date: 02/20/24  -CK Side: Left  -CK Orientation: lower  -CK Location: leg  -CK    Retired Wound - Properties Group Placement Date: 02/20/24  -CK Side: Left  -CK Orientation: lower  -CK Location: leg  -CK    Retired Wound - Properties Group Date first assessed: 02/20/24  -CK Side: Left  -CK Location: leg  -CK       Plan of Care Review    Plan of Care Reviewed With patient  -TS        Progress no change  -TS        Outcome Evaluation Completed AROM with RUE and AAROM with L elbow, wrist and digits to assist in edema reduction. Pt would benefit from SNF at discharge. Continue OT POC  -TS           Positioning and Restraints    Pre-Treatment Position in bed  -TS        Post Treatment Position bed  -TS        In Bed supine;call light within reach;encouraged to call for assist;exit alarm on;side rails up x3;RUE elevated;LUE elevated  -TS                  User Key  (r) = Recorded By, (t) = Taken By, (c) = Cosigned By      Initials Name Effective Dates    TS Marry Krishnamurthy COTA 02/03/23 -     CK Aida Glasgow RN 02/09/21 -                      Occupational Therapy Education       Title: PT OT SLP Therapies (Done)       Topic: Occupational Therapy (Done)       Point: ADL training (Done)       Description:   Instruct learner(s) on proper safety adaptation and remediation techniques during self care or transfers.   Instruct in proper use  of assistive devices.                  Learning Progress Summary             Patient Acceptance, E, VU by MK at 2/22/2024 2300    Acceptance, E,D, VU,NR by LR at 2/22/2024 1036    Acceptance, E, VU by MK at 2/21/2024 2324    Acceptance, E, VU,NR by CS at 2/21/2024 1100                         Point: Home exercise program (Done)       Description:   Instruct learner(s) on appropriate technique for monitoring, assisting and/or progressing therapeutic exercises/activities.                  Learning Progress Summary             Patient Acceptance, E, VU by MK at 2/22/2024 2300    Acceptance, E, VU by MK at 2/21/2024 2324    Acceptance, E, VU,NR by CS at 2/21/2024 1100                         Point: Precautions (Done)       Description:   Instruct learner(s) on prescribed precautions during self-care and functional transfers.                  Learning Progress Summary             Patient Acceptance, E, VU by MK at 2/22/2024 2300    Acceptance, E,D, VU,NR by LR at 2/22/2024 1036    Acceptance, E, VU by MK at 2/21/2024 2324    Acceptance, E, VU,NR by CS at 2/21/2024 1100                         Point: Body mechanics (Done)       Description:   Instruct learner(s) on proper positioning and spine alignment during self-care, functional mobility activities and/or exercises.                  Learning Progress Summary             Patient Acceptance, E, VU by  at 2/22/2024 2300    Acceptance, E,D, VU,NR by LR at 2/22/2024 1036    Acceptance, E, VU by MK at 2/21/2024 2324    Acceptance, E, VU,NR by  at 2/21/2024 1100                                         User Key       Initials Effective Dates Name Provider Type Discipline     02/03/23 -  Almaz Rodriguez, OTR/L, CNT Occupational Therapist OT    LR 04/25/23 -  Wilda Faulkner OTR/L Occupational Therapist OT     04/25/23 -  Warren Mccauley, KADEN Registered Nurse Nurse                      OT Recommendation and Plan     Plan of Care Review  Plan of Care Reviewed With:  patient  Progress: no change  Outcome Evaluation: Completed AROM with RUMCKENNA and KIARA with L elbow, wrist and digits to assist in edema reduction. Pt would benefit from SNF at discharge. Continue OT POC  Plan of Care Reviewed With: patient  Outcome Evaluation: Completed AROM with RUE and WILYOM with L elbow, wrist and digits to assist in edema reduction. Pt would benefit from SNF at discharge. Continue OT POC     Outcome Measures       Row Name 02/23/24 1400 02/23/24 1100          How much help from another person do you currently need...    Turning from your back to your side while in flat bed without using bedrails? -- 2  -AH     Moving from lying on back to sitting on the side of a flat bed without bedrails? -- 2  -AH     Moving to and from a bed to a chair (including a wheelchair)? -- 2  -AH     Standing up from a chair using your arms (e.g., wheelchair, bedside chair)? -- 2  -AH     Climbing 3-5 steps with a railing? -- 1  -AH     To walk in hospital room? -- 1  -AH     AM-PAC 6 Clicks Score (PT) -- 10  -     Highest Level of Mobility Goal -- 4 --> Transfer to chair/commode  -        How much help from another is currently needed...    Putting on and taking off regular lower body clothing? 2  -TS --     Bathing (including washing, rinsing, and drying) 2  -TS --     Toileting (which includes using toilet bed pan or urinal) 2  -TS --     Putting on and taking off regular upper body clothing 2  -TS --     Taking care of personal grooming (such as brushing teeth) 3  -TS --     Eating meals 3  -TS --     AM-PAC 6 Clicks Score (OT) 14  -TS --        Functional Assessment    Outcome Measure Options -- AM-PAC 6 Clicks Basic Mobility (PT)  -               User Key  (r) = Recorded By, (t) = Taken By, (c) = Cosigned By      Initials Name Provider Type     Margot Verma, NE Physical Therapist Assistant    Marry Littlejohn COTA Occupational Therapist Assistant                    Time Calculation:    Time  Calculation- OT       Row Name 02/23/24 1439             Time Calculation- OT    OT Start Time 1400  -TS      OT Stop Time 1424  -TS      OT Time Calculation (min) 24 min  -TS      Total Timed Code Minutes- OT 24 minute(s)  -TS      OT Received On 02/23/24  -TS         Timed Charges    42386 - OT Therapeutic Activity Minutes 24  -TS         Total Minutes    Timed Charges Total Minutes 24  -TS       Total Minutes 24  -TS                User Key  (r) = Recorded By, (t) = Taken By, (c) = Cosigned By      Initials Name Provider Type    TS Marry Krishnamurthy COTA Occupational Therapist Assistant                  Therapy Charges for Today       Code Description Service Date Service Provider Modifiers Qty    [id number] HC OT THERAPEUTIC ACT EA 15 MIN 2/23/2024 Marry Krishnamurthy COTA GO 2                 Marry SANTAMARIA. ALFREDO Krishnamurthy  2/23/2024

## 2024-02-23 NOTE — PLAN OF CARE
Goal Outcome Evaluation:  Plan of Care Reviewed With: patient        Progress: no change  Outcome Evaluation: pt a/ox4. wound care/ dressings changed per order. pt voiding with purwick. due for BM. IV dressing changes- clean dry and intact. Cefepime given- per order. pt tolerating food and fluids well. taking medications whole. resting in bed. prevalon boots in place and legs elevated. pt on room air. COVID precautions in use. call light within reach.

## 2024-02-23 NOTE — PLAN OF CARE
Goal Outcome Evaluation:  Plan of Care Reviewed With: patient, caregiver      Progress: improving     Anticipated Discharge Disposition (SLP): unknown     SLP Swallowing Diagnosis: mild-moderate, oral dysphagia, suspected pharyngeal dysphagia (02/23/24 1447)       SPEECH-LANGUAGE PATHOLOGY EVALUATION - SWALLOW  Subjective: The patient was seen on this date for a Clinical Swallow evaluation.   Patient was lethargic, but able to awaken for evaluation.  Significant history: Patient is admitted with CHRISTINE, UTI, Covid, osteoarthritis, and cellulitis.    Objective: Textures given included thin liquid, nectar thick liquid, and mechanical soft consistency.    Assessment: Difficulties were noted with thin liquid, nectar thick liquid, and mechanical soft consistency.    Observations: Patient is endentulous with generalized weakness. Limited awareness of deficits. During swallow trials,   reduced oral control, oral residue, inefficient mastication, and cough were noted, specifically with thin liquids. Patient mastication is limited due to being endentulous.     SLP Findings:  Patient presents with moderate oropharyngeal dysphagia, without esophageal component.     Recommendations: Diet Textures: nectar thick liquid,  soft with chopped meats  food.  Medications should be taken whole with puree.     Recommended Strategies: Upright for PO, small bites and sips, may use straw, alternate liquids and solids, and supervision with all PO. Oral care before breakfast, after all meals and PRN.    Other Recommended Evaluations: Re-evaluation at bedside.      Dysphagia therapy is recommended. Rationale: diet tolerance and advancement.    Carolin Negron, SLP 2/23/2024 15:23 CST

## 2024-02-23 NOTE — PROGRESS NOTES
"    Daily Progress Note  Brenda Sneed  MRN: [id number] LOS: 0    Admit Date: 2024 07:00 CST    Subjective:         Interval History:    Reviewed overnight events and nursing notes.     Fairly lethargic this morning.  No new complaints.    ROS:  Review of Systems   Constitutional:  Positive for fatigue. Negative for chills and fever.   Respiratory:  Negative for cough and shortness of breath.    Cardiovascular:  Negative for chest pain.   Gastrointestinal:  Negative for abdominal pain, constipation and diarrhea.       DIET:  Diet: Diabetic Diets; Consistent Carbohydrate; Texture: Regular Texture (IDDSI 7); Fluid Consistency: Thin (IDDSI 0)    Medications:      albuterol sulfate HFA, 2 puff, Inhalation, TID - RT  apixaban, 5 mg, Oral, Q12H  Biotene Dry Mouth Moisturizing, 1 spray, Mouth/Throat, 5x Daily  busPIRone, 5 mg, Oral, TID With Meals  cefepime, 2,000 mg, Intravenous, Q12H  DULoxetine, 30 mg, Oral, Daily  gabapentin, 100 mg, Oral, Q12H  levothyroxine, 100 mcg, Oral, Q AM  Morphine, 15 mg, Oral, Daily  Non-Formulary / Patient Supplied Medication, 1 each, Topical, Weekly  nystatin, 1 application , Topical, BID  pramipexole, 0.75 mg, Oral, Nightly  saccharomyces boulardii, 250 mg, Oral, BID  sodium chloride, 10 mL, Intravenous, Q12H  sodium chloride, 10 mL, Intravenous, Q12H          Objective:     Vitals: BP 91/49 (BP Location: Right arm, Patient Position: Lying)   Pulse 68   Temp 98.3 °F (36.8 °C) (Axillary)   Resp 20   Ht 160 cm (63\")   Wt 91.6 kg (202 lb)   SpO2 98%   BMI 35.78 kg/m²    Intake/Output Summary (Last 24 hours) at 2024 0700  Last data filed at 2024 1811  Gross per 24 hour   Intake 500 ml   Output 350 ml   Net 150 ml    Temp (24hrs), Av.4 °F (36.9 °C), Min:98 °F (36.7 °C), Max:98.9 °F (37.2 °C)    Glucose:  Lab Results   Component Value Date    POCGLU 100 (H) 2021     Physical Examination:   Physical Exam  Constitutional:       General: She is not in " acute distress.     Appearance: She is obese. She is ill-appearing. She is not toxic-appearing.   Cardiovascular:      Rate and Rhythm: Normal rate and regular rhythm.      Heart sounds: No murmur heard.  Pulmonary:      Effort: Pulmonary effort is normal. No respiratory distress.   Abdominal:      Palpations: Abdomen is soft.   Musculoskeletal:      Right lower leg: Edema present.      Left lower leg: Edema present.   Neurological:      Comments: Moving all extremities spontaneously, lethargic, arousable and will converse when prompted         Labs:  Lab Results (last 24 hours)       Procedure Component Value Units Date/Time    Potassium [[id number]]  (Normal) Collected: 02/22/24 1908    Specimen: Blood Updated: 02/22/24 1955     Potassium 4.0 mmol/L     Urine Culture - Urine, Straight Cath [[id number]]  (Abnormal)  (Susceptibility) Collected: 02/20/24 1837    Specimen: Urine from Straight Cath Updated: 02/22/24 1200     Urine Culture 50,000 CFU/mL Pseudomonas aeruginosa MDRO     Comment:   Multi drug resistant Pseudomonas, patient may be an isolation risk.  Multi drug resistant Pseudomonas, patient may be an isolation risk.         50,000 CFU/mL Enterococcus faecalis    Narrative:      Colonization of the urinary tract without infection is common. Treatment is discouraged unless the patient is symptomatic, pregnant, or undergoing an invasive urologic procedure.    Susceptibility        Pseudomonas aeruginosa MDRO      CHRISTIANO      Cefepime Susceptible      Ceftazidime Susceptible      Ciprofloxacin Susceptible      Levofloxacin Intermediate      Meropenem Intermediate      Piperacillin + Tazobactam Susceptible      Tobramycin Susceptible                       Susceptibility        Enterococcus faecalis      CHRISTIANO      Ampicillin Susceptible      Levofloxacin Resistant      Nitrofurantoin Susceptible      Vancomycin Susceptible                       Susceptibility Comments       Pseudomonas aeruginosa MDRO    For MDR  Pseudomonas infections, susceptibility results may not correlate to clinical outcomes. Please consider infectious disease consult.               CBC Auto Differential [[id number]]  (Abnormal) Collected: 02/22/24 0833    Specimen: Blood Updated: 02/22/24 0949     WBC 7.77 10*3/mm3      RBC 2.72 10*6/mm3      Hemoglobin 7.8 g/dL      Hematocrit 23.0 %      MCV 84.6 fL      MCH 28.7 pg      MCHC 33.9 g/dL      RDW 18.5 %      RDW-SD 56.0 fl      MPV 11.6 fL      Platelets 60 10*3/mm3      Neutrophil % 76.4 %      Lymphocyte % 12.5 %      Monocyte % 10.0 %      Eosinophil % 0.6 %      Basophil % 0.1 %      Neutrophils, Absolute 5.93 10*3/mm3      Lymphocytes, Absolute 0.97 10*3/mm3      Monocytes, Absolute 0.78 10*3/mm3      Eosinophils, Absolute 0.05 10*3/mm3      Basophils, Absolute 0.01 10*3/mm3     Comprehensive Metabolic Panel [[id number]]  (Abnormal) Collected: 02/22/24 0833    Specimen: Blood Updated: 02/22/24 0927     Glucose 91 mg/dL      BUN 50 mg/dL      Creatinine 1.53 mg/dL      Sodium 133 mmol/L      Potassium 3.3 mmol/L      Comment: Slight hemolysis detected by analyzer. Result may be falsely elevated.        Chloride 101 mmol/L      CO2 23.0 mmol/L      Calcium 7.6 mg/dL      Total Protein 4.0 g/dL      Albumin 1.8 g/dL      ALT (SGPT) 8 U/L      AST (SGOT) 18 U/L      Alkaline Phosphatase 99 U/L      Total Bilirubin 1.9 mg/dL      Globulin 2.2 gm/dL      A/G Ratio 0.8 g/dL      BUN/Creatinine Ratio 32.7     Anion Gap 9.0 mmol/L      eGFR 35.3 mL/min/1.73     Narrative:      GFR Normal >60  Chronic Kidney Disease <60  Kidney Failure <15    The GFR formula is only valid for adults with stable renal function between ages 18 and 70.             Imaging:  XR Foot 2 View Right    Result Date: 2/22/2024  EXAMINATION: XR FOOT 2 VW RIGHT-  2/22/2024 12:24 PM  HISTORY: chronic wound; N39.0-Urinary tract infection, site not specified; R53.1-Weakness; M25.512-Pain in left shoulder; G89.29-Other chronic pain;  I83.018-Varicose veins of right lower extremity with ulcer other part of lower leg; L97.812-Non-pressure chronic ulcer of other part of right lower leg with fat layer exposed; I83.028-Varicose veins of left lower extremity with ulcer other part of lower leg;  COMPARISON: None  TECHNIQUE: Frontal and lateral radiographs of the right foot were provided for review. No oblique images were obtained.  FINDINGS: Joint space narrowing at the first IP joint. No definite acute fracture or destructive bony changes. There is joint space loss at the third DIP joint. There is some crossover of the fourth and fifth digits. It may be from positioning but the foot has the appearance of pes cavus. This could be confirmed or refuted with weightbearing images if clinically warranted. Positioning of the toes with hammertoe deformities makes evaluation of alignment suboptimal. Mild diffuse soft tissue swelling.       Impression:  1.  Limited 2 view exam without any definite acute osseous abnormality. There is soft tissue swelling.         2.  There are degenerative changes in the toes. Hammertoe deformities of the second through fifth digits are suspected.  3.  Possible pes cavus; however, this would be better evaluated with weightbearing images.    This report was signed and finalized on 2/22/2024 12:30 PM by Dr. Jamal Loya MD.          Assessment and Plan:     Primary Problem:  UTI (urinary tract infection)    Hospital Problem list:    UTI (urinary tract infection)    Secondary osteoarthritis of left shoulder due to rotator cuff arthropathy    Cellulitis of right lower extremity    HTN (hypertension)    Anxiety associated with depression    Cellulitis of left lower extremity    Acute cystitis with hematuria    Candidiasis of skin    CHRISTINE (acute kidney injury)    Chronic kidney disease, stage 3a    COVID-19 virus detected        Assessment: Chronically ill 73-year-old female with opiate use disorder, hypertension, and bilateral  lymphedema who has suffered recent progressive decline and presents with weakness and fatigue found to have COVID-19.    Plan:     UTI  Enterococcus in culture yesterday, found to have MDRO Pseudomonas today.,  Has previously had this in her urine, I doubt this is the cause of her symptoms but I cannot prove this.  Will have infectious disease consult for clinical opinion.     Possible cellulitis  Discontinued Vanco on 2/22, suspect redness is due to stasis dermatitis from lymphedema, continue wound care and lymphedema care.     Left shoulder pain  Concern for prothesis failure on Xray, ortho consultation occurred yesterday, deferring intervention until she is more medically stable.  I agree with this.    Lethargy, altered mental status  Opiate use disorder  Upon admission, she has stopped all her other medications and was only taking opiates.  She has previously been to clinic demanding opiates as well.  She is lethargic since admission, will decrease her morphine.  This is a somewhat difficult situation given that she definitely has left shoulder pain but I would like to optimize nonopiate pain control.  Will continue to work on this.    Per social work conversation with daughter, she plans to discharge home.  This is not a reasonable plan at this time, she requires significant care and will probably need to be placed in a skilled nursing facility.  Anticipate she will be here over the weekend.      Reviewed treatment plans with the patient and/or family.     Code Status:   Code Status and Medical Interventions:   Ordered at: 02/21/24 1123     Medical Intervention Limits:    NO intubation (DNI)    Other     Code Status (Patient has no pulse and is not breathing):    No CPR (Do Not Attempt to Resuscitate)     Medical Interventions (Patient has pulse or is breathing):    Limited Support     Additional Medical Interventions Limits:    no permanent feeding tube       Electronically signed by Sanjiv Block MD on  2/23/2024 at 07:00 CST

## 2024-02-23 NOTE — TELEPHONE ENCOUNTER
Message sent to [facility]      [8:32 AM] Kamilah Mckeon ([facility])  Consult  Brenda Sneed 1950   Carolin @[facility] 3a [phone] called in consult. Dr. Block is consulting for MDRO UTI. Patient is in room 358 bed 1.       [8:38 AM] Clint Moctezuma ([facility])  Ok

## 2024-02-23 NOTE — PLAN OF CARE
Goal Outcome Evaluation:  Plan of Care Reviewed With: patient        Progress: no change     Alert and oriented x4. IV antibiotics continue. Legs up with boots on. VSS. Safety maintained.

## 2024-02-23 NOTE — PROGRESS NOTES
"            [facility] Palliative Care Services    Palliative Care Daily Progress Note   Chief complaint-follow up support for patient/family and pain/symptom management    Code Status:   Code Status and Medical Interventions:   Ordered at: 02/21/24 1123     Medical Intervention Limits:    NO intubation (DNI)    Other     Code Status (Patient has no pulse and is not breathing):    No CPR (Do Not Attempt to Resuscitate)     Medical Interventions (Patient has pulse or is breathing):    Limited Support     Additional Medical Interventions Limits:    no permanent feeding tube      Advanced Directives: Advance Directive Status: Patient has advance directive, copy in chart   Goals of Care: Ongoing.     S: Medical record reviewed. Events noted.  No new labs to review.  Urine culture from 2/20 shows Pseudomonas MDRO and Enterococcus faecalis.  Evaluated by orthopedics yesterday with discussion of possible revision reverse total shoulder arthroplasty when more medically stable, recommends sling to left upper extremity for comfort and follow-up outpatient.  Therapy and attending physician recommending SNF.  Per SW note plans for discharge home with family, declined SNF.  A MOST document completed.  Received 74 mg oral morphine equivalent in the form of Butrans transdermal patch, morphine extended release, and morphine immediate release over the last 24 hours.  And lidocaine patches in place to left shoulder but appears last placed several days ago.  Laying in bed without apparent distress.  Therapy at bedside.  Reports pain fairly well-controlled at rest, however with any movement of left upper extremity has significant discomfort. Refusing skilled nursing facility placement, \"they are going to quit paying\". Attempt to call patient's daughter, no answer on home #, boyfriend answered cell and out of house.     Review of Systems   Constitutional: Positive for malaise/fatigue.   Cardiovascular:  Positive for leg " swelling.        Lymphedema bilateral lower extremities   Respiratory:  Negative for shortness of breath.    Skin:  Positive for poor wound healing.   Musculoskeletal:  Positive for falls, joint pain, joint swelling, muscle weakness and myalgias.   Genitourinary:  Negative for dysuria.   Neurological:  Positive for weakness.   Psychiatric/Behavioral:  Positive for depression. The patient is nervous/anxious.      Pain Assessment  Preferred Pain Scale: number (Numeric Rating Pain Scale)  Pain Location: shoulder    O:     Intake/Output Summary (Last 24 hours) at 2/23/2024 1211  Last data filed at 2/23/2024 1155  Gross per 24 hour   Intake 120 ml   Output 850 ml   Net -730 ml       Diagnostics and current medications: Reviewed.      Current Facility-Administered Medications:     albuterol sulfate HFA (PROVENTIL HFA;VENTOLIN HFA;PROAIR HFA) inhaler 2 puff, 2 puff, Inhalation, TID - RT, Sachin Dia MD, 2 puff at 02/23/24 0735    apixaban (ELIQUIS) tablet 5 mg, 5 mg, Oral, Q12H, Sachin Dia MD, 5 mg at 02/23/24 0946    Biotene Dry Mouth Moisturizing (BIOTENE) 1 spray, 1 spray, Mouth/Throat, 5x Daily, Earnestine Gan APRN, 1 spray at 02/23/24 0630    sennosides-docusate (PERICOLACE) 8.6-50 MG per tablet 2 tablet, 2 tablet, Oral, BID PRN **AND** polyethylene glycol (MIRALAX) packet 17 g, 17 g, Oral, Daily PRN **AND** bisacodyl (DULCOLAX) EC tablet 5 mg, 5 mg, Oral, Daily PRN **AND** bisacodyl (DULCOLAX) suppository 10 mg, 10 mg, Rectal, Daily PRN, Sachin Dia MD    busPIRone (BUSPAR) tablet 5 mg, 5 mg, Oral, TID With Meals, Earnestine Gan APRN, 5 mg at 02/23/24 0946    Calcium Replacement - Follow Nurse / BPA Driven Protocol, , Does not apply, PRN, Sachin Dia MD    cyclobenzaprine (FLEXERIL) tablet 10 mg, 10 mg, Oral, TID PRN, Sachin Dia MD    docusate sodium (COLACE) capsule 100 mg, 100 mg, Oral, BID PRN, Sachin Dia MD    DULoxetine (CYMBALTA)   capsule 30 mg, 30 mg, Oral, Daily, Earnestine Gan APRN, 30 mg at 02/23/24 0946    furosemide (LASIX) tablet 40 mg, 40 mg, Oral, Daily PRN, Sachin Dia MD    gabapentin (NEURONTIN) capsule 100 mg, 100 mg, Oral, Q12H, Earnestine Gan APRN, 100 mg at 02/23/24 0946    levothyroxine (SYNTHROID, LEVOTHROID) tablet 100 mcg, 100 mcg, Oral, Q AM, Sachin Dia MD, 100 mcg at 02/23/24 0559    Magnesium Standard Dose Replacement - Follow Nurse / BPA Driven Protocol, , Does not apply, PRN, Sachin Dia MD    Morphine (MS CONTIN) 12 hr tablet 15 mg, 15 mg, Oral, Daily, Sanjiv Block MD, 15 mg at 02/23/24 0946    Morphine (MSIR) tablet 15 mg, 15 mg, Oral, Q6H PRN **OR** Morphine (MSIR) tablet 7.5 mg, 7.5 mg, Oral, Q6H PRN, Earnestine Gan APRN, 7.5 mg at 02/22/24 0941    Non-Formulary / Patient Supplied Medication, 1 each, Topical, Weekly, Earnestine Gan APRROSALIO    nystatin (MYCOSTATIN) powder 1 Application, 1 application , Topical, BID, Sachin Dia MD, 1 Application at 02/23/24 0946    ondansetron (ZOFRAN) injection 4 mg, 4 mg, Intravenous, Q6H PRN, Elba Rockwell APRN    Phosphorus Replacement - Follow Nurse / BPA Driven Protocol, , Does not apply, PRN, Sachin Dia MD    Potassium Replacement - Follow Nurse / BPA Driven Protocol, , Does not apply, PRN, Sachin Dia MD    pramipexole (MIRAPEX) tablet 0.75 mg, 0.75 mg, Oral, Nightly, Sachin Dia MD, 0.75 mg at 02/22/24 2117    saccharomyces boulardii (FLORASTOR) capsule 250 mg, 250 mg, Oral, BID, Sachin Dia MD, 250 mg at 02/23/24 0946    sodium chloride 0.9 % flush 10 mL, 10 mL, Intravenous, Q12H, Elba Rockwell APRN, 10 mL at 02/23/24 0946    sodium chloride 0.9 % flush 10 mL, 10 mL, Intravenous, PRN, Elba Rockwell, GARETT    sodium chloride 0.9 % flush 10 mL, 10 mL, Intravenous, Q12H, Sachin Dia MD, 10 mL at 02/23/24 0946    sodium chloride 0.9 % flush 10 mL, 10 mL,  "Intravenous, PRN, Sachin Dia MD    sodium chloride 0.9 % infusion 40 mL, 40 mL, Intravenous, PRN, Elba Rockwell APRN    sodium chloride 0.9 % infusion 40 mL, 40 mL, Intravenous, PRN, Sachin Dia MD    Allergies   Allergen Reactions    Codeine Itching and Rash     SEVERE RASH/ITCHING (TOLERATES PERCOCET)    Amoxicillin Other (See Comments)     unknown    Levaquin [Levofloxacin] Other (See Comments)     unknown    Benadryl [Diphenhydramine] Other (See Comments)     KEEPS PATIENT AWAKE      I have utilized all available immediate resources to obtain, update, or review the patient's current medications (including all prescriptions, over-the-counter products, herbals, cannabis/cannabidiol products, and vitamin/mineral/dietary (nutritional) supplements) for name, route of administration, type, dose and frequency.    A:    /75 (BP Location: Right arm, Patient Position: Lying)   Pulse 98   Temp 98 °F (36.7 °C) (Axillary)   Resp 22   Ht 160 cm (63\")   Wt 91.6 kg (202 lb)   SpO2 100%   BMI 35.78 kg/m²     Vitals and nursing note reviewed.   Constitutional:       Appearance: Ill-appearing and chronically ill-appearing.      Comments: Ace wraps to bilateral lower extremities  Pulmonary:      Effort: Pulmonary effort is normal.   Cardiovascular:      Normal rate.   Edema:     Peripheral edema present. L>R  Musculoskeletal:      Cervical back: Neck supple.   Skin:     General: Skin is warm.   Neurological:      Mental Status: Alert     Patient status: Disease state: Controlled with current treatments.  Current Functional status: Palliative Performance Scale Score: Performance 40% based on the following measures: Ambulation: Mainly in bed, Activity and Evidence of Disease: Unable to do any work, extensive evidence of disease, Self-Care: Mainly assistance required,  Intake: Normal or reduced, LOC: Full, drowsy or confusion   Baseline Functional status: Palliative Performance Scale Score: " Performance 60% based on the following measures: Ambulation: Reduced, Activity and Evidence of Disease: Unable to do hobby or some work, significant evidence of disease, Self-Care: Occasional assist necessary,  Intake: Normal or reduced, LOC: Full or confusion   Nutritional status: Albumin 2.9 Body mass index is 35.78 kg/m².      Hospital Problem List      UTI (urinary tract infection)    Secondary osteoarthritis of left shoulder due to rotator cuff arthropathy    Cellulitis of right lower extremity    HTN (hypertension)    Anxiety associated with depression    Cellulitis of left lower extremity    Acute cystitis with hematuria    Candidiasis of skin    CHRISTINE (acute kidney injury)    Chronic kidney disease, stage 3a    COVID-19 virus detected     Impression/Problem List:     Acute kidney injury on chronic kidney disease stage III  Acute UTI with hematuria  COVID-19 virus detected  Candidiasis of skin  Secondary osteoarthritis of left shoulder due to rotator cuff arthroplasty, abnormal imaging  Cellulitis bilateral lower extremities  Atrial fibrillation-Eliquis  Chronic back pain-spondylosis followed by Dr. Jones pain management  Depression  Disease of thyroid gland  Hypertension  Iron deficiency anemia  Thrombocytopenia  Impaired mobility-walker  Lymphedema-managed by home health with wound care 3 times a week       Recommendations/Plan:  1. plan: Goals of care include CODE STATUS no CPR/limited support interventions.     Family support: The patient receives support from her daughter and extended family..  Advance Directives: On file     POA/Healthcare surrogate-daughterDominga-next of kin.     2.  Palliative care encounter  - Prognosis is fair long-term secondary to acute kidney injury, acute UTI, COVID-19, and multiple comorbidities.  -Patient appears to have fair prognostic awareness.      -Recently discharged from rehab 2 weeks prior.  Recently diagnosed with COVID 1 week prior.       -Urine culture pending.  Started on broad-spectrum IV antibiotics for UTI and bilateral lower extremity cellulitis  -Wound care consulted.    -Orthopedics consulted due to abnormal imaging left shoulder as above.     2/21-CODE STATUS changes no CPR/Limited interventions, no intubation, no permanent feeding tube.  Will plan to complete a MOST document  -High risk rehospitalization  -Awaiting Ortho recommendations  -dietician evaluation protein calorie malnutrition and dietary supplements per family request. Family reports patient does not use dentures and eats soft consistency foods at baseline.  Also drinks ensure supplements  -Will plan to reevaluate living will directive prior to discharge    2/23-continue supportive measures  -Ortho recommendations-possible revision reverse total shoulder arthroplasty when more medically stable, recommends sling to left upper extremity for comfort and follow-up outpatient.   -Attending and therapy recommending SNF.  Patient and daughter refusing SNF at this time     3. Pain  -patient has Butrans pain patch on lower left abd changed by patient 2/19. Recently restarted by pain management on second week. Weekly patch,added to MAR to continue. Discussed with family 2/21 may need to provide home medications as this is not on formulary.   -given kidney dysfunction and increased weakness with fall decreased Cymbalta to 30 mg daily 2/21.   -MSER scheduled, now daily per attending and MSIR as needed, ranging dosing for breakthrough pain.  -flexeril as needed  -add lidocaine patch to left shoulder     4. Xerostomia  -Biotene     5. Anxiety  -Buspar three times a day     6. Neuropathy  -gabapentin twice day     7.  Depression  -stable   -On Cymbalta, may consider adding Lexapro to augment regimen    ADDENDUM at 3:25 PM spoke with patient's daughter, Jazmin via telephone, updated on status as above and discussed patient's significant need for assistance with all aspects of ADLs.  Encouraged ongoing conversation  with regard to discharge disposition and recommended consideration of skilled nursing facility placement given caregiver needs.  Questions encouraged and support given.      Thank you for allowing us to participate in patient's plan of care. Palliative Care Team will continue to follow patient.     Earnestine Gan, APRN  2/23/2024  12:11 CST

## 2024-02-23 NOTE — PAYOR COMM NOTE
"2/23/24 [facility] [phone]  [phone]    ER ADMIT ON 2/20/24. OBSERVATION ORDER. PATIENT IN OBSERVATION FROM 2/20/24 THRU 2/22/24.    2/23/24. CHANGED TO INPATIENT ON 2/23/24 INPATIENT ORDER PER MD.    FAXING CLINICAL FOR INPATIENT REVIEW STARTING ON 2/23/24.            Eyad Gurrola (73 y.o. Female)       Date of Birth   1950    Social Security Number       Address   [address] [facility] [address]    Home Phone   [phone]    MRN   [id number]       Catholic   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), COVID (confirmed) (02/20/24), MDR Pseudomonas (02/22/24)   Code Status: No CPR    Ht: 160 cm (63\")   Wt: 91.6 kg (202 lb)    Admission Cmt: None   Principal Problem: UTI (urinary tract infection) [N39.0]                   Active Insurance as of 2/20/2024       Primary Coverage       Payor Plan Insurance Group Employer/Plan Group    HUMANA MEDICARE REPLACEMENT HUMANA MED ADV PPO [id number]       Payor Plan Address Payor Plan Phone Number Payor Plan Fax Number Effective Dates    [address] [address] [phone]  1/1/2023 - None Entered    [facility] [address]         Subscriber Name Subscriber Birth Date Member ID       EYAD GURROLA 1950 [id number]                     Emergency Contacts        (Rel.) Home Phone Work Phone Mobile Phone    Nella (Daughter) [phone] -- [phone] "    ODELL NELSON (Relative) -- -- [phone]             [facility] Encounter Date/Time: 2024 [facility]   Hospital Account: [id number]    MRN: [id number]   Patient:  Eyad Gurrola   Contact Serial #: [id number]   SSN:          ENCOUNTER             Patient Class: Inpatient   Unit: [address] Service: Medicine     Bed: 358/1   Admitting Provider: Sachin Dia MD   Referring Physician: Sachin Dia   Attending Provider: Sachin Dia MD   Adm Diagnosis: UTI (urinary tract infec*               PATIENT             Name: Eyad Gurrola : 1950 (73 yrs)   Address: [address] [place] [address] Sex: Female   City: [place] [address]   County: [facility]   Marital Status:  Ethnicity: NOT                                                                         Race: WHITE   Primary Care Provider: Sachin Dia MD Patients Phone: Home Phone: [phone]           EMERGENCY CONTACT   Contact Name Legal Guardian? Relationship to Patient Home Phone Work Phone Mobile Phone   1. Nella Law  2. ODELL NELSON No  No Daughter  Relative [phone]   GUARANTOR             Guarantor: Eyad Gurrola     : 1950   Address: [address] [place] [address] Sex: [place] [address]     Relation to Patient: Self       Home Phone: [phone]   Guarantor ID: [id number]       Work Phone:     GUARANTOR EMPLOYER   Employer:           Status: RETIRED   COVERAGE          PRIMARY INSURANCE   Payor: HUMANA MEDICARE REPLACEMENT Plan: HUMANA MED ADV PPO   Group Number: [id number] Insurance Type: INDEMNITY   Subscriber Name: EYAD GURROLA Subscriber : 1950   Subscriber ID: [id number] Coverage Address: [address] [address]   Pat. Rel. to Subscriber: Self Coverage Phone: [phone]   SECONDARY INSURANCE   Payor: N/A Plan: N/A   Group Number:   Insurance Type:     Subscriber Name:   Subscriber :      Subscriber ID:   Coverage Address:     Pat. Rel. to Subscriber:   Coverage Phone:        Contact Serial # ([id number])         2024    Chart ID ([id number])            [facility] Encounter Date/Time: 2024 [facility]   Hospital Account: [id number]    MRN: [id number]   Patient:  Eyad Gurrola   Contact Serial #: [id number]   SSN:          ENCOUNTER             Patient Class: Inpatient   Unit: [facility] 3A   Hospital Service: Medicine     Bed: 358/1   Admitting Provider: Sachin Dia MD   Referring Physician: Sachin Dia   Attending Provider: Sachin Dia MD   Adm Diagnosis: UTI (urinary tract infec*               PATIENT             Name: Eyad Gurrola : 1950 (73 yrs)   Address: [address] [place] [address] Sex: Female   City: [place] [address]   County: [facility]   Marital Status:  Ethnicity: NOT                                                                         Race: WHITE   Primary Care Provider: Sachin Dia MD Patients Phone: Home Phone: [phone]           EMERGENCY CONTACT   Contact Name Legal Guardian? Relationship to Patient Home Phone Work Phone Mobile Phone   1. ,Nella  2. ODELL NELSON No  No Daughter  Relative [phone]   GUARANTOR             Guarantor: Eyad Gurrola     : 1950   Address: [address] [place] [address] Sex: [place] [address]     Relation to Patient: Self       Home Phone: [phone]   Guarantor ID: [id number]       Work Phone:     GUARANTOR EMPLOYER   Employer:           Status: RETIRED   COVERAGE          PRIMARY INSURANCE   Payor: HUMANA MEDICARE REPLACEMENT Plan: HUMANA MED ADV PPO   Group Number: [id number] Insurance Type: INDEMNITY   Subscriber Name: EYAD GURROLA Subscriber : 1950   Subscriber ID: [id number] Coverage Address: [facility] [address]  [place] [address]   Pat. Rel. to Subscriber: Self Coverage Phone:  [phone]   SECONDARY INSURANCE   Payor: N/A Plan: N/A   Group Number:   Insurance Type:     Subscriber Name:   Subscriber :     Subscriber ID:   Coverage Address:     Pat. Rel. to Subscriber:   Coverage Phone:        Contact Serial # ([id number])         2024    Chart ID ([id number])          0858 98.1 (36.7) 74 18 94/43  Lying nasal cannula with ETCO2 2 94   BP: nurse notified at 24 0858   24 0845 -- -- -- -- -- room air -- --   24 0708 -- 68 16 -- -- nasal cannula 2 96   24 0532 98 (36.7) 70 20 103/54 Lying nasal cannula with ETCO2 2 96   24 0027 98.4 (36.9) 70 18 108/50 Lying nasal cannula with ETCO2 2 96   24 2108 98.1 (36.7) 71 20 93/47 Lying nasal cannula with ETCO2 2 97   24 -- 68 18 -- Lying nasal cannula with ETCO2 2 97   24 -- 70 18 -- Lying nasal cannula with ETCO2 2 97   24 1544 98.4 (36.9) 70 18 101/52 Lying nasal cannula 2 95   24 1520 -- 64 16 -- -- nasal cannula 2 98   24 1300 98.7 (37.1) 67 20 97/53 Lying nasal cannula -- 96                    Elba Rockwell APRN   Nurse Practitioner  Emergency Medicine     ED Provider Notes      Attested     Date of Service: 24 1506  Creation Time: 24 1506     Attested           Attestation signed by Breana Ho DO at 24 1026           SUPERVISE: For this patient encounter, I reviewed the APC's documentation, treatment plan, and medical decision making.  Breana Ho DO 2024 10:26 CST                                   Expand All Collapse All       Subjective   History of Present Illness  Patient is a 73-year-old female who presents emergency department with complaints of generalized weakness.  Her niece is at the bedside helping provide history.  Niece states that the patient was fallen over the last several months, however she denies any recent falls.  She does state that she slid down in her bed last night,  "however she did not fall out of her bed.  Patient is complaining of pain in her left shoulder that has been ongoing for a while.  Patient had an x-ray done last month which was negative for acute findings.  Niece reports that patient had a yeast infection under her breast and she try to put a sports bra line and that is when the pain initially started.  Patient follows with Dr. Jones for chronic neck and back pain.  She is currently taking pain patches, but is not taking anything else.  Patient does have history of lymphedema and niece reports that her legs are \"weeping from the waist down\".  Patient does have home health that comes out 3 times a week and does wound care on these.  Patient actually has an appointment coming up with wound care outpatient.  Patient was recently discharged from the nursing home 2 weeks ago for rehab.  Patient denies any chest pain.  Denies shortness of breath.  Denies abdominal pain.  Denies nausea, vomiting, diarrhea, fevers.  Niece also reports that whenever she had her follow-up appointment with her primary care provider 1 week ago she had a cough and tested positive for COVID at that time.  Patient does live at home by herself.  She is able to walk with a walker.  Patient with a past medical history of arthritis, chronic pain, lymphedema, hypertension.  Patient denies any history of congestive heart failure.  Denies history of DVT or pulmonary embolism.  Patient does have history of atrial fibrillation and is anticoagulated with Eliquis.           Review of Systems   Cardiovascular:  Positive for leg swelling.   Musculoskeletal:  Positive for arthralgias.   Neurological:  Positive for weakness.   All other systems reviewed and are negative.        Medical History        Past Medical History:   Diagnosis Date    Arthritis      Bronchitis      Cataract       right eye , cornea implant    Chronic back pain      Depression      Disease of thyroid gland      Edema       lower " extremities    Hypertension      Kidney stones      Lymphedema                    Allergies   Allergen Reactions    Codeine Itching and Rash       SEVERE RASH/ITCHING (TOLERATES PERCOCET)    Amoxicillin Other (See Comments)       unknown    Levaquin [Levofloxacin] Other (See Comments)       unknown    Benadryl [Diphenhydramine] Other (See Comments)       KEEPS PATIENT AWAKE          Surgical History         Past Surgical History:   Procedure Laterality Date    BACK SURGERY         2010 ([place]), 2013     CHOLECYSTECTOMY OPEN        CORNEAL TRANSPLANT Right 1999    INCISION AND DRAINAGE ABSCESS Left 4/19/2023     Procedure: INCISION AND DRAINAGE ABSCESS left leg;  Surgeon: Sarita Vazquez MD;  Location:  [facility];  Service: General;  Laterality: Left;    LACERATION REPAIR Left 4/7/2023     Procedure: washout and closure left leg wound ;  Surgeon: Sarita Vazquez MD;  Location:  [facility] OR;  Service: General;  Laterality: Left;    LUMBAR FUSION Left 8/2/2017     Procedure: LEFT LUMBAR LATERAL INTERBODY FUSION WITH INSTRUMENTATION  L1-2;  Surgeon: MARCELINO Wright MD;  Location:  [facility] OR;  Service:     NECK SURGERY   2010     [place]    TOTAL HIP ARTHROPLASTY Right 2014     DR. KENDRICK     TOTAL KNEE ARTHROPLASTY Right 2000    TOTAL KNEE ARTHROPLASTY Left 2002    TOTAL SHOULDER ARTHROPLASTY W/ DISTAL CLAVICLE EXCISION Left 6/1/2020     Procedure: LEFT REVERSE TOTAL SHOULDER REPLACEMENT;  Surgeon: Saravanan Calvin MD;  Location:  [facility] OR;  Service: Orthopedics;  Laterality: Left;                  Family History   Problem Relation Age of Onset    Breast cancer Neg Hx           Social History   Social History           Socioeconomic History    Marital status:    Tobacco Use    Smoking status: Never    Smokeless tobacco: Never   Vaping Use    Vaping Use: Never used   Substance and Sexual Activity    Alcohol use: Not Currently    Drug use: No    Sexual activity: Defer                        Objective  []Expand by Default  Physical Exam  Vitals and nursing note reviewed.   Constitutional:       General: She is not in acute distress.     Appearance: Normal appearance. She is normal weight. She is ill-appearing. She is not toxic-appearing.      Comments: Chronically ill-appearing   HENT:      Nose: Nose normal.   Cardiovascular:      Rate and Rhythm: Normal rate and regular rhythm.      Pulses: Normal pulses.      Heart sounds: Normal heart sounds.   Pulmonary:      Effort: Pulmonary effort is normal.      Breath sounds: Normal breath sounds.   Abdominal:      General: Abdomen is flat. Bowel sounds are normal. There is no distension.      Palpations: Abdomen is soft.      Tenderness: There is no abdominal tenderness.   Musculoskeletal:         General: Tenderness present.      Cervical back: Normal range of motion and neck supple.      Right lower leg: Edema present.      Left lower leg: Edema present.      Comments: Edema present to BLE.  Patient does have Ace bandages present on bilateral lower extremities that have been placed from home health wound care.  Patient is having tenderness in her left shoulder.  Range of motion is limited in her left shoulder.   Skin:     General: Skin is warm and dry.   Neurological:      General: No focal deficit present.      Mental Status: She is alert and oriented to person, place, and time. Mental status is at baseline.      GCS: GCS eye subscore is 4. GCS verbal subscore is 5. GCS motor subscore is 6.      Sensory: Sensation is intact.   Psychiatric:         Mood and Affect: Mood normal.         Behavior: Behavior normal.         Thought Content: Thought content normal.         Judgment: Judgment normal.            Procedures                 ED Course      ED Course as of 02/20/24 2036 Tue Feb 20, 2024 1916 Call placed to Dr. Dia for admission. [KR]   1924 Patient with history of E. coli ESBL and CRE.  IV cefepime and IV vancomycin has been ordered at this time.   Patient does have a UTI.  Urinalysis revealed positive nitrate, 4+ bacteria, 3-5 WBCs.  Culture is pending at this time.  Patient with leukocytosis on CBC.  Stable H&H.  Creatinine 1.96, sodium 132, potassium 3.3.  Patient still testing positive for COVID. [KR]   2009 Chest x-ray reviewed by radiologist which revealed no acute findings.  X-ray left shoulder revealed   IMPRESSION:  1.  Findings suspicious for hardware complication of the total shoulder arthroplasty. I suspect that there may be grade 4 scapular notching and the inferior glenoid component screw is fractured. Orthopedic follow-up nonemergent CT with metal suppression protocol is recommended to further characterize the degree of scapular notching. No acute osseous abnormality identified on this exam.  [KR]   2010 Case discussed with ALISA Israel with Dr. Dia.  He has agreed to accept this patient for further management.  Patient and family member agreeable to plan. [KR]       ED Course User Index  [KR] Elba Rockwell APRN                                           Medical Decision Making  Problems Addressed:  Chronic left shoulder pain: complicated acute illness or injury  Generalized weakness: complicated acute illness or injury  Urinary tract infection without hematuria, site unspecified: complicated acute illness or injury     Amount and/or Complexity of Data Reviewed  Labs: ordered.  Radiology: ordered.  ECG/medicine tests: ordered.     Risk  OTC drugs.  Prescription drug management.  Decision regarding hospitalization.           Final diagnoses:   Urinary tract infection without hematuria, site unspecified   Generalized weakness   Chronic left shoulder pain         ED Disposition  ED Disposition         ED Disposition   Decision to Admit    Condition   --    Comment   Level of Care: Med/Surg [1]   Diagnosis: UTI (urinary tract infection) [575989]   Admitting Physician: RADHA DIA [[id number]]                      No follow-up provider  "specified.         Medication List       No changes were made to your prescriptions during this visit.               Elba Rockwell APRN  02/20/24 2036               Cosigned by: Breana Ho DO at 02/21/24 1026     Sachin Dia MD   Physician  Medicine     H&P      Addendum     Date of Service: 02/21/24 0715  Creation Time: 02/21/24 0715     Expand All Collapse All              History and Physical        CHIEF COMPLAINT: Fall and recurrent urinary tract infection     Reason for Admission: Urosepsis     History Obtained From: Patient/niece     HISTORY OF PRESENT ILLNESS:    Patient is a 73-year-old female who presents emergency department with complaints of generalized weakness.  Her niece is at the bedside helping provide history.  Niece states that the patient was fallen over the last several months, however she denies any recent falls.  She does state that she slid down in her bed last night, however she did not fall out of her bed.  Patient is complaining of pain in her left shoulder that has been ongoing for a while.  Patient had an x-ray done last month which was negative for acute findings.  Niece reports that patient had a yeast infection under her breast and she try to put a sports bra line and that is when the pain initially started.  Patient follows with Dr. Jones for chronic neck and back pain.  She is currently taking pain patches, but is not taking anything else.  Patient does have history of lymphedema and niece reports that her legs are \"weeping from the waist down\".  Patient does have home health that comes out 3 times a week and does wound care on these.  Patient actually has an appointment coming up with wound care outpatient.  Patient was recently discharged from the nursing home 2 weeks ago for rehab.  Patient denies any chest pain.  Denies shortness of breath.  Denies abdominal pain.  Denies nausea, vomiting, diarrhea, fevers.  Niece also reports that whenever she had her " follow-up appointment in our office 1 week ago she had a cough and tested positive for COVID at that time.  Patient does live at home by herself.  She is able to walk with a walker.  Patient with a past medical history of arthritis, chronic pain, lymphedema, hypertension.  Patient denies any history of congestive heart failure.  Denies history of DVT or pulmonary embolism.  Patient does have history of atrial fibrillation and is anticoagulated with Eliquis.  Patient seems focused on her pain in the past, much less alert currently this morning then reported in the emergency room.        Past Medical History:    Medical History[]Expand by Default        Past Medical History:   Diagnosis Date    Arthritis      Bronchitis      Cataract       right eye , cornea implant    Chronic back pain      Depression      Disease of thyroid gland      Edema       lower extremities    Hypertension      Kidney stones      Lymphedema              Past Surgical History:    Surgical History         Past Surgical History:   Procedure Laterality Date    BACK SURGERY         2010 ([place]), 2013     CHOLECYSTECTOMY OPEN        CORNEAL TRANSPLANT Right 1999    INCISION AND DRAINAGE ABSCESS Left 4/19/2023     Procedure: INCISION AND DRAINAGE ABSCESS left leg;  Surgeon: Sarita Vazquez MD;  Location:  [facility] OR;  Service: General;  Laterality: Left;    LACERATION REPAIR Left 4/7/2023     Procedure: washout and closure left leg wound ;  Surgeon: Sarita Vazquez MD;  Location:  [facility] OR;  Service: General;  Laterality: Left;    LUMBAR FUSION Left 8/2/2017     Procedure: LEFT LUMBAR LATERAL INTERBODY FUSION WITH INSTRUMENTATION  L1-2;  Surgeon: MARCELINO Wright MD;  Location:  [facility] OR;  Service:     NECK SURGERY   2010     [place]    TOTAL HIP ARTHROPLASTY Right 2014     DR. KENDRICK     TOTAL KNEE ARTHROPLASTY Right 2000    TOTAL KNEE ARTHROPLASTY Left 2002    TOTAL SHOULDER ARTHROPLASTY W/ DISTAL CLAVICLE EXCISION Left 6/1/2020      Procedure: LEFT REVERSE TOTAL SHOULDER REPLACEMENT;  Surgeon: Saravanan Calvin MD;  Location: [facility];  Service: Orthopedics;  Laterality: Left;            Medications Prior to Admission:    Prescriptions Prior to Admission           Medications Prior to Admission   Medication Sig Dispense Refill Last Dose    albuterol (PROVENTIL) (2.5 MG/3ML) 0.083% nebulizer solution Take 2.5 mg by nebulization 3 (Three) Times a Day. Indications: Spasm of Lung Air Passages          apixaban (ELIQUIS) 5 MG tablet tablet Take 1 tablet by mouth Every 12 (Twelve) Hours. Indications: Atrial Fibrillation 180 tablet 1      azelastine (ASTELIN) 0.1 % nasal spray 2 sprays into the nostril(s) as directed by provider 2 (Two) Times a Day. Use in each nostril as directed  Indications: afib          bisoprolol 5 MG tablet 5 mg, hydroCHLOROthiazide 12.5 MG tablet 6.25 mg Take 1 dose by mouth Daily. Indications: htn          Buprenorphine (Butrans) 20 MCG/HR patch weekly Apply 20 mcg topically to the appropriate area as directed 1 (One) Time Per Week. Indications: pain          cyclobenzaprine (FLEXERIL) 10 MG tablet Take 1 tablet by mouth 3 (Three) Times a Day As Needed for Muscle Spasms. 30 tablet 0      docusate sodium (COLACE) 100 MG capsule Take 1 capsule by mouth 2 (Two) Times a Day As Needed for Constipation. Indications: Constipation          DULoxetine (CYMBALTA) 60 MG capsule Take 1 capsule by mouth 2 (Two) Times a Day. Indications: Musculoskeletal Pain          furosemide (LASIX) 40 MG tablet Take 1 tablet by mouth Daily As Needed (edema). Indications: Edema          levothyroxine (SYNTHROID, LEVOTHROID) 100 MCG tablet Take 1 tablet by mouth Daily. Indications: Underactive Thyroid          Morphine (MS CONTIN) 15 MG 12 hr tablet Take 1 tablet by mouth Every 12 (Twelve) Hours. 30 tablet 0      nystatin (MYCOSTATIN) 892188 UNIT/GM powder Apply 1 application  topically to the appropriate area as directed 2 (Two) Times a Day.  Indications: Candida Infection of Skin and Mouth or Vagina          pramipexole (MIRAPEX) 0.75 MG tablet Take 1 tablet by mouth Every Night. Indications: Restless Leg Syndrome 90 tablet 1      saccharomyces boulardii (FLORASTOR) 250 MG capsule Take 250 mg by mouth 2 (Two) Times a Day.                  Allergies:  Codeine, Amoxicillin, Levaquin [levofloxacin], and Benadryl [diphenhydramine]     Social History:   Social History   Social History           Socioeconomic History    Marital status:    Tobacco Use    Smoking status: Never    Smokeless tobacco: Never   Vaping Use    Vaping Use: Never used   Substance and Sexual Activity    Alcohol use: Not Currently    Drug use: No    Sexual activity: Defer            Family History:         Family History   Problem Relation Age of Onset    Breast cancer Neg Hx           REVIEW OF SYSTEMS:     CONSTITUTIONAL: Generally declined since discharge from skilled nursing facility 2 weeks ago, seems sedated today and difficult to get accurate review of systems most is obtained from emergency room reports and niece.  EYES:  negative for eye dryness, icterus and redness, no significant changes in vision  HEENT:   negative for dental problems, epistaxis or sinus congestion , no history of facial trauma or difficulty swallowing  RESPIRATORY: See HPI  CARDIOVASCULAR: Chronic atrial fibrillation rate controlled anticoagulated with Eliquis  GASTROINTESTINAL:  negative for abdominal pain, nausea or vomiting, no history of hematemesis, jaundice, melena or rectal bleeding  MUSCULOSKELETAL: Since fall out of bed left shoulder has been painful and unable to move  NEUROLOGICAL:   Intermittent confusion, fixation on pain medicine noted  INTEGUMENT: Yeast in inguinal folds                  Vital Signs   Temp:  [97.8 °F (36.6 °C)-98.8 °F (37.1 °C)] 98.4 °F (36.9 °C)  Heart Rate:  [] 68  Resp:  [18-20] 20  BP: ()/(48-76) 104/49            Physical Exam:  Constitutional:  Difficult to obtain complaints  HEENT: Grossly normal sitting up in bed  Head: Normocephalic and atraumatic.   Eyes: Pupils are equal, round, and reactive to light.  Extraocular muscles appear to be intact  Neck: Neck supple without masses or carotid bruit.  Cardiovascular: Regular rhythm and normal heart sounds.  No extra or lift rub or murmur appreciated  Pulmonary/Chest: Effort normal and breath sounds normal. CTAB, no appreciable rales or wheezes  Abdominal: Soft. Bowel sounds are normal. There is  no distension or tenderness appreciated. There is no rebound and no guarding.   Musculoskeletal: Decreased range of motion of shoulder  Neurological: She seems sedated from pain medication this morning  Skin: Yeast noted     Results Review:              I reviewed the patient's new imaging results and agree with the interpretation.     DATA:  Lab Results (last 24 hours)         Procedure Component Value Units Date/Time     Urinalysis With Culture If Indicated - Straight Cath [[id number]]  (Abnormal) Collected: 02/20/24 1837     Specimen: Urine from Straight Cath Updated: 02/20/24 1917       Color, UA Orange       Appearance, UA Clear       pH, UA <=5.0       Specific Gravity, UA 1.019       Glucose, UA Negative       Ketones, UA Negative       Bilirubin, UA Negative       Blood, UA Negative       Protein, UA Negative       Leuk Esterase, UA Small (1+)       Nitrite, UA Positive       Urobilinogen, UA 0.2 E.U./dL     Narrative:       Dipstick results may be inaccurate due to color interference.     Urine Culture - Urine, Straight Cath [[id number]] Collected: 02/20/24 1837     Specimen: Urine from Straight Cath Updated: 02/20/24 1917     Urinalysis, Microscopic Only - Straight Cath [[id number]]  (Abnormal) Collected: 02/20/24 1837     Specimen: Urine from Straight Cath Updated: 02/20/24 1910       RBC, UA 3-5 /HPF         WBC, UA 3-5 /HPF         Comment: Urine culture not indicated.          Bacteria, UA 4+ /HPF          Squamous Epithelial Cells, UA 0-2 /HPF         Hyaline Casts, UA 7-12 /LPF         Methodology Automated Microscopy     COVID PRE-OP / PRE-PROCEDURE SCREENING ORDER (NO ISOLATION) - Swab, Nasopharynx [[id number]]  (Abnormal) Collected: 02/20/24 1606     Specimen: Swab from Nasopharynx Updated: 02/20/24 1743     Narrative:       The following orders were created for panel order COVID PRE-OP / PRE-PROCEDURE SCREENING ORDER (NO ISOLATION) - Swab, Nasopharynx.  Procedure                               Abnormality         Status                     ---------                               -----------         ------                     COVID-19, FLU A/B, RSV P...[[id number]]  Abnormal            Final result                  Please view results for these tests on the individual orders.     COVID-19, FLU A/B, RSV PCR 1 HR TAT - Swab, Nasopharynx [[id number]]  (Abnormal) Collected: 02/20/24 1606     Specimen: Swab from Nasopharynx Updated: 02/20/24 1743       COVID19 Detected       Influenza A PCR Not Detected       Influenza B PCR Not Detected       RSV, PCR Not Detected     Narrative:       Fact sheet for providers: https://www.fda.gov/media/981195/download    Fact sheet for patients: https://www.fda.gov/media/025920/download     Test performed by PCR.     CBC & Differential [[id number]]  (Abnormal) Collected: 02/20/24 1605     Specimen: Blood from Arm, Right Updated: 02/20/24 1643     Narrative:       The following orders were created for panel order CBC & Differential.  Procedure                               Abnormality         Status                     ---------                               -----------         ------                     CBC Auto Differential[[id number]]        Abnormal            Final result                  Please view results for these tests on the individual orders.     CBC Auto Differential [[id number]]  (Abnormal) Collected: 02/20/24 1605     Specimen: Blood from Arm, Right Updated: 02/20/24 1643        WBC 15.64 10*3/mm3         RBC 3.11 10*6/mm3         Hemoglobin 10.5 g/dL         Hematocrit 28.2 %         MCV 90.7 fL         MCH 33.8 pg         MCHC 37.2 g/dL         RDW 22.1 %         RDW-SD 58.3 fl         MPV 11.5 fL         Platelets 73 10*3/mm3         Neutrophil % 87.3 %         Lymphocyte % 4.7 %         Monocyte % 7.2 %         Eosinophil % 0.0 %         Basophil % 0.1 %         Immature Grans % 0.7 %         Neutrophils, Absolute 13.67 10*3/mm3         Lymphocytes, Absolute 0.73 10*3/mm3         Monocytes, Absolute 1.12 10*3/mm3         Eosinophils, Absolute 0.00 10*3/mm3         Basophils, Absolute 0.01 10*3/mm3         Immature Grans, Absolute 0.11 10*3/mm3         nRBC 0.0 /100 WBC       TSH [[id number]]  (Abnormal) Collected: 02/20/24 1605     Specimen: Blood from Arm, Right Updated: 02/20/24 1642       TSH 6.370 uIU/mL       Comprehensive Metabolic Panel [[id number]]  (Abnormal) Collected: 02/20/24 1605     Specimen: Blood from Arm, Right Updated: 02/20/24 1636       Glucose 94 mg/dL         BUN 46 mg/dL         Creatinine 1.96 mg/dL         Sodium 132 mmol/L         Potassium 3.3 mmol/L         Chloride 95 mmol/L         CO2 24.0 mmol/L         Calcium 7.8 mg/dL         Total Protein 6.0 g/dL         Albumin 2.9 g/dL         ALT (SGPT) 15 U/L         AST (SGOT) 35 U/L         Alkaline Phosphatase 137 U/L         Total Bilirubin 1.8 mg/dL         Globulin 3.1 gm/dL         A/G Ratio 0.9 g/dL         BUN/Creatinine Ratio 23.5       Anion Gap 13.0 mmol/L         eGFR 26.3 mL/min/1.73       Narrative:       GFR Normal >60  Chronic Kidney Disease <60  Kidney Failure <15     The GFR formula is only valid for adults with stable renal function between ages 18 and 70.     Magnesium [[id number]]  (Normal) Collected: 02/20/24 1605     Specimen: Blood from Arm, Right Updated: 02/20/24 1631       Magnesium 1.7 mg/dL               Imaging Results (Last 24 Hours)         Procedure Component Value Units Date/Time      XR Shoulder 2+ View Left [[id number]] Collected: 02/20/24 1533       Updated: 02/20/24 1543     Narrative:       EXAMINATION: XR SHOULDER 2+ VW LEFT-  2/20/2024 3:33 PM     HISTORY: pain     COMPARISON: 1/5/2024     TECHNIQUE:  Frontal views with internal and external rotation of the LEFT shoulder  are submitted. 2 total views.     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 There is fracture of  the inferior glenoid anchor screw/post with probable high-grade scapular  notching. CT with metal suppression protocol recommended to evaluate the  degree of scapular notching. I am suspicious that this may represent  grade 4 scapular notching. This is not an acute finding and CT can be  performed as an outpatient to ensure optimized protocol performed. Mild  AC joint arthropathy.           Impression:          1.  Findings suspicious for hardware complication of the total shoulder  arthroplasty. I suspect that there may be grade 4 scapular notching and  the inferior glenoid component screw is fractured. Orthopedic follow-up  nonemergent CT with metal suppression protocol is recommended to further  characterize the degree of scapular notching.     2.  No acute osseous abnormality identified on this exam.     This report was signed and finalized on 2/20/2024 3:40 PM by Dr. Jamal Loya MD.        XR Chest 1 View [[id number]] Collected: 02/20/24 1533       Updated: 02/20/24 1537     Narrative:       EXAM: XR CHEST 1 VW- 2/20/2024 2:27 PM     HISTORY: generalized weakness       COMPARISON: 1/5/2024.     TECHNIQUE: Single frontal radiograph of the chest was obtained.     FINDINGS:     Support Devices: None.     Cardiac and Mediastinal Silhouettes: Heart size borderline enlarged.     Lungs/Pleura: Lung volumes are low. Mild bibasilar parenchymal  opacities. No sizable pleural effusion. No visible pneumothorax.     Osseous structures: No acute osseous finding.     Other: None.        Impression:          Low  lung volumes with mild bibasilar parenchymal opacities, likely  atelectasis.           This report was signed and finalized on 2/20/2024 3:34 PM by Tin Garcia.                      ASSESSMENT AND PLAN:       1.     UTI (urinary tract infection)     2.   COVID-19  3.   Hardware failure shoulder  4.   Lymphedema/cellulitis bilateral lower extremities     Will place her on broad-spectrum IV antibiotics both for her urinary tract infection present on admission as well as cellulitis of bilateral lower extremities.  Patient has been followed by wound care will reconsult them.  Notify orthopedics of admission and failed hardware and shoulder.  Patient is 1 week out from COVID-19 infection, not a candidate for antivirals at this point in time.  Symptomatic treatment with IV antibiotics nebulized treatments and aggressive pulmonary toilet.  Difficult to get accurate history from patient as she is sedated from pain medication this morning.  Patient is very noncompliant with care and as noted in HPI is only been taking her pain medication and seems fixated on her opioids.  Will get palliative care to discuss with family the aggressiveness of treatment plan forward.              Sachin Dia MD  07:15 CST 2/21/2024             Sanjiv Block MD   Physician  Medicine     Progress Notes      Signed     Date of Service: 02/22/24 0857  Creation Time: 02/22/24 0857     Signed       Expand All Collapse All         Daily Progress Note  Brenda Sneed  MRN: [id number] LOS: 0    Admit Date: 2/20/2024 2/22/2024 08:57 CST     Subjective:            Interval History:    Reviewed overnight events and nursing notes.      C/o left shoulder pain, no other complaints.     ROS:  Review of Systems   Constitutional:  Negative for chills and fever.   Respiratory:  Negative for cough, chest tightness and shortness of breath.    Cardiovascular:  Negative for chest pain.   Gastrointestinal:  Negative for abdominal pain,  "diarrhea, nausea and vomiting.         DIET:  Diet: Diabetic Diets; Consistent Carbohydrate; Texture: Regular Texture (IDDSI 7); Fluid Consistency: Thin (IDDSI 0)     Medications:   sodium chloride, 100 mL/hr, Last Rate: 100 mL/hr (240)        albuterol sulfate HFA, 2 puff, Inhalation, TID - RT  apixaban, 5 mg, Oral, Q12H  Biotene Dry Mouth Moisturizing, 1 spray, Mouth/Throat, 5x Daily  bisoprolol (ZEBeta) 5 mg, hydroCHLOROthiazide 6.25 mg for Ziac 5-6.25, , Oral, Daily  busPIRone, 5 mg, Oral, TID With Meals  cefepime, 2,000 mg, Intravenous, Q24H  DULoxetine, 30 mg, Oral, Daily  gabapentin, 100 mg, Oral, Q12H  levothyroxine, 100 mcg, Oral, Q AM  Morphine, 15 mg, Oral, Q12H  Non-Formulary / Patient Supplied Medication, 1 each, Topical, Weekly  nystatin, 1 application , Topical, BID  pramipexole, 0.75 mg, Oral, Nightly  saccharomyces boulardii, 250 mg, Oral, BID  sodium chloride, 10 mL, Intravenous, Q12H  sodium chloride, 10 mL, Intravenous, Q12H              Objective:      Vitals: /54 (BP Location: Right arm, Patient Position: Lying)   Pulse 68   Temp 98 °F (36.7 °C) (Oral)   Resp 16   Ht 160 cm (63\")   Wt 91.6 kg (202 lb)   SpO2 96%   BMI 35.78 kg/m²    Intake/Output Summary (Last 24 hours) at 2024 0857  Last data filed at 2024 0532      Gross per 24 hour   Intake 420 ml   Output 600 ml   Net -180 ml    Temp (24hrs), Av.3 °F (36.8 °C), Min:98 °F (36.7 °C), Max:98.7 °F (37.1 °C)     Glucose:        Lab Results   Component Value Date     POCGLU 100 (H) 2021      Physical Examination:   Physical Exam  Constitutional:       General: She is not in acute distress.     Appearance: She is obese. She is ill-appearing. She is not toxic-appearing.   Cardiovascular:      Rate and Rhythm: Normal rate and regular rhythm.      Heart sounds: No murmur heard.  Pulmonary:      Effort: Pulmonary effort is normal. No respiratory distress.   Abdominal:      Palpations: Abdomen is soft. "   Musculoskeletal:      Right lower leg: Edema present.      Left lower leg: Edema present.            Labs:  Lab Results (last 24 hours)         Procedure Component Value Units Date/Time     CBC Auto Differential [[id number]] Collected: 02/22/24 0833     Specimen: Blood Updated: 02/22/24 0854     Comprehensive Metabolic Panel [[id number]] Collected: 02/22/24 0833     Specimen: Blood Updated: 02/22/24 0854     Folate [[id number]]  (Abnormal) Collected: 02/21/24 1212     Specimen: Blood Updated: 02/21/24 2044       Folate 2.59 ng/mL       Narrative:       Results may be falsely increased if patient taking Biotin.        Vitamin B12 [[id number]]  (Abnormal) Collected: 02/21/24 1212     Specimen: Blood Updated: 02/21/24 2004       Vitamin B-12 1,641 pg/mL       Narrative:       Results may be falsely increased if patient taking Biotin.        Vitamin D,25-Hydroxy [[id number]]  (Abnormal) Collected: 02/21/24 1212     Specimen: Blood Updated: 02/21/24 2004       25 Hydroxy, Vitamin D 14.2 ng/ml       Narrative:       Reference Range for Total Vitamin D 25(OH)      Deficiency <20.0 ng/mL   Insufficiency 21-29 ng/mL   Sufficiency  ng/mL  Toxicity >100 ng/ml        Urine Culture - Urine, Straight Cath [[id number]]  (Abnormal) Collected: 02/20/24 1837     Specimen: Urine from Straight Cath Updated: 02/21/24 1929       Urine Culture 50,000 CFU/mL Gram Negative Bacilli     Narrative:       Colonization of the urinary tract without infection is common. Treatment is discouraged unless the patient is symptomatic, pregnant, or undergoing an invasive urologic procedure.                Imaging:    Radiology results from the last 24 hours:   XR Shoulder 2+ View Left     Result Date: 2/20/2024  EXAMINATION: XR SHOULDER 2+ VW LEFT-  2/20/2024 3:33 PM  HISTORY: pain  COMPARISON: 1/5/2024  TECHNIQUE: Frontal views with internal and external rotation of the LEFT shoulder are submitted. 2 total views.  FINDINGS: Reverse total shoulder  arthroplasty is in place. There is fracture of the inferior glenoid anchor screw/post with probable high-grade scapular notching. CT with metal suppression protocol recommended to evaluate the degree of scapular notching. I am suspicious that this may represent grade 4 scapular notching. This is not an acute finding and CT can be performed as an outpatient to ensure optimized protocol performed. Mild AC joint arthropathy.        Impression:  1.  Findings suspicious for hardware complication of the total shoulder arthroplasty. I suspect that there may be grade 4 scapular notching and the inferior glenoid component screw is fractured. Orthopedic follow-up nonemergent CT with metal suppression protocol is recommended to further characterize the degree of scapular notching.  2.  No acute osseous abnormality identified on this exam.  This report was signed and finalized on 2/20/2024 3:40 PM by Dr. Jamal Loya MD.       XR Chest 1 View     Result Date: 2/20/2024  EXAM: XR CHEST 1 VW- 2/20/2024 2:27 PM  HISTORY: generalized weakness   COMPARISON: 1/5/2024.  TECHNIQUE: Single frontal radiograph of the chest was obtained.  FINDINGS:  Support Devices: None.  Cardiac and Mediastinal Silhouettes: Heart size borderline enlarged.  Lungs/Pleura: Lung volumes are low. Mild bibasilar parenchymal opacities. No sizable pleural effusion. No visible pneumothorax.  Osseous structures: No acute osseous finding.  Other: None.       Impression:  Low lung volumes with mild bibasilar parenchymal opacities, likely atelectasis.    This report was signed and finalized on 2/20/2024 3:34 PM by Tin Garcia.               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 Coli in culture, d/c vanc and continue zosyn, narrow as able.     Possible cellulitis  Hold vanc as above, suspect this is due to stasis dermatitis from lymphedema, continue wound care and lymphedema care.     Left shoulder pain  Concern for prothesis failure on Xray, ortho consultation pending.     Labs are pending for today to evaluate CHRISTINE and other electrolyte abnormalities.        Reviewed treatment plans with the patient and/or family.      Code Status:       Code Status and Medical Interventions:   Ordered at: 02/21/24 1123     Medical Intervention Limits:     NO intubation (DNI)     Other     Code Status (Patient has no pulse and is not breathing):     No CPR (Do Not Attempt to Resuscitate)     Medical Interventions (Patient has pulse or is breathing):     Limited Support     Additional Medical Interventions Limits:     no permanent feeding tube         Electronically signed by Sanjiv Block MD on 2/22/2024 at 08:57 CST                    Daniella Connelly, PT DPT   Physical Therapist  Physical Therapy     Plan of Care      Signed     Date of Service: 02/22/24 1544  Creation Time: 02/22/24 1544     Signed         Goal Outcome Evaluation:  Plan of Care Reviewed With: patient  Progress: no change  Outcome Evaluation: PT eval completed. Pt alert and oriented x4 with c/o L shoulder pain and B feet pain and burning. Treating LUE as NWB while awaiting ortho consult. Pt reqd mod A to perform sup to sit t/f and requires increased time to scoot hips to EOB with assist. Pt demos generalized weakness and edema in BLE. Pt performs sit to stand t/f with min-mod A x2 with HHA on RUE. Pt takes a couple of sidesteps toward HOB before returning to seated position. Pt limited by pain, weakness and dec endurance and will benefit  from skilled PT to improve in these areas. Recommend d/c SNF.        Anticipated Discharge Disposition (PT): skilled nursing facility                  esult       Visible to patient: No (not released)       Next appt: None    Specimen Information: Blood   0 Result Notes            Component  Ref Range & Units 1 d ago  (2/22/24) 3 d ago  (2/20/24) 1 mo ago  (1/12/24) 1 mo ago  (1/12/24) 1 mo ago  (1/9/24) 1 mo ago  (1/8/24) 1 mo ago  (1/8/24)   Glucose  65 - 99 mg/dL 91 94  101 High  78  101 High    BUN  8 - 23 mg/dL 50 High  46 High   21 37 High   45 High    Creatinine  0.57 - 1.00 mg/dL 1.53 High  1.96 High   0.97 1.17 High   1.51 High    Sodium  136 - 145 mmol/L 133 Low  132 Low   137 136  137   Potassium  3.5 - 5.2 mmol/L 3.3 Low  3.3 Low  4.1 3.5 3.8 5.0 CM 3.2 Low    Comment: Slight hemolysis detected by analyzer. Result may be falsely elevated.   Chloride  98 - 107 mmol/L 101 95 Low   104 103  101   CO2  22.0 - 29.0 mmol/L 23.0 24.0  24.0 23.0  23.0   Calcium  8.6 - 10.5 mg/dL 7.6 Low  7.8 Low   8.5 Low  8.2 Low   8.6   Total Protein  6.0 - 8.5 g/dL 4.0 Low  6.0        Albumin  3.5 - 5.2 g/dL 1.8                                  Component  Ref Range & Units 1 d ago  (2/22/24) 3 d ago  (2/20/24) 1 mo ago  (1/9/24) 1 mo ago  (1/9/24) 1 mo ago  (1/8/24) 1 mo ago  (1/7/24) 1 mo ago  (1/6/24)   WBC  3.40 - 10.80 10*3/mm3 7.77 15.64 High   4.47 8.66 10.01 7.65   RBC  3.77 - 5.28 10*6/mm3 2.72 Low  3.11 Low   3.04 Low  2.99 Low  2.96 Low  3.20 Low    Hemoglobin  12.0 - 15.9 g/dL 7.8 Low  10.5 Low   8.3 Low  8.4 Low  8.7 Low  8.8 Low    Hematocrit  34.0 - 46.6 % 23.0 Low  28.2 Low   26.0 Low  25.1 Low  25.6 Low  27.0 Low    MCV  79.0 - 97.0 fL 84.6 90.7  85.5 83.9 86.5 84.4   MCH  26.6 - 33.0 pg 28.7 33.8 High   27.3 28.1 29.4 27.5   MCHC  31.5 - 35.7 g/dL 33.9 37.2 High   31.9 33.5 34.0 32.6   RDW  12.3 - 15.4 % 18.5 High  22.1 High   18.2 High  17.4 High  18.1 High  16.9 High    RDW-SD  37.0 - 54.0 fl 56.0 High                  Order: [id number]  Status: Final result       Visible to patient: No (not released)       Next appt: None    Specimen Information: Blood   0 Result Notes            Component  Ref Range & Units 1 d ago  (2/22/24) 1 d ago  (2/22/24) 3 d ago  (2/20/24) 1 mo ago  (1/12/24) 1 mo ago  (1/12/24) 1 mo ago  (1/9/24) 1 mo ago  (1/8/24)   Potassium  3.5 - 5.2 mmol/L 4.0 3.3 Low  CM 3.3 Low  4                 Current Facility-Administered Medications   Medication Dose Route Frequency Provider Last Rate Last Admin    albuterol sulfate HFA (PROVENTIL HFA;VENTOLIN HFA;PROAIR HFA) inhaler 2 puff  2 puff Inhalation TID - RT Sachin Dia MD   2 puff at 02/23/24 0735    apixaban (ELIQUIS) tablet 5 mg  5 mg Oral Q12H Sachin Dia MD   5 mg at 02/23/24 0946    Biotene Dry Mouth Moisturizing (BIOTENE) 1 spray  1 spray Mouth/Throat 5x Daily Earnestine Gan APRN   1 spray at 02/23/24 0630    sennosides-docusate (PERICOLACE) 8.6-50 MG per tablet 2 tablet  2 tablet Oral BID PRN Sachin Dia MD        And    polyethylene glycol (MIRALAX) packet 17 g  17 g Oral Daily PRN Sachin Dia MD        And    bisacodyl (DULCOLAX) EC tablet 5 mg  5 mg Oral Daily PRN Sachin Dia MD        And    bisacodyl (DULCOLAX) suppository 10 mg  10 mg Rectal Daily PRN Sachin Dia MD        busPIRone (BUSPAR) tablet 5 mg  5 mg Oral TID With Meals Earnestine Gan APRN   5 mg at 02/23/24 0946    Calcium Replacement - Follow Nurse / BPA Driven Protocol   Does not apply Sachin Garrido MD        cyclobenzaprine (FLEXERIL) tablet 10 mg  10 mg Oral TID PRN Sachin Dia MD        docusate sodium (COLACE) capsule 100 mg  100 mg Oral BID PRN Sachin Dia MD        DULoxetine (CYMBALTA) DR capsule 30 mg  30 mg Oral Daily Earnestine Gan APRN   30 mg at 02/23/24 0946    furosemide (LASIX) tablet 40 mg  40 mg Oral Daily PRN Sachin Dia MD        gabapentin  (NEURONTIN) capsule 100 mg  100 mg Oral Q12H Earnestine Gan APRN   100 mg at 02/23/24 0946    levothyroxine (SYNTHROID, LEVOTHROID) tablet 100 mcg  100 mcg Oral Q AM Sachin Dia MD   100 mcg at 02/23/24 0559    Magnesium Standard Dose Replacement - Follow Nurse / BPA Driven Protocol   Does not apply PRN Sachin Dia MD        Morphine (MS CONTIN) 12 hr tablet 15 mg  15 mg Oral Daily Sanjiv Block MD   15 mg at 02/23/24 0946    Morphine (MSIR) tablet 15 mg  15 mg Oral Q6H PRN Earnestine Gan APRN        Or    Morphine (MSIR) tablet 7.5 mg  7.5 mg Oral Q6H PRN Earnestine Gan APRN   7.5 mg at 02/22/24 0941    Non-Formulary / Patient Supplied Medication  1 each Topical Weekly Earnestine Gan APRN        nystatin (MYCOSTATIN) powder 1 Application  1 application  Topical BID Sachin Dia MD   1 Application at 02/23/24 0946    ondansetron (ZOFRAN) injection 4 mg  4 mg Intravenous Q6H PRN Elba Rockwell APRN        Phosphorus Replacement - Follow Nurse / BPA Driven Protocol   Does not apply PRN Sachin Dia MD        Potassium Replacement - Follow Nurse / BPA Driven Protocol   Does not apply PRN Sachin Dia MD        pramipexole (MIRAPEX) tablet 0.75 mg  0.75 mg Oral Nightly Sachin Dia MD   0.75 mg at 02/22/24 2117    saccharomyces boulardii (FLORASTOR) capsule 250 mg  250 mg Oral BID Sachin Dia MD   250 mg at 02/23/24 0946    sodium chloride 0.9 % flush 10 mL  10 mL Intravenous Q12H Elba Rockwell APRN   10 mL at 02/23/24 0946    sodium chloride 0.9 % flush 10 mL  10 mL Intravenous PRN Elba Rockwell APRN        sodium chloride 0.9 % flush 10 mL  10 mL Intravenous Q12H Sachin Dia MD   10 mL at 02/23/24 0946    sodium chloride 0.9 % flush 10 mL  10 mL Intravenous PRN Sachin Dia MD        sodium chloride 0.9 % infusion 40 mL  40 mL Intravenous PRN Elba Rockwell APRN        sodium chloride 0.9 % infusion 40 mL   40 mL Intravenous PRN Sachin Dia MD

## 2024-02-23 NOTE — PLAN OF CARE
Goal Outcome Evaluation:  Plan of Care Reviewed With: caregiver        Progress: improving  Outcome Evaluation: Ntn follow up. Oral intake 37.5% of the past six meals. MSA completed. Soft to chew,chopped meats,nectar thick liquids. Boost Glucose Control with lunch. Mighty shakes with breakfast and dinner. Con to follow for plan of care.

## 2024-02-23 NOTE — PLAN OF CARE
Goal Outcome Evaluation:  Plan of Care Reviewed With: patient        Progress: improving  Outcome Evaluation: pt cont to c/o pain L shld, BLE 8/10, pt trans to EOB mod assist, with HOB elevated, sat EOB 20 minutes cga-sba, sit-stand mod 2, pt stood x 2, trans back to bed mod-max 2, pt would benefit from SNF

## 2024-02-24 ENCOUNTER — APPOINTMENT (OUTPATIENT)
Dept: GENERAL RADIOLOGY | Facility: HOSPITAL | Age: 74
DRG: 689 | End: 2024-02-24
Payer: MEDICARE

## 2024-02-24 LAB
DEPRECATED RDW RBC AUTO: 58.6 FL (ref 37–54)
ERYTHROCYTE [DISTWIDTH] IN BLOOD BY AUTOMATED COUNT: 19.4 % (ref 12.3–15.4)
HCT VFR BLD AUTO: 26.4 % (ref 34–46.6)
HGB BLD-MCNC: 8.8 G/DL (ref 12–15.9)
MCH RBC QN AUTO: 29.3 PG (ref 26.6–33)
MCHC RBC AUTO-ENTMCNC: 33.3 G/DL (ref 31.5–35.7)
MCV RBC AUTO: 88 FL (ref 79–97)
PLATELET # BLD AUTO: 69 10*3/MM3 (ref 140–450)
PMV BLD AUTO: 10.9 FL (ref 6–12)
RBC # BLD AUTO: 3 10*6/MM3 (ref 3.77–5.28)
WBC NRBC COR # BLD AUTO: 8.56 10*3/MM3 (ref 3.4–10.8)

## 2024-02-24 PROCEDURE — 97530 THERAPEUTIC ACTIVITIES: CPT

## 2024-02-24 PROCEDURE — 94664 DEMO&/EVAL PT USE INHALER: CPT

## 2024-02-24 PROCEDURE — 97110 THERAPEUTIC EXERCISES: CPT

## 2024-02-24 PROCEDURE — 25010000002 CEFTAZIDIME PER 500 MG: Performed by: INTERNAL MEDICINE

## 2024-02-24 PROCEDURE — 85027 COMPLETE CBC AUTOMATED: CPT

## 2024-02-24 PROCEDURE — 73060 X-RAY EXAM OF HUMERUS: CPT

## 2024-02-24 PROCEDURE — 94799 UNLISTED PULMONARY SVC/PX: CPT

## 2024-02-24 PROCEDURE — 73070 X-RAY EXAM OF ELBOW: CPT

## 2024-02-24 PROCEDURE — 99232 SBSQ HOSP IP/OBS MODERATE 35: CPT | Performed by: INTERNAL MEDICINE

## 2024-02-24 PROCEDURE — 71045 X-RAY EXAM CHEST 1 VIEW: CPT

## 2024-02-24 RX ADMIN — ALBUTEROL SULFATE 2 PUFF: 90 AEROSOL, METERED RESPIRATORY (INHALATION) at 20:12

## 2024-02-24 RX ADMIN — Medication 250 MG: at 08:26

## 2024-02-24 RX ADMIN — Medication 250 MG: at 22:26

## 2024-02-24 RX ADMIN — Medication 1 SPRAY: at 22:26

## 2024-02-24 RX ADMIN — BUSPIRONE HYDROCHLORIDE 5 MG: 5 TABLET ORAL at 14:02

## 2024-02-24 RX ADMIN — Medication 1 SPRAY: at 11:01

## 2024-02-24 RX ADMIN — CEFTAZIDIME 1000 MG: 1 INJECTION, POWDER, FOR SOLUTION INTRAMUSCULAR; INTRAVENOUS at 14:02

## 2024-02-24 RX ADMIN — DULOXETINE HYDROCHLORIDE 30 MG: 30 CAPSULE, DELAYED RELEASE ORAL at 08:25

## 2024-02-24 RX ADMIN — Medication 10 ML: at 22:28

## 2024-02-24 RX ADMIN — MORPHINE SULFATE 15 MG: 15 TABLET, FILM COATED, EXTENDED RELEASE ORAL at 08:36

## 2024-02-24 RX ADMIN — Medication 1 SPRAY: at 14:02

## 2024-02-24 RX ADMIN — GABAPENTIN 100 MG: 100 CAPSULE ORAL at 08:36

## 2024-02-24 RX ADMIN — Medication 1 SPRAY: at 06:13

## 2024-02-24 RX ADMIN — APIXABAN 5 MG: 5 TABLET, FILM COATED ORAL at 22:26

## 2024-02-24 RX ADMIN — GABAPENTIN 100 MG: 100 CAPSULE ORAL at 22:39

## 2024-02-24 RX ADMIN — NYSTATIN 1 APPLICATION: 100000 POWDER TOPICAL at 08:26

## 2024-02-24 RX ADMIN — LIDOCAINE 1 PATCH: 4 PATCH TOPICAL at 08:25

## 2024-02-24 RX ADMIN — PRAMIPEXOLE DIHYDROCHLORIDE 0.75 MG: 0.25 TABLET ORAL at 22:26

## 2024-02-24 RX ADMIN — Medication 1 SPRAY: at 17:36

## 2024-02-24 RX ADMIN — ALBUTEROL SULFATE 2 PUFF: 90 AEROSOL, METERED RESPIRATORY (INHALATION) at 14:52

## 2024-02-24 RX ADMIN — Medication 10 ML: at 08:26

## 2024-02-24 RX ADMIN — ALBUTEROL SULFATE 2 PUFF: 90 AEROSOL, METERED RESPIRATORY (INHALATION) at 07:04

## 2024-02-24 RX ADMIN — BUSPIRONE HYDROCHLORIDE 5 MG: 5 TABLET ORAL at 08:25

## 2024-02-24 RX ADMIN — NYSTATIN 1 APPLICATION: 100000 POWDER TOPICAL at 22:26

## 2024-02-24 RX ADMIN — LEVOTHYROXINE SODIUM 100 MCG: 100 TABLET ORAL at 05:30

## 2024-02-24 NOTE — PLAN OF CARE
Goal Outcome Evaluation: Pt is lethargic with some intermittent confusion, vitals are stable, no s/s of distress, pt screams out in severe pain when moved, takes meds crushed in applesauce, call light is in bed with pt but pt does not use it           Progress: no change

## 2024-02-24 NOTE — CONSULTS
"INFECTIOUS DISEASES CONSULT NOTE    Patient:  Brenda Sneed 73 y.o. female  ROOM # 358/1  YOB: 1950  MRN: [id number]  [facility]:  [id number]  Admit date: 2/20/2024   Admitting Physician: Sachin Dia MD  Primary Care Physician: Sachin Dia MD  REFERRING PROVIDER: Sachin Dia MD    Reason for Consultation: \"MDRO UTI\"    History of Present Illness/Chief Complaint: 73-year-old woman.  Unable to get significant history from the patient.  She is somnolent at the moment.  She does not look uncomfortable.  She does not appear dyspneic.  She had presented with generalized weakness.  She follows with pain management for chronic back and neck pain.  She has developed some increasing lower extremity lymphedema.  She had tested positive for COVID-19 recently at an outpatient appointment.  Infectious disease asked to evaluate and offer recommendations.    Current Scheduled Medications:   albuterol sulfate HFA, 2 puff, Inhalation, TID - RT  apixaban, 5 mg, Oral, Q12H  Biotene Dry Mouth Moisturizing, 1 spray, Mouth/Throat, 5x Daily  busPIRone, 5 mg, Oral, TID With Meals  DULoxetine, 30 mg, Oral, Daily  gabapentin, 100 mg, Oral, Q12H  levothyroxine, 100 mcg, Oral, Q AM  Lidocaine, 1 patch, Transdermal, Q24H  Morphine, 15 mg, Oral, Daily  Non-Formulary / Patient Supplied Medication, 1 each, Topical, Weekly  nystatin, 1 application , Topical, BID  pramipexole, 0.75 mg, Oral, Nightly  saccharomyces boulardii, 250 mg, Oral, BID  sodium chloride, 10 mL, Intravenous, Q12H  sodium chloride, 10 mL, Intravenous, Q12H      Current PRN Medications:    senna-docusate sodium **AND** polyethylene glycol **AND** bisacodyl **AND** bisacodyl    Calcium Replacement - Follow Nurse / BPA Driven Protocol    cyclobenzaprine    docusate sodium    furosemide    Magnesium Standard Dose Replacement - Follow Nurse / BPA Driven Protocol    Morphine **OR** Morphine    ondansetron    Phosphorus Replacement - Follow " "Nurse / BPA Driven Protocol    Potassium Replacement - Follow Nurse / BPA Driven Protocol    sodium chloride    sodium chloride    sodium chloride    sodium chloride    Allergies:    Allergies   Allergen Reactions    Codeine Itching and Rash     SEVERE RASH/ITCHING (TOLERATES PERCOCET)    Amoxicillin Other (See Comments)     unknown    Levaquin [Levofloxacin] Other (See Comments)     unknown    Benadryl [Diphenhydramine] Other (See Comments)     KEEPS PATIENT AWAKE      Past Medical History: Nephrolithiasis. Hypertension. Lymphedema. Depression. Chronic back pain. Thyroid dysfunction. Cataract.     Past Surgical History: Back surgery.  Cholecystectomy.  Abscess drainage.  Corneal transplant.  Lumbar fusion.  Hip arthroplasty.  Knee arthroplasty.  Shoulder arthroplasty and resection of distal clavicle.    Social History: .  No alcohol use.  No tobacco use.  No illicit drug use.    Family History: Breast cancer    Exposure History: Unobtainable from patient    Review of Systems see HPI    Vital Signs:  /94 (BP Location: Right arm, Patient Position: Lying) Comment: Nurse notified  Pulse 74   Temp 98.8 °F (37.1 °C) (Axillary)   Resp 20   Ht 160 cm (63\")   Wt 91.6 kg (202 lb)   SpO2 98%   BMI 35.78 kg/m²  Temp (24hrs), Av.5 °F (36.9 °C), Min:98 °F (36.7 °C), Max:98.9 °F (37.2 °C)    Physical Exam  Vital signs - reviewed.  Line/IV site - No erythema or tenderness.  Somnolent.  She will arouse.  She will open eyes.  She is not being verbal.  She will move all extremities.  She seems diffusely weak.  Lungs with decreased breath sounds in the bases  No significant crackles or wheezes  Heart without murmur  Abdomen is nondistended bowel sounds are present.  Extremities lymphedema  Skin without rash    Lab Results:  CBC:   Results from last 7 days   Lab Units 24  0833 24  1605   WBC 10*3/mm3 7.77 15.64*   HEMOGLOBIN g/dL 7.8* 10.5*   HEMATOCRIT % 23.0* 28.2*   PLATELETS 10*3/mm3 60* 73* "     CMP:   Results from last 7 days   Lab Units 02/22/24  1908 02/22/24  0833 02/20/24  1605   SODIUM mmol/L  --  133* 132*   POTASSIUM mmol/L 4.0 3.3* 3.3*   CHLORIDE mmol/L  --  101 95*   CO2 mmol/L  --  23.0 24.0   BUN mg/dL  --  50* 46*   CREATININE mg/dL  --  1.53* 1.96*   CALCIUM mg/dL  --  7.6* 7.8*   BILIRUBIN mg/dL  --  1.9* 1.8*   ALK PHOS U/L  --  99 137*   ALT (SGPT) U/L  --  8 15   AST (SGOT) U/L  --  18 35*   GLUCOSE mg/dL  --  91 94     Profile of recent urinalysis:            Component  Ref Range & Units 3 d ago  (2/20/24) 1 mo ago  (1/8/24) 1 mo ago  (1/5/24) 4 mo ago  (9/30/23) 9 mo ago  (5/26/23) 9 mo ago  (5/23/23) 1 yr ago  (7/6/22)   RBC, UA  None Seen, 0-2 /HPF 3-5 Abnormal  6-10 Abnormal  0-2 0-2 Abnormal  R 13-20 Abnormal  R 21-30 Abnormal  R 0-2 Abnormal  R   WBC, UA  None Seen, 0-2 /HPF 3-5 Abnormal  3-5 Abnormal  CM 11-20 Abnormal  21-30 Abnormal  R 13-20 Abnormal  R 31-50 Abnormal  R 3-5 Abnormal  R, CM   Comment: Urine culture not indicated.   Bacteria, UA  None Seen /HPF 4+ Abnormal  None Seen 4+ Abnormal  4+ Abnormal  Trace Abnormal  2+ Abnormal  1+ Abnormal    Squamous Epithelial Cells, UA  None Seen, 0-2 /HPF 0-2 0-2 3-6 Abnormal  None Seen 3-6 Abnormal  None Seen 7-12 Abnormal    Hyaline Casts, UA  None Seen /LPF 7-12 0-2 0-2 3-6 7-12 3-6 None        Susceptibility   Pseudomonas aeruginosa MDRO Enterococcus faecalis     CHRISTIANO CHRISTIANO     Ampicillin   <=2 ug/ml Susceptible     Cefepime 8 ug/ml Susceptible       Ceftazidime 4 ug/ml Susceptible       Ciprofloxacin <=0.25 ug/ml Susceptible       Levofloxacin 2 ug/ml Intermediate >=8 ug/ml Resistant     Meropenem 4 ug/ml Intermediate       Nitrofurantoin   <=16 ug/ml Susceptible     Piperacillin + Tazobactam 8 ug/ml Susceptible       Tobramycin <=1 ug/ml Susceptible       Vancomycin   1 ug/ml Susceptible      Radiology:   Chest x-ray February 20, 2024:  IMPRESSION:  Low lung volumes with mild bibasilar parenchymal opacities,  likely  atelectasis    Additional Studies Reviewed:     Impression:   1.  Possible UTI-Pseudomonas aeruginosa and Enterococcus on urine culture  2.  Recent COVID 19 infection  3.  Lethargy/opiate use disorder  4.  Amoxicillin allergy    Recommendations:    Continue supportive care  Minimize sedating medications  Empiric treatment with Ceftazidime  Monitor clinical course    Clint Johnson MD  02/23/24  18:20 CST

## 2024-02-24 NOTE — PLAN OF CARE
Goal Outcome Evaluation:  Plan of Care Reviewed With: patient        Progress: no change  Outcome Evaluation: IVFs, dc'd, Lasix given PRN for edema, MSIR given PRN for pain. Speech eval done for coughing with liquids, see their recommendations. Patient refuses to turn or reposition entirety of shift, except when she stood with therapy. ID consulted, awaiting recommendations.

## 2024-02-24 NOTE — PLAN OF CARE
Goal Outcome Evaluation:  Plan of Care Reviewed With: patient        Progress: no change  Outcome Evaluation: PT tx completed. Pt in bed, lethargic this time. Answeres questions somewhat appropriately with eyes closed. initiated supine to sit, ultimately required max x1 to complete. CGA/min x1 for sitting balance d/t posterior lean. Required assist to complete BLE ROM while seated, cues for attention to task. Unable to attempt standing, d/t pt keeping her eyes closed. Returned to bed with max x1. LUE elevated with pillows. Will follow.

## 2024-02-24 NOTE — PROGRESS NOTES
Infectious Diseases Progress Note    Patient:  Brenda Sneed  YOB: 1950  MRN: [id number]   Admit date: 2/20/2024   Admitting Physician: Sachin Dia MD  Primary Care Physician: Sachin Dia MD    Chief Complaint/Interval History: She is without fever.  She is on room air.  She appears to be breathing comfortably at present.  She is more arousable.  She is little more conversant today.  No productive cough.  Has some left shoulder discomfort.  No nausea or vomiting.  Discussed with Dr. Robledo.    Intake/Output Summary (Last 24 hours) at 2/24/2024 1342  Last data filed at 2/24/2024 0900  Gross per 24 hour   Intake 240 ml   Output --   Net 240 ml     Allergies:   Allergies   Allergen Reactions    Codeine Itching and Rash     SEVERE RASH/ITCHING (TOLERATES PERCOCET)    Amoxicillin Other (See Comments)     unknown    Levaquin [Levofloxacin] Other (See Comments)     unknown    Benadryl [Diphenhydramine] Other (See Comments)     KEEPS PATIENT AWAKE      Current Scheduled Medications:   albuterol sulfate HFA, 2 puff, Inhalation, TID - RT  apixaban, 5 mg, Oral, Q12H  Biotene Dry Mouth Moisturizing, 1 spray, Mouth/Throat, 5x Daily  busPIRone, 5 mg, Oral, TID With Meals  cefTAZidime, 1,000 mg, Intravenous, Q12H  DULoxetine, 30 mg, Oral, Daily  gabapentin, 100 mg, Oral, Q12H  levothyroxine, 100 mcg, Oral, Q AM  Lidocaine, 1 patch, Transdermal, Q24H  Morphine, 15 mg, Oral, Daily  Non-Formulary / Patient Supplied Medication, 1 each, Topical, Weekly  nystatin, 1 application , Topical, BID  pramipexole, 0.75 mg, Oral, Nightly  saccharomyces boulardii, 250 mg, Oral, BID  sodium chloride, 10 mL, Intravenous, Q12H  sodium chloride, 10 mL, Intravenous, Q12H          Current PRN Medications:    senna-docusate sodium **AND** polyethylene glycol **AND** bisacodyl **AND** bisacodyl    Calcium Replacement - Follow Nurse / BPA Driven Protocol    cyclobenzaprine    docusate sodium    furosemide     "Magnesium Standard Dose Replacement - Follow Nurse / BPA Driven Protocol    Morphine **OR** Morphine    ondansetron    Phosphorus Replacement - Follow Nurse / BPA Driven Protocol    Potassium Replacement - Follow Nurse / BPA Driven Protocol    sodium chloride    sodium chloride    sodium chloride    sodium chloride    Review of Systems see HPI    Vital Signs:  Temp (24hrs), Av.2 °F (36.8 °C), Min:97.6 °F (36.4 °C), Max:98.8 °F (37.1 °C)    /51 (BP Location: Right arm, Patient Position: Lying)   Pulse 78   Temp 98.6 °F (37 °C) (Axillary)   Resp 18   Ht 160 cm (63\")   Wt 91.6 kg (202 lb)   SpO2 96%   BMI 35.78 kg/m²     Physical Exam  Vital signs - reviewed.  Line/IV site - No erythema, warmth, induration, or tenderness.  No respiratory distress at rest  She is comfortable appearing  Soreness left elbow area.  Some swelling diffusely left upper extremity a little worse relative to other extremities.    Lab Results:  CBC:   Results from last 7 days   Lab Units 24  0833 24  1605   WBC 10*3/mm3 7.77 15.64*   HEMOGLOBIN g/dL 7.8* 10.5*   HEMATOCRIT % 23.0* 28.2*   PLATELETS 10*3/mm3 60* 73*     BMP:  Results from last 7 days   Lab Units 24  1908 24  0833 24  1605   SODIUM mmol/L  --  133* 132*   POTASSIUM mmol/L 4.0 3.3* 3.3*   CHLORIDE mmol/L  --  101 95*   CO2 mmol/L  --  23.0 24.0   BUN mg/dL  --  50* 46*   CREATININE mg/dL  --  1.53* 1.96*   GLUCOSE mg/dL  --  91 94   CALCIUM mg/dL  --  7.6* 7.8*   ALT (SGPT) U/L  --  8 15     Culture Results:   Urine Culture   Date Value Ref Range Status   2024 50,000 CFU/mL Pseudomonas aeruginosa MDRO (A)  Final     Comment:       Multi drug resistant Pseudomonas, patient may be an isolation risk.  Multi drug resistant Pseudomonas, patient may be an isolation risk.   2024 50,000 CFU/mL Enterococcus faecalis (A)  Final     Reviewed susceptibility testing  Susceptibility   Pseudomonas aeruginosa MDRO Enterococcus faecalis     " CHRISTIANO CHRISTIANO     Ampicillin   <=2 ug/ml Susceptible     Cefepime 8 ug/ml Susceptible       Ceftazidime 4 ug/ml Susceptible       Ciprofloxacin <=0.25 ug/ml Susceptible       Levofloxacin 2 ug/ml Intermediate >=8 ug/ml Resistant     Meropenem 4 ug/ml Intermediate       Nitrofurantoin   <=16 ug/ml Susceptible     Piperacillin + Tazobactam 8 ug/ml Susceptible       Tobramycin <=1 ug/ml Susceptible       Vancomycin   1 ug/ml Susceptible      Radiology: None  Additional Studies Reviewed: None    Impression:   1.  Probable Pseudomonas UTI-on treatment with Ceftazidime.  Organism should be susceptible based on testing.  2.  Left elbow area discomfort and left arm swelling-further evaluation today pending including ultrasound and x-rays.  3.  Recent COVID-19 infection-seems stable from pulmonary standpoint at present  4.  Lethargy/opiate use-more conversant and responsive today.    5.  Amoxicillin allergy-tolerating cephalosporin without difficulty    Recommendations:   Continue Ceftazidime  No change in antibiotic treatment  Left upper extremity ultrasound and left elbow x-rays today  Continue to follow    Clint Johnson MD

## 2024-02-24 NOTE — PLAN OF CARE
Goal Outcome Evaluation:  Plan of Care Reviewed With: patient        Progress: no change       Patient is AOx2-4, waxing and waning confusion. Patient is lethargic, quickly responds to pain becoming combative. Must have patient alert as possible for med pass, coughs after swallowing. No complaints of pain. Generalized edema, leaking from left arm. Voiding via purewick, refusing to turn, on RA. Patient is being treated for UTI and failed hardware in L shoulder. PPE worn, safety precautions maintained, and call light within reach.

## 2024-02-24 NOTE — PROGRESS NOTES
Chief Complaint: UTI, lethargy      Interval History:     Patient is sitting up in bed and is lethargic this morning.  Attempting to eat breakfast.  Continues to have increased weakness and fatigue.  Overall respiratory status appears stable.  Continues to plaint of shoulder pain.  Worsening anemia on labs 2 days ago.    Review of Systems:   General ROS: negative for - chills or fever  Respiratory ROS: no cough, shortness of breath, or wheezing  Cardiovascular ROS: no chest pain or dyspnea on exertion  Gastrointestinal ROS: negative for - abdominal pain, diarrhea or nausea/vomiting       Vital Signs  Temp:  [97.6 °F (36.4 °C)-98.8 °F (37.1 °C)] 98 °F (36.7 °C)  Heart Rate:  [73-98] 75  Resp:  [18-22] 18  BP: (101-137)/(46-94) 109/79    Intake/Output Summary (Last 24 hours) at 2/24/2024 1031  Last data filed at 2/24/2024 0900  Gross per 24 hour   Intake 240 ml   Output 500 ml   Net -260 ml       Physical Exam:     General Appearance:  Opens eyes to voice.  Lethargic.   Head:    N/A   Throat:   N/A   Neck:   N/A   Lungs:     Clear to auscultation,respirations regular, even and                  unlabored    Heart:    Regular rhythm and normal rate, normal S1 and S2, no            murmur, no gallop, no rub   Abdomen:     Normal bowel sounds, no masses, no organomegaly, soft        non-tender, non-distended, no guarding, no rebound                tenderness   Extremities: Bilateral upper extremity lymphedema   Pulses:   N/A   Skin:   N/A   Lymph nodes:   N/A   Neurologic:   N/A          Lab Review:       Lab Results (last 24 hours)       ** No results found for the last 24 hours. **          Cultures:    Urine Culture   Date Value Ref Range Status   02/20/2024 50,000 CFU/mL Pseudomonas aeruginosa MDRO (A)  Final     Comment:       Multi drug resistant Pseudomonas, patient may be an isolation risk.  Multi drug resistant Pseudomonas, patient may be an isolation risk.   02/20/2024 50,000 CFU/mL Enterococcus faecalis (A)   Final       Radiology Review:  Imaging Results (Last 24 Hours)       ** No results found for the last 24 hours. **                     ASSESSMENT:      UTI (urinary tract infection)    Secondary osteoarthritis of left shoulder due to rotator cuff arthropathy    Cellulitis of right lower extremity    HTN (hypertension)    Anxiety associated with depression    Cellulitis of left lower extremity    Acute cystitis with hematuria    Candidiasis of skin    CHRISTINE (acute kidney injury)    Chronic kidney disease, stage 3a    COVID-19 virus detected      PLAN:    1.  Urine culture positive MDRO Pseudomonas.  Infectious diseases been consulted.  Was previously on vancomycin and Zosyn.  2.  Persistent left shoulder pain and concern for prosthesis failure on x-ray.  Deferring intervention until medically stable.  3.  Chronic opiate use.  Will monitor on decreased dose of morphine.  4.  Stasis dermatitis bilateral upper extremities from lymphedema.  Wound care and lymphedema care following.  5.  She is DNR, DNI.  Palliative care following.  Awaiting skilled nursing facility placement next week.  6.  Worsening anemia on labs 2 days ago.  Will hold Eliquis today and get stat CBC this morning.    Further orders per Dr. Robledo.      Tylor Cornelius, APRN  02/24/24  10:31 CST        Part of this note may be an electronic transcription/translation of spoken language to printed text using the Dragon Dictation System.

## 2024-02-24 NOTE — THERAPY TREATMENT NOTE
Acute Care - Physical Therapy Treatment Note  [facility]     Patient Name: Brenda Sneed  : 1950  MRN: [id number]  Today's Date: 2024      Visit Dx:     ICD-10-CM ICD-9-CM   1. Urinary tract infection without hematuria, site unspecified  N39.0 599.0   2. Generalized weakness  R53.1 780.79   3. Chronic left shoulder pain  M25.512 719.41    G89.29 338.29   4. Venous stasis ulcer of other part of right lower leg with fat layer exposed, unspecified whether varicose veins present  I83.018 454.0    L97.812    5. Venous stasis ulcer of other part of left lower leg with fat layer exposed, unspecified whether varicose veins present  I83.028 454.0    L97.822    6. Impaired mobility [Z74.09]  Z74.09 799.89   7. Oropharyngeal dysphagia  R13.12 787.22     Patient Active Problem List   Diagnosis    Spinal stenosis, lumbar    Secondary osteoarthritis of left shoulder due to rotator cuff arthropathy    Cellulitis of right lower extremity    HTN (hypertension)    Obesity, Class III, BMI 40-49.9 (morbid obesity)    Anxiety associated with depression    Traumatic rhabdomyolysis    Traumatic rhabdomyolysis, initial encounter    Laceration of left lower extremity    Cellulitis    Cellulitis of left lower extremity    Thrombocytopenia    Acute cystitis with hematuria    Sepsis    Candidiasis of skin    Acquired hypothyroidism    CHRISTINE (acute kidney injury)    Chronic pain syndrome    Hyponatremia    Chronic kidney disease, stage 3a    UTI (urinary tract infection)    COVID-19 virus detected     Past Medical History:   Diagnosis Date    Arthritis     Bronchitis     Cataract     right eye , cornea implant    Chronic back pain     Depression     Disease of thyroid gland     Edema     lower extremities    Hypertension     Kidney stones     Lymphedema      Past Surgical History:   Procedure Laterality Date    BACK SURGERY       ([place]), 2013     CHOLECYSTECTOMY OPEN      CORNEAL TRANSPLANT Right     INCISION AND  DRAINAGE ABSCESS Left 4/19/2023    Procedure: INCISION AND DRAINAGE ABSCESS left leg;  Surgeon: Sarita Vazquez MD;  Location:  [facility];  Service: General;  Laterality: Left;    LACERATION REPAIR Left 4/7/2023    Procedure: washout and closure left leg wound ;  Surgeon: Sarita Vazquez MD;  Location:  [facility];  Service: General;  Laterality: Left;    LUMBAR FUSION Left 8/2/2017    Procedure: LEFT LUMBAR LATERAL INTERBODY FUSION WITH INSTRUMENTATION  L1-2;  Surgeon: MARCELINO Wright MD;  Location:  [facility];  Service:     NECK SURGERY  2010    [place]    TOTAL HIP ARTHROPLASTY Right 2014    DR. KENDRICK     TOTAL KNEE ARTHROPLASTY Right 2000    TOTAL KNEE ARTHROPLASTY Left 2002    TOTAL SHOULDER ARTHROPLASTY W/ DISTAL CLAVICLE EXCISION Left 6/1/2020    Procedure: LEFT REVERSE TOTAL SHOULDER REPLACEMENT;  Surgeon: Saravanan Calvin MD;  Location:  [facility];  Service: Orthopedics;  Laterality: Left;     PT Assessment (last 12 hours)       PT Evaluation and Treatment       Row Name 02/24/24 0954          Physical Therapy Time and Intention    Subjective Information complains of;fatigue;pain  lethargic  -LY     Document Type therapy note (daily note)  -LY     Mode of Treatment physical therapy  -LY       Row Name 02/24/24 0954          General Information    Existing Precautions/Restrictions fall;left;non-weight bearing  -LY       Row Name 02/24/24 0954          Pain    Additional Documentation Pain Scale: FACES Pre/Post-Treatment (Group)  -LY       Row Name 02/24/24 0954          Pain Scale: FACES Pre/Post-Treatment    Pain: FACES Scale, Pretreatment 0-->no hurt  -LY     Posttreatment Pain Rating 6-->hurts even more  -LY     Pain Location generalized  -LY     Pre/Posttreatment Pain Comment unable to give pain rating, yells out with movement with LUE and BLEs  -LY       Row Name 02/24/24 0954          Bed Mobility    Supine-Sit [place] (Bed Mobility) verbal cues;maximum assist (25% patient effort)  -LY      Sit-Supine [place] (Bed Mobility) verbal cues;maximum assist (25% patient effort)  -LY     Assistive Device (Bed Mobility) bed rails;draw sheet;head of bed elevated  -LY       Row Name 02/24/24 0954          Transfers    Comment, (Transfers) unable to attempt d/t lethargy  -LY       Row Name 02/24/24 0954          Balance    Comment, Balance CGA/min x1 for sitting balance d/t posterior lean  -LY       Row Name 02/24/24 0954          Aerobic Exercise    Comment, Aerobic Exercise (Therapeutic Exercise) BLE AAROM x10 reps seated EOB, cues for attention to task  -LY       Row Name             Wound 02/20/24 Right lower leg    Wound - Properties Group Placement Date: 02/20/24  -CK Side: Right  -CK Orientation: lower  -CK Location: leg  -CK    Retired Wound - Properties Group Placement Date: 02/20/24  -CK Side: Right  -CK Orientation: lower  -CK Location: leg  -CK    Retired Wound - Properties Group Date first assessed: 02/20/24  -CK Side: Right  -CK Location: leg  -CK      Row Name             Wound 02/20/24 Left lower leg    Wound - Properties Group Placement Date: 02/20/24  -CK Side: Left  -CK Orientation: lower  -CK Location: leg  -CK    Retired Wound - Properties Group Placement Date: 02/20/24  -CK Side: Left  -CK Orientation: lower  -CK Location: leg  -CK    Retired Wound - Properties Group Date first assessed: 02/20/24  -CK Side: Left  -CK Location: leg  -CK      Row Name 02/24/24 0954          Plan of Care Review    Plan of Care Reviewed With patient  -LY     Progress no change  -LY     Outcome Evaluation PT tx completed. Pt in bed, lethargic this time. Answeres questions somewhat appropriately with eyes closed. initiated supine to sit, ultimately required max x1 to complete. CGA/min x1 for sitting balance d/t posterior lean. Required assist to complete BLE ROM while seated, cues for attention to task. Unable to attempt standing, d/t pt keeping her eyes closed. Returned to bed with max x1. LUE elevated with  pillows. Will follow.  -LY       Row Name 02/24/24 0954          Positioning and Restraints    Pre-Treatment Position in bed  -LY     Post Treatment Position bed  -LY     In Bed fowlers;call light within reach;encouraged to call for assist;exit alarm on;LUE elevated  -LY               User Key  (r) = Recorded By, (t) = Taken By, (c) = Cosigned By      Initials Name Provider Type    LY Effie Florentino, PTA Physical Therapist Assistant    Aida Bass, RN Registered Nurse                    Physical Therapy Education       Title: PT OT SLP Therapies (Done)       Topic: Physical Therapy (Done)       Point: Mobility training (Done)       Learning Progress Summary             Patient Acceptance, E, VU by  at 2/22/2024 2300    Acceptance, E, VU by  at 2/22/2024 1543    Comment: pt edu on POC, benefits of act, d/c plans                         Point: Home exercise program (Done)       Learning Progress Summary             Patient Acceptance, E, VU by  at 2/22/2024 2300                         Point: Body mechanics (Done)       Learning Progress Summary             Patient Acceptance, E, VU by  at 2/22/2024 2300                         Point: Precautions (Done)       Learning Progress Summary             Patient Acceptance, E, VU by  at 2/22/2024 2300    Acceptance, E, VU by SB at 2/22/2024 1543    Comment: pt edu on POC, benefits of act, d/c plans                                         User Key       Initials Effective Dates Name Provider Type Discipline    SB 07/11/23 -  Daniella Connelly, PT DPT Physical Therapist PT     04/25/23 -  Warren Mccauley, RN Registered Nurse Nurse                  PT Recommendation and Plan     Plan of Care Reviewed With: patient  Progress: no change  Outcome Evaluation: PT tx completed. Pt in bed, lethargic this time. Answeres questions somewhat appropriately with eyes closed. initiated supine to sit, ultimately required max x1 to complete. CGA/min x1 for sitting balance d/t posterior  lean. Required assist to complete BLE ROM while seated, cues for attention to task. Unable to attempt standing, d/t pt keeping her eyes closed. Returned to bed with max x1. LUE elevated with pillows. Will follow.   Outcome Measures       Row Name 02/23/24 1400 02/23/24 1100          How much help from another person do you currently need...    Turning from your back to your side while in flat bed without using bedrails? -- 2  -AH     Moving from lying on back to sitting on the side of a flat bed without bedrails? -- 2  -AH     Moving to and from a bed to a chair (including a wheelchair)? -- 2  -AH     Standing up from a chair using your arms (e.g., wheelchair, bedside chair)? -- 2  -AH     Climbing 3-5 steps with a railing? -- 1  -AH     To walk in hospital room? -- 1  -AH     AM-PAC 6 Clicks Score (PT) -- 10  -     Highest Level of Mobility Goal -- 4 --> Transfer to chair/commode  -        How much help from another is currently needed...    Putting on and taking off regular lower body clothing? 2  -TS --     Bathing (including washing, rinsing, and drying) 2  -TS --     Toileting (which includes using toilet bed pan or urinal) 2  -TS --     Putting on and taking off regular upper body clothing 2  -TS --     Taking care of personal grooming (such as brushing teeth) 3  -TS --     Eating meals 3  -TS --     AM-PAC 6 Clicks Score (OT) 14  -TS --        Functional Assessment    Outcome Measure Options -- AM-PAC 6 Clicks Basic Mobility (PT)  -               User Key  (r) = Recorded By, (t) = Taken By, (c) = Cosigned By      Initials Name Provider Type     Margot Verma, NE Physical Therapist Assistant    Marry Littlejohn COTA Occupational Therapist Assistant                     Time Calculation:    PT Charges       Row Name 02/24/24 1044             Time Calculation    Start Time 0954  -LY      Stop Time 1022  -LY      Time Calculation (min) 28 min  -LY      PT Received On 02/24/24  -LY         Time  Calculation- PT    Total Timed Code Minutes- PT 28 minute(s)  -LY         Timed Charges    28746 - PT Therapeutic Exercise Minutes 12  -LY      64898 - PT Therapeutic Activity Minutes 16  -LY         Total Minutes    Timed Charges Total Minutes 28  -LY       Total Minutes 28  -LY                User Key  (r) = Recorded By, (t) = Taken By, (c) = Cosigned By      Initials Name Provider Type    LY Effie Florentino PTA Physical Therapist Assistant                  Therapy Charges for Today       Code Description Service Date Service Provider Modifiers Qty    83451989640 HC PT THER PROC EA 15 MIN 2/24/2024 Effie Florentino PTA GP 1    19962107816 HC PT THERAPEUTIC ACT EA 15 MIN 2/24/2024 Effie Florentino, NE GP 1            PT G-Codes  Outcome Measure Options: AM-PAC 6 Clicks Basic Mobility (PT)  AM-PAC 6 Clicks Score (PT): 10  AM-PAC 6 Clicks Score (OT): 14    Effie Florentino PTA  2/24/2024

## 2024-02-25 LAB
ALBUMIN SERPL-MCNC: 2 G/DL (ref 3.5–5.2)
ALBUMIN/GLOB SERPL: 0.7 G/DL
ALP SERPL-CCNC: 163 U/L (ref 39–117)
ALT SERPL W P-5'-P-CCNC: 12 U/L (ref 1–33)
ANION GAP SERPL CALCULATED.3IONS-SCNC: 9 MMOL/L (ref 5–15)
AST SERPL-CCNC: 22 U/L (ref 1–32)
BASOPHILS # BLD AUTO: 0.01 10*3/MM3 (ref 0–0.2)
BASOPHILS NFR BLD AUTO: 0.1 % (ref 0–1.5)
BILIRUB SERPL-MCNC: 2.3 MG/DL (ref 0–1.2)
BUN SERPL-MCNC: 49 MG/DL (ref 8–23)
BUN/CREAT SERPL: 41.5 (ref 7–25)
CALCIUM SPEC-SCNC: 8 MG/DL (ref 8.6–10.5)
CHLORIDE SERPL-SCNC: 106 MMOL/L (ref 98–107)
CO2 SERPL-SCNC: 21 MMOL/L (ref 22–29)
CREAT SERPL-MCNC: 1.18 MG/DL (ref 0.57–1)
CRP SERPL-MCNC: 16.6 MG/DL (ref 0–0.5)
DEPRECATED RDW RBC AUTO: 57.8 FL (ref 37–54)
EGFRCR SERPLBLD CKD-EPI 2021: 48.3 ML/MIN/1.73
EOSINOPHIL # BLD AUTO: 0.19 10*3/MM3 (ref 0–0.4)
EOSINOPHIL NFR BLD AUTO: 2 % (ref 0.3–6.2)
ERYTHROCYTE [DISTWIDTH] IN BLOOD BY AUTOMATED COUNT: 19.4 % (ref 12.3–15.4)
ERYTHROCYTE [SEDIMENTATION RATE] IN BLOOD: 74 MM/HR (ref 0–30)
GLOBULIN UR ELPH-MCNC: 2.8 GM/DL
GLUCOSE SERPL-MCNC: 90 MG/DL (ref 65–99)
HCT VFR BLD AUTO: 25.1 % (ref 34–46.6)
HGB BLD-MCNC: 8.3 G/DL (ref 12–15.9)
LYMPHOCYTES # BLD AUTO: 1.19 10*3/MM3 (ref 0.7–3.1)
LYMPHOCYTES NFR BLD AUTO: 12.8 % (ref 19.6–45.3)
MCH RBC QN AUTO: 29.2 PG (ref 26.6–33)
MCHC RBC AUTO-ENTMCNC: 33.1 G/DL (ref 31.5–35.7)
MCV RBC AUTO: 88.4 FL (ref 79–97)
MONOCYTES # BLD AUTO: 1.32 10*3/MM3 (ref 0.1–0.9)
MONOCYTES NFR BLD AUTO: 14.2 % (ref 5–12)
NEUTROPHILS NFR BLD AUTO: 6.47 10*3/MM3 (ref 1.7–7)
NEUTROPHILS NFR BLD AUTO: 69.7 % (ref 42.7–76)
PLATELET # BLD AUTO: 95 10*3/MM3 (ref 140–450)
PMV BLD AUTO: 10.4 FL (ref 6–12)
POTASSIUM SERPL-SCNC: 4 MMOL/L (ref 3.5–5.2)
PROT SERPL-MCNC: 4.8 G/DL (ref 6–8.5)
QT INTERVAL: 410 MS
QTC INTERVAL: 445 MS
RBC # BLD AUTO: 2.84 10*6/MM3 (ref 3.77–5.28)
SODIUM SERPL-SCNC: 136 MMOL/L (ref 136–145)
WBC NRBC COR # BLD AUTO: 9.29 10*3/MM3 (ref 3.4–10.8)

## 2024-02-25 PROCEDURE — 94799 UNLISTED PULMONARY SVC/PX: CPT

## 2024-02-25 PROCEDURE — 85025 COMPLETE CBC W/AUTO DIFF WBC: CPT | Performed by: INTERNAL MEDICINE

## 2024-02-25 PROCEDURE — 80053 COMPREHEN METABOLIC PANEL: CPT | Performed by: INTERNAL MEDICINE

## 2024-02-25 PROCEDURE — 25010000002 CEFTAZIDIME PER 500 MG: Performed by: INTERNAL MEDICINE

## 2024-02-25 PROCEDURE — 85652 RBC SED RATE AUTOMATED: CPT | Performed by: INTERNAL MEDICINE

## 2024-02-25 PROCEDURE — 99231 SBSQ HOSP IP/OBS SF/LOW 25: CPT | Performed by: INTERNAL MEDICINE

## 2024-02-25 PROCEDURE — 94664 DEMO&/EVAL PT USE INHALER: CPT

## 2024-02-25 PROCEDURE — 86140 C-REACTIVE PROTEIN: CPT | Performed by: INTERNAL MEDICINE

## 2024-02-25 RX ADMIN — NYSTATIN 1 APPLICATION: 100000 POWDER TOPICAL at 09:42

## 2024-02-25 RX ADMIN — ALBUTEROL SULFATE 2 PUFF: 90 AEROSOL, METERED RESPIRATORY (INHALATION) at 06:26

## 2024-02-25 RX ADMIN — CEFTAZIDIME 1000 MG: 1 INJECTION, POWDER, FOR SOLUTION INTRAMUSCULAR; INTRAVENOUS at 12:27

## 2024-02-25 RX ADMIN — APIXABAN 5 MG: 5 TABLET, FILM COATED ORAL at 20:28

## 2024-02-25 RX ADMIN — Medication 10 ML: at 20:29

## 2024-02-25 RX ADMIN — Medication 250 MG: at 09:42

## 2024-02-25 RX ADMIN — NYSTATIN 1 APPLICATION: 100000 POWDER TOPICAL at 20:29

## 2024-02-25 RX ADMIN — BUSPIRONE HYDROCHLORIDE 5 MG: 5 TABLET ORAL at 09:42

## 2024-02-25 RX ADMIN — GABAPENTIN 100 MG: 100 CAPSULE ORAL at 20:28

## 2024-02-25 RX ADMIN — PRAMIPEXOLE DIHYDROCHLORIDE 0.75 MG: 0.25 TABLET ORAL at 20:28

## 2024-02-25 RX ADMIN — LEVOTHYROXINE SODIUM 100 MCG: 100 TABLET ORAL at 05:30

## 2024-02-25 RX ADMIN — Medication 250 MG: at 20:28

## 2024-02-25 RX ADMIN — ALBUTEROL SULFATE 2 PUFF: 90 AEROSOL, METERED RESPIRATORY (INHALATION) at 19:57

## 2024-02-25 RX ADMIN — Medication 1 SPRAY: at 22:23

## 2024-02-25 RX ADMIN — ALBUTEROL SULFATE 2 PUFF: 90 AEROSOL, METERED RESPIRATORY (INHALATION) at 14:55

## 2024-02-25 RX ADMIN — DULOXETINE HYDROCHLORIDE 30 MG: 30 CAPSULE, DELAYED RELEASE ORAL at 09:42

## 2024-02-25 RX ADMIN — Medication 1 SPRAY: at 06:12

## 2024-02-25 RX ADMIN — Medication 1 SPRAY: at 14:24

## 2024-02-25 RX ADMIN — CEFTAZIDIME 1000 MG: 1 INJECTION, POWDER, FOR SOLUTION INTRAMUSCULAR; INTRAVENOUS at 00:50

## 2024-02-25 RX ADMIN — BUSPIRONE HYDROCHLORIDE 5 MG: 5 TABLET ORAL at 17:31

## 2024-02-25 RX ADMIN — Medication 1 SPRAY: at 17:31

## 2024-02-25 RX ADMIN — MORPHINE SULFATE 15 MG: 15 TABLET, FILM COATED, EXTENDED RELEASE ORAL at 09:42

## 2024-02-25 RX ADMIN — BUSPIRONE HYDROCHLORIDE 5 MG: 5 TABLET ORAL at 12:27

## 2024-02-25 RX ADMIN — APIXABAN 5 MG: 5 TABLET, FILM COATED ORAL at 09:42

## 2024-02-25 RX ADMIN — LIDOCAINE 1 PATCH: 4 PATCH TOPICAL at 09:42

## 2024-02-25 RX ADMIN — GABAPENTIN 100 MG: 100 CAPSULE ORAL at 09:42

## 2024-02-25 NOTE — PLAN OF CARE
Goal Outcome Evaluation: Patient medicated with scheduled morphine. No PRN pain meds needed this shift.  Spoke with patients daughter this shift to give her an update on her mother. Patient safety to be maintained this shift, continue to monitor and report abnormal to provider.

## 2024-02-25 NOTE — PROGRESS NOTES
Chief Complaint: UTI, lethargy      Interval History:     Appears to be a little bit more conversational this morning.  Still complains of pain in her left arm and will yell with any movement of the arm.  Persistent erythema and lymphedema in the arm as well.  X-rays performed yesterday were negative.  Venous Doppler still pending.  Her daughter was concerned that her pain has not been controlled last night.  However she had persistent somnolence and hypotension.  Anemia has been improved.  Overall respiratory status appears stable.  Chest x-ray was performed yesterday showing bronchitis.  Remains in COVID precautions.  Oxygen saturation is 98% on room air.    Review of Systems:   General ROS: negative for - chills or fever  Respiratory ROS: no cough, shortness of breath, or wheezing  Cardiovascular ROS: no chest pain or dyspnea on exertion  Gastrointestinal ROS: negative for - abdominal pain, diarrhea or nausea/vomiting       Vital Signs  Temp:  [98.1 °F (36.7 °C)-98.6 °F (37 °C)] 98.1 °F (36.7 °C)  Heart Rate:  [74-86] 86  Resp:  [18-20] 20  BP: ()/(41-51) 93/41    Intake/Output Summary (Last 24 hours) at 2/25/2024 0957  Last data filed at 2/24/2024 1940  Gross per 24 hour   Intake 240 ml   Output 750 ml   Net -510 ml       Physical Exam:     General Appearance:    Alert, cooperative, in no acute distress   Head:    N/A   Throat:   N/A   Neck:   N/A   Lungs:     Clear to auscultation,respirations regular, even and                  unlabored    Heart:    Regular rhythm and normal rate, normal S1 and S2, no            murmur, no gallop, no rub   Abdomen:     Normal bowel sounds, no masses, no organomegaly, soft        non-tender, non-distended, no guarding, no rebound                tenderness   Extremities:   No edema, no cyanosis, no clubbing   Pulses:   N/A   Skin:   N/A   Lymph nodes:   N/A   Neurologic:   N/A          Lab Review:       Lab Results (last 24 hours)       Procedure Component Value Units  Date/Time    Comprehensive Metabolic Panel [[id number]]  (Abnormal) Collected: 02/25/24 0700    Specimen: Blood Updated: 02/25/24 0816     Glucose 90 mg/dL      BUN 49 mg/dL      Creatinine 1.18 mg/dL      Sodium 136 mmol/L      Potassium 4.0 mmol/L      Chloride 106 mmol/L      CO2 21.0 mmol/L      Calcium 8.0 mg/dL      Total Protein 4.8 g/dL      Albumin 2.0 g/dL      ALT (SGPT) 12 U/L      AST (SGOT) 22 U/L      Alkaline Phosphatase 163 U/L      Total Bilirubin 2.3 mg/dL      Globulin 2.8 gm/dL      A/G Ratio 0.7 g/dL      BUN/Creatinine Ratio 41.5     Anion Gap 9.0 mmol/L      eGFR 48.3 mL/min/1.73     Narrative:      GFR Normal >60  Chronic Kidney Disease <60  Kidney Failure <15    The GFR formula is only valid for adults with stable renal function between ages 18 and 70.    Sedimentation Rate [[id number]]  (Abnormal) Collected: 02/25/24 0700    Specimen: Blood Updated: 02/25/24 0813     Sed Rate 74 mm/hr     CBC & Differential [[id number]]  (Abnormal) Collected: 02/25/24 0700    Specimen: Blood Updated: 02/25/24 0809    Narrative:      The following orders were created for panel order CBC & Differential.  Procedure                               Abnormality         Status                     ---------                               -----------         ------                     CBC Auto Differential[[id number]]        Abnormal            Final result                 Please view results for these tests on the individual orders.    CBC Auto Differential [[id number]]  (Abnormal) Collected: 02/25/24 0700    Specimen: Blood Updated: 02/25/24 0809     WBC 9.29 10*3/mm3      RBC 2.84 10*6/mm3      Hemoglobin 8.3 g/dL      Hematocrit 25.1 %      MCV 88.4 fL      MCH 29.2 pg      MCHC 33.1 g/dL      RDW 19.4 %      RDW-SD 57.8 fl      MPV 10.4 fL      Platelets 95 10*3/mm3      Neutrophil % 69.7 %      Lymphocyte % 12.8 %      Monocyte % 14.2 %      Eosinophil % 2.0 %      Basophil % 0.1 %      Neutrophils, Absolute 6.47  10*3/mm3      Lymphocytes, Absolute 1.19 10*3/mm3      Monocytes, Absolute 1.32 10*3/mm3      Eosinophils, Absolute 0.19 10*3/mm3      Basophils, Absolute 0.01 10*3/mm3     C-reactive Protein [[id number]]  (Abnormal) Collected: 02/25/24 0700    Specimen: Blood Updated: 02/25/24 0801     C-Reactive Protein 16.60 mg/dL     CBC (No Diff) [[id number]]  (Abnormal) Collected: 02/24/24 1406    Specimen: Blood Updated: 02/24/24 1451     WBC 8.56 10*3/mm3      RBC 3.00 10*6/mm3      Hemoglobin 8.8 g/dL      Hematocrit 26.4 %      MCV 88.0 fL      MCH 29.3 pg      MCHC 33.3 g/dL      RDW 19.4 %      RDW-SD 58.6 fl      MPV 10.9 fL      Platelets 69 10*3/mm3           Cultures:    Urine Culture   Date Value Ref Range Status   02/20/2024 50,000 CFU/mL Pseudomonas aeruginosa MDRO (A)  Final     Comment:       Multi drug resistant Pseudomonas, patient may be an isolation risk.  Multi drug resistant Pseudomonas, patient may be an isolation risk.   02/20/2024 50,000 CFU/mL Enterococcus faecalis (A)  Final       Radiology Review:  Imaging Results (Last 24 Hours)       Procedure Component Value Units Date/Time    XR Chest 1 View [[id number]] Collected: 02/24/24 1809     Updated: 02/24/24 1813    Narrative:      XR CHEST 1 VW- 2/24/2024 4:35 PM     HISTORY: congestion; N39.0-Urinary tract infection, site not specified;  R53.1-Weakness; M25.512-Pain in left shoulder; G89.29-Other chronic  pain; I83.018-Varicose veins of right lower extremity with ulcer other  part of lower leg; L97.812-Non-pressure chronic ulcer of other part of  right lower leg with fat layer exposed; I83.028-Varicose veins of left  lower extremity with ulcer other part of lower leg; L97     COMPARISON: Chest x-ray dated 2/20/2024.     FINDINGS:      Bilateral diffuse peribronchial thickening. Lungs are well expanded. No  lung consolidation. No pleural effusion or pneumothorax. The  cardiomediastinal silhouette and pulmonary vascularity are within normal  limits. Left  shoulder arthroplasty. Previous anterior cervical fusion.  No acute bony abnormality is seen.       Impression:      1. Peribronchial thickening may reflect viral lower respiratory tract  infection. There is no superimposed lung consolidation. Lungs are well  expanded.           This report was signed and finalized on 2/24/2024 6:10 PM by Dr Macho Brown.       XR ELBOW 2 VIEW LEFT [[id number]] Collected: 02/24/24 1459     Updated: 02/24/24 1502    Narrative:      EXAMINATION: XR ELBOW 2 VW LEFT-     2/24/2024 1:31 PM     HISTORY: acute pain; N39.0-Urinary tract infection, site not specified;  R53.1-Weakness; M25.512-Pain in left shoulder; G89.29-Other chronic  pain; I83.018-Varicose veins of right lower extremity with ulcer other  part of lower leg; L97.812-Non-pressure chronic ulcer of other part of  right lower leg with fat layer exposed; I83.028-Varicose veins of left  lower extremity with ulcer other part of lower leg; L97     2 view LEFT elbow exam.  The distal humerus and proximal radius and ulna have a normal  appearance.      Diffuse soft tissue edema seen about the elbow.  There is no soft tissue foreign body.     There is a normal appearance of the elbow joint.  No significant joint effusion is seen.       Impression:      1. No acute bony abnormality is seen.           This report was signed and finalized on 2/24/2024 2:59 PM by Dr. Ben Crockett MD.       XR Humerus Left [[id number]] Collected: 02/24/24 1458     Updated: 02/24/24 1502    Narrative:      EXAMINATION: XR HUMERUS LEFT-     2/24/2024 1:31 PM     HISTORY: acute pain; N39.0-Urinary tract infection, site not specified;  R53.1-Weakness; M25.512-Pain in left shoulder; G89.29-Other chronic  pain; I83.018-Varicose veins of right lower extremity with ulcer other  part of lower leg; L97.812-Non-pressure chronic ulcer of other part of  right lower leg with fat layer exposed; I83.028-Varicose veins of left  lower extremity with ulcer other part of  lower leg; L97     2 view LEFT humerus series.     LEFT shoulder prosthesis shows normal alignment.     Intact scapula and humerus.     Diffuse soft tissue edema about the upper arm.     Intact elbow joint, to the extent visualized.       Impression:      1. The LEFT humerus shows no acute bony abnormality.  2. Diffuse LEFT arm edema is noted.           This report was signed and finalized on 2/24/2024 2:58 PM by Dr. Ben Crockett MD.                        ASSESSMENT:      UTI (urinary tract infection)    Secondary osteoarthritis of left shoulder due to rotator cuff arthropathy    Cellulitis of right lower extremity    HTN (hypertension)    Anxiety associated with depression    Cellulitis of left lower extremity    Acute cystitis with hematuria    Candidiasis of skin    CHRISTINE (acute kidney injury)    Chronic kidney disease, stage 3a    COVID-19 virus detected      PLAN:    Continues to have issues with pain on movement, somnolence, borderline hypotension.  She is slightly more conversational this morning.    1.  Urine culture positive for MDRO Pseudomonas.  Infectious disease has been consulted.  Continue antibiotics with ceftazidime.  2.  Continues to have left arm and elbow pain.  Ultrasound pending.  X-rays were negative.  Orthopedics was consulted, and she is not surgical candidate currently.  Suspect prosthesis failure.  3.  Continue COVID-19 precautions.  Chest x-ray showed no consolidation.  Overall respiratory status stable.  4.  Lethargic and chronic opiate use.  Will continue with current medications.  5.  She is DNR/DNI.  Palliative care is following.  Awaiting skilled nursing facility placement next week.  6.  Anticoagulant was held.  Hemoglobin is stable.    Further orders per Dr. Robledo.      Tylor Cornelius, APRN  02/25/24  09:57 CST        Part of this note may be an electronic transcription/translation of spoken language to printed text using the Dragon Dictation System.

## 2024-02-25 NOTE — PLAN OF CARE
Goal Outcome Evaluation:  Plan of Care Reviewed With: patient        Progress: no change     Patient is AOx2-4, disoriented to time and situation at times. Extremely lethargic and responds to pain stimulation or loud verbal call out. Hard to keep alert. Combative when moved and refuses to be moved or repositioned. Medications are crushed and given in applesauce, still coughing after swallowing/difficultly swallowing. Patient show no s/s of distress. IV ABX given. Voiding via purewick. Poor appetite/fluid intake could present a problem. Weeping out of left arm has slowed. Safety precautions maintained, patient checked frequently, and call light within reach.

## 2024-02-26 ENCOUNTER — APPOINTMENT (OUTPATIENT)
Dept: CT IMAGING | Facility: HOSPITAL | Age: 74
DRG: 689 | End: 2024-02-26
Payer: MEDICARE

## 2024-02-26 ENCOUNTER — APPOINTMENT (OUTPATIENT)
Dept: ULTRASOUND IMAGING | Facility: HOSPITAL | Age: 74
DRG: 689 | End: 2024-02-26
Payer: MEDICARE

## 2024-02-26 LAB — AMMONIA BLD-SCNC: 32 UMOL/L (ref 11–51)

## 2024-02-26 PROCEDURE — 94799 UNLISTED PULMONARY SVC/PX: CPT

## 2024-02-26 PROCEDURE — 99232 SBSQ HOSP IP/OBS MODERATE 35: CPT | Performed by: CLINICAL NURSE SPECIALIST

## 2024-02-26 PROCEDURE — 97110 THERAPEUTIC EXERCISES: CPT

## 2024-02-26 PROCEDURE — 92610 EVALUATE SWALLOWING FUNCTION: CPT

## 2024-02-26 PROCEDURE — 25010000002 CEFTAZIDIME PER 500 MG: Performed by: INTERNAL MEDICINE

## 2024-02-26 PROCEDURE — 99497 ADVNCD CARE PLAN 30 MIN: CPT | Performed by: CLINICAL NURSE SPECIALIST

## 2024-02-26 PROCEDURE — 94761 N-INVAS EAR/PLS OXIMETRY MLT: CPT

## 2024-02-26 PROCEDURE — 82140 ASSAY OF AMMONIA: CPT | Performed by: CLINICAL NURSE SPECIALIST

## 2024-02-26 PROCEDURE — 70450 CT HEAD/BRAIN W/O DYE: CPT

## 2024-02-26 PROCEDURE — 97535 SELF CARE MNGMENT TRAINING: CPT | Performed by: OCCUPATIONAL THERAPIST

## 2024-02-26 PROCEDURE — 94664 DEMO&/EVAL PT USE INHALER: CPT

## 2024-02-26 RX ADMIN — NYSTATIN 1 APPLICATION: 100000 POWDER TOPICAL at 08:22

## 2024-02-26 RX ADMIN — BUSPIRONE HYDROCHLORIDE 5 MG: 5 TABLET ORAL at 18:01

## 2024-02-26 RX ADMIN — CEFTAZIDIME 1000 MG: 1 INJECTION, POWDER, FOR SOLUTION INTRAMUSCULAR; INTRAVENOUS at 23:14

## 2024-02-26 RX ADMIN — Medication 10 ML: at 21:44

## 2024-02-26 RX ADMIN — CEFTAZIDIME 1000 MG: 1 INJECTION, POWDER, FOR SOLUTION INTRAMUSCULAR; INTRAVENOUS at 00:26

## 2024-02-26 RX ADMIN — ALBUTEROL SULFATE 2 PUFF: 90 AEROSOL, METERED RESPIRATORY (INHALATION) at 07:16

## 2024-02-26 RX ADMIN — Medication 1 SPRAY: at 05:55

## 2024-02-26 RX ADMIN — Medication 1 SPRAY: at 18:01

## 2024-02-26 RX ADMIN — Medication 1 SPRAY: at 08:15

## 2024-02-26 RX ADMIN — NYSTATIN 1 APPLICATION: 100000 POWDER TOPICAL at 21:50

## 2024-02-26 RX ADMIN — LIDOCAINE 1 PATCH: 4 PATCH TOPICAL at 08:19

## 2024-02-26 RX ADMIN — Medication 1 SPRAY: at 21:45

## 2024-02-26 RX ADMIN — Medication 1 SPRAY: at 11:53

## 2024-02-26 RX ADMIN — Medication 1 SPRAY: at 15:06

## 2024-02-26 RX ADMIN — ALBUTEROL SULFATE 2 PUFF: 90 AEROSOL, METERED RESPIRATORY (INHALATION) at 15:26

## 2024-02-26 RX ADMIN — CEFTAZIDIME 1000 MG: 1 INJECTION, POWDER, FOR SOLUTION INTRAMUSCULAR; INTRAVENOUS at 11:53

## 2024-02-26 RX ADMIN — ALBUTEROL SULFATE 2 PUFF: 90 AEROSOL, METERED RESPIRATORY (INHALATION) at 20:39

## 2024-02-26 NOTE — PLAN OF CARE
Goal Outcome Evaluation:  Plan of Care Reviewed With: patient        Progress: no change       Patient is AOx2-4, waxing and waning confusion. BP is soft and needs rechecking manually. Patient is lethargic, quickly responds to pain becoming combative. Must have patient alert as possible for med pass, coughs after swallowing. Patient is continuing to have difficulty swallowing. Generalized edema, leaking from BUE. Voiding via purewick, refusing to turn, on RA. Patient is being treated for UTI and failed hardware in L shoulder. PPE worn, safety precautions maintained, and call light within reach.

## 2024-02-26 NOTE — PLAN OF CARE
Goal Outcome Evaluation:  Plan of Care Reviewed With: patient, family           Outcome Evaluation: see note      Anticipated Discharge Disposition (SLP): unknown          SLP Swallowing Diagnosis: mod-severe, oral dysphagia, suspected pharyngeal dysphagia (02/26/24 0911)  Treatment Assessment (SLP): worsened (02/26/24 0911)

## 2024-02-26 NOTE — CASE MANAGEMENT/SOCIAL WORK
Continued Stay Note   [facility]     Patient Name: Brenda Sneed  MRN: [id number]  Today's Date: 2/26/2024    Admit Date: 2/20/2024    Plan: [facility]   Discharge Plan       Row Name 02/26/24 1408       Plan    Plan Comments Plan is still for dc home when pt is medically stable. Noted order for hospital bed was faxed to [place] on Friday.  will notify [facility]  at time of dc.    Final Discharge Disposition Code 06 - home with home health care                   Discharge Codes    No documentation.                 Expected Discharge Date and Time       Expected Discharge Date Expected Discharge Time    Feb 29, 2024               TAMICA Dodge

## 2024-02-26 NOTE — THERAPY RE-EVALUATION
Acute Care - Speech Language Pathology   Swallow Re-Evaluation [facility]     Patient Name: Brenda Sneed  : 1950  MRN: [id number]  Today's Date: 2024               Admit Date: 2024    SPEECH-LANGUAGE PATHOLOGY EVALUATION - SWALLOW  Subjective: The patient was seen on this date for a Clinical Swallow evaluation.   Pt was poorly alert but cooperative .  Significant history: Patient is admitted with generalized weakness, CHRISTINE, UTI, Covid, osteoarthritis in the left shoulder, and cellulitis in the lower extremities. Patient was coughing with oral intake per RN staff and significant neuro change.     Objective: Textures given included puree consistency.  Assessment: Difficulties were noted with puree consistency.  Observations: Significant weakness observed during oral [facility] exam. Spoon with puree presented to pt oral cavity. Pt made poor attempt to create labial seal around spoon and required multiple verbal cues to swallow small amount of puree that fell into oral cavity from spoon. Pt presented with immediate cough following consumption of puree. Cough continued for several minutes. No additional PO trials presented to pt due to presentation following initial PO trial.     SLP Findings:  Patient presents with severe oropharyngeal dysphagia, without esophageal component.   Recommendations: Diet Textures: It is recommended that pt be NPO at this time.  Medications should be taken  by alternate means. Pt family given okay to occasionally place damp toothette in pt oral cavity due to dryness from mouth breathing.   Recommended Strategies: Oral BID and PRN.  Other Recommended Evaluations: Re-evaluation at bedside    Dysphagia therapy is recommended. Rationale: diet advancement as pt status improves.    Ngozi Rockwell MS CCC-SLP 2024 10:11 CST      Visit Dx:     ICD-10-CM ICD-9-CM   1. Urinary tract infection without hematuria, site unspecified  N39.0 599.0   2. Generalized weakness  R53.1 780.79   3.  Chronic left shoulder pain  M25.512 719.41    G89.29 338.29   4. Venous stasis ulcer of other part of right lower leg with fat layer exposed, unspecified whether varicose veins present  I83.018 454.0    L97.812    5. Venous stasis ulcer of other part of left lower leg with fat layer exposed, unspecified whether varicose veins present  I83.028 454.0    L97.822    6. Impaired mobility [Z74.09]  Z74.09 799.89   7. Oropharyngeal dysphagia  R13.12 787.22     Patient Active Problem List   Diagnosis    Spinal stenosis, lumbar    Secondary osteoarthritis of left shoulder due to rotator cuff arthropathy    Cellulitis of right lower extremity    HTN (hypertension)    Obesity, Class III, BMI 40-49.9 (morbid obesity)    Anxiety associated with depression    Traumatic rhabdomyolysis    Traumatic rhabdomyolysis, initial encounter    Laceration of left lower extremity    Cellulitis    Cellulitis of left lower extremity    Thrombocytopenia    Acute cystitis with hematuria    Sepsis    Candidiasis of skin    Acquired hypothyroidism    CHRISTINE (acute kidney injury)    Chronic pain syndrome    Hyponatremia    Chronic kidney disease, stage 3a    UTI (urinary tract infection)    COVID-19 virus detected     Past Medical History:   Diagnosis Date    Arthritis     Bronchitis     Cataract     right eye , cornea implant    Chronic back pain     Depression     Disease of thyroid gland     Edema     lower extremities    Hypertension     Kidney stones     Lymphedema      Past Surgical History:   Procedure Laterality Date    BACK SURGERY      2010 ([place]), 2013     CHOLECYSTECTOMY OPEN      CORNEAL TRANSPLANT Right 1999    INCISION AND DRAINAGE ABSCESS Left 4/19/2023    Procedure: INCISION AND DRAINAGE ABSCESS left leg;  Surgeon: Sarita Vazquez MD;  Location: [facility] OR;  Service: General;  Laterality: Left;    LACERATION REPAIR Left 4/7/2023    Procedure: washout and closure left leg wound ;  Surgeon: Sarita Vazquez MD;  Location:  [facility]  OR;  Service: General;  Laterality: Left;    LUMBAR FUSION Left 8/2/2017    Procedure: LEFT LUMBAR LATERAL INTERBODY FUSION WITH INSTRUMENTATION  L1-2;  Surgeon: MARCELINO Wright MD;  Location:  [facility] OR;  Service:     NECK SURGERY  2010    [place]    TOTAL HIP ARTHROPLASTY Right 2014    DR. KENDRICK     TOTAL KNEE ARTHROPLASTY Right 2000    TOTAL KNEE ARTHROPLASTY Left 2002    TOTAL SHOULDER ARTHROPLASTY W/ DISTAL CLAVICLE EXCISION Left 6/1/2020    Procedure: LEFT REVERSE TOTAL SHOULDER REPLACEMENT;  Surgeon: Saravanan Calvin MD;  Location:  [facility] OR;  Service: Orthopedics;  Laterality: Left;       SLP Recommendation and Plan  SLP Swallowing Diagnosis: mod-severe, oral dysphagia, suspected pharyngeal dysphagia (02/26/24 0911)  SLP Diet Recommendation: NPO (02/26/24 0911)  Recommended Precautions and Strategies: upright posture during/after eating, small bites of food and sips of liquid, alternate between small bites of food and sips of liquid, check mouth frequently for oral residue/pocketing, general aspiration precautions, fatigue precautions, 1:1 supervision (02/26/24 0911)  SLP Rec. for Method of Medication Administration: meds via alternate route (02/26/24 0911)     Monitor for Signs of Aspiration: yes, notify SLP if any concerns (02/26/24 0911)  Recommended Diagnostics: reassess via clinical swallow evaluation (02/26/24 0911)  Swallow Criteria for Skilled Therapeutic Interventions Met: demonstrates skilled criteria (02/26/24 0911)  Anticipated Discharge Disposition (SLP): unknown (02/26/24 0911)  Rehab Potential/Prognosis, Swallowing: adequate, monitor progress closely (02/26/24 0911)  Therapy Frequency (Swallow): at least, 2 days per week (02/26/24 0911)  Predicted Duration Therapy Intervention (Days): until discharge (02/26/24 0911)  Oral Care Recommendations: Oral Care BID/PRN (02/26/24 0911)                        Treatment Assessment (SLP): worsened (02/26/24 0911)               Plan of Care Reviewed  With: patient, family  Outcome Evaluation: see note      SWALLOW EVALUATION (last 72 hours)       SLP Adult Swallow Evaluation       Row Name 02/26/24 0911 02/23/24 6270                Rehab Evaluation    Document Type evaluation  -JR evaluation  -MD       Subjective Information no complaints  -JR complains of  coughing with eating/drinking  -MD       Patient Observations cooperative;lethargic  -JR cooperative;lethargic  -MD       Patient/Family/Caregiver Comments/Observations niece present, daughter on phone  -JR no family present  -MD       Patient Effort fair  -JR adequate  -MD       Symptoms Noted During/After Treatment shortness of breath  -JR shortness of breath  -MD       Symptoms Noted, Comment -- resolved easliy  -MD          General Information    Patient Profile Reviewed yes  -JR yes  -MD       Pertinent History Of Current Problem Patient is admitted with generalized weakness, CHRISTINE, UTI, Covid, osteoarthritis in the left shoulder, and cellulitis in the lower extremities. Patient was coughin with oral intake per RN staff.  -JR Patient is admitted with generalized weakness, CHRISTINE, UTI, Covid, osteoarthritis in the left shoulder, and cellulitis in the lower extremities. Patient was coughin with oral intake per RN staff.  -MD       Current Method of Nutrition soft to chew textures;nectar/syrup-thick liquids  -JR regular textures;thin liquids  -MD       Precautions/Limitations, Vision WFL;for purposes of eval  -JR WFL;for purposes of eval  -MD       Precautions/Limitations, Hearing WFL;for purposes of eval  -JR WFL;for purposes of eval  -MD       Prior Level of Function-Communication unknown  -JR unknown  -MD       Prior Level of Function-Swallowing no diet consistency restrictions  -JR no diet consistency restrictions  -MD       Plans/Goals Discussed with patient;family  -JR patient  -MD       Barriers to Rehab medically complex  -JR none identified  -MD       Patient's Goals for Discharge patient did not state   -JR patient did not state  -MD       Family Goals for Discharge family did not state  -JR --          Pain    Additional Documentation Pain Scale: FACES Pre/Post-Treatment (Group)  -JR --          Pain Scale: Numbers Pre/Post-Treatment    Pretreatment Pain Rating -- 8/10  -MD       Posttreatment Pain Rating -- 8/10  -MD       Pain Location - Side/Orientation -- Left  -MD       Pain Location - -- shoulder  -MD       Pain Intervention(s) -- Repositioned;Nursing Notified  -MD          Pain Scale: FACES Pre/Post-Treatment    Pain: FACES Scale, Pretreatment 0-->no hurt  -JR --       Posttreatment Pain Rating 0-->no hurt  -JR --          Oral Motor Structure and Function    Dentition Assessment edentulous  -JR edentulous  -MD       Secretion Management WNL/WFL  -JR WNL/WFL  -MD       Mucosal Quality dry;sticky  -JR moist, healthy  -MD       Gag Response absent or diminished  -JR absent or diminished  -MD       Volitional Swallow delayed;weak  -JR delayed;weak  -MD       Volitional Cough weak;reduced respiratory support  -JR --          Oral Musculature and Cranial Nerve Assessment    Oral Motor General Assessment generalized oral motor weakness  -JR generalized oral motor weakness  -MD       Mandibular Impairment Detail, Cranial Nerve V (Trigeminal) reduced strength bilaterally  -JR --       Oral Labial or Buccal Impairment, Detail, Cranial Nerve VII (Facial): reduced strength bilaterally  -JR --       Lingual Impairment, Detail. Cranial Nerves IX, XII (Glossopharyngeal and Hypoglossal) reduced lingual ROM;reduced strength  -JR --          General Eating/Swallowing Observations    Respiratory Support Currently in Use room air  -JR room air  -MD       Eating/Swallowing Skills -- fed by SLP  -MD       Positioning During Eating upright in bed  -JR upright in bed  -MD       Utensils Used spoon  -JR spoon;straw  -MD       Consistencies Trialed pureed  -JR soft to chew textures;chopped;thin liquids;nectar/syrup-thick liquids   -MD          Clinical Swallow Eval    Oral Prep Phase impaired  -JR impaired  -MD       Oral Transit impaired  -JR impaired  -MD       Oral Residue impaired  -JR impaired  -MD       Pharyngeal Phase suspected pharyngeal impairment  -JR suspected pharyngeal impairment  -MD       Esophageal Phase unremarkable  -JR unremarkable  -MD       Clinical Swallow Evaluation Summary -- see note  -MD          Oral Prep Concerns    Oral Prep Concerns inefficient mastication;increased prep time  -JR inefficient mastication;increased prep time  -MD       Inefficient Mastication other (see comments);mechanical soft  -JR other (see comments);mechanical soft  -MD       Increased Prep Time pudding  -JR mixed consistencies  -MD          Oral Transit Concerns    Oral Transit Concerns delayed initiation of bolus transit;increased oral transit time  -JR delayed initiation of bolus transit;increased oral transit time  -MD       Delayed Intiation of Bolus Transit pudding  -JR all consistencies  -MD       Increased Oral Transit Time pudding  -JR all consistencies  -MD          Oral Residue Concerns    Oral Residue Concerns diffuse residue throughout oral cavity  -JR diffuse residue throughout oral cavity  -MD       Diffuse Residue Throughout Oral Cavity pudding  -JR mixed consistencies  -MD          Pharyngeal Phase Concerns    Pharyngeal Phase Concerns cough;multiple swallows  -JR cough;multiple swallows  -MD       Multiple Swallows pudding  -JR thin  -MD       Cough pudding  -JR thin  -MD          SLP Evaluation Clinical Impression    SLP Swallowing Diagnosis mod-severe;oral dysphagia;suspected pharyngeal dysphagia  -JR mild-moderate;oral dysphagia;suspected pharyngeal dysphagia  -MD       Functional Impact risk of aspiration/pneumonia;risk of malnutrition;risk of dehydration  -JR risk of aspiration/pneumonia;risk of malnutrition;risk of dehydration  -MD       Rehab Potential/Prognosis, Swallowing adequate, monitor progress closely  -JR  adequate, monitor progress closely  -MD       Swallow Criteria for Skilled Therapeutic Interventions Met demonstrates skilled criteria  -JR demonstrates skilled criteria  -MD          SLP Treatment Clinical Impressions    Treatment Assessment (SLP) worsened  -JR --          Recommendations    Therapy Frequency (Swallow) at least;2 days per week  -JR at least;2 days per week  -MD       Predicted Duration Therapy Intervention (Days) until discharge  -JR until discharge  -MD       SLP Diet Recommendation NPO  -JR soft to chew textures;chopped;nectar thick liquids  -MD       Recommended Diagnostics reassess via clinical swallow evaluation  -JR reassess via clinical swallow evaluation  -MD       Recommended Precautions and Strategies upright posture during/after eating;small bites of food and sips of liquid;alternate between small bites of food and sips of liquid;check mouth frequently for oral residue/pocketing;general aspiration precautions;fatigue precautions;1:1 supervision  -JR upright posture during/after eating;small bites of food and sips of liquid;alternate between small bites of food and sips of liquid;check mouth frequently for oral residue/pocketing;general aspiration precautions;fatigue precautions;1:1 supervision  -MD       Oral Care Recommendations Oral Care BID/PRN  -JR Oral Care BID/PRN  -MD       SLP Rec. for Method of Medication Administration meds via alternate route  -JR meds whole;with puree  -MD       Monitor for Signs of Aspiration yes;notify SLP if any concerns  -JR yes;notify SLP if any concerns  -MD       Anticipated Discharge Disposition (SLP) unknown  -JR unknown  -MD          Swallow Goals (SLP)    Swallow LTGs -- Patient will demonstrate functional swallow for  -MD       Swallow STGs -- diet tolerance goal selection (SLP);pharyngeal strengthening exercise goal selection (SLP)  -MD       Diet Tolerance Goal Selection (SLP) -- Patient will tolerate trials of  -MD       Pharyngeal  Strengthening Exercise Goal Selection (SLP) -- pharyngeal strengthening exercise, SLP goal 1  -MD          (LTG) Patient will demonstrate functional swallow for    Diet Texture (Demonstrate functional swallow) pureed textures  -JR soft to chew (whole) textures  -MD       Liquid viscosity (Demonstrate functional swallow) honey/  moderately thick liquids  -JR thin liquids  -MD       [place] (Demonstrate functional swallow) with minimal cues (75-90% accuracy)  -JR independently (over 90% accuracy)  -MD       Time Frame (Demonstrate functional swallow) by discharge  -JR by discharge  -MD       Barriers (Demonstrate functional swallow) n/a  -JR n/a  -MD       Progress/Outcomes (Demonstrate functional swallow) medical status inhibiting progress  -JR new goal  -MD          (STG) Patient will tolerate trials of    Consistencies Trialed (Tolerate trials) pureed textures;honey/ moderately thick liquids  -JR soft to chew (chopped) textures;nectar/ mildly thick liquids  -MD       Desired Outcome (Tolerate trials) without signs/symptoms of aspiration;with adequate oral prep/transit/clearance  -JR without signs/symptoms of aspiration;with adequate oral prep/transit/clearance  -MD       [place] (Tolerate trials) with minimal cues (75-90% accuracy)  -JR with minimal cues (75-90% accuracy)  -MD       Time Frame (Tolerate trials) by discharge  -JR by discharge  -MD       Progress/Outcomes (Tolerate trials) medical status inhibiting progress  -JR new goal  -MD          (STG) Pharyngeal Strengthening Exercise Goal 1 (SLP)    Activity (Pharyngeal Strengthening Goal 1, SLP) increase pharyngeal sensation;increase timing  -JR increase pharyngeal sensation;increase timing  -MD       Increase Pharyngeal Sensation gustatory stimulation (sour/cold)  -JR gustatory stimulation (sour/cold)  -MD       Increase Timing breath hold exercises;prepping - 3 second prep or suck swallow or 3-step swallow  -JR breath hold exercises;prepping - 3  second prep or suck swallow or 3-step swallow  -MD       [place]/Accuracy (Pharyngeal Strengthening Goal 1, SLP) with minimal cues (75-90% accuracy)  -JR with minimal cues (75-90% accuracy)  -MD       Time Frame (Pharyngeal Strengthening Goal 1, SLP) by discharge  -JR by discharge  -MD       Barriers (Pharyngeal Strengthening Goal 1, SLP) n/a  -JR n/a  -MD       Progress/Outcomes (Pharyngeal Strengthening Goal 1, SLP) medical status inhibiting progress  -JR new goal  -MD                 User Key  (r) = Recorded By, (t) = Taken By, (c) = Cosigned By      Initials Name Effective Dates    Ngozi Roberson, MS CCC-SLP 08/22/23 -     Carolin Stein, SLP 06/21/22 -                     EDUCATION  The patient has been educated in the following areas:   Dysphagia (Swallowing Impairment).        SLP GOALS       Row Name 02/26/24 0911 02/23/24 1447          (LTG) Patient will demonstrate functional swallow for    Diet Texture (Demonstrate functional swallow) pureed textures  -JR soft to chew (whole) textures  -MD     Liquid viscosity (Demonstrate functional swallow) honey/  moderately thick liquids  -JR thin liquids  -MD     [place] (Demonstrate functional swallow) with minimal cues (75-90% accuracy)  -JR independently (over 90% accuracy)  -MD     Time Frame (Demonstrate functional swallow) by discharge  -JR by discharge  -MD     Barriers (Demonstrate functional swallow) n/a  -JR n/a  -MD     Progress/Outcomes (Demonstrate functional swallow) medical status inhibiting progress  -JR new goal  -MD        (STG) Patient will tolerate trials of    Consistencies Trialed (Tolerate trials) pureed textures;honey/ moderately thick liquids  -JR soft to chew (chopped) textures;nectar/ mildly thick liquids  -MD     Desired Outcome (Tolerate trials) without signs/symptoms of aspiration;with adequate oral prep/transit/clearance  -JR without signs/symptoms of aspiration;with adequate oral prep/transit/clearance  -MD     [place]  (Tolerate trials) with minimal cues (75-90% accuracy)  -JR with minimal cues (75-90% accuracy)  -MD     Time Frame (Tolerate trials) by discharge  -JR by discharge  -MD     Progress/Outcomes (Tolerate trials) medical status inhibiting progress  -JR new goal  -MD        (STG) Pharyngeal Strengthening Exercise Goal 1 (SLP)    Activity (Pharyngeal Strengthening Goal 1, SLP) increase pharyngeal sensation;increase timing  -JR increase pharyngeal sensation;increase timing  -MD     Increase Pharyngeal Sensation gustatory stimulation (sour/cold)  -JR gustatory stimulation (sour/cold)  -MD     Increase Timing breath hold exercises;prepping - 3 second prep or suck swallow or 3-step swallow  -JR breath hold exercises;prepping - 3 second prep or suck swallow or 3-step swallow  -MD     [place]/Accuracy (Pharyngeal Strengthening Goal 1, SLP) with minimal cues (75-90% accuracy)  -JR with minimal cues (75-90% accuracy)  -MD     Time Frame (Pharyngeal Strengthening Goal 1, SLP) by discharge  -JR by discharge  -MD     Barriers (Pharyngeal Strengthening Goal 1, SLP) n/a  -JR n/a  -MD     Progress/Outcomes (Pharyngeal Strengthening Goal 1, SLP) medical status inhibiting progress  -JR new goal  -MD               User Key  (r) = Recorded By, (t) = Taken By, (c) = Cosigned By      Initials Name Provider Type    Ngozi Roberson MS CCC-SLP Speech and Language Pathologist    Carolin Stein, SLP Speech and Language Pathologist                       Time Calculation:    Time Calculation- SLP       Row Name 02/26/24 1010             Time Calculation- SLP    SLP Start Time 0911  -JR      SLP Stop Time 1010  -JR      SLP Time Calculation (min) 59 min  -JR      SLP Received On 02/26/24  -JR         Untimed Charges    SLP Eval/Re-eval  ST Eval Oral Pharyng Swallow - 09130  -JR      13309-QD Eval Oral Pharyng Swallow Minutes 59  -JR         Total Minutes    Untimed Charges Total Minutes 59  -JR       Total Minutes 59  -JR                User  Key  (r) = Recorded By, (t) = Taken By, (c) = Cosigned By      Initials Name Provider Type    Ngozi Roberson MS CCC-SLP Speech and Language Pathologist                    Therapy Charges for Today       Code Description Service Date Service Provider Modifiers Qty    [id number]  ST EVAL ORAL PHARYNG SWALLOW 4 2/26/2024 Ngozi Rockwell MS CCC-SLP GN 1                 Ngozi Rockwell MS CCC-SLP  2/26/2024

## 2024-02-26 NOTE — PLAN OF CARE
Goal Outcome Evaluation:      Patient is A&Ox1 and afebrile. Wound care put new orders in and Pt is currently NPO. VAT put in a right upper arm IV. She is lethargic but does respond. Pt has been turned but has also refused turns today. She still has most of her pain in her left shoulder.

## 2024-02-26 NOTE — PROGRESS NOTES
[facility]  INPATIENT WOUND & OSTOMY CARE    PROGRESS NOTE    Today's Date: 02/26/24    Patient Name: Brenda Sneed  MRN: [id number]  CSN: [id number]  PCP: Sachin Dia MD  Referring Provider: ***  Attending Provider: Sachin Dia MD  Length of Stay: 6    SUBJECTIVE   Chief Complaint: ***    HPI: Brenda Sneed ***      Visit Dx:    ICD-10-CM ICD-9-CM   1. Urinary tract infection without hematuria, site unspecified  N39.0 599.0   2. Generalized weakness  R53.1 780.79   3. Chronic left shoulder pain  M25.512 719.41    G89.29 338.29   4. Venous stasis ulcer of other part of right lower leg with fat layer exposed, unspecified whether varicose veins present  I83.018 454.0    L97.812    5. Venous stasis ulcer of other part of left lower leg with fat layer exposed, unspecified whether varicose veins present  I83.028 454.0    L97.822    6. Impaired mobility [Z74.09]  Z74.09 799.89   7. Oropharyngeal dysphagia  R13.12 787.22       Hospital Problem List:     UTI (urinary tract infection)    Secondary osteoarthritis of left shoulder due to rotator cuff arthropathy    Cellulitis of right lower extremity    HTN (hypertension)    Anxiety associated with depression    Cellulitis of left lower extremity    Acute cystitis with hematuria    Candidiasis of skin    CHRISTINE (acute kidney injury)    Chronic kidney disease, stage 3a    COVID-19 virus detected      History:   Past Medical History:   Diagnosis Date    Arthritis     Bronchitis     Cataract     right eye , cornea implant    Chronic back pain     Depression     Disease of thyroid gland     Edema     lower extremities    Hypertension     Kidney stones     Lymphedema      Past Surgical History:   Procedure Laterality Date    BACK SURGERY      2010 ([place]), 2013     CHOLECYSTECTOMY OPEN      CORNEAL TRANSPLANT Right 1999    INCISION AND DRAINAGE ABSCESS Left 4/19/2023    Procedure: INCISION AND DRAINAGE ABSCESS left leg;  Surgeon: George  Sarita CARLISLE MD;  Location:  [facility];  Service: General;  Laterality: Left;    LACERATION REPAIR Left 4/7/2023    Procedure: washout and closure left leg wound ;  Surgeon: Sarita Vazquez MD;  Location:  [facility];  Service: General;  Laterality: Left;    LUMBAR FUSION Left 8/2/2017    Procedure: LEFT LUMBAR LATERAL INTERBODY FUSION WITH INSTRUMENTATION  L1-2;  Surgeon: MARCELINO Wright MD;  Location:  [facility];  Service:     NECK SURGERY  2010    [place]    TOTAL HIP ARTHROPLASTY Right 2014    DR. KENDRICK     TOTAL KNEE ARTHROPLASTY Right 2000    TOTAL KNEE ARTHROPLASTY Left 2002    TOTAL SHOULDER ARTHROPLASTY W/ DISTAL CLAVICLE EXCISION Left 6/1/2020    Procedure: LEFT REVERSE TOTAL SHOULDER REPLACEMENT;  Surgeon: Saravanan Calvin MD;  Location:  [facility];  Service: Orthopedics;  Laterality: Left;     Social History     Socioeconomic History    Marital status:    Tobacco Use    Smoking status: Never    Smokeless tobacco: Never   Vaping Use    Vaping Use: Never used   Substance and Sexual Activity    Alcohol use: Not Currently    Drug use: No    Sexual activity: Defer       Allergies:  Allergies   Allergen Reactions    Codeine Itching and Rash     SEVERE RASH/ITCHING (TOLERATES PERCOCET)    Amoxicillin Other (See Comments)     unknown    Levaquin [Levofloxacin] Other (See Comments)     unknown    Benadryl [Diphenhydramine] Other (See Comments)     KEEPS PATIENT AWAKE        Medications:    Current Facility-Administered Medications:     albuterol sulfate HFA (PROVENTIL HFA;VENTOLIN HFA;PROAIR HFA) inhaler 2 puff, 2 puff, Inhalation, TID - RT, Sachin Dia MD, 2 puff at 02/26/24 0716    apixaban (ELIQUIS) tablet 5 mg, 5 mg, Oral, Q12H, Sachin Dia MD, 5 mg at 02/25/24 2028    Biotene Dry Mouth Moisturizing (BIOTENE) 1 spray, 1 spray, Mouth/Throat, 5x Daily, Earnestine Gan APRN, 1 spray at 02/26/24 1153    sennosides-docusate (PERICOLACE) 8.6-50 MG per tablet 2 tablet, 2 tablet, Oral,  BID PRN **AND** polyethylene glycol (MIRALAX) packet 17 g, 17 g, Oral, Daily PRN **AND** bisacodyl (DULCOLAX) EC tablet 5 mg, 5 mg, Oral, Daily PRN **AND** bisacodyl (DULCOLAX) suppository 10 mg, 10 mg, Rectal, Daily PRN, Sachin Dia MD    busPIRone (BUSPAR) tablet 5 mg, 5 mg, Oral, TID With Meals, Earnestine Gan APRN, 5 mg at 02/25/24 1731    Calcium Replacement - Follow Nurse / BPA Driven Protocol, , Does not apply, PRN, Sachin Dia MD    cefTAZidime (FORTAZ) 1,000 mg in sodium chloride 0.9 % 100 mL IVPB, 1,000 mg, Intravenous, Q12H, Clint Moctezuma MD, 1,000 mg at 02/26/24 1153    cyclobenzaprine (FLEXERIL) tablet 10 mg, 10 mg, Oral, TID PRN, Sachin Dia MD    docusate sodium (COLACE) capsule 100 mg, 100 mg, Oral, BID PRN, Sachin Dia MD    DULoxetine (CYMBALTA) DR capsule 30 mg, 30 mg, Oral, Daily, Earnestine Gan APRN, 30 mg at 02/25/24 0942    furosemide (LASIX) tablet 40 mg, 40 mg, Oral, Daily PRN, Sachin Dia MD, 40 mg at 02/23/24 1718    gabapentin (NEURONTIN) capsule 100 mg, 100 mg, Oral, Q12H, Eanrestine Gan APRN, 100 mg at 02/25/24 2028    levothyroxine (SYNTHROID, LEVOTHROID) tablet 100 mcg, 100 mcg, Oral, Q AM, Sachin Dia MD, 100 mcg at 02/25/24 0530    Lidocaine 4 % 1 patch, 1 patch, Transdermal, Q24H, Earnestine Gan APRN, 1 patch at 02/26/24 0819    Magnesium Standard Dose Replacement - Follow Nurse / BPA Driven Protocol, , Does not apply, PRN, Sachin Dia MD    [DISCONTINUED] Morphine (MSIR) tablet 15 mg, 15 mg, Oral, Q6H PRN **OR** Morphine (MSIR) tablet 7.5 mg, 7.5 mg, Oral, Q6H PRN, Earnestine Gan APRN, 7.5 mg at 02/23/24 1718    nystatin (MYCOSTATIN) powder 1 Application, 1 application , Topical, BID, Sachin Dia MD, 1 Application at 02/26/24 0822    ondansetron (ZOFRAN) injection 4 mg, 4 mg, Intravenous, Q6H PRN, Elba Rockwell APRN    Phosphorus Replacement - Follow Nurse / BPA Driven  Protocol, , Does not apply, Ifeoma MAYER James Noah, MD    Potassium Replacement - Follow Nurse / BPA Driven Protocol, , Does not apply, Ifeoma MAYER James Noah, MD    pramipexole (MIRAPEX) tablet 0.75 mg, 0.75 mg, Oral, Nightly, Sachin Dia MD, 0.75 mg at 02/25/24 2028    saccharomyces boulardii (FLORASTOR) capsule 250 mg, 250 mg, Oral, BID, Sachin Dia MD, 250 mg at 02/25/24 2028    sodium chloride 0.9 % flush 10 mL, 10 mL, Intravenous, Q12H, Elba Rockwell APRN, 10 mL at 02/24/24 2228    sodium chloride 0.9 % flush 10 mL, 10 mL, Intravenous, PRN, Elba Rockwell APRN    sodium chloride 0.9 % flush 10 mL, 10 mL, Intravenous, Q12H, Sachin Dia MD, 10 mL at 02/25/24 2029    sodium chloride 0.9 % flush 10 mL, 10 mL, Intravenous, PRN, Sachin Dia MD    sodium chloride 0.9 % infusion 40 mL, 40 mL, Intravenous, PRN, Elba Rockwell, GARETT    sodium chloride 0.9 % infusion 40 mL, 40 mL, Intravenous, PRN, Sachin Dia MD    Review of Systems:  ***  Review of Systems      OBJECTIVE     Vitals:    02/26/24 1250   BP: 106/47   Pulse: 87   Resp: 20   Temp: 98.2 °F (36.8 °C)   SpO2: 98%       PHYSICAL EXAM  Pt presents ***.  Physical Exam       Results Review:  Lab Results (last 48 hours)       Procedure Component Value Units Date/Time    Ammonia [[id number]]  (Normal) Collected: 02/26/24 1340    Specimen: Blood Updated: 02/26/24 1413     Ammonia 32 umol/L     Comprehensive Metabolic Panel [[id number]]  (Abnormal) Collected: 02/25/24 0700    Specimen: Blood Updated: 02/25/24 0816     Glucose 90 mg/dL      BUN 49 mg/dL      Creatinine 1.18 mg/dL      Sodium 136 mmol/L      Potassium 4.0 mmol/L      Chloride 106 mmol/L      CO2 21.0 mmol/L      Calcium 8.0 mg/dL      Total Protein 4.8 g/dL      Albumin 2.0 g/dL      ALT (SGPT) 12 U/L      AST (SGOT) 22 U/L      Alkaline Phosphatase 163 U/L      Total Bilirubin 2.3 mg/dL      Globulin 2.8 gm/dL      A/G Ratio 0.7 g/dL       BUN/Creatinine Ratio 41.5     Anion Gap 9.0 mmol/L      eGFR 48.3 mL/min/1.73     Narrative:      GFR Normal >60  Chronic Kidney Disease <60  Kidney Failure <15    The GFR formula is only valid for adults with stable renal function between ages 18 and 70.    Sedimentation Rate [[id number]]  (Abnormal) Collected: 02/25/24 0700    Specimen: Blood Updated: 02/25/24 0813     Sed Rate 74 mm/hr     CBC & Differential [[id number]]  (Abnormal) Collected: 02/25/24 0700    Specimen: Blood Updated: 02/25/24 0809    Narrative:      The following orders were created for panel order CBC & Differential.  Procedure                               Abnormality         Status                     ---------                               -----------         ------                     CBC Auto Differential[[id number]]        Abnormal            Final result                 Please view results for these tests on the individual orders.    CBC Auto Differential [[id number]]  (Abnormal) Collected: 02/25/24 0700    Specimen: Blood Updated: 02/25/24 0809     WBC 9.29 10*3/mm3      RBC 2.84 10*6/mm3      Hemoglobin 8.3 g/dL      Hematocrit 25.1 %      MCV 88.4 fL      MCH 29.2 pg      MCHC 33.1 g/dL      RDW 19.4 %      RDW-SD 57.8 fl      MPV 10.4 fL      Platelets 95 10*3/mm3      Neutrophil % 69.7 %      Lymphocyte % 12.8 %      Monocyte % 14.2 %      Eosinophil % 2.0 %      Basophil % 0.1 %      Neutrophils, Absolute 6.47 10*3/mm3      Lymphocytes, Absolute 1.19 10*3/mm3      Monocytes, Absolute 1.32 10*3/mm3      Eosinophils, Absolute 0.19 10*3/mm3      Basophils, Absolute 0.01 10*3/mm3     C-reactive Protein [[id number]]  (Abnormal) Collected: 02/25/24 0700    Specimen: Blood Updated: 02/25/24 0801     C-Reactive Protein 16.60 mg/dL           Imaging Results (Last 72 Hours)       Procedure Component Value Units Date/Time    CT Head Without Contrast [[id number]] Collected: 02/26/24 1144     Updated: 02/26/24 1151    Narrative:      EXAMINATION: CT  HEAD WO CONTRAST-      2/26/2024 10:14 AM     HISTORY: Mental status change, unknown cause; N39.0-Urinary tract  infection, site not specified; R53.1-Weakness; M25.512-Pain in left  shoulder; G89.29-Other chronic pain; I83.018-Varicose veins of right  lower extremity with ulcer other part of lower leg; L97.812-Non-pressure  chronic ulcer of other part of right lower leg with fat layer exposed;  I83.028-Varicose veins of left lower extremity with ulcer oth     In order to have a CT radiation dose as low as reasonably achievable  Automated Exposure Control was utilized for adjustment of the mA and/or  KV according to patient size.     Total DLP = 844.33 mGy.cm     The CT scan of the head is performed without intravenous contrast  enhancement.     The images are acquired in axial plane with subsequent reconstruction in  coronal and sagittal planes.     Comparison is made with the previous study dated 7/5/2022.     There is no evidence of a mass. There is no evidence of midline shift.     There is no evidence of intracranial hemorrhage or hematoma.     Moderately prominent ventricles, basal cisterns and the cortical sulci  are similar to the previous study suggesting chronic volume  loss/atrophy. This is more pronounced in the frontal lobes as in the  previous study.     An empty sella turcica is seen. No widening or erosion.     Images reviewed in bone window show no significant displaced fracture.  However a subtle nondisplaced fracture may be obscured due to  significant motion artifacts. Limited visualized paranasal sinuses and  mastoid air cells are clear.       Impression:      1. A stable CT scan of the head. No acute intracranial abnormality.                                      This report was signed and finalized on 2/26/2024 11:48 AM by Dr. Glen Heaton MD.       XR Chest 1 View [[id number]] Collected: 02/24/24 1809     Updated: 02/24/24 1813    Narrative:      XR CHEST 1 VW- 2/24/2024 4:35 PM      HISTORY: congestion; N39.0-Urinary tract infection, site not specified;  R53.1-Weakness; M25.512-Pain in left shoulder; G89.29-Other chronic  pain; I83.018-Varicose veins of right lower extremity with ulcer other  part of lower leg; L97.812-Non-pressure chronic ulcer of other part of  right lower leg with fat layer exposed; I83.028-Varicose veins of left  lower extremity with ulcer other part of lower leg; L97     COMPARISON: Chest x-ray dated 2/20/2024.     FINDINGS:      Bilateral diffuse peribronchial thickening. Lungs are well expanded. No  lung consolidation. No pleural effusion or pneumothorax. The  cardiomediastinal silhouette and pulmonary vascularity are within normal  limits. Left shoulder arthroplasty. Previous anterior cervical fusion.  No acute bony abnormality is seen.       Impression:      1. Peribronchial thickening may reflect viral lower respiratory tract  infection. There is no superimposed lung consolidation. Lungs are well  expanded.           This report was signed and finalized on 2/24/2024 6:10 PM by Dr Macho Brown.       XR ELBOW 2 VIEW LEFT [[id number]] Collected: 02/24/24 1459     Updated: 02/24/24 1502    Narrative:      EXAMINATION: XR ELBOW 2 VW LEFT-     2/24/2024 1:31 PM     HISTORY: acute pain; N39.0-Urinary tract infection, site not specified;  R53.1-Weakness; M25.512-Pain in left shoulder; G89.29-Other chronic  pain; I83.018-Varicose veins of right lower extremity with ulcer other  part of lower leg; L97.812-Non-pressure chronic ulcer of other part of  right lower leg with fat layer exposed; I83.028-Varicose veins of left  lower extremity with ulcer other part of lower leg; L97     2 view LEFT elbow exam.  The distal humerus and proximal radius and ulna have a normal  appearance.      Diffuse soft tissue edema seen about the elbow.  There is no soft tissue foreign body.     There is a normal appearance of the elbow joint.  No significant joint effusion is seen.        Impression:      1. No acute bony abnormality is seen.           This report was signed and finalized on 2/24/2024 2:59 PM by Dr. Ben Crockett MD.       XR Humerus Left [[id number]] Collected: 02/24/24 1458     Updated: 02/24/24 1502    Narrative:      EXAMINATION: XR HUMERUS LEFT-     2/24/2024 1:31 PM     HISTORY: acute pain; N39.0-Urinary tract infection, site not specified;  R53.1-Weakness; M25.512-Pain in left shoulder; G89.29-Other chronic  pain; I83.018-Varicose veins of right lower extremity with ulcer other  part of lower leg; L97.812-Non-pressure chronic ulcer of other part of  right lower leg with fat layer exposed; I83.028-Varicose veins of left  lower extremity with ulcer other part of lower leg; L97     2 view LEFT humerus series.     LEFT shoulder prosthesis shows normal alignment.     Intact scapula and humerus.     Diffuse soft tissue edema about the upper arm.     Intact elbow joint, to the extent visualized.       Impression:      1. The LEFT humerus shows no acute bony abnormality.  2. Diffuse LEFT arm edema is noted.           This report was signed and finalized on 2/24/2024 2:58 PM by Dr. Ben Crockett MD.                  ASSESSMENT/PLAN       Examination and evaluation of wound(s) was performed.    DIAGNOSIS:   Pressure  injury of deep tissue of sacral region  Pressure injury of deep tissue of right medial thigh  Hospital acquired pressure injury  ***    PLAN:   Patient has been refusing turns and yells when attempting to reposition.    Discussed importance of turning and repositioning to patient's family.   Adding Dolphin mattress to treatment plan.   Zinc paste to be used to area of DTI.   Will continue to monitor.     Discussed findings and treatment plan including risks, benefits, and treatment options with patient's daughter and other family in detail. Patient's daughter agreed with treatment plan.      This document has been electronically signed by GARETT Garcia on  2/26/2024 15:12 CST     Time spent in face-to-face evaluation, chart review, planning and education totaled 40 minutes with greater than 50% of time spent with patient and/or family and in coordination of care.  Counseling of patient and/or family includes discussing wound diagnosis and etiology , discussing risks and benefits, counseling on risk factor reduction, giving instructions including follow-up management, and Importance of compliance with chosen management options. No procedures were completed during this visit.           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    Discussed importance of turning and repositioning to patient's family.   Adding Dolphin mattress to treatment plan.   Zinc paste to be used to area of DTI.   Will continue to monitor.     Discussed findings and treatment plan including risks, benefits, and treatment options with patient's daughter and other family in detail. Patient's daughter agreed with treatment plan.      This document has been electronically signed by GARETT Garcia on 2/26/2024 15:12 CST     Time spent in face-to-face evaluation, chart review, planning and education totaled 40 minutes with greater than 50% of time spent with patient and/or family and in coordination of care.  Counseling of patient and/or family includes discussing wound diagnosis and etiology , discussing risks and benefits, counseling on risk factor reduction, giving instructions including follow-up management, and Importance of compliance with chosen management options. No procedures were completed during this visit.

## 2024-02-26 NOTE — PROGRESS NOTES
"    Daily Progress Note  Brenda Sneed  MRN: [id number] LOS: 6    Admit Date: 2024 08:27 CST    Subjective:         Interval History:    Reviewed overnight events and nursing notes.     She is probably worse than she was Friday.  On chart review over the weekend, it appears she has had some intermittent alertness.  Confused this morning.    ROS:  Review of Systems   Unable to perform ROS: Mental status change       DIET:  Diet: Diabetic Diets; Consistent Carbohydrate; Texture: Soft to Chew (NDD 3); Soft to Chew: Chopped Meat; Fluid Consistency: Nectar Thick    Medications:      albuterol sulfate HFA, 2 puff, Inhalation, TID - RT  apixaban, 5 mg, Oral, Q12H  Biotene Dry Mouth Moisturizing, 1 spray, Mouth/Throat, 5x Daily  busPIRone, 5 mg, Oral, TID With Meals  cefTAZidime, 1,000 mg, Intravenous, Q12H  DULoxetine, 30 mg, Oral, Daily  gabapentin, 100 mg, Oral, Q12H  levothyroxine, 100 mcg, Oral, Q AM  Lidocaine, 1 patch, Transdermal, Q24H  Non-Formulary / Patient Supplied Medication, 1 each, Topical, Weekly  nystatin, 1 application , Topical, BID  pramipexole, 0.75 mg, Oral, Nightly  saccharomyces boulardii, 250 mg, Oral, BID  sodium chloride, 10 mL, Intravenous, Q12H  sodium chloride, 10 mL, Intravenous, Q12H          Objective:     Vitals: BP 92/52 (BP Location: Right arm, Patient Position: Lying)   Pulse 96   Temp 98.2 °F (36.8 °C) (Axillary)   Resp 20   Ht 160 cm (63\")   Wt 91.6 kg (202 lb)   SpO2 93%   BMI 35.78 kg/m²    Intake/Output Summary (Last 24 hours) at 2024 0827  Last data filed at 2024 0513  Gross per 24 hour   Intake 120 ml   Output 1000 ml   Net -880 ml    Temp (24hrs), Av.1 °F (36.7 °C), Min:97.7 °F (36.5 °C), Max:98.4 °F (36.9 °C)    Glucose:  Lab Results   Component Value Date    POCGLU 100 (H) 2021     Physical Examination:   Physical Exam  Constitutional:       General: She is not in acute distress.     Appearance: She is obese. She is ill-appearing. " She is not toxic-appearing.   Cardiovascular:      Rate and Rhythm: Normal rate and regular rhythm.      Heart sounds: No murmur heard.  Pulmonary:      Effort: Pulmonary effort is normal. No respiratory distress.   Musculoskeletal:      Right lower leg: Edema present.      Left lower leg: Edema present.         Labs:  Lab Results (last 24 hours)       ** No results found for the last 24 hours. **             Imaging:  XR Chest 1 View    Result Date: 2/24/2024  XR CHEST 1 VW- 2/24/2024 4:35 PM  HISTORY: congestion; N39.0-Urinary tract infection, site not specified; R53.1-Weakness; M25.512-Pain in left shoulder; G89.29-Other chronic pain; I83.018-Varicose veins of right lower extremity with ulcer other part of lower leg; L97.812-Non-pressure chronic ulcer of other part of right lower leg with fat layer exposed; I83.028-Varicose veins of left lower extremity with ulcer other part of lower leg; L97  COMPARISON: Chest x-ray dated 2/20/2024.  FINDINGS:  Bilateral diffuse peribronchial thickening. Lungs are well expanded. No lung consolidation. No pleural effusion or pneumothorax. The cardiomediastinal silhouette and pulmonary vascularity are within normal limits. Left shoulder arthroplasty. Previous anterior cervical fusion. No acute bony abnormality is seen.      Impression: 1. Peribronchial thickening may reflect viral lower respiratory tract infection. There is no superimposed lung consolidation. Lungs are well expanded.    This report was signed and finalized on 2/24/2024 6:10 PM by Dr Macho Brown.      XR ELBOW 2 VIEW LEFT    Result Date: 2/24/2024  EXAMINATION: XR ELBOW 2 VW LEFT-  2/24/2024 1:31 PM  HISTORY: acute pain; N39.0-Urinary tract infection, site not specified; R53.1-Weakness; M25.512-Pain in left shoulder; G89.29-Other chronic pain; I83.018-Varicose veins of right lower extremity with ulcer other part of lower leg; L97.812-Non-pressure chronic ulcer of other part of right lower leg with fat layer  exposed; I83.028-Varicose veins of left lower extremity with ulcer other part of lower leg; L97  2 view LEFT elbow exam. The distal humerus and proximal radius and ulna have a normal appearance.  Diffuse soft tissue edema seen about the elbow. There is no soft tissue foreign body.  There is a normal appearance of the elbow joint. No significant joint effusion is seen.      Impression: 1. No acute bony abnormality is seen.    This report was signed and finalized on 2/24/2024 2:59 PM by Dr. Ben Crockett MD.      XR Humerus Left    Result Date: 2/24/2024  EXAMINATION: XR HUMERUS LEFT-  2/24/2024 1:31 PM  HISTORY: acute pain; N39.0-Urinary tract infection, site not specified; R53.1-Weakness; M25.512-Pain in left shoulder; G89.29-Other chronic pain; I83.018-Varicose veins of right lower extremity with ulcer other part of lower leg; L97.812-Non-pressure chronic ulcer of other part of right lower leg with fat layer exposed; I83.028-Varicose veins of left lower extremity with ulcer other part of lower leg; L97  2 view LEFT humerus series.  LEFT shoulder prosthesis shows normal alignment.  Intact scapula and humerus.  Diffuse soft tissue edema about the upper arm.  Intact elbow joint, to the extent visualized.      Impression: 1. The LEFT humerus shows no acute bony abnormality. 2. Diffuse LEFT arm edema is noted.    This report was signed and finalized on 2/24/2024 2:58 PM by Dr. Ben Crockett MD.          Assessment and Plan:     Primary Problem:  UTI (urinary tract infection)    Hospital Problem list:    UTI (urinary tract infection)    Secondary osteoarthritis of left shoulder due to rotator cuff arthropathy    Cellulitis of right lower extremity    HTN (hypertension)    Anxiety associated with depression    Cellulitis of left lower extremity    Acute cystitis with hematuria    Candidiasis of skin    CHRISTINE (acute kidney injury)    Chronic kidney disease, stage 3a    COVID-19 virus detected    Assessment: Chronically ill  73-year-old female with opiate use disorder, hypertension, and bilateral lymphedema who has suffered recent progressive decline and presents with weakness and fatigue found to have COVID-19.     Plan:     UTI  Enterococcus in culture yesterday, found to have MDRO Pseudomonas today.,  Has previously had this in her urine, infectious disease following.      Left shoulder pain  Concern for prothesis failure on Xray, ortho consultation occurred yesterday, deferring intervention until she is more medically stable.  I agree with this.     Lethargy, altered mental status  Opiate use disorder  She has previously been quite noncompliant and exhibited some signs of opiate dependence in our clinic.  She is currently receiving opiates through pain management.  She takes morphine twice daily and endorsed noncompliance to her medical and pain regimen in the emergency department.  Going to stop her scheduled pain medicine this morning, I will leave some as needed pain medications on.  I hope this will help her altered mental status and intermittent confusion.  I am also going to do a CT head to rule out structural etiologies.          Reviewed treatment plans with the patient and/or family.     Code Status:   Code Status and Medical Interventions:   Ordered at: 02/21/24 1123     Medical Intervention Limits:    NO intubation (DNI)    Other     Code Status (Patient has no pulse and is not breathing):    No CPR (Do Not Attempt to Resuscitate)     Medical Interventions (Patient has pulse or is breathing):    Limited Support     Additional Medical Interventions Limits:    no permanent feeding tube       Electronically signed by Sanjiv Block MD on 2/26/2024 at 08:27 CST

## 2024-02-26 NOTE — THERAPY TREATMENT NOTE
Acute Care - Physical Therapy Treatment Note  [facility]     Patient Name: Brenda Sneed  : 1950  MRN: [id number]  Today's Date: 2024      Visit Dx:     ICD-10-CM ICD-9-CM   1. Urinary tract infection without hematuria, site unspecified  N39.0 599.0   2. Generalized weakness  R53.1 780.79   3. Chronic left shoulder pain  M25.512 719.41    G89.29 338.29   4. Venous stasis ulcer of other part of right lower leg with fat layer exposed, unspecified whether varicose veins present  I83.018 454.0    L97.812    5. Venous stasis ulcer of other part of left lower leg with fat layer exposed, unspecified whether varicose veins present  I83.028 454.0    L97.822    6. Impaired mobility [Z74.09]  Z74.09 799.89   7. Oropharyngeal dysphagia  R13.12 787.22     Patient Active Problem List   Diagnosis    Spinal stenosis, lumbar    Secondary osteoarthritis of left shoulder due to rotator cuff arthropathy    Cellulitis of right lower extremity    HTN (hypertension)    Obesity, Class III, BMI 40-49.9 (morbid obesity)    Anxiety associated with depression    Traumatic rhabdomyolysis    Traumatic rhabdomyolysis, initial encounter    Laceration of left lower extremity    Cellulitis    Cellulitis of left lower extremity    Thrombocytopenia    Acute cystitis with hematuria    Sepsis    Candidiasis of skin    Acquired hypothyroidism    CHRISTINE (acute kidney injury)    Chronic pain syndrome    Hyponatremia    Chronic kidney disease, stage 3a    UTI (urinary tract infection)    COVID-19 virus detected     Past Medical History:   Diagnosis Date    Arthritis     Bronchitis     Cataract     right eye , cornea implant    Chronic back pain     Depression     Disease of thyroid gland     Edema     lower extremities    Hypertension     Kidney stones     Lymphedema      Past Surgical History:   Procedure Laterality Date    BACK SURGERY       ([place]), 2013     CHOLECYSTECTOMY OPEN      CORNEAL TRANSPLANT Right     INCISION AND  DRAINAGE ABSCESS Left 4/19/2023    Procedure: INCISION AND DRAINAGE ABSCESS left leg;  Surgeon: Sarita Vazquez MD;  Location:  [facility] OR;  Service: General;  Laterality: Left;    LACERATION REPAIR Left 4/7/2023    Procedure: washout and closure left leg wound ;  Surgeon: Sarita Vazquez MD;  Location:  [facility];  Service: General;  Laterality: Left;    LUMBAR FUSION Left 8/2/2017    Procedure: LEFT LUMBAR LATERAL INTERBODY FUSION WITH INSTRUMENTATION  L1-2;  Surgeon: MARCELINO Wright MD;  Location:  [facility];  Service:     NECK SURGERY  2010    [place]    TOTAL HIP ARTHROPLASTY Right 2014    DR. KENDRICK     TOTAL KNEE ARTHROPLASTY Right 2000    TOTAL KNEE ARTHROPLASTY Left 2002    TOTAL SHOULDER ARTHROPLASTY W/ DISTAL CLAVICLE EXCISION Left 6/1/2020    Procedure: LEFT REVERSE TOTAL SHOULDER REPLACEMENT;  Surgeon: Saravanan Calvin MD;  Location:  [facility];  Service: Orthopedics;  Laterality: Left;     PT Assessment (last 12 hours)       PT Evaluation and Treatment       Row Name 02/26/24 1421 02/26/24 1117       Physical Therapy Time and Intention    Subjective Information --  -NW --    Document Type therapy note (daily note)  -NW --    Mode of Treatment physical therapy  -NW --    Session Not Performed -- patient unavailable for treatment  -NW    Comment, Session Not Performed -- CT scan  -NW      Row Name 02/26/24 1421          General Information    Existing Precautions/Restrictions fall;non-weight bearing  LUE  -NW       Row Name 02/26/24 1421          Pain    Pain Location - Side/Orientation Left  -NW     Pain Location upper  -NW     Pain Location - extremity  -NW     Pre/Posttreatment Pain Comment would moan in pain w/ movement  -NW       Row Name 02/26/24 1421          Bed Mobility    Comment, (Bed Mobility) pt lethargic and unable to arouse. performed PROM BUE/LEs  -NW       Row Name 02/26/24 1421          Safety Issues, Functional Mobility    Safety Issues Affecting Function (Mobility) friction/shear  risk  -NW       Row Name 02/26/24 1421          Aerobic Exercise    Comment, Aerobic Exercise (Therapeutic Exercise) PROM BUE/LEs  -NW       Row Name             Wound 02/20/24 Right lower leg    Wound - Properties Group Placement Date: 02/20/24  -CK Side: Right  -CK Orientation: lower  -CK Location: leg  -CK    Retired Wound - Properties Group Placement Date: 02/20/24  -CK Side: Right  -CK Orientation: lower  -CK Location: leg  -CK    Retired Wound - Properties Group Date first assessed: 02/20/24  -CK Side: Right  -CK Location: leg  -CK      Row Name             Wound 02/20/24 Left lower leg    Wound - Properties Group Placement Date: 02/20/24  -CK Side: Left  -CK Orientation: lower  -CK Location: leg  -CK    Retired Wound - Properties Group Placement Date: 02/20/24  -CK Side: Left  -CK Orientation: lower  -CK Location: leg  -CK    Retired Wound - Properties Group Date first assessed: 02/20/24  -CK Side: Left  -CK Location: leg  -CK      Row Name             [REMOVED] Wound 02/26/24 0512 groin Blisters    Wound - Properties Group Placement Date: 02/26/24  -LF Placement Time: 0512  -LF Present on Original Admission: N  -LF Location: groin  -LF Primary Wound Type: Blisters  -LF Removal Date: 02/26/24  -WOJCIECH Removal Time: 0610  -WOJCIECH    Retired Wound - Properties Group Placement Date: 02/26/24  -LF Placement Time: 0512  -LF Present on Original Admission: N  -LF Location: groin  -LF Primary Wound Type: Blisters  -LF Removal Date: 02/26/24  -WOJCIECH Removal Time: 0610  -WOJCIECH    Retired Wound - Properties Group Date first assessed: 02/26/24  -LF Time first assessed: 0512  -LF Present on Original Admission: N  -LF Location: groin  -LF Primary Wound Type: Blisters  -LF Resolution Date: 02/26/24  -WOJCIECH Resolution Time: 0610  -WOJCIECH      Row Name             Wound 02/26/24 0512 groin Blisters    Wound - Properties Group Placement Date: 02/26/24  -WOJCIECH Placement Time: 0512  -WOJCIECH Location: groin  -WOJCIECH Primary Wound Type: Blisters  -WOJCIECH    Retired  Wound - Properties Group Placement Date: 02/26/24  -WOJCIECH Placement Time: 0512 -JP Location: groin  -WOJCIECH Primary Wound Type: Blisters  -WOJCIECH    Retired Wound - Properties Group Date first assessed: 02/26/24  -WOJCIECH Time first assessed: 0512 -JP Location: groin  -WOJCIECH Primary Wound Type: Blisters  -WOJCIECH      Row Name             Wound 02/26/24 0810 sacral spine    Wound - Properties Group Placement Date: 02/26/24  -MS Placement Time: 0810 -MS Location: sacral spine  -MS    Retired Wound - Properties Group Placement Date: 02/26/24  -MS Placement Time: 0810 -MS Location: sacral spine  -MS    Retired Wound - Properties Group Date first assessed: 02/26/24  -MS Time first assessed: 0810 -MS Location: sacral spine  -MS      Row Name 02/26/24 1421          Positioning and Restraints    Pre-Treatment Position in bed  -NW     Post Treatment Position bed  -NW     In Bed fowlers;with family/caregiver;notified nsg  repositioned  -NW               User Key  (r) = Recorded By, (t) = Taken By, (c) = Cosigned By      Initials Name Provider Type    Zohreh Thorpe PTA Physical Therapist Assistant    Aida Bass, RN Registered Nurse    Vee Vilchis RN Registered Nurse    Shoshana Orozco, RN Registered Nurse    Maryellen Glynn, RN Registered Nurse                    Physical Therapy Education       Title: PT OT SLP Therapies (Done)       Topic: Physical Therapy (Done)       Point: Mobility training (Done)       Learning Progress Summary             Patient Acceptance, E, VU by  at 2/22/2024 2300    Acceptance, E, VU by DAMARIS at 2/22/2024 1543    Comment: pt edu on POC, benefits of act, d/c plans                         Point: Home exercise program (Done)       Learning Progress Summary             Patient Acceptance, E, VU by PHILIP at 2/22/2024 2300                         Point: Body mechanics (Done)       Learning Progress Summary             Patient Acceptance, E, VU by  at 2/22/2024 2300                          Point: Precautions (Done)       Learning Progress Summary             Patient Acceptance, E, VU by PHILIP at 2/22/2024 2300    Acceptance, E, VU by DAMARIS at 2/22/2024 1543    Comment: pt edu on POC, benefits of act, d/c plans                                         User Key       Initials Effective Dates Name Provider Type Discipline    SB 07/11/23 -  Daniella Connelly, PT DPT Physical Therapist PT    PHILIP 04/25/23 -  Warren Mccauley, RN Registered Nurse Nurse                  PT Recommendation and Plan     Plan of Care Reviewed With: patient  Progress: declining  Outcome Evaluation: Pt lethargic and not responding only moaning when sternal rubbed. Performed PROM BUE/LEs. Pt repositioned and chuk placed for weeping.       Time Calculation:    PT Charges       Row Name 02/26/24 1437             Time Calculation    Start Time 1421  -NW      Stop Time 1438  -NW      Time Calculation (min) 17 min  -NW      PT Received On 02/26/24  -NW      PT Goal Re-Cert Due Date 03/03/24  -NW         Time Calculation- PT    Total Timed Code Minutes- PT 17 minute(s)  -NW         Timed Charges    46171 - PT Therapeutic Exercise Minutes 17  -NW         Total Minutes    Timed Charges Total Minutes 17  -NW       Total Minutes 17  -NW                User Key  (r) = Recorded By, (t) = Taken By, (c) = Cosigned By      Initials Name Provider Type    NW Zohreh Saez PTA Physical Therapist Assistant                  Therapy Charges for Today       Code Description Service Date Service Provider Modifiers Qty    69917579987 HC PT THER PROC EA 15 MIN 2/26/2024 Zohreh Saez PTA GP 1            PT G-Codes  Outcome Measure Options: AM-PAC 6 Clicks Basic Mobility (PT)  AM-PAC 6 Clicks Score (PT): 6  AM-PAC 6 Clicks Score (OT): 14    Zohreh Saez PTA  2/26/2024

## 2024-02-26 NOTE — PLAN OF CARE
Goal Outcome Evaluation:  Plan of Care Reviewed With: patient        Progress: declining  Outcome Evaluation: Pt lethargic and not responding only moaning when sternal rubbed. Performed PROM BUE/LEs. Pt repositioned and chuk placed for weeping.

## 2024-02-26 NOTE — CONSULTS
Palliative care consult received, already following this hospitalization, please refer to previous notes.    Earnestine Gan, GARETT  Palliative Care

## 2024-02-26 NOTE — PROGRESS NOTES
"            [facility]    Palliative Care Daily Progress Note   Chief complaint-follow up support for patient/family and pain/symptom management    Code Status:   Code Status and Medical Interventions:   Ordered at: 02/21/24 1123     Medical Intervention Limits:    NO intubation (DNI)    Other     Code Status (Patient has no pulse and is not breathing):    No CPR (Do Not Attempt to Resuscitate)     Medical Interventions (Patient has pulse or is breathing):    Limited Support     Additional Medical Interventions Limits:    no permanent feeding tube      Advanced Directives: Advance Directive Status: Patient has advance directive, copy in chart   Goals of Care: Ongoing.     S: Medical record reviewed. Events noted.  Palliative consult received and have been following this hospitalization. Last seen on Friday and spoke with patient's daughter, see previous notes.  Infectious disease consulted for MDRO UTI.  Increased lethargy and confusion over the weekend.  Plan for CT of the head and scheduled MS Contin and Butrans patch discontinued.  Has not received breakthrough pain medication over the last 3 days.  Renal function improving.  Elevated CRP 16.60.  Hemoglobin stable 8.3.  Evaluated by speech therapy with findings of severe oropharyngeal dysphagia and made NPO.  CT head shows chronic atrophy and negative for acute findings.  Advance Care Planning spoke with patient's niece, Nicole Wheeler in room and daughter, Dominga via telephone with regard to patient's progressive decline this hospitalization despite interventions.  Family report history of fatty liver and ask about elevated ammonia, seems reasonable to assess for.  With this said discussed patient's inability to safely tolerate oral intake and family adamant not to pursue either temporary or permanent feeding tube options stating \"she would not of wanted that\".  Given discussion we further discussed de-escalation with focus " of comfort and hospice services.  Encouraged ongoing conversation with family with regard to medical priorities.     Review of Systems   Constitutional: Positive for malaise/fatigue.   Cardiovascular:  Positive for leg swelling.        Lymphedema bilateral lower extremities   Respiratory:  Negative for shortness of breath.    Skin:  Positive for poor wound healing.   Musculoskeletal:  Positive for falls, joint pain, joint swelling, muscle weakness and myalgias.   Genitourinary:  Negative for dysuria.   Neurological:  Positive for weakness.  Altered mental status  Psychiatric/Behavioral:  Positive for depression.      Pain Assessment  Preferred Pain Scale: number (Numeric Rating Pain Scale)  PAINAD Breathin-->normal  PAINAD Negative Vocalization: 0-->none  PAINAD Facial Expression: 0-->smiling or inexpressive  PAINAD Body Language: 0-->relaxed  PAINAD Consolability: 0-->no need to console  PAINAD Score: 0  Pain Location: shoulder  Pain Description: constant    O:     Intake/Output Summary (Last 24 hours) at 2024 0933  Last data filed at 2024 0513  Gross per 24 hour   Intake 120 ml   Output 1000 ml   Net -880 ml       Diagnostics and current medications: Reviewed.      Current Facility-Administered Medications:     albuterol sulfate HFA (PROVENTIL HFA;VENTOLIN HFA;PROAIR HFA) inhaler 2 puff, 2 puff, Inhalation, TID - RT, Sachin Dia MD, 2 puff at 24 0716    apixaban (ELIQUIS) tablet 5 mg, 5 mg, Oral, Q12H, Sachin Dia MD, 5 mg at 24    Biotene Dry Mouth Moisturizing (BIOTENE) 1 spray, 1 spray, Mouth/Throat, 5x Daily, Earnestine Gan APRN, 1 spray at 24 0815    sennosides-docusate (PERICOLACE) 8.6-50 MG per tablet 2 tablet, 2 tablet, Oral, BID PRN **AND** polyethylene glycol (MIRALAX) packet 17 g, 17 g, Oral, Daily PRN **AND** bisacodyl (DULCOLAX) EC tablet 5 mg, 5 mg, Oral, Daily PRN **AND** bisacodyl (DULCOLAX) suppository 10 mg, 10 mg, Rectal, Daily PRN,  Sachin Dia MD    busPIRone (BUSPAR) tablet 5 mg, 5 mg, Oral, TID With Meals, Earnestine Gan APRN, 5 mg at 02/25/24 1731    Calcium Replacement - Follow Nurse / BPA Driven Protocol, , Does not apply, PRN, Sachin Dia MD    cefTAZidime (FORTAZ) 1,000 mg in sodium chloride 0.9 % 100 mL IVPB, 1,000 mg, Intravenous, Q12H, Clint Moctezuma MD, 1,000 mg at 02/26/24 0026    cyclobenzaprine (FLEXERIL) tablet 10 mg, 10 mg, Oral, TID PRN, Sachin Dia MD    docusate sodium (COLACE) capsule 100 mg, 100 mg, Oral, BID PRN, Sachin Dia MD    DULoxetine (CYMBALTA) DR capsule 30 mg, 30 mg, Oral, Daily, Earnestine Gan APRN, 30 mg at 02/25/24 0942    furosemide (LASIX) tablet 40 mg, 40 mg, Oral, Daily PRN, Sachin Dia MD, 40 mg at 02/23/24 1718    gabapentin (NEURONTIN) capsule 100 mg, 100 mg, Oral, Q12H, Earnestine Gan APRN, 100 mg at 02/25/24 2028    levothyroxine (SYNTHROID, LEVOTHROID) tablet 100 mcg, 100 mcg, Oral, Q AM, Sachin Dia MD, 100 mcg at 02/25/24 0530    Lidocaine 4 % 1 patch, 1 patch, Transdermal, Q24H, Earnestine Gan APRN, 1 patch at 02/26/24 0819    Magnesium Standard Dose Replacement - Follow Nurse / BPA Driven Protocol, , Does not apply, PRN, Sachin Dia MD    [DISCONTINUED] Morphine (MSIR) tablet 15 mg, 15 mg, Oral, Q6H PRN **OR** Morphine (MSIR) tablet 7.5 mg, 7.5 mg, Oral, Q6H PRN, Earnestine Gan APRN, 7.5 mg at 02/23/24 1718    nystatin (MYCOSTATIN) powder 1 Application, 1 application , Topical, BID, Sachin Dia MD, 1 Application at 02/26/24 0822    ondansetron (ZOFRAN) injection 4 mg, 4 mg, Intravenous, Q6H PRN, Elba Rockwell APRN    Phosphorus Replacement - Follow Nurse / BPA Driven Protocol, , Does not apply, PRN, Sachin Dia MD    Potassium Replacement - Follow Nurse / BPA Driven Protocol, , Does not apply, PRN, Sachin Dia MD    pramipexole (MIRAPEX) tablet 0.75 mg, 0.75 mg,  "Oral, Nightly, Sachin Dia MD, 0.75 mg at 02/25/24 2028    saccharomyces boulardii (FLORASTOR) capsule 250 mg, 250 mg, Oral, BID, Sachin Dia MD, 250 mg at 02/25/24 2028    sodium chloride 0.9 % flush 10 mL, 10 mL, Intravenous, Q12H, MoizElba fay, APRN, 10 mL at 02/24/24 2228    sodium chloride 0.9 % flush 10 mL, 10 mL, Intravenous, PRN, Moiz, Elba, APRN    sodium chloride 0.9 % flush 10 mL, 10 mL, Intravenous, Q12H, Sachin Dia MD, 10 mL at 02/25/24 2029    sodium chloride 0.9 % flush 10 mL, 10 mL, Intravenous, PRN, Sachin Dia MD    sodium chloride 0.9 % infusion 40 mL, 40 mL, Intravenous, PRN, Moiz, Elba, APRN    sodium chloride 0.9 % infusion 40 mL, 40 mL, Intravenous, PRN, Sachin Dia MD    Allergies   Allergen Reactions    Codeine Itching and Rash     SEVERE RASH/ITCHING (TOLERATES PERCOCET)    Amoxicillin Other (See Comments)     unknown    Levaquin [Levofloxacin] Other (See Comments)     unknown    Benadryl [Diphenhydramine] Other (See Comments)     KEEPS PATIENT AWAKE      I have utilized all available immediate resources to obtain, update, or review the patient's current medications (including all prescriptions, over-the-counter products, herbals, cannabis/cannabidiol products, and vitamin/mineral/dietary (nutritional) supplements) for name, route of administration, type, dose and frequency.    A:    BP (!) 106/38 (BP Location: Right arm, Patient Position: Lying) Comment: nurse notified  Pulse 92   Temp 98.7 °F (37.1 °C) (Axillary)   Resp 20   Ht 160 cm (63\")   Wt 91.6 kg (202 lb)   SpO2 94%   BMI 35.78 kg/m²     Vitals and nursing note reviewed.   Constitutional:       Appearance: Ill-appearing and chronically ill-appearing.   Pulmonary:      Effort: Pulmonary effort is normal.   Cardiovascular:      Normal rate.   Edema:     Peripheral edema present. L>R  Musculoskeletal:      Cervical back: Neck supple.   Skin:     General: Skin is warm. "   Neurological:      Mental Status: Lethargic     Patient status: Disease state: Controlled with current treatments.  Current Functional status: Palliative Performance Scale Score: Performance 40% based on the following measures: Ambulation: Mainly in bed, Activity and Evidence of Disease: Unable to do any work, extensive evidence of disease, Self-Care: Mainly assistance required,  Intake: Normal or reduced, LOC: Full, drowsy or confusion   Baseline Functional status: Palliative Performance Scale Score: Performance 60% based on the following measures: Ambulation: Reduced, Activity and Evidence of Disease: Unable to do hobby or some work, significant evidence of disease, Self-Care: Occasional assist necessary,  Intake: Normal or reduced, LOC: Full or confusion   Nutritional status: Albumin 2.9 Body mass index is 35.78 kg/m².      Hospital Problem List      UTI (urinary tract infection)    Secondary osteoarthritis of left shoulder due to rotator cuff arthropathy    Cellulitis of right lower extremity    HTN (hypertension)    Anxiety associated with depression    Cellulitis of left lower extremity    Acute cystitis with hematuria    Candidiasis of skin    CHRISTINE (acute kidney injury)    Chronic kidney disease, stage 3a    COVID-19 virus detected     Impression/Problem List:     Acute kidney injury on chronic kidney disease stage III  Acute UTI with hematuria  COVID-19 virus detected  Candidiasis of skin  Secondary osteoarthritis of left shoulder due to rotator cuff arthroplasty, abnormal imaging  Cellulitis bilateral lower extremities  Atrial fibrillation-Eliquis  Chronic back pain-spondylosis followed by Dr. Jones pain management  Depression  Disease of thyroid gland  Hypertension  Iron deficiency anemia  Thrombocytopenia  Impaired mobility-walker  Lymphedema-managed by home health with wound care 3 times a week       Recommendations/Plan:  1. plan: Goals of care include CODE STATUS no CPR/limited support  "interventions.     Family support: The patient receives support from her daughter and extended family..  Advance Directives: On file     POA/Healthcare surrogate-Dominga wagner-next of kin.     2.  Palliative care encounter  - Prognosis is fair long-term secondary to acute kidney injury, acute UTI, COVID-19, and multiple comorbidities.  -Patient appears to have fair prognostic awareness.      -Recently discharged from rehab 2 weeks prior.  Recently diagnosed with COVID 1 week prior.       -Urine culture pending. Started on broad-spectrum IV antibiotics for UTI and bilateral lower extremity cellulitis  -Wound care consulted.    -Orthopedics consulted due to abnormal imaging left shoulder as above.     2/21-CODE STATUS changes no CPR/Limited interventions, no intubation, no permanent feeding tube.  Will plan to complete a MOST document  -High risk rehospitalization  -Awaiting Ortho recommendations  -dietician evaluation protein calorie malnutrition and dietary supplements per family request. Family reports patient does not use dentures and eats soft consistency foods at baseline.  Also drinks ensure supplements  -Will plan to reevaluate living will directive prior to discharge    2/26-continue supportive measures  -Ortho recommendations-possible revision reverse total shoulder arthroplasty when more medically stable, recommends sling to left upper extremity for comfort and follow-up outpatient.   -Anticipating SNF  -CT head today  -Add ammonia level now.  -family adamant not to pursue either temporary or permanent feeding tube options stating \"she would not of wanted that\".  Given discussion we further discussed de-escalation with focus of comfort and hospice services.  Encouraged ongoing conversation with family with regard to medical priorities.     3. Pain  -currently controlled.  -Butrans patch DC'd per attending.  Recently restarted by pain management on second week 2/19.   -given kidney dysfunction and " increased weakness with fall decreased Cymbalta to 30 mg daily 2/21.   -MSER scheduled discontinued per attending due to lethargy  -MSIR d/c'd per attending  -flexeril as needed, currently NPO  -lidocaine patch to left shoulder  -Gabapentin twice daily, currently NPO    35 minutes spent on advance care planning-discussing with patient and/or family, answering questions, providing some guidance about a plan and documentation of care, and coordinating care face to face.        Thank you for allowing us to participate in patient's plan of care. Palliative Care Team will continue to follow patient.     Earnestine Gan, APRN  2/26/2024  09:33 CST

## 2024-02-26 NOTE — PROGRESS NOTES
Infectious Diseases Progress Note    Patient:  Brenda Sneed  YOB: 1950  MRN: [id number]   Admit date: 2/20/2024   Admitting Physician: Sachin Dia MD  Primary Care Physician: Sachin Dia MD    Chief Complaint/Interval History: When I saw her this evening she was a little more alert.  She is more conversant.  She still tends to keep her eyes closed only minimally open.  She does not give any lengthy answers.  She did not appear to be in distress.  I was able to hold her left elbow and move her left arm without much discomfort this evening.  That has been different than on previous evaluations.  She was not coughing during evaluation.    Intake/Output Summary (Last 24 hours) at 2/25/2024 2115  Last data filed at 2/25/2024 1335  Gross per 24 hour   Intake 120 ml   Output 600 ml   Net -480 ml     Allergies:   Allergies   Allergen Reactions    Codeine Itching and Rash     SEVERE RASH/ITCHING (TOLERATES PERCOCET)    Amoxicillin Other (See Comments)     unknown    Levaquin [Levofloxacin] Other (See Comments)     unknown    Benadryl [Diphenhydramine] Other (See Comments)     KEEPS PATIENT AWAKE      Current Scheduled Medications:   albuterol sulfate HFA, 2 puff, Inhalation, TID - RT  apixaban, 5 mg, Oral, Q12H  Biotene Dry Mouth Moisturizing, 1 spray, Mouth/Throat, 5x Daily  busPIRone, 5 mg, Oral, TID With Meals  cefTAZidime, 1,000 mg, Intravenous, Q12H  DULoxetine, 30 mg, Oral, Daily  gabapentin, 100 mg, Oral, Q12H  levothyroxine, 100 mcg, Oral, Q AM  Lidocaine, 1 patch, Transdermal, Q24H  Morphine, 15 mg, Oral, Daily  Non-Formulary / Patient Supplied Medication, 1 each, Topical, Weekly  nystatin, 1 application , Topical, BID  pramipexole, 0.75 mg, Oral, Nightly  saccharomyces boulardii, 250 mg, Oral, BID  sodium chloride, 10 mL, Intravenous, Q12H  sodium chloride, 10 mL, Intravenous, Q12H          Current PRN Medications:    senna-docusate sodium **AND** polyethylene glycol **AND**  "bisacodyl **AND** bisacodyl    Calcium Replacement - Follow Nurse / BPA Driven Protocol    cyclobenzaprine    docusate sodium    furosemide    Magnesium Standard Dose Replacement - Follow Nurse / BPA Driven Protocol    Morphine **OR** Morphine    ondansetron    Phosphorus Replacement - Follow Nurse / BPA Driven Protocol    Potassium Replacement - Follow Nurse / BPA Driven Protocol    sodium chloride    sodium chloride    sodium chloride    sodium chloride    Review of Systems see HPI    Vital Signs:  Temp (24hrs), Av °F (36.7 °C), Min:97.7 °F (36.5 °C), Max:98.1 °F (36.7 °C)    BP 98/51 (BP Location: Right arm, Patient Position: Lying)   Pulse 86   Temp 97.9 °F (36.6 °C) (Axillary)   Resp 18   Ht 160 cm (63\")   Wt 91.6 kg (202 lb)   SpO2 95%   BMI 35.78 kg/m²     Physical Exam  Vital signs - reviewed.  Line/IV site - No erythema, warmth, induration, or tenderness.  Abdomen was soft and nontender  Extremities edematous  I was able to palpate left arm and left elbow area and also move her left shoulder without much discomfort this evening    Lab Results:  CBC:   Results from last 7 days   Lab Units 24  0700 24  1406 24  0833 24  1605   WBC 10*3/mm3 9.29 8.56 7.77 15.64*   HEMOGLOBIN g/dL 8.3* 8.8* 7.8* 10.5*   HEMATOCRIT % 25.1* 26.4* 23.0* 28.2*   PLATELETS 10*3/mm3 95* 69* 60* 73*     BMP:  Results from last 7 days   Lab Units 24  0700 24  1908 24  0833 24  1605   SODIUM mmol/L 136  --  133* 132*   POTASSIUM mmol/L 4.0 4.0 3.3* 3.3*   CHLORIDE mmol/L 106  --  101 95*   CO2 mmol/L 21.0*  --  23.0 24.0   BUN mg/dL 49*  --  50* 46*   CREATININE mg/dL 1.18*  --  1.53* 1.96*   GLUCOSE mg/dL 90  --  91 94   CALCIUM mg/dL 8.0*  --  7.6* 7.8*   ALT (SGPT) U/L 12  --  8 15     Culture Results:   Urine Culture   Date Value Ref Range Status   2024 50,000 CFU/mL Pseudomonas aeruginosa MDRO (A)  Final     Comment:       Multi drug resistant Pseudomonas, patient may " be an isolation risk.  Multi drug resistant Pseudomonas, patient may be an isolation risk.   02/20/2024 50,000 CFU/mL Enterococcus faecalis (A)  Final   Susceptibility   Pseudomonas aeruginosa MDRO Enterococcus faecalis     CHRISTIANO CHRISTIANO     Ampicillin   <=2 ug/ml Susceptible     Cefepime 8 ug/ml Susceptible       Ceftazidime 4 ug/ml Susceptible       Ciprofloxacin <=0.25 ug/ml Susceptible       Levofloxacin 2 ug/ml Intermediate >=8 ug/ml Resistant     Meropenem 4 ug/ml Intermediate       Nitrofurantoin   <=16 ug/ml Susceptible     Piperacillin + Tazobactam 8 ug/ml Susceptible       Tobramycin <=1 ug/ml Susceptible       Vancomycin   1 ug/ml Susceptible      Radiology:   X-rays left humerus:  IMPRESSION:  1. The LEFT humerus shows no acute bony abnormality.  2. Diffuse LEFT arm edema is noted.    Chest x-ray yesterday:  IMPRESSION:  1. Peribronchial thickening may reflect viral lower respiratory tract  infection. There is no superimposed lung consolidation. Lungs are well  expanded.     Additional Studies Reviewed: None    Impression:   1.  Probable Pseudomonas UTI  2.  She had discomfort in the left elbow and shoulder area-improved by my exam this evening  3.  Recent COVID-19 infection  4.  Lethargy/opiate use-she is more awake and conversant today than she was with me previously  5.  Amoxicillin allergy-continues to tolerate cephalosporin without difficulty    Recommendations:   Continue Ceftazidime  Continue supportive care  Continue to follow    Clint Johnson MD

## 2024-02-26 NOTE — PAYOR COMM NOTE
"2/26 CLINICAL   [id number]    Eyad Gurrola (73 y.o. Female)       Date of Birth   1950    Social Security Number       Address   [address] [id number]    Home Phone   [phone]    MRN   [id number]       [facility]    Marital Status                               Admission Date   2/20/24    Admission Type   Emergency    Admitting Provider   Sachin Dia MD    Attending Provider   Sachin Dia MD    Department, Room/Bed   [facility] 3A, 358/1       Discharge Date       Discharge Disposition       Discharge Destination                                 Attending Provider: Sachin Dia MD    Allergies: Codeine, Amoxicillin, Levaquin [Levofloxacin], Benadryl [Diphenhydramine]    Isolation: Enh Drop/Con, Contact   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), 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), COVID (confirmed) (02/20/24), MDR Pseudomonas (02/22/24)   Code Status: No CPR    Ht: 160 cm (63\")   Wt: 91.6 kg (202 lb)    Admission Cmt: None   Principal Problem: UTI (urinary tract infection) [N39.0]                   Active Insurance as of 2/20/2024       Primary Coverage       Payor Plan Insurance Group Employer/Plan Group    HUMANA MEDICARE REPLACEMENT HUMANA MED ADV PPO [id number]       Payor Plan Address Payor Plan Phone Number Payor Plan Fax Number Effective Dates    [address] [address] [phone]  1/1/2023 - None Entered    [facility] [id number]         Subscriber Name Subscriber Birth Date Member ID       EYAD GURROLA 1950 [id number]                     Emergency Contacts        (Rel.) Home Phone Work Phone Mobile Phone    Nella (Daughter) [phone] -- [phone]    ODELL NELSON (Relative) -- -- [phone]              Current Facility-Administered Medications   Medication Dose Route Frequency Provider Last Rate Last Admin    albuterol sulfate HFA (PROVENTIL HFA;VENTOLIN HFA;PROAIR HFA) inhaler 2 puff  2 puff Inhalation TID - " RT Sachin Dia MD   2 puff at 02/26/24 0716    apixaban (ELIQUIS) tablet 5 mg  5 mg Oral Q12H Sachin Dia MD   5 mg at 02/25/24 2028    Biotene Dry Mouth Moisturizing (BIOTENE) 1 spray  1 spray Mouth/Throat 5x Daily Earnestine Gan APRN   1 spray at 02/26/24 0815    sennosides-docusate (PERICOLACE) 8.6-50 MG per tablet 2 tablet  2 tablet Oral BID PRN Sachin Dia MD        And    polyethylene glycol (MIRALAX) packet 17 g  17 g Oral Daily PRN Sachin Dia MD        And    bisacodyl (DULCOLAX) EC tablet 5 mg  5 mg Oral Daily PRN Sachin Dia MD        And    bisacodyl (DULCOLAX) suppository 10 mg  10 mg Rectal Daily PRN Sachin Dia MD        busPIRone (BUSPAR) tablet 5 mg  5 mg Oral TID With Meals Earnestine Gan APRN   5 mg at 02/25/24 1731    Calcium Replacement - Follow Nurse / BPA Driven Protocol   Does not apply PRSachin Kang MD        cefTAZidime (FORTAZ) 1,000 mg in sodium chloride 0.9 % 100 mL IVPB  1,000 mg Intravenous Q12H Clint Moctezuma MD   1,000 mg at 02/26/24 0026    cyclobenzaprine (FLEXERIL) tablet 10 mg  10 mg Oral TID PRN Sachin Dia MD        docusate sodium (COLACE) capsule 100 mg  100 mg Oral BID PRN Sachin Dia MD        DULoxetine (CYMBALTA) DR capsule 30 mg  30 mg Oral Daily Earnestine Gan APRN   30 mg at 02/25/24 0942    furosemide (LASIX) tablet 40 mg  40 mg Oral Daily PRN Sachin Dia MD   40 mg at 02/23/24 1718    gabapentin (NEURONTIN) capsule 100 mg  100 mg Oral Q12H Earnestine Gan APRN   100 mg at 02/25/24 2028    levothyroxine (SYNTHROID, LEVOTHROID) tablet 100 mcg  100 mcg Oral Q AM Sachin Dia MD   100 mcg at 02/25/24 0530    Lidocaine 4 % 1 patch  1 patch Transdermal Q24H Earnestine Gan APRN   1 patch at 02/26/24 0819    Magnesium Standard Dose Replacement - Follow Nurse / BPA Driven Protocol   Does not apply PRN Sachin Dia MD         Morphine (MSIR) tablet 7.5 mg  7.5 mg Oral Q6H PRN Earnestine Gan APRN   7.5 mg at 02/23/24 1718    nystatin (MYCOSTATIN) powder 1 Application  1 application  Topical BID Sachin Dia MD   1 Application at 02/26/24 0822    ondansetron (ZOFRAN) injection 4 mg  4 mg Intravenous Q6H PRN Elba Rockwell APRN        Phosphorus Replacement - Follow Nurse / BPA Driven Protocol   Does not apply PRN Sachin Dia MD        Potassium Replacement - Follow Nurse / BPA Driven Protocol   Does not apply PRN Sachin Dia MD        pramipexole (MIRAPEX) tablet 0.75 mg  0.75 mg Oral Nightly Sachin Dia MD   0.75 mg at 02/25/24 2028    saccharomyces boulardii (FLORASTOR) capsule 250 mg  250 mg Oral BID Sachin Dia MD   250 mg at 02/25/24 2028    sodium chloride 0.9 % flush 10 mL  10 mL Intravenous Q12H Elba Rockwell APRN   10 mL at 02/24/24 2228    sodium chloride 0.9 % flush 10 mL  10 mL Intravenous PRN Elba Rockwell APRN        sodium chloride 0.9 % flush 10 mL  10 mL Intravenous Q12H Sachin Dia MD   10 mL at 02/25/24 2029    sodium chloride 0.9 % flush 10 mL  10 mL Intravenous PRN Sachin Dia MD        sodium chloride 0.9 % infusion 40 mL  40 mL Intravenous PRN Elba Rockwell APRN        sodium chloride 0.9 % infusion 40 mL  40 mL Intravenous PRN Sachin Dia MD         Orders (last 24 hrs)        Start     Ordered    02/26/24 0942  NPO Diet NPO Type: Strict NPO  Diet Effective Now         02/26/24 0941    02/26/24 0828  Inpatient Palliative Care Consult  Once        Provider:  (Not yet assigned)    02/26/24 0827 02/26/24 0827  CT Head Without Contrast  1 Time Imaging         02/26/24 0826    02/26/24 0708  SLP Consult: Eval & Treat Swallow Disorder  Once        Comments: Change of status    02/26/24 0708    02/26/24 0524  Vascular Access Consult  Once        Provider:  (Not yet assigned)    02/26/24 0524    02/24/24 1800  Dietary Nutrition  Supplements Other; mighty shake  Daily With Breakfast & Dinner      Comments: Strawberry with breakfast and dinner, vanilla with lunch    02/24/24 0901    02/24/24 1200  cefTAZidime (FORTAZ) 1,000 mg in sodium chloride 0.9 % 100 mL IVPB  Every 12 Hours         02/24/24 1114    02/23/24 1515  Lidocaine 4 % 1 patch  Every 24 Hours Scheduled         02/23/24 1426    02/23/24 0900  Morphine (MS CONTIN) 12 hr tablet 15 mg  Daily,   Status:  Discontinued         02/23/24 0651    02/22/24 1026  Silicone Border Dressing to Bony Prominences  Every Shift       02/22/24 1026    02/22/24 1024  Wound Care  Daily       02/22/24 1023    02/22/24 0908  Morphine (MSIR) tablet 15 mg  Every 6 Hours PRN,   Status:  Discontinued        See Hyperspace for full Linked Orders Report.    02/22/24 0908    02/22/24 0908  Morphine (MSIR) tablet 7.5 mg  Every 6 Hours PRN        See Hyperspace for full Linked Orders Report.    02/22/24 0908    02/22/24 0000  Ambulatory Referral to Wound Clinic         02/22/24 1024    02/21/24 2100  pramipexole (MIRAPEX) tablet 0.75 mg  Nightly         02/21/24 0714    02/21/24 1400  Biotene Dry Mouth Moisturizing (BIOTENE) 1 spray  5 Times Daily         02/21/24 1122    02/21/24 1215  gabapentin (NEURONTIN) capsule 100 mg  Every 12 Hours Scheduled         02/21/24 1122    02/21/24 1200  DULoxetine (CYMBALTA) DR capsule 30 mg  Daily         02/21/24 1122    02/21/24 1200  busPIRone (BUSPAR) tablet 5 mg  3 Times Daily With Meals         02/21/24 1122    02/21/24 1000  Incentive Spirometry  Every 4 Hours While Awake       02/21/24 0723    02/21/24 0900  apixaban (ELIQUIS) tablet 5 mg  Every 12 Hours Scheduled         02/21/24 0714    02/21/24 0900  nystatin (MYCOSTATIN) powder 1 Application  2 Times Daily         02/21/24 0714    02/21/24 0900  saccharomyces boulardii (FLORASTOR) capsule 250 mg  2 Times Daily         02/21/24 0714    02/21/24 0900  sodium chloride 0.9 % flush 10 mL  Every 12 Hours Scheduled          02/21/24 0723    02/21/24 0830  albuterol sulfate HFA (PROVENTIL HFA;VENTOLIN HFA;PROAIR HFA) inhaler 2 puff  3 Times Daily - RT         02/21/24 0733    02/21/24 0800  Oral Care  2 Times Daily       02/20/24 1949    02/21/24 0800  Vital Signs  Every 4 Hours       02/21/24 0723    02/21/24 0800  Oral Care  2 Times Daily       02/21/24 0723    02/21/24 0745  levothyroxine (SYNTHROID, LEVOTHROID) tablet 100 mcg  Every Early Morning         02/21/24 0714    02/21/24 0725  Potassium Replacement - Follow Nurse / BPA Driven Protocol  As Needed         02/21/24 0725    02/21/24 0725  Magnesium Standard Dose Replacement - Follow Nurse / BPA Driven Protocol  As Needed         02/21/24 0725    02/21/24 0725  Phosphorus Replacement - Follow Nurse / BPA Driven Protocol  As Needed         02/21/24 0725    02/21/24 0725  Calcium Replacement - Follow Nurse / BPA Driven Protocol  As Needed         02/21/24 0725    02/21/24 0723  bisacodyl (DULCOLAX) suppository 10 mg  Daily PRN        See Hyperspace for full Linked Orders Report.    02/21/24 0723    02/21/24 0723  sennosides-docusate (PERICOLACE) 8.6-50 MG per tablet 2 tablet  2 Times Daily PRN        See Hyperspace for full Linked Orders Report.    02/21/24 0723    02/21/24 0723  polyethylene glycol (MIRALAX) packet 17 g  Daily PRN        See Hyperspace for full Linked Orders Report.    02/21/24 0723    02/21/24 0723  bisacodyl (DULCOLAX) EC tablet 5 mg  Daily PRN        See Hyperspace for full Linked Orders Report.    02/21/24 0723    02/21/24 0722  Intake & Output  Every Shift       02/21/24 0723    02/21/24 0721  sodium chloride 0.9 % flush 10 mL  As Needed         02/21/24 0723    02/21/24 0721  sodium chloride 0.9 % infusion 40 mL  As Needed         02/21/24 0723    02/21/24 0714  furosemide (LASIX) tablet 40 mg  Daily PRN         02/21/24 0714    02/21/24 0714  docusate sodium (COLACE) capsule 100 mg  2 Times Daily PRN         02/21/24 0714    02/21/24 0714   cyclobenzaprine (FLEXERIL) tablet 10 mg  3 Times Daily PRN         02/21/24 0714    02/20/24 2100  sodium chloride 0.9 % flush 10 mL  Every 12 Hours Scheduled         02/20/24 1949 02/20/24 2000  Vital Signs  Every 4 Hours       02/20/24 1949 02/20/24 1950  ondansetron (ZOFRAN) injection 4 mg  Every 6 Hours PRN         02/20/24 1950 02/20/24 1949  Intake & Output  Every Shift       02/20/24 1949 02/1950  sodium chloride 0.9 % flush 10 mL  As Needed         02/20/24 1949 02/1950  sodium chloride 0.9 % infusion 40 mL  As Needed         02/20/24 1949 02/19/24 0900  Non-Formulary / Patient Supplied Medication  Weekly        Note to Pharmacy: Patch in place left lower abdomen from home    02/21/24 1201    Unscheduled  Oxygen Therapy- Nasal Cannula; Titrate 1-6 LPM Per SpO2; 90 - 95%  Continuous PRN       02/21/24 0723    Unscheduled  Wound Care  As Needed       02/22/24 1026    --  bisoprolol-hydrochlorothiazide (ZIAC) 5-6.25 MG per tablet  Daily         02/21/24 1139    --  SCANNED EKG         02/20/24 0000                     Physician Progress Notes (last 72 hours)        Sanjiv Block MD at 02/26/24 0827              Daily Progress Note  Brenda Sneed  MRN: [id number] LOS: 6    Admit Date: 2/20/2024 2/26/2024 08:27 CST    Subjective:         Interval History:    Reviewed overnight events and nursing notes.     She is probably worse than she was Friday.  On chart review over the weekend, it appears she has had some intermittent alertness.  Confused this morning.    ROS:  Review of Systems   Unable to perform ROS: Mental status change       DIET:  Diet: Diabetic Diets; Consistent Carbohydrate; Texture: Soft to Chew (NDD 3); Soft to Chew: Chopped Meat; Fluid Consistency: Nectar Thick    Medications:      albuterol sulfate HFA, 2 puff, Inhalation, TID - RT  apixaban, 5 mg, Oral, Q12H  Biotene Dry Mouth Moisturizing, 1 spray, Mouth/Throat, 5x Daily  busPIRone, 5 mg, Oral, TID With  "Meals  cefTAZidime, 1,000 mg, Intravenous, Q12H  DULoxetine, 30 mg, Oral, Daily  gabapentin, 100 mg, Oral, Q12H  levothyroxine, 100 mcg, Oral, Q AM  Lidocaine, 1 patch, Transdermal, Q24H  Non-Formulary / Patient Supplied Medication, 1 each, Topical, Weekly  nystatin, 1 application , Topical, BID  pramipexole, 0.75 mg, Oral, Nightly  saccharomyces boulardii, 250 mg, Oral, BID  sodium chloride, 10 mL, Intravenous, Q12H  sodium chloride, 10 mL, Intravenous, Q12H          Objective:     Vitals: BP 92/52 (BP Location: Right arm, Patient Position: Lying)   Pulse 96   Temp 98.2 °F (36.8 °C) (Axillary)   Resp 20   Ht 160 cm (63\")   Wt 91.6 kg (202 lb)   SpO2 93%   BMI 35.78 kg/m²    Intake/Output Summary (Last 24 hours) at 2024 0858  Last data filed at 2024 0513  Gross per 24 hour   Intake 120 ml   Output 1000 ml   Net -880 ml    Temp (24hrs), Av.1 °F (36.7 °C), Min:97.7 °F (36.5 °C), Max:98.4 °F (36.9 °C)    Glucose:  Lab Results   Component Value Date    POCGLU 100 (H) 2021     Physical Examination:   Physical Exam  Constitutional:       General: She is not in acute distress.     Appearance: She is obese. She is ill-appearing. She is not toxic-appearing.   Cardiovascular:      Rate and Rhythm: Normal rate and regular rhythm.      Heart sounds: No murmur heard.  Pulmonary:      Effort: Pulmonary effort is normal. No respiratory distress.   Musculoskeletal:      Right lower leg: Edema present.      Left lower leg: Edema present.         Labs:  Lab Results (last 24 hours)       ** No results found for the last 24 hours. 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  FINDINGS:  Bilateral diffuse peribronchial thickening. Lungs are well expanded. No lung consolidation. No pleural effusion or pneumothorax. The cardiomediastinal silhouette and pulmonary vascularity are within normal limits. Left shoulder arthroplasty. Previous anterior cervical fusion. No acute bony abnormality is seen.      Impression: 1. Peribronchial thickening may reflect viral lower respiratory tract infection. There is no superimposed lung consolidation. Lungs are well expanded.    This report was signed and finalized on 2/24/2024 6:10 PM by Dr Macho Brown.      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 The distal humerus and proximal radius and ulna have a normal appearance.  Diffuse soft tissue edema seen about the elbow. There is no soft tissue foreign body.  There is a normal appearance of the elbow joint. No significant joint effusion is seen.      Impression: 1. No acute bony abnormality is seen.    This report was signed and finalized on 2/24/2024 2:59 PM by Dr. Ben Crockett MD.      XR Humerus Left    Result Date: 2/24/2024  EXAMINATION: XR HUMERUS LEFT-  2/24/2024 1:31 PM  HISTORY: acute pain; N39.0-Urinary tract infection, site not specified; R53.1-Weakness; M25.512-Pain in left shoulder; G89.29-Other chronic pain; I83.018-Varicose veins of right  lower extremity with ulcer other part of lower leg; L97.812-Non-pressure chronic ulcer of other part of right lower leg with fat layer exposed; I83.028-Varicose veins of left lower extremity with ulcer other part of lower leg; L97  2 view LEFT humerus series.  LEFT shoulder prosthesis shows normal alignment.  Intact scapula and humerus.  Diffuse soft tissue edema about the upper arm.  Intact elbow joint, to the extent visualized.      Impression: 1. The LEFT humerus shows no acute bony abnormality. 2. Diffuse LEFT arm edema is noted.    This report was signed and finalized on 2/24/2024 2:58 PM by Dr. Ben Crockett MD.          Assessment and Plan:     Primary Problem:  UTI (urinary tract infection)    Hospital Problem list:    UTI (urinary tract infection)    Secondary osteoarthritis of left shoulder due to rotator cuff arthropathy    Cellulitis of right lower extremity    HTN (hypertension)    Anxiety associated with depression    Cellulitis of left lower extremity    Acute cystitis with hematuria    Candidiasis of skin    CHRISTINE (acute kidney injury)    Chronic kidney disease, stage 3a    COVID-19 virus detected    Assessment: Chronically ill 73-year-old female with opiate use disorder, hypertension, and bilateral lymphedema who has suffered recent progressive decline and presents with weakness and fatigue found to have COVID-19.     Plan:     UTI  Enterococcus in culture yesterday, found to have MDRO Pseudomonas today.,  Has previously had this in her urine, infectious disease following.      Left shoulder pain  Concern for prothesis failure on Xray, ortho consultation occurred yesterday, deferring intervention until she is more medically stable.  I agree with this.     Lethargy, altered mental status  Opiate use disorder  She has previously been quite noncompliant and exhibited some signs of opiate dependence in our clinic.  She is currently receiving opiates through pain management.  She takes morphine twice daily  and endorsed noncompliance to her medical and pain regimen in the emergency department.  Going to stop her scheduled pain medicine this morning, I will leave some as needed pain medications on.  I hope this will help her altered mental status and intermittent confusion.  I am also going to do a CT head to rule out structural etiologies.          Reviewed treatment plans with the patient and/or family.     Code Status:   Code Status and Medical Interventions:   Ordered at: 02/21/24 1123     Medical Intervention Limits:    NO intubation (DNI)    Other     Code Status (Patient has no pulse and is not breathing):    No CPR (Do Not Attempt to Resuscitate)     Medical Interventions (Patient has pulse or is breathing):    Limited Support     Additional Medical Interventions Limits:    no permanent feeding tube       Electronically signed by Sanjiv Block MD on 2/26/2024 at 08:27 CST      Electronically signed by Sanjiv Block MD at 02/26/24 0829       Clint Moctezuma MD at 02/25/24 2115          Infectious Diseases Progress Note    Patient:  Brenda Sneed  YOB: 1950  MRN: [id number]   Admit date: 2/20/2024   Admitting Physician: Sachin Dia MD  Primary Care Physician: Sachin Dia MD    Chief Complaint/Interval History: When I saw her this evening she was a little more alert.  She is more conversant.  She still tends to keep her eyes closed only minimally open.  She does not give any lengthy answers.  She did not appear to be in distress.  I was able to hold her left elbow and move her left arm without much discomfort this evening.  That has been different than on previous evaluations.  She was not coughing during evaluation.    Intake/Output Summary (Last 24 hours) at 2/25/2024 2115  Last data filed at 2/25/2024 1335  Gross per 24 hour   Intake 120 ml   Output 600 ml   Net -480 ml     Allergies:   Allergies   Allergen Reactions    Codeine Itching and Rash     SEVERE  "RASH/ITCHING (TOLERATES PERCOCET)    Amoxicillin Other (See Comments)     unknown    Levaquin [Levofloxacin] Other (See Comments)     unknown    Benadryl [Diphenhydramine] Other (See Comments)     KEEPS PATIENT AWAKE      Current Scheduled Medications:   albuterol sulfate HFA, 2 puff, Inhalation, TID - RT  apixaban, 5 mg, Oral, Q12H  Biotene Dry Mouth Moisturizing, 1 spray, Mouth/Throat, 5x Daily  busPIRone, 5 mg, Oral, TID With Meals  cefTAZidime, 1,000 mg, Intravenous, Q12H  DULoxetine, 30 mg, Oral, Daily  gabapentin, 100 mg, Oral, Q12H  levothyroxine, 100 mcg, Oral, Q AM  Lidocaine, 1 patch, Transdermal, Q24H  Morphine, 15 mg, Oral, Daily  Non-Formulary / Patient Supplied Medication, 1 each, Topical, Weekly  nystatin, 1 application , Topical, BID  pramipexole, 0.75 mg, Oral, Nightly  saccharomyces boulardii, 250 mg, Oral, BID  sodium chloride, 10 mL, Intravenous, Q12H  sodium chloride, 10 mL, Intravenous, Q12H          Current PRN Medications:    senna-docusate sodium **AND** polyethylene glycol **AND** bisacodyl **AND** bisacodyl    Calcium Replacement - Follow Nurse / BPA Driven Protocol    cyclobenzaprine    docusate sodium    furosemide    Magnesium Standard Dose Replacement - Follow Nurse / BPA Driven Protocol    Morphine **OR** Morphine    ondansetron    Phosphorus Replacement - Follow Nurse / BPA Driven Protocol    Potassium Replacement - Follow Nurse / BPA Driven Protocol    sodium chloride    sodium chloride    sodium chloride    sodium chloride    Review of Systems see HPI    Vital Signs:  Temp (24hrs), Av °F (36.7 °C), Min:97.7 °F (36.5 °C), Max:98.1 °F (36.7 °C)    BP 98/51 (BP Location: Right arm, Patient Position: Lying)   Pulse 86   Temp 97.9 °F (36.6 °C) (Axillary)   Resp 18   Ht 160 cm (63\")   Wt 91.6 kg (202 lb)   SpO2 95%   BMI 35.78 kg/m²     Physical Exam  Vital signs - reviewed.  Line/IV site - No erythema, warmth, induration, or tenderness.  Abdomen was soft and " nontender  Extremities edematous  I was able to palpate left arm and left elbow area and also move her left shoulder without much discomfort this evening    Lab Results:  CBC:   Results from last 7 days   Lab Units 02/25/24  0700 02/24/24  1406 02/22/24  0833 02/20/24  1605   WBC 10*3/mm3 9.29 8.56 7.77 15.64*   HEMOGLOBIN g/dL 8.3* 8.8* 7.8* 10.5*   HEMATOCRIT % 25.1* 26.4* 23.0* 28.2*   PLATELETS 10*3/mm3 95* 69* 60* 73*     BMP:  Results from last 7 days   Lab Units 02/25/24  0700 02/22/24  1908 02/22/24  0833 02/20/24  1605   SODIUM mmol/L 136  --  133* 132*   POTASSIUM mmol/L 4.0 4.0 3.3* 3.3*   CHLORIDE mmol/L 106  --  101 95*   CO2 mmol/L 21.0*  --  23.0 24.0   BUN mg/dL 49*  --  50* 46*   CREATININE mg/dL 1.18*  --  1.53* 1.96*   GLUCOSE mg/dL 90  --  91 94   CALCIUM mg/dL 8.0*  --  7.6* 7.8*   ALT (SGPT) U/L 12  --  8 15     Culture Results:   Urine Culture   Date Value Ref Range Status   02/20/2024 50,000 CFU/mL Pseudomonas aeruginosa MDRO (A)  Final     Comment:       Multi drug resistant Pseudomonas, patient may be an isolation risk.  Multi drug resistant Pseudomonas, patient may be an isolation risk.   02/20/2024 50,000 CFU/mL Enterococcus faecalis (A)  Final   Susceptibility   Pseudomonas aeruginosa MDRO Enterococcus faecalis     CHRISTIANO CHRISTIANO     Ampicillin   <=2 ug/ml Susceptible     Cefepime 8 ug/ml Susceptible       Ceftazidime 4 ug/ml Susceptible       Ciprofloxacin <=0.25 ug/ml Susceptible       Levofloxacin 2 ug/ml Intermediate >=8 ug/ml Resistant     Meropenem 4 ug/ml Intermediate       Nitrofurantoin   <=16 ug/ml Susceptible     Piperacillin + Tazobactam 8 ug/ml Susceptible       Tobramycin <=1 ug/ml Susceptible       Vancomycin   1 ug/ml Susceptible      Radiology:   X-rays left humerus:  IMPRESSION:  1. The LEFT humerus shows no acute bony abnormality.  2. Diffuse LEFT arm edema is noted.    Chest x-ray yesterday:  IMPRESSION:  1. Peribronchial thickening may reflect viral lower respiratory  tract  infection. There is no superimposed lung consolidation. Lungs are well  expanded.     Additional Studies Reviewed: None    Impression:   1.  Probable Pseudomonas UTI  2.  She had discomfort in the left elbow and shoulder area-improved by my exam this evening  3.  Recent COVID-19 infection  4.  Lethargy/opiate use-she is more awake and conversant today than she was with me previously  5.  Amoxicillin allergy-continues to tolerate cephalosporin without difficulty    Recommendations:   Continue Ceftazidime  Continue supportive care  Continue to follow    Cilnt Johnson MD    Electronically signed by Clint Johnson MD at 02/25/24 9619       Tylor Cornelius, GARETT at 02/25/24 7252       Attestation signed by Saravanan Robledo MD at 02/25/24 1219    I have reviewed this documentation and agree.    She tells me her elbow does not hurt as much today.    On exam she continues to be slightly more alert.  Her left elbow does not appear as red and it is not as tender to touch as yesterday    X-rays of elbow and humerus are negative.  Venous Doppler still pending    Plan:  1.  Continue current antibiotics  2.  Continue to monitor elbow.  It appears to be improving with current treatment which includes antibiotic treatment.  The elbow and arm look less erythematous.  3.  Continue current pain medication.  She states pain control currently is adequate                      Chief Complaint: UTI, lethargy      Interval History:     Appears to be a little bit more conversational this morning.  Still complains of pain in her left arm and will yell with any movement of the arm.  Persistent erythema and lymphedema in the arm as well.  X-rays performed yesterday were negative.  Venous Doppler still pending.  Her daughter was concerned that her pain has not been controlled last night.  However she had persistent somnolence and hypotension.  Anemia has been improved.  Overall respiratory status appears stable.  Chest x-ray  was performed yesterday showing bronchitis.  Remains in COVID precautions.  Oxygen saturation is 98% on room air.    Review of Systems:   General ROS: negative for - chills or fever  Respiratory ROS: no cough, shortness of breath, or wheezing  Cardiovascular ROS: no chest pain or dyspnea on exertion  Gastrointestinal ROS: negative for - abdominal pain, diarrhea or nausea/vomiting       Vital Signs  Temp:  [98.1 °F (36.7 °C)-98.6 °F (37 °C)] 98.1 °F (36.7 °C)  Heart Rate:  [74-86] 86  Resp:  [18-20] 20  BP: ()/(41-51) 93/41    Intake/Output Summary (Last 24 hours) at 2/25/2024 0957  Last data filed at 2/24/2024 1940  Gross per 24 hour   Intake 240 ml   Output 750 ml   Net -510 ml       Physical Exam:     General Appearance:    Alert, cooperative, in no acute distress   Head:    N/A   Throat:   N/A   Neck:   N/A   Lungs:     Clear to auscultation,respirations regular, even and                  unlabored    Heart:    Regular rhythm and normal rate, normal S1 and S2, no            murmur, no gallop, no rub   Abdomen:     Normal bowel sounds, no masses, no organomegaly, soft        non-tender, non-distended, no guarding, no rebound                tenderness   Extremities:   No edema, no cyanosis, no clubbing   Pulses:   N/A   Skin:   N/A   Lymph nodes:   N/A   Neurologic:   N/A          Lab Review:       Lab Results (last 24 hours)       Procedure Component Value Units Date/Time    Comprehensive Metabolic Panel [[id number]]  (Abnormal) Collected: 02/25/24 0700    Specimen: Blood Updated: 02/25/24 0816     Glucose 90 mg/dL      BUN 49 mg/dL      Creatinine 1.18 mg/dL      Sodium 136 mmol/L      Potassium 4.0 mmol/L      Chloride 106 mmol/L      CO2 21.0 mmol/L      Calcium 8.0 mg/dL      Total Protein 4.8 g/dL      Albumin 2.0 g/dL      ALT (SGPT) 12 U/L      AST (SGOT) 22 U/L      Alkaline Phosphatase 163 U/L      Total Bilirubin 2.3 mg/dL      Globulin 2.8 gm/dL      A/G Ratio 0.7 g/dL      BUN/Creatinine Ratio  41.5     Anion Gap 9.0 mmol/L      eGFR 48.3 mL/min/1.73     Narrative:      GFR Normal >60  Chronic Kidney Disease <60  Kidney Failure <15    The GFR formula is only valid for adults with stable renal function between ages 18 and 70.    Sedimentation Rate [[id number]]  (Abnormal) Collected: 02/25/24 0700    Specimen: Blood Updated: 02/25/24 0813     Sed Rate 74 mm/hr     CBC & Differential [[id number]]  (Abnormal) Collected: 02/25/24 0700    Specimen: Blood Updated: 02/25/24 0809    Narrative:      The following orders were created for panel order CBC & Differential.  Procedure                               Abnormality         Status                     ---------                               -----------         ------                     CBC Auto Differential[[id number]]        Abnormal            Final result                 Please view results for these tests on the individual orders.    CBC Auto Differential [[id number]]  (Abnormal) Collected: 02/25/24 0700    Specimen: Blood Updated: 02/25/24 0809     WBC 9.29 10*3/mm3      RBC 2.84 10*6/mm3      Hemoglobin 8.3 g/dL      Hematocrit 25.1 %      MCV 88.4 fL      MCH 29.2 pg      MCHC 33.1 g/dL      RDW 19.4 %      RDW-SD 57.8 fl      MPV 10.4 fL      Platelets 95 10*3/mm3      Neutrophil % 69.7 %      Lymphocyte % 12.8 %      Monocyte % 14.2 %      Eosinophil % 2.0 %      Basophil % 0.1 %      Neutrophils, Absolute 6.47 10*3/mm3      Lymphocytes, Absolute 1.19 10*3/mm3      Monocytes, Absolute 1.32 10*3/mm3      Eosinophils, Absolute 0.19 10*3/mm3      Basophils, Absolute 0.01 10*3/mm3     C-reactive Protein [[id number]]  (Abnormal) Collected: 02/25/24 0700    Specimen: Blood Updated: 02/25/24 0801     C-Reactive Protein 16.60 mg/dL     CBC (No Diff) [[id number]]  (Abnormal) Collected: 02/24/24 1406    Specimen: Blood Updated: 02/24/24 1451     WBC 8.56 10*3/mm3      RBC 3.00 10*6/mm3      Hemoglobin 8.8 g/dL      Hematocrit 26.4 %      MCV 88.0 fL      MCH 29.3 pg       MCHC 33.3 g/dL      RDW 19.4 %      RDW-SD 58.6 fl      MPV 10.9 fL      Platelets 69 10*3/mm3           Cultures:    Urine Culture   Date Value Ref Range Status   02/20/2024 50,000 CFU/mL Pseudomonas aeruginosa MDRO (A)  Final     Comment:       Multi drug resistant Pseudomonas, patient may be an isolation risk.  Multi drug resistant Pseudomonas, patient may be an isolation risk.   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     FINDINGS:      Bilateral diffuse peribronchial thickening. Lungs are well expanded. No  lung consolidation. No pleural effusion or pneumothorax. The  cardiomediastinal silhouette and pulmonary vascularity are within normal  limits. Left shoulder arthroplasty. Previous anterior cervical fusion.  No acute bony abnormality is seen.       Impression:      1. Peribronchial thickening may reflect viral lower respiratory tract  infection. There is no superimposed lung consolidation. Lungs are well  expanded.           This report was signed and finalized on 2/24/2024 6:10 PM by Dr Macho rBown.       XR ELBOW 2 VIEW LEFT [[id number]] Collected: 02/24/24 1459     Updated: 02/24/24 1502    Narrative:      EXAMINATION: XR ELBOW 2 VW LEFT-     2/24/2024 1:31 PM     HISTORY: acute pain;  N39.0-Urinary tract infection, site not specified;  R53.1-Weakness; M25.512-Pain in left shoulder; G89.29-Other chronic  pain; I83.018-Varicose veins of right lower extremity with ulcer other  part of lower leg; L97.812-Non-pressure chronic ulcer of other part of  right lower leg with fat layer exposed; I83.028-Varicose veins of left  lower extremity with ulcer other part of lower leg; L97     2 view LEFT elbow exam.  The distal humerus and proximal radius and ulna have a normal  appearance.      Diffuse soft tissue edema seen about the elbow.  There is no soft tissue foreign body.     There is a normal appearance of the elbow joint.  No significant joint effusion is seen.       Impression:      1. No acute bony abnormality is seen.           This report was signed and finalized on 2/24/2024 2:59 PM by Dr. Ben Crockett MD.       XR Humerus Left [[id number]] Collected: 02/24/24 1458     Updated: 02/24/24 1502    Narrative:      EXAMINATION: XR HUMERUS LEFT-     2/24/2024 1:31 PM     HISTORY: acute pain; N39.0-Urinary tract infection, site not specified;  R53.1-Weakness; M25.512-Pain in left shoulder; G89.29-Other chronic  pain; I83.018-Varicose veins of right lower extremity with ulcer other  part of lower leg; L97.812-Non-pressure chronic ulcer of other part of  right lower leg with fat layer exposed; I83.028-Varicose veins of left  lower extremity with ulcer other part of lower leg; L97     2 view LEFT humerus series.     LEFT shoulder prosthesis shows normal alignment.     Intact scapula and humerus.     Diffuse soft tissue edema about the upper arm.     Intact elbow joint, to the extent visualized.       Impression:      1. The LEFT humerus shows no acute bony abnormality.  2. Diffuse LEFT arm edema is noted.           This report was signed and finalized on 2/24/2024 2:58 PM by Dr. Ben Crockett MD.                        ASSESSMENT:      UTI (urinary tract infection)    Secondary osteoarthritis of left  shoulder due to rotator cuff arthropathy    Cellulitis of right lower extremity    HTN (hypertension)    Anxiety associated with depression    Cellulitis of left lower extremity    Acute cystitis with hematuria    Candidiasis of skin    CHRISTINE (acute kidney injury)    Chronic kidney disease, stage 3a    COVID-19 virus detected      PLAN:    Continues to have issues with pain on movement, somnolence, borderline hypotension.  She is slightly more conversational this morning.    1.  Urine culture positive for MDRO Pseudomonas.  Infectious disease has been consulted.  Continue antibiotics with ceftazidime.  2.  Continues to have left arm and elbow pain.  Ultrasound pending.  X-rays were negative.  Orthopedics was consulted, and she is not surgical candidate currently.  Suspect prosthesis failure.  3.  Continue COVID-19 precautions.  Chest x-ray showed no consolidation.  Overall respiratory status stable.  4.  Lethargic and chronic opiate use.  Will continue with current medications.  5.  She is DNR/DNI.  Palliative care is following.  Awaiting skilled nursing facility placement next week.  6.  Anticoagulant was held.  Hemoglobin is stable.    Further orders per Dr. Robledo.      Tylor Cornelius, APRN  02/25/24  09:57 CST        Part of this note may be an electronic transcription/translation of spoken language to printed text using the Dragon Dictation System.    Electronically signed by Saravanan Robledo MD at 02/25/24 1219       Clint Moctezuma MD at 02/24/24 1342          Infectious Diseases Progress Note    Patient:  Brenda Sneed  YOB: 1950  MRN: [id number]   Admit date: 2/20/2024   Admitting Physician: Sachin Dia MD  Primary Care Physician: Sachin Dia MD    Chief Complaint/Interval History: She is without fever.  She is on room air.  She appears to be breathing comfortably at present.  She is more arousable.  She is little more conversant today.  No productive cough.   Has some left shoulder discomfort.  No nausea or vomiting.  Discussed with Dr. Robledo.    Intake/Output Summary (Last 24 hours) at 2024 1342  Last data filed at 2024 0900  Gross per 24 hour   Intake 240 ml   Output --   Net 240 ml     Allergies:   Allergies   Allergen Reactions    Codeine Itching and Rash     SEVERE RASH/ITCHING (TOLERATES PERCOCET)    Amoxicillin Other (See Comments)     unknown    Levaquin [Levofloxacin] Other (See Comments)     unknown    Benadryl [Diphenhydramine] Other (See Comments)     KEEPS PATIENT AWAKE      Current Scheduled Medications:   albuterol sulfate HFA, 2 puff, Inhalation, TID - RT  apixaban, 5 mg, Oral, Q12H  Biotene Dry Mouth Moisturizing, 1 spray, Mouth/Throat, 5x Daily  busPIRone, 5 mg, Oral, TID With Meals  cefTAZidime, 1,000 mg, Intravenous, Q12H  DULoxetine, 30 mg, Oral, Daily  gabapentin, 100 mg, Oral, Q12H  levothyroxine, 100 mcg, Oral, Q AM  Lidocaine, 1 patch, Transdermal, Q24H  Morphine, 15 mg, Oral, Daily  Non-Formulary / Patient Supplied Medication, 1 each, Topical, Weekly  nystatin, 1 application , Topical, BID  pramipexole, 0.75 mg, Oral, Nightly  saccharomyces boulardii, 250 mg, Oral, BID  sodium chloride, 10 mL, Intravenous, Q12H  sodium chloride, 10 mL, Intravenous, Q12H          Current PRN Medications:    senna-docusate sodium **AND** polyethylene glycol **AND** bisacodyl **AND** bisacodyl    Calcium Replacement - Follow Nurse / BPA Driven Protocol    cyclobenzaprine    docusate sodium    furosemide    Magnesium Standard Dose Replacement - Follow Nurse / BPA Driven Protocol    Morphine **OR** Morphine    ondansetron    Phosphorus Replacement - Follow Nurse / BPA Driven Protocol    Potassium Replacement - Follow Nurse / BPA Driven Protocol    sodium chloride    sodium chloride    sodium chloride    sodium chloride    Review of Systems see HPI    Vital Signs:  Temp (24hrs), Av.2 °F (36.8 °C), Min:97.6 °F (36.4 °C), Max:98.8 °F (37.1 °C)    BP  "112/51 (BP Location: Right arm, Patient Position: Lying)   Pulse 78   Temp 98.6 °F (37 °C) (Axillary)   Resp 18   Ht 160 cm (63\")   Wt 91.6 kg (202 lb)   SpO2 96%   BMI 35.78 kg/m²     Physical Exam  Vital signs - reviewed.  Line/IV site - No erythema, warmth, induration, or tenderness.  No respiratory distress at rest  She is comfortable appearing  Soreness left elbow area.  Some swelling diffusely left upper extremity a little worse relative to other extremities.    Lab Results:  CBC:   Results from last 7 days   Lab Units 02/22/24  0833 02/20/24  1605   WBC 10*3/mm3 7.77 15.64*   HEMOGLOBIN g/dL 7.8* 10.5*   HEMATOCRIT % 23.0* 28.2*   PLATELETS 10*3/mm3 60* 73*     BMP:  Results from last 7 days   Lab Units 02/22/24  1908 02/22/24  0833 02/20/24  1605   SODIUM mmol/L  --  133* 132*   POTASSIUM mmol/L 4.0 3.3* 3.3*   CHLORIDE mmol/L  --  101 95*   CO2 mmol/L  --  23.0 24.0   BUN mg/dL  --  50* 46*   CREATININE mg/dL  --  1.53* 1.96*   GLUCOSE mg/dL  --  91 94   CALCIUM mg/dL  --  7.6* 7.8*   ALT (SGPT) U/L  --  8 15     Culture Results:   Urine Culture   Date Value Ref Range Status   02/20/2024 50,000 CFU/mL Pseudomonas aeruginosa MDRO (A)  Final     Comment:       Multi drug resistant Pseudomonas, patient may be an isolation risk.  Multi drug resistant Pseudomonas, patient may be an isolation risk.   02/20/2024 50,000 CFU/mL Enterococcus faecalis (A)  Final     Reviewed susceptibility testing  Susceptibility   Pseudomonas aeruginosa MDRO Enterococcus faecalis     CHRISTIANO CHRISTIANO     Ampicillin   <=2 ug/ml Susceptible     Cefepime 8 ug/ml Susceptible       Ceftazidime 4 ug/ml Susceptible       Ciprofloxacin <=0.25 ug/ml Susceptible       Levofloxacin 2 ug/ml Intermediate >=8 ug/ml Resistant     Meropenem 4 ug/ml Intermediate       Nitrofurantoin   <=16 ug/ml Susceptible     Piperacillin + Tazobactam 8 ug/ml Susceptible       Tobramycin <=1 ug/ml Susceptible       Vancomycin   1 ug/ml Susceptible      Radiology: " None  Additional Studies Reviewed: None    Impression:   1.  Probable Pseudomonas UTI-on treatment with Ceftazidime.  Organism should be susceptible based on testing.  2.  Left elbow area discomfort and left arm swelling-further evaluation today pending including ultrasound and x-rays.  3.  Recent COVID-19 infection-seems stable from pulmonary standpoint at present  4.  Lethargy/opiate use-more conversant and responsive today.    5.  Amoxicillin allergy-tolerating cephalosporin without difficulty    Recommendations:   Continue Ceftazidime  No change in antibiotic treatment  Left upper extremity ultrasound and left elbow x-rays today  Continue to follow    Clint Johnson MD    Electronically signed by Clint Johnson MD at 02/24/24 1348       Tylor Cornelius, APRN at 02/24/24 1031       Attestation signed by Saravanan Robledo MD at 02/24/24 1330    I have reviewed this documentation and agree.    Nursing states patient screams in pain with any movement of her left arm.  Patient complains of pain in the left arm.  She is now able to tell me that it started the morning she came to the hospital.  She denies any injury.    On exam the left arm is swollen with some erythema.  She is tender over the elbow but also somewhat the upper and lower arm.  Difficult to localize exactly where her worst pain is.    1.  Will get x-rays of the elbow and humerus  2.  Will get venous Doppler left upper extremity  3.  Antibiotics per infectious disease  4.  Continue current pain medication  5.  Awaiting repeat hemoglobin level                          Chief Complaint: UTI, lethargy      Interval History:     Patient is sitting up in bed and is lethargic this morning.  Attempting to eat breakfast.  Continues to have increased weakness and fatigue.  Overall respiratory status appears stable.  Continues to plaint of shoulder pain.  Worsening anemia on labs 2 days ago.    Review of Systems:   General ROS: negative for - chills or  fever  Respiratory ROS: no cough, shortness of breath, or wheezing  Cardiovascular ROS: no chest pain or dyspnea on exertion  Gastrointestinal ROS: negative for - abdominal pain, diarrhea or nausea/vomiting       Vital Signs  Temp:  [97.6 °F (36.4 °C)-98.8 °F (37.1 °C)] 98 °F (36.7 °C)  Heart Rate:  [73-98] 75  Resp:  [18-22] 18  BP: (101-137)/(46-94) 109/79    Intake/Output Summary (Last 24 hours) at 2/24/2024 1031  Last data filed at 2/24/2024 0900  Gross per 24 hour   Intake 240 ml   Output 500 ml   Net -260 ml       Physical Exam:     General Appearance:  Opens eyes to voice.  Lethargic.   Head:    N/A   Throat:   N/A   Neck:   N/A   Lungs:     Clear to auscultation,respirations regular, even and                  unlabored    Heart:    Regular rhythm and normal rate, normal S1 and S2, no            murmur, no gallop, no rub   Abdomen:     Normal bowel sounds, no masses, no organomegaly, soft        non-tender, non-distended, no guarding, no rebound                tenderness   Extremities: Bilateral upper extremity lymphedema   Pulses:   N/A   Skin:   N/A   Lymph nodes:   N/A   Neurologic:   N/A          Lab Review:       Lab Results (last 24 hours)       ** No results found for the last 24 hours. **          Cultures:    Urine Culture   Date Value Ref Range Status   02/20/2024 50,000 CFU/mL Pseudomonas aeruginosa MDRO (A)  Final     Comment:       Multi drug resistant Pseudomonas, patient may be an isolation risk.  Multi drug resistant Pseudomonas, patient may be an isolation risk.   02/20/2024 50,000 CFU/mL Enterococcus faecalis (A)  Final       Radiology Review:  Imaging Results (Last 24 Hours)       ** No results found for the last 24 hours. **                     ASSESSMENT:      UTI (urinary tract infection)    Secondary osteoarthritis of left shoulder due to rotator cuff arthropathy    Cellulitis of right lower extremity    HTN (hypertension)    Anxiety associated with depression    Cellulitis of left  lower extremity    Acute cystitis with hematuria    Candidiasis of skin    CHRISTINE (acute kidney injury)    Chronic kidney disease, stage 3a    COVID-19 virus detected      PLAN:    1.  Urine culture positive MDRO Pseudomonas.  Infectious diseases been consulted.  Was previously on vancomycin and Zosyn.  2.  Persistent left shoulder pain and concern for prosthesis failure on x-ray.  Deferring intervention until medically stable.  3.  Chronic opiate use.  Will monitor on decreased dose of morphine.  4.  Stasis dermatitis bilateral upper extremities from lymphedema.  Wound care and lymphedema care following.  5.  She is DNR, DNI.  Palliative care following.  Awaiting skilled nursing facility placement next week.  6.  Worsening anemia on labs 2 days ago.  Will hold Eliquis today and get stat CBC this morning.    Further orders per Dr. Robledo.      GARETT Perry  02/24/24  10:31 CST        Part of this note may be an electronic transcription/translation of spoken language to printed text using the Dragon Dictation System.    Electronically signed by Saravanan Robledo MD at 02/24/24 1330       Earnestine Gan APRN at 02/23/24 1210                      [facility]    Palliative Care Daily Progress Note   Chief complaint-follow up support for patient/family and pain/symptom management    Code Status:   Code Status and Medical Interventions:   Ordered at: 02/21/24 1123     Medical Intervention Limits:    NO intubation (DNI)    Other     Code Status (Patient has no pulse and is not breathing):    No CPR (Do Not Attempt to Resuscitate)     Medical Interventions (Patient has pulse or is breathing):    Limited Support     Additional Medical Interventions Limits:    no permanent feeding tube      Advanced Directives: Advance Directive Status: Patient has advance directive, copy in chart   Goals of Care: Ongoing.     S: Medical record reviewed. Events noted.  No new labs to  "review.  Urine culture from 2/20 shows Pseudomonas MDRO and Enterococcus faecalis.  Evaluated by orthopedics yesterday with discussion of possible revision reverse total shoulder arthroplasty when more medically stable, recommends sling to left upper extremity for comfort and follow-up outpatient.  Therapy and attending physician recommending SNF.  Per  note plans for discharge home with family, declined SNF.  A MOST document completed.  Received 74 mg oral morphine equivalent in the form of Butrans transdermal patch, morphine extended release, and morphine immediate release over the last 24 hours.  And lidocaine patches in place to left shoulder but appears last placed several days ago.  Laying in bed without apparent distress.  Therapy at bedside.  Reports pain fairly well-controlled at rest, however with any movement of left upper extremity has significant discomfort. Refusing skilled nursing facility placement, \"they are going to quit paying\". Attempt to call patient's daughter, no answer on home #, boyfriend answered cell and out of house.     Review of Systems   Constitutional: Positive for malaise/fatigue.   Cardiovascular:  Positive for leg swelling.        Lymphedema bilateral lower extremities   Respiratory:  Negative for shortness of breath.    Skin:  Positive for poor wound healing.   Musculoskeletal:  Positive for falls, joint pain, joint swelling, muscle weakness and myalgias.   Genitourinary:  Negative for dysuria.   Neurological:  Positive for weakness.   Psychiatric/Behavioral:  Positive for depression. The patient is nervous/anxious.      Pain Assessment  Preferred Pain Scale: number (Numeric Rating Pain Scale)  Pain Location: shoulder    O:     Intake/Output Summary (Last 24 hours) at 2/23/2024 1211  Last data filed at 2/23/2024 1155  Gross per 24 hour   Intake 120 ml   Output 850 ml   Net -730 ml       Diagnostics and current medications: Reviewed.      Current Facility-Administered " Medications:     albuterol sulfate HFA (PROVENTIL HFA;VENTOLIN HFA;PROAIR HFA) inhaler 2 puff, 2 puff, Inhalation, TID - RT, Sachin Dia MD, 2 puff at 02/23/24 0735    apixaban (ELIQUIS) tablet 5 mg, 5 mg, Oral, Q12H, Sachin Dia MD, 5 mg at 02/23/24 0946    Biotene Dry Mouth Moisturizing (BIOTENE) 1 spray, 1 spray, Mouth/Throat, 5x Daily, Earnestine Gan APRN, 1 spray at 02/23/24 0630    sennosides-docusate (PERICOLACE) 8.6-50 MG per tablet 2 tablet, 2 tablet, Oral, BID PRN **AND** polyethylene glycol (MIRALAX) packet 17 g, 17 g, Oral, Daily PRN **AND** bisacodyl (DULCOLAX) EC tablet 5 mg, 5 mg, Oral, Daily PRN **AND** bisacodyl (DULCOLAX) suppository 10 mg, 10 mg, Rectal, Daily PRN, Sachin Dia MD    busPIRone (BUSPAR) tablet 5 mg, 5 mg, Oral, TID With Meals, Earnestine Gan APRN, 5 mg at 02/23/24 0946    Calcium Replacement - Follow Nurse / BPA Driven Protocol, , Does not apply, PRN, Sachin Dia MD    cyclobenzaprine (FLEXERIL) tablet 10 mg, 10 mg, Oral, TID PRN, Sachin Dia MD    docusate sodium (COLACE) capsule 100 mg, 100 mg, Oral, BID PRN, Sachin Dia MD    DULoxetine (CYMBALTA) DR capsule 30 mg, 30 mg, Oral, Daily, Earnestine Gan APRN, 30 mg at 02/23/24 0946    furosemide (LASIX) tablet 40 mg, 40 mg, Oral, Daily PRN, Sachin Dia MD    gabapentin (NEURONTIN) capsule 100 mg, 100 mg, Oral, Q12H, Earnestine Gan APRN, 100 mg at 02/23/24 0946    levothyroxine (SYNTHROID, LEVOTHROID) tablet 100 mcg, 100 mcg, Oral, Q AM, Sachin Dia MD, 100 mcg at 02/23/24 0559    Magnesium Standard Dose Replacement - Follow Nurse / BPA Driven Protocol, , Does not apply, PRN, Sachin Dia MD    Morphine (MS CONTIN) 12 hr tablet 15 mg, 15 mg, Oral, Daily, Sanjiv Block MD, 15 mg at 02/23/24 0946    Morphine (MSIR) tablet 15 mg, 15 mg, Oral, Q6H PRN **OR** Morphine (MSIR) tablet 7.5 mg, 7.5 mg, Oral, Q6H PRN, Elvia  GARETT Garza, 7.5 mg at 02/22/24 0941    Non-Formulary / Patient Supplied Medication, 1 each, Topical, Weekly, Earnestine Gan APRN    nystatin (MYCOSTATIN) powder 1 Application, 1 application , Topical, BID, Sachin Dia MD, 1 Application at 02/23/24 0946    ondansetron (ZOFRAN) injection 4 mg, 4 mg, Intravenous, Q6H PRN, Elba Rockwell APRN    Phosphorus Replacement - Follow Nurse / BPA Driven Protocol, , Does not apply, PRN, Sachin Dia MD    Potassium Replacement - Follow Nurse / BPA Driven Protocol, , Does not apply, PRN, Sachin Dia MD    pramipexole (MIRAPEX) tablet 0.75 mg, 0.75 mg, Oral, Nightly, Sachin Dia MD, 0.75 mg at 02/22/24 2117    saccharomyces boulardii (FLORASTOR) capsule 250 mg, 250 mg, Oral, BID, Sachin Dia MD, 250 mg at 02/23/24 0946    sodium chloride 0.9 % flush 10 mL, 10 mL, Intravenous, Q12H, Elba Rockwell APRN, 10 mL at 02/23/24 0946    sodium chloride 0.9 % flush 10 mL, 10 mL, Intravenous, PRN, Elba Rockwell APRN    sodium chloride 0.9 % flush 10 mL, 10 mL, Intravenous, Q12H, Sachin Dia MD, 10 mL at 02/23/24 0946    sodium chloride 0.9 % flush 10 mL, 10 mL, Intravenous, PRN, Sachin Dia MD    sodium chloride 0.9 % infusion 40 mL, 40 mL, Intravenous, PRNMoiz Katie, APRN    sodium chloride 0.9 % infusion 40 mL, 40 mL, Intravenous, PRN, Sachin Dia MD    Allergies   Allergen Reactions    Codeine Itching and Rash     SEVERE RASH/ITCHING (TOLERATES PERCOCET)    Amoxicillin Other (See Comments)     unknown    Levaquin [Levofloxacin] Other (See Comments)     unknown    Benadryl [Diphenhydramine] Other (See Comments)     KEEPS PATIENT AWAKE      I have utilized all available immediate resources to obtain, update, or review the patient's current medications (including all prescriptions, over-the-counter products, herbals, cannabis/cannabidiol products, and vitamin/mineral/dietary (nutritional) supplements)  "for name, route of administration, type, dose and frequency.    A:    /75 (BP Location: Right arm, Patient Position: Lying)   Pulse 98   Temp 98 °F (36.7 °C) (Axillary)   Resp 22   Ht 160 cm (63\")   Wt 91.6 kg (202 lb)   SpO2 100%   BMI 35.78 kg/m²     Vitals and nursing note reviewed.   Constitutional:       Appearance: Ill-appearing and chronically ill-appearing.      Comments: Ace wraps to bilateral lower extremities  Pulmonary:      Effort: Pulmonary effort is normal.   Cardiovascular:      Normal rate.   Edema:     Peripheral edema present. L>R  Musculoskeletal:      Cervical back: Neck supple.   Skin:     General: Skin is warm.   Neurological:      Mental Status: Alert     Patient status: Disease state: Controlled with current treatments.  Current Functional status: Palliative Performance Scale Score: Performance 40% based on the following measures: Ambulation: Mainly in bed, Activity and Evidence of Disease: Unable to do any work, extensive evidence of disease, Self-Care: Mainly assistance required,  Intake: Normal or reduced, LOC: Full, drowsy or confusion   Baseline Functional status: Palliative Performance Scale Score: Performance 60% based on the following measures: Ambulation: Reduced, Activity and Evidence of Disease: Unable to do hobby or some work, significant evidence of disease, Self-Care: Occasional assist necessary,  Intake: Normal or reduced, LOC: Full or confusion   Nutritional status: Albumin 2.9 Body mass index is 35.78 kg/m².      Hospital Problem List      UTI (urinary tract infection)    Secondary osteoarthritis of left shoulder due to rotator cuff arthropathy    Cellulitis of right lower extremity    HTN (hypertension)    Anxiety associated with depression    Cellulitis of left lower extremity    Acute cystitis with hematuria    Candidiasis of skin    CHRISTINE (acute kidney injury)    Chronic kidney disease, stage 3a    COVID-19 virus detected     Impression/Problem List:     Acute " kidney injury on chronic kidney disease stage III  Acute UTI with hematuria  COVID-19 virus detected  Candidiasis of skin  Secondary osteoarthritis of left shoulder due to rotator cuff arthroplasty, abnormal imaging  Cellulitis bilateral lower extremities  Atrial fibrillation-Eliquis  Chronic back pain-spondylosis followed by Dr. Jones pain management  Depression  Disease of thyroid gland  Hypertension  Iron deficiency anemia  Thrombocytopenia  Impaired mobility-walker  Lymphedema-managed by home health with wound care 3 times a week       Recommendations/Plan:  1. plan: Goals of care include CODE STATUS no CPR/limited support interventions.     Family support: The patient receives support from her daughter and extended family..  Advance Directives: On file     POA/Healthcare surrogate-Dominga wagner-next of kin.     2.  Palliative care encounter  - Prognosis is fair long-term secondary to acute kidney injury, acute UTI, COVID-19, and multiple comorbidities.  -Patient appears to have fair prognostic awareness.      -Recently discharged from rehab 2 weeks prior.  Recently diagnosed with COVID 1 week prior.       -Urine culture pending. Started on broad-spectrum IV antibiotics for UTI and bilateral lower extremity cellulitis  -Wound care consulted.    -Orthopedics consulted due to abnormal imaging left shoulder as above.     2/21-CODE STATUS changes no CPR/Limited interventions, no intubation, no permanent feeding tube.  Will plan to complete a MOST document  -High risk rehospitalization  -Awaiting Ortho recommendations  -dietician evaluation protein calorie malnutrition and dietary supplements per family request. Family reports patient does not use dentures and eats soft consistency foods at baseline.  Also drinks ensure supplements  -Will plan to reevaluate living will directive prior to discharge    2/23-continue supportive measures  -Ortho recommendations-possible revision reverse total shoulder arthroplasty  when more medically stable, recommends sling to left upper extremity for comfort and follow-up outpatient.   -Attending and therapy recommending SNF.  Patient and daughter refusing SNF at this time     3. Pain  -patient has Butrans pain patch on lower left abd changed by patient 2/19. Recently restarted by pain management on second week. Weekly patch,added to MAR to continue. Discussed with family 2/21 may need to provide home medications as this is not on formulary.   -given kidney dysfunction and increased weakness with fall decreased Cymbalta to 30 mg daily 2/21.   -MSER scheduled, now daily per attending and MSIR as needed, ranging dosing for breakthrough pain.  -flexeril as needed  -add lidocaine patch to left shoulder     4. Xerostomia  -Biotene     5. Anxiety  -Buspar three times a day     6. Neuropathy  -gabapentin twice day     7.  Depression  -stable   -On Cymbalta, may consider adding Lexapro to augment regimen    ADDENDUM at 3:25 PM spoke with patient's daughterJazmin via telephone, updated on status as above and discussed patient's significant need for assistance with all aspects of ADLs.  Encouraged ongoing conversation with regard to discharge disposition and recommended consideration of skilled nursing facility placement given caregiver needs.  Questions encouraged and support given.      Thank you for allowing us to participate in patient's plan of care. Palliative Care Team will continue to follow patient.     GARETT Riddle  2/23/2024  12:11 CST     Electronically signed by Earnestine Gan APRN at 02/23/24 1536          Consult Notes (last 72 hours)        Earnestine Gan APRN at 02/26/24 0934        Consult Orders    1. Inpatient Palliative Care Consult [[id number]] ordered by Sanjiv Block MD at 02/26/24 0827                 Palliative care consult received, already following this hospitalization, please refer to previous notes.    GARETT Riddle  Palliative  "Care    Electronically signed by Earnestine Gan APRN at 02/26/24 0935       Clint Moctezuma MD at 02/23/24 1820        Consult Orders    1. Inpatient Infectious Diseases Consult [[id number]] ordered by Sanjiv Block MD at 02/23/24 0653                 INFECTIOUS DISEASES CONSULT NOTE    Patient:  Brenda Sneed 73 y.o. female  ROOM # 358/1  YOB: 1950  MRN: [id number]  CSN:  [id number]  Admit date: 2/20/2024   Admitting Physician: Sachin Dia MD  Primary Care Physician: Sachin Dia MD  REFERRING PROVIDER: Sachin Dia MD    Reason for Consultation: \"MDRO UTI\"    History of Present Illness/Chief Complaint: 73-year-old woman.  Unable to get significant history from the patient.  She is somnolent at the moment.  She does not look uncomfortable.  She does not appear dyspneic.  She had presented with generalized weakness.  She follows with pain management for chronic back and neck pain.  She has developed some increasing lower extremity lymphedema.  She had tested positive for COVID-19 recently at an outpatient appointment.  Infectious disease asked to evaluate and offer recommendations.    Current Scheduled Medications:   albuterol sulfate HFA, 2 puff, Inhalation, TID - RT  apixaban, 5 mg, Oral, Q12H  Biotene Dry Mouth Moisturizing, 1 spray, Mouth/Throat, 5x Daily  busPIRone, 5 mg, Oral, TID With Meals  DULoxetine, 30 mg, Oral, Daily  gabapentin, 100 mg, Oral, Q12H  levothyroxine, 100 mcg, Oral, Q AM  Lidocaine, 1 patch, Transdermal, Q24H  Morphine, 15 mg, Oral, Daily  Non-Formulary / Patient Supplied Medication, 1 each, Topical, Weekly  nystatin, 1 application , Topical, BID  pramipexole, 0.75 mg, Oral, Nightly  saccharomyces boulardii, 250 mg, Oral, BID  sodium chloride, 10 mL, Intravenous, Q12H  sodium chloride, 10 mL, Intravenous, Q12H      Current PRN Medications:    senna-docusate sodium **AND** polyethylene glycol **AND** bisacodyl **AND** bisacodyl    " "Calcium Replacement - Follow Nurse / BPA Driven Protocol    cyclobenzaprine    docusate sodium    furosemide    Magnesium Standard Dose Replacement - Follow Nurse / BPA Driven Protocol    Morphine **OR** Morphine    ondansetron    Phosphorus Replacement - Follow Nurse / BPA Driven Protocol    Potassium Replacement - Follow Nurse / BPA Driven Protocol    sodium chloride    sodium chloride    sodium chloride    sodium chloride    Allergies:    Allergies   Allergen Reactions    Codeine Itching and Rash     SEVERE RASH/ITCHING (TOLERATES PERCOCET)    Amoxicillin Other (See Comments)     unknown    Levaquin [Levofloxacin] Other (See Comments)     unknown    Benadryl [Diphenhydramine] Other (See Comments)     KEEPS PATIENT AWAKE      Past Medical History: Nephrolithiasis. Hypertension. Lymphedema. Depression. Chronic back pain. Thyroid dysfunction. Cataract.     Past Surgical History: Back surgery.  Cholecystectomy.  Abscess drainage.  Corneal transplant.  Lumbar fusion.  Hip arthroplasty.  Knee arthroplasty.  Shoulder arthroplasty and resection of distal clavicle.    Social History: .  No alcohol use.  No tobacco use.  No illicit drug use.    Family History: Breast cancer    Exposure History: Unobtainable from patient    Review of Systems see HPI    Vital Signs:  /94 (BP Location: Right arm, Patient Position: Lying) Comment: Nurse notified  Pulse 74   Temp 98.8 °F (37.1 °C) (Axillary)   Resp 20   Ht 160 cm (63\")   Wt 91.6 kg (202 lb)   SpO2 98%   BMI 35.78 kg/m²  Temp (24hrs), Av.5 °F (36.9 °C), Min:98 °F (36.7 °C), Max:98.9 °F (37.2 °C)    Physical Exam  Vital signs - reviewed.  Line/IV site - No erythema or tenderness.  Somnolent.  She will arouse.  She will open eyes.  She is not being verbal.  She will move all extremities.  She seems diffusely weak.  Lungs with decreased breath sounds in the bases  No significant crackles or wheezes  Heart without murmur  Abdomen is nondistended bowel sounds " are present.  Extremities lymphedema  Skin without rash    Lab Results:  CBC:   Results from last 7 days   Lab Units 02/22/24  0833 02/20/24  1605   WBC 10*3/mm3 7.77 15.64*   HEMOGLOBIN g/dL 7.8* 10.5*   HEMATOCRIT % 23.0* 28.2*   PLATELETS 10*3/mm3 60* 73*     CMP:   Results from last 7 days   Lab Units 02/22/24  1908 02/22/24  0833 02/20/24  1605   SODIUM mmol/L  --  133* 132*   POTASSIUM mmol/L 4.0 3.3* 3.3*   CHLORIDE mmol/L  --  101 95*   CO2 mmol/L  --  23.0 24.0   BUN mg/dL  --  50* 46*   CREATININE mg/dL  --  1.53* 1.96*   CALCIUM mg/dL  --  7.6* 7.8*   BILIRUBIN mg/dL  --  1.9* 1.8*   ALK PHOS U/L  --  99 137*   ALT (SGPT) U/L  --  8 15   AST (SGOT) U/L  --  18 35*   GLUCOSE mg/dL  --  91 94     Profile of recent urinalysis:            Component  Ref Range & Units 3 d ago  (2/20/24) 1 mo ago  (1/8/24) 1 mo ago  (1/5/24) 4 mo ago  (9/30/23) 9 mo ago  (5/26/23) 9 mo ago  (5/23/23) 1 yr ago  (7/6/22)   RBC, UA  None Seen, 0-2 /HPF 3-5 Abnormal  6-10 Abnormal  0-2 0-2 Abnormal  R 13-20 Abnormal  R 21-30 Abnormal  R 0-2 Abnormal  R   WBC, UA  None Seen, 0-2 /HPF 3-5 Abnormal  3-5 Abnormal  CM 11-20 Abnormal  21-30 Abnormal  R 13-20 Abnormal  R 31-50 Abnormal  R 3-5 Abnormal  R, CM   Comment: Urine culture not indicated.   Bacteria, UA  None Seen /HPF 4+ Abnormal  None Seen 4+ Abnormal  4+ Abnormal  Trace Abnormal  2+ Abnormal  1+ Abnormal    Squamous Epithelial Cells, UA  None Seen, 0-2 /HPF 0-2 0-2 3-6 Abnormal  None Seen 3-6 Abnormal  None Seen 7-12 Abnormal    Hyaline Casts, UA  None Seen /LPF 7-12 0-2 0-2 3-6 7-12 3-6 None        Susceptibility   Pseudomonas aeruginosa MDRO Enterococcus faecalis     CHRISTIANO CHRISTIANO     Ampicillin   <=2 ug/ml Susceptible     Cefepime 8 ug/ml Susceptible       Ceftazidime 4 ug/ml Susceptible       Ciprofloxacin <=0.25 ug/ml Susceptible       Levofloxacin 2 ug/ml Intermediate >=8 ug/ml Resistant     Meropenem 4 ug/ml Intermediate       Nitrofurantoin   <=16 ug/ml Susceptible      Piperacillin + Tazobactam 8 ug/ml Susceptible       Tobramycin <=1 ug/ml Susceptible       Vancomycin   1 ug/ml Susceptible      Radiology:   Chest x-ray February 20, 2024:  IMPRESSION:  Low lung volumes with mild bibasilar parenchymal opacities, likely  atelectasis    Additional Studies Reviewed:     Impression:   1.  Possible UTI-Pseudomonas aeruginosa and Enterococcus on urine culture  2.  Recent COVID 19 infection  3.  Lethargy/opiate use disorder  4.  Amoxicillin allergy    Recommendations:    Continue supportive care  Minimize sedating medications  Empiric treatment with Ceftazidime  Monitor clinical course    Clint Johnson MD  02/23/24  18:20 CST            Electronically signed by Clint Johnson MD at 02/24/24 1112

## 2024-02-27 PROCEDURE — 97535 SELF CARE MNGMENT TRAINING: CPT | Performed by: OCCUPATIONAL THERAPIST

## 2024-02-27 PROCEDURE — 94799 UNLISTED PULMONARY SVC/PX: CPT

## 2024-02-27 PROCEDURE — 94761 N-INVAS EAR/PLS OXIMETRY MLT: CPT

## 2024-02-27 PROCEDURE — 99231 SBSQ HOSP IP/OBS SF/LOW 25: CPT | Performed by: INTERNAL MEDICINE

## 2024-02-27 PROCEDURE — 97530 THERAPEUTIC ACTIVITIES: CPT

## 2024-02-27 PROCEDURE — 25010000002 CEFTAZIDIME PER 500 MG: Performed by: INTERNAL MEDICINE

## 2024-02-27 PROCEDURE — 92526 ORAL FUNCTION THERAPY: CPT

## 2024-02-27 RX ORDER — MORPHINE SULFATE 15 MG/1
7.5 TABLET ORAL EVERY 8 HOURS PRN
Status: DISCONTINUED | OUTPATIENT
Start: 2024-02-27 | End: 2024-03-02 | Stop reason: HOSPADM

## 2024-02-27 RX ADMIN — GABAPENTIN 100 MG: 100 CAPSULE ORAL at 20:29

## 2024-02-27 RX ADMIN — CYCLOBENZAPRINE HYDROCHLORIDE 10 MG: 10 TABLET, FILM COATED ORAL at 10:38

## 2024-02-27 RX ADMIN — ALBUTEROL SULFATE 2 PUFF: 90 AEROSOL, METERED RESPIRATORY (INHALATION) at 15:05

## 2024-02-27 RX ADMIN — ALBUTEROL SULFATE 2 PUFF: 90 AEROSOL, METERED RESPIRATORY (INHALATION) at 07:17

## 2024-02-27 RX ADMIN — BUSPIRONE HYDROCHLORIDE 5 MG: 5 TABLET ORAL at 17:19

## 2024-02-27 RX ADMIN — MORPHINE SULFATE 7.5 MG: 15 TABLET ORAL at 10:38

## 2024-02-27 RX ADMIN — PRAMIPEXOLE DIHYDROCHLORIDE 0.75 MG: 0.25 TABLET ORAL at 20:29

## 2024-02-27 RX ADMIN — Medication 1 SPRAY: at 09:13

## 2024-02-27 RX ADMIN — NYSTATIN 1 APPLICATION: 100000 POWDER TOPICAL at 09:13

## 2024-02-27 RX ADMIN — CEFTAZIDIME 1000 MG: 1 INJECTION, POWDER, FOR SOLUTION INTRAMUSCULAR; INTRAVENOUS at 12:16

## 2024-02-27 RX ADMIN — LIDOCAINE 1 PATCH: 4 PATCH TOPICAL at 09:13

## 2024-02-27 RX ADMIN — Medication 10 ML: at 20:30

## 2024-02-27 RX ADMIN — Medication 1 SPRAY: at 12:16

## 2024-02-27 RX ADMIN — APIXABAN 5 MG: 5 TABLET, FILM COATED ORAL at 20:29

## 2024-02-27 RX ADMIN — Medication 250 MG: at 20:29

## 2024-02-27 RX ADMIN — Medication 1 SPRAY: at 17:19

## 2024-02-27 RX ADMIN — Medication 1 SPRAY: at 14:31

## 2024-02-27 RX ADMIN — BUSPIRONE HYDROCHLORIDE 5 MG: 5 TABLET ORAL at 12:16

## 2024-02-27 RX ADMIN — NYSTATIN 1 APPLICATION: 100000 POWDER TOPICAL at 20:29

## 2024-02-27 RX ADMIN — Medication 10 ML: at 09:27

## 2024-02-27 RX ADMIN — Medication 10 ML: at 09:26

## 2024-02-27 RX ADMIN — ALBUTEROL SULFATE 2 PUFF: 90 AEROSOL, METERED RESPIRATORY (INHALATION) at 18:10

## 2024-02-27 NOTE — PLAN OF CARE
Goal Outcome Evaluation:      Pt is more alert today, she is A&O2-3, disoriented mostly to time and sometimes situation. She was clear by speech therapy and is on a pureed diet with thickened liquids and can have water between meals. Meds are crushed in applesauce. She was move to a dolphin mattress and And she requested a purewick to be placed so she can pee.

## 2024-02-27 NOTE — PLAN OF CARE
Goal Outcome Evaluation:  Plan of Care Reviewed With: patient        Progress: improving  Outcome Evaluation: Pt awake and alert and eager to work w/ therapy. Bed mobility min x1 supine-sit. Sat EOB working on sitting balance pt required cga/sba. pt tolerated and was able to follow commands for A-AAROM BLEs sitting EOB. Pt stood x2 mod x2 HH unable to get completely upright c/o pain in calves when standing. Pt required mod/max x1 to lay back down. Pt will need snf upon d/c for cont strength, mobility and safety.

## 2024-02-27 NOTE — PLAN OF CARE
Goal Outcome Evaluation:  Plan of Care Reviewed With: patient, family        Progress: improving  Outcome Evaluation: Pt able to come to EOB at Min A.  Once EOB demo'd impulsive jerking movement backward and required Min A-Mod A to maintain upright posture.  Assisted pt with obtaining drink of thickened liquid and provided balance challenge whilst adhering to NWB status at L UE.  Assisted her back to supine for bed change.  Edu'd family in proper body mechs to phys assist pt and pt progresss since previous day.  Cont OT tx.      Anticipated Discharge Disposition (OT): skilled nursing facility

## 2024-02-27 NOTE — PROGRESS NOTES
"    Daily Progress Note  Brenda Sneed  MRN: [id number] LOS: 7    Admit Date: 2024 06:57 CST    Subjective:         Interval History:    Reviewed overnight events and nursing notes.     Interval improvement with stopping scheduled opiates.  She is more alert and less confused.  She is not oriented to time but recognize her mistake and corrected it.  Only complaining of shoulder pain.  Denies chest pain, shortness of breath, cough, abdominal pain, nausea, vomiting, or diarrhea.    ROS:  Review of Systems   Constitutional:  Positive for fatigue. Negative for chills and fever.   Respiratory:  Negative for cough and shortness of breath.    Cardiovascular:  Positive for leg swelling. Negative for chest pain.   Gastrointestinal:  Negative for abdominal pain.       DIET:  NPO Diet NPO Type: Strict NPO    Medications:      albuterol sulfate HFA, 2 puff, Inhalation, TID - RT  apixaban, 5 mg, Oral, Q12H  Biotene Dry Mouth Moisturizing, 1 spray, Mouth/Throat, 5x Daily  busPIRone, 5 mg, Oral, TID With Meals  cefTAZidime, 1,000 mg, Intravenous, Q12H  DULoxetine, 30 mg, Oral, Daily  gabapentin, 100 mg, Oral, Q12H  levothyroxine, 100 mcg, Oral, Q AM  Lidocaine, 1 patch, Transdermal, Q24H  nystatin, 1 application , Topical, BID  pramipexole, 0.75 mg, Oral, Nightly  saccharomyces boulardii, 250 mg, Oral, BID  sodium chloride, 10 mL, Intravenous, Q12H  sodium chloride, 10 mL, Intravenous, Q12H          Objective:     Vitals: BP 98/44 (BP Location: Left arm, Patient Position: Lying)   Pulse 88   Temp 97.5 °F (36.4 °C) (Axillary)   Resp 20   Ht 160 cm (63\")   Wt 91.6 kg (202 lb)   SpO2 96%   BMI 35.78 kg/m²  No intake or output data in the 24 hours ending 24 0657 Temp (24hrs), Av.9 °F (36.6 °C), Min:97.5 °F (36.4 °C), Max:98.7 °F (37.1 °C)    Glucose:  Lab Results   Component Value Date    POCGLU 100 (H) 2021     Physical Examination:   Physical Exam  Constitutional:       General: She is not " in acute distress.     Appearance: She is obese. She is ill-appearing. She is not toxic-appearing.   Cardiovascular:      Rate and Rhythm: Normal rate and regular rhythm.      Heart sounds: No murmur heard.  Pulmonary:      Effort: Pulmonary effort is normal. No respiratory distress.   Musculoskeletal:      Right lower leg: Edema present.      Left lower leg: Edema present.   Neurological:      Comments: More alert than yesterday.  Oriented x 2.  Garbled speech, moving all extremities spontaneously.         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  Total DLP = 844.33 mGy.cm  The CT scan of the head is performed without intravenous contrast enhancement.  The images are acquired in axial plane with subsequent reconstruction in coronal and sagittal planes.  Comparison is made with the previous study dated 7/5/2022.  There is no evidence of a mass. There is no evidence of midline shift.  There is no evidence of intracranial hemorrhage or hematoma.  Moderately prominent ventricles, basal cisterns and the cortical sulci are similar to the previous study  suggesting chronic volume loss/atrophy. This is more pronounced in the frontal lobes as in the previous study.  An empty sella turcica is seen. No widening or erosion.  Images reviewed in bone window show no significant displaced fracture. However a subtle nondisplaced fracture may be obscured due to significant motion artifacts. Limited visualized paranasal sinuses and mastoid air cells are clear.      Impression: 1. A stable CT scan of the head. No acute intracranial abnormality.             This report was signed and finalized on 2/26/2024 11:48 AM by Dr. Glen Heaton MD.          Assessment and Plan:     Primary Problem:  UTI (urinary tract infection)    Hospital Problem list:    UTI (urinary tract infection)    Secondary osteoarthritis of left shoulder due to rotator cuff arthropathy    Cellulitis of right lower extremity    HTN (hypertension)    Anxiety associated with depression    Cellulitis of left lower extremity    Acute cystitis with hematuria    Candidiasis of skin    CHRISTINE (acute kidney injury)    Chronic kidney disease, stage 3a    COVID-19 virus detected      Assessment: Chronically ill 73-year-old female with opiate use disorder, hypertension, and bilateral lymphedema who has suffered recent progressive decline and presents with weakness and fatigue found to have COVID-19.     Plan:     UTI  Enterococcus in culture yesterday, found to have MDRO Pseudomonas today.,  Has previously had this in her urine, infectious disease following, treatment ongoing per infectious disease      Left shoulder pain  Concern for prothesis failure on Xray, ortho consultation occurred yesterday, deferring intervention until she is more medically stable.  I agree with this.     Lethargy, altered mental status  Opiate use disorder  Interval improvement in her mental status off scheduled opiates.  Will continue to keep her off opiates with only the short acting morphine as needed for her left shoulder pain.  I am  anticipating her mental status will continue to improve as medications wash out of her system.    Dysphagia  Suspect this is due to encephalopathy and altered mental status as above.  Low suspicion for primary neurologic issue.  Will continue treatment for altered mental status as above.    Two disposition issues.  Palliative care is following her and I would agree to pursue more comfortable path in a long-term scenario.  Secondly, based on case management notes the plan is for her to return home at discharge.  I think this is unreasonable and I do not think she can be taken care of at home.  I would recommend skilled nursing facility on discharge based on the amount of care she is requiring while inpatient.      Reviewed treatment plans with the patient and/or family.     Code Status:   Code Status and Medical Interventions:   Ordered at: 02/21/24 1123     Medical Intervention Limits:    NO intubation (DNI)    Other     Code Status (Patient has no pulse and is not breathing):    No CPR (Do Not Attempt to Resuscitate)     Medical Interventions (Patient has pulse or is breathing):    Limited Support     Additional Medical Interventions Limits:    no permanent feeding tube       Electronically signed by Sanjiv Block MD on 2/27/2024 at 06:57 CST

## 2024-02-27 NOTE — THERAPY TREATMENT NOTE
Acute Care - Physical Therapy Treatment Note  [facility]     Patient Name: Brenda Sneed  : 1950  MRN: [id number]  Today's Date: 2024      Visit Dx:     ICD-10-CM ICD-9-CM   1. Urinary tract infection without hematuria, site unspecified  N39.0 599.0   2. Generalized weakness  R53.1 780.79   3. Chronic left shoulder pain  M25.512 719.41    G89.29 338.29   4. Venous stasis ulcer of other part of right lower leg with fat layer exposed, unspecified whether varicose veins present  I83.018 454.0    L97.812    5. Venous stasis ulcer of other part of left lower leg with fat layer exposed, unspecified whether varicose veins present  I83.028 454.0    L97.822    6. Impaired mobility [Z74.09]  Z74.09 799.89   7. Oropharyngeal dysphagia  R13.12 787.22     Patient Active Problem List   Diagnosis    Spinal stenosis, lumbar    Secondary osteoarthritis of left shoulder due to rotator cuff arthropathy    Cellulitis of right lower extremity    HTN (hypertension)    Obesity, Class III, BMI 40-49.9 (morbid obesity)    Anxiety associated with depression    Traumatic rhabdomyolysis    Traumatic rhabdomyolysis, initial encounter    Laceration of left lower extremity    Cellulitis    Cellulitis of left lower extremity    Thrombocytopenia    Acute cystitis with hematuria    Sepsis    Candidiasis of skin    Acquired hypothyroidism    CHRISTINE (acute kidney injury)    Chronic pain syndrome    Hyponatremia    Chronic kidney disease, stage 3a    UTI (urinary tract infection)    COVID-19 virus detected     Past Medical History:   Diagnosis Date    Arthritis     Bronchitis     Cataract     right eye , cornea implant    Chronic back pain     Depression     Disease of thyroid gland     Edema     lower extremities    Hypertension     Kidney stones     Lymphedema      Past Surgical History:   Procedure Laterality Date    BACK SURGERY       ([place]), 2013     CHOLECYSTECTOMY OPEN      CORNEAL TRANSPLANT Right     INCISION AND  DRAINAGE ABSCESS Left 4/19/2023    Procedure: INCISION AND DRAINAGE ABSCESS left leg;  Surgeon: Sarita Vazquez MD;  Location:  [facility];  Service: General;  Laterality: Left;    LACERATION REPAIR Left 4/7/2023    Procedure: washout and closure left leg wound ;  Surgeon: Sarita Vazquez MD;  Location:  [facility];  Service: General;  Laterality: Left;    LUMBAR FUSION Left 8/2/2017    Procedure: LEFT LUMBAR LATERAL INTERBODY FUSION WITH INSTRUMENTATION  L1-2;  Surgeon: MARCELINO Wright MD;  Location:  [facility];  Service:     NECK SURGERY  2010    [place]    TOTAL HIP ARTHROPLASTY Right 2014    DR. KENDRICK     TOTAL KNEE ARTHROPLASTY Right 2000    TOTAL KNEE ARTHROPLASTY Left 2002    TOTAL SHOULDER ARTHROPLASTY W/ DISTAL CLAVICLE EXCISION Left 6/1/2020    Procedure: LEFT REVERSE TOTAL SHOULDER REPLACEMENT;  Surgeon: Saravanan Calvin MD;  Location:  [facility];  Service: Orthopedics;  Laterality: Left;     PT Assessment (last 12 hours)       PT Evaluation and Treatment       Row Name 02/27/24 1130          Physical Therapy Time and Intention    Subjective Information complains of;pain  -NW     Document Type therapy note (daily note)  -NW     Mode of Treatment physical therapy  -NW     Comment pt alert and eager to work w/ therapy this am  -NW       Row Name 02/27/24 1130          General Information    Existing Precautions/Restrictions fall;non-weight bearing  NWB LUE  -NW       Row Name 02/27/24 1130          Pain    Pain Location - Side/Orientation Left  -NW     Pain Location upper  -NW     Pain Location - extremity  -NW     Pre/Posttreatment Pain Comment w/ movement  -NW       Row Name 02/27/24 1130          Pain Scale: Word Pre/Post-Treatment    Posttreatment Pain Rating 6 - moderate-severe pain  -NW       Row Name 02/27/24 1130          Bed Mobility    Scooting/Bridging [place] (Bed Mobility) dependent (less than 25% patient effort)  -NW     Supine-Sit [place] (Bed Mobility) verbal cues;minimum assist  (75% patient effort)  -NW     Sit-Supine [place] (Bed Mobility) verbal cues;moderate assist (50% patient effort);maximum assist (25% patient effort)  -NW     Bed Mobility, Safety Issues decreased use of arms for pushing/pulling  -NW     Assistive Device (Bed Mobility) bed rails;draw sheet  -NW       Row Name 02/27/24 1130          Sit-Stand Transfer    Sit-Stand [place] (Transfers) verbal cues;moderate assist (50% patient effort);2 person assist  -NW       Row Name 02/27/24 1130          Stand-Sit Transfer    Stand-Sit [place] (Transfers) verbal cues;moderate assist (50% patient effort)  -NW       Row Name 02/27/24 1130          Gait/Stairs (Locomotion)    Comment, (Gait/Stairs) sat EOB working on sitting balance and LE strengthening ex's . attempt to stand x2 unable to get completely upright  -NW       Row Name 02/27/24 1130          Safety Issues, Functional Mobility    Impairments Affecting Function (Mobility) balance;pain;strength  -NW       Row Name 02/27/24 1130          Aerobic Exercise    Comment, Aerobic Exercise (Therapeutic Exercise) CHARLIE [place] sitting EOB  -NW       Row Name             Wound 02/20/24 Right lower leg    Wound - Properties Group Placement Date: 02/20/24  -CK Side: Right  -CK Orientation: lower  -CK Location: leg  -CK    Retired Wound - Properties Group Placement Date: 02/20/24  -CK Side: Right  -CK Orientation: lower  -CK Location: leg  -CK    Retired Wound - Properties Group Date first assessed: 02/20/24  -CK Side: Right  -CK Location: leg  -CK      Row Name             Wound 02/20/24 Left lower leg    Wound - Properties Group Placement Date: 02/20/24  -CK Side: Left  -CK Orientation: lower  -CK Location: leg  -CK    Retired Wound - Properties Group Placement Date: 02/20/24  -CK Side: Left  -CK Orientation: lower  -CK Location: leg  -CK    Retired Wound - Properties Group Date first assessed: 02/20/24  -CK Side: Left  -CK Location: leg  -CK      Row Name              Wound 02/26/24 0512 groin Blisters    Wound - Properties Group Placement Date: 02/26/24  -JP Placement Time: 0512 -JP Location: groin  -WOJCIECH Primary Wound Type: Blisters  -WOJCIECH    Retired Wound - Properties Group Placement Date: 02/26/24  -WOJCIECH Placement Time: 0512 -JP Location: groin  -WOJCIECH Primary Wound Type: Blisters  -WOJCIECH    Retired Wound - Properties Group Date first assessed: 02/26/24  -WOJCIECH Time first assessed: 0512 -JP Location: groin  -WOJCIECH Primary Wound Type: Blisters  -WOJCIECH      Row Name             Wound 02/26/24 0810 sacral spine    Wound - Properties Group Placement Date: 02/26/24  -MS Placement Time: 0810 -MS Location: sacral spine  -MS    Retired Wound - Properties Group Placement Date: 02/26/24  -MS Placement Time: 0810 -MS Location: sacral spine  -MS    Retired Wound - Properties Group Date first assessed: 02/26/24  -MS Time first assessed: 0810 -MS Location: sacral spine  -MS      Row Name 02/27/24 1130          Positioning and Restraints    Pre-Treatment Position in bed  -NW     Post Treatment Position bed  -NW     In Bed fowlers;call light within reach;exit alarm on;encouraged to call for assist;notified ns  -NW               User Key  (r) = Recorded By, (t) = Taken By, (c) = Cosigned By      Initials Name Provider Type    Zohreh Thorpe, PTA Physical Therapist Assistant    Aida Bass, RN Registered Nurse    Shoshana Orozco, RN Registered Nurse    Maryellen Glynn, RN Registered Nurse                    Physical Therapy Education       Title: PT OT SLP Therapies (Done)       Topic: Physical Therapy (Done)       Point: Mobility training (Done)       Learning Progress Summary             Patient Acceptance, E, VU by PHILIP at 2/22/2024 2300    Acceptance, E, VU by DAMARIS at 2/22/2024 1543    Comment: pt edu on POC, benefits of act, d/c plans                         Point: Home exercise program (Done)       Learning Progress Summary             Patient Acceptance, E, VU by PHILIP at 2/22/2024 2300                          Point: Body mechanics (Done)       Learning Progress Summary             Patient Acceptance, E, VU by PHILIP at 2/22/2024 2300                         Point: Precautions (Done)       Learning Progress Summary             Patient Acceptance, E, VU by PHILIP at 2/22/2024 2300    Acceptance, E, VU by DAMARIS at 2/22/2024 1543    Comment: pt edu on POC, benefits of act, d/c plans                                         User Key       Initials Effective Dates Name Provider Type Discipline    SB 07/11/23 -  Daniella Connelly, PT DPT Physical Therapist PT    PHILIP 04/25/23 -  Warren Mccauley, RN Registered Nurse Nurse                  PT Recommendation and Plan     Plan of Care Reviewed With: patient  Progress: improving  Outcome Evaluation: Pt awake and alert and eager to work w/ therapy. Bed mobility min x1 supine-sit. Sat EOB working on sitting balance pt required cga/sba. pt tolerated and was able to follow commands for A-AAROM BLEs sitting EOB. Pt stood x2 mod x2 HH unable to get completely upright c/o pain in calves when standing. Pt required mod/max x1 to lay back down. Pt will need snf upon d/c for cont strength, mobility and safety.       Time Calculation:    PT Charges       Row Name 02/27/24 1200             Time Calculation    Start Time 1130  -NW      Stop Time 1200  -NW      Time Calculation (min) 30 min  -NW      PT Received On 02/27/24  -NW      PT Goal Re-Cert Due Date 03/03/24  -NW         Time Calculation- PT    Total Timed Code Minutes- PT 30 minute(s)  -NW         Timed Charges    25284 - PT Therapeutic Activity Minutes 30  -NW         Total Minutes    Timed Charges Total Minutes 30  -NW       Total Minutes 30  -NW                User Key  (r) = Recorded By, (t) = Taken By, (c) = Cosigned By      Initials Name Provider Type    NW Zohreh Saez PTA Physical Therapist Assistant                  Therapy Charges for Today       Code Description Service Date Service Provider Modifiers Qty    25424984603  HC PT THER PROC EA 15 MIN 2/26/2024 Zohreh Saez, PTA GP 1    09617896222 HC PT THERAPEUTIC ACT EA 15 MIN 2/27/2024 Zohreh Saez, PTA GP 2            PT G-Codes  Outcome Measure Options: AM-PAC 6 Clicks Basic Mobility (PT)  AM-PAC 6 Clicks Score (PT): 12  AM-PAC 6 Clicks Score (OT): 14    Zohreh Saez PTA  2/27/2024

## 2024-02-27 NOTE — PLAN OF CARE
Goal Outcome Evaluation:  UTI, covid, cellulitis, L shoulder pain - ortho, ID following; ceftazidime 1 gm BID for uti; hx of opiate dependence; NPO d/t suspected pharyngeal dysphagia  per SLP

## 2024-02-27 NOTE — THERAPY TREATMENT NOTE
Patient Name: Brenda Sneed  : 1950    MRN: [id number]                              Today's Date: 2024       Admit Date: 2024    Visit Dx:     ICD-10-CM ICD-9-CM   1. Urinary tract infection without hematuria, site unspecified  N39.0 599.0   2. Generalized weakness  R53.1 780.79   3. Chronic left shoulder pain  M25.512 719.41    G89.29 338.29   4. Venous stasis ulcer of other part of right lower leg with fat layer exposed, unspecified whether varicose veins present  I83.018 454.0    L97.812    5. Venous stasis ulcer of other part of left lower leg with fat layer exposed, unspecified whether varicose veins present  I83.028 454.0    L97.822    6. Impaired mobility [Z74.09]  Z74.09 799.89   7. Oropharyngeal dysphagia  R13.12 787.22     Patient Active Problem List   Diagnosis    Spinal stenosis, lumbar    Secondary osteoarthritis of left shoulder due to rotator cuff arthropathy    Cellulitis of right lower extremity    HTN (hypertension)    Obesity, Class III, BMI 40-49.9 (morbid obesity)    Anxiety associated with depression    Traumatic rhabdomyolysis    Traumatic rhabdomyolysis, initial encounter    Laceration of left lower extremity    Cellulitis    Cellulitis of left lower extremity    Thrombocytopenia    Acute cystitis with hematuria    Sepsis    Candidiasis of skin    Acquired hypothyroidism    CHRISTINE (acute kidney injury)    Chronic pain syndrome    Hyponatremia    Chronic kidney disease, stage 3a    UTI (urinary tract infection)    COVID-19 virus detected     Past Medical History:   Diagnosis Date    Arthritis     Bronchitis     Cataract     right eye , cornea implant    Chronic back pain     Depression     Disease of thyroid gland     Edema     lower extremities    Hypertension     Kidney stones     Lymphedema      Past Surgical History:   Procedure Laterality Date    BACK SURGERY       ([place]), 2013     CHOLECYSTECTOMY OPEN      CORNEAL TRANSPLANT Right     INCISION AND DRAINAGE  ABSCESS Left 4/19/2023    Procedure: INCISION AND DRAINAGE ABSCESS left leg;  Surgeon: Sarita Vazquez MD;  Location:  [facility];  Service: General;  Laterality: Left;    LACERATION REPAIR Left 4/7/2023    Procedure: washout and closure left leg wound ;  Surgeon: Sarita Vazquez MD;  Location:  [facility];  Service: General;  Laterality: Left;    LUMBAR FUSION Left 8/2/2017    Procedure: LEFT LUMBAR LATERAL INTERBODY FUSION WITH INSTRUMENTATION  L1-2;  Surgeon: MARCELINO Wright MD;  Location:  [facility];  Service:     NECK SURGERY  2010    [place]    TOTAL HIP ARTHROPLASTY Right 2014    DR. KENDRICK     TOTAL KNEE ARTHROPLASTY Right 2000    TOTAL KNEE ARTHROPLASTY Left 2002    TOTAL SHOULDER ARTHROPLASTY W/ DISTAL CLAVICLE EXCISION Left 6/1/2020    Procedure: LEFT REVERSE TOTAL SHOULDER REPLACEMENT;  Surgeon: Saravanan Calvin MD;  Location:  [facility];  Service: Orthopedics;  Laterality: Left;      General Information       Row Name 02/27/24 1507          OT Time and Intention    Document Type therapy note (daily note)  -     Mode of Treatment occupational therapy  -       Row Name 02/27/24 1507          General Information    Patient Profile Reviewed yes  -     Existing Precautions/Restrictions fall;non-weight bearing  -       Row Name 02/27/24 1507          Cognition    Orientation Status (Cognition) oriented to;person;place;situation;time  -       Row Name 02/27/24 1505          Safety Issues, Functional Mobility    Impairments Affecting Function (Mobility) balance;pain;strength;cognition;range of motion (ROM)  -     Cognitive Impairments, Mobility Safety/Performance attention;insight into deficits/self-awareness;judgment;problem-solving/reasoning  -               User Key  (r) = Recorded By, (t) = Taken By, (c) = Cosigned By      Initials Name Provider Type     Dorita Kendrick OTR/L Occupational Therapist                     Mobility/ADL's       Row Name 02/27/24 1506          Bed Mobility    Bed  Mobility rolling left;rolling right;scooting/bridging;supine-sit;sit-supine  -CH     Rolling Left [place] (Bed Mobility) maximum assist (25% patient effort)  -CH     Rolling Right [place] (Bed Mobility) maximum assist (25% patient effort)  -CH     Scooting/Bridging [place] (Bed Mobility) maximum assist (25% patient effort);2 person assist  -     Supine-Sit [place] (Bed Mobility) verbal cues;minimum assist (75% patient effort)  -CH     Sit-Supine [place] (Bed Mobility) verbal cues;moderate assist (50% patient effort);maximum assist (25% patient effort)  -     Bed Mobility, Safety Issues decreased use of arms for pushing/pulling;decreased use of legs for bridging/pushing  -     Assistive Device (Bed Mobility) bed rails;draw sheet  -       Row Name 02/27/24 1507          Activities of Daily Living    BADL Assessment/Intervention feeding;toileting  -       Row Name 02/27/24 1507          Self-Feeding Assessment/Training    [place] Level (Feeding) liquids to mouth;maximum assist (25% patient effort)  -     Position (Self-Feeding) edge of bed sitting  -       Row Name 02/27/24 1507          Toileting Assessment/Training    [place] Level (Toileting) change pad/brief  -     Position (Toileting) supine  -               User Key  (r) = Recorded By, (t) = Taken By, (c) = Cosigned By      Initials Name Provider Type    Dorita Brown OTR/L Occupational Therapist                   Obj/Interventions       Row Name 02/27/24 1507          Balance    Balance Assessment sitting static balance  -     Static Sitting Balance verbal cues;non-verbal cues (demo/gesture);moderate assist;minimal assist  -     Balance Interventions sitting;supported;static;occupation based/functional task  -               User Key  (r) = Recorded By, (t) = Taken By, (c) = Cosigned By      Initials Name Provider Type    Dorita Brown, OTR/L Occupational Therapist                   Goals/Plan     No documentation.                  Clinical Impression       Row Name 02/27/24 1507          Pain Scale: FACES Pre/Post-Treatment    Pain: FACES Scale, Pretreatment 0-->no hurt  -     Posttreatment Pain Rating 6-->hurts even more  -     Pain Location - Side/Orientation Left  -     Pain Location upper  -     Pain Location - extremity  -       Row Name 02/27/24 1507          Plan of Care Review    Plan of Care Reviewed With patient;family  -     Progress improving  -     Outcome Evaluation Pt able to come to EOB at Min A.  Once EOB demo'd impulsive jerking movement backward and required Min A-Mod A to maintain upright posture.  Assisted pt with obtaining drink of thickened liquid and provided balance challenge whilst adhering to NWB status at L UE.  Assisted her back to supine for bed change.  Edu'd family in proper body mechs to phys assist pt and pt progresss since previous day.  Cont OT tx.  -       Row Name 02/27/24 1507          Therapy Assessment/Plan (OT)    Rehab Potential (OT) good, to achieve stated therapy goals  -     Criteria for Skilled Therapeutic Interventions Met (OT) yes;skilled treatment is necessary  -     Therapy Frequency (OT) 5 times/wk  -       Row Name 02/27/24 1507          Therapy Plan Review/Discharge Plan (OT)    Anticipated Discharge Disposition (OT) skilled nursing facility  -       Row Name 02/27/24 1507          Positioning and Restraints    Pre-Treatment Position in bed  -     Post Treatment Position bed  -     In Bed fowlers;call light within reach;encouraged to call for assist;with family/caregiver;side rails up x3;side lying right;pillow between legs  -               User Key  (r) = Recorded By, (t) = Taken By, (c) = Cosigned By      Initials Name Provider Type     Dorita Oropeza, OTR/L Occupational Therapist                   Outcome Measures       Row Name 02/27/24 1507          How much help from another is currently needed...    Putting on and  taking off regular lower body clothing? 1  -CH     Bathing (including washing, rinsing, and drying) 1  -CH     Toileting (which includes using toilet bed pan or urinal) 1  -CH     Putting on and taking off regular upper body clothing 2  -CH     Taking care of personal grooming (such as brushing teeth) 2  -CH     Eating meals 2  -CH     AM-PAC 6 Clicks Score (OT) 9  -CH       Row Name 02/27/24 0830          How much help from another person do you currently need...    Turning from your back to your side while in flat bed without using bedrails? 2  -MS     Moving from lying on back to sitting on the side of a flat bed without bedrails? 2  -MS     Moving to and from a bed to a chair (including a wheelchair)? 2  -MS     Standing up from a chair using your arms (e.g., wheelchair, bedside chair)? 2  -MS     Climbing 3-5 steps with a railing? 2  -MS     To walk in hospital room? 2  -MS     AM-PAC 6 Clicks Score (PT) 12  -MS     Highest Level of Mobility Goal 4 --> Transfer to chair/commode  -MS       Row Name 02/27/24 1507          Functional Assessment    Outcome Measure Options AM-PAC 6 Clicks Daily Activity (OT)  -               User Key  (r) = Recorded By, (t) = Taken By, (c) = Cosigned By      Initials Name Provider Type     Dorita Oropeza, OTR/L Occupational Therapist    Shoshana Orozco, RN Registered Nurse                    Occupational Therapy Education       Title: PT OT SLP Therapies (Done)       Topic: Occupational Therapy (Done)       Point: ADL training (Done)       Description:   Instruct learner(s) on proper safety adaptation and remediation techniques during self care or transfers.   Instruct in proper use of assistive devices.                  Learning Progress Summary             Patient Acceptance, E,D, VU by  at 2/27/2024 1616    Acceptance, E, VU by PHILIP at 2/22/2024 2300    Acceptance, E,D, VU,NR by  at 2/22/2024 1036    Acceptance, E, VU by MK at 2/21/2024 2324    Acceptance, E, VU,NR by   at 2/21/2024 1100   Family Acceptance, E,D, VU by  at 2/27/2024 1616                         Point: Home exercise program (Done)       Description:   Instruct learner(s) on appropriate technique for monitoring, assisting and/or progressing therapeutic exercises/activities.                  Learning Progress Summary             Patient Acceptance, E, VU by  at 2/22/2024 2300    Acceptance, E, VU by  at 2/21/2024 2324    Acceptance, E, VU,NR by  at 2/21/2024 1100                         Point: Precautions (Done)       Description:   Instruct learner(s) on prescribed precautions during self-care and functional transfers.                  Learning Progress Summary             Patient Acceptance, E,D, VU by  at 2/27/2024 1616    Acceptance, E, VU by  at 2/22/2024 2300    Acceptance, E,D, VU,NR by  at 2/22/2024 1036    Acceptance, E, VU by  at 2/21/2024 2324    Acceptance, E, VU,NR by  at 2/21/2024 1100   Family Acceptance, E,D, VU by  at 2/27/2024 1616                         Point: Body mechanics (Done)       Description:   Instruct learner(s) on proper positioning and spine alignment during self-care, functional mobility activities and/or exercises.                  Learning Progress Summary             Patient Acceptance, E,D, VU by  at 2/27/2024 1616    Acceptance, E, VU by  at 2/22/2024 2300    Acceptance, E,D, VU,NR by  at 2/22/2024 1036    Acceptance, E, VU by  at 2/21/2024 2324    Acceptance, E, VU,NR by  at 2/21/2024 1100   Family Acceptance, E,D, VU by  at 2/27/2024 1616                                         User Key       Initials Effective Dates Name Provider Type Discipline     07/11/23 -  Dorita Oropeza, OTR/L Occupational Therapist OT    CS 02/03/23 -  Almaz Rodriguez, OTR/L, KIRSTIN Occupational Therapist OT    LR 04/25/23 -  Wilda Faulkner OTR/L Occupational Therapist OT     04/25/23 -  Warren Mccauley, KADEN Registered Nurse Nurse                  OT Recommendation  and Plan  Therapy Frequency (OT): 5 times/wk  Plan of Care Review  Plan of Care Reviewed With: patient, family  Progress: improving  Outcome Evaluation: Pt able to come to EOB at Min A.  Once EOB demo'd impulsive jerking movement backward and required Min A-Mod A to maintain upright posture.  Assisted pt with obtaining drink of thickened liquid and provided balance challenge whilst adhering to NWB status at L UE.  Assisted her back to supine for bed change.  Edu'd family in proper body mechs to phys assist pt and pt progresss since previous day.  Cont OT tx.     Time Calculation:         Time Calculation- OT       Row Name 02/27/24 1507             Time Calculation- OT    OT Start Time 1507  -CH      OT Stop Time 1600  -CH      OT Time Calculation (min) 53 min  -CH      OT Received On 02/27/24  -         Timed Charges    23769 - OT Self Care/Mgmt Minutes 53  -CH         Total Minutes    Timed Charges Total Minutes 53  -CH       Total Minutes 53  -CH                User Key  (r) = Recorded By, (t) = Taken By, (c) = Cosigned By      Initials Name Provider Type     Dorita Oropeza OTR/L Occupational Therapist                  Therapy Charges for Today       Code Description Service Date Service Provider Modifiers Qty    13899085380 HC OT SELF CARE/MGMT/TRAIN EA 15 MIN 2/26/2024 Dorita Oropeza OTR/L GO 2    38666553180 HC OT SELF CARE/MGMT/TRAIN EA 15 MIN 2/27/2024 Dorita Oropeza OTR/MAYLIN GO 4                 LINDA Quinn/MAYLIN  2/27/2024

## 2024-02-27 NOTE — THERAPY TREATMENT NOTE
Acute Care - Speech Language Pathology   Swallow Treatment Note [facility]     Patient Name: Brenda Sneed  : 1950  MRN: [id number]  Today's Date: 2024               Admit Date: 2024  Pt was alert and cooperative for PO trials. Nursing reports pt's previous lethargy may have been medication related. Pt was requesting water upon SLP arrival. Her vocal quality was clear. She took trials of regular solids, honey thick, nectar thick, and thin consistencies. She displayed an immediate cough one time with thin. She had delayed coughs at times with nectar, honey, and regular solids, but her vocal quality remained clear. She had poor rotary chew and prolonged, incomplete mastication of the regular solid. SLP had the pt expectorate some of the residue. Recommend starting a pureed diet and nectar thick liquids. Meds whole with applesauce. Ok for thin water between meals, approximately 30 minutes after any other PO intake. SLP will continue to follow.   Scot Henley, CCC-SLP 2024 13:41 CST    Visit Dx:     ICD-10-CM ICD-9-CM   1. Urinary tract infection without hematuria, site unspecified  N39.0 599.0   2. Generalized weakness  R53.1 780.79   3. Chronic left shoulder pain  M25.512 719.41    G89.29 338.29   4. Venous stasis ulcer of other part of right lower leg with fat layer exposed, unspecified whether varicose veins present  I83.018 454.0    L97.812    5. Venous stasis ulcer of other part of left lower leg with fat layer exposed, unspecified whether varicose veins present  I83.028 454.0    L97.822    6. Impaired mobility [Z74.09]  Z74.09 799.89   7. Oropharyngeal dysphagia  R13.12 787.22     Patient Active Problem List   Diagnosis    Spinal stenosis, lumbar    Secondary osteoarthritis of left shoulder due to rotator cuff arthropathy    Cellulitis of right lower extremity    HTN (hypertension)    Obesity, Class III, BMI 40-49.9 (morbid obesity)    Anxiety associated with depression     Traumatic rhabdomyolysis    Traumatic rhabdomyolysis, initial encounter    Laceration of left lower extremity    Cellulitis    Cellulitis of left lower extremity    Thrombocytopenia    Acute cystitis with hematuria    Sepsis    Candidiasis of skin    Acquired hypothyroidism    CHRISTINE (acute kidney injury)    Chronic pain syndrome    Hyponatremia    Chronic kidney disease, stage 3a    UTI (urinary tract infection)    COVID-19 virus detected     Past Medical History:   Diagnosis Date    Arthritis     Bronchitis     Cataract     right eye , cornea implant    Chronic back pain     Depression     Disease of thyroid gland     Edema     lower extremities    Hypertension     Kidney stones     Lymphedema      Past Surgical History:   Procedure Laterality Date    BACK SURGERY      2010 ([place]), 2013     CHOLECYSTECTOMY OPEN      CORNEAL TRANSPLANT Right 1999    INCISION AND DRAINAGE ABSCESS Left 4/19/2023    Procedure: INCISION AND DRAINAGE ABSCESS left leg;  Surgeon: Sarita Vazquez MD;  Location:  [facility] OR;  Service: General;  Laterality: Left;    LACERATION REPAIR Left 4/7/2023    Procedure: washout and closure left leg wound ;  Surgeon: Sarita Vazquez MD;  Location:  [facility] OR;  Service: General;  Laterality: Left;    LUMBAR FUSION Left 8/2/2017    Procedure: LEFT LUMBAR LATERAL INTERBODY FUSION WITH INSTRUMENTATION  L1-2;  Surgeon: MARCELINO Wright MD;  Location:  [facility] OR;  Service:     NECK SURGERY  2010    [place]    TOTAL HIP ARTHROPLASTY Right 2014    DR. KENDRICK     TOTAL KNEE ARTHROPLASTY Right 2000    TOTAL KNEE ARTHROPLASTY Left 2002    TOTAL SHOULDER ARTHROPLASTY W/ DISTAL CLAVICLE EXCISION Left 6/1/2020    Procedure: LEFT REVERSE TOTAL SHOULDER REPLACEMENT;  Surgeon: Saravanan Calvin MD;  Location:  [facility] OR;  Service: Orthopedics;  Laterality: Left;       SLP Recommendation and Plan     SLP Diet Recommendation: puree, nectar thick liquids, water between meals after oral care, with supervision, ice  chips between meals after oral care, with supervision (02/27/24 0937)     SLP Rec. for Method of Medication Administration: meds whole, with puree (02/27/24 0937)                                   Daily Summary of Progress (SLP): progress toward functional goals is good (02/27/24 0937)               Treatment Assessment (SLP): improved (02/27/24 0937)  Treatment Assessment Comments (SLP): Start a pureed diet and nectar thick liquids (02/27/24 0937)  Plan for Continued Treatment (SLP): goals adjusted to reflect functional improvements demonstrated (02/27/24 0937)         Plan of Care Reviewed With: patient  Progress: improving  Outcome Evaluation: See note      SWALLOW EVALUATION (last 72 hours)       SLP Adult Swallow Evaluation       Row Name 02/27/24 0937 02/26/24 0911                Rehab Evaluation    Document Type therapy note (daily note)  -MB evaluation  -JR       Subjective Information complains of;pain  -MB no complaints  -JR       Patient Observations alert;cooperative  -MB cooperative;lethargic  -JR       Patient/Family/Caregiver Comments/Observations Niece, Nicole present  -MB niece present, daughter on phone  -JR       Patient Effort good  -MB fair  -JR       Symptoms Noted During/After Treatment -- shortness of breath  -JR          General Information    Patient Profile Reviewed -- yes  -JR       Pertinent History Of Current Problem -- Patient is admitted with generalized weakness, CHRISTINE, UTI, Covid, osteoarthritis in the left shoulder, and cellulitis in the lower extremities. Patient was coughin with oral intake per RN staff.  -JR       Current Method of Nutrition -- soft to chew textures;nectar/syrup-thick liquids  -JR       Precautions/Limitations, Vision -- WFL;for purposes of eval  -JR       Precautions/Limitations, Hearing -- WFL;for purposes of eval  -JR       Prior Level of Function-Communication -- unknown  -JR       Prior Level of Function-Swallowing -- no diet consistency restrictions  -JR        Plans/Goals Discussed with -- patient;family  -       Barriers to Rehab -- medically complex  -JR       Patient's Goals for Discharge -- patient did not state  -JR       Family Goals for Discharge -- family did not state  -JR          Pain    Additional Documentation Pain Scale: FACES Pre/Post-Treatment (Group)  -MB Pain Scale: FACES Pre/Post-Treatment (Group)  -JR          Pain Scale: FACES Pre/Post-Treatment    Pain: FACES Scale, Pretreatment 4-->hurts little more  -MB 0-->no hurt  -       Posttreatment Pain Rating -- 0-->no hurt  -JR       Pain Location - Side/Orientation Left  -MB --       Pain Location upper  -MB --       Pain Location - extremity  -MB --       Pre/Posttreatment Pain Comment With movement  -MB --          Oral Motor Structure and Function    Dentition Assessment edentulous  -MB edentulous  -       Secretion Management -- WNL/WFL  -JR       Mucosal Quality -- dry;sticky  -JR       Gag Response -- absent or diminished  -JR       Volitional Swallow -- delayed;weak  -JR       Volitional Cough -- weak;reduced respiratory support  -JR          Oral Musculature and Cranial Nerve Assessment    Oral Motor General Assessment -- generalized oral motor weakness  -JR       Mandibular Impairment Detail, Cranial Nerve V (Trigeminal) -- reduced strength bilaterally  -JR       Oral Labial or Buccal Impairment, Detail, Cranial Nerve VII (Facial): -- reduced strength bilaterally  -JR       Lingual Impairment, Detail. Cranial Nerves IX, XII (Glossopharyngeal and Hypoglossal) -- reduced lingual ROM;reduced strength  -JR          General Eating/Swallowing Observations    Respiratory Support Currently in Use -- room air  -JR       Positioning During Eating -- upright in bed  -JR       Utensils Used -- spoon  -JR       Consistencies Trialed -- pureed  -JR          Clinical Swallow Eval    Oral Prep Phase -- impaired  -JR       Oral Transit -- impaired  -JR       Oral Residue -- impaired  -JR       Pharyngeal  Phase -- suspected pharyngeal impairment  -JR       Esophageal Phase -- unremarkable  -JR          Oral Prep Concerns    Oral Prep Concerns -- inefficient mastication;increased prep time  -JR       Inefficient Mastication -- other (see comments);mechanical soft  -JR       Increased Prep Time -- pudding  -JR          Oral Transit Concerns    Oral Transit Concerns -- delayed initiation of bolus transit;increased oral transit time  -JR       Delayed Intiation of Bolus Transit -- pudding  -JR       Increased Oral Transit Time -- pudding  -JR          Oral Residue Concerns    Oral Residue Concerns -- diffuse residue throughout oral cavity  -JR       Diffuse Residue Throughout Oral Cavity -- pudding  -JR          Pharyngeal Phase Concerns    Pharyngeal Phase Concerns -- cough;multiple swallows  -JR       Multiple Swallows -- pudding  -JR       Cough -- pudding  -JR          SLP Evaluation Clinical Impression    SLP Swallowing Diagnosis -- mod-severe;oral dysphagia;suspected pharyngeal dysphagia  -       Functional Impact -- risk of aspiration/pneumonia;risk of malnutrition;risk of dehydration  -       Rehab Potential/Prognosis, Swallowing -- adequate, monitor progress closely  -       Swallow Criteria for Skilled Therapeutic Interventions Met -- demonstrates skilled criteria  -          SLP Treatment Clinical Impressions    Treatment Assessment (SLP) improved  -MB worsened  -       Treatment Assessment Comments (SLP) Start a pureed diet and nectar thick liquids  -MB --       Daily Summary of Progress (SLP) progress toward functional goals is good  -MB --       Plan for Continued Treatment (SLP) goals adjusted to reflect functional improvements demonstrated  -MB --          Recommendations    Therapy Frequency (Swallow) -- at least;2 days per week  -       Predicted Duration Therapy Intervention (Days) -- until discharge  -       SLP Diet Recommendation puree;nectar thick liquids;water between meals after  oral care, with supervision;ice chips between meals after oral care, with supervision  -MB NPO  -JR       Recommended Diagnostics -- reassess via clinical swallow evaluation  -JR       Recommended Precautions and Strategies -- upright posture during/after eating;small bites of food and sips of liquid;alternate between small bites of food and sips of liquid;check mouth frequently for oral residue/pocketing;general aspiration precautions;fatigue precautions;1:1 supervision  -JR       Oral Care Recommendations -- Oral Care BID/PRN  -JR       SLP Rec. for Method of Medication Administration meds whole;with puree  -MB meds via alternate route  -JR       Monitor for Signs of Aspiration -- yes;notify SLP if any concerns  -JR       Anticipated Discharge Disposition (SLP) -- unknown  -JR          (LTG) Patient will demonstrate functional swallow for    Diet Texture (Demonstrate functional swallow) pureed textures  -MB pureed textures  -JR       Liquid viscosity (Demonstrate functional swallow) nectar/ mildly thick liquids  -MB honey/  moderately thick liquids  -JR       [place] (Demonstrate functional swallow) with minimal cues (75-90% accuracy)  -MB with minimal cues (75-90% accuracy)  -JR       Time Frame (Demonstrate functional swallow) by discharge  -MB by discharge  -JR       Barriers (Demonstrate functional swallow) -- n/a  -JR       Progress/Outcomes (Demonstrate functional swallow) goal revised this date  -MB medical status inhibiting progress  -JR          (STG) Patient will tolerate trials of    Consistencies Trialed (Tolerate trials) soft to chew (whole) textures;soft to chew (chopped) textures;soft to chew (ground) textures;pureed textures;thin liquids;nectar/ mildly thick liquids  -MB pureed textures;honey/ moderately thick liquids  -JR       Desired Outcome (Tolerate trials) without signs/symptoms of aspiration;with adequate oral prep/transit/clearance  -MB without signs/symptoms of aspiration;with  adequate oral prep/transit/clearance  -JR       [place] (Tolerate trials) with minimal cues (75-90% accuracy)  -MB with minimal cues (75-90% accuracy)  -JR       Time Frame (Tolerate trials) by discharge  -MB by discharge  -JR       Progress/Outcomes (Tolerate trials) goal revised this date  -MB medical status inhibiting progress  -JR          (STG) Pharyngeal Strengthening Exercise Goal 1 (SLP)    Activity (Pharyngeal Strengthening Goal 1, SLP) increase pharyngeal sensation;increase timing  -MB increase pharyngeal sensation;increase timing  -JR       Increase Pharyngeal Sensation gustatory stimulation (sour/cold)  -MB gustatory stimulation (sour/cold)  -JR       Increase Timing breath hold exercises;prepping - 3 second prep or suck swallow or 3-step swallow  -MB breath hold exercises;prepping - 3 second prep or suck swallow or 3-step swallow  -JR       [place]/Accuracy (Pharyngeal Strengthening Goal 1, SLP) with minimal cues (75-90% accuracy)  -MB with minimal cues (75-90% accuracy)  -JR       Time Frame (Pharyngeal Strengthening Goal 1, SLP) by discharge  -MB by discharge  -JR       Barriers (Pharyngeal Strengthening Goal 1, SLP) -- n/a  -JR       Progress/Outcomes (Pharyngeal Strengthening Goal 1, SLP) -- medical status inhibiting progress  -JR                 User Key  (r) = Recorded By, (t) = Taken By, (c) = Cosigned By      Initials Name Effective Dates    Scot Pacheco, CCC-SLP 02/03/23 -     Ngozi Roberson MS CCC-SLP 08/22/23 -                     EDUCATION  The patient has been educated in the following areas:   Dysphagia (Swallowing Impairment).        SLP GOALS       Row Name 02/27/24 0937 02/26/24 0911          (LTG) Patient will demonstrate functional swallow for    Diet Texture (Demonstrate functional swallow) pureed textures  -MB pureed textures  -JR     Liquid viscosity (Demonstrate functional swallow) nectar/ mildly thick liquids  -MB honey/  moderately thick liquids  -JR      [place] (Demonstrate functional swallow) with minimal cues (75-90% accuracy)  -MB with minimal cues (75-90% accuracy)  -JR     Time Frame (Demonstrate functional swallow) by discharge  -MB by discharge  -JR     Barriers (Demonstrate functional swallow) -- n/a  -JR     Progress/Outcomes (Demonstrate functional swallow) goal revised this date  -MB medical status inhibiting progress  -JR        (STG) Patient will tolerate trials of    Consistencies Trialed (Tolerate trials) soft to chew (whole) textures;soft to chew (chopped) textures;soft to chew (ground) textures;pureed textures;thin liquids;nectar/ mildly thick liquids  -MB pureed textures;honey/ moderately thick liquids  -JR     Desired Outcome (Tolerate trials) without signs/symptoms of aspiration;with adequate oral prep/transit/clearance  -MB without signs/symptoms of aspiration;with adequate oral prep/transit/clearance  -JR     [place] (Tolerate trials) with minimal cues (75-90% accuracy)  -MB with minimal cues (75-90% accuracy)  -JR     Time Frame (Tolerate trials) by discharge  -MB by discharge  -JR     Progress/Outcomes (Tolerate trials) goal revised this date  -MB medical status inhibiting progress  -JR        (STG) Pharyngeal Strengthening Exercise Goal 1 (SLP)    Activity (Pharyngeal Strengthening Goal 1, SLP) increase pharyngeal sensation;increase timing  -MB increase pharyngeal sensation;increase timing  -JR     Increase Pharyngeal Sensation gustatory stimulation (sour/cold)  -MB gustatory stimulation (sour/cold)  -JR     Increase Timing breath hold exercises;prepping - 3 second prep or suck swallow or 3-step swallow  -MB breath hold exercises;prepping - 3 second prep or suck swallow or 3-step swallow  -JR     [place]/Accuracy (Pharyngeal Strengthening Goal 1, SLP) with minimal cues (75-90% accuracy)  -MB with minimal cues (75-90% accuracy)  -JR     Time Frame (Pharyngeal Strengthening Goal 1, SLP) by discharge  -MB by discharge  -JR      Barriers (Pharyngeal Strengthening Goal 1, SLP) -- n/a  -JR     Progress/Outcomes (Pharyngeal Strengthening Goal 1, SLP) -- medical status inhibiting progress  -JR               User Key  (r) = Recorded By, (t) = Taken By, (c) = Cosigned By      Initials Name Provider Type    Scot Pacheco CCC-SLP Speech and Language Pathologist    Ngozi Roberson, MS CCC-SLP Speech and Language Pathologist                       Time Calculation:    Time Calculation- SLP       Row Name 02/27/24 1340             Time Calculation- SLP    SLP Start Time 0937  -MB      SLP Stop Time 1036  -MB      SLP Time Calculation (min) 59 min  -MB      SLP Received On 02/27/24  -MB         Untimed Charges    42080-FR Treatment Swallow Minutes 59  -MB         Total Minutes    Untimed Charges Total Minutes 59  -MB       Total Minutes 59  -MB                User Key  (r) = Recorded By, (t) = Taken By, (c) = Cosigned By      Initials Name Provider Type    Scot Pacheco CCC-SLP Speech and Language Pathologist                    Therapy Charges for Today       Code Description Service Date Service Provider Modifiers Qty    [id number]  ST TREATMENT SWALLOW 4 2/27/2024 Scot Henley CCC-SLP GN 1                 SHIRA Lombardi  2/27/2024

## 2024-02-27 NOTE — PLAN OF CARE
Goal Outcome Evaluation:  Plan of Care Reviewed With: patient        Progress: improving  Outcome Evaluation: See note                    Treatment Assessment (SLP): improved (02/27/24 0937)  Treatment Assessment Comments (SLP): Start a pureed diet and nectar thick liquids (02/27/24 0937)  Plan for Continued Treatment (SLP): goals adjusted to reflect functional improvements demonstrated (02/27/24 0937)

## 2024-02-27 NOTE — PROGRESS NOTES
Infectious Diseases Progress Note    Patient:  Brenda Sneed  YOB: 1950  MRN: [id number]   Admit date: 2/20/2024   Admitting Physician: Sachin Dia MD  Primary Care Physician: Sachin Dia MD    Chief Complaint/Interval History: She is more awake and alert.  Her daughter is at bedside.  She looks comfortable at present.  She is without fever.    No intake or output data in the 24 hours ending 02/27/24 0802  Allergies:   Allergies   Allergen Reactions    Codeine Itching and Rash     SEVERE RASH/ITCHING (TOLERATES PERCOCET)    Amoxicillin Other (See Comments)     unknown    Levaquin [Levofloxacin] Other (See Comments)     unknown    Benadryl [Diphenhydramine] Other (See Comments)     KEEPS PATIENT AWAKE      Current Scheduled Medications:   albuterol sulfate HFA, 2 puff, Inhalation, TID - RT  apixaban, 5 mg, Oral, Q12H  Biotene Dry Mouth Moisturizing, 1 spray, Mouth/Throat, 5x Daily  busPIRone, 5 mg, Oral, TID With Meals  cefTAZidime, 1,000 mg, Intravenous, Q12H  DULoxetine, 30 mg, Oral, Daily  gabapentin, 100 mg, Oral, Q12H  levothyroxine, 100 mcg, Oral, Q AM  Lidocaine, 1 patch, Transdermal, Q24H  nystatin, 1 application , Topical, BID  pramipexole, 0.75 mg, Oral, Nightly  saccharomyces boulardii, 250 mg, Oral, BID  sodium chloride, 10 mL, Intravenous, Q12H  sodium chloride, 10 mL, Intravenous, Q12H        Current PRN Medications:    senna-docusate sodium **AND** polyethylene glycol **AND** bisacodyl **AND** bisacodyl    Calcium Replacement - Follow Nurse / BPA Driven Protocol    cyclobenzaprine    docusate sodium    furosemide    Magnesium Standard Dose Replacement - Follow Nurse / BPA Driven Protocol    [DISCONTINUED] Morphine **OR** Morphine    ondansetron    Phosphorus Replacement - Follow Nurse / BPA Driven Protocol    Potassium Replacement - Follow Nurse / BPA Driven Protocol    sodium chloride    sodium chloride    sodium chloride    sodium chloride    Review of Systems  "see HPI    Vital Signs:  Temp (24hrs), Av.9 °F (36.6 °C), Min:97.5 °F (36.4 °C), Max:98.7 °F (37.1 °C)    BP 98/44 (BP Location: Left arm, Patient Position: Lying)   Pulse 88   Temp 97.5 °F (36.4 °C) (Axillary)   Resp 16   Ht 160 cm (63\")   Wt 91.6 kg (202 lb)   SpO2 97%   BMI 35.78 kg/m²   She remains on room air with oxygen saturations in the mid to high 90s.  Physical Exam  Vital signs - reviewed.  Line/IV site - No erythema, warmth, induration, or tenderness.  Lungs without wheezing  Abdomen is soft and nontender    Lab Results:  CBC:   Results from last 7 days   Lab Units 24  0700 24  1406 24  0833 24  1605   WBC 10*3/mm3 9.29 8.56 7.77 15.64*   HEMOGLOBIN g/dL 8.3* 8.8* 7.8* 10.5*   HEMATOCRIT % 25.1* 26.4* 23.0* 28.2*   PLATELETS 10*3/mm3 95* 69* 60* 73*     BMP:  Results from last 7 days   Lab Units 24  0700 24  1908 24  0833 24  1605   SODIUM mmol/L 136  --  133* 132*   POTASSIUM mmol/L 4.0 4.0 3.3* 3.3*   CHLORIDE mmol/L 106  --  101 95*   CO2 mmol/L 21.0*  --  23.0 24.0   BUN mg/dL 49*  --  50* 46*   CREATININE mg/dL 1.18*  --  1.53* 1.96*   GLUCOSE mg/dL 90  --  91 94   CALCIUM mg/dL 8.0*  --  7.6* 7.8*   ALT (SGPT) U/L 12  --  8 15     Culture Results:   Urine Culture   Date Value Ref Range Status   2024 50,000 CFU/mL Pseudomonas aeruginosa MDRO (A)  Final     Comment:       Multi drug resistant Pseudomonas, patient may be an isolation risk.  Multi drug resistant Pseudomonas, patient may be an isolation risk.   2024 50,000 CFU/mL Enterococcus faecalis (A)  Final     Susceptibility       Pseudomonas aeruginosa MDRO Enterococcus faecalis     CHRISTIANO CHRISTIANO     Ampicillin   <=2 ug/ml Susceptible     Cefepime 8 ug/ml Susceptible       Ceftazidime 4 ug/ml Susceptible       Ciprofloxacin <=0.25 ug/ml Susceptible       Levofloxacin 2 ug/ml Intermediate >=8 ug/ml Resistant     Meropenem 4 ug/ml Intermediate       Nitrofurantoin   <=16 ug/ml " Susceptible     Piperacillin + Tazobactam 8 ug/ml Susceptible       Tobramycin <=1 ug/ml Susceptible       Vancomycin   1 ug/ml Susceptible        Radiology: None  Additional Studies Reviewed: None    Impression:   Pseudomonas UTI-responding to treatment  Previous left elbow and shoulder pain-does not seem as significant  Recent COVID-19 infection-stable from pulmonary standpoint-oxygen saturation in the mid to high 90s  Lethargy-improved    Recommendations:   She completed Ceftazidime treatment on February 28  Continue supportive care    Clint Johnson MD

## 2024-02-28 ENCOUNTER — APPOINTMENT (OUTPATIENT)
Dept: GENERAL RADIOLOGY | Facility: HOSPITAL | Age: 74
DRG: 689 | End: 2024-02-28
Payer: MEDICARE

## 2024-02-28 LAB
ANION GAP SERPL CALCULATED.3IONS-SCNC: 9 MMOL/L (ref 5–15)
BUN SERPL-MCNC: 51 MG/DL (ref 8–23)
BUN/CREAT SERPL: 42.5 (ref 7–25)
CALCIUM SPEC-SCNC: 8 MG/DL (ref 8.6–10.5)
CHLORIDE SERPL-SCNC: 109 MMOL/L (ref 98–107)
CO2 SERPL-SCNC: 22 MMOL/L (ref 22–29)
CREAT SERPL-MCNC: 1.2 MG/DL (ref 0.57–1)
CRP SERPL-MCNC: 12.11 MG/DL (ref 0–0.5)
DEPRECATED RDW RBC AUTO: 63.7 FL (ref 37–54)
EGFRCR SERPLBLD CKD-EPI 2021: 47.3 ML/MIN/1.73
ERYTHROCYTE [DISTWIDTH] IN BLOOD BY AUTOMATED COUNT: 24.7 % (ref 12.3–15.4)
ERYTHROCYTE [SEDIMENTATION RATE] IN BLOOD: 37 MM/HR (ref 0–30)
GLUCOSE SERPL-MCNC: 112 MG/DL (ref 65–99)
HCT VFR BLD AUTO: 23.1 % (ref 34–46.6)
HGB BLD-MCNC: 8.5 G/DL (ref 12–15.9)
MCH RBC QN AUTO: 35.4 PG (ref 26.6–33)
MCHC RBC AUTO-ENTMCNC: 36.8 G/DL (ref 31.5–35.7)
MCV RBC AUTO: 96.3 FL (ref 79–97)
PLATELET # BLD AUTO: 118 10*3/MM3 (ref 140–450)
PMV BLD AUTO: 10.5 FL (ref 6–12)
POTASSIUM SERPL-SCNC: 3.7 MMOL/L (ref 3.5–5.2)
RBC # BLD AUTO: 2.4 10*6/MM3 (ref 3.77–5.28)
SODIUM SERPL-SCNC: 140 MMOL/L (ref 136–145)
TSH SERPL DL<=0.05 MIU/L-ACNC: 12.38 UIU/ML (ref 0.27–4.2)
WBC NRBC COR # BLD AUTO: 5.71 10*3/MM3 (ref 3.4–10.8)

## 2024-02-28 PROCEDURE — 94799 UNLISTED PULMONARY SVC/PX: CPT

## 2024-02-28 PROCEDURE — 99231 SBSQ HOSP IP/OBS SF/LOW 25: CPT | Performed by: INTERNAL MEDICINE

## 2024-02-28 PROCEDURE — 99232 SBSQ HOSP IP/OBS MODERATE 35: CPT | Performed by: CLINICAL NURSE SPECIALIST

## 2024-02-28 PROCEDURE — 86140 C-REACTIVE PROTEIN: CPT | Performed by: STUDENT IN AN ORGANIZED HEALTH CARE EDUCATION/TRAINING PROGRAM

## 2024-02-28 PROCEDURE — 84443 ASSAY THYROID STIM HORMONE: CPT | Performed by: STUDENT IN AN ORGANIZED HEALTH CARE EDUCATION/TRAINING PROGRAM

## 2024-02-28 PROCEDURE — 97530 THERAPEUTIC ACTIVITIES: CPT

## 2024-02-28 PROCEDURE — 25010000002 CEFTAZIDIME PER 500 MG: Performed by: INTERNAL MEDICINE

## 2024-02-28 PROCEDURE — 71045 X-RAY EXAM CHEST 1 VIEW: CPT

## 2024-02-28 PROCEDURE — 85027 COMPLETE CBC AUTOMATED: CPT | Performed by: STUDENT IN AN ORGANIZED HEALTH CARE EDUCATION/TRAINING PROGRAM

## 2024-02-28 PROCEDURE — 92526 ORAL FUNCTION THERAPY: CPT

## 2024-02-28 PROCEDURE — 97535 SELF CARE MNGMENT TRAINING: CPT

## 2024-02-28 PROCEDURE — 85652 RBC SED RATE AUTOMATED: CPT | Performed by: STUDENT IN AN ORGANIZED HEALTH CARE EDUCATION/TRAINING PROGRAM

## 2024-02-28 PROCEDURE — 99497 ADVNCD CARE PLAN 30 MIN: CPT | Performed by: CLINICAL NURSE SPECIALIST

## 2024-02-28 PROCEDURE — 80048 BASIC METABOLIC PNL TOTAL CA: CPT | Performed by: STUDENT IN AN ORGANIZED HEALTH CARE EDUCATION/TRAINING PROGRAM

## 2024-02-28 RX ORDER — LEVOTHYROXINE SODIUM 112 UG/1
112 TABLET ORAL
Status: DISCONTINUED | OUTPATIENT
Start: 2024-02-29 | End: 2024-03-02 | Stop reason: HOSPADM

## 2024-02-28 RX ADMIN — Medication 1 SPRAY: at 20:44

## 2024-02-28 RX ADMIN — LIDOCAINE 1 PATCH: 4 PATCH TOPICAL at 09:43

## 2024-02-28 RX ADMIN — NYSTATIN 1 APPLICATION: 100000 POWDER TOPICAL at 20:44

## 2024-02-28 RX ADMIN — DULOXETINE HYDROCHLORIDE 30 MG: 30 CAPSULE, DELAYED RELEASE ORAL at 09:43

## 2024-02-28 RX ADMIN — CEFTAZIDIME 1000 MG: 1 INJECTION, POWDER, FOR SOLUTION INTRAMUSCULAR; INTRAVENOUS at 00:47

## 2024-02-28 RX ADMIN — ALBUTEROL SULFATE 2 PUFF: 90 AEROSOL, METERED RESPIRATORY (INHALATION) at 19:38

## 2024-02-28 RX ADMIN — Medication 250 MG: at 09:43

## 2024-02-28 RX ADMIN — NYSTATIN 1 APPLICATION: 100000 POWDER TOPICAL at 09:44

## 2024-02-28 RX ADMIN — Medication 10 ML: at 09:43

## 2024-02-28 RX ADMIN — MORPHINE SULFATE 7.5 MG: 15 TABLET ORAL at 09:58

## 2024-02-28 RX ADMIN — Medication 10 ML: at 20:35

## 2024-02-28 RX ADMIN — Medication 10 ML: at 20:36

## 2024-02-28 RX ADMIN — Medication 250 MG: at 20:34

## 2024-02-28 RX ADMIN — APIXABAN 5 MG: 5 TABLET, FILM COATED ORAL at 20:35

## 2024-02-28 RX ADMIN — Medication 1 SPRAY: at 00:48

## 2024-02-28 RX ADMIN — ALBUTEROL SULFATE 2 PUFF: 90 AEROSOL, METERED RESPIRATORY (INHALATION) at 14:15

## 2024-02-28 RX ADMIN — ALBUTEROL SULFATE 2 PUFF: 90 AEROSOL, METERED RESPIRATORY (INHALATION) at 07:02

## 2024-02-28 RX ADMIN — CEFTAZIDIME 1000 MG: 1 INJECTION, POWDER, FOR SOLUTION INTRAMUSCULAR; INTRAVENOUS at 13:04

## 2024-02-28 RX ADMIN — Medication 1 SPRAY: at 09:44

## 2024-02-28 RX ADMIN — APIXABAN 5 MG: 5 TABLET, FILM COATED ORAL at 09:43

## 2024-02-28 RX ADMIN — LEVOTHYROXINE SODIUM 100 MCG: 100 TABLET ORAL at 05:42

## 2024-02-28 RX ADMIN — PRAMIPEXOLE DIHYDROCHLORIDE 0.75 MG: 0.25 TABLET ORAL at 20:35

## 2024-02-28 NOTE — PLAN OF CARE
Goal Outcome Evaluation:  Plan of Care Reviewed With: caregiver        Progress: no change  Outcome Evaluation: Ntn follow up. Pt consumed 25% of breakfast this am. Insuffiicent oral intake to review at this time. Pureed diet,Nectar thickened liquids. Magic cup TID. Mighty Shakes BID. Cont to follow for plan of care.

## 2024-02-28 NOTE — THERAPY TREATMENT NOTE
Acute Care - Physical Therapy Treatment Note  [facility]     Patient Name: Brenda Sneed  : 1950  MRN: [id number]  Today's Date: 2024      Visit Dx:     ICD-10-CM ICD-9-CM   1. Urinary tract infection without hematuria, site unspecified  N39.0 599.0   2. Generalized weakness  R53.1 780.79   3. Chronic left shoulder pain  M25.512 719.41    G89.29 338.29   4. Venous stasis ulcer of other part of right lower leg with fat layer exposed, unspecified whether varicose veins present  I83.018 454.0    L97.812    5. Venous stasis ulcer of other part of left lower leg with fat layer exposed, unspecified whether varicose veins present  I83.028 454.0    L97.822    6. Impaired mobility [Z74.09]  Z74.09 799.89   7. Oropharyngeal dysphagia  R13.12 787.22     Patient Active Problem List   Diagnosis    Spinal stenosis, lumbar    Secondary osteoarthritis of left shoulder due to rotator cuff arthropathy    Cellulitis of right lower extremity    HTN (hypertension)    Obesity, Class III, BMI 40-49.9 (morbid obesity)    Anxiety associated with depression    Traumatic rhabdomyolysis    Traumatic rhabdomyolysis, initial encounter    Laceration of left lower extremity    Cellulitis    Cellulitis of left lower extremity    Thrombocytopenia    Acute cystitis with hematuria    Sepsis    Candidiasis of skin    Acquired hypothyroidism    CHRISTINE (acute kidney injury)    Chronic pain syndrome    Hyponatremia    Chronic kidney disease, stage 3a    UTI (urinary tract infection)    COVID-19 virus detected     Past Medical History:   Diagnosis Date    Arthritis     Bronchitis     Cataract     right eye , cornea implant    Chronic back pain     Depression     Disease of thyroid gland     Edema     lower extremities    Hypertension     Kidney stones     Lymphedema      Past Surgical History:   Procedure Laterality Date    BACK SURGERY       ([place]), 2013     CHOLECYSTECTOMY OPEN      CORNEAL TRANSPLANT Right     INCISION AND  DRAINAGE ABSCESS Left 4/19/2023    Procedure: INCISION AND DRAINAGE ABSCESS left leg;  Surgeon: Sarita Vazquez MD;  Location:  [facility];  Service: General;  Laterality: Left;    LACERATION REPAIR Left 4/7/2023    Procedure: washout and closure left leg wound ;  Surgeon: Sarita Vazquez MD;  Location:  [facility];  Service: General;  Laterality: Left;    LUMBAR FUSION Left 8/2/2017    Procedure: LEFT LUMBAR LATERAL INTERBODY FUSION WITH INSTRUMENTATION  L1-2;  Surgeon: MARCELINO Wright MD;  Location:  [facility];  Service:     NECK SURGERY  2010    [place]    TOTAL HIP ARTHROPLASTY Right 2014    DR. KENDRICK     TOTAL KNEE ARTHROPLASTY Right 2000    TOTAL KNEE ARTHROPLASTY Left 2002    TOTAL SHOULDER ARTHROPLASTY W/ DISTAL CLAVICLE EXCISION Left 6/1/2020    Procedure: LEFT REVERSE TOTAL SHOULDER REPLACEMENT;  Surgeon: Saravanan Calvin MD;  Location:  [facility];  Service: Orthopedics;  Laterality: Left;     PT Assessment (last 12 hours)       PT Evaluation and Treatment       Row Name 02/28/24 0901          Physical Therapy Time and Intention    Subjective Information complains of;pain;dizziness  -     Document Type therapy note (daily note)  -     Mode of Treatment physical therapy  -       Row Name 02/28/24 0901          General Information    Existing Precautions/Restrictions fall;non-weight bearing  NWB LUE  -       Row Name 02/28/24 0901          Pain    Pretreatment Pain Rating 6/10  -     Posttreatment Pain Rating 6/10  -     Pain Location generalized  -     Pain Intervention(s) Repositioned;Nursing Notified  -       Row Name 02/28/24 0901          Bed Mobility    Rolling Left [place] (Bed Mobility) maximum assist (25% patient effort);verbal cues  -     Rolling Right [place] (Bed Mobility) maximum assist (25% patient effort);verbal cues  -     Scooting/Bridging [place] (Bed Mobility) dependent (less than 25% patient effort);2 person assist  -     Supine-Sit [place]  (Bed Mobility) verbal cues;minimum assist (75% patient effort)  -     Sit-Supine [place] (Bed Mobility) verbal cues;moderate assist (50% patient effort);maximum assist (25% patient effort)  -     Bed Mobility, Safety Issues decreased use of arms for pushing/pulling  -     Assistive Device (Bed Mobility) bed rails;draw sheet  -     Comment, (Bed Mobility) sat EOB 20 minutes cga-sba  -       Row Name 02/28/24 0901          Transfers    Comment, (Transfers) attempted to stand x 2, pt not able to stand today, on 2nd try did get bottom off bed  -       Row Name 02/28/24 0901          Safety Issues, Functional Mobility    Impairments Affecting Function (Mobility) balance;pain;strength  -       Row Name 02/28/24 0901          Motor Skills    Comments, Therapeutic Exercise A-AAROM BLE sitting EOB  -     Additional Documentation Comments, Therapeutic Exercise (Row)  -       Row Name             Wound 02/20/24 Right lower leg    Wound - Properties Group Placement Date: 02/20/24  -CK Side: Right  -CK Orientation: lower  -CK Location: leg  -CK    Retired Wound - Properties Group Placement Date: 02/20/24  -CK Side: Right  -CK Orientation: lower  -CK Location: leg  -CK    Retired Wound - Properties Group Date first assessed: 02/20/24  -CK Side: Right  -CK Location: leg  -CK      Row Name             Wound 02/20/24 Left lower leg    Wound - Properties Group Placement Date: 02/20/24  -CK Side: Left  -CK Orientation: lower  -CK Location: leg  -CK    Retired Wound - Properties Group Placement Date: 02/20/24  -CK Side: Left  -CK Orientation: lower  -CK Location: leg  -CK    Retired Wound - Properties Group Date first assessed: 02/20/24  -CK Side: Left  -CK Location: leg  -CK      Row Name             Wound 02/26/24 0512 groin Blisters    Wound - Properties Group Placement Date: 02/26/24  -WOJCIECH Placement Time: 0512 -JP Location: groin  -WOJCIECH Primary Wound Type: Blisters  -WOJCIECH    Retired Wound - Properties Group  Placement Date: 02/26/24  -WOJCIECH Placement Time: 0512 -JP Location: groin  -WOJCIECH Primary Wound Type: Blisters  -WOJCIECH    Retired Wound - Properties Group Date first assessed: 02/26/24  -WOJCIECH Time first assessed: 0512 -JP Location: groin  -WOJCIECH Primary Wound Type: Blisters  -WOJCIECH      Row Name             Wound 02/26/24 0810 sacral spine    Wound - Properties Group Placement Date: 02/26/24  -MS Placement Time: 0810 -MS Location: sacral spine  -MS    Retired Wound - Properties Group Placement Date: 02/26/24  -MS Placement Time: 0810  -MS Location: sacral spine  -MS    Retired Wound - Properties Group Date first assessed: 02/26/24  -MS Time first assessed: 0810 -MS Location: sacral spine  -MS      Row Name 02/28/24 0901          Plan of Care Review    Plan of Care Reviewed With patient;family  [facility]     Progress no change  -     Outcome Evaluation pt trans to EOB min assist, sat EOB 20 minutes cga-sba, BLE A-AAROM, attempted to stand x 2, pt not able to stand completely, on 2nd try pt was able to clear bottom from bed, trans back to bed max, dependent to scoot up in bed, max for rolling, pt would benefit from SNF  -       Row Name 02/28/24 0901          Positioning and Restraints    Pre-Treatment Position in bed  -     Post Treatment Position bed  -     In Bed fowlers;call light within reach;encouraged to call for assist;with nsg;with family/caregiver  -               User Key  (r) = Recorded By, (t) = Taken By, (c) = Cosigned By      Initials Name Provider Type     Margot Verma PTA Physical Therapist Assistant    Aida Bass, RN Registered Nurse    Shoshana Orozco, RN Registered Nurse    Maryellen Glynn, RN Registered Nurse                    Physical Therapy Education       Title: PT OT SLP Therapies (Done)       Topic: Physical Therapy (Done)       Point: Mobility training (Done)       Learning Progress Summary             Patient Acceptance, E, VU by PHILIP at 2/22/2024 2300    Acceptance, E,  VU by SB at 2/22/2024 1543    Comment: pt edu on POC, benefits of act, d/c plans                         Point: Home exercise program (Done)       Learning Progress Summary             Patient Acceptance, E, VU by  at 2/22/2024 2300                         Point: Body mechanics (Done)       Learning Progress Summary             Patient Acceptance, E, VU by MK at 2/22/2024 2300                         Point: Precautions (Done)       Learning Progress Summary             Patient Acceptance, E, VU by  at 2/22/2024 2300    Acceptance, E, VU by SB at 2/22/2024 1543    Comment: pt edu on POC, benefits of act, d/c plans                                         User Key       Initials Effective Dates Name Provider Type Discipline    SB 07/11/23 -  Daniella Connelly, PT DPT Physical Therapist PT     04/25/23 -  Warren Mccauley, RN Registered Nurse Nurse                  PT Recommendation and Plan     Plan of Care Reviewed With: patient, family (NEICE)  Progress: no change  Outcome Evaluation: pt trans to EOB min assist, sat EOB 20 minutes cga-sba, BLE A-AAROM, attempted to stand x 2, pt not able to stand completely, on 2nd try pt was able to clear bottom from bed, trans back to bed max, dependent to scoot up in bed, max for rolling, pt would benefit from SNF   Outcome Measures       Row Name 02/28/24 0900             How much help from another person do you currently need...    Turning from your back to your side while in flat bed without using bedrails? 2  -AH      Moving from lying on back to sitting on the side of a flat bed without bedrails? 2  -AH      Moving to and from a bed to a chair (including a wheelchair)? 1  -AH      Standing up from a chair using your arms (e.g., wheelchair, bedside chair)? 1  -AH      Climbing 3-5 steps with a railing? 1  -AH      To walk in hospital room? 1  -AH      AM-PAC 6 Clicks Score (PT) 8  -AH      Highest Level of Mobility Goal 3 --> Sit at edge of bed  -AH         Functional Assessment     Outcome Measure Options AM-PAC 6 Clicks Basic Mobility (PT)  -AH                User Key  (r) = Recorded By, (t) = Taken By, (c) = Cosigned By      Initials Name Provider Type    Margot Brown PTA Physical Therapist Assistant                     Time Calculation:    PT Charges       Row Name 02/28/24 0951             Time Calculation    Start Time 0901  -      Stop Time 0944  -      Time Calculation (min) 43 min  -      PT Received On 02/28/24  -         Time Calculation- PT    Total Timed Code Minutes- PT 43 minute(s)  -         Timed Charges    60055 - PT Therapeutic Activity Minutes 43  -AH         Total Minutes    Timed Charges Total Minutes 43  -AH       Total Minutes 43  -AH                User Key  (r) = Recorded By, (t) = Taken By, (c) = Cosigned By      Initials Name Provider Type    Margot Brown PTA Physical Therapist Assistant                  Therapy Charges for Today       Code Description Service Date Service Provider Modifiers Qty    [id number] HC PT THERAPEUTIC ACT EA 15 MIN 2/28/2024 Margot Verma PTA GP 3            PT G-Codes  Outcome Measure Options: AM-PAC 6 Clicks Basic Mobility (PT)  AM-PAC 6 Clicks Score (PT): 8  AM-PAC 6 Clicks Score (OT): 9    Margot Verma PTA  2/28/2024

## 2024-02-28 NOTE — PLAN OF CARE
Goal Outcome Evaluation:  Plan of Care Reviewed With: patient        Progress: no change  Outcome Evaluation: See note                    Treatment Assessment (SLP): continued (02/28/24 1440)  Treatment Assessment Comments (SLP): Continue to follow (02/28/24 1440)  Plan for Continued Treatment (SLP): continue treatment per plan of care (02/28/24 1440)

## 2024-02-28 NOTE — PLAN OF CARE
Goal Outcome Evaluation:      Pt difficult to arouse at start of shift, but eventually woke up oriented to self and took her pills crushed in applesauce. Purewick in place(did not visualize blistering on vagina that was reported by day shift) Pain tolerable nothing given PRN. VSS, CTM     Progress: no change

## 2024-02-28 NOTE — THERAPY TREATMENT NOTE
Acute Care - Occupational Therapy Treatment Note  [facility]     Patient Name: Brenda Sneed  : 1950  MRN: [id number]  Today's Date: 2024             Admit Date: 2024       ICD-10-CM ICD-9-CM   1. Urinary tract infection without hematuria, site unspecified  N39.0 599.0   2. Generalized weakness  R53.1 780.79   3. Chronic left shoulder pain  M25.512 719.41    G89.29 338.29   4. Venous stasis ulcer of other part of right lower leg with fat layer exposed, unspecified whether varicose veins present  I83.018 454.0    L97.812    5. Venous stasis ulcer of other part of left lower leg with fat layer exposed, unspecified whether varicose veins present  I83.028 454.0    L97.822    6. Impaired mobility [Z74.09]  Z74.09 799.89   7. Oropharyngeal dysphagia  R13.12 787.22     Patient Active Problem List   Diagnosis    Spinal stenosis, lumbar    Secondary osteoarthritis of left shoulder due to rotator cuff arthropathy    Cellulitis of right lower extremity    HTN (hypertension)    Obesity, Class III, BMI 40-49.9 (morbid obesity)    Anxiety associated with depression    Traumatic rhabdomyolysis    Traumatic rhabdomyolysis, initial encounter    Laceration of left lower extremity    Cellulitis    Cellulitis of left lower extremity    Thrombocytopenia    Acute cystitis with hematuria    Sepsis    Candidiasis of skin    Acquired hypothyroidism    CHRISTINE (acute kidney injury)    Chronic pain syndrome    Hyponatremia    Chronic kidney disease, stage 3a    UTI (urinary tract infection)    COVID-19 virus detected     Past Medical History:   Diagnosis Date    Arthritis     Bronchitis     Cataract     right eye , cornea implant    Chronic back pain     Depression     Disease of thyroid gland     Edema     lower extremities    Hypertension     Kidney stones     Lymphedema      Past Surgical History:   Procedure Laterality Date    BACK SURGERY       ([place]),      CHOLECYSTECTOMY OPEN      CORNEAL TRANSPLANT Right  1999    INCISION AND DRAINAGE ABSCESS Left 4/19/2023    Procedure: INCISION AND DRAINAGE ABSCESS left leg;  Surgeon: Sarita Vazquez MD;  Location:  [facility] OR;  Service: General;  Laterality: Left;    LACERATION REPAIR Left 4/7/2023    Procedure: washout and closure left leg wound ;  Surgeon: Sarita Vazquez MD;  Location:  [facility] OR;  Service: General;  Laterality: Left;    LUMBAR FUSION Left 8/2/2017    Procedure: LEFT LUMBAR LATERAL INTERBODY FUSION WITH INSTRUMENTATION  L1-2;  Surgeon: MARCELINO Wright MD;  Location:  [facility];  Service:     NECK SURGERY  2010    [place]    TOTAL HIP ARTHROPLASTY Right 2014    DR. KENDRICK     TOTAL KNEE ARTHROPLASTY Right 2000    TOTAL KNEE ARTHROPLASTY Left 2002    TOTAL SHOULDER ARTHROPLASTY W/ DISTAL CLAVICLE EXCISION Left 6/1/2020    Procedure: LEFT REVERSE TOTAL SHOULDER REPLACEMENT;  Surgeon: Saravanan Calvin MD;  Location:  [facility] OR;  Service: Orthopedics;  Laterality: Left;         OT ASSESSMENT FLOWSHEET (last 12 hours)       OT Evaluation and Treatment       Row Name 02/28/24 4521                   OT Time and Intention    Subjective Information complains of;pain  -TS        Document Type therapy note (daily note)  -TS        Mode of Treatment occupational therapy  -TS        Patient Effort fair  -TS        Comment NWB MARYSE, okay for ROM as tolerated  -TS           General Information    Existing Precautions/Restrictions fall;non-weight bearing  -TS           Pain Scale: FACES Pre/Post-Treatment    Pain: FACES Scale, Pretreatment 0-->no hurt  -TS        Posttreatment Pain Rating 2-->hurts little bit  -TS        Pain Location - Side/Orientation Left  -TS        Pain Location upper  -TS        Pain Location - shoulder  -TS           Cognition    Personal Safety Interventions fall prevention program maintained;elopement precautions initiated  -TS           Activities of Daily Living    BADL Assessment/Intervention toileting  -TS           Toileting Assessment/Training     [place] Level (Toileting) change pad/brief;maximum assist (25% patient effort)  -TS        Position (Toileting) supine  -TS           Bed Mobility    Rolling Left [place] (Bed Mobility) maximum assist (25% patient effort)  -TS        Rolling Right [place] (Bed Mobility) maximum assist (25% patient effort)  -TS        Scooting/Bridging [place] (Bed Mobility) maximum assist (25% patient effort);2 person assist  -TS        Assistive Device (Bed Mobility) bed rails;draw sheet  -TS           Wound 02/20/24 Right lower leg    Wound - Properties Group Placement Date: 02/20/24  -CK Side: Right  -CK Orientation: lower  -CK Location: leg  -CK    Retired Wound - Properties Group Placement Date: 02/20/24  -CK Side: Right  -CK Orientation: lower  -CK Location: leg  -CK    Retired Wound - Properties Group Date first assessed: 02/20/24  -CK Side: Right  -CK Location: leg  -CK       Wound 02/20/24 Left lower leg    Wound - Properties Group Placement Date: 02/20/24  -CK Side: Left  -CK Orientation: lower  -CK Location: leg  -CK    Retired Wound - Properties Group Placement Date: 02/20/24  -CK Side: Left  -CK Orientation: lower  -CK Location: leg  -CK    Retired Wound - Properties Group Date first assessed: 02/20/24  -CK Side: Left  -CK Location: leg  -CK       Wound 02/26/24 0512 groin Blisters    Wound - Properties Group Placement Date: 02/26/24  -WOJCIECH Placement Time: 0512 -JP Location: groin  -WOJCIECH Primary Wound Type: Blisters  -WOJCIECH    Retired Wound - Properties Group Placement Date: 02/26/24  -WOJCIECH Placement Time: 0512 -JP Location: groin  -WOJCIECH Primary Wound Type: Blisters  -WOJCIECH    Retired Wound - Properties Group Date first assessed: 02/26/24  -WOJCIECH Time first assessed: 0512 -WOJCIECH Location: groin  -WOJCIECH Primary Wound Type: Blisters  -WOJCIECH       Wound 02/26/24 0810 sacral spine    Wound - Properties Group Placement Date: 02/26/24  -MS Placement Time: 0810  -MS Location: sacral spine  -MS    Retired Wound - Properties  Group Placement Date: 02/26/24  -MS Placement Time: 0810 -MS Location: sacral spine  -MS    Retired Wound - Properties Group Date first assessed: 02/26/24  -MS Time first assessed: 0810 -MS Location: sacral spine  -MS       Plan of Care Review    Plan of Care Reviewed With patient  -TS        Progress no change  -TS        Outcome Evaluation Pt agreeable to treatment, pt alert to self, requested drink of water. Pt tolerated AROM to RUE and AAROM to L elbow, wrist and digits. Pt max A for LB dressing and rolling L/R in bed. Pt would benefit from continued rehab. Continue OT POC  -TS           Positioning and Restraints    Pre-Treatment Position in bed  -TS        Post Treatment Position bed  -TS        In Bed fowlers;call light within reach;encouraged to call for assist;exit alarm on;side rails up x3;RUE elevated;LUE elevated  -TS                  User Key  (r) = Recorded By, (t) = Taken By, (c) = Cosigned By      Initials Name Effective Dates    TS Marry Krishnamurthy COTA 02/03/23 -     Aida Bass, KADEN 02/09/21 -     Shoshana Orozco, KADEN 12/28/23 -     Maryellen Glynn RN 01/25/24 -                      Occupational Therapy Education       Title: PT OT SLP Therapies (Done)       Topic: Occupational Therapy (Done)       Point: ADL training (Done)       Description:   Instruct learner(s) on proper safety adaptation and remediation techniques during self care or transfers.   Instruct in proper use of assistive devices.                  Learning Progress Summary             Patient Acceptance, E,D, VU by  at 2/27/2024 1616    Acceptance, E, VU by PHILIP at 2/22/2024 2300    Acceptance, E,D, VU,NR by LR at 2/22/2024 1036    Acceptance, E, VU by  at 2/21/2024 2324    Acceptance, E, VU,NR by CS at 2/21/2024 1100   Family Acceptance, E,D, VU by  at 2/27/2024 1616                         Point: Home exercise program (Done)       Description:   Instruct learner(s) on appropriate technique for monitoring,  assisting and/or progressing therapeutic exercises/activities.                  Learning Progress Summary             Patient Acceptance, E, VU by  at 2/22/2024 2300    Acceptance, E, VU by  at 2/21/2024 2324    Acceptance, E, VU,NR by CS at 2/21/2024 1100                         Point: Precautions (Done)       Description:   Instruct learner(s) on prescribed precautions during self-care and functional transfers.                  Learning Progress Summary             Patient Acceptance, E,D, VU by  at 2/27/2024 1616    Acceptance, E, VU by MK at 2/22/2024 2300    Acceptance, E,D, VU,NR by LR at 2/22/2024 1036    Acceptance, E, VU by MK at 2/21/2024 2324    Acceptance, E, VU,NR by CS at 2/21/2024 1100   Family Acceptance, E,D, VU by  at 2/27/2024 1616                         Point: Body mechanics (Done)       Description:   Instruct learner(s) on proper positioning and spine alignment during self-care, functional mobility activities and/or exercises.                  Learning Progress Summary             Patient Acceptance, E,D, VU by  at 2/27/2024 1616    Acceptance, E, VU by MK at 2/22/2024 2300    Acceptance, E,D, VU,NR by LR at 2/22/2024 1036    Acceptance, E, VU by  at 2/21/2024 2324    Acceptance, E, VU,NR by  at 2/21/2024 1100   Family Acceptance, E,D, VU by  at 2/27/2024 1616                                         User Key       Initials Effective Dates Name Provider Type Discipline     07/11/23 -  Dorita Oropeza, OTR/L Occupational Therapist OT     02/03/23 -  Almaz Rodriguez, OTR/L, KIRSTIN Occupational Therapist OT    LR 04/25/23 -  Wilda Faulkner OTR/L Occupational Therapist OT     04/25/23 -  Warren Mccauley, KADEN Registered Nurse Nurse                      OT Recommendation and Plan     Plan of Care Review  Plan of Care Reviewed With: patient  Progress: no change  Outcome Evaluation: Pt agreeable to treatment, pt alert to self, requested drink of water. Pt tolerated AROM to RUE and  AAROM to L elbow, wrist and digits. Pt max A for LB dressing and rolling L/R in bed. Pt would benefit from continued rehab. Continue OT POC  Plan of Care Reviewed With: patient  Outcome Evaluation: Pt agreeable to treatment, pt alert to self, requested drink of water. Pt tolerated AROM to RUE and AAROM to L elbow, wrist and digits. Pt max A for LB dressing and rolling L/R in bed. Pt would benefit from continued rehab. Continue OT POC     Outcome Measures       Row Name 02/28/24 1100 02/28/24 0900          How much help from another person do you currently need...    Turning from your back to your side while in flat bed without using bedrails? -- 2  -AH     Moving from lying on back to sitting on the side of a flat bed without bedrails? -- 2  -AH     Moving to and from a bed to a chair (including a wheelchair)? -- 1  -AH     Standing up from a chair using your arms (e.g., wheelchair, bedside chair)? -- 1  -AH     Climbing 3-5 steps with a railing? -- 1  -AH     To walk in hospital room? -- 1  -AH     AM-PAC 6 Clicks Score (PT) -- 8  -AH     Highest Level of Mobility Goal -- 3 --> Sit at edge of bed  -        How much help from another is currently needed...    Putting on and taking off regular lower body clothing? 2  -TS --     Bathing (including washing, rinsing, and drying) 2  -TS --     Toileting (which includes using toilet bed pan or urinal) 2  -TS --     Putting on and taking off regular upper body clothing 2  -TS --     Taking care of personal grooming (such as brushing teeth) 2  -TS --     Eating meals 3  -TS --     AM-PAC 6 Clicks Score (OT) 13  -TS --        Functional Assessment    Outcome Measure Options -- AM-PAC 6 Clicks Basic Mobility (PT)  -               User Key  (r) = Recorded By, (t) = Taken By, (c) = Cosigned By      Initials Name Provider Type     Margot Verma, PTA Physical Therapist Assistant    Marry Littlejohn COTA Occupational Therapist Assistant                    Time  Calculation:    Time Calculation- OT       Row Name 02/28/24 1156             Time Calculation- OT    OT Start Time 1050  -TS      OT Stop Time 1120  -TS      OT Time Calculation (min) 30 min  -TS      Total Timed Code Minutes- OT 30 minute(s)  -TS      OT Received On 02/28/24  -TS         Timed Charges    51273 - OT Self Care/Mgmt Minutes 30  -TS         Total Minutes    Timed Charges Total Minutes 30  -TS       Total Minutes 30  -TS                User Key  (r) = Recorded By, (t) = Taken By, (c) = Cosigned By      Initials Name Provider Type    TS Marry Krishnamurthy COTA Occupational Therapist Assistant                  Therapy Charges for Today       Code Description Service Date Service Provider Modifiers Qty    [id number] HC OT SELF CARE/MGMT/TRAIN EA 15 MIN 2/28/2024 Marry Krishnamurthy COTA GO 2                 Marry SANTAMARIA. ALFREDO Krishnamurthy  2/28/2024

## 2024-02-28 NOTE — CONSULTS
VAT reviewed order for central line at this time. Current timeline for antibiotic finishes today, unclear if there is a plan to continue antibiotics. Pt has multiple risks for infection including CRE colonization and other isolation risks. Pt is also currently on florastor which would increase the risk of fungal central line infection.     Pt is very high risk for central line infection. Understand that lab has had difficulty obtaining specimins however there are results from lab this morning. VAT could assist with lab draws if needed rather than place central line.     Spoke with KADEN Rendon who will clarify abx orders with ID and speak with physician re: our concerns.

## 2024-02-28 NOTE — PLAN OF CARE
Goal Outcome Evaluation:  Plan of Care Reviewed With: patient        Progress: no change  Outcome Evaluation: Pt agreeable to treatment, pt alert to self, requested drink of water. Pt tolerated AROM to RUE and AAROM to L elbow, wrist and digits. Pt max A for LB dressing and rolling L/R in bed. Pt would benefit from continued rehab. Continue OT POC

## 2024-02-28 NOTE — PROGRESS NOTES
"    Daily Progress Note  Brenda Sneed  MRN: [id number] LOS: 8    Admit Date: 2024 08:16 CST    Subjective:         Interval History:    Reviewed overnight events and nursing notes.     Somewhat more alert and oriented this morning.  Still has a weak voice and generally not able provide complex thought.    ROS:  Review of Systems   Unable to perform ROS: Mental status change       DIET:  Diet: Regular/House Diet; Texture: Pureed (NDD 1); Fluid Consistency: Nectar Thick    Medications:      albuterol sulfate HFA, 2 puff, Inhalation, TID - RT  apixaban, 5 mg, Oral, Q12H  Biotene Dry Mouth Moisturizing, 1 spray, Mouth/Throat, 5x Daily  busPIRone, 5 mg, Oral, TID With Meals  cefTAZidime, 1,000 mg, Intravenous, Q12H  DULoxetine, 30 mg, Oral, Daily  gabapentin, 100 mg, Oral, Q12H  levothyroxine, 100 mcg, Oral, Q AM  Lidocaine, 1 patch, Transdermal, Q24H  nystatin, 1 application , Topical, BID  pramipexole, 0.75 mg, Oral, Nightly  saccharomyces boulardii, 250 mg, Oral, BID  sodium chloride, 10 mL, Intravenous, Q12H  sodium chloride, 10 mL, Intravenous, Q12H          Objective:     Vitals: /43 (BP Location: Left arm, Patient Position: Lying)   Pulse 83   Temp 97.4 °F (36.3 °C) (Axillary)   Resp 18   Ht 160 cm (63\")   Wt 91.6 kg (202 lb)   SpO2 100%   BMI 35.78 kg/m²  No intake or output data in the 24 hours ending 24 0816 Temp (24hrs), Av.9 °F (36.6 °C), Min:97.4 °F (36.3 °C), Max:98.2 °F (36.8 °C)    Glucose:  Lab Results   Component Value Date    POCGLU 100 (H) 2021     Physical Examination:   Physical Exam  Constitutional:       General: She is not in acute distress.     Appearance: She is obese. She is ill-appearing. She is not toxic-appearing.   Cardiovascular:      Rate and Rhythm: Normal rate and regular rhythm.      Heart sounds: No murmur heard.  Pulmonary:      Effort: Pulmonary effort is normal. No respiratory distress.   Musculoskeletal:      Right lower leg: " Edema present.      Left lower leg: Edema present.   Neurological:      Comments: Alert and oriented x3,          Labs:  Lab Results (last 24 hours)       Procedure Component Value Units Date/Time    TSH [[id number]]  (Abnormal) Collected: 02/28/24 0619    Specimen: Blood Updated: 02/28/24 0651     TSH 12.380 uIU/mL     C-reactive Protein [[id number]]  (Abnormal) Collected: 02/28/24 0619    Specimen: Blood Updated: 02/28/24 0651     C-Reactive Protein 12.11 mg/dL     Basic Metabolic Panel [[id number]]  (Abnormal) Collected: 02/28/24 0619    Specimen: Blood Updated: 02/28/24 0651     Glucose 112 mg/dL      BUN 51 mg/dL      Creatinine 1.20 mg/dL      Sodium 140 mmol/L      Potassium 3.7 mmol/L      Chloride 109 mmol/L      CO2 22.0 mmol/L      Calcium 8.0 mg/dL      BUN/Creatinine Ratio 42.5     Anion Gap 9.0 mmol/L      eGFR 47.3 mL/min/1.73     Narrative:      GFR Normal >60  Chronic Kidney Disease <60  Kidney Failure <15    The GFR formula is only valid for adults with stable renal function between ages 18 and 70.             Imaging:  CT Head Without Contrast    Result Date: 2/26/2024  EXAMINATION: CT HEAD WO CONTRAST-   2/26/2024 10:14 AM  HISTORY: Mental status change, unknown cause; N39.0-Urinary tract infection, site not specified; R53.1-Weakness; M25.512-Pain in left shoulder; G89.29-Other chronic pain; I83.018-Varicose veins of right lower extremity with ulcer other part of lower leg; L97.812-Non-pressure chronic ulcer of other part of right lower leg with fat layer exposed; I83.028-Varicose veins of left lower extremity with ulcer ot  In order to have a CT radiation dose as low as reasonably achievable Automated Exposure Control was utilized for adjustment of the mA and/or KV according to patient size.  Total DLP = 844.33 mGy.cm  The CT scan of the head is performed without intravenous contrast enhancement.  The images are acquired in axial plane with subsequent reconstruction in coronal and sagittal planes.   Comparison is made with the previous study dated 7/5/2022.  There is no evidence of a mass. There is no evidence of midline shift.  There is no evidence of intracranial hemorrhage or hematoma.  Moderately prominent ventricles, basal cisterns and the cortical sulci are similar to the previous study suggesting chronic volume loss/atrophy. This is more pronounced in the frontal lobes as in the previous study.  An empty sella turcica is seen. No widening or erosion.  Images reviewed in bone window show no significant displaced fracture. However a subtle nondisplaced fracture may be obscured due to significant motion artifacts. Limited visualized paranasal sinuses and mastoid air cells are clear.      Impression: 1. A stable CT scan of the head. No acute intracranial abnormality.             This report was signed and finalized on 2/26/2024 11:48 AM by Dr. Glen Heaton MD.          Assessment and Plan:     Primary Problem:  UTI (urinary tract infection)    Hospital Problem list:    UTI (urinary tract infection)    Secondary osteoarthritis of left shoulder due to rotator cuff arthropathy    Cellulitis of right lower extremity    HTN (hypertension)    Anxiety associated with depression    Cellulitis of left lower extremity    Acute cystitis with hematuria    Candidiasis of skin    CHRISTINE (acute kidney injury)    Chronic kidney disease, stage 3a    COVID-19 virus detected      Assessment: Chronically ill 73-year-old female with opiate use disorder, hypertension, and bilateral lymphedema who has suffered recent progressive decline and presents with weakness and fatigue found to have COVID-19.     Plan:     UTI  Enterococcus in culture, found to have MDRO Pseudomonas.  ID treated with ceftazedime.      Left shoulder pain  Concern for prothesis failure on Xray, ortho consultation occurred yesterday, deferring intervention until she is more medically stable.  I agree with this.     Lethargy, altered mental status  Opiate use  disorder  Interval improvement in her mental status off scheduled opiates.  Will continue to keep her off opiates with only the short acting morphine as needed for her left shoulder pain.  I am anticipating her mental status will continue to improve as medications wash out of her system.     Dysphagia  Suspect this is due to encephalopathy and altered mental status as above, negative head imaging.  Low suspicion for primary neurologic issue.  Will continue treatment for altered mental status as above.    Still struggling with mental status, will keep her off opiates, talked to family today about possible hospice care but they do not seem ready for it at this time.  Will hopefully continue to improve mental status as she recovers from covid infection, will need SNF at discharge.      Reviewed treatment plans with the patient and/or family.     Code Status:   Code Status and Medical Interventions:   Ordered at: 02/21/24 1123     Medical Intervention Limits:    NO intubation (DNI)    Other     Code Status (Patient has no pulse and is not breathing):    No CPR (Do Not Attempt to Resuscitate)     Medical Interventions (Patient has pulse or is breathing):    Limited Support     Additional Medical Interventions Limits:    no permanent feeding tube       Electronically signed by Sanjiv Block MD on 2/28/2024 at 08:16 CST

## 2024-02-28 NOTE — THERAPY TREATMENT NOTE
Acute Care - Speech Language Pathology   Swallow Treatment Note [facility]     Patient Name: Brenda Sneed  : 1950  MRN: [id number]  Today's Date: 2024               Admit Date: 2024  Pt was asleep upon SLP arrival, but did wake to voice. She was unable to consistently maintain alertness without frequent verbal and tactile cues. She took two trials of nectar thick via spoon with no overt s/s of aspiration. Attempted to have pt take sips of thin water via straw, however pt's level of alertness impacted her ability to participate. For this reason the session was discontinued. Continue a pureed diet and nectar thick liquids as tolerated. SLP will continue to follow.   Scot Henley, CCC-SLP 2024 15:08 CST    Visit Dx:     ICD-10-CM ICD-9-CM   1. Urinary tract infection without hematuria, site unspecified  N39.0 599.0   2. Generalized weakness  R53.1 780.79   3. Chronic left shoulder pain  M25.512 719.41    G89.29 338.29   4. Venous stasis ulcer of other part of right lower leg with fat layer exposed, unspecified whether varicose veins present  I83.018 454.0    L97.812    5. Venous stasis ulcer of other part of left lower leg with fat layer exposed, unspecified whether varicose veins present  I83.028 454.0    L97.822    6. Impaired mobility [Z74.09]  Z74.09 799.89   7. Oropharyngeal dysphagia  R13.12 787.22     Patient Active Problem List   Diagnosis    Spinal stenosis, lumbar    Secondary osteoarthritis of left shoulder due to rotator cuff arthropathy    Cellulitis of right lower extremity    HTN (hypertension)    Obesity, Class III, BMI 40-49.9 (morbid obesity)    Anxiety associated with depression    Traumatic rhabdomyolysis    Traumatic rhabdomyolysis, initial encounter    Laceration of left lower extremity    Cellulitis    Cellulitis of left lower extremity    Thrombocytopenia    Acute cystitis with hematuria    Sepsis    Candidiasis of skin    Acquired hypothyroidism    CHRISTINE (acute  kidney injury)    Chronic pain syndrome    Hyponatremia    Chronic kidney disease, stage 3a    UTI (urinary tract infection)    COVID-19 virus detected     Past Medical History:   Diagnosis Date    Arthritis     Bronchitis     Cataract     right eye , cornea implant    Chronic back pain     Depression     Disease of thyroid gland     Edema     lower extremities    Hypertension     Kidney stones     Lymphedema      Past Surgical History:   Procedure Laterality Date    BACK SURGERY      2010 ([facility]), 2013     CHOLECYSTECTOMY OPEN      CORNEAL TRANSPLANT Right 1999    INCISION AND DRAINAGE ABSCESS Left 4/19/2023    Procedure: INCISION AND DRAINAGE ABSCESS left leg;  Surgeon: Sarita Vazquez MD;  Location:  [facility] OR;  Service: General;  Laterality: Left;    LACERATION REPAIR Left 4/7/2023    Procedure: washout and closure left leg wound ;  Surgeon: Sarita Vazquez MD;  Location:  [facility] OR;  Service: General;  Laterality: Left;    LUMBAR FUSION Left 8/2/2017    Procedure: LEFT LUMBAR LATERAL INTERBODY FUSION WITH INSTRUMENTATION  L1-2;  Surgeon: MARCELINO Wright MD;  Location:  [facility] OR;  Service:     NECK SURGERY  2010    [facility]    TOTAL HIP ARTHROPLASTY Right 2014    DR. KENDRICK     TOTAL KNEE ARTHROPLASTY Right 2000    TOTAL KNEE ARTHROPLASTY Left 2002    TOTAL SHOULDER ARTHROPLASTY W/ DISTAL CLAVICLE EXCISION Left 6/1/2020    Procedure: LEFT REVERSE TOTAL SHOULDER REPLACEMENT;  Surgeon: Saravanan Calvin MD;  Location:  [facility] OR;  Service: Orthopedics;  Laterality: Left;       SLP Recommendation and Plan                                               Daily Summary of Progress (SLP): progress towards functional goals is fair (02/28/24 1440)               Treatment Assessment (SLP): continued (02/28/24 1440)  Treatment Assessment Comments (SLP): Continue to follow (02/28/24 1440)  Plan for Continued Treatment (SLP): continue treatment per plan of care (02/28/24 1440)         Plan of Care Reviewed With:  patient  Progress: no change  Outcome Evaluation: See note      SWALLOW EVALUATION (last 72 hours)       SLP Adult Swallow Evaluation       Row Name 02/28/24 1440 02/27/24 0937 02/26/24 0911             Rehab Evaluation    Document Type therapy note (daily note)  -MB therapy note (daily note)  -MB evaluation  -JR      Subjective Information fatigue  -MB complains of;pain  -MB no complaints  -JR      Patient Observations lethargic  -MB alert;cooperative  -MB cooperative;lethargic  -JR      Patient/Family/Caregiver Comments/Observations No family present  -MB Niece, Nicole present  -MB niece present, daughter on phone  -JR      Patient Effort poor  -MB good  -MB fair  -JR      Symptoms Noted During/After Treatment -- -- shortness of breath  -JR         General Information    Patient Profile Reviewed -- -- yes  -JR      Pertinent History Of Current Problem -- -- Patient is admitted with generalized weakness, CHRISTINE, UTI, Covid, osteoarthritis in the left shoulder, and cellulitis in the lower extremities. Patient was coughin with oral intake per RN staff.  -JR      Current Method of Nutrition -- -- soft to chew textures;nectar/syrup-thick liquids  -JR      Precautions/Limitations, Vision -- -- WFL;for purposes of eval  -JR      Precautions/Limitations, Hearing -- -- WFL;for purposes of eval  -JR      Prior Level of Function-Communication -- -- unknown  -JR      Prior Level of Function-Swallowing -- -- no diet consistency restrictions  -JR      Plans/Goals Discussed with -- -- patient;family  -JR      Barriers to Rehab -- -- medically complex  -JR      Patient's Goals for Discharge -- -- patient did not state  -JR      Family Goals for Discharge -- -- family did not state  -JR         Pain    Additional Documentation Pain Scale: FACES Pre/Post-Treatment (Group)  -MB Pain Scale: FACES Pre/Post-Treatment (Group)  -MB Pain Scale: FACES Pre/Post-Treatment (Group)  -JR         Pain Scale: FACES Pre/Post-Treatment    Pain: FACES  Scale, Pretreatment 0-->no hurt  -MB 4-->hurts little more  -MB 0-->no hurt  -JR      Posttreatment Pain Rating -- -- 0-->no hurt  -JR      Pain Location - Side/Orientation -- Left  -MB --      Pain Location -- upper  -MB --      Pain Location - -- extremity  -MB --      Pre/Posttreatment Pain Comment -- With movement  -MB --         Oral Motor Structure and Function    Dentition Assessment -- edentulous  -MB edentulous  -JR      Secretion Management -- -- WNL/WFL  -JR      Mucosal Quality -- -- dry;sticky  -JR      Gag Response -- -- absent or diminished  -JR      Volitional Swallow -- -- delayed;weak  -JR      Volitional Cough -- -- weak;reduced respiratory support  -JR         Oral Musculature and Cranial Nerve Assessment    Oral Motor General Assessment -- -- generalized oral motor weakness  -JR      Mandibular Impairment Detail, Cranial Nerve V (Trigeminal) -- -- reduced strength bilaterally  -JR      Oral Labial or Buccal Impairment, Detail, Cranial Nerve VII (Facial): -- -- reduced strength bilaterally  -JR      Lingual Impairment, Detail. Cranial Nerves IX, XII (Glossopharyngeal and Hypoglossal) -- -- reduced lingual ROM;reduced strength  -JR         General Eating/Swallowing Observations    Respiratory Support Currently in Use -- -- room air  -JR      Positioning During Eating -- -- upright in bed  -JR      Utensils Used -- -- spoon  -JR      Consistencies Trialed -- -- pureed  -JR         Clinical Swallow Eval    Oral Prep Phase -- -- impaired  -JR      Oral Transit -- -- impaired  -JR      Oral Residue -- -- impaired  -JR      Pharyngeal Phase -- -- suspected pharyngeal impairment  -JR      Esophageal Phase -- -- unremarkable  -JR         Oral Prep Concerns    Oral Prep Concerns -- -- inefficient mastication;increased prep time  -JR      Inefficient Mastication -- -- other (see comments);mechanical soft  -JR      Increased Prep Time -- -- pudding  -JR         Oral Transit Concerns    Oral Transit Concerns  -- -- delayed initiation of bolus transit;increased oral transit time  -JR      Delayed Intiation of Bolus Transit -- -- pudding  -JR      Increased Oral Transit Time -- -- pudding  -JR         Oral Residue Concerns    Oral Residue Concerns -- -- diffuse residue throughout oral cavity  -JR      Diffuse Residue Throughout Oral Cavity -- -- pudding  -JR         Pharyngeal Phase Concerns    Pharyngeal Phase Concerns -- -- cough;multiple swallows  -JR      Multiple Swallows -- -- pudding  -JR      Cough -- -- pudding  -JR         SLP Evaluation Clinical Impression    SLP Swallowing Diagnosis -- -- mod-severe;oral dysphagia;suspected pharyngeal dysphagia  -      Functional Impact -- -- risk of aspiration/pneumonia;risk of malnutrition;risk of dehydration  -      Rehab Potential/Prognosis, Swallowing -- -- adequate, monitor progress closely  -      Swallow Criteria for Skilled Therapeutic Interventions Met -- -- demonstrates skilled criteria  -         SLP Treatment Clinical Impressions    Treatment Assessment (SLP) continued  -MB improved  -MB worsened  -      Treatment Assessment Comments (SLP) Continue to follow  -MB Start a pureed diet and nectar thick liquids  -MB --      Daily Summary of Progress (SLP) progress towards functional goals is fair  -MB progress toward functional goals is good  -MB --      Barriers to Overall Progress (SLP) Lethargy  -MB -- --      Plan for Continued Treatment (SLP) continue treatment per plan of care  -MB goals adjusted to reflect functional improvements demonstrated  -MB --         Recommendations    Therapy Frequency (Swallow) -- -- at least;2 days per week  -      Predicted Duration Therapy Intervention (Days) -- -- until discharge  -      SLP Diet Recommendation -- puree;nectar thick liquids;water between meals after oral care, with supervision;ice chips between meals after oral care, with supervision  -MB NPO  -      Recommended Diagnostics -- -- reassess via  clinical swallow evaluation  -      Recommended Precautions and Strategies -- -- upright posture during/after eating;small bites of food and sips of liquid;alternate between small bites of food and sips of liquid;check mouth frequently for oral residue/pocketing;general aspiration precautions;fatigue precautions;1:1 supervision  -      Oral Care Recommendations -- -- Oral Care BID/PRN  -JR      SLP Rec. for Method of Medication Administration -- meds whole;with puree  -MB meds via alternate route  -JR      Monitor for Signs of Aspiration -- -- yes;notify SLP if any concerns  -JR      Anticipated Discharge Disposition (SLP) -- -- unknown  -JR         (LTG) Patient will demonstrate functional swallow for    Diet Texture (Demonstrate functional swallow) pureed textures  -MB pureed textures  -MB pureed textures  -JR      Liquid viscosity (Demonstrate functional swallow) nectar/ mildly thick liquids  -MB nectar/ mildly thick liquids  -MB honey/  moderately thick liquids  -JR      [place] (Demonstrate functional swallow) with minimal cues (75-90% accuracy)  -MB with minimal cues (75-90% accuracy)  -MB with minimal cues (75-90% accuracy)  -JR      Time Frame (Demonstrate functional swallow) by discharge  -MB by discharge  -MB by discharge  -JR      Barriers (Demonstrate functional swallow) n/a  -MB -- n/a  -JR      Progress/Outcomes (Demonstrate functional swallow) progress slower than expected  -MB goal revised this date  -MB medical status inhibiting progress  -JR         (STG) Patient will tolerate trials of    Consistencies Trialed (Tolerate trials) soft to chew (whole) textures;soft to chew (chopped) textures;soft to chew (ground) textures;pureed textures;thin liquids;nectar/ mildly thick liquids  -MB soft to chew (whole) textures;soft to chew (chopped) textures;soft to chew (ground) textures;pureed textures;thin liquids;nectar/ mildly thick liquids  -MB pureed textures;honey/ moderately thick liquids  -       Desired Outcome (Tolerate trials) without signs/symptoms of aspiration;with adequate oral prep/transit/clearance  -MB without signs/symptoms of aspiration;with adequate oral prep/transit/clearance  -MB without signs/symptoms of aspiration;with adequate oral prep/transit/clearance  -JR      [place] (Tolerate trials) with minimal cues (75-90% accuracy)  -MB with minimal cues (75-90% accuracy)  -MB with minimal cues (75-90% accuracy)  -JR      Time Frame (Tolerate trials) by discharge  -MB by discharge  -MB by discharge  -JR      Progress/Outcomes (Tolerate trials) progress slower than expected  -MB goal revised this date  -MB medical status inhibiting progress  -JR         (STG) Pharyngeal Strengthening Exercise Goal 1 (SLP)    Activity (Pharyngeal Strengthening Goal 1, SLP) increase pharyngeal sensation;increase timing  -MB increase pharyngeal sensation;increase timing  -MB increase pharyngeal sensation;increase timing  -JR      Increase Pharyngeal Sensation gustatory stimulation (sour/cold)  -MB gustatory stimulation (sour/cold)  -MB gustatory stimulation (sour/cold)  -JR      Increase Timing breath hold exercises;prepping - 3 second prep or suck swallow or 3-step swallow  -MB breath hold exercises;prepping - 3 second prep or suck swallow or 3-step swallow  -MB breath hold exercises;prepping - 3 second prep or suck swallow or 3-step swallow  -JR      [place]/Accuracy (Pharyngeal Strengthening Goal 1, SLP) with minimal cues (75-90% accuracy)  -MB with minimal cues (75-90% accuracy)  -MB with minimal cues (75-90% accuracy)  -JR      Time Frame (Pharyngeal Strengthening Goal 1, SLP) by discharge  -MB by discharge  -MB by discharge  -JR      Barriers (Pharyngeal Strengthening Goal 1, SLP) n/a  -MB -- n/a  -JR      Progress/Outcomes (Pharyngeal Strengthening Goal 1, SLP) progress slower than expected  -MB -- medical status inhibiting progress  -JR                User Key  (r) = Recorded By, (t) = Taken By,  (c) = Cosigned By      Initials Name Effective Dates    Scot Pacheco, CCC-SLP 02/03/23 -     JR Ngozi Rockwell MS CCC-SLP 08/22/23 -                     EDUCATION  The patient has been educated in the following areas:   Dysphagia (Swallowing Impairment).        SLP GOALS       Row Name 02/28/24 1440 02/27/24 0937 02/26/24 0911       (LTG) Patient will demonstrate functional swallow for    Diet Texture (Demonstrate functional swallow) pureed textures  -MB pureed textures  -MB pureed textures  -JR    Liquid viscosity (Demonstrate functional swallow) nectar/ mildly thick liquids  -MB nectar/ mildly thick liquids  -MB honey/  moderately thick liquids  -JR    [place] (Demonstrate functional swallow) with minimal cues (75-90% accuracy)  -MB with minimal cues (75-90% accuracy)  -MB with minimal cues (75-90% accuracy)  -JR    Time Frame (Demonstrate functional swallow) by discharge  -MB by discharge  -MB by discharge  -JR    Barriers (Demonstrate functional swallow) n/a  -MB -- n/a  -JR    Progress/Outcomes (Demonstrate functional swallow) progress slower than expected  -MB goal revised this date  -MB medical status inhibiting progress  -JR       (STG) Patient will tolerate trials of    Consistencies Trialed (Tolerate trials) soft to chew (whole) textures;soft to chew (chopped) textures;soft to chew (ground) textures;pureed textures;thin liquids;nectar/ mildly thick liquids  -MB soft to chew (whole) textures;soft to chew (chopped) textures;soft to chew (ground) textures;pureed textures;thin liquids;nectar/ mildly thick liquids  -MB pureed textures;honey/ moderately thick liquids  -JR    Desired Outcome (Tolerate trials) without signs/symptoms of aspiration;with adequate oral prep/transit/clearance  -MB without signs/symptoms of aspiration;with adequate oral prep/transit/clearance  -MB without signs/symptoms of aspiration;with adequate oral prep/transit/clearance  -JR    [place] (Tolerate trials) with minimal  cues (75-90% accuracy)  -MB with minimal cues (75-90% accuracy)  -MB with minimal cues (75-90% accuracy)  -JR    Time Frame (Tolerate trials) by discharge  -MB by discharge  -MB by discharge  -JR    Progress/Outcomes (Tolerate trials) progress slower than expected  -MB goal revised this date  -MB medical status inhibiting progress  -JR       (STG) Pharyngeal Strengthening Exercise Goal 1 (SLP)    Activity (Pharyngeal Strengthening Goal 1, SLP) increase pharyngeal sensation;increase timing  -MB increase pharyngeal sensation;increase timing  -MB increase pharyngeal sensation;increase timing  -JR    Increase Pharyngeal Sensation gustatory stimulation (sour/cold)  -MB gustatory stimulation (sour/cold)  -MB gustatory stimulation (sour/cold)  -JR    Increase Timing breath hold exercises;prepping - 3 second prep or suck swallow or 3-step swallow  -MB breath hold exercises;prepping - 3 second prep or suck swallow or 3-step swallow  -MB breath hold exercises;prepping - 3 second prep or suck swallow or 3-step swallow  -JR    [place]/Accuracy (Pharyngeal Strengthening Goal 1, SLP) with minimal cues (75-90% accuracy)  -MB with minimal cues (75-90% accuracy)  -MB with minimal cues (75-90% accuracy)  -JR    Time Frame (Pharyngeal Strengthening Goal 1, SLP) by discharge  -MB by discharge  -MB by discharge  -JR    Barriers (Pharyngeal Strengthening Goal 1, SLP) n/a  -MB -- n/a  -JR    Progress/Outcomes (Pharyngeal Strengthening Goal 1, SLP) progress slower than expected  -MB -- medical status inhibiting progress  -JR              User Key  (r) = Recorded By, (t) = Taken By, (c) = Cosigned By      Initials Name Provider Type    Scot Pacheco CCC-SLP Speech and Language Pathologist    Ngozi Roberson MS CCC-SLP Speech and Language Pathologist                       Time Calculation:    Time Calculation- SLP       Row Name 02/28/24 1508             Time Calculation- SLP    SLP Start Time 1440  -MB      SLP Stop Time 1508   -MB      SLP Time Calculation (min) 28 min  -MB      SLP Received On 02/28/24  -MB         Untimed Charges    30430-IF Treatment Swallow Minutes 28  -MB         Total Minutes    Untimed Charges Total Minutes 28  -MB       Total Minutes 28  -MB                User Key  (r) = Recorded By, (t) = Taken By, (c) = Cosigned By      Initials Name Provider Type    Scot Pacheco CCC-SLP Speech and Language Pathologist                    Therapy Charges for Today       Code Description Service Date Service Provider Modifiers Qty    [id number] HC ST TREATMENT SWALLOW 4 2/27/2024 Scot Henley CCC-SLP GN 1    [id number] HC ST TREATMENT SWALLOW 2 2/28/2024 Scot Henley CCC-SLP GN 1                 SHIRA Lombardi  2/28/2024

## 2024-02-28 NOTE — PROGRESS NOTES
[facility] Palliative Care Services    Palliative Care Daily Progress Note   Chief complaint-follow up support for patient/family and pain/symptom management goals of care    Code Status:   Code Status and Medical Interventions:   Ordered at: 02/21/24 1123     Medical Intervention Limits:    NO intubation (DNI)    Other     Code Status (Patient has no pulse and is not breathing):    No CPR (Do Not Attempt to Resuscitate)     Medical Interventions (Patient has pulse or is breathing):    Limited Support     Additional Medical Interventions Limits:    no permanent feeding tube      Advanced Directives: Advance Directive Status: Patient has advance directive, copy in chart   Goals of Care: Ongoing.     S: Medical record reviewed. Events noted.  Improvement in mental status and working with therapy yesterday and recommending SNF at discharge.  Evaluated by speech therapy and restarted modified diet puréed with thickened liquids.  CRP trending downward.  Gabapentin and BuSpar have been discontinued per attending.  Received a dose of Flexeril around 10:30 AM yesterday.  Received 15 mg oral morphine equivalent in the form of MS IR over the last 24 hours.  Laying in bed without apparent distress.  Extremely somnolent.  Complains of shoulder pain, but does appear to be moving her arm minimally.  States she was able to sit up on side of bed but not able to stand more than a few seconds due to extreme weakness.  Niece, Nicole at bedside.  Advance Care Planning ongoing conversation with patient's niece, Nicole at bedside followed by patient's daughter, Dominga via telephone.  Again discussed guarded long-term prognosis secondary to multiple comorbidities, ongoing lethargy, profound weakness, and high risk for rehospitalizations and decline.  Explored discharge options and recommendations with regard to skilled nursing facility if aggressive care interventions desired versus transitions care with focus of  comfort and best supportive services directed by hospice.  Discussed current de-escalation of medications per attending physician and family have concerns with regard to patient's anxiety and pain.  Discussed challenges with aggressive symptom management secondary to patient's inability to actively participate in therapy initiatives given profound weakness and lethargy and goal de-escalation of medications to identify probable/causative factors.  Questions encouraged and support given.     Review of Systems   Constitutional: Positive for malaise/fatigue.   Cardiovascular:  Positive for leg swelling.        Lymphedema bilateral lower extremities   Respiratory:  Negative for shortness of breath.    Skin:  Positive for poor wound healing.   Musculoskeletal:  Positive for falls, joint pain, joint swelling, muscle weakness and myalgias.   Genitourinary:  Negative for dysuria.   Neurological:  Positive for weakness.  Altered mental status  Psychiatric/Behavioral:  Positive for depression.      Pain Assessment  Preferred Pain Scale: number (Numeric Rating Pain Scale)  PAINAD Breathin-->normal  PAINAD Negative Vocalization: 0-->none  PAINAD Facial Expression: 0-->smiling or inexpressive  PAINAD Body Language: 0-->relaxed  PAINAD Consolability: 0-->no need to console  PAINAD Score: 0  Pain Location: shoulder  Pain Description: constant    O:   No intake or output data in the 24 hours ending 24 1006      Diagnostics and current medications: Reviewed.      Current Facility-Administered Medications:     albuterol sulfate HFA (PROVENTIL HFA;VENTOLIN HFA;PROAIR HFA) inhaler 2 puff, 2 puff, Inhalation, TID - RT, Sachin Dia MD, 2 puff at 24 0702    apixaban (ELIQUIS) tablet 5 mg, 5 mg, Oral, Q12H, Sachin Dia MD, 5 mg at 24 0943    Biotene Dry Mouth Moisturizing (BIOTENE) 1 spray, 1 spray, Mouth/Throat, 5x Daily, Earnestine Gan APRN, 1 spray at 24 0944    sennosides-docusate  (PERICOLACE) 8.6-50 MG per tablet 2 tablet, 2 tablet, Oral, BID PRN **AND** polyethylene glycol (MIRALAX) packet 17 g, 17 g, Oral, Daily PRN **AND** bisacodyl (DULCOLAX) EC tablet 5 mg, 5 mg, Oral, Daily PRN **AND** bisacodyl (DULCOLAX) suppository 10 mg, 10 mg, Rectal, Daily PRN, Sachin Dia MD    Calcium Replacement - Follow Nurse / BPA Driven Protocol, , Does not apply, PRN, Sachin Dia MD    cefTAZidime (FORTAZ) 1,000 mg in sodium chloride 0.9 % 100 mL IVPB, 1,000 mg, Intravenous, Q12H, Clint Moctezuma MD, 1,000 mg at 02/28/24 0047    cyclobenzaprine (FLEXERIL) tablet 10 mg, 10 mg, Oral, TID PRN, Sachin Dia MD, 10 mg at 02/27/24 1038    docusate sodium (COLACE) capsule 100 mg, 100 mg, Oral, BID PRN, Sachin Dia MD    DULoxetine (CYMBALTA) DR capsule 30 mg, 30 mg, Oral, Daily, Earnestine Gan APRN, 30 mg at 02/28/24 0943    furosemide (LASIX) tablet 40 mg, 40 mg, Oral, Daily PRN, Sachin Dia MD, 40 mg at 02/23/24 1718    levothyroxine (SYNTHROID, LEVOTHROID) tablet 100 mcg, 100 mcg, Oral, Q AM, Sachin Dia MD, 100 mcg at 02/28/24 0542    Lidocaine 4 % 1 patch, 1 patch, Transdermal, Q24H, Earnestine Gan APRN, 1 patch at 02/28/24 0943    Magnesium Standard Dose Replacement - Follow Nurse / BPA Driven Protocol, , Does not apply, PRN, Sachin Dia MD    [DISCONTINUED] Morphine (MSIR) tablet 15 mg, 15 mg, Oral, Q6H PRN **OR** Morphine (MSIR) tablet 7.5 mg, 7.5 mg, Oral, Q8H PRN, Sanjiv Block MD, 7.5 mg at 02/28/24 0958    nystatin (MYCOSTATIN) powder 1 Application, 1 application , Topical, BID, Sachin Dia MD, 1 Application at 02/28/24 0944    ondansetron (ZOFRAN) injection 4 mg, 4 mg, Intravenous, Q6H PRN, Elba Rockwell, GARETT    Phosphorus Replacement - Follow Nurse / BPA Driven Protocol, , Does not apply, PRN, Sachin Dia MD    Potassium Replacement - Follow Nurse / BPA Driven Protocol, , Does not apply, PRN,  "Sachin Dia MD    pramipexole (MIRAPEX) tablet 0.75 mg, 0.75 mg, Oral, Nightly, Sachin Dia MD, 0.75 mg at 02/27/24 2029    saccharomyces boulardii (FLORASTOR) capsule 250 mg, 250 mg, Oral, BID, Sachin Dia MD, 250 mg at 02/28/24 0943    sodium chloride 0.9 % flush 10 mL, 10 mL, Intravenous, Q12H, Elba Rockwell, APRN, 10 mL at 02/28/24 0943    sodium chloride 0.9 % flush 10 mL, 10 mL, Intravenous, PRN, Elba Rockwell APRN    sodium chloride 0.9 % flush 10 mL, 10 mL, Intravenous, Q12H, Sachin Dia MD, 10 mL at 02/28/24 0943    sodium chloride 0.9 % flush 10 mL, 10 mL, Intravenous, PRN, Sachin Dia MD    sodium chloride 0.9 % infusion 40 mL, 40 mL, Intravenous, PRN, Elba Rockwell, APRN    sodium chloride 0.9 % infusion 40 mL, 40 mL, Intravenous, PRN, Sachin Dia MD    Allergies   Allergen Reactions    Codeine Itching and Rash     SEVERE RASH/ITCHING (TOLERATES PERCOCET)    Amoxicillin Other (See Comments)     unknown    Levaquin [Levofloxacin] Other (See Comments)     unknown    Benadryl [Diphenhydramine] Other (See Comments)     KEEPS PATIENT AWAKE      I have utilized all available immediate resources to obtain, update, or review the patient's current medications (including all prescriptions, over-the-counter products, herbals, cannabis/cannabidiol products, and vitamin/mineral/dietary (nutritional) supplements) for name, route of administration, type, dose and frequency.    A:    /46 (BP Location: Left arm, Patient Position: Lying)   Pulse 80   Temp 97.5 °F (36.4 °C) (Axillary)   Resp 20   Ht 160 cm (63\")   Wt 91.6 kg (202 lb)   SpO2 94%   BMI 35.78 kg/m²     Vitals and nursing note reviewed.   Constitutional:       Appearance: Ill-appearing and chronically ill-appearing.   Pulmonary:      Effort: Pulmonary effort is normal.   Cardiovascular:      Normal rate.   Edema:     Peripheral edema present.  Musculoskeletal:      Cervical back: Neck " supple.   Skin:     General: Skin is warm.   Neurological:      Mental Status: Extremely somnolent     Patient status: Disease state: Controlled with current treatments.  Current Functional status: Palliative Performance Scale Score: Performance 40% based on the following measures: Ambulation: Mainly in bed, Activity and Evidence of Disease: Unable to do any work, extensive evidence of disease, Self-Care: Mainly assistance required,  Intake: Normal or reduced, LOC: Full, drowsy or confusion   Baseline Functional status: Palliative Performance Scale Score: Performance 60% based on the following measures: Ambulation: Reduced, Activity and Evidence of Disease: Unable to do hobby or some work, significant evidence of disease, Self-Care: Occasional assist necessary,  Intake: Normal or reduced, LOC: Full or confusion   Nutritional status: Albumin 2.9 Body mass index is 35.78 kg/m².      Hospital Problem List      UTI (urinary tract infection)    Secondary osteoarthritis of left shoulder due to rotator cuff arthropathy    Cellulitis of right lower extremity    HTN (hypertension)    Anxiety associated with depression    Cellulitis of left lower extremity    Acute cystitis with hematuria    Candidiasis of skin    CHRISTINE (acute kidney injury)    Chronic kidney disease, stage 3a    COVID-19 virus detected     Impression/Problem List:     Acute kidney injury on chronic kidney disease stage III  Acute UTI with hematuria  COVID-19 virus detected  Candidiasis of skin  Secondary osteoarthritis of left shoulder due to rotator cuff arthroplasty, abnormal imaging  Cellulitis bilateral lower extremities  Atrial fibrillation-Eliquis  Chronic back pain-spondylosis followed by Dr. Jones pain management  Depression  Disease of thyroid gland  Hypertension  Iron deficiency anemia  Thrombocytopenia  Impaired mobility-walker  Lymphedema-managed by home health with wound care 3 times a week       Recommendations/Plan:  1. plan: Goals of care  "include CODE STATUS no CPR/limited support interventions.     Family support: The patient receives support from her daughter and extended family..  Advance Directives: On file     POA/Healthcare surrogate-Dominga wagner-next of kin.     2.  Palliative care encounter  - Prognosis is guarded long-term secondary to acute kidney injury, acute UTI, COVID-19, and multiple comorbidities.  -Patient appears to have fair prognostic awareness.      -Recently discharged from rehab 2 weeks prior.  Recently diagnosed with COVID 1 week prior.       -Urine culture pending. Started on broad-spectrum IV antibiotics for UTI and bilateral lower extremity cellulitis  -Wound care consulted.    -Orthopedics consulted due to abnormal imaging left shoulder as above.     2/21-CODE STATUS changes no CPR/Limited interventions, no intubation, no permanent feeding tube.  Will plan to complete a MOST document  -High risk rehospitalization  -Awaiting Ortho recommendations  -dietician evaluation protein calorie malnutrition and dietary supplements per family request. Family reports patient does not use dentures and eats soft consistency foods at baseline.  Also drinks ensure supplements  -Will plan to reevaluate living will directive prior to discharge    2/26-continue supportive measures  -Ortho recommendations-possible revision reverse total shoulder arthroplasty when more medically stable, recommends sling to left upper extremity for comfort and follow-up outpatient.   -Anticipating SNF  -CT head today  -Add ammonia level now.  -family adamant not to pursue either temporary or permanent feeding tube options stating \"she would not of wanted that\".  Given discussion we further discussed de-escalation with focus of comfort and hospice services.  Encouraged ongoing conversation with family with regard to medical priorities.    2/28--continue supportive care   -high risk for re-hospitalizations   -Explored discharge options and recommendations " with regard to skilled nursing facility if aggressive care interventions desired versus transitions care with focus of comfort and best supportive services directed by hospice.       3. Pain  -currently controlled.  -Butrans patch DC'd per attending 2/26.  Recently restarted by pain management on second week 2/19.   -given kidney dysfunction and increased weakness with fall decreased Cymbalta to 30 mg daily 2/21.   -MSER discontinued per attending due to lethargy 2/26  -MSIR as needed  -flexeril as needed  -lidocaine patch to left shoulder  -Gabapentin discontinued per attending 2/28    4.  Anxiety  -Buspar d/c'd per attending 2/28.    20 minutes spent on advance care planning-discussing with patient and/or family, answering questions, providing some guidance about a plan and documentation of care, and coordinating care face to face.        Thank you for allowing us to participate in patient's plan of care. Palliative Care Team will continue to follow patient.     Earnestine Gan, GARETT  2/28/2024  10:06 CST

## 2024-02-28 NOTE — PLAN OF CARE
Goal Outcome Evaluation:  Plan of Care Reviewed With: patient, family (ALMA)        Progress: no change  Outcome Evaluation: pt trans to EOB min assist, sat EOB 20 minutes cga-sba, BLE A-AAROM, attempted to stand x 2, pt not able to stand completely, on 2nd try pt was able to clear bottom from bed, trans back to bed max, dependent to scoot up in bed, max for rolling, pt would benefit from SNF

## 2024-02-29 PROCEDURE — 25010000002 CEFTAZIDIME PER 500 MG: Performed by: INTERNAL MEDICINE

## 2024-02-29 PROCEDURE — 99233 SBSQ HOSP IP/OBS HIGH 50: CPT | Performed by: CLINICAL NURSE SPECIALIST

## 2024-02-29 PROCEDURE — 94799 UNLISTED PULMONARY SVC/PX: CPT

## 2024-02-29 PROCEDURE — 94664 DEMO&/EVAL PT USE INHALER: CPT

## 2024-02-29 PROCEDURE — 99497 ADVNCD CARE PLAN 30 MIN: CPT | Performed by: CLINICAL NURSE SPECIALIST

## 2024-02-29 RX ORDER — DIPHENOXYLATE HYDROCHLORIDE AND ATROPINE SULFATE 2.5; .025 MG/1; MG/1
1 TABLET ORAL
Status: DISCONTINUED | OUTPATIENT
Start: 2024-02-29 | End: 2024-03-02 | Stop reason: HOSPADM

## 2024-02-29 RX ORDER — LORAZEPAM 0.5 MG/1
0.25 TABLET ORAL EVERY 6 HOURS PRN
Status: DISCONTINUED | OUTPATIENT
Start: 2024-02-29 | End: 2024-03-02 | Stop reason: HOSPADM

## 2024-02-29 RX ORDER — ALBUTEROL SULFATE 90 UG/1
2 AEROSOL, METERED RESPIRATORY (INHALATION) EVERY 4 HOURS PRN
Status: DISCONTINUED | OUTPATIENT
Start: 2024-02-29 | End: 2024-03-02 | Stop reason: HOSPADM

## 2024-02-29 RX ORDER — ATROPINE SULFATE 10 MG/ML
2 SOLUTION/ DROPS OPHTHALMIC 2 TIMES DAILY PRN
Status: DISCONTINUED | OUTPATIENT
Start: 2024-02-29 | End: 2024-03-02 | Stop reason: HOSPADM

## 2024-02-29 RX ORDER — ACETAMINOPHEN 160 MG/5ML
650 SOLUTION ORAL EVERY 4 HOURS PRN
Status: DISCONTINUED | OUTPATIENT
Start: 2024-02-29 | End: 2024-03-02 | Stop reason: HOSPADM

## 2024-02-29 RX ORDER — ACETAMINOPHEN 650 MG/1
650 SUPPOSITORY RECTAL EVERY 4 HOURS PRN
Status: DISCONTINUED | OUTPATIENT
Start: 2024-02-29 | End: 2024-03-02 | Stop reason: HOSPADM

## 2024-02-29 RX ORDER — IPRATROPIUM BROMIDE AND ALBUTEROL SULFATE 2.5; .5 MG/3ML; MG/3ML
3 SOLUTION RESPIRATORY (INHALATION) EVERY 4 HOURS PRN
Status: DISCONTINUED | OUTPATIENT
Start: 2024-02-29 | End: 2024-02-29

## 2024-02-29 RX ORDER — ACETAMINOPHEN 325 MG/1
650 TABLET ORAL EVERY 4 HOURS PRN
Status: DISCONTINUED | OUTPATIENT
Start: 2024-02-29 | End: 2024-03-02 | Stop reason: HOSPADM

## 2024-02-29 RX ADMIN — CYCLOBENZAPRINE HYDROCHLORIDE 10 MG: 10 TABLET, FILM COATED ORAL at 21:38

## 2024-02-29 RX ADMIN — Medication 10 ML: at 09:30

## 2024-02-29 RX ADMIN — PRAMIPEXOLE DIHYDROCHLORIDE 0.75 MG: 0.25 TABLET ORAL at 21:38

## 2024-02-29 RX ADMIN — ALBUTEROL SULFATE 2 PUFF: 90 AEROSOL, METERED RESPIRATORY (INHALATION) at 14:10

## 2024-02-29 RX ADMIN — Medication 250 MG: at 21:38

## 2024-02-29 RX ADMIN — LIDOCAINE 1 PATCH: 4 PATCH TOPICAL at 09:30

## 2024-02-29 RX ADMIN — DULOXETINE HYDROCHLORIDE 30 MG: 30 CAPSULE, DELAYED RELEASE ORAL at 09:30

## 2024-02-29 RX ADMIN — Medication 10 ML: at 21:38

## 2024-02-29 RX ADMIN — Medication 1 SPRAY: at 21:38

## 2024-02-29 RX ADMIN — Medication 10 ML: at 21:39

## 2024-02-29 RX ADMIN — APIXABAN 5 MG: 5 TABLET, FILM COATED ORAL at 09:30

## 2024-02-29 RX ADMIN — NYSTATIN 1 APPLICATION: 100000 POWDER TOPICAL at 21:38

## 2024-02-29 RX ADMIN — ALBUTEROL SULFATE 2 PUFF: 90 AEROSOL, METERED RESPIRATORY (INHALATION) at 07:25

## 2024-02-29 RX ADMIN — LEVOTHYROXINE SODIUM 112 MCG: 112 TABLET ORAL at 05:44

## 2024-02-29 RX ADMIN — MORPHINE SULFATE 7.5 MG: 15 TABLET ORAL at 09:30

## 2024-02-29 RX ADMIN — Medication 250 MG: at 09:30

## 2024-02-29 RX ADMIN — NYSTATIN 1 APPLICATION: 100000 POWDER TOPICAL at 09:30

## 2024-02-29 RX ADMIN — APIXABAN 5 MG: 5 TABLET, FILM COATED ORAL at 21:38

## 2024-02-29 RX ADMIN — CEFTAZIDIME 1000 MG: 1 INJECTION, POWDER, FOR SOLUTION INTRAMUSCULAR; INTRAVENOUS at 00:05

## 2024-02-29 NOTE — PROGRESS NOTES
Infectious Diseases Progress Note    Patient:  Brenda Sneed  YOB: 1950  MRN: [id number]   Admit date: 2/20/2024   Admitting Physician: Sachin Dia MD  Primary Care Physician: Sachin Dia MD    Chief Complaint/Interval History: She appears comfortable.  She was arousable.  She was interactive.  She answers questions.  She follow commands.  She had had a pure wick yesterday that was removed.  She had some urinary incontinence today.  Pure wick is going to be replaced.  She is not having dysuria.  She is not having suprapubic or flank pain.  She is on room air.  She appears to be breathing comfortably without cough or shortness of breath.  She is completing IV antibiotic treatment today.    No intake or output data in the 24 hours ending 02/28/24 2128  Allergies:   Allergies   Allergen Reactions    Codeine Itching and Rash     SEVERE RASH/ITCHING (TOLERATES PERCOCET)    Amoxicillin Other (See Comments)     unknown    Levaquin [Levofloxacin] Other (See Comments)     unknown    Benadryl [Diphenhydramine] Other (See Comments)     KEEPS PATIENT AWAKE      Current Scheduled Medications:   albuterol sulfate HFA, 2 puff, Inhalation, TID - RT  apixaban, 5 mg, Oral, Q12H  Biotene Dry Mouth Moisturizing, 1 spray, Mouth/Throat, 5x Daily  cefTAZidime, 1,000 mg, Intravenous, Q12H  DULoxetine, 30 mg, Oral, Daily  [START ON 2/29/2024] levothyroxine, 112 mcg, Oral, Q AM  Lidocaine, 1 patch, Transdermal, Q24H  nystatin, 1 application , Topical, BID  pramipexole, 0.75 mg, Oral, Nightly  saccharomyces boulardii, 250 mg, Oral, BID  sodium chloride, 10 mL, Intravenous, Q12H  sodium chloride, 10 mL, Intravenous, Q12H        Current PRN Medications:    senna-docusate sodium **AND** polyethylene glycol **AND** bisacodyl **AND** bisacodyl    Calcium Replacement - Follow Nurse / BPA Driven Protocol    cyclobenzaprine    docusate sodium    furosemide    Magnesium Standard Dose Replacement - Follow Nurse /  "BPA Driven Protocol    [DISCONTINUED] Morphine **OR** Morphine    ondansetron    Phosphorus Replacement - Follow Nurse / BPA Driven Protocol    Potassium Replacement - Follow Nurse / BPA Driven Protocol    sodium chloride    sodium chloride    sodium chloride    sodium chloride    Review of Systems see HPI    Vital Signs:  Temp (24hrs), Av.7 °F (36.5 °C), Min:97.3 °F (36.3 °C), Max:98.2 °F (36.8 °C)    /79 (BP Location: Right arm, Patient Position: Lying)   Pulse 95   Temp 97.9 °F (36.6 °C) (Axillary)   Resp 18   Ht 160 cm (63\")   Wt 91.6 kg (202 lb)   SpO2 96%   BMI 35.78 kg/m²     Physical Exam  Vital signs - reviewed.  Line/IV site - No erythema, warmth, induration, or tenderness.  Lungs clear without crackles  Abdomen is soft and nontender  No suprapubic or flank tenderness  She is able to flex at both hips and both knees  She had intact dorsiflexion of both feet  She indicates she is not able to stand yet.      Lab Results:  CBC:   Results from last 7 days   Lab Units 24  1922 24  0700 24  1406 24  0833   WBC 10*3/mm3 5.71 9.29 8.56 7.77   HEMOGLOBIN g/dL 8.5* 8.3* 8.8* 7.8*   HEMATOCRIT % 23.1* 25.1* 26.4* 23.0*   PLATELETS 10*3/mm3 118* 95* 69* 60*     BMP:  Results from last 7 days   Lab Units 24  0619 24  0700 24  1908 24  0833   SODIUM mmol/L 140 136  --  133*   POTASSIUM mmol/L 3.7 4.0 4.0 3.3*   CHLORIDE mmol/L 109* 106  --  101   CO2 mmol/L 22.0 21.0*  --  23.0   BUN mg/dL 51* 49*  --  50*   CREATININE mg/dL 1.20* 1.18*  --  1.53*   GLUCOSE mg/dL 112* 90  --  91   CALCIUM mg/dL 8.0* 8.0*  --  7.6*   ALT (SGPT) U/L  --  12  --  8     Urine Culture   Date Value Ref Range Status   2024 50,000 CFU/mL Pseudomonas aeruginosa MDRO (A)   Final       Comment:          Multi drug resistant Pseudomonas, patient may be an isolation risk.  Multi drug resistant Pseudomonas, patient may be an isolation risk.   2024 50,000 CFU/mL Enterococcus " faecalis (A)   Final      Susceptibility                Pseudomonas aeruginosa MDRO Enterococcus faecalis       CHRISTIANO CHRISTIANO       Ampicillin     <=2 ug/ml Susceptible       Cefepime 8 ug/ml Susceptible           Ceftazidime 4 ug/ml Susceptible           Ciprofloxacin <=0.25 ug/ml Susceptible           Levofloxacin 2 ug/ml Intermediate >=8 ug/ml Resistant       Meropenem 4 ug/ml Intermediate           Nitrofurantoin     <=16 ug/ml Susceptible       Piperacillin + Tazobactam 8 ug/ml Susceptible           Tobramycin <=1 ug/ml Susceptible           Vancomycin     1 ug/ml Susceptible       Impression:   1.  Pseudomonas UTI-completing treatment today  2.  Recent COVID-19 infection-stable from pulmonary standpoint.  She is on room air.  She is not coughing and does not appear dyspneic.  3. Lethargy-improved.  She was arousable.  She will follow questions and commands today.    Recommendations:   She seems to be showing improvement  Stop her Ceftazidime after last doses today  Would be happy to reassess if no ongoing improvement or new problems develop  Will sign off for now  She can otherwise follow-up with infectious diseases as needed    Clint Johnson MD

## 2024-02-29 NOTE — PROGRESS NOTES
[facility] Palliative Care Services    Palliative Care Daily Progress Note   Chief complaint-follow up support for patient/family and pain/symptom management goals of care, hospice    Code Status:   Code Status and Medical Interventions:   Ordered at: 02/21/24 1123     Medical Intervention Limits:    NO intubation (DNI)    Other     Code Status (Patient has no pulse and is not breathing):    No CPR (Do Not Attempt to Resuscitate)     Medical Interventions (Patient has pulse or is breathing):    Limited Support     Additional Medical Interventions Limits:    no permanent feeding tube      Advanced Directives: Advance Directive Status: Patient has advance directive, copy in chart   Goals of Care: Ongoing.     S: Medical record reviewed. Events noted.  No new labs to review.  Dietitian following and added Magic cup and mighty shakes.  Speech therapy notes decreased level of alertness and plans to continue puréed diet nectar thick liquids as tolerated at this juncture.  Antibiotics completed for Pseudomonas UTI yesterday.  Received 7.5 mg oral morphine equivalent over the last 24 hours in the form of MS IR.  Laying in bed without apparent distress.  Attempting to feed self ice chips with some degree of difficulty, noted to have coughing with intake.  Somnolent.  Daughter and her significant other at bedside.  Advance Care Planning ongoing conversation with daughter, patient unable to participate given somnolence.  Daughter states given patient's decline throughout hospitalization they have made a decision to transition patient to comfort measures and plan for patient to return home with daughter and additional family caregivers.  Hospice expectations discussed at length.  Anticipating need for DME prior to discharge.  A MOST document reviewed and initiated.  Attending to complete.     Review of Systems   Constitutional: Positive for malaise/fatigue.   Cardiovascular:  Positive for leg swelling.         Lymphedema bilateral lower extremities   Respiratory:  Negative for shortness of breath.    Skin:  Positive for poor wound healing.   Musculoskeletal:  Positive for falls, joint pain, joint swelling, muscle weakness and myalgias.   Genitourinary:  Negative for dysuria.   Neurological:  Positive for weakness.  Altered mental status  Psychiatric/Behavioral:  Positive for depression.      Pain Assessment  Preferred Pain Scale: number (Numeric Rating Pain Scale)  PAINAD Breathin-->normal  PAINAD Negative Vocalization: 0-->none  PAINAD Facial Expression: 0-->smiling or inexpressive  PAINAD Body Language: 0-->relaxed  PAINAD Consolability: 0-->no need to console  PAINAD Score: 0  Pain Location: shoulder  Pain Description: constant    O:     Intake/Output Summary (Last 24 hours) at 2024 1113  Last data filed at 2024 0100  Gross per 24 hour   Intake 420 ml   Output --   Net 420 ml         Diagnostics and current medications: Reviewed.      Current Facility-Administered Medications:     albuterol sulfate HFA (PROVENTIL HFA;VENTOLIN HFA;PROAIR HFA) inhaler 2 puff, 2 puff, Inhalation, TID - RT, Sachin Dia MD, 2 puff at 24 0725    apixaban (ELIQUIS) tablet 5 mg, 5 mg, Oral, Q12H, Sachin Dia MD, 5 mg at 24 0930    Biotene Dry Mouth Moisturizing (BIOTENE) 1 spray, 1 spray, Mouth/Throat, 5x Daily, Earnestine Gan APRN, 1 spray at 24 2044    sennosides-docusate (PERICOLACE) 8.6-50 MG per tablet 2 tablet, 2 tablet, Oral, BID PRN **AND** polyethylene glycol (MIRALAX) packet 17 g, 17 g, Oral, Daily PRN **AND** bisacodyl (DULCOLAX) EC tablet 5 mg, 5 mg, Oral, Daily PRN **AND** bisacodyl (DULCOLAX) suppository 10 mg, 10 mg, Rectal, Daily PRN, Sachin Dia MD    Calcium Replacement - Follow Nurse / BPA Driven Protocol, , Does not apply, PRN, Sachin Dia MD    cyclobenzaprine (FLEXERIL) tablet 10 mg, 10 mg, Oral, TID PRN, Sachin Dia MD, 10 mg  at 02/27/24 1038    docusate sodium (COLACE) capsule 100 mg, 100 mg, Oral, BID PRN, Sachin Dia MD    DULoxetine (CYMBALTA) DR capsule 30 mg, 30 mg, Oral, Daily, Earnestine Gan APRN, 30 mg at 02/29/24 0930    furosemide (LASIX) tablet 40 mg, 40 mg, Oral, Daily PRN, Sachin Dia MD, 40 mg at 02/23/24 1718    levothyroxine (SYNTHROID, LEVOTHROID) tablet 112 mcg, 112 mcg, Oral, Q AM, Sanjiv Block MD, 112 mcg at 02/29/24 0544    Lidocaine 4 % 1 patch, 1 patch, Transdermal, Q24H, Earnestine Gan APRN, 1 patch at 02/29/24 0930    Magnesium Standard Dose Replacement - Follow Nurse / BPA Driven Protocol, , Does not apply, PRN, Sachin Dia MD    [DISCONTINUED] Morphine (MSIR) tablet 15 mg, 15 mg, Oral, Q6H PRN **OR** Morphine (MSIR) tablet 7.5 mg, 7.5 mg, Oral, Q8H PRN, Sanjiv Block MD, 7.5 mg at 02/29/24 0930    nystatin (MYCOSTATIN) powder 1 Application, 1 application , Topical, BID, Sachin Dia MD, 1 Application at 02/29/24 0930    ondansetron (ZOFRAN) injection 4 mg, 4 mg, Intravenous, Q6H PRN, Elba Rockwell APRN    Phosphorus Replacement - Follow Nurse / BPA Driven Protocol, , Does not apply, PRN, Sachin Dia MD    Potassium Replacement - Follow Nurse / BPA Driven Protocol, , Does not apply, PRN, Sachin Dia MD    pramipexole (MIRAPEX) tablet 0.75 mg, 0.75 mg, Oral, Nightly, Sachin Dia MD, 0.75 mg at 02/28/24 2035    saccharomyces boulardii (FLORASTOR) capsule 250 mg, 250 mg, Oral, BID, Sachin Dia MD, 250 mg at 02/29/24 0930    sodium chloride 0.9 % flush 10 mL, 10 mL, Intravenous, Q12H, Elba Rockwell APRN, 10 mL at 02/28/24 2036    sodium chloride 0.9 % flush 10 mL, 10 mL, Intravenous, PRN, Elba Rockwell, GARETT    sodium chloride 0.9 % flush 10 mL, 10 mL, Intravenous, Q12H, Sachin Dia MD, 10 mL at 02/29/24 0930    sodium chloride 0.9 % flush 10 mL, 10 mL, Intravenous, PRN, Sachin Dia MD     "sodium chloride 0.9 % infusion 40 mL, 40 mL, Intravenous, PRN, Elba Rockwell APRN    sodium chloride 0.9 % infusion 40 mL, 40 mL, Intravenous, PRN, Sachin Dia MD    Allergies   Allergen Reactions    Codeine Itching and Rash     SEVERE RASH/ITCHING (TOLERATES PERCOCET)    Amoxicillin Other (See Comments)     unknown    Levaquin [Levofloxacin] Other (See Comments)     unknown    Benadryl [Diphenhydramine] Other (See Comments)     KEEPS PATIENT AWAKE      I have utilized all available immediate resources to obtain, update, or review the patient's current medications (including all prescriptions, over-the-counter products, herbals, cannabis/cannabidiol products, and vitamin/mineral/dietary (nutritional) supplements) for name, route of administration, type, dose and frequency.    A:    /66 (BP Location: Right arm, Patient Position: Lying)   Pulse 83   Temp 97.5 °F (36.4 °C) (Oral)   Resp 20   Ht 160 cm (63\")   Wt 91.6 kg (202 lb)   SpO2 91%   BMI 35.78 kg/m²     Vitals and nursing note reviewed.   Constitutional:       Appearance: Ill-appearing and chronically ill-appearing.   Pulmonary:      Effort: Pulmonary effort is normal.   Cardiovascular:      Normal rate.   Edema:     Peripheral edema present.  Musculoskeletal:      Cervical back: Neck supple.   Skin:     General: Skin is warm.   Neurological:      Mental Status: Extremely somnolent     Patient status: Disease state: Controlled with current treatments.  Current Functional status: Palliative Performance Scale Score: Performance 40% based on the following measures: Ambulation: Mainly in bed, Activity and Evidence of Disease: Unable to do any work, extensive evidence of disease, Self-Care: Mainly assistance required,  Intake: Normal or reduced, LOC: Full, drowsy or confusion   Baseline Functional status: Palliative Performance Scale Score: Performance 60% based on the following measures: Ambulation: Reduced, Activity and Evidence of Disease: " Unable to do hobby or some work, significant evidence of disease, Self-Care: Occasional assist necessary,  Intake: Normal or reduced, LOC: Full or confusion   Nutritional status: Albumin 2.9 Body mass index is 35.78 kg/m².      Hospital Problem List      UTI (urinary tract infection)    Secondary osteoarthritis of left shoulder due to rotator cuff arthropathy    Cellulitis of right lower extremity    HTN (hypertension)    Anxiety associated with depression    Cellulitis of left lower extremity    Acute cystitis with hematuria    Candidiasis of skin    CHRISTINE (acute kidney injury)    Chronic kidney disease, stage 3a    COVID-19 virus detected     Impression/Problem List:     Acute kidney injury on chronic kidney disease stage III  Cirrhosis  Acute UTI with hematuria  COVID-19 virus detected  Altered mental status  Candidiasis of skin  Secondary osteoarthritis of left shoulder due to rotator cuff arthroplasty, abnormal imaging  Cellulitis bilateral lower extremities  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 Jones pain management  Depression  Disease of thyroid gland  Hypertension  Iron deficiency anemia  Thrombocytopenia  Impaired mobility-walker baseline  Venous stasis ulcer bilateral lower extremity  Lymphedema-managed by home health with wound care 3 times a week    Functional paralysis secondary to lymphedema      Recommendations/Plan:  1. plan: Goals of care include CODE STATUS no CPR/limited support interventions.     Family support: The patient receives support from her daughter and extended family..  Advance Directives: On file     POA/Healthcare surrogate-daughterDominga-next of kin.     2.  Palliative care encounter  - Prognosis is guarded long-term secondary to acute kidney injury, acute UTI, COVID-19, and  "multiple comorbidities.  -Patient appears to have fair prognostic awareness.      -Recently discharged from rehab 2 weeks prior.  Recently diagnosed with COVID 1 week prior.       -Urine culture pending. Started on broad-spectrum IV antibiotics for UTI and bilateral lower extremity cellulitis  -Wound care consulted.    -Orthopedics consulted due to abnormal imaging left shoulder as above.     2/21-CODE STATUS changes no CPR/Limited interventions, no intubation, no permanent feeding tube.  Will plan to complete a MOST document  -High risk rehospitalization  -Awaiting Ortho recommendations  -dietician evaluation protein calorie malnutrition and dietary supplements per family request. Family reports patient does not use dentures and eats soft consistency foods at baseline.  Also drinks ensure supplements  -Will plan to reevaluate living will directive prior to discharge    2/26-continue supportive measures  -Ortho recommendations-possible revision reverse total shoulder arthroplasty when more medically stable, recommends sling to left upper extremity for comfort and follow-up outpatient.   -Anticipating SNF  -CT head today  -Add ammonia level now.  -family adamant not to pursue either temporary or permanent feeding tube options stating \"she would not of wanted that\".  Given discussion we further discussed de-escalation with focus of comfort and hospice services.  Encouraged ongoing conversation with family with regard to medical priorities.    2/29--continue supportive care   -Family have elected to de-escalate care measures with focus of comfort.  -Plan for discharge home with hospice.  A hospice consult has been placed.  -A MOST document has been reviewed and initiated.  Attending to complete.     3.  Comfort care  -May continue current oral medications if able to swallow safely.  -MSIR as needed  -flexeril as needed  -lidocaine patch to left shoulder    30 minutes spent on advance care planning-discussing with " patient and/or family, answering questions, providing some guidance about a plan and documentation of care, and coordinating care face to face.        Thank you for allowing us to participate in patient's plan of care. Palliative Care Team will continue to follow patient.     Earnestine Gan, APRN  2/29/2024  11:13 CST

## 2024-02-29 NOTE — PLAN OF CARE
Goal Outcome Evaluation: Patient medicated once for complaints of pain.  Patient has been resting since she got the pain medication. Nurse spoke with patient concerning discharge planning.   is assisting patient with discharge planning . Family has been at bedside. Patient safety has been maintained this shift, continue to monitor and report abnormal to provider

## 2024-02-29 NOTE — PROGRESS NOTES
"    Daily Progress Note  Brenda Sneed  MRN: [id number] LOS: 9    Admit Date: 2024 07:38 CST    Subjective:         Interval History:    Reviewed overnight events and nursing notes.     Ongoing goals of care conversations have culminated in the decision to go home with hospice.    ROS:  Review of Systems   Unable to perform ROS: Mental status change       DIET:  Diet: Regular/House Diet; Texture: Pureed (NDD 1); Fluid Consistency: Nectar Thick    Medications:      albuterol sulfate HFA, 2 puff, Inhalation, TID - RT  apixaban, 5 mg, Oral, Q12H  Biotene Dry Mouth Moisturizing, 1 spray, Mouth/Throat, 5x Daily  DULoxetine, 30 mg, Oral, Daily  levothyroxine, 112 mcg, Oral, Q AM  Lidocaine, 1 patch, Transdermal, Q24H  nystatin, 1 application , Topical, BID  pramipexole, 0.75 mg, Oral, Nightly  saccharomyces boulardii, 250 mg, Oral, BID  sodium chloride, 10 mL, Intravenous, Q12H  sodium chloride, 10 mL, Intravenous, Q12H          Objective:     Vitals: /66 (BP Location: Right arm, Patient Position: Lying)   Pulse 83   Temp 97.5 °F (36.4 °C) (Oral)   Resp 20   Ht 160 cm (63\")   Wt 91.6 kg (202 lb)   SpO2 91%   BMI 35.78 kg/m²    Intake/Output Summary (Last 24 hours) at 2024 0738  Last data filed at 2024 0100  Gross per 24 hour   Intake 420 ml   Output --   Net 420 ml    Temp (24hrs), Av.7 °F (36.5 °C), Min:97.3 °F (36.3 °C), Max:97.9 °F (36.6 °C)    Glucose:  Lab Results   Component Value Date    POCGLU 100 (H) 2021     Physical Examination:   Physical Exam  Constitutional:       General: She is not in acute distress.     Appearance: She is obese. She is ill-appearing. She is not toxic-appearing.   Cardiovascular:      Rate and Rhythm: Normal rate and regular rhythm.      Heart sounds: No murmur heard.  Pulmonary:      Effort: Pulmonary effort is normal. No respiratory distress.   Musculoskeletal:      Right lower leg: Edema present.      Left lower leg: Edema present. " "  Neurological:      Comments: Alert and oriented x3,          Labs:  Lab Results (last 24 hours)       Procedure Component Value Units Date/Time    CBC (No Diff) [[id number]]  (Abnormal) Collected: 02/28/24 1922    Specimen: Blood Updated: 02/28/24 1955     WBC 5.71 10*3/mm3      RBC 2.40 10*6/mm3      Hemoglobin 8.5 g/dL      Hematocrit 23.1 %      MCV 96.3 fL      MCH 35.4 pg      MCHC 36.8 g/dL      RDW 24.7 %      RDW-SD 63.7 fl      MPV 10.5 fL      Platelets 118 10*3/mm3     Sedimentation Rate [[id number]]  (Abnormal) Collected: 02/28/24 1922    Specimen: Blood Updated: 02/28/24 1928     Sed Rate 37 mm/hr              Imaging:  XR Chest 1 View    Result Date: 2/28/2024  XR CHEST 1 VW- 2/28/2024 9:23 AM  HISTORY: cough; N39.0-Urinary tract infection, site not specified; R53.1-Weakness; M25.512-Pain in left shoulder; G89.29-Other chronic pain; I83.018-Varicose veins of right lower extremity with ulcer other part of lower leg; L97.812-Non-pressure chronic ulcer of other part of right lower leg with fat layer exposed; I83.028-Varicose veins of left lower extremity with ulcer other part of lower leg; L97.822-   COMPARISON: Chest x-ray dated 2/24/2024  FINDINGS: Upright frontal radiograph of the chest was obtained  Hazy bilateral pulmonary infiltrates, most notably in the right upper lobe as well as lateral in the midportion of the left lung. Lungs appear to be well expanded. No areas of dense consolidation identified. There is a mild cardiomegaly present although there does not appear to be a central pulmonary vascular congestion. No subpleural septal lines are identified. No pleural effusion or pneumothorax.      Impression: 1.  Bilateral increasing hazy airspace opacities, right lung apex and midportion of the left lung in particular. Pattern is almost a \"reverse batwing\" pattern and I do not favor this to be a developing pulmonary edema. Atypical pneumonia/viral pneumonia is the top consideration.  This report " was signed and finalized on 2/28/2024 12:11 PM by Dr Macho Brown.          Assessment and Plan:     Primary Problem:  UTI (urinary tract infection)    Hospital Problem list:    UTI (urinary tract infection)    Secondary osteoarthritis of left shoulder due to rotator cuff arthropathy    Cellulitis of right lower extremity    HTN (hypertension)    Anxiety associated with depression    Cellulitis of left lower extremity    Acute cystitis with hematuria    Candidiasis of skin    CHRISTINE (acute kidney injury)    Chronic kidney disease, stage 3a    COVID-19 virus detected         Assessment: Chronically ill 73-year-old female with opiate use disorder, hypertension, and bilateral lymphedema who has suffered recent progressive decline and presents with weakness and fatigue found to have COVID-19.     Plan:     -Patient has elected to go home with hospice.  She will require some significant help going home.  She will probably need a hospital bed, bedside commode, home oxygen, amongst other things.  If we can set this up today, I am happy to discharge her.  She can always wait till tomorrow should some of our medical equipment not be available to her.    -MOST form signed and in chart.        Reviewed treatment plans with the patient and/or family.     Code Status:   Code Status and Medical Interventions:   Ordered at: 02/21/24 1123     Medical Intervention Limits:    NO intubation (DNI)    Other     Code Status (Patient has no pulse and is not breathing):    No CPR (Do Not Attempt to Resuscitate)     Medical Interventions (Patient has pulse or is breathing):    Limited Support     Additional Medical Interventions Limits:    no permanent feeding tube       Electronically signed by Sanjiv Block MD on 2/29/2024 at 07:38 CST

## 2024-02-29 NOTE — PLAN OF CARE
Goal Outcome Evaluation:      Pt oriented, conversational and easy to arouse this shift. Drinking and having adequate UOP /2 incontinent medium briefs changed and now purewick in place. No BM this shift. Weeping right arm with continued +4 edema generally. VSS CTM     Progress: no change

## 2024-03-01 PROBLEM — K74.69 OTHER CIRRHOSIS OF LIVER: Chronic | Status: ACTIVE | Noted: 2024-03-01

## 2024-03-01 PROBLEM — E66.01 MORBID OBESITY: Status: ACTIVE | Noted: 2022-05-03

## 2024-03-01 PROBLEM — I89.0 LYMPHEDEMA: Chronic | Status: ACTIVE | Noted: 2024-03-01

## 2024-03-01 PROCEDURE — 97535 SELF CARE MNGMENT TRAINING: CPT

## 2024-03-01 PROCEDURE — 99232 SBSQ HOSP IP/OBS MODERATE 35: CPT | Performed by: CLINICAL NURSE SPECIALIST

## 2024-03-01 PROCEDURE — 99497 ADVNCD CARE PLAN 30 MIN: CPT | Performed by: CLINICAL NURSE SPECIALIST

## 2024-03-01 RX ORDER — CYCLOBENZAPRINE HCL 10 MG
10 TABLET ORAL 3 TIMES DAILY PRN
Qty: 90 TABLET | Refills: 1 | Status: SHIPPED | OUTPATIENT
Start: 2024-03-01

## 2024-03-01 RX ORDER — MORPHINE SULFATE 15 MG/1
15 TABLET ORAL EVERY 8 HOURS PRN
Qty: 90 TABLET | Refills: 0 | Status: SHIPPED | OUTPATIENT
Start: 2024-03-01

## 2024-03-01 RX ADMIN — Medication 10 ML: at 19:23

## 2024-03-01 RX ADMIN — PRAMIPEXOLE DIHYDROCHLORIDE 0.75 MG: 0.25 TABLET ORAL at 19:22

## 2024-03-01 RX ADMIN — Medication 10 ML: at 09:30

## 2024-03-01 RX ADMIN — NYSTATIN 1 APPLICATION: 100000 POWDER TOPICAL at 09:29

## 2024-03-01 RX ADMIN — Medication 250 MG: at 09:29

## 2024-03-01 RX ADMIN — DULOXETINE HYDROCHLORIDE 30 MG: 30 CAPSULE, DELAYED RELEASE ORAL at 09:29

## 2024-03-01 RX ADMIN — Medication 250 MG: at 19:22

## 2024-03-01 RX ADMIN — MORPHINE SULFATE 7.5 MG: 15 TABLET ORAL at 12:45

## 2024-03-01 RX ADMIN — Medication 1 SPRAY: at 05:20

## 2024-03-01 RX ADMIN — LIDOCAINE 1 PATCH: 4 PATCH TOPICAL at 09:29

## 2024-03-01 RX ADMIN — APIXABAN 5 MG: 5 TABLET, FILM COATED ORAL at 09:29

## 2024-03-01 RX ADMIN — LEVOTHYROXINE SODIUM 112 MCG: 112 TABLET ORAL at 05:20

## 2024-03-01 RX ADMIN — NYSTATIN 1 APPLICATION: 100000 POWDER TOPICAL at 19:23

## 2024-03-01 RX ADMIN — APIXABAN 5 MG: 5 TABLET, FILM COATED ORAL at 19:22

## 2024-03-01 RX ADMIN — DICLOFENAC SODIUM 4 G: 10 GEL TOPICAL at 16:31

## 2024-03-01 RX ADMIN — Medication 1 SPRAY: at 19:22

## 2024-03-01 NOTE — PROGRESS NOTES
"    Daily Progress Note  Brenda Sneed  MRN: [id number] LOS: 10    Admit Date: 2024   3/1/2024 12:08 CST    Subjective:         Interval History:    Reviewed overnight events and nursing notes.         ROS:  Review of Systems    DIET:  Diet: Regular/House Diet; Texture: Pureed (NDD 1); Fluid Consistency: Nectar Thick    Medications:      apixaban, 5 mg, Oral, Q12H  Biotene Dry Mouth Moisturizing, 1 spray, Mouth/Throat, 5x Daily  DULoxetine, 30 mg, Oral, Daily  levothyroxine, 112 mcg, Oral, Q AM  Lidocaine, 1 patch, Transdermal, Q24H  nystatin, 1 application , Topical, BID  pramipexole, 0.75 mg, Oral, Nightly  saccharomyces boulardii, 250 mg, Oral, BID  sodium chloride, 10 mL, Intravenous, Q12H  sodium chloride, 10 mL, Intravenous, Q12H          Objective:     Vitals: /72 (BP Location: Right arm, Patient Position: Lying)   Pulse 87   Temp 97.5 °F (36.4 °C) (Oral)   Resp 16   Ht 160 cm (63\")   Wt 91.6 kg (202 lb)   SpO2 91%   BMI 35.78 kg/m²    Intake/Output Summary (Last 24 hours) at 3/1/2024 1208  Last data filed at 3/1/2024 0645  Gross per 24 hour   Intake 880 ml   Output 950 ml   Net -70 ml    Temp (24hrs), Av.5 °F (36.4 °C), Min:97.4 °F (36.3 °C), Max:97.6 °F (36.4 °C)    Glucose:  Lab Results   Component Value Date    POCGLU 100 (H) 2021     Physical Examination:   Physical Exam    Labs:  Lab Results (last 24 hours)       ** No results found for the last 24 hours. **             Imaging:  No radiology results from the last 24 hrs       Assessment and Plan:     Primary Problem:  UTI (urinary tract infection)    Hospital Problem list:    UTI (urinary tract infection)    Secondary osteoarthritis of left shoulder due to rotator cuff arthropathy    Cellulitis of right lower extremity    HTN (hypertension)    Morbid obesity    Anxiety associated with depression    Cellulitis of left lower extremity    Acute cystitis with hematuria    Candidiasis of skin    CHRISTINE (acute kidney injury)    " Chronic kidney disease, stage 3a    COVID-19 virus detected    Other cirrhosis of liver    Lymphedema      Assessment: Chronically ill 73-year-old female with cirrhosis, opiate use disorder, hypertension, and bilateral lymphedema w/ morbid obesity who has suffered recent progressive decline and presents with weakness and fatigue found to have COVID-19.     Plan:     -Chornically ill female with cirrhosis, progressive decline c/b lymphedema and morbid obesity   -Patient has elected to go home with hospice.  She will require some significant help going home.  She will probably need a hospital bed, bedside commode, home oxygen, amongst other things.  If we can set this up today, I am happy to discharge her.  She can always wait till tomorrow should some of our medical equipment not be available to her.     -MOST form signed and in chart.        Reviewed treatment plans with the patient and/or family.     Code Status:   Code Status and Medical Interventions:   Ordered at: 02/29/24 5197     Level Of Support Discussed With:    Next of Kin (If No Surrogate)     Code Status (Patient has no pulse and is not breathing):    No CPR (Do Not Attempt to Resuscitate)     Medical Interventions (Patient has pulse or is breathing):    Comfort Measures     Comments:    DaughterDominga       Electronically signed by Sanjiv Block MD on 3/1/2024 at 12:08 CST

## 2024-03-01 NOTE — CASE MANAGEMENT/SOCIAL WORK
Continued Stay Note  [facility]     Patient Name: Brenda Sneed  MRN: [id number]  Today's Date: 3/1/2024    Admit Date: 2/20/2024    Plan: [facility]   Discharge Plan       Row Name 03/01/24 3930       Plan    Plan Comments Pt will not dc home until tomorrow due to DME delivery. SW did notifiy Physician. Physician sent in scripts for pain meds to [facility] today. Hospice will be sending a RN to pt home tomorrow.                   Discharge Codes    No documentation.                 Expected Discharge Date and Time       Expected Discharge Date Expected Discharge Time    Mar 1, 2024               TAMICA Dodge

## 2024-03-01 NOTE — PLAN OF CARE
Goal Outcome Evaluation:  Plan of Care Reviewed With: patient, family        Progress: improving  Outcome Evaluation: see note      Anticipated Discharge Disposition (SLP): unknown             Treatment Assessment (SLP): continued (03/01/24 0934)  Treatment Assessment Comments (SLP): see note (03/01/24 0934)  Plan for Continued Treatment (SLP): continue treatment per plan of care (03/01/24 0934)

## 2024-03-01 NOTE — PROGRESS NOTES
"    Daily Progress Note  Brenda Sneed  MRN: [id number] LOS: 10    Admit Date: 2024   3/1/2024 12:08 CST    Subjective:         Interval History:    Reviewed overnight events and nursing notes.         ROS:  Review of Systems    DIET:  Diet: Regular/House Diet; Texture: Pureed (NDD 1); Fluid Consistency: Nectar Thick    Medications:      apixaban, 5 mg, Oral, Q12H  Biotene Dry Mouth Moisturizing, 1 spray, Mouth/Throat, 5x Daily  DULoxetine, 30 mg, Oral, Daily  levothyroxine, 112 mcg, Oral, Q AM  Lidocaine, 1 patch, Transdermal, Q24H  nystatin, 1 application , Topical, BID  pramipexole, 0.75 mg, Oral, Nightly  saccharomyces boulardii, 250 mg, Oral, BID  sodium chloride, 10 mL, Intravenous, Q12H  sodium chloride, 10 mL, Intravenous, Q12H          Objective:     Vitals: /72 (BP Location: Right arm, Patient Position: Lying)   Pulse 87   Temp 97.5 °F (36.4 °C) (Oral)   Resp 16   Ht 160 cm (63\")   Wt 91.6 kg (202 lb)   SpO2 91%   BMI 35.78 kg/m²    Intake/Output Summary (Last 24 hours) at 3/1/2024 1208  Last data filed at 3/1/2024 0645  Gross per 24 hour   Intake 880 ml   Output 950 ml   Net -70 ml    Temp (24hrs), Av.5 °F (36.4 °C), Min:97.4 °F (36.3 °C), Max:97.6 °F (36.4 °C)    Glucose:  Lab Results   Component Value Date    POCGLU 100 (H) 2021     Physical Examination:   Physical Exam    Labs:  Lab Results (last 24 hours)       ** No results found for the last 24 hours. **             Imaging:  No radiology results from the last 24 hrs       Assessment and Plan:     Primary Problem:  UTI (urinary tract infection)    Hospital Problem list:    UTI (urinary tract infection)    Secondary osteoarthritis of left shoulder due to rotator cuff arthropathy    Cellulitis of right lower extremity    HTN (hypertension)    Morbid obesity    Anxiety associated with depression    Cellulitis of left lower extremity    Acute cystitis with hematuria    Candidiasis of skin    CHRISTINE (acute kidney injury)    " Chronic kidney disease, stage 3a    COVID-19 virus detected    Other cirrhosis of liver    Lymphedema      Assessment: Chronically ill 73-year-old female with cirrhosis, opiate use disorder, hypertension, and bilateral lymphedema w/ morbid obesity who has suffered recent progressive decline and presents with weakness and fatigue found to have COVID-19.     Plan:       Mrs. Sneed is a chronically           -Chornically ill female with cirrhosis, progressive decline c/b lymphedema and morbid obesity   -Patient has elected to go home with hospice.  She will require some significant help going home.  She will probably need a hospital bed, bedside commode, home oxygen, amongst other things.  If we can set this up today, I am happy to discharge her.  She can always wait till tomorrow should some of our medical equipment not be available to her.     -MOST form signed and in chart.        Reviewed treatment plans with the patient and/or family.     Code Status:   Code Status and Medical Interventions:   Ordered at: 02/29/24 0612     Level Of Support Discussed With:    Next of Kin (If No Surrogate)     Code Status (Patient has no pulse and is not breathing):    No CPR (Do Not Attempt to Resuscitate)     Medical Interventions (Patient has pulse or is breathing):    Comfort Measures     Comments:    Dominga Young       Electronically signed by Sanjiv Block MD on 3/1/2024 at 12:08 CST

## 2024-03-01 NOTE — PROGRESS NOTES
[facility]    Palliative Care Daily Progress Note   Chief complaint-follow up support for patient/family and pain/symptom management goals of care, hospice    Code Status:   Code Status and Medical Interventions:   Ordered at: 24 7613     Level Of Support Discussed With:    Next of Kin (If No Surrogate)     Code Status (Patient has no pulse and is not breathing):    No CPR (Do Not Attempt to Resuscitate)     Medical Interventions (Patient has pulse or is breathing):    Comfort Measures     Comments:    DaughterDominga      Advanced Directives: Advance Directive Status: Patient has advance directive, copy in chart   Goals of Care: Ongoing.     S: Medical record reviewed. Events noted.  Family elected to transition to comfort measures and plans for home with hospice and family caregivers. Received 7.5 mg oral morphine equivalent over the last 24 hours in the form of MS IR.  Laying in bed without apparent distress.  Profound weakness, somnolent, and repeats phrases frequently.  Difficulty keeping on conversation. She is content with discharge home with family and hospice. Poor oral intake 25% of meals yesterday, no meal intake today. Drinking some fluids but extremely limited. 950 ml urine out over last 24 hours.      Review of Systems   Constitutional: Positive for malaise/fatigue.   Cardiovascular:  Positive for leg swelling.        Lymphedema bilateral lower extremities   Respiratory:  Negative for shortness of breath.    Skin:  Positive for poor wound healing.   Musculoskeletal:  Positive for falls, joint pain, joint swelling, muscle weakness and myalgias.   Genitourinary:  Negative for dysuria.   Neurological:  Positive for weakness.  Altered mental status  Psychiatric/Behavioral:  Positive for depression.      Pain Assessment  Preferred Pain Scale: number (Numeric Rating Pain Scale)  PAINAD Breathin-->normal  PAINAD Negative Vocalization:  0-->none  PAINAD Facial Expression: 0-->smiling or inexpressive  PAINAD Body Language: 0-->relaxed  PAINAD Consolability: 0-->no need to console  PAINAD Score: 0  Pain Location: shoulder  Pain Description: constant    O:     Intake/Output Summary (Last 24 hours) at 3/1/2024 0840  Last data filed at 3/1/2024 0645  Gross per 24 hour   Intake 880 ml   Output 950 ml   Net -70 ml         Diagnostics and current medications: Reviewed.      Current Facility-Administered Medications:     acetaminophen (TYLENOL) tablet 650 mg, 650 mg, Oral, Q4H PRN **OR** acetaminophen (TYLENOL) 160 MG/5ML oral solution 650 mg, 650 mg, Oral, Q4H PRN **OR** acetaminophen (TYLENOL) suppository 650 mg, 650 mg, Rectal, Q4H PRN, Earnestine Gan APRN    albuterol sulfate HFA (PROVENTIL HFA;VENTOLIN HFA;PROAIR HFA) inhaler 2 puff, 2 puff, Inhalation, Q4H PRN, Earnestine Gan APRN    apixaban (ELIQUIS) tablet 5 mg, 5 mg, Oral, Q12H, Sachin Dia MD, 5 mg at 02/29/24 2138    atropine 1 % ophthalmic solution 2 drop, 2 drop, Sublingual, BID PRN, Earnestine Gan APRN    Biotene Dry Mouth Moisturizing (BIOTENE) 1 spray, 1 spray, Mouth/Throat, 5x Daily, Earnestine Gan APRN, 1 spray at 03/01/24 0520    sennosides-docusate (PERICOLACE) 8.6-50 MG per tablet 2 tablet, 2 tablet, Oral, BID PRN **AND** polyethylene glycol (MIRALAX) packet 17 g, 17 g, Oral, Daily PRN **AND** bisacodyl (DULCOLAX) EC tablet 5 mg, 5 mg, Oral, Daily PRN **AND** bisacodyl (DULCOLAX) suppository 10 mg, 10 mg, Rectal, Daily PRN, Sachin Dia MD    cyclobenzaprine (FLEXERIL) tablet 10 mg, 10 mg, Oral, TID PRN, Sachin Dia MD, 10 mg at 02/29/24 2138    diphenoxylate-atropine (LOMOTIL) 2.5-0.025 MG per tablet 1 tablet, 1 tablet, Oral, Q2H PRN, Earnestine Gan APRN    docusate sodium (COLACE) capsule 100 mg, 100 mg, Oral, BID PRN, Sachin Dia MD    DULoxetine (CYMBALTA) DR capsule 30 mg, 30 mg, Oral, Daily, Earnestine Gan  APRN, 30 mg at 02/29/24 0930    furosemide (LASIX) tablet 40 mg, 40 mg, Oral, Daily PRN, Sachin Dia MD, 40 mg at 02/23/24 1718    Glycerin-Hypromellose- (ARTIFICIAL TEARS) 0.2-0.2-1 % ophthalmic solution solution 1 drop, 1 drop, Both Eyes, Q30 Min PRN, Earnestine Gan APRN    levothyroxine (SYNTHROID, LEVOTHROID) tablet 112 mcg, 112 mcg, Oral, Q AM, Sanjiv lBock MD, 112 mcg at 03/01/24 0520    Lidocaine 4 % 1 patch, 1 patch, Transdermal, Q24H, Earnestine Gan APRN, 1 patch at 02/29/24 0930    LORazepam (ATIVAN) tablet 0.25 mg, 0.25 mg, Oral, Q6H PRN, Earnestine Gan APRN    [DISCONTINUED] Morphine (MSIR) tablet 15 mg, 15 mg, Oral, Q6H PRN **OR** Morphine (MSIR) tablet 7.5 mg, 7.5 mg, Oral, Q8H PRN, Sanjiv Block MD, 7.5 mg at 02/29/24 0930    nystatin (MYCOSTATIN) powder 1 Application, 1 application , Topical, BID, Sachin Dia MD, 1 Application at 02/29/24 2138    ondansetron (ZOFRAN) injection 4 mg, 4 mg, Intravenous, Q6H PRN, Elba Rockwell APRN    pramipexole (MIRAPEX) tablet 0.75 mg, 0.75 mg, Oral, Nightly, Sachin Dia MD, 0.75 mg at 02/29/24 2138    saccharomyces boulardii (FLORASTOR) capsule 250 mg, 250 mg, Oral, BID, Sachin Dia MD, 250 mg at 02/29/24 2138    sodium chloride 0.9 % flush 10 mL, 10 mL, Intravenous, Q12H, Elba Rockwell APRN, 10 mL at 02/29/24 2138    sodium chloride 0.9 % flush 10 mL, 10 mL, Intravenous, PRN, Elba Rockwell APRN    sodium chloride 0.9 % flush 10 mL, 10 mL, Intravenous, Q12H, Sachin Dia MD, 10 mL at 02/29/24 2139    sodium chloride 0.9 % flush 10 mL, 10 mL, Intravenous, PRN, Sachin Dia MD    sodium chloride 0.9 % infusion 40 mL, 40 mL, Intravenous, PRN, Elba Rockwell APRN    sodium chloride 0.9 % infusion 40 mL, 40 mL, Intravenous, PRN, Sachin Dia MD    Allergies   Allergen Reactions    Codeine Itching and Rash     SEVERE RASH/ITCHING (TOLERATES PERCOCET)    Amoxicillin  "Other (See Comments)     unknown    Levaquin [Levofloxacin] Other (See Comments)     unknown    Benadryl [Diphenhydramine] Other (See Comments)     KEEPS PATIENT AWAKE      I have utilized all available immediate resources to obtain, update, or review the patient's current medications (including all prescriptions, over-the-counter products, herbals, cannabis/cannabidiol products, and vitamin/mineral/dietary (nutritional) supplements) for name, route of administration, type, dose and frequency.    A:    /72 (BP Location: Right arm, Patient Position: Lying)   Pulse 87   Temp 97.5 °F (36.4 °C) (Oral)   Resp 16   Ht 160 cm (63\")   Wt 91.6 kg (202 lb)   SpO2 91%   BMI 35.78 kg/m²     Vitals and nursing note reviewed.   Constitutional:       Appearance: Ill-appearing and chronically ill-appearing.   Pulmonary:      Effort: Pulmonary effort is normal.   Cardiovascular:      Normal rate.   Edema:     Peripheral edema present.  Musculoskeletal:      Cervical back: Neck supple.   Skin:     General: Skin is warm.   Neurological:      Mental Status: Extremely somnolent     Patient status: Disease state: Controlled with current treatments.  Current Functional status: Palliative Performance Scale Score: Performance 40% based on the following measures: Ambulation: Mainly in bed, Activity and Evidence of Disease: Unable to do any work, extensive evidence of disease, Self-Care: Mainly assistance required,  Intake: Normal or reduced, LOC: Full, drowsy or confusion   Baseline Functional status: Palliative Performance Scale Score: Performance 60% based on the following measures: Ambulation: Reduced, Activity and Evidence of Disease: Unable to do hobby or some work, significant evidence of disease, Self-Care: Occasional assist necessary,  Intake: Normal or reduced, LOC: Full or confusion   Nutritional status: Albumin 2.9 Body mass index is 35.78 kg/m².      Hospital Problem List      UTI (urinary tract infection)    " Secondary osteoarthritis of left shoulder due to rotator cuff arthropathy    Cellulitis of right lower extremity    HTN (hypertension)    Anxiety associated with depression    Cellulitis of left lower extremity    Acute cystitis with hematuria    Candidiasis of skin    CHRISTINE (acute kidney injury)    Chronic kidney disease, stage 3a    COVID-19 virus detected     Impression/Problem List:     Acute kidney injury on chronic kidney disease stage III  Hypoxia  Cirrhosis  Acute UTI with hematuria  Adult failure to thrive  COVID-19 virus detected  Anemia  Altered mental status  Candidiasis of skin  Secondary osteoarthritis of left shoulder due to rotator cuff arthroplasty, abnormal imaging  Cellulitis bilateral lower extremities  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 Jones pain management  Depression  Disease of thyroid gland  Hypertension  Iron deficiency anemia  Thrombocytopenia  Impaired mobility-walker baseline  Venous stasis ulcer bilateral lower extremity  Lymphedema-managed by home health with wound care 3 times a week    Functional paralysis secondary to lymphedema     Recommendations/Plan:  1. plan: Goals of care include CODE STATUS no CPR/limited support interventions.     Family support: The patient receives support from her daughter and extended family..  Advance Directives: On file     POA/Healthcare surrogate-Dominga wagner-next of kin.     2.  Palliative care encounter  - Prognosis is guarded long-term secondary to acute kidney injury, acute UTI, COVID-19, and multiple comorbidities.  -Patient appears to have fair prognostic awareness.      -Recently discharged from rehab 2 weeks prior.  Recently diagnosed with COVID 1 week prior.       -Urine culture pending. Started on broad-spectrum IV antibiotics for UTI  "and bilateral lower extremity cellulitis  -Wound care consulted.    -Orthopedics consulted due to abnormal imaging left shoulder as above.     2/21-CODE STATUS changes no CPR/Limited interventions, no intubation, no permanent feeding tube.  Will plan to complete a MOST document  -High risk rehospitalization  -Awaiting Ortho recommendations  -dietician evaluation protein calorie malnutrition and dietary supplements per family request. Family reports patient does not use dentures and eats soft consistency foods at baseline.  Also drinks ensure supplements  -Will plan to reevaluate living will directive prior to discharge    2/26-continue supportive measures  -Ortho recommendations-possible revision reverse total shoulder arthroplasty when more medically stable, recommends sling to left upper extremity for comfort and follow-up outpatient.   -Anticipating SNF  -CT head today  -Add ammonia level now.  -family adamant not to pursue either temporary or permanent feeding tube options stating \"she would not of wanted that\".  Given discussion we further discussed de-escalation with focus of comfort and hospice services.  Encouraged ongoing conversation with family with regard to medical priorities.    3/1--continue comfort measures  -Family have elected to de-escalate care measures with focus of comfort 2/29.  -Plan for discharge home with hospice.  A hospice consult has been placed 2/29.  -A MOST document has been reviewed and initiated.  Attending to complete.  -Case discussed with attending physician and hospice liaison     3.  Comfort care  -May continue current oral medications if able to swallow safely.  -MSIR as needed  -flexeril as needed  -lidocaine patch to left shoulder  -Additional palliative adjuvants as needed        Thank you for allowing us to participate in patient's plan of care. Palliative Care Team will continue to follow patient.     Earnestine Gan, APRN  3/1/2024  08:40 CST   "

## 2024-03-01 NOTE — THERAPY TREATMENT NOTE
"Acute Care - Speech Language Pathology   Swallow Treatment Note [facility]     Patient Name: Brenda Sneed  : 1950  MRN: [id number]  Today's Date: 3/1/2024               Admit Date: 2024  Pt seen to address dysphagia. Upon arrival, pt was upright in bed with niece at bedside. Pt reported that she had completed her Mighty Shake but was \"so full\" and refused additional PO trials for diet tolerance. Pt stated that was feeling much better and spoke of being ready to leave hospital. Pt and family educated on use of thickener at home and expressed understanding. Education also provided on safe eating and swallowing strategies. Pt is not eligible for diet upgrade at this time due to refusal to attempt PO trials with SLP in room but may continue on recommended diet of pureed solids and nectar thick liquids. ST will continue to follow and tx.    Ngozi Rockwell, MS CCC-SLP 3/1/2024 10:27 CST      Visit Dx:     ICD-10-CM ICD-9-CM   1. Urinary tract infection without hematuria, site unspecified  N39.0 599.0   2. Generalized weakness  R53.1 780.79   3. Chronic left shoulder pain  M25.512 719.41    G89.29 338.29   4. Venous stasis ulcer of other part of right lower leg with fat layer exposed, unspecified whether varicose veins present  I83.018 454.0    L97.812    5. Venous stasis ulcer of other part of left lower leg with fat layer exposed, unspecified whether varicose veins present  I83.028 454.0    L97.822    6. Impaired mobility [Z74.09]  Z74.09 799.89   7. Oropharyngeal dysphagia  R13.12 787.22     Patient Active Problem List   Diagnosis    Spinal stenosis, lumbar    Secondary osteoarthritis of left shoulder due to rotator cuff arthropathy    Cellulitis of right lower extremity    HTN (hypertension)    Obesity, Class III, BMI 40-49.9 (morbid obesity)    Anxiety associated with depression    Traumatic rhabdomyolysis    Traumatic rhabdomyolysis, initial encounter    Laceration of left lower extremity    Cellulitis "    Cellulitis of left lower extremity    Thrombocytopenia    Acute cystitis with hematuria    Sepsis    Candidiasis of skin    Acquired hypothyroidism    CHRISTINE (acute kidney injury)    Chronic pain syndrome    Hyponatremia    Chronic kidney disease, stage 3a    UTI (urinary tract infection)    COVID-19 virus detected     Past Medical History:   Diagnosis Date    Arthritis     Bronchitis     Cataract     right eye , cornea implant    Chronic back pain     Depression     Disease of thyroid gland     Edema     lower extremities    Hypertension     Kidney stones     Lymphedema      Past Surgical History:   Procedure Laterality Date    BACK SURGERY      2010 ([place]), 2013     CHOLECYSTECTOMY OPEN      CORNEAL TRANSPLANT Right 1999    INCISION AND DRAINAGE ABSCESS Left 4/19/2023    Procedure: INCISION AND DRAINAGE ABSCESS left leg;  Surgeon: Sarita Vazquez MD;  Location: [facility] OR;  Service: General;  Laterality: Left;    LACERATION REPAIR Left 4/7/2023    Procedure: washout and closure left leg wound ;  Surgeon: Sarita Vazquez MD;  Location:  [facility] OR;  Service: General;  Laterality: Left;    LUMBAR FUSION Left 8/2/2017    Procedure: LEFT LUMBAR LATERAL INTERBODY FUSION WITH INSTRUMENTATION  L1-2;  Surgeon: MARCELINO Wright MD;  Location:  [facility] OR;  Service:     NECK SURGERY  2010    [place]    TOTAL HIP ARTHROPLASTY Right 2014    DR. KENDRICK     TOTAL KNEE ARTHROPLASTY Right 2000    TOTAL KNEE ARTHROPLASTY Left 2002    TOTAL SHOULDER ARTHROPLASTY W/ DISTAL CLAVICLE EXCISION Left 6/1/2020    Procedure: LEFT REVERSE TOTAL SHOULDER REPLACEMENT;  Surgeon: Saravanan Calvin MD;  Location:  [facility] OR;  Service: Orthopedics;  Laterality: Left;       SLP Recommendation and Plan     SLP Diet Recommendation: puree, nectar thick liquids, water between meals after oral care, with supervision, ice chips between meals after oral care, with supervision (03/01/24 0952)  Recommended Precautions and Strategies: upright posture  during/after eating, small bites of food and sips of liquid, alternate between small bites of food and sips of liquid, check mouth frequently for oral residue/pocketing, general aspiration precautions, fatigue precautions, 1:1 supervision (03/01/24 0934)  SLP Rec. for Method of Medication Administration: meds whole, with puree (03/01/24 0934)     Monitor for Signs of Aspiration: yes, notify SLP if any concerns (03/01/24 0934)  Recommended Diagnostics: reassess via clinical swallow evaluation (03/01/24 0934)     Anticipated Discharge Disposition (SLP): unknown (03/01/24 0934)     Therapy Frequency (Swallow): at least, 2 days per week (03/01/24 0934)  Predicted Duration Therapy Intervention (Days): until discharge (03/01/24 0934)  Oral Care Recommendations: Oral Care BID/PRN (03/01/24 0934)        Daily Summary of Progress (SLP): progress toward functional goals is good (03/01/24 0934)               Treatment Assessment (SLP): continued (03/01/24 0934)  Treatment Assessment Comments (SLP): see note (03/01/24 0934)  Plan for Continued Treatment (SLP): continue treatment per plan of care (03/01/24 0934)         Plan of Care Reviewed With: patient, family  Progress: improving  Outcome Evaluation: see note      SWALLOW EVALUATION (last 72 hours)       SLP Adult Swallow Evaluation       Row Name 03/01/24 0934 02/29/24 1148 02/28/24 1440             Rehab Evaluation    Document Type therapy note (daily note)  -JR therapy note (daily note)  -JR therapy note (daily note)  -MB      Subjective Information no complaints  -JR -- fatigue  -MB      Patient Observations alert;cooperative  -JR alert;cooperative  -JR lethargic  -MB      Patient/Family/Caregiver Comments/Observations niece at bedside  -JR niece at bedside  -JR No family present  -MB      Session Not Performed -- patient/family declined treatment;other (see comments)  -JR --      Patient Effort fair  -JR -- poor  -MB      Comment -- Pt not hungry or thirsty and refused  PO.  -JR --      Symptoms Noted During/After Treatment none  -JR -- --         General Information    Patient Profile Reviewed yes  -JR -- --         Pain    Additional Documentation -- -- Pain Scale: FACES Pre/Post-Treatment (Group)  -MB         Pain Scale: FACES Pre/Post-Treatment    Pain: FACES Scale, Pretreatment -- -- 0-->no hurt  -MB         SLP Treatment Clinical Impressions    Treatment Assessment (SLP) continued  -JR -- continued  -MB      Treatment Assessment Comments (SLP) see note  -JR -- Continue to follow  -MB      Daily Summary of Progress (SLP) progress toward functional goals is good  -JR -- progress towards functional goals is fair  -MB      Barriers to Overall Progress (SLP) -- -- Lethargy  -MB      Plan for Continued Treatment (SLP) continue treatment per plan of care  -JR -- continue treatment per plan of care  -MB      Care Plan Review evaluation/treatment results reviewed;care plan/treatment goals reviewed;risks/benefits reviewed;current/potential barriers reviewed  -JR -- --         Recommendations    Therapy Frequency (Swallow) at least;2 days per week  -JR -- --      Predicted Duration Therapy Intervention (Days) until discharge  -JR -- --      SLP Diet Recommendation puree;nectar thick liquids;water between meals after oral care, with supervision;ice chips between meals after oral care, with supervision  -JR -- --      Recommended Diagnostics reassess via clinical swallow evaluation  -JR -- --      Recommended Precautions and Strategies upright posture during/after eating;small bites of food and sips of liquid;alternate between small bites of food and sips of liquid;check mouth frequently for oral residue/pocketing;general aspiration precautions;fatigue precautions;1:1 supervision  -JR -- --      Oral Care Recommendations Oral Care BID/PRN  -JR -- --      SLP Rec. for Method of Medication Administration meds whole;with puree  -JR -- --      Monitor for Signs of Aspiration yes;notify SLP if  any concerns  -JR -- --      Anticipated Discharge Disposition (SLP) unknown  -JR -- --         Swallow Goals (SLP)    Swallow LTGs Patient will demonstrate functional swallow for  -JR -- --      Swallow STGs diet tolerance goal selection (SLP);pharyngeal strengthening exercise goal selection (SLP)  -JR -- --      Diet Tolerance Goal Selection (SLP) Patient will tolerate trials of  -JR -- --      Pharyngeal Strengthening Exercise Goal Selection (SLP) pharyngeal strengthening exercise, SLP goal 1  -JR -- --         (LTG) Patient will demonstrate functional swallow for    Diet Texture (Demonstrate functional swallow) pureed textures  -JR -- pureed textures  -MB      Liquid viscosity (Demonstrate functional swallow) nectar/ mildly thick liquids  -JR -- nectar/ mildly thick liquids  -MB      [place] (Demonstrate functional swallow) with minimal cues (75-90% accuracy)  -JR -- with minimal cues (75-90% accuracy)  -MB      Time Frame (Demonstrate functional swallow) by discharge  -JR -- by discharge  -MB      Barriers (Demonstrate functional swallow) n/a  -JR -- n/a  -MB      Progress/Outcomes (Demonstrate functional swallow) progress slower than expected  -JR -- progress slower than expected  -MB         (STG) Patient will tolerate trials of    Consistencies Trialed (Tolerate trials) soft to chew (whole) textures;soft to chew (chopped) textures;soft to chew (ground) textures;pureed textures;thin liquids;nectar/ mildly thick liquids  -JR -- soft to chew (whole) textures;soft to chew (chopped) textures;soft to chew (ground) textures;pureed textures;thin liquids;nectar/ mildly thick liquids  -MB      Desired Outcome (Tolerate trials) without signs/symptoms of aspiration;with adequate oral prep/transit/clearance  -JR -- without signs/symptoms of aspiration;with adequate oral prep/transit/clearance  -MB      [place] (Tolerate trials) with minimal cues (75-90% accuracy)  -JR -- with minimal cues (75-90% accuracy)  -MB       Time Frame (Tolerate trials) by discharge  -JR -- by discharge  -MB      Progress/Outcomes (Tolerate trials) progress slower than expected  -JR -- progress slower than expected  -MB         (STG) Pharyngeal Strengthening Exercise Goal 1 (SLP)    Activity (Pharyngeal Strengthening Goal 1, SLP) increase pharyngeal sensation;increase timing  -JR -- increase pharyngeal sensation;increase timing  -MB      Increase Pharyngeal Sensation gustatory stimulation (sour/cold)  -JR -- gustatory stimulation (sour/cold)  -MB      Increase Timing breath hold exercises;prepping - 3 second prep or suck swallow or 3-step swallow  -JR -- breath hold exercises;prepping - 3 second prep or suck swallow or 3-step swallow  -MB      [place]/Accuracy (Pharyngeal Strengthening Goal 1, SLP) with minimal cues (75-90% accuracy)  -JR -- with minimal cues (75-90% accuracy)  -MB      Time Frame (Pharyngeal Strengthening Goal 1, SLP) by discharge  -JR -- by discharge  -MB      Barriers (Pharyngeal Strengthening Goal 1, SLP) n/a  -JR -- n/a  -MB      Progress/Outcomes (Pharyngeal Strengthening Goal 1, SLP) progress slower than expected  -JR -- progress slower than expected  -MB                User Key  (r) = Recorded By, (t) = Taken By, (c) = Cosigned By      Initials Name Effective Dates    Scot Pacheco, CCC-SLP 02/03/23 -     Ngozi Roberson MS CCC-SLP 08/22/23 -                     EDUCATION  The patient has been educated in the following areas:   Dysphagia (Swallowing Impairment).        SLP GOALS       Row Name 03/01/24 0934 02/28/24 1440          (LTG) Patient will demonstrate functional swallow for    Diet Texture (Demonstrate functional swallow) pureed textures  -JR pureed textures  -MB     Liquid viscosity (Demonstrate functional swallow) nectar/ mildly thick liquids  -JR nectar/ mildly thick liquids  -MB     [place] (Demonstrate functional swallow) with minimal cues (75-90% accuracy)  -JR with minimal cues (75-90%  accuracy)  -MB     Time Frame (Demonstrate functional swallow) by discharge  -JR by discharge  -MB     Barriers (Demonstrate functional swallow) n/a  -JR n/a  -MB     Progress/Outcomes (Demonstrate functional swallow) progress slower than expected  -JR progress slower than expected  -MB        (STG) Patient will tolerate trials of    Consistencies Trialed (Tolerate trials) soft to chew (whole) textures;soft to chew (chopped) textures;soft to chew (ground) textures;pureed textures;thin liquids;nectar/ mildly thick liquids  -JR soft to chew (whole) textures;soft to chew (chopped) textures;soft to chew (ground) textures;pureed textures;thin liquids;nectar/ mildly thick liquids  -MB     Desired Outcome (Tolerate trials) without signs/symptoms of aspiration;with adequate oral prep/transit/clearance  -JR without signs/symptoms of aspiration;with adequate oral prep/transit/clearance  -MB     [place] (Tolerate trials) with minimal cues (75-90% accuracy)  -JR with minimal cues (75-90% accuracy)  -MB     Time Frame (Tolerate trials) by discharge  -JR by discharge  -MB     Progress/Outcomes (Tolerate trials) progress slower than expected  -JR progress slower than expected  -MB        (STG) Pharyngeal Strengthening Exercise Goal 1 (SLP)    Activity (Pharyngeal Strengthening Goal 1, SLP) increase pharyngeal sensation;increase timing  -JR increase pharyngeal sensation;increase timing  -MB     Increase Pharyngeal Sensation gustatory stimulation (sour/cold)  -JR gustatory stimulation (sour/cold)  -MB     Increase Timing breath hold exercises;prepping - 3 second prep or suck swallow or 3-step swallow  -JR breath hold exercises;prepping - 3 second prep or suck swallow or 3-step swallow  -MB     [place]/Accuracy (Pharyngeal Strengthening Goal 1, SLP) with minimal cues (75-90% accuracy)  -JR with minimal cues (75-90% accuracy)  -MB     Time Frame (Pharyngeal Strengthening Goal 1, SLP) by discharge  -JR by discharge  -MB      Barriers (Pharyngeal Strengthening Goal 1, SLP) n/a  -JR n/a  -MB     Progress/Outcomes (Pharyngeal Strengthening Goal 1, SLP) progress slower than expected  -JR progress slower than expected  -MB               User Key  (r) = Recorded By, (t) = Taken By, (c) = Cosigned By      Initials Name Provider Type    Scot Pacheco, CCC-SLP Speech and Language Pathologist    Ngozi Roberson MS CCC-SLP Speech and Language Pathologist                       Time Calculation:    Time Calculation- SLP       Row Name 03/01/24 1025             Time Calculation- SLP    SLP Start Time 0934  -JR      SLP Stop Time 1004  -      SLP Time Calculation (min) 30 min  -JR      SLP Received On 03/01/24  -         Timed Charges    20985-[facility] Selfcare Mgmt Minutes 30  -JR         Total Minutes    Timed Charges Total Minutes 30  -JR       Total Minutes 30  -JR                User Key  (r) = Recorded By, (t) = Taken By, (c) = Cosigned By      Initials Name Provider Type     Ngozi Rockwell MS CCC-SLP Speech and Language Pathologist                    Therapy Charges for Today       Code Description Service Date Service Provider Modifiers Qty    [id number]  ST SELF CARE/MGMT/TRAIN EA 15 MIN 3/1/2024 Ngozi Rockwell MS CCC-SLP GN 2                 Ngozi Rockwell MS CCC-SLP  3/1/2024

## 2024-03-01 NOTE — PLAN OF CARE
Goal Outcome Evaluation:      Pt is A&Ox4 with some confusion. She is on her dolphin matress. She once again requested a purewick so she wouldn't have to be turned on her left side. She did request voltaren to aid with pain in her shoulder. She is to be discharged in the AM.

## 2024-03-01 NOTE — CASE MANAGEMENT/SOCIAL WORK
Continued Stay Note   [place]     Patient Name: Brenda Sneed  MRN: [id number]  Today's Date: 3/1/2024    Admit Date: 2/20/2024    Plan: [facility]   Discharge Plan       Row Name 03/01/24 1213       Plan    Plan Comments DILCIA spoke to pt niece and dtr and plan is for home with hospice. [place] [address] [place] DILCIA has notified Jenny with [facility] hospice ([phone]). Plan dc later today with [facility] hospice.    Final Discharge Disposition Code 50 - home with hospice                   Discharge Codes    No documentation.                 Expected Discharge Date and Time       Expected Discharge Date Expected Discharge Time    Mar 1, 2024               TAMICA Dodge

## 2024-03-01 NOTE — PLAN OF CARE
Goal Outcome Evaluation:  Plan of Care Reviewed With: patient           Outcome Evaluation: A&Ox4. Prn pain med with relief. Dophin mattress. Prevalon boots. Turns refused. Wound care. RA/. Purewick.  Appears to be sleeping well.

## 2024-03-02 ENCOUNTER — READMISSION MANAGEMENT (OUTPATIENT)
Dept: CALL CENTER | Facility: HOSPITAL | Age: 74
End: 2024-03-02
Payer: MEDICARE

## 2024-03-02 VITALS
TEMPERATURE: 98.3 F | HEART RATE: 105 BPM | DIASTOLIC BLOOD PRESSURE: 71 MMHG | RESPIRATION RATE: 20 BRPM | HEIGHT: 63 IN | SYSTOLIC BLOOD PRESSURE: 136 MMHG | BODY MASS INDEX: 35.79 KG/M2 | WEIGHT: 202 LBS | OXYGEN SATURATION: 100 %

## 2024-03-02 PROBLEM — E44.0 MODERATE MALNUTRITION: Status: ACTIVE | Noted: 2024-03-02

## 2024-03-02 PROCEDURE — 92526 ORAL FUNCTION THERAPY: CPT | Performed by: SPEECH-LANGUAGE PATHOLOGIST

## 2024-03-02 PROCEDURE — 97535 SELF CARE MNGMENT TRAINING: CPT

## 2024-03-02 RX ADMIN — MORPHINE SULFATE 7.5 MG: 15 TABLET ORAL at 11:30

## 2024-03-02 RX ADMIN — Medication 250 MG: at 10:34

## 2024-03-02 RX ADMIN — LIDOCAINE 1 PATCH: 4 PATCH TOPICAL at 10:34

## 2024-03-02 RX ADMIN — Medication 1 SPRAY: at 05:15

## 2024-03-02 RX ADMIN — DULOXETINE HYDROCHLORIDE 30 MG: 30 CAPSULE, DELAYED RELEASE ORAL at 10:34

## 2024-03-02 RX ADMIN — Medication 10 ML: at 10:35

## 2024-03-02 RX ADMIN — LEVOTHYROXINE SODIUM 112 MCG: 112 TABLET ORAL at 05:15

## 2024-03-02 RX ADMIN — APIXABAN 5 MG: 5 TABLET, FILM COATED ORAL at 10:34

## 2024-03-02 RX ADMIN — NYSTATIN 1 APPLICATION: 100000 POWDER TOPICAL at 10:35

## 2024-03-02 RX ADMIN — Medication 1 SPRAY: at 10:35

## 2024-03-02 NOTE — PLAN OF CARE
Goal Outcome Evaluation:  Plan of Care Reviewed With: patient        Progress: no change  Outcome Evaluation: A&Ox4. No c/o pain. 2 L NC. Turns refused. Purewick. Prevalon boots. Dolphin mattress. Wound care.  Appears to be sleeping well.

## 2024-03-02 NOTE — PLAN OF CARE
Goal Outcome Evaluation:  Plan of Care Reviewed With: patient        Progress: declining  Outcome Evaluation: Pt being dc home via EMS to [facility], awaiting transport. Voiding incont via purewick. Dolphin mattress currently in place. Pt turned Q2 hrs as pt allowed. Prevalon boots in place to BLE. A/o x3. Speech garbled. Tolerating pureed diet with nectar thick liquids. Meds crushed in AS. Stable at d/c.

## 2024-03-02 NOTE — THERAPY DISCHARGE NOTE
Acute Care - Physical Therapy Discharge Summary  [facility]       Patient Name: Brenda Sneed  : 1950  MRN: [id number]    Today's Date: 3/2/2024                 Admit Date: 2024      PT Recommendation and Plan    Visit Dx:    ICD-10-CM ICD-9-CM   1. Lymphedema  I89.0 457.1   2. Urinary tract infection without hematuria, site unspecified  N39.0 599.0   3. Generalized weakness  R53.1 780.79   4. Chronic left shoulder pain  M25.512 719.41    G89.29 338.29   5. Venous stasis ulcer of other part of right lower leg with fat layer exposed, unspecified whether varicose veins present  I83.018 454.0    L97.812    6. Venous stasis ulcer of other part of left lower leg with fat layer exposed, unspecified whether varicose veins present  I83.028 454.0    L97.822    7. Impaired mobility [Z74.09]  Z74.09 799.89   8. Oropharyngeal dysphagia  R13.12 787.22        Outcome Measures       Row Name 24 1200             How much help from another is currently needed...    Putting on and taking off regular lower body clothing? 1  -EC      Bathing (including washing, rinsing, and drying) 1  -EC      Toileting (which includes using toilet bed pan or urinal) 1  -EC      Putting on and taking off regular upper body clothing 1  -EC      Taking care of personal grooming (such as brushing teeth) 2  -EC      Eating meals 1  -EC      AM-PAC 6 Clicks Score (OT) 7  -EC         Functional Assessment    Outcome Measure Options AM-PAC 6 Clicks Daily Activity (OT)  -EC                User Key  (r) = Recorded By, (t) = Taken By, (c) = Cosigned By      Initials Name Provider Type    EC Mellissa Manning OTR/L Occupational Therapist                         PT Rehab Goals       Row Name 24 1558             Bed Mobility Goal 1 (PT)    Activity/Assistive Device (Bed Mobility Goal 1, PT) sit to supine;supine to sit;rolling to left;rolling to right  -AE      [place] Level/Cues Needed (Bed Mobility Goal 1, PT) minimum assist (75% or  more patient effort)  -AE      Time Frame (Bed Mobility Goal 1, PT) long term goal (LTG)  -AE      Progress/Outcomes (Bed Mobility Goal 1, PT) goal met  -AE         Transfer Goal 1 (PT)    Activity/Assistive Device (Transfer Goal 1, PT) sit-to-stand/stand-to-sit;bed-to-chair/chair-to-bed;other (see comments)  LRAD  -AE      [place] Level/Cues Needed (Transfer Goal 1, PT) minimum assist (75% or more patient effort)  -AE      Time Frame (Transfer Goal 1, PT) long term goal (LTG)  -AE      Progress/Outcome (Transfer Goal 1, PT) goal not met  -AE         Gait Training Goal 1 (PT)    Activity/Assistive Device (Gait Training Goal 1, PT) gait (walking locomotion);decrease fall risk;increase endurance/gait distance;other (see comments)  LRAD  -AE      [place] Level (Gait Training Goal 1, PT) contact guard required  -AE      Distance (Gait Training Goal 1, PT) 20 feet  -AE      Time Frame (Gait Training Goal 1, PT) long term goal (LTG)  -AE      Progress/Outcome (Gait Training Goal 1, PT) goal not met  -AE                User Key  (r) = Recorded By, (t) = Taken By, (c) = Cosigned By      Initials Name Provider Type Discipline    AE Eden Glasgow PTA Physical Therapist Assistant PT                        PT Discharge Summary  Anticipated Discharge Disposition (PT): skilled nursing facility  Reason for Discharge: Discharge from facility  Outcomes Achieved: Patient able to partially acheive established goals  Discharge Destination: Home with assist      Eden Glasgow PTA   3/2/2024

## 2024-03-02 NOTE — THERAPY DISCHARGE NOTE
Acute Care - Occupational Therapy Treatment Note/Discharge  [facility]     Patient Name: Brenda Sneed  : 1950  MRN: [id number]  Today's Date: 3/2/2024               Admit Date: 2024       ICD-10-CM ICD-9-CM   1. Lymphedema  I89.0 457.1   2. Urinary tract infection without hematuria, site unspecified  N39.0 599.0   3. Generalized weakness  R53.1 780.79   4. Chronic left shoulder pain  M25.512 719.41    G89.29 338.29   5. Venous stasis ulcer of other part of right lower leg with fat layer exposed, unspecified whether varicose veins present  I83.018 454.0    L97.812    6. Venous stasis ulcer of other part of left lower leg with fat layer exposed, unspecified whether varicose veins present  I83.028 454.0    L97.822    7. Impaired mobility [Z74.09]  Z74.09 799.89   8. Oropharyngeal dysphagia  R13.12 787.22     Patient Active Problem List   Diagnosis    Spinal stenosis, lumbar    Secondary osteoarthritis of left shoulder due to rotator cuff arthropathy    Cellulitis of right lower extremity    HTN (hypertension)    Morbid obesity    Anxiety associated with depression    Traumatic rhabdomyolysis    Traumatic rhabdomyolysis, initial encounter    Laceration of left lower extremity    Cellulitis    Cellulitis of left lower extremity    Thrombocytopenia    Acute cystitis with hematuria    Sepsis    Candidiasis of skin    Acquired hypothyroidism    CHRISTINE (acute kidney injury)    Chronic pain syndrome    Hyponatremia    Chronic kidney disease, stage 3a    UTI (urinary tract infection)    COVID-19 virus detected    Other cirrhosis of liver    Lymphedema    Moderate malnutrition     Past Medical History:   Diagnosis Date    Arthritis     Bronchitis     Cataract     right eye , cornea implant    Chronic back pain     Depression     Disease of thyroid gland     Edema     lower extremities    Hypertension     Kidney stones     Lymphedema      Past Surgical History:   Procedure Laterality Date    BACK SURGERY        ([place]), 2013     CHOLECYSTECTOMY OPEN      CORNEAL TRANSPLANT Right 1999    INCISION AND DRAINAGE ABSCESS Left 4/19/2023    Procedure: INCISION AND DRAINAGE ABSCESS left leg;  Surgeon: Sarita Vazquez MD;  Location:  [facility] OR;  Service: General;  Laterality: Left;    LACERATION REPAIR Left 4/7/2023    Procedure: washout and closure left leg wound ;  Surgeon: Sarita Vazquez MD;  Location:  [facility];  Service: General;  Laterality: Left;    LUMBAR FUSION Left 8/2/2017    Procedure: LEFT LUMBAR LATERAL INTERBODY FUSION WITH INSTRUMENTATION  L1-2;  Surgeon: MARCELINO Wright MD;  Location:  [facility] OR;  Service:     NECK SURGERY  2010    [place]    TOTAL HIP ARTHROPLASTY Right 2014    DR. KENDRICK     TOTAL KNEE ARTHROPLASTY Right 2000    TOTAL KNEE ARTHROPLASTY Left 2002    TOTAL SHOULDER ARTHROPLASTY W/ DISTAL CLAVICLE EXCISION Left 6/1/2020    Procedure: LEFT REVERSE TOTAL SHOULDER REPLACEMENT;  Surgeon: Saravanan Calvin MD;  Location:  [facility] OR;  Service: Orthopedics;  Laterality: Left;       OT ASSESSMENT FLOWSHEET (Last 12 Hours)       OT Evaluation and Treatment       Row Name 03/02/24 1145                   OT Time and Intention    Subjective Information no complaints  -EC        Document Type discharge treatment  -EC        Mode of Treatment occupational therapy  -EC           General Information    Patient Profile Reviewed yes  -EC        Existing Precautions/Restrictions fall;non-weight bearing  -EC           Pain Assessment    Pre/Posttreatment Pain Comment pt asleep during education tx, grimaces in sleep  -EC           Wound 02/20/24 Right lower leg    Wound - Properties Group Placement Date: 02/20/24  -CK Side: Right  -CK Orientation: lower  -CK Location: leg  -CK    Retired Wound - Properties Group Placement Date: 02/20/24  -CK Side: Right  -CK Orientation: lower  -CK Location: leg  -CK    Retired Wound - Properties Group Date first assessed: 02/20/24  -CK Side: Right  -CK Location: leg  -CK        Wound 02/20/24 Left lower leg    Wound - Properties Group Placement Date: 02/20/24  -CK Side: Left  -CK Orientation: lower  -CK Location: leg  -CK    Retired Wound - Properties Group Placement Date: 02/20/24  -CK Side: Left  -CK Orientation: lower  -CK Location: leg  -CK    Retired Wound - Properties Group Date first assessed: 02/20/24  -CK Side: Left  -CK Location: leg  -CK       Wound 02/26/24 0810 sacral spine    Wound - Properties Group Placement Date: 02/26/24  -MS Placement Time: 0810  -MS Location: sacral spine  -MS    Retired Wound - Properties Group Placement Date: 02/26/24  -MS Placement Time: 0810  -MS Location: sacral spine  -MS    Retired Wound - Properties Group Date first assessed: 02/26/24  -MS Time first assessed: 0810  -MS Location: sacral spine  -MS       Plan of Care Review    Plan of Care Reviewed With family  -EC        Progress declining  -EC        Outcome Evaluation OT tx completed. Spoke with family regarding transition to comfort care on 3/1/24 and they communicated that they would like education from therapy staff regarding positioning/ADL care. OT educated pt's family on body mechanics, ADL care, & positioning in bed. OT to answer family's questions, they verbalized understanding in all aspects. Pt is discharging home today with family as primary caregivers.  -EC           Positioning and Restraints    Pre-Treatment Position in bed  -EC        Post Treatment Position bed  -EC        In Bed notified nsg;fowlers;with family/caregiver  -EC                  User Key  (r) = Recorded By, (t) = Taken By, (c) = Cosigned By      Initials Name Effective Dates    CK Aida Glasgow RN 02/09/21 -     MS Shoshana Sal RN 12/28/23 -     EC Mellissa Manning OTR/L 10/13/23 -                     Occupational Therapy Education       Title: PT OT SLP Therapies (Done)       Topic: Occupational Therapy (Done)       Point: ADL training (Done)       Description:   Instruct learner(s) on proper safety  adaptation and remediation techniques during self care or transfers.   Instruct in proper use of assistive devices.                  Learning Progress Summary             Patient Acceptance, E,D, VU by CH at 2/27/2024 1616    Acceptance, E, VU by MK at 2/22/2024 2300    Acceptance, E,D, VU,NR by LR at 2/22/2024 1036    Acceptance, E, VU by MK at 2/21/2024 2324    Acceptance, E, VU,NR by CS at 2/21/2024 1100   Family Acceptance, E, VU by EC at 3/2/2024 1206    Comment: body mechanics, adls, proper positioning    Acceptance, E,D, VU by CH at 2/27/2024 1616                         Point: Home exercise program (Done)       Description:   Instruct learner(s) on appropriate technique for monitoring, assisting and/or progressing therapeutic exercises/activities.                  Learning Progress Summary             Patient Acceptance, E, VU by MK at 2/22/2024 2300    Acceptance, E, VU by MK at 2/21/2024 2324    Acceptance, E, VU,NR by CS at 2/21/2024 1100                         Point: Precautions (Done)       Description:   Instruct learner(s) on prescribed precautions during self-care and functional transfers.                  Learning Progress Summary             Patient Acceptance, E,D, VU by CH at 2/27/2024 1616    Acceptance, E, VU by MK at 2/22/2024 2300    Acceptance, E,D, VU,NR by LR at 2/22/2024 1036    Acceptance, E, VU by MK at 2/21/2024 2324    Acceptance, E, VU,NR by CS at 2/21/2024 1100   Family Acceptance, E, VU by EC at 3/2/2024 1206    Comment: body mechanics, adls, proper positioning    Acceptance, E,D, VU by CH at 2/27/2024 1616                         Point: Body mechanics (Done)       Description:   Instruct learner(s) on proper positioning and spine alignment during self-care, functional mobility activities and/or exercises.                  Learning Progress Summary             Patient Acceptance, E,D, VU by CH at 2/27/2024 1616    Acceptance, E, VU by MK at 2/22/2024 2300    Acceptance, E,D, VU,NR  by LR at 2/22/2024 1036    Acceptance, E, VU by MK at 2/21/2024 2324    Acceptance, E, VU,NR by CS at 2/21/2024 1100   Family Acceptance, E, VU by EC at 3/2/2024 1206    Comment: body mechanics, adls, proper positioning    Acceptance, E,D, VU by  at 2/27/2024 1616                                         User Key       Initials Effective Dates Name Provider Type Discipline     07/11/23 -  Dorita Oropeza, OTR/L Occupational Therapist OT    CS 02/03/23 -  Almaz Rodriguez, OTR/L, KIRSTIN Occupational Therapist OT    LR 04/25/23 -  Wilda Faulkner, OTR/L Occupational Therapist OT    MK 04/25/23 -  Warren Mccauley, KADEN Registered Nurse Nurse    EC 10/13/23 -  Mellissa Manning, OTR/L Occupational Therapist OT                    OT Recommendation and Plan     Plan of Care Review  Plan of Care Reviewed With: family  Progress: declining  Outcome Evaluation: OT tx completed. Spoke with family regarding transition to comfort care on 3/1/24 and they communicated that they would like education from therapy staff regarding positioning/ADL care. OT educated pt's family on body mechanics, ADL care, & positioning in bed. OT to answer family's questions, they verbalized understanding in all aspects. Pt is discharging home today with family as primary caregivers.  Plan of Care Reviewed With: family  Outcome Evaluation: OT tx completed. Spoke with family regarding transition to comfort care on 3/1/24 and they communicated that they would like education from therapy staff regarding positioning/ADL care. OT educated pt's family on body mechanics, ADL care, & positioning in bed. OT to answer family's questions, they verbalized understanding in all aspects. Pt is discharging home today with family as primary caregivers.      OT Rehab Goals       Row Name 03/02/24 1200             Transfer Goal 1 (OT)    Activity/Assistive Device (Transfer Goal 1, OT) bed-to-chair/chair-to-bed;toilet  -EC      [place] Level/Cues Needed (Transfer Goal 1,  OT) standby assist  -EC      Time Frame (Transfer Goal 1, OT) long term goal (LTG)  -EC      Progress/Outcome (Transfer Goal 1, OT) goal not met  -EC         Dressing Goal 1 (OT)    Activity/Device (Dressing Goal 1, OT) dressing skills, all  -EC      Porter/Cues Needed (Dressing Goal 1, OT) minimum assist (75% or more patient effort)  -EC      Time Frame (Dressing Goal 1, OT) long term goal (LTG)  -EC      Progress/Outcome (Dressing Goal 1, OT) goal not met  -EC         Toileting Goal 1 (OT)    Activity/Device (Toileting Goal 1, OT) toileting skills, all  -EC      [place] Level/Cues Needed (Toileting Goal 1, OT) standby assist  -EC      Time Frame (Toileting Goal 1, OT) long term goal (LTG)  -EC      Progress/Outcome (Toileting Goal 1, OT) goal not met  -EC                User Key  (r) = Recorded By, (t) = Taken By, (c) = Cosigned By      Initials Name Provider Type Discipline    EC Mellissa Manning, OTR/L Occupational Therapist OT                     Outcome Measures       Row Name 03/02/24 1200             How much help from another is currently needed...    Putting on and taking off regular lower body clothing? 1  -EC      Bathing (including washing, rinsing, and drying) 1  -EC      Toileting (which includes using toilet bed pan or urinal) 1  -EC      Putting on and taking off regular upper body clothing 1  -EC      Taking care of personal grooming (such as brushing teeth) 2  -EC      Eating meals 1  -EC      AM-PAC 6 Clicks Score (OT) 7  -EC         Functional Assessment    Outcome Measure Options AM-PAC 6 Clicks Daily Activity (OT)  -EC                User Key  (r) = Recorded By, (t) = Taken By, (c) = Cosigned By      Initials Name Provider Type    EC Mellissa Manning, OTR/L Occupational Therapist                    Time Calculation:    Time Calculation- OT       Row Name 03/02/24 1200             Time Calculation- OT    OT Start Time 1140  +5 min educating family on 3/1/24  -EC      OT Stop Time 1200   -EC      OT Time Calculation (min) 20 min  -EC      Total Timed Code Minutes- OT 25 minute(s)  -EC      OT Received On 03/02/24  -EC         Timed Charges    52828 - OT Self Care/Mgmt Minutes 25  -EC         Total Minutes    Timed Charges Total Minutes 25  -EC       Total Minutes 25  -EC                User Key  (r) = Recorded By, (t) = Taken By, (c) = Cosigned By      Initials Name Provider Type    Mellissa Boogie OTR/L Occupational Therapist                  Timed Therapy Charges  Total Units: 2      Suggested Charges  Total Units: 2      Procedure Name Documented Minutes Units Code    HC OT SELF CARE/MGMT/TRAIN EA 15 MIN 25 2   34295 (CPT®)                 Documented Minutes  Total Minutes: 25      Therapy Provided Minutes    98964 - OT Self Care/Mgmt Minutes 25                           OT Discharge Summary  Anticipated Discharge Disposition (OT): home with home health, home with 24/7 care  Reason for Discharge: Discharge from facility  Outcomes Achieved: Refer to plan of care for updates on goals achieved  Discharge Destination: Home with home health    LINDA Rivrea/MAYLIN  3/2/2024

## 2024-03-02 NOTE — DISCHARGE SUMMARY
Hospital Discharge Summary    Brenda Sneed  :  1950  MRN:  [id number]    Admit date:  2024  Discharge date:  2024    Admitting Physician:    Sachin Dia MD    Discharge Diagnoses:      UTI (urinary tract infection)    Secondary osteoarthritis of left shoulder due to rotator cuff arthropathy    Cellulitis of right lower extremity    HTN (hypertension)    Morbid obesity    Anxiety associated with depression    Cellulitis of left lower extremity    Acute cystitis with hematuria    Candidiasis of skin    CHRISTINE (acute kidney injury)    Chronic kidney disease, stage 3a    COVID-19 virus detected    Other cirrhosis of liver    Lymphedema    Moderate malnutrition      Hospital Course:   The patient was admitted for the above surgical/medical indication.  Please see admission H&P for further details concerning the admission.  The patient was seen daily and progress noted via daily updates in the progress notes.  The patient improved throughout her stay.  They reached maximum medical improvement and were considered stable for discharge home.  They understand the importance of follow-up concerning any abnormal lab values/x-rays.  All questions were answered to the best of my ability prior to their discharge home.    The patient is a chronically ill 73 year old female who was admitted to the hospital initially with generalized weakness, pain, chronic lymphedema issues, acute on chronic candidiasis skin infection under breasts and skin folds, frequent falls, atrial fibrillation anticoagulated with Eliqiuis, having more trouble getting around at home.  Lives alone, uses walker.  Niece was present throughout the admission for history and information.  Patient was recently discharged from skilled nursing facility and also recently had COVID both of which she was having difficulty recovering from.  She was initially admitted and treated for a UTI which was found to be MDRO Pseudomonas, infectious  disease was consulted.  She became more and more lethagric throughout her admission.  She also has some issues with her right shoulder and orthopedics evaluated her but did not feel intervention was advisable due to her lethargic state.  The patient became more lethargic, feeding tube or enteric tube feeding was discussed family stated that she would not want this.  Therefore they have elected to go home with hospice.   She will be discharged today with [facility] once her DME has arrived.  Patient will be discharged with pain medications and hospice will adjust these as needed for comfort.        Discharge Medications:         Discharge Medications        New Medications        Instructions Start Date   Morphine 15 MG tablet  Commonly known as: MSIR  Replaces: Morphine 15 MG 12 hr tablet   15 mg, Oral, Every 8 Hours PRN             Continue These Medications        Instructions Start Date   albuterol (2.5 MG/3ML) 0.083% nebulizer solution  Commonly known as: PROVENTIL   2.5 mg, Nebulization, 3 Times Daily      cyclobenzaprine 10 MG tablet  Commonly known as: FLEXERIL   10 mg, Oral, 3 Times Daily PRN      docusate sodium 100 MG capsule  Commonly known as: COLACE   1 capsule, Oral, 2 Times Daily PRN      DULoxetine 60 MG capsule  Commonly known as: CYMBALTA   1 capsule, Oral, 2 Times Daily      furosemide 40 MG tablet  Commonly known as: LASIX   40 mg, Oral, Daily PRN      levothyroxine 100 MCG tablet  Commonly known as: SYNTHROID, LEVOTHROID   1 tablet, Oral, Daily      nystatin 136324 UNIT/GM powder  Commonly known as: MYCOSTATIN   1 application , Topical, 2 Times Daily             Stop These Medications      apixaban 5 MG tablet tablet  Commonly known as: ELIQUIS     bisoprolol-hydrochlorothiazide 5-6.25 MG per tablet  Commonly known as: ZIAC     Butrans 20 MCG/HR patch weekly  Generic drug: Buprenorphine     Morphine 15 MG 12 hr tablet  Commonly known as: MS CONTIN  Replaced by: Morphine 15 MG tablet               Consults: ID, ortho and palliative care     Significant Diagnostic Studies:      Lab Results (last 48 hours)       ** No results found for the last 48 hours. **              Treatments:   IV abx, pain medications, supportive treatment     Disposition:   hospice  Follow up with Sachin Dia MD in 1 week.    Signed:  Hector Swan Arley, APRN   3/2/2024, 05:25 CST      Dr. Block sent in controlled prescriptions yesterday.  I will arrange for discharge today.  Home with hospice care.  Long-term prognosis poor-all aware.    I have discussed the care of Brenda Sneed, including pertinent history and exam findings with the ARNP/PA.  I have seen and examined the patient and the key elements of all parts of the encounter have been performed by me. I agree with the assessment and plan as outlined by the ARNP/PA. Please refer to my comments for complete documentation.     Electronically signed by Sachin Dia MD on 3/2/2024 at 07:33 CST

## 2024-03-02 NOTE — PLAN OF CARE
Goal Outcome Evaluation:  Plan of Care Reviewed With: family        Progress: declining  Outcome Evaluation: OT tx completed. Spoke with family regarding transition to comfort care on 3/1/24 and they communicated that they would like education from therapy staff regarding positioning/ADL care. OT educated pt's family on body mechanics, ADL care, & positioning in bed. OT to answer family's questions, they verbalized understanding in all aspects. Pt is discharging home today with family as primary caregivers.      Anticipated Discharge Disposition (OT): home with home health, home with 24/7 care

## 2024-03-02 NOTE — PLAN OF CARE
Goal Outcome Evaluation:  Plan of Care Reviewed With: (P) patient, family      Pt seen for discharge treatment as she is going home today. Pt awake and alert in bed with family present. Pt's family member was feeding her ice chips during the session, and Pt was coughing throughout. Pt and family educated on swallow safety, dysphagia symptoms, nutrition, and different types of thickeners. Pt and family supplied with a box of gel thickeners for nectar thick consistencies and an education packet regarding swallowing. Family member reported that nectar thick liquids were beneficial as Pt had been coughing on thin liquids. SLP services are discharging Pt as she is leaving the facility.    Celeste Hamilton, SLP Student                          Treatment Assessment (SLP): (P) continued (03/02/24 2299)     Plan for Continued Treatment (SLP): (P)  (Pt being discharged from facility) (03/02/24 0159)

## 2024-03-02 NOTE — OUTREACH NOTE
Prep Survey      Flowsheet Row Responses   [facility] facility patient discharged from? [place]   Is LACE score < 7 ? No   Eligibility Not Eligible   What are the reasons patient is not eligible? Hospice/Pallative Care   Does the patient have one of the following disease processes/diagnoses(primary or secondary)? Other   Prep survey completed? Yes            FERN MUNIZ - Registered Nurse

## 2024-03-04 NOTE — PAYOR COMM NOTE
"[facility] 3-2-24    Eyad Gurrola (73 y.o. Female)       Date of Birth   1950    Social Security Number       Address   [address] [address]    Home Phone   [phone]    MRN   [id number]       [facility]    Marital Status                               Admission Date   24    Admission Type   Emergency    Admitting Provider   Sachin Dia MD    Attending Provider       Department, Room/Bed   [facility] 3A, 358/1       Discharge Date   3/2/2024    Discharge Disposition   Home or Self Care    Discharge Destination                                 Attending Provider: (none)   Allergies: Codeine, Amoxicillin, Levaquin [Levofloxacin], Benadryl [Diphenhydramine]    Isolation: None   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), 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), MDR Pseudomonas (24), COVID (History) (24)   Code Status: Prior    Ht: 160 cm (63\")   Wt: 91.6 kg (202 lb)    Admission Cmt: None   Principal Problem: UTI (urinary tract infection) [N39.0]                   Active Insurance as of 2024       Primary Coverage       Payor Plan Insurance Group Employer/Plan Group    HUMANA MEDICARE REPLACEMENT HUMANA MED ADV PPO [id number]       Payor Plan Address Payor Plan Phone Number Payor Plan Fax Number Effective Dates    [address] [address] [phone]  2023 - None Entered    [facility] [id number]         Subscriber Name Subscriber Birth Date Member ID       EYAD GURROLA 1950 [id number]                     Emergency Contacts        (Rel.) Home Phone Work Phone Mobile Phone    Nella (Daughter) [phone] -- [phone]    LESLIEODELL (Relative) -- -- [phone]                 Discharge Summary        Sachin Dia MD at 24 0525              Hospital Discharge Summary    Eyad Gurrola  :  1950  MRN:  [id number]    Admit date:  2024  Discharge date:  2024    Admitting Physician:    Sachin Rojas " MD Ifeoma    Discharge Diagnoses:      UTI (urinary tract infection)    Secondary osteoarthritis of left shoulder due to rotator cuff arthropathy    Cellulitis of right lower extremity    HTN (hypertension)    Morbid obesity    Anxiety associated with depression    Cellulitis of left lower extremity    Acute cystitis with hematuria    Candidiasis of skin    CHRISTINE (acute kidney injury)    Chronic kidney disease, stage 3a    COVID-19 virus detected    Other cirrhosis of liver    Lymphedema    Moderate malnutrition      Hospital Course:   The patient was admitted for the above surgical/medical indication.  Please see admission H&P for further details concerning the admission.  The patient was seen daily and progress noted via daily updates in the progress notes.  The patient improved throughout her stay.  They reached maximum medical improvement and were considered stable for discharge home.  They understand the importance of follow-up concerning any abnormal lab values/x-rays.  All questions were answered to the best of my ability prior to their discharge home.    The patient is a chronically ill 73 year old female who was admitted to the hospital initially with generalized weakness, pain, chronic lymphedema issues, acute on chronic candidiasis skin infection under breasts and skin folds, frequent falls, atrial fibrillation anticoagulated with Eliqiuis, having more trouble getting around at home.  Lives alone, uses walker.  Niece was present throughout the admission for history and information.  Patient was recently discharged from skilled nursing facility and also recently had COVID both of which she was having difficulty recovering from.  She was initially admitted and treated for a UTI which was found to be MDRO Pseudomonas, infectious disease was consulted.  She became more and more lethagric throughout her admission.  She also has some issues with her right shoulder and orthopedics evaluated her but did not feel  intervention was advisable due to her lethargic state.  The patient became more lethargic, feeding tube or enteric tube feeding was discussed family stated that she would not want this.  Therefore they have elected to go home with hospice.   She will be discharged today with [facility] once her DME has arrived.  Patient will be discharged with pain medications and hospice will adjust these as needed for comfort.        Discharge Medications:         Discharge Medications        New Medications        Instructions Start Date   Morphine 15 MG tablet  Commonly known as: MSIR  Replaces: Morphine 15 MG 12 hr tablet   15 mg, Oral, Every 8 Hours PRN             Continue These Medications        Instructions Start Date   albuterol (2.5 MG/3ML) 0.083% nebulizer solution  Commonly known as: PROVENTIL   2.5 mg, Nebulization, 3 Times Daily      cyclobenzaprine 10 MG tablet  Commonly known as: FLEXERIL   10 mg, Oral, 3 Times Daily PRN      docusate sodium 100 MG capsule  Commonly known as: COLACE   1 capsule, Oral, 2 Times Daily PRN      DULoxetine 60 MG capsule  Commonly known as: CYMBALTA   1 capsule, Oral, 2 Times Daily      furosemide 40 MG tablet  Commonly known as: LASIX   40 mg, Oral, Daily PRN      levothyroxine 100 MCG tablet  Commonly known as: SYNTHROID, LEVOTHROID   1 tablet, Oral, Daily      nystatin 340298 UNIT/GM powder  Commonly known as: MYCOSTATIN   1 application , Topical, 2 Times Daily             Stop These Medications      apixaban 5 MG tablet tablet  Commonly known as: ELIQUIS     bisoprolol-hydrochlorothiazide 5-6.25 MG per tablet  Commonly known as: ZIAC     Butrans 20 MCG/HR patch weekly  Generic drug: Buprenorphine     Morphine 15 MG 12 hr tablet  Commonly known as: MS CONTIN  Replaced by: Morphine 15 MG tablet              Consults: ID, ortho and palliative care     Significant Diagnostic Studies:      Lab Results (last 48 hours)       ** No results found for the last 48 hours. **               Treatments:   IV abx, pain medications, supportive treatment     Disposition:   hospice  Follow up with Sachin Dia MD in 1 week.    Signed:  GARETT Miles   3/2/2024, 05:25 CST      Dr. Block sent in controlled prescriptions yesterday.  I will arrange for discharge today.  Home with hospice care.  Long-term prognosis poor-all aware.    I have discussed the care of Brenda Sneed, including pertinent history and exam findings with the ARNP/PA.  I have seen and examined the patient and the key elements of all parts of the encounter have been performed by me. I agree with the assessment and plan as outlined by the ARNP/PA. Please refer to my comments for complete documentation.     Electronically signed by Sachin Dia MD on 3/2/2024 at 07:33 CST     Electronically signed by Sachin Dia MD at 03/02/24 0733

## 2024-03-13 NOTE — PROGRESS NOTES
"Enter Query Response Below      Query Response: History of COVID-19 infection with persistent viral shedding             If applicable, please update the problem list.   Patient: Brenda Sneed        : 1950  Account: [id number]           Admit Date: 2024        How to Respond to this query:       a. Click New Note     b. Answer query within the yellow box.                c. Update the Problem List, if applicable.      If you have any questions about this query contact me at: [email or web address]     :    73-year-old patient admitted with UTI and COVID-19 on 2024. Per H&P, \"Patient is 1 week out from COVID-19 infection, not a candidate for antivirals at this point in time.  Symptomatic treatment with IV antibiotics nebulized treatments and aggressive pulmonary toilet.\" Per Discharge Summary, \"COVID-19 virus detected\". Per Infectious Disease Consult Note, \"Recent COVID 19 infection\". Medicine PN  includes \"Chest x-ray was performed yesterday showing bronchitis.  Remains in COVID precautions\".  COVID-19/FLUA&B/RSV nasal swab obtained on  resulted as COVID19 \"Detected\".  CXR , \"Low lung volumes with mild bibasilar parenchymal opacities, likely atelectasis\".  CXR , \"Peribronchial thickening may reflect viral lower respiratory tract infection. There is no superimposed lung consolidation. Lungs are well expanded\".  CXR , \"Bilateral increasing hazy airspace opacities, right lung apex and midportion of the left lung in particular. Pattern is almost a \"reverse batwing\" pattern and I do not favor this to be a developing pulmonary  edema. Atypical pneumonia/viral pneumonia is the top consideration\".  Treatment has included supplemental oxygen, Proventil HFA, Palliative Care consult, and discharged home with Hospice.     Can the patient's condition be further specified as:    Active COVID 19 infection  History of COVID 19 with viral shedding  Other- specify " __________  Unable to determine    By submitting this query, we are merely seeking further clarification of documentation to accurately reflect all conditions that you are monitoring, evaluating, treating or that extend the hospitalization or utilize additional resources of care. Please utilize your independent clinical judgment when addressing the question(s) above.     This query and your response, once completed, will be entered into the legal medical record.    Sincerely,  Rosa Elena Eisenberg RN, BSN, CCDS  Clinical Documentation Integrity Program   [email or web address]

## 2024-03-13 NOTE — PROGRESS NOTES
"Enter Query Response Below      Query Response: Toxic encephalopathy due to opioids to include additional clinical indicators of slurred incoherent speech.  Oriented to person only.               If applicable, please update the problem list.   Patient: Brenda Sneed        : 1950  Account: [id number]           Admit Date: 2024        How to Respond to this query:       a. Click New Note     b. Answer query within the yellow box.                c. Update the Problem List, if applicable.      If you have any questions about this query contact me at: [email or web address]     :    73-year-old patient with opiate use disorder was admitted with UTI on 2024 has diagnosis of encephalopathy this admit.   Per  PN, \"Going to stop her scheduled pain medicine this morning, I will leave some as needed pain medications on.  I hope this will help her altered mental status and intermittent confusion.\"  Per ED Provider Notes, patient was alert and oriented on arrival. H&P states \"much less alert currently this morning then reported in the emergency room\" and \"seems sedated from pain medication this morning\". Per OT , patient was alert and oriented x1.   Nursing Note  states \"Patient is AOx2-4, waxing and waning confusion\". PN , \"She is probably worse than she was Friday.  On chart review over the weekend, it appears she has had some intermittent alertness.  Confused this morning\". Per review of Nursing documentation, patient had GCS of 14 on  -, then decreased to 13 on  and remained 13 thru discharge.   Treatment has included decreasing scheduled MS Contin to once daily then discontinuing on , Morphine 7.5 mg every 8 hours PRN beginning on , IV antibiotics, Palliative Care consult, and discharged home with Hospice.  Please clarify if patient treated/monitored for one or more of the following:    Toxic encephalopathy due to opiates, please include additional " clinical indicators: __________  Acute metabolic encephalopathy due to infection, please include additional clinical indicators: __________  Encephalopathy due to:_____________, please include additional clinical indicators: __________  Encephalopathy not clinically supported  Other- specify__________  Unable to determine     By submitting this query, we are merely seeking further clarification of documentation to accurately reflect all conditions that you are monitoring, evaluating, treating or that extend the hospitalization or utilize additional resources of care. Please utilize your independent clinical judgment when addressing the question(s) above.     This query and your response, once completed, will be entered into the legal medical record.    Sincerely,  Rosa Elena Eisenberg RN, BSN, CCDS  Clinical Documentation Integrity Program   [email or web address]

## 2024-03-29 ENCOUNTER — TELEPHONE (OUTPATIENT)
Dept: HEMATOLOGY | Age: 74
End: 2024-03-29

## 2024-03-29 NOTE — TELEPHONE ENCOUNTER
Called pt to remind them of their appt on 4/2/2024 but was unable to leave message or speak to the patient due to line (slow) busy OR (FAST) busy signal.No other number in chart.

## 2024-07-23 NOTE — PROGRESS NOTES
[facility]  INPATIENT WOUND & OSTOMY CARE    PROGRESS NOTE    Today's Date: 05/26/23    Patient Name: Brenda Sneed  MRN: [id number]  CSN: [id number]  PCP: Sachin Dia MD  Referring Provider: EVA ANN   Attending Provider: Sachin Dia MD  Length of Stay: 3    SUBJECTIVE   Chief Complaint: Left leg wound    HPI: Brenda Sneed is receiving care in [facility] now.  Left proximal lower leg continues to be dressed with Iodoform to fill undermining medially of wound.  Left distal lower leg continues to be dressed with Opticell Ag.  Tissue culture preliminary result is growing Staphylococcus aureus, Klebsiella pneumoniae, and E coli. Attending is managing antibiotics.  She is currently receiving cefdinir.      Visit Dx:    ICD-10-CM ICD-9-CM   1. Lymphedema of both lower extremities  I89.0 457.1   2. Thrombocytopenia  D69.6 287.5   3. Cellulitis of left lower extremity  L03.116 682.6   4. Laceration of left lower extremity, subsequent encounter  S81.812D V58.89     894.0       Hospital Problem List:     Thrombocytopenia    HTN (hypertension)    Obesity, Class III, BMI 40-49.9 (morbid obesity)    Anxiety associated with depression    Acute cystitis with hematuria      History:   Past Medical History:   Diagnosis Date    Arthritis     Bronchitis     Cataract     right eye , cornea implant    Chronic back pain     Depression     Disease of thyroid gland     Edema     lower extremities    Hypertension     Kidney stones      Past Surgical History:   Procedure Laterality Date    BACK SURGERY      2010 ([place]), 2013     CHOLECYSTECTOMY OPEN      CORNEAL TRANSPLANT Right 1999    INCISION AND DRAINAGE ABSCESS Left 4/19/2023    Procedure: INCISION AND DRAINAGE ABSCESS left leg;  Surgeon: Sarita Vazquez MD;  Location: [facility];  Service: General;  Laterality: Left;    LACERATION REPAIR Left 4/7/2023    Procedure: washout and closure left leg wound ;  Surgeon: Sarita Vazquez MD;  Subjective   Patient ID: Betito Vicente is a 59 y.o. male who presents for Elevated PSA. Last seen 5/1/24 when NEREYDA is positive for enlarged prostate but otherwise unremarkable. We discussed the role of PSA in screening for prostate cancer. I recommend that we schedule an MRI of the prostate to see if there are any lesions visible. If there is a lesion detected, it will facilitate an MRI targeted biopsy. If there is nothing found on the MRI, that does not rule out a cancer. We can still investigate further with a general IO biopsy. Start tamsulosin 0.4 mg nightly. Schedule MRI and follow-up with results.     HPI  Patient relates he is still straining to void and the medication did not make a lot of difference. Patient relates he has to strain worse at night.     MRI Prostate Results  ROSTATE VOLUME:  The prostate measures  5.9 cm x  5.0 cm  x  6.0 cm in right-to-left,  anterior-posterior and craniocaudal dimension.  Prostate weight is estimated at 92.7g. PSA density is 0.07 ng/mL/g.  IMPRESSION:  BPH changes of the transition zone. Diffuse non nodular  hypointensities within the peripheral zone, without evidence of  focally restricted diffusion ( PI-RADS 2).      Review of Systems  A 12 system review was completed and is negative with the exception of those signs and symptoms noted in the history of present illness.    Objective   Physical Exam  General: in NAD, appears stated age  Head: normocephalic, atraumatic  Respiratory: normal effort, no use of accessory muscles  Cardiovascular: no edema noted  Skin: normal turgor, no rashes  Neurologic: grossly intact, oriented to person/place/time  Psychiatric: mode and affect appropriate     Assessment/Plan   Problem List Items Addressed This Visit             ICD-10-CM    Elevated PSA - Primary R97.20     Other Visit Diagnoses         Codes    BPH with elevated PSA     N40.0, R97.20          We reviewed MRI findings together. Prostate weight is estimated at 92.7 g PI-RADS   Location:  [facility];  Service: General;  Laterality: Left;    LUMBAR FUSION Left 8/2/2017    Procedure: LEFT LUMBAR LATERAL INTERBODY FUSION WITH INSTRUMENTATION  L1-2;  Surgeon: MARCELINO Wright MD;  Location:  [facility];  Service:     NECK SURGERY  2010    [place]    TOTAL HIP ARTHROPLASTY Right 2014    DR. KENDRICK     TOTAL KNEE ARTHROPLASTY Right 2000    TOTAL KNEE ARTHROPLASTY Left 2002    TOTAL SHOULDER ARTHROPLASTY W/ DISTAL CLAVICLE EXCISION Left 6/1/2020    Procedure: LEFT REVERSE TOTAL SHOULDER REPLACEMENT;  Surgeon: Saravanan Calvin MD;  Location:  [facility];  Service: Orthopedics;  Laterality: Left;     Social History     Socioeconomic History    Marital status:    Tobacco Use    Smoking status: Never    Smokeless tobacco: Never   Vaping Use    Vaping Use: Never used   Substance and Sexual Activity    Alcohol use: No    Drug use: No    Sexual activity: Defer       Allergies:  Allergies   Allergen Reactions    Codeine Itching and Rash     SEVERE RASH/ITCHING (TOLERATES PERCOCET)    Benadryl [Diphenhydramine] Other (See Comments)     KEEPS PATIENT AWAKE        Medications:    Current Facility-Administered Medications:     acetaminophen (TYLENOL) tablet 650 mg, 650 mg, Oral, Q4H PRN, Sachin Dia MD, 650 mg at 05/25/23 2218    ascorbic acid (VITAMIN C) tablet 500 mg, 500 mg, Oral, Daily, Sachin Dia MD, 500 mg at 05/26/23 0812    sennosides-docusate (PERICOLACE) 8.6-50 MG per tablet 2 tablet, 2 tablet, Oral, BID, 2 tablet at 05/26/23 0813 **AND** polyethylene glycol (MIRALAX) packet 17 g, 17 g, Oral, Daily PRN **AND** bisacodyl (DULCOLAX) EC tablet 5 mg, 5 mg, Oral, Daily PRN **AND** bisacodyl (DULCOLAX) suppository 10 mg, 10 mg, Rectal, Daily PRN, Sachin Dia MD    bisoprolol (ZEBeta) 5 mg, hydroCHLOROthiazide (HYDRODIURIL) 6.25 mg for Ziac 5-6.25, , Oral, Daily, Sachin Dia MD, Given at 05/26/23 0812    calcium carbonate (TUMS) chewable tablet 500 mg (200 mg  2.  PI-RADS 2 does not completely rule out a low-grade cancer. I explained the biopsy procedure to the patient. I also explained what to expect after the procedure. We will schedule standard IO biopsy and follow-up with pathology results.   I suggested trying finasteride but it of course takes >6 months to work. We will wait to start finasteride until after the biopsy results.       Scribe Attestation  By signing my name below, I, Dorinda Collins , Ariana   attest that this documentation has been prepared under the direction and in the presence of Mohan Kumar MD.      elemental), 2 tablet, Oral, TID PRN, Sachin Dia MD, 2 tablet at 05/26/23 0035    cefdinir (OMNICEF) capsule 300 mg, 300 mg, Oral, Q12H, Sachin Dia MD, 300 mg at 05/26/23 0813    cyclobenzaprine (FLEXERIL) tablet 10 mg, 10 mg, Oral, TID PRN, Sachin Dia MD    dexamethasone sodium phosphate 40 mg in sodium chloride 0.9 % IVPB, 40 mg, Intravenous, Daily, Warren Marie PA    docusate sodium (COLACE) capsule 100 mg, 100 mg, Oral, BID, Sachin Dia MD, 100 mg at 05/26/23 0813    DULoxetine (CYMBALTA) DR capsule 60 mg, 60 mg, Oral, BID, Sachin Dia MD, 60 mg at 05/26/23 0813    HYDROmorphone (DILAUDID) injection 0.5 mg, 0.5 mg, Intravenous, Q2H PRN, Sachin Dia MD, 0.5 mg at 05/25/23 1657    immune globulin (human) (GAMMAGARD) 45 g, 600 mg/kg (Adjusted), Intravenous, Once, Warren Marie PA    levothyroxine (SYNTHROID, LEVOTHROID) tablet 100 mcg, 100 mcg, Oral, Q AM, Sachin Dia MD, 100 mcg at 05/26/23 0513    melatonin tablet 5 mg, 5 mg, Oral, Nightly PRN, Sachin Dia MD    nystatin (MYCOSTATIN) powder 1 application, 1 application, Topical, BID, Sachin Dia MD, 1 application at 05/26/23 0815    ondansetron (ZOFRAN) injection 4 mg, 4 mg, Intravenous, Q6H PRN, Sachin Dia MD    oxyCODONE-acetaminophen (PERCOCET) 5-325 MG per tablet 2 tablet, 2 tablet, Oral, Q4H PRN, Sachin Dia MD, 2 tablet at 05/26/23 0752    pantoprazole (PROTONIX) EC tablet 40 mg, 40 mg, Oral, Q AM, Warren Marie PA, 40 mg at 05/26/23 1214    pramipexole (MIRAPEX) tablet 0.75 mg, 0.75 mg, Oral, Q8H, Sachin Dia MD, 0.75 mg at 05/26/23 0513    sodium chloride 0.9 % flush 10 mL, 10 mL, Intravenous, Q12H, Sachin Dia MD, 10 mL at 05/26/23 0815    sodium chloride 0.9 % flush 10 mL, 10 mL, Intravenous, PRN, Sachin Dia MD    sodium chloride 0.9 % flush 10 mL, 10 mL, Intravenous, Q12H, Sachin Dia MD, 10 mL at  05/26/23 0813    sodium chloride 0.9 % flush 10 mL, 10 mL, Intravenous, PRN, Sachin Dia MD    sodium chloride 0.9 % flush 20 mL, 20 mL, Intravenous, PRN, Sachin Dia MD, 20 mL at 05/26/23 0814    sodium chloride 0.9 % infusion 40 mL, 40 mL, Intravenous, PRN, Sachin Dia MD    sodium chloride 0.9 % infusion 40 mL, 40 mL, Intravenous, PRN, Sachin Dia MD    vitamin D3 capsule 5,000 Units, 5,000 Units, Oral, Daily, Sachin Dia MD, 5,000 Units at 05/26/23 0814    Review of Systems:  Review of Systems  Constitutional:  Positive for activity change and fatigue. Negative for chills and fever.   HENT:  Negative for rhinorrhea and sore throat.    Respiratory:  Negative for shortness of breath.    Cardiovascular:  Positive for leg swelling. Negative for chest pain and palpitations.   Gastrointestinal:  Negative for diarrhea, nausea and vomiting.   Genitourinary:  Negative for flank pain and hematuria.   Musculoskeletal:  Positive for gait problem and myalgias. Negative for arthralgias.   Skin:  Positive for color change and wound.   Neurological:  Positive for weakness. Negative for dizziness and headaches.   Psychiatric/Behavioral:  Negative for agitation and behavioral problems.      OBJECTIVE     Vitals:    05/26/23 1134   BP: 128/58   Pulse: 58   Resp: 16   Temp: 97.2 °F (36.2 °C)   SpO2: 96%       PHYSICAL EXAM  Physical Exam  Vitals and nursing note reviewed.   Constitutional:       General: She is sleeping.      Appearance: She is morbidly obese.      Comments: Body mass index is 44.99 kg/m².   HENT:      Head: Normocephalic and atraumatic.   Eyes:      General: Lids are normal. Gaze aligned appropriately.   Cardiovascular:      Rate and Rhythm: Normal rate and regular rhythm.   Pulmonary:      Effort: Pulmonary effort is normal. No respiratory distress.   Abdominal:      General: Abdomen is protuberant.      Palpations: Abdomen is soft.   Musculoskeletal:       Cervical back: Normal range of motion and neck supple.      Right lower leg: Edema present.      Left lower leg: Edema present.   Skin:     General: Skin is warm and dry.      Findings: Erythema and wound present.      Comments: Proximal medial wound bed is red and moist with subcutaneous tissue present.   Periwound area is erythemic and bruised. Serosanguinous drainage present.    Distal medial wound 1.6cm x 7.5cm is laceration.  Red moist wound bed.  Small amount of slough present with serosanguinous drainage present.   Neurological:      Mental Status: She is alert, oriented to person, place, and time and easily aroused.      Motor: Weakness present.   Psychiatric:         Attention and Perception: She is inattentive.         Speech: Speech normal.         Behavior: Behavior is cooperative.      Results Review:  Lab Results (last 48 hours)       Procedure Component Value Units Date/Time    Fibrinogen [[id number]]  (Abnormal) Collected: 05/26/23 1056    Specimen: Blood Updated: 05/26/23 1126     Fibrinogen 227 mg/dL     Urinalysis, Microscopic Only - Urine, Clean Catch [[id number]]  (Abnormal) Collected: 05/26/23 1006    Specimen: Urine, Clean Catch Updated: 05/26/23 1037     RBC, UA 13-20 /HPF      WBC, UA 13-20 /HPF      Bacteria, UA Trace /HPF      Squamous Epithelial Cells, UA 3-6 /HPF      Hyaline Casts, UA 7-12 /LPF      Methodology Automated Microscopy    Urinalysis With Culture If Indicated - Urine, Clean Catch [[id number]]  (Abnormal) Collected: 05/26/23 1006    Specimen: Urine, Clean Catch Updated: 05/26/23 1037     Color, UA Dark Yellow     Appearance, UA Clear     pH, UA 6.5     Specific Gravity, UA 1.022     Glucose, UA Negative     Ketones, UA Trace     Bilirubin, UA Small (1+)     Blood, UA Moderate (2+)     Protein, UA Negative     Leuk Esterase, UA Moderate (2+)     Nitrite, UA Negative     Urobilinogen, UA 1.0 E.U./dL    Narrative:      In absence of clinical symptoms, the presence of pyuria,  bacteria, and/or nitrites on the urinalysis result does not correlate with infection.    Urine Culture - Urine, Urine, Clean Catch [[id number]] Collected: 05/26/23 1006    Specimen: Urine, Clean Catch Updated: 05/26/23 1037    CBC & Differential [[id number]]  (Abnormal) Collected: 05/26/23 0810    Specimen: Blood Updated: 05/26/23 0918    Narrative:      The following orders were created for panel order CBC & Differential.  Procedure                               Abnormality         Status                     ---------                               -----------         ------                     CBC Auto Differential[[id number]]        Abnormal            Final result                 Please view results for these tests on the individual orders.    CBC Auto Differential [[id number]]  (Abnormal) Collected: 05/26/23 0810    Specimen: Blood Updated: 05/26/23 0918     WBC 4.67 10*3/mm3      RBC 1.90 10*6/mm3      Hemoglobin 7.1 g/dL      Hematocrit 19.2 %      .1 fL      MCH 37.4 pg      MCHC 37.0 g/dL      RDW 18.5 %      RDW-SD 50.5 fl      Platelets 5 10*3/mm3      Neutrophil % 47.9 %      Lymphocyte % 37.0 %      Monocyte % 11.3 %      Eosinophil % 2.8 %      Basophil % 0.6 %      Immature Grans % 0.4 %      Neutrophils, Absolute 2.23 10*3/mm3      Lymphocytes, Absolute 1.73 10*3/mm3      Monocytes, Absolute 0.53 10*3/mm3      Eosinophils, Absolute 0.13 10*3/mm3      Basophils, Absolute 0.03 10*3/mm3      Immature Grans, Absolute 0.02 10*3/mm3      nRBC 0.4 /100 WBC     Cold Agglutinin Titer [[id number]] Collected: 05/26/23 0810    Specimen: Blood Updated: 05/26/23 0818    Blood Culture - Blood, Arm, Left [[id number]]  (Normal) Collected: 05/23/23 0548    Specimen: Blood from Arm, Left Updated: 05/26/23 0730     Blood Culture No growth at 3 days    Blood Culture - Blood, Arm, Left [[id number]]  (Normal) Collected: 05/23/23 0548    Specimen: Blood from Arm, Left Updated: 05/26/23 0730     Blood Culture No growth at 3  days    Tissue / Bone Culture - Tissue, Leg, Left [[id number]]  (Abnormal)  (Susceptibility) Collected: 05/24/23 1054    Specimen: Tissue from Leg, Left Updated: 05/26/23 0707     Tissue Culture Moderate growth (3+) Staphylococcus aureus      Moderate growth (3+) Klebsiella pneumoniae ssp pneumoniae      Moderate growth (3+) Escherichia coli     Gram Stain Moderate (3+) Gram negative bacilli      No WBCs seen    Susceptibility        Staphylococcus aureus      CHRISTIANO      Clindamycin Susceptible      Erythromycin Resistant      Inducible Clindamycin Resistance Negative      Oxacillin Susceptible      Rifampin Susceptible      Tetracycline Resistant      Trimethoprim + Sulfamethoxazole Susceptible      Vancomycin Susceptible                       Susceptibility        Klebsiella pneumoniae ssp pneumoniae      CHRISTIANO      Ampicillin Resistant      Ampicillin + Sulbactam Susceptible      Cefepime Susceptible      Ceftazidime Susceptible      Ceftriaxone Susceptible      Gentamicin Susceptible      Levofloxacin Susceptible      Piperacillin + Tazobactam Susceptible      Tetracycline Resistant      Trimethoprim + Sulfamethoxazole Susceptible                       Susceptibility Comments       Staphylococcus aureus    This isolate does not demonstrate inducible clindamycin resistance in vitro.        Klebsiella pneumoniae ssp pneumoniae    Cefazolin sensitivity will not be reported for Enterobacteriaceae in non-urine isolates. If cefazolin is preferred, please call the microbiology lab to request an E-test.  With the exception of urinary-sourced infections, aminoglycosides should not be used as monotherapy.               aPTT [[id number]]  (Abnormal) Collected: 05/26/23 0543    Specimen: Blood Updated: 05/26/23 0613     PTT 35.3 seconds     Protime-INR [[id number]]  (Abnormal) Collected: 05/26/23 0543    Specimen: Blood Updated: 05/26/23 0613     Protime 15.7 Seconds      INR 1.23    Occult Blood X 1, Stool - Stool, Per Rectum  [[id number]]  (Normal) Collected: 05/25/23 2311    Specimen: Stool from Per Rectum Updated: 05/25/23 2321     Fecal Occult Blood Negative    Immature Platelet Fraction [[id number]]  (Normal) Collected: 05/25/23 0947    Specimen: Blood Updated: 05/25/23 1024     IPF 5.40 %     CBC & Differential [[id number]]  (Abnormal) Collected: 05/25/23 0947    Specimen: Blood Updated: 05/25/23 1024    Narrative:      The following orders were created for panel order CBC & Differential.  Procedure                               Abnormality         Status                     ---------                               -----------         ------                     CBC Auto Differential[[id number]]        Abnormal            Final result                 Please view results for these tests on the individual orders.    CBC Auto Differential [[id number]]  (Abnormal) Collected: 05/25/23 0947    Specimen: Blood Updated: 05/25/23 1024     WBC 5.02 10*3/mm3      RBC 2.75 10*6/mm3      Hemoglobin 8.3 g/dL      Hematocrit 26.6 %      MCV 96.7 fL      MCH 30.2 pg      MCHC 31.2 g/dL      RDW 15.6 %      RDW-SD 53.1 fl      MPV 12.4 fL      Platelets 7 10*3/mm3      Neutrophil % 58.1 %      Lymphocyte % 26.5 %      Monocyte % 9.6 %      Eosinophil % 4.0 %      Basophil % 1.2 %      Neutrophils, Absolute 2.92 10*3/mm3      Lymphocytes, Absolute 1.33 10*3/mm3      Monocytes, Absolute 0.48 10*3/mm3      Eosinophils, Absolute 0.20 10*3/mm3      Basophils, Absolute 0.06 10*3/mm3     Basic Metabolic Panel [[id number]]  (Abnormal) Collected: 05/25/23 0947    Specimen: Blood Updated: 05/25/23 1018     Glucose 102 mg/dL      BUN 11 mg/dL      Creatinine 0.54 mg/dL      Sodium 139 mmol/L      Potassium 3.3 mmol/L      Comment: Slight hemolysis detected by analyzer. Results may be affected.        Chloride 102 mmol/L      CO2 28.0 mmol/L      Calcium 8.7 mg/dL      BUN/Creatinine Ratio 20.4     Anion Gap 9.0 mmol/L      eGFR 96.7 mL/min/1.73     Narrative:      GFR  Normal >60  Chronic Kidney Disease <60  Kidney Failure <15    The GFR formula is only valid for adults with stable renal function between ages 18 and 70.    aPTT [[id number]]  (Normal) Collected: 05/25/23 0947    Specimen: Blood Updated: 05/25/23 1007     PTT 32.9 seconds     Protime-INR [[id number]]  (Abnormal) Collected: 05/25/23 0947    Specimen: Blood Updated: 05/25/23 1007     Protime 14.8 Seconds      INR 1.14    Haptoglobin [[id number]]  (Normal) Collected: 05/23/23 0109    Specimen: Blood Updated: 05/24/23 2013     Haptoglobin 71 mg/dL           Imaging Results (Last 72 Hours)       Procedure Component Value Units Date/Time    US Abdomen Complete [[id number]] Collected: 05/24/23 1645     Updated: 05/24/23 1651    Narrative:      EXAM/TECHNIQUE: US ABDOMEN COMPLETE-     INDICATION: thrombocytopenia; I89.0-Lymphedema, not elsewhere  classified; D69.6-Thrombocytopenia, unspecified; L03.116-Cellulitis of  left lower limb; S81.812D-Laceration without foreign body, left lower  leg, subsequent encounter     COMPARISON: None     FINDINGS:     Visualized portion of the pancreas is unremarkable. Upper abdominal  aorta and IVC are unremarkable.     Liver echotexture is coarsened and the liver contour appears slightly  nodular. Patent portal vein with appropriate directional flow. No solid  liver lesion identified. Perihepatic ascites is present.     Gallbladder is reported surgically absent. The common bile duct is 7 mm  diameter, upper limits of normal, with prominence likely related to  reservoir phenomenon.     RIGHT kidney is 9.1 cm in length and demonstrates normal cortical  thickness and echogenicity. LEFT kidney is 9.1 cm in length and  demonstrates normal cortical thickness and echogenicity. No shadowing  calculi or hydronephrosis. No focal urinary bladder abnormality.     Spleen is enlarged measuring up to 13.4 cm. No focal splenic lesion.       Impression:         1.  Coarsened liver with mild surface contour  nodularity, concerning for  chronic liver disease and cirrhosis.  2.  Perihepatic ascites.  3.  Mild splenomegaly.  This report was finalized on 05/24/2023 16:48 by Dr. Bryce Mancera MD.               ASSESSMENT/PLAN       Examination and evaluation of wound(s) was performed.    DIAGNOSIS:   Laceration of left lower extremity  Open wound of left lower leg with fat layer exposed  Lymphedema of both lower extremities  Obesity - Body mass index is 44.99 kg/m².         PLAN:   Continue current wound care orders.     Referrals placed for follow up at [facility] and lymphedema clinic after discharge.     Discussed findings and treatment plan including risks, benefits, and treatment options with patient in detail. Patient agreed with treatment plan.      This document has been electronically signed by GARETT Garcia on 5/26/2023 12:33 CDT     Time spent in face-to-face evaluation, chart review, planning and education 40 minutes with greater than 50% of time spent with patient and/or family and in coordination of care.  Counseling of patient and/or family includes discussing wound diagnosis and etiology , talking about test results, counseling on risk factor reduction, giving instructions including follow-up management, and patient/family education. No procedures were completed during this visit.

## 2025-07-24 NOTE — PROGRESS NOTES
Medicare Wellness Visit  Plan for Preventive Care    A good way for you to stay healthy is to use preventive care.  Medicare covers many services that can help you stay healthy.* The goal of these services is to find any health problems as quickly as possible. Finding problems early can help make them easier to treat.  Your personal plan below lists the services you may need and when they are due.      Health Maintenance Summary     DTaP/Tdap/Td Vaccine (2 - Td or Tdap)  Overdue since 10/2/2022    COVID-19 Vaccine (4 - 2024-25 season)  Overdue since 9/1/2024    Colorectal Cancer Screening (Colonoscopy - Every 5 Years)  Overdue since 11/21/2024    Breast Cancer Screening (Every 2 Years)  Tentatively due on 3/14/2026    Medicare Advantage - Medicare Wellness Visit (Yearly - January to December)  Order placed this encounter    A1c for Diabetes Screening (Yearly)  Next due on 7/17/2026    Depression Screening (Yearly)  Next due on 7/22/2026    Respiratory Syncytial Virus (RSV) Vaccine 60+ (1 - 1-dose 75+ series)  Next due on 1/26/2029    Osteoporosis Screening   Completed    Hepatitis C Screening   Completed    Shingles Vaccine   Completed    Pneumococcal Vaccine 50+   Completed    Hepatitis A Vaccine   Aged Out    Meningococcal Vaccine   Aged Out    Hepatitis B Vaccine (For Physician/[facility] Discussion)   Aged Out    Meningococcal Serogroup B Vaccine   Aged Out    HPV Vaccine   Aged Out           Preventive Care for Women and Men    Heart Screenings (Cardiovascular):  Blood tests are used to check your cholesterol, lipid and triglyceride levels. High levels can increase your risk for heart disease and stroke. High levels can be treated with medications, diet and exercise. Lowering your levels can help keep your heart and blood vessels healthy.  Your provider will order these tests if they are needed.    An ultrasound is done to see if you have an abdominal aortic aneurysm (AAA).  This is an enlargement of one of the main  Malnutrition Severity Assessment    Patient Name:  Brenda Sneed  YOB: 1950  MRN: [id number]  Admit Date:  2/20/2024    Patient meets criteria for : Moderate (non-severe) Malnutrition (Secondary signs: Fatigue,generalized weakness,poor wound healing and limited mobility)    Malnutrition Severity Assessment  Malnutrition Type: Chronic Disease - Related Malnutrition  Malnutrition Type (last 8 hours)       Malnutrition Severity Assessment       Row Name 02/23/24 1544       Malnutrition Severity Assessment    Malnutrition Type Chronic Disease - Related Malnutrition      Row Name 02/23/24 1544       Insufficient Energy Intake     Insufficient Energy Intake Findings Moderate    Insufficient Energy Intake  <75% of est. energy requirement for > or equal to 1 month      Row Name 02/23/24 1544       Unintentional Weight Loss     Unintentional Weight Loss Findings Severe    Unintentional Weight Loss  Weight loss greater than 7.5% in three months  wt loss of 28 lbs (12%) over the past three months      Row Name 02/23/24 1544       Fluid Accumulation (Edema)    Fluid Acumulation Findings --  Peripheral edema to bilateral lower extremity wrapped in ace bandages      Row Name 02/23/24 1544       Criteria Met (Must meet criteria for severity in at least 2 of these categories: M Wasting, Fat Loss, Fluid, Secondary Signs, Wt. Status, Intake)    Patient meets criteria for  Moderate (non-severe) Malnutrition  Secondary signs: Fatigue,generalized weakness,poor wound healing and limited mobility                    Electronically signed by:  Paola Espinoza MS,RDN,LD  02/23/24 15:55 CST   blood vessels that delivers blood to the body.   In the [place], 9,000 deaths are caused by AAA.  You may not even know you have this problem and as many as 1 in 3 people will have a serious problem if it is not treated.  Early diagnosis allows for more effective treatment and cure.  If you have a family history of AAA or are a male age 65-75 who has smoked, you are at higher risk of an AAA.  Your provider can order this test, if needed.    Colorectal Screening:  There are many tests that are used to check for cancer of your colon and rectum. You and your provider should discuss what test is best for you and when to have it done.  Options include:  Screening Colonoscopy: exam of the entire colon, seen through a flexible lighted tube.  Flexible Sigmoidoscopy: exam of the last third (sigmoid portion) of the colon and rectum, seen through a flexible lighted tube.  Cologuard DNA stool test: a sample of your stool is used to screen for cancer and unseen blood in your stool.  Fecal Occult Blood Test: a sample of your stool is studied to find any unseen blood    Flu Shot:  An immunization that helps to prevent influenza (the flu). You should get this every year. The best time to get the shot is in the fall.    Pneumococcal Shot:  Vaccines help prevent pneumococcal disease, which is any type of illness caused by Streptococcus pneumoniae bacteria. There are two kinds of pneumococcal vaccines available in the [place]:   Pneumococcal conjugate vaccines (PCV20 or [id number]®)  Pneumococcal polysaccharide vaccine (PPSV23 or Lnapmhfyo24®)  For those who have never received any pneumococcal conjugate vaccine, CDC recommends PVC20 for adults 65 years or older and adults 19 through 64 years old with certain medical conditions or risk factors.   For those who have previously received PCV13, this should be followed by a dose of PPSV23.     Hepatitis B Shot:  An immunization that helps to protect people from getting  Hepatitis B. Hepatitis B is a virus that spreads through contact with infected blood or body fluids. Many people with the virus do not have symptoms.  The virus can lead to serious problems, such as liver disease. Some people are at higher risk than others. Your doctor will tell you if you need this shot.     Diabetes Screening:  A test to measure sugar (glucose) in your blood is called a fasting blood sugar. Fasting means you cannot have food or drink for at least 8 hours before the test. This test can detect diabetes long before you may notice symptoms.    Glaucoma Screening:  Glaucoma screening is performed by your eye doctor. The test measures the fluid pressure inside your eyes to determine if you have glaucoma.     Hepatitis C Screening:  A blood test to see if you have the hepatitis C virus.  Hepatitis C attacks the liver and is a major cause of chronic liver disease.  Medicare will cover a single screening for all adults born between 1945 & 1965, or high risk patients (people who have injected illegal drugs or people who have had blood transfusions).  High risk patients who continue to inject illegal drugs can be screened for Hepatitis C every year.    Smoking and Tobacco-Use Cessation Counseling:  Tobacco is the single greatest cause of disease and early death in our country today. Medication and counseling together can increase a person’s chance of quitting for good.   Medicare covers two quitting attempts per year, with four counseling sessions per attempt (eight sessions in a 12 month period)    Preventive Screening tests for Women    Screening Mammograms and Breast Exams:  An x-ray of your breasts to check for breast cancer before you or your doctor may be able to feel it.  If breast cancer is found early it can usually be treated with success.    Pelvic Exams and Pap Tests:  An exam to check for cervical and vaginal cancer. A Pap test is a lab test in which cells are taken from your cervix and sent to  the lab to look for signs of cervical cancer. If cancer of the cervix is found early, chances for a cure are good. Testing can generally end at age 65, or if a woman has a hysterectomy for a benign condition. Your provider may recommend more frequent testing if certain abnormal results are found.    Bone Mass Measurements:  A painless x-ray of your bone density to see if you are at risk for a broken bone. Bone density refers to the thickness of bones or how tightly the bone tissue is packed.    Preventive Screening tests for Men    Prostate Screening:  Should you have a prostate cancer test (PSA)?  It is up to you to decide if you want a prostate cancer test. Talk to your clinician to find out if the test is right for you.  Things for you to consider and talk about should include:  Benefits and harms of the test  Your family history  How your race/ethnicity may influence the test  If the test may impact other medical conditions you have  Your values on screenings and treatments    *Medicare pays for many preventive services to keep you healthy. For some of these services, you might have to pay a deductible, coinsurance, and / or copayment.  The amounts vary depending on the type of services you need and the kind of Medicare health plan you have.    For further details on screenings offered by Medicare please visit: https://[email or web address].gov/coverage/preventive-screening-services

## (undated) DEVICE — OPTIFOAM GENTLE SA, POSTOP, 4X8: Brand: MEDLINE

## (undated) DEVICE — GLV SURG PREMIERPRO ORTHO LTX PF SZ8.5 BRN

## (undated) DEVICE — BAPTIST TURNOVER KIT: Brand: MEDLINE INDUSTRIES, INC.

## (undated) DEVICE — ELECTRD BLD EXT EDGE 1P COAT 6.5IN STRL

## (undated) DEVICE — PENCL E/S PLUMEPEN 9.5MM 10FT LF

## (undated) DEVICE — SYS SKIN CLS DERMABOND PRINEO W/22CM MESH TP

## (undated) DEVICE — GLV SURG BIOGEL LTX PF 6 1/2

## (undated) DEVICE — GAUZE,SPONGE,FLUFF,6"X6.75",STRL,10/TRAY: Brand: MEDLINE

## (undated) DEVICE — GLV SURG SENSICARE MICRO PF LF 9 STRL

## (undated) DEVICE — 3M™ STERI-DRAPE™ U-DRAPE 1015: Brand: STERI-DRAPE™

## (undated) DEVICE — BANDAGE,GAUZE,BULKEE II,4.5"X4.1YD,STRL: Brand: MEDLINE

## (undated) DEVICE — HANDPIECE SET WITH HIGH FLOW TIP AND SUCTION TUBE: Brand: INTERPULSE

## (undated) DEVICE — ANTIBACTERIAL UNDYED BRAIDED (POLYGLACTIN 910), SYNTHETIC ABSORBABLE SUTURE: Brand: COATED VICRYL

## (undated) DEVICE — KWIRE TIMBERLINE BLNT
Type: IMPLANTABLE DEVICE | Status: NON-FUNCTIONAL
Removed: 2017-08-02

## (undated) DEVICE — CATH IV ANGIO FEP 12G 3IN LTBLU 10PK

## (undated) DEVICE — PAD MINOR UNIVERSAL: Brand: MEDLINE INDUSTRIES, INC.

## (undated) DEVICE — SUT ETHLN 3/0 FS1 30IN 669H

## (undated) DEVICE — SPONGE,LAP,12"X12",XR,ST,5/PK,40PK/CS: Brand: MEDLINE

## (undated) DEVICE — INTENDED FOR TISSUE SEPARATION, AND OTHER PROCEDURES THAT REQUIRE A SHARP SURGICAL BLADE TO PUNCTURE OR CUT.: Brand: BARD-PARKER ® STAINLESS STEEL BLADES

## (undated) DEVICE — TRAP FLD MINIVAC MEGADYNE 100ML

## (undated) DEVICE — ELECTRD BLD EZ CLN MOD XLNG 2.75IN

## (undated) DEVICE — DUAL CUT SAGITTAL BLADE

## (undated) DEVICE — GLV SURG TRIUMPH NATURAL W/ALOE PF LTX 8 STRL

## (undated) DEVICE — 4-PORT MANIFOLD: Brand: NEPTUNE 2

## (undated) DEVICE — VAGINAL PREP TRAY: Brand: MEDLINE INDUSTRIES, INC.

## (undated) DEVICE — PK TURNOVER RM ADV

## (undated) DEVICE — PRECISION THIN (9.0 X 0.38 X 25.0MM)

## (undated) DEVICE — SUT ETHIB 1 CT1 30IN  X425H

## (undated) DEVICE — GLV SURG NEOLON 2G PF LF 7.5 STRL

## (undated) DEVICE — SHORT LENS-STERILE

## (undated) DEVICE — PK SPINE LAT 30

## (undated) DEVICE — PAD,ABDOMINAL,8"X10",ST,LF: Brand: MEDLINE

## (undated) DEVICE — T-MAX DISPOSABLE FACE MASK 8 PER BOX

## (undated) DEVICE — ELECTRD BLD EDGE/INSUL1P 2.4X5.1MM STRL

## (undated) DEVICE — KT ACC LAT EMG/SSEP TIMBERLINE GEN2

## (undated) DEVICE — KT MONTR EMG/SSEP TIMBERLINE GEN2 LD2.5M

## (undated) DEVICE — GOWN,NON-REINFORCED,SIRUS,SET IN SLV,XXL: Brand: MEDLINE

## (undated) DEVICE — 1010 S-DRAPE TOWEL DRAPE 10/BX: Brand: STERI-DRAPE™

## (undated) DEVICE — GRFT PLS W/FLTR SM

## (undated) DEVICE — GLV SURG NEOLON 2G PF LF 8 STRL

## (undated) DEVICE — SHOULDER STABILIZATION KIT,                                    DISPOSABLE 12 PER BOX

## (undated) DEVICE — TP SILK DURAPORE 3IN

## (undated) DEVICE — GLV SURG DERMASSURE GRN LF PF 8.5

## (undated) DEVICE — PK SHLDR 30

## (undated) DEVICE — 3M™ IOBAN™ 2 ANTIMICROBIAL INCISE DRAPE 6651EZ: Brand: IOBAN™ 2